# Patient Record
Sex: FEMALE | URBAN - METROPOLITAN AREA
[De-identification: names, ages, dates, MRNs, and addresses within clinical notes are randomized per-mention and may not be internally consistent; named-entity substitution may affect disease eponyms.]

---

## 2023-01-30 ENCOUNTER — INPATIENT (INPATIENT)
Facility: HOSPITAL | Age: 63
LOS: 21 days | Discharge: HOME | DRG: 207 | End: 2023-02-21
Attending: PSYCHIATRY & NEUROLOGY | Admitting: INTERNAL MEDICINE
Payer: MEDICAID

## 2023-01-30 VITALS
TEMPERATURE: 98 F | HEART RATE: 122 BPM | DIASTOLIC BLOOD PRESSURE: 101 MMHG | WEIGHT: 158.73 LBS | HEIGHT: 62 IN | RESPIRATION RATE: 22 BRPM | OXYGEN SATURATION: 91 % | SYSTOLIC BLOOD PRESSURE: 180 MMHG

## 2023-01-30 DIAGNOSIS — R06.02 SHORTNESS OF BREATH: ICD-10-CM

## 2023-01-30 LAB
ALBUMIN SERPL ELPH-MCNC: 4.6 G/DL — SIGNIFICANT CHANGE UP (ref 3.3–5)
ALP SERPL-CCNC: 108 U/L — SIGNIFICANT CHANGE UP (ref 40–120)
ALT FLD-CCNC: 19 U/L — SIGNIFICANT CHANGE UP (ref 10–45)
ANION GAP SERPL CALC-SCNC: 15 MMOL/L — SIGNIFICANT CHANGE UP (ref 5–17)
AST SERPL-CCNC: 25 U/L — SIGNIFICANT CHANGE UP (ref 10–40)
BASE EXCESS BLDV CALC-SCNC: 6.5 MMOL/L — HIGH (ref -2–3)
BASOPHILS # BLD AUTO: 0.05 K/UL — SIGNIFICANT CHANGE UP (ref 0–0.2)
BASOPHILS NFR BLD AUTO: 0.2 % — SIGNIFICANT CHANGE UP (ref 0–2)
BILIRUB SERPL-MCNC: 0.4 MG/DL — SIGNIFICANT CHANGE UP (ref 0.2–1.2)
BUN SERPL-MCNC: 13 MG/DL — SIGNIFICANT CHANGE UP (ref 7–23)
CA-I SERPL-SCNC: 1.16 MMOL/L — SIGNIFICANT CHANGE UP (ref 1.15–1.33)
CALCIUM SERPL-MCNC: 9.5 MG/DL — SIGNIFICANT CHANGE UP (ref 8.4–10.5)
CHLORIDE BLDV-SCNC: 100 MMOL/L — SIGNIFICANT CHANGE UP (ref 96–108)
CHLORIDE SERPL-SCNC: 97 MMOL/L — SIGNIFICANT CHANGE UP (ref 96–108)
CO2 BLDV-SCNC: 35 MMOL/L — HIGH (ref 22–26)
CO2 SERPL-SCNC: 27 MMOL/L — SIGNIFICANT CHANGE UP (ref 22–31)
CREAT SERPL-MCNC: 0.51 MG/DL — SIGNIFICANT CHANGE UP (ref 0.5–1.3)
EGFR: 105 ML/MIN/1.73M2 — SIGNIFICANT CHANGE UP
EOSINOPHIL # BLD AUTO: 0.05 K/UL — SIGNIFICANT CHANGE UP (ref 0–0.5)
EOSINOPHIL NFR BLD AUTO: 0.2 % — SIGNIFICANT CHANGE UP (ref 0–6)
FLUAV AG NPH QL: SIGNIFICANT CHANGE UP
FLUBV AG NPH QL: SIGNIFICANT CHANGE UP
GAS PNL BLDV: 135 MMOL/L — LOW (ref 136–145)
GAS PNL BLDV: SIGNIFICANT CHANGE UP
GLUCOSE BLDV-MCNC: 186 MG/DL — HIGH (ref 70–99)
GLUCOSE SERPL-MCNC: 162 MG/DL — HIGH (ref 70–99)
HCO3 BLDV-SCNC: 33 MMOL/L — HIGH (ref 22–29)
HCT VFR BLD CALC: 45.8 % — HIGH (ref 34.5–45)
HCT VFR BLDA CALC: 45 % — SIGNIFICANT CHANGE UP (ref 34.5–46.5)
HGB BLD CALC-MCNC: 15.1 G/DL — SIGNIFICANT CHANGE UP (ref 11.7–16.1)
HGB BLD-MCNC: 15.2 G/DL — SIGNIFICANT CHANGE UP (ref 11.5–15.5)
IMM GRANULOCYTES NFR BLD AUTO: 0.5 % — SIGNIFICANT CHANGE UP (ref 0–0.9)
LACTATE BLDV-MCNC: 1.3 MMOL/L — SIGNIFICANT CHANGE UP (ref 0.5–2)
LYMPHOCYTES # BLD AUTO: 0.8 K/UL — LOW (ref 1–3.3)
LYMPHOCYTES # BLD AUTO: 3.7 % — LOW (ref 13–44)
MCHC RBC-ENTMCNC: 29.6 PG — SIGNIFICANT CHANGE UP (ref 27–34)
MCHC RBC-ENTMCNC: 33.2 GM/DL — SIGNIFICANT CHANGE UP (ref 32–36)
MCV RBC AUTO: 89.1 FL — SIGNIFICANT CHANGE UP (ref 80–100)
MONOCYTES # BLD AUTO: 0.95 K/UL — HIGH (ref 0–0.9)
MONOCYTES NFR BLD AUTO: 4.4 % — SIGNIFICANT CHANGE UP (ref 2–14)
NEUTROPHILS # BLD AUTO: 19.8 K/UL — HIGH (ref 1.8–7.4)
NEUTROPHILS NFR BLD AUTO: 91 % — HIGH (ref 43–77)
NRBC # BLD: 0 /100 WBCS — SIGNIFICANT CHANGE UP (ref 0–0)
PCO2 BLDV: 55 MMHG — HIGH (ref 39–42)
PH BLDV: 7.39 — SIGNIFICANT CHANGE UP (ref 7.32–7.43)
PLATELET # BLD AUTO: 331 K/UL — SIGNIFICANT CHANGE UP (ref 150–400)
PO2 BLDV: 64 MMHG — HIGH (ref 25–45)
POTASSIUM BLDV-SCNC: 4.2 MMOL/L — SIGNIFICANT CHANGE UP (ref 3.5–5.1)
POTASSIUM SERPL-MCNC: 4.1 MMOL/L — SIGNIFICANT CHANGE UP (ref 3.5–5.3)
POTASSIUM SERPL-SCNC: 4.1 MMOL/L — SIGNIFICANT CHANGE UP (ref 3.5–5.3)
PROT SERPL-MCNC: 8.2 G/DL — SIGNIFICANT CHANGE UP (ref 6–8.3)
RBC # BLD: 5.14 M/UL — SIGNIFICANT CHANGE UP (ref 3.8–5.2)
RBC # FLD: 12.6 % — SIGNIFICANT CHANGE UP (ref 10.3–14.5)
RSV RNA NPH QL NAA+NON-PROBE: SIGNIFICANT CHANGE UP
SAO2 % BLDV: 93.2 % — HIGH (ref 67–88)
SARS-COV-2 RNA SPEC QL NAA+PROBE: DETECTED
SODIUM SERPL-SCNC: 139 MMOL/L — SIGNIFICANT CHANGE UP (ref 135–145)
WBC # BLD: 21.75 K/UL — HIGH (ref 3.8–10.5)
WBC # FLD AUTO: 21.75 K/UL — HIGH (ref 3.8–10.5)

## 2023-01-30 PROCEDURE — 99291 CRITICAL CARE FIRST HOUR: CPT

## 2023-01-30 PROCEDURE — 71045 X-RAY EXAM CHEST 1 VIEW: CPT | Mod: 26

## 2023-01-30 RX ORDER — PYRIDOSTIGMINE BROMIDE 60 MG/5ML
120 SOLUTION ORAL ONCE
Refills: 0 | Status: DISCONTINUED | OUTPATIENT
Start: 2023-01-30 | End: 2023-01-30

## 2023-01-30 RX ORDER — SODIUM CHLORIDE 9 MG/ML
500 INJECTION INTRAMUSCULAR; INTRAVENOUS; SUBCUTANEOUS ONCE
Refills: 0 | Status: COMPLETED | OUTPATIENT
Start: 2023-01-30 | End: 2023-01-30

## 2023-01-30 RX ADMIN — SODIUM CHLORIDE 500 MILLILITER(S): 9 INJECTION INTRAMUSCULAR; INTRAVENOUS; SUBCUTANEOUS at 19:07

## 2023-01-30 NOTE — ED PROVIDER NOTE - PHYSICAL EXAMINATION
GENERAL: no acute distress, non-toxic appearing  HEAD: normocephalic, atraumatic  HEENT: PERRLA, EOMI, normal conjunctiva  CARDIAC: regular rate and rhythm, no appreciable murmurs  PULM: diminished breath sounds right side, no crackles, no wheezing, no acute respiratory distress  GI: abdomen nondistended, soft, nontender  NEURO: alert and oriented x 3, normal speech, no gross neurologic deficit  MSK: no visible deformities, no peripheral edema, calf tenderness/redness/swelling  SKIN: no visible rashes, dry, well-perfused  PSYCH: appropriate mood and affect

## 2023-01-30 NOTE — CONSULT NOTE ADULT - ASSESSMENT
62y (1960) Malayalam-speaking woman with a PMHx significant for myasthenia gravis who presents to the ED with SOB for 3 days after testing positive for COVID-19 at home. She is currently on prednisone 10mg PRN, which she has taken for the past 4 days, and pyridostigmine 2 tablets of 60mg TID. She has had prior hospitalizations for myasthenia exacerbations every 1-2 years, with last hospitalization 1.5 years ago. Each time she had an exacerbation she was treated with IVIG. She reportedly discussed PLEX as an option but preferred IVIG and states she typically starts to improve after 3-4 days. In the ED, NIF/VC were -20/.68L. Physical exam remarkable for ophthalmoplegia, R ptosis>L, nasal speech, proximal>distal weakness, and SOB requiring 2L O2 via NC.    Impression: Worsening SOB and weakness due to myasthenia gravis exacerbation in the setting of COVID-19.    Recommendations:  [] MICU consult as patient would benefit from close observation of respiratory status in the ICU  [] Would recommend Shiley placement for at least 5 sessions of PLEX. Discussed at length the risk and benefits of PLEX vs IVIG given patient's current presentation, including risk for intubation and tracheostomy if no significant improvement with IVIG, however patient verbalized understanding and would prefer to proceed with IVIG. If patient insists on doing IVIG would plan for at least 5 sessions daily.  [] Hold prednisone and pyridostigmine during acute exacerbation  [] Avoid medications that may worsen the current exacerbation including macrolides, fluoroquinolones, tetracyclines, aminoglycoside, beta-blockers, magnesium, and anti-arrhythmics (procainamide, quinidine, and lidocaine)  [] NIF/VC q4h  [] Neuro checks and vital signs per unit protocol  [] Speech and swallow evaluation  [] PT/OT when able  [] Fall precautions    Case seen and discussed with neurology attending Dr. Whyte.     62y (1960) Malayalam-speaking woman with a PMHx significant for myasthenia gravis who presents to the ED with SOB for 3 days after testing positive for COVID-19 at home. She is currently on prednisone 10mg PRN, which she has taken for the past 4 days, and pyridostigmine 2 tablets of 60mg TID. She has had prior hospitalizations for myasthenia exacerbations every 1-2 years, with last hospitalization 1.5 years ago. Each time she had an exacerbation she was treated with IVIG. She reportedly discussed PLEX as an option but preferred IVIG and states she typically starts to improve after 3-4 days. In the ED, NIF/VC were -20/.68L. Physical exam remarkable for ophthalmoplegia, R ptosis>L, nasal speech, proximal>distal weakness, and SOB requiring 2L O2 via NC.    Impression: Worsening SOB and weakness due to myasthenia gravis exacerbation in the setting of COVID-19.    Recommendations:  [] MICU consult as patient would benefit from close observation of respiratory status in the ICU  [] Would recommend Shiley placement for at least 5 sessions of PLEX. Discussed at length the risk and benefits of PLEX vs IVIG given patient's current presentation, including risk for intubation and tracheostomy if no significant improvement with IVIG, however patient verbalized understanding and would prefer to proceed with IVIG. If patient insists on doing IVIG would plan for at least 5 sessions daily for a total of 2g/kg.  [] Hold prednisone and pyridostigmine during acute exacerbation  [] Avoid medications that may worsen the current exacerbation including macrolides, fluoroquinolones, tetracyclines, aminoglycoside, beta-blockers, magnesium, and anti-arrhythmics (procainamide, quinidine, and lidocaine)  [] NIF/VC q4h  [] Neuro checks and vital signs per unit protocol  [] Speech and swallow evaluation  [] PT/OT when able  [] Fall precautions    Case seen and discussed with neurology attending Dr. Whyte.     62y (1960) Malayalam-speaking woman with a PMHx significant for myasthenia gravis who presents to the ED with SOB for 3 days after testing positive for COVID-19 at home. She is currently on prednisone 10mg PRN, which she has taken for the past 4 days, and pyridostigmine 2 tablets of 60mg TID. She has had prior hospitalizations for myasthenia exacerbations every 1-2 years, with last hospitalization 1.5 years ago. Each time she had an exacerbation she was treated with IVIG. She reportedly discussed PLEX as an option but preferred IVIG and states she typically starts to improve after 3-4 days. In the ED, NIF/VC were -20/.68L. Physical exam remarkable for ophthalmoplegia, R ptosis>L, nasal speech, proximal>distal weakness, and SOB requiring 2L O2 via NC.    Impression: Worsening SOB and weakness due to myasthenia gravis exacerbation in the setting of COVID-19.    Recommendations:  [] MICU consult as patient would benefit from close observation of respiratory status in the ICU  [] Would recommend Shiley placement for at least 5 sessions of PLEX. Discussed at length the risk and benefits of PLEX vs IVIG given patient's current presentation, including risk for intubation and tracheostomy if no significant improvement with IVIG, however patient verbalized understanding and would prefer to proceed with IVIG. If patient insists on doing IVIG would plan for 5 sessions daily for a total of 2g/kg.  [] Hold prednisone and pyridostigmine during acute exacerbation  [] Avoid medications that may worsen the current exacerbation including macrolides, fluoroquinolones, tetracyclines, aminoglycoside, beta-blockers, magnesium, and anti-arrhythmics (procainamide, quinidine, and lidocaine)  [] NIF/VC q4h  [] Neuro checks and vital signs per unit protocol  [] Speech and swallow evaluation  [] PT/OT when able  [] Fall precautions    Case seen and discussed with neurology attending Dr. Whyte.

## 2023-01-30 NOTE — PROGRESS NOTE ADULT - ASSESSMENT
ASSESSMENT/PLAN:    NEURO:  Activity: [] mobilize as tolerated [] Bedrest [] PT [] OT [] PMNR    PULM:    CV:  SBP goal    RENAL:  Fluids:    GI:  Diet:  GI prophylaxis [] not indicated [] PPI [] other:  Bowel regimen [] colace [] senna [] other:    ENDO:   Goal euglycemia (-180)    HEME/ONC:  VTE prophylaxis: [] SCDs [] chemoprophylaxis [] hold chemoprophylaxis due to: [] high risk of DVT/PE on admission due to:    ID:    MISC:    SOCIAL/FAMILY:  [] awaiting [] updated at bedside [] family meeting    CODE STATUS:  [] Full Code [] DNR [] DNI [] Palliative/Comfort Care    DISPOSITION:  [] ICU [] Stroke Unit [] Floor [] EMU [] RCU [] PCU    [] Patient is at high risk of neurologic deterioration/death due to:     Time seen:  Time spent: ___ [] critical care minutes    Contact: [phone] ASSESSMENT/PLAN:    NEURO:  Activity: [] mobilize as tolerated [] Bedrest [] PT [] OT [] PMNR    PULM:    CV:  SBP goal    RENAL:  Fluids:    GI:  Diet:  GI prophylaxis [] not indicated [] PPI [] other:  Bowel regimen [] colace [] senna [] other:    ENDO:   Goal euglycemia (-180)    HEME/ONC:  VTE prophylaxis: [] SCDs [] chemoprophylaxis [] hold chemoprophylaxis due to: [] high risk of DVT/PE on admission due to:    ID:    MISC:    SOCIAL/FAMILY:  [] awaiting [] updated at bedside [] family meeting    CODE STATUS:  [] Full Code [] DNR [] DNI [] Palliative/Comfort Care    DISPOSITION:  [] ICU [] Stroke Unit [] Floor [] EMU [] RCU [] PCU    [] Patient is at high risk of neurologic deterioration/death due to:     Time seen:  Time spent: ___ [] critical care minutes    Contact: [phone] ASSESSMENT/PLAN:    NEURO:  Activity: [] mobilize as tolerated [] Bedrest [] PT [] OT [] PMNR    PULM:    CV:  SBP goal    RENAL:  Fluids:    GI:  Diet:  GI prophylaxis [] not indicated [] PPI [] other:  Bowel regimen [] colace [] senna [] other:    ENDO:   Goal euglycemia (-180)    HEME/ONC:  VTE prophylaxis: [] SCDs [] chemoprophylaxis [] hold chemoprophylaxis due to: [] high risk of DVT/PE on admission due to:    ID:    MISC:    SOCIAL/FAMILY:  [] awaiting [] updated at bedside [] family meeting    CODE STATUS:  [] Full Code [] DNR [] DNI [] Palliative/Comfort Care    DISPOSITION:  [] ICU [] Stroke Unit [] Floor [] EMU [] RCU [] PCU    [] Patient is at high risk of neurologic deterioration/death due to:     Time seen:  Time spent: ___ [] critical care minutes    Contact: [phone] ASSESSMENT/PLAN: MG crisis, covid +    NEURO:   neurology consulted, reportedly had discussion with Patient regarding PLEX, patient refused  start IVIG not   Hold prednisone and pyridostigmine during acute exacerbation  Avoid medications that may worsen the current exacerbation including macrolides, fluoroquinolones, tetracyclines, aminoglycoside, beta-blockers, magnesium, and anti-arrhythmics (procainamide, quinidine, and lidocaine)  NIF/VC q4h--not able as on BiPAP   neurochecks Q2 hrs   Activity: [] mobilize as tolerated [x] Bedrest [] PT [] OT [] PMNR    PULM:  severe respiratory acidosis improved with BiPAP  if persistent/no further improvement, intubate     CV:  SBP goal 100-160  nicardipine gtt prn     RENAL:  Fluids: IVF    GI:  Diet: NPO   GI prophylaxis [x] not indicated [] PPI [] other:  Bowel regimen [] colace [x] senna [x] other: miralax     ENDO:   Goal euglycemia (-180)  RISS for now      HEME/ONC:  VTE prophylaxis: [x] SCDs [x] chemoprophylaxis  lovenox [] hold chemoprophylaxis due to: [] high risk of DVT/PE on admission due to:    ID: covid + hold off decadron given MG exac, supportive care for now  monitor for fevers     MISC:    SOCIAL/FAMILY:  [] awaiting [x] updated at bedside [] family meeting    CODE STATUS:  [x] Full Code [] DNR [] DNI [] Palliative/Comfort Care    DISPOSITION:  [x] ICU [] Stroke Unit [] Floor [] EMU [] RCU [] PCU    [x] Patient is at high risk of neurologic deterioration/death due to: MG crisis, hypercapnic respiratory failure       Contact: [phone] ASSESSMENT/PLAN: MG crisis, covid +    NEURO:   neurology consulted, reportedly had discussion with Patient regarding PLEX, patient refused  start IVIG not   Hold prednisone and pyridostigmine during acute exacerbation  Avoid medications that may worsen the current exacerbation including macrolides, fluoroquinolones, tetracyclines, aminoglycoside, beta-blockers, magnesium, and anti-arrhythmics (procainamide, quinidine, and lidocaine)  NIF/VC q4h--not able as on BiPAP   neurochecks Q2 hrs   Activity: [] mobilize as tolerated [x] Bedrest [] PT [] OT [] PMNR    PULM:  severe respiratory acidosis improved with BiPAP  if persistent/no further improvement, intubate     CV:  SBP goal 100-160  nicardipine gtt prn     RENAL:  Fluids: IVF    GI:  Diet: NPO   GI prophylaxis [x] not indicated [] PPI [] other:  Bowel regimen [] colace [x] senna [x] other: miralax     ENDO:   Goal euglycemia (-180)  RISS for now      HEME/ONC:  VTE prophylaxis: [x] SCDs [x] chemoprophylaxis  lovenox [] hold chemoprophylaxis due to: [] high risk of DVT/PE on admission due to:    ID: covid + hold off decadron given MG exac, supportive care for now  monitor for fevers     MISC:    SOCIAL/FAMILY:  [] awaiting [x] updated at bedside [] family meeting    CODE STATUS:  [x] Full Code [] DNR [] DNI [] Palliative/Comfort Care    DISPOSITION:  [x] ICU [] Stroke Unit [] Floor [] EMU [] RCU [] PCU    [x] Patient is at high risk of neurologic deterioration/death due to: MG crisis, hypercapnic respiratory failure       Contact: [phone] ASSESSMENT/PLAN: MG crisis, covid +    NEURO:   neurology consulted, reportedly had discussion with Patient regarding PLEX, patient refused  start IVIG not   Hold prednisone and pyridostigmine during acute exacerbation  Avoid medications that may worsen the current exacerbation including macrolides, fluoroquinolones, tetracyclines, aminoglycoside, beta-blockers, magnesium, and anti-arrhythmics (procainamide, quinidine, and lidocaine)  NIF/VC q4h--not able as on BiPAP   neurochecks Q2 hrs   Activity: [] mobilize as tolerated [x] Bedrest [] PT [] OT [] PMNR    PULM:  severe respiratory acidosis improved with BiPAP  if persistent/no further improvement, intubate     CV:  SBP goal 100-160  nicardipine gtt prn     RENAL:  Fluids: IVF    GI:  Diet: NPO   GI prophylaxis [x] not indicated [] PPI [] other:  Bowel regimen [] colace [x] senna [x] other: miralax     ENDO:   Goal euglycemia (-180)  RISS for now      HEME/ONC:  VTE prophylaxis: [x] SCDs [x] chemoprophylaxis  lovenox [] hold chemoprophylaxis due to: [] high risk of DVT/PE on admission due to:    ID: covid + hold off decadron given MG exac, supportive care for now  monitor for fevers     MISC:    SOCIAL/FAMILY:  [] awaiting [x] updated at bedside [] family meeting    CODE STATUS:  [x] Full Code [] DNR [] DNI [] Palliative/Comfort Care    DISPOSITION:  [x] ICU [] Stroke Unit [] Floor [] EMU [] RCU [] PCU    [x] Patient is at high risk of neurologic deterioration/death due to: MG crisis, hypercapnic respiratory failure       Contact: [phone]

## 2023-01-30 NOTE — CONSULT NOTE ADULT - ATTENDING COMMENTS
Patient seen and examined.  Agree with resident note as above.  Patient with hx as noted including DM2 on metformin and myasthenia gravis maintained on pyridostigmine who presented with worsening dyspnea and difficulty swallowing in the setting of covid intection (tested + at home and  is covid +).      On exam by attending, patient complained of worsening dyspnea and difficulty swallowing over the course of today.  Attempting swallow frequently, spitting in order to tolerate secretions.  Reports orthopnea.  Other MICU team members note worsening subjective dyspnea and lid lag since initial exam. Vitals stable on 2L via NC but NIF and VC are very low.  VBG shows chronic hypercarbia (no evidence of acute hypercarbia at the time of collection).    Given chronic hypercarbia, low respiratory parameters, worsening sx during the day, difficulty managing secretions, I assessed that patient would be best served with critical care monitoring and management.  Discussed with NSCU and patient will be triaged to neurocritical care service.  Will defer all decisions to NSCU.

## 2023-01-30 NOTE — ED PROVIDER NOTE - ATTENDING CONTRIBUTION TO CARE
I, Franklin Weber, performed a history and physical exam of the patient and discussed their management with the resident and/or advanced care provider. I reviewed the resident and/or advanced care provider's note and agree with the documented findings and plan of care. I was present and available for all procedures.    62y F PMHx DM, MG (on pyridostigmine, hospital admission every 1-2 years for IVIG), p/w SOB, orthopnea, Covid+ test at home This morning. Reports orthopnea last night at home.  Worsening shortness of breath this morning so came to the ER for evaluation. On prednisone 10 mg daily. Denies recent trauma, chills, headache, dizziness, nausea, vomiting, dysuria, freq, hematuria, diarrhea, constipation, shortness of breath, cough.    Well appearing and in NAD, head normal appearing atraumatic, trachea midline, tachypnea mild respiratory distress, lungs poor lung volumes but cta bilaterally, rrr no murmurs, soft NT ND abdomen, no visible extremity deformities, Alert and oriented, non focal neuro exam, skin warm and dry, normal affect and mood    Concerning for acute myasthenic crisis will obtain negative inspiratory force as well as forced vital capacity to prognosticate otherwise obtain screening labs close monitoring for respiratory collapse discussions with ICU and neurology for further disposition discussed with patient agreeable for plan likely related to COVID related illness complicated by myasthenia.  Unlikely ACS PE pneumothorax dissection AAA pneumonia anticipate admission possible to ICU

## 2023-01-30 NOTE — PROGRESS NOTE ADULT - SUBJECTIVE AND OBJECTIVE BOX
SUMMARY:    OVERNIGHT EVENTS:     ADMISSION SCORES:   GCS: HH: MF: NIHSS: ICH Score:    SEDATION SCORES:  RASS: CAM-ICU:     REVIEW OF SYSTEMS:    VITALS: [] Reviewed    IMAGING/DATA: [] Reviewed    IVF FLUIDS/MEDICATIONS: [] Reviewed    ALLERGIES: Allergies    No Known Allergies    Intolerances        DEVICES:   [] Restraints [] PIVs [] ET tube [] central line [] PICC [] arterial line [] morales [] NGT/OGT [] EVD [] LD [] MYAH/HMV [] LiCOX [] ICP monitor [] Trach [] PEG [] Chest Tube [] other:    EXAMINATION:  General: No acute distress  HEENT: Anicteric sclerae  Cardiac: [id number]  Lungs: Clear  Abdomen: Soft, non-tender, +BS  Extremities: No c/c/e  Skin/Incision Site: Clean, dry and intact  Neurologic: Awake, alert, fully oriented, follows commands, PERRL, VFFtc, EOMI, face symmetric, tongue midline, no drift, full strength SUMMARY:    OVERNIGHT EVENTS:     ADMISSION SCORES:   GCS: HH: MF: NIHSS: ICH Score:    SEDATION SCORES:  RASS: CAM-ICU:     REVIEW OF SYSTEMS:    VITALS: [] Reviewed    IMAGING/DATA: [] Reviewed    IVF FLUIDS/MEDICATIONS: [] Reviewed    ALLERGIES: Allergies    No Known Allergies    Intolerances        DEVICES:   [] Restraints [] PIVs [] ET tube [] central line [] PICC [] arterial line [] morales [] NGT/OGT [] EVD [] LD [] MAYH/HMV [] LiCOX [] ICP monitor [] Trach [] PEG [] Chest Tube [] other:    EXAMINATION:  General: No acute distress  HEENT: Anicteric sclerae  Cardiac: [id number]  Lungs: Clear  Abdomen: Soft, non-tender, +BS  Extremities: No c/c/e  Skin/Incision Site: Clean, dry and intact  Neurologic: Awake, alert, fully oriented, follows commands, PERRL, VFFtc, EOMI, face symmetric, tongue midline, no drift, full strength SUMMARY:    OVERNIGHT EVENTS:     ADMISSION SCORES:   GCS: HH: MF: NIHSS: ICH Score:    SEDATION SCORES:  RASS: CAM-ICU:     REVIEW OF SYSTEMS:    VITALS: [] Reviewed    IMAGING/DATA: [] Reviewed    IVF FLUIDS/MEDICATIONS: [] Reviewed    ALLERGIES: Allergies    No Known Allergies    Intolerances        DEVICES:   [] Restraints [] PIVs [] ET tube [] central line [] PICC [] arterial line [] morales [] NGT/OGT [] EVD [] LD [] MYAH/HMV [] LiCOX [] ICP monitor [] Trach [] PEG [] Chest Tube [] other:    EXAMINATION:  General: No acute distress  HEENT: Anicteric sclerae  Cardiac: [id number]  Lungs: Clear  Abdomen: Soft, non-tender, +BS  Extremities: No c/c/e  Skin/Incision Site: Clean, dry and intact  Neurologic: Awake, alert, fully oriented, follows commands, PERRL, VFFtc, EOMI, face symmetric, tongue midline, no drift, full strength SUMMARY: 61yo Malayalam-speaking woman w/ Myasthenia Gravis (on pyridostigmine, hospitalization every 1-2 years requiring IVIG) (dx 2015), s/p thymectomy (2010), T2DM who presents with SOB, cough, difficulty spitting up sputum x 4 days.  was sick with COVID, so son performed home COVID test for patient which was positive. Patient's primary complaint is SOB when lying down. She has been sleeping upright. She had difficulty coughing up and spitting out sputum but today these symptoms are slightly improved. Also reports difficulty swallowing - but was able to tolerate taking oral pills. Admits to difficulty looking up, but eye drooping is overall improved from past couple of months. No blurry vision. Patient takes pyridostigmine 120mg TID regularly. Patient also has prednisone PRN for sick days-- she has been taking 20mg prednisone x past 4 days as per instruction of her neurologist.     Of note, last hospitalization was 2 years ago at [facility] during which patient was intubated -- son notes that this was likely 2/2 patient being forced to lie down flat in hospital bed, leading to aspiration. She was extubated after ~3 days. Received IVIG during that hospitalization    OVERNIGHT EVENTS: Adm NSCU, upon arrival to the unit, appeared in acute respiratory distress tachypneic, hypertensive, tachycardic, hypoxic, ABG showing resp acidosis, immediately put on BiPAP, started on IVIG with some improvement     ADMISSION SCORES:   GCS: 14 HH: MF: NIHSS: ICH Score:    REVIEW OF SYSTEMS: [] Unable to Assess due to neurologic exam   [ x] All ROS addressed below are non-contributory, except:  Neuro: [ ] Headache [ ] Back pain [ ] Numbness [ ] Weakness [ ] Ataxia [ ] Dizziness [ ] Aphasia [ ] Dysarthria [ ] Visual disturbance  Resp: [ x] Shortness of breath/dyspnea [ ] Orthopnea [ ] Cough  CV: [ ] Chest pain [ ] Palpitation [ ] Lightheadedness [ ] Syncope  Renal: [ ] Thirst [ ] Edema  GI: [ ] Nausea [ ] Emesis [ ] Abdominal pain [ ] Constipation [ ] Diarrhea  Hem: [ ] Hematemesis [ ] bBright red blood per rectum  ID: [ ] Fever [ ] Chills [ ] Dysuria  ENT: [ ] Rhinorrhea    VITALS: [x] Reviewed    IMAGING/DATA: [x] Reviewed    IVF FLUIDS/MEDICATIONS: [x] Reviewed    ALLERGIES: Allergies    No Known Allergies    Intolerances      DEVICES:   [] Restraints [x] PIVs [] ET tube [] central line [] PICC [] arterial line [] morales [] NGT/OGT [] EVD [] LD [] MYAH/HMV [] LiCOX [] ICP monitor [] Trach [] PEG [] Chest Tube [] other:    EXAMINATION:  General: distress  HEENT: Anicteric sclerae  Cardiac: [id number] tachycardic  Lungs: diminished   Abdomen: Soft, non-tender, +BS  Neurologic: Awake, alert, oriented x3 with , follows commands, PERRL, EOMI, face symmetric, TANNER to command SUMMARY: 61yo Malayalam-speaking woman w/ Myasthenia Gravis (on pyridostigmine, hospitalization every 1-2 years requiring IVIG) (dx 2015), s/p thymectomy (2010), T2DM who presents with SOB, cough, difficulty spitting up sputum x 4 days.  was sick with COVID, so son performed home COVID test for patient which was positive. Patient's primary complaint is SOB when lying down. She has been sleeping upright. She had difficulty coughing up and spitting out sputum but today these symptoms are slightly improved. Also reports difficulty swallowing - but was able to tolerate taking oral pills. Admits to difficulty looking up, but eye drooping is overall improved from past couple of months. No blurry vision. Patient takes pyridostigmine 120mg TID regularly. Patient also has prednisone PRN for sick days-- she has been taking 20mg prednisone x past 4 days as per instruction of her neurologist.     Of note, last hospitalization was 2 years ago at [facility] during which patient was intubated -- son notes that this was likely 2/2 patient being forced to lie down flat in hospital bed, leading to aspiration. She was extubated after ~3 days. Received IVIG during that hospitalization    OVERNIGHT EVENTS: Adm NSCU, upon arrival to the unit, appeared in acute respiratory distress tachypneic, hypertensive, tachycardic, hypoxic, ABG showing resp acidosis, immediately put on BiPAP, started on IVIG with some improvement     ADMISSION SCORES:   GCS: 14 HH: MF: NIHSS: ICH Score:    REVIEW OF SYSTEMS: [] Unable to Assess due to neurologic exam   [ x] All ROS addressed below are non-contributory, except:  Neuro: [ ] Headache [ ] Back pain [ ] Numbness [ ] Weakness [ ] Ataxia [ ] Dizziness [ ] Aphasia [ ] Dysarthria [ ] Visual disturbance  Resp: [ x] Shortness of breath/dyspnea [ ] Orthopnea [ ] Cough  CV: [ ] Chest pain [ ] Palpitation [ ] Lightheadedness [ ] Syncope  Renal: [ ] Thirst [ ] Edema  GI: [ ] Nausea [ ] Emesis [ ] Abdominal pain [ ] Constipation [ ] Diarrhea  Hem: [ ] Hematemesis [ ] bBright red blood per rectum  ID: [ ] Fever [ ] Chills [ ] Dysuria  ENT: [ ] Rhinorrhea    VITALS: [x] Reviewed    IMAGING/DATA: [x] Reviewed    IVF FLUIDS/MEDICATIONS: [x] Reviewed    ALLERGIES: Allergies    No Known Allergies    Intolerances      DEVICES:   [] Restraints [x] PIVs [] ET tube [] central line [] PICC [] arterial line [] morales [] NGT/OGT [] EVD [] LD [] MYAH/HMV [] LiCOX [] ICP monitor [] Trach [] PEG [] Chest Tube [] other:    EXAMINATION:  General: distress  HEENT: Anicteric sclerae  Cardiac: [id number] tachycardic  Lungs: diminished   Abdomen: Soft, non-tender, +BS  Neurologic: Awake, alert, oriented x3 with , follows commands, PERRL, EOMI, face symmetric, TANNER to command SUMMARY: 63yo Malayalam-speaking woman w/ Myasthenia Gravis (on pyridostigmine, hospitalization every 1-2 years requiring IVIG) (dx 2015), s/p thymectomy (2010), T2DM who presents with SOB, cough, difficulty spitting up sputum x 4 days.  was sick with COVID, so son performed home COVID test for patient which was positive. Patient's primary complaint is SOB when lying down. She has been sleeping upright. She had difficulty coughing up and spitting out sputum but today these symptoms are slightly improved. Also reports difficulty swallowing - but was able to tolerate taking oral pills. Admits to difficulty looking up, but eye drooping is overall improved from past couple of months. No blurry vision. Patient takes pyridostigmine 120mg TID regularly. Patient also has prednisone PRN for sick days-- she has been taking 20mg prednisone x past 4 days as per instruction of her neurologist.     Of note, last hospitalization was 2 years ago at [facility] during which patient was intubated -- son notes that this was likely 2/2 patient being forced to lie down flat in hospital bed, leading to aspiration. She was extubated after ~3 days. Received IVIG during that hospitalization    OVERNIGHT EVENTS: Adm NSCU, upon arrival to the unit, appeared in acute respiratory distress tachypneic, hypertensive, tachycardic, hypoxic, ABG showing resp acidosis, immediately put on BiPAP, started on IVIG with some improvement     ADMISSION SCORES:   GCS: 14 HH: MF: NIHSS: ICH Score:    REVIEW OF SYSTEMS: [] Unable to Assess due to neurologic exam   [ x] All ROS addressed below are non-contributory, except:  Neuro: [ ] Headache [ ] Back pain [ ] Numbness [ ] Weakness [ ] Ataxia [ ] Dizziness [ ] Aphasia [ ] Dysarthria [ ] Visual disturbance  Resp: [ x] Shortness of breath/dyspnea [ ] Orthopnea [ ] Cough  CV: [ ] Chest pain [ ] Palpitation [ ] Lightheadedness [ ] Syncope  Renal: [ ] Thirst [ ] Edema  GI: [ ] Nausea [ ] Emesis [ ] Abdominal pain [ ] Constipation [ ] Diarrhea  Hem: [ ] Hematemesis [ ] bBright red blood per rectum  ID: [ ] Fever [ ] Chills [ ] Dysuria  ENT: [ ] Rhinorrhea    VITALS: [x] Reviewed    IMAGING/DATA: [x] Reviewed    IVF FLUIDS/MEDICATIONS: [x] Reviewed    ALLERGIES: Allergies    No Known Allergies    Intolerances      DEVICES:   [] Restraints [x] PIVs [] ET tube [] central line [] PICC [] arterial line [] morales [] NGT/OGT [] EVD [] LD [] MYAH/HMV [] LiCOX [] ICP monitor [] Trach [] PEG [] Chest Tube [] other:    EXAMINATION:  General: distress  HEENT: Anicteric sclerae  Cardiac: [id number] tachycardic  Lungs: diminished   Abdomen: Soft, non-tender, +BS  Neurologic: Awake, alert, oriented x3 with , follows commands, PERRL, EOMI, face symmetric, TANNER to command

## 2023-01-30 NOTE — ED ADULT NURSE NOTE - OBJECTIVE STATEMENT
Pt presented to the ED in NAD, A&O x 4, breathing with ease on room air, c/o SOB, difficulty swallowing and generalized weakness. Pt tested Covid+ at home.

## 2023-01-30 NOTE — CONSULT NOTE ADULT - ASSESSMENT
#Myasthenia gravis exacerbation likely d/t COVID infection  - Recommend repeat NIF/Vital Capacity  - Steroids:  -  #Myasthenia gravis exacerbation likely d/t COVID infection  - NIF/Vital Capacity q4h  - f/u Neuro recs - hold pyridostigmine and steroid iso acute exacerbation  - Patient declines PLEX  - Should start IVIG  - Should be monitored in ICU setting - neuro crit care unit vs. MICU, TBD    Discussed with Dr. Bauman

## 2023-01-30 NOTE — CONSULT NOTE ADULT - CRITICAL CARE ATTENDING COMMENT
HPI as per resident note, personally verified by me. Patient with worsening of respiration, speech, and weakness for the past 3-4 days since being diagnosed with COVID. She discussed with her outside neurologist and had increased her outpatient prednisone from 10mg daily to 20mg daily but symptoms still worsened. Also reported some dysphagia but can still swallow pills. She has had her prior work-up and treatment at [facility] and her last exacerbation was ~1.5 years ago. She has had PNA and had to be briefly intubated in the past but was able to be extubated without tracheostomy. She is very reticent against PLEX as she does not want Shiley placed but has had experience with IVIG and is willing to try that. VC .68 L and NIF -20 cm H20.    Neurologic exam as per resident note with additions as below:  AAO x3, speech very nasal and moderately hypophonic but fluent  CN's II-XII intact except for moderate to severe R > L ptosis, dysconjugate with poor upgaze at baseline, NF 3/5, NE 3+/5  Strength BUE's deltoid 3/5 -> disal 4+/5, BLE's with IP 4/5 and R DF/PF 4/5, L DF/PF 4+/5  Sens intact all  Coordination grossly appropriate for level of strength  Neutral b/l plantar response    A/P:  Myasthenia gravis (MG) exacerbation with weakness, ptosis, and acute respiratory failure  COVID-19 infection    - Likely MG exacerbation/crisis triggered by current COVID-19 infection. Her VC and NIF are low and she has increased risk of intubation and will require ICU level of monitoring, at least for now  - Had extensive talk with patient and son regarding treatment and our recommendation for PLEX given significant respiratory involvement and wanted to avoid intubation. However, they were insistent upon IVIG. Would give IVIG 0.4g/kg/day x5 days and premedicate with Tylenol, Benadryl, and IV fluids  - VC and NIF [id number]  - Hold home Mestinon and steroids for now  - PT/OT/ST evaluation  - Continue to address above medical issues, as you are doing  - Will continue to follow patient with you HPI as per resident note, personally verified by me. Patient with worsening of respiration, speech, and weakness for the past 3-4 days since being diagnosed with COVID. She discussed with her outside neurologist and had increased her outpatient prednisone from 10mg daily to 20mg daily but symptoms still worsened. Also reported some dysphagia but can still swallow pills. She has had her prior work-up and treatment at [facility] and her last exacerbation was ~1.5 years ago. She has had PNA and had to be briefly intubated in the past but was able to be extubated without tracheostomy. She is very reticent against PLEX as she does not want Shiley placed but has had experience with IVIG and is willing to try that. VC .68 L and NIF -20 cm H20.    Neurologic exam as per resident note with additions as below:  AAO x3, speech very nasal and moderately hypophonic but fluent  CN's II-XII intact except for moderate to severe R > L ptosis, dysconjugate with poor upgaze at baseline, NF 3/5, NE 3+/5  Strength BUE's deltoid 3/5 -> disal 4+/5, BLE's with IP 4/5 and R DF/PF 4/5, L DF/PF 4+/5  Sens intact all  Coordination grossly appropriate for level of strength  Neutral b/l plantar response    A/P:  Myasthenia gravis (MG) exacerbation with weakness, ptosis, and acute respiratory failure  COVID-19 infection    - Likely MG exacerbation/crisis triggered by current COVID-19 infection. Her VC and NIF are low and she has increased risk of intubation and will require ICU level of monitoring, at least for now  - Had extensive talk with patient and son regarding treatment and our recommendation for PLEX given significant respiratory involvement and wanted to avoid intubation. However, they were insistent upon IVIG. Would give IVIG 0.4g/kg/day x5 days and premedicate with Tylenol, Benadryl, and IV fluids  - VC and NIF [id number]  - Hold home Mestinon and steroids for now  - PT/OT/ST evaluation  - Continue to address above medical issues, as you are doing  - Will continue to follow patient with you HPI as per resident note, personally verified by me. Patient with worsening of respiration, speech, and weakness for the past 3-4 days since being diagnosed with COVID. She discussed with her outside neurologist and had increased her outpatient prednisone from 10mg daily to 20mg daily but symptoms still worsened. Also reported some dysphagia but can still swallow pills. She has had her prior work-up and treatment at [facility] and her last exacerbation was ~1.5 years ago. She has had PNA and had to be briefly intubated in the past but was able to be extubated without tracheostomy. She is very reticent against PLEX as she does not want Shiley placed but has had experience with IVIG and is willing to try that. VC .68 L and NIF -20 cm H20.    Neurologic exam as per resident note with additions as below:  AAO x3, speech very nasal and moderately hypophonic but fluent  CN's II-XII intact except for moderate to severe R > L ptosis, dysconjugate with poor upgaze at baseline, NF 3/5, NE 3+/5  Strength BUE's deltoid 3/5 -> disal 4+/5, BLE's with IP 4/5 and R DF/PF 4/5, L DF/PF 4+/5  Sens intact all  Coordination grossly appropriate for level of strength  Neutral b/l plantar response    A/P:  Myasthenia gravis (MG) exacerbation with weakness, ptosis, and acute respiratory failure  COVID-19 infection    - Likely MG exacerbation/crisis triggered by current COVID-19 infection. Her VC and NIF are low and she has increased risk of intubation and will require ICU level of monitoring, at least for now  - Had extensive talk with patient and son regarding treatment and our recommendation for PLEX given significant respiratory involvement and wanted to avoid intubation. However, they were insistent upon IVIG. Would give IVIG 0.4g/kg/day x5 days and premedicate with Tylenol, Benadryl, and IV fluids  - VC and NIF [id number]  - Hold home Mestinon and steroids for now  - PT/OT/ST evaluation  - Continue to address above medical issues, as you are doing  - Will continue to follow patient with you

## 2023-01-30 NOTE — ED PROVIDER NOTE - CHILD ABUSE FACILITY
[facility]

## 2023-01-30 NOTE — ED ADULT NURSE NOTE - NSIMPLEMENTINTERV_GEN_ALL_ED
Implemented All Universal Safety Interventions:  [place] to call system. Call bell, personal items and telephone within reach. Instruct patient to call for assistance. Room bathroom lighting operational. Non-slip footwear when patient is off stretcher. Physically safe environment: no spills, clutter or unnecessary equipment. Stretcher in lowest position, wheels locked, appropriate side rails in place. Implemented All Universal Safety Interventions:  [place] to call system. Call bell, personal items and telephone within reach. Instruct patient to call for assistance. Room bathroom lighting operational. Non-slip footwear when patient is off stretcher. Physically safe environment: no spills, clutter or unnecessary equipment. Stretcher in lowest position, wheels locked, appropriate side rails in place. Implemented All Universal Safety Interventions:  [place] to call system. Call bell, personal items and telephone within reach. Instruct patient to call for assistance. Room bathroom lighting operational. Non-slip footwear when patient is off stretcher. Physically safe environment: no spills, clutter or unnecessary equipment. Stretcher in lowest position, wheels locked, appropriate side rails in place.

## 2023-01-30 NOTE — ED ADULT NURSE REASSESSMENT NOTE - NS ED NURSE REASSESS COMMENT FT1
Report received from RADHA Slaughter. Pt is A&OX4 and is neurologically intact. Pt able to follow commands.  is at bedside. Pt is on the monitor and is ST. Pt is on O2 2l nasal canula. Pt note not filled out from previous shift. Pt pending dispo.

## 2023-01-30 NOTE — CONSULT NOTE ADULT - SUBJECTIVE AND OBJECTIVE BOX
62F Malayam-speaking w/ Myasthenia Gravis, DM  CHIEF COMPLAINT: SOB    HPI:  Interpretation provided by son at bedside.    62F Malayalam-speaking w/ Myasthenia Gravis (on pyridostigmine, hospitalization every 1-2 years requiring IVIG) (dx 2015), s/p thymectomy (2010), T2DM who presents with SOB, cough, difficulty spitting up sputum x 4 days.  was sick with COVID, so son performed home COVID test for patient which was positive. Patient's primary complaint is SOB when lying down. She has been sleeping upright. She had difficulty coughing up and spitting out sputum but today these symptoms are slightly improved. Also reports difficulty swallowing - but was able to tolerate taking oral pills. Admits to difficulty looking up, but this is overall improved than the past couple of months. Patient takes pyridostigmine 120mg TID regularly. Patient also has prednisone PRN for sick days-- she has been taking 20mg prednisone x past 4 days as per instruction of her neurologist.     Of note, last hospitalization was 2 years ago at [facility] during which patient was intubated -- son notes that this was likely 2/2 patient being forced to lie down flat in hospital bed, leading to aspiration. She was extubated after ~3 days. Received IVIG during that hospitalization.  PAST MEDICAL & SURGICAL HISTORY:  Myasthenia gravis      Diabetes mellitus          FAMILY HISTORY:      SOCIAL HISTORY:  Smoking: none  EtOH Use: none  Marital Status:   Occupation:  Recent Travel:  Country of Birth:  Advance Directives:    Allergies    No Known Allergies    Intolerances        HOME MEDICATIONS:    REVIEW OF SYSTEMS:  Constitutional:   Eyes:  ENT:  CV:  Resp:  GI:  :  MSK:  Integumentary:  Neurological:  Psychiatric:  Endocrine:  Hematologic/Lymphatic:  Allergic/Immunologic:  [ ] All other systems negative  [ ] Unable to assess ROS because ________    OBJECTIVE:  ICU Vital Signs Last 24 Hrs  T(C): 36.8 (30 Jan 2023 11:21), Max: 36.8 (30 Jan 2023 11:21)  T(F): 98.2 (30 Jan 2023 11:21), Max: 98.2 (30 Jan 2023 11:21)  HR: 120 (30 Jan 2023 14:14) (120 - 122)  BP: 180/101 (30 Jan 2023 11:21) (180/101 - 180/101)  BP(mean): --  ABP: --  ABP(mean): --  RR: 20 (30 Jan 2023 14:14) (20 - 22)  SpO2: 98% (30 Jan 2023 14:14) (91% - 98%)    O2 Parameters below as of 30 Jan 2023 14:14  Patient On (Oxygen Delivery Method): nasal cannula  O2 Flow (L/min): 2            CAPILLARY BLOOD GLUCOSE          PHYSICAL EXAM:  General:   HEENT:   Lymph Nodes:  Neck:   Respiratory:   Cardiovascular:   Abdomen:   Extremities:   Skin:   Neurological:  Psychiatry:    HOSPITAL MEDICATIONS:  MEDICATIONS  (STANDING):    MEDICATIONS  (PRN):      LABS:                        15.2   21.75 )-----------( 331      ( 30 Jan 2023 13:37 )             45.8     01-30    139  |  97  |  13  ----------------------------<  162<H>  4.1   |  27  |  0.51    Ca    9.5      30 Jan 2023 13:37    TPro  8.2  /  Alb  4.6  /  TBili  0.4  /  DBili  x   /  AST  25  /  ALT  19  /  AlkPhos  108  01-30          Venous Blood Gas:  01-30 @ 14:00  7.39/55/64/33/93.2  VBG Lactate: 1.3      MICROBIOLOGY:     RADIOLOGY:  [ ] Reviewed and interpreted by me    EKG: CHIEF COMPLAINT: SOB    HPI:  Interpretation provided by son at bedside.    62F Malayalam-speaking w/ Myasthenia Gravis (on pyridostigmine, hospitalization every 1-2 years requiring IVIG) (dx 2015), s/p thymectomy (2010), T2DM who presents with SOB, cough, difficulty spitting up sputum x 4 days.  was sick with COVID, so son performed home COVID test for patient which was positive. Patient's primary complaint is SOB when lying down. She has been sleeping upright. She had difficulty coughing up and spitting out sputum but today these symptoms are slightly improved. Also reports difficulty swallowing - but was able to tolerate taking oral pills. Admits to difficulty looking up, but this is overall improved than the past couple of months. Patient takes pyridostigmine 120mg TID regularly. Patient also has prednisone PRN for sick days-- she has been taking 20mg prednisone x past 4 days as per instruction of her neurologist.     Of note, last hospitalization was 2 years ago at [facility] during which patient was intubated -- son notes that this was likely 2/2 patient being forced to lie down flat in hospital bed, leading to aspiration. She was extubated after ~3 days. Received IVIG during that hospitalization.  PAST MEDICAL & SURGICAL HISTORY:  Myasthenia gravis      Diabetes mellitus          FAMILY HISTORY:      SOCIAL HISTORY:  Smoking: none  EtOH Use: none  Marital Status:   Occupation:  Recent Travel:  Country of Birth:  Advance Directives:    Allergies    No Known Allergies    Intolerances        HOME MEDICATIONS:    REVIEW OF SYSTEMS:  Constitutional:   Eyes:  ENT:  CV:  Resp:  GI:  :  MSK:  Integumentary:  Neurological:  Psychiatric:  Endocrine:  Hematologic/Lymphatic:  Allergic/Immunologic:  [ ] All other systems negative  [ ] Unable to assess ROS because ________    OBJECTIVE:  ICU Vital Signs Last 24 Hrs  T(C): 36.8 (30 Jan 2023 11:21), Max: 36.8 (30 Jan 2023 11:21)  T(F): 98.2 (30 Jan 2023 11:21), Max: 98.2 (30 Jan 2023 11:21)  HR: 120 (30 Jan 2023 14:14) (120 - 122)  BP: 180/101 (30 Jan 2023 11:21) (180/101 - 180/101)  BP(mean): --  ABP: --  ABP(mean): --  RR: 20 (30 Jan 2023 14:14) (20 - 22)  SpO2: 98% (30 Jan 2023 14:14) (91% - 98%)    O2 Parameters below as of 30 Jan 2023 14:14  Patient On (Oxygen Delivery Method): nasal cannula  O2 Flow (L/min): 2            CAPILLARY BLOOD GLUCOSE          PHYSICAL EXAM:  General:   HEENT:   Lymph Nodes:  Neck:   Respiratory:   Cardiovascular:   Abdomen:   Extremities:   Skin:   Neurological:  Psychiatry:    HOSPITAL MEDICATIONS:  MEDICATIONS  (STANDING):    MEDICATIONS  (PRN):      LABS:                        15.2   21.75 )-----------( 331      ( 30 Jan 2023 13:37 )             45.8     01-30    139  |  97  |  13  ----------------------------<  162<H>  4.1   |  27  |  0.51    Ca    9.5      30 Jan 2023 13:37    TPro  8.2  /  Alb  4.6  /  TBili  0.4  /  DBili  x   /  AST  25  /  ALT  19  /  AlkPhos  108  01-30          Venous Blood Gas:  01-30 @ 14:00  7.39/55/64/33/93.2  VBG Lactate: 1.3      MICROBIOLOGY:     RADIOLOGY:  [ ] Reviewed and interpreted by me    EKG: CHIEF COMPLAINT: SOB    HPI:  Interpretation provided by son at bedside.    62F Malayalam-speaking w/ Myasthenia Gravis (on pyridostigmine, hospitalization every 1-2 years requiring IVIG) (dx 2015), s/p thymectomy (2010), T2DM who presents with SOB, cough, difficulty spitting up sputum x 4 days.  was sick with COVID, so son performed home COVID test for patient which was positive. Patient's primary complaint is SOB when lying down. She has been sleeping upright. She had difficulty coughing up and spitting out sputum but today these symptoms are slightly improved. Also reports difficulty swallowing - but was able to tolerate taking oral pills. Admits to difficulty looking up, but this is overall improved than the past couple of months. Patient takes pyridostigmine 120mg TID regularly. Patient also has prednisone PRN for sick days-- she has been taking 20mg prednisone x past 4 days as per instruction of her neurologist.     Of note, last hospitalization was 2 years ago at [facility] during which patient was intubated -- son notes that this was likely 2/2 patient being forced to lie down flat in hospital bed, leading to aspiration. She was extubated after ~3 days. Received IVIG during that hospitalization.  PAST MEDICAL & SURGICAL HISTORY:  Myasthenia gravis      Diabetes mellitus          FAMILY HISTORY:      SOCIAL HISTORY:  Smoking: none  EtOH Use: none  Marital Status:   Occupation:  Recent Travel:  Country of Birth:  Advance Directives:    Allergies    No Known Allergies    Intolerances        HOME MEDICATIONS:    REVIEW OF SYSTEMS:  Constitutional:   Eyes:  ENT:  CV:  Resp:  GI:  :  MSK:  Integumentary:  Neurological:  Psychiatric:  Endocrine:  Hematologic/Lymphatic:  Allergic/Immunologic:  [ ] All other systems negative  [ ] Unable to assess ROS because ________    OBJECTIVE:  ICU Vital Signs Last 24 Hrs  T(C): 36.8 (30 Jan 2023 11:21), Max: 36.8 (30 Jan 2023 11:21)  T(F): 98.2 (30 Jan 2023 11:21), Max: 98.2 (30 Jan 2023 11:21)  HR: 120 (30 Jan 2023 14:14) (120 - 122)  BP: 180/101 (30 Jan 2023 11:21) (180/101 - 180/101)  BP(mean): --  ABP: --  ABP(mean): --  RR: 20 (30 Jan 2023 14:14) (20 - 22)  SpO2: 98% (30 Jan 2023 14:14) (91% - 98%)    O2 Parameters below as of 30 Jan 2023 14:14  Patient On (Oxygen Delivery Method): nasal cannula  O2 Flow (L/min): 2            CAPILLARY BLOOD GLUCOSE          PHYSICAL EXAM:  General:   HEENT:   Lymph Nodes:  Neck:   Respiratory:   Cardiovascular:   Abdomen:   Extremities:   Skin:   Neurological:  Psychiatry:    HOSPITAL MEDICATIONS:  MEDICATIONS  (STANDING):    MEDICATIONS  (PRN):      LABS:                        15.2   21.75 )-----------( 331      ( 30 Jan 2023 13:37 )             45.8     01-30    139  |  97  |  13  ----------------------------<  162<H>  4.1   |  27  |  0.51    Ca    9.5      30 Jan 2023 13:37    TPro  8.2  /  Alb  4.6  /  TBili  0.4  /  DBili  x   /  AST  25  /  ALT  19  /  AlkPhos  108  01-30          Venous Blood Gas:  01-30 @ 14:00  7.39/55/64/33/93.2  VBG Lactate: 1.3      MICROBIOLOGY:     RADIOLOGY:  [ ] Reviewed and interpreted by me    EKG: CHIEF COMPLAINT: SOB    HPI:  Interpretation provided by son at bedside.    62F Malayalam-speaking w/ Myasthenia Gravis (on pyridostigmine, hospitalization every 1-2 years requiring IVIG) (dx 2015), s/p thymectomy (2010), T2DM who presents with SOB, cough, difficulty spitting up sputum x 4 days.  was sick with COVID, so son performed home COVID test for patient which was positive. Patient's primary complaint is SOB when lying down. She has been sleeping upright. She had difficulty coughing up and spitting out sputum but today these symptoms are slightly improved. Also reports difficulty swallowing - but was able to tolerate taking oral pills. Admits to difficulty looking up, but eye drooping is overall improved from past couple of months. No blurry vision. Patient takes pyridostigmine 120mg TID regularly. Patient also has prednisone PRN for sick days-- she has been taking 20mg prednisone x past 4 days as per instruction of her neurologist.     Of note, last hospitalization was 2 years ago at [facility] during which patient was intubated -- son notes that this was likely 2/2 patient being forced to lie down flat in hospital bed, leading to aspiration. She was extubated after ~3 days. Received IVIG during that hospitalization.    PAST MEDICAL & SURGICAL HISTORY:  Myasthenia gravis  Diabetes mellitus      FAMILY HISTORY:    SOCIAL HISTORY:  Smoking: none  EtOH Use: none  Marital Status:     Allergies  No Known Allergies    Intolerances        HOME MEDICATIONS:  Pyridostigmine 120mg TID  PRN prednisone 10mg  Metformin 500mg BID      REVIEW OF SYSTEMS:  General: No fevers, chills  HEENT: No headaches, acute visual changes, rhinorrhea. +difficulty swallowing  CVS: No chest pain, palpitations  Resp: + cough, SOB, orthopnea.  GI: No abdominal pain, nausea, vomiting, constipation, diarrhea, hematochezia, melena  : No dysuria, frequency, hematuria, or discharge  MSK: No joint pain or swelling  Ext: No edema, no claudication  Skin: No rashes or itching  Heme: No easy bruising or petechiae  Neuro: No confusion, numbness, focal weakness, or loss of consciousness  Endocrine: No excessive heat or cold symptoms, polyuria, polydipsia    OBJECTIVE:  ICU Vital Signs Last 24 Hrs  T(C): 36.8 (30 Jan 2023 11:21), Max: 36.8 (30 Jan 2023 11:21)  T(F): 98.2 (30 Jan 2023 11:21), Max: 98.2 (30 Jan 2023 11:21)  HR: 120 (30 Jan 2023 14:14) (120 - 122)  BP: 180/101 (30 Jan 2023 11:21) (180/101 - 180/101)  BP(mean): --  ABP: --  ABP(mean): --  RR: 20 (30 Jan 2023 14:14) (20 - 22)  SpO2: 98% (30 Jan 2023 14:14) (91% - 98%)    O2 Parameters below as of 30 Jan 2023 14:14  Patient On (Oxygen Delivery Method): nasal cannula  O2 Flow (L/min): 2            CAPILLARY BLOOD GLUCOSE          PHYSICAL EXAM:  General: NAD, intermittently coughing up sputum. Well developed.  HEENT: NCAT, EOMI but with limited movement upwards, clear conjunctiva, no scleral icterus, mmm  Neck: Supple, no LAD  CV: RRR, S1S2, no m/r/g  Resp: CTAB, normal respiratory effort  Abd: Soft, NT, ND, normal bowel sounds  Ext: no edema, no clubbing or cyanosis  Neuro: AOx3, CN2-12 grossly intact, TANNER  Skin: warm, perfused    HOSPITAL MEDICATIONS:  MEDICATIONS  (STANDING):    MEDICATIONS  (PRN):      LABS:                        15.2   21.75 )-----------( 331      ( 30 Jan 2023 13:37 )             45.8     01-30    139  |  97  |  13  ----------------------------<  162<H>  4.1   |  27  |  0.51    Ca    9.5      30 Jan 2023 13:37    TPro  8.2  /  Alb  4.6  /  TBili  0.4  /  DBili  x   /  AST  25  /  ALT  19  /  AlkPhos  108  01-30          Venous Blood Gas:  01-30 @ 14:00  7.39/55/64/33/93.2  VBG Lactate: 1.3      MICROBIOLOGY:     RADIOLOGY:  [ ] Reviewed and interpreted by me    EKG: CHIEF COMPLAINT: SOB    HPI:  Interpretation provided by son at bedside.    62F Malayalam-speaking w/ Myasthenia Gravis (on pyridostigmine, hospitalization every 1-2 years requiring IVIG) (dx 2015), s/p thymectomy (2010), T2DM who presents with SOB, cough, difficulty spitting up sputum x 4 days.  was sick with COVID, so son performed home COVID test for patient which was positive. Patient's primary complaint is SOB when lying down. She has been sleeping upright. She had difficulty coughing up and spitting out sputum but today these symptoms are slightly improved. Also reports difficulty swallowing - but was able to tolerate taking oral pills. Admits to difficulty looking up, but eye drooping is overall improved from past couple of months. No blurry vision. Patient takes pyridostigmine 120mg TID regularly. Patient also has prednisone PRN for sick days-- she has been taking 20mg prednisone x past 4 days as per instruction of her neurologist.     Of note, last hospitalization was 2 years ago at [facility] during which patient was intubated -- son notes that this was likely 2/2 patient being forced to lie down flat in hospital bed, leading to aspiration. She was extubated after ~3 days. Received IVIG during that hospitalization.    PAST MEDICAL & SURGICAL HISTORY:  Myasthenia gravis  Diabetes mellitus      FAMILY HISTORY:    SOCIAL HISTORY:  Smoking: none  EtOH Use: none  Marital Status:     Allergies  No Known Allergies    Intolerances        HOME MEDICATIONS:  Pyridostigmine 120mg TID  PRN prednisone 10mg  Metformin 500mg BID      REVIEW OF SYSTEMS:  General: No fevers, chills  HEENT: No headaches, acute visual changes, rhinorrhea. +difficulty swallowing  CVS: No chest pain, palpitations  Resp: + cough, SOB, orthopnea.  GI: No abdominal pain, nausea, vomiting, constipation, diarrhea, hematochezia, melena  : No dysuria, frequency, hematuria, or discharge  MSK: No joint pain or swelling  Ext: No edema, no claudication  Skin: No rashes or itching  Heme: No easy bruising or petechiae  Neuro: No confusion, numbness, focal weakness, or loss of consciousness  Endocrine: No excessive heat or cold symptoms, polyuria, polydipsia    OBJECTIVE:  ICU Vital Signs Last 24 Hrs  T(C): 36.8 (30 Jan 2023 11:21), Max: 36.8 (30 Jan 2023 11:21)  T(F): 98.2 (30 Jan 2023 11:21), Max: 98.2 (30 Jan 2023 11:21)  HR: 120 (30 Jan 2023 14:14) (120 - 122)  BP: 180/101 (30 Jan 2023 11:21) (180/101 - 180/101)  BP(mean): --  ABP: --  ABP(mean): --  RR: 20 (30 Jan 2023 14:14) (20 - 22)  SpO2: 98% (30 Jan 2023 14:14) (91% - 98%)    O2 Parameters below as of 30 Jan 2023 14:14  Patient On (Oxygen Delivery Method): nasal cannula  O2 Flow (L/min): 2            CAPILLARY BLOOD GLUCOSE          PHYSICAL EXAM:  General: NAD, intermittently coughing up sputum. Well developed.  HEENT: NCAT, EOMI but with limited movement upwards, clear conjunctiva, no scleral icterus, mmm  Neck: Supple, no LAD  CV: RRR, S1S2, no m/r/g  Resp: CTAB, normal respiratory effort  Abd: Soft, NT, ND, normal bowel sounds  Ext: no edema, no clubbing or cyanosis  Neuro: AOx3, CN2-12 grossly intact, TANNER  Skin: warm, perfused    HOSPITAL MEDICATIONS:  MEDICATIONS  (STANDING):    MEDICATIONS  (PRN):      LABS:                        15.2   21.75 )-----------( 331      ( 30 Jan 2023 13:37 )             45.8     01-30    139  |  97  |  13  ----------------------------<  162<H>  4.1   |  27  |  0.51    Ca    9.5      30 Jan 2023 13:37    TPro  8.2  /  Alb  4.6  /  TBili  0.4  /  DBili  x   /  AST  25  /  ALT  19  /  AlkPhos  108  01-30          Venous Blood Gas:  01-30 @ 14:00  7.39/55/64/33/93.2  VBG Lactate: 1.3      MICROBIOLOGY:     RADIOLOGY:  [ ] Reviewed and interpreted by me    EKG: CHIEF COMPLAINT: SOB    HPI:  Interpretation provided by son at bedside.    62F Malayalam-speaking w/ Myasthenia Gravis (on pyridostigmine, hospitalization every 1-2 years requiring IVIG) (dx 2015), s/p thymectomy (2010), T2DM who presents with SOB, cough, difficulty spitting up sputum x 4 days.  was sick with COVID, so son performed home COVID test for patient which was positive. Patient's primary complaint is SOB when lying down. She has been sleeping upright. She had difficulty coughing up and spitting out sputum but today these symptoms are slightly improved. Also reports difficulty swallowing - but was able to tolerate taking oral pills. Admits to difficulty looking up, but eye drooping is overall improved from past couple of months. No blurry vision. Patient takes pyridostigmine 120mg TID regularly. Patient also has prednisone PRN for sick days-- she has been taking 20mg prednisone x past 4 days as per instruction of her neurologist.     Of note, last hospitalization was 2 years ago at [facility] during which patient was intubated -- son notes that this was likely 2/2 patient being forced to lie down flat in hospital bed, leading to aspiration. She was extubated after ~3 days. Received IVIG during that hospitalization.    PAST MEDICAL & SURGICAL HISTORY:  Myasthenia gravis  Diabetes mellitus      FAMILY HISTORY:    SOCIAL HISTORY:  Smoking: none  EtOH Use: none  Marital Status:     Allergies  No Known Allergies    Intolerances        HOME MEDICATIONS:  Pyridostigmine 120mg TID  PRN prednisone 10mg  Metformin 500mg BID      REVIEW OF SYSTEMS:  General: No fevers, chills  HEENT: No headaches, acute visual changes, rhinorrhea. +difficulty swallowing  CVS: No chest pain, palpitations  Resp: + cough, SOB, orthopnea.  GI: No abdominal pain, nausea, vomiting, constipation, diarrhea, hematochezia, melena  : No dysuria, frequency, hematuria, or discharge  MSK: No joint pain or swelling  Ext: No edema, no claudication  Skin: No rashes or itching  Heme: No easy bruising or petechiae  Neuro: No confusion, numbness, focal weakness, or loss of consciousness  Endocrine: No excessive heat or cold symptoms, polyuria, polydipsia    OBJECTIVE:  ICU Vital Signs Last 24 Hrs  T(C): 36.8 (30 Jan 2023 11:21), Max: 36.8 (30 Jan 2023 11:21)  T(F): 98.2 (30 Jan 2023 11:21), Max: 98.2 (30 Jan 2023 11:21)  HR: 120 (30 Jan 2023 14:14) (120 - 122)  BP: 180/101 (30 Jan 2023 11:21) (180/101 - 180/101)  BP(mean): --  ABP: --  ABP(mean): --  RR: 20 (30 Jan 2023 14:14) (20 - 22)  SpO2: 98% (30 Jan 2023 14:14) (91% - 98%)    O2 Parameters below as of 30 Jan 2023 14:14  Patient On (Oxygen Delivery Method): nasal cannula  O2 Flow (L/min): 2            CAPILLARY BLOOD GLUCOSE          PHYSICAL EXAM:  General: NAD, intermittently coughing up sputum. Well developed.  HEENT: NCAT, EOMI but with limited movement upwards, clear conjunctiva, no scleral icterus, mmm  Neck: Supple, no LAD  CV: RRR, S1S2, no m/r/g  Resp: CTAB, normal respiratory effort  Abd: Soft, NT, ND, normal bowel sounds  Ext: no edema, no clubbing or cyanosis  Neuro: AOx3, CN2-12 grossly intact, TANNER  Skin: warm, perfused    HOSPITAL MEDICATIONS:  MEDICATIONS  (STANDING):    MEDICATIONS  (PRN):      LABS:                        15.2   21.75 )-----------( 331      ( 30 Jan 2023 13:37 )             45.8     01-30    139  |  97  |  13  ----------------------------<  162<H>  4.1   |  27  |  0.51    Ca    9.5      30 Jan 2023 13:37    TPro  8.2  /  Alb  4.6  /  TBili  0.4  /  DBili  x   /  AST  25  /  ALT  19  /  AlkPhos  108  01-30          Venous Blood Gas:  01-30 @ 14:00  7.39/55/64/33/93.2  VBG Lactate: 1.3      MICROBIOLOGY:     RADIOLOGY:  [ ] Reviewed and interpreted by me    EKG: CHIEF COMPLAINT: SOB    HPI:  Interpretation provided by son at bedside.    62F Malayalam-speaking w/ Myasthenia Gravis (on pyridostigmine, hospitalization every 1-2 years requiring IVIG) (dx 2015), s/p thymectomy (2010), T2DM who presents with SOB, cough, difficulty spitting up sputum x 4 days.  was sick with COVID, so son performed home COVID test for patient which was positive. Patient's primary complaint is SOB when lying down. She has been sleeping upright. She had difficulty coughing up and spitting out sputum but today these symptoms are slightly improved. Also reports difficulty swallowing - but was able to tolerate taking oral pills. Admits to difficulty looking up, but eye drooping is overall improved from past couple of months. No blurry vision. Patient takes pyridostigmine 120mg TID regularly. Patient also has prednisone PRN for sick days-- she has been taking 20mg prednisone x past 4 days as per instruction of her neurologist.     Of note, last hospitalization was 2 years ago at [facility] during which patient was intubated -- son notes that this was likely 2/2 patient being forced to lie down flat in hospital bed, leading to aspiration. She was extubated after ~3 days. Received IVIG during that hospitalization.    PAST MEDICAL & SURGICAL HISTORY:  Myasthenia gravis  Diabetes mellitus      FAMILY HISTORY:    SOCIAL HISTORY:  Smoking: none  EtOH Use: none  Marital Status:     Allergies  No Known Allergies    Intolerances        HOME MEDICATIONS:  Pyridostigmine 120mg TID  PRN prednisone 10mg  Metformin 500mg BID      REVIEW OF SYSTEMS:  General: No fevers, chills  HEENT: No headaches, acute visual changes, rhinorrhea. +difficulty swallowing  CVS: No chest pain, palpitations  Resp: + cough, SOB, orthopnea.  GI: No abdominal pain, nausea, vomiting, constipation, diarrhea, hematochezia, melena  : No dysuria, frequency, hematuria, or discharge  MSK: No joint pain or swelling  Ext: No edema, no claudication  Skin: No rashes or itching  Heme: No easy bruising or petechiae  Neuro: No confusion, numbness, focal weakness, or loss of consciousness  Endocrine: No excessive heat or cold symptoms, polyuria, polydipsia    OBJECTIVE:  ICU Vital Signs Last 24 Hrs  T(C): 36.8 (30 Jan 2023 11:21), Max: 36.8 (30 Jan 2023 11:21)  T(F): 98.2 (30 Jan 2023 11:21), Max: 98.2 (30 Jan 2023 11:21)  HR: 120 (30 Jan 2023 14:14) (120 - 122)  BP: 180/101 (30 Jan 2023 11:21) (180/101 - 180/101)  BP(mean): --  ABP: --  ABP(mean): --  RR: 20 (30 Jan 2023 14:14) (20 - 22)  SpO2: 98% (30 Jan 2023 14:14) (91% - 98%)    O2 Parameters below as of 30 Jan 2023 14:14  Patient On (Oxygen Delivery Method): nasal cannula  O2 Flow (L/min): 2            CAPILLARY BLOOD GLUCOSE          PHYSICAL EXAM:  General: NAD, intermittently coughing up sputum. Well developed.  HEENT: NCAT, EOMI but with limited movement upwards, clear conjunctiva, no scleral icterus, mmm, muffled voice  Neck: Supple, no LAD  CV: Tachycardic, regular rhythm, S1S2  Resp: CTAB, normal respiratory effort  Abd: Soft, NT, ND  Ext: no edema, no clubbing or cyanosis  Neuro: AOx3, slight difficulty lifting eyelids and extraocular movement upward. Slight slurring of words and muffled voice.   Skin: warm, perfused    HOSPITAL MEDICATIONS:  MEDICATIONS  (STANDING):    MEDICATIONS  (PRN):      LABS:                        15.2   21.75 )-----------( 331      ( 30 Jan 2023 13:37 )             45.8     01-30    139  |  97  |  13  ----------------------------<  162<H>  4.1   |  27  |  0.51    Ca    9.5      30 Jan 2023 13:37    TPro  8.2  /  Alb  4.6  /  TBili  0.4  /  DBili  x   /  AST  25  /  ALT  19  /  AlkPhos  108  01-30          Venous Blood Gas:  01-30 @ 14:00  7.39/55/64/33/93.2  VBG Lactate: 1.3      MICROBIOLOGY:     RADIOLOGY:  [ ] Reviewed and interpreted by me    EKG: CHIEF COMPLAINT: SOB    HPI:  Interpretation provided by son at bedside.    62F Malayalam-speaking w/ Myasthenia Gravis (on pyridostigmine, hospitalization every 1-2 years requiring IVIG) (dx 2015), s/p thymectomy (2010), T2DM who presents with SOB, cough, difficulty spitting up sputum x 4 days.  was sick with COVID, so son performed home COVID test for patient which was positive. Patient's primary complaint is SOB when lying down. She has been sleeping upright. She had difficulty coughing up and spitting out sputum but today these symptoms are slightly improved. Also reports difficulty swallowing - but was able to tolerate taking oral pills. Admits to difficulty looking up, but eye drooping is overall improved from past couple of months. No blurry vision. Patient takes pyridostigmine 120mg TID regularly. Patient also has prednisone PRN for sick days-- she has been taking 20mg prednisone x past 4 days as per instruction of her neurologist.     Of note, last hospitalization was 2 years ago at [facility] during which patient was intubated -- son notes that this was likely 2/2 patient being forced to lie down flat in hospital bed, leading to aspiration. She was extubated after ~3 days. Received IVIG during that hospitalization.    PAST MEDICAL & SURGICAL HISTORY:  Myasthenia gravis  Diabetes mellitus      FAMILY HISTORY:    SOCIAL HISTORY:  Smoking: none  EtOH Use: none  Marital Status:     Allergies  No Known Allergies    Intolerances        HOME MEDICATIONS:  Pyridostigmine 120mg TID  PRN prednisone 10mg  Metformin 500mg BID      REVIEW OF SYSTEMS:  General: No fevers, chills  HEENT: No headaches, acute visual changes, rhinorrhea. +difficulty swallowing  CVS: No chest pain, palpitations  Resp: + cough, SOB, orthopnea.  GI: No abdominal pain, nausea, vomiting, constipation, diarrhea, hematochezia, melena  : No dysuria, frequency, hematuria, or discharge  MSK: No joint pain or swelling  Ext: No edema, no claudication  Skin: No rashes or itching  Heme: No easy bruising or petechiae  Neuro: No confusion, numbness, focal weakness, or loss of consciousness  Endocrine: No excessive heat or cold symptoms, polyuria, polydipsia    OBJECTIVE:  ICU Vital Signs Last 24 Hrs  T(C): 36.8 (30 Jan 2023 11:21), Max: 36.8 (30 Jan 2023 11:21)  T(F): 98.2 (30 Jan 2023 11:21), Max: 98.2 (30 Jan 2023 11:21)  HR: 120 (30 Jan 2023 14:14) (120 - 122)  BP: 180/101 (30 Jan 2023 11:21) (180/101 - 180/101)  BP(mean): --  ABP: --  ABP(mean): --  RR: 20 (30 Jan 2023 14:14) (20 - 22)  SpO2: 98% (30 Jan 2023 14:14) (91% - 98%)    O2 Parameters below as of 30 Jan 2023 14:14  Patient On (Oxygen Delivery Method): nasal cannula  O2 Flow (L/min): 2            CAPILLARY BLOOD GLUCOSE          PHYSICAL EXAM:  General: NAD, intermittently coughing up sputum. Well developed.  HEENT: NCAT, EOMI but with limited movement upwards, clear conjunctiva, no scleral icterus, mmm, muffled voice  Neck: Supple, no LAD  CV: Tachycardic, regular rhythm, S1S2  Resp: CTAB, normal respiratory effort  Abd: Soft, NT, ND  Ext: no edema, no clubbing or cyanosis  Neuro: AOx3, slight difficulty lifting eyelids and extraocular movement upward. Slight slurring of words and muffled voice.   Skin: warm, perfused    HOSPITAL MEDICATIONS:  MEDICATIONS  (STANDING):    MEDICATIONS  (PRN):      LABS:                        15.2   21.75 )-----------( 331      ( 30 Jan 2023 13:37 )             45.8     01-30    139  |  97  |  13  ----------------------------<  162<H>  4.1   |  27  |  0.51    Ca    9.5      30 Jan 2023 13:37    TPro  8.2  /  Alb  4.6  /  TBili  0.4  /  DBili  x   /  AST  25  /  ALT  19  /  AlkPhos  108  01-30          Venous Blood Gas:  01-30 @ 14:00  7.39/55/64/33/93.2  VBG Lactate: 1.3      MICROBIOLOGY:     RADIOLOGY:  [ ] Reviewed and interpreted by me    EKG: CHIEF COMPLAINT: SOB    HPI:  Interpretation provided by son at bedside.    62F Malayalam-speaking w/ Myasthenia Gravis (on pyridostigmine, hospitalization every 1-2 years requiring IVIG) (dx 2015), s/p thymectomy (2010), T2DM who presents with SOB, cough, difficulty spitting up sputum x 4 days.  was sick with COVID, so son performed home COVID test for patient which was positive. Patient's primary complaint is SOB when lying down. She has been sleeping upright. She had difficulty coughing up and spitting out sputum but today these symptoms are slightly improved. Also reports difficulty swallowing - but was able to tolerate taking oral pills. Admits to difficulty looking up, but eye drooping is overall improved from past couple of months. No blurry vision. Patient takes pyridostigmine 120mg TID regularly. Patient also has prednisone PRN for sick days-- she has been taking 20mg prednisone x past 4 days as per instruction of her neurologist.     Of note, last hospitalization was 2 years ago at [facility] during which patient was intubated -- son notes that this was likely 2/2 patient being forced to lie down flat in hospital bed, leading to aspiration. She was extubated after ~3 days. Received IVIG during that hospitalization.    PAST MEDICAL & SURGICAL HISTORY:  Myasthenia gravis  Diabetes mellitus      FAMILY HISTORY:    SOCIAL HISTORY:  Smoking: none  EtOH Use: none  Marital Status:     Allergies  No Known Allergies    Intolerances        HOME MEDICATIONS:  Pyridostigmine 120mg TID  PRN prednisone 10mg  Metformin 500mg BID      REVIEW OF SYSTEMS:  General: No fevers, chills  HEENT: No headaches, acute visual changes, rhinorrhea. +difficulty swallowing  CVS: No chest pain, palpitations  Resp: + cough, SOB, orthopnea.  GI: No abdominal pain, nausea, vomiting, constipation, diarrhea, hematochezia, melena  : No dysuria, frequency, hematuria, or discharge  MSK: No joint pain or swelling  Ext: No edema, no claudication  Skin: No rashes or itching  Heme: No easy bruising or petechiae  Neuro: No confusion, numbness, focal weakness, or loss of consciousness  Endocrine: No excessive heat or cold symptoms, polyuria, polydipsia    OBJECTIVE:  ICU Vital Signs Last 24 Hrs  T(C): 36.8 (30 Jan 2023 11:21), Max: 36.8 (30 Jan 2023 11:21)  T(F): 98.2 (30 Jan 2023 11:21), Max: 98.2 (30 Jan 2023 11:21)  HR: 120 (30 Jan 2023 14:14) (120 - 122)  BP: 180/101 (30 Jan 2023 11:21) (180/101 - 180/101)  BP(mean): --  ABP: --  ABP(mean): --  RR: 20 (30 Jan 2023 14:14) (20 - 22)  SpO2: 98% (30 Jan 2023 14:14) (91% - 98%)    O2 Parameters below as of 30 Jan 2023 14:14  Patient On (Oxygen Delivery Method): nasal cannula  O2 Flow (L/min): 2            CAPILLARY BLOOD GLUCOSE          PHYSICAL EXAM:  General: NAD, intermittently coughing up sputum. Well developed.  HEENT: NCAT, EOMI but with limited movement upwards, clear conjunctiva, no scleral icterus, mmm, muffled voice  Neck: Supple, no LAD  CV: Tachycardic, regular rhythm, S1S2  Resp: CTAB, normal respiratory effort  Abd: Soft, NT, ND  Ext: no edema, no clubbing or cyanosis  Neuro: AOx3, slight difficulty lifting eyelids and extraocular movement upward. Slight slurring of words and muffled voice.   Skin: warm, perfused    HOSPITAL MEDICATIONS:  MEDICATIONS  (STANDING):    MEDICATIONS  (PRN):      LABS:                        15.2   21.75 )-----------( 331      ( 30 Jan 2023 13:37 )             45.8     01-30    139  |  97  |  13  ----------------------------<  162<H>  4.1   |  27  |  0.51    Ca    9.5      30 Jan 2023 13:37    TPro  8.2  /  Alb  4.6  /  TBili  0.4  /  DBili  x   /  AST  25  /  ALT  19  /  AlkPhos  108  01-30          Venous Blood Gas:  01-30 @ 14:00  7.39/55/64/33/93.2  VBG Lactate: 1.3      MICROBIOLOGY:     RADIOLOGY:  [ ] Reviewed and interpreted by me    EKG: CHIEF COMPLAINT: SOB    HPI:  Interpretation provided by son at bedside.    62F Malayalam-speaking w/ Myasthenia Gravis (on pyridostigmine, hospitalization every 1-2 years requiring IVIG) (dx 2015), s/p thymectomy (2010), T2DM who presents with SOB, cough, difficulty spitting up sputum x 4 days.  was sick with COVID, so son performed home COVID test for patient which was positive. Patient's primary complaint is SOB when lying down. She has been sleeping upright. She had difficulty coughing up and spitting out sputum but today these symptoms are slightly improved. Also reports difficulty swallowing - but was able to tolerate taking oral pills. Admits to difficulty looking up, but eye drooping is overall improved from past couple of months. No blurry vision. Patient takes pyridostigmine 120mg TID regularly. Patient also has prednisone PRN for sick days-- she has been taking 20mg prednisone x past 4 days as per instruction of her neurologist.     Of note, last hospitalization was 2 years ago at [facility] during which patient was intubated -- son notes that this was likely 2/2 patient being forced to lie down flat in hospital bed, leading to aspiration. She was extubated after ~3 days. Received IVIG during that hospitalization.    PAST MEDICAL & SURGICAL HISTORY:  Myasthenia gravis  Diabetes mellitus      SOCIAL HISTORY:  Smoking: none  EtOH Use: none  Marital Status:   lives at home with     Allergies  No Known Allergies      HOME MEDICATIONS:  Pyridostigmine 120mg TID  PRN prednisone 10mg  Metformin 500mg BID      REVIEW OF SYSTEMS:  General: No fevers, chills  HEENT: No headaches, acute visual changes, rhinorrhea. +difficulty swallowing  CVS: No chest pain, palpitations  Resp: + cough, SOB, orthopnea.  GI: No abdominal pain, nausea, vomiting, constipation, diarrhea, hematochezia, melena  : No dysuria, frequency, hematuria, or discharge  MSK: No joint pain or swelling  Ext: No edema, no claudication  Skin: No rashes or itching  Heme: No easy bruising or petechiae  Neuro: No confusion, numbness, focal weakness, or loss of consciousness  Endocrine: No excessive heat or cold symptoms, polyuria, polydipsia    OBJECTIVE:  ICU Vital Signs Last 24 Hrs  T(C): 36.8 (30 Jan 2023 11:21), Max: 36.8 (30 Jan 2023 11:21)  T(F): 98.2 (30 Jan 2023 11:21), Max: 98.2 (30 Jan 2023 11:21)  HR: 120 (30 Jan 2023 14:14) (120 - 122)  BP: 180/101 (30 Jan 2023 11:21) (180/101 - 180/101)  RR: 20 (30 Jan 2023 14:14) (20 - 22)  SpO2: 98% (30 Jan 2023 14:14) (91% - 98%)    O2 Parameters below as of 30 Jan 2023 14:14  Patient On (Oxygen Delivery Method): nasal cannula  O2 Flow (L/min): 2    PHYSICAL EXAM:  General: NAD, intermittently coughing up sputum. Well developed.  HEENT: NCAT, EOMI but with limited movement upwards, clear conjunctiva, no scleral icterus, mmm, muffled voice  Neck: Supple, no LAD  CV: Tachycardic, regular rhythm, S1S2  Resp: CTAB, normal respiratory effort  Abd: Soft, NT, ND  Ext: no edema, no clubbing or cyanosis  Neuro: AOx3, slight difficulty lifting eyelids and extraocular movement upward. Slight slurring of words and muffled voice.   Skin: warm, perfused    HOSPITAL MEDICATIONS:  MEDICATIONS  (STANDING):    MEDICATIONS  (PRN):      LABS:                        15.2   21.75 )-----------( 331      ( 30 Jan 2023 13:37 )             45.8     01-30    139  |  97  |  13  ----------------------------<  162<H>  4.1   |  27  |  0.51    Ca    9.5      30 Jan 2023 13:37    TPro  8.2  /  Alb  4.6  /  TBili  0.4  /  DBili  x   /  AST  25  /  ALT  19  /  AlkPhos  108  01-30    Venous Blood Gas:  01-30 @ 14:00  7.39/55/64/33/93.2  VBG Lactate: 1.3      MICROBIOLOGY: covid+    RADIOLOGY:  [+ ] Reviewed and interpreted by me  < from: Xray Chest 1 View- PORTABLE-Urgent (Xray Chest 1 View- PORTABLE-Urgent .) (01.30.23 @ 14:44) >    IMPRESSION:    Bibasilar linear atelectasis. Otherwise, the lungs are clear.    < end of copied text >   CHIEF COMPLAINT: SOB    HPI:  Interpretation provided by son at bedside.    62F Malayalam-speaking w/ Myasthenia Gravis (on pyridostigmine, hospitalization every 1-2 years requiring IVIG) (dx 2015), s/p thymectomy (2010), T2DM who presents with SOB, cough, difficulty spitting up sputum x 4 days.  was sick with COVID, so son performed home COVID test for patient which was positive. Patient's primary complaint is SOB when lying down. She has been sleeping upright. She had difficulty coughing up and spitting out sputum but today these symptoms are slightly improved. Also reports difficulty swallowing - but was able to tolerate taking oral pills. Admits to difficulty looking up, but eye drooping is overall improved from past couple of months. No blurry vision. Patient takes pyridostigmine 120mg TID regularly. Patient also has prednisone PRN for sick days-- she has been taking 20mg prednisone x past 4 days as per instruction of her neurologist.     Of note, last hospitalization was 2 years ago at [facility] during which patient was intubated -- son notes that this was likely 2/2 patient being forced to lie down flat in hospital bed, leading to aspiration. She was extubated after ~3 days. Received IVIG during that hospitalization.    PAST MEDICAL & SURGICAL HISTORY:  Myasthenia gravis  Diabetes mellitus      SOCIAL HISTORY:  Smoking: none  EtOH Use: none  Marital Status:   lives at home with     Allergies  No Known Allergies      HOME MEDICATIONS:  Pyridostigmine 120mg TID  PRN prednisone 10mg  Metformin 500mg BID      REVIEW OF SYSTEMS:  General: No fevers, chills  HEENT: No headaches, acute visual changes, rhinorrhea. +difficulty swallowing  CVS: No chest pain, palpitations  Resp: + cough, SOB, orthopnea.  GI: No abdominal pain, nausea, vomiting, constipation, diarrhea, hematochezia, melena  : No dysuria, frequency, hematuria, or discharge  MSK: No joint pain or swelling  Ext: No edema, no claudication  Skin: No rashes or itching  Heme: No easy bruising or petechiae  Neuro: No confusion, numbness, focal weakness, or loss of consciousness  Endocrine: No excessive heat or cold symptoms, polyuria, polydipsia    OBJECTIVE:  ICU Vital Signs Last 24 Hrs  T(C): 36.8 (30 Jan 2023 11:21), Max: 36.8 (30 Jan 2023 11:21)  T(F): 98.2 (30 Jan 2023 11:21), Max: 98.2 (30 Jan 2023 11:21)  HR: 120 (30 Jan 2023 14:14) (120 - 122)  BP: 180/101 (30 Jan 2023 11:21) (180/101 - 180/101)  RR: 20 (30 Jan 2023 14:14) (20 - 22)  SpO2: 98% (30 Jan 2023 14:14) (91% - 98%)    O2 Parameters below as of 30 Jan 2023 14:14  Patient On (Oxygen Delivery Method): nasal cannula  O2 Flow (L/min): 2    PHYSICAL EXAM:  General: NAD, intermittently coughing up sputum. Well developed.  HEENT: NCAT, EOMI but with limited movement upwards, clear conjunctiva, no scleral icterus, mmm, muffled voice  Neck: Supple, no LAD  CV: Tachycardic, regular rhythm, S1S2  Resp: CTAB, normal respiratory effort  Abd: Soft, NT, ND  Ext: no edema, no clubbing or cyanosis  Neuro: AOx3, slight difficulty lifting eyelids and extraocular movement upward. Slight slurring of words and muffled voice.   Skin: warm, perfused    HOSPITAL MEDICATIONS:  MEDICATIONS  (STANDING):    MEDICATIONS  (PRN):      LABS:                        15.2   21.75 )-----------( 331      ( 30 Jan 2023 13:37 )             45.8     01-30    139  |  97  |  13  ----------------------------<  162<H>  4.1   |  27  |  0.51    Ca    9.5      30 Jan 2023 13:37    TPro  8.2  /  Alb  4.6  /  TBili  0.4  /  DBili  x   /  AST  25  /  ALT  19  /  AlkPhos  108  01-30    Venous Blood Gas:  01-30 @ 14:00  7.39/55/64/33/93.2  VBG Lactate: 1.3      MICROBIOLOGY: covid+    RADIOLOGY:  [+ ] Reviewed and interpreted by me  < from: Xray Chest 1 View- PORTABLE-Urgent (Xray Chest 1 View- PORTABLE-Urgent .) (01.30.23 @ 14:44) >    IMPRESSION:    Bibasilar linear atelectasis. Otherwise, the lungs are clear.    < end of copied text >   CHIEF COMPLAINT: SOB    HPI:  Interpretation provided by son at bedside.    62F Malayalam-speaking w/ Myasthenia Gravis (on pyridostigmine, hospitalization every 1-2 years requiring IVIG) (dx 2015), s/p thymectomy (2010), T2DM who presents with SOB, cough, difficulty spitting up sputum x 4 days.  was sick with COVID, so son performed home COVID test for patient which was positive. Patient's primary complaint is SOB when lying down. She has been sleeping upright. She had difficulty coughing up and spitting out sputum but today these symptoms are slightly improved. Also reports difficulty swallowing - but was able to tolerate taking oral pills. Admits to difficulty looking up, but eye drooping is overall improved from past couple of months. No blurry vision. Patient takes pyridostigmine 120mg TID regularly. Patient also has prednisone PRN for sick days-- she has been taking 20mg prednisone x past 4 days as per instruction of her neurologist.     Of note, last hospitalization was 2 years ago at [facility] during which patient was intubated -- son notes that this was likely 2/2 patient being forced to lie down flat in hospital bed, leading to aspiration. She was extubated after ~3 days. Received IVIG during that hospitalization.    PAST MEDICAL & SURGICAL HISTORY:  Myasthenia gravis  Diabetes mellitus      SOCIAL HISTORY:  Smoking: none  EtOH Use: none  Marital Status:   lives at home with     Allergies  No Known Allergies      HOME MEDICATIONS:  Pyridostigmine 120mg TID  PRN prednisone 10mg  Metformin 500mg BID      REVIEW OF SYSTEMS:  General: No fevers, chills  HEENT: No headaches, acute visual changes, rhinorrhea. +difficulty swallowing  CVS: No chest pain, palpitations  Resp: + cough, SOB, orthopnea.  GI: No abdominal pain, nausea, vomiting, constipation, diarrhea, hematochezia, melena  : No dysuria, frequency, hematuria, or discharge  MSK: No joint pain or swelling  Ext: No edema, no claudication  Skin: No rashes or itching  Heme: No easy bruising or petechiae  Neuro: No confusion, numbness, focal weakness, or loss of consciousness  Endocrine: No excessive heat or cold symptoms, polyuria, polydipsia    OBJECTIVE:  ICU Vital Signs Last 24 Hrs  T(C): 36.8 (30 Jan 2023 11:21), Max: 36.8 (30 Jan 2023 11:21)  T(F): 98.2 (30 Jan 2023 11:21), Max: 98.2 (30 Jan 2023 11:21)  HR: 120 (30 Jan 2023 14:14) (120 - 122)  BP: 180/101 (30 Jan 2023 11:21) (180/101 - 180/101)  RR: 20 (30 Jan 2023 14:14) (20 - 22)  SpO2: 98% (30 Jan 2023 14:14) (91% - 98%)    O2 Parameters below as of 30 Jan 2023 14:14  Patient On (Oxygen Delivery Method): nasal cannula  O2 Flow (L/min): 2    PHYSICAL EXAM:  General: NAD, intermittently coughing up sputum. Well developed.  HEENT: NCAT, EOMI but with limited movement upwards, clear conjunctiva, no scleral icterus, mmm, muffled voice  Neck: Supple, no LAD  CV: Tachycardic, regular rhythm, S1S2  Resp: CTAB, normal respiratory effort  Abd: Soft, NT, ND  Ext: no edema, no clubbing or cyanosis  Neuro: AOx3, slight difficulty lifting eyelids and extraocular movement upward. Slight slurring of words and muffled voice.   Skin: warm, perfused    HOSPITAL MEDICATIONS:  MEDICATIONS  (STANDING):    MEDICATIONS  (PRN):      LABS:                        15.2   21.75 )-----------( 331      ( 30 Jan 2023 13:37 )             45.8     01-30    139  |  97  |  13  ----------------------------<  162<H>  4.1   |  27  |  0.51    Ca    9.5      30 Jan 2023 13:37    TPro  8.2  /  Alb  4.6  /  TBili  0.4  /  DBili  x   /  AST  25  /  ALT  19  /  AlkPhos  108  01-30    Venous Blood Gas:  01-30 @ 14:00  7.39/55/64/33/93.2  VBG Lactate: 1.3      MICROBIOLOGY: covid+    RADIOLOGY:  [+ ] Reviewed and interpreted by me  < from: Xray Chest 1 View- PORTABLE-Urgent (Xray Chest 1 View- PORTABLE-Urgent .) (01.30.23 @ 14:44) >    IMPRESSION:    Bibasilar linear atelectasis. Otherwise, the lungs are clear.    < end of copied text >

## 2023-01-30 NOTE — ED PROVIDER NOTE - CLINICAL SUMMARY MEDICAL DECISION MAKING FREE TEXT BOX
Patient is a 62y F PMHx DM, MG (on pyridostigmine, hospital admission every 1-2 years for IVIG), p/w SOB, orthopnea, Covid+ test at home. Concern for MG flare, already took prednisone at home, and home pyridostigmine. Will get labs, trop, CXR, NIF/VC. Patient likely TBA, may require IVIG. Will reassess. Currently sats well on 2L NC. Patient is a 62y F PMHx DM, MG (on pyridostigmine, hospital admission every 1-2 years for IVIG), p/w SOB, orthopnea, Covid+ test at home. Concern for MG flare, already took prednisone at home, and home pyridostigmine. Will get labs, trop, CXR, NIF/VC. Patient likely TBA, may require IVIG. Will reassess. Currently sats well on 2L NC.    pettet attending- see attending attestation for my mdm

## 2023-01-30 NOTE — ED PROVIDER NOTE - PROGRESS NOTE DETAILS
ap- pt signed out to me pedning admission to MICU, pt was seen by Dr. Whyte neurology recommending admission to micu, spoke w/ micu fellow who states they will be discussing w/ night MICU attending this case, pt w/ nif of -20, pt doesn't want plex treatment and wants trial of ivig, despite plex w/ improved outcomes spoke w/ son at bedside, nakita pt w/ mild eyelid drooping able to tolerate 60 mg of pyridostigmine, seen by micu they will discuss w/ [facility] regarding admission

## 2023-01-31 LAB
A1C WITH ESTIMATED AVERAGE GLUCOSE RESULT: 6.6 % — HIGH (ref 4–5.6)
ALBUMIN SERPL ELPH-MCNC: 3.5 G/DL — SIGNIFICANT CHANGE UP (ref 3.3–5)
ALBUMIN SERPL ELPH-MCNC: 4.4 G/DL — SIGNIFICANT CHANGE UP (ref 3.3–5)
ALP SERPL-CCNC: 121 U/L — HIGH (ref 40–120)
ALP SERPL-CCNC: 88 U/L — SIGNIFICANT CHANGE UP (ref 40–120)
ALT FLD-CCNC: 21 U/L — SIGNIFICANT CHANGE UP (ref 10–45)
ALT FLD-CCNC: 26 U/L — SIGNIFICANT CHANGE UP (ref 10–45)
ANION GAP SERPL CALC-SCNC: 21 MMOL/L — HIGH (ref 5–17)
ANION GAP SERPL CALC-SCNC: 8 MMOL/L — SIGNIFICANT CHANGE UP (ref 5–17)
AST SERPL-CCNC: 22 U/L — SIGNIFICANT CHANGE UP (ref 10–40)
AST SERPL-CCNC: 24 U/L — SIGNIFICANT CHANGE UP (ref 10–40)
B-OH-BUTYR SERPL-SCNC: 1.2 MMOL/L — HIGH
BILIRUB DIRECT SERPL-MCNC: <0.1 MG/DL — SIGNIFICANT CHANGE UP (ref 0–0.3)
BILIRUB INDIRECT FLD-MCNC: >0.2 MG/DL — SIGNIFICANT CHANGE UP (ref 0.2–1)
BILIRUB SERPL-MCNC: 0.3 MG/DL — SIGNIFICANT CHANGE UP (ref 0.2–1.2)
BILIRUB SERPL-MCNC: 0.4 MG/DL — SIGNIFICANT CHANGE UP (ref 0.2–1.2)
BLD GP AB SCN SERPL QL: NEGATIVE — SIGNIFICANT CHANGE UP
BUN SERPL-MCNC: 18 MG/DL — SIGNIFICANT CHANGE UP (ref 7–23)
BUN SERPL-MCNC: 22 MG/DL — SIGNIFICANT CHANGE UP (ref 7–23)
CALCIUM SERPL-MCNC: 8.3 MG/DL — LOW (ref 8.4–10.5)
CALCIUM SERPL-MCNC: 9.8 MG/DL — SIGNIFICANT CHANGE UP (ref 8.4–10.5)
CHLORIDE SERPL-SCNC: 102 MMOL/L — SIGNIFICANT CHANGE UP (ref 96–108)
CHLORIDE SERPL-SCNC: 99 MMOL/L — SIGNIFICANT CHANGE UP (ref 96–108)
CO2 SERPL-SCNC: 19 MMOL/L — LOW (ref 22–31)
CO2 SERPL-SCNC: 29 MMOL/L — SIGNIFICANT CHANGE UP (ref 22–31)
CREAT SERPL-MCNC: 0.51 MG/DL — SIGNIFICANT CHANGE UP (ref 0.5–1.3)
CREAT SERPL-MCNC: 0.61 MG/DL — SIGNIFICANT CHANGE UP (ref 0.5–1.3)
CREAT SERPL-MCNC: 0.63 MG/DL — SIGNIFICANT CHANGE UP (ref 0.5–1.3)
EGFR: 100 ML/MIN/1.73M2 — SIGNIFICANT CHANGE UP
EGFR: 105 ML/MIN/1.73M2 — SIGNIFICANT CHANGE UP
ESTIMATED AVERAGE GLUCOSE: 143 MG/DL — HIGH (ref 68–114)
GAS PNL BLDA: SIGNIFICANT CHANGE UP
GLUCOSE BLDC GLUCOMTR-MCNC: 118 MG/DL — HIGH (ref 70–99)
GLUCOSE BLDC GLUCOMTR-MCNC: 119 MG/DL — HIGH (ref 70–99)
GLUCOSE BLDC GLUCOMTR-MCNC: 145 MG/DL — HIGH (ref 70–99)
GLUCOSE BLDC GLUCOMTR-MCNC: 160 MG/DL — HIGH (ref 70–99)
GLUCOSE BLDC GLUCOMTR-MCNC: 166 MG/DL — HIGH (ref 70–99)
GLUCOSE BLDC GLUCOMTR-MCNC: 208 MG/DL — HIGH (ref 70–99)
GLUCOSE BLDC GLUCOMTR-MCNC: 241 MG/DL — HIGH (ref 70–99)
GLUCOSE SERPL-MCNC: 119 MG/DL — HIGH (ref 70–99)
GLUCOSE SERPL-MCNC: 248 MG/DL — HIGH (ref 70–99)
HCT VFR BLD CALC: 48.8 % — HIGH (ref 34.5–45)
HGB BLD-MCNC: 15.6 G/DL — HIGH (ref 11.5–15.5)
INR BLD: 1.22 RATIO — HIGH (ref 0.88–1.16)
INR BLD: 1.24 RATIO — HIGH (ref 0.88–1.16)
MAGNESIUM SERPL-MCNC: 1.9 MG/DL — SIGNIFICANT CHANGE UP (ref 1.6–2.6)
MAGNESIUM SERPL-MCNC: 2.4 MG/DL — SIGNIFICANT CHANGE UP (ref 1.6–2.6)
MCHC RBC-ENTMCNC: 29.3 PG — SIGNIFICANT CHANGE UP (ref 27–34)
MCHC RBC-ENTMCNC: 32 GM/DL — SIGNIFICANT CHANGE UP (ref 32–36)
MCV RBC AUTO: 91.7 FL — SIGNIFICANT CHANGE UP (ref 80–100)
NRBC # BLD: 0 /100 WBCS — SIGNIFICANT CHANGE UP (ref 0–0)
PHOSPHATE SERPL-MCNC: 4.5 MG/DL — SIGNIFICANT CHANGE UP (ref 2.5–4.5)
PHOSPHATE SERPL-MCNC: 4.7 MG/DL — HIGH (ref 2.5–4.5)
PLATELET # BLD AUTO: 361 K/UL — SIGNIFICANT CHANGE UP (ref 150–400)
POTASSIUM SERPL-MCNC: 3.7 MMOL/L — SIGNIFICANT CHANGE UP (ref 3.5–5.3)
POTASSIUM SERPL-MCNC: 4 MMOL/L — SIGNIFICANT CHANGE UP (ref 3.5–5.3)
POTASSIUM SERPL-SCNC: 3.7 MMOL/L — SIGNIFICANT CHANGE UP (ref 3.5–5.3)
POTASSIUM SERPL-SCNC: 4 MMOL/L — SIGNIFICANT CHANGE UP (ref 3.5–5.3)
PROT SERPL-MCNC: 7.3 G/DL — SIGNIFICANT CHANGE UP (ref 6–8.3)
PROT SERPL-MCNC: 7.9 G/DL — SIGNIFICANT CHANGE UP (ref 6–8.3)
PROTHROM AB SERPL-ACNC: 14.2 SEC — HIGH (ref 10.5–13.4)
PROTHROM AB SERPL-ACNC: 14.3 SEC — HIGH (ref 10.5–13.4)
RBC # BLD: 5.32 M/UL — HIGH (ref 3.8–5.2)
RBC # FLD: 12.8 % — SIGNIFICANT CHANGE UP (ref 10.3–14.5)
RH IG SCN BLD-IMP: POSITIVE — SIGNIFICANT CHANGE UP
SODIUM SERPL-SCNC: 139 MMOL/L — SIGNIFICANT CHANGE UP (ref 135–145)
WBC # BLD: 28.99 K/UL — HIGH (ref 3.8–10.5)
WBC # FLD AUTO: 28.99 K/UL — HIGH (ref 3.8–10.5)

## 2023-01-31 PROCEDURE — 99291 CRITICAL CARE FIRST HOUR: CPT

## 2023-01-31 PROCEDURE — 71045 X-RAY EXAM CHEST 1 VIEW: CPT | Mod: 26

## 2023-01-31 PROCEDURE — 93970 EXTREMITY STUDY: CPT | Mod: 26

## 2023-01-31 PROCEDURE — 93010 ELECTROCARDIOGRAM REPORT: CPT

## 2023-01-31 RX ORDER — SODIUM CHLORIDE 9 MG/ML
1000 INJECTION, SOLUTION INTRAVENOUS
Refills: 0 | Status: DISCONTINUED | OUTPATIENT
Start: 2023-01-31 | End: 2023-02-02

## 2023-01-31 RX ORDER — INSULIN LISPRO 100/ML
VIAL (ML) SUBCUTANEOUS EVERY 6 HOURS
Refills: 0 | Status: DISCONTINUED | OUTPATIENT
Start: 2023-01-31 | End: 2023-01-31

## 2023-01-31 RX ORDER — ACETAMINOPHEN 500 MG
650 TABLET ORAL ONCE
Refills: 0 | Status: DISCONTINUED | OUTPATIENT
Start: 2023-01-31 | End: 2023-02-02

## 2023-01-31 RX ORDER — CHLORHEXIDINE GLUCONATE 213 G/1000ML
1 SOLUTION TOPICAL
Refills: 0 | Status: DISCONTINUED | OUTPATIENT
Start: 2023-01-31 | End: 2023-02-03

## 2023-01-31 RX ORDER — IMMUNE GLOBULIN (HUMAN) 10 G/100ML
25 INJECTION INTRAVENOUS; SUBCUTANEOUS ONCE
Refills: 0 | Status: COMPLETED | OUTPATIENT
Start: 2023-01-31 | End: 2023-01-31

## 2023-01-31 RX ORDER — DEXTROSE 50 % IN WATER 50 %
50 SYRINGE (ML) INTRAVENOUS
Refills: 0 | Status: DISCONTINUED | OUTPATIENT
Start: 2023-01-31 | End: 2023-01-31

## 2023-01-31 RX ORDER — DIPHENHYDRAMINE HCL 50 MG
25 CAPSULE ORAL ONCE
Refills: 0 | Status: COMPLETED | OUTPATIENT
Start: 2023-01-31 | End: 2023-01-31

## 2023-01-31 RX ORDER — NICARDIPINE HYDROCHLORIDE 30 MG/1
5 CAPSULE, EXTENDED RELEASE ORAL
Qty: 40 | Refills: 0 | Status: DISCONTINUED | OUTPATIENT
Start: 2023-01-31 | End: 2023-01-31

## 2023-01-31 RX ORDER — SENNA PLUS 8.6 MG/1
2 TABLET ORAL AT BEDTIME
Refills: 0 | Status: DISCONTINUED | OUTPATIENT
Start: 2023-01-31 | End: 2023-02-01

## 2023-01-31 RX ORDER — IMMUNE GLOBULIN (HUMAN) 10 G/100ML
25 INJECTION INTRAVENOUS; SUBCUTANEOUS
Refills: 0 | Status: DISCONTINUED | OUTPATIENT
Start: 2023-01-31 | End: 2023-02-02

## 2023-01-31 RX ORDER — INSULIN HUMAN 100 [IU]/ML
3 INJECTION, SOLUTION SUBCUTANEOUS
Qty: 100 | Refills: 0 | Status: DISCONTINUED | OUTPATIENT
Start: 2023-01-31 | End: 2023-01-31

## 2023-01-31 RX ORDER — IMMUNE GLOBULIN (HUMAN) 10 G/100ML
25 INJECTION INTRAVENOUS; SUBCUTANEOUS DAILY
Refills: 0 | Status: DISCONTINUED | OUTPATIENT
Start: 2023-01-31 | End: 2023-01-31

## 2023-01-31 RX ORDER — REMDESIVIR 5 MG/ML
200 INJECTION INTRAVENOUS EVERY 24 HOURS
Refills: 0 | Status: COMPLETED | OUTPATIENT
Start: 2023-01-31 | End: 2023-01-31

## 2023-01-31 RX ORDER — ENOXAPARIN SODIUM 100 MG/ML
40 INJECTION SUBCUTANEOUS
Refills: 0 | Status: DISCONTINUED | OUTPATIENT
Start: 2023-01-31 | End: 2023-02-21

## 2023-01-31 RX ORDER — PYRIDOSTIGMINE BROMIDE 60 MG/5ML
120 SOLUTION ORAL EVERY 8 HOURS
Refills: 0 | Status: DISCONTINUED | OUTPATIENT
Start: 2023-01-31 | End: 2023-01-31

## 2023-01-31 RX ORDER — REMDESIVIR 5 MG/ML
INJECTION INTRAVENOUS
Refills: 0 | Status: COMPLETED | OUTPATIENT
Start: 2023-01-31 | End: 2023-02-04

## 2023-01-31 RX ORDER — REMDESIVIR 5 MG/ML
100 INJECTION INTRAVENOUS EVERY 24 HOURS
Refills: 0 | Status: COMPLETED | OUTPATIENT
Start: 2023-02-01 | End: 2023-02-04

## 2023-01-31 RX ORDER — POLYETHYLENE GLYCOL 3350 17 G/17G
17 POWDER, FOR SOLUTION ORAL EVERY 12 HOURS
Refills: 0 | Status: DISCONTINUED | OUTPATIENT
Start: 2023-01-31 | End: 2023-02-01

## 2023-01-31 RX ORDER — INSULIN LISPRO 100/ML
VIAL (ML) SUBCUTANEOUS EVERY 6 HOURS
Refills: 0 | Status: DISCONTINUED | OUTPATIENT
Start: 2023-01-31 | End: 2023-02-14

## 2023-01-31 RX ORDER — DEXTROSE 50 % IN WATER 50 %
25 SYRINGE (ML) INTRAVENOUS
Refills: 0 | Status: DISCONTINUED | OUTPATIENT
Start: 2023-01-31 | End: 2023-01-31

## 2023-01-31 RX ADMIN — ENOXAPARIN SODIUM 40 MILLIGRAM(S): 100 INJECTION SUBCUTANEOUS at 17:39

## 2023-01-31 RX ADMIN — SODIUM CHLORIDE 50 MILLILITER(S): 9 INJECTION, SOLUTION INTRAVENOUS at 19:05

## 2023-01-31 RX ADMIN — NICARDIPINE HYDROCHLORIDE 25 MG/HR: 30 CAPSULE, EXTENDED RELEASE ORAL at 01:24

## 2023-01-31 RX ADMIN — SODIUM CHLORIDE 50 MILLILITER(S): 9 INJECTION, SOLUTION INTRAVENOUS at 02:34

## 2023-01-31 RX ADMIN — Medication 25 MILLIGRAM(S): at 00:56

## 2023-01-31 RX ADMIN — CHLORHEXIDINE GLUCONATE 1 APPLICATION(S): 213 SOLUTION TOPICAL at 06:39

## 2023-01-31 RX ADMIN — CHLORHEXIDINE GLUCONATE 1 APPLICATION(S): 213 SOLUTION TOPICAL at 17:39

## 2023-01-31 RX ADMIN — IMMUNE GLOBULIN (HUMAN) 83.33 GRAM(S): 10 INJECTION INTRAVENOUS; SUBCUTANEOUS at 02:31

## 2023-01-31 RX ADMIN — Medication 125 MILLIGRAM(S): at 11:30

## 2023-01-31 RX ADMIN — Medication 2: at 18:10

## 2023-01-31 RX ADMIN — REMDESIVIR 200 MILLIGRAM(S): 5 INJECTION INTRAVENOUS at 17:54

## 2023-01-31 RX ADMIN — Medication 2: at 23:14

## 2023-01-31 RX ADMIN — ENOXAPARIN SODIUM 40 MILLIGRAM(S): 100 INJECTION SUBCUTANEOUS at 00:56

## 2023-01-31 RX ADMIN — SODIUM CHLORIDE 50 MILLILITER(S): 9 INJECTION, SOLUTION INTRAVENOUS at 17:39

## 2023-01-31 NOTE — PHARMACOTHERAPY INTERVENTION NOTE - COMMENTS
Recommended ID consult and addition of remdesvir for symptomatic COVID (SOB, low grade fevers, cough) on bipap.    Jessica Ortez, PharmD  PGY2 Critical Care Pharmacy Resident  Available on [employer] Teams

## 2023-01-31 NOTE — PHARMACOTHERAPY INTERVENTION NOTE - COMMENTS
Performed medication reconciliation and home medication list updated in outpatient medication review. Medications verified with patient and [facility] pharmacy. Please refer to specifics in home medication list (outpatient medication review). Also confirmed with patient NKDA. Reviewed medications for appropriate route of administration.     Home Medications:  Albuterol (Eqv-ProAir HFA) 90 mcg/inh inhalation aerosol: 2 puff(s) inhaled every 6 hours, As Needed  amLODIPine 10 mg oral tablet: 1 tab(s) orally once a day  fluticasone 110 mcg/inh inhalation aerosol with adapter: 1 puff(s) inhaled 2 times a day   lovastatin 20 mg oral tablet: 1 tab(s) orally once a day   metFORMIN 500 mg oral tablet: 1 tab(s) orally 3 times a day   predniSONE 5 mg oral tablet: 1 tab(s) orally once a day-last fill 1/20/23   pyridostigmine 60 mg oral tablet: 2 tab(s) orally 3 times a day    Jessica Ortez, PharmD  PGY2 Critical Care Pharmacy Resident  Available on [employer] Teams

## 2023-01-31 NOTE — PATIENT PROFILE ADULT - FUNCTIONAL ASSESSMENT - BASIC MOBILITY 6.
4-calculated by average/Not able to assess (calculate score using [facility] averaging method)  4-calculated by average/Not able to assess (calculate score using [facility] averaging method)  4-calculated by average/Not able to assess (calculate score using [facility] averaging method)

## 2023-01-31 NOTE — PHYSICAL THERAPY INITIAL EVALUATION ADULT - ADDITIONAL COMMENTS
Pt intubated, however answering yes/no questions. Pt lives in a house with , son, and dtr-in-law. Has no stairs to enter and first floor setup. Reports being independent with functional mobility/ADLs without AD.

## 2023-01-31 NOTE — PATIENT PROFILE ADULT - FALL HARM RISK - RISK INTERVENTIONS
Assistance OOB with selected safe patient handling equipment/Assistance with ambulation/Communicate Fall Risk and Risk Factors to all staff, patient, and family/Reinforce activity limits and safety measures with patient and family/Visual Cue: Yellow wristband/Bed in lowest position, wheels locked, appropriate side rails in place/Call bell, personal items and telephone in reach/Instruct patient to call for assistance before getting out of bed or chair/Non-slip footwear when patient is out of bed/[place] to call system/Physically safe environment - no spills, clutter or unnecessary equipment/Purposeful Proactive Rounding/Room/bathroom lighting operational, light cord in reach Assistance OOB with selected safe patient handling equipment/Assistance with ambulation/Communicate Fall Risk and Risk Factors to all staff, patient, and family/Reinforce activity limits and safety measures with patient and family/Visual Cue: Yellow wristband/Bed in lowest position, wheels locked, appropriate side rails in place/Call bell, personal items and telephone in reach/Instruct patient to call for assistance before getting out of bed or chair/Non-slip footwear when patient is out of bed/[place] to call system/Physically safe environment - no spills, clutter or unnecessary equipment/Purposeful Proactive Rounding/Room/bathroom lighting operational, light cord in reach Assistance OOB with selected safe patient handling equipment/Assistance with ambulation/Communicate Fall Risk and Risk Factors to all staff, patient, and family/Reinforce activity limits and safety measures with patient and family/Visual Cue: Yellow wristband/Bed in lowest position, wheels locked, appropriate side rails in place/Call bell, personal items and telephone in reach/Instruct patient to call for assistance before getting out of bed or chair/Non-slip footwear when patient is out of bed/[place] to call system/Physically safe environment - no spills, clutter or unnecessary equipment/Purposeful Proactive Rounding/Room/bathroom lighting operational, light cord in reach

## 2023-01-31 NOTE — PHYSICAL THERAPY INITIAL EVALUATION ADULT - ACTIVE RANGE OF MOTION EXAMINATION, REHAB EVAL
b/l shld AAROM WFL/bilateral upper extremity Active ROM was WFL (within functional limits)/bilateral  lower extremity Active ROM was WFL (within functional limits)

## 2023-01-31 NOTE — PROGRESS NOTE ADULT - SUBJECTIVE AND OBJECTIVE BOX
NEUROLOGY FOLLOW-UP CONSULT NOTE    RFC: Myasthenia gravis (MG) exacerbation    Interval history: No acute neurologic events overnight. Patient placed on BiPAP overnight for respiratory failure. Received first dose of IVIG earlier this morning without incident. Also receiving SoluMedrol 125mg IV and remdesivir for COVID-19 infection. Mildly worse than yesterday.    Meds:  MEDICATIONS  (STANDING):  acetaminophen   IVPB .. 650 milliGRAM(s) IV Intermittent once  chlorhexidine 4% Liquid 1 Application(s) Topical two times a day  enoxaparin Injectable 40 milliGRAM(s) SubCutaneous <User Schedule>  immune   globulin 10% (GAMMAGARD) IVPB 25 Gram(s) IV Intermittent <User Schedule>  insulin lispro (ADMELOG) corrective regimen sliding scale   SubCutaneous every 6 hours  methylPREDNISolone sodium succinate Injectable 125 milliGRAM(s) IV Push daily  multiple electrolytes Injection Type 1 1000 milliLiter(s) (50 mL/Hr) IV Continuous <Continuous>  polyethylene glycol 3350 17 Gram(s) Oral every 12 hours  remdesivir  IVPB   IV Intermittent   remdesivir  IVPB 200 milliGRAM(s) IV Intermittent every 24 hours  senna 2 Tablet(s) Oral at bedtime    MEDICATIONS  (PRN):    GTT:  None    PMHx/PSHx/FHx/SHx:  Shortness of breath    Handoff    MEWS Score    Myasthenia gravis    Diabetes mellitus    Shortness of breath    COVID SOB    SysAdmin_VisitLink        Allergies:  No Known Drug Allergies  peanuts (Unknown)      ROS: All systems negative except as documented in Interval history    O:  T(C): 36.7 (01-31-23 @ 11:00), Max: 37.9 (01-30-23 @ 23:19)  HR: 88 (01-31-23 @ 12:00) (77 - 145)  BP: 130/60 (01-31-23 @ 12:00) (103/54 - 219/129)  RR: 30 (01-31-23 @ 12:00) (18 - 35)  SpO2: 97% (01-31-23 @ 12:00) (90% - 100%)    Focused neurologic exam:  MS - AAO x3, speech difficult to assess due to BiPAP on, follows commands. Due to clinical condition unable to assess (MOLLY) rep/naming, attn/conc/recent and remote memory/fund of knowledge  CN - PERRL, EOM with poor upgaze and dysconjugate gaze, R > L severe ptosis, VFF, face sens/str/hearing WNL b/l, tongue/palate midline, trap 5/5 b/l. NE 3+/5, NF 2+/5  Motor - Normal bulk and mildly dec tone. BUE's D 3/5, RUE distal 4/5, LUE distal 4+/5. BLE's proximal 4/5, RLE distal 4/5, LLE distal 4+/5  Sens - LT/PP intact all  DTR's - 2+ BUE's, 1+ KJ b/l, 0+ AJ b/l, and neutral b/l plantar response  Coord - Grossly appropriate for level of strength  Gait and station - MOLLY    Pertinent labs/studies:  CBC with inc WBC 28.99, H/H inc 16/49, otherwise essentially WNL  PT/INR inc 14.3/1.24  BMP essentially WNL  Mg WNL, Phos WNL  Albumin WNL, LFT's with inc alk phos 121  A1C inc 6.6%, COVID-19 (+)    < from: Xray Chest 1 View- PORTABLE-Urgent (Xray Chest 1 View- PORTABLE-Urgent .) (01.30.23 @ 14:44) >  Bibasilar linear atelectasis. Otherwise, the lungs are clear.    < end of copied text >

## 2023-01-31 NOTE — PHYSICAL THERAPY INITIAL EVALUATION ADULT - NSPTDISCHREC_GEN_A_CORE
at this time, however anticipate possible progression to HomePT pending hospital course./Acute Inpatient Rehab

## 2023-01-31 NOTE — CHART NOTE - NSCHARTNOTEFT_GEN_A_CORE
CAPRINI SCORE [CLOT]    AGE RELATED RISK FACTORS                                                       MOBILITY RELATED FACTORS  [ ] Age 41-60 years                                            (1 Point)                  [ ] Bed rest                                                        (1 Point)  [X ] Age: 61-74 years                                           (2 Points)                 [ ] Plaster cast                                                   (2 Points)  [ ] Age= 75 years                                              (3 Points)                 [ ] Bed bound for more than 72 hours                 (2 Points)    DISEASE RELATED RISK FACTORS                                               GENDER SPECIFIC FACTORS  [ ] Edema in the lower extremities                       (1 Point)                  [ ] Pregnancy                                                     (1 Point)  [ ] Varicose veins                                               (1 Point)                  [ ] Post-partum < 6 weeks                                   (1 Point)             [X] BMI > 25 Kg/m2                                            (1 Point)                  [ ] Hormonal therapy  or oral contraception          (1 Point)                 [ ] Sepsis (in the previous month)                        (1 Point)                  [ ] History of pregnancy complications                 (1 point)  [ ] Pneumonia or serious lung disease                                               [ ] Unexplained or recurrent                     (1 Point)           (in the previous month)                               (1 Point)  [ ] Abnormal pulmonary function test                     (1 Point)                 SURGERY RELATED RISK FACTORS  [ ] Acute myocardial infarction                              (1 Point)                 [ ]  Section                                             (1 Point)  [ ] Congestive heart failure (in the previous month)  (1 Point)               [ ] Minor surgery                                                  (1 Point)   [ ] Inflammatory bowel disease                             (1 Point)                 [ ] Arthroscopic surgery                                        (2 Points)  [ ] Central venous access                                      (2 Points)                [ ] General surgery lasting more than 45 minutes   (2 Points)       [ ] Stroke (in the previous month)                          (5 Points)               [ ] Elective arthroplasty                                         (5 Points)                                                                                                                                               HEMATOLOGY RELATED FACTORS                                                 TRAUMA RELATED RISK FACTORS  [ ] Prior episodes of VTE                                     (3 Points)                [ ] Fracture of the hip, pelvis, or leg                       (5 Points)  [ ] Positive family history for VTE                         (3 Points)                 [ ] Acute spinal cord injury (in the previous month)  (5 Points)  [ ] Prothrombin 15386 A                                     (3 Points)                 [ ] Paralysis  (less than 1 month)                             (5 Points)  [ ] Factor V Leiden                                             (3 Points)                  [ ] Multiple Trauma within 1 month                        (5 Points)  [ ] Lupus anticoagulants                                     (3 Points)                                                           [ ] Anticardiolipin antibodies                               (3 Points)                                                       [ ] High homocysteine in the blood                      (3 Points)                                             [ ] Other congenital or acquired thrombophilia      (3 Points)                                                [ ] Heparin induced thrombocytopenia                  (3 Points)                                          Total Score [     3     ]    Caprini Score 0 - 2:  Low Risk, No VTE Prophylaxis required for most patients, encourage ambulation  Caprini Score 3 - 6:  At Risk, pharmacologic VTE prophylaxis is indicated for most patients (in the absence of a contraindication)  Caprini Score Greater than or = 7:  High Risk, pharmacologic VTE prophylaxis is indicated for most patients (in the absence of a contraindication) CAPRINI SCORE [CLOT]    AGE RELATED RISK FACTORS                                                       MOBILITY RELATED FACTORS  [ ] Age 41-60 years                                            (1 Point)                  [ ] Bed rest                                                        (1 Point)  [X ] Age: 61-74 years                                           (2 Points)                 [ ] Plaster cast                                                   (2 Points)  [ ] Age= 75 years                                              (3 Points)                 [ ] Bed bound for more than 72 hours                 (2 Points)    DISEASE RELATED RISK FACTORS                                               GENDER SPECIFIC FACTORS  [ ] Edema in the lower extremities                       (1 Point)                  [ ] Pregnancy                                                     (1 Point)  [ ] Varicose veins                                               (1 Point)                  [ ] Post-partum < 6 weeks                                   (1 Point)             [X] BMI > 25 Kg/m2                                            (1 Point)                  [ ] Hormonal therapy  or oral contraception          (1 Point)                 [ ] Sepsis (in the previous month)                        (1 Point)                  [ ] History of pregnancy complications                 (1 point)  [ ] Pneumonia or serious lung disease                                               [ ] Unexplained or recurrent                     (1 Point)           (in the previous month)                               (1 Point)  [ ] Abnormal pulmonary function test                     (1 Point)                 SURGERY RELATED RISK FACTORS  [ ] Acute myocardial infarction                              (1 Point)                 [ ]  Section                                             (1 Point)  [ ] Congestive heart failure (in the previous month)  (1 Point)               [ ] Minor surgery                                                  (1 Point)   [ ] Inflammatory bowel disease                             (1 Point)                 [ ] Arthroscopic surgery                                        (2 Points)  [ ] Central venous access                                      (2 Points)                [ ] General surgery lasting more than 45 minutes   (2 Points)       [ ] Stroke (in the previous month)                          (5 Points)               [ ] Elective arthroplasty                                         (5 Points)                                                                                                                                               HEMATOLOGY RELATED FACTORS                                                 TRAUMA RELATED RISK FACTORS  [ ] Prior episodes of VTE                                     (3 Points)                [ ] Fracture of the hip, pelvis, or leg                       (5 Points)  [ ] Positive family history for VTE                         (3 Points)                 [ ] Acute spinal cord injury (in the previous month)  (5 Points)  [ ] Prothrombin 46167 A                                     (3 Points)                 [ ] Paralysis  (less than 1 month)                             (5 Points)  [ ] Factor V Leiden                                             (3 Points)                  [ ] Multiple Trauma within 1 month                        (5 Points)  [ ] Lupus anticoagulants                                     (3 Points)                                                           [ ] Anticardiolipin antibodies                               (3 Points)                                                       [ ] High homocysteine in the blood                      (3 Points)                                             [ ] Other congenital or acquired thrombophilia      (3 Points)                                                [ ] Heparin induced thrombocytopenia                  (3 Points)                                          Total Score [     3     ]    Caprini Score 0 - 2:  Low Risk, No VTE Prophylaxis required for most patients, encourage ambulation  Caprini Score 3 - 6:  At Risk, pharmacologic VTE prophylaxis is indicated for most patients (in the absence of a contraindication)  Caprini Score Greater than or = 7:  High Risk, pharmacologic VTE prophylaxis is indicated for most patients (in the absence of a contraindication) CAPRINI SCORE [CLOT]    AGE RELATED RISK FACTORS                                                       MOBILITY RELATED FACTORS  [ ] Age 41-60 years                                            (1 Point)                  [ ] Bed rest                                                        (1 Point)  [X ] Age: 61-74 years                                           (2 Points)                 [ ] Plaster cast                                                   (2 Points)  [ ] Age= 75 years                                              (3 Points)                 [ ] Bed bound for more than 72 hours                 (2 Points)    DISEASE RELATED RISK FACTORS                                               GENDER SPECIFIC FACTORS  [ ] Edema in the lower extremities                       (1 Point)                  [ ] Pregnancy                                                     (1 Point)  [ ] Varicose veins                                               (1 Point)                  [ ] Post-partum < 6 weeks                                   (1 Point)             [X] BMI > 25 Kg/m2                                            (1 Point)                  [ ] Hormonal therapy  or oral contraception          (1 Point)                 [ ] Sepsis (in the previous month)                        (1 Point)                  [ ] History of pregnancy complications                 (1 point)  [ ] Pneumonia or serious lung disease                                               [ ] Unexplained or recurrent                     (1 Point)           (in the previous month)                               (1 Point)  [ ] Abnormal pulmonary function test                     (1 Point)                 SURGERY RELATED RISK FACTORS  [ ] Acute myocardial infarction                              (1 Point)                 [ ]  Section                                             (1 Point)  [ ] Congestive heart failure (in the previous month)  (1 Point)               [ ] Minor surgery                                                  (1 Point)   [ ] Inflammatory bowel disease                             (1 Point)                 [ ] Arthroscopic surgery                                        (2 Points)  [ ] Central venous access                                      (2 Points)                [ ] General surgery lasting more than 45 minutes   (2 Points)       [ ] Stroke (in the previous month)                          (5 Points)               [ ] Elective arthroplasty                                         (5 Points)                                                                                                                                               HEMATOLOGY RELATED FACTORS                                                 TRAUMA RELATED RISK FACTORS  [ ] Prior episodes of VTE                                     (3 Points)                [ ] Fracture of the hip, pelvis, or leg                       (5 Points)  [ ] Positive family history for VTE                         (3 Points)                 [ ] Acute spinal cord injury (in the previous month)  (5 Points)  [ ] Prothrombin 25295 A                                     (3 Points)                 [ ] Paralysis  (less than 1 month)                             (5 Points)  [ ] Factor V Leiden                                             (3 Points)                  [ ] Multiple Trauma within 1 month                        (5 Points)  [ ] Lupus anticoagulants                                     (3 Points)                                                           [ ] Anticardiolipin antibodies                               (3 Points)                                                       [ ] High homocysteine in the blood                      (3 Points)                                             [ ] Other congenital or acquired thrombophilia      (3 Points)                                                [ ] Heparin induced thrombocytopenia                  (3 Points)                                          Total Score [     3     ]    Caprini Score 0 - 2:  Low Risk, No VTE Prophylaxis required for most patients, encourage ambulation  Caprini Score 3 - 6:  At Risk, pharmacologic VTE prophylaxis is indicated for most patients (in the absence of a contraindication)  Caprini Score Greater than or = 7:  High Risk, pharmacologic VTE prophylaxis is indicated for most patients (in the absence of a contraindication)

## 2023-01-31 NOTE — PROGRESS NOTE ADULT - ASSESSMENT
ASSESSMENT/PLAN: MG crisis, covid +    NEURO:   neurology consulted, reportedly had discussion with Patient regarding PLEX, patient refused  start IVIG not   Hold prednisone and pyridostigmine during acute exacerbation  Avoid medications that may worsen the current exacerbation including macrolides, fluoroquinolones, tetracyclines, aminoglycoside, beta-blockers, magnesium, and anti-arrhythmics (procainamide, quinidine, and lidocaine)  NIF/VC q4h--not able as on BiPAP   neurochecks Q2 hrs   Activity: [] mobilize as tolerated [x] Bedrest [] PT [] OT [] PMNR    PULM:  severe respiratory acidosis improved with BiPAP  if persistent/no further improvement, intubate     CV:  SBP goal 100-160  nicardipine gtt prn     RENAL:  Fluids: IVF    GI:  Diet: NPO   GI prophylaxis [x] not indicated [] PPI [] other:  Bowel regimen [] colace [x] senna [x] other: miralax     ENDO:   Goal euglycemia (-180)  RISS for now      HEME/ONC:  VTE prophylaxis: [x] SCDs [x] chemoprophylaxis  lovenox [] hold chemoprophylaxis due to: [] high risk of DVT/PE on admission due to:    ID: covid + hold off decadron given MG exac, supportive care for now  monitor for fevers     MISC:    SOCIAL/FAMILY:  [] awaiting [x] updated at bedside [] family meeting    CODE STATUS:  [x] Full Code [] DNR [] DNI [] Palliative/Comfort Care    DISPOSITION:  [x] ICU [] Stroke Unit [] Floor [] EMU [] RCU [] PCU    [x] Patient is at high risk of neurologic deterioration/death due to: MG crisis, hypercapnic respiratory failure       Contact: [phone] ASSESSMENT/PLAN: MG crisis, covid +    NEURO:   neurology consulted, reportedly had discussion with Patient regarding PLEX, patient refused  start IVIG not   Hold prednisone and pyridostigmine during acute exacerbation  Avoid medications that may worsen the current exacerbation including macrolides, fluoroquinolones, tetracyclines, aminoglycoside, beta-blockers, magnesium, and anti-arrhythmics (procainamide, quinidine, and lidocaine)  NIF/VC q4h--not able as on BiPAP   neurochecks Q2 hrs   Activity: [] mobilize as tolerated [x] Bedrest [] PT [] OT [] PMNR    PULM:  severe respiratory acidosis improved with BiPAP  if persistent/no further improvement, intubate     CV:  SBP goal 100-160  nicardipine gtt prn     RENAL:  Fluids: IVF    GI:  Diet: NPO   GI prophylaxis [x] not indicated [] PPI [] other:  Bowel regimen [] colace [x] senna [x] other: miralax     ENDO:   Goal euglycemia (-180)  RISS for now      HEME/ONC:  VTE prophylaxis: [x] SCDs [x] chemoprophylaxis  lovenox [] hold chemoprophylaxis due to: [] high risk of DVT/PE on admission due to:    ID: covid + hold off decadron given MG exac, supportive care for now  monitor for fevers     MISC:    SOCIAL/FAMILY:  [] awaiting [x] updated at bedside [] family meeting    CODE STATUS:  [x] Full Code [] DNR [] DNI [] Palliative/Comfort Care    DISPOSITION:  [x] ICU [] Stroke Unit [] Floor [] EMU [] RCU [] PCU    [x] Patient is at high risk of neurologic deterioration/death due to: MG crisis, hypercapnic respiratory failure       Contact: [phone] ASSESSMENT/PLAN: MG crisis, covid +    NEURO:   neurology consulted, reportedly had discussion with Patient regarding PLEX, patient refused  start IVIG not   Hold prednisone and pyridostigmine during acute exacerbation  Avoid medications that may worsen the current exacerbation including macrolides, fluoroquinolones, tetracyclines, aminoglycoside, beta-blockers, magnesium, and anti-arrhythmics (procainamide, quinidine, and lidocaine)  NIF/VC q4h--not able as on BiPAP   neurochecks Q2 hrs   Activity: [] mobilize as tolerated [x] Bedrest [] PT [] OT [] PMNR    PULM:  severe respiratory acidosis improved with BiPAP  if persistent/no further improvement, intubate     CV:  SBP goal 100-160  nicardipine gtt prn     RENAL:  Fluids: IVF    GI:  Diet: NPO   GI prophylaxis [x] not indicated [] PPI [] other:  Bowel regimen [] colace [x] senna [x] other: miralax     ENDO:   Goal euglycemia (-180)  RISS for now      HEME/ONC:  VTE prophylaxis: [x] SCDs [x] chemoprophylaxis  lovenox [] hold chemoprophylaxis due to: [] high risk of DVT/PE on admission due to:    ID: covid + hold off decadron given MG exac, supportive care for now  monitor for fevers     MISC:    SOCIAL/FAMILY:  [] awaiting [x] updated at bedside [] family meeting    CODE STATUS:  [x] Full Code [] DNR [] DNI [] Palliative/Comfort Care    DISPOSITION:  [x] ICU [] Stroke Unit [] Floor [] EMU [] RCU [] PCU    [x] Patient is at high risk of neurologic deterioration/death due to: MG crisis, hypercapnic respiratory failure       Contact: [phone] ASSESSMENT/PLAN: MG crisis, covid +    NEURO:   neurology consulted, reportedly had discussion with Patient regarding PLEX, patient refused  start IVIG  day 2 today   started on solumedrol today   Hold pyridostigmine during acute exacerbation  Avoid medications that may worsen the current exacerbation including macrolides, fluoroquinolones, tetracyclines, aminoglycoside, beta-blockers, magnesium, and anti-arrhythmics (procainamide, quinidine, and lidocaine)  NIF/VC q4h--not able as on noninvasive ventilatory support   neurochecks Q2 hrs   Activity: [] mobilize as tolerated [x] Bedrest [] PT [] OT [] PMNR    PULM:  cont noninvasive ventilatory support  monitor resp status closely  maintain neutral neck position  frequent suctioning     CV:  SBP goal 100-160    RENAL:  Fluids: IVF    GI:  Diet: NPO   GI prophylaxis [x] not indicated [] PPI [] other:  Bowel regimen [] colace [x] senna [x] other: miralax     ENDO:   Goal euglycemia (-180)  RISS for now      HEME/ONC:  VTE prophylaxis: [x] SCDs [x] chemoprophylaxis lovenox [] hold chemoprophylaxis due to: [] high risk of DVT/PE on admission due to:    ID: covid +on steroid and remdesivir now   ID following   monitor for fevers     MISC:    CODE STATUS:  [x] Full Code [] DNR [] DNI [] Palliative/Comfort Care    DISPOSITION:  [x] ICU [] Stroke Unit [] Floor [] EMU [] RCU [] PCU    [x] Patient is at high risk of neurologic deterioration/death due to: MG crisis, hypercapnic/hypoxic respiratory failure    Contact: [phone] ASSESSMENT/PLAN: MG crisis, covid +    NEURO:   neurology consulted, reportedly had discussion with Patient regarding PLEX, patient refused  start IVIG  day 2 today   started on solumedrol today   Hold pyridostigmine during acute exacerbation  Avoid medications that may worsen the current exacerbation including macrolides, fluoroquinolones, tetracyclines, aminoglycoside, beta-blockers, magnesium, and anti-arrhythmics (procainamide, quinidine, and lidocaine)  NIF/VC q4h--not able as on noninvasive ventilatory support   neurochecks Q2 hrs   Activity: [] mobilize as tolerated [x] Bedrest [] PT [] OT [] PMNR    PULM:  cont noninvasive ventilatory support  monitor resp status closely  maintain neutral neck position  frequent suctioning     CV:  SBP goal 100-160    RENAL:  Fluids: IVF    GI:  Diet: NPO   GI prophylaxis [x] not indicated [] PPI [] other:  Bowel regimen [] colace [x] senna [x] other: miralax     ENDO:   Goal euglycemia (-180)  RISS for now      HEME/ONC:  VTE prophylaxis: [x] SCDs [x] chemoprophylaxis lovenox [] hold chemoprophylaxis due to: [] high risk of DVT/PE on admission due to:    ID: covid +on steroid and remdesivir now   ID following   monitor for fevers     MISC:    CODE STATUS:  [x] Full Code [] DNR [] DNI [] Palliative/Comfort Care    DISPOSITION:  [x] ICU [] Stroke Unit [] Floor [] EMU [] RCU [] PCU    [x] Patient is at high risk of neurologic deterioration/death due to: MG crisis, hypercapnic/hypoxic respiratory failure    Contact: [phone] ASSESSMENT/PLAN: MG crisis, covid +    NEURO:   neurology consulted, reportedly had discussion with Patient regarding PLEX, patient refused  start IVIG  day 2 today   started on solumedrol today   Hold pyridostigmine during acute exacerbation  Avoid medications that may worsen the current exacerbation including macrolides, fluoroquinolones, tetracyclines, aminoglycoside, beta-blockers, magnesium, and anti-arrhythmics (procainamide, quinidine, and lidocaine)  NIF/VC q4h--not able as on noninvasive ventilatory support   neurochecks Q2 hrs   Activity: [] mobilize as tolerated [x] Bedrest [] PT [] OT [] PMNR    PULM:  cont noninvasive ventilatory support  monitor resp status closely  maintain neutral neck position  frequent suctioning     CV:  SBP goal 100-160    RENAL:  Fluids: IVF    GI:  Diet: NPO   GI prophylaxis [x] not indicated [] PPI [] other:  Bowel regimen [] colace [x] senna [x] other: miralax     ENDO:   Goal euglycemia (-180)  RISS for now      HEME/ONC:  VTE prophylaxis: [x] SCDs [x] chemoprophylaxis lovenox [] hold chemoprophylaxis due to: [] high risk of DVT/PE on admission due to:    ID: covid +on steroid and remdesivir now   ID following   monitor for fevers     MISC:    CODE STATUS:  [x] Full Code [] DNR [] DNI [] Palliative/Comfort Care    DISPOSITION:  [x] ICU [] Stroke Unit [] Floor [] EMU [] RCU [] PCU    [x] Patient is at high risk of neurologic deterioration/death due to: MG crisis, hypercapnic/hypoxic respiratory failure    Contact: [phone]

## 2023-01-31 NOTE — PHYSICAL THERAPY INITIAL EVALUATION ADULT - PERTINENT HX OF CURRENT PROBLEM, REHAB EVAL
61yo Malayalam-speaking woman w/ Myasthenia Gravis (on pyridostigmine, hospitalization every 1-2 years requiring IVIG) (dx 2015), s/p thymectomy (2010), T2DM who presents with SOB, cough, difficulty spitting up sputum x 4 days.  was sick with COVID, so son performed home COVID test for patient which was positive. Patient's primary complaint is SOB when lying down. She has been sleeping upright. She had difficulty coughing up and spitting out sputum but today these symptoms are slightly improved. Also reports difficulty swallowing - but was able to tolerate taking oral pills. Admits to difficulty looking up, but eye drooping is overall improved from past couple of months. No blurry vision. Patient takes pyridostigmine 120mg TID regularly. Patient also has prednisone PRN for sick days-- she has been taking 20mg prednisone x past 4 days as per instruction of her neurologist.  OVERNIGHT EVENTS: Adm NSCU, upon arrival to the unit, appeared in acute respiratory distress tachypneic, hypertensive, tachycardic, hypoxic, ABG showing resp acidosis, immediately put on BiPAP, started on IVIG with some improvement. 1/30 CXR Bibasilar linear atelectasis. Otherwise, the lungs are clear. 61yo Malayalam-speaking woman w/ Myasthenia Gravis (on pyridostigmine, hospitalization every 1-2 years requiring IVIG) (dx 2015), s/p thymectomy (2010), T2DM who presents with SOB, cough, difficulty spitting up sputum x 4 days.  was sick with COVID, so son performed home COVID test for patient which was positive. Patient's primary complaint is SOB when lying down. She has been sleeping upright. She had difficulty coughing up and spitting out sputum but today these symptoms are slightly improved. Also reports difficulty swallowing - but was able to tolerate taking oral pills. Admits to difficulty looking up, but eye drooping is overall improved from past couple of months. No blurry vision. Patient takes pyridostigmine 120mg TID regularly. Patient also has prednisone PRN for sick days-- she has been taking 20mg prednisone x past 4 days as per instruction of her neurologist.  OVERNIGHT EVENTS: Adm NSCU, upon arrival to the unit, appeared in acute respiratory distress tachypneic, hypertensive, tachycardic, hypoxic, ABG showing resp acidosis, immediately put on BiPAP, started on IVIG with some improvement. 1/30 CXR Bibasilar linear atelectasis. Otherwise, the lungs are clear.    Pt intubated and now s/p 1/5 PLEX sessions and next session on 2/4. 63yo Malayalam-speaking woman w/ Myasthenia Gravis (on pyridostigmine, hospitalization every 1-2 years requiring IVIG) (dx 2015), s/p thymectomy (2010), T2DM who presents with SOB, cough, difficulty spitting up sputum x 4 days.  was sick with COVID, so son performed home COVID test for patient which was positive. Patient's primary complaint is SOB when lying down. She has been sleeping upright. She had difficulty coughing up and spitting out sputum but today these symptoms are slightly improved. Also reports difficulty swallowing - but was able to tolerate taking oral pills. Admits to difficulty looking up, but eye drooping is overall improved from past couple of months. No blurry vision. Patient takes pyridostigmine 120mg TID regularly. Patient also has prednisone PRN for sick days-- she has been taking 20mg prednisone x past 4 days as per instruction of her neurologist.  OVERNIGHT EVENTS: Adm NSCU, upon arrival to the unit, appeared in acute respiratory distress tachypneic, hypertensive, tachycardic, hypoxic, ABG showing resp acidosis, immediately put on BiPAP, started on IVIG with some improvement. 1/30 CXR Bibasilar linear atelectasis. Otherwise, the lungs are clear.    Pt intubated and now s/p 1/5 PLEX sessions and next session on 2/4.

## 2023-01-31 NOTE — PROGRESS NOTE ADULT - SUBJECTIVE AND OBJECTIVE BOX
SUMMARY: 63yo Malayalam-speaking woman w/ Myasthenia Gravis (on pyridostigmine, hospitalization every 1-2 years requiring IVIG) (dx 2015), s/p thymectomy (2010), T2DM who presents with SOB, cough, difficulty spitting up sputum x 4 days.  was sick with COVID, so son performed home COVID test for patient which was positive. Patient's primary complaint is SOB when lying down. She has been sleeping upright. She had difficulty coughing up and spitting out sputum but today these symptoms are slightly improved. Also reports difficulty swallowing - but was able to tolerate taking oral pills. Admits to difficulty looking up, but eye drooping is overall improved from past couple of months. No blurry vision. Patient takes pyridostigmine 120mg TID regularly. Patient also has prednisone PRN for sick days-- she has been taking 20mg prednisone x past 4 days as per instruction of her neurologist.     Of note, last hospitalization was 2 years ago at [facility] during which patient was intubated -- son notes that this was likely 2/2 patient being forced to lie down flat in hospital bed, leading to aspiration. She was extubated after ~3 days. Received IVIG during that hospitalization    OVERNIGHT EVENTS: Adm NSCU, upon arrival to the unit, appeared in acute respiratory distress tachypneic, hypertensive, tachycardic, hypoxic, ABG showing resp acidosis, immediately put on BiPAP, started on IVIG with some improvement     ADMISSION SCORES:   GCS: 14 HH: MF: NIHSS: ICH Score:    REVIEW OF SYSTEMS: [] Unable to Assess due to neurologic exam   [ x] All ROS addressed below are non-contributory, except:  Neuro: [ ] Headache [ ] Back pain [ ] Numbness [ ] Weakness [ ] Ataxia [ ] Dizziness [ ] Aphasia [ ] Dysarthria [ ] Visual disturbance  Resp: [ x] Shortness of breath/dyspnea [ ] Orthopnea [ ] Cough  CV: [ ] Chest pain [ ] Palpitation [ ] Lightheadedness [ ] Syncope  Renal: [ ] Thirst [ ] Edema  GI: [ ] Nausea [ ] Emesis [ ] Abdominal pain [ ] Constipation [ ] Diarrhea  Hem: [ ] Hematemesis [ ] bBright red blood per rectum  ID: [ ] Fever [ ] Chills [ ] Dysuria  ENT: [ ] Rhinorrhea    VITALS: [x] Reviewed    IMAGING/DATA: [x] Reviewed    IVF FLUIDS/MEDICATIONS: [x] Reviewed    ALLERGIES: Allergies    No Known Allergies    Intolerances      DEVICES:   [] Restraints [x] PIVs [] ET tube [] central line [] PICC [] arterial line [] morales [] NGT/OGT [] EVD [] LD [] MYAH/HMV [] LiCOX [] ICP monitor [] Trach [] PEG [] Chest Tube [] other:    EXAMINATION:  General: distress  HEENT: Anicteric sclerae  Cardiac: [id number] tachycardic  Lungs: diminished   Abdomen: Soft, non-tender, +BS  Neurologic: Awake, alert, oriented x3 with , follows commands, PERRL, EOMI, face symmetric, TANNER to command SUMMARY: 61yo Malayalam-speaking woman w/ Myasthenia Gravis (on pyridostigmine, hospitalization every 1-2 years requiring IVIG) (dx 2015), s/p thymectomy (2010), T2DM who presents with SOB, cough, difficulty spitting up sputum x 4 days.  was sick with COVID, so son performed home COVID test for patient which was positive. Patient's primary complaint is SOB when lying down. She has been sleeping upright. She had difficulty coughing up and spitting out sputum but today these symptoms are slightly improved. Also reports difficulty swallowing - but was able to tolerate taking oral pills. Admits to difficulty looking up, but eye drooping is overall improved from past couple of months. No blurry vision. Patient takes pyridostigmine 120mg TID regularly. Patient also has prednisone PRN for sick days-- she has been taking 20mg prednisone x past 4 days as per instruction of her neurologist.     Of note, last hospitalization was 2 years ago at [facility] during which patient was intubated -- son notes that this was likely 2/2 patient being forced to lie down flat in hospital bed, leading to aspiration. She was extubated after ~3 days. Received IVIG during that hospitalization    OVERNIGHT EVENTS: Adm NSCU, upon arrival to the unit, appeared in acute respiratory distress tachypneic, hypertensive, tachycardic, hypoxic, ABG showing resp acidosis, immediately put on BiPAP, started on IVIG with some improvement     ADMISSION SCORES:   GCS: 14 HH: MF: NIHSS: ICH Score:    REVIEW OF SYSTEMS: [] Unable to Assess due to neurologic exam   [ x] All ROS addressed below are non-contributory, except:  Neuro: [ ] Headache [ ] Back pain [ ] Numbness [ ] Weakness [ ] Ataxia [ ] Dizziness [ ] Aphasia [ ] Dysarthria [ ] Visual disturbance  Resp: [ x] Shortness of breath/dyspnea [ ] Orthopnea [ ] Cough  CV: [ ] Chest pain [ ] Palpitation [ ] Lightheadedness [ ] Syncope  Renal: [ ] Thirst [ ] Edema  GI: [ ] Nausea [ ] Emesis [ ] Abdominal pain [ ] Constipation [ ] Diarrhea  Hem: [ ] Hematemesis [ ] bBright red blood per rectum  ID: [ ] Fever [ ] Chills [ ] Dysuria  ENT: [ ] Rhinorrhea    VITALS: [x] Reviewed    IMAGING/DATA: [x] Reviewed    IVF FLUIDS/MEDICATIONS: [x] Reviewed    ALLERGIES: Allergies    No Known Allergies    Intolerances      DEVICES:   [] Restraints [x] PIVs [] ET tube [] central line [] PICC [] arterial line [] morales [] NGT/OGT [] EVD [] LD [] MYAH/HMV [] LiCOX [] ICP monitor [] Trach [] PEG [] Chest Tube [] other:    EXAMINATION:  General: distress  HEENT: Anicteric sclerae  Cardiac: [id number] tachycardic  Lungs: diminished   Abdomen: Soft, non-tender, +BS  Neurologic: Awake, alert, oriented x3 with , follows commands, PERRL, EOMI, face symmetric, TANNER to command SUMMARY: 63yo Malayalam-speaking woman w/ Myasthenia Gravis (on pyridostigmine, hospitalization every 1-2 years requiring IVIG) (dx 2015), s/p thymectomy (2010), T2DM who presents with SOB, cough, difficulty spitting up sputum x 4 days.  was sick with COVID, so son performed home COVID test for patient which was positive. Patient's primary complaint is SOB when lying down. She has been sleeping upright. She had difficulty coughing up and spitting out sputum but today these symptoms are slightly improved. Also reports difficulty swallowing - but was able to tolerate taking oral pills. Admits to difficulty looking up, but eye drooping is overall improved from past couple of months. No blurry vision. Patient takes pyridostigmine 120mg TID regularly. Patient also has prednisone PRN for sick days-- she has been taking 20mg prednisone x past 4 days as per instruction of her neurologist.     Of note, last hospitalization was 2 years ago at [facility] during which patient was intubated -- son notes that this was likely 2/2 patient being forced to lie down flat in hospital bed, leading to aspiration. She was extubated after ~3 days. Received IVIG during that hospitalization    OVERNIGHT EVENTS: Adm NSCU, upon arrival to the unit, appeared in acute respiratory distress tachypneic, hypertensive, tachycardic, hypoxic, ABG showing resp acidosis, immediately put on BiPAP, started on IVIG with some improvement     ADMISSION SCORES:   GCS: 14 HH: MF: NIHSS: ICH Score:    REVIEW OF SYSTEMS: [] Unable to Assess due to neurologic exam   [ x] All ROS addressed below are non-contributory, except:  Neuro: [ ] Headache [ ] Back pain [ ] Numbness [ ] Weakness [ ] Ataxia [ ] Dizziness [ ] Aphasia [ ] Dysarthria [ ] Visual disturbance  Resp: [ x] Shortness of breath/dyspnea [ ] Orthopnea [ ] Cough  CV: [ ] Chest pain [ ] Palpitation [ ] Lightheadedness [ ] Syncope  Renal: [ ] Thirst [ ] Edema  GI: [ ] Nausea [ ] Emesis [ ] Abdominal pain [ ] Constipation [ ] Diarrhea  Hem: [ ] Hematemesis [ ] bBright red blood per rectum  ID: [ ] Fever [ ] Chills [ ] Dysuria  ENT: [ ] Rhinorrhea    VITALS: [x] Reviewed    IMAGING/DATA: [x] Reviewed    IVF FLUIDS/MEDICATIONS: [x] Reviewed    ALLERGIES: Allergies    No Known Allergies    Intolerances      DEVICES:   [] Restraints [x] PIVs [] ET tube [] central line [] PICC [] arterial line [] morales [] NGT/OGT [] EVD [] LD [] MYAH/HMV [] LiCOX [] ICP monitor [] Trach [] PEG [] Chest Tube [] other:    EXAMINATION:  General: distress  HEENT: Anicteric sclerae  Cardiac: [id number] tachycardic  Lungs: diminished   Abdomen: Soft, non-tender, +BS  Neurologic: Awake, alert, oriented x3 with , follows commands, PERRL, EOMI, face symmetric, TANNER to command SUMMARY: 61yo Malayalam-speaking woman w/ Myasthenia Gravis (on pyridostigmine, hospitalization every 1-2 years requiring IVIG) (dx 2015), s/p thymectomy (2010), T2DM who presents with SOB, cough, difficulty spitting up sputum x 4 days.  was sick with COVID, so son performed home COVID test for patient which was positive. Patient's primary complaint is SOB when lying down. She has been sleeping upright. She had difficulty coughing up and spitting out sputum but today these symptoms are slightly improved. Also reports difficulty swallowing - but was able to tolerate taking oral pills. Admits to difficulty looking up, but eye drooping is overall improved from past couple of months. No blurry vision. Patient takes pyridostigmine 120mg TID regularly. Patient also has prednisone PRN for sick days-- she has been taking 20mg prednisone x past 4 days as per instruction of her neurologist.     Of note, last hospitalization was 2 years ago at [facility] during which patient was intubated -- son notes that this was likely 2/2 patient being forced to lie down flat in hospital bed, leading to aspiration. She was extubated after ~3 days. Received IVIG during that hospitalization    OVERNIGHT EVENTS: Adm NSCU, upon arrival to the unit, appeared in acute respiratory distress tachypneic, hypertensive, tachycardic, hypoxic, ABG showing resp acidosis, immediately put on BiPAP, started on IVIG with some improvement     ADMISSION SCORES:   GCS: 14 HH: MF: NIHSS: ICH Score:    REVIEW OF SYSTEMS: [] Unable to Assess due to neurologic exam   [ x] All ROS addressed below are non-contributory, except:  Neuro: [ ] Headache [ ] Back pain [ ] Numbness [ ] Weakness [ ] Ataxia [ ] Dizziness [ ] Aphasia [ ] Dysarthria [ ] Visual disturbance  Resp: [ x] Shortness of breath/dyspnea [ ] Orthopnea [ ] Cough  CV: [ ] Chest pain [ ] Palpitation [ ] Lightheadedness [ ] Syncope  Renal: [ ] Thirst [ ] Edema  GI: [ ] Nausea [ ] Emesis [ ] Abdominal pain [ ] Constipation [ ] Diarrhea  Hem: [ ] Hematemesis [ ] bBright red blood per rectum  ID: [ ] Fever [ ] Chills [ ] Dysuria  ENT: [ ] Rhinorrhea    VITALS: [x] Reviewed    IMAGING/DATA: [x] Reviewed    IVF FLUIDS/MEDICATIONS: [x] Reviewed    ALLERGIES: Allergies    No Known Allergies    Intolerances      DEVICES:   [] Restraints [x] PIVs [] ET tube [] central line [] PICC [] arterial line [] morales [] NGT/OGT [] EVD [] LD [] MYAH/HMV [] LiCOX [] ICP monitor [] Trach [] PEG [] Chest Tube [] other:    EXAMINATION:  General: distress  HEENT: Anicteric sclerae  Cardiac: [id number] tachycardic  Lungs: diminished   Abdomen: Soft, non-tender, +BS  Neurologic: Awake, alert, oriented x3 with , follows commands, PERRL, EOMI, face symmetric, Neck flexion 3, neck extension 3+. R delt 3/5, biceps 4+/5, Triceps 5/5,  5/5. L delt 3/5, biceps 4+/5, triceps 5/5,  5/5. R HF 4/5, DF 4/5, PF 4/5. Left HF 4/5, DF 4+/5, PF 4+/5. Sensations intact.   SUMMARY: 61yo Malayalam-speaking woman w/ Myasthenia Gravis (on pyridostigmine, hospitalization every 1-2 years requiring IVIG) (dx 2015), s/p thymectomy (2010), T2DM who presents with SOB, cough, difficulty spitting up sputum x 4 days.  was sick with COVID, so son performed home COVID test for patient which was positive. Patient's primary complaint is SOB when lying down. She has been sleeping upright. She had difficulty coughing up and spitting out sputum but today these symptoms are slightly improved. Also reports difficulty swallowing - but was able to tolerate taking oral pills. Admits to difficulty looking up, but eye drooping is overall improved from past couple of months. No blurry vision. Patient takes pyridostigmine 120mg TID regularly. Patient also has prednisone PRN for sick days-- she has been taking 20mg prednisone x past 4 days as per instruction of her neurologist.     Of note, last hospitalization was 2 years ago at [facility] during which patient was intubated -- son notes that this was likely 2/2 patient being forced to lie down flat in hospital bed, leading to aspiration. She was extubated after ~3 days. Received IVIG during that hospitalization    OVERNIGHT EVENTS: Adm NSCU, upon arrival to the unit, appeared in acute respiratory distress tachypneic, hypertensive, tachycardic, hypoxic, ABG showing resp acidosis, immediately put on BiPAP, started on IVIG with some improvement     ADMISSION SCORES:   GCS: 14 HH: MF: NIHSS: ICH Score:    REVIEW OF SYSTEMS: [] Unable to Assess due to neurologic exam   [ x] All ROS addressed below are non-contributory, except:  Neuro: [ ] Headache [ ] Back pain [ ] Numbness [ ] Weakness [ ] Ataxia [ ] Dizziness [ ] Aphasia [ ] Dysarthria [ ] Visual disturbance  Resp: [ x] Shortness of breath/dyspnea [ ] Orthopnea [ ] Cough  CV: [ ] Chest pain [ ] Palpitation [ ] Lightheadedness [ ] Syncope  Renal: [ ] Thirst [ ] Edema  GI: [ ] Nausea [ ] Emesis [ ] Abdominal pain [ ] Constipation [ ] Diarrhea  Hem: [ ] Hematemesis [ ] bBright red blood per rectum  ID: [ ] Fever [ ] Chills [ ] Dysuria  ENT: [ ] Rhinorrhea    VITALS: [x] Reviewed    IMAGING/DATA: [x] Reviewed    IVF FLUIDS/MEDICATIONS: [x] Reviewed    ALLERGIES: Allergies    No Known Allergies    Intolerances      DEVICES:   [] Restraints [x] PIVs [] ET tube [] central line [] PICC [] arterial line [] morales [] NGT/OGT [] EVD [] LD [] MYAH/HMV [] LiCOX [] ICP monitor [] Trach [] PEG [] Chest Tube [] other:    EXAMINATION:  General: distress  HEENT: Anicteric sclerae  Cardiac: [id number] tachycardic  Lungs: diminished   Abdomen: Soft, non-tender, +BS  Neurologic: Awake, alert, oriented x3 with , follows commands, PERRL, EOMI, face symmetric, Neck flexion 3, neck extension 3+. R delt 3/5, biceps 4+/5, Triceps 5/5,  5/5. L delt 3/5, biceps 4+/5, triceps 5/5,  5/5. R HF 4/5, DF 4/5, PF 4/5. Left HF 4/5, DF 4+/5, PF 4+/5. Sensations intact.   SUMMARY: 63yo Malayalam-speaking woman w/ Myasthenia Gravis (on pyridostigmine, hospitalization every 1-2 years requiring IVIG) (dx 2015), s/p thymectomy (2010), T2DM who presents with SOB, cough, difficulty spitting up sputum x 4 days.  was sick with COVID, so son performed home COVID test for patient which was positive. Patient's primary complaint is SOB when lying down. She has been sleeping upright. She had difficulty coughing up and spitting out sputum but today these symptoms are slightly improved. Also reports difficulty swallowing - but was able to tolerate taking oral pills. Admits to difficulty looking up, but eye drooping is overall improved from past couple of months. No blurry vision. Patient takes pyridostigmine 120mg TID regularly. Patient also has prednisone PRN for sick days-- she has been taking 20mg prednisone x past 4 days as per instruction of her neurologist.     Of note, last hospitalization was 2 years ago at [facility] during which patient was intubated -- son notes that this was likely 2/2 patient being forced to lie down flat in hospital bed, leading to aspiration. She was extubated after ~3 days. Received IVIG during that hospitalization    OVERNIGHT EVENTS: Adm NSCU, upon arrival to the unit, appeared in acute respiratory distress tachypneic, hypertensive, tachycardic, hypoxic, ABG showing resp acidosis, immediately put on BiPAP, started on IVIG with some improvement     ADMISSION SCORES:   GCS: 14 HH: MF: NIHSS: ICH Score:    REVIEW OF SYSTEMS: [] Unable to Assess due to neurologic exam   [ x] All ROS addressed below are non-contributory, except:  Neuro: [ ] Headache [ ] Back pain [ ] Numbness [ ] Weakness [ ] Ataxia [ ] Dizziness [ ] Aphasia [ ] Dysarthria [ ] Visual disturbance  Resp: [ x] Shortness of breath/dyspnea [ ] Orthopnea [ ] Cough  CV: [ ] Chest pain [ ] Palpitation [ ] Lightheadedness [ ] Syncope  Renal: [ ] Thirst [ ] Edema  GI: [ ] Nausea [ ] Emesis [ ] Abdominal pain [ ] Constipation [ ] Diarrhea  Hem: [ ] Hematemesis [ ] bBright red blood per rectum  ID: [ ] Fever [ ] Chills [ ] Dysuria  ENT: [ ] Rhinorrhea    VITALS: [x] Reviewed    IMAGING/DATA: [x] Reviewed    IVF FLUIDS/MEDICATIONS: [x] Reviewed    ALLERGIES: Allergies    No Known Allergies    Intolerances      DEVICES:   [] Restraints [x] PIVs [] ET tube [] central line [] PICC [] arterial line [] morales [] NGT/OGT [] EVD [] LD [] MYAH/HMV [] LiCOX [] ICP monitor [] Trach [] PEG [] Chest Tube [] other:    EXAMINATION:  General: distress  HEENT: Anicteric sclerae  Cardiac: [id number] tachycardic  Lungs: diminished   Abdomen: Soft, non-tender, +BS  Neurologic: Awake, alert, oriented x3 with , follows commands, PERRL, EOMI, face symmetric, Neck flexion 3, neck extension 3+. R delt 3/5, biceps 4+/5, Triceps 5/5,  5/5. L delt 3/5, biceps 4+/5, triceps 5/5,  5/5. R HF 4/5, DF 4/5, PF 4/5. Left HF 4/5, DF 4+/5, PF 4+/5. Sensations intact.

## 2023-01-31 NOTE — PROGRESS NOTE ADULT - ASSESSMENT
Myasthenia gravis (MG) exacerbation with weakness, ptosis, and acute respiratory failure  COVID-19 infection  DM type 2 (steroid induced?)    - Likely MG exacerbation/crisis triggered by current COVID-19 infection. Her VC and NIF are low and she has increased risk of intubation and will continue to require ICU level of monitoring, at least for now  - Had extensive talk with patient and son on 1/30 regarding treatment and our recommendation for PLEX given significant respiratory involvement and wanted to avoid intubation. However, they were insistent upon IVIG. Would give IVIG 0.4g/kg/day x5 days and premedicate with Tylenol, Benadryl, and IV fluids. Today is dose 1/5  - VC and NIF [id number] (or as able with BiPAP)  - Hold home Mestinon and steroids for now. Defer to ICU as to need for stress dose steroids and remdesivir with current COVID-19 infection  - PT/OT/ST evaluation  - Continue to address above medical issues, as you are doing  - Will continue to follow patient with you Myasthenia gravis (MG) exacerbation with weakness, ptosis, and acute respiratory failure  COVID-19 infection  DM type 2 (steroid induced?)    - Likely MG exacerbation/crisis triggered by current COVID-19 infection. Her VC and NIF are low and she has increased risk of intubation and will continue to require ICU level of monitoring, at least for now  - Had extensive talk with patient and son on 1/30 regarding treatment and our recommendation for PLEX given significant respiratory involvement and wanted to avoid intubation. However, they were insistent upon IVIG. Would give IVIG 0.4g/kg/day x5 days and premedicate with Tylenol, Benadryl, and IV fluids. Today is dose 1/5  - VC and NIF [id number] (or as able with BiPAP)  - Hold home Mestinon and steroids for now. Defer to ICU as to need for stress dose steroids and remdesivir with current COVID-19 infection  - PT/OT/ST evaluation  - Continue to address above medical issues, as you are doing  - Will continue to follow patient with you Myasthenia gravis (MG) exacerbation with weakness, ptosis, and acute respiratory failure  COVID-19 infection  DM type 2 (steroid induced?)    - Likely MG exacerbation/crisis triggered by current COVID-19 infection. Her VC and NIF are low and she has increased risk of intubation and will continue to require ICU level of monitoring, at least for now  - Had extensive talk with patient and son on 1/30 regarding treatment and our recommendation for PLEX given significant respiratory involvement and wanted to avoid intubation. However, they were insistent upon IVIG. Would give IVIG 0.4g/kg/day x5 days and premedicate with Tylenol, Benadryl, and IV fluids. Today is dose 1/5  - VC and NIF [id number] (or as able with BiPAP)  - Hold home Mestinon and steroids for now. Defer to ICU as to need for stress dose steroids and remdesivir with current COVID-19 infection  - PT/OT/ST evaluation  - Continue to address above medical issues, as you are doing  - Will continue to follow patient with you

## 2023-01-31 NOTE — PROGRESS NOTE ADULT - SUBJECTIVE AND OBJECTIVE BOX
SUMMARY: 63yo Malayalam-speaking woman w/ Myasthenia Gravis (on pyridostigmine, hospitalization every 1-2 years requiring IVIG) (dx 2015), s/p thymectomy (2010), T2DM who presents with SOB, cough, difficulty spitting up sputum x 4 days.  was sick with COVID, so son performed home COVID test for patient which was positive. Patient's primary complaint is SOB when lying down. She has been sleeping upright. She had difficulty coughing up and spitting out sputum but today these symptoms are slightly improved. Also reports difficulty swallowing - but was able to tolerate taking oral pills. Admits to difficulty looking up, but eye drooping is overall improved from past couple of months. No blurry vision. Patient takes pyridostigmine 120mg TID regularly. Patient also has prednisone PRN for sick days-- she has been taking 20mg prednisone x past 4 days as per instruction of her neurologist.     Of note, last hospitalization was 2 years ago at [facility] during which patient was intubated -- son notes that this was likely 2/2 patient being forced to lie down flat in hospital bed, leading to aspiration. She was extubated after ~3 days. Received IVIG during that hospitalization    OVERNIGHT EVENTS: Adm NSCU, upon arrival to the unit, appeared in acute respiratory distress tachypneic, hypertensive, tachycardic, hypoxic, ABG showing resp acidosis, immediately put on BiPAP, started on IVIG with some improvement     ADMISSION SCORES:   GCS: 14 HH: MF: NIHSS: ICH Score:    REVIEW OF SYSTEMS: [] Unable to Assess due to neurologic exam   [ x] All ROS addressed below are non-contributory, except:  Neuro: [ ] Headache [ ] Back pain [ ] Numbness [ ] Weakness [ ] Ataxia [ ] Dizziness [ ] Aphasia [ ] Dysarthria [ ] Visual disturbance  Resp: [ x] Shortness of breath/dyspnea [ ] Orthopnea [ ] Cough  CV: [ ] Chest pain [ ] Palpitation [ ] Lightheadedness [ ] Syncope  Renal: [ ] Thirst [ ] Edema  GI: [ ] Nausea [ ] Emesis [ ] Abdominal pain [ ] Constipation [ ] Diarrhea  Hem: [ ] Hematemesis [ ] bBright red blood per rectum  ID: [ ] Fever [ ] Chills [ ] Dysuria  ENT: [ ] Rhinorrhea    VITALS: [x] Reviewed    IMAGING/DATA: [x] Reviewed    IVF FLUIDS/MEDICATIONS: [x] Reviewed    ALLERGIES: Allergies    No Known Allergies    Intolerances      DEVICES:   [] Restraints [x] PIVs [] ET tube [] central line [] PICC [] arterial line [] morales [] NGT/OGT [] EVD [] LD [] MYAH/HMV [] LiCOX [] ICP monitor [] Trach [] PEG [] Chest Tube [] other:    EXAMINATION:  General: distress  HEENT: Anicteric sclerae  Cardiac: [id number] tachycardic  Lungs: diminished   Abdomen: Soft, non-tender, +BS  Neurologic: Awake, alert, oriented x3 with , follows commands, PERRL, EOMI, face symmetric, TANNER to command SUMMARY: 61yo Malayalam-speaking woman w/ Myasthenia Gravis (on pyridostigmine, hospitalization every 1-2 years requiring IVIG) (dx 2015), s/p thymectomy (2010), T2DM who presents with SOB, cough, difficulty spitting up sputum x 4 days.  was sick with COVID, so son performed home COVID test for patient which was positive. Patient's primary complaint is SOB when lying down. She has been sleeping upright. She had difficulty coughing up and spitting out sputum but today these symptoms are slightly improved. Also reports difficulty swallowing - but was able to tolerate taking oral pills. Admits to difficulty looking up, but eye drooping is overall improved from past couple of months. No blurry vision. Patient takes pyridostigmine 120mg TID regularly. Patient also has prednisone PRN for sick days-- she has been taking 20mg prednisone x past 4 days as per instruction of her neurologist.     Of note, last hospitalization was 2 years ago at [facility] during which patient was intubated -- son notes that this was likely 2/2 patient being forced to lie down flat in hospital bed, leading to aspiration. She was extubated after ~3 days. Received IVIG during that hospitalization    OVERNIGHT EVENTS: Adm NSCU, upon arrival to the unit, appeared in acute respiratory distress tachypneic, hypertensive, tachycardic, hypoxic, ABG showing resp acidosis, immediately put on BiPAP, started on IVIG with some improvement     ADMISSION SCORES:   GCS: 14 HH: MF: NIHSS: ICH Score:    REVIEW OF SYSTEMS: [] Unable to Assess due to neurologic exam   [ x] All ROS addressed below are non-contributory, except:  Neuro: [ ] Headache [ ] Back pain [ ] Numbness [ ] Weakness [ ] Ataxia [ ] Dizziness [ ] Aphasia [ ] Dysarthria [ ] Visual disturbance  Resp: [ x] Shortness of breath/dyspnea [ ] Orthopnea [ ] Cough  CV: [ ] Chest pain [ ] Palpitation [ ] Lightheadedness [ ] Syncope  Renal: [ ] Thirst [ ] Edema  GI: [ ] Nausea [ ] Emesis [ ] Abdominal pain [ ] Constipation [ ] Diarrhea  Hem: [ ] Hematemesis [ ] bBright red blood per rectum  ID: [ ] Fever [ ] Chills [ ] Dysuria  ENT: [ ] Rhinorrhea    VITALS: [x] Reviewed    IMAGING/DATA: [x] Reviewed    IVF FLUIDS/MEDICATIONS: [x] Reviewed    ALLERGIES: Allergies    No Known Allergies    Intolerances      DEVICES:   [] Restraints [x] PIVs [] ET tube [] central line [] PICC [] arterial line [] morales [] NGT/OGT [] EVD [] LD [] MYAH/HMV [] LiCOX [] ICP monitor [] Trach [] PEG [] Chest Tube [] other:    EXAMINATION:  General: distress  HEENT: Anicteric sclerae  Cardiac: [id number] tachycardic  Lungs: diminished   Abdomen: Soft, non-tender, +BS  Neurologic: Awake, alert, oriented x3 with , follows commands, PERRL, EOMI, face symmetric, TANNER to command SUMMARY: 61yo Malayalam-speaking woman w/ Myasthenia Gravis (on pyridostigmine, hospitalization every 1-2 years requiring IVIG) (dx 2015), s/p thymectomy (2010), T2DM who presents with SOB, cough, difficulty spitting up sputum x 4 days.  was sick with COVID, so son performed home COVID test for patient which was positive. Patient's primary complaint is SOB when lying down. She has been sleeping upright. She had difficulty coughing up and spitting out sputum but today these symptoms are slightly improved. Also reports difficulty swallowing - but was able to tolerate taking oral pills. Admits to difficulty looking up, but eye drooping is overall improved from past couple of months. No blurry vision. Patient takes pyridostigmine 120mg TID regularly. Patient also has prednisone PRN for sick days-- she has been taking 20mg prednisone x past 4 days as per instruction of her neurologist.     Of note, last hospitalization was 2 years ago at [facility] during which patient was intubated -- son notes that this was likely 2/2 patient being forced to lie down flat in hospital bed, leading to aspiration. She was extubated after ~3 days. Received IVIG during that hospitalization    OVERNIGHT EVENTS: Adm NSCU, upon arrival to the unit, appeared in acute respiratory distress tachypneic, hypertensive, tachycardic, hypoxic, ABG showing resp acidosis, immediately put on BiPAP, started on IVIG with some improvement     ADMISSION SCORES:   GCS: 14 HH: MF: NIHSS: ICH Score:    REVIEW OF SYSTEMS: [] Unable to Assess due to neurologic exam   [ x] All ROS addressed below are non-contributory, except:  Neuro: [ ] Headache [ ] Back pain [ ] Numbness [ ] Weakness [ ] Ataxia [ ] Dizziness [ ] Aphasia [ ] Dysarthria [ ] Visual disturbance  Resp: [ x] Shortness of breath/dyspnea [ ] Orthopnea [ ] Cough  CV: [ ] Chest pain [ ] Palpitation [ ] Lightheadedness [ ] Syncope  Renal: [ ] Thirst [ ] Edema  GI: [ ] Nausea [ ] Emesis [ ] Abdominal pain [ ] Constipation [ ] Diarrhea  Hem: [ ] Hematemesis [ ] bBright red blood per rectum  ID: [ ] Fever [ ] Chills [ ] Dysuria  ENT: [ ] Rhinorrhea    VITALS: [x] Reviewed    IMAGING/DATA: [x] Reviewed    IVF FLUIDS/MEDICATIONS: [x] Reviewed    ALLERGIES: Allergies    No Known Allergies    Intolerances      DEVICES:   [] Restraints [x] PIVs [] ET tube [] central line [] PICC [] arterial line [] morales [] NGT/OGT [] EVD [] LD [] MYAH/HMV [] LiCOX [] ICP monitor [] Trach [] PEG [] Chest Tube [] other:    EXAMINATION:  General: distress  HEENT: Anicteric sclerae  Cardiac: [id number] tachycardic  Lungs: diminished   Abdomen: Soft, non-tender, +BS  Neurologic: Awake, alert, oriented x3 with , follows commands, PERRL, EOMI, face symmetric, TANNER to command SUMMARY: 61yo Malayalam-speaking woman w/ Myasthenia Gravis (on pyridostigmine, hospitalization every 1-2 years requiring IVIG) (dx 2015), s/p thymectomy (2010), T2DM who presents with SOB, cough, difficulty spitting up sputum x 4 days.  was sick with COVID, so son performed home COVID test for patient which was positive. Patient's primary complaint is SOB when lying down. She has been sleeping upright. She had difficulty coughing up and spitting out sputum but today these symptoms are slightly improved. Also reports difficulty swallowing - but was able to tolerate taking oral pills. Admits to difficulty looking up, but eye drooping is overall improved from past couple of months. No blurry vision. Patient takes pyridostigmine 120mg TID regularly. Patient also has prednisone PRN for sick days-- she has been taking 20mg prednisone x past 4 days as per instruction of her neurologist.     Of note, last hospitalization was 2 years ago at [facility] during which patient was intubated -- son notes that this was likely 2/2 patient being forced to lie down flat in hospital bed, leading to aspiration. She was extubated after ~3 days. Received IVIG during that hospitalization  1/30  Adm NSCU, upon arrival to the unit, appeared in acute respiratory distress tachypneic, hypertensive, tachycardic, hypoxic, ABG showing resp acidosis, immediately put on BiPAP, started on IVIG with some improvement     ADMISSION SCORES:   GCS: 14 HH: MF: NIHSS: ICH Score:    REVIEW OF SYSTEMS: [] Unable to Assess due to neurologic exam   [ x] All ROS addressed below are non-contributory, except:  Neuro: [ ] Headache [ ] Back pain [ ] Numbness [ ] Weakness [ ] Ataxia [ ] Dizziness [ ] Aphasia [ ] Dysarthria [ ] Visual disturbance  Resp: [ x] Shortness of breath/dyspnea [ ] Orthopnea [ ] Cough  CV: [ ] Chest pain [ ] Palpitation [ ] Lightheadedness [ ] Syncope  Renal: [ ] Thirst [ ] Edema  GI: [ ] Nausea [ ] Emesis [ ] Abdominal pain [ ] Constipation [ ] Diarrhea  Hem: [ ] Hematemesis [ ] bBright red blood per rectum  ID: [ ] Fever [ ] Chills [ ] Dysuria  ENT: [ ] Rhinorrhea    VITALS: [x] Reviewed    IMAGING/DATA: [x] Reviewed    IVF FLUIDS/MEDICATIONS: [x] Reviewed    ALLERGIES: Allergies    No Known Allergies    Intolerances      DEVICES:   [] Restraints [x] PIVs [] ET tube [] central line [] PICC [] arterial line [] morales [] NGT/OGT [] EVD [] LD [] YMAH/HMV [] LiCOX [] ICP monitor [] Trach [] PEG [] Chest Tube [] other:    EXAMINATION:  General: distress  HEENT: Anicteric sclerae  Cardiac: [id number] tachycardic  Lungs: diminished   Abdomen: Soft, non-tender, +BS  Neurologic: Awake, alert, oriented x3 with , follows commands, PERRL, EOMI, face symmetric, TANNER to command SUMMARY: 63yo Malayalam-speaking woman w/ Myasthenia Gravis (on pyridostigmine, hospitalization every 1-2 years requiring IVIG) (dx 2015), s/p thymectomy (2010), T2DM who presents with SOB, cough, difficulty spitting up sputum x 4 days.  was sick with COVID, so son performed home COVID test for patient which was positive. Patient's primary complaint is SOB when lying down. She has been sleeping upright. She had difficulty coughing up and spitting out sputum but today these symptoms are slightly improved. Also reports difficulty swallowing - but was able to tolerate taking oral pills. Admits to difficulty looking up, but eye drooping is overall improved from past couple of months. No blurry vision. Patient takes pyridostigmine 120mg TID regularly. Patient also has prednisone PRN for sick days-- she has been taking 20mg prednisone x past 4 days as per instruction of her neurologist.     Of note, last hospitalization was 2 years ago at [facility] during which patient was intubated -- son notes that this was likely 2/2 patient being forced to lie down flat in hospital bed, leading to aspiration. She was extubated after ~3 days. Received IVIG during that hospitalization  1/30  Adm NSCU, upon arrival to the unit, appeared in acute respiratory distress tachypneic, hypertensive, tachycardic, hypoxic, ABG showing resp acidosis, immediately put on BiPAP, started on IVIG with some improvement     ADMISSION SCORES:   GCS: 14 HH: MF: NIHSS: ICH Score:    REVIEW OF SYSTEMS: [] Unable to Assess due to neurologic exam   [ x] All ROS addressed below are non-contributory, except:  Neuro: [ ] Headache [ ] Back pain [ ] Numbness [ ] Weakness [ ] Ataxia [ ] Dizziness [ ] Aphasia [ ] Dysarthria [ ] Visual disturbance  Resp: [ x] Shortness of breath/dyspnea [ ] Orthopnea [ ] Cough  CV: [ ] Chest pain [ ] Palpitation [ ] Lightheadedness [ ] Syncope  Renal: [ ] Thirst [ ] Edema  GI: [ ] Nausea [ ] Emesis [ ] Abdominal pain [ ] Constipation [ ] Diarrhea  Hem: [ ] Hematemesis [ ] bBright red blood per rectum  ID: [ ] Fever [ ] Chills [ ] Dysuria  ENT: [ ] Rhinorrhea    VITALS: [x] Reviewed    IMAGING/DATA: [x] Reviewed    IVF FLUIDS/MEDICATIONS: [x] Reviewed    ALLERGIES: Allergies    No Known Allergies    Intolerances      DEVICES:   [] Restraints [x] PIVs [] ET tube [] central line [] PICC [] arterial line [] morales [] NGT/OGT [] EVD [] LD [] MYAH/HMV [] LiCOX [] ICP monitor [] Trach [] PEG [] Chest Tube [] other:    EXAMINATION:  General: distress  HEENT: Anicteric sclerae  Cardiac: [id number] tachycardic  Lungs: diminished   Abdomen: Soft, non-tender, +BS  Neurologic: Awake, alert, oriented x3 with , follows commands, PERRL, EOMI, face symmetric, TANNER to command SUMMARY: 61yo Malayalam-speaking woman w/ Myasthenia Gravis (on pyridostigmine, hospitalization every 1-2 years requiring IVIG) (dx 2015), s/p thymectomy (2010), T2DM who presents with SOB, cough, difficulty spitting up sputum x 4 days.  was sick with COVID, so son performed home COVID test for patient which was positive. Patient's primary complaint is SOB when lying down. She has been sleeping upright. She had difficulty coughing up and spitting out sputum but today these symptoms are slightly improved. Also reports difficulty swallowing - but was able to tolerate taking oral pills. Admits to difficulty looking up, but eye drooping is overall improved from past couple of months. No blurry vision. Patient takes pyridostigmine 120mg TID regularly. Patient also has prednisone PRN for sick days-- she has been taking 20mg prednisone x past 4 days as per instruction of her neurologist.     Of note, last hospitalization was 2 years ago at [facility] during which patient was intubated -- son notes that this was likely 2/2 patient being forced to lie down flat in hospital bed, leading to aspiration. She was extubated after ~3 days. Received IVIG during that hospitalization  1/30  Adm NSCU, upon arrival to the unit, appeared in acute respiratory distress tachypneic, hypertensive, tachycardic, hypoxic, ABG showing resp acidosis, immediately put on BiPAP, started on IVIG with some improvement     ADMISSION SCORES:   GCS: 14 HH: MF: NIHSS: ICH Score:    REVIEW OF SYSTEMS: [] Unable to Assess due to neurologic exam   [ x] All ROS addressed below are non-contributory, except:  Neuro: [ ] Headache [ ] Back pain [ ] Numbness [ ] Weakness [ ] Ataxia [ ] Dizziness [ ] Aphasia [ ] Dysarthria [ ] Visual disturbance  Resp: [ x] Shortness of breath/dyspnea [ ] Orthopnea [ ] Cough  CV: [ ] Chest pain [ ] Palpitation [ ] Lightheadedness [ ] Syncope  Renal: [ ] Thirst [ ] Edema  GI: [ ] Nausea [ ] Emesis [ ] Abdominal pain [ ] Constipation [ ] Diarrhea  Hem: [ ] Hematemesis [ ] bBright red blood per rectum  ID: [ ] Fever [ ] Chills [ ] Dysuria  ENT: [ ] Rhinorrhea    VITALS: [x] Reviewed    IMAGING/DATA: [x] Reviewed    IVF FLUIDS/MEDICATIONS: [x] Reviewed    ALLERGIES: Allergies    No Known Allergies    Intolerances      DEVICES:   [] Restraints [x] PIVs [] ET tube [] central line [] PICC [] arterial line [] morales [] NGT/OGT [] EVD [] LD [] MYAH/HMV [] LiCOX [] ICP monitor [] Trach [] PEG [] Chest Tube [] other:    EXAMINATION:  General: distress  HEENT: Anicteric sclerae  Cardiac: [id number] tachycardic  Lungs: diminished   Abdomen: Soft, non-tender, +BS  Neurologic: Awake, alert, oriented x3 with , follows commands, PERRL, EOMI, face symmetric, TANNER to command

## 2023-01-31 NOTE — PHYSICAL THERAPY INITIAL EVALUATION ADULT - GENERAL OBSERVATIONS, REHAB EVAL
Pt rec'd semisupine in bed, in NAD, +IV, +a-line, +shiley, +intubated, following 100% of commands, +NSCU monitoring. Agreeable to PT. RN and NSCU team cleared pt to be seen. RN, OT, RT present t/o session.

## 2023-01-31 NOTE — CONSULT NOTE ADULT - SUBJECTIVE AND OBJECTIVE BOX
HPI:   Patient is a 62y female with Myasthenia gravis on pyrostigmine, developed cough, sob, tested positive for covid at home. Started 20mg of prednisone but continued to be very sob so came to hospital. She is now needing bipap and desats very low when it is taken off. She has low grade  fever, I cannot get any info from her right now due to respiratory distress on bipap. She tends to have mg flares every couple of years, last one she was at [facility] and required intubation and course was complicated by aspiration pneumonia.     REVIEW OF SYSTEMS:  All other review of systems negative (Comprehensive ROS)    PAST MEDICAL & SURGICAL HISTORY:  Myasthenia gravis      Diabetes mellitus          Allergies    No Known Drug Allergies  peanuts (Unknown)    Intolerances        Antimicrobials Day #  :1/5  remdesivir  IVPB   IV Intermittent     Other Medications:  acetaminophen   IVPB .. 650 milliGRAM(s) IV Intermittent once  chlorhexidine 4% Liquid 1 Application(s) Topical two times a day  enoxaparin Injectable 40 milliGRAM(s) SubCutaneous <User Schedule>  immune   globulin 10% (GAMMAGARD) IVPB 25 Gram(s) IV Intermittent <User Schedule>  insulin lispro (ADMELOG) corrective regimen sliding scale   SubCutaneous every 6 hours  methylPREDNISolone sodium succinate Injectable 125 milliGRAM(s) IV Push daily  multiple electrolytes Injection Type 1 1000 milliLiter(s) IV Continuous <Continuous>  polyethylene glycol 3350 17 Gram(s) Oral every 12 hours  senna 2 Tablet(s) Oral at bedtime      FAMILY HISTORY:      SOCIAL HISTORY:  Smoking: [ ]Yes [x ]No  ETOH: [ ]Yesx [ ]No  Drug Use: [ ]Yes x ]No   [ ] Single[ ]    T(F): 97.4 (01-31-23 @ 07:00), Max: 100.2 (01-30-23 @ 23:19)  HR: 109 (01-31-23 @ 10:00)  BP: 145/68 (01-31-23 @ 10:00)  RR: 31 (01-31-23 @ 10:00)  SpO2: 95% (01-31-23 @ 10:00)  Wt(kg): --    PHYSICAL EXAM:  General: alert, no acute distress  Eyes:  anicteric, no conjunctival injection, no discharge  Oropharynx: no lesions or injection 	  Neck: supple, without adenopathy  Lungs: course bs  to auscultation  Heart: regular rate and rhythm; no murmur, rubs or gallops  Abdomen: soft, nondistended, nontender, without mass or organomegaly  Skin: no lesions  Extremities: no clubbing, cyanosis, or edema  Neurologic: awake, sleepy, weak all over but  moves all extremities    LAB RESULTS:                        15.6   28.99 )-----------( 361      ( 31 Jan 2023 01:13 )             48.8     01-31    139  |  102  |  22  ----------------------------<  119<H>  3.7   |  29  |  0.61    Ca    8.3<L>      31 Jan 2023 06:15  Phos  4.5     01-31  Mg     1.9     01-31    TPro  7.9  /  Alb  4.4  /  TBili  0.4  /  DBili  x   /  AST  24  /  ALT  21  /  AlkPhos  121<H>  01-31    LIVER FUNCTIONS - ( 31 Jan 2023 01:16 )  Alb: 4.4 g/dL / Pro: 7.9 g/dL / ALK PHOS: 121 U/L / ALT: 21 U/L / AST: 24 U/L / GGT: x               MICROBIOLOGY:  RECENT CULTURES:        RADIOLOGY REVIEWED:  < from: Xray Chest 1 View- PORTABLE-Urgent (Xray Chest 1 View- PORTABLE-Urgent .) (01.30.23 @ 14:44) >    ACC: [id number] EXAM:  XR CHEST PORTABLE URGENT 1V   ORDERED BY: MACKENZIE WAGGONER     PROCEDURE DATE:  01/30/2023          INTERPRETATION:  EXAMINATION: XR CHEST URGENT    CLINICAL INDICATION: covid, SOB, orthopnea    TECHNIQUE: Single frontal, portable view of the chest was obtained.    COMPARISON: None available.    FINDINGS:    LINES/TUBES: None present.    LUNGS/PLEURA: Bibasilar linear atelectasis. Otherwise, lungs are clear.   No pleural effusion. No pneumothorax.    HEART AND MEDIASTINUM:The heart size is not accurately measured in this   projection. Median sternotomy.    SKELETON: No acute osseous abnormalities.    IMPRESSION:    Bibasilar linear atelectasis. Otherwise, the lungs are clear.    --- End of Report ---      < end of copied text >          Impression:  Patient is a 62 year old woman with dm, mg on pyrostigmine, developed cough and sputum and sob a few days ago, positive for covid at home. Yesterday very severe sob, came here, hypoxic now on bipap machine, weak all over. She is being treated for mg flare with iv ig but also started on solumedrol. Today will also start remdesivir. It is not known to exacerbate mg. Steroids are needed for covid due to hypoxia.   Recommendations:  remdesivir for 5 days  on steroids for mg which is more than enough of a dose for covid  no ID objection to IV IG  monitor cr, liver enzymes, sats  on lovenox for dvt prophylaxis HPI:   Patient is a 62y female with Myasthenia gravis on pyrostigmine, developed cough, sob, tested positive for covid at home. Started 20mg of prednisone but continued to be very sob so came to hospital. She is now needing bipap and desats very low when it is taken off. She has low grade  fever, I cannot get any info from her right now due to respiratory distress on bipap. She tends to have mg flares every couple of years, last one she was at [facility] and required intubation and course was complicated by aspiration pneumonia.     REVIEW OF SYSTEMS:  All other review of systems negative (Comprehensive ROS)    PAST MEDICAL & SURGICAL HISTORY:  Myasthenia gravis      Diabetes mellitus          Allergies    No Known Drug Allergies  peanuts (Unknown)    Intolerances        Antimicrobials Day #  :1/5  remdesivir  IVPB   IV Intermittent     Other Medications:  acetaminophen   IVPB .. 650 milliGRAM(s) IV Intermittent once  chlorhexidine 4% Liquid 1 Application(s) Topical two times a day  enoxaparin Injectable 40 milliGRAM(s) SubCutaneous <User Schedule>  immune   globulin 10% (GAMMAGARD) IVPB 25 Gram(s) IV Intermittent <User Schedule>  insulin lispro (ADMELOG) corrective regimen sliding scale   SubCutaneous every 6 hours  methylPREDNISolone sodium succinate Injectable 125 milliGRAM(s) IV Push daily  multiple electrolytes Injection Type 1 1000 milliLiter(s) IV Continuous <Continuous>  polyethylene glycol 3350 17 Gram(s) Oral every 12 hours  senna 2 Tablet(s) Oral at bedtime      FAMILY HISTORY:      SOCIAL HISTORY:  Smoking: [ ]Yes [x ]No  ETOH: [ ]Yesx [ ]No  Drug Use: [ ]Yes x ]No   [ ] Single[ ]    T(F): 97.4 (01-31-23 @ 07:00), Max: 100.2 (01-30-23 @ 23:19)  HR: 109 (01-31-23 @ 10:00)  BP: 145/68 (01-31-23 @ 10:00)  RR: 31 (01-31-23 @ 10:00)  SpO2: 95% (01-31-23 @ 10:00)  Wt(kg): --    PHYSICAL EXAM:  General: alert, no acute distress  Eyes:  anicteric, no conjunctival injection, no discharge  Oropharynx: no lesions or injection 	  Neck: supple, without adenopathy  Lungs: course bs  to auscultation  Heart: regular rate and rhythm; no murmur, rubs or gallops  Abdomen: soft, nondistended, nontender, without mass or organomegaly  Skin: no lesions  Extremities: no clubbing, cyanosis, or edema  Neurologic: awake, sleepy, weak all over but  moves all extremities    LAB RESULTS:                        15.6   28.99 )-----------( 361      ( 31 Jan 2023 01:13 )             48.8     01-31    139  |  102  |  22  ----------------------------<  119<H>  3.7   |  29  |  0.61    Ca    8.3<L>      31 Jan 2023 06:15  Phos  4.5     01-31  Mg     1.9     01-31    TPro  7.9  /  Alb  4.4  /  TBili  0.4  /  DBili  x   /  AST  24  /  ALT  21  /  AlkPhos  121<H>  01-31    LIVER FUNCTIONS - ( 31 Jan 2023 01:16 )  Alb: 4.4 g/dL / Pro: 7.9 g/dL / ALK PHOS: 121 U/L / ALT: 21 U/L / AST: 24 U/L / GGT: x               MICROBIOLOGY:  RECENT CULTURES:        RADIOLOGY REVIEWED:  < from: Xray Chest 1 View- PORTABLE-Urgent (Xray Chest 1 View- PORTABLE-Urgent .) (01.30.23 @ 14:44) >    ACC: [id number] EXAM:  XR CHEST PORTABLE URGENT 1V   ORDERED BY: MACKENZIE WAGGONER     PROCEDURE DATE:  01/30/2023          INTERPRETATION:  EXAMINATION: XR CHEST URGENT    CLINICAL INDICATION: covid, SOB, orthopnea    TECHNIQUE: Single frontal, portable view of the chest was obtained.    COMPARISON: None available.    FINDINGS:    LINES/TUBES: None present.    LUNGS/PLEURA: Bibasilar linear atelectasis. Otherwise, lungs are clear.   No pleural effusion. No pneumothorax.    HEART AND MEDIASTINUM:The heart size is not accurately measured in this   projection. Median sternotomy.    SKELETON: No acute osseous abnormalities.    IMPRESSION:    Bibasilar linear atelectasis. Otherwise, the lungs are clear.    --- End of Report ---      < end of copied text >          Impression:  Patient is a 62 year old woman with dm, mg on pyrostigmine, developed cough and sputum and sob a few days ago, positive for covid at home. Yesterday very severe sob, came here, hypoxic now on bipap machine, weak all over. She is being treated for mg flare with iv ig but also started on solumedrol. Today will also start remdesivir. It is not known to exacerbate mg. Steroids are needed for covid due to hypoxia.   Recommendations:  remdesivir for 5 days  on steroids for mg which is more than enough of a dose for covid  no ID objection to IV IG  monitor cr, liver enzymes, sats  on lovenox for dvt prophylaxis HPI:   Patient is a 62y female with Myasthenia gravis on pyrostigmine, developed cough, sob, tested positive for covid at home. Started 20mg of prednisone but continued to be very sob so came to hospital. She is now needing bipap and desats very low when it is taken off. She has low grade  fever, I cannot get any info from her right now due to respiratory distress on bipap. She tends to have mg flares every couple of years, last one she was at [facility] and required intubation and course was complicated by aspiration pneumonia.     REVIEW OF SYSTEMS:  All other review of systems negative (Comprehensive ROS)    PAST MEDICAL & SURGICAL HISTORY:  Myasthenia gravis      Diabetes mellitus          Allergies    No Known Drug Allergies  peanuts (Unknown)    Intolerances        Antimicrobials Day #  :1/5  remdesivir  IVPB   IV Intermittent     Other Medications:  acetaminophen   IVPB .. 650 milliGRAM(s) IV Intermittent once  chlorhexidine 4% Liquid 1 Application(s) Topical two times a day  enoxaparin Injectable 40 milliGRAM(s) SubCutaneous <User Schedule>  immune   globulin 10% (GAMMAGARD) IVPB 25 Gram(s) IV Intermittent <User Schedule>  insulin lispro (ADMELOG) corrective regimen sliding scale   SubCutaneous every 6 hours  methylPREDNISolone sodium succinate Injectable 125 milliGRAM(s) IV Push daily  multiple electrolytes Injection Type 1 1000 milliLiter(s) IV Continuous <Continuous>  polyethylene glycol 3350 17 Gram(s) Oral every 12 hours  senna 2 Tablet(s) Oral at bedtime      FAMILY HISTORY:      SOCIAL HISTORY:  Smoking: [ ]Yes [x ]No  ETOH: [ ]Yesx [ ]No  Drug Use: [ ]Yes x ]No   [ ] Single[ ]    T(F): 97.4 (01-31-23 @ 07:00), Max: 100.2 (01-30-23 @ 23:19)  HR: 109 (01-31-23 @ 10:00)  BP: 145/68 (01-31-23 @ 10:00)  RR: 31 (01-31-23 @ 10:00)  SpO2: 95% (01-31-23 @ 10:00)  Wt(kg): --    PHYSICAL EXAM:  General: alert, no acute distress  Eyes:  anicteric, no conjunctival injection, no discharge  Oropharynx: no lesions or injection 	  Neck: supple, without adenopathy  Lungs: course bs  to auscultation  Heart: regular rate and rhythm; no murmur, rubs or gallops  Abdomen: soft, nondistended, nontender, without mass or organomegaly  Skin: no lesions  Extremities: no clubbing, cyanosis, or edema  Neurologic: awake, sleepy, weak all over but  moves all extremities    LAB RESULTS:                        15.6   28.99 )-----------( 361      ( 31 Jan 2023 01:13 )             48.8     01-31    139  |  102  |  22  ----------------------------<  119<H>  3.7   |  29  |  0.61    Ca    8.3<L>      31 Jan 2023 06:15  Phos  4.5     01-31  Mg     1.9     01-31    TPro  7.9  /  Alb  4.4  /  TBili  0.4  /  DBili  x   /  AST  24  /  ALT  21  /  AlkPhos  121<H>  01-31    LIVER FUNCTIONS - ( 31 Jan 2023 01:16 )  Alb: 4.4 g/dL / Pro: 7.9 g/dL / ALK PHOS: 121 U/L / ALT: 21 U/L / AST: 24 U/L / GGT: x               MICROBIOLOGY:  RECENT CULTURES:        RADIOLOGY REVIEWED:  < from: Xray Chest 1 View- PORTABLE-Urgent (Xray Chest 1 View- PORTABLE-Urgent .) (01.30.23 @ 14:44) >    ACC: [id number] EXAM:  XR CHEST PORTABLE URGENT 1V   ORDERED BY: MACKENZIE WAGGONER     PROCEDURE DATE:  01/30/2023          INTERPRETATION:  EXAMINATION: XR CHEST URGENT    CLINICAL INDICATION: covid, SOB, orthopnea    TECHNIQUE: Single frontal, portable view of the chest was obtained.    COMPARISON: None available.    FINDINGS:    LINES/TUBES: None present.    LUNGS/PLEURA: Bibasilar linear atelectasis. Otherwise, lungs are clear.   No pleural effusion. No pneumothorax.    HEART AND MEDIASTINUM:The heart size is not accurately measured in this   projection. Median sternotomy.    SKELETON: No acute osseous abnormalities.    IMPRESSION:    Bibasilar linear atelectasis. Otherwise, the lungs are clear.    --- End of Report ---      < end of copied text >          Impression:  Patient is a 62 year old woman with dm, mg on pyrostigmine, developed cough and sputum and sob a few days ago, positive for covid at home. Yesterday very severe sob, came here, hypoxic now on bipap machine, weak all over. She is being treated for mg flare with iv ig but also started on solumedrol. Today will also start remdesivir. It is not known to exacerbate mg. Steroids are needed for covid due to hypoxia.   Recommendations:  remdesivir for 5 days  on steroids for mg which is more than enough of a dose for covid  no ID objection to IV IG  monitor cr, liver enzymes, sats  on lovenox for dvt prophylaxis

## 2023-01-31 NOTE — PHYSICAL THERAPY INITIAL EVALUATION ADULT - GAIT DEVIATIONS NOTED, PT EVAL
decreased elo/increased time in double stance/decreased step length/decreased stride length/decreased weight-shifting ability

## 2023-01-31 NOTE — PROGRESS NOTE ADULT - ASSESSMENT
ASSESSMENT/PLAN: MG crisis, covid +    NEURO:   neurology consulted, reportedly had discussion with Patient regarding PLEX, patient refused  start IVIG  Hold prednisone and pyridostigmine during acute exacerbation  Avoid medications that may worsen the current exacerbation including macrolides, fluoroquinolones, tetracyclines, aminoglycoside, beta-blockers, magnesium, and anti-arrhythmics (procainamide, quinidine, and lidocaine)  NIF/VC q4h--not able as on BiPAP   neurochecks Q2 hrs   Activity: [] mobilize as tolerated [x] Bedrest [] PT [] OT [] PMNR    PULM:  severe respiratory acidosis improved with BiPAP  if persistent/no further improvement, intubate     CV:  SBP goal 100-160  nicardipine gtt prn     RENAL:  Fluids: IVF    GI:  Diet: NPO   GI prophylaxis [x] not indicated [] PPI [] other:  Bowel regimen [] colace [x] senna [x] other: miralax     ENDO:   Goal euglycemia (-180)  RISS for now      HEME/ONC:  VTE prophylaxis: [x] SCDs [x] chemoprophylaxis  lovenox [] hold chemoprophylaxis due to: [] high risk of DVT/PE on admission due to:    ID: covid + hold off decadron given MG exac, supportive care for now  monitor for fevers     MISC:    SOCIAL/FAMILY:  [] awaiting [x] updated at bedside [] family meeting    CODE STATUS:  [x] Full Code [] DNR [] DNI [] Palliative/Comfort Care    DISPOSITION:  [x] ICU [] Stroke Unit [] Floor [] EMU [] RCU [] PCU    [x] Patient is at high risk of neurologic deterioration/death due to: MG crisis, hypercapnic respiratory failure       Contact: [phone] ASSESSMENT/PLAN: MG crisis, covid +    NEURO:   neurology consulted, reportedly had discussion with Patient regarding PLEX, patient refused  start IVIG  Hold prednisone and pyridostigmine during acute exacerbation  Avoid medications that may worsen the current exacerbation including macrolides, fluoroquinolones, tetracyclines, aminoglycoside, beta-blockers, magnesium, and anti-arrhythmics (procainamide, quinidine, and lidocaine)  NIF/VC q4h--not able as on BiPAP   neurochecks Q2 hrs   Activity: [] mobilize as tolerated [x] Bedrest [] PT [] OT [] PMNR    PULM:  severe respiratory acidosis improved with BiPAP  if persistent/no further improvement, intubate     CV:  SBP goal 100-160  nicardipine gtt prn     RENAL:  Fluids: IVF    GI:  Diet: NPO   GI prophylaxis [x] not indicated [] PPI [] other:  Bowel regimen [] colace [x] senna [x] other: miralax     ENDO:   Goal euglycemia (-180)  RISS for now      HEME/ONC:  VTE prophylaxis: [x] SCDs [x] chemoprophylaxis  lovenox [] hold chemoprophylaxis due to: [] high risk of DVT/PE on admission due to:    ID: covid + hold off decadron given MG exac, supportive care for now  monitor for fevers     MISC:    SOCIAL/FAMILY:  [] awaiting [x] updated at bedside [] family meeting    CODE STATUS:  [x] Full Code [] DNR [] DNI [] Palliative/Comfort Care    DISPOSITION:  [x] ICU [] Stroke Unit [] Floor [] EMU [] RCU [] PCU    [x] Patient is at high risk of neurologic deterioration/death due to: MG crisis, hypercapnic respiratory failure       Contact: [phone] ASSESSMENT/PLAN: MG crisis, covid +    NEURO:   neurology consulted, reportedly had discussion with Patient regarding PLEX, patient refused  start IVIG  Hold prednisone and pyridostigmine during acute exacerbation  Avoid medications that may worsen the current exacerbation including macrolides, fluoroquinolones, tetracyclines, aminoglycoside, beta-blockers, magnesium, and anti-arrhythmics (procainamide, quinidine, and lidocaine)  NIF/VC q4h--not able as on BiPAP   neurochecks Q2 hrs   Activity: [] mobilize as tolerated [x] Bedrest [] PT [] OT [] PMNR    PULM:  severe respiratory acidosis improved with BiPAP  if persistent/no further improvement, intubate     CV:  SBP goal 100-160  nicardipine gtt prn     RENAL:  Fluids: IVF    GI:  Diet: NPO   GI prophylaxis [x] not indicated [] PPI [] other:  Bowel regimen [] colace [x] senna [x] other: miralax     ENDO:   Goal euglycemia (-180)  RISS for now      HEME/ONC:  VTE prophylaxis: [x] SCDs [x] chemoprophylaxis  lovenox [] hold chemoprophylaxis due to: [] high risk of DVT/PE on admission due to:    ID: covid + hold off decadron given MG exac, supportive care for now  monitor for fevers     MISC:    SOCIAL/FAMILY:  [] awaiting [x] updated at bedside [] family meeting    CODE STATUS:  [x] Full Code [] DNR [] DNI [] Palliative/Comfort Care    DISPOSITION:  [x] ICU [] Stroke Unit [] Floor [] EMU [] RCU [] PCU    [x] Patient is at high risk of neurologic deterioration/death due to: MG crisis, hypercapnic respiratory failure       Contact: [phone] ASSESSMENT/PLAN: MG crisis, covid +    NEURO:   neurology consulted, Patient refusing PLEX  c/w IVIG  Will likely start steroids in setting of acute exacerbation  Avoid medications that may worsen the current exacerbation including macrolides, fluoroquinolones, tetracyclines, aminoglycoside, beta-blockers, magnesium, and anti-arrhythmics (procainamide, quinidine, and lidocaine)  NIF/VC q4h--not able as on BiPAP   neurochecks Q2 hrs   Activity: [] mobilize as tolerated [x] Bedrest [] PT [] OT [] PMNR    PULM:  severe respiratory acidosis improved with BiPAP  if persistent/no further improvement, intubate   Last ABG 7.32/61/168/31, saturating 99%    CV:  SBP goal 100-160  nicardipine gtt prn     RENAL:  Fluids: IVF    GI:  Diet: NPO   GI prophylaxis [x] not indicated [] PPI [] other:  Bowel regimen [] colace [x] senna [x] other: miralax     ENDO:   Goal euglycemia (-180)  RISS for now      HEME/ONC:  VTE prophylaxis: [x] SCDs [x] chemoprophylaxis  lovenox [] hold chemoprophylaxis due to: [] high risk of DVT/PE on admission due to:    ID: covid + hold off decadron given MG exac, supportive care for now  Elevated leukocytosis, likely in setting of MG exacerbation and COVID-19 infection  monitor for fevers     MISC:    SOCIAL/FAMILY:  [] awaiting [x] updated at bedside [] family meeting    CODE STATUS:  [x] Full Code [] DNR [] DNI [] Palliative/Comfort Care    DISPOSITION:  [x] ICU [] Stroke Unit [] Floor [] EMU [] RCU [] PCU    [x] Patient is at high risk of neurologic deterioration/death due to: MG crisis, hypercapnic respiratory failure       Contact: [phone] ASSESSMENT/PLAN: MG crisis, covid +    NEURO:   neurology consulted, Patient refusing PLEX  c/w IVIG  Will likely start steroids in setting of acute exacerbation  Avoid medications that may worsen the current exacerbation including macrolides, fluoroquinolones, tetracyclines, aminoglycoside, beta-blockers, magnesium, and anti-arrhythmics (procainamide, quinidine, and lidocaine)  NIF/VC q4h--not able as on BiPAP   neurochecks Q2 hrs   Activity: [] mobilize as tolerated [x] Bedrest [] PT [] OT [] PMNR    PULM:  severe respiratory acidosis improved with BiPAP  if persistent/no further improvement, intubate   Last ABG 7.32/61/168/31, saturating 99%    CV:  SBP goal 100-160  nicardipine gtt prn     RENAL:  Fluids: IVF    GI:  Diet: NPO   GI prophylaxis [x] not indicated [] PPI [] other:  Bowel regimen [] colace [x] senna [x] other: miralax     ENDO:   Goal euglycemia (-180)  RISS for now      HEME/ONC:  VTE prophylaxis: [x] SCDs [x] chemoprophylaxis  lovenox [] hold chemoprophylaxis due to: [] high risk of DVT/PE on admission due to:    ID: covid + hold off decadron given MG exac, supportive care for now  Elevated leukocytosis, likely in setting of MG exacerbation and COVID-19 infection  monitor for fevers     MISC:    SOCIAL/FAMILY:  [] awaiting [x] updated at bedside [] family meeting    CODE STATUS:  [x] Full Code [] DNR [] DNI [] Palliative/Comfort Care    DISPOSITION:  [x] ICU [] Stroke Unit [] Floor [] EMU [] RCU [] PCU    [x] Patient is at high risk of neurologic deterioration/death due to: MG crisis, hypercapnic respiratory failure       Contact: [phone] ASSESSMENT/PLAN: MG crisis, covid +    NEURO:   neurology consulted, Patient refusing PLEX  c/w IVIG  Will likely start steroids in setting of acute exacerbation  Avoid medications that may worsen the current exacerbation including macrolides, fluoroquinolones, tetracyclines, aminoglycoside, beta-blockers, magnesium, and anti-arrhythmics (procainamide, quinidine, and lidocaine)  NIF/VC q4h--not able as on BiPAP   neurochecks Q2 hrs   Activity: [] mobilize as tolerated [x] Bedrest [] PT [] OT [] PMNR    PULM:  severe respiratory acidosis improved with BiPAP  if persistent/no further improvement, intubate   Last ABG 7.32/61/168/31, saturating 99%    CV:  SBP goal 100-160  nicardipine gtt prn     RENAL:  Fluids: IVF    GI:  Diet: NPO   GI prophylaxis [x] not indicated [] PPI [] other:  Bowel regimen [] colace [x] senna [x] other: miralax     ENDO:   Goal euglycemia (-180)  RISS for now      HEME/ONC:  VTE prophylaxis: [x] SCDs [x] chemoprophylaxis  lovenox [] hold chemoprophylaxis due to: [] high risk of DVT/PE on admission due to:    ID: covid + hold off decadron given MG exac, supportive care for now  Elevated leukocytosis, likely in setting of MG exacerbation and COVID-19 infection  monitor for fevers     MISC:    SOCIAL/FAMILY:  [] awaiting [x] updated at bedside [] family meeting    CODE STATUS:  [x] Full Code [] DNR [] DNI [] Palliative/Comfort Care    DISPOSITION:  [x] ICU [] Stroke Unit [] Floor [] EMU [] RCU [] PCU    [x] Patient is at high risk of neurologic deterioration/death due to: MG crisis, hypercapnic respiratory failure       Contact: [phone] ASSESSMENT/PLAN: MG crisis, covid +    NEURO:   checks Q1  neurology consulted, Patient refusing PLEX  c/w IVIG 25g (1/31--  Will likely start steroids in setting of acute exacerbation  Avoid medications that may worsen the current exacerbation including macrolides, fluoroquinolones, tetracyclines, aminoglycoside, beta-blockers, magnesium, and anti-arrhythmics (procainamide, quinidine, and lidocaine)  NIF/VC q4h--not able as on BiPAP   Activity: [] mobilize as tolerated [x] Bedrest [] PT [] OT [] PMNR    PULM:  severe respiratory acidosis improved with BiPAP  if persistent/no further improvement, intubate   Last ABG 7.32/61/168/31, saturating 99%  On bipap setting 14/8, will obtain ABG now and adjust settings    CV:  SBP goal 100-160  Not on cardene anymore    RENAL:  Fluids: IVF    GI:  Diet: NPO   GI prophylaxis [x] not indicated [] PPI [] other:  Bowel regimen [] colace [x] senna [x] other: miralax     ENDO:   Goal euglycemia (-180)  RISS for now      HEME/ONC:  VTE prophylaxis: [x] SCDs [x] chemoprophylaxis  lovenox [] hold chemoprophylaxis due to: [] high risk of DVT/PE on admission due to:    ID: covid + hold off decadron given MG exac, supportive care for now  Elevated leukocytosis, likely in setting of MG exacerbation and COVID-19 infection  monitor for fevers   Will consult ID for COVID- 19 treatment    MISC:    SOCIAL/FAMILY:  [] awaiting [x] updated at bedside [] family meeting    CODE STATUS:  [x] Full Code [] DNR [] DNI [] Palliative/Comfort Care    DISPOSITION:  [x] ICU [] Stroke Unit [] Floor [] EMU [] RCU [] PCU    [x] Patient is at high risk of neurologic deterioration/death due to: MG crisis, hypercapnic respiratory failure       Contact: [phone] ASSESSMENT/PLAN: MG crisis, covid +    NEURO:   checks Q1  neurology consulted, Patient refusing PLEX  c/w IVIG 25g (1/31--  Will likely start steroids in setting of acute exacerbation  Avoid medications that may worsen the current exacerbation including macrolides, fluoroquinolones, tetracyclines, aminoglycoside, beta-blockers, magnesium, and anti-arrhythmics (procainamide, quinidine, and lidocaine)  NIF/VC q4h--not able as on BiPAP   Activity: [] mobilize as tolerated [x] Bedrest [] PT [] OT [] PMNR    PULM:  severe respiratory acidosis improved with BiPAP  if persistent/no further improvement, intubate   Last ABG 7.32/61/168/31, saturating 99%  On bipap setting 14/8, will obtain ABG now and adjust settings    CV:  SBP goal 100-160  Not on cardene anymore    RENAL:  Fluids: IVF    GI:  Diet: NPO   GI prophylaxis [x] not indicated [] PPI [] other:  Bowel regimen [] colace [x] senna [x] other: miralax     ENDO:   Goal euglycemia (-180)  RISS for now      HEME/ONC:  VTE prophylaxis: [x] SCDs [x] chemoprophylaxis  lovenox [] hold chemoprophylaxis due to: [] high risk of DVT/PE on admission due to:    ID: covid + hold off decadron given MG exac, supportive care for now  Elevated leukocytosis, likely in setting of MG exacerbation and COVID-19 infection  monitor for fevers   Will consult ID for COVID- 19 treatment    MISC:    SOCIAL/FAMILY:  [] awaiting [x] updated at bedside [] family meeting    CODE STATUS:  [x] Full Code [] DNR [] DNI [] Palliative/Comfort Care    DISPOSITION:  [x] ICU [] Stroke Unit [] Floor [] EMU [] RCU [] PCU    [x] Patient is at high risk of neurologic deterioration/death due to: MG crisis, hypercapnic respiratory failure       Contact: [phone] ASSESSMENT/PLAN: MG crisis, covid +    NEURO:   checks Q1  neurology consulted, Patient refusing PLEX  c/w IVIG 25g (1/31--  Will likely start steroids in setting of acute exacerbation  Avoid medications that may worsen the current exacerbation including macrolides, fluoroquinolones, tetracyclines, aminoglycoside, beta-blockers, magnesium, and anti-arrhythmics (procainamide, quinidine, and lidocaine)  NIF/VC q4h--not able as on BiPAP   Activity: [] mobilize as tolerated [x] Bedrest [] PT [] OT [] PMNR    PULM:  severe respiratory acidosis improved with BiPAP  if persistent/no further improvement, intubate   Last ABG 7.32/61/168/31, saturating 99%  On bipap setting 14/8, will obtain ABG now and adjust settings    CV:  SBP goal 100-160  Not on cardene anymore    RENAL:  Fluids: IVF    GI:  Diet: NPO   GI prophylaxis [x] not indicated [] PPI [] other:  Bowel regimen [] colace [x] senna [x] other: miralax     ENDO:   Goal euglycemia (-180)  RISS for now      HEME/ONC:  VTE prophylaxis: [x] SCDs [x] chemoprophylaxis  lovenox [] hold chemoprophylaxis due to: [] high risk of DVT/PE on admission due to:    ID: covid + hold off decadron given MG exac, supportive care for now  Elevated leukocytosis, likely in setting of MG exacerbation and COVID-19 infection  monitor for fevers   Will consult ID for COVID- 19 treatment    MISC:    SOCIAL/FAMILY:  [] awaiting [x] updated at bedside [] family meeting    CODE STATUS:  [x] Full Code [] DNR [] DNI [] Palliative/Comfort Care    DISPOSITION:  [x] ICU [] Stroke Unit [] Floor [] EMU [] RCU [] PCU    [x] Patient is at high risk of neurologic deterioration/death due to: MG crisis, hypercapnic respiratory failure       Contact: [phone]

## 2023-02-01 LAB
ANION GAP SERPL CALC-SCNC: 13 MMOL/L — SIGNIFICANT CHANGE UP (ref 5–17)
BUN SERPL-MCNC: 32 MG/DL — HIGH (ref 7–23)
CALCIUM SERPL-MCNC: 8.6 MG/DL — SIGNIFICANT CHANGE UP (ref 8.4–10.5)
CHLORIDE SERPL-SCNC: 103 MMOL/L — SIGNIFICANT CHANGE UP (ref 96–108)
CO2 SERPL-SCNC: 24 MMOL/L — SIGNIFICANT CHANGE UP (ref 22–31)
CREAT SERPL-MCNC: 0.58 MG/DL — SIGNIFICANT CHANGE UP (ref 0.5–1.3)
EGFR: 102 ML/MIN/1.73M2 — SIGNIFICANT CHANGE UP
GAS PNL BLDA: SIGNIFICANT CHANGE UP
GLUCOSE BLDC GLUCOMTR-MCNC: 112 MG/DL — HIGH (ref 70–99)
GLUCOSE BLDC GLUCOMTR-MCNC: 165 MG/DL — HIGH (ref 70–99)
GLUCOSE BLDC GLUCOMTR-MCNC: 180 MG/DL — HIGH (ref 70–99)
GLUCOSE SERPL-MCNC: 120 MG/DL — HIGH (ref 70–99)
HCT VFR BLD CALC: 33.6 % — LOW (ref 34.5–45)
HCT VFR BLD CALC: 37.5 % — SIGNIFICANT CHANGE UP (ref 34.5–45)
HGB BLD-MCNC: 11 G/DL — LOW (ref 11.5–15.5)
HGB BLD-MCNC: 12 G/DL — SIGNIFICANT CHANGE UP (ref 11.5–15.5)
MAGNESIUM SERPL-MCNC: 2.1 MG/DL — SIGNIFICANT CHANGE UP (ref 1.6–2.6)
MCHC RBC-ENTMCNC: 28.6 PG — SIGNIFICANT CHANGE UP (ref 27–34)
MCHC RBC-ENTMCNC: 28.8 PG — SIGNIFICANT CHANGE UP (ref 27–34)
MCHC RBC-ENTMCNC: 32 GM/DL — SIGNIFICANT CHANGE UP (ref 32–36)
MCHC RBC-ENTMCNC: 32.7 GM/DL — SIGNIFICANT CHANGE UP (ref 32–36)
MCV RBC AUTO: 87.5 FL — SIGNIFICANT CHANGE UP (ref 80–100)
MCV RBC AUTO: 90.1 FL — SIGNIFICANT CHANGE UP (ref 80–100)
NRBC # BLD: 0 /100 WBCS — SIGNIFICANT CHANGE UP (ref 0–0)
PHOSPHATE SERPL-MCNC: 2.7 MG/DL — SIGNIFICANT CHANGE UP (ref 2.5–4.5)
PLATELET # BLD AUTO: 264 K/UL — SIGNIFICANT CHANGE UP (ref 150–400)
PLATELET # BLD AUTO: 294 K/UL — SIGNIFICANT CHANGE UP (ref 150–400)
POTASSIUM SERPL-MCNC: 3.8 MMOL/L — SIGNIFICANT CHANGE UP (ref 3.5–5.3)
POTASSIUM SERPL-SCNC: 3.8 MMOL/L — SIGNIFICANT CHANGE UP (ref 3.5–5.3)
RBC # BLD: 3.84 M/UL — SIGNIFICANT CHANGE UP (ref 3.8–5.2)
RBC # BLD: 4.16 M/UL — SIGNIFICANT CHANGE UP (ref 3.8–5.2)
RBC # FLD: 12.8 % — SIGNIFICANT CHANGE UP (ref 10.3–14.5)
RBC # FLD: 13.2 % — SIGNIFICANT CHANGE UP (ref 10.3–14.5)
SODIUM SERPL-SCNC: 140 MMOL/L — SIGNIFICANT CHANGE UP (ref 135–145)
WBC # BLD: 13.93 K/UL — HIGH (ref 3.8–10.5)
WBC # BLD: 18.35 K/UL — HIGH (ref 3.8–10.5)
WBC # FLD AUTO: 13.93 K/UL — HIGH (ref 3.8–10.5)
WBC # FLD AUTO: 18.35 K/UL — HIGH (ref 3.8–10.5)

## 2023-02-01 PROCEDURE — 71045 X-RAY EXAM CHEST 1 VIEW: CPT | Mod: 26

## 2023-02-01 PROCEDURE — 99291 CRITICAL CARE FIRST HOUR: CPT

## 2023-02-01 RX ORDER — CHLORHEXIDINE GLUCONATE 213 G/1000ML
15 SOLUTION TOPICAL EVERY 12 HOURS
Refills: 0 | Status: DISCONTINUED | OUTPATIENT
Start: 2023-02-01 | End: 2023-02-11

## 2023-02-01 RX ORDER — POLYETHYLENE GLYCOL 3350 17 G/17G
17 POWDER, FOR SOLUTION ORAL EVERY 12 HOURS
Refills: 0 | Status: DISCONTINUED | OUTPATIENT
Start: 2023-02-01 | End: 2023-02-03

## 2023-02-01 RX ORDER — POTASSIUM CHLORIDE 20 MEQ
10 PACKET (EA) ORAL
Refills: 0 | Status: COMPLETED | OUTPATIENT
Start: 2023-02-01 | End: 2023-02-01

## 2023-02-01 RX ORDER — PROPOFOL 10 MG/ML
20 INJECTION, EMULSION INTRAVENOUS
Qty: 500 | Refills: 0 | Status: DISCONTINUED | OUTPATIENT
Start: 2023-02-01 | End: 2023-02-01

## 2023-02-01 RX ORDER — FAMOTIDINE 10 MG/ML
20 INJECTION INTRAVENOUS
Refills: 0 | Status: DISCONTINUED | OUTPATIENT
Start: 2023-02-01 | End: 2023-02-12

## 2023-02-01 RX ORDER — PHENYLEPHRINE HYDROCHLORIDE 10 MG/ML
0.2 INJECTION INTRAVENOUS
Qty: 40 | Refills: 0 | Status: DISCONTINUED | OUTPATIENT
Start: 2023-02-01 | End: 2023-02-02

## 2023-02-01 RX ORDER — SENNA PLUS 8.6 MG/1
2 TABLET ORAL AT BEDTIME
Refills: 0 | Status: DISCONTINUED | OUTPATIENT
Start: 2023-02-01 | End: 2023-02-03

## 2023-02-01 RX ORDER — PROPOFOL 10 MG/ML
20 INJECTION, EMULSION INTRAVENOUS
Qty: 500 | Refills: 0 | Status: DISCONTINUED | OUTPATIENT
Start: 2023-02-01 | End: 2023-02-02

## 2023-02-01 RX ADMIN — ENOXAPARIN SODIUM 40 MILLIGRAM(S): 100 INJECTION SUBCUTANEOUS at 17:38

## 2023-02-01 RX ADMIN — FAMOTIDINE 20 MILLIGRAM(S): 10 INJECTION INTRAVENOUS at 17:38

## 2023-02-01 RX ADMIN — CHLORHEXIDINE GLUCONATE 15 MILLILITER(S): 213 SOLUTION TOPICAL at 05:10

## 2023-02-01 RX ADMIN — PROPOFOL 8.64 MICROGRAM(S)/KG/MIN: 10 INJECTION, EMULSION INTRAVENOUS at 18:22

## 2023-02-01 RX ADMIN — PHENYLEPHRINE HYDROCHLORIDE 5.4 MICROGRAM(S)/KG/MIN: 10 INJECTION INTRAVENOUS at 07:25

## 2023-02-01 RX ADMIN — PROPOFOL 8.64 MICROGRAM(S)/KG/MIN: 10 INJECTION, EMULSION INTRAVENOUS at 07:25

## 2023-02-01 RX ADMIN — REMDESIVIR 200 MILLIGRAM(S): 5 INJECTION INTRAVENOUS at 17:38

## 2023-02-01 RX ADMIN — CHLORHEXIDINE GLUCONATE 15 MILLILITER(S): 213 SOLUTION TOPICAL at 17:39

## 2023-02-01 RX ADMIN — SODIUM CHLORIDE 75 MILLILITER(S): 9 INJECTION, SOLUTION INTRAVENOUS at 19:00

## 2023-02-01 RX ADMIN — Medication 100 MILLIEQUIVALENT(S): at 08:42

## 2023-02-01 RX ADMIN — PHENYLEPHRINE HYDROCHLORIDE 5.4 MICROGRAM(S)/KG/MIN: 10 INJECTION INTRAVENOUS at 19:00

## 2023-02-01 RX ADMIN — Medication 100 MILLIEQUIVALENT(S): at 07:00

## 2023-02-01 RX ADMIN — Medication 125 MILLIGRAM(S): at 05:10

## 2023-02-01 RX ADMIN — CHLORHEXIDINE GLUCONATE 1 APPLICATION(S): 213 SOLUTION TOPICAL at 05:09

## 2023-02-01 RX ADMIN — PHENYLEPHRINE HYDROCHLORIDE 5.4 MICROGRAM(S)/KG/MIN: 10 INJECTION INTRAVENOUS at 00:37

## 2023-02-01 RX ADMIN — PROPOFOL 8.64 MICROGRAM(S)/KG/MIN: 10 INJECTION, EMULSION INTRAVENOUS at 19:00

## 2023-02-01 RX ADMIN — Medication 2: at 12:05

## 2023-02-01 RX ADMIN — SODIUM CHLORIDE 75 MILLILITER(S): 9 INJECTION, SOLUTION INTRAVENOUS at 17:00

## 2023-02-01 RX ADMIN — SODIUM CHLORIDE 50 MILLILITER(S): 9 INJECTION, SOLUTION INTRAVENOUS at 07:25

## 2023-02-01 RX ADMIN — POLYETHYLENE GLYCOL 3350 17 GRAM(S): 17 POWDER, FOR SOLUTION ORAL at 17:38

## 2023-02-01 RX ADMIN — Medication 2: at 17:57

## 2023-02-01 RX ADMIN — IMMUNE GLOBULIN (HUMAN) 83.33 GRAM(S): 10 INJECTION INTRAVENOUS; SUBCUTANEOUS at 00:35

## 2023-02-01 RX ADMIN — SENNA PLUS 2 TABLET(S): 8.6 TABLET ORAL at 21:47

## 2023-02-01 RX ADMIN — PROPOFOL 8.64 MICROGRAM(S)/KG/MIN: 10 INJECTION, EMULSION INTRAVENOUS at 00:51

## 2023-02-01 RX ADMIN — CHLORHEXIDINE GLUCONATE 1 APPLICATION(S): 213 SOLUTION TOPICAL at 17:39

## 2023-02-01 NOTE — DIETITIAN INITIAL EVALUATION ADULT - CALCULATED FROM (G/KG)
Denies known Latex allergy or symptoms of Latex sensitivity.  Verified medications with patient, no refills needed.  Luis Barr is a 61 year old male presenting for RANDAL.     86.28 59.76

## 2023-02-01 NOTE — DIETITIAN INITIAL EVALUATION ADULT - ORAL INTAKE PTA/DIET HISTORY
Name band; Nutrition assessment completed at bedside. Unable to obtain subjective information, pt intubated and sedated at time of interview. Will follow up and obtain subjective information at a later date when medically feasible.

## 2023-02-01 NOTE — DIETITIAN INITIAL EVALUATION ADULT - OTHER CALCULATIONS
Estimated nutrient needs based on dosing weight of 158.7 lbs (01-30) (per chart).   [place] State Equation Estimated Needs: 1,508 kcals  Defer fluid calculations to the medical team.

## 2023-02-01 NOTE — DIETITIAN INITIAL EVALUATION ADULT - NAME AND PHONE
Laura Killian  Dietetic Intern  Pager # [phone] or TEAMS  Laura Killian  Dietetic Intern  Pager # [phone] or TEAMS  Laura Killian  Dietetic Intern  Pager # [phone] or TEAMS

## 2023-02-01 NOTE — DIETITIAN INITIAL EVALUATION ADULT - OTHER INFO
Weights:  - Current Weight: 158.7 lbs (02-01) (per chart)  - Dosing Weight: 158.7 lbs (01-30) (per chart)  RD to continue to monitor weights and trends as able.     Pt is s/p tracheal intubation on 02-01 (per chart).   - Pt is currently on a propofol infusion (per orders).     Pt w/ history of T2DM.  - Currently on insulin lispro sliding scale in-house and methylPREDNISolone sodium succinate injectable (per orders).  - Per chart, "patient also has prednisone PRN for sick days-- she has been taking 20mg prednisone x past 4 days as per instruction of her neurologist."  - POCT Blood Glucose this AM: 112 mg/dL (02-01). Elevated yesterday (01-31) (per chart).  - A1C 6.6% (01-31) (per chart).  Weights:  - Current Weight: 158.7 lbs (02-01) (per chart)  - Dosing Weight: 158.7 lbs (01-30) (per chart)  RD to continue to monitor weights and trends as able.     Pt is s/p tracheal intubation on 02-01 (per chart).   - Pt is currently on a propofol infusion of 8.64 mL/hr (per orders).     Pt w/ history of T2DM.  - Currently on insulin lispro sliding scale in-house and methylPREDNISolone sodium succinate injectable (per orders).  - Per chart, "patient also has prednisone PRN for sick days-- she has been taking 20mg prednisone x past 4 days as per instruction of her neurologist."  - POCT Blood Glucose this AM: 112 mg/dL (02-01). Elevated yesterday (01-31) (per chart).  - A1C 6.6% (01-31) (per chart).

## 2023-02-01 NOTE — DIETITIAN INITIAL EVALUATION ADULT - PERTINENT MEDS FT
MEDICATIONS  (STANDING):  IV immune globulin 10% (GAMMAGARD)   insulin lispro corrective regimen sliding scale  methylPREDNISolone sodium succinate injectable  IV multiple electrolytes injection type 1   polyethylene glycol  IV propofol infusion  IV remdesivir  senna MEDICATIONS  (STANDING):  IV immune globulin 10% (GAMMAGARD)   insulin lispro corrective regimen sliding scale  methylPREDNISolone sodium succinate injectable  IV multiple electrolytes injection type 1   polyethylene glycol  phenylephrine 13.5 mL/hr  IV propofol infusion  IV remdesivir  senna

## 2023-02-01 NOTE — PROGRESS NOTE ADULT - SUBJECTIVE AND OBJECTIVE BOX
CC: f/u for  covid  Patient reports  nothing  REVIEW OF SYSTEMS:  All other review of systems negative (Comprehensive ROS)cannot get    Antimicrobials Day #  :2/5  remdesivir  IVPB   IV Intermittent   remdesivir  IVPB 100 milliGRAM(s) IV Intermittent every 24 hours    Other Medications Reviewed    T(F): 99.3 (02-01-23 @ 15:00), Max: 99.9 (02-01-23 @ 07:00)  HR: 60 (02-01-23 @ 18:45)  BP: 116/63 (02-01-23 @ 03:15)  RR: 18 (02-01-23 @ 18:45)  SpO2: 98% (02-01-23 @ 18:45)  Wt(kg): --    PHYSICAL EXAM:  General: sedated on vent, no acute distress  Eyes:  anicteric, no conjunctival injection, no discharge  Oropharynx: no lesions or injection 	  Neck: supple, without adenopathy  Lungs: vent sounds  to auscultation  Heart: regular rate and rhythm; no murmur, rubs or gallops  Abdomen: soft, nondistended, nontender, without mass or organomegaly  Skin: no lesions  Extremities: no clubbing, cyanosis, or edema  Neurologic: sedated    LAB RESULTS:                        12.0   18.35 )-----------( 294      ( 01 Feb 2023 03:48 )             37.5     02-01    140  |  103  |  32<H>  ----------------------------<  120<H>  3.8   |  24  |  0.58    Ca    8.6      01 Feb 2023 03:48  Phos  2.7     02-01  Mg     2.1     02-01    TPro  7.3  /  Alb  3.5  /  TBili  0.3  /  DBili  <0.1  /  AST  22  /  ALT  26  /  AlkPhos  88  01-31    LIVER FUNCTIONS - ( 31 Jan 2023 15:00 )  Alb: 3.5 g/dL / Pro: 7.3 g/dL / ALK PHOS: 88 U/L / ALT: 26 U/L / AST: 22 U/L / GGT: x             MICROBIOLOGY:  RECENT CULTURES:      RADIOLOGY REVIEWED:  < from: Xray Chest 1 View- PORTABLE-Urgent (Xray Chest 1 View- PORTABLE-Urgent .) (02.01.23 @ 09:46) >    ACC: [id number] EXAM:  XR CHEST PORTABLE URGENT 1V   ORDERED BY:  SANTOS TREVINO     ACC: [id number] EXAM:  XR CHEST PORTABLE URGENT 1V   ORDERED BY: HEATHER LOCK     ACC: [id number] EXAM:  XR CHEST PORTABLE URGENT 1V   ORDERED BY: KEELEY MERCEDES     PROCEDURE DATE:  02/01/2023          INTERPRETATION:  EXAMINATION: XR CHEST URGENT, XR CHEST URGENT, XR CHEST   URGENT    CLINICAL INDICATION: intubated    TECHNIQUE: Single frontal, portable view of the chest was obtained.    COMPARISON: Chest x-ray 1/31/2023    FINDINGS:    2/1/2023 12:45 AM  LINES/TUBES: Endotracheal tube is within the distal trachea..    LUNGS/PLEURA: Bibasilar atelectasis. Small left pleural effusion. No   pneumothorax.    HEART AND MEDIASTINUM: The heart size is not accurately measured in this   projection. Median sternotomy.    SKELETON: No acute osseous abnormalities.    2/1/2023 4:12 AM  Interval development of hazy right lower lung opacification.    2/1/2023 9:04 AM  Enteric tube, coursing below the diaphragm, with tip in the stomach.    IMPRESSION:    Interval development of hazy right lower lung patchy opacification on   chest x-ray 2/1/2023 4:12 AM, possibly related to atelectasis or   pneumonia.  Retrocardiac atelectasis/effusion.      < end of copied text >              Assessment:  patient with MG, has flares every couple of years, she now returns with covid, sob, respiratory failure now intubated .  Plan:  4 more days of remdesivir  monitor cr, liver enzymes  steroids for hypoxia  iv ig but now planned for plex  supportive cre  will monitor for aspiration pneumonia  dvt prophylaxis CC: f/u for  covid  Patient reports  nothing  REVIEW OF SYSTEMS:  All other review of systems negative (Comprehensive ROS)cannot get    Antimicrobials Day #  :2/5  remdesivir  IVPB   IV Intermittent   remdesivir  IVPB 100 milliGRAM(s) IV Intermittent every 24 hours    Other Medications Reviewed    T(F): 99.3 (02-01-23 @ 15:00), Max: 99.9 (02-01-23 @ 07:00)  HR: 60 (02-01-23 @ 18:45)  BP: 116/63 (02-01-23 @ 03:15)  RR: 18 (02-01-23 @ 18:45)  SpO2: 98% (02-01-23 @ 18:45)  Wt(kg): --    PHYSICAL EXAM:  General: sedated on vent, no acute distress  Eyes:  anicteric, no conjunctival injection, no discharge  Oropharynx: no lesions or injection 	  Neck: supple, without adenopathy  Lungs: vent sounds  to auscultation  Heart: regular rate and rhythm; no murmur, rubs or gallops  Abdomen: soft, nondistended, nontender, without mass or organomegaly  Skin: no lesions  Extremities: no clubbing, cyanosis, or edema  Neurologic: sedated    LAB RESULTS:                        12.0   18.35 )-----------( 294      ( 01 Feb 2023 03:48 )             37.5     02-01    140  |  103  |  32<H>  ----------------------------<  120<H>  3.8   |  24  |  0.58    Ca    8.6      01 Feb 2023 03:48  Phos  2.7     02-01  Mg     2.1     02-01    TPro  7.3  /  Alb  3.5  /  TBili  0.3  /  DBili  <0.1  /  AST  22  /  ALT  26  /  AlkPhos  88  01-31    LIVER FUNCTIONS - ( 31 Jan 2023 15:00 )  Alb: 3.5 g/dL / Pro: 7.3 g/dL / ALK PHOS: 88 U/L / ALT: 26 U/L / AST: 22 U/L / GGT: x             MICROBIOLOGY:  RECENT CULTURES:      RADIOLOGY REVIEWED:  < from: Xray Chest 1 View- PORTABLE-Urgent (Xray Chest 1 View- PORTABLE-Urgent .) (02.01.23 @ 09:46) >    ACC: [id number] EXAM:  XR CHEST PORTABLE URGENT 1V   ORDERED BY:  SANTOS TREVINO     ACC: [id number] EXAM:  XR CHEST PORTABLE URGENT 1V   ORDERED BY: HEATHER LOCK     ACC: [id number] EXAM:  XR CHEST PORTABLE URGENT 1V   ORDERED BY: KEELEY MERCEDES     PROCEDURE DATE:  02/01/2023          INTERPRETATION:  EXAMINATION: XR CHEST URGENT, XR CHEST URGENT, XR CHEST   URGENT    CLINICAL INDICATION: intubated    TECHNIQUE: Single frontal, portable view of the chest was obtained.    COMPARISON: Chest x-ray 1/31/2023    FINDINGS:    2/1/2023 12:45 AM  LINES/TUBES: Endotracheal tube is within the distal trachea..    LUNGS/PLEURA: Bibasilar atelectasis. Small left pleural effusion. No   pneumothorax.    HEART AND MEDIASTINUM: The heart size is not accurately measured in this   projection. Median sternotomy.    SKELETON: No acute osseous abnormalities.    2/1/2023 4:12 AM  Interval development of hazy right lower lung opacification.    2/1/2023 9:04 AM  Enteric tube, coursing below the diaphragm, with tip in the stomach.    IMPRESSION:    Interval development of hazy right lower lung patchy opacification on   chest x-ray 2/1/2023 4:12 AM, possibly related to atelectasis or   pneumonia.  Retrocardiac atelectasis/effusion.      < end of copied text >              Assessment:  patient with MG, has flares every couple of years, she now returns with covid, sob, respiratory failure now intubated .  Plan:  4 more days of remdesivir  monitor cr, liver enzymes  steroids for hypoxia  iv ig but now planned for plex  supportive cre  will monitor for aspiration pneumonia  dvt prophylaxis CC: f/u for  covid  Patient reports  nothing  REVIEW OF SYSTEMS:  All other review of systems negative (Comprehensive ROS)cannot get    Antimicrobials Day #  :2/5  remdesivir  IVPB   IV Intermittent   remdesivir  IVPB 100 milliGRAM(s) IV Intermittent every 24 hours    Other Medications Reviewed    T(F): 99.3 (02-01-23 @ 15:00), Max: 99.9 (02-01-23 @ 07:00)  HR: 60 (02-01-23 @ 18:45)  BP: 116/63 (02-01-23 @ 03:15)  RR: 18 (02-01-23 @ 18:45)  SpO2: 98% (02-01-23 @ 18:45)  Wt(kg): --    PHYSICAL EXAM:  General: sedated on vent, no acute distress  Eyes:  anicteric, no conjunctival injection, no discharge  Oropharynx: no lesions or injection 	  Neck: supple, without adenopathy  Lungs: vent sounds  to auscultation  Heart: regular rate and rhythm; no murmur, rubs or gallops  Abdomen: soft, nondistended, nontender, without mass or organomegaly  Skin: no lesions  Extremities: no clubbing, cyanosis, or edema  Neurologic: sedated    LAB RESULTS:                        12.0   18.35 )-----------( 294      ( 01 Feb 2023 03:48 )             37.5     02-01    140  |  103  |  32<H>  ----------------------------<  120<H>  3.8   |  24  |  0.58    Ca    8.6      01 Feb 2023 03:48  Phos  2.7     02-01  Mg     2.1     02-01    TPro  7.3  /  Alb  3.5  /  TBili  0.3  /  DBili  <0.1  /  AST  22  /  ALT  26  /  AlkPhos  88  01-31    LIVER FUNCTIONS - ( 31 Jan 2023 15:00 )  Alb: 3.5 g/dL / Pro: 7.3 g/dL / ALK PHOS: 88 U/L / ALT: 26 U/L / AST: 22 U/L / GGT: x             MICROBIOLOGY:  RECENT CULTURES:      RADIOLOGY REVIEWED:  < from: Xray Chest 1 View- PORTABLE-Urgent (Xray Chest 1 View- PORTABLE-Urgent .) (02.01.23 @ 09:46) >    ACC: [id number] EXAM:  XR CHEST PORTABLE URGENT 1V   ORDERED BY:  SANTOS TREVINO     ACC: [id number] EXAM:  XR CHEST PORTABLE URGENT 1V   ORDERED BY: HEATHER LOCK     ACC: [id number] EXAM:  XR CHEST PORTABLE URGENT 1V   ORDERED BY: KEELEY MERCEDES     PROCEDURE DATE:  02/01/2023          INTERPRETATION:  EXAMINATION: XR CHEST URGENT, XR CHEST URGENT, XR CHEST   URGENT    CLINICAL INDICATION: intubated    TECHNIQUE: Single frontal, portable view of the chest was obtained.    COMPARISON: Chest x-ray 1/31/2023    FINDINGS:    2/1/2023 12:45 AM  LINES/TUBES: Endotracheal tube is within the distal trachea..    LUNGS/PLEURA: Bibasilar atelectasis. Small left pleural effusion. No   pneumothorax.    HEART AND MEDIASTINUM: The heart size is not accurately measured in this   projection. Median sternotomy.    SKELETON: No acute osseous abnormalities.    2/1/2023 4:12 AM  Interval development of hazy right lower lung opacification.    2/1/2023 9:04 AM  Enteric tube, coursing below the diaphragm, with tip in the stomach.    IMPRESSION:    Interval development of hazy right lower lung patchy opacification on   chest x-ray 2/1/2023 4:12 AM, possibly related to atelectasis or   pneumonia.  Retrocardiac atelectasis/effusion.      < end of copied text >              Assessment:  patient with MG, has flares every couple of years, she now returns with covid, sob, respiratory failure now intubated .  Plan:  4 more days of remdesivir  monitor cr, liver enzymes  steroids for hypoxia  iv ig but now planned for plex  supportive cre  will monitor for aspiration pneumonia  dvt prophylaxis

## 2023-02-01 NOTE — DIETITIAN INITIAL EVALUATION ADULT - NUTRITIONGOAL OUTCOME2
Pt will be able to meet >80% of estimated nutrient needs.  Pt will be able to meet >80% of estimated nutrient needs via tolerated route.

## 2023-02-01 NOTE — DIETITIAN INITIAL EVALUATION ADULT - PERTINENT LABORATORY DATA
02-01    140  |  103  |  32<H>  ----------------------------<  120<H>  3.8   |  24  |  0.58    Ca    8.6      01 Feb 2023 03:48  Phos  2.7     02-01  Mg     2.1     02-01    TPro  7.3  /  Alb  3.5  /  TBili  0.3  /  DBili  <0.1  /  AST  22  /  ALT  26  /  AlkPhos  88  01-31  POCT Blood Glucose.: 112 mg/dL (02-01-23 @ 05:42)  A1C with Estimated Average Glucose Result: 6.6 % (01-31-23 @ 01:13)    Nutrition Related Labs: 02-01 Na140 mmol/L Glu 120 mg/dL<H> K+ 3.8 mmol/L Cr  0.58 mg/dL BUN 32 mg/dL<H> Phos 2.7 mg/dL

## 2023-02-01 NOTE — PROGRESS NOTE ADULT - SUBJECTIVE AND OBJECTIVE BOX
SUMMARY: 63yo Malayalam-speaking woman w/ Myasthenia Gravis (on pyridostigmine, hospitalization every 1-2 years requiring IVIG) (dx 2015), s/p thymectomy (2010), T2DM who presents with SOB, cough, difficulty spitting up sputum x 4 days.  was sick with COVID, so son performed home COVID test for patient which was positive. Patient's primary complaint is SOB when lying down. She has been sleeping upright. She had difficulty coughing up and spitting out sputum but today these symptoms are slightly improved. Also reports difficulty swallowing - but was able to tolerate taking oral pills. Admits to difficulty looking up, but eye drooping is overall improved from past couple of months. No blurry vision. Patient takes pyridostigmine 120mg TID regularly. Patient also has prednisone PRN for sick days-- she has been taking 20mg prednisone x past 4 days as per instruction of her neurologist.     Of note, last hospitalization was 2 years ago at [facility] during which patient was intubated -- son notes that this was likely 2/2 patient being forced to lie down flat in hospital bed, leading to aspiration. She was extubated after ~3 days. Received IVIG during that hospitalization    OVERNIGHT EVENTS: Adm NSCU, upon arrival to the unit, appeared in acute respiratory distress tachypneic, hypertensive, tachycardic, hypoxic, ABG showing resp acidosis, immediately put on BiPAP, started on IVIG with some improvement     ADMISSION SCORES:   GCS: 14 HH: MF: NIHSS: ICH Score:    REVIEW OF SYSTEMS: [] Unable to Assess due to neurologic exam   [ x] All ROS addressed below are non-contributory, except:  Neuro: [ ] Headache [ ] Back pain [ ] Numbness [ ] Weakness [ ] Ataxia [ ] Dizziness [ ] Aphasia [ ] Dysarthria [ ] Visual disturbance  Resp: [ x] Shortness of breath/dyspnea [ ] Orthopnea [ ] Cough  CV: [ ] Chest pain [ ] Palpitation [ ] Lightheadedness [ ] Syncope  Renal: [ ] Thirst [ ] Edema  GI: [ ] Nausea [ ] Emesis [ ] Abdominal pain [ ] Constipation [ ] Diarrhea  Hem: [ ] Hematemesis [ ] bBright red blood per rectum  ID: [ ] Fever [ ] Chills [ ] Dysuria  ENT: [ ] Rhinorrhea    VITALS: [x] Reviewed    IMAGING/DATA: [x] Reviewed    IVF FLUIDS/MEDICATIONS: [x] Reviewed    ALLERGIES: Allergies    No Known Allergies    Intolerances      DEVICES:   [] Restraints [x] PIVs [] ET tube [] central line [] PICC [] arterial line [] morales [] NGT/OGT [] EVD [] LD [] MYAH/HMV [] LiCOX [] ICP monitor [] Trach [] PEG [] Chest Tube [] other:    EXAMINATION:  General: distress  HEENT: Anicteric sclerae  Cardiac: [id number] tachycardic  Lungs: diminished   Abdomen: Soft, non-tender, +BS  Neurologic: Awake, alert, oriented x3 with , follows commands, PERRL, EOMI, face symmetric, Neck flexion 3, neck extension 3+. R delt 3/5, biceps 4+/5, Triceps 5/5,  5/5. L delt 3/5, biceps 4+/5, triceps 5/5,  5/5. R HF 4/5, DF 4/5, PF 4/5. Left HF 4/5, DF 4+/5, PF 4+/5. Sensations intact.   SUMMARY: 63yo Malayalam-speaking woman w/ Myasthenia Gravis (on pyridostigmine, hospitalization every 1-2 years requiring IVIG) (dx 2015), s/p thymectomy (2010), T2DM who presents with SOB, cough, difficulty spitting up sputum x 4 days.  was sick with COVID, so son performed home COVID test for patient which was positive. Patient's primary complaint is SOB when lying down. She has been sleeping upright. She had difficulty coughing up and spitting out sputum but today these symptoms are slightly improved. Also reports difficulty swallowing - but was able to tolerate taking oral pills. Admits to difficulty looking up, but eye drooping is overall improved from past couple of months. No blurry vision. Patient takes pyridostigmine 120mg TID regularly. Patient also has prednisone PRN for sick days-- she has been taking 20mg prednisone x past 4 days as per instruction of her neurologist.     Of note, last hospitalization was 2 years ago at [facility] during which patient was intubated -- son notes that this was likely 2/2 patient being forced to lie down flat in hospital bed, leading to aspiration. She was extubated after ~3 days. Received IVIG during that hospitalization    OVERNIGHT EVENTS: Adm NSCU, upon arrival to the unit, appeared in acute respiratory distress tachypneic, hypertensive, tachycardic, hypoxic, ABG showing resp acidosis, immediately put on BiPAP, started on IVIG with some improvement     ADMISSION SCORES:   GCS: 14 HH: MF: NIHSS: ICH Score:    REVIEW OF SYSTEMS: [] Unable to Assess due to neurologic exam   [ x] All ROS addressed below are non-contributory, except:  Neuro: [ ] Headache [ ] Back pain [ ] Numbness [ ] Weakness [ ] Ataxia [ ] Dizziness [ ] Aphasia [ ] Dysarthria [ ] Visual disturbance  Resp: [ x] Shortness of breath/dyspnea [ ] Orthopnea [ ] Cough  CV: [ ] Chest pain [ ] Palpitation [ ] Lightheadedness [ ] Syncope  Renal: [ ] Thirst [ ] Edema  GI: [ ] Nausea [ ] Emesis [ ] Abdominal pain [ ] Constipation [ ] Diarrhea  Hem: [ ] Hematemesis [ ] bBright red blood per rectum  ID: [ ] Fever [ ] Chills [ ] Dysuria  ENT: [ ] Rhinorrhea    VITALS: [x] Reviewed    IMAGING/DATA: [x] Reviewed    IVF FLUIDS/MEDICATIONS: [x] Reviewed    ALLERGIES: Allergies    No Known Allergies    Intolerances      DEVICES:   [] Restraints [x] PIVs [] ET tube [] central line [] PICC [] arterial line [] morales [] NGT/OGT [] EVD [] LD [] MYAH/HMV [] LiCOX [] ICP monitor [] Trach [] PEG [] Chest Tube [] other:    EXAMINATION:  General: distress  HEENT: Anicteric sclerae  Cardiac: [id number] tachycardic  Lungs: diminished   Abdomen: Soft, non-tender, +BS  Neurologic: Awake, alert, oriented x3 with , follows commands, PERRL, EOMI, face symmetric, Neck flexion 3, neck extension 3+. R delt 3/5, biceps 4+/5, Triceps 5/5,  5/5. L delt 3/5, biceps 4+/5, triceps 5/5,  5/5. R HF 4/5, DF 4/5, PF 4/5. Left HF 4/5, DF 4+/5, PF 4+/5. Sensations intact.   SUMMARY: 63yo Malayalam-speaking woman w/ Myasthenia Gravis (on pyridostigmine, hospitalization every 1-2 years requiring IVIG) (dx 2015), s/p thymectomy (2010), T2DM who presents with SOB, cough, difficulty spitting up sputum x 4 days.  was sick with COVID, so son performed home COVID test for patient which was positive. Patient's primary complaint is SOB when lying down. She has been sleeping upright. She had difficulty coughing up and spitting out sputum but today these symptoms are slightly improved. Also reports difficulty swallowing - but was able to tolerate taking oral pills. Admits to difficulty looking up, but eye drooping is overall improved from past couple of months. No blurry vision. Patient takes pyridostigmine 120mg TID regularly. Patient also has prednisone PRN for sick days-- she has been taking 20mg prednisone x past 4 days as per instruction of her neurologist.     Of note, last hospitalization was 2 years ago at [facility] during which patient was intubated -- son notes that this was likely 2/2 patient being forced to lie down flat in hospital bed, leading to aspiration. She was extubated after ~3 days. Received IVIG during that hospitalization    OVERNIGHT EVENTS: Adm NSCU, upon arrival to the unit, appeared in acute respiratory distress tachypneic, hypertensive, tachycardic, hypoxic, ABG showing resp acidosis, immediately put on BiPAP, started on IVIG with some improvement     ADMISSION SCORES:   GCS: 14 HH: MF: NIHSS: ICH Score:    REVIEW OF SYSTEMS: [] Unable to Assess due to neurologic exam   [ x] All ROS addressed below are non-contributory, except:  Neuro: [ ] Headache [ ] Back pain [ ] Numbness [ ] Weakness [ ] Ataxia [ ] Dizziness [ ] Aphasia [ ] Dysarthria [ ] Visual disturbance  Resp: [ x] Shortness of breath/dyspnea [ ] Orthopnea [ ] Cough  CV: [ ] Chest pain [ ] Palpitation [ ] Lightheadedness [ ] Syncope  Renal: [ ] Thirst [ ] Edema  GI: [ ] Nausea [ ] Emesis [ ] Abdominal pain [ ] Constipation [ ] Diarrhea  Hem: [ ] Hematemesis [ ] bBright red blood per rectum  ID: [ ] Fever [ ] Chills [ ] Dysuria  ENT: [ ] Rhinorrhea    VITALS: [x] Reviewed    IMAGING/DATA: [x] Reviewed    IVF FLUIDS/MEDICATIONS: [x] Reviewed    ALLERGIES: Allergies    No Known Allergies    Intolerances      DEVICES:   [] Restraints [x] PIVs [] ET tube [] central line [] PICC [] arterial line [] morales [] NGT/OGT [] EVD [] LD [] MYAH/HMV [] LiCOX [] ICP monitor [] Trach [] PEG [] Chest Tube [] other:    EXAMINATION:  General: distress  HEENT: Anicteric sclerae  Cardiac: [id number] tachycardic  Lungs: diminished   Abdomen: Soft, non-tender, +BS  Neurologic: Awake, alert, oriented x3 with , follows commands, PERRL, EOMI, face symmetric, Neck flexion 3, neck extension 3+. R delt 3/5, biceps 4+/5, Triceps 5/5,  5/5. L delt 3/5, biceps 4+/5, triceps 5/5,  5/5. R HF 4/5, DF 4/5, PF 4/5. Left HF 4/5, DF 4+/5, PF 4+/5. Sensations intact.   SUMMARY: 61yo Malayalam-speaking woman w/ Myasthenia Gravis (on pyridostigmine, hospitalization every 1-2 years requiring IVIG) (dx 2015), s/p thymectomy (2010), T2DM who presents with SOB, cough, difficulty spitting up sputum x 4 days.  was sick with COVID, so son performed home COVID test for patient which was positive. Patient's primary complaint is SOB when lying down. She has been sleeping upright. She had difficulty coughing up and spitting out sputum but today these symptoms are slightly improved. Also reports difficulty swallowing - but was able to tolerate taking oral pills. Admits to difficulty looking up, but eye drooping is overall improved from past couple of months. No blurry vision. Patient takes pyridostigmine 120mg TID regularly. Patient also has prednisone PRN for sick days-- she has been taking 20mg prednisone x past 4 days as per instruction of her neurologist.     Of note, last hospitalization was 2 years ago at [facility] during which patient was intubated -- son notes that this was likely 2/2 patient being forced to lie down flat in hospital bed, leading to aspiration. She was extubated after ~3 days. Received IVIG during that hospitalization    OVERNIGHT EVENTS: Adm NSCU, upon arrival to the unit, appeared in acute respiratory distress tachypneic, hypertensive, tachycardic, hypoxic, ABG showing resp acidosis, immediately put on BiPAP, started on IVIG with some improvement     ADMISSION SCORES:   GCS: 14 HH: MF: NIHSS: ICH Score:    REVIEW OF SYSTEMS: [] Unable to Assess due to neurologic exam   [ x] All ROS addressed below are non-contributory, except:  Neuro: [ ] Headache [ ] Back pain [ ] Numbness [ ] Weakness [ ] Ataxia [ ] Dizziness [ ] Aphasia [ ] Dysarthria [ ] Visual disturbance  Resp: [ x] Shortness of breath/dyspnea [ ] Orthopnea [ ] Cough  CV: [ ] Chest pain [ ] Palpitation [ ] Lightheadedness [ ] Syncope  Renal: [ ] Thirst [ ] Edema  GI: [ ] Nausea [ ] Emesis [ ] Abdominal pain [ ] Constipation [ ] Diarrhea  Hem: [ ] Hematemesis [ ] bBright red blood per rectum  ID: [ ] Fever [ ] Chills [ ] Dysuria  ENT: [ ] Rhinorrhea    VITALS: [x] Reviewed    IMAGING/DATA: [x] Reviewed    IVF FLUIDS/MEDICATIONS: [x] Reviewed    ALLERGIES: Allergies    No Known Allergies    Intolerances      DEVICES:   [] Restraints [x] PIVs [] ET tube [] central line [] PICC [] arterial line [] morales [] NGT/OGT [] EVD [] LD [] MYAH/HMV [] LiCOX [] ICP monitor [] Trach [] PEG [] Chest Tube [] other:    EXAMINATION:  General: distress  HEENT: Anicteric sclerae  Cardiac: [id number] tachycardic  Lungs: diminished   Abdomen: Soft, non-tender, +BS  Neurologic: Awake, alert, oriented x3 with choices, follows commands, PERRL, EOMI, face symmetric, Neck flexion 2, neck extension 4+. R delt 3/5, biceps 4+/5, Triceps 5/5,  5/5. L delt 3/5, biceps 4/5, triceps 5/5,  5/5. BILATERAL LE 5/5. Sensations intact.   SUMMARY: 61yo Malayalam-speaking woman w/ Myasthenia Gravis (on pyridostigmine, hospitalization every 1-2 years requiring IVIG) (dx 2015), s/p thymectomy (2010), T2DM who presents with SOB, cough, difficulty spitting up sputum x 4 days.  was sick with COVID, so son performed home COVID test for patient which was positive. Patient's primary complaint is SOB when lying down. She has been sleeping upright. She had difficulty coughing up and spitting out sputum but today these symptoms are slightly improved. Also reports difficulty swallowing - but was able to tolerate taking oral pills. Admits to difficulty looking up, but eye drooping is overall improved from past couple of months. No blurry vision. Patient takes pyridostigmine 120mg TID regularly. Patient also has prednisone PRN for sick days-- she has been taking 20mg prednisone x past 4 days as per instruction of her neurologist.     Of note, last hospitalization was 2 years ago at [facility] during which patient was intubated -- son notes that this was likely 2/2 patient being forced to lie down flat in hospital bed, leading to aspiration. She was extubated after ~3 days. Received IVIG during that hospitalization    OVERNIGHT EVENTS: Adm NSCU, upon arrival to the unit, appeared in acute respiratory distress tachypneic, hypertensive, tachycardic, hypoxic, ABG showing resp acidosis, immediately put on BiPAP, started on IVIG with some improvement     ADMISSION SCORES:   GCS: 14 HH: MF: NIHSS: ICH Score:    REVIEW OF SYSTEMS: [] Unable to Assess due to neurologic exam   [ x] All ROS addressed below are non-contributory, except:  Neuro: [ ] Headache [ ] Back pain [ ] Numbness [ ] Weakness [ ] Ataxia [ ] Dizziness [ ] Aphasia [ ] Dysarthria [ ] Visual disturbance  Resp: [ x] Shortness of breath/dyspnea [ ] Orthopnea [ ] Cough  CV: [ ] Chest pain [ ] Palpitation [ ] Lightheadedness [ ] Syncope  Renal: [ ] Thirst [ ] Edema  GI: [ ] Nausea [ ] Emesis [ ] Abdominal pain [ ] Constipation [ ] Diarrhea  Hem: [ ] Hematemesis [ ] bBright red blood per rectum  ID: [ ] Fever [ ] Chills [ ] Dysuria  ENT: [ ] Rhinorrhea    VITALS: [x] Reviewed    IMAGING/DATA: [x] Reviewed    IVF FLUIDS/MEDICATIONS: [x] Reviewed    ALLERGIES: Allergies    No Known Allergies    Intolerances      DEVICES:   [] Restraints [x] PIVs [] ET tube [] central line [] PICC [] arterial line [] morales [] NGT/OGT [] EVD [] LD [] MYAH/HMV [] LiCOX [] ICP monitor [] Trach [] PEG [] Chest Tube [] other:    EXAMINATION:  General: distress  HEENT: Anicteric sclerae  Cardiac: [id number] tachycardic  Lungs: diminished   Abdomen: Soft, non-tender, +BS  Neurologic: Awake, alert, oriented x3 with choices, follows commands, PERRL, EOMI, face symmetric, Neck flexion 2, neck extension 4+. R delt 3/5, biceps 4+/5, Triceps 5/5,  5/5. L delt 3/5, biceps 4/5, triceps 5/5,  5/5. BILATERAL LE 5/5. Sensations intact.   SUMMARY: 61yo Malayalam-speaking woman w/ Myasthenia Gravis (on pyridostigmine, hospitalization every 1-2 years requiring IVIG) (dx 2015), s/p thymectomy (2010), T2DM who presents with SOB, cough, difficulty spitting up sputum x 4 days.  was sick with COVID, so son performed home COVID test for patient which was positive. Patient's primary complaint is SOB when lying down. She has been sleeping upright. She had difficulty coughing up and spitting out sputum but today these symptoms are slightly improved. Also reports difficulty swallowing - but was able to tolerate taking oral pills. Admits to difficulty looking up, but eye drooping is overall improved from past couple of months. No blurry vision. Patient takes pyridostigmine 120mg TID regularly. Patient also has prednisone PRN for sick days-- she has been taking 20mg prednisone x past 4 days as per instruction of her neurologist.     Of note, last hospitalization was 2 years ago at [facility] during which patient was intubated -- son notes that this was likely 2/2 patient being forced to lie down flat in hospital bed, leading to aspiration. She was extubated after ~3 days. Received IVIG during that hospitalization    OVERNIGHT EVENTS: Adm NSCU, upon arrival to the unit, appeared in acute respiratory distress tachypneic, hypertensive, tachycardic, hypoxic, ABG showing resp acidosis, immediately put on BiPAP, started on IVIG with some improvement     ADMISSION SCORES:   GCS: 14 HH: MF: NIHSS: ICH Score:    REVIEW OF SYSTEMS: [] Unable to Assess due to neurologic exam   [ x] All ROS addressed below are non-contributory, except:  Neuro: [ ] Headache [ ] Back pain [ ] Numbness [ ] Weakness [ ] Ataxia [ ] Dizziness [ ] Aphasia [ ] Dysarthria [ ] Visual disturbance  Resp: [ x] Shortness of breath/dyspnea [ ] Orthopnea [ ] Cough  CV: [ ] Chest pain [ ] Palpitation [ ] Lightheadedness [ ] Syncope  Renal: [ ] Thirst [ ] Edema  GI: [ ] Nausea [ ] Emesis [ ] Abdominal pain [ ] Constipation [ ] Diarrhea  Hem: [ ] Hematemesis [ ] bBright red blood per rectum  ID: [ ] Fever [ ] Chills [ ] Dysuria  ENT: [ ] Rhinorrhea    VITALS: [x] Reviewed    IMAGING/DATA: [x] Reviewed    IVF FLUIDS/MEDICATIONS: [x] Reviewed    ALLERGIES: Allergies    No Known Allergies    Intolerances      DEVICES:   [] Restraints [x] PIVs [] ET tube [] central line [] PICC [] arterial line [] morales [] NGT/OGT [] EVD [] LD [] MYAH/HMV [] LiCOX [] ICP monitor [] Trach [] PEG [] Chest Tube [] other:    EXAMINATION:  General: distress  HEENT: Anicteric sclerae  Cardiac: [id number] tachycardic  Lungs: diminished   Abdomen: Soft, non-tender, +BS  Neurologic: Awake, alert, oriented x3 with choices, follows commands, PERRL, EOMI, face symmetric, Neck flexion 2, neck extension 4+. R delt 3/5, biceps 4+/5, Triceps 5/5,  5/5. L delt 3/5, biceps 4/5, triceps 5/5,  5/5. BILATERAL LE 5/5. Sensations intact.

## 2023-02-01 NOTE — PROGRESS NOTE ADULT - ASSESSMENT
ASSESSMENT/PLAN: MG crisis, covid +    NEURO:   neurology consulted, reportedly had discussion with Patient regarding PLEX, patient refused  start IVIG  day 2 today   started on solumedrol today   Hold pyridostigmine during acute exacerbation  Avoid medications that may worsen the current exacerbation including macrolides, fluoroquinolones, tetracyclines, aminoglycoside, beta-blockers, magnesium, and anti-arrhythmics (procainamide, quinidine, and lidocaine)  NIF/VC q4h--not able as on noninvasive ventilatory support   neurochecks Q2 hrs   Activity: [] mobilize as tolerated [x] Bedrest [] PT [] OT [] PMNR    PULM:  cont noninvasive ventilatory support  monitor resp status closely  maintain neutral neck position  frequent suctioning     CV:  SBP goal 100-160    RENAL:  Fluids: IVF    GI:  Diet: NPO   GI prophylaxis [x] not indicated [] PPI [] other:  Bowel regimen [] colace [x] senna [x] other: miralax     ENDO:   Goal euglycemia (-180)  RISS for now      HEME/ONC:  VTE prophylaxis: [x] SCDs [x] chemoprophylaxis lovenox [] hold chemoprophylaxis due to: [] high risk of DVT/PE on admission due to:    ID: covid +on steroid and remdesivir now   ID following   monitor for fevers     MISC:    CODE STATUS:  [x] Full Code [] DNR [] DNI [] Palliative/Comfort Care    DISPOSITION:  [x] ICU [] Stroke Unit [] Floor [] EMU [] RCU [] PCU    [x] Patient is at high risk of neurologic deterioration/death due to: MG crisis, hypercapnic/hypoxic respiratory failure    Contact: [phone] ASSESSMENT/PLAN: MG crisis, covid +    NEURO:   neurology consulted, reportedly had discussion with Patient regarding PLEX, patient refused  start IVIG  day 2 today   started on solumedrol today   Hold pyridostigmine during acute exacerbation  Avoid medications that may worsen the current exacerbation including macrolides, fluoroquinolones, tetracyclines, aminoglycoside, beta-blockers, magnesium, and anti-arrhythmics (procainamide, quinidine, and lidocaine)  NIF/VC q4h--not able as on noninvasive ventilatory support   neurochecks Q2 hrs   Activity: [] mobilize as tolerated [x] Bedrest [] PT [] OT [] PMNR    PULM:  cont noninvasive ventilatory support  monitor resp status closely  maintain neutral neck position  frequent suctioning     CV:  SBP goal 100-160    RENAL:  Fluids: IVF    GI:  Diet: NPO   GI prophylaxis [x] not indicated [] PPI [] other:  Bowel regimen [] colace [x] senna [x] other: miralax     ENDO:   Goal euglycemia (-180)  RISS for now      HEME/ONC:  VTE prophylaxis: [x] SCDs [x] chemoprophylaxis lovenox [] hold chemoprophylaxis due to: [] high risk of DVT/PE on admission due to:    ID: covid +on steroid and remdesivir now   ID following   monitor for fevers     MISC:    CODE STATUS:  [x] Full Code [] DNR [] DNI [] Palliative/Comfort Care    DISPOSITION:  [x] ICU [] Stroke Unit [] Floor [] EMU [] RCU [] PCU    [x] Patient is at high risk of neurologic deterioration/death due to: MG crisis, hypercapnic/hypoxic respiratory failure    Contact: [phone] ASSESSMENT/PLAN: MG crisis, covid +    NEURO:   neurology consulted, reportedly had discussion with Patient regarding PLEX, patient refused  start IVIG  day 2 today   started on solumedrol today   Hold pyridostigmine during acute exacerbation  Avoid medications that may worsen the current exacerbation including macrolides, fluoroquinolones, tetracyclines, aminoglycoside, beta-blockers, magnesium, and anti-arrhythmics (procainamide, quinidine, and lidocaine)  NIF/VC q4h--not able as on noninvasive ventilatory support   neurochecks Q2 hrs   Activity: [] mobilize as tolerated [x] Bedrest [] PT [] OT [] PMNR    PULM:  cont noninvasive ventilatory support  monitor resp status closely  maintain neutral neck position  frequent suctioning     CV:  SBP goal 100-160    RENAL:  Fluids: IVF    GI:  Diet: NPO   GI prophylaxis [x] not indicated [] PPI [] other:  Bowel regimen [] colace [x] senna [x] other: miralax     ENDO:   Goal euglycemia (-180)  RISS for now      HEME/ONC:  VTE prophylaxis: [x] SCDs [x] chemoprophylaxis lovenox [] hold chemoprophylaxis due to: [] high risk of DVT/PE on admission due to:    ID: covid +on steroid and remdesivir now   ID following   monitor for fevers     MISC:    CODE STATUS:  [x] Full Code [] DNR [] DNI [] Palliative/Comfort Care    DISPOSITION:  [x] ICU [] Stroke Unit [] Floor [] EMU [] RCU [] PCU    [x] Patient is at high risk of neurologic deterioration/death due to: MG crisis, hypercapnic/hypoxic respiratory failure    Contact: [phone] ASSESSMENT/PLAN: MG crisis, covid +    NEURO:   now family/patient agreeable to PLEX, edith consented will need to arrange for PLEX tomorrow   start IVIG 3rd dose now   started on solumedrol   Hold pyridostigmine during acute exacerbation  Avoid medications that may worsen the current exacerbation including macrolides, fluoroquinolones, tetracyclines, aminoglycoside, beta-blockers, magnesium, and anti-arrhythmics (procainamide, quinidine, and lidocaine)  neurochecks Q2 hrs   lose dose propofol for vent synchrony   Activity: [] mobilize as tolerated [x] Bedrest [] PT [] OT [] PMNR    PULM:  intubated  18/400/7/40%  ABG check and adjust vent setting as appropriate     CV:  SBP goal 100-160  MAP>65    RENAL:  Fluids: IVL    GI:  Diet: tube feeding   GI prophylaxis [x] not indicated [] PPI [] other:  Bowel regimen [] colace [x] senna [x] other: miralax   2/1 BM     ENDO:   Goal euglycemia (-180)  RISS for now      HEME/ONC:  VTE prophylaxis: [x] SCDs [x] chemoprophylaxis lovenox [] hold chemoprophylaxis due to: [] high risk of DVT/PE on admission due to:    ID: covid +on steroid and remdesivir now   ID following   monitor for fevers     MISC:  son updated at bedside and discussed plan     CODE STATUS:  [x] Full Code [] DNR [] DNI [] Palliative/Comfort Care    DISPOSITION:  [x] ICU [] Stroke Unit [] Floor [] EMU [] RCU [] PCU    [x] Patient is at high risk of neurologic deterioration/death due to: MG crisis, hypercapnic/hypoxic respiratory failure    Contact: [phone] ASSESSMENT/PLAN: MG crisis, covid +    NEURO:   now family/patient agreeable to PLEX, edith consented will need to arrange for PLEX tomorrow   start IVIG 3rd dose now   started on solumedrol   Hold pyridostigmine during acute exacerbation  Avoid medications that may worsen the current exacerbation including macrolides, fluoroquinolones, tetracyclines, aminoglycoside, beta-blockers, magnesium, and anti-arrhythmics (procainamide, quinidine, and lidocaine)  neurochecks Q2 hrs   lose dose propofol for vent synchrony   Activity: [] mobilize as tolerated [x] Bedrest [] PT [] OT [] PMNR    PULM:  intubated  18/400/7/40%  ABG check and adjust vent setting as appropriate     CV:  SBP goal 100-160  MAP>65    RENAL:  Fluids: IVL    GI:  Diet: tube feeding   GI prophylaxis [x] not indicated [] PPI [] other:  Bowel regimen [] colace [x] senna [x] other: miralax   2/1 BM     ENDO:   Goal euglycemia (-180)  RISS for now      HEME/ONC:  VTE prophylaxis: [x] SCDs [x] chemoprophylaxis lovenox [] hold chemoprophylaxis due to: [] high risk of DVT/PE on admission due to:    ID: covid +on steroid and remdesivir now   ID following   monitor for fevers     MISC:  son updated at bedside and discussed plan     CODE STATUS:  [x] Full Code [] DNR [] DNI [] Palliative/Comfort Care    DISPOSITION:  [x] ICU [] Stroke Unit [] Floor [] EMU [] RCU [] PCU    [x] Patient is at high risk of neurologic deterioration/death due to: MG crisis, hypercapnic/hypoxic respiratory failure    Contact: [phone] ASSESSMENT/PLAN: MG crisis, covid +    NEURO:   now family/patient agreeable to PLEX, edith consented will need to arrange for PLEX tomorrow   start IVIG 3rd dose now   started on solumedrol   Hold pyridostigmine during acute exacerbation  Avoid medications that may worsen the current exacerbation including macrolides, fluoroquinolones, tetracyclines, aminoglycoside, beta-blockers, magnesium, and anti-arrhythmics (procainamide, quinidine, and lidocaine)  neurochecks Q2 hrs   lose dose propofol for vent synchrony   Activity: [] mobilize as tolerated [x] Bedrest [] PT [] OT [] PMNR    PULM:  intubated  18/400/7/40%  ABG check and adjust vent setting as appropriate     CV:  SBP goal 100-160  MAP>65    RENAL:  Fluids: IVL    GI:  Diet: tube feeding   GI prophylaxis [x] not indicated [] PPI [] other:  Bowel regimen [] colace [x] senna [x] other: miralax   2/1 BM     ENDO:   Goal euglycemia (-180)  RISS for now      HEME/ONC:  VTE prophylaxis: [x] SCDs [x] chemoprophylaxis lovenox [] hold chemoprophylaxis due to: [] high risk of DVT/PE on admission due to:    ID: covid +on steroid and remdesivir now   ID following   monitor for fevers     MISC:  son updated at bedside and discussed plan     CODE STATUS:  [x] Full Code [] DNR [] DNI [] Palliative/Comfort Care    DISPOSITION:  [x] ICU [] Stroke Unit [] Floor [] EMU [] RCU [] PCU    [x] Patient is at high risk of neurologic deterioration/death due to: MG crisis, hypercapnic/hypoxic respiratory failure    Contact: [phone]

## 2023-02-01 NOTE — PROGRESS NOTE ADULT - SUBJECTIVE AND OBJECTIVE BOX
SUMMARY: 61yo Malayalam-speaking woman w/ Myasthenia Gravis (on pyridostigmine, hospitalization every 1-2 years requiring IVIG) (dx 2015), s/p thymectomy (2010), T2DM who presents with SOB, cough, difficulty spitting up sputum x 4 days.  was sick with COVID, so son performed home COVID test for patient which was positive. Patient's primary complaint is SOB when lying down. She has been sleeping upright. She had difficulty coughing up and spitting out sputum but today these symptoms are slightly improved. Also reports difficulty swallowing - but was able to tolerate taking oral pills. Admits to difficulty looking up, but eye drooping is overall improved from past couple of months. No blurry vision. Patient takes pyridostigmine 120mg TID regularly. Patient also has prednisone PRN for sick days-- she has been taking 20mg prednisone x past 4 days as per instruction of her neurologist.     Of note, last hospitalization was 2 years ago at [facility] during which patient was intubated -- son notes that this was likely 2/2 patient being forced to lie down flat in hospital bed, leading to aspiration. She was extubated after ~3 days. Received IVIG during that hospitalization  1/30  Adm NSCU, upon arrival to the unit, appeared in acute respiratory distress tachypneic, hypertensive, tachycardic, hypoxic, ABG showing resp acidosis, immediately put on BiPAP, started on IVIG with some improvement     ADMISSION SCORES:   GCS: 14 HH: MF: NIHSS: ICH Score:    REVIEW OF SYSTEMS: [] Unable to Assess due to neurologic exam   [ x] All ROS addressed below are non-contributory, except:  Neuro: [ ] Headache [ ] Back pain [ ] Numbness [ ] Weakness [ ] Ataxia [ ] Dizziness [ ] Aphasia [ ] Dysarthria [ ] Visual disturbance  Resp: [ x] Shortness of breath/dyspnea [ ] Orthopnea [ ] Cough  CV: [ ] Chest pain [ ] Palpitation [ ] Lightheadedness [ ] Syncope  Renal: [ ] Thirst [ ] Edema  GI: [ ] Nausea [ ] Emesis [ ] Abdominal pain [ ] Constipation [ ] Diarrhea  Hem: [ ] Hematemesis [ ] bBright red blood per rectum  ID: [ ] Fever [ ] Chills [ ] Dysuria  ENT: [ ] Rhinorrhea    VITALS: [x] Reviewed    IMAGING/DATA: [x] Reviewed    IVF FLUIDS/MEDICATIONS: [x] Reviewed    ALLERGIES: Allergies    No Known Allergies    Intolerances      DEVICES:   [] Restraints [x] PIVs [] ET tube [] central line [] PICC [] arterial line [] morales [] NGT/OGT [] EVD [] LD [] MYAH/HMV [] LiCOX [] ICP monitor [] Trach [] PEG [] Chest Tube [] other:    EXAMINATION:  General: distress  HEENT: Anicteric sclerae  Cardiac: [id number] tachycardic  Lungs: diminished   Abdomen: Soft, non-tender, +BS  Neurologic: Awake, alert, oriented x3 with , follows commands, PERRL, EOMI, face symmetric, TANNER to command SUMMARY: 61yo Malayalam-speaking woman w/ Myasthenia Gravis (on pyridostigmine, hospitalization every 1-2 years requiring IVIG) (dx 2015), s/p thymectomy (2010), T2DM who presents with SOB, cough, difficulty spitting up sputum x 4 days.  was sick with COVID, so son performed home COVID test for patient which was positive. Patient's primary complaint is SOB when lying down. She has been sleeping upright. She had difficulty coughing up and spitting out sputum but today these symptoms are slightly improved. Also reports difficulty swallowing - but was able to tolerate taking oral pills. Admits to difficulty looking up, but eye drooping is overall improved from past couple of months. No blurry vision. Patient takes pyridostigmine 120mg TID regularly. Patient also has prednisone PRN for sick days-- she has been taking 20mg prednisone x past 4 days as per instruction of her neurologist.     Of note, last hospitalization was 2 years ago at [facility] during which patient was intubated -- son notes that this was likely 2/2 patient being forced to lie down flat in hospital bed, leading to aspiration. She was extubated after ~3 days. Received IVIG during that hospitalization  1/30  Adm NSCU, upon arrival to the unit, appeared in acute respiratory distress tachypneic, hypertensive, tachycardic, hypoxic, ABG showing resp acidosis, immediately put on BiPAP, started on IVIG with some improvement     ADMISSION SCORES:   GCS: 14 HH: MF: NIHSS: ICH Score:    REVIEW OF SYSTEMS: [] Unable to Assess due to neurologic exam   [ x] All ROS addressed below are non-contributory, except:  Neuro: [ ] Headache [ ] Back pain [ ] Numbness [ ] Weakness [ ] Ataxia [ ] Dizziness [ ] Aphasia [ ] Dysarthria [ ] Visual disturbance  Resp: [ x] Shortness of breath/dyspnea [ ] Orthopnea [ ] Cough  CV: [ ] Chest pain [ ] Palpitation [ ] Lightheadedness [ ] Syncope  Renal: [ ] Thirst [ ] Edema  GI: [ ] Nausea [ ] Emesis [ ] Abdominal pain [ ] Constipation [ ] Diarrhea  Hem: [ ] Hematemesis [ ] bBright red blood per rectum  ID: [ ] Fever [ ] Chills [ ] Dysuria  ENT: [ ] Rhinorrhea    VITALS: [x] Reviewed    IMAGING/DATA: [x] Reviewed    IVF FLUIDS/MEDICATIONS: [x] Reviewed    ALLERGIES: Allergies    No Known Allergies    Intolerances      DEVICES:   [] Restraints [x] PIVs [] ET tube [] central line [] PICC [] arterial line [] morales [] NGT/OGT [] EVD [] LD [] MYAH/HMV [] LiCOX [] ICP monitor [] Trach [] PEG [] Chest Tube [] other:    EXAMINATION:  General: distress  HEENT: Anicteric sclerae  Cardiac: [id number] tachycardic  Lungs: diminished   Abdomen: Soft, non-tender, +BS  Neurologic: Awake, alert, oriented x3 with , follows commands, PERRL, EOMI, face symmetric, TANNER to command SUMMARY: 63yo Malayalam-speaking woman w/ Myasthenia Gravis (on pyridostigmine, hospitalization every 1-2 years requiring IVIG) (dx 2015), s/p thymectomy (2010), T2DM who presents with SOB, cough, difficulty spitting up sputum x 4 days.  was sick with COVID, so son performed home COVID test for patient which was positive. Patient's primary complaint is SOB when lying down. She has been sleeping upright. She had difficulty coughing up and spitting out sputum but today these symptoms are slightly improved. Also reports difficulty swallowing - but was able to tolerate taking oral pills. Admits to difficulty looking up, but eye drooping is overall improved from past couple of months. No blurry vision. Patient takes pyridostigmine 120mg TID regularly. Patient also has prednisone PRN for sick days-- she has been taking 20mg prednisone x past 4 days as per instruction of her neurologist.     Of note, last hospitalization was 2 years ago at [facility] during which patient was intubated -- son notes that this was likely 2/2 patient being forced to lie down flat in hospital bed, leading to aspiration. She was extubated after ~3 days. Received IVIG during that hospitalization  1/30  Adm NSCU, upon arrival to the unit, appeared in acute respiratory distress tachypneic, hypertensive, tachycardic, hypoxic, ABG showing resp acidosis, immediately put on BiPAP, started on IVIG with some improvement     ADMISSION SCORES:   GCS: 14 HH: MF: NIHSS: ICH Score:    REVIEW OF SYSTEMS: [] Unable to Assess due to neurologic exam   [ x] All ROS addressed below are non-contributory, except:  Neuro: [ ] Headache [ ] Back pain [ ] Numbness [ ] Weakness [ ] Ataxia [ ] Dizziness [ ] Aphasia [ ] Dysarthria [ ] Visual disturbance  Resp: [ x] Shortness of breath/dyspnea [ ] Orthopnea [ ] Cough  CV: [ ] Chest pain [ ] Palpitation [ ] Lightheadedness [ ] Syncope  Renal: [ ] Thirst [ ] Edema  GI: [ ] Nausea [ ] Emesis [ ] Abdominal pain [ ] Constipation [ ] Diarrhea  Hem: [ ] Hematemesis [ ] bBright red blood per rectum  ID: [ ] Fever [ ] Chills [ ] Dysuria  ENT: [ ] Rhinorrhea    VITALS: [x] Reviewed    IMAGING/DATA: [x] Reviewed    IVF FLUIDS/MEDICATIONS: [x] Reviewed    ALLERGIES: Allergies    No Known Allergies    Intolerances      DEVICES:   [] Restraints [x] PIVs [] ET tube [] central line [] PICC [] arterial line [] morales [] NGT/OGT [] EVD [] LD [] MYAH/HMV [] LiCOX [] ICP monitor [] Trach [] PEG [] Chest Tube [] other:    EXAMINATION:  General: distress  HEENT: Anicteric sclerae  Cardiac: [id number] tachycardic  Lungs: diminished   Abdomen: Soft, non-tender, +BS  Neurologic: Awake, alert, oriented x3 with , follows commands, PERRL, EOMI, face symmetric, TANNER to command SUMMARY: 61yo Malayalam-speaking woman w/ Myasthenia Gravis (on pyridostigmine, hospitalization every 1-2 years requiring IVIG) (dx 2015), s/p thymectomy (2010), T2DM who presents with SOB, cough, difficulty spitting up sputum x 4 days.  was sick with COVID, so son performed home COVID test for patient which was positive. Patient's primary complaint is SOB when lying down. She has been sleeping upright. She had difficulty coughing up and spitting out sputum but today these symptoms are slightly improved. Also reports difficulty swallowing - but was able to tolerate taking oral pills. Admits to difficulty looking up, but eye drooping is overall improved from past couple of months. No blurry vision. Patient takes pyridostigmine 120mg TID regularly. Patient also has prednisone PRN for sick days-- she has been taking 20mg prednisone x past 4 days as per instruction of her neurologist.     Of note, last hospitalization was 2 years ago at [facility] during which patient was intubated -- son notes that this was likely 2/2 patient being forced to lie down flat in hospital bed, leading to aspiration. She was extubated after ~3 days. Received IVIG during that hospitalization  1/30  Adm NSCU, upon arrival to the unit, appeared in acute respiratory distress tachypneic, hypertensive, tachycardic, hypoxic, ABG showing resp acidosis, immediately put on BiPAP, started on IVIG with some improvement     1/31 intubated overnight for hypercapnic/hypoxic respiratory failure     currently on phenylephrine, propofol, IVF     ADMISSION SCORES:   GCS: 14 HH: MF: NIHSS: ICH Score:    REVIEW OF SYSTEMS: [x] Unable to Assess due to neurologic exam intubated now but denies pain   [ ] All ROS addressed below are non-contributory, except:  Neuro: [ ] Headache [ ] Back pain [ ] Numbness [ ] Weakness [ ] Ataxia [ ] Dizziness [ ] Aphasia [ ] Dysarthria [ ] Visual disturbance  Resp: [ x] Shortness of breath/dyspnea [ ] Orthopnea [ ] Cough  CV: [ ] Chest pain [ ] Palpitation [ ] Lightheadedness [ ] Syncope  Renal: [ ] Thirst [ ] Edema  GI: [ ] Nausea [ ] Emesis [ ] Abdominal pain [ ] Constipation [ ] Diarrhea  Hem: [ ] Hematemesis [ ] bBright red blood per rectum  ID: [ ] Fever [ ] Chills [ ] Dysuria  ENT: [ ] Rhinorrhea    VITALS: [x] Reviewed    IMAGING/DATA: [x] Reviewed    IVF FLUIDS/MEDICATIONS: [x] Reviewed    ALLERGIES: Allergies    No Known Allergies    Intolerances      DEVICES:   [] Restraints [x] PIVs [] ET tube [] central line [] PICC [x] arterial line [] morales [] NGT/OGT [] EVD [] LD [] MYAH/HMV [] LiCOX [] ICP monitor [] Trach [] PEG [] Chest Tube [] other:    EXAMINATION:  General: NAD  HEENT: Anicteric sclerae  Cardiac: [id number] tachycardic  Lungs: diminished   Abdomen: Soft, non-tender, +BS  Neurologic: intubated but able to nod appropriately, oriented x3 with choices, son at bedside, follows commands, PERRL, EOMI, face symmetric, TANNER to command SUMMARY: 63yo Malayalam-speaking woman w/ Myasthenia Gravis (on pyridostigmine, hospitalization every 1-2 years requiring IVIG) (dx 2015), s/p thymectomy (2010), T2DM who presents with SOB, cough, difficulty spitting up sputum x 4 days.  was sick with COVID, so son performed home COVID test for patient which was positive. Patient's primary complaint is SOB when lying down. She has been sleeping upright. She had difficulty coughing up and spitting out sputum but today these symptoms are slightly improved. Also reports difficulty swallowing - but was able to tolerate taking oral pills. Admits to difficulty looking up, but eye drooping is overall improved from past couple of months. No blurry vision. Patient takes pyridostigmine 120mg TID regularly. Patient also has prednisone PRN for sick days-- she has been taking 20mg prednisone x past 4 days as per instruction of her neurologist.     Of note, last hospitalization was 2 years ago at [facility] during which patient was intubated -- son notes that this was likely 2/2 patient being forced to lie down flat in hospital bed, leading to aspiration. She was extubated after ~3 days. Received IVIG during that hospitalization  1/30  Adm NSCU, upon arrival to the unit, appeared in acute respiratory distress tachypneic, hypertensive, tachycardic, hypoxic, ABG showing resp acidosis, immediately put on BiPAP, started on IVIG with some improvement     1/31 intubated overnight for hypercapnic/hypoxic respiratory failure     currently on phenylephrine, propofol, IVF     ADMISSION SCORES:   GCS: 14 HH: MF: NIHSS: ICH Score:    REVIEW OF SYSTEMS: [x] Unable to Assess due to neurologic exam intubated now but denies pain   [ ] All ROS addressed below are non-contributory, except:  Neuro: [ ] Headache [ ] Back pain [ ] Numbness [ ] Weakness [ ] Ataxia [ ] Dizziness [ ] Aphasia [ ] Dysarthria [ ] Visual disturbance  Resp: [ x] Shortness of breath/dyspnea [ ] Orthopnea [ ] Cough  CV: [ ] Chest pain [ ] Palpitation [ ] Lightheadedness [ ] Syncope  Renal: [ ] Thirst [ ] Edema  GI: [ ] Nausea [ ] Emesis [ ] Abdominal pain [ ] Constipation [ ] Diarrhea  Hem: [ ] Hematemesis [ ] bBright red blood per rectum  ID: [ ] Fever [ ] Chills [ ] Dysuria  ENT: [ ] Rhinorrhea    VITALS: [x] Reviewed    IMAGING/DATA: [x] Reviewed    IVF FLUIDS/MEDICATIONS: [x] Reviewed    ALLERGIES: Allergies    No Known Allergies    Intolerances      DEVICES:   [] Restraints [x] PIVs [] ET tube [] central line [] PICC [x] arterial line [] morales [] NGT/OGT [] EVD [] LD [] MYAH/HMV [] LiCOX [] ICP monitor [] Trach [] PEG [] Chest Tube [] other:    EXAMINATION:  General: NAD  HEENT: Anicteric sclerae  Cardiac: [id number] tachycardic  Lungs: diminished   Abdomen: Soft, non-tender, +BS  Neurologic: intubated but able to nod appropriately, oriented x3 with choices, son at bedside, follows commands, PERRL, EOMI, face symmetric, TANNER to command SUMMARY: 63yo Malayalam-speaking woman w/ Myasthenia Gravis (on pyridostigmine, hospitalization every 1-2 years requiring IVIG) (dx 2015), s/p thymectomy (2010), T2DM who presents with SOB, cough, difficulty spitting up sputum x 4 days.  was sick with COVID, so son performed home COVID test for patient which was positive. Patient's primary complaint is SOB when lying down. She has been sleeping upright. She had difficulty coughing up and spitting out sputum but today these symptoms are slightly improved. Also reports difficulty swallowing - but was able to tolerate taking oral pills. Admits to difficulty looking up, but eye drooping is overall improved from past couple of months. No blurry vision. Patient takes pyridostigmine 120mg TID regularly. Patient also has prednisone PRN for sick days-- she has been taking 20mg prednisone x past 4 days as per instruction of her neurologist.     Of note, last hospitalization was 2 years ago at [facility] during which patient was intubated -- son notes that this was likely 2/2 patient being forced to lie down flat in hospital bed, leading to aspiration. She was extubated after ~3 days. Received IVIG during that hospitalization  1/30  Adm NSCU, upon arrival to the unit, appeared in acute respiratory distress tachypneic, hypertensive, tachycardic, hypoxic, ABG showing resp acidosis, immediately put on BiPAP, started on IVIG with some improvement     1/31 intubated overnight for hypercapnic/hypoxic respiratory failure     currently on phenylephrine, propofol, IVF     ADMISSION SCORES:   GCS: 14 HH: MF: NIHSS: ICH Score:    REVIEW OF SYSTEMS: [x] Unable to Assess due to neurologic exam intubated now but denies pain   [ ] All ROS addressed below are non-contributory, except:  Neuro: [ ] Headache [ ] Back pain [ ] Numbness [ ] Weakness [ ] Ataxia [ ] Dizziness [ ] Aphasia [ ] Dysarthria [ ] Visual disturbance  Resp: [ x] Shortness of breath/dyspnea [ ] Orthopnea [ ] Cough  CV: [ ] Chest pain [ ] Palpitation [ ] Lightheadedness [ ] Syncope  Renal: [ ] Thirst [ ] Edema  GI: [ ] Nausea [ ] Emesis [ ] Abdominal pain [ ] Constipation [ ] Diarrhea  Hem: [ ] Hematemesis [ ] bBright red blood per rectum  ID: [ ] Fever [ ] Chills [ ] Dysuria  ENT: [ ] Rhinorrhea    VITALS: [x] Reviewed    IMAGING/DATA: [x] Reviewed    IVF FLUIDS/MEDICATIONS: [x] Reviewed    ALLERGIES: Allergies    No Known Allergies    Intolerances      DEVICES:   [] Restraints [x] PIVs [] ET tube [] central line [] PICC [x] arterial line [] morales [] NGT/OGT [] EVD [] LD [] MYAH/HMV [] LiCOX [] ICP monitor [] Trach [] PEG [] Chest Tube [] other:    EXAMINATION:  General: NAD  HEENT: Anicteric sclerae  Cardiac: [id number] tachycardic  Lungs: diminished   Abdomen: Soft, non-tender, +BS  Neurologic: intubated but able to nod appropriately, oriented x3 with choices, son at bedside, follows commands, PERRL, EOMI, face symmetric, TANNER to command

## 2023-02-01 NOTE — DIETITIAN INITIAL EVALUATION ADULT - [id number]_GEN_A_CORE
Last documented bowel movement: 02-01 (per flow sheets).   Bowel Regimen: Miralax and Senna (per orders).

## 2023-02-01 NOTE — DIETITIAN INITIAL EVALUATION ADULT - ADD RECOMMEND
1) If enteral nutrition warranted, recommend Glucerna 1.5 @ 45 mL/hr for 24 hrs as tolerated to provide 1,080 mL formula, 1,620 kcals, 89.1 g protein, and 820 mL free water. Propofol infusing at 8.64 mL/hr x 24 hrs provides an additional 228 kcals/day (1,848 total calories daily with propofol and tube feeding regimen). Total tube feeding regimen with propofol infusion provides 26 kcals/kg and 1.2 g/kg protein daily based on dosing weight of 72 kg (01-30). Defer free water flushes to medical team.   2) If pt receives enteral nutrition, monitor GI tolerance to EN. RD remains available to adjust formula/rate PRN.   3) If pt is advanced to PO diet, recommend Consistent Carbohydrate diet in setting of T2DM. Defer diet texture/consistency to SLP.   4) Monitor nutritional intake, tolerance to diet prescription, weights, labs, glucose fingersticks and skin integrity.  1) If enteral nutrition warranted, as phenylephrine deescalates recommend Vital 1.5 starting at 10 mL/hr advancing by 10 mL/hr every 4 hrs until goal @ 45 mL/hr for 24 hrs as tolerated to provide 1,080 mL formula, 1,620 kcals, 72 g protein, and 825 mL free water. Propofol infusing at 8.64 mL/hr x 24 hrs provides an additional 228 kcals/day (1,848 total calories daily with propofol and tube feeding regimen). Total tube feeding regimen with propofol infusion provides 26 kcals/kg and 1.4 g/kg protein daily based on IBW weight of 50 kg. Defer free water flushes to medical team.   2) If pt receives enteral nutrition, monitor GI tolerance to EN. RD remains available to adjust formula/rate PRN.   3) If pt is advanced to PO diet, recommend Consistent Carbohydrate diet in setting of T2DM. Defer diet texture/consistency to SLP.   4) Monitor nutritional intake, tolerance to diet prescription, weights, labs, glucose fingersticks, triglycerides and skin integrity.   5) Monitor for refeeding risk. Monitor and replete electrolytes as needed.

## 2023-02-01 NOTE — PROGRESS NOTE ADULT - ATTENDING COMMENTS
63 yo woman with DM and Myasthenia gravis s/p thymectomy 2010, with prior exacerbations requiring intubation (last 2021), now admitted 1/30 with SOB, weak cough, dysphagia and double vision, found to be COVID positive, concerning for MG exacerbation. Started on IVIG due to refusal of PLEX. Initially on Bipap but intubated 2/1 for worsening respiratory failure.      On ACVC, , PEEP 7, 60% FiO2.     On exam, intubated, alert, communicates by nodding, oriented x 3 with choices, neck flexion 2/5, neck extension 4+/5, deltoids 3/5, R stronger than L, LEs 5/5    discussing PLEX with family, awaiting decision   plan for IVIG 3/5 dose tonight  c/w solumedrol 125mg daily   low dose propofol for sedation, currently off precedex for bradycardia   on rich at 0.5, MAP goal >65  wean vent setting as tolerated   for MG, avoid Mg, beta blockers, NMB, microlides and fluroquinolones   start TF, add famotidine   senna and miralax, LBM prior to admission   ISS (HgA1C 6.6)  remdesevir   Lov ppx, LE US neg 1/31    Patient seen and examined by attending on 2/1/2023.    Patient is critically ill due to myasthenia gravis exacerbation and at high risk for neurological deterioration or death due to: hypoxic and hypercarbic respiratory failure requiring mechanical ventilation. 61 yo woman with DM and Myasthenia gravis s/p thymectomy 2010, with prior exacerbations requiring intubation (last 2021), now admitted 1/30 with SOB, weak cough, dysphagia and double vision, found to be COVID positive, concerning for MG exacerbation. Started on IVIG due to refusal of PLEX. Initially on Bipap but intubated 2/1 for worsening respiratory failure.      On ACVC, , PEEP 7, 60% FiO2.     On exam, intubated, alert, communicates by nodding, oriented x 3 with choices, neck flexion 2/5, neck extension 4+/5, deltoids 3/5, R stronger than L, LEs 5/5    discussing PLEX with family, awaiting decision   plan for IVIG 3/5 dose tonight  c/w solumedrol 125mg daily   low dose propofol for sedation, currently off precedex for bradycardia   on rich at 0.5, MAP goal >65  wean vent setting as tolerated   for MG, avoid Mg, beta blockers, NMB, microlides and fluroquinolones   start TF, add famotidine   senna and miralax, LBM prior to admission   ISS (HgA1C 6.6)  remdesevir   Lov ppx, LE US neg 1/31    Patient seen and examined by attending on 2/1/2023.    Patient is critically ill due to myasthenia gravis exacerbation and at high risk for neurological deterioration or death due to: hypoxic and hypercarbic respiratory failure requiring mechanical ventilation.

## 2023-02-01 NOTE — DIETITIAN INITIAL EVALUATION ADULT - NUTRITIONGOAL OUTCOME1
Pt will be able to meet >80% of estimated energy needs.  Pt will be able to meet >80% of estimated energy needs via tolerated route.

## 2023-02-01 NOTE — DIETITIAN INITIAL EVALUATION ADULT - SIGNS/SYMPTOMS
as evidenced by MG exacerbation, COVID + and recent intubation status.  as evidenced by NPO status x 2 days.  as evidenced by MG exacerbation, and COVID +.

## 2023-02-01 NOTE — PROGRESS NOTE ADULT - ASSESSMENT
ASSESSMENT/PLAN: MG crisis, covid +    NEURO:   checks Q1  neurology consulted, Patient refusing PLEX  c/w IVIG 25g (1/31--  Will likely start steroids in setting of acute exacerbation  Avoid medications that may worsen the current exacerbation including macrolides, fluoroquinolones, tetracyclines, aminoglycoside, beta-blockers, magnesium, and anti-arrhythmics (procainamide, quinidine, and lidocaine)  NIF/VC q4h--not able as on BiPAP   Activity: [] mobilize as tolerated [x] Bedrest [] PT [] OT [] PMNR    PULM:  severe respiratory acidosis improved with BiPAP  if persistent/no further improvement, intubate   Last ABG 7.32/61/168/31, saturating 99%  On bipap setting 14/8, will obtain ABG now and adjust settings    CV:  SBP goal 100-160  Not on cardene anymore    RENAL:  Fluids: IVF    GI:  Diet: NPO   GI prophylaxis [x] not indicated [] PPI [] other:  Bowel regimen [] colace [x] senna [x] other: miralax     ENDO:   Goal euglycemia (-180)  RISS for now      HEME/ONC:  VTE prophylaxis: [x] SCDs [x] chemoprophylaxis  lovenox [] hold chemoprophylaxis due to: [] high risk of DVT/PE on admission due to:    ID: covid + hold off decadron given MG exac, supportive care for now  Elevated leukocytosis, likely in setting of MG exacerbation and COVID-19 infection  monitor for fevers   Will consult ID for COVID- 19 treatment    MISC:    SOCIAL/FAMILY:  [] awaiting [x] updated at bedside [] family meeting    CODE STATUS:  [x] Full Code [] DNR [] DNI [] Palliative/Comfort Care    DISPOSITION:  [x] ICU [] Stroke Unit [] Floor [] EMU [] RCU [] PCU    [x] Patient is at high risk of neurologic deterioration/death due to: MG crisis, hypercapnic respiratory failure       Contact: [phone] ASSESSMENT/PLAN: MG crisis, covid +    NEURO:   checks Q1  neurology consulted, Patient refusing PLEX  c/w IVIG 25g (1/31--  Will likely start steroids in setting of acute exacerbation  Avoid medications that may worsen the current exacerbation including macrolides, fluoroquinolones, tetracyclines, aminoglycoside, beta-blockers, magnesium, and anti-arrhythmics (procainamide, quinidine, and lidocaine)  NIF/VC q4h--not able as on BiPAP   Activity: [] mobilize as tolerated [x] Bedrest [] PT [] OT [] PMNR    PULM:  severe respiratory acidosis improved with BiPAP  if persistent/no further improvement, intubate   Last ABG 7.32/61/168/31, saturating 99%  On bipap setting 14/8, will obtain ABG now and adjust settings    CV:  SBP goal 100-160  Not on cardene anymore    RENAL:  Fluids: IVF    GI:  Diet: NPO   GI prophylaxis [x] not indicated [] PPI [] other:  Bowel regimen [] colace [x] senna [x] other: miralax     ENDO:   Goal euglycemia (-180)  RISS for now      HEME/ONC:  VTE prophylaxis: [x] SCDs [x] chemoprophylaxis  lovenox [] hold chemoprophylaxis due to: [] high risk of DVT/PE on admission due to:    ID: covid + hold off decadron given MG exac, supportive care for now  Elevated leukocytosis, likely in setting of MG exacerbation and COVID-19 infection  monitor for fevers   Will consult ID for COVID- 19 treatment    MISC:    SOCIAL/FAMILY:  [] awaiting [x] updated at bedside [] family meeting    CODE STATUS:  [x] Full Code [] DNR [] DNI [] Palliative/Comfort Care    DISPOSITION:  [x] ICU [] Stroke Unit [] Floor [] EMU [] RCU [] PCU    [x] Patient is at high risk of neurologic deterioration/death due to: MG crisis, hypercapnic respiratory failure       Contact: [phone] ASSESSMENT/PLAN: MG crisis, covid +    NEURO:   checks Q1  neurology consulted, Patient refusing PLEX  c/w IVIG 25g (1/31--  Will likely start steroids in setting of acute exacerbation  Avoid medications that may worsen the current exacerbation including macrolides, fluoroquinolones, tetracyclines, aminoglycoside, beta-blockers, magnesium, and anti-arrhythmics (procainamide, quinidine, and lidocaine)  NIF/VC q4h--not able as on BiPAP   Activity: [] mobilize as tolerated [x] Bedrest [] PT [] OT [] PMNR    PULM:  severe respiratory acidosis improved with BiPAP  if persistent/no further improvement, intubate   Last ABG 7.32/61/168/31, saturating 99%  On bipap setting 14/8, will obtain ABG now and adjust settings    CV:  SBP goal 100-160  Not on cardene anymore    RENAL:  Fluids: IVF    GI:  Diet: NPO   GI prophylaxis [x] not indicated [] PPI [] other:  Bowel regimen [] colace [x] senna [x] other: miralax     ENDO:   Goal euglycemia (-180)  RISS for now      HEME/ONC:  VTE prophylaxis: [x] SCDs [x] chemoprophylaxis  lovenox [] hold chemoprophylaxis due to: [] high risk of DVT/PE on admission due to:    ID: covid + hold off decadron given MG exac, supportive care for now  Elevated leukocytosis, likely in setting of MG exacerbation and COVID-19 infection  monitor for fevers   Will consult ID for COVID- 19 treatment    MISC:    SOCIAL/FAMILY:  [] awaiting [x] updated at bedside [] family meeting    CODE STATUS:  [x] Full Code [] DNR [] DNI [] Palliative/Comfort Care    DISPOSITION:  [x] ICU [] Stroke Unit [] Floor [] EMU [] RCU [] PCU    [x] Patient is at high risk of neurologic deterioration/death due to: MG crisis, hypercapnic respiratory failure       Contact: [phone] ASSESSMENT/PLAN: MG crisis, covid +    NEURO:   checks Q1  neurology consulted, will confirm about PLEX, patient agreed to neurology team  c/w IVIG 25g (1/31--  Started on solumedrol 125 mg daily  Avoid medications that may worsen the current exacerbation including macrolides, fluoroquinolones, tetracyclines, aminoglycoside, beta-blockers, magnesium, and anti-arrhythmics (procainamide, quinidine, and lidocaine)  Pain management  Activity: [] mobilize as tolerated [x] Bedrest [] PT [] OT [] PMNR    PULM:  Intubated for airway protection  Adjusted the vent settings based on ABG  Daily CXR and ABG while intubated    CV:  MAP >65  On rich, try to wean    RENAL:  Fluids: IVF with plasmalytes at 75 cc/hour    GI:  Diet: TF   GI prophylaxis [] not indicated [x] famotidine [] other:  Bowel regimen [] colace [x] senna [x] other: miralax     ENDO:   A1c 6.6  Goal euglycemia (-180)  RISS for now      HEME/ONC:  VTE prophylaxis: [x] SCDs [x] chemoprophylaxis  lovenox [] hold chemoprophylaxis due to: [] high risk of DVT/PE on admission due to:    ID: covid + supportive care and solumedrol + remdesivir   Elevated leukocytosis, likely in setting of MG exacerbation and COVID-19 infection  monitor for fevers   ID on board    MISC:    SOCIAL/FAMILY:  [] awaiting [x] updated at bedside [] family meeting    CODE STATUS:  [x] Full Code [] DNR [] DNI [] Palliative/Comfort Care    DISPOSITION:  [x] ICU [] Stroke Unit [] Floor [] EMU [] RCU [] PCU    [x] Patient is at high risk of neurologic deterioration/death due to: MG crisis, hypercapnic respiratory failure       Contact: [phone] ASSESSMENT/PLAN: MG crisis, covid +    NEURO:   checks Q1  neurology consulted, will confirm about PLEX, patient agreed to neurology team  c/w IVIG 25g (1/31--  Started on solumedrol 125 mg daily  Avoid medications that may worsen the current exacerbation including macrolides, fluoroquinolones, tetracyclines, aminoglycoside, beta-blockers, magnesium, and anti-arrhythmics (procainamide, quinidine, and lidocaine)  Pain management  Activity: [] mobilize as tolerated [x] Bedrest [] PT [] OT [] PMNR    PULM:  Intubated for airway protection  Adjusted the vent settings based on ABG  Daily CXR and ABG while intubated    CV:  MAP >65  On rich, try to wean    RENAL:  Fluids: IVF with plasmalytes at 75 cc/hour    GI:  Diet: TF   GI prophylaxis [] not indicated [x] famotidine [] other:  Bowel regimen [] colace [x] senna [x] other: miralax     ENDO:   A1c 6.6  Goal euglycemia (-180)  RISS for now      HEME/ONC:  VTE prophylaxis: [x] SCDs [x] chemoprophylaxis  lovenox [] hold chemoprophylaxis due to: [] high risk of DVT/PE on admission due to:    ID: covid + supportive care and solumedrol + remdesivir   Elevated leukocytosis, likely in setting of MG exacerbation and COVID-19 infection  monitor for fevers   ID on board    MISC:    SOCIAL/FAMILY:  [] awaiting [x] updated at bedside [] family meeting    CODE STATUS:  [x] Full Code [] DNR [] DNI [] Palliative/Comfort Care    DISPOSITION:  [x] ICU [] Stroke Unit [] Floor [] EMU [] RCU [] PCU    [x] Patient is at high risk of neurologic deterioration/death due to: MG crisis, hypercapnic respiratory failure       Contact: [phone] ASSESSMENT/PLAN: MG crisis, covid +    NEURO:   checks Q1  neurology consulted, will confirm about PLEX, patient agreed to neurology team  c/w IVIG 25g (1/31--  Started on solumedrol 125 mg daily  Avoid medications that may worsen the current exacerbation including macrolides, fluoroquinolones, tetracyclines, aminoglycoside, beta-blockers, magnesium, and anti-arrhythmics (procainamide, quinidine, and lidocaine)  Pain management  Activity: [] mobilize as tolerated [x] Bedrest [] PT [] OT [] PMNR    PULM:  Intubated for airway protection  Adjusted the vent settings based on ABG  Daily CXR and ABG while intubated    CV:  MAP >65  On rich, try to wean    RENAL:  Fluids: IVF with plasmalytes at 75 cc/hour    GI:  Diet: TF   GI prophylaxis [] not indicated [x] famotidine [] other:  Bowel regimen [] colace [x] senna [x] other: miralax     ENDO:   A1c 6.6  Goal euglycemia (-180)  RISS for now      HEME/ONC:  VTE prophylaxis: [x] SCDs [x] chemoprophylaxis  lovenox [] hold chemoprophylaxis due to: [] high risk of DVT/PE on admission due to:    ID: covid + supportive care and solumedrol + remdesivir   Elevated leukocytosis, likely in setting of MG exacerbation and COVID-19 infection  monitor for fevers   ID on board    MISC:    SOCIAL/FAMILY:  [] awaiting [x] updated at bedside [] family meeting    CODE STATUS:  [x] Full Code [] DNR [] DNI [] Palliative/Comfort Care    DISPOSITION:  [x] ICU [] Stroke Unit [] Floor [] EMU [] RCU [] PCU    [x] Patient is at high risk of neurologic deterioration/death due to: MG crisis, hypercapnic respiratory failure       Contact: [phone]

## 2023-02-01 NOTE — DIETITIAN INITIAL EVALUATION ADULT - ETIOLOGY
related to increased physiological demand for nutrients  related to inability to meet sufficient protein-energy needs

## 2023-02-01 NOTE — DIETITIAN INITIAL EVALUATION ADULT - REASON INDICATOR FOR ASSESSMENT
RD assessment warranted for length of stay.   Source: Electronic Medical Record  Chart reviewed, events noted.

## 2023-02-01 NOTE — DIETITIAN INITIAL EVALUATION ADULT - REASON
Nutrition focused physical exam not appropriate at this time. Pt is intubated and sedated, no family at bedside to provide consent.

## 2023-02-01 NOTE — DIETITIAN INITIAL EVALUATION ADULT - NSFNSNUTRCHEWSWALLOWFT_GEN_A_CORE
Per NSICU note on 01-31, pt "reports difficulty swallowing - but was able to tolerate taking oral pills."   If pt is advanced to a PO diet, defer diet texture/consistency to SLP.

## 2023-02-01 NOTE — PROGRESS NOTE ADULT - SUBJECTIVE AND OBJECTIVE BOX
Patient on AVAPS from the day, but was c/o inability to control saliva earlier. Now tachypneic to high 30's and tachycardic, hypoxic. Decision made to intubate. Son called and discussed at [phone]. Hypercapnic/hypoxic respiratory failure.  Patient on AVAPS from the day, but was c/o inability to control saliva earlier. Now tachypneic to high 30's and tachycardic, hypoxic. Decision made to intubate. Son called and discussed at [phone]. Hypercapnic/hypoxic respiratory failure.  Patient on AVAPS from the day, but was c/o inability to control saliva earlier. Now tachypneic to high 30's and tachycardic, hypoxic. Decision made to intubate. Son called and discussed at [phone]. Hypercapnic/hypoxic respiratory failure.

## 2023-02-01 NOTE — STUDENT SIGN OFF DOCUMENT - DOCUMENTS STUDENTS ARE SIGNED OFF ON
Assessment and Intervention Nadia Blackmon, MS, RD, CDN #[id number]/Dietitian Initial Evaluation Nadia Blackmon, MS, RD, CDN #[id number]/Dietitian Initial Evaluation Nadia Blackmon, MS, RD, CDN #[phone]/Dietitian Initial Evaluation

## 2023-02-01 NOTE — DIETITIAN INITIAL EVALUATION ADULT - ENERGY INTAKE
Currently, pt has been NPO x 2 days.  Pt has been NPO x 2 days./Number of days NPO (enter number of days in comment field)

## 2023-02-01 NOTE — PROGRESS NOTE ADULT - ASSESSMENT
Myasthenia gravis (MG) exacerbation with weakness, ptosis, and acute hypoxic and hypercarbic respiratory failure  COVID-19 infection  DM type 2 (steroid induced?)    - Likely MG exacerbation/crisis triggered by current COVID-19 infection. Her VC and NIF were low and she has since been intubated. She seems more agreeable to going with PLEX at this time  - Had extensive talk with patient and son on 1/30 regarding treatment and our recommendation for PLEX given significant respiratory involvement and wanted to avoid intubation. However, they were insistent upon IVIG. Would give IVIG 0.4g/kg/day x5 days and premedicate with Tylenol, Benadryl, and IV fluids. Today is dose 2/5  - Following talk with patient she now seems more amenable to undergoing PLEX. If this continues to be the case would place [facility] urgently and begin PLEX with one session every other day for total of 5 sessions. If PLEX started please stop any further doses of IVIG  - OK to continue current steroid dose of SoluMedrol 125mg IV daily for MG exacerbation  - Hold home Mestinon and steroids for now. Defer to ICU as to need for stress dose remdesivir with current COVID-19 infection  - PT/OT/ST evaluation  - Continue to address above medical issues, as you are doing  - Will continue to follow patient with you

## 2023-02-01 NOTE — PHARMACOTHERAPY INTERVENTION NOTE - COMMENTS
Reviewed medications for appropriate route of administration     MEDICATIONS  (STANDING):  acetaminophen   IVPB .. 650 milliGRAM(s) IV Intermittent once  chlorhexidine 0.12% Liquid 15 milliLiter(s) Oral Mucosa every 12 hours  chlorhexidine 4% Liquid 1 Application(s) Topical two times a day  enoxaparin Injectable 40 milliGRAM(s) SubCutaneous <User Schedule>  immune   globulin 10% (GAMMAGARD) IVPB 25 Gram(s) IV Intermittent <User Schedule>  insulin lispro (ADMELOG) corrective regimen sliding scale   SubCutaneous every 6 hours  methylPREDNISolone sodium succinate Injectable 125 milliGRAM(s) IV Push daily  multiple electrolytes Injection Type 1 1000 milliLiter(s) (50 mL/Hr) IV Continuous   phenylephrine    Infusion 0.2 MICROgram(s)/kG/Min (5.4 mL/Hr) IV Continuous   polyethylene glycol 3350 17 Gram(s) Oral every 12 hours  propofol Infusion 20 MICROgram(s)/kG/Min (8.64 mL/Hr) IV Continuous   remdesivir  IVPB 100 milliGRAM(s) IV Intermittent every 24 hours  senna 2 Tablet(s) Oral at bedtime    Jessica Ortez, PharmD  PGY2 Critical Care Pharmacy Resident  Available on [employer] Teams

## 2023-02-01 NOTE — PROGRESS NOTE ADULT - SUBJECTIVE AND OBJECTIVE BOX
NEUROLOGY FOLLOW-UP CONSULT NOTE    RFC: Myasthenia gravis (MG) exacerbation    Interval history: Patient with worsening respiratory failure overnight that required AVAPS and then intubation. No appreciable change in strength. Patient appears to indicate that she is OK with PLEX at this time. No additional acute neurologic events overnight. Received second dose of IVIG earlier this morning without incident. Also receiving SoluMedrol 125mg IV and remdesivir for COVID-19 infection.    Meds:  MEDICATIONS  (STANDING):  acetaminophen   IVPB .. 650 milliGRAM(s) IV Intermittent once  chlorhexidine 0.12% Liquid 15 milliLiter(s) Oral Mucosa every 12 hours  chlorhexidine 4% Liquid 1 Application(s) Topical two times a day  enoxaparin Injectable 40 milliGRAM(s) SubCutaneous <User Schedule>  immune   globulin 10% (GAMMAGARD) IVPB 25 Gram(s) IV Intermittent <User Schedule>  insulin lispro (ADMELOG) corrective regimen sliding scale   SubCutaneous every 6 hours  methylPREDNISolone sodium succinate Injectable 125 milliGRAM(s) IV Push daily  multiple electrolytes Injection Type 1 1000 milliLiter(s) (50 mL/Hr) IV Continuous <Continuous>  phenylephrine    Infusion 0.2 MICROgram(s)/kG/Min (5.4 mL/Hr) IV Continuous <Continuous>  polyethylene glycol 3350 17 Gram(s) Oral every 12 hours  propofol Infusion 20 MICROgram(s)/kG/Min (8.64 mL/Hr) IV Continuous <Continuous>  remdesivir  IVPB   IV Intermittent   remdesivir  IVPB 100 milliGRAM(s) IV Intermittent every 24 hours  senna 2 Tablet(s) Oral at bedtime    MEDICATIONS  (PRN):    GTT:  Phenylephrine 0.5 mcg/kg/min  Propofol 15 mcg/kg/min    PMHx/PSHx/FHx/SHx:  Shortness of breath    Handoff    MEWS Score    Myasthenia gravis    Diabetes mellitus    Shortness of breath    COVID SOB    [email or web address]        Allergies:  No Known Drug Allergies  peanuts (Unknown)      ROS: All systems negative except as documented in Interval history    O:  T(C): 37.1 (02-01-23 @ 11:00), Max: 37.7 (02-01-23 @ 07:00)  HR: 61 (02-01-23 @ 12:45) (59 - 153)  BP: 116/63 (02-01-23 @ 03:15) (99/51 - 158/82)  RR: 18 (02-01-23 @ 12:45) (15 - 34)  SpO2: 100% (02-01-23 @ 12:45) (93% - 100%)    Focused neurologic exam:  MS - Intubated, sedated, lethargic, attends to all stimuli b/l, can follow commands and respond to questions by shaking head "yes"/"no" appropriately but no true speech output. Due to clinical condition unable to assess (MOLLY) orientation rep/naming, attn/conc/recent and remote memory/fund of knowledge  CN - PERRL, EOM with poor upgaze and dysconjugate gaze, R > L severe ptosis, VFF, face sens/str/hearing WNL b/l, tongue/palate midline, trap 5/5 b/l. NE 3+/5, NF 2+/5  Motor - Normal bulk and mildly dec tone. BUE's D 3/5, RUE distal 4/5, LUE distal 4+/5. BLE's proximal 4/5, RLE distal 4/5, LLE distal 4+/5  Sens - LT/PP intact all  DTR's - 2+ BUE's, 1+ KJ b/l, 0+ AJ b/l, and neutral b/l plantar response  Coord - Grossly appropriate for level of strength  Gait and station - MOLLY    Pertinent labs/studies:  CBC with inc WBC 18.35, otherwise essentially WNL  PT/INR inc 14.3/1.24  BMP essentially WNL  Mg WNL, Phos WNL  Albumin 3.5 WNL, LFT's WNL  A1C inc 6.6%, COVID-19 (+)    < from: Xray Chest 1 View- PORTABLE-Urgent (Xray Chest 1 View- PORTABLE-Urgent .) (02.01.23 @ 09:46) >  Interval development of hazy right lower lung patchy opacification on   chest x-ray 2/1/2023 4:12 AM, possibly related to atelectasis or   pneumonia.  Retrocardiac atelectasis/effusion.    < end of copied text >      < from: Xray Chest 1 View- PORTABLE-Urgent (Xray Chest 1 View- PORTABLE-Urgent .) (01.30.23 @ 14:44) >  Bibasilar linear atelectasis. Otherwise, the lungs are clear.    < end of copied text > NEUROLOGY FOLLOW-UP CONSULT NOTE    RFC: Myasthenia gravis (MG) exacerbation    Interval history: Patient with worsening respiratory failure overnight that required AVAPS and then intubation. No appreciable change in strength. Patient appears to indicate that she is OK with PLEX at this time. No additional acute neurologic events overnight. Received second dose of IVIG earlier this morning without incident. Also receiving SoluMedrol 125mg IV and remdesivir for COVID-19 infection.    Meds:  MEDICATIONS  (STANDING):  acetaminophen   IVPB .. 650 milliGRAM(s) IV Intermittent once  chlorhexidine 0.12% Liquid 15 milliLiter(s) Oral Mucosa every 12 hours  chlorhexidine 4% Liquid 1 Application(s) Topical two times a day  enoxaparin Injectable 40 milliGRAM(s) SubCutaneous <User Schedule>  immune   globulin 10% (GAMMAGARD) IVPB 25 Gram(s) IV Intermittent <User Schedule>  insulin lispro (ADMELOG) corrective regimen sliding scale   SubCutaneous every 6 hours  methylPREDNISolone sodium succinate Injectable 125 milliGRAM(s) IV Push daily  multiple electrolytes Injection Type 1 1000 milliLiter(s) (50 mL/Hr) IV Continuous <Continuous>  phenylephrine    Infusion 0.2 MICROgram(s)/kG/Min (5.4 mL/Hr) IV Continuous <Continuous>  polyethylene glycol 3350 17 Gram(s) Oral every 12 hours  propofol Infusion 20 MICROgram(s)/kG/Min (8.64 mL/Hr) IV Continuous <Continuous>  remdesivir  IVPB   IV Intermittent   remdesivir  IVPB 100 milliGRAM(s) IV Intermittent every 24 hours  senna 2 Tablet(s) Oral at bedtime    MEDICATIONS  (PRN):    GTT:  Phenylephrine 0.5 mcg/kg/min  Propofol 15 mcg/kg/min    PMHx/PSHx/FHx/SHx:  Shortness of breath    Handoff    MEWS Score    Myasthenia gravis    Diabetes mellitus    Shortness of breath    COVID SOB    [email or web address]        Allergies:  No Known Drug Allergies  peanuts (Unknown)      ROS: All systems negative except as documented in Interval history    O:  T(C): 37.1 (02-01-23 @ 11:00), Max: 37.7 (02-01-23 @ 07:00)  HR: 61 (02-01-23 @ 12:45) (59 - 153)  BP: 116/63 (02-01-23 @ 03:15) (99/51 - 158/82)  RR: 18 (02-01-23 @ 12:45) (15 - 34)  SpO2: 100% (02-01-23 @ 12:45) (93% - 100%)    Focused neurologic exam:  MS - Intubated, sedated, lethargic, attends to all stimuli b/l, can follow commands and respond to questions by shaking head "yes"/"no" appropriately but no true speech output. Due to clinical condition unable to assess (MOLLY) orientation rep/naming, attn/conc/recent and remote memory/fund of knowledge  CN - PERRL, EOM with poor upgaze and dysconjugate gaze, R > L severe ptosis, VFF, face sens/str/hearing WNL b/l, tongue/palate midline, trap 5/5 b/l. NE 3+/5, NF 2+/5  Motor - Normal bulk and mildly dec tone. BUE's D 3/5, RUE distal 4/5, LUE distal 4+/5. BLE's proximal 4/5, RLE distal 4/5, LLE distal 4+/5  Sens - LT/PP intact all  DTR's - 2+ BUE's, 1+ KJ b/l, 0+ AJ b/l, and neutral b/l plantar response  Coord - Grossly appropriate for level of strength  Gait and station - MOLYL    Pertinent labs/studies:  CBC with inc WBC 18.35, otherwise essentially WNL  PT/INR inc 14.3/1.24  BMP essentially WNL  Mg WNL, Phos WNL  Albumin 3.5 WNL, LFT's WNL  A1C inc 6.6%, COVID-19 (+)    < from: Xray Chest 1 View- PORTABLE-Urgent (Xray Chest 1 View- PORTABLE-Urgent .) (02.01.23 @ 09:46) >  Interval development of hazy right lower lung patchy opacification on   chest x-ray 2/1/2023 4:12 AM, possibly related to atelectasis or   pneumonia.  Retrocardiac atelectasis/effusion.    < end of copied text >      < from: Xray Chest 1 View- PORTABLE-Urgent (Xray Chest 1 View- PORTABLE-Urgent .) (01.30.23 @ 14:44) >  Bibasilar linear atelectasis. Otherwise, the lungs are clear.    < end of copied text >

## 2023-02-01 NOTE — CHART NOTE - NSCHARTNOTEFT_GEN_A_CORE
IR consulted for PLEX catheter placement. Recommend bedside placement by ICU team give pt's clinical status.

## 2023-02-02 LAB
ALBUMIN SERPL ELPH-MCNC: 3 G/DL — LOW (ref 3.3–5)
ALP SERPL-CCNC: 80 U/L — SIGNIFICANT CHANGE UP (ref 40–120)
ALT FLD-CCNC: 9 U/L — LOW (ref 10–45)
ANION GAP SERPL CALC-SCNC: 11 MMOL/L — SIGNIFICANT CHANGE UP (ref 5–17)
APTT BLD: 27.2 SEC — LOW (ref 27.5–35.5)
AST SERPL-CCNC: 14 U/L — SIGNIFICANT CHANGE UP (ref 10–40)
BILIRUB SERPL-MCNC: 0.3 MG/DL — SIGNIFICANT CHANGE UP (ref 0.2–1.2)
BUN SERPL-MCNC: 50 MG/DL — HIGH (ref 7–23)
CA-I BLD-SCNC: 1.12 MMOL/L — LOW (ref 1.15–1.33)
CALCIUM SERPL-MCNC: 8.7 MG/DL — SIGNIFICANT CHANGE UP (ref 8.4–10.5)
CHLORIDE SERPL-SCNC: 104 MMOL/L — SIGNIFICANT CHANGE UP (ref 96–108)
CO2 SERPL-SCNC: 28 MMOL/L — SIGNIFICANT CHANGE UP (ref 22–31)
CREAT SERPL-MCNC: 0.6 MG/DL — SIGNIFICANT CHANGE UP (ref 0.5–1.3)
EGFR: 101 ML/MIN/1.73M2 — SIGNIFICANT CHANGE UP
FIBRINOGEN PPP-MCNC: 474 MG/DL — HIGH (ref 200–445)
GLUCOSE BLDC GLUCOMTR-MCNC: 180 MG/DL — HIGH (ref 70–99)
GLUCOSE BLDC GLUCOMTR-MCNC: 186 MG/DL — HIGH (ref 70–99)
GLUCOSE BLDC GLUCOMTR-MCNC: 222 MG/DL — HIGH (ref 70–99)
GLUCOSE BLDC GLUCOMTR-MCNC: 234 MG/DL — HIGH (ref 70–99)
GLUCOSE BLDC GLUCOMTR-MCNC: 280 MG/DL — HIGH (ref 70–99)
GLUCOSE SERPL-MCNC: 198 MG/DL — HIGH (ref 70–99)
INR BLD: 1.35 RATIO — HIGH (ref 0.88–1.16)
MAGNESIUM SERPL-MCNC: 2.7 MG/DL — HIGH (ref 1.6–2.6)
PHOSPHATE SERPL-MCNC: 2.9 MG/DL — SIGNIFICANT CHANGE UP (ref 2.5–4.5)
POTASSIUM SERPL-MCNC: 3.5 MMOL/L — SIGNIFICANT CHANGE UP (ref 3.5–5.3)
POTASSIUM SERPL-SCNC: 3.5 MMOL/L — SIGNIFICANT CHANGE UP (ref 3.5–5.3)
PROT SERPL-MCNC: 6.8 G/DL — SIGNIFICANT CHANGE UP (ref 6–8.3)
PROTHROM AB SERPL-ACNC: 15.6 SEC — HIGH (ref 10.5–13.4)
SODIUM SERPL-SCNC: 143 MMOL/L — SIGNIFICANT CHANGE UP (ref 135–145)

## 2023-02-02 PROCEDURE — 99291 CRITICAL CARE FIRST HOUR: CPT

## 2023-02-02 PROCEDURE — 36556 INSERT NON-TUNNEL CV CATH: CPT

## 2023-02-02 PROCEDURE — 99232 SBSQ HOSP IP/OBS MODERATE 35: CPT

## 2023-02-02 PROCEDURE — 36514 APHERESIS PLASMA: CPT

## 2023-02-02 PROCEDURE — 71045 X-RAY EXAM CHEST 1 VIEW: CPT | Mod: 26

## 2023-02-02 PROCEDURE — 71045 X-RAY EXAM CHEST 1 VIEW: CPT | Mod: 26,77

## 2023-02-02 RX ORDER — POTASSIUM CHLORIDE 20 MEQ
40 PACKET (EA) ORAL ONCE
Refills: 0 | Status: COMPLETED | OUTPATIENT
Start: 2023-02-02 | End: 2023-02-02

## 2023-02-02 RX ORDER — FENTANYL CITRATE 50 UG/ML
25 INJECTION INTRAVENOUS
Refills: 0 | Status: DISCONTINUED | OUTPATIENT
Start: 2023-02-02 | End: 2023-02-03

## 2023-02-02 RX ORDER — FENTANYL CITRATE 50 UG/ML
50 INJECTION INTRAVENOUS ONCE
Refills: 0 | Status: DISCONTINUED | OUTPATIENT
Start: 2023-02-02 | End: 2023-02-02

## 2023-02-02 RX ORDER — HUMAN INSULIN 100 [IU]/ML
4 INJECTION, SUSPENSION SUBCUTANEOUS EVERY 6 HOURS
Refills: 0 | Status: DISCONTINUED | OUTPATIENT
Start: 2023-02-02 | End: 2023-02-03

## 2023-02-02 RX ORDER — SODIUM CHLORIDE 9 MG/ML
1000 INJECTION INTRAMUSCULAR; INTRAVENOUS; SUBCUTANEOUS ONCE
Refills: 0 | Status: COMPLETED | OUTPATIENT
Start: 2023-02-02 | End: 2023-02-02

## 2023-02-02 RX ORDER — SODIUM CHLORIDE 9 MG/ML
1000 INJECTION, SOLUTION INTRAVENOUS
Refills: 0 | Status: DISCONTINUED | OUTPATIENT
Start: 2023-02-02 | End: 2023-02-04

## 2023-02-02 RX ORDER — SODIUM CHLORIDE 9 MG/ML
10 INJECTION INTRAMUSCULAR; INTRAVENOUS; SUBCUTANEOUS
Refills: 0 | Status: DISCONTINUED | OUTPATIENT
Start: 2023-02-02 | End: 2023-02-05

## 2023-02-02 RX ORDER — CHLORHEXIDINE GLUCONATE 213 G/1000ML
1 SOLUTION TOPICAL
Refills: 0 | Status: DISCONTINUED | OUTPATIENT
Start: 2023-02-02 | End: 2023-02-21

## 2023-02-02 RX ORDER — HYDRALAZINE HCL 50 MG
10 TABLET ORAL ONCE
Refills: 0 | Status: DISCONTINUED | OUTPATIENT
Start: 2023-02-02 | End: 2023-02-02

## 2023-02-02 RX ORDER — CALCIUM GLUCONATE 100 MG/ML
1 VIAL (ML) INTRAVENOUS ONCE
Refills: 0 | Status: COMPLETED | OUTPATIENT
Start: 2023-02-02 | End: 2023-02-02

## 2023-02-02 RX ADMIN — Medication 200 GRAM(S): at 09:52

## 2023-02-02 RX ADMIN — HUMAN INSULIN 4 UNIT(S): 100 INJECTION, SUSPENSION SUBCUTANEOUS at 17:54

## 2023-02-02 RX ADMIN — Medication 125 MILLIGRAM(S): at 05:28

## 2023-02-02 RX ADMIN — CHLORHEXIDINE GLUCONATE 1 APPLICATION(S): 213 SOLUTION TOPICAL at 05:28

## 2023-02-02 RX ADMIN — SODIUM CHLORIDE 75 MILLILITER(S): 9 INJECTION, SOLUTION INTRAVENOUS at 09:51

## 2023-02-02 RX ADMIN — FENTANYL CITRATE 50 MICROGRAM(S): 50 INJECTION INTRAVENOUS at 14:15

## 2023-02-02 RX ADMIN — CHLORHEXIDINE GLUCONATE 15 MILLILITER(S): 213 SOLUTION TOPICAL at 05:27

## 2023-02-02 RX ADMIN — CHLORHEXIDINE GLUCONATE 15 MILLILITER(S): 213 SOLUTION TOPICAL at 17:27

## 2023-02-02 RX ADMIN — FAMOTIDINE 20 MILLIGRAM(S): 10 INJECTION INTRAVENOUS at 17:27

## 2023-02-02 RX ADMIN — FENTANYL CITRATE 25 MICROGRAM(S): 50 INJECTION INTRAVENOUS at 14:05

## 2023-02-02 RX ADMIN — FAMOTIDINE 20 MILLIGRAM(S): 10 INJECTION INTRAVENOUS at 05:28

## 2023-02-02 RX ADMIN — CHLORHEXIDINE GLUCONATE 1 APPLICATION(S): 213 SOLUTION TOPICAL at 17:28

## 2023-02-02 RX ADMIN — SENNA PLUS 2 TABLET(S): 8.6 TABLET ORAL at 21:34

## 2023-02-02 RX ADMIN — REMDESIVIR 200 MILLIGRAM(S): 5 INJECTION INTRAVENOUS at 17:27

## 2023-02-02 RX ADMIN — IMMUNE GLOBULIN (HUMAN) 83.33 GRAM(S): 10 INJECTION INTRAVENOUS; SUBCUTANEOUS at 00:50

## 2023-02-02 RX ADMIN — FENTANYL CITRATE 50 MICROGRAM(S): 50 INJECTION INTRAVENOUS at 14:30

## 2023-02-02 RX ADMIN — Medication 2: at 01:05

## 2023-02-02 RX ADMIN — SODIUM CHLORIDE 75 MILLILITER(S): 9 INJECTION, SOLUTION INTRAVENOUS at 19:00

## 2023-02-02 RX ADMIN — CHLORHEXIDINE GLUCONATE 1 APPLICATION(S): 213 SOLUTION TOPICAL at 18:14

## 2023-02-02 RX ADMIN — Medication 6: at 11:58

## 2023-02-02 RX ADMIN — Medication 2: at 05:40

## 2023-02-02 RX ADMIN — HUMAN INSULIN 4 UNIT(S): 100 INJECTION, SUSPENSION SUBCUTANEOUS at 23:49

## 2023-02-02 RX ADMIN — Medication 40 MILLIEQUIVALENT(S): at 05:28

## 2023-02-02 RX ADMIN — Medication 4: at 23:50

## 2023-02-02 RX ADMIN — FENTANYL CITRATE 25 MICROGRAM(S): 50 INJECTION INTRAVENOUS at 13:55

## 2023-02-02 RX ADMIN — Medication 4: at 17:55

## 2023-02-02 RX ADMIN — ENOXAPARIN SODIUM 40 MILLIGRAM(S): 100 INJECTION SUBCUTANEOUS at 17:27

## 2023-02-02 RX ADMIN — SODIUM CHLORIDE 2000 MILLILITER(S): 9 INJECTION INTRAMUSCULAR; INTRAVENOUS; SUBCUTANEOUS at 09:51

## 2023-02-02 RX ADMIN — POLYETHYLENE GLYCOL 3350 17 GRAM(S): 17 POWDER, FOR SOLUTION ORAL at 05:27

## 2023-02-02 NOTE — CONSULT NOTE ADULT - NSCONSULTADDITIONALINFOA_GEN_ALL_CORE
Informed consent for therapeutic plasma exchange (PLEX) procedure was obtained over the telephone from patient's son and listed emergency contact (Justin Acosta). The details of the procedure were discussed (including replacement with 5% albumin and/or plasma and anticoagulation), benefits, risks and complications of the procedure (electrolyte imbalance, fluid imbalance, transfusion transmitted infections and transfusion reactions which may be life threatening) and alternative options explained in detail to the patient's son who verbalized understanding and consented to the procedure after all questions answered.

## 2023-02-02 NOTE — PROGRESS NOTE ADULT - ASSESSMENT
ASSESSMENT/PLAN: MG crisis, covid +    NEURO:   checks Q1  neurology consulted, will confirm about PLEX, patient agreed to neurology team  c/w IVIG 25g (1/31--  Started on solumedrol 125 mg daily  Avoid medications that may worsen the current exacerbation including macrolides, fluoroquinolones, tetracyclines, aminoglycoside, beta-blockers, magnesium, and anti-arrhythmics (procainamide, quinidine, and lidocaine)  Pain management  Activity: [] mobilize as tolerated [x] Bedrest [] PT [] OT [] PMNR    PULM:  Intubated for airway protection  Adjusted the vent settings based on ABG  Daily CXR and ABG while intubated    CV:  MAP >65  On rich, try to wean    RENAL:  Fluids: IVF with plasmalytes at 75 cc/hour    GI:  Diet: TF   GI prophylaxis [] not indicated [x] famotidine [] other:  Bowel regimen [] colace [x] senna [x] other: miralax     ENDO:   A1c 6.6  Goal euglycemia (-180)  RISS for now      HEME/ONC:  VTE prophylaxis: [x] SCDs [x] chemoprophylaxis  lovenox [] hold chemoprophylaxis due to: [] high risk of DVT/PE on admission due to:    ID: covid + supportive care and solumedrol + remdesivir   Elevated leukocytosis, likely in setting of MG exacerbation and COVID-19 infection  monitor for fevers   ID on board    MISC:    SOCIAL/FAMILY:  [] awaiting [x] updated at bedside [] family meeting    CODE STATUS:  [x] Full Code [] DNR [] DNI [] Palliative/Comfort Care    DISPOSITION:  [x] ICU [] Stroke Unit [] Floor [] EMU [] RCU [] PCU    [x] Patient is at high risk of neurologic deterioration/death due to: MG crisis, hypercapnic respiratory failure       Contact: [phone] ASSESSMENT/PLAN: MG crisis, covid +    NEURO:   checks Q1  neurology consulted, will confirm about PLEX, patient agreed to neurology team  c/w IVIG 25g (1/31--  Started on solumedrol 125 mg daily  Avoid medications that may worsen the current exacerbation including macrolides, fluoroquinolones, tetracyclines, aminoglycoside, beta-blockers, magnesium, and anti-arrhythmics (procainamide, quinidine, and lidocaine)  Pain management  Activity: [] mobilize as tolerated [x] Bedrest [] PT [] OT [] PMNR    PULM:  Intubated for airway protection  Adjusted the vent settings based on ABG  Daily CXR and ABG while intubated    CV:  MAP >65  On rich, try to wean    RENAL:  Fluids: IVF with plasmalytes at 75 cc/hour    GI:  Diet: TF   GI prophylaxis [] not indicated [x] famotidine [] other:  Bowel regimen [] colace [x] senna [x] other: miralax     ENDO:   A1c 6.6  Goal euglycemia (-180)  RISS for now      HEME/ONC:  VTE prophylaxis: [x] SCDs [x] chemoprophylaxis  lovenox [] hold chemoprophylaxis due to: [] high risk of DVT/PE on admission due to:    ID: covid + supportive care and solumedrol + remdesivir   Elevated leukocytosis, likely in setting of MG exacerbation and COVID-19 infection  monitor for fevers   ID on board    MISC:    SOCIAL/FAMILY:  [] awaiting [x] updated at bedside [] family meeting    CODE STATUS:  [x] Full Code [] DNR [] DNI [] Palliative/Comfort Care    DISPOSITION:  [x] ICU [] Stroke Unit [] Floor [] EMU [] RCU [] PCU    [x] Patient is at high risk of neurologic deterioration/death due to: MG crisis, hypercapnic respiratory failure       Contact: [phone] ASSESSMENT/PLAN: MG crisis, covid +    NEURO:   checks Q1  neurology consulted, will confirm about PLEX, patient agreed to neurology team  c/w IVIG 25g (1/31--  Started on solumedrol 125 mg daily  Avoid medications that may worsen the current exacerbation including macrolides, fluoroquinolones, tetracyclines, aminoglycoside, beta-blockers, magnesium, and anti-arrhythmics (procainamide, quinidine, and lidocaine)  Pain management  Activity: [] mobilize as tolerated [x] Bedrest [] PT [] OT [] PMNR    PULM:  Intubated for airway protection  Adjusted the vent settings based on ABG  Daily CXR and ABG while intubated    CV:  MAP >65  On rich, try to wean    RENAL:  Fluids: IVF with plasmalytes at 75 cc/hour    GI:  Diet: TF   GI prophylaxis [] not indicated [x] famotidine [] other:  Bowel regimen [] colace [x] senna [x] other: miralax     ENDO:   A1c 6.6  Goal euglycemia (-180)  RISS for now      HEME/ONC:  VTE prophylaxis: [x] SCDs [x] chemoprophylaxis  lovenox [] hold chemoprophylaxis due to: [] high risk of DVT/PE on admission due to:    ID: covid + supportive care and solumedrol + remdesivir   Elevated leukocytosis, likely in setting of MG exacerbation and COVID-19 infection  monitor for fevers   ID on board    MISC:    SOCIAL/FAMILY:  [] awaiting [x] updated at bedside [] family meeting    CODE STATUS:  [x] Full Code [] DNR [] DNI [] Palliative/Comfort Care    DISPOSITION:  [x] ICU [] Stroke Unit [] Floor [] EMU [] RCU [] PCU    [x] Patient is at high risk of neurologic deterioration/death due to: MG crisis, hypercapnic respiratory failure       Contact: [phone] ASSESSMENT/PLAN: MG crisis, covid +    NEURO:   checks Q1  neurology consulted, will start her on PLEX today  c/w IVIG 25g (1/31--  Started on solumedrol 125 mg daily  Avoid medications that may worsen the current exacerbation including macrolides, fluoroquinolones, tetracyclines, aminoglycoside, beta-blockers, magnesium, and anti-arrhythmics (procainamide, quinidine, and lidocaine)  Pain management with fentanyl pushes  Activity: [] mobilize as tolerated [x] Bedrest [] PT [] OT [] PMNR    PULM:  Intubated for airway protection, will CPAP trial  Adjusted the vent settings based on ABG  ABG 7.5/39/124/30  Daily CXR and ABG while intubated    CV:  MAP >65  Will order TTE    RENAL:  Fluids: IVF with plasmalytes at 75 cc/hour  Will give her a bolus     GI:  Diet: TF at goal  GI prophylaxis [] not indicated [x] famotidine [] other:  Bowel regimen [] colace [x] senna [x] other: miralax   No BM yet    ENDO:   A1c 6.6  Goal euglycemia (-180)  RISS for now      HEME/ONC:  H/H stable   VTE prophylaxis: [x] SCDs [x] chemoprophylaxis  lovenox [] hold chemoprophylaxis due to: [] high risk of DVT/PE on admission due to:    ID: covid + supportive care and solumedrol + remdesivir   Elevated leukocytosis trending down, likely in setting of MG exacerbation and COVID-19 infection  monitor for fevers   ID on board    MISC:    SOCIAL/FAMILY:  [] awaiting [x] updated at bedside [] family meeting    CODE STATUS:  [x] Full Code [] DNR [] DNI [] Palliative/Comfort Care    DISPOSITION:  [x] ICU [] Stroke Unit [] Floor [] EMU [] RCU [] PCU    [x] Patient is at high risk of neurologic deterioration/death due to: MG crisis, hypercapnic respiratory failure       Contact: [phone] ASSESSMENT/PLAN: MG crisis, covid +    NEURO:   checks Q1  neurology consulted, will start her on PLEX today  c/w IVIG 25g (1/31--  Started on solumedrol 125 mg daily  Avoid medications that may worsen the current exacerbation including macrolides, fluoroquinolones, tetracyclines, aminoglycoside, beta-blockers, magnesium, and anti-arrhythmics (procainamide, quinidine, and lidocaine)  Pain management with fentanyl pushes  Activity: [] mobilize as tolerated [x] Bedrest [] PT [] OT [] PMNR    PULM:  Intubated for airway protection, will CPAP trial  Adjusted the vent settings based on ABG  ABG 7.5/39/124/30  Daily CXR and ABG while intubated    CV:  MAP >65  Will order TTE    RENAL:  Fluids: IVF with plasmalytes at 75 cc/hour  Will give her a bolus     GI:  Diet: TF at goal  GI prophylaxis [] not indicated [x] famotidine [] other:  Bowel regimen [] colace [x] senna [x] other: miralax   No BM yet    ENDO:   A1c 6.6  Goal euglycemia (-180)  RISS for now      HEME/ONC:  H/H stable   VTE prophylaxis: [x] SCDs [x] chemoprophylaxis  lovenox [] hold chemoprophylaxis due to: [] high risk of DVT/PE on admission due to:    ID: covid + supportive care and solumedrol + remdesivir   Elevated leukocytosis trending down, likely in setting of MG exacerbation and COVID-19 infection  monitor for fevers   ID on board    MISC:    SOCIAL/FAMILY:  [] awaiting [x] updated at bedside [] family meeting    CODE STATUS:  [x] Full Code [] DNR [] DNI [] Palliative/Comfort Care    DISPOSITION:  [x] ICU [] Stroke Unit [] Floor [] EMU [] RCU [] PCU    [x] Patient is at high risk of neurologic deterioration/death due to: MG crisis, hypercapnic respiratory failure       Contact: [phone] ASSESSMENT/PLAN: MG crisis, covid +    NEURO:   checks Q1  neurology consulted, will start her on PLEX today  c/w IVIG 25g (1/31--  Started on solumedrol 125 mg daily  Avoid medications that may worsen the current exacerbation including macrolides, fluoroquinolones, tetracyclines, aminoglycoside, beta-blockers, magnesium, and anti-arrhythmics (procainamide, quinidine, and lidocaine)  Pain management with fentanyl pushes  Activity: [] mobilize as tolerated [x] Bedrest [] PT [] OT [] PMNR    PULM:  Intubated for airway protection, will CPAP trial  Adjusted the vent settings based on ABG  ABG 7.5/39/124/30  Daily CXR and ABG while intubated    CV:  MAP >65  Will order TTE    RENAL:  Fluids: IVF with plasmalytes at 75 cc/hour  Will give her a bolus     GI:  Diet: TF at goal  GI prophylaxis [] not indicated [x] famotidine [] other:  Bowel regimen [] colace [x] senna [x] other: miralax   No BM yet    ENDO:   A1c 6.6  Goal euglycemia (-180)  RISS for now      HEME/ONC:  H/H stable   VTE prophylaxis: [x] SCDs [x] chemoprophylaxis  lovenox [] hold chemoprophylaxis due to: [] high risk of DVT/PE on admission due to:    ID: covid + supportive care and solumedrol + remdesivir   Elevated leukocytosis trending down, likely in setting of MG exacerbation and COVID-19 infection  monitor for fevers   ID on board    MISC:    SOCIAL/FAMILY:  [] awaiting [x] updated at bedside [] family meeting    CODE STATUS:  [x] Full Code [] DNR [] DNI [] Palliative/Comfort Care    DISPOSITION:  [x] ICU [] Stroke Unit [] Floor [] EMU [] RCU [] PCU    [x] Patient is at high risk of neurologic deterioration/death due to: MG crisis, hypercapnic respiratory failure       Contact: [phone]

## 2023-02-02 NOTE — PHARMACOTHERAPY INTERVENTION NOTE - COMMENTS
Reviewed medications for appropriate route of administration     MEDICATIONS  (STANDING):  chlorhexidine 0.12% Liquid 15 milliLiter(s) Oral Mucosa every 12 hours  chlorhexidine 4% Liquid 1 Application(s) Topical two times a day  enoxaparin Injectable 40 milliGRAM(s) SubCutaneous <User Schedule>  famotidine    Tablet 20 milliGRAM(s) Oral two times a day  immune   globulin 10% (GAMMAGARD) IVPB 25 Gram(s) IV Intermittent <User Schedule>  insulin lispro (ADMELOG) corrective regimen sliding scale   SubCutaneous every 6 hours  methylPREDNISolone sodium succinate Injectable 125 milliGRAM(s) IV Push daily  multiple electrolytes Injection Type 1 1000 milliLiter(s) (75 mL/Hr) IV Continuous   polyethylene glycol 3350 17 Gram(s) Oral every 12 hours  remdesivir  IVPB 100 milliGRAM(s) IV Intermittent every 24 hours  remdesivir  IVPB   IV Intermittent   senna 2 Tablet(s) Oral at bedtime    MEDICATIONS  (PRN):  fentaNYL    Injectable 25 MICROGram(s) IV Push every 2 hours PRN pain / vent synchrony    Jessica Ortez, PharmD  PGY2 Critical Care Pharmacy Resident  Available on [employer] Teams

## 2023-02-02 NOTE — PROGRESS NOTE ADULT - ASSESSMENT
ASSESSMENT:    PLAN:  SBP  Feeding: [] diet [] tube feeds  Analgesia/Sedation:   Seizure prophylaxis: [] not indicated  Thromboprophylaxis: [] SCDs [] chemoprophylaxis [] hold chemoprophylaxis due to: [] high risk of DVT/PE on admission due to:  Head of Bed/Activity: [] 30 degrees [] mobilize as tolerated [] PT [] OT [] PMNR  Ulcer prophylaxis: [] not indicated [] PPI [] other:  Glucose Control: Goal -180 [] RISS [] other:    [] Patient critically ill due to:    Contact: [phone] ASSESSMENT:    PLAN:  SBP  Feeding: [] diet [] tube feeds  Analgesia/Sedation:   Seizure prophylaxis: [] not indicated  Thromboprophylaxis: [] SCDs [] chemoprophylaxis [] hold chemoprophylaxis due to: [] high risk of DVT/PE on admission due to:  Head of Bed/Activity: [] 30 degrees [] mobilize as tolerated [] PT [] OT [] PMNR  Ulcer prophylaxis: [] not indicated [] PPI [] other:  Glucose Control: Goal -180 [] RISS [] other:    [] Patient critically ill due to:    Contact: [phone] ASSESSMENT:    PLAN:  SBP  Feeding: [] diet [] tube feeds  Analgesia/Sedation:   Seizure prophylaxis: [] not indicated  Thromboprophylaxis: [] SCDs [] chemoprophylaxis [] hold chemoprophylaxis due to: [] high risk of DVT/PE on admission due to:  Head of Bed/Activity: [] 30 degrees [] mobilize as tolerated [] PT [] OT [] PMNR  Ulcer prophylaxis: [] not indicated [] PPI [] other:  Glucose Control: Goal -180 [] RISS [] other:    [] Patient critically ill due to:    Contact: [phone] ASSESSMENT: Myasthenic crisis    PLAN:  PLEX  Steroids  Vent support, wean as tolerated  -160mmHg  Feeding: [] diet [x] tube feeds  Thromboprophylaxis: [x] SCDs [x] chemoprophylaxis [] hold chemoprophylaxis due to: [] high risk of DVT/PE on admission due to:  Head of Bed/Activity: [x] 30 degrees [] mobilize as tolerated [] PT [] OT [] PMNR  Ulcer prophylaxis  Glucose Control: Goal -180 [] RISS [] other:  COVID - remdesivir    [x] Patient critically ill due to: acute resp failure requiring intubation    Contact: [phone] ASSESSMENT: Myasthenic crisis    PLAN:  PLEX  Steroids  Vent support, wean as tolerated  -160mmHg  Feeding: [] diet [x] tube feeds  Thromboprophylaxis: [x] SCDs [x] chemoprophylaxis [] hold chemoprophylaxis due to: [] high risk of DVT/PE on admission due to:  Head of Bed/Activity: [x] 30 degrees [] mobilize as tolerated [] PT [] OT [] PMNR  Ulcer prophylaxis  Glucose Control: Goal -180 [] RISS [] other:  COVID - remdesivir    [x] Patient critically ill due to: acute resp failure requiring intubation    Contact: [phone] ASSESSMENT: Myasthenic crisis    PLAN:  PLEX  Steroids  Vent support, wean as tolerated  -160mmHg  Feeding: [] diet [x] tube feeds  Thromboprophylaxis: [x] SCDs [x] chemoprophylaxis [] hold chemoprophylaxis due to: [] high risk of DVT/PE on admission due to:  Head of Bed/Activity: [x] 30 degrees [] mobilize as tolerated [] PT [] OT [] PMNR  Ulcer prophylaxis  Glucose Control: Goal -180 [] RISS [] other:  COVID - remdesivir    [x] Patient critically ill due to: acute resp failure requiring intubation    Contact: [phone]

## 2023-02-02 NOTE — PROGRESS NOTE ADULT - SUBJECTIVE AND OBJECTIVE BOX
SUMMARY: 63yo Malayalam-speaking woman w/ Myasthenia Gravis (on pyridostigmine, hospitalization every 1-2 years requiring IVIG) (dx 2015), s/p thymectomy (2010), T2DM who presents with SOB, cough, difficulty spitting up sputum x 4 days.  was sick with COVID, so son performed home COVID test for patient which was positive. Patient's primary complaint is SOB when lying down. She has been sleeping upright. She had difficulty coughing up and spitting out sputum but today these symptoms are slightly improved. Also reports difficulty swallowing - but was able to tolerate taking oral pills. Admits to difficulty looking up, but eye drooping is overall improved from past couple of months. No blurry vision. Patient takes pyridostigmine 120mg TID regularly. Patient also has prednisone PRN for sick days-- she has been taking 20mg prednisone x past 4 days as per instruction of her neurologist.     Of note, last hospitalization was 2 years ago at [facility] during which patient was intubated -- son notes that this was likely 2/2 patient being forced to lie down flat in hospital bed, leading to aspiration. She was extubated after ~3 days. Received IVIG during that hospitalization    OVERNIGHT EVENTS: Adm NSCU, upon arrival to the unit, appeared in acute respiratory distress tachypneic, hypertensive, tachycardic, hypoxic, ABG showing resp acidosis, immediately put on BiPAP, started on IVIG with some improvement     2/2- Still on IVIG, PLEX consented. No issues overnight.    ADMISSION SCORES:   GCS: 14 HH: MF: NIHSS: ICH Score:    REVIEW OF SYSTEMS: [] Unable to Assess due to neurologic exam   [ x] All ROS addressed below are non-contributory, except:  Neuro: [ ] Headache [ ] Back pain [ ] Numbness [ ] Weakness [ ] Ataxia [ ] Dizziness [ ] Aphasia [ ] Dysarthria [ ] Visual disturbance  Resp: [ x] Shortness of breath/dyspnea [ ] Orthopnea [ ] Cough  CV: [ ] Chest pain [ ] Palpitation [ ] Lightheadedness [ ] Syncope  Renal: [ ] Thirst [ ] Edema  GI: [ ] Nausea [ ] Emesis [ ] Abdominal pain [ ] Constipation [ ] Diarrhea  Hem: [ ] Hematemesis [ ] bBright red blood per rectum  ID: [ ] Fever [ ] Chills [ ] Dysuria  ENT: [ ] Rhinorrhea    VITALS: [x] Reviewed    IMAGING/DATA: [x] Reviewed    IVF FLUIDS/MEDICATIONS: [x] Reviewed    ALLERGIES: Allergies    No Known Allergies    Intolerances      DEVICES:   [] Restraints [x] PIVs [] ET tube [] central line [] PICC [] arterial line [] morales [] NGT/OGT [] EVD [] LD [] MYAH/HMV [] LiCOX [] ICP monitor [] Trach [] PEG [] Chest Tube [] other:    EXAMINATION:  General: distress  HEENT: Anicteric sclerae  Cardiac: [id number] tachycardic  Lungs: diminished   Abdomen: Soft, non-tender, +BS  Neurologic: Awake, alert, oriented x3 with choices, follows commands, PERRL, EOMI, face symmetric, Neck flexion 2, neck extension 4+. R delt 3/5, biceps 4+/5, Triceps 5/5,  5/5. L delt 3/5, biceps 4/5, triceps 5/5,  5/5. BILATERAL LE 5/5. Sensations intact.   SUMMARY: 63yo Malayalam-speaking woman w/ Myasthenia Gravis (on pyridostigmine, hospitalization every 1-2 years requiring IVIG) (dx 2015), s/p thymectomy (2010), T2DM who presents with SOB, cough, difficulty spitting up sputum x 4 days.  was sick with COVID, so son performed home COVID test for patient which was positive. Patient's primary complaint is SOB when lying down. She has been sleeping upright. She had difficulty coughing up and spitting out sputum but today these symptoms are slightly improved. Also reports difficulty swallowing - but was able to tolerate taking oral pills. Admits to difficulty looking up, but eye drooping is overall improved from past couple of months. No blurry vision. Patient takes pyridostigmine 120mg TID regularly. Patient also has prednisone PRN for sick days-- she has been taking 20mg prednisone x past 4 days as per instruction of her neurologist.     Of note, last hospitalization was 2 years ago at [facility] during which patient was intubated -- son notes that this was likely 2/2 patient being forced to lie down flat in hospital bed, leading to aspiration. She was extubated after ~3 days. Received IVIG during that hospitalization    OVERNIGHT EVENTS: Adm NSCU, upon arrival to the unit, appeared in acute respiratory distress tachypneic, hypertensive, tachycardic, hypoxic, ABG showing resp acidosis, immediately put on BiPAP, started on IVIG with some improvement     2/2- Still on IVIG, PLEX consented. No issues overnight.    ADMISSION SCORES:   GCS: 14 HH: MF: NIHSS: ICH Score:    REVIEW OF SYSTEMS: [] Unable to Assess due to neurologic exam   [ x] All ROS addressed below are non-contributory, except:  Neuro: [ ] Headache [ ] Back pain [ ] Numbness [ ] Weakness [ ] Ataxia [ ] Dizziness [ ] Aphasia [ ] Dysarthria [ ] Visual disturbance  Resp: [ x] Shortness of breath/dyspnea [ ] Orthopnea [ ] Cough  CV: [ ] Chest pain [ ] Palpitation [ ] Lightheadedness [ ] Syncope  Renal: [ ] Thirst [ ] Edema  GI: [ ] Nausea [ ] Emesis [ ] Abdominal pain [ ] Constipation [ ] Diarrhea  Hem: [ ] Hematemesis [ ] bBright red blood per rectum  ID: [ ] Fever [ ] Chills [ ] Dysuria  ENT: [ ] Rhinorrhea    VITALS: [x] Reviewed    IMAGING/DATA: [x] Reviewed    IVF FLUIDS/MEDICATIONS: [x] Reviewed    ALLERGIES: Allergies    No Known Allergies    Intolerances      DEVICES:   [] Restraints [x] PIVs [] ET tube [] central line [] PICC [] arterial line [] morales [] NGT/OGT [] EVD [] LD [] MYAH/HMV [] LiCOX [] ICP monitor [] Trach [] PEG [] Chest Tube [] other:    EXAMINATION:  General: distress  HEENT: Anicteric sclerae  Cardiac: [id number] tachycardic  Lungs: diminished   Abdomen: Soft, non-tender, +BS  Neurologic: Awake, alert, oriented x3 with choices, follows commands, PERRL, EOMI, face symmetric, Neck flexion 2, neck extension 4+. R delt 3/5, biceps 4+/5, Triceps 5/5,  5/5. L delt 3/5, biceps 4/5, triceps 5/5,  5/5. BILATERAL LE 5/5. Sensations intact.   SUMMARY: 61yo Malayalam-speaking woman w/ Myasthenia Gravis (on pyridostigmine, hospitalization every 1-2 years requiring IVIG) (dx 2015), s/p thymectomy (2010), T2DM who presents with SOB, cough, difficulty spitting up sputum x 4 days.  was sick with COVID, so son performed home COVID test for patient which was positive. Patient's primary complaint is SOB when lying down. She has been sleeping upright. She had difficulty coughing up and spitting out sputum but today these symptoms are slightly improved. Also reports difficulty swallowing - but was able to tolerate taking oral pills. Admits to difficulty looking up, but eye drooping is overall improved from past couple of months. No blurry vision. Patient takes pyridostigmine 120mg TID regularly. Patient also has prednisone PRN for sick days-- she has been taking 20mg prednisone x past 4 days as per instruction of her neurologist.     Of note, last hospitalization was 2 years ago at [facility] during which patient was intubated -- son notes that this was likely 2/2 patient being forced to lie down flat in hospital bed, leading to aspiration. She was extubated after ~3 days. Received IVIG during that hospitalization    OVERNIGHT EVENTS: Adm NSCU, upon arrival to the unit, appeared in acute respiratory distress tachypneic, hypertensive, tachycardic, hypoxic, ABG showing resp acidosis, immediately put on BiPAP, started on IVIG with some improvement     2/2- Still on IVIG, PLEX consented. No issues overnight.    ADMISSION SCORES:   GCS: 14 HH: MF: NIHSS: ICH Score:    REVIEW OF SYSTEMS: [] Unable to Assess due to neurologic exam   [ x] All ROS addressed below are non-contributory, except:  Neuro: [ ] Headache [ ] Back pain [ ] Numbness [ ] Weakness [ ] Ataxia [ ] Dizziness [ ] Aphasia [ ] Dysarthria [ ] Visual disturbance  Resp: [ x] Shortness of breath/dyspnea [ ] Orthopnea [ ] Cough  CV: [ ] Chest pain [ ] Palpitation [ ] Lightheadedness [ ] Syncope  Renal: [ ] Thirst [ ] Edema  GI: [ ] Nausea [ ] Emesis [ ] Abdominal pain [ ] Constipation [ ] Diarrhea  Hem: [ ] Hematemesis [ ] bBright red blood per rectum  ID: [ ] Fever [ ] Chills [ ] Dysuria  ENT: [ ] Rhinorrhea    VITALS: [x] Reviewed    IMAGING/DATA: [x] Reviewed    IVF FLUIDS/MEDICATIONS: [x] Reviewed    ALLERGIES: Allergies    No Known Allergies    Intolerances      DEVICES:   [] Restraints [x] PIVs [] ET tube [] central line [] PICC [] arterial line [] morales [] NGT/OGT [] EVD [] LD [] MYAH/HMV [] LiCOX [] ICP monitor [] Trach [] PEG [] Chest Tube [] other:    EXAMINATION:  General: distress  HEENT: Anicteric sclerae  Cardiac: [id number] tachycardic  Lungs: diminished   Abdomen: Soft, non-tender, +BS  Neurologic: Awake, alert, oriented x3 with choices, follows commands, PERRL, EOMI, face symmetric, Neck flexion 2, neck extension 4+. R delt 3/5, biceps 4+/5, Triceps 5/5,  5/5. L delt 3/5, biceps 4/5, triceps 5/5,  5/5. BILATERAL LE 5/5. Sensations intact.   SUMMARY: 63yo Malayalam-speaking woman w/ Myasthenia Gravis (on pyridostigmine, hospitalization every 1-2 years requiring IVIG) (dx 2015), s/p thymectomy (2010), T2DM who presents with SOB, cough, difficulty spitting up sputum x 4 days.  was sick with COVID, so son performed home COVID test for patient which was positive. Patient's primary complaint is SOB when lying down. She has been sleeping upright. She had difficulty coughing up and spitting out sputum but today these symptoms are slightly improved. Also reports difficulty swallowing - but was able to tolerate taking oral pills. Admits to difficulty looking up, but eye drooping is overall improved from past couple of months. No blurry vision. Patient takes pyridostigmine 120mg TID regularly. Patient also has prednisone PRN for sick days-- she has been taking 20mg prednisone x past 4 days as per instruction of her neurologist.     Of note, last hospitalization was 2 years ago at [facility] during which patient was intubated -- son notes that this was likely 2/2 patient being forced to lie down flat in hospital bed, leading to aspiration. She was extubated after ~3 days. Received IVIG during that hospitalization    OVERNIGHT EVENTS: Adm NSCU, upon arrival to the unit, appeared in acute respiratory distress tachypneic, hypertensive, tachycardic, hypoxic, ABG showing resp acidosis, immediately put on BiPAP, started on IVIG with some improvement     2/2- Still on IVIG, PLEX consented. No issues overnight.    ADMISSION SCORES:   GCS: 14 HH: MF: NIHSS: ICH Score:    REVIEW OF SYSTEMS: [] Unable to Assess due to neurologic exam   [ x] All ROS addressed below are non-contributory, except:  Neuro: [ ] Headache [ ] Back pain [ ] Numbness [ ] Weakness [ ] Ataxia [ ] Dizziness [ ] Aphasia [ ] Dysarthria [ ] Visual disturbance  Resp: [ x] Shortness of breath/dyspnea [ ] Orthopnea [ ] Cough  CV: [ ] Chest pain [ ] Palpitation [ ] Lightheadedness [ ] Syncope  Renal: [ ] Thirst [ ] Edema  GI: [ ] Nausea [ ] Emesis [ ] Abdominal pain [ ] Constipation [ ] Diarrhea  Hem: [ ] Hematemesis [ ] bBright red blood per rectum  ID: [ ] Fever [ ] Chills [ ] Dysuria  ENT: [ ] Rhinorrhea    VITALS: [x] Reviewed    IMAGING/DATA: [x] Reviewed    IVF FLUIDS/MEDICATIONS: [x] Reviewed    ALLERGIES: Allergies    No Known Allergies    Intolerances      DEVICES:   [] Restraints [x] PIVs [] ET tube [] central line [] PICC [] arterial line [] morales [] NGT/OGT [] EVD [] LD [] MYAH/HMV [] LiCOX [] ICP monitor [] Trach [] PEG [] Chest Tube [] other:    EXAMINATION:  General: distress  HEENT: Anicteric sclerae  Cardiac: [id number] tachycardic  Lungs: diminished   Abdomen: Soft, non-tender, +BS  Neurologic: Awake, alert, oriented x3 with choices, follows commands, PERRL, EOMI, face symmetric, bilateral ptosis improved since yesterday Neck flexion 2, neck extension 4+. R delt 3/5, biceps 4+/5, Triceps 5/5,  5/5. L delt 3/5, biceps 4/5, triceps 5/5,  5/5. BILATERAL LE 5/5. Sensations intact.   SUMMARY: 63yo Malayalam-speaking woman w/ Myasthenia Gravis (on pyridostigmine, hospitalization every 1-2 years requiring IVIG) (dx 2015), s/p thymectomy (2010), T2DM who presents with SOB, cough, difficulty spitting up sputum x 4 days.  was sick with COVID, so son performed home COVID test for patient which was positive. Patient's primary complaint is SOB when lying down. She has been sleeping upright. She had difficulty coughing up and spitting out sputum but today these symptoms are slightly improved. Also reports difficulty swallowing - but was able to tolerate taking oral pills. Admits to difficulty looking up, but eye drooping is overall improved from past couple of months. No blurry vision. Patient takes pyridostigmine 120mg TID regularly. Patient also has prednisone PRN for sick days-- she has been taking 20mg prednisone x past 4 days as per instruction of her neurologist.     Of note, last hospitalization was 2 years ago at [facility] during which patient was intubated -- son notes that this was likely 2/2 patient being forced to lie down flat in hospital bed, leading to aspiration. She was extubated after ~3 days. Received IVIG during that hospitalization    OVERNIGHT EVENTS: Adm NSCU, upon arrival to the unit, appeared in acute respiratory distress tachypneic, hypertensive, tachycardic, hypoxic, ABG showing resp acidosis, immediately put on BiPAP, started on IVIG with some improvement     2/2- Still on IVIG, PLEX consented. No issues overnight.    ADMISSION SCORES:   GCS: 14 HH: MF: NIHSS: ICH Score:    REVIEW OF SYSTEMS: [] Unable to Assess due to neurologic exam   [ x] All ROS addressed below are non-contributory, except:  Neuro: [ ] Headache [ ] Back pain [ ] Numbness [ ] Weakness [ ] Ataxia [ ] Dizziness [ ] Aphasia [ ] Dysarthria [ ] Visual disturbance  Resp: [ x] Shortness of breath/dyspnea [ ] Orthopnea [ ] Cough  CV: [ ] Chest pain [ ] Palpitation [ ] Lightheadedness [ ] Syncope  Renal: [ ] Thirst [ ] Edema  GI: [ ] Nausea [ ] Emesis [ ] Abdominal pain [ ] Constipation [ ] Diarrhea  Hem: [ ] Hematemesis [ ] bBright red blood per rectum  ID: [ ] Fever [ ] Chills [ ] Dysuria  ENT: [ ] Rhinorrhea    VITALS: [x] Reviewed    IMAGING/DATA: [x] Reviewed    IVF FLUIDS/MEDICATIONS: [x] Reviewed    ALLERGIES: Allergies    No Known Allergies    Intolerances      DEVICES:   [] Restraints [x] PIVs [] ET tube [] central line [] PICC [] arterial line [] morales [] NGT/OGT [] EVD [] LD [] MYAH/HMV [] LiCOX [] ICP monitor [] Trach [] PEG [] Chest Tube [] other:    EXAMINATION:  General: distress  HEENT: Anicteric sclerae  Cardiac: [id number] tachycardic  Lungs: diminished   Abdomen: Soft, non-tender, +BS  Neurologic: Awake, alert, oriented x3 with choices, follows commands, PERRL, EOMI, face symmetric, bilateral ptosis improved since yesterday Neck flexion 2, neck extension 4+. R delt 3/5, biceps 4+/5, Triceps 5/5,  5/5. L delt 3/5, biceps 4/5, triceps 5/5,  5/5. BILATERAL LE 5/5. Sensations intact.   SUMMARY: 63yo Malayalam-speaking woman w/ Myasthenia Gravis (on pyridostigmine, hospitalization every 1-2 years requiring IVIG) (dx 2015), s/p thymectomy (2010), T2DM who presents with SOB, cough, difficulty spitting up sputum x 4 days.  was sick with COVID, so son performed home COVID test for patient which was positive. Patient's primary complaint is SOB when lying down. She has been sleeping upright. She had difficulty coughing up and spitting out sputum but today these symptoms are slightly improved. Also reports difficulty swallowing - but was able to tolerate taking oral pills. Admits to difficulty looking up, but eye drooping is overall improved from past couple of months. No blurry vision. Patient takes pyridostigmine 120mg TID regularly. Patient also has prednisone PRN for sick days-- she has been taking 20mg prednisone x past 4 days as per instruction of her neurologist.     Of note, last hospitalization was 2 years ago at [facility] during which patient was intubated -- son notes that this was likely 2/2 patient being forced to lie down flat in hospital bed, leading to aspiration. She was extubated after ~3 days. Received IVIG during that hospitalization    OVERNIGHT EVENTS: Adm NSCU, upon arrival to the unit, appeared in acute respiratory distress tachypneic, hypertensive, tachycardic, hypoxic, ABG showing resp acidosis, immediately put on BiPAP, started on IVIG with some improvement     2/2- Still on IVIG, PLEX consented. No issues overnight.    ADMISSION SCORES:   GCS: 14 HH: MF: NIHSS: ICH Score:    REVIEW OF SYSTEMS: [] Unable to Assess due to neurologic exam   [ x] All ROS addressed below are non-contributory, except:  Neuro: [ ] Headache [ ] Back pain [ ] Numbness [ ] Weakness [ ] Ataxia [ ] Dizziness [ ] Aphasia [ ] Dysarthria [ ] Visual disturbance  Resp: [ x] Shortness of breath/dyspnea [ ] Orthopnea [ ] Cough  CV: [ ] Chest pain [ ] Palpitation [ ] Lightheadedness [ ] Syncope  Renal: [ ] Thirst [ ] Edema  GI: [ ] Nausea [ ] Emesis [ ] Abdominal pain [ ] Constipation [ ] Diarrhea  Hem: [ ] Hematemesis [ ] bBright red blood per rectum  ID: [ ] Fever [ ] Chills [ ] Dysuria  ENT: [ ] Rhinorrhea    VITALS: [x] Reviewed    IMAGING/DATA: [x] Reviewed    IVF FLUIDS/MEDICATIONS: [x] Reviewed    ALLERGIES: Allergies    No Known Allergies    Intolerances      DEVICES:   [] Restraints [x] PIVs [] ET tube [] central line [] PICC [] arterial line [] morales [] NGT/OGT [] EVD [] LD [] MYAH/HMV [] LiCOX [] ICP monitor [] Trach [] PEG [] Chest Tube [] other:    EXAMINATION:  General: distress  HEENT: Anicteric sclerae  Cardiac: [id number] tachycardic  Lungs: diminished   Abdomen: Soft, non-tender, +BS  Neurologic: Awake, alert, oriented x3 with choices, follows commands, PERRL, EOMI, face symmetric, bilateral ptosis improved since yesterday Neck flexion 2, neck extension 4+. R delt 3/5, biceps 4+/5, Triceps 5/5,  5/5. L delt 3/5, biceps 4/5, triceps 5/5,  5/5. BILATERAL LE 5/5. Sensations intact.

## 2023-02-02 NOTE — CONSULT NOTE ADULT - ASSESSMENT
63yo woman w/ Myasthenia Gravis (on pyridostigmine, hospitalization every 1-2 years requiring IVIG) (dx 2015), s/p thymectomy (2010), T2DM who presents on 01/30/2023 with SOB, cough, difficulty spitting up sputum x 4 days.  was sick with COVID, so son performed home COVID test for patient which was positive. Patient's primary complaint is SOB when lying down. She has been sleeping upright. She had difficulty coughing up and spitting out sputum but today these symptoms are slightly improved. Also reports difficulty swallowing - but was able to tolerate taking oral pills. Admits to difficulty looking up, but eye drooping is overall improved from past couple of months. No blurry vision. Patient takes pyridostigmine 120mg TID regularly. Patient also has prednisone PRN for sick days-- she has been taking 20mg prednisone x past 4 days as per instruction of her neurologist.     Of note, last hospitalization was 2 years ago at [facility] during which patient was intubated -- son notes that this was likely 2/2 patient being forced to lie down flat in hospital bed, leading to aspiration. She was extubated after ~3 days. Received IVIG during that hospitalization. Patient was admitted to [facility], upon arrival to the unit, appeared in acute respiratory distress tachypneic, hypertensive, tachycardic, hypoxic, ABG showing respiratory acidosis, immediately put on BiPAP, started on IVIG, SoluMedrol 125mg IV and remdesivir for COVID-19 infection.    Patient has experienced some improvements in ptosis and extremity strength but not neck flexion or respiratory status. Today we were consulted by neurology service for initiation of therapeutic plasma exchange. A right IJ Shiley catheter has been inserted and confirmed. We concur with initiation of therapeutic plasma exchange (PLEX) today. Plan is for 5 PLEX procedures, every other day, one plasma volume using 5% albumin for replacement fluid (pending coagulation labs).    Next scheduled PLEX procedure is on Saturday, 02/04/23. Please order CBC, fibrinogen and ionized calcium the morning of PLEX. 61yo woman w/ Myasthenia Gravis (on pyridostigmine, hospitalization every 1-2 years requiring IVIG) (dx 2015), s/p thymectomy (2010), T2DM who presents on 01/30/2023 with SOB, cough, difficulty spitting up sputum x 4 days.  was sick with COVID, so son performed home COVID test for patient which was positive. Patient's primary complaint is SOB when lying down. She has been sleeping upright. She had difficulty coughing up and spitting out sputum but today these symptoms are slightly improved. Also reports difficulty swallowing - but was able to tolerate taking oral pills. Admits to difficulty looking up, but eye drooping is overall improved from past couple of months. No blurry vision. Patient takes pyridostigmine 120mg TID regularly. Patient also has prednisone PRN for sick days-- she has been taking 20mg prednisone x past 4 days as per instruction of her neurologist.     Of note, last hospitalization was 2 years ago at [facility] during which patient was intubated -- son notes that this was likely 2/2 patient being forced to lie down flat in hospital bed, leading to aspiration. She was extubated after ~3 days. Received IVIG during that hospitalization. Patient was admitted to NSCU, upon arrival to the unit, appeared in acute respiratory distress tachypneic, hypertensive, tachycardic, hypoxic, ABG showing respiratory acidosis, immediately put on BiPAP, started on IVIG, SoluMedrol 125mg IV and remdesivir for COVID-19 infection.    Patient has experienced some improvements in ptosis and extremity strength but not neck flexion or respiratory status. Today we were consulted by neurology service for initiation of therapeutic plasma exchange. A right IJ Shiley catheter has been inserted and confirmed. We concur with initiation of therapeutic plasma exchange (PLEX) today. Plan is for 5 PLEX procedures, every other day, one plasma volume using 5% albumin for replacement fluid (pending coagulation labs).    Next scheduled PLEX procedure is on Saturday, 02/04/23. Please order CBC, fibrinogen and ionized calcium the morning of PLEX. 63yo woman w/ Myasthenia Gravis (on pyridostigmine, hospitalization every 1-2 years requiring IVIG) (dx 2015), s/p thymectomy (2010), T2DM who presents on 01/30/2023 with SOB, cough, difficulty spitting up sputum x 4 days.  was sick with COVID, so son performed home COVID test for patient which was positive. Patient's primary complaint is SOB when lying down. She has been sleeping upright. She had difficulty coughing up and spitting out sputum but today these symptoms are slightly improved. Also reports difficulty swallowing - but was able to tolerate taking oral pills. Admits to difficulty looking up, but eye drooping is overall improved from past couple of months. No blurry vision. Patient takes pyridostigmine 120mg TID regularly. Patient also has prednisone PRN for sick days-- she has been taking 20mg prednisone x past 4 days as per instruction of her neurologist.     Of note, last hospitalization was 2 years ago at [facility] during which patient was intubated -- son notes that this was likely 2/2 patient being forced to lie down flat in hospital bed, leading to aspiration. She was extubated after ~3 days. Received IVIG during that hospitalization. Patient was admitted to NSCU, upon arrival to the unit, appeared in acute respiratory distress tachypneic, hypertensive, tachycardic, hypoxic, ABG showing respiratory acidosis, immediately put on BiPAP, started on IVIG, SoluMedrol 125mg IV and remdesivir for COVID-19 infection.    Patient has experienced some improvements in ptosis and extremity strength but not neck flexion or respiratory status. Today we were consulted by neurology service for initiation of therapeutic plasma exchange. A right IJ Shiley catheter has been inserted and confirmed. We concur with initiation of therapeutic plasma exchange (PLEX) today. Plan is for 5 PLEX procedures, every other day, one plasma volume using 5% albumin for replacement fluid (pending coagulation labs).    Next scheduled PLEX procedure is on Saturday, 02/04/23. Please order CBC, fibrinogen and ionized calcium the morning of PLEX.

## 2023-02-02 NOTE — PROGRESS NOTE ADULT - SUBJECTIVE AND OBJECTIVE BOX
SUMMARY:     24 HOUR EVENTS:    VITALS/DATA/ORDERS: [x] Reviewed    EXAMINATION:  SUMMARY:   62-year-old woman with DMII, myasthenia gravis status post thymectomy and prior crises status post IVIG developed MG crisis in setting of COVID-19 infection requiring intubation status post IVIG x 1 and PLEX.     24 HOUR EVENTS:  Tolerating PLEX.     VITALS/DATA/ORDERS: [x] Reviewed    EXAMINATION:   Intubated, nods appropr SUMMARY:   62-year-old woman with DMII, myasthenia gravis status post thymectomy and prior crises status post IVIG developed MG crisis in setting of COVID-19 infection requiring intubation status post IVIG x 1 and PLEX.     24 HOUR EVENTS:  Tolerating PLEX.     VITALS/DATA/ORDERS: [x] Reviewed    EXAMINATION:   Intubated, nods appropriately, +cough/gag, no ptosis, deltoids 3/5, neck flexion/extension 4/5, otherwise full strength

## 2023-02-02 NOTE — PROGRESS NOTE ADULT - SUBJECTIVE AND OBJECTIVE BOX
CC: f/u for  covid  Patient reports she is ok    REVIEW OF SYSTEMS:  All other review of systems negative (Comprehensive ROS)    Antimicrobials Day #  :3/5  remdesivir  IVPB   IV Intermittent   remdesivir  IVPB 100 milliGRAM(s) IV Intermittent every 24 hours    Other Medications Reviewed    T(F): 99.3 (02-02-23 @ 15:00), Max: 99.4 (02-01-23 @ 23:00)  HR: 57 (02-02-23 @ 19:00)  BP: --  RR: 18 (02-02-23 @ 19:00)  SpO2: 100% (02-02-23 @ 19:00)  Wt(kg): --    PHYSICAL EXAM:  General: alert, no acute distress, on vent, follows  Eyes:  anicteric, no conjunctival injection, no discharge  Oropharynx: no lesions or injection 	  Neck: supple, without adenopathy  Lungs: vent sounds  to auscultation  Heart: regular rate and rhythm; no murmur, rubs or gallops  Abdomen: soft, nondistended, nontender, without mass or organomegaly  Skin: no lesions  Extremities: no clubbing, cyanosis, or edema  Neurologic: alert,, moves all extremities    LAB RESULTS:                        11.0   13.93 )-----------( 264      ( 01 Feb 2023 23:40 )             33.6     02-01    143  |  104  |  50<H>  ----------------------------<  198<H>  3.5   |  28  |  0.60    Ca    8.7      01 Feb 2023 23:40  Phos  2.9     02-01  Mg     2.7     02-01    TPro  6.8  /  Alb  3.0<L>  /  TBili  0.3  /  DBili  x   /  AST  14  /  ALT  9<L>  /  AlkPhos  80  02-01    LIVER FUNCTIONS - ( 01 Feb 2023 23:40 )  Alb: 3.0 g/dL / Pro: 6.8 g/dL / ALK PHOS: 80 U/L / ALT: 9 U/L / AST: 14 U/L / GGT: x             MICROBIOLOGY:  RECENT CULTURES:      RADIOLOGY REVIEWED:  < from: Xray Chest 1 View AP/PA (02.02.23 @ 15:36) >    ACC: [id number] EXAM:  XR CHEST AP OR PA 1V   ORDERED BY: SHELTON MCCORMICK     PROCEDURE DATE:  02/02/2023          INTERPRETATION:  AP chest on February 2, 2023 at 3:12 PM. Patient had   exacerbation of myasthenia gravis.    Heart magnified by technique. Sternotomy, endotracheal tube, and   nasogastric tube remain.    There is a persistent left base pleural pulmonary process and a small   infiltrate off the right lower hilum.    Above findings are similar to earlier in the day.    Present film shows a large right jugular line inserted in good position.    IMPRESSION: Right jugular line inserted. Stable findings otherwise.    --- End of Report ---            < from: Xray Chest 1 View- PORTABLE-Routine (Xray Chest 1 View- PORTABLE-Routine in AM.) (02.02.23 @ 04:07) >    ACC: [id number] EXAM:  XR CHEST PORTABLE ROUTINE 1V   ORDERED BY: HEATHER LOCK     PROCEDURE DATE:  02/02/2023          INTERPRETATION:  EXAMINATION: XR CHEST    CLINICAL INDICATION: intubated    TECHNIQUE: Single frontal, portable view of the chest was obtained.    COMPARISON: Chest x-ray 2/1/2023    FINDINGS:    LINES/TUBES: Endotracheal tube is within the mid trachea. Enteric tube,   coursing below the diaphragm, with its tip non-visualized below the level   of the film.    LUNGS/PLEURA: The lungs are clear. No pleural effusion. No pneumothorax.    HEART AND MEDIASTINUM: The heart size is normal. Median sternotomy.    SKELETON: No acute osseous abnormalities.    IMPRESSION:    Clear lungs.    --- End of Report ---        < end of copied text >    < end of copied text >              Assessment:  patient with myasthenia on just pyrostigmine admitted a few days ago with respiratory failure from covid and MG flare , given iv IG, steroids and now for plex, required intubation  Plan:  2 more days of rdv  steroid taper per neuro  monitor cr, liver enzymes , sats but suspect mostly respiratory failure on neuromuscular basis  dvt prophylaxis CC: f/u for  covid  Patient reports she is ok    REVIEW OF SYSTEMS:  All other review of systems negative (Comprehensive ROS)    Antimicrobials Day #  :3/5  remdesivir  IVPB   IV Intermittent   remdesivir  IVPB 100 milliGRAM(s) IV Intermittent every 24 hours    Other Medications Reviewed    T(F): 99.3 (02-02-23 @ 15:00), Max: 99.4 (02-01-23 @ 23:00)  HR: 57 (02-02-23 @ 19:00)  BP: --  RR: 18 (02-02-23 @ 19:00)  SpO2: 100% (02-02-23 @ 19:00)  Wt(kg): --    PHYSICAL EXAM:  General: alert, no acute distress, on vent, follows  Eyes:  anicteric, no conjunctival injection, no discharge  Oropharynx: no lesions or injection 	  Neck: supple, without adenopathy  Lungs: vent sounds  to auscultation  Heart: regular rate and rhythm; no murmur, rubs or gallops  Abdomen: soft, nondistended, nontender, without mass or organomegaly  Skin: no lesions  Extremities: no clubbing, cyanosis, or edema  Neurologic: alert,, moves all extremities    LAB RESULTS:                        11.0   13.93 )-----------( 264      ( 01 Feb 2023 23:40 )             33.6     02-01    143  |  104  |  50<H>  ----------------------------<  198<H>  3.5   |  28  |  0.60    Ca    8.7      01 Feb 2023 23:40  Phos  2.9     02-01  Mg     2.7     02-01    TPro  6.8  /  Alb  3.0<L>  /  TBili  0.3  /  DBili  x   /  AST  14  /  ALT  9<L>  /  AlkPhos  80  02-01    LIVER FUNCTIONS - ( 01 Feb 2023 23:40 )  Alb: 3.0 g/dL / Pro: 6.8 g/dL / ALK PHOS: 80 U/L / ALT: 9 U/L / AST: 14 U/L / GGT: x             MICROBIOLOGY:  RECENT CULTURES:      RADIOLOGY REVIEWED:  < from: Xray Chest 1 View AP/PA (02.02.23 @ 15:36) >    ACC: [id number] EXAM:  XR CHEST AP OR PA 1V   ORDERED BY: SHELTON MCCORMICK     PROCEDURE DATE:  02/02/2023          INTERPRETATION:  AP chest on February 2, 2023 at 3:12 PM. Patient had   exacerbation of myasthenia gravis.    Heart magnified by technique. Sternotomy, endotracheal tube, and   nasogastric tube remain.    There is a persistent left base pleural pulmonary process and a small   infiltrate off the right lower hilum.    Above findings are similar to earlier in the day.    Present film shows a large right jugular line inserted in good position.    IMPRESSION: Right jugular line inserted. Stable findings otherwise.    --- End of Report ---            < from: Xray Chest 1 View- PORTABLE-Routine (Xray Chest 1 View- PORTABLE-Routine in AM.) (02.02.23 @ 04:07) >    ACC: [id number] EXAM:  XR CHEST PORTABLE ROUTINE 1V   ORDERED BY: HEATHER LOCK     PROCEDURE DATE:  02/02/2023          INTERPRETATION:  EXAMINATION: XR CHEST    CLINICAL INDICATION: intubated    TECHNIQUE: Single frontal, portable view of the chest was obtained.    COMPARISON: Chest x-ray 2/1/2023    FINDINGS:    LINES/TUBES: Endotracheal tube is within the mid trachea. Enteric tube,   coursing below the diaphragm, with its tip non-visualized below the level   of the film.    LUNGS/PLEURA: The lungs are clear. No pleural effusion. No pneumothorax.    HEART AND MEDIASTINUM: The heart size is normal. Median sternotomy.    SKELETON: No acute osseous abnormalities.    IMPRESSION:    Clear lungs.    --- End of Report ---        < end of copied text >    < end of copied text >              Assessment:  patient with myasthenia on just pyrostigmine admitted a few days ago with respiratory failure from covid and MG flare , given iv IG, steroids and now for plex, required intubation  Plan:  2 more days of rdv  steroid taper per neuro  monitor cr, liver enzymes , sats but suspect mostly respiratory failure on neuromuscular basis  dvt prophylaxis CC: f/u for  covid  Patient reports she is ok    REVIEW OF SYSTEMS:  All other review of systems negative (Comprehensive ROS)    Antimicrobials Day #  :3/5  remdesivir  IVPB   IV Intermittent   remdesivir  IVPB 100 milliGRAM(s) IV Intermittent every 24 hours    Other Medications Reviewed    T(F): 99.3 (02-02-23 @ 15:00), Max: 99.4 (02-01-23 @ 23:00)  HR: 57 (02-02-23 @ 19:00)  BP: --  RR: 18 (02-02-23 @ 19:00)  SpO2: 100% (02-02-23 @ 19:00)  Wt(kg): --    PHYSICAL EXAM:  General: alert, no acute distress, on vent, follows  Eyes:  anicteric, no conjunctival injection, no discharge  Oropharynx: no lesions or injection 	  Neck: supple, without adenopathy  Lungs: vent sounds  to auscultation  Heart: regular rate and rhythm; no murmur, rubs or gallops  Abdomen: soft, nondistended, nontender, without mass or organomegaly  Skin: no lesions  Extremities: no clubbing, cyanosis, or edema  Neurologic: alert,, moves all extremities    LAB RESULTS:                        11.0   13.93 )-----------( 264      ( 01 Feb 2023 23:40 )             33.6     02-01    143  |  104  |  50<H>  ----------------------------<  198<H>  3.5   |  28  |  0.60    Ca    8.7      01 Feb 2023 23:40  Phos  2.9     02-01  Mg     2.7     02-01    TPro  6.8  /  Alb  3.0<L>  /  TBili  0.3  /  DBili  x   /  AST  14  /  ALT  9<L>  /  AlkPhos  80  02-01    LIVER FUNCTIONS - ( 01 Feb 2023 23:40 )  Alb: 3.0 g/dL / Pro: 6.8 g/dL / ALK PHOS: 80 U/L / ALT: 9 U/L / AST: 14 U/L / GGT: x             MICROBIOLOGY:  RECENT CULTURES:      RADIOLOGY REVIEWED:  < from: Xray Chest 1 View AP/PA (02.02.23 @ 15:36) >    ACC: [id number] EXAM:  XR CHEST AP OR PA 1V   ORDERED BY: SHELTON MCCORMICK     PROCEDURE DATE:  02/02/2023          INTERPRETATION:  AP chest on February 2, 2023 at 3:12 PM. Patient had   exacerbation of myasthenia gravis.    Heart magnified by technique. Sternotomy, endotracheal tube, and   nasogastric tube remain.    There is a persistent left base pleural pulmonary process and a small   infiltrate off the right lower hilum.    Above findings are similar to earlier in the day.    Present film shows a large right jugular line inserted in good position.    IMPRESSION: Right jugular line inserted. Stable findings otherwise.    --- End of Report ---            < from: Xray Chest 1 View- PORTABLE-Routine (Xray Chest 1 View- PORTABLE-Routine in AM.) (02.02.23 @ 04:07) >    ACC: [id number] EXAM:  XR CHEST PORTABLE ROUTINE 1V   ORDERED BY: HEATHER LOCK     PROCEDURE DATE:  02/02/2023          INTERPRETATION:  EXAMINATION: XR CHEST    CLINICAL INDICATION: intubated    TECHNIQUE: Single frontal, portable view of the chest was obtained.    COMPARISON: Chest x-ray 2/1/2023    FINDINGS:    LINES/TUBES: Endotracheal tube is within the mid trachea. Enteric tube,   coursing below the diaphragm, with its tip non-visualized below the level   of the film.    LUNGS/PLEURA: The lungs are clear. No pleural effusion. No pneumothorax.    HEART AND MEDIASTINUM: The heart size is normal. Median sternotomy.    SKELETON: No acute osseous abnormalities.    IMPRESSION:    Clear lungs.    --- End of Report ---        < end of copied text >    < end of copied text >              Assessment:  patient with myasthenia on just pyrostigmine admitted a few days ago with respiratory failure from covid and MG flare , given iv IG, steroids and now for plex, required intubation  Plan:  2 more days of rdv  steroid taper per neuro  monitor cr, liver enzymes , sats but suspect mostly respiratory failure on neuromuscular basis  dvt prophylaxis CC: f/u for  covid  Patient reports she is ok    REVIEW OF SYSTEMS:  All other review of systems negative (Comprehensive ROS)    Antimicrobials Day #  :3/5  remdesivir  IVPB   IV Intermittent   remdesivir  IVPB 100 milliGRAM(s) IV Intermittent every 24 hours    Other Medications Reviewed    T(F): 99.3 (02-02-23 @ 15:00), Max: 99.4 (02-01-23 @ 23:00)  HR: 57 (02-02-23 @ 19:00)  BP: --  RR: 18 (02-02-23 @ 19:00)  SpO2: 100% (02-02-23 @ 19:00)  Wt(kg): --    PHYSICAL EXAM:  General: alert, no acute distress, on vent, follows  Eyes:  anicteric, no conjunctival injection, no discharge  Oropharynx: no lesions or injection 	  Neck: supple, without adenopathy  Lungs: vent sounds  to auscultation  Heart: regular rate and rhythm; no murmur, rubs or gallops  Abdomen: soft, nondistended, nontender, without mass or organomegaly  Skin: no lesions  Extremities: no clubbing, cyanosis, or edema  Neurologic: alert,, moves all extremities    LAB RESULTS:                        11.0   13.93 )-----------( 264      ( 01 Feb 2023 23:40 )             33.6     02-01    143  |  104  |  50<H>  ----------------------------<  198<H>  3.5   |  28  |  0.60    Ca    8.7      01 Feb 2023 23:40  Phos  2.9     02-01  Mg     2.7     02-01    TPro  6.8  /  Alb  3.0<L>  /  TBili  0.3  /  DBili  x   /  AST  14  /  ALT  9<L>  /  AlkPhos  80  02-01    LIVER FUNCTIONS - ( 01 Feb 2023 23:40 )  Alb: 3.0 g/dL / Pro: 6.8 g/dL / ALK PHOS: 80 U/L / ALT: 9 U/L / AST: 14 U/L / GGT: x             MICROBIOLOGY:  RECENT CULTURES:      RADIOLOGY REVIEWED:  < from: Xray Chest 1 View AP/PA (02.02.23 @ 15:36) >    ACC: [id number] EXAM:  XR CHEST AP OR PA 1V   ORDERED BY: SHELTON MCCORMICK     PROCEDURE DATE:  02/02/2023          INTERPRETATION:  AP chest on February 2, 2023 at 3:12 PM. Patient had   exacerbation of myasthenia gravis.    Heart magnified by technique. Sternotomy, endotracheal tube, and   nasogastric tube remain.    There is a persistent left base pleural pulmonary process and a small   infiltrate off the right lower hilum.    Above findings are similar to earlier in the day.    Present film shows a large right jugular line inserted in good position.    IMPRESSION: Right jugular line inserted. Stable findings otherwise.    --- End of Report ---            < from: Xray Chest 1 View- PORTABLE-Routine (Xray Chest 1 View- PORTABLE-Routine in AM.) (02.02.23 @ 04:07) >    ACC: [id number] EXAM:  XR CHEST PORTABLE ROUTINE 1V   ORDERED BY: HEATHER LOCK     PROCEDURE DATE:  02/02/2023          INTERPRETATION:  EXAMINATION: XR CHEST    CLINICAL INDICATION: intubated    TECHNIQUE: Single frontal, portable view of the chest was obtained.    COMPARISON: Chest x-ray 2/1/2023    FINDINGS:    LINES/TUBES: Endotracheal tube is within the mid trachea. Enteric tube,   coursing below the diaphragm, with its tip non-visualized below the level   of the film.    LUNGS/PLEURA: The lungs are clear. No pleural effusion. No pneumothorax.    HEART AND MEDIASTINUM: The heart size is normal. Median sternotomy.    SKELETON: No acute osseous abnormalities.    IMPRESSION:    Clear lungs.    --- End of Report ---        < end of copied text >    < end of copied text >              Assessment:  patient with myasthenia on just pyridostigmine admitted a few days ago with respiratory failure from covid and MG flare , took some prednisone just pta,  given iv IG, steroids and now for plex, required intubation  Plan:  2 more days of rdv  steroid taper per neuro  monitor cr, liver enzymes , sats but suspect mostly respiratory failure on neuromuscular basis  dvt prophylaxis CC: f/u for  covid  Patient reports she is ok    REVIEW OF SYSTEMS:  All other review of systems negative (Comprehensive ROS)    Antimicrobials Day #  :3/5  remdesivir  IVPB   IV Intermittent   remdesivir  IVPB 100 milliGRAM(s) IV Intermittent every 24 hours    Other Medications Reviewed    T(F): 99.3 (02-02-23 @ 15:00), Max: 99.4 (02-01-23 @ 23:00)  HR: 57 (02-02-23 @ 19:00)  BP: --  RR: 18 (02-02-23 @ 19:00)  SpO2: 100% (02-02-23 @ 19:00)  Wt(kg): --    PHYSICAL EXAM:  General: alert, no acute distress, on vent, follows  Eyes:  anicteric, no conjunctival injection, no discharge  Oropharynx: no lesions or injection 	  Neck: supple, without adenopathy  Lungs: vent sounds  to auscultation  Heart: regular rate and rhythm; no murmur, rubs or gallops  Abdomen: soft, nondistended, nontender, without mass or organomegaly  Skin: no lesions  Extremities: no clubbing, cyanosis, or edema  Neurologic: alert,, moves all extremities    LAB RESULTS:                        11.0   13.93 )-----------( 264      ( 01 Feb 2023 23:40 )             33.6     02-01    143  |  104  |  50<H>  ----------------------------<  198<H>  3.5   |  28  |  0.60    Ca    8.7      01 Feb 2023 23:40  Phos  2.9     02-01  Mg     2.7     02-01    TPro  6.8  /  Alb  3.0<L>  /  TBili  0.3  /  DBili  x   /  AST  14  /  ALT  9<L>  /  AlkPhos  80  02-01    LIVER FUNCTIONS - ( 01 Feb 2023 23:40 )  Alb: 3.0 g/dL / Pro: 6.8 g/dL / ALK PHOS: 80 U/L / ALT: 9 U/L / AST: 14 U/L / GGT: x             MICROBIOLOGY:  RECENT CULTURES:      RADIOLOGY REVIEWED:  < from: Xray Chest 1 View AP/PA (02.02.23 @ 15:36) >    ACC: [id number] EXAM:  XR CHEST AP OR PA 1V   ORDERED BY: SHELTON MCCORMICK     PROCEDURE DATE:  02/02/2023          INTERPRETATION:  AP chest on February 2, 2023 at 3:12 PM. Patient had   exacerbation of myasthenia gravis.    Heart magnified by technique. Sternotomy, endotracheal tube, and   nasogastric tube remain.    There is a persistent left base pleural pulmonary process and a small   infiltrate off the right lower hilum.    Above findings are similar to earlier in the day.    Present film shows a large right jugular line inserted in good position.    IMPRESSION: Right jugular line inserted. Stable findings otherwise.    --- End of Report ---            < from: Xray Chest 1 View- PORTABLE-Routine (Xray Chest 1 View- PORTABLE-Routine in AM.) (02.02.23 @ 04:07) >    ACC: [id number] EXAM:  XR CHEST PORTABLE ROUTINE 1V   ORDERED BY: HEATHER LOCK     PROCEDURE DATE:  02/02/2023          INTERPRETATION:  EXAMINATION: XR CHEST    CLINICAL INDICATION: intubated    TECHNIQUE: Single frontal, portable view of the chest was obtained.    COMPARISON: Chest x-ray 2/1/2023    FINDINGS:    LINES/TUBES: Endotracheal tube is within the mid trachea. Enteric tube,   coursing below the diaphragm, with its tip non-visualized below the level   of the film.    LUNGS/PLEURA: The lungs are clear. No pleural effusion. No pneumothorax.    HEART AND MEDIASTINUM: The heart size is normal. Median sternotomy.    SKELETON: No acute osseous abnormalities.    IMPRESSION:    Clear lungs.    --- End of Report ---        < end of copied text >    < end of copied text >              Assessment:  patient with myasthenia on just pyridostigmine admitted a few days ago with respiratory failure from covid and MG flare , took some prednisone just pta,  given iv IG, steroids and now for plex, required intubation  Plan:  2 more days of rdv  steroid taper per neuro  monitor cr, liver enzymes , sats but suspect mostly respiratory failure on neuromuscular basis  dvt prophylaxis CC: f/u for  covid  Patient reports she is ok    REVIEW OF SYSTEMS:  All other review of systems negative (Comprehensive ROS)    Antimicrobials Day #  :3/5  remdesivir  IVPB   IV Intermittent   remdesivir  IVPB 100 milliGRAM(s) IV Intermittent every 24 hours    Other Medications Reviewed    T(F): 99.3 (02-02-23 @ 15:00), Max: 99.4 (02-01-23 @ 23:00)  HR: 57 (02-02-23 @ 19:00)  BP: --  RR: 18 (02-02-23 @ 19:00)  SpO2: 100% (02-02-23 @ 19:00)  Wt(kg): --    PHYSICAL EXAM:  General: alert, no acute distress, on vent, follows  Eyes:  anicteric, no conjunctival injection, no discharge  Oropharynx: no lesions or injection 	  Neck: supple, without adenopathy  Lungs: vent sounds  to auscultation  Heart: regular rate and rhythm; no murmur, rubs or gallops  Abdomen: soft, nondistended, nontender, without mass or organomegaly  Skin: no lesions  Extremities: no clubbing, cyanosis, or edema  Neurologic: alert,, moves all extremities    LAB RESULTS:                        11.0   13.93 )-----------( 264      ( 01 Feb 2023 23:40 )             33.6     02-01    143  |  104  |  50<H>  ----------------------------<  198<H>  3.5   |  28  |  0.60    Ca    8.7      01 Feb 2023 23:40  Phos  2.9     02-01  Mg     2.7     02-01    TPro  6.8  /  Alb  3.0<L>  /  TBili  0.3  /  DBili  x   /  AST  14  /  ALT  9<L>  /  AlkPhos  80  02-01    LIVER FUNCTIONS - ( 01 Feb 2023 23:40 )  Alb: 3.0 g/dL / Pro: 6.8 g/dL / ALK PHOS: 80 U/L / ALT: 9 U/L / AST: 14 U/L / GGT: x             MICROBIOLOGY:  RECENT CULTURES:      RADIOLOGY REVIEWED:  < from: Xray Chest 1 View AP/PA (02.02.23 @ 15:36) >    ACC: [id number] EXAM:  XR CHEST AP OR PA 1V   ORDERED BY: SHELTON MCCORMICK     PROCEDURE DATE:  02/02/2023          INTERPRETATION:  AP chest on February 2, 2023 at 3:12 PM. Patient had   exacerbation of myasthenia gravis.    Heart magnified by technique. Sternotomy, endotracheal tube, and   nasogastric tube remain.    There is a persistent left base pleural pulmonary process and a small   infiltrate off the right lower hilum.    Above findings are similar to earlier in the day.    Present film shows a large right jugular line inserted in good position.    IMPRESSION: Right jugular line inserted. Stable findings otherwise.    --- End of Report ---            < from: Xray Chest 1 View- PORTABLE-Routine (Xray Chest 1 View- PORTABLE-Routine in AM.) (02.02.23 @ 04:07) >    ACC: [id number] EXAM:  XR CHEST PORTABLE ROUTINE 1V   ORDERED BY: HEATHER LOCK     PROCEDURE DATE:  02/02/2023          INTERPRETATION:  EXAMINATION: XR CHEST    CLINICAL INDICATION: intubated    TECHNIQUE: Single frontal, portable view of the chest was obtained.    COMPARISON: Chest x-ray 2/1/2023    FINDINGS:    LINES/TUBES: Endotracheal tube is within the mid trachea. Enteric tube,   coursing below the diaphragm, with its tip non-visualized below the level   of the film.    LUNGS/PLEURA: The lungs are clear. No pleural effusion. No pneumothorax.    HEART AND MEDIASTINUM: The heart size is normal. Median sternotomy.    SKELETON: No acute osseous abnormalities.    IMPRESSION:    Clear lungs.    --- End of Report ---        < end of copied text >    < end of copied text >              Assessment:  patient with myasthenia on just pyridostigmine admitted a few days ago with respiratory failure from covid and MG flare , took some prednisone just pta,  given iv IG, steroids and now for plex, required intubation  Plan:  2 more days of rdv  steroid taper per neuro  monitor cr, liver enzymes , sats but suspect mostly respiratory failure on neuromuscular basis  dvt prophylaxis

## 2023-02-02 NOTE — PROGRESS NOTE ADULT - ATTENDING COMMENTS
63 yo woman with DM and Myasthenia gravis s/p thymectomy 2010, with prior exacerbations requiring intubation (last 2021), now admitted 1/30 with SOB, weak cough, dysphagia and double vision, found to be COVID positive, concerning for MG exacerbation. Started on IVIG due to refusal of PLEX. Initially on Bipap but intubated 2/1 for worsening respiratory failure.      On ACVC, , PEEP 7, 40% FiO2.   Afebrile, leukocytosis improving.   Trialed on CPAP 12/5, with very small TV.     On exam, intubated, alert, communicates by nodding, oriented x 3 with choices, with ptosis, neck flexion 2/5, neck extension 4+/5, deltoids 3/5, R stronger than L, LEs 5/5    start PLEX today   c/w solumedrol 125mg daily   currently off sedation (with bradycardia), fentanyl PRN  MAP goal >65, off rich   CPAP trials   for MG, avoid Mg, beta blockers, NMB, microlides and fluroquinolones   on TF  c/w famotidine   senna and miralax, LBM prior to admission   1 L bolus and IVF for intravascular depletion, keep hydrated to avoid clots   ISS (HgA1C 6.6)  remdesevir   Lov ppx, LE US neg 1/31    Patient seen and examined by attending on 2/2/2023.    Patient is critically ill due to myasthenia gravis exacerbation and at high risk for neurological deterioration or death due to: hypoxic and hypercarbic respiratory failure requiring mechanical ventilation. 61 yo woman with DM and Myasthenia gravis s/p thymectomy 2010, with prior exacerbations requiring intubation (last 2021), now admitted 1/30 with SOB, weak cough, dysphagia and double vision, found to be COVID positive, concerning for MG exacerbation. Started on IVIG due to refusal of PLEX. Initially on Bipap but intubated 2/1 for worsening respiratory failure.      On ACVC, , PEEP 7, 40% FiO2.   Afebrile, leukocytosis improving.   Trialed on CPAP 12/5, with very small TV.     On exam, intubated, alert, communicates by nodding, oriented x 3 with choices, with ptosis, neck flexion 2/5, neck extension 4+/5, deltoids 3/5, R stronger than L, LEs 5/5    start PLEX today   c/w solumedrol 125mg daily   currently off sedation (with bradycardia), fentanyl PRN  MAP goal >65, off rich   CPAP trials   for MG, avoid Mg, beta blockers, NMB, microlides and fluroquinolones   on TF  c/w famotidine   senna and miralax, LBM prior to admission   1 L bolus and IVF for intravascular depletion, keep hydrated to avoid clots   ISS (HgA1C 6.6)  remdesevir   Lov ppx, LE US neg 1/31    Patient seen and examined by attending on 2/2/2023.    Patient is critically ill due to myasthenia gravis exacerbation and at high risk for neurological deterioration or death due to: hypoxic and hypercarbic respiratory failure requiring mechanical ventilation.

## 2023-02-02 NOTE — CONSULT NOTE ADULT - SUBJECTIVE AND OBJECTIVE BOX
Patient is a 62y old  Female who presents on 01/30/23 with a chief complaint of shortness of breath.    HPI:  61yo woman w/ Myasthenia Gravis (on pyridostigmine, hospitalization every 1-2 years requiring IVIG) (dx 2015), s/p thymectomy (2010), T2DM who presents on 01/30/2023 with SOB, cough, difficulty spitting up sputum x 4 days.  was sick with COVID, so son performed home COVID test for patient which was positive. Patient's primary complaint is SOB when lying down. She has been sleeping upright. She had difficulty coughing up and spitting out sputum but today these symptoms are slightly improved. Also reports difficulty swallowing - but was able to tolerate taking oral pills. Admits to difficulty looking up, but eye drooping is overall improved from past couple of months. No blurry vision. Patient takes pyridostigmine 120mg TID regularly. Patient also has prednisone PRN for sick days-- she has been taking 20mg prednisone x past 4 days as per instruction of her neurologist.     Of note, last hospitalization was 2 years ago at [facility] during which patient was intubated -- son notes that this was likely 2/2 patient being forced to lie down flat in hospital bed, leading to aspiration. She was extubated after ~3 days. Received IVIG during that hospitalization. Patient was admitted to NSCU, upon arrival to the unit, appeared in acute respiratory distress tachypneic, hypertensive, tachycardic, hypoxic, ABG showing respiratory acidosis, immediately put on BiPAP, started on IVIG, SoluMedrol 125mg IV and remdesivir for COVID-19 infection.    Patient has experienced some improvement with IVIG, with improvement in ptosis and extremity strength but not neck flexion or respiratory status. Today we were consulted by neurology service for initiation of therapeutic plasma exchange. A right IJ Shiley catheter has been inserted and confirmed. We concur with initiation of therapeutic plasma exchange (PLEX) today.    PAST MEDICAL & SURGICAL HISTORY:  Myasthenia gravis  Diabetes mellitus    MEDICATIONS  (STANDING):  chlorhexidine 0.12% Liquid 15 milliLiter(s) Oral Mucosa every 12 hours  chlorhexidine 4% Liquid 1 Application(s) Topical two times a day  chlorhexidine 4% Liquid 1 Application(s) Topical <User Schedule>  enoxaparin Injectable 40 milliGRAM(s) SubCutaneous <User Schedule>  famotidine    Tablet 20 milliGRAM(s) Oral two times a day  immune   globulin 10% (GAMMAGARD) IVPB 25 Gram(s) IV Intermittent <User Schedule>  insulin lispro (ADMELOG) corrective regimen sliding scale   SubCutaneous every 6 hours  methylPREDNISolone sodium succinate Injectable 125 milliGRAM(s) IV Push daily  multiple electrolytes Injection Type 1 1000 milliLiter(s) (75 mL/Hr) IV Continuous <Continuous>  polyethylene glycol 3350 17 Gram(s) Oral every 12 hours  remdesivir  IVPB 100 milliGRAM(s) IV Intermittent every 24 hours  remdesivir  IVPB   IV Intermittent   senna 2 Tablet(s) Oral at bedtime    MEDICATIONS  (PRN):  fentaNYL    Injectable 25 MICROGram(s) IV Push every 2 hours PRN pain / vent synchrony  sodium chloride 0.9% lock flush 10 milliLiter(s) IV Push every 1 hour PRN Pre/post blood products, medications, blood draw, and to maintain line patency    FAMILY HISTORY:  Non-contributory.    Allergies    No Known Drug Allergies  peanuts (Unknown)    REVIEW OF SYSTEMS:  CONSTITUTIONAL: No weakness, fevers or chills.  EYES/ENT: No visual changes;  No vertigo or throat pain   RESPIRATORY: Cough ans shortness of breath. No wheezing, hemoptysis.  CARDIOVASCULAR: No chest pain or palpitations  GASTROINTESTINAL: No abdominal or epigastric pain. No nausea, vomiting, or hematemesis; No diarrhea or constipation. No melena or hematochezia.  MUSCULOSKELETAL: Full range of motion  NEUROLOGICAL: No numbness or weakness  HEMATOLOGY: No anemia, no bleeding  SKIN: No itching, burning, rashes, petechia, or lesions  ENDOCRINE: No diabetes, no thyroid disease    Vital Signs Last 24 Hrs  T(C): 37.4 (02 Feb 2023 15:00), Max: 37.4 (01 Feb 2023 23:00)  T(F): 99.3 (02 Feb 2023 15:00), Max: 99.4 (01 Feb 2023 23:00)  HR: 69 (02 Feb 2023 15:28) (53 - 119)  RR: 20 (02 Feb 2023 15:00) (18 - 29)  SpO2: 97% (02 Feb 2023 15:28) (95% - 100%)    Parameters below as of 02 Feb 2023 09:25  Patient On (Oxygen Delivery Method): ventilator    PHYSICAL EXAM:  General: WN/WD respiratory distress  Eyes: No jaundice  Respiratory: diminished breath sounds, bilaterally.  CV: RRR, no murmurs. Tachycardia.  Abdominal: Soft, NT, ND +BS  Musculoskeletal/Extremities: full range of motion X 4, no edema  Neurology: Awake, alert, oriented x3 with choices, follows commands, PERRL, EOMI, face symmetric, bilateral ptosis improved since yesterday Neck flexion 2, neck extension 4+. R delt 3/5, biceps 4+/5, Triceps 5/5,  5/5. L delt 3/5, biceps 4/5, triceps 5/5,  5/5. BILATERAL LE 5/5. Sensations intact.  Skin: No rash, no petechia  Catheters/Tubes/Wires: Right IJ Shiley catheter.                          11.0   13.93 )-----------( 264      ( 01 Feb 2023 23:40 )             33.6       Hematocrit: 33.6 % (02-01 @ 23:40)  Hematocrit: 37.5 % (02-01 @ 03:48)  Hematocrit: 48.8 % (01-31 @ 01:13)    02-01    143  |  104  |  50<H>  ----------------------------<  198<H>  3.5   |  28  |  0.60    Ca    8.7      01 Feb 2023 23:40  Phos  2.9     02-01  Mg     2.7     02-01    TPro  6.8  /  Alb  3.0<L>  /  TBili  0.3  /  DBili  x   /  AST  14  /  ALT  9<L>  /  AlkPhos  80  02-01    Bilirubin Direct, Serum: <0.1 mg/dL (01-31 @ 15:00)                                       Patient is a 62y old  Female who presents on 01/30/23 with a chief complaint of shortness of breath.    HPI:  63yo woman w/ Myasthenia Gravis (on pyridostigmine, hospitalization every 1-2 years requiring IVIG) (dx 2015), s/p thymectomy (2010), T2DM who presents on 01/30/2023 with SOB, cough, difficulty spitting up sputum x 4 days.  was sick with COVID, so son performed home COVID test for patient which was positive. Patient's primary complaint is SOB when lying down. She has been sleeping upright. She had difficulty coughing up and spitting out sputum but today these symptoms are slightly improved. Also reports difficulty swallowing - but was able to tolerate taking oral pills. Admits to difficulty looking up, but eye drooping is overall improved from past couple of months. No blurry vision. Patient takes pyridostigmine 120mg TID regularly. Patient also has prednisone PRN for sick days-- she has been taking 20mg prednisone x past 4 days as per instruction of her neurologist.     Of note, last hospitalization was 2 years ago at [facility] during which patient was intubated -- son notes that this was likely 2/2 patient being forced to lie down flat in hospital bed, leading to aspiration. She was extubated after ~3 days. Received IVIG during that hospitalization. Patient was admitted to NSCU, upon arrival to the unit, appeared in acute respiratory distress tachypneic, hypertensive, tachycardic, hypoxic, ABG showing respiratory acidosis, immediately put on BiPAP, started on IVIG, SoluMedrol 125mg IV and remdesivir for COVID-19 infection.    Patient has experienced some improvement with IVIG, with improvement in ptosis and extremity strength but not neck flexion or respiratory status. Today we were consulted by neurology service for initiation of therapeutic plasma exchange. A right IJ Shiley catheter has been inserted and confirmed. We concur with initiation of therapeutic plasma exchange (PLEX) today.    PAST MEDICAL & SURGICAL HISTORY:  Myasthenia gravis  Diabetes mellitus    MEDICATIONS  (STANDING):  chlorhexidine 0.12% Liquid 15 milliLiter(s) Oral Mucosa every 12 hours  chlorhexidine 4% Liquid 1 Application(s) Topical two times a day  chlorhexidine 4% Liquid 1 Application(s) Topical <User Schedule>  enoxaparin Injectable 40 milliGRAM(s) SubCutaneous <User Schedule>  famotidine    Tablet 20 milliGRAM(s) Oral two times a day  immune   globulin 10% (GAMMAGARD) IVPB 25 Gram(s) IV Intermittent <User Schedule>  insulin lispro (ADMELOG) corrective regimen sliding scale   SubCutaneous every 6 hours  methylPREDNISolone sodium succinate Injectable 125 milliGRAM(s) IV Push daily  multiple electrolytes Injection Type 1 1000 milliLiter(s) (75 mL/Hr) IV Continuous <Continuous>  polyethylene glycol 3350 17 Gram(s) Oral every 12 hours  remdesivir  IVPB 100 milliGRAM(s) IV Intermittent every 24 hours  remdesivir  IVPB   IV Intermittent   senna 2 Tablet(s) Oral at bedtime    MEDICATIONS  (PRN):  fentaNYL    Injectable 25 MICROGram(s) IV Push every 2 hours PRN pain / vent synchrony  sodium chloride 0.9% lock flush 10 milliLiter(s) IV Push every 1 hour PRN Pre/post blood products, medications, blood draw, and to maintain line patency    FAMILY HISTORY:  Non-contributory.    Allergies    No Known Drug Allergies  peanuts (Unknown)    REVIEW OF SYSTEMS:  CONSTITUTIONAL: No weakness, fevers or chills.  EYES/ENT: No visual changes;  No vertigo or throat pain   RESPIRATORY: Cough ans shortness of breath. No wheezing, hemoptysis.  CARDIOVASCULAR: No chest pain or palpitations  GASTROINTESTINAL: No abdominal or epigastric pain. No nausea, vomiting, or hematemesis; No diarrhea or constipation. No melena or hematochezia.  MUSCULOSKELETAL: Full range of motion  NEUROLOGICAL: No numbness or weakness  HEMATOLOGY: No anemia, no bleeding  SKIN: No itching, burning, rashes, petechia, or lesions  ENDOCRINE: No diabetes, no thyroid disease    Vital Signs Last 24 Hrs  T(C): 37.4 (02 Feb 2023 15:00), Max: 37.4 (01 Feb 2023 23:00)  T(F): 99.3 (02 Feb 2023 15:00), Max: 99.4 (01 Feb 2023 23:00)  HR: 69 (02 Feb 2023 15:28) (53 - 119)  RR: 20 (02 Feb 2023 15:00) (18 - 29)  SpO2: 97% (02 Feb 2023 15:28) (95% - 100%)    Parameters below as of 02 Feb 2023 09:25  Patient On (Oxygen Delivery Method): ventilator    PHYSICAL EXAM:  General: WN/WD respiratory distress  Eyes: No jaundice  Respiratory: diminished breath sounds, bilaterally.  CV: RRR, no murmurs. Tachycardia.  Abdominal: Soft, NT, ND +BS  Musculoskeletal/Extremities: full range of motion X 4, no edema  Neurology: Awake, alert, oriented x3 with choices, follows commands, PERRL, EOMI, face symmetric, bilateral ptosis improved since yesterday Neck flexion 2, neck extension 4+. R delt 3/5, biceps 4+/5, Triceps 5/5,  5/5. L delt 3/5, biceps 4/5, triceps 5/5,  5/5. BILATERAL LE 5/5. Sensations intact.  Skin: No rash, no petechia  Catheters/Tubes/Wires: Right IJ Shiley catheter.                          11.0   13.93 )-----------( 264      ( 01 Feb 2023 23:40 )             33.6       Hematocrit: 33.6 % (02-01 @ 23:40)  Hematocrit: 37.5 % (02-01 @ 03:48)  Hematocrit: 48.8 % (01-31 @ 01:13)    02-01    143  |  104  |  50<H>  ----------------------------<  198<H>  3.5   |  28  |  0.60    Ca    8.7      01 Feb 2023 23:40  Phos  2.9     02-01  Mg     2.7     02-01    TPro  6.8  /  Alb  3.0<L>  /  TBili  0.3  /  DBili  x   /  AST  14  /  ALT  9<L>  /  AlkPhos  80  02-01    Bilirubin Direct, Serum: <0.1 mg/dL (01-31 @ 15:00)                                       Patient is a 62y old  Female who presents on 01/30/23 with a chief complaint of shortness of breath.    HPI:  61yo woman w/ Myasthenia Gravis (on pyridostigmine, hospitalization every 1-2 years requiring IVIG) (dx 2015), s/p thymectomy (2010), T2DM who presents on 01/30/2023 with SOB, cough, difficulty spitting up sputum x 4 days.  was sick with COVID, so son performed home COVID test for patient which was positive. Patient's primary complaint is SOB when lying down. She has been sleeping upright. She had difficulty coughing up and spitting out sputum but today these symptoms are slightly improved. Also reports difficulty swallowing - but was able to tolerate taking oral pills. Admits to difficulty looking up, but eye drooping is overall improved from past couple of months. No blurry vision. Patient takes pyridostigmine 120mg TID regularly. Patient also has prednisone PRN for sick days-- she has been taking 20mg prednisone x past 4 days as per instruction of her neurologist.     Of note, last hospitalization was 2 years ago at [facility] during which patient was intubated -- son notes that this was likely 2/2 patient being forced to lie down flat in hospital bed, leading to aspiration. She was extubated after ~3 days. Received IVIG during that hospitalization. Patient was admitted to NSCU, upon arrival to the unit, appeared in acute respiratory distress tachypneic, hypertensive, tachycardic, hypoxic, ABG showing respiratory acidosis, immediately put on BiPAP, started on IVIG, SoluMedrol 125mg IV and remdesivir for COVID-19 infection.    Patient has experienced some improvement with IVIG, with improvement in ptosis and extremity strength but not neck flexion or respiratory status. Today we were consulted by neurology service for initiation of therapeutic plasma exchange. A right IJ Shiley catheter has been inserted and confirmed. We concur with initiation of therapeutic plasma exchange (PLEX) today.    PAST MEDICAL & SURGICAL HISTORY:  Myasthenia gravis  Diabetes mellitus    MEDICATIONS  (STANDING):  chlorhexidine 0.12% Liquid 15 milliLiter(s) Oral Mucosa every 12 hours  chlorhexidine 4% Liquid 1 Application(s) Topical two times a day  chlorhexidine 4% Liquid 1 Application(s) Topical <User Schedule>  enoxaparin Injectable 40 milliGRAM(s) SubCutaneous <User Schedule>  famotidine    Tablet 20 milliGRAM(s) Oral two times a day  immune   globulin 10% (GAMMAGARD) IVPB 25 Gram(s) IV Intermittent <User Schedule>  insulin lispro (ADMELOG) corrective regimen sliding scale   SubCutaneous every 6 hours  methylPREDNISolone sodium succinate Injectable 125 milliGRAM(s) IV Push daily  multiple electrolytes Injection Type 1 1000 milliLiter(s) (75 mL/Hr) IV Continuous <Continuous>  polyethylene glycol 3350 17 Gram(s) Oral every 12 hours  remdesivir  IVPB 100 milliGRAM(s) IV Intermittent every 24 hours  remdesivir  IVPB   IV Intermittent   senna 2 Tablet(s) Oral at bedtime    MEDICATIONS  (PRN):  fentaNYL    Injectable 25 MICROGram(s) IV Push every 2 hours PRN pain / vent synchrony  sodium chloride 0.9% lock flush 10 milliLiter(s) IV Push every 1 hour PRN Pre/post blood products, medications, blood draw, and to maintain line patency    FAMILY HISTORY:  Non-contributory.    Allergies    No Known Drug Allergies  peanuts (Unknown)    REVIEW OF SYSTEMS:  CONSTITUTIONAL: No weakness, fevers or chills.  EYES/ENT: No visual changes;  No vertigo or throat pain   RESPIRATORY: Cough ans shortness of breath. No wheezing, hemoptysis.  CARDIOVASCULAR: No chest pain or palpitations  GASTROINTESTINAL: No abdominal or epigastric pain. No nausea, vomiting, or hematemesis; No diarrhea or constipation. No melena or hematochezia.  MUSCULOSKELETAL: Full range of motion  NEUROLOGICAL: No numbness or weakness  HEMATOLOGY: No anemia, no bleeding  SKIN: No itching, burning, rashes, petechia, or lesions  ENDOCRINE: No diabetes, no thyroid disease    Vital Signs Last 24 Hrs  T(C): 37.4 (02 Feb 2023 15:00), Max: 37.4 (01 Feb 2023 23:00)  T(F): 99.3 (02 Feb 2023 15:00), Max: 99.4 (01 Feb 2023 23:00)  HR: 69 (02 Feb 2023 15:28) (53 - 119)  RR: 20 (02 Feb 2023 15:00) (18 - 29)  SpO2: 97% (02 Feb 2023 15:28) (95% - 100%)    Parameters below as of 02 Feb 2023 09:25  Patient On (Oxygen Delivery Method): ventilator    PHYSICAL EXAM:  General: WN/WD respiratory distress  Eyes: No jaundice  Respiratory: diminished breath sounds, bilaterally.  CV: RRR, no murmurs. Tachycardia.  Abdominal: Soft, NT, ND +BS  Musculoskeletal/Extremities: full range of motion X 4, no edema  Neurology: Awake, alert, oriented x3 with choices, follows commands, PERRL, EOMI, face symmetric, bilateral ptosis improved since yesterday Neck flexion 2, neck extension 4+. R delt 3/5, biceps 4+/5, Triceps 5/5,  5/5. L delt 3/5, biceps 4/5, triceps 5/5,  5/5. BILATERAL LE 5/5. Sensations intact.  Skin: No rash, no petechia  Catheters/Tubes/Wires: Right IJ Shiley catheter.                          11.0   13.93 )-----------( 264      ( 01 Feb 2023 23:40 )             33.6       Hematocrit: 33.6 % (02-01 @ 23:40)  Hematocrit: 37.5 % (02-01 @ 03:48)  Hematocrit: 48.8 % (01-31 @ 01:13)    02-01    143  |  104  |  50<H>  ----------------------------<  198<H>  3.5   |  28  |  0.60    Ca    8.7      01 Feb 2023 23:40  Phos  2.9     02-01  Mg     2.7     02-01    TPro  6.8  /  Alb  3.0<L>  /  TBili  0.3  /  DBili  x   /  AST  14  /  ALT  9<L>  /  AlkPhos  80  02-01    Bilirubin Direct, Serum: <0.1 mg/dL (01-31 @ 15:00)

## 2023-02-02 NOTE — PROGRESS NOTE ADULT - SUBJECTIVE AND OBJECTIVE BOX
NEUROLOGY FOLLOW-UP CONSULT NOTE    RFC: Myasthenia gravis (MG) exacerbation    Interval history: Patient continues with intubation and has improvement in ptosis and extremity strength but not neck flexion or respiratory status. She has agreed to PLEX and will place Shiley and start today. Also receiving SoluMedrol 125mg IV and remdesivir for COVID-19 infection.    Meds:  MEDICATIONS  (STANDING):  chlorhexidine 0.12% Liquid 15 milliLiter(s) Oral Mucosa every 12 hours  chlorhexidine 4% Liquid 1 Application(s) Topical two times a day  enoxaparin Injectable 40 milliGRAM(s) SubCutaneous <User Schedule>  famotidine    Tablet 20 milliGRAM(s) Oral two times a day  immune   globulin 10% (GAMMAGARD) IVPB 25 Gram(s) IV Intermittent <User Schedule>  insulin lispro (ADMELOG) corrective regimen sliding scale   SubCutaneous every 6 hours  methylPREDNISolone sodium succinate Injectable 125 milliGRAM(s) IV Push daily  multiple electrolytes Injection Type 1 1000 milliLiter(s) (75 mL/Hr) IV Continuous <Continuous>  polyethylene glycol 3350 17 Gram(s) Oral every 12 hours  remdesivir  IVPB 100 milliGRAM(s) IV Intermittent every 24 hours  remdesivir  IVPB   IV Intermittent   senna 2 Tablet(s) Oral at bedtime    MEDICATIONS  (PRN):  fentaNYL    Injectable 25 MICROGram(s) IV Push every 2 hours PRN pain / vent synchrony    GTT:  None    PMHx/PSHx/FHx/SHx:  Shortness of breath    Handoff    MEWS Score    Myasthenia gravis    Diabetes mellitus    Shortness of breath    COVID SOB    [email or web address]        Allergies:  No Known Drug Allergies  peanuts (Unknown)      ROS: All systems negative except as documented in Interval history    O:  T(C): 37.2 (02-02-23 @ 11:00), Max: 37.4 (02-01-23 @ 15:00)  HR: 59 (02-02-23 @ 12:00) (53 - 119)  BP: --  RR: 18 (02-02-23 @ 12:00) (18 - 29)  SpO2: 99% (02-02-23 @ 12:00) (97% - 100%)    Focused neurologic exam:  MS - Intubated, not sedated, awake, attends to all stimuli b/l, can follow commands and respond to questions by shaking head "yes"/"no" appropriately but no true speech output. Due to clinical condition unable to assess (MOLLY) orientation rep/naming, attn/conc/recent and remote memory/fund of knowledge  CN - PERRL, EOM with poor upgaze and dysconjugate gaze, L > R moderate ptosis, VFF, face sens/str/hearing WNL b/l, tongue/palate midline, trap 5/5 b/l. NE 3+/5, NF 1+/5. (+) spontaneous respirations  Motor - Normal bulk and mildly dec tone. BUE's D 3+/5, RUE distal 4+/5, LUE distal 4++/5. BLE's proximal 4+/5, RLE distal 4+/5, LLE distal 4++/5  Sens - LT/PP intact all  DTR's - 2+ BUE's, 1+ KJ b/l, 0+ AJ b/l, and neutral b/l plantar response  Coord - Grossly appropriate for level of strength  Gait and station - MOLLY    Pertinent labs/studies:  CBC with inc WBC 13.93, low H/H 11/34 and MCV WNL, otherwise essentially WNL  PT/INR inc 14.3/1.24  BMP essentially WNL  Mg inc 2.7, Phos WNL  Albumin 3.0 WNL, LFT's WNL  A1C inc 6.6%, COVID-19 (+)    < from: Xray Chest 1 View- PORTABLE-Urgent (Xray Chest 1 View- PORTABLE-Urgent .) (02.01.23 @ 09:46) >  Interval development of hazy right lower lung patchy opacification on   chest x-ray 2/1/2023 4:12 AM, possibly related to atelectasis or   pneumonia.  Retrocardiac atelectasis/effusion.    < end of copied text >      < from: Xray Chest 1 View- PORTABLE-Urgent (Xray Chest 1 View- PORTABLE-Urgent .) (01.30.23 @ 14:44) >  Bibasilar linear atelectasis. Otherwise, the lungs are clear.    < end of copied text >

## 2023-02-03 LAB
ANION GAP SERPL CALC-SCNC: 10 MMOL/L — SIGNIFICANT CHANGE UP (ref 5–17)
ANION GAP SERPL CALC-SCNC: 8 MMOL/L — SIGNIFICANT CHANGE UP (ref 5–17)
BUN SERPL-MCNC: 30 MG/DL — HIGH (ref 7–23)
BUN SERPL-MCNC: 41 MG/DL — HIGH (ref 7–23)
CALCIUM SERPL-MCNC: 8.1 MG/DL — LOW (ref 8.4–10.5)
CALCIUM SERPL-MCNC: 8.2 MG/DL — LOW (ref 8.4–10.5)
CHLORIDE SERPL-SCNC: 110 MMOL/L — HIGH (ref 96–108)
CHLORIDE SERPL-SCNC: 112 MMOL/L — HIGH (ref 96–108)
CO2 SERPL-SCNC: 23 MMOL/L — SIGNIFICANT CHANGE UP (ref 22–31)
CO2 SERPL-SCNC: 28 MMOL/L — SIGNIFICANT CHANGE UP (ref 22–31)
CREAT SERPL-MCNC: 0.38 MG/DL — LOW (ref 0.5–1.3)
CREAT SERPL-MCNC: 0.49 MG/DL — LOW (ref 0.5–1.3)
EGFR: 106 ML/MIN/1.73M2 — SIGNIFICANT CHANGE UP
EGFR: 113 ML/MIN/1.73M2 — SIGNIFICANT CHANGE UP
GAS PNL BLDA: SIGNIFICANT CHANGE UP
GLUCOSE BLDC GLUCOMTR-MCNC: 191 MG/DL — HIGH (ref 70–99)
GLUCOSE BLDC GLUCOMTR-MCNC: 210 MG/DL — HIGH (ref 70–99)
GLUCOSE BLDC GLUCOMTR-MCNC: 254 MG/DL — HIGH (ref 70–99)
GLUCOSE SERPL-MCNC: 163 MG/DL — HIGH (ref 70–99)
GLUCOSE SERPL-MCNC: 222 MG/DL — HIGH (ref 70–99)
HCT VFR BLD CALC: 31.6 % — LOW (ref 34.5–45)
HGB BLD-MCNC: 10.2 G/DL — LOW (ref 11.5–15.5)
MAGNESIUM SERPL-MCNC: 2.4 MG/DL — SIGNIFICANT CHANGE UP (ref 1.6–2.6)
MCHC RBC-ENTMCNC: 28.5 PG — SIGNIFICANT CHANGE UP (ref 27–34)
MCHC RBC-ENTMCNC: 32.3 GM/DL — SIGNIFICANT CHANGE UP (ref 32–36)
MCV RBC AUTO: 88.3 FL — SIGNIFICANT CHANGE UP (ref 80–100)
NRBC # BLD: 0 /100 WBCS — SIGNIFICANT CHANGE UP (ref 0–0)
PHOSPHATE SERPL-MCNC: 3.1 MG/DL — SIGNIFICANT CHANGE UP (ref 2.5–4.5)
PLATELET # BLD AUTO: 218 K/UL — SIGNIFICANT CHANGE UP (ref 150–400)
POTASSIUM SERPL-MCNC: 3.9 MMOL/L — SIGNIFICANT CHANGE UP (ref 3.5–5.3)
POTASSIUM SERPL-MCNC: 4.4 MMOL/L — SIGNIFICANT CHANGE UP (ref 3.5–5.3)
POTASSIUM SERPL-SCNC: 3.9 MMOL/L — SIGNIFICANT CHANGE UP (ref 3.5–5.3)
POTASSIUM SERPL-SCNC: 4.4 MMOL/L — SIGNIFICANT CHANGE UP (ref 3.5–5.3)
RBC # BLD: 3.58 M/UL — LOW (ref 3.8–5.2)
RBC # FLD: 13.2 % — SIGNIFICANT CHANGE UP (ref 10.3–14.5)
SODIUM SERPL-SCNC: 145 MMOL/L — SIGNIFICANT CHANGE UP (ref 135–145)
SODIUM SERPL-SCNC: 146 MMOL/L — HIGH (ref 135–145)
WBC # BLD: 7.28 K/UL — SIGNIFICANT CHANGE UP (ref 3.8–10.5)
WBC # FLD AUTO: 7.28 K/UL — SIGNIFICANT CHANGE UP (ref 3.8–10.5)

## 2023-02-03 PROCEDURE — 99291 CRITICAL CARE FIRST HOUR: CPT

## 2023-02-03 PROCEDURE — 71045 X-RAY EXAM CHEST 1 VIEW: CPT | Mod: 26

## 2023-02-03 PROCEDURE — 93306 TTE W/DOPPLER COMPLETE: CPT | Mod: 26

## 2023-02-03 RX ORDER — POTASSIUM CHLORIDE 20 MEQ
20 PACKET (EA) ORAL ONCE
Refills: 0 | Status: COMPLETED | OUTPATIENT
Start: 2023-02-03 | End: 2023-02-04

## 2023-02-03 RX ORDER — FENTANYL CITRATE 50 UG/ML
50 INJECTION INTRAVENOUS
Refills: 0 | Status: DISCONTINUED | OUTPATIENT
Start: 2023-02-03 | End: 2023-02-09

## 2023-02-03 RX ORDER — HYDRALAZINE HCL 50 MG
10 TABLET ORAL ONCE
Refills: 0 | Status: COMPLETED | OUTPATIENT
Start: 2023-02-03 | End: 2023-02-03

## 2023-02-03 RX ORDER — HUMAN INSULIN 100 [IU]/ML
6 INJECTION, SUSPENSION SUBCUTANEOUS EVERY 6 HOURS
Refills: 0 | Status: DISCONTINUED | OUTPATIENT
Start: 2023-02-03 | End: 2023-02-03

## 2023-02-03 RX ORDER — PSYLLIUM SEED (WITH DEXTROSE)
1 POWDER (GRAM) ORAL THREE TIMES A DAY
Refills: 0 | Status: DISCONTINUED | OUTPATIENT
Start: 2023-02-03 | End: 2023-02-06

## 2023-02-03 RX ORDER — SODIUM CHLORIDE 9 MG/ML
1000 INJECTION, SOLUTION INTRAVENOUS ONCE
Refills: 0 | Status: COMPLETED | OUTPATIENT
Start: 2023-02-03 | End: 2023-02-03

## 2023-02-03 RX ORDER — FENTANYL CITRATE 50 UG/ML
25 INJECTION INTRAVENOUS ONCE
Refills: 0 | Status: DISCONTINUED | OUTPATIENT
Start: 2023-02-03 | End: 2023-02-03

## 2023-02-03 RX ORDER — HUMAN INSULIN 100 [IU]/ML
10 INJECTION, SUSPENSION SUBCUTANEOUS EVERY 6 HOURS
Refills: 0 | Status: DISCONTINUED | OUTPATIENT
Start: 2023-02-03 | End: 2023-02-06

## 2023-02-03 RX ADMIN — Medication 2: at 05:41

## 2023-02-03 RX ADMIN — Medication 4: at 17:37

## 2023-02-03 RX ADMIN — ENOXAPARIN SODIUM 40 MILLIGRAM(S): 100 INJECTION SUBCUTANEOUS at 17:25

## 2023-02-03 RX ADMIN — FENTANYL CITRATE 25 MICROGRAM(S): 50 INJECTION INTRAVENOUS at 11:20

## 2023-02-03 RX ADMIN — FENTANYL CITRATE 25 MICROGRAM(S): 50 INJECTION INTRAVENOUS at 16:30

## 2023-02-03 RX ADMIN — FENTANYL CITRATE 25 MICROGRAM(S): 50 INJECTION INTRAVENOUS at 11:05

## 2023-02-03 RX ADMIN — HUMAN INSULIN 6 UNIT(S): 100 INJECTION, SUSPENSION SUBCUTANEOUS at 17:36

## 2023-02-03 RX ADMIN — FENTANYL CITRATE 25 MICROGRAM(S): 50 INJECTION INTRAVENOUS at 16:15

## 2023-02-03 RX ADMIN — CHLORHEXIDINE GLUCONATE 15 MILLILITER(S): 213 SOLUTION TOPICAL at 17:25

## 2023-02-03 RX ADMIN — FENTANYL CITRATE 25 MICROGRAM(S): 50 INJECTION INTRAVENOUS at 14:56

## 2023-02-03 RX ADMIN — Medication 1 PACKET(S): at 14:27

## 2023-02-03 RX ADMIN — Medication 6: at 13:00

## 2023-02-03 RX ADMIN — CHLORHEXIDINE GLUCONATE 1 APPLICATION(S): 213 SOLUTION TOPICAL at 05:34

## 2023-02-03 RX ADMIN — FAMOTIDINE 20 MILLIGRAM(S): 10 INJECTION INTRAVENOUS at 17:25

## 2023-02-03 RX ADMIN — CHLORHEXIDINE GLUCONATE 15 MILLILITER(S): 213 SOLUTION TOPICAL at 05:33

## 2023-02-03 RX ADMIN — HUMAN INSULIN 4 UNIT(S): 100 INJECTION, SUSPENSION SUBCUTANEOUS at 05:41

## 2023-02-03 RX ADMIN — SODIUM CHLORIDE 75 MILLILITER(S): 9 INJECTION, SOLUTION INTRAVENOUS at 17:42

## 2023-02-03 RX ADMIN — FAMOTIDINE 20 MILLIGRAM(S): 10 INJECTION INTRAVENOUS at 05:33

## 2023-02-03 RX ADMIN — Medication 125 MILLIGRAM(S): at 05:33

## 2023-02-03 RX ADMIN — SODIUM CHLORIDE 1000 MILLILITER(S): 9 INJECTION, SOLUTION INTRAVENOUS at 18:51

## 2023-02-03 RX ADMIN — FENTANYL CITRATE 50 MICROGRAM(S): 50 INJECTION INTRAVENOUS at 22:07

## 2023-02-03 RX ADMIN — Medication 10 MILLIGRAM(S): at 17:21

## 2023-02-03 RX ADMIN — REMDESIVIR 200 MILLIGRAM(S): 5 INJECTION INTRAVENOUS at 17:25

## 2023-02-03 RX ADMIN — FENTANYL CITRATE 50 MICROGRAM(S): 50 INJECTION INTRAVENOUS at 21:00

## 2023-02-03 RX ADMIN — HUMAN INSULIN 6 UNIT(S): 100 INJECTION, SUSPENSION SUBCUTANEOUS at 13:00

## 2023-02-03 NOTE — PROGRESS NOTE ADULT - SUBJECTIVE AND OBJECTIVE BOX
SUMMARY:   62-year-old woman with DMII, myasthenia gravis status post thymectomy and prior crises status post IVIG developed MG crisis in setting of COVID-19 infection requiring intubation status post IVIG x 1 and PLEX.     24 HOUR EVENTS:  Insulin dose adjusted for hyperglycemia.     VITALS/DATA/ORDERS: [x] Reviewed    EXAMINATION:   Intubated, nods appropriately, +cough/gag, no ptosis, deltoids 3/5, neck flexion/extension 4/5, otherwise full strength

## 2023-02-03 NOTE — OCCUPATIONAL THERAPY INITIAL EVALUATION ADULT - NSOTDISCHREC_GEN_A_CORE
anticipate pt able to progress to home pending further hospital course however at this time recommending acute rehab/Acute Inpatient Rehab

## 2023-02-03 NOTE — PROGRESS NOTE ADULT - SUBJECTIVE AND OBJECTIVE BOX
NEUROLOGY FOLLOW-UP CONSULT NOTE    RFC: Myasthenia gravis (MG) exacerbation    Interval history: Patient continues with intubation and has improvement in ptosis, extremity strength, and now neck flexion but unclear respiratory status. Had Krystal placed and first session of PLEX on 2/2, tolerated without incident. Continues to receive SoluMedrol 125mg IV and remdesivir for COVID-19 infection.    Meds:  MEDICATIONS  (STANDING):  chlorhexidine 0.12% Liquid 15 milliLiter(s) Oral Mucosa every 12 hours  chlorhexidine 4% Liquid 1 Application(s) Topical <User Schedule>  enoxaparin Injectable 40 milliGRAM(s) SubCutaneous <User Schedule>  famotidine    Tablet 20 milliGRAM(s) Oral two times a day  insulin lispro (ADMELOG) corrective regimen sliding scale   SubCutaneous every 6 hours  insulin NPH human recombinant 6 Unit(s) SubCutaneous every 6 hours  methylPREDNISolone sodium succinate Injectable 125 milliGRAM(s) IV Push daily  multiple electrolytes Injection Type 1 1000 milliLiter(s) (75 mL/Hr) IV Continuous <Continuous>  psyllium Powder 1 Packet(s) Oral three times a day  remdesivir  IVPB   IV Intermittent   remdesivir  IVPB 100 milliGRAM(s) IV Intermittent every 24 hours    MEDICATIONS  (PRN):  fentaNYL    Injectable 25 MICROGram(s) IV Push every 2 hours PRN pain / vent synchrony  sodium chloride 0.9% lock flush 10 milliLiter(s) IV Push every 1 hour PRN Pre/post blood products, medications, blood draw, and to maintain line patency    GTT:  None    PMHx/PSHx/FHx/SHx:  Shortness of breath    Handoff    MEWS Score    Myasthenia gravis    Diabetes mellitus    Shortness of breath    COVID SOB    [email or web address]        Allergies:  No Known Drug Allergies  peanuts (Unknown)      ROS: All systems negative except as documented in Interval history    O:  T(C): 37.1 (02-03-23 @ 11:00), Max: 37.4 (02-02-23 @ 15:00)  HR: 70 (02-03-23 @ 11:15) (55 - 105)  BP: --  RR: 17 (02-03-23 @ 11:15) (17 - 22)  SpO2: 97% (02-03-23 @ 11:15) (95% - 100%)    Focused neurologic exam:  MS - Intubated, not sedated, awake, attends to all stimuli b/l, can follow commands and respond to questions by shaking head "yes"/"no" appropriately but no true speech output. Due to clinical condition unable to assess (MOLLY) orientation rep/naming, attn/conc/recent and remote memory/fund of knowledge  CN - PERRL, EOM with dec upgaze and dysconjugate gaze (improved from 2/2), L > R moderate ptosis, VFF, face sens/str/hearing WNL b/l, tongue/palate midline, trap 5/5 b/l. NE 4/5, NF 2/5. (-) spontaneous respirations (vent rate 18 bpm), (-) cough  Motor - Normal bulk and mildly dec tone. BUE's D 4/5, RUE distal 4++/5, LUE distal 5/5. BLE's proximal 4++/5, RLE distal 4++/5, LLE distal 5/5  Sens - LT/PP intact all  DTR's - 2+ BUE's, 1+ KJ b/l, 0+ AJ b/l, and neutral b/l plantar response  Coord - Grossly appropriate for level of strength  Gait and station - MOLLY    Pertinent labs/studies:  CBC with low H/H 10/32 and MCV WNL, otherwise essentially WNL  PT/INR inc 15.6/1.35, PTT dec 27.2  BMP essentially WNL  Mg WNL, Phos WNL  Albumin 3.0 WNL, LFT's WNL  A1C inc 6.6%, COVID-19 (+)    < from: Xray Chest 1 View- PORTABLE-Urgent (Xray Chest 1 View- PORTABLE-Urgent .) (02.01.23 @ 09:46) >  Interval development of hazy right lower lung patchy opacification on   chest x-ray 2/1/2023 4:12 AM, possibly related to atelectasis or   pneumonia.  Retrocardiac atelectasis/effusion.    < end of copied text >      < from: Xray Chest 1 View- PORTABLE-Urgent (Xray Chest 1 View- PORTABLE-Urgent .) (01.30.23 @ 14:44) >  Bibasilar linear atelectasis. Otherwise, the lungs are clear.    < end of copied text >    < from: Transthoracic Echocardiogram (02.03.23 @ 09:14) >  Technically difficult, focused study to evaluate LV and RV  systolic function.  1. Tethered mitral valve leaflets with normal opening.  Mild-moderate mitral regurgitation.  2. Normal trileaflet aortic valve. Minimal aortic  regurgitation.  3. Left ventricle suboptimally visualized; the apex is  foreshortened on apical images. Grossly, moderate global  left ventricular systolic dysfunction.  4. The right ventricle is not well visualized; grossly  normal right ventricular size and systolic function.    < end of copied text > NEUROLOGY FOLLOW-UP CONSULT NOTE    RFC: Myasthenia gravis (MG) exacerbation    Interval history: Patient continues with intubation and has improvement in ptosis, extremity strength, and now neck flexion but unclear respiratory status. Had Krysatl placed and first session of PLEX on 2/2, tolerated without incident. Continues to receive SoluMedrol 125mg IV and remdesivir for COVID-19 infection.    Meds:  MEDICATIONS  (STANDING):  chlorhexidine 0.12% Liquid 15 milliLiter(s) Oral Mucosa every 12 hours  chlorhexidine 4% Liquid 1 Application(s) Topical <User Schedule>  enoxaparin Injectable 40 milliGRAM(s) SubCutaneous <User Schedule>  famotidine    Tablet 20 milliGRAM(s) Oral two times a day  insulin lispro (ADMELOG) corrective regimen sliding scale   SubCutaneous every 6 hours  insulin NPH human recombinant 6 Unit(s) SubCutaneous every 6 hours  methylPREDNISolone sodium succinate Injectable 125 milliGRAM(s) IV Push daily  multiple electrolytes Injection Type 1 1000 milliLiter(s) (75 mL/Hr) IV Continuous <Continuous>  psyllium Powder 1 Packet(s) Oral three times a day  remdesivir  IVPB   IV Intermittent   remdesivir  IVPB 100 milliGRAM(s) IV Intermittent every 24 hours    MEDICATIONS  (PRN):  fentaNYL    Injectable 25 MICROGram(s) IV Push every 2 hours PRN pain / vent synchrony  sodium chloride 0.9% lock flush 10 milliLiter(s) IV Push every 1 hour PRN Pre/post blood products, medications, blood draw, and to maintain line patency    GTT:  None    PMHx/PSHx/FHx/SHx:  Shortness of breath    Handoff    MEWS Score    Myasthenia gravis    Diabetes mellitus    Shortness of breath    COVID SOB    [email or web address]        Allergies:  No Known Drug Allergies  peanuts (Unknown)      ROS: All systems negative except as documented in Interval history    O:  T(C): 37.1 (02-03-23 @ 11:00), Max: 37.4 (02-02-23 @ 15:00)  HR: 70 (02-03-23 @ 11:15) (55 - 105)  BP: --  RR: 17 (02-03-23 @ 11:15) (17 - 22)  SpO2: 97% (02-03-23 @ 11:15) (95% - 100%)    Focused neurologic exam:  MS - Intubated, not sedated, awake, attends to all stimuli b/l, can follow commands and respond to questions by shaking head "yes"/"no" appropriately but no true speech output. Due to clinical condition unable to assess (MOLLY) orientation rep/naming, attn/conc/recent and remote memory/fund of knowledge  CN - PERRL, EOM with dec upgaze and dysconjugate gaze (improved from 2/2), L > R moderate ptosis, VFF, face sens/str/hearing WNL b/l, tongue/palate midline, trap 5/5 b/l. NE 4/5, NF 2/5. (-) spontaneous respirations (vent rate 18 bpm), (-) cough  Motor - Normal bulk and mildly dec tone. BUE's D 4/5, RUE distal 4++/5, LUE distal 5/5. BLE's proximal 4++/5, RLE distal 4++/5, LLE distal 5/5  Sens - LT/PP intact all  DTR's - 2+ BUE's, 1+ KJ b/l, 0+ AJ b/l, and neutral b/l plantar response  Coord - Grossly appropriate for level of strength  Gait and station - MOLLY    Pertinent labs/studies:  CBC with low H/H 10/32 and MCV WNL, otherwise essentially WNL  PT/INR inc 15.6/1.35, PTT dec 27.2  BMP essentially WNL  Mg WNL, Phos WNL  Albumin 3.0 WNL, LFT's WNL  A1C inc 6.6%, COVID-19 (+)    < from: Xray Chest 1 View- PORTABLE-Urgent (Xray Chest 1 View- PORTABLE-Urgent .) (02.01.23 @ 09:46) >  Interval development of hazy right lower lung patchy opacification on   chest x-ray 2/1/2023 4:12 AM, possibly related to atelectasis or   pneumonia.  Retrocardiac atelectasis/effusion.    < end of copied text >      < from: Xray Chest 1 View- PORTABLE-Urgent (Xray Chest 1 View- PORTABLE-Urgent .) (01.30.23 @ 14:44) >  Bibasilar linear atelectasis. Otherwise, the lungs are clear.    < end of copied text >    < from: Transthoracic Echocardiogram (02.03.23 @ 09:14) >  Technically difficult, focused study to evaluate LV and RV  systolic function.  1. Tethered mitral valve leaflets with normal opening.  Mild-moderate mitral regurgitation.  2. Normal trileaflet aortic valve. Minimal aortic  regurgitation.  3. Left ventricle suboptimally visualized; the apex is  foreshortened on apical images. Grossly, moderate global  left ventricular systolic dysfunction.  4. The right ventricle is not well visualized; grossly  normal right ventricular size and systolic function.    < end of copied text >

## 2023-02-03 NOTE — OCCUPATIONAL THERAPY INITIAL EVALUATION ADULT - PERTINENT HX OF CURRENT PROBLEM, REHAB EVAL
63 yo woman with DM and Myasthenia gravis s/p thymectomy 2010, with prior exacerbations requiring intubation (last 2021), now admitted 1/30 with SOB, weak cough, dysphagia and double vision, found to be COVID positive, concerning for MG exacerbation. Started on IVIG, s/p 3 doses. Initially on Bipap but intubated 2/1 for worsening respiratory failure.  S/p PLEX 1/5 sessions. 61 yo woman with DM and Myasthenia gravis s/p thymectomy 2010, with prior exacerbations requiring intubation (last 2021), now admitted 1/30 with SOB, weak cough, dysphagia and double vision, found to be COVID positive, concerning for MG exacerbation. Started on IVIG, s/p 3 doses. Initially on Bipap but intubated 2/1 for worsening respiratory failure.  S/p PLEX 1/5 sessions.

## 2023-02-03 NOTE — PROGRESS NOTE ADULT - ATTENDING COMMENTS
61 yo woman with DM and Myasthenia gravis s/p thymectomy 2010, with prior exacerbations requiring intubation (last 2021), now admitted 1/30 with SOB, weak cough, dysphagia and double vision, found to be COVID positive, concerning for MG exacerbation. Started on IVIG due to refusal of PLEX, s/p 3 doses. Initially on Bipap but intubated 2/1 for worsening respiratory failure.    TTE with EF 35-40%, mild-moderate MR.     On ACVC.  Afebrile, leukocytosis improving.     On exam, intubated, alert, communicates by nodding, oriented x 3 with choices, with ptosis, neck flexion 2/5, neck extension 4+/5, R arm stronger than L, LEs 5/5 (please see fellow documentation for detailed motor exam)     s/p PLEX 1/5 sessions   c/w solumedrol 125mg daily (10 days total)  currently off sedation (with bradycardia), fentanyl PRN  MAP goal >65  CPAP trials   for MG, avoid Mg, beta blockers, NMB, microlides and fluroquinolones   on TF  c/w famotidine   senna and miralax, LBM 2/3  IVF  NPH 4U q6h, ISS (HgA1C 6.6)  remdesevir   Lov ppx, LE US neg 1/31    Patient seen and examined by attending on 2/3/2023.    Patient is critically ill due to myasthenia gravis exacerbation and at high risk for neurological deterioration or death due to: hypoxic and hypercarbic respiratory failure requiring mechanical ventilation. 63 yo woman with DM and Myasthenia gravis s/p thymectomy 2010, with prior exacerbations requiring intubation (last 2021), now admitted 1/30 with SOB, weak cough, dysphagia and double vision, found to be COVID positive, concerning for MG exacerbation. Started on IVIG due to refusal of PLEX, s/p 3 doses. Initially on Bipap but intubated 2/1 for worsening respiratory failure.    TTE with EF 35-40%, mild-moderate MR.     On ACVC.  Afebrile, leukocytosis improving.     On exam, intubated, alert, communicates by nodding, oriented x 3 with choices, with ptosis, neck flexion 2/5, neck extension 4+/5, R arm stronger than L, LEs 5/5 (please see fellow documentation for detailed motor exam)     s/p PLEX 1/5 sessions   c/w solumedrol 125mg daily (10 days total)  currently off sedation (with bradycardia), fentanyl PRN  MAP goal >65  CPAP trials   for MG, avoid Mg, beta blockers, NMB, microlides and fluroquinolones   on TF  c/w famotidine   senna and miralax, LBM 2/3  IVF  NPH 4U q6h, ISS (HgA1C 6.6)  remdesevir   Lov ppx, LE US neg 1/31    Patient seen and examined by attending on 2/3/2023.    Patient is critically ill due to myasthenia gravis exacerbation and at high risk for neurological deterioration or death due to: hypoxic and hypercarbic respiratory failure requiring mechanical ventilation.

## 2023-02-03 NOTE — PROGRESS NOTE ADULT - ASSESSMENT
Myasthenia gravis (MG) exacerbation with weakness, ptosis, and acute hypoxic and hypercarbic respiratory failure  COVID-19 infection  DM type 2 (steroid induced?)    - Likely MG exacerbation/crisis triggered by current COVID-19 infection. Her VC and NIF were low and she has since been intubated. She seems more agreeable to going with PLEX at this time  - Had extensive talk with patient and son on 1/30 regarding treatment and our recommendation for PLEX given significant respiratory involvement and wanted to avoid intubation. However, they were insistent upon IVIG. Gave IVIG 0.4g/kg/day 3/5 doses and then switched to PLEX due to worsening respiratory status  - Receiving PLEX and tolerating with some improvement already. She is s/p 1/5 sessions and next session on 2/4  - OK to continue current steroid dose of SoluMedrol 125mg IV daily x 10 days for MG exacerbation  - Hold home Mestinon and steroids for now. Defer to ICU as to need for stress dose remdesivir with current COVID-19 infection  - PT/OT/ST evaluation  - Continue to address above medical issues, as you are doing  - Will continue to follow patient with you but on Monday (2/6). Please call [phone]) with additional questions or concerns in the interim Myasthenia gravis (MG) exacerbation with weakness, ptosis, and acute hypoxic and hypercarbic respiratory failure  COVID-19 infection  DM type 2 (steroid induced?)    - Likely MG exacerbation/crisis triggered by current COVID-19 infection. Her VC and NIF were low and she has since been intubated. She seems more agreeable to going with PLEX at this time  - Had extensive talk with patient and son on 1/30 regarding treatment and our recommendation for PLEX given significant respiratory involvement and wanted to avoid intubation. However, they were insistent upon IVIG. Gave IVIG 0.4g/kg/day 3/5 doses and then switched to PLEX due to worsening respiratory status  - Receiving PLEX and tolerating with some improvement already. She is s/p 1/5 sessions and next session on 2/4  - OK to continue current steroid dose of SoluMedrol 125mg IV daily x 10 days for MG exacerbation  - Hold home Mestinon and steroids for now. Defer to ICU as to need for stress dose remdesivir with current COVID-19 infection  - PT/OT/ST evaluation  - Continue to address above medical issues, as you are doing  - Will continue to follow patient with you but on Monday (2/6). Please call [phone]) with additional questions or concerns in the interim Myasthenia gravis (MG) exacerbation with weakness, ptosis, and acute hypoxic and hypercarbic respiratory failure  COVID-19 infection  DM type 2 (steroid induced?)    - Likely MG exacerbation/crisis triggered by current COVID-19 infection. Her VC and NIF were low and she has since been intubated. She seems more agreeable to going with PLEX at this time  - Had extensive talk with patient and son on 1/30 regarding treatment and our recommendation for PLEX given significant respiratory involvement and wanted to avoid intubation. However, they were insistent upon IVIG. Gave IVIG 0.4g/kg/day 3/5 doses and then switched to PLEX due to worsening respiratory status  - Receiving PLEX and tolerating with some improvement already. She is s/p 1/5 sessions and next session on 2/4  - OK to continue current steroid dose of SoluMedrol 125mg IV daily x 10 days for MG exacerbation  - Hold home Mestinon and steroids for now. Defer to ICU as to need for stress dose remdesivir with current COVID-19 infection  - PT/OT/ST evaluation  - Continue to address above medical issues, as you are doing  - Will continue to follow patient with you but on Monday (2/6). Please call [phone]) with additional questions or concerns in the interim

## 2023-02-03 NOTE — OCCUPATIONAL THERAPY INITIAL EVALUATION ADULT - ADL RETRAINING, OT EVAL
GOAL: Patient will be independent with UB dressing within 4 weeks GOAL: Pt will perform LB dressing independently in 4 weeks

## 2023-02-03 NOTE — PROGRESS NOTE ADULT - ASSESSMENT
ASSESSMENT: Myasthenic crisis    PLAN:  PLEX  Steroids  Vent support, wean as tolerated  -160mmHg  Feeding: [] diet [x] tube feeds  Thromboprophylaxis: [x] SCDs [x] chemoprophylaxis [] hold chemoprophylaxis due to: [] high risk of DVT/PE on admission due to:  Head of Bed/Activity: [x] 30 degrees [] mobilize as tolerated [] PT [] OT [] PMNR  Ulcer prophylaxis  Glucose Control: Goal -180 [] RISS [] other: Adjust NPH as needed for glycemic cntrol  COVID - remdesivir    [x] Patient critically ill due to: acute resp failure requiring intubation    Contact: [phone] ASSESSMENT: Myasthenic crisis    PLAN:  PLEX  Steroids  Vent support, wean as tolerated  -160mmHg  Feeding: [] diet [x] tube feeds  Thromboprophylaxis: [x] SCDs [x] chemoprophylaxis [] hold chemoprophylaxis due to: [] high risk of DVT/PE on admission due to:  Head of Bed/Activity: [x] 30 degrees [] mobilize as tolerated [] PT [] OT [] PMNR  Ulcer prophylaxis  Glucose Control: Goal -180 [] RISS [] other: Adjust NPH as needed for glycemic cntrol  COVID - remdesivir    [x] Patient critically ill due to: acute resp failure requiring intubation    Contact: [phone] ASSESSMENT: Myasthenic crisis    PLAN:  PLEX  Steroids  Vent support, wean as tolerated  -160mmHg  Feeding: [] diet [x] tube feeds  Thromboprophylaxis: [x] SCDs [x] chemoprophylaxis [] hold chemoprophylaxis due to: [] high risk of DVT/PE on admission due to:  Head of Bed/Activity: [x] 30 degrees [] mobilize as tolerated [] PT [] OT [] PMNR  Ulcer prophylaxis  Glucose Control: Goal -180 [] RISS [] other: Adjust NPH as needed for glycemic cntrol  COVID - remdesivir    [x] Patient critically ill due to: acute resp failure requiring intubation    Contact: [phone]

## 2023-02-03 NOTE — OCCUPATIONAL THERAPY INITIAL EVALUATION ADULT - ORIENTATION, REHAB EVAL
when provided yes/no choices due to inability to verbalize while orally intubated/oriented to person, place, time and situation

## 2023-02-03 NOTE — PROGRESS NOTE ADULT - ASSESSMENT
65 yo female with a long history of Myasthenia Gravis , T2DM,,prior thymectomy who was admitted with respiratory difficulty and weakness.  She is maintained on pyridostigmine and PRN prednisone, this had been added recently for MG flare.  History of prior need for ventilator.  Home exposure to Covid, she tested positive on admission.  Suspect respiratory failure on MG basis.  Suggest:  1.Limit RDV to 5 days  2. Steroid per MG flare  3 S/P IVIG as well as PLEX  4.Vent per neurology  5.Monitor for evidence of secondary infection. 63 yo female with a long history of Myasthenia Gravis , T2DM,,prior thymectomy who was admitted with respiratory difficulty and weakness.  She is maintained on pyridostigmine and PRN prednisone, this had been added recently for MG flare.  History of prior need for ventilator.  Home exposure to Covid, she tested positive on admission.  Suspect respiratory failure on MG basis.  Suggest:  1.Limit RDV to 5 days  2. Steroid per MG flare  3 S/P IVIG as well as PLEX  4.Vent per neurology  5.Monitor for evidence of secondary infection.

## 2023-02-03 NOTE — PROGRESS NOTE ADULT - ASSESSMENT
ASSESSMENT/PLAN: MG crisis, covid +    NEURO:   checks Q1  neurology consulted, will start her on PLEX today  c/w IVIG 25g (1/31--  Started on solumedrol 125 mg daily  Avoid medications that may worsen the current exacerbation including macrolides, fluoroquinolones, tetracyclines, aminoglycoside, beta-blockers, magnesium, and anti-arrhythmics (procainamide, quinidine, and lidocaine)  Pain management with fentanyl pushes  Activity: [] mobilize as tolerated [x] Bedrest [] PT [] OT [] PMNR    PULM:  Intubated for airway protection, will CPAP trial  Adjusted the vent settings based on ABG  ABG 7.5/39/124/30  Daily CXR and ABG while intubated    CV:  MAP >65  Will order TTE    RENAL:  Fluids: IVF with plasmalytes at 75 cc/hour  Will give her a bolus     GI:  Diet: TF at goal  GI prophylaxis [] not indicated [x] famotidine [] other:  Bowel regimen [] colace [x] senna [x] other: miralax   No BM yet    ENDO:   A1c 6.6  Goal euglycemia (-180)  RISS for now      HEME/ONC:  H/H stable   VTE prophylaxis: [x] SCDs [x] chemoprophylaxis  lovenox [] hold chemoprophylaxis due to: [] high risk of DVT/PE on admission due to:    ID: covid + supportive care and solumedrol + remdesivir   Elevated leukocytosis trending down, likely in setting of MG exacerbation and COVID-19 infection  monitor for fevers   ID on board    MISC:    SOCIAL/FAMILY:  [] awaiting [x] updated at bedside [] family meeting    CODE STATUS:  [x] Full Code [] DNR [] DNI [] Palliative/Comfort Care    DISPOSITION:  [x] ICU [] Stroke Unit [] Floor [] EMU [] RCU [] PCU    [x] Patient is at high risk of neurologic deterioration/death due to: MG crisis, hypercapnic respiratory failure       Contact: [phone] ASSESSMENT/PLAN: MG crisis, covid +    NEURO:   checks Q1  neurology consulted, will start her on PLEX today  c/w IVIG 25g (1/31--  Started on solumedrol 125 mg daily  Avoid medications that may worsen the current exacerbation including macrolides, fluoroquinolones, tetracyclines, aminoglycoside, beta-blockers, magnesium, and anti-arrhythmics (procainamide, quinidine, and lidocaine)  Pain management with fentanyl pushes  Activity: [] mobilize as tolerated [x] Bedrest [] PT [] OT [] PMNR    PULM:  Intubated for airway protection, will CPAP trial  Adjusted the vent settings based on ABG  ABG 7.5/39/124/30  Daily CXR and ABG while intubated    CV:  MAP >65  Will order TTE    RENAL:  Fluids: IVF with plasmalytes at 75 cc/hour  Will give her a bolus     GI:  Diet: TF at goal  GI prophylaxis [] not indicated [x] famotidine [] other:  Bowel regimen [] colace [x] senna [x] other: miralax   No BM yet    ENDO:   A1c 6.6  Goal euglycemia (-180)  RISS for now      HEME/ONC:  H/H stable   VTE prophylaxis: [x] SCDs [x] chemoprophylaxis  lovenox [] hold chemoprophylaxis due to: [] high risk of DVT/PE on admission due to:    ID: covid + supportive care and solumedrol + remdesivir   Elevated leukocytosis trending down, likely in setting of MG exacerbation and COVID-19 infection  monitor for fevers   ID on board    MISC:    SOCIAL/FAMILY:  [] awaiting [x] updated at bedside [] family meeting    CODE STATUS:  [x] Full Code [] DNR [] DNI [] Palliative/Comfort Care    DISPOSITION:  [x] ICU [] Stroke Unit [] Floor [] EMU [] RCU [] PCU    [x] Patient is at high risk of neurologic deterioration/death due to: MG crisis, hypercapnic respiratory failure       Contact: [phone] ASSESSMENT/PLAN: MG crisis, covid +    NEURO:   checks Q1  neurology consulted, will start her on PLEX today  c/w IVIG 25g (1/31--  Started on solumedrol 125 mg daily  Avoid medications that may worsen the current exacerbation including macrolides, fluoroquinolones, tetracyclines, aminoglycoside, beta-blockers, magnesium, and anti-arrhythmics (procainamide, quinidine, and lidocaine)  Pain management with fentanyl pushes  Activity: [] mobilize as tolerated [x] Bedrest [] PT [] OT [] PMNR    PULM:  Intubated for airway protection, will CPAP trial  Adjusted the vent settings based on ABG  ABG 7.5/39/124/30  Daily CXR and ABG while intubated    CV:  MAP >65  Will order TTE    RENAL:  Fluids: IVF with plasmalytes at 75 cc/hour  Will give her a bolus     GI:  Diet: TF at goal  GI prophylaxis [] not indicated [x] famotidine [] other:  Bowel regimen [] colace [x] senna [x] other: miralax   No BM yet    ENDO:   A1c 6.6  Goal euglycemia (-180)  RISS for now      HEME/ONC:  H/H stable   VTE prophylaxis: [x] SCDs [x] chemoprophylaxis  lovenox [] hold chemoprophylaxis due to: [] high risk of DVT/PE on admission due to:    ID: covid + supportive care and solumedrol + remdesivir   Elevated leukocytosis trending down, likely in setting of MG exacerbation and COVID-19 infection  monitor for fevers   ID on board    MISC:    SOCIAL/FAMILY:  [] awaiting [x] updated at bedside [] family meeting    CODE STATUS:  [x] Full Code [] DNR [] DNI [] Palliative/Comfort Care    DISPOSITION:  [x] ICU [] Stroke Unit [] Floor [] EMU [] RCU [] PCU    [x] Patient is at high risk of neurologic deterioration/death due to: MG crisis, hypercapnic respiratory failure       Contact: [phone] ASSESSMENT/PLAN: MG exacerbation, covid +    NEURO:   checks Q4  neurology consulted  IVIG 25g (1/31--2/2), PLEX every other day 2/2--  Started on solumedrol 125 mg daily (1/31--)  Avoid medications that may worsen the current exacerbation including macrolides, fluoroquinolones, tetracyclines, aminoglycoside, beta-blockers, magnesium, and anti-arrhythmics (procainamide, quinidine, and lidocaine)  Pain management with fentanyl pushes  Activity: [] mobilize as tolerated [x] Bedrest [] PT [] OT [] PMNR    PULM:  Intubated for airway protection, will CPAP trial  Adjust the vent settings based on ABG  Increased TV to 400 and RR to 14- Repeat ABG  Daily CXR and ABG while intubated    CV:  MAP >65  TTE- p    RENAL:  Fluids: IVF with plasmalytes at 75 cc/hour    GI:  Diet: TF at goal  GI prophylaxis [] not indicated [x] famotidine [] other:  Bowel regimen [] colace [x] senna [x] other: miralax - hold since the patient had diarrhea, will add psyllium   LBM 2/3    ENDO:   A1c 6.6  Goal euglycemia (-180)  RISS for now      HEME/ONC:  H/H stable   VTE prophylaxis: [x] SCDs [x] chemoprophylaxis lovenox [] hold chemoprophylaxis due to: [] high risk of DVT/PE on admission due to:    ID: covid + supportive care and solumedrol + remdesivir   No leukocytosis, afebrile  monitor for fevers   ID on board    MISC:    SOCIAL/FAMILY:  [] awaiting [x] updated at bedside [] family meeting    CODE STATUS:  [x] Full Code [] DNR [] DNI [] Palliative/Comfort Care    DISPOSITION:  [x] ICU [] Stroke Unit [] Floor [] EMU [] RCU [] PCU    [x] Patient is at high risk of neurologic deterioration/death due to: MG crisis, hypercapnic respiratory failure       Contact: [phone] ASSESSMENT/PLAN: MG exacerbation, covid +    NEURO:   checks Q4  neurology consulted  IVIG 25g (1/31--2/2), PLEX every other day 2/2--  Started on solumedrol 125 mg daily (1/31--)  Avoid medications that may worsen the current exacerbation including macrolides, fluoroquinolones, tetracyclines, aminoglycoside, beta-blockers, magnesium, and anti-arrhythmics (procainamide, quinidine, and lidocaine)  Pain management with fentanyl pushes  Activity: [] mobilize as tolerated [x] Bedrest [] PT [] OT [] PMNR    PULM:  Intubated for airway protection, will CPAP trial  Adjust the vent settings based on ABG  Increased TV to 400 and RR to 14- Repeat ABG  Daily CXR and ABG while intubated    CV:  MAP >65  TTE- p    RENAL:  Fluids: IVF with plasmalytes at 75 cc/hour    GI:  Diet: TF at goal  GI prophylaxis [] not indicated [x] famotidine [] other:  Bowel regimen [] colace [x] senna [x] other: miralax - hold since the patient had diarrhea, will add psyllium   LBM 2/3    ENDO:   A1c 6.6  Goal euglycemia (-180)  RISS for now      HEME/ONC:  H/H stable   VTE prophylaxis: [x] SCDs [x] chemoprophylaxis lovenox [] hold chemoprophylaxis due to: [] high risk of DVT/PE on admission due to:    ID: covid + supportive care and solumedrol + remdesivir   No leukocytosis, afebrile  monitor for fevers   ID on board    MISC:    SOCIAL/FAMILY:  [] awaiting [x] updated at bedside [] family meeting    CODE STATUS:  [x] Full Code [] DNR [] DNI [] Palliative/Comfort Care    DISPOSITION:  [x] ICU [] Stroke Unit [] Floor [] EMU [] RCU [] PCU    [x] Patient is at high risk of neurologic deterioration/death due to: MG crisis, hypercapnic respiratory failure       Contact: [phone] ASSESSMENT/PLAN: MG exacerbation, covid +    NEURO:   checks Q4  neurology consulted  IVIG 25g (1/31--2/2), PLEX every other day 2/2--  Started on solumedrol 125 mg daily (1/31--)  Avoid medications that may worsen the current exacerbation including macrolides, fluoroquinolones, tetracyclines, aminoglycoside, beta-blockers, magnesium, and anti-arrhythmics (procainamide, quinidine, and lidocaine)  Pain management with fentanyl pushes  Activity: [] mobilize as tolerated [x] Bedrest [] PT [] OT [] PMNR    PULM:  Intubated for airway protection, will CPAP trial  Adjust the vent settings based on ABG  Increased TV to 400 and RR to 14- Repeat ABG  Daily CXR and ABG while intubated    CV:  MAP >65  TTE- p    RENAL:  Fluids: IVF with plasmalytes at 75 cc/hour    GI:  Diet: TF at goal  GI prophylaxis [] not indicated [x] famotidine [] other:  Bowel regimen [] colace [x] senna [x] other: miralax - hold since the patient had diarrhea, will add psyllium   LBM 2/3    ENDO:   A1c 6.6  Goal euglycemia (-180)  RISS for now      HEME/ONC:  H/H stable   VTE prophylaxis: [x] SCDs [x] chemoprophylaxis lovenox [] hold chemoprophylaxis due to: [] high risk of DVT/PE on admission due to:    ID: covid + supportive care and solumedrol + remdesivir   No leukocytosis, afebrile  monitor for fevers   ID on board    MISC:    SOCIAL/FAMILY:  [] awaiting [x] updated at bedside [] family meeting    CODE STATUS:  [x] Full Code [] DNR [] DNI [] Palliative/Comfort Care    DISPOSITION:  [x] ICU [] Stroke Unit [] Floor [] EMU [] RCU [] PCU    [x] Patient is at high risk of neurologic deterioration/death due to: MG crisis, hypercapnic respiratory failure       Contact: [phone]

## 2023-02-03 NOTE — PROGRESS NOTE ADULT - SUBJECTIVE AND OBJECTIVE BOX
SUMMARY: 63yo Malayalam-speaking woman w/ Myasthenia Gravis (on pyridostigmine, hospitalization every 1-2 years requiring IVIG) (dx 2015), s/p thymectomy (2010), T2DM who presents with SOB, cough, difficulty spitting up sputum x 4 days.  was sick with COVID, so son performed home COVID test for patient which was positive. Patient's primary complaint is SOB when lying down. She has been sleeping upright. She had difficulty coughing up and spitting out sputum but today these symptoms are slightly improved. Also reports difficulty swallowing - but was able to tolerate taking oral pills. Admits to difficulty looking up, but eye drooping is overall improved from past couple of months. No blurry vision. Patient takes pyridostigmine 120mg TID regularly. Patient also has prednisone PRN for sick days-- she has been taking 20mg prednisone x past 4 days as per instruction of her neurologist.     Of note, last hospitalization was 2 years ago at [facility] during which patient was intubated -- son notes that this was likely 2/2 patient being forced to lie down flat in hospital bed, leading to aspiration. She was extubated after ~3 days. Received IVIG during that hospitalization    OVERNIGHT EVENTS: Adm NSCU, upon arrival to the unit, appeared in acute respiratory distress tachypneic, hypertensive, tachycardic, hypoxic, ABG showing resp acidosis, immediately put on BiPAP, started on IVIG with some improvement     2/2- Still on IVIG, PLEX consented. No issues overnight.  2/3- Shiley was placed and was started on PLEX (2/2). No other events.    ADMISSION SCORES:   GCS: 14 HH: MF: NIHSS: ICH Score:    REVIEW OF SYSTEMS: [] Unable to Assess due to neurologic exam   [ x] All ROS addressed below are non-contributory, except:  Neuro: [ ] Headache [ ] Back pain [ ] Numbness [ ] Weakness [ ] Ataxia [ ] Dizziness [ ] Aphasia [ ] Dysarthria [ ] Visual disturbance  Resp: [ x] Shortness of breath/dyspnea [ ] Orthopnea [ ] Cough  CV: [ ] Chest pain [ ] Palpitation [ ] Lightheadedness [ ] Syncope  Renal: [ ] Thirst [ ] Edema  GI: [ ] Nausea [ ] Emesis [ ] Abdominal pain [ ] Constipation [ ] Diarrhea  Hem: [ ] Hematemesis [ ] bBright red blood per rectum  ID: [ ] Fever [ ] Chills [ ] Dysuria  ENT: [ ] Rhinorrhea    VITALS: [x] Reviewed    IMAGING/DATA: [x] Reviewed    IVF FLUIDS/MEDICATIONS: [x] Reviewed    ALLERGIES: Allergies    No Known Allergies    Intolerances      DEVICES:   [] Restraints [x] PIVs [] ET tube [] central line [] PICC [] arterial line [] morales [] NGT/OGT [] EVD [] LD [] MYAH/HMV [] LiCOX [] ICP monitor [] Trach [] PEG [] Chest Tube [] other:    EXAMINATION:  General: distress  HEENT: Anicteric sclerae  Cardiac: [id number] tachycardic  Lungs: diminished   Abdomen: Soft, non-tender, +BS  Neurologic: Awake, alert, oriented x3 with choices, follows commands, PERRL, EOMI, face symmetric, bilateral ptosis improved since yesterday Neck flexion 2, neck extension 4+. R delt 3/5, biceps 4+/5, Triceps 5/5,  5/5. L delt 3/5, biceps 4/5, triceps 5/5,  5/5. BILATERAL LE 5/5. Sensations intact.   SUMMARY: 63yo Malayalam-speaking woman w/ Myasthenia Gravis (on pyridostigmine, hospitalization every 1-2 years requiring IVIG) (dx 2015), s/p thymectomy (2010), T2DM who presents with SOB, cough, difficulty spitting up sputum x 4 days.  was sick with COVID, so son performed home COVID test for patient which was positive. Patient's primary complaint is SOB when lying down. She has been sleeping upright. She had difficulty coughing up and spitting out sputum but today these symptoms are slightly improved. Also reports difficulty swallowing - but was able to tolerate taking oral pills. Admits to difficulty looking up, but eye drooping is overall improved from past couple of months. No blurry vision. Patient takes pyridostigmine 120mg TID regularly. Patient also has prednisone PRN for sick days-- she has been taking 20mg prednisone x past 4 days as per instruction of her neurologist.     Of note, last hospitalization was 2 years ago at [facility] during which patient was intubated -- son notes that this was likely 2/2 patient being forced to lie down flat in hospital bed, leading to aspiration. She was extubated after ~3 days. Received IVIG during that hospitalization    OVERNIGHT EVENTS: Adm NSCU, upon arrival to the unit, appeared in acute respiratory distress tachypneic, hypertensive, tachycardic, hypoxic, ABG showing resp acidosis, immediately put on BiPAP, started on IVIG with some improvement     2/2- Still on IVIG, PLEX consented. No issues overnight.  2/3- Shiley was placed and was started on PLEX (2/2). No other events.    ADMISSION SCORES:   GCS: 14 HH: MF: NIHSS: ICH Score:    REVIEW OF SYSTEMS: [] Unable to Assess due to neurologic exam   [ x] All ROS addressed below are non-contributory, except:  Neuro: [ ] Headache [ ] Back pain [ ] Numbness [ ] Weakness [ ] Ataxia [ ] Dizziness [ ] Aphasia [ ] Dysarthria [ ] Visual disturbance  Resp: [ x] Shortness of breath/dyspnea [ ] Orthopnea [ ] Cough  CV: [ ] Chest pain [ ] Palpitation [ ] Lightheadedness [ ] Syncope  Renal: [ ] Thirst [ ] Edema  GI: [ ] Nausea [ ] Emesis [ ] Abdominal pain [ ] Constipation [ ] Diarrhea  Hem: [ ] Hematemesis [ ] bBright red blood per rectum  ID: [ ] Fever [ ] Chills [ ] Dysuria  ENT: [ ] Rhinorrhea    VITALS: [x] Reviewed    IMAGING/DATA: [x] Reviewed    IVF FLUIDS/MEDICATIONS: [x] Reviewed    ALLERGIES: Allergies    No Known Allergies    Intolerances      DEVICES:   [] Restraints [x] PIVs [] ET tube [] central line [] PICC [] arterial line [] morales [] NGT/OGT [] EVD [] LD [] MYAH/HMV [] LiCOX [] ICP monitor [] Trach [] PEG [] Chest Tube [] other:    EXAMINATION:  General: distress  HEENT: Anicteric sclerae  Cardiac: [id number] tachycardic  Lungs: diminished   Abdomen: Soft, non-tender, +BS  Neurologic: Awake, alert, oriented x3 with choices, follows commands, PERRL, EOMI, face symmetric, bilateral ptosis improved since yesterday Neck flexion 2, neck extension 4+. R delt 3/5, biceps 4+/5, Triceps 5/5,  5/5. L delt 3/5, biceps 4/5, triceps 5/5,  5/5. BILATERAL LE 5/5. Sensations intact.   SUMMARY: 61yo Malayalam-speaking woman w/ Myasthenia Gravis (on pyridostigmine, hospitalization every 1-2 years requiring IVIG) (dx 2015), s/p thymectomy (2010), T2DM who presents with SOB, cough, difficulty spitting up sputum x 4 days.  was sick with COVID, so son performed home COVID test for patient which was positive. Patient's primary complaint is SOB when lying down. She has been sleeping upright. She had difficulty coughing up and spitting out sputum but today these symptoms are slightly improved. Also reports difficulty swallowing - but was able to tolerate taking oral pills. Admits to difficulty looking up, but eye drooping is overall improved from past couple of months. No blurry vision. Patient takes pyridostigmine 120mg TID regularly. Patient also has prednisone PRN for sick days-- she has been taking 20mg prednisone x past 4 days as per instruction of her neurologist.     Of note, last hospitalization was 2 years ago at [facility] during which patient was intubated -- son notes that this was likely 2/2 patient being forced to lie down flat in hospital bed, leading to aspiration. She was extubated after ~3 days. Received IVIG during that hospitalization    OVERNIGHT EVENTS: Adm NSCU, upon arrival to the unit, appeared in acute respiratory distress tachypneic, hypertensive, tachycardic, hypoxic, ABG showing resp acidosis, immediately put on BiPAP, started on IVIG with some improvement     2/2- Still on IVIG, PLEX consented. No issues overnight.  2/3- Shiley was placed and was started on PLEX (2/2). No other events.    ADMISSION SCORES:   GCS: 14 HH: MF: NIHSS: ICH Score:    REVIEW OF SYSTEMS: [] Unable to Assess due to neurologic exam   [ x] All ROS addressed below are non-contributory, except:  Neuro: [ ] Headache [ ] Back pain [ ] Numbness [ ] Weakness [ ] Ataxia [ ] Dizziness [ ] Aphasia [ ] Dysarthria [ ] Visual disturbance  Resp: [ x] Shortness of breath/dyspnea [ ] Orthopnea [ ] Cough  CV: [ ] Chest pain [ ] Palpitation [ ] Lightheadedness [ ] Syncope  Renal: [ ] Thirst [ ] Edema  GI: [ ] Nausea [ ] Emesis [ ] Abdominal pain [ ] Constipation [ ] Diarrhea  Hem: [ ] Hematemesis [ ] bBright red blood per rectum  ID: [ ] Fever [ ] Chills [ ] Dysuria  ENT: [ ] Rhinorrhea    VITALS: [x] Reviewed    IMAGING/DATA: [x] Reviewed    IVF FLUIDS/MEDICATIONS: [x] Reviewed    ALLERGIES: Allergies    No Known Allergies    Intolerances      DEVICES:   [] Restraints [x] PIVs [] ET tube [] central line [] PICC [] arterial line [] morales [] NGT/OGT [] EVD [] LD [] MYAH/HMV [] LiCOX [] ICP monitor [] Trach [] PEG [] Chest Tube [] other:    EXAMINATION:  General: distress  HEENT: Anicteric sclerae  Cardiac: [id number] tachycardic  Lungs: diminished   Abdomen: Soft, non-tender, +BS  Neurologic: Awake, alert, oriented x3 with choices, follows commands, PERRL, EOMI, face symmetric, bilateral ptosis improved since yesterday Neck flexion 2, neck extension 4+. R delt 3/5, biceps 4+/5, Triceps 5/5,  5/5. L delt 3/5, biceps 4/5, triceps 5/5,  5/5. BILATERAL LE 5/5. Sensations intact.   SUMMARY: 61yo Malayalam-speaking woman w/ Myasthenia Gravis (on pyridostigmine, hospitalization every 1-2 years requiring IVIG) (dx 2015), s/p thymectomy (2010), T2DM who presents with SOB, cough, difficulty spitting up sputum x 4 days.  was sick with COVID, so son performed home COVID test for patient which was positive. Patient's primary complaint is SOB when lying down. She has been sleeping upright. She had difficulty coughing up and spitting out sputum but today these symptoms are slightly improved. Also reports difficulty swallowing - but was able to tolerate taking oral pills. Admits to difficulty looking up, but eye drooping is overall improved from past couple of months. No blurry vision. Patient takes pyridostigmine 120mg TID regularly. Patient also has prednisone PRN for sick days-- she has been taking 20mg prednisone x past 4 days as per instruction of her neurologist.     Of note, last hospitalization was 2 years ago at [facility] during which patient was intubated -- son notes that this was likely 2/2 patient being forced to lie down flat in hospital bed, leading to aspiration. She was extubated after ~3 days. Received IVIG during that hospitalization    OVERNIGHT EVENTS: Adm NSCU, upon arrival to the unit, appeared in acute respiratory distress tachypneic, hypertensive, tachycardic, hypoxic, ABG showing resp acidosis, immediately put on BiPAP, started on IVIG with some improvement     2/2- Still on IVIG, PLEX consented. No issues overnight.  2/3- Shiley was placed and was started on PLEX (2/2). No other events.    ADMISSION SCORES:   GCS: 14 HH: MF: NIHSS: ICH Score:    REVIEW OF SYSTEMS: [] Unable to Assess due to neurologic exam   [ x] All ROS addressed below are non-contributory, except:  Neuro: [ ] Headache [ ] Back pain [ ] Numbness [ ] Weakness [ ] Ataxia [ ] Dizziness [ ] Aphasia [ ] Dysarthria [ ] Visual disturbance  Resp: [ x] Shortness of breath/dyspnea [ ] Orthopnea [ ] Cough  CV: [ ] Chest pain [ ] Palpitation [ ] Lightheadedness [ ] Syncope  Renal: [ ] Thirst [ ] Edema  GI: [ ] Nausea [ ] Emesis [ ] Abdominal pain [ ] Constipation [ ] Diarrhea  Hem: [ ] Hematemesis [ ] bBright red blood per rectum  ID: [ ] Fever [ ] Chills [ ] Dysuria  ENT: [ ] Rhinorrhea    VITALS: [x] Reviewed    IMAGING/DATA: [x] Reviewed    IVF FLUIDS/MEDICATIONS: [x] Reviewed    ALLERGIES: Allergies    No Known Allergies    Intolerances      DEVICES:   [] Restraints [x] PIVs [] ET tube [] central line [] PICC [] arterial line [] morales [] NGT/OGT [] EVD [] LD [] MYAH/HMV [] LiCOX [] ICP monitor [] Trach [] PEG [] Chest Tube [] other:    EXAMINATION:  General: distress  HEENT: Anicteric sclerae  Cardiac: [id number] tachycardic  Lungs: diminished   Abdomen: Soft, non-tender, +BS  Neurologic: Awake, alert, oriented x3 with choices, follows commands, PERRL, EOMI, face symmetric, bilateral ptosis improving, Neck flexion 2, neck extension 4+. R delt 3/5, biceps 4+/5, Triceps 5/5,  5/5. L delt 3/5, biceps 4/5, triceps 5/5,  5/5. BILATERAL LE 5/5. Sensations intact.   SUMMARY: 61yo Malayalam-speaking woman w/ Myasthenia Gravis (on pyridostigmine, hospitalization every 1-2 years requiring IVIG) (dx 2015), s/p thymectomy (2010), T2DM who presents with SOB, cough, difficulty spitting up sputum x 4 days.  was sick with COVID, so son performed home COVID test for patient which was positive. Patient's primary complaint is SOB when lying down. She has been sleeping upright. She had difficulty coughing up and spitting out sputum but today these symptoms are slightly improved. Also reports difficulty swallowing - but was able to tolerate taking oral pills. Admits to difficulty looking up, but eye drooping is overall improved from past couple of months. No blurry vision. Patient takes pyridostigmine 120mg TID regularly. Patient also has prednisone PRN for sick days-- she has been taking 20mg prednisone x past 4 days as per instruction of her neurologist.     Of note, last hospitalization was 2 years ago at [facility] during which patient was intubated -- son notes that this was likely 2/2 patient being forced to lie down flat in hospital bed, leading to aspiration. She was extubated after ~3 days. Received IVIG during that hospitalization    OVERNIGHT EVENTS: Adm NSCU, upon arrival to the unit, appeared in acute respiratory distress tachypneic, hypertensive, tachycardic, hypoxic, ABG showing resp acidosis, immediately put on BiPAP, started on IVIG with some improvement     2/2- Still on IVIG, PLEX consented. No issues overnight.  2/3- Shiley was placed and was started on PLEX (2/2). No other events.    ADMISSION SCORES:   GCS: 14 HH: MF: NIHSS: ICH Score:    REVIEW OF SYSTEMS: [] Unable to Assess due to neurologic exam   [ x] All ROS addressed below are non-contributory, except:  Neuro: [ ] Headache [ ] Back pain [ ] Numbness [ ] Weakness [ ] Ataxia [ ] Dizziness [ ] Aphasia [ ] Dysarthria [ ] Visual disturbance  Resp: [ x] Shortness of breath/dyspnea [ ] Orthopnea [ ] Cough  CV: [ ] Chest pain [ ] Palpitation [ ] Lightheadedness [ ] Syncope  Renal: [ ] Thirst [ ] Edema  GI: [ ] Nausea [ ] Emesis [ ] Abdominal pain [ ] Constipation [ ] Diarrhea  Hem: [ ] Hematemesis [ ] bBright red blood per rectum  ID: [ ] Fever [ ] Chills [ ] Dysuria  ENT: [ ] Rhinorrhea    VITALS: [x] Reviewed    IMAGING/DATA: [x] Reviewed    IVF FLUIDS/MEDICATIONS: [x] Reviewed    ALLERGIES: Allergies    No Known Allergies    Intolerances      DEVICES:   [] Restraints [x] PIVs [] ET tube [] central line [] PICC [] arterial line [] morales [] NGT/OGT [] EVD [] LD [] MYAH/HMV [] LiCOX [] ICP monitor [] Trach [] PEG [] Chest Tube [] other:    EXAMINATION:  General: distress  HEENT: Anicteric sclerae  Cardiac: [id number] tachycardic  Lungs: diminished   Abdomen: Soft, non-tender, +BS  Neurologic: Awake, alert, oriented x3 with choices, follows commands, PERRL, EOMI, face symmetric, bilateral ptosis improving, Neck flexion 2, neck extension 4+. R delt 3/5, biceps 4+/5, Triceps 5/5,  5/5. L delt 3/5, biceps 4/5, triceps 5/5,  5/5. BILATERAL LE 5/5. Sensations intact.   SUMMARY: 61yo Malayalam-speaking woman w/ Myasthenia Gravis (on pyridostigmine, hospitalization every 1-2 years requiring IVIG) (dx 2015), s/p thymectomy (2010), T2DM who presents with SOB, cough, difficulty spitting up sputum x 4 days.  was sick with COVID, so son performed home COVID test for patient which was positive. Patient's primary complaint is SOB when lying down. She has been sleeping upright. She had difficulty coughing up and spitting out sputum but today these symptoms are slightly improved. Also reports difficulty swallowing - but was able to tolerate taking oral pills. Admits to difficulty looking up, but eye drooping is overall improved from past couple of months. No blurry vision. Patient takes pyridostigmine 120mg TID regularly. Patient also has prednisone PRN for sick days-- she has been taking 20mg prednisone x past 4 days as per instruction of her neurologist.     Of note, last hospitalization was 2 years ago at [facility] during which patient was intubated -- son notes that this was likely 2/2 patient being forced to lie down flat in hospital bed, leading to aspiration. She was extubated after ~3 days. Received IVIG during that hospitalization    OVERNIGHT EVENTS: Adm NSCU, upon arrival to the unit, appeared in acute respiratory distress tachypneic, hypertensive, tachycardic, hypoxic, ABG showing resp acidosis, immediately put on BiPAP, started on IVIG with some improvement     2/2- Still on IVIG, PLEX consented. No issues overnight.  2/3- Shiley was placed and was started on PLEX (2/2). No other events.    ADMISSION SCORES:   GCS: 14 HH: MF: NIHSS: ICH Score:    REVIEW OF SYSTEMS: [] Unable to Assess due to neurologic exam   [ x] All ROS addressed below are non-contributory, except:  Neuro: [ ] Headache [ ] Back pain [ ] Numbness [ ] Weakness [ ] Ataxia [ ] Dizziness [ ] Aphasia [ ] Dysarthria [ ] Visual disturbance  Resp: [ x] Shortness of breath/dyspnea [ ] Orthopnea [ ] Cough  CV: [ ] Chest pain [ ] Palpitation [ ] Lightheadedness [ ] Syncope  Renal: [ ] Thirst [ ] Edema  GI: [ ] Nausea [ ] Emesis [ ] Abdominal pain [ ] Constipation [ ] Diarrhea  Hem: [ ] Hematemesis [ ] bBright red blood per rectum  ID: [ ] Fever [ ] Chills [ ] Dysuria  ENT: [ ] Rhinorrhea    VITALS: [x] Reviewed    IMAGING/DATA: [x] Reviewed    IVF FLUIDS/MEDICATIONS: [x] Reviewed    ALLERGIES: Allergies    No Known Allergies    Intolerances      DEVICES:   [] Restraints [x] PIVs [] ET tube [] central line [] PICC [] arterial line [] morales [] NGT/OGT [] EVD [] LD [] MYAH/HMV [] LiCOX [] ICP monitor [] Trach [] PEG [] Chest Tube [] other:    EXAMINATION:  General: distress  HEENT: Anicteric sclerae  Cardiac: [id number] tachycardic  Lungs: diminished   Abdomen: Soft, non-tender, +BS  Neurologic: Awake, alert, oriented x3 with choices, follows commands, PERRL, EOMI, face symmetric, bilateral ptosis improving, Neck flexion 2, neck extension 4+. R delt 3/5, biceps 4+/5, Triceps 5/5,  5/5. L delt 3/5, biceps 4/5, triceps 5/5,  5/5. BILATERAL LE 5/5. Sensations intact.

## 2023-02-03 NOTE — PROGRESS NOTE ADULT - SUBJECTIVE AND OBJECTIVE BOX
CC: f/u for Covid    Patient reports: she is alert, following commands, on vent    REVIEW OF SYSTEMS:  All other review of systems negative (Comprehensive ROS): limited, tolerating enteral feeds, minimal secretions    Antimicrobials Day #  :day 4/5  remdesivir  IVPB   IV Intermittent   remdesivir  IVPB 100 milliGRAM(s) IV Intermittent every 24 hours    Other Medications Reviewed  MEDICATIONS  (STANDING):  chlorhexidine 0.12% Liquid 15 milliLiter(s) Oral Mucosa every 12 hours  chlorhexidine 4% Liquid 1 Application(s) Topical <User Schedule>  enoxaparin Injectable 40 milliGRAM(s) SubCutaneous <User Schedule>  famotidine    Tablet 20 milliGRAM(s) Oral two times a day  insulin lispro (ADMELOG) corrective regimen sliding scale   SubCutaneous every 6 hours  insulin NPH human recombinant 4 Unit(s) SubCutaneous every 6 hours  methylPREDNISolone sodium succinate Injectable 125 milliGRAM(s) IV Push daily  multiple electrolytes Injection Type 1 1000 milliLiter(s) (75 mL/Hr) IV Continuous <Continuous>  remdesivir  IVPB   IV Intermittent   remdesivir  IVPB 100 milliGRAM(s) IV Intermittent every 24 hours    T(F): 98.3 (02-03-23 @ 07:00), Max: 99.3 (02-02-23 @ 15:00)  HR: 74 (02-03-23 @ 10:00)  BP: --  RR: 20 (02-03-23 @ 10:00)  SpO2: 98% (02-03-23 @ 10:00)  Wt(kg): --    PHYSICAL EXAM:  General: alert, no acute distress  Eyes:  anicteric, no conjunctival injection, no discharge  Oropharynx: no lesions or injection 	  Neck: ETT, OGT  Lungs: clear to auscultation  Heart: regular rate and rhythm; no murmur, rubs or gallops  Abdomen: soft, nondistended, nontender, without mass or organomegaly  Skin: no lesions  Extremities: no clubbing, cyanosis, or edema  Neurologic: alert, oriented, moves all extremities    LAB RESULTS:                        10.2   7.28  )-----------( 218      ( 03 Feb 2023 00:25 )             31.6     02-03    145  |  112<H>  |  41<H>  ----------------------------<  222<H>  4.4   |  23  |  0.49<L>    Ca    8.2<L>      03 Feb 2023 00:25  Phos  3.1     02-03  Mg     2.4     02-03    TPro  6.8  /  Alb  3.0<L>  /  TBili  0.3  /  DBili  x   /  AST  14  /  ALT  9<L>  /  AlkPhos  80  02-01    LIVER FUNCTIONS - ( 01 Feb 2023 23:40 )  Alb: 3.0 g/dL / Pro: 6.8 g/dL / ALK PHOS: 80 U/L / ALT: 9 U/L / AST: 14 U/L / GGT: x             MICROBIOLOGY:  RECENT CULTURES:      RADIOLOGY REVIEWED:  < from: Xray Chest 1 View AP/PA (02.02.23 @ 15:36) >    INTERPRETATION:  AP chest on February 2, 2023 at 3:12 PM. Patient had   exacerbation of myasthenia gravis.    Heart magnified by technique. Sternotomy, endotracheal tube, and   nasogastric tube remain.    There is a persistent left base pleural pulmonary process and a small   infiltrate off the right lower hilum.    Above findings are similar to earlier in the day.    Present film shows a large right jugular line inserted in good position.    IMPRESSION: Right jugular line inserted. Stable findings otherwise.    --- End of Report ---    < end of copied text >

## 2023-02-04 LAB
ANION GAP SERPL CALC-SCNC: 8 MMOL/L — SIGNIFICANT CHANGE UP (ref 5–17)
BUN SERPL-MCNC: 31 MG/DL — HIGH (ref 7–23)
CA-I BLD-SCNC: 1.13 MMOL/L — LOW (ref 1.15–1.33)
CALCIUM SERPL-MCNC: 8.2 MG/DL — LOW (ref 8.4–10.5)
CHLORIDE SERPL-SCNC: 109 MMOL/L — HIGH (ref 96–108)
CO2 SERPL-SCNC: 28 MMOL/L — SIGNIFICANT CHANGE UP (ref 22–31)
CREAT SERPL-MCNC: 0.43 MG/DL — LOW (ref 0.5–1.3)
EGFR: 109 ML/MIN/1.73M2 — SIGNIFICANT CHANGE UP
FIBRINOGEN PPP-MCNC: 169 MG/DL — LOW (ref 200–445)
GLUCOSE BLDC GLUCOMTR-MCNC: 132 MG/DL — HIGH (ref 70–99)
GLUCOSE BLDC GLUCOMTR-MCNC: 143 MG/DL — HIGH (ref 70–99)
GLUCOSE BLDC GLUCOMTR-MCNC: 167 MG/DL — HIGH (ref 70–99)
GLUCOSE BLDC GLUCOMTR-MCNC: 220 MG/DL — HIGH (ref 70–99)
GLUCOSE BLDC GLUCOMTR-MCNC: 223 MG/DL — HIGH (ref 70–99)
GLUCOSE SERPL-MCNC: 163 MG/DL — HIGH (ref 70–99)
HCT VFR BLD CALC: 32.3 % — LOW (ref 34.5–45)
HGB BLD-MCNC: 10.5 G/DL — LOW (ref 11.5–15.5)
MAGNESIUM SERPL-MCNC: 2.6 MG/DL — SIGNIFICANT CHANGE UP (ref 1.6–2.6)
MCHC RBC-ENTMCNC: 28.9 PG — SIGNIFICANT CHANGE UP (ref 27–34)
MCHC RBC-ENTMCNC: 32.5 GM/DL — SIGNIFICANT CHANGE UP (ref 32–36)
MCV RBC AUTO: 89 FL — SIGNIFICANT CHANGE UP (ref 80–100)
NRBC # BLD: 0 /100 WBCS — SIGNIFICANT CHANGE UP (ref 0–0)
PHOSPHATE SERPL-MCNC: 3.8 MG/DL — SIGNIFICANT CHANGE UP (ref 2.5–4.5)
PLATELET # BLD AUTO: 206 K/UL — SIGNIFICANT CHANGE UP (ref 150–400)
POTASSIUM SERPL-MCNC: 4 MMOL/L — SIGNIFICANT CHANGE UP (ref 3.5–5.3)
POTASSIUM SERPL-SCNC: 4 MMOL/L — SIGNIFICANT CHANGE UP (ref 3.5–5.3)
RBC # BLD: 3.63 M/UL — LOW (ref 3.8–5.2)
RBC # FLD: 13.2 % — SIGNIFICANT CHANGE UP (ref 10.3–14.5)
SODIUM SERPL-SCNC: 145 MMOL/L — SIGNIFICANT CHANGE UP (ref 135–145)
WBC # BLD: 10.31 K/UL — SIGNIFICANT CHANGE UP (ref 3.8–10.5)
WBC # FLD AUTO: 10.31 K/UL — SIGNIFICANT CHANGE UP (ref 3.8–10.5)

## 2023-02-04 PROCEDURE — 71045 X-RAY EXAM CHEST 1 VIEW: CPT | Mod: 26

## 2023-02-04 PROCEDURE — 36514 APHERESIS PLASMA: CPT

## 2023-02-04 PROCEDURE — 99291 CRITICAL CARE FIRST HOUR: CPT

## 2023-02-04 RX ADMIN — HUMAN INSULIN 10 UNIT(S): 100 INJECTION, SUSPENSION SUBCUTANEOUS at 17:46

## 2023-02-04 RX ADMIN — Medication 4: at 17:46

## 2023-02-04 RX ADMIN — Medication 4: at 12:45

## 2023-02-04 RX ADMIN — FENTANYL CITRATE 50 MICROGRAM(S): 50 INJECTION INTRAVENOUS at 21:30

## 2023-02-04 RX ADMIN — REMDESIVIR 200 MILLIGRAM(S): 5 INJECTION INTRAVENOUS at 17:45

## 2023-02-04 RX ADMIN — ENOXAPARIN SODIUM 40 MILLIGRAM(S): 100 INJECTION SUBCUTANEOUS at 17:46

## 2023-02-04 RX ADMIN — FAMOTIDINE 20 MILLIGRAM(S): 10 INJECTION INTRAVENOUS at 17:46

## 2023-02-04 RX ADMIN — FENTANYL CITRATE 50 MICROGRAM(S): 50 INJECTION INTRAVENOUS at 12:52

## 2023-02-04 RX ADMIN — Medication 1 PACKET(S): at 05:24

## 2023-02-04 RX ADMIN — Medication 1 PACKET(S): at 13:54

## 2023-02-04 RX ADMIN — HUMAN INSULIN 10 UNIT(S): 100 INJECTION, SUSPENSION SUBCUTANEOUS at 00:52

## 2023-02-04 RX ADMIN — HUMAN INSULIN 10 UNIT(S): 100 INJECTION, SUSPENSION SUBCUTANEOUS at 12:44

## 2023-02-04 RX ADMIN — CHLORHEXIDINE GLUCONATE 15 MILLILITER(S): 213 SOLUTION TOPICAL at 05:22

## 2023-02-04 RX ADMIN — FENTANYL CITRATE 50 MICROGRAM(S): 50 INJECTION INTRAVENOUS at 10:45

## 2023-02-04 RX ADMIN — HUMAN INSULIN 10 UNIT(S): 100 INJECTION, SUSPENSION SUBCUTANEOUS at 06:33

## 2023-02-04 RX ADMIN — CHLORHEXIDINE GLUCONATE 15 MILLILITER(S): 213 SOLUTION TOPICAL at 17:46

## 2023-02-04 RX ADMIN — Medication 2: at 00:52

## 2023-02-04 RX ADMIN — SODIUM CHLORIDE 75 MILLILITER(S): 9 INJECTION, SOLUTION INTRAVENOUS at 07:23

## 2023-02-04 RX ADMIN — CHLORHEXIDINE GLUCONATE 1 APPLICATION(S): 213 SOLUTION TOPICAL at 06:25

## 2023-02-04 RX ADMIN — Medication 1 PACKET(S): at 21:17

## 2023-02-04 RX ADMIN — FAMOTIDINE 20 MILLIGRAM(S): 10 INJECTION INTRAVENOUS at 05:23

## 2023-02-04 RX ADMIN — Medication 20 MILLIEQUIVALENT(S): at 00:13

## 2023-02-04 RX ADMIN — FENTANYL CITRATE 50 MICROGRAM(S): 50 INJECTION INTRAVENOUS at 10:30

## 2023-02-04 RX ADMIN — FENTANYL CITRATE 50 MICROGRAM(S): 50 INJECTION INTRAVENOUS at 21:15

## 2023-02-04 RX ADMIN — Medication 125 MILLIGRAM(S): at 05:23

## 2023-02-04 NOTE — PROGRESS NOTE ADULT - SUBJECTIVE AND OBJECTIVE BOX
CC: f/u for  covid  Patient reports she is ok    REVIEW OF SYSTEMS:  All other review of systems negative (Comprehensive ROS)    Antimicrobials Day #  :    Other Medications Reviewed    T(F): 99 (02-04-23 @ 19:00), Max: 99.3 (02-04-23 @ 11:00)  HR: 91 (02-04-23 @ 19:00)  BP: --  RR: 20 (02-04-23 @ 19:00)  SpO2: 100% (02-04-23 @ 19:00)  Wt(kg): --    PHYSICAL EXAM:  General: alert, no acute distress  Eyes:  anicteric, no conjunctival injection, no discharge  Oropharynx: no lesions or injection 	  Neck: supple, without adenopathy  Lungs: clear to auscultation  Heart: regular rate and rhythm; no murmur, rubs or gallops  Abdomen: soft, nondistended, nontender, without mass or organomegaly  Skin: no lesions  Extremities: no clubbing, cyanosis, or edema  Neurologic: alert, oriented, moves all extremities    LAB RESULTS:                        10.5   10.31 )-----------( 206      ( 04 Feb 2023 00:56 )             32.3     02-04    145  |  109<H>  |  31<H>  ----------------------------<  163<H>  4.0   |  28  |  0.43<L>    Ca    8.2<L>      04 Feb 2023 00:56  Phos  3.8     02-04  Mg     2.6     02-04          MICROBIOLOGY:  RECENT CULTURES:      RADIOLOGY REVIEWED:  < from: Xray Chest 1 View- PORTABLE-Routine (Xray Chest 1 View- PORTABLE-Routine in AM.) (02.04.23 @ 04:58) >    ACC: [id number] EXAM:  XR CHEST PORTABLE ROUTINE 1V   ORDERED BY: SHELTON MCCORMICK     PROCEDURE DATE:  02/04/2023          INTERPRETATION:  EXAMINATION: XR CHEST    CLINICAL INDICATION: intubated    TECHNIQUE: Single frontal, portable view of the chest was obtained.    COMPARISON: Chest x-ray 2/3/2023.    FINDINGS:  Endotracheal tube tip is above the level of the sherri.  There is an enteric tube, coursing below the diaphragm, with its tip out   of the field of view.  Right IJ approach central venous catheter with its tip in the SVC.  Median sternotomy wires are visualized.  The heart size cannot be accurately assessed in this projection.  Low lung volumes. No focal consolidation.  There is no pneumothorax or pleural effusion.  No acute bonyabnormality.    IMPRESSION:  Lines and tubes, as above.  No focal consolidation.    --- End of Report ---    < end of copied text >              Assessment:  Patient with MG admitted with flare triggered by covid, finished rdv, on steroids and plex, s/p IV ig for mg flare  Plan:  steroids, plex  per neuro  dvt prophylaxis  monitor off further antimicrobics   CC: f/u for  covid  Patient reports she is ok    REVIEW OF SYSTEMS:  All other review of systems negative (Comprehensive ROS)    Antimicrobials Day #  :    Other Medications Reviewed    T(F): 99 (02-04-23 @ 19:00), Max: 99.3 (02-04-23 @ 11:00)  HR: 91 (02-04-23 @ 19:00)  BP: --  RR: 20 (02-04-23 @ 19:00)  SpO2: 100% (02-04-23 @ 19:00)  Wt(kg): --    PHYSICAL EXAM:  General: alert, no acute distress  Eyes:  anicteric, no conjunctival injection, no discharge  Oropharynx: no lesions or injection 	  Neck: supple, without adenopathy  Lungs: clear to auscultation  Heart: regular rate and rhythm; no murmur, rubs or gallops  Abdomen: soft, nondistended, nontender, without mass or organomegaly  Skin: no lesions  Extremities: no clubbing, cyanosis, or edema  Neurologic: alert, oriented, moves all extremities    LAB RESULTS:                        10.5   10.31 )-----------( 206      ( 04 Feb 2023 00:56 )             32.3     02-04    145  |  109<H>  |  31<H>  ----------------------------<  163<H>  4.0   |  28  |  0.43<L>    Ca    8.2<L>      04 Feb 2023 00:56  Phos  3.8     02-04  Mg     2.6     02-04          MICROBIOLOGY:  RECENT CULTURES:      RADIOLOGY REVIEWED:  < from: Xray Chest 1 View- PORTABLE-Routine (Xray Chest 1 View- PORTABLE-Routine in AM.) (02.04.23 @ 04:58) >    ACC: [id number] EXAM:  XR CHEST PORTABLE ROUTINE 1V   ORDERED BY: SHELTON MCCORMICK     PROCEDURE DATE:  02/04/2023          INTERPRETATION:  EXAMINATION: XR CHEST    CLINICAL INDICATION: intubated    TECHNIQUE: Single frontal, portable view of the chest was obtained.    COMPARISON: Chest x-ray 2/3/2023.    FINDINGS:  Endotracheal tube tip is above the level of the sherri.  There is an enteric tube, coursing below the diaphragm, with its tip out   of the field of view.  Right IJ approach central venous catheter with its tip in the SVC.  Median sternotomy wires are visualized.  The heart size cannot be accurately assessed in this projection.  Low lung volumes. No focal consolidation.  There is no pneumothorax or pleural effusion.  No acute bonyabnormality.    IMPRESSION:  Lines and tubes, as above.  No focal consolidation.    --- End of Report ---    < end of copied text >              Assessment:  Patient with MG admitted with flare triggered by covid, finished rdv, on steroids and plex, s/p IV ig for mg flare  Plan:  steroids, plex  per neuro  dvt prophylaxis  monitor off further antimicrobics   CC: f/u for  covid  Patient reports she is ok    REVIEW OF SYSTEMS:  All other review of systems negative (Comprehensive ROS)    Antimicrobials Day #  :    Other Medications Reviewed    T(F): 99 (02-04-23 @ 19:00), Max: 99.3 (02-04-23 @ 11:00)  HR: 91 (02-04-23 @ 19:00)  BP: --  RR: 20 (02-04-23 @ 19:00)  SpO2: 100% (02-04-23 @ 19:00)  Wt(kg): --    PHYSICAL EXAM:  General: alert, no acute distress  Eyes:  anicteric, no conjunctival injection, no discharge  Oropharynx: no lesions or injection 	  Neck: supple, without adenopathy  Lungs: clear to auscultation  Heart: regular rate and rhythm; no murmur, rubs or gallops  Abdomen: soft, nondistended, nontender, without mass or organomegaly  Skin: no lesions  Extremities: no clubbing, cyanosis, or edema  Neurologic: alert, oriented, moves all extremities    LAB RESULTS:                        10.5   10.31 )-----------( 206      ( 04 Feb 2023 00:56 )             32.3     02-04    145  |  109<H>  |  31<H>  ----------------------------<  163<H>  4.0   |  28  |  0.43<L>    Ca    8.2<L>      04 Feb 2023 00:56  Phos  3.8     02-04  Mg     2.6     02-04          MICROBIOLOGY:  RECENT CULTURES:      RADIOLOGY REVIEWED:  < from: Xray Chest 1 View- PORTABLE-Routine (Xray Chest 1 View- PORTABLE-Routine in AM.) (02.04.23 @ 04:58) >    ACC: [id number] EXAM:  XR CHEST PORTABLE ROUTINE 1V   ORDERED BY: SHELTON MCCORMICK     PROCEDURE DATE:  02/04/2023          INTERPRETATION:  EXAMINATION: XR CHEST    CLINICAL INDICATION: intubated    TECHNIQUE: Single frontal, portable view of the chest was obtained.    COMPARISON: Chest x-ray 2/3/2023.    FINDINGS:  Endotracheal tube tip is above the level of the sherri.  There is an enteric tube, coursing below the diaphragm, with its tip out   of the field of view.  Right IJ approach central venous catheter with its tip in the SVC.  Median sternotomy wires are visualized.  The heart size cannot be accurately assessed in this projection.  Low lung volumes. No focal consolidation.  There is no pneumothorax or pleural effusion.  No acute bonyabnormality.    IMPRESSION:  Lines and tubes, as above.  No focal consolidation.    --- End of Report ---    < end of copied text >              Assessment:  Patient with MG admitted with flare triggered by covid, finished rdv, on steroids and plex, s/p IV ig for mg flare  Plan:  steroids, plex  per neuro  dvt prophylaxis  monitor off further antimicrobics

## 2023-02-04 NOTE — CHART NOTE - NSCHARTNOTEFT_GEN_A_CORE
61yo woman w/ Myasthenia Gravis (on pyridostigmine, hospitalization every 1-2 years requiring IVIG) (dx 2015), s/p thymectomy (2010), T2DM who presents on 01/30/2023 with SOB, cough, difficulty spitting up sputum x 4 days.  was sick with COVID, so son performed home COVID test for patient which was positive. Patient's primary complaint is SOB when lying down. She has been sleeping upright. She had difficulty coughing up and spitting out sputum but today these symptoms are slightly improved. Also reports difficulty swallowing - but was able to tolerate taking oral pills. Admits to difficulty looking up, but eye drooping is overall improved from past couple of months. No blurry vision. Patient takes pyridostigmine 120mg TID regularly. Patient also has prednisone PRN for sick days-- she has been taking 20mg prednisone x past 4 days as per instruction of her neurologist.     Of note, last hospitalization was 2 years ago at [facility] during which patient was intubated -- son notes that this was likely 2/2 patient being forced to lie down flat in hospital bed, leading to aspiration. She was extubated after ~3 days. Received IVIG during that hospitalization. Patient was admitted to [facility], upon arrival to the unit, appeared in acute respiratory distress, tachypneic, hypertensive, tachycardic, hypoxic, ABG showing respiratory acidosis, immediately put on BiPAP, started on IVIG. Patient later intubated on 2/1 for worsening respiratory failure. He received IVIG x 3 (1/31-2/2). Therapeutic Apheresis team consulted for initiation of PLEX on 2/2. A course of 5 PLEX over 10 days planned.    Patient is s/p PLEX#1 on 2/2/23, procedure tolerated well.    Clinical notes, labs, meds reviewed. Some improvement in ptosis, extremity strength, and neck flexion. Remains intubated,    PLEX#2 today, 1 plasma volume with 5% albumin as replacement fluid.     PLEX#3 currently scheduled on 2/6/23. Please draw a CBC, fibrinogen, and ionized calcium in the morning of each procedure. 61yo woman w/ Myasthenia Gravis (on pyridostigmine, hospitalization every 1-2 years requiring IVIG) (dx 2015), s/p thymectomy (2010), T2DM who presents on 01/30/2023 with SOB, cough, difficulty spitting up sputum x 4 days.  was sick with COVID, so son performed home COVID test for patient which was positive. Patient's primary complaint is SOB when lying down. She has been sleeping upright. She had difficulty coughing up and spitting out sputum but today these symptoms are slightly improved. Also reports difficulty swallowing - but was able to tolerate taking oral pills. Admits to difficulty looking up, but eye drooping is overall improved from past couple of months. No blurry vision. Patient takes pyridostigmine 120mg TID regularly. Patient also has prednisone PRN for sick days-- she has been taking 20mg prednisone x past 4 days as per instruction of her neurologist.     Of note, last hospitalization was 2 years ago at [facility] during which patient was intubated -- son notes that this was likely 2/2 patient being forced to lie down flat in hospital bed, leading to aspiration. She was extubated after ~3 days. Received IVIG during that hospitalization. Patient was admitted to NSCU, upon arrival to the unit, appeared in acute respiratory distress, tachypneic, hypertensive, tachycardic, hypoxic, ABG showing respiratory acidosis, immediately put on BiPAP, started on IVIG. Patient later intubated on 2/1 for worsening respiratory failure. He received IVIG x 3 (1/31-2/2). Therapeutic Apheresis team consulted for initiation of PLEX on 2/2. A course of 5 PLEX over 10 days planned.    Patient is s/p PLEX#1 on 2/2/23, procedure tolerated well.    Clinical notes, labs, meds reviewed. Some improvement in ptosis, extremity strength, and neck flexion. Remains intubated,    PLEX#2 today, 1 plasma volume with 5% albumin as replacement fluid.     PLEX#3 currently scheduled on 2/6/23. Please draw a CBC, fibrinogen, and ionized calcium in the morning of each procedure. 63yo woman w/ Myasthenia Gravis (on pyridostigmine, hospitalization every 1-2 years requiring IVIG) (dx 2015), s/p thymectomy (2010), T2DM who presents on 01/30/2023 with SOB, cough, difficulty spitting up sputum x 4 days.  was sick with COVID, so son performed home COVID test for patient which was positive. Patient's primary complaint is SOB when lying down. She has been sleeping upright. She had difficulty coughing up and spitting out sputum but today these symptoms are slightly improved. Also reports difficulty swallowing - but was able to tolerate taking oral pills. Admits to difficulty looking up, but eye drooping is overall improved from past couple of months. No blurry vision. Patient takes pyridostigmine 120mg TID regularly. Patient also has prednisone PRN for sick days-- she has been taking 20mg prednisone x past 4 days as per instruction of her neurologist.     Of note, last hospitalization was 2 years ago at [facility] during which patient was intubated -- son notes that this was likely 2/2 patient being forced to lie down flat in hospital bed, leading to aspiration. She was extubated after ~3 days. Received IVIG during that hospitalization. Patient was admitted to NSCU, upon arrival to the unit, appeared in acute respiratory distress, tachypneic, hypertensive, tachycardic, hypoxic, ABG showing respiratory acidosis, immediately put on BiPAP, started on IVIG. Patient later intubated on 2/1 for worsening respiratory failure. He received IVIG x 3 (1/31-2/2). Therapeutic Apheresis team consulted for initiation of PLEX on 2/2. A course of 5 PLEX over 10 days planned.    Patient is s/p PLEX#1 on 2/2/23, procedure tolerated well.    Clinical notes, labs, meds reviewed. Some improvement in ptosis, extremity strength, and neck flexion. Remains intubated,    PLEX#2 today, 1 plasma volume with 5% albumin as replacement fluid.     PLEX#3 currently scheduled on 2/6/23. Please draw a CBC, fibrinogen, and ionized calcium in the morning of each procedure.

## 2023-02-04 NOTE — PROGRESS NOTE ADULT - CRITICAL CARE ATTENDING COMMENT
Pt currently receiving Plasma exchnage. will cont for anticpated 5 treatments. Pt failed CPAP today. CXR shows significant b/l atelectasis. d/w RT-will perform Q6 IPPV with duonebs. Will raise PEEP to10 from 7. Check ABG in 2hrs

## 2023-02-04 NOTE — PROGRESS NOTE ADULT - ATTENDING COMMENTS
61 yo woman with DM and Myasthenia gravis s/p thymectomy 2010, with prior exacerbations requiring intubation (last 2021), now admitted 1/30 with SOB, weak cough, dysphagia and double vision, found to be COVID positive, concerning for MG exacerbation. Started on IVIG due to refusal of PLEX, s/p 3 doses. Initially on Bipap but intubated 2/1 for worsening respiratory failure.  Now receiving PLEX.   TTE with EF 35-40%, mild-moderate MR.     On ACVC.  Afebrile, no leukocytosis.  Is and Os positive 1.5L.     On exam, intubated, alert, communicates by nodding, oriented x 3 with choices, with ptosis, neck flexion 2/5, neck extension 5/5, R delt 3/5, biceps 5/5, Triceps 4+/5,  5/5. L delt 3/5, biceps 4+/5, triceps 4+/5,  5/5. B/l LE 5/5.    Exam improving.     s/p PLEX 1/5 sessions, next session today   c/w solumedrol 125mg daily (10 days total)  currently off sedation (with bradycardia), fentanyl PRN  MAP goal >65  CPAP trials   for MG, avoid Mg, beta blockers, NMB, microlides and fluroquinolones   on TF  c/w famotidine   senna and miralax, LBM 2/3  d/c plasmalyte   NPH 10U q6h, ISS (HgA1C 6.6)  remdesevir, last day today   Lov ppx, LE US neg 1/31    Patient seen and examined by attending on 2/4/2023.    Patient is critically ill due to myasthenia gravis exacerbation and at high risk for neurological deterioration or death due to: hypoxic and hypercarbic respiratory failure requiring mechanical ventilation. 63 yo woman with DM and Myasthenia gravis s/p thymectomy 2010, with prior exacerbations requiring intubation (last 2021), now admitted 1/30 with SOB, weak cough, dysphagia and double vision, found to be COVID positive, concerning for MG exacerbation. Started on IVIG due to refusal of PLEX, s/p 3 doses. Initially on Bipap but intubated 2/1 for worsening respiratory failure.  Now receiving PLEX.   TTE with EF 35-40%, mild-moderate MR.     On ACVC.  Afebrile, no leukocytosis.  Is and Os positive 1.5L.     On exam, intubated, alert, communicates by nodding, oriented x 3 with choices, with ptosis, neck flexion 2/5, neck extension 5/5, R delt 3/5, biceps 5/5, Triceps 4+/5,  5/5. L delt 3/5, biceps 4+/5, triceps 4+/5,  5/5. B/l LE 5/5.    Exam improving.     s/p PLEX 1/5 sessions, next session today   c/w solumedrol 125mg daily (10 days total)  currently off sedation (with bradycardia), fentanyl PRN  MAP goal >65  CPAP trials   for MG, avoid Mg, beta blockers, NMB, microlides and fluroquinolones   on TF  c/w famotidine   senna and miralax, LBM 2/3  d/c plasmalyte   NPH 10U q6h, ISS (HgA1C 6.6)  remdesevir, last day today   Lov ppx, LE US neg 1/31    Patient seen and examined by attending on 2/4/2023.    Patient is critically ill due to myasthenia gravis exacerbation and at high risk for neurological deterioration or death due to: hypoxic and hypercarbic respiratory failure requiring mechanical ventilation.

## 2023-02-04 NOTE — PROGRESS NOTE ADULT - ASSESSMENT
ASSESSMENT/PLAN: MG exacerbation, covid +    NEURO:   checks Q4  neurology consulted  IVIG 25g (1/31--2/2), PLEX every other day 2/2--  Started on solumedrol 125 mg daily (1/31--)  Avoid medications that may worsen the current exacerbation including macrolides, fluoroquinolones, tetracyclines, aminoglycoside, beta-blockers, magnesium, and anti-arrhythmics (procainamide, quinidine, and lidocaine)  Pain management with fentanyl pushes  Activity: [] mobilize as tolerated [x] Bedrest [] PT [] OT [] PMNR    PULM:  Intubated for airway protection, will CPAP trial  Adjust the vent settings based on ABG  Increased TV to 400 and RR to 14- Repeat ABG  Daily CXR and ABG while intubated    CV:  MAP >65  TTE- p    RENAL:  Fluids: IVF with plasmalytes at 75 cc/hour    GI:  Diet: TF at goal  GI prophylaxis [] not indicated [x] famotidine [] other:  Bowel regimen [] colace [x] senna [x] other: miralax - hold since the patient had diarrhea, will add psyllium   LBM 2/3    ENDO:   A1c 6.6  Goal euglycemia (-180)  RISS for now      HEME/ONC:  H/H stable   VTE prophylaxis: [x] SCDs [x] chemoprophylaxis lovenox [] hold chemoprophylaxis due to: [] high risk of DVT/PE on admission due to:    ID: covid + supportive care and solumedrol + remdesivir   No leukocytosis, afebrile  monitor for fevers   ID on board    MISC:    SOCIAL/FAMILY:  [] awaiting [x] updated at bedside [] family meeting    CODE STATUS:  [x] Full Code [] DNR [] DNI [] Palliative/Comfort Care    DISPOSITION:  [x] ICU [] Stroke Unit [] Floor [] EMU [] RCU [] PCU    [x] Patient is at high risk of neurologic deterioration/death due to: MG crisis, hypercapnic respiratory failure       Contact: [phone] ASSESSMENT/PLAN: MG exacerbation, covid +    NEURO:   checks Q4  neurology consulted  IVIG 25g (1/31--2/2), PLEX every other day 2/2--  Started on solumedrol 125 mg daily (1/31--)  Avoid medications that may worsen the current exacerbation including macrolides, fluoroquinolones, tetracyclines, aminoglycoside, beta-blockers, magnesium, and anti-arrhythmics (procainamide, quinidine, and lidocaine)  Pain management with fentanyl pushes  Activity: [] mobilize as tolerated [x] Bedrest [] PT [] OT [] PMNR    PULM:  Intubated for airway protection, will CPAP trial  Adjust the vent settings based on ABG  Increased TV to 400 and RR to 14- Repeat ABG  Daily CXR and ABG while intubated    CV:  MAP >65  TTE- p    RENAL:  Fluids: IVF with plasmalytes at 75 cc/hour    GI:  Diet: TF at goal  GI prophylaxis [] not indicated [x] famotidine [] other:  Bowel regimen [] colace [x] senna [x] other: miralax - hold since the patient had diarrhea, will add psyllium   LBM 2/3    ENDO:   A1c 6.6  Goal euglycemia (-180)  RISS for now      HEME/ONC:  H/H stable   VTE prophylaxis: [x] SCDs [x] chemoprophylaxis lovenox [] hold chemoprophylaxis due to: [] high risk of DVT/PE on admission due to:    ID: covid + supportive care and solumedrol + remdesivir   No leukocytosis, afebrile  monitor for fevers   ID on board    MISC:    SOCIAL/FAMILY:  [] awaiting [x] updated at bedside [] family meeting    CODE STATUS:  [x] Full Code [] DNR [] DNI [] Palliative/Comfort Care    DISPOSITION:  [x] ICU [] Stroke Unit [] Floor [] EMU [] RCU [] PCU    [x] Patient is at high risk of neurologic deterioration/death due to: MG crisis, hypercapnic respiratory failure       Contact: [phone] ASSESSMENT/PLAN: MG exacerbation, covid +    NEURO:   checks Q4  neurology consulted  IVIG 25g (1/31--2/2), PLEX every other day 2/2--  Started on solumedrol 125 mg daily (1/31--)  Avoid medications that may worsen the current exacerbation including macrolides, fluoroquinolones, tetracyclines, aminoglycoside, beta-blockers, magnesium, and anti-arrhythmics (procainamide, quinidine, and lidocaine)  Pain management with fentanyl pushes  Activity: [] mobilize as tolerated [x] Bedrest [] PT [] OT [] PMNR    PULM:  Intubated for airway protection, will CPAP trial  Adjust the vent settings based on ABG  Increased TV to 400 and RR to 14- Repeat ABG  Daily CXR and ABG while intubated    CV:  MAP >65  TTE- p    RENAL:  Fluids: IVF with plasmalytes at 75 cc/hour    GI:  Diet: TF at goal  GI prophylaxis [] not indicated [x] famotidine [] other:  Bowel regimen [] colace [x] senna [x] other: miralax - hold since the patient had diarrhea, will add psyllium   LBM 2/3    ENDO:   A1c 6.6  Goal euglycemia (-180)  RISS for now      HEME/ONC:  H/H stable   VTE prophylaxis: [x] SCDs [x] chemoprophylaxis lovenox [] hold chemoprophylaxis due to: [] high risk of DVT/PE on admission due to:    ID: covid + supportive care and solumedrol + remdesivir   No leukocytosis, afebrile  monitor for fevers   ID on board    MISC:    SOCIAL/FAMILY:  [] awaiting [x] updated at bedside [] family meeting    CODE STATUS:  [x] Full Code [] DNR [] DNI [] Palliative/Comfort Care    DISPOSITION:  [x] ICU [] Stroke Unit [] Floor [] EMU [] RCU [] PCU    [x] Patient is at high risk of neurologic deterioration/death due to: MG crisis, hypercapnic respiratory failure       Contact: [phone] ASSESSMENT/PLAN: MG exacerbation, covid +    NEURO:   checks Q4  neurology consulted  IVIG 25g (1/31--2/2), PLEX every other day 2/2--  Started on solumedrol 125 mg daily (1/31--)  Avoid medications that may worsen the current exacerbation including macrolides, fluoroquinolones, tetracyclines, aminoglycoside, beta-blockers, magnesium, and anti-arrhythmics (procainamide, quinidine, and lidocaine)  Pain management with fentanyl pushes  Activity: [] mobilize as tolerated [x] Bedrest [] PT [] OT [] PMNR    PULM:  14/400/7/40%  2/3: ABG- 7.41/42/114/27  Intubated for airway protection, will CPAP trial  Adjust the vent settings based on ABG  Daily CXR and ABG while intubated    CV:  MAP >65  TTE- 35-40%    RENAL:  Fluids: IVL in setting of poor EF  IO: replete as needed    GI:  Diet: TF at goal  GI prophylaxis [] not indicated [x] famotidine [] other:  Bowel regimen [] colace [x] senna [x] other: miralax - hold since the patient had diarrhea, will add psyllium   LBM 2/3    ENDO:   A1c 6.6  Goal euglycemia (-180)  RISS for now and NPH     HEME/ONC:  H/H stable   VTE prophylaxis: [x] SCDs [x] chemoprophylaxis lovenox [] hold chemoprophylaxis due to: [] high risk of DVT/PE on admission due to:    ID: covid + supportive care and solumedrol x 10 days total (1/31-- + remdesivir   No leukocytosis, afebrile  monitor for fevers   ID on board    MISC:    SOCIAL/FAMILY:  [] awaiting [x] updated at bedside [] family meeting    CODE STATUS:  [x] Full Code [] DNR [] DNI [] Palliative/Comfort Care    DISPOSITION:  [x] ICU [] Stroke Unit [] Floor [] EMU [] RCU [] PCU    [x] Patient is at high risk of neurologic deterioration/death due to: MG crisis, hypercapnic respiratory failure       Contact: [phone] ASSESSMENT/PLAN: MG exacerbation, covid +    NEURO:   checks Q4  neurology consulted  IVIG 25g (1/31--2/2), PLEX every other day 2/2--  Started on solumedrol 125 mg daily (1/31--)  Avoid medications that may worsen the current exacerbation including macrolides, fluoroquinolones, tetracyclines, aminoglycoside, beta-blockers, magnesium, and anti-arrhythmics (procainamide, quinidine, and lidocaine)  Pain management with fentanyl pushes  Activity: [] mobilize as tolerated [x] Bedrest [] PT [] OT [] PMNR    PULM:  14/400/7/40%  2/3: ABG- 7.41/42/114/27  Intubated for airway protection, will CPAP trial  Adjust the vent settings based on ABG  Daily CXR and ABG while intubated    CV:  MAP >65  TTE- 35-40%    RENAL:  Fluids: IVL in setting of poor EF  IO: replete as needed    GI:  Diet: TF at goal  GI prophylaxis [] not indicated [x] famotidine [] other:  Bowel regimen [] colace [x] senna [x] other: miralax - hold since the patient had diarrhea, will add psyllium   LBM 2/3    ENDO:   A1c 6.6  Goal euglycemia (-180)  RISS for now and NPH     HEME/ONC:  H/H stable   VTE prophylaxis: [x] SCDs [x] chemoprophylaxis lovenox [] hold chemoprophylaxis due to: [] high risk of DVT/PE on admission due to:    ID: covid + supportive care and solumedrol x 10 days total (1/31-- + remdesivir   No leukocytosis, afebrile  monitor for fevers   ID on board    MISC:    SOCIAL/FAMILY:  [] awaiting [x] updated at bedside [] family meeting    CODE STATUS:  [x] Full Code [] DNR [] DNI [] Palliative/Comfort Care    DISPOSITION:  [x] ICU [] Stroke Unit [] Floor [] EMU [] RCU [] PCU    [x] Patient is at high risk of neurologic deterioration/death due to: MG crisis, hypercapnic respiratory failure       Contact: [phone] ASSESSMENT/PLAN: MG exacerbation, covid +    NEURO:   checks Q4  neurology consulted  IVIG 25g (1/31--2/2), PLEX every other day 2/2--  Started on solumedrol 125 mg daily (1/31--)  Avoid medications that may worsen the current exacerbation including macrolides, fluoroquinolones, tetracyclines, aminoglycoside, beta-blockers, magnesium, and anti-arrhythmics (procainamide, quinidine, and lidocaine)  Pain management with fentanyl pushes  Activity: [] mobilize as tolerated [x] Bedrest [] PT [] OT [] PMNR    PULM:  14/400/7/40%  2/3: ABG- 7.41/42/114/27  Intubated for airway protection, will CPAP trial  Adjust the vent settings based on ABG  Daily CXR and ABG while intubated    CV:  MAP >65  TTE- 35-40%    RENAL:  Fluids: IVL in setting of poor EF  IO: replete as needed    GI:  Diet: TF at goal  GI prophylaxis [] not indicated [x] famotidine [] other:  Bowel regimen [] colace [x] senna [x] other: miralax - hold since the patient had diarrhea, will add psyllium   LBM 2/3    ENDO:   A1c 6.6  Goal euglycemia (-180)  RISS for now and NPH     HEME/ONC:  H/H stable   VTE prophylaxis: [x] SCDs [x] chemoprophylaxis lovenox [] hold chemoprophylaxis due to: [] high risk of DVT/PE on admission due to:    ID: covid + supportive care and solumedrol x 10 days total (1/31-- + remdesivir   No leukocytosis, afebrile  monitor for fevers   ID on board    MISC:    SOCIAL/FAMILY:  [] awaiting [x] updated at bedside [] family meeting    CODE STATUS:  [x] Full Code [] DNR [] DNI [] Palliative/Comfort Care    DISPOSITION:  [x] ICU [] Stroke Unit [] Floor [] EMU [] RCU [] PCU    [x] Patient is at high risk of neurologic deterioration/death due to: MG crisis, hypercapnic respiratory failure       Contact: [phone] ASSESSMENT/PLAN: MG exacerbation, covid +    NEURO:   checks Q4  neurology consulted  IVIG 25g (1/31--2/2), PLEX every other day 2/2--  Started on solumedrol 125 mg daily (1/31--)  Avoid medications that may worsen the current exacerbation including macrolides, fluoroquinolones, tetracyclines, aminoglycoside, beta-blockers, magnesium, and anti-arrhythmics (procainamide, quinidine, and lidocaine)  Pain management with fentanyl pushes  Activity: [] mobilize as tolerated [x] Bedrest [] PT [] OT [] PMNR    PULM:  14/400/7/40%  2/3: ABG- 7.41/42/114/27  Intubated for airway protection, will CPAP trial  Adjust the vent settings based on ABG  Daily CXR and ABG while intubated    CV:  MAP >65  TTE- 35-40%    RENAL:  Fluids: IVL in setting of poor EF  IO: replete as needed    GI:  Diet: TF at goal  GI prophylaxis [] not indicated [x] famotidine [] other:  Bowel regimen [] colace [x] senna [x] other: miralax - hold since the patient had diarrhea, will add psyllium   LBM 2/3    ENDO:   A1c 6.6  Goal euglycemia (-180)  RISS for now and NPH     HEME/ONC:  H/H stable   VTE prophylaxis: [x] SCDs [x] chemoprophylaxis lovenox [] hold chemoprophylaxis due to: [] high risk of DVT/PE on admission due to:    ID: covid + supportive care and solumedrol x 10 days total (1/31-- + remdesivir   No leukocytosis, afebrile  monitor for fevers   ID on board    MISC:    SOCIAL/FAMILY:  [] awaiting [x] updated at bedside [] family meeting    CODE STATUS:  [x] Full Code [] DNR [] DNI [] Palliative/Comfort Care    DISPOSITION:  [x] ICU [] Stroke Unit [] Floor [] EMU [] RCU [] PCU      Contact: [phone] ASSESSMENT/PLAN: MG exacerbation, covid +    NEURO:   checks Q4  neurology consulted  IVIG 25g (1/31--2/2), PLEX every other day 2/2--  Started on solumedrol 125 mg daily (1/31--)  Avoid medications that may worsen the current exacerbation including macrolides, fluoroquinolones, tetracyclines, aminoglycoside, beta-blockers, magnesium, and anti-arrhythmics (procainamide, quinidine, and lidocaine)  Pain management with fentanyl pushes  Activity: [] mobilize as tolerated [x] Bedrest [] PT [] OT [] PMNR    PULM:  14/400/7/40%  2/3: ABG- 7.41/42/114/27  Intubated for airway protection, will CPAP trial  Adjust the vent settings based on ABG  Daily CXR and ABG while intubated    CV:  MAP >65  TTE- 35-40%    RENAL:  Fluids: IVL in setting of poor EF  IO: replete as needed    GI:  Diet: TF at goal  GI prophylaxis [] not indicated [x] famotidine [] other:  Bowel regimen [] colace [x] senna [x] other: miralax - hold since the patient had diarrhea, will add psyllium   LBM 2/3    ENDO:   A1c 6.6  Goal euglycemia (-180)  RISS for now and NPH     HEME/ONC:  H/H stable   VTE prophylaxis: [x] SCDs [x] chemoprophylaxis lovenox [] hold chemoprophylaxis due to: [] high risk of DVT/PE on admission due to:    ID: covid + supportive care and solumedrol x 10 days total (1/31-- + remdesivir   No leukocytosis, afebrile  monitor for fevers   ID on board    MISC:    SOCIAL/FAMILY:  [] awaiting [x] updated at bedside [] family meeting    CODE STATUS:  [x] Full Code [] DNR [] DNI [] Palliative/Comfort Care    DISPOSITION:  [x] ICU [] Stroke Unit [] Floor [] EMU [] RCU [] PCU      Contact: [phone] ASSESSMENT/PLAN: MG exacerbation, covid +    NEURO:   checks Q4  neurology consulted  IVIG 25g (1/31--2/2), PLEX every other day 2/2--  Started on solumedrol 125 mg daily (1/31--)  Avoid medications that may worsen the current exacerbation including macrolides, fluoroquinolones, tetracyclines, aminoglycoside, beta-blockers, magnesium, and anti-arrhythmics (procainamide, quinidine, and lidocaine)  Pain management with fentanyl pushes  Activity: [] mobilize as tolerated [x] Bedrest [] PT [] OT [] PMNR    PULM:  14/400/7/40%  2/3: ABG- 7.41/42/114/27  Intubated for airway protection, will CPAP trial  Adjust the vent settings based on ABG  Daily CXR and ABG while intubated    CV:  MAP >65  TTE- 35-40%    RENAL:  Fluids: IVL in setting of poor EF  IO: replete as needed    GI:  Diet: TF at goal  GI prophylaxis [] not indicated [x] famotidine [] other:  Bowel regimen [] colace [x] senna [x] other: miralax - hold since the patient had diarrhea, will add psyllium   LBM 2/3    ENDO:   A1c 6.6  Goal euglycemia (-180)  RISS for now and NPH     HEME/ONC:  H/H stable   VTE prophylaxis: [x] SCDs [x] chemoprophylaxis lovenox [] hold chemoprophylaxis due to: [] high risk of DVT/PE on admission due to:    ID: covid + supportive care and solumedrol x 10 days total (1/31-- + remdesivir   No leukocytosis, afebrile  monitor for fevers   ID on board    MISC:    SOCIAL/FAMILY:  [] awaiting [x] updated at bedside [] family meeting    CODE STATUS:  [x] Full Code [] DNR [] DNI [] Palliative/Comfort Care    DISPOSITION:  [x] ICU [] Stroke Unit [] Floor [] EMU [] RCU [] PCU      Contact: [phone]

## 2023-02-05 LAB
ANION GAP SERPL CALC-SCNC: 6 MMOL/L — SIGNIFICANT CHANGE UP (ref 5–17)
ANION GAP SERPL CALC-SCNC: 8 MMOL/L — SIGNIFICANT CHANGE UP (ref 5–17)
BUN SERPL-MCNC: 35 MG/DL — HIGH (ref 7–23)
BUN SERPL-MCNC: 36 MG/DL — HIGH (ref 7–23)
CALCIUM SERPL-MCNC: 8.1 MG/DL — LOW (ref 8.4–10.5)
CALCIUM SERPL-MCNC: 8.3 MG/DL — LOW (ref 8.4–10.5)
CHLORIDE SERPL-SCNC: 110 MMOL/L — HIGH (ref 96–108)
CO2 SERPL-SCNC: 27 MMOL/L — SIGNIFICANT CHANGE UP (ref 22–31)
CO2 SERPL-SCNC: 28 MMOL/L — SIGNIFICANT CHANGE UP (ref 22–31)
CREAT SERPL-MCNC: 0.42 MG/DL — LOW (ref 0.5–1.3)
CREAT SERPL-MCNC: 0.47 MG/DL — LOW (ref 0.5–1.3)
EGFR: 107 ML/MIN/1.73M2 — SIGNIFICANT CHANGE UP
EGFR: 110 ML/MIN/1.73M2 — SIGNIFICANT CHANGE UP
GAS PNL BLDA: SIGNIFICANT CHANGE UP
GLUCOSE BLDC GLUCOMTR-MCNC: 128 MG/DL — HIGH (ref 70–99)
GLUCOSE BLDC GLUCOMTR-MCNC: 164 MG/DL — HIGH (ref 70–99)
GLUCOSE BLDC GLUCOMTR-MCNC: 187 MG/DL — HIGH (ref 70–99)
GLUCOSE BLDC GLUCOMTR-MCNC: 239 MG/DL — HIGH (ref 70–99)
GLUCOSE SERPL-MCNC: 138 MG/DL — HIGH (ref 70–99)
GLUCOSE SERPL-MCNC: 158 MG/DL — HIGH (ref 70–99)
HCT VFR BLD CALC: 31.9 % — LOW (ref 34.5–45)
HCT VFR BLD CALC: 32.6 % — LOW (ref 34.5–45)
HGB BLD-MCNC: 10.3 G/DL — LOW (ref 11.5–15.5)
HGB BLD-MCNC: 10.6 G/DL — LOW (ref 11.5–15.5)
MAGNESIUM SERPL-MCNC: 2.4 MG/DL — SIGNIFICANT CHANGE UP (ref 1.6–2.6)
MAGNESIUM SERPL-MCNC: 2.5 MG/DL — SIGNIFICANT CHANGE UP (ref 1.6–2.6)
MCHC RBC-ENTMCNC: 28.9 PG — SIGNIFICANT CHANGE UP (ref 27–34)
MCHC RBC-ENTMCNC: 29 PG — SIGNIFICANT CHANGE UP (ref 27–34)
MCHC RBC-ENTMCNC: 32.3 GM/DL — SIGNIFICANT CHANGE UP (ref 32–36)
MCHC RBC-ENTMCNC: 32.5 GM/DL — SIGNIFICANT CHANGE UP (ref 32–36)
MCV RBC AUTO: 89.3 FL — SIGNIFICANT CHANGE UP (ref 80–100)
MCV RBC AUTO: 89.4 FL — SIGNIFICANT CHANGE UP (ref 80–100)
NRBC # BLD: 0 /100 WBCS — SIGNIFICANT CHANGE UP (ref 0–0)
PHOSPHATE SERPL-MCNC: 4.1 MG/DL — SIGNIFICANT CHANGE UP (ref 2.5–4.5)
PHOSPHATE SERPL-MCNC: 4.9 MG/DL — HIGH (ref 2.5–4.5)
PLATELET # BLD AUTO: 198 K/UL — SIGNIFICANT CHANGE UP (ref 150–400)
PLATELET # BLD AUTO: 229 K/UL — SIGNIFICANT CHANGE UP (ref 150–400)
POTASSIUM SERPL-MCNC: 3.9 MMOL/L — SIGNIFICANT CHANGE UP (ref 3.5–5.3)
POTASSIUM SERPL-MCNC: 4.2 MMOL/L — SIGNIFICANT CHANGE UP (ref 3.5–5.3)
POTASSIUM SERPL-SCNC: 3.9 MMOL/L — SIGNIFICANT CHANGE UP (ref 3.5–5.3)
POTASSIUM SERPL-SCNC: 4.2 MMOL/L — SIGNIFICANT CHANGE UP (ref 3.5–5.3)
RBC # BLD: 3.57 M/UL — LOW (ref 3.8–5.2)
RBC # BLD: 3.65 M/UL — LOW (ref 3.8–5.2)
RBC # FLD: 13.1 % — SIGNIFICANT CHANGE UP (ref 10.3–14.5)
RBC # FLD: 13.2 % — SIGNIFICANT CHANGE UP (ref 10.3–14.5)
SODIUM SERPL-SCNC: 144 MMOL/L — SIGNIFICANT CHANGE UP (ref 135–145)
SODIUM SERPL-SCNC: 145 MMOL/L — SIGNIFICANT CHANGE UP (ref 135–145)
WBC # BLD: 13.64 K/UL — HIGH (ref 3.8–10.5)
WBC # BLD: 9.28 K/UL — SIGNIFICANT CHANGE UP (ref 3.8–10.5)
WBC # FLD AUTO: 13.64 K/UL — HIGH (ref 3.8–10.5)
WBC # FLD AUTO: 9.28 K/UL — SIGNIFICANT CHANGE UP (ref 3.8–10.5)

## 2023-02-05 PROCEDURE — 71045 X-RAY EXAM CHEST 1 VIEW: CPT | Mod: 26

## 2023-02-05 PROCEDURE — 99232 SBSQ HOSP IP/OBS MODERATE 35: CPT

## 2023-02-05 PROCEDURE — 99291 CRITICAL CARE FIRST HOUR: CPT

## 2023-02-05 RX ORDER — POTASSIUM CHLORIDE 20 MEQ
20 PACKET (EA) ORAL ONCE
Refills: 0 | Status: COMPLETED | OUTPATIENT
Start: 2023-02-05 | End: 2023-02-05

## 2023-02-05 RX ORDER — FENTANYL CITRATE 50 UG/ML
50 INJECTION INTRAVENOUS ONCE
Refills: 0 | Status: DISCONTINUED | OUTPATIENT
Start: 2023-02-05 | End: 2023-02-05

## 2023-02-05 RX ORDER — IPRATROPIUM/ALBUTEROL SULFATE 18-103MCG
3 AEROSOL WITH ADAPTER (GRAM) INHALATION EVERY 6 HOURS
Refills: 0 | Status: DISCONTINUED | OUTPATIENT
Start: 2023-02-05 | End: 2023-02-07

## 2023-02-05 RX ORDER — SODIUM CHLORIDE 9 MG/ML
4 INJECTION INTRAMUSCULAR; INTRAVENOUS; SUBCUTANEOUS EVERY 6 HOURS
Refills: 0 | Status: DISCONTINUED | OUTPATIENT
Start: 2023-02-05 | End: 2023-02-07

## 2023-02-05 RX ADMIN — Medication 4: at 12:41

## 2023-02-05 RX ADMIN — Medication 3 MILLILITER(S): at 12:17

## 2023-02-05 RX ADMIN — FENTANYL CITRATE 50 MICROGRAM(S): 50 INJECTION INTRAVENOUS at 10:35

## 2023-02-05 RX ADMIN — SODIUM CHLORIDE 4 MILLILITER(S): 9 INJECTION INTRAMUSCULAR; INTRAVENOUS; SUBCUTANEOUS at 14:36

## 2023-02-05 RX ADMIN — HUMAN INSULIN 10 UNIT(S): 100 INJECTION, SUSPENSION SUBCUTANEOUS at 17:21

## 2023-02-05 RX ADMIN — FENTANYL CITRATE 50 MICROGRAM(S): 50 INJECTION INTRAVENOUS at 12:50

## 2023-02-05 RX ADMIN — Medication 125 MILLIGRAM(S): at 05:41

## 2023-02-05 RX ADMIN — Medication 1 PACKET(S): at 23:00

## 2023-02-05 RX ADMIN — ENOXAPARIN SODIUM 40 MILLIGRAM(S): 100 INJECTION SUBCUTANEOUS at 17:01

## 2023-02-05 RX ADMIN — HUMAN INSULIN 10 UNIT(S): 100 INJECTION, SUSPENSION SUBCUTANEOUS at 12:40

## 2023-02-05 RX ADMIN — Medication 2: at 23:24

## 2023-02-05 RX ADMIN — CHLORHEXIDINE GLUCONATE 1 APPLICATION(S): 213 SOLUTION TOPICAL at 05:41

## 2023-02-05 RX ADMIN — Medication 2: at 17:20

## 2023-02-05 RX ADMIN — Medication 1 PACKET(S): at 05:40

## 2023-02-05 RX ADMIN — FENTANYL CITRATE 50 MICROGRAM(S): 50 INJECTION INTRAVENOUS at 10:20

## 2023-02-05 RX ADMIN — SODIUM CHLORIDE 4 MILLILITER(S): 9 INJECTION INTRAMUSCULAR; INTRAVENOUS; SUBCUTANEOUS at 17:28

## 2023-02-05 RX ADMIN — HUMAN INSULIN 10 UNIT(S): 100 INJECTION, SUSPENSION SUBCUTANEOUS at 23:23

## 2023-02-05 RX ADMIN — FENTANYL CITRATE 50 MICROGRAM(S): 50 INJECTION INTRAVENOUS at 19:55

## 2023-02-05 RX ADMIN — Medication 1 PACKET(S): at 13:56

## 2023-02-05 RX ADMIN — HUMAN INSULIN 10 UNIT(S): 100 INJECTION, SUSPENSION SUBCUTANEOUS at 05:42

## 2023-02-05 RX ADMIN — FENTANYL CITRATE 50 MICROGRAM(S): 50 INJECTION INTRAVENOUS at 01:00

## 2023-02-05 RX ADMIN — CHLORHEXIDINE GLUCONATE 15 MILLILITER(S): 213 SOLUTION TOPICAL at 05:40

## 2023-02-05 RX ADMIN — FENTANYL CITRATE 50 MICROGRAM(S): 50 INJECTION INTRAVENOUS at 19:40

## 2023-02-05 RX ADMIN — FAMOTIDINE 20 MILLIGRAM(S): 10 INJECTION INTRAVENOUS at 17:01

## 2023-02-05 RX ADMIN — FENTANYL CITRATE 50 MICROGRAM(S): 50 INJECTION INTRAVENOUS at 13:05

## 2023-02-05 RX ADMIN — CHLORHEXIDINE GLUCONATE 15 MILLILITER(S): 213 SOLUTION TOPICAL at 17:01

## 2023-02-05 RX ADMIN — HUMAN INSULIN 10 UNIT(S): 100 INJECTION, SUSPENSION SUBCUTANEOUS at 00:21

## 2023-02-05 RX ADMIN — FENTANYL CITRATE 50 MICROGRAM(S): 50 INJECTION INTRAVENOUS at 12:55

## 2023-02-05 RX ADMIN — Medication 20 MILLIEQUIVALENT(S): at 05:40

## 2023-02-05 RX ADMIN — FENTANYL CITRATE 50 MICROGRAM(S): 50 INJECTION INTRAVENOUS at 17:20

## 2023-02-05 RX ADMIN — Medication 3 MILLILITER(S): at 17:28

## 2023-02-05 RX ADMIN — FENTANYL CITRATE 50 MICROGRAM(S): 50 INJECTION INTRAVENOUS at 12:40

## 2023-02-05 RX ADMIN — Medication 3 MILLILITER(S): at 23:17

## 2023-02-05 RX ADMIN — FAMOTIDINE 20 MILLIGRAM(S): 10 INJECTION INTRAVENOUS at 05:41

## 2023-02-05 RX ADMIN — FENTANYL CITRATE 50 MICROGRAM(S): 50 INJECTION INTRAVENOUS at 01:31

## 2023-02-05 RX ADMIN — SODIUM CHLORIDE 4 MILLILITER(S): 9 INJECTION INTRAMUSCULAR; INTRAVENOUS; SUBCUTANEOUS at 23:17

## 2023-02-05 RX ADMIN — FENTANYL CITRATE 50 MICROGRAM(S): 50 INJECTION INTRAVENOUS at 05:45

## 2023-02-05 RX ADMIN — FENTANYL CITRATE 50 MICROGRAM(S): 50 INJECTION INTRAVENOUS at 17:35

## 2023-02-05 NOTE — PROGRESS NOTE ADULT - ASSESSMENT
Myasthenia gravis (MG) exacerbation in the setting of + COVID infection    Currently intubated and stable.     Started PLEX, second session completed 2/4, will plan for a total of 5 treatment sessions (depending on clinical improvement)

## 2023-02-05 NOTE — PROGRESS NOTE ADULT - SUBJECTIVE AND OBJECTIVE BOX
Pt seen at bedside. Remains intubated, not on sedation. Alert and able to follow all commands. Received second session of PLEX on 2/4. She denies any pain or concern.      B/l Ptosis L>R  Limited upward gaze  Neck extension 5/5, neck flexion 4/5  Shoulder shrug 5/5  Proximal UE and LE strength 4+/5, distally 5/5

## 2023-02-05 NOTE — PROGRESS NOTE ADULT - SUBJECTIVE AND OBJECTIVE BOX
SUMMARY:   62-year-old woman with DMII, myasthenia gravis status post thymectomy and prior crises status post IVIG developed MG crisis in setting of COVID-19 infection requiring intubation status post IVIG x 1 and PLEX.     24 HOUR EVENTS:  Tolerating feeds.     VITALS/DATA/ORDERS: [x] Reviewed    EXAMINATION:   Intubated, nods appropriately, +cough/gag, no ptosis, deltoids 3/5, neck flexion/extension 4+/5, otherwise full strength   Decreased BS at bases

## 2023-02-05 NOTE — PROGRESS NOTE ADULT - ASSESSMENT
ASSESSMENT/PLAN: MG exacerbation, covid +    NEURO:   IVIG 25g (1/31--2/2), PLEX every other day 2/2--  solumedrol 125 mg daily (1/31--)  Avoid medications that may worsen the current exacerbation including macrolides, fluoroquinolones, tetracyclines, aminoglycoside, beta-blockers, magnesium, and anti-arrhythmics (procainamide, quinidine, and lidocaine)  Pain management with fentanyl pushes    PULM:  Intubated for airway protection, will wean as tolerated    CV:  MAP >65  TTE- 35-40%    RENAL:  Fluids: IVL in setting of poor EF  IO: replete as needed    GI:  Diet: TF at goal  GI prophylaxis [] not indicated [x] famotidine [] other:    ENDO:   A1c 6.6  Goal euglycemia (-180)  RISS for now and NPH, adjust as needed    HEME/ONC:  H/H stable   VTE prophylaxis: [x] SCDs [x] chemoprophylaxis lovenox [] hold chemoprophylaxis due to: [] high risk of DVT/PE on admission due to:    ID: covid + status post remdesivir  No leukocytosis, afebrile  monitor for fevers   ID on board    CODE STATUS:  [x] Full Code [] DNR [] DNI [] Palliative/Comfort Care    DISPOSITION:  [x] ICU [] Stroke Unit [] Floor [] EMU [] RCU [] PCU    At risk of death/neuro decline due to: acute resp failure requiring intubation    Contact: [phone] ASSESSMENT/PLAN: MG exacerbation, covid +    NEURO:   IVIG 25g (1/31--2/2), PLEX every other day 2/2--  solumedrol 125 mg daily (1/31--)  Avoid medications that may worsen the current exacerbation including macrolides, fluoroquinolones, tetracyclines, aminoglycoside, beta-blockers, magnesium, and anti-arrhythmics (procainamide, quinidine, and lidocaine)  Pain management with fentanyl pushes    PULM:  Intubated for airway protection, will wean as tolerated    CV:  MAP >65  TTE- 35-40%    RENAL:  Fluids: IVL in setting of poor EF  IO: replete as needed    GI:  Diet: TF at goal  GI prophylaxis [] not indicated [x] famotidine [] other:    ENDO:   A1c 6.6  Goal euglycemia (-180)  RISS for now and NPH, adjust as needed    HEME/ONC:  H/H stable   VTE prophylaxis: [x] SCDs [x] chemoprophylaxis lovenox [] hold chemoprophylaxis due to: [] high risk of DVT/PE on admission due to:    ID: covid + status post remdesivir  No leukocytosis, afebrile  monitor for fevers   ID on board    CODE STATUS:  [x] Full Code [] DNR [] DNI [] Palliative/Comfort Care    DISPOSITION:  [x] ICU [] Stroke Unit [] Floor [] EMU [] RCU [] PCU    At risk of death/neuro decline due to: acute resp failure requiring intubation    Contact: [phone] ASSESSMENT/PLAN: MG exacerbation, covid +    NEURO:   IVIG 25g (1/31--2/2), PLEX every other day 2/2--  solumedrol 125 mg daily (1/31--)  Avoid medications that may worsen the current exacerbation including macrolides, fluoroquinolones, tetracyclines, aminoglycoside, beta-blockers, magnesium, and anti-arrhythmics (procainamide, quinidine, and lidocaine)  Pain management with fentanyl pushes    PULM:  Intubated for airway protection, will wean as tolerated    CV:  MAP >65  TTE- 35-40%    RENAL:  Fluids: IVL in setting of poor EF  IO: replete as needed    GI:  Diet: TF at goal  GI prophylaxis [] not indicated [x] famotidine [] other:    ENDO:   A1c 6.6  Goal euglycemia (-180)  RISS for now and NPH, adjust as needed    HEME/ONC:  H/H stable   VTE prophylaxis: [x] SCDs [x] chemoprophylaxis lovenox [] hold chemoprophylaxis due to: [] high risk of DVT/PE on admission due to:    ID: covid + status post remdesivir  No leukocytosis, afebrile  monitor for fevers   ID on board    CODE STATUS:  [x] Full Code [] DNR [] DNI [] Palliative/Comfort Care    DISPOSITION:  [x] ICU [] Stroke Unit [] Floor [] EMU [] RCU [] PCU    At risk of death/neuro decline due to: acute resp failure requiring intubation    Contact: [phone]

## 2023-02-06 LAB
CA-I BLD-SCNC: 1.1 MMOL/L — LOW (ref 1.15–1.33)
FIBRINOGEN PPP-MCNC: 246 MG/DL — SIGNIFICANT CHANGE UP (ref 200–445)
GAS PNL BLDA: SIGNIFICANT CHANGE UP
GLUCOSE BLDC GLUCOMTR-MCNC: 141 MG/DL — HIGH (ref 70–99)
GLUCOSE BLDC GLUCOMTR-MCNC: 153 MG/DL — HIGH (ref 70–99)
GLUCOSE BLDC GLUCOMTR-MCNC: 158 MG/DL — HIGH (ref 70–99)
GLUCOSE BLDC GLUCOMTR-MCNC: 227 MG/DL — HIGH (ref 70–99)
GLUCOSE BLDC GLUCOMTR-MCNC: 84 MG/DL — SIGNIFICANT CHANGE UP (ref 70–99)
GLUCOSE BLDC GLUCOMTR-MCNC: 87 MG/DL — SIGNIFICANT CHANGE UP (ref 70–99)
HCT VFR BLD CALC: 32.8 % — LOW (ref 34.5–45)
HCT VFR BLD CALC: 33.8 % — LOW (ref 34.5–45)
HGB BLD-MCNC: 10.7 G/DL — LOW (ref 11.5–15.5)
HGB BLD-MCNC: 11 G/DL — LOW (ref 11.5–15.5)
MCHC RBC-ENTMCNC: 28.9 PG — SIGNIFICANT CHANGE UP (ref 27–34)
MCHC RBC-ENTMCNC: 29.3 PG — SIGNIFICANT CHANGE UP (ref 27–34)
MCHC RBC-ENTMCNC: 32.5 GM/DL — SIGNIFICANT CHANGE UP (ref 32–36)
MCHC RBC-ENTMCNC: 32.6 GM/DL — SIGNIFICANT CHANGE UP (ref 32–36)
MCV RBC AUTO: 88.7 FL — SIGNIFICANT CHANGE UP (ref 80–100)
MCV RBC AUTO: 89.9 FL — SIGNIFICANT CHANGE UP (ref 80–100)
NRBC # BLD: 0 /100 WBCS — SIGNIFICANT CHANGE UP (ref 0–0)
PLATELET # BLD AUTO: 226 K/UL — SIGNIFICANT CHANGE UP (ref 150–400)
PLATELET # BLD AUTO: 263 K/UL — SIGNIFICANT CHANGE UP (ref 150–400)
RBC # BLD: 3.65 M/UL — LOW (ref 3.8–5.2)
RBC # BLD: 3.81 M/UL — SIGNIFICANT CHANGE UP (ref 3.8–5.2)
RBC # FLD: 13.2 % — SIGNIFICANT CHANGE UP (ref 10.3–14.5)
RBC # FLD: 13.4 % — SIGNIFICANT CHANGE UP (ref 10.3–14.5)
WBC # BLD: 15.33 K/UL — HIGH (ref 3.8–10.5)
WBC # BLD: 20.14 K/UL — HIGH (ref 3.8–10.5)
WBC # FLD AUTO: 15.33 K/UL — HIGH (ref 3.8–10.5)
WBC # FLD AUTO: 20.14 K/UL — HIGH (ref 3.8–10.5)

## 2023-02-06 PROCEDURE — 99291 CRITICAL CARE FIRST HOUR: CPT

## 2023-02-06 PROCEDURE — 36514 APHERESIS PLASMA: CPT

## 2023-02-06 PROCEDURE — 71045 X-RAY EXAM CHEST 1 VIEW: CPT | Mod: 26,76

## 2023-02-06 PROCEDURE — 99233 SBSQ HOSP IP/OBS HIGH 50: CPT

## 2023-02-06 RX ORDER — ACETAMINOPHEN 500 MG
650 TABLET ORAL EVERY 6 HOURS
Refills: 0 | Status: DISCONTINUED | OUTPATIENT
Start: 2023-02-06 | End: 2023-02-07

## 2023-02-06 RX ORDER — DEXTROSE 50 % IN WATER 50 %
25 SYRINGE (ML) INTRAVENOUS ONCE
Refills: 0 | Status: COMPLETED | OUTPATIENT
Start: 2023-02-06 | End: 2023-02-06

## 2023-02-06 RX ORDER — ACETAMINOPHEN 500 MG
1000 TABLET ORAL ONCE
Refills: 0 | Status: COMPLETED | OUTPATIENT
Start: 2023-02-06 | End: 2023-02-06

## 2023-02-06 RX ORDER — HUMAN INSULIN 100 [IU]/ML
8 INJECTION, SUSPENSION SUBCUTANEOUS EVERY 6 HOURS
Refills: 0 | Status: DISCONTINUED | OUTPATIENT
Start: 2023-02-06 | End: 2023-02-12

## 2023-02-06 RX ORDER — FENTANYL CITRATE 50 UG/ML
12.5 INJECTION INTRAVENOUS ONCE
Refills: 0 | Status: DISCONTINUED | OUTPATIENT
Start: 2023-02-06 | End: 2023-02-06

## 2023-02-06 RX ORDER — HUMAN INSULIN 100 [IU]/ML
4 INJECTION, SUSPENSION SUBCUTANEOUS ONCE
Refills: 0 | Status: COMPLETED | OUTPATIENT
Start: 2023-02-06 | End: 2023-02-06

## 2023-02-06 RX ORDER — OXYCODONE HYDROCHLORIDE 5 MG/1
5 TABLET ORAL EVERY 6 HOURS
Refills: 0 | Status: DISCONTINUED | OUTPATIENT
Start: 2023-02-06 | End: 2023-02-09

## 2023-02-06 RX ADMIN — Medication 3 MILLILITER(S): at 05:27

## 2023-02-06 RX ADMIN — Medication 2: at 12:02

## 2023-02-06 RX ADMIN — Medication 3 MILLILITER(S): at 23:45

## 2023-02-06 RX ADMIN — Medication 25 MILLILITER(S): at 05:15

## 2023-02-06 RX ADMIN — Medication 1000 MILLIGRAM(S): at 12:30

## 2023-02-06 RX ADMIN — FENTANYL CITRATE 50 MICROGRAM(S): 50 INJECTION INTRAVENOUS at 10:20

## 2023-02-06 RX ADMIN — Medication 125 MILLIGRAM(S): at 05:10

## 2023-02-06 RX ADMIN — SODIUM CHLORIDE 4 MILLILITER(S): 9 INJECTION INTRAMUSCULAR; INTRAVENOUS; SUBCUTANEOUS at 12:02

## 2023-02-06 RX ADMIN — Medication 3 MILLILITER(S): at 18:00

## 2023-02-06 RX ADMIN — FENTANYL CITRATE 50 MICROGRAM(S): 50 INJECTION INTRAVENOUS at 03:00

## 2023-02-06 RX ADMIN — FENTANYL CITRATE 50 MICROGRAM(S): 50 INJECTION INTRAVENOUS at 17:00

## 2023-02-06 RX ADMIN — ENOXAPARIN SODIUM 40 MILLIGRAM(S): 100 INJECTION SUBCUTANEOUS at 17:11

## 2023-02-06 RX ADMIN — Medication 3 MILLILITER(S): at 12:02

## 2023-02-06 RX ADMIN — Medication 650 MILLIGRAM(S): at 19:33

## 2023-02-06 RX ADMIN — SODIUM CHLORIDE 4 MILLILITER(S): 9 INJECTION INTRAMUSCULAR; INTRAVENOUS; SUBCUTANEOUS at 05:28

## 2023-02-06 RX ADMIN — FENTANYL CITRATE 50 MICROGRAM(S): 50 INJECTION INTRAVENOUS at 10:35

## 2023-02-06 RX ADMIN — SODIUM CHLORIDE 4 MILLILITER(S): 9 INJECTION INTRAMUSCULAR; INTRAVENOUS; SUBCUTANEOUS at 23:45

## 2023-02-06 RX ADMIN — FENTANYL CITRATE 12.5 MICROGRAM(S): 50 INJECTION INTRAVENOUS at 00:36

## 2023-02-06 RX ADMIN — FENTANYL CITRATE 50 MICROGRAM(S): 50 INJECTION INTRAVENOUS at 00:17

## 2023-02-06 RX ADMIN — HUMAN INSULIN 4 UNIT(S): 100 INJECTION, SUSPENSION SUBCUTANEOUS at 12:37

## 2023-02-06 RX ADMIN — FAMOTIDINE 20 MILLIGRAM(S): 10 INJECTION INTRAVENOUS at 17:11

## 2023-02-06 RX ADMIN — CHLORHEXIDINE GLUCONATE 15 MILLILITER(S): 213 SOLUTION TOPICAL at 05:09

## 2023-02-06 RX ADMIN — CHLORHEXIDINE GLUCONATE 1 APPLICATION(S): 213 SOLUTION TOPICAL at 05:09

## 2023-02-06 RX ADMIN — FENTANYL CITRATE 50 MICROGRAM(S): 50 INJECTION INTRAVENOUS at 03:15

## 2023-02-06 RX ADMIN — FENTANYL CITRATE 50 MICROGRAM(S): 50 INJECTION INTRAVENOUS at 17:15

## 2023-02-06 RX ADMIN — Medication 400 MILLIGRAM(S): at 12:00

## 2023-02-06 RX ADMIN — HUMAN INSULIN 8 UNIT(S): 100 INJECTION, SUSPENSION SUBCUTANEOUS at 17:09

## 2023-02-06 RX ADMIN — Medication 650 MILLIGRAM(S): at 20:03

## 2023-02-06 RX ADMIN — FENTANYL CITRATE 50 MICROGRAM(S): 50 INJECTION INTRAVENOUS at 23:50

## 2023-02-06 RX ADMIN — SODIUM CHLORIDE 4 MILLILITER(S): 9 INJECTION INTRAMUSCULAR; INTRAVENOUS; SUBCUTANEOUS at 17:59

## 2023-02-06 RX ADMIN — CHLORHEXIDINE GLUCONATE 15 MILLILITER(S): 213 SOLUTION TOPICAL at 17:11

## 2023-02-06 RX ADMIN — FENTANYL CITRATE 12.5 MICROGRAM(S): 50 INJECTION INTRAVENOUS at 00:21

## 2023-02-06 RX ADMIN — FENTANYL CITRATE 50 MICROGRAM(S): 50 INJECTION INTRAVENOUS at 00:02

## 2023-02-06 RX ADMIN — Medication 4: at 17:10

## 2023-02-06 NOTE — PROGRESS NOTE ADULT - ASSESSMENT
ASSESSMENT/PLAN: MG exacerbation, covid +    NEURO:   IVIG 25g (1/31--2/2), PLEX every other day 2/2--  solumedrol 125 mg daily (1/31--)  Avoid medications that may worsen the current exacerbation including macrolides, fluoroquinolones, tetracyclines, aminoglycoside, beta-blockers, magnesium, and anti-arrhythmics (procainamide, quinidine, and lidocaine)  Pain management with fentanyl pushes    PULM:  Intubated for airway protection, will wean as tolerated    CV:  MAP >65  TTE- 35-40%    RENAL:  Fluids: IVL in setting of poor EF  IO: replete as needed    GI:  Diet: TF at goal  GI prophylaxis [] not indicated [x] famotidine [] other:    ENDO:   A1c 6.6  Goal euglycemia (-180)  RISS for now and NPH, adjust as needed    HEME/ONC:  H/H stable   VTE prophylaxis: [x] SCDs [x] chemoprophylaxis lovenox [] hold chemoprophylaxis due to: [] high risk of DVT/PE on admission due to:    ID: covid + status post remdesivir  No leukocytosis, afebrile  monitor for fevers   ID on board    CODE STATUS:  [x] Full Code [] DNR [] DNI [] Palliative/Comfort Care    DISPOSITION:  [x] ICU [] Stroke Unit [] Floor [] EMU [] RCU [] PCU    At risk of death/neuro decline due to: acute resp failure requiring intubation    Contact: [phone] ASSESSMENT/PLAN: MG exacerbation, covid +    NEURO:   IVIG 25g (1/31--2/2), PLEX every other day 2/2--  solumedrol 125 mg daily (1/31--)  Avoid medications that may worsen the current exacerbation including macrolides, fluoroquinolones, tetracyclines, aminoglycoside, beta-blockers, magnesium, and anti-arrhythmics (procainamide, quinidine, and lidocaine)  Pain management with fentanyl pushes    PULM:  Intubated for airway protection, will wean as tolerated    CV:  MAP >65  TTE- 35-40%    RENAL:  Fluids: IVL in setting of poor EF  IO: replete as needed    GI:  Diet: TF at goal  GI prophylaxis [] not indicated [x] famotidine [] other:    ENDO:   A1c 6.6  Goal euglycemia (-180)  RISS for now and NPH, adjust as needed    HEME/ONC:  H/H stable   VTE prophylaxis: [x] SCDs [x] chemoprophylaxis lovenox [] hold chemoprophylaxis due to: [] high risk of DVT/PE on admission due to:    ID: covid + status post remdesivir  No leukocytosis, afebrile  monitor for fevers   ID on board    CODE STATUS:  [x] Full Code [] DNR [] DNI [] Palliative/Comfort Care    DISPOSITION:  [x] ICU [] Stroke Unit [] Floor [] EMU [] RCU [] PCU    At risk of death/neuro decline due to: acute resp failure requiring intubation    Contact: [phone] ASSESSMENT/PLAN: MG exacerbation, covid +    NEURO:   IVIG 25g (1/31--2/2), PLEX every other day 2/2--  solumedrol 125 mg daily (1/31--)  Avoid medications that may worsen the current exacerbation including macrolides, fluoroquinolones, tetracyclines, aminoglycoside, beta-blockers, magnesium, and anti-arrhythmics (procainamide, quinidine, and lidocaine)  Pain management with fentanyl pushes    PULM:  Intubated for airway protection, will wean as tolerated    CV:  MAP >65  TTE- 35-40%    RENAL:  Fluids: IVL in setting of poor EF  IO: replete as needed    GI:  Diet: TF at goal  GI prophylaxis [] not indicated [x] famotidine [] other:    ENDO:   A1c 6.6  Goal euglycemia (-180)  RISS for now and NPH, adjust as needed    HEME/ONC:  H/H stable   VTE prophylaxis: [x] SCDs [x] chemoprophylaxis lovenox [] hold chemoprophylaxis due to: [] high risk of DVT/PE on admission due to:    ID: covid + status post remdesivir  No leukocytosis, afebrile  monitor for fevers   ID on board    CODE STATUS:  [x] Full Code [] DNR [] DNI [] Palliative/Comfort Care    DISPOSITION:  [x] ICU [] Stroke Unit [] Floor [] EMU [] RCU [] PCU    At risk of death/neuro decline due to: acute resp failure requiring intubation    Contact: [phone]

## 2023-02-06 NOTE — PHARMACOTHERAPY INTERVENTION NOTE - COMMENTS
Reviewed medications for appropriate route of administration     MEDICATIONS  (STANDING):  albuterol/ipratropium for Nebulization 3 milliLiter(s) Nebulizer every 6 hours  chlorhexidine 0.12% Liquid 15 milliLiter(s) Oral Mucosa every 12 hours  chlorhexidine 4% Liquid 1 Application(s) Topical <User Schedule>  enoxaparin Injectable 40 milliGRAM(s) SubCutaneous <User Schedule>  famotidine    Tablet 20 milliGRAM(s) Oral two times a day  insulin lispro (ADMELOG) corrective regimen sliding scale   SubCutaneous every 6 hours  insulin NPH human recombinant 8 Unit(s) SubCutaneous every 6 hours  methylPREDNISolone sodium succinate Injectable 125 milliGRAM(s) IV Push daily  sodium chloride 3%  Inhalation 4 milliLiter(s) Inhalation every 6 hours    MEDICATIONS  (PRN):  fentaNYL    Injectable 50 MICROGram(s) IV Push every 2 hours PRN vent synchrony    Jessica Ortez, PharmD  PGY2 Critical Care Pharmacy Resident  Available on [employer] Teams

## 2023-02-06 NOTE — PROGRESS NOTE ADULT - ASSESSMENT
ASSESSMENT/PLAN: MG exacerbation, covid +    NEURO:   IVIG 25g (1/31--2/2), PLEX every other day 2/2--  solumedrol 125 mg daily (1/31--)  Avoid medications that may worsen the current exacerbation including macrolides, fluoroquinolones, tetracyclines, aminoglycoside, beta-blockers, magnesium, and anti-arrhythmics (procainamide, quinidine, and lidocaine)  Pain management with fentanyl pushes    PULM:  Intubated for airway protection, will wean as tolerated    CV:  MAP >65  TTE- 35-40%    RENAL:  Fluids: IVL in setting of poor EF  IO: replete as needed    GI:  Diet: TF at goal  GI prophylaxis [] not indicated [x] famotidine [] other:    ENDO:   A1c 6.6  Goal euglycemia (-180)  RISS for now and NPH, adjust as needed    HEME/ONC:  H/H stable   VTE prophylaxis: [x] SCDs [x] chemoprophylaxis lovenox [] hold chemoprophylaxis due to: [] high risk of DVT/PE on admission due to:    ID: covid + status post remdesivir  No leukocytosis, afebrile  monitor for fevers   ID on board    CODE STATUS:  [x] Full Code [] DNR [] DNI [] Palliative/Comfort Care    DISPOSITION:  [x] ICU [] Stroke Unit [] Floor [] EMU [] RCU [] PCU    At risk of death/neuro decline due to: acute resp failure requiring intubation    Contact: [phone] ASSESSMENT/PLAN: MG exacerbation, covid +    NEURO:   IVIG 25g (1/31--2/2), PLEX every other day 2/2--  solumedrol 125 mg daily (1/31--)  Avoid medications that may worsen the current exacerbation including macrolides, fluoroquinolones, tetracyclines, aminoglycoside, beta-blockers, magnesium, and anti-arrhythmics (procainamide, quinidine, and lidocaine)  Pain management with fentanyl pushes    PULM:  Intubated for airway protection, will wean as tolerated    CV:  MAP >65  TTE- 35-40%    RENAL:  Fluids: IVL in setting of poor EF  IO: replete as needed    GI:  Diet: TF at goal  GI prophylaxis [] not indicated [x] famotidine [] other:    ENDO:   A1c 6.6  Goal euglycemia (-180)  RISS for now and NPH, adjust as needed    HEME/ONC:  H/H stable   VTE prophylaxis: [x] SCDs [x] chemoprophylaxis lovenox [] hold chemoprophylaxis due to: [] high risk of DVT/PE on admission due to:    ID: covid + status post remdesivir  No leukocytosis, afebrile  monitor for fevers   ID on board    CODE STATUS:  [x] Full Code [] DNR [] DNI [] Palliative/Comfort Care    DISPOSITION:  [x] ICU [] Stroke Unit [] Floor [] EMU [] RCU [] PCU    At risk of death/neuro decline due to: acute resp failure requiring intubation    Contact: [phone] ASSESSMENT/PLAN: MG exacerbation, covid +    NEURO:   IVIG 25g (1/31--2/2), PLEX every other day 2/2--  solumedrol 125 mg daily (1/31--)  Avoid medications that may worsen the current exacerbation including macrolides, fluoroquinolones, tetracyclines, aminoglycoside, beta-blockers, magnesium, and anti-arrhythmics (procainamide, quinidine, and lidocaine)  Pain management with fentanyl pushes    PULM:  Intubated for airway protection, will wean as tolerated    CV:  MAP >65  TTE- 35-40%    RENAL:  Fluids: IVL in setting of poor EF  IO: replete as needed    GI:  Diet: TF at goal  GI prophylaxis [] not indicated [x] famotidine [] other:    ENDO:   A1c 6.6  Goal euglycemia (-180)  RISS for now and NPH, adjust as needed    HEME/ONC:  H/H stable   VTE prophylaxis: [x] SCDs [x] chemoprophylaxis lovenox [] hold chemoprophylaxis due to: [] high risk of DVT/PE on admission due to:    ID: covid + status post remdesivir  No leukocytosis, afebrile  monitor for fevers   ID on board    CODE STATUS:  [x] Full Code [] DNR [] DNI [] Palliative/Comfort Care    DISPOSITION:  [x] ICU [] Stroke Unit [] Floor [] EMU [] RCU [] PCU    At risk of death/neuro decline due to: acute resp failure requiring intubation    Contact: [phone] ASSESSMENT/PLAN: MG exacerbation, covid +    NEURO:   IVIG 25g (1/31--2/2), PLEX every other day 2/2--  solumedrol 125 mg daily (1/31--)  Avoid medications that may worsen the current exacerbation including macrolides, fluoroquinolones, tetracyclines, aminoglycoside, beta-blockers, magnesium, and anti-arrhythmics (procainamide, quinidine, and lidocaine)  Pain management with fentanyl pushes    PULM:  Intubated for airway protection, will wean as tolerated    CV:  MAP >65  TTE- 35-40%    RENAL:  Fluids: IVL in setting of poor EF  IO: replete as needed    GI:  Diet: TF at goal  GI prophylaxis [] not indicated [x] famotidine [] other:    ENDO:   A1c 6.6  Goal euglycemia (-180)  RISS for now and NPH, adjust as needed    HEME/ONC:  H/H stable   VTE prophylaxis: [x] SCDs [x] chemoprophylaxis lovenox [] hold chemoprophylaxis due to: [] high risk of DVT/PE on admission due to:    ID: covid + status post remdesivir  ELEVATED leukocytes likely in setting of steroids, afebrile  monitor for fevers   ID on board    CODE STATUS:  [x] Full Code [] DNR [] DNI [] Palliative/Comfort Care    DISPOSITION:  [x] ICU [] Stroke Unit [] Floor [] EMU [] RCU [] PCU    At risk of death/neuro decline due to: acute resp failure requiring intubation    Contact: [phone] ASSESSMENT/PLAN: MG exacerbation, covid +    NEURO:   IVIG 25g (1/31--2/2), PLEX every other day 2/2--  solumedrol 125 mg daily (1/31--)  Avoid medications that may worsen the current exacerbation including macrolides, fluoroquinolones, tetracyclines, aminoglycoside, beta-blockers, magnesium, and anti-arrhythmics (procainamide, quinidine, and lidocaine)  Pain management with fentanyl pushes    PULM:  Intubated for airway protection, will wean as tolerated    CV:  MAP >65  TTE- 35-40%    RENAL:  Fluids: IVL in setting of poor EF  IO: replete as needed    GI:  Diet: TF at goal  GI prophylaxis [] not indicated [x] famotidine [] other:    ENDO:   A1c 6.6  Goal euglycemia (-180)  RISS for now and NPH, adjust as needed    HEME/ONC:  H/H stable   VTE prophylaxis: [x] SCDs [x] chemoprophylaxis lovenox [] hold chemoprophylaxis due to: [] high risk of DVT/PE on admission due to:    ID: covid + status post remdesivir  ELEVATED leukocytes likely in setting of steroids, afebrile  monitor for fevers   ID on board    CODE STATUS:  [x] Full Code [] DNR [] DNI [] Palliative/Comfort Care    DISPOSITION:  [x] ICU [] Stroke Unit [] Floor [] EMU [] RCU [] PCU    At risk of death/neuro decline due to: acute resp failure requiring intubation    Contact: [phone] ASSESSMENT/PLAN: MG exacerbation, covid +    NEURO:   IVIG 25g (1/31--2/2), PLEX every other day 2/2--  solumedrol 125 mg daily (1/31--)  Avoid medications that may worsen the current exacerbation including macrolides, fluoroquinolones, tetracyclines, aminoglycoside, beta-blockers, magnesium, and anti-arrhythmics (procainamide, quinidine, and lidocaine)  Pain management with fentanyl pushes    PULM:  Intubated for airway protection, will wean as tolerated    CV:  MAP >65  TTE- 35-40%    RENAL:  Fluids: IVL in setting of poor EF  IO: replete as needed    GI:  Diet: TF at goal  GI prophylaxis [] not indicated [x] famotidine [] other:    ENDO:   A1c 6.6  Goal euglycemia (-180)  RISS for now and NPH, adjust as needed    HEME/ONC:  H/H stable   VTE prophylaxis: [x] SCDs [x] chemoprophylaxis lovenox [] hold chemoprophylaxis due to: [] high risk of DVT/PE on admission due to:    ID: covid + status post remdesivir  ELEVATED leukocytes likely in setting of steroids, afebrile  monitor for fevers   ID on board    CODE STATUS:  [x] Full Code [] DNR [] DNI [] Palliative/Comfort Care    DISPOSITION:  [x] ICU [] Stroke Unit [] Floor [] EMU [] RCU [] PCU    At risk of death/neuro decline due to: acute resp failure requiring intubation    Contact: [phone] ASSESSMENT/PLAN: MG exacerbation, covid +    NEURO:   IVIG 25g (1/31--2/2), PLEX every other day 2/2--  solumedrol 125 mg daily (1/31--)  Avoid medications that may worsen the current exacerbation including macrolides, fluoroquinolones, tetracyclines, aminoglycoside, beta-blockers, magnesium, and anti-arrhythmics (procainamide, quinidine, and lidocaine)  Pain management with fentanyl pushes    PULM:  IPP as she tolerates   PEEP 10/FiO2 40, Will obtain repeat ABG  Intubated for airway protection, will wean as tolerated    CV:  MAP >65  TTE- 35-40%    RENAL:  Fluids: IVL in setting of poor EF  IO: replete as needed    GI:  Diet: TF at goal  GI prophylaxis [] not indicated [x] famotidine [] other:  LBM- 2/3    ENDO:   A1c 6.6  Goal euglycemia (-180)  RISS for now and NPH, adjust as needed    HEME/ONC:  H/H stable   VTE prophylaxis: [x] SCDs [x] chemoprophylaxis lovenox [] hold chemoprophylaxis due to: [] high risk of DVT/PE on admission due to:    ID: covid + status post remdesivir  ELEVATED leukocytes likely in setting of steroids, afebrile  monitor for fevers   ID on board    CODE STATUS:  [x] Full Code [] DNR [] DNI [] Palliative/Comfort Care    DISPOSITION:  [x] ICU [] Stroke Unit [] Floor [] EMU [] RCU [] PCU    At risk of death/neuro decline due to: acute resp failure requiring intubation    Contact: [phone] ASSESSMENT/PLAN: MG exacerbation, covid +    NEURO:   IVIG 25g (1/31--2/2), PLEX every other day 2/2--  solumedrol 125 mg daily (1/31--)  Avoid medications that may worsen the current exacerbation including macrolides, fluoroquinolones, tetracyclines, aminoglycoside, beta-blockers, magnesium, and anti-arrhythmics (procainamide, quinidine, and lidocaine)  Pain management with fentanyl pushes    PULM:  IPP as she tolerates   PEEP 10/FiO2 40, Will obtain repeat ABG  Intubated for airway protection, will wean as tolerated    CV:  MAP >65  TTE- 35-40%    RENAL:  Fluids: IVL in setting of poor EF  IO: replete as needed    GI:  Diet: TF at goal  GI prophylaxis [] not indicated [x] famotidine [] other:  LBM- 2/3    ENDO:   A1c 6.6  Goal euglycemia (-180)  RISS for now and NPH, adjust as needed    HEME/ONC:  H/H stable   VTE prophylaxis: [x] SCDs [x] chemoprophylaxis lovenox [] hold chemoprophylaxis due to: [] high risk of DVT/PE on admission due to:    ID: covid + status post remdesivir  ELEVATED leukocytes likely in setting of steroids, afebrile  monitor for fevers   ID on board    CODE STATUS:  [x] Full Code [] DNR [] DNI [] Palliative/Comfort Care    DISPOSITION:  [x] ICU [] Stroke Unit [] Floor [] EMU [] RCU [] PCU    At risk of death/neuro decline due to: acute resp failure requiring intubation    Contact: [phone] ASSESSMENT/PLAN: MG exacerbation, covid +    NEURO:   IVIG 25g (1/31--2/2), PLEX every other day 2/2--  solumedrol 125 mg daily (1/31--)  Avoid medications that may worsen the current exacerbation including macrolides, fluoroquinolones, tetracyclines, aminoglycoside, beta-blockers, magnesium, and anti-arrhythmics (procainamide, quinidine, and lidocaine)  Pain management with fentanyl pushes    PULM:  IPP as she tolerates   PEEP 10/FiO2 40, Will obtain repeat ABG  Intubated for airway protection, will wean as tolerated    CV:  MAP >65  TTE- 35-40%    RENAL:  Fluids: IVL in setting of poor EF  IO: replete as needed    GI:  Diet: TF at goal  GI prophylaxis [] not indicated [x] famotidine [] other:  LBM- 2/3    ENDO:   A1c 6.6  Goal euglycemia (-180)  RISS for now and NPH, adjust as needed    HEME/ONC:  H/H stable   VTE prophylaxis: [x] SCDs [x] chemoprophylaxis lovenox [] hold chemoprophylaxis due to: [] high risk of DVT/PE on admission due to:    ID: covid + status post remdesivir  ELEVATED leukocytes likely in setting of steroids, afebrile  monitor for fevers   ID on board    CODE STATUS:  [x] Full Code [] DNR [] DNI [] Palliative/Comfort Care    DISPOSITION:  [x] ICU [] Stroke Unit [] Floor [] EMU [] RCU [] PCU    At risk of death/neuro decline due to: acute resp failure requiring intubation    Contact: [phone]

## 2023-02-06 NOTE — PROGRESS NOTE ADULT - SUBJECTIVE AND OBJECTIVE BOX
CC: f/u for Covid, MG flare    Patient reports: she is alert, on vet, on FiO2 of 30%    REVIEW OF SYSTEMS:  All other review of systems negative (Comprehensive ROS)    Antimicrobials Day #  :off    Other Medications Reviewed  MEDICATIONS  (STANDING):  albuterol/ipratropium for Nebulization 3 milliLiter(s) Nebulizer every 6 hours  chlorhexidine 0.12% Liquid 15 milliLiter(s) Oral Mucosa every 12 hours  chlorhexidine 4% Liquid 1 Application(s) Topical <User Schedule>  enoxaparin Injectable 40 milliGRAM(s) SubCutaneous <User Schedule>  famotidine    Tablet 20 milliGRAM(s) Oral two times a day  insulin lispro (ADMELOG) corrective regimen sliding scale   SubCutaneous every 6 hours  insulin NPH human recombinant 10 Unit(s) SubCutaneous every 6 hours  methylPREDNISolone sodium succinate Injectable 125 milliGRAM(s) IV Push daily  psyllium Powder 1 Packet(s) Oral three times a day  sodium chloride 3%  Inhalation 4 milliLiter(s) Inhalation every 6 hours    T(F): 99.9 (02-06-23 @ 03:00), Max: 100 (02-05-23 @ 19:00)  HR: 55 (02-06-23 @ 06:00)  BP: --  RR: 16 (02-06-23 @ 06:00)  SpO2: 100% (02-06-23 @ 06:00)  Wt(kg): --    PHYSICAL EXAM:  General: alert, no acute distress  Eyes:  anicteric, no conjunctival injection, no discharge  Oropharynx: ETT, OGT	  Neck: supple, without adenopathy  Lungs: clear to auscultation  Heart: regular rate and rhythm; no murmur, rubs or gallops  Abdomen: soft, nondistended, nontender, without mass or organomegaly  Skin: no lesions  Extremities: no clubbing, cyanosis, or edema  Neurologic: alert, oriented, moves all extremities    LAB RESULTS:                        10.7   15.33 )-----------( 226      ( 06 Feb 2023 06:02 )             32.8     02-05    144  |  110<H>  |  36<H>  ----------------------------<  158<H>  4.2   |  28  |  0.42<L>    Ca    8.3<L>      05 Feb 2023 21:46  Phos  4.9     02-05  Mg     2.5     02-05          MICROBIOLOGY:  RECENT CULTURES:      RADIOLOGY REVIEWED:    < from: Xray Chest 1 View- PORTABLE-Routine (Xray Chest 1 View- PORTABLE-Routine in AM.) (02.05.23 @ 04:02) >  INTERPRETATION:  EXAMINATION: XR CHEST    CLINICAL INDICATION: intubated    TECHNIQUE: Single frontal, portable view of the chest was obtained.    COMPARISON: Chest X-Ray 2/4/2023 at 4:08 AM.    FINDINGS:    Endotracheal tube tip above the level the sherri. Median sternotomy   wires. Enteric tube courses below the diaphragm but tip is not   visualized. Right IJ approach catheter with tip terminating in the region   overlying the SVC.  Median sternotomy wires.  The heart is not accurately assessed in this AP projection.  Low lung volumes. No focal consolidation.  There is no pleural effusion.  There is no pneumothorax.  No acute bony abnormality.    IMPRESSION:  No significant interval change.    < end of copied text >

## 2023-02-06 NOTE — PROGRESS NOTE ADULT - ASSESSMENT
63 yo female with a long history of Myasthenia Gravis , T2DM,,prior thymectomy who was admitted with respiratory difficulty and weakness.  She is maintained on pyridostigmine and PRN prednisone, this had been added recently for MG flare.  History of prior need for ventilator.  Home exposure to Covid, she tested positive on admission.  Suspect respiratory failure on MG basis.  She is s/p 5 days of RDV-completed 2/4  High dose steroids for MG  Vent management per NSICU,  Leukocytosis likely steroid mediated  Suggest:  1.Monitor of RDV  2. Steroids  per MG flare per neuroogy  3 S/P IVIG as well as PLEX per neurology  4.Vent per NSICU  5.Monitor for evidence of secondary infection., low threshold for surveillance cultures if she spikes a fever. 65 yo female with a long history of Myasthenia Gravis , T2DM,,prior thymectomy who was admitted with respiratory difficulty and weakness.  She is maintained on pyridostigmine and PRN prednisone, this had been added recently for MG flare.  History of prior need for ventilator.  Home exposure to Covid, she tested positive on admission.  Suspect respiratory failure on MG basis.  She is s/p 5 days of RDV-completed 2/4  High dose steroids for MG  Vent management per NSICU,  Leukocytosis likely steroid mediated  Suggest:  1.Monitor of RDV  2. Steroids  per MG flare per neuroogy  3 S/P IVIG as well as PLEX per neurology  4.Vent per NSICU  5.Monitor for evidence of secondary infection., low threshold for surveillance cultures if she spikes a fever.

## 2023-02-06 NOTE — CHART NOTE - NSCHARTNOTEFT_GEN_A_CORE
61yo woman w/ Myasthenia Gravis (on pyridostigmine, hospitalization every 1-2 years requiring IVIG) (dx 2015), s/p thymectomy (2010), T2DM who presents on 01/30/2023 with SOB, cough, difficulty spitting up sputum x 4 days.  was sick with COVID, so son performed home COVID test for patient which was positive. Patient's primary complaint is SOB when lying down. She has been sleeping upright. She had difficulty coughing up and spitting out sputum but today these symptoms are slightly improved. Also reports difficulty swallowing - but was able to tolerate taking oral pills. Admits to difficulty looking up, but eye drooping is overall improved from past couple of months. No blurry vision. Patient takes pyridostigmine 120mg TID regularly. Patient also has prednisone PRN for sick days-- she has been taking 20mg prednisone x past 4 days as per instruction of her neurologist.  Of note, last hospitalization was 2 years ago at [facility] during which patient was intubated -- son notes that this was likely 2/2 patient being forced to lie down flat in hospital bed, leading to aspiration. She was extubated after ~3 days. Received IVIG during that hospitalization. Patient was admitted to NSCU, upon arrival to the unit, appeared in acute respiratory distress, tachypneic, hypertensive, tachycardic, hypoxic, ABG showing respiratory acidosis, immediately put on BiPAP, started on IVIG. Patient later intubated on 2/1 for worsening respiratory failure. He received IVIG x 3 (1/31-2/2). Therapeutic Apheresis team consulted for initiation of PLEX on 2/2. A course of 5 PLEX over 10 days planned.    s/p PLEX on 2/2 and 2/4/23, tolerated well. Remains intubated.    PLEX#3 performed today, 1 plasma volume with 5% albumin as replacement fluid. Procedure tolerated well.    PLEX#4 currently scheduled on 2/8/23. Please draw a CBC, fibrinogen, and ionized calcium in the morning of each procedure. 61yo woman w/ Myasthenia Gravis (on pyridostigmine, hospitalization every 1-2 years requiring IVIG) (dx 2015), s/p thymectomy (2010), T2DM who presents on 01/30/2023 with SOB, cough, difficulty spitting up sputum x 4 days.  was sick with COVID, so son performed home COVID test for patient which was positive. Patient's primary complaint is SOB when lying down. She has been sleeping upright. She had difficulty coughing up and spitting out sputum but today these symptoms are slightly improved. Also reports difficulty swallowing - but was able to tolerate taking oral pills. Admits to difficulty looking up, but eye drooping is overall improved from past couple of months. No blurry vision. Patient takes pyridostigmine 120mg TID regularly. Patient also has prednisone PRN for sick days-- she has been taking 20mg prednisone x past 4 days as per instruction of her neurologist.  Of note, last hospitalization was 2 years ago at [facility] during which patient was intubated -- son notes that this was likely 2/2 patient being forced to lie down flat in hospital bed, leading to aspiration. She was extubated after ~3 days. Received IVIG during that hospitalization. Patient was admitted to NSCU, upon arrival to the unit, appeared in acute respiratory distress, tachypneic, hypertensive, tachycardic, hypoxic, ABG showing respiratory acidosis, immediately put on BiPAP, started on IVIG. Patient later intubated on 2/1 for worsening respiratory failure. He received IVIG x 3 (1/31-2/2). Therapeutic Apheresis team consulted for initiation of PLEX on 2/2. A course of 5 PLEX over 10 days planned.    s/p PLEX on 2/2 and 2/4/23, tolerated well. Remains intubated.    PLEX#3 performed today, 1 plasma volume with 5% albumin as replacement fluid. Procedure tolerated well.    PLEX#4 currently scheduled on 2/8/23. Please draw a CBC, fibrinogen, and ionized calcium in the morning of each procedure. 63yo woman w/ Myasthenia Gravis (on pyridostigmine, hospitalization every 1-2 years requiring IVIG) (dx 2015), s/p thymectomy (2010), T2DM who presents on 01/30/2023 with SOB, cough, difficulty spitting up sputum x 4 days.  was sick with COVID, so son performed home COVID test for patient which was positive. Patient's primary complaint is SOB when lying down. She has been sleeping upright. She had difficulty coughing up and spitting out sputum but today these symptoms are slightly improved. Also reports difficulty swallowing - but was able to tolerate taking oral pills. Admits to difficulty looking up, but eye drooping is overall improved from past couple of months. No blurry vision. Patient takes pyridostigmine 120mg TID regularly. Patient also has prednisone PRN for sick days-- she has been taking 20mg prednisone x past 4 days as per instruction of her neurologist.  Of note, last hospitalization was 2 years ago at [facility] during which patient was intubated -- son notes that this was likely 2/2 patient being forced to lie down flat in hospital bed, leading to aspiration. She was extubated after ~3 days. Received IVIG during that hospitalization. Patient was admitted to NSCU, upon arrival to the unit, appeared in acute respiratory distress, tachypneic, hypertensive, tachycardic, hypoxic, ABG showing respiratory acidosis, immediately put on BiPAP, started on IVIG. Patient later intubated on 2/1 for worsening respiratory failure. He received IVIG x 3 (1/31-2/2). Therapeutic Apheresis team consulted for initiation of PLEX on 2/2. A course of 5 PLEX over 10 days planned.    s/p PLEX on 2/2 and 2/4/23, tolerated well. Remains intubated.    PLEX#3 performed today, 1 plasma volume with 5% albumin as replacement fluid. Procedure tolerated well.    PLEX#4 currently scheduled on 2/8/23. Please draw a CBC, fibrinogen, and ionized calcium in the morning of each procedure.

## 2023-02-06 NOTE — PROGRESS NOTE ADULT - SUBJECTIVE AND OBJECTIVE BOX
SUMMARY:   62-year-old woman with DMII, myasthenia gravis status post thymectomy and prior crises status post IVIG developed MG crisis in setting of COVID-19 infection requiring intubation status post IVIG x 1 and PLEX.     24 HOUR EVENTS:  IPV, nebs q6hrs, PEEP increased to 10    2/6- No overnight events.     VITALS/DATA/ORDERS: [x] Reviewed    EXAMINATION:   Intubated, nods appropriately, +cough/gag, no ptosis, deltoids 3/5, neck flexion/extension 4+/5, otherwise full strength   Pulmonary: Decreased breath sounds at bases  Cardiac: Normal S1/S2  Abd: Soft, non tender, + BS SUMMARY:   62-year-old woman with DMII, myasthenia gravis status post thymectomy and prior crises status post IVIG developed MG crisis in setting of COVID-19 infection requiring intubation status post IVIG x 1 and PLEX.     24 HOUR EVENTS:  IPV, nebs q6hrs, PEEP increased to 10    2/6- No overnight events.     VITALS/DATA/ORDERS: [x] Reviewed    EXAMINATION:   Intubated, nods appropriately, +cough/gag, no ptosis, deltoids 4-/5, neck flexion/extension 4+/5, otherwise full strength   Pulmonary: Decreased breath sounds at bases  Cardiac: Normal S1/S2  Abd: Soft, non tender, + BS

## 2023-02-06 NOTE — PROGRESS NOTE ADULT - SUBJECTIVE AND OBJECTIVE BOX
SUMMARY:   62-year-old woman with DMII, myasthenia gravis status post thymectomy and prior crises status post IVIG developed MG crisis in setting of COVID-19 infection requiring intubation status post IVIG x 1 and PLEX.     24 HOUR EVENTS:  IPV, nebs q6hrs, PEEP increased to 10    VITALS/DATA/ORDERS: [x] Reviewed    EXAMINATION:   Intubated, nods appropriately, +cough/gag, no ptosis, deltoids 3/5, neck flexion/extension 4+/5, otherwise full strength   Decreased BS at bases

## 2023-02-06 NOTE — PROGRESS NOTE ADULT - ASSESSMENT
ASSESSMENT/PLAN: MG crisis, covid +    NEURO:   now family/patient agreeable to PLEX, edith consented will need to arrange for PLEX tomorrow   start IVIG 3rd dose now   started on solumedrol   Hold pyridostigmine during acute exacerbation  Avoid medications that may worsen the current exacerbation including macrolides, fluoroquinolones, tetracyclines, aminoglycoside, beta-blockers, magnesium, and anti-arrhythmics (procainamide, quinidine, and lidocaine)  neurochecks Q2 hrs   lose dose propofol for vent synchrony   Activity: [] mobilize as tolerated [x] Bedrest [] PT [] OT [] PMNR    PULM:  intubated  18/400/7/40%  ABG check and adjust vent setting as appropriate     CV:  SBP goal 100-160  MAP>65    RENAL:  Fluids: IVL    GI:  Diet: tube feeding   GI prophylaxis [x] not indicated [] PPI [] other:  Bowel regimen [] colace [x] senna [x] other: miralax   2/1 BM     ENDO:   Goal euglycemia (-180)  RISS for now      HEME/ONC:  VTE prophylaxis: [x] SCDs [x] chemoprophylaxis lovenox [] hold chemoprophylaxis due to: [] high risk of DVT/PE on admission due to:    ID: covid +on steroid and remdesivir now   ID following   monitor for fevers     MISC:  son updated at bedside and discussed plan     CODE STATUS:  [x] Full Code [] DNR [] DNI [] Palliative/Comfort Care    DISPOSITION:  [x] ICU [] Stroke Unit [] Floor [] EMU [] RCU [] PCU    [x] Patient is at high risk of neurologic deterioration/death due to: MG crisis, hypercapnic/hypoxic respiratory failure    Contact: [phone] ASSESSMENT/PLAN: MG crisis, covid +    NEURO:   now family/patient agreeable to PLEX, edith consented will need to arrange for PLEX tomorrow   start IVIG 3rd dose now   started on solumedrol   Hold pyridostigmine during acute exacerbation  Avoid medications that may worsen the current exacerbation including macrolides, fluoroquinolones, tetracyclines, aminoglycoside, beta-blockers, magnesium, and anti-arrhythmics (procainamide, quinidine, and lidocaine)  neurochecks Q2 hrs   lose dose propofol for vent synchrony   Activity: [] mobilize as tolerated [x] Bedrest [] PT [] OT [] PMNR    PULM:  intubated  18/400/7/40%  ABG check and adjust vent setting as appropriate     CV:  SBP goal 100-160  MAP>65    RENAL:  Fluids: IVL    GI:  Diet: tube feeding   GI prophylaxis [x] not indicated [] PPI [] other:  Bowel regimen [] colace [x] senna [x] other: miralax   2/1 BM     ENDO:   Goal euglycemia (-180)  RISS for now      HEME/ONC:  VTE prophylaxis: [x] SCDs [x] chemoprophylaxis lovenox [] hold chemoprophylaxis due to: [] high risk of DVT/PE on admission due to:    ID: covid +on steroid and remdesivir now   ID following   monitor for fevers     MISC:  son updated at bedside and discussed plan     CODE STATUS:  [x] Full Code [] DNR [] DNI [] Palliative/Comfort Care    DISPOSITION:  [x] ICU [] Stroke Unit [] Floor [] EMU [] RCU [] PCU    [x] Patient is at high risk of neurologic deterioration/death due to: MG crisis, hypercapnic/hypoxic respiratory failure    Contact: [phone] ASSESSMENT/PLAN: MG crisis, covid +    NEURO:   now family/patient agreeable to PLEX, edith consented will need to arrange for PLEX tomorrow   start IVIG 3rd dose now   started on solumedrol   Hold pyridostigmine during acute exacerbation  Avoid medications that may worsen the current exacerbation including macrolides, fluoroquinolones, tetracyclines, aminoglycoside, beta-blockers, magnesium, and anti-arrhythmics (procainamide, quinidine, and lidocaine)  neurochecks Q2 hrs   lose dose propofol for vent synchrony   Activity: [] mobilize as tolerated [x] Bedrest [] PT [] OT [] PMNR    PULM:  intubated  18/400/7/40%  ABG check and adjust vent setting as appropriate     CV:  SBP goal 100-160  MAP>65    RENAL:  Fluids: IVL    GI:  Diet: tube feeding   GI prophylaxis [x] not indicated [] PPI [] other:  Bowel regimen [] colace [x] senna [x] other: miralax   2/1 BM     ENDO:   Goal euglycemia (-180)  RISS for now      HEME/ONC:  VTE prophylaxis: [x] SCDs [x] chemoprophylaxis lovenox [] hold chemoprophylaxis due to: [] high risk of DVT/PE on admission due to:    ID: covid +on steroid and remdesivir now   ID following   monitor for fevers     MISC:  son updated at bedside and discussed plan     CODE STATUS:  [x] Full Code [] DNR [] DNI [] Palliative/Comfort Care    DISPOSITION:  [x] ICU [] Stroke Unit [] Floor [] EMU [] RCU [] PCU    [x] Patient is at high risk of neurologic deterioration/death due to: MG crisis, hypercapnic/hypoxic respiratory failure    Contact: [phone] ASSESSMENT/PLAN: MG crisis, covid +    NEURO:   PLEX#3 today   s/p IVIG 3rd dose now   started on solumedrol   Hold pyridostigmine during acute exacerbation  Avoid medications that may worsen the current exacerbation including macrolides, fluoroquinolones, tetracyclines, aminoglycoside, beta-blockers, magnesium, and anti-arrhythmics (procainamide, quinidine, and lidocaine)  neurochecks Q4 hrs   Activity: [] mobilize as tolerated [x] Bedrest [] PT [] OT [] PMNR    PULM:  intubated  16/300/10/40/% PEEP to 5 now, CPAP as tolerated  check ABG tonight after 10/5 CPAP  NPO, CPAP at 4am for possible extubation   IPV Q6 hrs       CV:  SBP goal 100-160  MAP>65    RENAL:  Fluids: IVL    GI:  Diet: tube feeding, NPO at 4am  GI prophylaxis [x] not indicated [] PPI [] other:  Bowel regimen [] colace [x] senna [x] other: miralax   2/4 BM     ENDO:   Goal euglycemia (-180)  RISS for now      HEME/ONC:  VTE prophylaxis: [x] SCDs [x] chemoprophylaxis lovenox [] hold chemoprophylaxis due to: [] high risk of DVT/PE on admission due to:    ID: covid +on steroid and remdesivir now   ID following   monitor for fevers     MISC:  son updated at bedside and discussed plan     CODE STATUS:  [x] Full Code [] DNR [] DNI [] Palliative/Comfort Care    DISPOSITION:  [x] ICU [] Stroke Unit [] Floor [] EMU [] RCU [] PCU    [x] Patient is at high risk of neurologic deterioration/death due to: MG crisis, hypercapnic/hypoxic respiratory failure    Contact: [phone] ASSESSMENT/PLAN: MG crisis, covid +    NEURO:   PLEX#3 today   s/p IVIG 3rd dose now   started on solumedrol   Hold pyridostigmine during acute exacerbation  Avoid medications that may worsen the current exacerbation including macrolides, fluoroquinolones, tetracyclines, aminoglycoside, beta-blockers, magnesium, and anti-arrhythmics (procainamide, quinidine, and lidocaine)  neurochecks Q4 hrs   Activity: [] mobilize as tolerated [x] Bedrest [] PT [] OT [] PMNR    PULM:  intubated  16/300/10/40/% PEEP to 5 now, CPAP as tolerated  check ABG tonight after 10/5 CPAP  NPO, CPAP at 4am for possible extubation   IPV Q6 hrs       CV:  SBP goal 100-160  MAP>65    RENAL:  Fluids: IVL    GI:  Diet: tube feeding, NPO at 4am  GI prophylaxis [x] not indicated [] PPI [] other:  Bowel regimen [] colace [x] senna [x] other: miralax   2/4 BM     ENDO:   Goal euglycemia (-180)  RISS for now      HEME/ONC:  VTE prophylaxis: [x] SCDs [x] chemoprophylaxis lovenox [] hold chemoprophylaxis due to: [] high risk of DVT/PE on admission due to:    ID: covid +on steroid and remdesivir now   ID following   monitor for fevers     MISC:  son updated at bedside and discussed plan     CODE STATUS:  [x] Full Code [] DNR [] DNI [] Palliative/Comfort Care    DISPOSITION:  [x] ICU [] Stroke Unit [] Floor [] EMU [] RCU [] PCU    [x] Patient is at high risk of neurologic deterioration/death due to: MG crisis, hypercapnic/hypoxic respiratory failure    Contact: [phone] ASSESSMENT/PLAN: MG crisis, covid +    NEURO:   PLEX#3 today   s/p IVIG 3rd dose now   started on solumedrol   Hold pyridostigmine during acute exacerbation  Avoid medications that may worsen the current exacerbation including macrolides, fluoroquinolones, tetracyclines, aminoglycoside, beta-blockers, magnesium, and anti-arrhythmics (procainamide, quinidine, and lidocaine)  neurochecks Q4 hrs   Activity: [] mobilize as tolerated [x] Bedrest [] PT [] OT [] PMNR    PULM:  intubated  16/300/10/40/% PEEP to 5 now, CPAP as tolerated  check ABG tonight after 10/5 CPAP  NPO, CPAP at 4am for possible extubation   IPV Q6 hrs       CV:  SBP goal 100-160  MAP>65    RENAL:  Fluids: IVL    GI:  Diet: tube feeding, NPO at 4am  GI prophylaxis [x] not indicated [] PPI [] other:  Bowel regimen [] colace [x] senna [x] other: miralax   2/4 BM     ENDO:   Goal euglycemia (-180)  RISS for now      HEME/ONC:  VTE prophylaxis: [x] SCDs [x] chemoprophylaxis lovenox [] hold chemoprophylaxis due to: [] high risk of DVT/PE on admission due to:    ID: covid +on steroid and remdesivir now   ID following   monitor for fevers     MISC:  son updated at bedside and discussed plan     CODE STATUS:  [x] Full Code [] DNR [] DNI [] Palliative/Comfort Care    DISPOSITION:  [x] ICU [] Stroke Unit [] Floor [] EMU [] RCU [] PCU    [x] Patient is at high risk of neurologic deterioration/death due to: MG crisis, hypercapnic/hypoxic respiratory failure    Contact: [phone]

## 2023-02-06 NOTE — PHARMACOTHERAPY INTERVENTION NOTE - COMMENTS
Reviewed medications for appropriate route of administration     MEDICATIONS  (STANDING):  albuterol/ipratropium for Nebulization 3 milliLiter(s) Nebulizer every 6 hours  chlorhexidine 0.12% Liquid 15 milliLiter(s) Oral Mucosa every 12 hours  chlorhexidine 4% Liquid 1 Application(s) Topical daily  enoxaparin Injectable 40 milliGRAM(s) SubCutaneous daily  famotidine    Tablet 20 milliGRAM(s) Oral two times a day  insulin lispro (ADMELOG) corrective regimen sliding scale   SubCutaneous every 6 hours  insulin NPH human recombinant 10 Unit(s) SubCutaneous every 6 hours  methylPREDNISolone sodium succinate Injectable 125 milliGRAM(s) IV Push daily  sodium chloride 3%  Inhalation 4 milliLiter(s) Inhalation every 6 hours    MEDICATIONS  (PRN):  fentaNYL    Injectable 50 MICROGram(s) IV Push every 2 hours PRN vent synchrony    Jessica Ortez, PharmD  PGY2 Critical Care Pharmacy Resident  Available on [employer] Teams

## 2023-02-06 NOTE — PROGRESS NOTE ADULT - SUBJECTIVE AND OBJECTIVE BOX
SUMMARY: 61yo Malayalam-speaking woman w/ Myasthenia Gravis (on pyridostigmine, hospitalization every 1-2 years requiring IVIG) (dx 2015), s/p thymectomy (2010), T2DM who presents with SOB, cough, difficulty spitting up sputum x 4 days.  was sick with COVID, so son performed home COVID test for patient which was positive. Patient's primary complaint is SOB when lying down. She has been sleeping upright. She had difficulty coughing up and spitting out sputum but today these symptoms are slightly improved. Also reports difficulty swallowing - but was able to tolerate taking oral pills. Admits to difficulty looking up, but eye drooping is overall improved from past couple of months. No blurry vision. Patient takes pyridostigmine 120mg TID regularly. Patient also has prednisone PRN for sick days-- she has been taking 20mg prednisone x past 4 days as per instruction of her neurologist.     Of note, last hospitalization was 2 years ago at [facility] during which patient was intubated -- son notes that this was likely 2/2 patient being forced to lie down flat in hospital bed, leading to aspiration. She was extubated after ~3 days. Received IVIG during that hospitalization  1/30  Adm NSCU, upon arrival to the unit, appeared in acute respiratory distress tachypneic, hypertensive, tachycardic, hypoxic, ABG showing resp acidosis, immediately put on BiPAP, started on IVIG with some improvement     1/31 intubated overnight for hypercapnic/hypoxic respiratory failure     currently on phenylephrine, propofol, IVF     ADMISSION SCORES:   GCS: 14 HH: MF: NIHSS: ICH Score:    REVIEW OF SYSTEMS: [x] Unable to Assess due to neurologic exam intubated now but denies pain   [ ] All ROS addressed below are non-contributory, except:  Neuro: [ ] Headache [ ] Back pain [ ] Numbness [ ] Weakness [ ] Ataxia [ ] Dizziness [ ] Aphasia [ ] Dysarthria [ ] Visual disturbance  Resp: [ x] Shortness of breath/dyspnea [ ] Orthopnea [ ] Cough  CV: [ ] Chest pain [ ] Palpitation [ ] Lightheadedness [ ] Syncope  Renal: [ ] Thirst [ ] Edema  GI: [ ] Nausea [ ] Emesis [ ] Abdominal pain [ ] Constipation [ ] Diarrhea  Hem: [ ] Hematemesis [ ] bBright red blood per rectum  ID: [ ] Fever [ ] Chills [ ] Dysuria  ENT: [ ] Rhinorrhea    VITALS: [x] Reviewed    IMAGING/DATA: [x] Reviewed    IVF FLUIDS/MEDICATIONS: [x] Reviewed    ALLERGIES: Allergies    No Known Allergies    Intolerances      DEVICES:   [] Restraints [x] PIVs [] ET tube [] central line [] PICC [x] arterial line [] morales [] NGT/OGT [] EVD [] LD [] MYAH/HMV [] LiCOX [] ICP monitor [] Trach [] PEG [] Chest Tube [] other:    EXAMINATION:  General: NAD  HEENT: Anicteric sclerae  Cardiac: [id number] tachycardic  Lungs: diminished   Abdomen: Soft, non-tender, +BS  Neurologic: intubated but able to nod appropriately, oriented x3 with choices, son at bedside, follows commands, PERRL, EOMI, face symmetric, TANNER to command SUMMARY: 61yo Malayalam-speaking woman w/ Myasthenia Gravis (on pyridostigmine, hospitalization every 1-2 years requiring IVIG) (dx 2015), s/p thymectomy (2010), T2DM who presents with SOB, cough, difficulty spitting up sputum x 4 days.  was sick with COVID, so son performed home COVID test for patient which was positive. Patient's primary complaint is SOB when lying down. She has been sleeping upright. She had difficulty coughing up and spitting out sputum but today these symptoms are slightly improved. Also reports difficulty swallowing - but was able to tolerate taking oral pills. Admits to difficulty looking up, but eye drooping is overall improved from past couple of months. No blurry vision. Patient takes pyridostigmine 120mg TID regularly. Patient also has prednisone PRN for sick days-- she has been taking 20mg prednisone x past 4 days as per instruction of her neurologist.     Of note, last hospitalization was 2 years ago at [facility] during which patient was intubated -- son notes that this was likely 2/2 patient being forced to lie down flat in hospital bed, leading to aspiration. She was extubated after ~3 days. Received IVIG during that hospitalization  1/30  Adm NSCU, upon arrival to the unit, appeared in acute respiratory distress tachypneic, hypertensive, tachycardic, hypoxic, ABG showing resp acidosis, immediately put on BiPAP, started on IVIG with some improvement     1/31 intubated overnight for hypercapnic/hypoxic respiratory failure     currently on phenylephrine, propofol, IVF     ADMISSION SCORES:   GCS: 14 HH: MF: NIHSS: ICH Score:    REVIEW OF SYSTEMS: [x] Unable to Assess due to neurologic exam intubated now but denies pain   [ ] All ROS addressed below are non-contributory, except:  Neuro: [ ] Headache [ ] Back pain [ ] Numbness [ ] Weakness [ ] Ataxia [ ] Dizziness [ ] Aphasia [ ] Dysarthria [ ] Visual disturbance  Resp: [ x] Shortness of breath/dyspnea [ ] Orthopnea [ ] Cough  CV: [ ] Chest pain [ ] Palpitation [ ] Lightheadedness [ ] Syncope  Renal: [ ] Thirst [ ] Edema  GI: [ ] Nausea [ ] Emesis [ ] Abdominal pain [ ] Constipation [ ] Diarrhea  Hem: [ ] Hematemesis [ ] bBright red blood per rectum  ID: [ ] Fever [ ] Chills [ ] Dysuria  ENT: [ ] Rhinorrhea    VITALS: [x] Reviewed    IMAGING/DATA: [x] Reviewed    IVF FLUIDS/MEDICATIONS: [x] Reviewed    ALLERGIES: Allergies    No Known Allergies    Intolerances      DEVICES:   [] Restraints [x] PIVs [] ET tube [] central line [] PICC [x] arterial line [] morales [] NGT/OGT [] EVD [] LD [] MYAH/HMV [] LiCOX [] ICP monitor [] Trach [] PEG [] Chest Tube [] other:    EXAMINATION:  General: NAD  HEENT: Anicteric sclerae  Cardiac: [id number] tachycardic  Lungs: diminished   Abdomen: Soft, non-tender, +BS  Neurologic: intubated but able to nod appropriately, oriented x3 with choices, son at bedside, follows commands, PERRL, EOMI, face symmetric, TANNER to command SUMMARY: 61yo Malayalam-speaking woman w/ Myasthenia Gravis (on pyridostigmine, hospitalization every 1-2 years requiring IVIG) (dx 2015), s/p thymectomy (2010), T2DM who presents with SOB, cough, difficulty spitting up sputum x 4 days.  was sick with COVID, so son performed home COVID test for patient which was positive. Patient's primary complaint is SOB when lying down. She has been sleeping upright. She had difficulty coughing up and spitting out sputum but today these symptoms are slightly improved. Also reports difficulty swallowing - but was able to tolerate taking oral pills. Admits to difficulty looking up, but eye drooping is overall improved from past couple of months. No blurry vision. Patient takes pyridostigmine 120mg TID regularly. Patient also has prednisone PRN for sick days-- she has been taking 20mg prednisone x past 4 days as per instruction of her neurologist.     Of note, last hospitalization was 2 years ago at [facility] during which patient was intubated -- son notes that this was likely 2/2 patient being forced to lie down flat in hospital bed, leading to aspiration. She was extubated after ~3 days. Received IVIG during that hospitalization  1/30  Adm NSCU, upon arrival to the unit, appeared in acute respiratory distress tachypneic, hypertensive, tachycardic, hypoxic, ABG showing resp acidosis, immediately put on BiPAP, started on IVIG with some improvement     1/31 intubated overnight for hypercapnic/hypoxic respiratory failure     currently on phenylephrine, propofol, IVF     ADMISSION SCORES:   GCS: 14 HH: MF: NIHSS: ICH Score:    REVIEW OF SYSTEMS: [x] Unable to Assess due to neurologic exam intubated now but denies pain   [ ] All ROS addressed below are non-contributory, except:  Neuro: [ ] Headache [ ] Back pain [ ] Numbness [ ] Weakness [ ] Ataxia [ ] Dizziness [ ] Aphasia [ ] Dysarthria [ ] Visual disturbance  Resp: [ x] Shortness of breath/dyspnea [ ] Orthopnea [ ] Cough  CV: [ ] Chest pain [ ] Palpitation [ ] Lightheadedness [ ] Syncope  Renal: [ ] Thirst [ ] Edema  GI: [ ] Nausea [ ] Emesis [ ] Abdominal pain [ ] Constipation [ ] Diarrhea  Hem: [ ] Hematemesis [ ] bBright red blood per rectum  ID: [ ] Fever [ ] Chills [ ] Dysuria  ENT: [ ] Rhinorrhea    VITALS: [x] Reviewed    IMAGING/DATA: [x] Reviewed    IVF FLUIDS/MEDICATIONS: [x] Reviewed    ALLERGIES: Allergies    No Known Allergies    Intolerances      DEVICES:   [] Restraints [x] PIVs [] ET tube [] central line [] PICC [x] arterial line [] morales [] NGT/OGT [] EVD [] LD [] MYAH/HMV [] LiCOX [] ICP monitor [] Trach [] PEG [] Chest Tube [] other:    EXAMINATION:  General: NAD  HEENT: Anicteric sclerae  Cardiac: [id number] tachycardic  Lungs: diminished   Abdomen: Soft, non-tender, +BS  Neurologic: intubated but able to nod appropriately, oriented x3 with choices, son at bedside, follows commands, PERRL, EOMI, face symmetric, TANNER to command SUMMARY: 63yo Malayalam-speaking woman w/ Myasthenia Gravis (on pyridostigmine, hospitalization every 1-2 years requiring IVIG) (dx 2015), s/p thymectomy (2010), T2DM who presents with SOB, cough, difficulty spitting up sputum x 4 days.  was sick with COVID, so son performed home COVID test for patient which was positive. Patient's primary complaint is SOB when lying down. She has been sleeping upright. She had difficulty coughing up and spitting out sputum but today these symptoms are slightly improved. Also reports difficulty swallowing - but was able to tolerate taking oral pills. Admits to difficulty looking up, but eye drooping is overall improved from past couple of months. No blurry vision. Patient takes pyridostigmine 120mg TID regularly. Patient also has prednisone PRN for sick days-- she has been taking 20mg prednisone x past 4 days as per instruction of her neurologist.     Of note, last hospitalization was 2 years ago at [facility] during which patient was intubated -- son notes that this was likely 2/2 patient being forced to lie down flat in hospital bed, leading to aspiration. She was extubated after ~3 days. Received IVIG during that hospitalization  1/30  Adm NSCU, upon arrival to the unit, appeared in acute respiratory distress tachypneic, hypertensive, tachycardic, hypoxic, ABG showing resp acidosis, immediately put on BiPAP, started on IVIG with some improvement     1/31 intubated overnight for hypercapnic/hypoxic respiratory failure     2/2 started PLEX       REVIEW OF SYSTEMS: [x] Unable to Assess due to neurologic exam intubated now but able to nod to y/n questions, minimal pain c/o   [ ] All ROS addressed below are non-contributory, except:  Neuro: [ ] Headache [ ] Back pain [ ] Numbness [ ] Weakness [ ] Ataxia [ ] Dizziness [ ] Aphasia [ ] Dysarthria [ ] Visual disturbance  Resp: [ x] Shortness of breath/dyspnea [ ] Orthopnea [ ] Cough  CV: [ ] Chest pain [ ] Palpitation [ ] Lightheadedness [ ] Syncope  Renal: [ ] Thirst [ ] Edema  GI: [ ] Nausea [ ] Emesis [ ] Abdominal pain [ ] Constipation [ ] Diarrhea  Hem: [ ] Hematemesis [ ] bBright red blood per rectum  ID: [ ] Fever [ ] Chills [ ] Dysuria  ENT: [ ] Rhinorrhea    VITALS: [x] Reviewed    IMAGING/DATA: [x] Reviewed    IVF FLUIDS/MEDICATIONS: [x] Reviewed    ALLERGIES: Allergies    No Known Allergies    Intolerances      DEVICES:   [] Restraints [x] PIVs [x] ET tube [x] central line R IJ Shiley [] PICC [x] arterial line [] morales [] NGT/OGT [] EVD [] LD [] MYAH/HMV [] LiCOX [] ICP monitor [] Trach [] PEG [] Chest Tube [] other:    EXAMINATION:  General: NAD  HEENT: Anicteric sclerae  Cardiac: [id number] tachycardic  Lungs: diminished   Abdomen: Soft, non-tender, +BS  Neurologic: intubated but able to nod appropriately, oriented x3 with choices, son at bedside, follows commands, PERRL, EOMI, cough strongly to command, able to take deep breaths to commands, BUE no drift 5/5 pain limited (shoulder pain at baseline), BLE 5/5  SUMMARY: 63yo Malayalam-speaking woman w/ Myasthenia Gravis (on pyridostigmine, hospitalization every 1-2 years requiring IVIG) (dx 2015), s/p thymectomy (2010), T2DM who presents with SOB, cough, difficulty spitting up sputum x 4 days.  was sick with COVID, so son performed home COVID test for patient which was positive. Patient's primary complaint is SOB when lying down. She has been sleeping upright. She had difficulty coughing up and spitting out sputum but today these symptoms are slightly improved. Also reports difficulty swallowing - but was able to tolerate taking oral pills. Admits to difficulty looking up, but eye drooping is overall improved from past couple of months. No blurry vision. Patient takes pyridostigmine 120mg TID regularly. Patient also has prednisone PRN for sick days-- she has been taking 20mg prednisone x past 4 days as per instruction of her neurologist.     Of note, last hospitalization was 2 years ago at [facility] during which patient was intubated -- son notes that this was likely 2/2 patient being forced to lie down flat in hospital bed, leading to aspiration. She was extubated after ~3 days. Received IVIG during that hospitalization  1/30  Adm NSCU, upon arrival to the unit, appeared in acute respiratory distress tachypneic, hypertensive, tachycardic, hypoxic, ABG showing resp acidosis, immediately put on BiPAP, started on IVIG with some improvement     1/31 intubated overnight for hypercapnic/hypoxic respiratory failure     2/2 started PLEX       REVIEW OF SYSTEMS: [x] Unable to Assess due to neurologic exam intubated now but able to nod to y/n questions, minimal pain c/o   [ ] All ROS addressed below are non-contributory, except:  Neuro: [ ] Headache [ ] Back pain [ ] Numbness [ ] Weakness [ ] Ataxia [ ] Dizziness [ ] Aphasia [ ] Dysarthria [ ] Visual disturbance  Resp: [ x] Shortness of breath/dyspnea [ ] Orthopnea [ ] Cough  CV: [ ] Chest pain [ ] Palpitation [ ] Lightheadedness [ ] Syncope  Renal: [ ] Thirst [ ] Edema  GI: [ ] Nausea [ ] Emesis [ ] Abdominal pain [ ] Constipation [ ] Diarrhea  Hem: [ ] Hematemesis [ ] bBright red blood per rectum  ID: [ ] Fever [ ] Chills [ ] Dysuria  ENT: [ ] Rhinorrhea    VITALS: [x] Reviewed    IMAGING/DATA: [x] Reviewed    IVF FLUIDS/MEDICATIONS: [x] Reviewed    ALLERGIES: Allergies    No Known Allergies    Intolerances      DEVICES:   [] Restraints [x] PIVs [x] ET tube [x] central line R IJ Shiley [] PICC [x] arterial line [] morales [] NGT/OGT [] EVD [] LD [] MYAH/HMV [] LiCOX [] ICP monitor [] Trach [] PEG [] Chest Tube [] other:    EXAMINATION:  General: NAD  HEENT: Anicteric sclerae  Cardiac: [id number] tachycardic  Lungs: diminished   Abdomen: Soft, non-tender, +BS  Neurologic: intubated but able to nod appropriately, oriented x3 with choices, son at bedside, follows commands, PERRL, EOMI, cough strongly to command, able to take deep breaths to commands, BUE no drift 5/5 pain limited (shoulder pain at baseline), BLE 5/5  SUMMARY: 61yo Malayalam-speaking woman w/ Myasthenia Gravis (on pyridostigmine, hospitalization every 1-2 years requiring IVIG) (dx 2015), s/p thymectomy (2010), T2DM who presents with SOB, cough, difficulty spitting up sputum x 4 days.  was sick with COVID, so son performed home COVID test for patient which was positive. Patient's primary complaint is SOB when lying down. She has been sleeping upright. She had difficulty coughing up and spitting out sputum but today these symptoms are slightly improved. Also reports difficulty swallowing - but was able to tolerate taking oral pills. Admits to difficulty looking up, but eye drooping is overall improved from past couple of months. No blurry vision. Patient takes pyridostigmine 120mg TID regularly. Patient also has prednisone PRN for sick days-- she has been taking 20mg prednisone x past 4 days as per instruction of her neurologist.     Of note, last hospitalization was 2 years ago at [facility] during which patient was intubated -- son notes that this was likely 2/2 patient being forced to lie down flat in hospital bed, leading to aspiration. She was extubated after ~3 days. Received IVIG during that hospitalization  1/30  Adm NSCU, upon arrival to the unit, appeared in acute respiratory distress tachypneic, hypertensive, tachycardic, hypoxic, ABG showing resp acidosis, immediately put on BiPAP, started on IVIG with some improvement     1/31 intubated overnight for hypercapnic/hypoxic respiratory failure     2/2 started PLEX       REVIEW OF SYSTEMS: [x] Unable to Assess due to neurologic exam intubated now but able to nod to y/n questions, minimal pain c/o   [ ] All ROS addressed below are non-contributory, except:  Neuro: [ ] Headache [ ] Back pain [ ] Numbness [ ] Weakness [ ] Ataxia [ ] Dizziness [ ] Aphasia [ ] Dysarthria [ ] Visual disturbance  Resp: [ x] Shortness of breath/dyspnea [ ] Orthopnea [ ] Cough  CV: [ ] Chest pain [ ] Palpitation [ ] Lightheadedness [ ] Syncope  Renal: [ ] Thirst [ ] Edema  GI: [ ] Nausea [ ] Emesis [ ] Abdominal pain [ ] Constipation [ ] Diarrhea  Hem: [ ] Hematemesis [ ] bBright red blood per rectum  ID: [ ] Fever [ ] Chills [ ] Dysuria  ENT: [ ] Rhinorrhea    VITALS: [x] Reviewed    IMAGING/DATA: [x] Reviewed    IVF FLUIDS/MEDICATIONS: [x] Reviewed    ALLERGIES: Allergies    No Known Allergies    Intolerances      DEVICES:   [] Restraints [x] PIVs [x] ET tube [x] central line R IJ Shiley [] PICC [x] arterial line [] morales [] NGT/OGT [] EVD [] LD [] MYAH/HMV [] LiCOX [] ICP monitor [] Trach [] PEG [] Chest Tube [] other:    EXAMINATION:  General: NAD  HEENT: Anicteric sclerae  Cardiac: [id number] tachycardic  Lungs: diminished   Abdomen: Soft, non-tender, +BS  Neurologic: intubated but able to nod appropriately, oriented x3 with choices, son at bedside, follows commands, PERRL, EOMI, cough strongly to command, able to take deep breaths to commands, BUE no drift 5/5 pain limited (shoulder pain at baseline), BLE 5/5

## 2023-02-07 LAB
ANION GAP SERPL CALC-SCNC: 12 MMOL/L — SIGNIFICANT CHANGE UP (ref 5–17)
ANION GAP SERPL CALC-SCNC: 7 MMOL/L — SIGNIFICANT CHANGE UP (ref 5–17)
APPEARANCE UR: ABNORMAL
BACTERIA # UR AUTO: ABNORMAL
BILIRUB UR-MCNC: NEGATIVE — SIGNIFICANT CHANGE UP
BUN SERPL-MCNC: 38 MG/DL — HIGH (ref 7–23)
BUN SERPL-MCNC: 41 MG/DL — HIGH (ref 7–23)
CALCIUM SERPL-MCNC: 8.2 MG/DL — LOW (ref 8.4–10.5)
CALCIUM SERPL-MCNC: 8.8 MG/DL — SIGNIFICANT CHANGE UP (ref 8.4–10.5)
CHLORIDE SERPL-SCNC: 108 MMOL/L — SIGNIFICANT CHANGE UP (ref 96–108)
CO2 SERPL-SCNC: 26 MMOL/L — SIGNIFICANT CHANGE UP (ref 22–31)
CO2 SERPL-SCNC: 28 MMOL/L — SIGNIFICANT CHANGE UP (ref 22–31)
COLOR SPEC: YELLOW — SIGNIFICANT CHANGE UP
CREAT SERPL-MCNC: 0.42 MG/DL — LOW (ref 0.5–1.3)
CREAT SERPL-MCNC: 0.49 MG/DL — LOW (ref 0.5–1.3)
DIFF PNL FLD: ABNORMAL
EGFR: 106 ML/MIN/1.73M2 — SIGNIFICANT CHANGE UP
EGFR: 110 ML/MIN/1.73M2 — SIGNIFICANT CHANGE UP
EPI CELLS # UR: 0 /HPF — SIGNIFICANT CHANGE UP
GAS PNL BLDA: SIGNIFICANT CHANGE UP
GLUCOSE BLDC GLUCOMTR-MCNC: 116 MG/DL — HIGH (ref 70–99)
GLUCOSE BLDC GLUCOMTR-MCNC: 161 MG/DL — HIGH (ref 70–99)
GLUCOSE BLDC GLUCOMTR-MCNC: 165 MG/DL — HIGH (ref 70–99)
GLUCOSE BLDC GLUCOMTR-MCNC: 196 MG/DL — HIGH (ref 70–99)
GLUCOSE SERPL-MCNC: 161 MG/DL — HIGH (ref 70–99)
GLUCOSE SERPL-MCNC: 176 MG/DL — HIGH (ref 70–99)
GLUCOSE UR QL: NEGATIVE — SIGNIFICANT CHANGE UP
HCT VFR BLD CALC: 29.9 % — LOW (ref 34.5–45)
HGB BLD-MCNC: 9.7 G/DL — LOW (ref 11.5–15.5)
HYALINE CASTS # UR AUTO: 6 /LPF — HIGH (ref 0–2)
KETONES UR-MCNC: NEGATIVE — SIGNIFICANT CHANGE UP
LEUKOCYTE ESTERASE UR-ACNC: ABNORMAL
MAGNESIUM SERPL-MCNC: 2.5 MG/DL — SIGNIFICANT CHANGE UP (ref 1.6–2.6)
MAGNESIUM SERPL-MCNC: 2.6 MG/DL — SIGNIFICANT CHANGE UP (ref 1.6–2.6)
MCHC RBC-ENTMCNC: 28.9 PG — SIGNIFICANT CHANGE UP (ref 27–34)
MCHC RBC-ENTMCNC: 32.4 GM/DL — SIGNIFICANT CHANGE UP (ref 32–36)
MCV RBC AUTO: 89 FL — SIGNIFICANT CHANGE UP (ref 80–100)
NITRITE UR-MCNC: POSITIVE
NRBC # BLD: 0 /100 WBCS — SIGNIFICANT CHANGE UP (ref 0–0)
PH UR: 7 — SIGNIFICANT CHANGE UP (ref 5–8)
PHOSPHATE SERPL-MCNC: 3.5 MG/DL — SIGNIFICANT CHANGE UP (ref 2.5–4.5)
PHOSPHATE SERPL-MCNC: 5.1 MG/DL — HIGH (ref 2.5–4.5)
PLATELET # BLD AUTO: 231 K/UL — SIGNIFICANT CHANGE UP (ref 150–400)
POTASSIUM SERPL-MCNC: 4.2 MMOL/L — SIGNIFICANT CHANGE UP (ref 3.5–5.3)
POTASSIUM SERPL-MCNC: 4.3 MMOL/L — SIGNIFICANT CHANGE UP (ref 3.5–5.3)
POTASSIUM SERPL-SCNC: 4.2 MMOL/L — SIGNIFICANT CHANGE UP (ref 3.5–5.3)
POTASSIUM SERPL-SCNC: 4.3 MMOL/L — SIGNIFICANT CHANGE UP (ref 3.5–5.3)
PROT UR-MCNC: ABNORMAL
RBC # BLD: 3.36 M/UL — LOW (ref 3.8–5.2)
RBC # FLD: 13.5 % — SIGNIFICANT CHANGE UP (ref 10.3–14.5)
RBC CASTS # UR COMP ASSIST: 20 /HPF — HIGH (ref 0–4)
SODIUM SERPL-SCNC: 143 MMOL/L — SIGNIFICANT CHANGE UP (ref 135–145)
SODIUM SERPL-SCNC: 146 MMOL/L — HIGH (ref 135–145)
SP GR SPEC: 1.03 — HIGH (ref 1.01–1.02)
UROBILINOGEN FLD QL: NEGATIVE — SIGNIFICANT CHANGE UP
WBC # BLD: 13.71 K/UL — HIGH (ref 3.8–10.5)
WBC # FLD AUTO: 13.71 K/UL — HIGH (ref 3.8–10.5)
WBC UR QL: 129 /HPF — HIGH (ref 0–5)

## 2023-02-07 PROCEDURE — 99232 SBSQ HOSP IP/OBS MODERATE 35: CPT

## 2023-02-07 PROCEDURE — 99291 CRITICAL CARE FIRST HOUR: CPT

## 2023-02-07 PROCEDURE — 71045 X-RAY EXAM CHEST 1 VIEW: CPT | Mod: 26

## 2023-02-07 RX ORDER — IPRATROPIUM/ALBUTEROL SULFATE 18-103MCG
3 AEROSOL WITH ADAPTER (GRAM) INHALATION EVERY 6 HOURS
Refills: 0 | Status: DISCONTINUED | OUTPATIENT
Start: 2023-02-07 | End: 2023-02-21

## 2023-02-07 RX ORDER — SENNA PLUS 8.6 MG/1
2 TABLET ORAL AT BEDTIME
Refills: 0 | Status: DISCONTINUED | OUTPATIENT
Start: 2023-02-07 | End: 2023-02-14

## 2023-02-07 RX ORDER — POLYETHYLENE GLYCOL 3350 17 G/17G
17 POWDER, FOR SOLUTION ORAL DAILY
Refills: 0 | Status: DISCONTINUED | OUTPATIENT
Start: 2023-02-07 | End: 2023-02-14

## 2023-02-07 RX ORDER — SENNA PLUS 8.6 MG/1
2 TABLET ORAL AT BEDTIME
Refills: 0 | Status: DISCONTINUED | OUTPATIENT
Start: 2023-02-07 | End: 2023-02-07

## 2023-02-07 RX ORDER — SODIUM CHLORIDE 9 MG/ML
4 INJECTION INTRAMUSCULAR; INTRAVENOUS; SUBCUTANEOUS EVERY 6 HOURS
Refills: 0 | Status: DISCONTINUED | OUTPATIENT
Start: 2023-02-07 | End: 2023-02-13

## 2023-02-07 RX ORDER — ACETAMINOPHEN 500 MG
650 TABLET ORAL EVERY 6 HOURS
Refills: 0 | Status: COMPLETED | OUTPATIENT
Start: 2023-02-07 | End: 2023-02-08

## 2023-02-07 RX ADMIN — Medication 2: at 23:57

## 2023-02-07 RX ADMIN — Medication 3 MILLILITER(S): at 05:40

## 2023-02-07 RX ADMIN — FAMOTIDINE 20 MILLIGRAM(S): 10 INJECTION INTRAVENOUS at 18:22

## 2023-02-07 RX ADMIN — FENTANYL CITRATE 50 MICROGRAM(S): 50 INJECTION INTRAVENOUS at 22:32

## 2023-02-07 RX ADMIN — CHLORHEXIDINE GLUCONATE 15 MILLILITER(S): 213 SOLUTION TOPICAL at 18:22

## 2023-02-07 RX ADMIN — Medication 2: at 12:22

## 2023-02-07 RX ADMIN — FENTANYL CITRATE 50 MICROGRAM(S): 50 INJECTION INTRAVENOUS at 05:25

## 2023-02-07 RX ADMIN — HUMAN INSULIN 8 UNIT(S): 100 INJECTION, SUSPENSION SUBCUTANEOUS at 12:21

## 2023-02-07 RX ADMIN — SODIUM CHLORIDE 4 MILLILITER(S): 9 INJECTION INTRAMUSCULAR; INTRAVENOUS; SUBCUTANEOUS at 05:41

## 2023-02-07 RX ADMIN — HUMAN INSULIN 8 UNIT(S): 100 INJECTION, SUSPENSION SUBCUTANEOUS at 23:58

## 2023-02-07 RX ADMIN — OXYCODONE HYDROCHLORIDE 5 MILLIGRAM(S): 5 TABLET ORAL at 12:21

## 2023-02-07 RX ADMIN — FENTANYL CITRATE 50 MICROGRAM(S): 50 INJECTION INTRAVENOUS at 05:40

## 2023-02-07 RX ADMIN — FENTANYL CITRATE 50 MICROGRAM(S): 50 INJECTION INTRAVENOUS at 00:05

## 2023-02-07 RX ADMIN — OXYCODONE HYDROCHLORIDE 5 MILLIGRAM(S): 5 TABLET ORAL at 06:10

## 2023-02-07 RX ADMIN — Medication 125 MILLIGRAM(S): at 05:40

## 2023-02-07 RX ADMIN — FENTANYL CITRATE 50 MICROGRAM(S): 50 INJECTION INTRAVENOUS at 18:36

## 2023-02-07 RX ADMIN — Medication 3 MILLILITER(S): at 17:17

## 2023-02-07 RX ADMIN — ENOXAPARIN SODIUM 40 MILLIGRAM(S): 100 INJECTION SUBCUTANEOUS at 23:58

## 2023-02-07 RX ADMIN — SODIUM CHLORIDE 4 MILLILITER(S): 9 INJECTION INTRAMUSCULAR; INTRAVENOUS; SUBCUTANEOUS at 17:31

## 2023-02-07 RX ADMIN — HUMAN INSULIN 8 UNIT(S): 100 INJECTION, SUSPENSION SUBCUTANEOUS at 18:22

## 2023-02-07 RX ADMIN — HUMAN INSULIN 8 UNIT(S): 100 INJECTION, SUSPENSION SUBCUTANEOUS at 05:44

## 2023-02-07 RX ADMIN — FAMOTIDINE 20 MILLIGRAM(S): 10 INJECTION INTRAVENOUS at 05:40

## 2023-02-07 RX ADMIN — FENTANYL CITRATE 50 MICROGRAM(S): 50 INJECTION INTRAVENOUS at 18:22

## 2023-02-07 RX ADMIN — OXYCODONE HYDROCHLORIDE 5 MILLIGRAM(S): 5 TABLET ORAL at 13:00

## 2023-02-07 RX ADMIN — Medication 2: at 18:23

## 2023-02-07 RX ADMIN — CHLORHEXIDINE GLUCONATE 15 MILLILITER(S): 213 SOLUTION TOPICAL at 05:41

## 2023-02-07 RX ADMIN — HUMAN INSULIN 8 UNIT(S): 100 INJECTION, SUSPENSION SUBCUTANEOUS at 00:43

## 2023-02-07 RX ADMIN — FENTANYL CITRATE 50 MICROGRAM(S): 50 INJECTION INTRAVENOUS at 22:47

## 2023-02-07 RX ADMIN — OXYCODONE HYDROCHLORIDE 5 MILLIGRAM(S): 5 TABLET ORAL at 05:40

## 2023-02-07 RX ADMIN — CHLORHEXIDINE GLUCONATE 1 APPLICATION(S): 213 SOLUTION TOPICAL at 05:53

## 2023-02-07 RX ADMIN — Medication 2: at 00:42

## 2023-02-07 NOTE — PROGRESS NOTE ADULT - ASSESSMENT
ASSESSMENT/PLAN: MG exacerbation, covid +    NEURO:   IVIG 25g (1/31--2/2), PLEX every other day 2/2--  solumedrol 125 mg daily (1/31--)  Avoid medications that may worsen the current exacerbation including macrolides, fluoroquinolones, tetracyclines, aminoglycoside, beta-blockers, magnesium, and anti-arrhythmics (procainamide, quinidine, and lidocaine)  Pain management with fentanyl pushes    PULM:  IPP as she tolerates   PEEP 10/FiO2 40, Will obtain repeat ABG  Intubated for airway protection, will wean as tolerated    CV:  MAP >65  TTE- 35-40%    RENAL:  Fluids: IVL in setting of poor EF  IO: replete as needed    GI:  Diet: TF at goal  GI prophylaxis [] not indicated [x] famotidine [] other:  LBM- 2/3    ENDO:   A1c 6.6  Goal euglycemia (-180)  RISS for now and NPH, adjust as needed    HEME/ONC:  H/H stable   VTE prophylaxis: [x] SCDs [x] chemoprophylaxis lovenox [] hold chemoprophylaxis due to: [] high risk of DVT/PE on admission due to:    ID: covid + status post remdesivir  ELEVATED leukocytes likely in setting of steroids, afebrile  monitor for fevers   ID on board    CODE STATUS:  [x] Full Code [] DNR [] DNI [] Palliative/Comfort Care    DISPOSITION:  [x] ICU [] Stroke Unit [] Floor [] EMU [] RCU [] PCU    At risk of death/neuro decline due to: acute resp failure requiring intubation    Contact: [phone] ASSESSMENT/PLAN: MG exacerbation, covid +    NEURO:   IVIG 25g (1/31--2/2), PLEX every other day 2/2--  solumedrol 125 mg daily (1/31--)  Avoid medications that may worsen the current exacerbation including macrolides, fluoroquinolones, tetracyclines, aminoglycoside, beta-blockers, magnesium, and anti-arrhythmics (procainamide, quinidine, and lidocaine)  Pain management with fentanyl pushes    PULM:  IPP as she tolerates   PEEP 10/FiO2 40, Will obtain repeat ABG  Intubated for airway protection, will wean as tolerated    CV:  MAP >65  TTE- 35-40%    RENAL:  Fluids: IVL in setting of poor EF  IO: replete as needed    GI:  Diet: TF at goal  GI prophylaxis [] not indicated [x] famotidine [] other:  LBM- 2/3    ENDO:   A1c 6.6  Goal euglycemia (-180)  RISS for now and NPH, adjust as needed    HEME/ONC:  H/H stable   VTE prophylaxis: [x] SCDs [x] chemoprophylaxis lovenox [] hold chemoprophylaxis due to: [] high risk of DVT/PE on admission due to:    ID: covid + status post remdesivir  ELEVATED leukocytes likely in setting of steroids, afebrile  monitor for fevers   ID on board    CODE STATUS:  [x] Full Code [] DNR [] DNI [] Palliative/Comfort Care    DISPOSITION:  [x] ICU [] Stroke Unit [] Floor [] EMU [] RCU [] PCU    At risk of death/neuro decline due to: acute resp failure requiring intubation    Contact: [phone] ASSESSMENT/PLAN: MG exacerbation, covid +    NEURO:   IVIG 25g (1/31--2/2), PLEX every other day 2/2--  solumedrol 125 mg daily (1/31--)  Avoid medications that may worsen the current exacerbation including macrolides, fluoroquinolones, tetracyclines, aminoglycoside, beta-blockers, magnesium, and anti-arrhythmics (procainamide, quinidine, and lidocaine)  Pain management with fentanyl pushes    PULM:  IPP as she tolerates   PEEP 10/FiO2 40, Will obtain repeat ABG  Intubated for airway protection, will wean as tolerated    CV:  MAP >65  TTE- 35-40%    RENAL:  Fluids: IVL in setting of poor EF  IO: replete as needed    GI:  Diet: TF at goal  GI prophylaxis [] not indicated [x] famotidine [] other:  LBM- 2/3    ENDO:   A1c 6.6  Goal euglycemia (-180)  RISS for now and NPH, adjust as needed    HEME/ONC:  H/H stable   VTE prophylaxis: [x] SCDs [x] chemoprophylaxis lovenox [] hold chemoprophylaxis due to: [] high risk of DVT/PE on admission due to:    ID: covid + status post remdesivir  ELEVATED leukocytes likely in setting of steroids, afebrile  monitor for fevers   ID on board    CODE STATUS:  [x] Full Code [] DNR [] DNI [] Palliative/Comfort Care    DISPOSITION:  [x] ICU [] Stroke Unit [] Floor [] EMU [] RCU [] PCU    At risk of death/neuro decline due to: acute resp failure requiring intubation    Contact: [phone] ASSESSMENT/PLAN: MG exacerbation, covid +    NEURO:   IVIG 25g (1/31--2/2), PLEX every other day 2/2--  solumedrol 125 mg daily (1/31--2/10)  Avoid medications that may worsen the current exacerbation including macrolides, fluoroquinolones, tetracyclines, aminoglycoside, beta-blockers, magnesium, and anti-arrhythmics (procainamide, quinidine, and lidocaine)  Pain management with fentanyl pushes    PULM:  IPPV as she tolerates   Will obtain repeat ABG  Intubated for airway protection, will wean as tolerated    CV:  MAP >65  TTE- 35-40%    RENAL:  Fluids: IVL in setting of poor EF  IO: replete as needed    GI:  Diet: TF at goal  GI prophylaxis [] not indicated [x] famotidine [] other:   LBM- 2/4    ENDO:   A1c 6.6  Goal euglycemia (-180)  RISS for now and NPH, adjust as needed    HEME/ONC:  H/H stable   VTE prophylaxis: [x] SCDs [x] chemoprophylaxis lovenox [] hold chemoprophylaxis due to: [] high risk of DVT/PE on admission due to:    ID: covid + status post remdesivir  ELEVATED leukocytes likely in setting of steroids, afebrile  monitor for fevers   ID on board, low threshold to start antibiotics if spikes fever    CODE STATUS:  [x] Full Code [] DNR [] DNI [] Palliative/Comfort Care    DISPOSITION:  [x] ICU [] Stroke Unit [] Floor [] EMU [] RCU [] PCU    At risk of death/neuro decline due to: acute resp failure requiring intubation    Contact: [phone] ASSESSMENT/PLAN: MG exacerbation, covid +    NEURO:   IVIG 25g (1/31--2/2), PLEX every other day 2/2--  solumedrol 125 mg daily (1/31--2/10)  Avoid medications that may worsen the current exacerbation including macrolides, fluoroquinolones, tetracyclines, aminoglycoside, beta-blockers, magnesium, and anti-arrhythmics (procainamide, quinidine, and lidocaine)  Pain management with fentanyl pushes    PULM:  IPPV as she tolerates   Will obtain repeat ABG  Intubated for airway protection, will wean as tolerated    CV:  MAP >65  TTE- 35-40%    RENAL:  Fluids: IVL in setting of poor EF  IO: replete as needed    GI:  Diet: TF at goal  GI prophylaxis [] not indicated [x] famotidine [] other:   LBM- 2/4    ENDO:   A1c 6.6  Goal euglycemia (-180)  RISS for now and NPH, adjust as needed    HEME/ONC:  H/H stable   VTE prophylaxis: [x] SCDs [x] chemoprophylaxis lovenox [] hold chemoprophylaxis due to: [] high risk of DVT/PE on admission due to:    ID: covid + status post remdesivir  ELEVATED leukocytes likely in setting of steroids, afebrile  monitor for fevers   ID on board, low threshold to start antibiotics if spikes fever    CODE STATUS:  [x] Full Code [] DNR [] DNI [] Palliative/Comfort Care    DISPOSITION:  [x] ICU [] Stroke Unit [] Floor [] EMU [] RCU [] PCU    At risk of death/neuro decline due to: acute resp failure requiring intubation    Contact: [phone] ASSESSMENT/PLAN: MG exacerbation, covid +    NEURO:   IVIG 25g (1/31--2/2), PLEX every other day 2/2--  solumedrol 125 mg daily (1/31--2/10)  Avoid medications that may worsen the current exacerbation including macrolides, fluoroquinolones, tetracyclines, aminoglycoside, beta-blockers, magnesium, and anti-arrhythmics (procainamide, quinidine, and lidocaine)  Pain management with fentanyl pushes    PULM:  IPPV as she tolerates   Will obtain repeat ABG  Intubated for airway protection, will wean as tolerated    CV:  MAP >65  TTE- 35-40%    RENAL:  Fluids: IVL in setting of poor EF  IO: replete as needed    GI:  Diet: TF at goal  GI prophylaxis [] not indicated [x] famotidine [] other:   LBM- 2/4    ENDO:   A1c 6.6  Goal euglycemia (-180)  RISS for now and NPH, adjust as needed    HEME/ONC:  H/H stable   VTE prophylaxis: [x] SCDs [x] chemoprophylaxis lovenox [] hold chemoprophylaxis due to: [] high risk of DVT/PE on admission due to:    ID: covid + status post remdesivir  ELEVATED leukocytes likely in setting of steroids, afebrile  monitor for fevers   ID on board, low threshold to start antibiotics if spikes fever    CODE STATUS:  [x] Full Code [] DNR [] DNI [] Palliative/Comfort Care    DISPOSITION:  [x] ICU [] Stroke Unit [] Floor [] EMU [] RCU [] PCU    At risk of death/neuro decline due to: acute resp failure requiring intubation    Contact: [phone]

## 2023-02-07 NOTE — PHARMACOTHERAPY INTERVENTION NOTE - COMMENTS
Reviewed medications for appropriate route of administration     MEDICATIONS  (STANDING):  albuterol/ipratropium for Nebulization 3 milliLiter(s) Nebulizer every 6 hours  chlorhexidine 0.12% Liquid 15 milliLiter(s) Oral Mucosa every 12 hours  chlorhexidine 4% Liquid 1 Application(s) Topical <User Schedule>  enoxaparin Injectable 40 milliGRAM(s) SubCutaneous <User Schedule>  famotidine    Tablet 20 milliGRAM(s) Oral two times a day  insulin lispro (ADMELOG) corrective regimen sliding scale   SubCutaneous every 6 hours  insulin NPH human recombinant 8 Unit(s) SubCutaneous every 6 hours  methylPREDNISolone sodium succinate Injectable 125 milliGRAM(s) IV Push daily  polyethylene glycol 3350 17 Gram(s) Oral daily  senna 2 Tablet(s) Oral at bedtime  sodium chloride 3%  Inhalation 4 milliLiter(s) Inhalation every 6 hours    MEDICATIONS  (PRN):  acetaminophen    Suspension .. 650 milliGRAM(s) Oral every 6 hours PRN Mild Pain (1 - 3)  fentaNYL    Injectable 50 MICROGram(s) IV Push every 2 hours PRN vent synchrony  oxyCODONE    IR 5 milliGRAM(s) Oral every 6 hours PRN Moderate Pain (4 - 6)    Jessica Ortez, PharmD  PGY2 Critical Care Pharmacy Resident  Available on [employer] Teams

## 2023-02-07 NOTE — CHART NOTE - NSCHARTNOTEFT_GEN_A_CORE
Nutrition Follow Up Note  Patient seen for: follow up     Chart reviewed, events noted. Per chart: '62-year-old woman with DMII, myasthenia gravis status post thymectomy and prior crises status post IVIG developed MG crisis in setting of COVID-19 infection requiring intubation status post IVIG x 1 and PLEX.."    Source: [] Patient       [x] EMR        [x] Nursing        [] Family at bedside       [] Other:    -If unable to interview patient: [x] Trach/Vent/BiPAP  [] Disoriented/confused/inappropriate to interview    Nutrition-Related Events:   - Pressors:  [] Yes    [x] No - phenylephrine stopped 2/.   - Propofol:  [] Yes    [x] No - stopped 2/.          - Rate: __mL/hr. If maintained x 24 hours, propofol will provide:     Diet Order:   Diet, NPO:   NPO for Procedure/Test     NPO Start Date: 2023,   NPO Start Time: 04:00  Except Medications (23)  Diet, NPO with Tube Feed:   Tube Feeding Modality: Nasogastric  Glucerna 1.2 Taye (GLUCERNARTH)  Total Volume for 24 Hours (mL): 1440  Continuous  Starting Tube Feed Rate {mL per Hour}: 20  Increase Tube Feed Rate by (mL): 10     Every 4 hours  Until Goal Tube Feed Rate (mL per Hour): 60  Tube Feed Duration (in Hours): 24  Tube Feed Start Time: 17:00 (23)    Being held for weaning trials.   EN Order Provides:    - Total volume: 1440ml    - Kcal:    1728   ( ___kcal/kg based on __)    - Gm Protein 86g ( __ g/kg based on __)    - mL free water: 1159ml  Current Pump Rate: held  EN provision: 85% EN volume goal provided in past 5 days per RN documentation     Is current diet order appropriate/adequate? see below for recommendations    Nutrition-related concerns:  -on insulin regimen for glycemic control  -hyperphosphatemia noted yesterday    GI:  Last BM ___.   Bowel Regimen? [] Yes   [] No  NGT Output:  IV Fluids:  Ostomy Output:  Fistula Output:     Weights:   Daily Weight in k ()    MEDICATIONS  (STANDING):  famotidine    Tablet  insulin lispro (ADMELOG) corrective regimen sliding scale  insulin NPH human recombinant  methylPREDNISolone sodium succinate Injectable    Pertinent Labs:  @ 23:32: Na 146<H>, BUN 41<H>, Cr 0.49<L>, <H>, K+ 4.3, Phos 5.1<H>, Mg 2.6, Alk Phos --, ALT/SGPT --, AST/SGOT --, HbA1c --    A1C with Estimated Average Glucose Result: 6.6 % (23 @ 01:13)    Finger Sticks:  POCT Blood Glucose.: 116 mg/dL ( @ 05:43)  POCT Blood Glucose.: 153 mg/dL ( @ 23:53)  POCT Blood Glucose.: 227 mg/dL ( @ 16:47)        Skin per nursing documentation:   Edema:     Estimated Energy Needs:  Estimated Protein Needs:  Estimated Fluid Needs:   [place] Equation:    Previous Nutrition Diagnosis:   Nutrition Diagnosis is: [] ongoing  [] resolved [] not applicable     New Nutrition Diagnosis: [] Not applicable    Nutrition Care Plan:  [] In Progress  [] Achieved  [] Not applicable    Nutrition Interventions:     Education Provided:       [] Yes:  [] No:        Recommendations:         [] Continue current diet order            [] Add oral nutrition supplement:     [] Discontinue current diet order. Recommend:      [] Add micronutrient supplementation:      [] Continue current micronutrient supplementation:      [] Other:     Monitoring and Evaluation:   Continue to monitor nutritional intake, tolerance to diet prescription, weights, labs, skin integrity      RD remains available upon request and will follow up per protocol Nutrition Follow Up Note  Patient seen for: follow up     Chart reviewed, events noted. Per chart: '62-year-old woman with DMII, myasthenia gravis status post thymectomy and prior crises status post IVIG developed MG crisis in setting of COVID-19 infection requiring intubation status post IVIG x 1 and PLEX."    Source: [] Patient       [x] EMR        [x] Nursing        [] Family at bedside       [] Other:    -If unable to interview patient: [x] Trach/Vent/BiPAP  [] Disoriented/confused/inappropriate to interview    Nutrition-Related Events:   - Pressors:  [] Yes    [x] No - phenylephrine stopped 2/2.   - Propofol:  [] Yes    [x] No - stopped 2/.          - Rate: __mL/hr. If maintained x 24 hours, propofol will provide:     Diet Order:   Diet, NPO:   NPO for Procedure/Test     NPO Start Date: 2023,   NPO Start Time: 04:00  Except Medications (23)  Diet, NPO with Tube Feed:   Tube Feeding Modality: Nasogastric  Glucerna 1.2 Taye (GLUCERNARTH)  Total Volume for 24 Hours (mL): 1440  Continuous  Starting Tube Feed Rate {mL per Hour}: 20  Increase Tube Feed Rate by (mL): 10     Every 4 hours  Until Goal Tube Feed Rate (mL per Hour): 60  Tube Feed Duration (in Hours): 24  Tube Feed Start Time: 17:00 (23)    Being held for weaning trials.   EN Order Provides:    - Total volume: 1440ml    - Kcal:    1728   ( 31kcal/kg based on IBW + 10% 55kg)    - Gm Protein 86g ( ~1.5 g/kg based on IBW + 10% 55kg)    - mL free water: 1159ml  Current Pump Rate: held  EN provision: 85% EN volume goal provided in past 5 days per RN documentation     Is current diet order appropriate/adequate? see below for recommendations    Nutrition-related concerns:  -on insulin regimen for glycemic control. Hgb A1c 6.6% ().   -hyperphosphatemia noted yesterday    GI: some constipation per nursing. Last BM 2/.   Bowel Regimen? [] Yes   [x] No    Weights:   Dosing weight 72kg  Daily Weight in k ()  no further weights noted. RD will continue to monitor.     MEDICATIONS  (STANDING):  famotidine    Tablet  insulin lispro (ADMELOG) corrective regimen sliding scale  insulin NPH human recombinant  methylPREDNISolone sodium succinate Injectable    Pertinent Labs:  @ 23:32: Na 146<H>, BUN 41<H>, Cr 0.49<L>, <H>, K+ 4.3, Phos 5.1<H>, Mg 2.6, Alk Phos --, ALT/SGPT --, AST/SGOT --, HbA1c --    A1C with Estimated Average Glucose Result: 6.6 % (23 @ 01:13)    Finger Sticks:  POCT Blood Glucose.: 116 mg/dL ( @ 05:43)  POCT Blood Glucose.: 153 mg/dL ( @ 23:53)  POCT Blood Glucose.: 227 mg/dL ( @ 16:47)    Skin per nursing documentation: no pressure injuries noted  Edema: 1+ generalized per flow sheets    Estimated Needs using IBW + 10% 55kg  30-35kcal/kg 1650-1925kcal/day  1.2-1.5g/kg 66-83g protein/day  defer fluid needs to team    Previous Nutrition Diagnosis: Increased nutrient needs,  Inadequate Protein Energy Intake (currently NPO)  Nutrition Diagnosis is: [x] ongoing  [] resolved [] not applicable     New Nutrition Diagnosis: [x] Not applicable    Nutrition Care Plan:  [x] In Progress  [] Achieved  [] Not applicable    Nutrition Interventions:     Education Provided:       [] Yes:  [x] No:     Recommendations:      -As able, continue Glucerna 1.2 60ml/hr x 24 hours. Pt with a history of DM2 and Glucerna is designed to minimize blood glucose spikes. This is providing pt with 1440ml total volume, 1728kcal ( 31kcal/kg based on IBW + 10% 55kg) Protein 86g ( ~1.5 g/kg based on IBW + 10% 55kg) and 1159ml free water if 100% provision  -defer free water to team  -if pt remains constipated, consider bowel regimen     Monitoring and Evaluation:   Continue to monitor EN provision, tolerance to diet prescription, weights, labs, skin integrity    RD remains available upon request and will follow up per protocol  Nadia Blackmon, MS, RD, CDN #[phone] Nutrition Follow Up Note  Patient seen for: follow up     Chart reviewed, events noted. Per chart: '62-year-old woman with DMII, myasthenia gravis status post thymectomy and prior crises status post IVIG developed MG crisis in setting of COVID-19 infection requiring intubation status post IVIG x 1 and PLEX."    Source: [] Patient       [x] EMR        [x] Nursing        [] Family at bedside       [] Other:    -If unable to interview patient: [x] Trach/Vent/BiPAP  [] Disoriented/confused/inappropriate to interview    Nutrition-Related Events:   - Pressors:  [] Yes    [x] No - phenylephrine stopped 2/2.   - Propofol:  [] Yes    [x] No - stopped 2/.          - Rate: __mL/hr. If maintained x 24 hours, propofol will provide:     Diet Order:   Diet, NPO:   NPO for Procedure/Test     NPO Start Date: 2023,   NPO Start Time: 04:00  Except Medications (23)  Diet, NPO with Tube Feed:   Tube Feeding Modality: Nasogastric  Glucerna 1.2 Taye (GLUCERNARTH)  Total Volume for 24 Hours (mL): 1440  Continuous  Starting Tube Feed Rate {mL per Hour}: 20  Increase Tube Feed Rate by (mL): 10     Every 4 hours  Until Goal Tube Feed Rate (mL per Hour): 60  Tube Feed Duration (in Hours): 24  Tube Feed Start Time: 17:00 (23)    Being held for weaning trials.   EN Order Provides:    - Total volume: 1440ml    - Kcal:    1728   ( 31kcal/kg based on IBW + 10% 55kg)    - Gm Protein 86g ( ~1.5 g/kg based on IBW + 10% 55kg)    - mL free water: 1159ml  Current Pump Rate: held  EN provision: 85% EN volume goal provided in past 5 days per RN documentation     Is current diet order appropriate/adequate? see below for recommendations    Nutrition-related concerns:  -on insulin regimen for glycemic control. Hgb A1c 6.6% ().   -hyperphosphatemia noted yesterday    GI: some constipation per nursing. Last BM 2/.   Bowel Regimen? [] Yes   [x] No    Weights:   Dosing weight 72kg  Daily Weight in k ()  no further weights noted. RD will continue to monitor.     MEDICATIONS  (STANDING):  famotidine    Tablet  insulin lispro (ADMELOG) corrective regimen sliding scale  insulin NPH human recombinant  methylPREDNISolone sodium succinate Injectable    Pertinent Labs:  @ 23:32: Na 146<H>, BUN 41<H>, Cr 0.49<L>, <H>, K+ 4.3, Phos 5.1<H>, Mg 2.6, Alk Phos --, ALT/SGPT --, AST/SGOT --, HbA1c --    A1C with Estimated Average Glucose Result: 6.6 % (23 @ 01:13)    Finger Sticks:  POCT Blood Glucose.: 116 mg/dL ( @ 05:43)  POCT Blood Glucose.: 153 mg/dL ( @ 23:53)  POCT Blood Glucose.: 227 mg/dL ( @ 16:47)    Skin per nursing documentation: no pressure injuries noted  Edema: 1+ generalized per flow sheets    Estimated Needs using IBW + 10% 55kg  30-35kcal/kg 1650-1925kcal/day  1.2-1.5g/kg 66-83g protein/day  defer fluid needs to team    Previous Nutrition Diagnosis: Increased nutrient needs,  Inadequate Protein Energy Intake (currently NPO)  Nutrition Diagnosis is: [x] ongoing  [] resolved [] not applicable     New Nutrition Diagnosis: [x] Not applicable    Nutrition Care Plan:  [x] In Progress  [] Achieved  [] Not applicable    Nutrition Interventions:     Education Provided:       [] Yes:  [x] No:     Recommendations:      -As able, continue Glucerna 1.2 60ml/hr x 24 hours. Pt with a history of DM2 and Glucerna is designed to minimize blood glucose spikes. This is providing pt with 1440ml total volume, 1728kcal ( 31kcal/kg based on IBW + 10% 55kg) Protein 86g ( ~1.5 g/kg based on IBW + 10% 55kg) and 1159ml free water if 100% provision  -defer free water to team  -if pt remains constipated, consider bowel regimen     Monitoring and Evaluation:   Continue to monitor EN provision, tolerance to diet prescription, weights, labs, skin integrity    RD remains available upon request and will follow up per protocol  Nadia Blackmon, MS, RD, CDN #[phone] Nutrition Follow Up Note  Patient seen for: follow up     Chart reviewed, events noted. Per chart: '62-year-old woman with DMII, myasthenia gravis status post thymectomy and prior crises status post IVIG developed MG crisis in setting of COVID-19 infection requiring intubation status post IVIG x 1 and PLEX."    Source: [] Patient       [x] EMR        [x] Nursing        [] Family at bedside       [] Other:    -If unable to interview patient: [x] Trach/Vent/BiPAP  [] Disoriented/confused/inappropriate to interview    Nutrition-Related Events:   - Pressors:  [] Yes    [x] No - phenylephrine stopped 2/2.   - Propofol:  [] Yes    [x] No - stopped 2/.          - Rate: __mL/hr. If maintained x 24 hours, propofol will provide:     Diet Order:   Diet, NPO:   NPO for Procedure/Test     NPO Start Date: 2023,   NPO Start Time: 04:00  Except Medications (23)  Diet, NPO with Tube Feed:   Tube Feeding Modality: Nasogastric  Glucerna 1.2 Taye (GLUCERNARTH)  Total Volume for 24 Hours (mL): 1440  Continuous  Starting Tube Feed Rate {mL per Hour}: 20  Increase Tube Feed Rate by (mL): 10     Every 4 hours  Until Goal Tube Feed Rate (mL per Hour): 60  Tube Feed Duration (in Hours): 24  Tube Feed Start Time: 17:00 (23)    Being held for weaning trials.   EN Order Provides:    - Total volume: 1440ml    - Kcal:    1728   ( 31kcal/kg based on IBW + 10% 55kg)    - Gm Protein 86g ( ~1.5 g/kg based on IBW + 10% 55kg)    - mL free water: 1159ml  Current Pump Rate: held  EN provision: 85% EN volume goal provided in past 5 days per RN documentation     Is current diet order appropriate/adequate? see below for recommendations    Nutrition-related concerns:  -on insulin regimen for glycemic control. Hgb A1c 6.6% ().   -hyperphosphatemia noted yesterday    GI: some constipation per nursing. Last BM 2/.   Bowel Regimen? [] Yes   [x] No    Weights:   Dosing weight 72kg  Daily Weight in k ()  no further weights noted. RD will continue to monitor.     MEDICATIONS  (STANDING):  famotidine    Tablet  insulin lispro (ADMELOG) corrective regimen sliding scale  insulin NPH human recombinant  methylPREDNISolone sodium succinate Injectable    Pertinent Labs:  @ 23:32: Na 146<H>, BUN 41<H>, Cr 0.49<L>, <H>, K+ 4.3, Phos 5.1<H>, Mg 2.6, Alk Phos --, ALT/SGPT --, AST/SGOT --, HbA1c --    A1C with Estimated Average Glucose Result: 6.6 % (23 @ 01:13)    Finger Sticks:  POCT Blood Glucose.: 116 mg/dL ( @ 05:43)  POCT Blood Glucose.: 153 mg/dL ( @ 23:53)  POCT Blood Glucose.: 227 mg/dL ( @ 16:47)    Skin per nursing documentation: no pressure injuries noted  Edema: 1+ generalized per flow sheets    Estimated Needs using IBW + 10% 55kg  30-35kcal/kg 1650-1925kcal/day  1.2-1.5g/kg 66-83g protein/day  defer fluid needs to team    Previous Nutrition Diagnosis: Increased nutrient needs,  Inadequate Protein Energy Intake (currently NPO)  Nutrition Diagnosis is: [x] ongoing  [] resolved [] not applicable     New Nutrition Diagnosis: [x] Not applicable    Nutrition Care Plan:  [x] In Progress  [] Achieved  [] Not applicable    Nutrition Interventions:     Education Provided:       [] Yes:  [x] No:     Recommendations:      -As able, continue Glucerna 1.2 60ml/hr x 24 hours. Pt with a history of DM2 and Glucerna is designed to minimize blood glucose spikes. This is providing pt with 1440ml total volume, 1728kcal ( 31kcal/kg based on IBW + 10% 55kg) Protein 86g ( ~1.5 g/kg based on IBW + 10% 55kg) and 1159ml free water if 100% provision  -defer free water to team  -if pt remains constipated, consider bowel regimen     Monitoring and Evaluation:   Continue to monitor EN provision, tolerance to diet prescription, weights, labs, skin integrity    RD remains available upon request and will follow up per protocol  Nadia Blackmon, MS, RD, CDN #[phone]

## 2023-02-07 NOTE — PROGRESS NOTE ADULT - SUBJECTIVE AND OBJECTIVE BOX
CC: f/u for covid    Patient reports she is ok    REVIEW OF SYSTEMS:  All other review of systems negative (Comprehensive ROS)    Antimicrobials Day #  :    Other Medications Reviewed    T(F): 98.6 (02-07-23 @ 11:00), Max: 99.3 (02-06-23 @ 19:00)  HR: 57 (02-07-23 @ 16:00)  BP: --  RR: 16 (02-07-23 @ 16:00)  SpO2: 95% (02-07-23 @ 16:00)  Wt(kg): --    PHYSICAL EXAM:  General: alert, no acute distress  Eyes:  anicteric, no conjunctival injection, no discharge  Oropharynx: no lesions or injection 	  Neck: supple, without adenopathy  Lungs: clear to auscultation  Heart: regular rate and rhythm; no murmur, rubs or gallops  Abdomen: soft, nondistended, nontender, without mass or organomegaly  Skin: no lesions  Extremities: no clubbing, cyanosis, or edema  Neurologic: alert,  moves all extremities    LAB RESULTS:                        11.0   20.14 )-----------( 263      ( 06 Feb 2023 23:32 )             33.8     02-06    146<H>  |  108  |  41<H>  ----------------------------<  161<H>  4.3   |  26  |  0.49<L>    Ca    8.8      06 Feb 2023 23:32  Phos  5.1     02-06  Mg     2.6     02-06          MICROBIOLOGY:  RECENT CULTURES:      RADIOLOGY REVIEWED:    < from: Xray Chest 1 View- PORTABLE-Urgent (Xray Chest 1 View- PORTABLE-Urgent .) (02.06.23 @ 12:20) >  [facility]     PROCEDURE DATE:  02/06/2023          INTERPRETATION:  EXAMINATION: XR CHEST URGENT    CLINICAL INDICATION: NGT placement    TECHNIQUE: Single frontal, portable view of the chest was obtained.    COMPARISON: Chest x-ray 2/6/2023 at 2:52 AM.    FINDINGS:  Support devices: Enteric tube projecting over the stomach. Right internal   jugular dialysis catheter with tip at superior cavoatrial junction.   Endotracheal tube with tip above the sherri. Median sternotomy.  The cardiomediastinal silhouette is  not accurately assessed in this AP   projection.  Low lung volumes. Small bilateral pleural effusions and bibasilar   atelectasis..  There is no pneumothorax.  No acute bony abnormality.    IMPRESSION:  Interval placement of enteric tube with tip in stomach.  Small bilateral pleural effusions and bibasilar atelectasis.    --- End of Report ---          < end of copied text >            Assessment:  Patient with MG, developed covid, developed mg flare , finished course of rdv, on solumedrol and is undergoing plex. She is doing some cpap and her mental status is great the mg is doing a lot better. She haS rising wbc but looks good and has no infiltrate on cxr so for now will monitor off antibiotics.   Plan:  for now monitor off abx  would do blood cultures and sputum cultures since wbc is up and may have temps masked by steroids

## 2023-02-07 NOTE — PROGRESS NOTE ADULT - SUBJECTIVE AND OBJECTIVE BOX
Ms. Acosta is a 61 yo woman with PMHx of myxasthenia gravis who presented with SOB associated with COVID 19 testing.  P is able to follow through imitation although she is Malayalam speaking.  She is on maintenance 10mg prednisone and pyridostigmine 120mg tid at home and has had multiple myasthenic exacerbation every 1-2 years requiring hospitalization with intubation- last 1.5 years ago.  She had been receiving IVIG previously and opted to IVIG during this hospitalization  However, she continued to worsen with worsening NIF/ VC and was intubated for airway protection.  Since intubation she has been receiving PLEX tx and has noted increasing strength.  On exam today Pt is eyes open and following simple imitative commands.  Mild ophthalmoparesis with difficulty mildly on upward gaze.  Mild bilateral ptosis.  Able to lift arms for sustained period bilaterally.  Sustained upward gaze.  Last PLEX on 2/6    A/P:  61 yo woman with myaesthenia Gravis exacerbation liekly associated with concurrent COVID infection with worsening SOB.  Continue with PLEX for 2 additional sessions.  Continue Solumedrol 125 mcg.  will continue to follow. Ms. Acosta is a 63 yo woman with PMHx of myxasthenia gravis who presented with SOB associated with COVID 19 testing.  P is able to follow through imitation although she is Malayalam speaking.  She is on maintenance 10mg prednisone and pyridostigmine 120mg tid at home and has had multiple myasthenic exacerbation every 1-2 years requiring hospitalization with intubation- last 1.5 years ago.  She had been receiving IVIG previously and opted to IVIG during this hospitalization  However, she continued to worsen with worsening NIF/ VC and was intubated for airway protection.  Since intubation she has been receiving PLEX tx and has noted increasing strength.  On exam today Pt is eyes open and following simple imitative commands.  Mild ophthalmoparesis with difficulty mildly on upward gaze.  Mild bilateral ptosis.  Able to lift arms for sustained period bilaterally.  Sustained upward gaze.  Last PLEX on 2/6    A/P:  63 yo woman with myaesthenia Gravis exacerbation liekly associated with concurrent COVID infection with worsening SOB.  Continue with PLEX for 2 additional sessions.  Continue Solumedrol 125 mcg.  will continue to follow.

## 2023-02-07 NOTE — PROGRESS NOTE ADULT - ASSESSMENT
ASSESSMENT/PLAN: MG crisis, covid +    NEURO:   PLEX#4 tomorrow    s/p IVIG 3rd dose now   started on solumedrol   Hold pyridostigmine during acute exacerbation  Avoid medications that may worsen the current exacerbation including macrolides, fluoroquinolones, tetracyclines, aminoglycoside, beta-blockers, magnesium, and anti-arrhythmics (procainamide, quinidine, and lidocaine)  neurochecks Q4 hrs   Activity: [] mobilize as tolerated [x] Bedrest [] PT [] OT [] PMNR    PULM:  intubated  16/300/7/40/% PRVC CPAP 12/7, tolerated, but got tired with 10/5  NPO, CPAP at 4am for possible extubation   IPV Q6 hrs     CV:  SBP goal 100-160  MAP>65    RENAL:  Fluids: IVL    GI:  Diet: tube feeding, NPO at 4am  GI prophylaxis [x] not indicated [] PPI [] other:  Bowel regimen [] colace [x] senna [x] other: miralax   2/4 BM     ENDO:   Goal euglycemia (-180)  RISS for now      HEME/ONC:  VTE prophylaxis: [x] SCDs [x] chemoprophylaxis lovenox [] hold chemoprophylaxis due to: [] high risk of DVT/PE on admission due to:    ID: covid +on steroid and remdesivir now   ID following   monitor for fevers     MISC:  son updated at bedside and discussed plan     CODE STATUS:  [x] Full Code [] DNR [] DNI [] Palliative/Comfort Care    DISPOSITION:  [x] ICU [] Stroke Unit [] Floor [] EMU [] RCU [] PCU    [x] Patient is at high risk of neurologic deterioration/death due to: MG crisis, hypercapnic/hypoxic respiratory failure    Contact: [phone] ASSESSMENT/PLAN: MG crisis, covid +    NEURO:   PLEX#4 tomorrow    s/p IVIG 3rd dose now   started on solumedrol   Hold pyridostigmine during acute exacerbation  Avoid medications that may worsen the current exacerbation including macrolides, fluoroquinolones, tetracyclines, aminoglycoside, beta-blockers, magnesium, and anti-arrhythmics (procainamide, quinidine, and lidocaine)  neurochecks Q4 hrs   Activity: [] mobilize as tolerated [x] Bedrest [] PT [] OT [] PMNR    PULM:  intubated  16/300/7/40/% PRVC CPAP 12/7, tolerated, but got tired with 10/5  NPO, CPAP at 4am for possible extubation   IPV Q6 hrs     CV:  SBP goal 100-160  MAP>65    RENAL:  Fluids: IVL    GI:  Diet: tube feeding, NPO at 4am  GI prophylaxis [x] not indicated [] PPI [] other:  Bowel regimen [] colace [x] senna [x] other: miralax   2/4 BM     ENDO:   Goal euglycemia (-180)  RISS for now      HEME/ONC:  VTE prophylaxis: [x] SCDs [x] chemoprophylaxis lovenox [] hold chemoprophylaxis due to: [] high risk of DVT/PE on admission due to:    ID: covid +on steroid and remdesivir now   ID following   monitor for fevers     MISC:  son updated at bedside and discussed plan     CODE STATUS:  [x] Full Code [] DNR [] DNI [] Palliative/Comfort Care    DISPOSITION:  [x] ICU [] Stroke Unit [] Floor [] EMU [] RCU [] PCU    [x] Patient is at high risk of neurologic deterioration/death due to: MG crisis, hypercapnic/hypoxic respiratory failure    Contact: [phone] ASSESSMENT/PLAN: MG crisis, covid +    NEURO:   PLEX#4 tomorrow    s/p IVIG 3rd dose now   started on solumedrol   Hold pyridostigmine during acute exacerbation  Avoid medications that may worsen the current exacerbation including macrolides, fluoroquinolones, tetracyclines, aminoglycoside, beta-blockers, magnesium, and anti-arrhythmics (procainamide, quinidine, and lidocaine)  neurochecks Q4 hrs   Activity: [] mobilize as tolerated [x] Bedrest [] PT [] OT [] PMNR    PULM:  intubated  16/300/7/40/% PRVC CPAP 12/7, tolerated, but got tired with 10/5  NPO, CPAP at 4am for possible extubation   IPV Q6 hrs     CV:  SBP goal 100-160  MAP>65    RENAL:  Fluids: IVL    GI:  Diet: tube feeding, NPO at 4am  GI prophylaxis [x] not indicated [] PPI [] other:  Bowel regimen [] colace [x] senna [x] other: miralax   2/4 BM     ENDO:   Goal euglycemia (-180)  RISS for now      HEME/ONC:  VTE prophylaxis: [x] SCDs [x] chemoprophylaxis lovenox [] hold chemoprophylaxis due to: [] high risk of DVT/PE on admission due to:    ID: covid +on steroid and remdesivir now   ID following   monitor for fevers     MISC:  son updated at bedside and discussed plan     CODE STATUS:  [x] Full Code [] DNR [] DNI [] Palliative/Comfort Care    DISPOSITION:  [x] ICU [] Stroke Unit [] Floor [] EMU [] RCU [] PCU    [x] Patient is at high risk of neurologic deterioration/death due to: MG crisis, hypercapnic/hypoxic respiratory failure    Contact: [phone] ASSESSMENT/PLAN: MG crisis, covid +    NEURO:   PLEX#4 tomorrow    s/p IVIG 3rd dose now   cont solumedrol   Hold pyridostigmine during acute exacerbation  Avoid medications that may worsen the current exacerbation including macrolides, fluoroquinolones, tetracyclines, aminoglycoside, beta-blockers, magnesium, and anti-arrhythmics (procainamide, quinidine, and lidocaine)  neurochecks Q4 hrs   Activity: [] mobilize as tolerated [x] Bedrest [] PT [] OT [] PMNR    PULM:  intubated  16/300/5/30/% PRVC  CPAP 10/5 at 4am   NPO, CPAP at 4am for possible extubation   IPV Q6 hrs standing     CV:  SBP goal   MAP>65    RENAL:  Fluids: IVL    GI:  Diet: tube feeding, NPO at 4am  GI prophylaxis [x] not indicated [] PPI [] other:  Bowel regimen [] colace [x] senna [x] other: miralax   2/7 BM     ENDO:   Goal euglycemia (-180)  RISS and NPH 8Q6      HEME/ONC:  VTE prophylaxis: [x] SCDs [x] chemoprophylaxis lovenox [] hold chemoprophylaxis due to: [] high risk of DVT/PE on admission due to:    ID: covid +on steroid and remdesivir now   ID following   monitor for fevers     CODE STATUS:  [x] Full Code [] DNR [] DNI [] Palliative/Comfort Care    DISPOSITION:  [x] ICU [] Stroke Unit [] Floor [] EMU [] RCU [] PCU    [x] Patient is at high risk of neurologic deterioration/death due to: MG crisis, hypercapnic/hypoxic respiratory failure    Contact: [phone] ASSESSMENT/PLAN: MG crisis, covid +    NEURO:   PLEX#4 tomorrow    s/p IVIG 3rd dose now   cont solumedrol   Hold pyridostigmine during acute exacerbation  Avoid medications that may worsen the current exacerbation including macrolides, fluoroquinolones, tetracyclines, aminoglycoside, beta-blockers, magnesium, and anti-arrhythmics (procainamide, quinidine, and lidocaine)  neurochecks Q4 hrs   Activity: [] mobilize as tolerated [x] Bedrest [] PT [] OT [] PMNR    PULM:  intubated  16/300/5/30/% PRVC  CPAP 10/5 at 4am   NPO, CPAP at 4am for possible extubation   IPV Q6 hrs standing     CV:  SBP goal   MAP>65    RENAL:  Fluids: IVL    GI:  Diet: tube feeding, NPO at 4am  GI prophylaxis [x] not indicated [] PPI [] other:  Bowel regimen [] colace [x] senna [x] other: miralax   2/7 BM     ENDO:   Goal euglycemia (-180)  RISS and NPH 8Q6      HEME/ONC:  VTE prophylaxis: [x] SCDs [x] chemoprophylaxis lovenox [] hold chemoprophylaxis due to: [] high risk of DVT/PE on admission due to:    ID: covid +on steroid and remdesivir now   ID following   monitor for fevers     CODE STATUS:  [x] Full Code [] DNR [] DNI [] Palliative/Comfort Care    DISPOSITION:  [x] ICU [] Stroke Unit [] Floor [] EMU [] RCU [] PCU    [x] Patient is at high risk of neurologic deterioration/death due to: MG crisis, hypercapnic/hypoxic respiratory failure    Contact: [phone] ASSESSMENT/PLAN: MG crisis, covid +    NEURO:   PLEX#4 tomorrow    s/p IVIG 3rd dose now   cont solumedrol   Hold pyridostigmine during acute exacerbation  Avoid medications that may worsen the current exacerbation including macrolides, fluoroquinolones, tetracyclines, aminoglycoside, beta-blockers, magnesium, and anti-arrhythmics (procainamide, quinidine, and lidocaine)  neurochecks Q4 hrs   Activity: [] mobilize as tolerated [x] Bedrest [] PT [] OT [] PMNR    PULM:  intubated  16/300/5/30/% PRVC  CPAP 10/5 at 4am   NPO, CPAP at 4am for possible extubation   IPV Q6 hrs standing     CV:  SBP goal   MAP>65    RENAL:  Fluids: IVL    GI:  Diet: tube feeding, NPO at 4am  GI prophylaxis [x] not indicated [] PPI [] other:  Bowel regimen [] colace [x] senna [x] other: miralax   2/7 BM     ENDO:   Goal euglycemia (-180)  RISS and NPH 8Q6      HEME/ONC:  VTE prophylaxis: [x] SCDs [x] chemoprophylaxis lovenox [] hold chemoprophylaxis due to: [] high risk of DVT/PE on admission due to:    ID: covid +on steroid and remdesivir now   ID following   monitor for fevers     CODE STATUS:  [x] Full Code [] DNR [] DNI [] Palliative/Comfort Care    DISPOSITION:  [x] ICU [] Stroke Unit [] Floor [] EMU [] RCU [] PCU    [x] Patient is at high risk of neurologic deterioration/death due to: MG crisis, hypercapnic/hypoxic respiratory failure    Contact: [phone]

## 2023-02-07 NOTE — PROGRESS NOTE ADULT - SUBJECTIVE AND OBJECTIVE BOX
SUMMARY: 61yo Malayalam-speaking woman w/ Myasthenia Gravis (on pyridostigmine, hospitalization every 1-2 years requiring IVIG) (dx 2015), s/p thymectomy (2010), T2DM who presents with SOB, cough, difficulty spitting up sputum x 4 days.  was sick with COVID, so son performed home COVID test for patient which was positive. Patient's primary complaint is SOB when lying down. She has been sleeping upright. She had difficulty coughing up and spitting out sputum but today these symptoms are slightly improved. Also reports difficulty swallowing - but was able to tolerate taking oral pills. Admits to difficulty looking up, but eye drooping is overall improved from past couple of months. No blurry vision. Patient takes pyridostigmine 120mg TID regularly. Patient also has prednisone PRN for sick days-- she has been taking 20mg prednisone x past 4 days as per instruction of her neurologist.     Of note, last hospitalization was 2 years ago at [facility] during which patient was intubated -- son notes that this was likely 2/2 patient being forced to lie down flat in hospital bed, leading to aspiration. She was extubated after ~3 days. Received IVIG during that hospitalization  1/30  Adm NSCU, upon arrival to the unit, appeared in acute respiratory distress tachypneic, hypertensive, tachycardic, hypoxic, ABG showing resp acidosis, immediately put on BiPAP, started on IVIG with some improvement     1/31 intubated overnight for hypercapnic/hypoxic respiratory failure     2/2 started PLEX     REVIEW OF SYSTEMS: [x] Unable to Assess due to neurologic exam intubated now but able to nod to y/n questions, minimal pain c/o   [ ] All ROS addressed below are non-contributory, except:  Neuro: [ ] Headache [ ] Back pain [ ] Numbness [ ] Weakness [ ] Ataxia [ ] Dizziness [ ] Aphasia [ ] Dysarthria [ ] Visual disturbance  Resp: [ x] Shortness of breath/dyspnea [ ] Orthopnea [ ] Cough  CV: [ ] Chest pain [ ] Palpitation [ ] Lightheadedness [ ] Syncope  Renal: [ ] Thirst [ ] Edema  GI: [ ] Nausea [ ] Emesis [ ] Abdominal pain [ ] Constipation [ ] Diarrhea  Hem: [ ] Hematemesis [ ] bBright red blood per rectum  ID: [ ] Fever [ ] Chills [ ] Dysuria  ENT: [ ] Rhinorrhea    VITALS: [x] Reviewed    IMAGING/DATA: [x] Reviewed    IVF FLUIDS/MEDICATIONS: [x] Reviewed    ALLERGIES: Allergies    No Known Allergies    Intolerances      DEVICES:   [] Restraints [x] PIVs [x] ET tube [x] central line R IJ Shiley [] PICC [x] arterial line [] morales [] NGT/OGT [] EVD [] LD [] MYAH/HMV [] LiCOX [] ICP monitor [] Trach [] PEG [] Chest Tube [] other:    EXAMINATION:  General: NAD  HEENT: Anicteric sclerae  Cardiac: [id number] tachycardic  Lungs: diminished   Abdomen: Soft, non-tender, +BS  Neurologic: intubated but able to nod appropriately, oriented x3 with choices, son at bedside, follows commands, PERRL, EOMI, cough strongly to command, able to take deep breaths to commands, BUE no drift 5/5 pain limited (shoulder pain at baseline), BLE 5/5  SUMMARY: 61yo Malayalam-speaking woman w/ Myasthenia Gravis (on pyridostigmine, hospitalization every 1-2 years requiring IVIG) (dx 2015), s/p thymectomy (2010), T2DM who presents with SOB, cough, difficulty spitting up sputum x 4 days.  was sick with COVID, so son performed home COVID test for patient which was positive. Patient's primary complaint is SOB when lying down. She has been sleeping upright. She had difficulty coughing up and spitting out sputum but today these symptoms are slightly improved. Also reports difficulty swallowing - but was able to tolerate taking oral pills. Admits to difficulty looking up, but eye drooping is overall improved from past couple of months. No blurry vision. Patient takes pyridostigmine 120mg TID regularly. Patient also has prednisone PRN for sick days-- she has been taking 20mg prednisone x past 4 days as per instruction of her neurologist.     Of note, last hospitalization was 2 years ago at [facility] during which patient was intubated -- son notes that this was likely 2/2 patient being forced to lie down flat in hospital bed, leading to aspiration. She was extubated after ~3 days. Received IVIG during that hospitalization  1/30  Adm NSCU, upon arrival to the unit, appeared in acute respiratory distress tachypneic, hypertensive, tachycardic, hypoxic, ABG showing resp acidosis, immediately put on BiPAP, started on IVIG with some improvement     1/31 intubated overnight for hypercapnic/hypoxic respiratory failure     2/2 started PLEX     REVIEW OF SYSTEMS: [x] Unable to Assess due to neurologic exam intubated now but able to nod to y/n questions, minimal pain c/o   [ ] All ROS addressed below are non-contributory, except:  Neuro: [ ] Headache [ ] Back pain [ ] Numbness [ ] Weakness [ ] Ataxia [ ] Dizziness [ ] Aphasia [ ] Dysarthria [ ] Visual disturbance  Resp: [ x] Shortness of breath/dyspnea [ ] Orthopnea [ ] Cough  CV: [ ] Chest pain [ ] Palpitation [ ] Lightheadedness [ ] Syncope  Renal: [ ] Thirst [ ] Edema  GI: [ ] Nausea [ ] Emesis [ ] Abdominal pain [ ] Constipation [ ] Diarrhea  Hem: [ ] Hematemesis [ ] bBright red blood per rectum  ID: [ ] Fever [ ] Chills [ ] Dysuria  ENT: [ ] Rhinorrhea    VITALS: [x] Reviewed    IMAGING/DATA: [x] Reviewed    IVF FLUIDS/MEDICATIONS: [x] Reviewed    ALLERGIES: Allergies    No Known Allergies    Intolerances      DEVICES:   [] Restraints [x] PIVs [x] ET tube [x] central line R IJ Shiley [] PICC [x] arterial line [] morales [] NGT/OGT [] EVD [] LD [] MYAH/HMV [] LiCOX [] ICP monitor [] Trach [] PEG [] Chest Tube [] other:    EXAMINATION:  General: NAD  HEENT: Anicteric sclerae  Cardiac: [id number] tachycardic  Lungs: diminished   Abdomen: Soft, non-tender, +BS  Neurologic: intubated but able to nod appropriately, oriented x3 with choices, son at bedside, follows commands, PERRL, EOMI, cough strongly to command, able to take deep breaths to commands, BUE no drift 5/5 pain limited (shoulder pain at baseline), BLE 5/5  SUMMARY: 61yo Malayalam-speaking woman w/ Myasthenia Gravis (on pyridostigmine, hospitalization every 1-2 years requiring IVIG) (dx 2015), s/p thymectomy (2010), T2DM who presents with SOB, cough, difficulty spitting up sputum x 4 days.  was sick with COVID, so son performed home COVID test for patient which was positive. Patient's primary complaint is SOB when lying down. She has been sleeping upright. She had difficulty coughing up and spitting out sputum but today these symptoms are slightly improved. Also reports difficulty swallowing - but was able to tolerate taking oral pills. Admits to difficulty looking up, but eye drooping is overall improved from past couple of months. No blurry vision. Patient takes pyridostigmine 120mg TID regularly. Patient also has prednisone PRN for sick days-- she has been taking 20mg prednisone x past 4 days as per instruction of her neurologist.     Of note, last hospitalization was 2 years ago at [facility] during which patient was intubated -- son notes that this was likely 2/2 patient being forced to lie down flat in hospital bed, leading to aspiration. She was extubated after ~3 days. Received IVIG during that hospitalization  1/30  Adm NSCU, upon arrival to the unit, appeared in acute respiratory distress tachypneic, hypertensive, tachycardic, hypoxic, ABG showing resp acidosis, immediately put on BiPAP, started on IVIG with some improvement     1/31 intubated overnight for hypercapnic/hypoxic respiratory failure     2/2 started PLEX     REVIEW OF SYSTEMS: [x] Unable to Assess due to neurologic exam intubated now but able to nod to y/n questions, minimal pain c/o   [ ] All ROS addressed below are non-contributory, except:  Neuro: [ ] Headache [ ] Back pain [ ] Numbness [ ] Weakness [ ] Ataxia [ ] Dizziness [ ] Aphasia [ ] Dysarthria [ ] Visual disturbance  Resp: [ x] Shortness of breath/dyspnea [ ] Orthopnea [ ] Cough  CV: [ ] Chest pain [ ] Palpitation [ ] Lightheadedness [ ] Syncope  Renal: [ ] Thirst [ ] Edema  GI: [ ] Nausea [ ] Emesis [ ] Abdominal pain [ ] Constipation [ ] Diarrhea  Hem: [ ] Hematemesis [ ] bBright red blood per rectum  ID: [ ] Fever [ ] Chills [ ] Dysuria  ENT: [ ] Rhinorrhea    VITALS: [x] Reviewed    IMAGING/DATA: [x] Reviewed    IVF FLUIDS/MEDICATIONS: [x] Reviewed    ALLERGIES: Allergies    No Known Allergies    Intolerances      DEVICES:   [] Restraints [x] PIVs [x] ET tube [x] central line R IJ Shiley [] PICC [x] arterial line [] morales [] NGT/OGT [] EVD [] LD [] MYAH/HMV [] LiCOX [] ICP monitor [] Trach [] PEG [] Chest Tube [] other:    EXAMINATION:  General: NAD  HEENT: Anicteric sclerae  Cardiac: [id number] tachycardic  Lungs: diminished   Abdomen: Soft, non-tender, +BS  Neurologic: intubated but able to nod appropriately, oriented x3 with choices, son at bedside, follows commands, PERRL, EOMI, cough strongly to command, able to take deep breaths to commands, BUE no drift 5/5 pain limited (shoulder pain at baseline), BLE 5/5  SUMMARY: 61yo Malayalam-speaking woman w/ Myasthenia Gravis (on pyridostigmine, hospitalization every 1-2 years requiring IVIG) (dx 2015), s/p thymectomy (2010), T2DM who presents with SOB, cough, difficulty spitting up sputum x 4 days.  was sick with COVID, so son performed home COVID test for patient which was positive. Patient's primary complaint is SOB when lying down. She has been sleeping upright. She had difficulty coughing up and spitting out sputum but today these symptoms are slightly improved. Also reports difficulty swallowing - but was able to tolerate taking oral pills. Admits to difficulty looking up, but eye drooping is overall improved from past couple of months. No blurry vision. Patient takes pyridostigmine 120mg TID regularly. Patient also has prednisone PRN for sick days-- she has been taking 20mg prednisone x past 4 days as per instruction of her neurologist.     Of note, last hospitalization was 2 years ago at [facility] during which patient was intubated -- son notes that this was likely 2/2 patient being forced to lie down flat in hospital bed, leading to aspiration. She was extubated after ~3 days. Received IVIG during that hospitalization  1/30  Adm NSCU, upon arrival to the unit, appeared in acute respiratory distress tachypneic, hypertensive, tachycardic, hypoxic, ABG showing resp acidosis, immediately put on BiPAP, started on IVIG with some improvement     1/31 intubated overnight for hypercapnic/hypoxic respiratory failure     2/2 started PLEX     DAYTIME EVENTS: remains intubated, thick secretion     REVIEW OF SYSTEMS: [x] Unable to Assess due to neurologic exam intubated now but able to nod to y/n questions, minimal pain c/o   [ ] All ROS addressed below are non-contributory, except:  Neuro: [ ] Headache [ ] Back pain [ ] Numbness [ ] Weakness [ ] Ataxia [ ] Dizziness [ ] Aphasia [ ] Dysarthria [ ] Visual disturbance  Resp: [ x] Shortness of breath/dyspnea [ ] Orthopnea [ ] Cough  CV: [ ] Chest pain [ ] Palpitation [ ] Lightheadedness [ ] Syncope  Renal: [ ] Thirst [ ] Edema  GI: [ ] Nausea [ ] Emesis [ ] Abdominal pain [ ] Constipation [ ] Diarrhea  Hem: [ ] Hematemesis [ ] bBright red blood per rectum  ID: [ ] Fever [ ] Chills [ ] Dysuria  ENT: [ ] Rhinorrhea    VITALS: [x] Reviewed    IMAGING/DATA: [x] Reviewed    IVF FLUIDS/MEDICATIONS: [x] Reviewed    ALLERGIES: Allergies    No Known Allergies    Intolerances      DEVICES:   [] Restraints [x] PIVs [x] ET tube [x] central line R IJ Shiley [] PICC [x] arterial line [] morales [] NGT/OGT [] EVD [] LD [] MYAH/HMV [] LiCOX [] ICP monitor [] Trach [] PEG [] Chest Tube [] other:    EXAMINATION:  General: NAD  HEENT: Anicteric sclerae  Cardiac: [id number] tachycardic  Lungs: diminished   Abdomen: Soft, non-tender, +BS  Neurologic: intubated but able to nod appropriately, oriented x3 with choices, follows commands, PERRL, EOMI, cough strongly to command, able to take deep breaths to commands, BUE no drift 5/5 pain limited (shoulder pain at baseline), BLE 5/5  SUMMARY: 61yo Malayalam-speaking woman w/ Myasthenia Gravis (on pyridostigmine, hospitalization every 1-2 years requiring IVIG) (dx 2015), s/p thymectomy (2010), T2DM who presents with SOB, cough, difficulty spitting up sputum x 4 days.  was sick with COVID, so son performed home COVID test for patient which was positive. Patient's primary complaint is SOB when lying down. She has been sleeping upright. She had difficulty coughing up and spitting out sputum but today these symptoms are slightly improved. Also reports difficulty swallowing - but was able to tolerate taking oral pills. Admits to difficulty looking up, but eye drooping is overall improved from past couple of months. No blurry vision. Patient takes pyridostigmine 120mg TID regularly. Patient also has prednisone PRN for sick days-- she has been taking 20mg prednisone x past 4 days as per instruction of her neurologist.     Of note, last hospitalization was 2 years ago at [facility] during which patient was intubated -- son notes that this was likely 2/2 patient being forced to lie down flat in hospital bed, leading to aspiration. She was extubated after ~3 days. Received IVIG during that hospitalization  1/30  Adm NSCU, upon arrival to the unit, appeared in acute respiratory distress tachypneic, hypertensive, tachycardic, hypoxic, ABG showing resp acidosis, immediately put on BiPAP, started on IVIG with some improvement     1/31 intubated overnight for hypercapnic/hypoxic respiratory failure     2/2 started PLEX     DAYTIME EVENTS: remains intubated, thick secretion     REVIEW OF SYSTEMS: [x] Unable to Assess due to neurologic exam intubated now but able to nod to y/n questions, minimal pain c/o   [ ] All ROS addressed below are non-contributory, except:  Neuro: [ ] Headache [ ] Back pain [ ] Numbness [ ] Weakness [ ] Ataxia [ ] Dizziness [ ] Aphasia [ ] Dysarthria [ ] Visual disturbance  Resp: [ x] Shortness of breath/dyspnea [ ] Orthopnea [ ] Cough  CV: [ ] Chest pain [ ] Palpitation [ ] Lightheadedness [ ] Syncope  Renal: [ ] Thirst [ ] Edema  GI: [ ] Nausea [ ] Emesis [ ] Abdominal pain [ ] Constipation [ ] Diarrhea  Hem: [ ] Hematemesis [ ] bBright red blood per rectum  ID: [ ] Fever [ ] Chills [ ] Dysuria  ENT: [ ] Rhinorrhea    VITALS: [x] Reviewed    IMAGING/DATA: [x] Reviewed    IVF FLUIDS/MEDICATIONS: [x] Reviewed    ALLERGIES: Allergies    No Known Allergies    Intolerances      DEVICES:   [] Restraints [x] PIVs [x] ET tube [x] central line R IJ Shiley [] PICC [x] arterial line [] morales [] NGT/OGT [] EVD [] LD [] MYAH/HMV [] LiCOX [] ICP monitor [] Trach [] PEG [] Chest Tube [] other:    EXAMINATION:  General: NAD  HEENT: Anicteric sclerae  Cardiac: [id number] tachycardic  Lungs: diminished   Abdomen: Soft, non-tender, +BS  Neurologic: intubated but able to nod appropriately, oriented x3 with choices, follows commands, PERRL, EOMI, cough strongly to command, able to take deep breaths to commands, BUE no drift 5/5 pain limited (shoulder pain at baseline), BLE 5/5  SUMMARY: 61yo Malayalam-speaking woman w/ Myasthenia Gravis (on pyridostigmine, hospitalization every 1-2 years requiring IVIG) (dx 2015), s/p thymectomy (2010), T2DM who presents with SOB, cough, difficulty spitting up sputum x 4 days.  was sick with COVID, so son performed home COVID test for patient which was positive. Patient's primary complaint is SOB when lying down. She has been sleeping upright. She had difficulty coughing up and spitting out sputum but today these symptoms are slightly improved. Also reports difficulty swallowing - but was able to tolerate taking oral pills. Admits to difficulty looking up, but eye drooping is overall improved from past couple of months. No blurry vision. Patient takes pyridostigmine 120mg TID regularly. Patient also has prednisone PRN for sick days-- she has been taking 20mg prednisone x past 4 days as per instruction of her neurologist.     Of note, last hospitalization was 2 years ago at [facility] during which patient was intubated -- son notes that this was likely 2/2 patient being forced to lie down flat in hospital bed, leading to aspiration. She was extubated after ~3 days. Received IVIG during that hospitalization  1/30  Adm NSCU, upon arrival to the unit, appeared in acute respiratory distress tachypneic, hypertensive, tachycardic, hypoxic, ABG showing resp acidosis, immediately put on BiPAP, started on IVIG with some improvement     1/31 intubated overnight for hypercapnic/hypoxic respiratory failure     2/2 started PLEX     DAYTIME EVENTS: remains intubated, thick secretion     REVIEW OF SYSTEMS: [x] Unable to Assess due to neurologic exam intubated now but able to nod to y/n questions, minimal pain c/o   [ ] All ROS addressed below are non-contributory, except:  Neuro: [ ] Headache [ ] Back pain [ ] Numbness [ ] Weakness [ ] Ataxia [ ] Dizziness [ ] Aphasia [ ] Dysarthria [ ] Visual disturbance  Resp: [ x] Shortness of breath/dyspnea [ ] Orthopnea [ ] Cough  CV: [ ] Chest pain [ ] Palpitation [ ] Lightheadedness [ ] Syncope  Renal: [ ] Thirst [ ] Edema  GI: [ ] Nausea [ ] Emesis [ ] Abdominal pain [ ] Constipation [ ] Diarrhea  Hem: [ ] Hematemesis [ ] bBright red blood per rectum  ID: [ ] Fever [ ] Chills [ ] Dysuria  ENT: [ ] Rhinorrhea    VITALS: [x] Reviewed    IMAGING/DATA: [x] Reviewed    IVF FLUIDS/MEDICATIONS: [x] Reviewed    ALLERGIES: Allergies    No Known Allergies    Intolerances      DEVICES:   [] Restraints [x] PIVs [x] ET tube [x] central line R IJ Shiley [] PICC [x] arterial line [] morales [] NGT/OGT [] EVD [] LD [] MYAH/HMV [] LiCOX [] ICP monitor [] Trach [] PEG [] Chest Tube [] other:    EXAMINATION:  General: NAD  HEENT: Anicteric sclerae  Cardiac: [id number] tachycardic  Lungs: diminished   Abdomen: Soft, non-tender, +BS  Neurologic: intubated but able to nod appropriately, oriented x3 with choices, follows commands, PERRL, EOMI, cough strongly to command, able to take deep breaths to commands, BUE no drift 5/5 pain limited (shoulder pain at baseline), BLE 5/5

## 2023-02-07 NOTE — PROGRESS NOTE ADULT - SUBJECTIVE AND OBJECTIVE BOX
SUMMARY:   62-year-old woman with DMII, myasthenia gravis status post thymectomy and prior crises status post IVIG developed MG crisis in setting of COVID-19 infection requiring intubation status post IVIG x 1 and PLEX.     24 HOUR EVENTS:  IPV, nebs q6hrs, PEEP increased to 10    2/6- No overnight events.     2/7- Patient difficult to wean off the ventilator, requiring quite a lot of PEEP, currently on PEEP of 7. Otherwise exam improving. S/p 3 doses of PLEX.    VITALS/DATA/ORDERS: [x] Reviewed    EXAMINATION:   Intubated, nods appropriately, +cough/gag, no ptosis, deltoids 4-/5, neck flexion/extension 4+/5, otherwise full strength   Pulmonary: Decreased breath sounds at bases  Cardiac: Normal S1/S2  Abd: Soft, non tender, + BS SUMMARY:   62-year-old woman with DMII, myasthenia gravis status post thymectomy and prior crises status post IVIG developed MG crisis in setting of COVID-19 infection requiring intubation status post IVIG x 1 and PLEX.     24 HOUR EVENTS:  IPV, nebs q6hrs, PEEP increased to 10    2/6- No overnight events.     2/7- Patient difficult to wean off the ventilator, requiring quite a lot of PEEP, currently on PEEP of 7. Otherwise exam improving. S/p 3 doses of PLEX.    VITALS/DATA/ORDERS: [x] Reviewed    EXAMINATION:   Intubated, nods appropriately, +cough/gag, no ptosis, deltoids 4-/5, neck flexion 4+/5 extension 5/5, otherwise full strength   Pulmonary: Decreased breath sounds at bases  Cardiac: Normal S1/S2  Abd: Soft, non tender, + BS SUMMARY:   62-year-old woman with DMII, myasthenia gravis status post thymectomy and prior crises status post IVIG developed MG crisis in setting of COVID-19 infection requiring intubation status post IVIG x 1 and PLEX.     24 HOUR EVENTS:  IPV, nebs q6hrs, PEEP increased to 10    2/6- No overnight events.     2/7- Patient difficult to wean off the ventilator, requiring quite a lot of PEEP, currently on PEEP of 7. Otherwise exam improving. S/p 3 doses of PLEX.    VITALS/DATA/ORDERS: [x] Reviewed    EXAMINATION:   Intubated, nods appropriately, +cough/gag, no ptosis, deltoids 4-/5, neck flexion 4+/5 extension 5/5, otherwise full strength   Pulmonary: Decreased breath sounds at bases  Cardiac: Normal S1/S2  Abd: Soft, non tender, + BS  Extremities: pulses strong, no edema

## 2023-02-08 LAB
ANION GAP SERPL CALC-SCNC: 8 MMOL/L — SIGNIFICANT CHANGE UP (ref 5–17)
BUN SERPL-MCNC: 34 MG/DL — HIGH (ref 7–23)
CALCIUM SERPL-MCNC: 8 MG/DL — LOW (ref 8.4–10.5)
CHLORIDE SERPL-SCNC: 105 MMOL/L — SIGNIFICANT CHANGE UP (ref 96–108)
CO2 SERPL-SCNC: 30 MMOL/L — SIGNIFICANT CHANGE UP (ref 22–31)
CREAT SERPL-MCNC: 0.47 MG/DL — LOW (ref 0.5–1.3)
EGFR: 107 ML/MIN/1.73M2 — SIGNIFICANT CHANGE UP
FIBRINOGEN PPP-MCNC: 349 MG/DL — SIGNIFICANT CHANGE UP (ref 200–445)
GAS PNL BLDA: SIGNIFICANT CHANGE UP
GLUCOSE BLDC GLUCOMTR-MCNC: 101 MG/DL — HIGH (ref 70–99)
GLUCOSE BLDC GLUCOMTR-MCNC: 162 MG/DL — HIGH (ref 70–99)
GLUCOSE BLDC GLUCOMTR-MCNC: 170 MG/DL — HIGH (ref 70–99)
GLUCOSE BLDC GLUCOMTR-MCNC: 98 MG/DL — SIGNIFICANT CHANGE UP (ref 70–99)
GLUCOSE SERPL-MCNC: 170 MG/DL — HIGH (ref 70–99)
GRAM STN FLD: SIGNIFICANT CHANGE UP
HCT VFR BLD CALC: 30 % — LOW (ref 34.5–45)
HGB BLD-MCNC: 10 G/DL — LOW (ref 11.5–15.5)
MAGNESIUM SERPL-MCNC: 2.3 MG/DL — SIGNIFICANT CHANGE UP (ref 1.6–2.6)
MCHC RBC-ENTMCNC: 29.2 PG — SIGNIFICANT CHANGE UP (ref 27–34)
MCHC RBC-ENTMCNC: 33.3 GM/DL — SIGNIFICANT CHANGE UP (ref 32–36)
MCV RBC AUTO: 87.5 FL — SIGNIFICANT CHANGE UP (ref 80–100)
NRBC # BLD: 0 /100 WBCS — SIGNIFICANT CHANGE UP (ref 0–0)
PHOSPHATE SERPL-MCNC: 3.7 MG/DL — SIGNIFICANT CHANGE UP (ref 2.5–4.5)
PLATELET # BLD AUTO: 229 K/UL — SIGNIFICANT CHANGE UP (ref 150–400)
POTASSIUM SERPL-MCNC: 4 MMOL/L — SIGNIFICANT CHANGE UP (ref 3.5–5.3)
POTASSIUM SERPL-SCNC: 4 MMOL/L — SIGNIFICANT CHANGE UP (ref 3.5–5.3)
RBC # BLD: 3.43 M/UL — LOW (ref 3.8–5.2)
RBC # FLD: 13.7 % — SIGNIFICANT CHANGE UP (ref 10.3–14.5)
SODIUM SERPL-SCNC: 143 MMOL/L — SIGNIFICANT CHANGE UP (ref 135–145)
SPECIMEN SOURCE: SIGNIFICANT CHANGE UP
WBC # BLD: 12.86 K/UL — HIGH (ref 3.8–10.5)
WBC # FLD AUTO: 12.86 K/UL — HIGH (ref 3.8–10.5)

## 2023-02-08 PROCEDURE — 36514 APHERESIS PLASMA: CPT

## 2023-02-08 PROCEDURE — 99291 CRITICAL CARE FIRST HOUR: CPT

## 2023-02-08 PROCEDURE — 71045 X-RAY EXAM CHEST 1 VIEW: CPT | Mod: 26

## 2023-02-08 RX ADMIN — OXYCODONE HYDROCHLORIDE 5 MILLIGRAM(S): 5 TABLET ORAL at 21:40

## 2023-02-08 RX ADMIN — Medication 3 MILLILITER(S): at 17:24

## 2023-02-08 RX ADMIN — Medication 2: at 17:47

## 2023-02-08 RX ADMIN — FENTANYL CITRATE 50 MICROGRAM(S): 50 INJECTION INTRAVENOUS at 17:11

## 2023-02-08 RX ADMIN — HUMAN INSULIN 8 UNIT(S): 100 INJECTION, SUSPENSION SUBCUTANEOUS at 17:48

## 2023-02-08 RX ADMIN — FAMOTIDINE 20 MILLIGRAM(S): 10 INJECTION INTRAVENOUS at 17:47

## 2023-02-08 RX ADMIN — SODIUM CHLORIDE 4 MILLILITER(S): 9 INJECTION INTRAMUSCULAR; INTRAVENOUS; SUBCUTANEOUS at 11:58

## 2023-02-08 RX ADMIN — SODIUM CHLORIDE 4 MILLILITER(S): 9 INJECTION INTRAMUSCULAR; INTRAVENOUS; SUBCUTANEOUS at 17:23

## 2023-02-08 RX ADMIN — OXYCODONE HYDROCHLORIDE 5 MILLIGRAM(S): 5 TABLET ORAL at 17:57

## 2023-02-08 RX ADMIN — ENOXAPARIN SODIUM 40 MILLIGRAM(S): 100 INJECTION SUBCUTANEOUS at 17:47

## 2023-02-08 RX ADMIN — Medication 3 MILLILITER(S): at 06:17

## 2023-02-08 RX ADMIN — CHLORHEXIDINE GLUCONATE 15 MILLILITER(S): 213 SOLUTION TOPICAL at 06:23

## 2023-02-08 RX ADMIN — FAMOTIDINE 20 MILLIGRAM(S): 10 INJECTION INTRAVENOUS at 06:23

## 2023-02-08 RX ADMIN — FENTANYL CITRATE 50 MICROGRAM(S): 50 INJECTION INTRAVENOUS at 17:57

## 2023-02-08 RX ADMIN — OXYCODONE HYDROCHLORIDE 5 MILLIGRAM(S): 5 TABLET ORAL at 06:24

## 2023-02-08 RX ADMIN — SODIUM CHLORIDE 4 MILLILITER(S): 9 INJECTION INTRAMUSCULAR; INTRAVENOUS; SUBCUTANEOUS at 00:23

## 2023-02-08 RX ADMIN — FENTANYL CITRATE 50 MICROGRAM(S): 50 INJECTION INTRAVENOUS at 01:15

## 2023-02-08 RX ADMIN — Medication 125 MILLIGRAM(S): at 06:23

## 2023-02-08 RX ADMIN — SODIUM CHLORIDE 4 MILLILITER(S): 9 INJECTION INTRAMUSCULAR; INTRAVENOUS; SUBCUTANEOUS at 06:17

## 2023-02-08 RX ADMIN — FENTANYL CITRATE 50 MICROGRAM(S): 50 INJECTION INTRAVENOUS at 01:00

## 2023-02-08 RX ADMIN — Medication 3 MILLILITER(S): at 11:57

## 2023-02-08 RX ADMIN — Medication 3 MILLILITER(S): at 00:22

## 2023-02-08 RX ADMIN — OXYCODONE HYDROCHLORIDE 5 MILLIGRAM(S): 5 TABLET ORAL at 14:40

## 2023-02-08 RX ADMIN — POLYETHYLENE GLYCOL 3350 17 GRAM(S): 17 POWDER, FOR SOLUTION ORAL at 11:38

## 2023-02-08 RX ADMIN — OXYCODONE HYDROCHLORIDE 5 MILLIGRAM(S): 5 TABLET ORAL at 21:07

## 2023-02-08 RX ADMIN — CHLORHEXIDINE GLUCONATE 15 MILLILITER(S): 213 SOLUTION TOPICAL at 17:47

## 2023-02-08 RX ADMIN — OXYCODONE HYDROCHLORIDE 5 MILLIGRAM(S): 5 TABLET ORAL at 06:54

## 2023-02-08 RX ADMIN — CHLORHEXIDINE GLUCONATE 1 APPLICATION(S): 213 SOLUTION TOPICAL at 06:24

## 2023-02-08 RX ADMIN — SENNA PLUS 2 TABLET(S): 8.6 TABLET ORAL at 21:07

## 2023-02-08 NOTE — PROGRESS NOTE ADULT - ASSESSMENT
ASSESSMENT/PLAN: MG exacerbation, covid +    NEURO:   IVIG 25g (1/31--2/2), PLEX every other day 2/2--  solumedrol 125 mg daily (1/31--2/10)  Avoid medications that may worsen the current exacerbation including macrolides, fluoroquinolones, tetracyclines, aminoglycoside, beta-blockers, magnesium, and anti-arrhythmics (procainamide, quinidine, and lidocaine)  Pain management with fentanyl pushes    PULM:  IPPV as she tolerates   Will obtain repeat ABG  Intubated for airway protection, will wean as tolerated    CV:  MAP >65  TTE- 35-40%    RENAL:  Fluids: IVL in setting of poor EF  IO: replete as needed    GI:  Diet: TF at goal  GI prophylaxis [] not indicated [x] famotidine [] other:   LBM- 2/4    ENDO:   A1c 6.6  Goal euglycemia (-180)  RISS for now and NPH, adjust as needed    HEME/ONC:  H/H stable   VTE prophylaxis: [x] SCDs [x] chemoprophylaxis lovenox [] hold chemoprophylaxis due to: [] high risk of DVT/PE on admission due to:    ID: covid + status post remdesivir  ELEVATED leukocytes likely in setting of steroids, afebrile  monitor for fevers   ID on board, low threshold to start antibiotics if spikes fever    CODE STATUS:  [x] Full Code [] DNR [] DNI [] Palliative/Comfort Care    DISPOSITION:  [x] ICU [] Stroke Unit [] Floor [] EMU [] RCU [] PCU    At risk of death/neuro decline due to: acute resp failure requiring intubation    Contact: [phone] ASSESSMENT/PLAN: MG exacerbation, covid +    NEURO:   IVIG 25g (1/31--2/2), PLEX every other day 2/2--  solumedrol 125 mg daily (1/31--2/10)  Avoid medications that may worsen the current exacerbation including macrolides, fluoroquinolones, tetracyclines, aminoglycoside, beta-blockers, magnesium, and anti-arrhythmics (procainamide, quinidine, and lidocaine)  Pain management with fentanyl pushes    PULM:  IPPV as she tolerates   Will obtain repeat ABG  Intubated for airway protection, will wean as tolerated    CV:  MAP >65  TTE- 35-40%    RENAL:  Fluids: IVL in setting of poor EF  IO: replete as needed    GI:  Diet: TF at goal  GI prophylaxis [] not indicated [x] famotidine [] other:   LBM- 2/4    ENDO:   A1c 6.6  Goal euglycemia (-180)  RISS for now and NPH, adjust as needed    HEME/ONC:  H/H stable   VTE prophylaxis: [x] SCDs [x] chemoprophylaxis lovenox [] hold chemoprophylaxis due to: [] high risk of DVT/PE on admission due to:    ID: covid + status post remdesivir  ELEVATED leukocytes likely in setting of steroids, afebrile  monitor for fevers   ID on board, low threshold to start antibiotics if spikes fever    CODE STATUS:  [x] Full Code [] DNR [] DNI [] Palliative/Comfort Care    DISPOSITION:  [x] ICU [] Stroke Unit [] Floor [] EMU [] RCU [] PCU    At risk of death/neuro decline due to: acute resp failure requiring intubation    Contact: [phone] ASSESSMENT/PLAN: MG exacerbation, covid +    NEURO:   IVIG 25g (1/31--2/2), PLEX every other day 2/2--  solumedrol 125 mg daily (1/31--2/10)  Avoid medications that may worsen the current exacerbation including macrolides, fluoroquinolones, tetracyclines, aminoglycoside, beta-blockers, magnesium, and anti-arrhythmics (procainamide, quinidine, and lidocaine)  Pain management with fentanyl pushes    PULM:  IPPV as she tolerates   Will obtain repeat ABG  Intubated for airway protection, will wean as tolerated    CV:  MAP >65  TTE- 35-40%    RENAL:  Fluids: IVL in setting of poor EF  IO: replete as needed    GI:  Diet: TF at goal  GI prophylaxis [] not indicated [x] famotidine [] other:   LBM- 2/4    ENDO:   A1c 6.6  Goal euglycemia (-180)  RISS for now and NPH, adjust as needed    HEME/ONC:  H/H stable   VTE prophylaxis: [x] SCDs [x] chemoprophylaxis lovenox [] hold chemoprophylaxis due to: [] high risk of DVT/PE on admission due to:    ID: covid + status post remdesivir  ELEVATED leukocytes likely in setting of steroids, afebrile  monitor for fevers   ID on board, low threshold to start antibiotics if spikes fever    CODE STATUS:  [x] Full Code [] DNR [] DNI [] Palliative/Comfort Care    DISPOSITION:  [x] ICU [] Stroke Unit [] Floor [] EMU [] RCU [] PCU    At risk of death/neuro decline due to: acute resp failure requiring intubation    Contact: [phone] ASSESSMENT/PLAN: MG exacerbation, covid +    NEURO:   IVIG 25g (1/31--2/2), PLEX every other day 2/2--  solumedrol 125 mg daily (1/31--2/10)  Avoid medications that may worsen the current exacerbation including macrolides, fluoroquinolones, tetracyclines, aminoglycoside, beta-blockers, magnesium, and anti-arrhythmics (procainamide, quinidine, and lidocaine)  Pain management with fentanyl pushes    PULM:  CPAP  NIF/VC/RSBI - will calculate and attempt to extubate to BIPAP.  Will obtain repeat ABG  Intubated for airway protection, will wean as tolerated    CV:  MAP >65  TTE- 35-40%    RENAL:  Fluids: IVL in setting of poor EF  IO: replete as needed    GI:  Diet: NPO for ext  GI prophylaxis [] not indicated [x] famotidine [] other:   LBM- 2/8    ENDO:   A1c 6.6  Goal euglycemia (-180)  RISS for now and NPH, adjust as needed    HEME/ONC:  H/H stable   VTE prophylaxis: [x] SCDs [x] chemoprophylaxis lovenox [] hold chemoprophylaxis due to: [] high risk of DVT/PE on admission due to:    ID: covid + status post remdesivir  leukocytes trending down, UA positive, obtain UC  Will start ceftriaxone 2/8--    CODE STATUS:  [x] Full Code [] DNR [] DNI [] Palliative/Comfort Care    DISPOSITION:  [x] ICU [] Stroke Unit [] Floor [] EMU [] RCU [] PCU    At risk of death/neuro decline due to: acute resp failure requiring intubation    Contact: [phone] ASSESSMENT/PLAN: MG exacerbation, covid +    NEURO:   IVIG 25g (1/31--2/2), PLEX every other day 2/2--  solumedrol 125 mg daily (1/31--2/10)  Avoid medications that may worsen the current exacerbation including macrolides, fluoroquinolones, tetracyclines, aminoglycoside, beta-blockers, magnesium, and anti-arrhythmics (procainamide, quinidine, and lidocaine)  Pain management with fentanyl pushes    PULM:  CPAP  NIF/VC/RSBI - will calculate and attempt to extubate to BIPAP.  Will obtain repeat ABG  Intubated for airway protection, will wean as tolerated    CV:  MAP >65  TTE- 35-40%    RENAL:  Fluids: IVL in setting of poor EF  IO: replete as needed    GI:  Diet: NPO for ext  GI prophylaxis [] not indicated [x] famotidine [] other:   LBM- 2/8    ENDO:   A1c 6.6  Goal euglycemia (-180)  RISS for now and NPH, adjust as needed    HEME/ONC:  H/H stable   VTE prophylaxis: [x] SCDs [x] chemoprophylaxis lovenox [] hold chemoprophylaxis due to: [] high risk of DVT/PE on admission due to:    ID: covid + status post remdesivir  leukocytes trending down, UA positive, obtain UC  Will start ceftriaxone 2/8--    CODE STATUS:  [x] Full Code [] DNR [] DNI [] Palliative/Comfort Care    DISPOSITION:  [x] ICU [] Stroke Unit [] Floor [] EMU [] RCU [] PCU    At risk of death/neuro decline due to: acute resp failure requiring intubation    Contact: [phone] ASSESSMENT/PLAN: MG exacerbation, covid +    NEURO:   IVIG 25g (1/31--2/2), PLEX every other day 2/2--  solumedrol 125 mg daily (1/31--2/10)  Avoid medications that may worsen the current exacerbation including macrolides, fluoroquinolones, tetracyclines, aminoglycoside, beta-blockers, magnesium, and anti-arrhythmics (procainamide, quinidine, and lidocaine)  Pain management with fentanyl pushes    PULM:  CPAP  NIF/VC/RSBI - will calculate and attempt to extubate to BIPAP.  Will obtain repeat ABG  Intubated for airway protection, will wean as tolerated    CV:  MAP >65  TTE- 35-40%    RENAL:  Fluids: IVL in setting of poor EF  IO: replete as needed    GI:  Diet: NPO for ext  GI prophylaxis [] not indicated [x] famotidine [] other:   LBM- 2/8    ENDO:   A1c 6.6  Goal euglycemia (-180)  RISS for now and NPH, adjust as needed    HEME/ONC:  H/H stable   VTE prophylaxis: [x] SCDs [x] chemoprophylaxis lovenox [] hold chemoprophylaxis due to: [] high risk of DVT/PE on admission due to:    ID: covid + status post remdesivir  leukocytes trending down, UA positive, obtain UC  Will start ceftriaxone 2/8--    CODE STATUS:  [x] Full Code [] DNR [] DNI [] Palliative/Comfort Care    DISPOSITION:  [x] ICU [] Stroke Unit [] Floor [] EMU [] RCU [] PCU    At risk of death/neuro decline due to: acute resp failure requiring intubation    Contact: [phone]

## 2023-02-08 NOTE — PROGRESS NOTE ADULT - SUBJECTIVE AND OBJECTIVE BOX
Interval events:    VITALS:  T(C): , Max: 37.1 (02-08-23 @ 11:00)  HR:  (53 - 113)  BP: --  ABP:  (102/42 - 168/80)  RR:  (11 - 25)  SpO2:  (91% - 100%)  Wt(kg): --  Device: Avea, Mode: AC/ CMV (Assist Control/ Continuous Mandatory Ventilation), RR (machine): 16, TV (machine): 300, FiO2: 30, PEEP: 5, ITime: 1, MAP: 11, PIP: 20    02-07-23 @ 07:01  -  02-08-23 @ 07:00  --------------------------------------------------------  IN: 540 mL / OUT: 450 mL / NET: 90 mL    02-08-23 @ 07:01  -  02-08-23 @ 19:04  --------------------------------------------------------  IN: 180 mL / OUT: 650 mL / NET: -470 mL      LABS:  Na: 143 (02-07 @ 22:06), 146 (02-06 @ 23:32), 144 (02-05 @ 21:46)  K: 4.2 (02-07 @ 22:06), 4.3 (02-06 @ 23:32), 4.2 (02-05 @ 21:46)  Cl: 108 (02-07 @ 22:06), 108 (02-06 @ 23:32), 110 (02-05 @ 21:46)  CO2: 28 (02-07 @ 22:06), 26 (02-06 @ 23:32), 28 (02-05 @ 21:46)  BUN: 38 (02-07 @ 22:06), 41 (02-06 @ 23:32), 36 (02-05 @ 21:46)  Cr: 0.42 (02-07 @ 22:06), 0.49 (02-06 @ 23:32), 0.42 (02-05 @ 21:46)  Glu: 176(02-07 @ 22:06), 161(02-06 @ 23:32), 158(02-05 @ 21:46)    Hgb: 9.7 (02-07 @ 22:06), 11.0 (02-06 @ 23:32), 10.7 (02-06 @ 06:02), 10.6 (02-05 @ 21:47)  Hct: 29.9 (02-07 @ 22:06), 33.8 (02-06 @ 23:32), 32.8 (02-06 @ 06:02), 32.6 (02-05 @ 21:47)  WBC: 13.71 (02-07 @ 22:06), 20.14 (02-06 @ 23:32), 15.33 (02-06 @ 06:02), 13.64 (02-05 @ 21:47)  Plt: 231 (02-07 @ 22:06), 263 (02-06 @ 23:32), 226 (02-06 @ 06:02), 229 (02-05 @ 21:47)    MEDICATIONS:  acetaminophen    Suspension .. 650 milliGRAM(s) Oral every 6 hours PRN  albuterol/ipratropium for Nebulization 3 milliLiter(s) Nebulizer every 6 hours  chlorhexidine 0.12% Liquid 15 milliLiter(s) Oral Mucosa every 12 hours  chlorhexidine 4% Liquid 1 Application(s) Topical <User Schedule>  enoxaparin Injectable 40 milliGRAM(s) SubCutaneous <User Schedule>  famotidine    Tablet 20 milliGRAM(s) Oral two times a day  fentaNYL    Injectable 50 MICROGram(s) IV Push every 2 hours PRN  insulin lispro (ADMELOG) corrective regimen sliding scale   SubCutaneous every 6 hours  insulin NPH human recombinant 8 Unit(s) SubCutaneous every 6 hours  methylPREDNISolone sodium succinate Injectable 125 milliGRAM(s) IV Push daily  oxyCODONE    IR 5 milliGRAM(s) Oral every 6 hours PRN  polyethylene glycol 3350 17 Gram(s) Oral daily  senna 2 Tablet(s) Oral at bedtime  sodium chloride 3%  Inhalation 4 milliLiter(s) Inhalation every 6 hours    EXAMINATION:  General:  in NAD  HEENT:  MMM  Neuro:  awake, alert, oriented x 3, follows commands, EOMI, face symmetric, no PD, DF 5/5   Cards:  RRR  Respiratory:  no respiratory distress  Abdomen:  soft  Extremities:  no LE edema    Assessment/Plan:   61 yo woman with DM and Myasthenia gravis s/p thymectomy 2010, with prior exacerbations requiring intubation (last 2021), now admitted 1/30 with SOB, weak cough, dysphagia and double vision, found to be COVID positive, concerning for MG exacerbation. Started on IVIG due to refusal of PLEX, s/p 3 doses. Initially on Bipap but intubated 2/1 for worsening respiratory failure.  Now receiving PLEX s/p 4/5 sessions.   TTE with EF 35-40%, mild-moderate MR.     No change to plan from daytime.  senna and miralax, LBM 2/3  d/c plasmalyte   NPH 10U q6h, ISS (HgA1C 6.6)  remdesevir, last day today   Lov ppx, LE US neg 1/31    Марина Doss  Neurocritical Care Attending     Interval events:    VITALS:  T(C): , Max: 37.1 (02-08-23 @ 11:00)  HR:  (53 - 113)  BP: --  ABP:  (102/42 - 168/80)  RR:  (11 - 25)  SpO2:  (91% - 100%)  Wt(kg): --  Device: Avea, Mode: AC/ CMV (Assist Control/ Continuous Mandatory Ventilation), RR (machine): 16, TV (machine): 300, FiO2: 30, PEEP: 5, ITime: 1, MAP: 11, PIP: 20    02-07-23 @ 07:01  -  02-08-23 @ 07:00  --------------------------------------------------------  IN: 540 mL / OUT: 450 mL / NET: 90 mL    02-08-23 @ 07:01  -  02-08-23 @ 19:04  --------------------------------------------------------  IN: 180 mL / OUT: 650 mL / NET: -470 mL      LABS:  Na: 143 (02-07 @ 22:06), 146 (02-06 @ 23:32), 144 (02-05 @ 21:46)  K: 4.2 (02-07 @ 22:06), 4.3 (02-06 @ 23:32), 4.2 (02-05 @ 21:46)  Cl: 108 (02-07 @ 22:06), 108 (02-06 @ 23:32), 110 (02-05 @ 21:46)  CO2: 28 (02-07 @ 22:06), 26 (02-06 @ 23:32), 28 (02-05 @ 21:46)  BUN: 38 (02-07 @ 22:06), 41 (02-06 @ 23:32), 36 (02-05 @ 21:46)  Cr: 0.42 (02-07 @ 22:06), 0.49 (02-06 @ 23:32), 0.42 (02-05 @ 21:46)  Glu: 176(02-07 @ 22:06), 161(02-06 @ 23:32), 158(02-05 @ 21:46)    Hgb: 9.7 (02-07 @ 22:06), 11.0 (02-06 @ 23:32), 10.7 (02-06 @ 06:02), 10.6 (02-05 @ 21:47)  Hct: 29.9 (02-07 @ 22:06), 33.8 (02-06 @ 23:32), 32.8 (02-06 @ 06:02), 32.6 (02-05 @ 21:47)  WBC: 13.71 (02-07 @ 22:06), 20.14 (02-06 @ 23:32), 15.33 (02-06 @ 06:02), 13.64 (02-05 @ 21:47)  Plt: 231 (02-07 @ 22:06), 263 (02-06 @ 23:32), 226 (02-06 @ 06:02), 229 (02-05 @ 21:47)    MEDICATIONS:  acetaminophen    Suspension .. 650 milliGRAM(s) Oral every 6 hours PRN  albuterol/ipratropium for Nebulization 3 milliLiter(s) Nebulizer every 6 hours  chlorhexidine 0.12% Liquid 15 milliLiter(s) Oral Mucosa every 12 hours  chlorhexidine 4% Liquid 1 Application(s) Topical <User Schedule>  enoxaparin Injectable 40 milliGRAM(s) SubCutaneous <User Schedule>  famotidine    Tablet 20 milliGRAM(s) Oral two times a day  fentaNYL    Injectable 50 MICROGram(s) IV Push every 2 hours PRN  insulin lispro (ADMELOG) corrective regimen sliding scale   SubCutaneous every 6 hours  insulin NPH human recombinant 8 Unit(s) SubCutaneous every 6 hours  methylPREDNISolone sodium succinate Injectable 125 milliGRAM(s) IV Push daily  oxyCODONE    IR 5 milliGRAM(s) Oral every 6 hours PRN  polyethylene glycol 3350 17 Gram(s) Oral daily  senna 2 Tablet(s) Oral at bedtime  sodium chloride 3%  Inhalation 4 milliLiter(s) Inhalation every 6 hours    EXAMINATION:  General:  in NAD  HEENT:  MMM  Neuro:  awake, alert, oriented x 3, follows commands, EOMI, face symmetric, no PD, DF 5/5   Cards:  RRR  Respiratory:  no respiratory distress  Abdomen:  soft  Extremities:  no LE edema    Assessment/Plan:   63 yo woman with DM and Myasthenia gravis s/p thymectomy 2010, with prior exacerbations requiring intubation (last 2021), now admitted 1/30 with SOB, weak cough, dysphagia and double vision, found to be COVID positive, concerning for MG exacerbation. Started on IVIG due to refusal of PLEX, s/p 3 doses. Initially on Bipap but intubated 2/1 for worsening respiratory failure.  Now receiving PLEX s/p 4/5 sessions.   TTE with EF 35-40%, mild-moderate MR.     No change to plan from daytime.  senna and miralax, LBM 2/3  d/c plasmalyte   NPH 10U q6h, ISS (HgA1C 6.6)  remdesevir, last day today   Lov ppx, LE US neg 1/31    Марина Doss  Neurocritical Care Attending     Interval events:  S/p PLEX session 4 today.   No acute events on evening rounds.   On CPAP 10/5 throughout the day.     VITALS:  T(C): , Max: 37.1 (02-08-23 @ 11:00)  HR:  (53 - 113)  BP: --  ABP:  (102/42 - 168/80)  RR:  (11 - 25)  SpO2:  (91% - 100%)  Wt(kg): --  Device: Avea, Mode: AC/ CMV (Assist Control/ Continuous Mandatory Ventilation), RR (machine): 16, TV (machine): 300, FiO2: 30, PEEP: 5, ITime: 1, MAP: 11, PIP: 20    02-07-23 @ 07:01  -  02-08-23 @ 07:00  --------------------------------------------------------  IN: 540 mL / OUT: 450 mL / NET: 90 mL    02-08-23 @ 07:01  -  02-08-23 @ 19:04  --------------------------------------------------------  IN: 180 mL / OUT: 650 mL / NET: -470 mL      LABS:  Na: 143 (02-07 @ 22:06), 146 (02-06 @ 23:32), 144 (02-05 @ 21:46)  K: 4.2 (02-07 @ 22:06), 4.3 (02-06 @ 23:32), 4.2 (02-05 @ 21:46)  Cl: 108 (02-07 @ 22:06), 108 (02-06 @ 23:32), 110 (02-05 @ 21:46)  CO2: 28 (02-07 @ 22:06), 26 (02-06 @ 23:32), 28 (02-05 @ 21:46)  BUN: 38 (02-07 @ 22:06), 41 (02-06 @ 23:32), 36 (02-05 @ 21:46)  Cr: 0.42 (02-07 @ 22:06), 0.49 (02-06 @ 23:32), 0.42 (02-05 @ 21:46)  Glu: 176(02-07 @ 22:06), 161(02-06 @ 23:32), 158(02-05 @ 21:46)    Hgb: 9.7 (02-07 @ 22:06), 11.0 (02-06 @ 23:32), 10.7 (02-06 @ 06:02), 10.6 (02-05 @ 21:47)  Hct: 29.9 (02-07 @ 22:06), 33.8 (02-06 @ 23:32), 32.8 (02-06 @ 06:02), 32.6 (02-05 @ 21:47)  WBC: 13.71 (02-07 @ 22:06), 20.14 (02-06 @ 23:32), 15.33 (02-06 @ 06:02), 13.64 (02-05 @ 21:47)  Plt: 231 (02-07 @ 22:06), 263 (02-06 @ 23:32), 226 (02-06 @ 06:02), 229 (02-05 @ 21:47)    MEDICATIONS:  acetaminophen    Suspension .. 650 milliGRAM(s) Oral every 6 hours PRN  albuterol/ipratropium for Nebulization 3 milliLiter(s) Nebulizer every 6 hours  chlorhexidine 0.12% Liquid 15 milliLiter(s) Oral Mucosa every 12 hours  chlorhexidine 4% Liquid 1 Application(s) Topical <User Schedule>  enoxaparin Injectable 40 milliGRAM(s) SubCutaneous <User Schedule>  famotidine    Tablet 20 milliGRAM(s) Oral two times a day  fentaNYL    Injectable 50 MICROGram(s) IV Push every 2 hours PRN  insulin lispro (ADMELOG) corrective regimen sliding scale   SubCutaneous every 6 hours  insulin NPH human recombinant 8 Unit(s) SubCutaneous every 6 hours  methylPREDNISolone sodium succinate Injectable 125 milliGRAM(s) IV Push daily  oxyCODONE    IR 5 milliGRAM(s) Oral every 6 hours PRN  polyethylene glycol 3350 17 Gram(s) Oral daily  senna 2 Tablet(s) Oral at bedtime  sodium chloride 3%  Inhalation 4 milliLiter(s) Inhalation every 6 hours    EXAMINATION:  Please see exam from daytime.     Assessment/Plan:   61 yo woman with DM and Myasthenia gravis s/p thymectomy 2010, with prior exacerbations requiring intubation (last 2021), now admitted 1/30 with SOB, weak cough, dysphagia and double vision, found to be COVID positive, concerning for MG exacerbation. Started on IVIG due to refusal of PLEX, s/p 3 doses. Initially on Bipap but intubated 2/1 for worsening respiratory failure.  Now receiving PLEX s/p 4/5 sessions.   TTE with EF 35-40%, mild-moderate MR.     No change to plan from daytime.  senna and miralax, LBM 2/7  TF at goal   NPH 8U q6h, ISS (HgA1C 6.6)  Lov ppx, LE US neg 1/31    Марина Doss  Neurocritical Care Attending

## 2023-02-08 NOTE — CHART NOTE - NSCHARTNOTEFT_GEN_A_CORE
The patient is a 62-year-old woman with DMII, myasthenia gravis status post thymectomy and prior crises status post IVIG who developed MG crisis in setting of COVID-19 infection requiring intubation. The patient received IVIG 25g (1/31--2/2) and  solumedrol 125 mg daily (1/31--2/10). PLEX was started on 2/2 with plan to perform 5 procedures over 10 days. The patient is a 62-year-old woman with DMII, myasthenia gravis status post thymectomy and prior crises status post IVIG who developed MG crisis in setting of COVID-19 infection requiring intubation. The patient received IVIG 25g (1/31--2/2) and  solumedrol 125 mg daily (1/31--2/10). PLEX was started on 2/2 with plan to perform 5 procedures over 10 days.    PLEX procedures on 2/2, 2/4 and 2/6 tolerated well.     PLEX#4 performed today 2/8/23. 1 PV with 5% albumin as replacement fluid. Procedure tolerated well. Patient remains intubated with NGT in place. Family members at bedside.     PLEX#5 scheduled for 2/10/23. Please monitor CBC, fibrinogen and ionized calcium the morning of each procedure.

## 2023-02-08 NOTE — PROGRESS NOTE ADULT - ASSESSMENT
65 yo female with a long history of Myasthenia Gravis , T2DM,,prior thymectomy who was admitted with respiratory difficulty and weakness.  She is maintained on pyridostigmine and PRN prednisone, this had been added recently for MG flare.  History of prior need for ventilator.  Home exposure to Covid, she tested positive on admission.  Suspect respiratory failure on MG basis.  She is s/p 5 days of RDV-completed 2/4  High dose steroids for MG  S/P IVIG and multiple PLEX sessions  Vent management per NSICU,  Leukocytosis likely steroid mediated, it is moderating  Surveillance blood cultures and sputum cultures sent 2/7  pyuria noted, nonspecific  Suggest:  1.Monitor off antibiotics  2.Secretions may pose threat to extubation.Await sputum culture, directed antibiotics may be indicated  3.Appears hemodynamically stable  4.Need for any respnse to pyuria from ID viewpoint is not obvious.  5.Supportive care per NSICU.VINAYAK AKINS management per neurology. 63 yo female with a long history of Myasthenia Gravis , T2DM,,prior thymectomy who was admitted with respiratory difficulty and weakness.  She is maintained on pyridostigmine and PRN prednisone, this had been added recently for MG flare.  History of prior need for ventilator.  Home exposure to Covid, she tested positive on admission.  Suspect respiratory failure on MG basis.  She is s/p 5 days of RDV-completed 2/4  High dose steroids for MG  S/P IVIG and multiple PLEX sessions  Vent management per NSICU,  Leukocytosis likely steroid mediated, it is moderating  Surveillance blood cultures and sputum cultures sent 2/7  pyuria noted, nonspecific  Suggest:  1.Monitor off antibiotics  2.Secretions may pose threat to extubation.Await sputum culture, directed antibiotics may be indicated  3.Appears hemodynamically stable  4.Need for any respnse to pyuria from ID viewpoint is not obvious.  5.Supportive care per NSICU.VINAYAK AKINS management per neurology.

## 2023-02-08 NOTE — PROGRESS NOTE ADULT - SUBJECTIVE AND OBJECTIVE BOX
SUMMARY:   62-year-old woman with DMII, myasthenia gravis status post thymectomy and prior crises status post IVIG developed MG crisis in setting of COVID-19 infection requiring intubation status post IVIG x 1 and PLEX.     24 HOUR EVENTS:  IPV, nebs q6hrs, PEEP increased to 10    2/6- No overnight events.     2/7- Patient difficult to wean off the ventilator, requiring quite a lot of PEEP, currently on PEEP of 7. Otherwise exam improving. S/p 3 doses of PLEX.    2/7- Remains intubated, CPAP this AM and tolerated it. Today is her 5th dose of PLEX. No changes in exam.    VITALS/DATA/ORDERS: [x] Reviewed    EXAMINATION:   Intubated, nods appropriately, +cough/gag, no ptosis, deltoids 4-/5, neck flexion 4+/5 extension 5/5, otherwise full strength   Pulmonary: Decreased breath sounds at bases  Cardiac: Normal S1/S2  Abd: Soft, non tender, + BS  Extremities: pulses strong, no edema SUMMARY:   62-year-old woman with DMII, myasthenia gravis status post thymectomy and prior crises status post IVIG developed MG crisis in setting of COVID-19 infection requiring intubation status post IVIG x 1 and PLEX.     24 HOUR EVENTS:  IPV, nebs q6hrs, PEEP increased to 10    2/6- No overnight events.     2/7- Patient difficult to wean off the ventilator, requiring quite a lot of PEEP, currently on PEEP of 7. Otherwise exam improving. S/p 3 doses of PLEX.    2/7- Remains intubated, CPAP this AM and tolerated it. Today is her 5th dose of PLEX. No changes in exam.    VITALS/DATA/ORDERS: [x] Reviewed    EXAMINATION:   Intubated, nods appropriately, +cough/gag, no ptosis, deltoids 4-/5, neck flexion 5/5 extension 5/5, otherwise full strength   Pulmonary: Decreased breath sounds at bases  Cardiac: Normal S1/S2  Abd: Soft, non tender, + BS  Extremities: pulses strong, no edema SUMMARY:   62-year-old woman with DMII, myasthenia gravis status post thymectomy and prior crises status post IVIG developed MG crisis in setting of COVID-19 infection requiring intubation status post IVIG x 1 and PLEX.     24 HOUR EVENTS:  IPV, nebs q6hrs, PEEP increased to 10    2/6- No overnight events.     2/7- Patient difficult to wean off the ventilator, requiring quite a lot of PEEP, currently on PEEP of 7. Otherwise exam improving. S/p 3 doses of PLEX.    2/7- Remains intubated, CPAP this AM and tolerated it. Today is her 5th dose of PLEX. No changes in exam.    VITALS/DATA/ORDERS: [x] Reviewed    EXAMINATION:   Intubated, nods appropriately aaoX3, +cough/gag, no ptosis, deltoids 4-/5, neck flexion 5/5 extension 5/5, otherwise full strength   Pulmonary: Decreased breath sounds at bases  Cardiac: Normal S1/S2  Abd: Soft, non tender, + BS  Extremities: pulses strong, no edema

## 2023-02-08 NOTE — PROGRESS NOTE ADULT - SUBJECTIVE AND OBJECTIVE BOX
CC: f/u for leukocytosis    Patient reports: she remains alert on ventilator    REVIEW OF SYSTEMS:  All other review of systems negative (Comprehensive ROS): limited, moderate ET secretions    Antimicrobials Day #  :off    Other Medications Reviewed  MEDICATIONS  (STANDING):  albuterol/ipratropium for Nebulization 3 milliLiter(s) Nebulizer every 6 hours  chlorhexidine 0.12% Liquid 15 milliLiter(s) Oral Mucosa every 12 hours  chlorhexidine 4% Liquid 1 Application(s) Topical <User Schedule>  enoxaparin Injectable 40 milliGRAM(s) SubCutaneous <User Schedule>  famotidine    Tablet 20 milliGRAM(s) Oral two times a day  insulin lispro (ADMELOG) corrective regimen sliding scale   SubCutaneous every 6 hours  insulin NPH human recombinant 8 Unit(s) SubCutaneous every 6 hours  methylPREDNISolone sodium succinate Injectable 125 milliGRAM(s) IV Push daily  polyethylene glycol 3350 17 Gram(s) Oral daily  senna 2 Tablet(s) Oral at bedtime  sodium chloride 3%  Inhalation 4 milliLiter(s) Inhalation every 6 hours    T(F): 98.1 (23 @ 03:00), Max: 98.8 (23 @ 19:00)  HR: 68 (23 @ 06:55)  BP: --  RR: 20 (23 @ 06:00)  SpO2: 95% (23 @ 06:55)  Wt(kg): --    PHYSICAL EXAM:  General: alert, no acute distress  Eyes:  anicteric, no conjunctival injection, no discharge  Oropharynx: ETT	  Neck: supple, without adenopathy  Lungs: clear to auscultation  Heart: regular rate and rhythm; no murmur, rubs or gallops  Abdomen: soft, nondistended, nontender, without mass or organomegaly  Skin: no lesions  Extremities: no clubbing, cyanosis, or edema  Neurologic: alert, oriented, moves all extremities    LAB RESULTS:                        9.7    13.71 )-----------( 231      ( 2023 22:06 )             29.9         143  |  108  |  38<H>  ----------------------------<  176<H>  4.2   |  28  |  0.42<L>    Ca    8.2<L>      2023 22:06  Phos  3.5     02-  Mg     2.5     02-07        Urinalysis Basic - ( 2023 22:06 )    Color: Yellow / Appearance: Slightly Turbid / S.032 / pH: x  Gluc: x / Ketone: Negative  / Bili: Negative / Urobili: Negative   Blood: x / Protein: 30 mg/dL / Nitrite: Positive   Leuk Esterase: Large / RBC: 20 /hpf /  /HPF   Sq Epi: x / Non Sq Epi: 0 /hpf / Bacteria: Many      MICROBIOLOGY:  RECENT CULTURES:    pending  RADIOLOGY REVIEWED:    < from: Xray Chest 1 View- PORTABLE-Routine (Xray Chest 1 View- PORTABLE-Routine in AM.) (23 @ 04:37) >  IMPRESSION:  Endotracheal tube tip above the sherri.  Similar small bilateral pleural effusions and bibasilar atelectasis.    --- End of Report -    < end of copied text >

## 2023-02-09 LAB
ANION GAP SERPL CALC-SCNC: 8 MMOL/L — SIGNIFICANT CHANGE UP (ref 5–17)
BUN SERPL-MCNC: 36 MG/DL — HIGH (ref 7–23)
CALCIUM SERPL-MCNC: 8.4 MG/DL — SIGNIFICANT CHANGE UP (ref 8.4–10.5)
CHLORIDE SERPL-SCNC: 107 MMOL/L — SIGNIFICANT CHANGE UP (ref 96–108)
CO2 SERPL-SCNC: 29 MMOL/L — SIGNIFICANT CHANGE UP (ref 22–31)
CREAT SERPL-MCNC: 0.46 MG/DL — LOW (ref 0.5–1.3)
EGFR: 107 ML/MIN/1.73M2 — SIGNIFICANT CHANGE UP
GAS PNL BLDA: SIGNIFICANT CHANGE UP
GLUCOSE BLDC GLUCOMTR-MCNC: 142 MG/DL — HIGH (ref 70–99)
GLUCOSE BLDC GLUCOMTR-MCNC: 157 MG/DL — HIGH (ref 70–99)
GLUCOSE BLDC GLUCOMTR-MCNC: 166 MG/DL — HIGH (ref 70–99)
GLUCOSE BLDC GLUCOMTR-MCNC: 202 MG/DL — HIGH (ref 70–99)
GLUCOSE BLDC GLUCOMTR-MCNC: 211 MG/DL — HIGH (ref 70–99)
GLUCOSE SERPL-MCNC: 151 MG/DL — HIGH (ref 70–99)
HCT VFR BLD CALC: 28 % — LOW (ref 34.5–45)
HGB BLD-MCNC: 9.3 G/DL — LOW (ref 11.5–15.5)
MAGNESIUM SERPL-MCNC: 2.5 MG/DL — SIGNIFICANT CHANGE UP (ref 1.6–2.6)
MCHC RBC-ENTMCNC: 29.2 PG — SIGNIFICANT CHANGE UP (ref 27–34)
MCHC RBC-ENTMCNC: 33.2 GM/DL — SIGNIFICANT CHANGE UP (ref 32–36)
MCV RBC AUTO: 88.1 FL — SIGNIFICANT CHANGE UP (ref 80–100)
NRBC # BLD: 0 /100 WBCS — SIGNIFICANT CHANGE UP (ref 0–0)
PHOSPHATE SERPL-MCNC: 3.3 MG/DL — SIGNIFICANT CHANGE UP (ref 2.5–4.5)
PLATELET # BLD AUTO: 226 K/UL — SIGNIFICANT CHANGE UP (ref 150–400)
POTASSIUM SERPL-MCNC: 3.9 MMOL/L — SIGNIFICANT CHANGE UP (ref 3.5–5.3)
POTASSIUM SERPL-SCNC: 3.9 MMOL/L — SIGNIFICANT CHANGE UP (ref 3.5–5.3)
RBC # BLD: 3.18 M/UL — LOW (ref 3.8–5.2)
RBC # FLD: 14 % — SIGNIFICANT CHANGE UP (ref 10.3–14.5)
SODIUM SERPL-SCNC: 144 MMOL/L — SIGNIFICANT CHANGE UP (ref 135–145)
WBC # BLD: 15.45 K/UL — HIGH (ref 3.8–10.5)
WBC # FLD AUTO: 15.45 K/UL — HIGH (ref 3.8–10.5)

## 2023-02-09 PROCEDURE — 99291 CRITICAL CARE FIRST HOUR: CPT

## 2023-02-09 PROCEDURE — 71045 X-RAY EXAM CHEST 1 VIEW: CPT | Mod: 26

## 2023-02-09 PROCEDURE — 99232 SBSQ HOSP IP/OBS MODERATE 35: CPT

## 2023-02-09 RX ORDER — OXYCODONE HYDROCHLORIDE 5 MG/1
5 TABLET ORAL EVERY 6 HOURS
Refills: 0 | Status: DISCONTINUED | OUTPATIENT
Start: 2023-02-09 | End: 2023-02-14

## 2023-02-09 RX ORDER — CALCIUM GLUCONATE 100 MG/ML
1 VIAL (ML) INTRAVENOUS ONCE
Refills: 0 | Status: COMPLETED | OUTPATIENT
Start: 2023-02-09 | End: 2023-02-09

## 2023-02-09 RX ORDER — FENTANYL CITRATE 50 UG/ML
50 INJECTION INTRAVENOUS ONCE
Refills: 0 | Status: DISCONTINUED | OUTPATIENT
Start: 2023-02-09 | End: 2023-02-09

## 2023-02-09 RX ORDER — OXYCODONE HYDROCHLORIDE 5 MG/1
10 TABLET ORAL EVERY 4 HOURS
Refills: 0 | Status: DISCONTINUED | OUTPATIENT
Start: 2023-02-09 | End: 2023-02-14

## 2023-02-09 RX ADMIN — FENTANYL CITRATE 50 MICROGRAM(S): 50 INJECTION INTRAVENOUS at 01:00

## 2023-02-09 RX ADMIN — OXYCODONE HYDROCHLORIDE 5 MILLIGRAM(S): 5 TABLET ORAL at 04:00

## 2023-02-09 RX ADMIN — POLYETHYLENE GLYCOL 3350 17 GRAM(S): 17 POWDER, FOR SOLUTION ORAL at 10:59

## 2023-02-09 RX ADMIN — ENOXAPARIN SODIUM 40 MILLIGRAM(S): 100 INJECTION SUBCUTANEOUS at 17:24

## 2023-02-09 RX ADMIN — Medication 100 GRAM(S): at 05:20

## 2023-02-09 RX ADMIN — SODIUM CHLORIDE 4 MILLILITER(S): 9 INJECTION INTRAMUSCULAR; INTRAVENOUS; SUBCUTANEOUS at 17:59

## 2023-02-09 RX ADMIN — Medication 4: at 10:58

## 2023-02-09 RX ADMIN — Medication 2: at 05:24

## 2023-02-09 RX ADMIN — Medication 3 MILLILITER(S): at 05:30

## 2023-02-09 RX ADMIN — Medication 4: at 17:23

## 2023-02-09 RX ADMIN — FENTANYL CITRATE 50 MICROGRAM(S): 50 INJECTION INTRAVENOUS at 07:35

## 2023-02-09 RX ADMIN — FENTANYL CITRATE 50 MICROGRAM(S): 50 INJECTION INTRAVENOUS at 19:05

## 2023-02-09 RX ADMIN — SENNA PLUS 2 TABLET(S): 8.6 TABLET ORAL at 21:21

## 2023-02-09 RX ADMIN — Medication 2: at 00:52

## 2023-02-09 RX ADMIN — SODIUM CHLORIDE 4 MILLILITER(S): 9 INJECTION INTRAMUSCULAR; INTRAVENOUS; SUBCUTANEOUS at 12:02

## 2023-02-09 RX ADMIN — Medication 3 MILLILITER(S): at 17:58

## 2023-02-09 RX ADMIN — FENTANYL CITRATE 50 MICROGRAM(S): 50 INJECTION INTRAVENOUS at 15:00

## 2023-02-09 RX ADMIN — FENTANYL CITRATE 50 MICROGRAM(S): 50 INJECTION INTRAVENOUS at 14:30

## 2023-02-09 RX ADMIN — HUMAN INSULIN 8 UNIT(S): 100 INJECTION, SUSPENSION SUBCUTANEOUS at 17:24

## 2023-02-09 RX ADMIN — FAMOTIDINE 20 MILLIGRAM(S): 10 INJECTION INTRAVENOUS at 05:20

## 2023-02-09 RX ADMIN — Medication 3 MILLILITER(S): at 00:14

## 2023-02-09 RX ADMIN — CHLORHEXIDINE GLUCONATE 15 MILLILITER(S): 213 SOLUTION TOPICAL at 05:20

## 2023-02-09 RX ADMIN — FENTANYL CITRATE 50 MICROGRAM(S): 50 INJECTION INTRAVENOUS at 18:35

## 2023-02-09 RX ADMIN — HUMAN INSULIN 8 UNIT(S): 100 INJECTION, SUSPENSION SUBCUTANEOUS at 10:58

## 2023-02-09 RX ADMIN — FENTANYL CITRATE 50 MICROGRAM(S): 50 INJECTION INTRAVENOUS at 00:56

## 2023-02-09 RX ADMIN — CHLORHEXIDINE GLUCONATE 1 APPLICATION(S): 213 SOLUTION TOPICAL at 05:21

## 2023-02-09 RX ADMIN — FENTANYL CITRATE 50 MICROGRAM(S): 50 INJECTION INTRAVENOUS at 08:05

## 2023-02-09 RX ADMIN — HUMAN INSULIN 8 UNIT(S): 100 INJECTION, SUSPENSION SUBCUTANEOUS at 00:52

## 2023-02-09 RX ADMIN — CHLORHEXIDINE GLUCONATE 15 MILLILITER(S): 213 SOLUTION TOPICAL at 17:25

## 2023-02-09 RX ADMIN — FENTANYL CITRATE 50 MICROGRAM(S): 50 INJECTION INTRAVENOUS at 06:10

## 2023-02-09 RX ADMIN — OXYCODONE HYDROCHLORIDE 5 MILLIGRAM(S): 5 TABLET ORAL at 03:17

## 2023-02-09 RX ADMIN — Medication 3 MILLILITER(S): at 12:01

## 2023-02-09 RX ADMIN — FENTANYL CITRATE 50 MICROGRAM(S): 50 INJECTION INTRAVENOUS at 05:50

## 2023-02-09 RX ADMIN — SODIUM CHLORIDE 4 MILLILITER(S): 9 INJECTION INTRAMUSCULAR; INTRAVENOUS; SUBCUTANEOUS at 00:19

## 2023-02-09 RX ADMIN — OXYCODONE HYDROCHLORIDE 5 MILLIGRAM(S): 5 TABLET ORAL at 11:30

## 2023-02-09 RX ADMIN — FAMOTIDINE 20 MILLIGRAM(S): 10 INJECTION INTRAVENOUS at 17:25

## 2023-02-09 RX ADMIN — HUMAN INSULIN 8 UNIT(S): 100 INJECTION, SUSPENSION SUBCUTANEOUS at 23:57

## 2023-02-09 RX ADMIN — HUMAN INSULIN 8 UNIT(S): 100 INJECTION, SUSPENSION SUBCUTANEOUS at 05:25

## 2023-02-09 RX ADMIN — SODIUM CHLORIDE 4 MILLILITER(S): 9 INJECTION INTRAMUSCULAR; INTRAVENOUS; SUBCUTANEOUS at 05:39

## 2023-02-09 RX ADMIN — Medication 125 MILLIGRAM(S): at 05:16

## 2023-02-09 RX ADMIN — OXYCODONE HYDROCHLORIDE 5 MILLIGRAM(S): 5 TABLET ORAL at 11:00

## 2023-02-09 NOTE — PROGRESS NOTE ADULT - SUBJECTIVE AND OBJECTIVE BOX
CC: f/u for  leukocytosis  Patient reports  feels ok  REVIEW OF SYSTEMS:  All other review of systems negative (Comprehensive ROS)    Antimicrobials Day #  :    Other Medications Reviewed    T(F): 99.2 (23 @ 19:00), Max: 99.6 (23 @ 03:00)  HR: 55 (23 @ 21:00)  BP: --  RR: 16 (23 @ 21:00)  SpO2: 100% (23 @ 21:00)  Wt(kg): --    PHYSICAL EXAM:  General: alert, no acute distress  Eyes:  anicteric, no conjunctival injection, no discharge  Oropharynx: no lesions or injection 	  Neck: supple, without adenopathy  Lungs: clear to auscultation  Heart: regular rate and rhythm; no murmur, rubs or gallops  Abdomen: soft, nondistended, nontender, without mass or organomegaly  Skin: no lesions  Extremities: no clubbing, cyanosis, or edema  Neurologic: alert, oriented, moves all extremities    LAB RESULTS:                        9.3    15.45 )-----------( 226      ( 2023 21:24 )             28.0     02-08    143  |  105  |  34<H>  ----------------------------<  170<H>  4.0   |  30  |  0.47<L>    Ca    8.0<L>      2023 21:28  Phos  3.7     02-  Mg     2.3     -        Urinalysis Basic - ( 2023 22:06 )    Color: Yellow / Appearance: Slightly Turbid / S.032 / pH: x  Gluc: x / Ketone: Negative  / Bili: Negative / Urobili: Negative   Blood: x / Protein: 30 mg/dL / Nitrite: Positive   Leuk Esterase: Large / RBC: 20 /hpf /  /HPF   Sq Epi: x / Non Sq Epi: 0 /hpf / Bacteria: Many      MICROBIOLOGY:  RECENT CULTURES:   @ 01:09 .Sputum Sputum     Normal Respiratory Renetta present    Rare polymorphonuclear leukocytes per low power field  No Squamous epithelial cells per low power field  Rare Gram Positive Rods seen per oil power field  Rare Gram positive cocci in pairs seen per oil power field     @ 18:41 .Blood Blood-Venous     No growth to date.       @ 18:40 .Blood Blood-Peripheral     No growth to date.          RADIOLOGY REVIEWED:      < from: Xray Chest 1 View- PORTABLE-Urgent (Xray Chest 1 View- PORTABLE-Urgent .) (23 @ 04:05) >    ACC: [id number] EXAM:  XR CHEST PORTABLE URGENT 1V   ORDERED BY: LUBNA AGUDELO     PROCEDURE DATE:  2023          INTERPRETATION:  EXAMINATION: XR CHEST URGENT    CLINICAL INDICATION: CXR    TECHNIQUE: Single frontal, portable view of the chest was obtained.    COMPARISON: Chest x-ray 2023.    FINDINGS:  Support devices: Endotracheal tube tip above the sherri. Enteric tube tip   projecting over the stomach. Right internal jugular nontunneled dialysis   catheter with tip in SVC. Median sternotomy.  The cardiomediastinal silhouette is  not accurately assessed in this AP   projection.  Decreased small right pleural effusion.  There is no pneumothorax.  No acute bony abnormality.    IMPRESSION:  Decreased small right pleural effusion.  Lines and tubes as above.    --- End of Report ---          < end of copied text >          Assessment:  patient with mg on chronic po prednisone admitted last week with covid and mg flare, Has some leukocytosis but suspect just from steroid  Plan:  monitor off antimicrobics  supportive care CC: f/u for  leukocytosis  Patient reports  feels ok  REVIEW OF SYSTEMS:  All other review of systems negative (Comprehensive ROS)    Antimicrobials Day #  :    Other Medications Reviewed    T(F): 99.2 (23 @ 19:00), Max: 99.6 (23 @ 03:00)  HR: 55 (23 @ 21:00)  BP: --  RR: 16 (23 @ 21:00)  SpO2: 100% (23 @ 21:00)  Wt(kg): --    PHYSICAL EXAM:  General: alert, no acute distress  Eyes:  anicteric, no conjunctival injection, no discharge  Oropharynx: no lesions or injection 	  Neck: supple, without adenopathy  Lungs: clear to auscultation  Heart: regular rate and rhythm; no murmur, rubs or gallops  Abdomen: soft, nondistended, nontender, without mass or organomegaly  Skin: no lesions  Extremities: no clubbing, cyanosis, or edema  Neurologic: alert, oriented, moves all extremities    LAB RESULTS:                        9.3    15.45 )-----------( 226      ( 2023 21:24 )             28.0     02-08    143  |  105  |  34<H>  ----------------------------<  170<H>  4.0   |  30  |  0.47<L>    Ca    8.0<L>      2023 21:28  Phos  3.7     02-  Mg     2.3     -        Urinalysis Basic - ( 2023 22:06 )    Color: Yellow / Appearance: Slightly Turbid / S.032 / pH: x  Gluc: x / Ketone: Negative  / Bili: Negative / Urobili: Negative   Blood: x / Protein: 30 mg/dL / Nitrite: Positive   Leuk Esterase: Large / RBC: 20 /hpf /  /HPF   Sq Epi: x / Non Sq Epi: 0 /hpf / Bacteria: Many      MICROBIOLOGY:  RECENT CULTURES:   @ 01:09 .Sputum Sputum     Normal Respiratory Renetta present    Rare polymorphonuclear leukocytes per low power field  No Squamous epithelial cells per low power field  Rare Gram Positive Rods seen per oil power field  Rare Gram positive cocci in pairs seen per oil power field     @ 18:41 .Blood Blood-Venous     No growth to date.       @ 18:40 .Blood Blood-Peripheral     No growth to date.          RADIOLOGY REVIEWED:      < from: Xray Chest 1 View- PORTABLE-Urgent (Xray Chest 1 View- PORTABLE-Urgent .) (23 @ 04:05) >    ACC: [id number] EXAM:  XR CHEST PORTABLE URGENT 1V   ORDERED BY: LUBNA AGUDELO     PROCEDURE DATE:  2023          INTERPRETATION:  EXAMINATION: XR CHEST URGENT    CLINICAL INDICATION: CXR    TECHNIQUE: Single frontal, portable view of the chest was obtained.    COMPARISON: Chest x-ray 2023.    FINDINGS:  Support devices: Endotracheal tube tip above the sherri. Enteric tube tip   projecting over the stomach. Right internal jugular nontunneled dialysis   catheter with tip in SVC. Median sternotomy.  The cardiomediastinal silhouette is  not accurately assessed in this AP   projection.  Decreased small right pleural effusion.  There is no pneumothorax.  No acute bony abnormality.    IMPRESSION:  Decreased small right pleural effusion.  Lines and tubes as above.    --- End of Report ---          < end of copied text >          Assessment:  patient with mg on chronic po prednisone admitted last week with covid and mg flare, Has some leukocytosis but suspect just from steroid  Plan:  monitor off antimicrobics  supportive care CC: f/u for  leukocytosis  Patient reports  feels ok  REVIEW OF SYSTEMS:  All other review of systems negative (Comprehensive ROS)    Antimicrobials Day #  :    Other Medications Reviewed    T(F): 99.2 (23 @ 19:00), Max: 99.6 (23 @ 03:00)  HR: 55 (23 @ 21:00)  BP: --  RR: 16 (23 @ 21:00)  SpO2: 100% (23 @ 21:00)  Wt(kg): --    PHYSICAL EXAM:  General: alert, no acute distress  Eyes:  anicteric, no conjunctival injection, no discharge  Oropharynx: no lesions or injection 	  Neck: supple, without adenopathy  Lungs: clear to auscultation  Heart: regular rate and rhythm; no murmur, rubs or gallops  Abdomen: soft, nondistended, nontender, without mass or organomegaly  Skin: no lesions  Extremities: no clubbing, cyanosis, or edema  Neurologic: alert, oriented, moves all extremities    LAB RESULTS:                        9.3    15.45 )-----------( 226      ( 2023 21:24 )             28.0     02-08    143  |  105  |  34<H>  ----------------------------<  170<H>  4.0   |  30  |  0.47<L>    Ca    8.0<L>      2023 21:28  Phos  3.7     02-  Mg     2.3     -        Urinalysis Basic - ( 2023 22:06 )    Color: Yellow / Appearance: Slightly Turbid / S.032 / pH: x  Gluc: x / Ketone: Negative  / Bili: Negative / Urobili: Negative   Blood: x / Protein: 30 mg/dL / Nitrite: Positive   Leuk Esterase: Large / RBC: 20 /hpf /  /HPF   Sq Epi: x / Non Sq Epi: 0 /hpf / Bacteria: Many      MICROBIOLOGY:  RECENT CULTURES:   @ 01:09 .Sputum Sputum     Normal Respiratory Renetta present    Rare polymorphonuclear leukocytes per low power field  No Squamous epithelial cells per low power field  Rare Gram Positive Rods seen per oil power field  Rare Gram positive cocci in pairs seen per oil power field     @ 18:41 .Blood Blood-Venous     No growth to date.       @ 18:40 .Blood Blood-Peripheral     No growth to date.          RADIOLOGY REVIEWED:      < from: Xray Chest 1 View- PORTABLE-Urgent (Xray Chest 1 View- PORTABLE-Urgent .) (23 @ 04:05) >    ACC: [id number] EXAM:  XR CHEST PORTABLE URGENT 1V   ORDERED BY: LUBNA AGUDELO     PROCEDURE DATE:  2023          INTERPRETATION:  EXAMINATION: XR CHEST URGENT    CLINICAL INDICATION: CXR    TECHNIQUE: Single frontal, portable view of the chest was obtained.    COMPARISON: Chest x-ray 2023.    FINDINGS:  Support devices: Endotracheal tube tip above the sherri. Enteric tube tip   projecting over the stomach. Right internal jugular nontunneled dialysis   catheter with tip in SVC. Median sternotomy.  The cardiomediastinal silhouette is  not accurately assessed in this AP   projection.  Decreased small right pleural effusion.  There is no pneumothorax.  No acute bony abnormality.    IMPRESSION:  Decreased small right pleural effusion.  Lines and tubes as above.    --- End of Report ---          < end of copied text >          Assessment:  patient with mg on chronic po prednisone admitted last week with covid and mg flare, Has some leukocytosis but suspect just from steroid  Plan:  monitor off antimicrobics  supportive care

## 2023-02-09 NOTE — PROGRESS NOTE ADULT - SUBJECTIVE AND OBJECTIVE BOX
SUBJECTIVE/INTERVAL HISTORY:  - Desaturated with low tidal volume, adjusted PEEP to 10 and improved oxygenation  - Remains intubated    PAST MEDICAL & SURGICAL HISTORY:  Myasthenia gravis      Diabetes mellitus        MEDICATIONS (HOME):  Home Medications:  Albuterol (Eqv-ProAir HFA) 90 mcg/inh inhalation aerosol: 2 puff(s) inhaled every 6 hours, As Needed (2023 09:16)  amLODIPine 10 mg oral tablet: 1 tab(s) orally once a day (2023 09:16)  fluticasone 110 mcg/inh inhalation aerosol with adapter: 1 puff(s) inhaled 2 times a day (2023 09:16)  lovastatin 20 mg oral tablet: 1 tab(s) orally once a day (2023 09:16)  metFORMIN 500 mg oral tablet: 1 tab(s) orally 3 times a day (2023 09:16)  predniSONE 5 mg oral tablet: 1 tab(s) orally once a day  last fill 23 (2023 09:16)  pyridostigmine 60 mg oral tablet: 2 tab(s) orally 3 times a day (2023 09:16)    MEDICATIONS  (STANDING):  albuterol/ipratropium for Nebulization 3 milliLiter(s) Nebulizer every 6 hours  chlorhexidine 0.12% Liquid 15 milliLiter(s) Oral Mucosa every 12 hours  chlorhexidine 4% Liquid 1 Application(s) Topical <User Schedule>  enoxaparin Injectable 40 milliGRAM(s) SubCutaneous <User Schedule>  famotidine    Tablet 20 milliGRAM(s) Oral two times a day  insulin lispro (ADMELOG) corrective regimen sliding scale   SubCutaneous every 6 hours  insulin NPH human recombinant 8 Unit(s) SubCutaneous every 6 hours  methylPREDNISolone sodium succinate Injectable 125 milliGRAM(s) IV Push daily  polyethylene glycol 3350 17 Gram(s) Oral daily  senna 2 Tablet(s) Oral at bedtime  sodium chloride 3%  Inhalation 4 milliLiter(s) Inhalation every 6 hours    MEDICATIONS  (PRN):  acetaminophen    Suspension .. 650 milliGRAM(s) Oral every 6 hours PRN Mild Pain (1 - 3)  fentaNYL    Injectable 50 MICROGram(s) IV Push every 2 hours PRN vent synchrony  oxyCODONE    IR 5 milliGRAM(s) Oral every 6 hours PRN Moderate Pain (4 - 6)    ALLERGIES/INTOLERANCES:  Allergies  No Known Drug Allergies  peanuts (Unknown)    Intolerances    VITALS & EXAMINATION:  Vital Signs Last 24 Hrs  T(C): 37.5 (2023 07:00), Max: 37.6 (2023 03:00)  T(F): 99.5 (2023 07:00), Max: 99.6 (2023 03:00)  HR: 92 (2023 09:26) (49 - 120)  BP: --  BP(mean): --  RR: 16 (2023 08:00) (15 - 24)  SpO2: 97% (2023 09:26) (92% - 100%)    Parameters below as of 2023 05:45  Patient On (Oxygen Delivery Method): ventilator        General:  Constitutional: Obese Female, appears stated age, in no apparent distress including pain  Head: Normocephalic & atraumatic.  Respiratory: Intubated    Neurological (>12):  MS: Eyes open. Responds to verbal stimuli. Awake, alert, unable to answer orientation questions due to intubated state. Normal affect. Follows all commands.    Language: Mute due to intubation    CNs: VFF. EOMI. Mild b/l eyelid droop, difficulty with upward gaze.     Motor: Normal muscle bulk & tone. No noticeable tremor. B/l UE at least 3/5 strength. Wrist extension 5/5 and finger flexion 5/5 b/l    Sensation: Intact to LT b/l throughout.     Gait: Deferred    LABORATORY:  CBC                       10.0   12.86 )-----------( 229      ( 2023 21:28 )             30.0     Chem 02-08    143  |  105  |  34<H>  ----------------------------<  170<H>  4.0   |  30  |  0.47<L>    Ca    8.0<L>      2023 21:28  Phos  3.7     02-08  Mg     2.3     02-08      LFTs   Coagulopathy   Lipid Panel   A1c   Cardiac enzymes     U/A Urinalysis Basic - ( 2023 22:06 )    Color: Yellow / Appearance: Slightly Turbid / S.032 / pH: x  Gluc: x / Ketone: Negative  / Bili: Negative / Urobili: Negative   Blood: x / Protein: 30 mg/dL / Nitrite: Positive   Leuk Esterase: Large / RBC: 20 /hpf /  /HPF   Sq Epi: x / Non Sq Epi: 0 /hpf / Bacteria: Many      CSF  Immunological  Other    STUDIES & IMAGING:  Studies (EKG, EEG, EMG, etc):     Radiology (XR, CT, MR, U/S, TTE/AUGUSTINA):

## 2023-02-09 NOTE — PROVIDER CONTACT NOTE (CHANGE IN STATUS NOTIFICATION) - ACTION/TREATMENT ORDERED:
Providers Garcia Rios, and MD Doss assessed at bedside. ABG ordered and sent. Chest radiographs ordered and sent. Ventilator settings adjusted from  to 400, Peep from 5 to 10, and FiO2 from 30% to 80%. Fentanyl 50mg given for pain.

## 2023-02-09 NOTE — PROVIDER CONTACT NOTE (CHANGE IN STATUS NOTIFICATION) - SITUATION
Patient had acute change in mental status for approximately 1 minute immediately following initiation of IPV ventilation.

## 2023-02-09 NOTE — PROGRESS NOTE ADULT - SUBJECTIVE AND OBJECTIVE BOX
SUMMARY:   62-year-old woman with DMII, myasthenia gravis status post thymectomy and prior crises status post IVIG developed MG crisis in setting of COVID-19 infection requiring intubation status post IVIG x 1 and PLEX.     24 HOUR EVENTS:  IPV, nebs q6hrs, PEEP increased to 10    2/6- No overnight events.     2/7- Patient difficult to wean off the ventilator, requiring quite a lot of PEEP, currently on PEEP of 7. Otherwise exam improving. S/p 3 doses of PLEX.    2/8- Remains intubated, CPAP this AM and tolerated it. Today is her 5th dose of PLEX. No changes in exam.    2/9- Patient desaturated overnight, TV was low which was adjusted and PEEP was increased to 10. Improved after.     VITALS/DATA/ORDERS: [x] Reviewed    EXAMINATION:   Intubated, nods appropriately aaoX3, +cough/gag, no ptosis, deltoids 4-/5, neck flexion 5/5 extension 5/5, otherwise full strength   Pulmonary: Decreased breath sounds at bases  Cardiac: Normal S1/S2  Abd: Soft, non tender, + BS  Extremities: pulses strong, no edema

## 2023-02-09 NOTE — PROGRESS NOTE ADULT - SUBJECTIVE AND OBJECTIVE BOX
Interval events:    VITALS:  T(C): , Max: 37.6 (02-09-23 @ 03:00)  HR:  (49 - 120)  BP: --  ABP:  (95/38 - 162/70)  RR:  (15 - 24)  SpO2:  (92% - 100%)  Wt(kg): --  Device: Avea, Mode: AC/ CMV (Assist Control/ Continuous Mandatory Ventilation), RR (machine): 16, TV (machine): 400, FiO2: 60, PEEP: 10, ITime: 1, MAP: 14, PIP: 22    02-08-23 @ 07:01  -  02-09-23 @ 07:00  --------------------------------------------------------  IN: 1080 mL / OUT: 850 mL / NET: 230 mL    02-09-23 @ 07:01  -  02-09-23 @ 19:21  --------------------------------------------------------  IN: 800 mL / OUT: 350 mL / NET: 450 mL      LABS:  Na: 143 (02-08 @ 21:28), 143 (02-07 @ 22:06), 146 (02-06 @ 23:32)  K: 4.0 (02-08 @ 21:28), 4.2 (02-07 @ 22:06), 4.3 (02-06 @ 23:32)  Cl: 105 (02-08 @ 21:28), 108 (02-07 @ 22:06), 108 (02-06 @ 23:32)  CO2: 30 (02-08 @ 21:28), 28 (02-07 @ 22:06), 26 (02-06 @ 23:32)  BUN: 34 (02-08 @ 21:28), 38 (02-07 @ 22:06), 41 (02-06 @ 23:32)  Cr: 0.47 (02-08 @ 21:28), 0.42 (02-07 @ 22:06), 0.49 (02-06 @ 23:32)  Glu: 170(02-08 @ 21:28), 176(02-07 @ 22:06), 161(02-06 @ 23:32)    Hgb: 10.0 (02-08 @ 21:28), 9.7 (02-07 @ 22:06), 11.0 (02-06 @ 23:32)  Hct: 30.0 (02-08 @ 21:28), 29.9 (02-07 @ 22:06), 33.8 (02-06 @ 23:32)  WBC: 12.86 (02-08 @ 21:28), 13.71 (02-07 @ 22:06), 20.14 (02-06 @ 23:32)  Plt: 229 (02-08 @ 21:28), 231 (02-07 @ 22:06), 263 (02-06 @ 23:32)    MEDICATIONS:  acetaminophen    Suspension .. 650 milliGRAM(s) Oral every 6 hours PRN  albuterol/ipratropium for Nebulization 3 milliLiter(s) Nebulizer every 6 hours  chlorhexidine 0.12% Liquid 15 milliLiter(s) Oral Mucosa every 12 hours  chlorhexidine 4% Liquid 1 Application(s) Topical <User Schedule>  enoxaparin Injectable 40 milliGRAM(s) SubCutaneous <User Schedule>  famotidine    Tablet 20 milliGRAM(s) Oral two times a day  fentaNYL    Injectable 50 MICROGram(s) IV Push every 2 hours PRN  insulin lispro (ADMELOG) corrective regimen sliding scale   SubCutaneous every 6 hours  insulin NPH human recombinant 8 Unit(s) SubCutaneous every 6 hours  methylPREDNISolone sodium succinate Injectable 125 milliGRAM(s) IV Push daily  oxyCODONE    IR 5 milliGRAM(s) Oral every 6 hours PRN  polyethylene glycol 3350 17 Gram(s) Oral daily  senna 2 Tablet(s) Oral at bedtime  sodium chloride 3%  Inhalation 4 milliLiter(s) Inhalation every 6 hours    EXAMINATION:  Please see exam from daytime.     Assessment/Plan:   61 yo woman with DM and Myasthenia gravis s/p thymectomy 2010, with prior exacerbations requiring intubation (last 2021), now admitted 1/30 with SOB, weak cough, dysphagia and double vision, found to be COVID positive, concerning for MG exacerbation. Started on IVIG due to refusal of PLEX, s/p 3 doses. Initially on Bipap but intubated 2/1 for worsening respiratory failure.  Now receiving PLEX s/p 4/5 sessions.   TTE with EF 35-40%, mild-moderate MR.     No change to plan from daytime.  senna and miralax, LBM 2/7  TF at goal   NPH 8U q6h, ISS (HgA1C 6.6)  Lov ppx, LE US neg 1/31    Марина Doss  Neurocritical Care Attending Interval events:  Last night with an episode of desaturation, requiring increase in TV from 300 to 400 and PEEP from 5 to 10, with increase in FiO2. CXR with a small R pleural effusion and b/l atelectasis.     VITALS:  T(C): , Max: 37.6 (02-09-23 @ 03:00)  HR:  (49 - 120)  BP: --  ABP:  (95/38 - 162/70)  RR:  (15 - 24)  SpO2:  (92% - 100%)  Wt(kg): --  Device: Avea, Mode: AC/ CMV (Assist Control/ Continuous Mandatory Ventilation), RR (machine): 16, TV (machine): 400, FiO2: 60, PEEP: 10, ITime: 1, MAP: 14, PIP: 22    02-08-23 @ 07:01  -  02-09-23 @ 07:00  --------------------------------------------------------  IN: 1080 mL / OUT: 850 mL / NET: 230 mL    02-09-23 @ 07:01  -  02-09-23 @ 19:21  --------------------------------------------------------  IN: 800 mL / OUT: 350 mL / NET: 450 mL      LABS:  Na: 143 (02-08 @ 21:28), 143 (02-07 @ 22:06), 146 (02-06 @ 23:32)  K: 4.0 (02-08 @ 21:28), 4.2 (02-07 @ 22:06), 4.3 (02-06 @ 23:32)  Cl: 105 (02-08 @ 21:28), 108 (02-07 @ 22:06), 108 (02-06 @ 23:32)  CO2: 30 (02-08 @ 21:28), 28 (02-07 @ 22:06), 26 (02-06 @ 23:32)  BUN: 34 (02-08 @ 21:28), 38 (02-07 @ 22:06), 41 (02-06 @ 23:32)  Cr: 0.47 (02-08 @ 21:28), 0.42 (02-07 @ 22:06), 0.49 (02-06 @ 23:32)  Glu: 170(02-08 @ 21:28), 176(02-07 @ 22:06), 161(02-06 @ 23:32)    Hgb: 10.0 (02-08 @ 21:28), 9.7 (02-07 @ 22:06), 11.0 (02-06 @ 23:32)  Hct: 30.0 (02-08 @ 21:28), 29.9 (02-07 @ 22:06), 33.8 (02-06 @ 23:32)  WBC: 12.86 (02-08 @ 21:28), 13.71 (02-07 @ 22:06), 20.14 (02-06 @ 23:32)  Plt: 229 (02-08 @ 21:28), 231 (02-07 @ 22:06), 263 (02-06 @ 23:32)    MEDICATIONS:  acetaminophen    Suspension .. 650 milliGRAM(s) Oral every 6 hours PRN  albuterol/ipratropium for Nebulization 3 milliLiter(s) Nebulizer every 6 hours  chlorhexidine 0.12% Liquid 15 milliLiter(s) Oral Mucosa every 12 hours  chlorhexidine 4% Liquid 1 Application(s) Topical <User Schedule>  enoxaparin Injectable 40 milliGRAM(s) SubCutaneous <User Schedule>  famotidine    Tablet 20 milliGRAM(s) Oral two times a day  fentaNYL    Injectable 50 MICROGram(s) IV Push every 2 hours PRN  insulin lispro (ADMELOG) corrective regimen sliding scale   SubCutaneous every 6 hours  insulin NPH human recombinant 8 Unit(s) SubCutaneous every 6 hours  methylPREDNISolone sodium succinate Injectable 125 milliGRAM(s) IV Push daily  oxyCODONE    IR 5 milliGRAM(s) Oral every 6 hours PRN  polyethylene glycol 3350 17 Gram(s) Oral daily  senna 2 Tablet(s) Oral at bedtime  sodium chloride 3%  Inhalation 4 milliLiter(s) Inhalation every 6 hours    EXAMINATION:  Please see exam from daytime.     Assessment/Plan:   63 yo woman with DM and Myasthenia gravis s/p thymectomy 2010, with prior exacerbations requiring intubation (last 2021), now admitted 1/30 with SOB, weak cough, dysphagia and double vision, found to be COVID positive, concerning for MG exacerbation. Started on IVIG due to refusal of PLEX, s/p 3 doses. Initially on Bipap but intubated 2/1 for worsening respiratory failure.  Now receiving PLEX s/p 4/5 sessions.   TTE with EF 35-40%, mild-moderate MR.     No change to plan from daytime except ABG to continue to wean vent settings   add oxy for pain control   senna and miralax, LBM 2/9  TF at goal   NPH 8U q6h, ISS (HgA1C 6.6)  Lov ppx, LE US neg 1/31    Марина Doss  Neurocritical Care Attending

## 2023-02-09 NOTE — PROGRESS NOTE ADULT - ASSESSMENT
62 y.o. F with PMH of myasthenia gravis, presented with myasthenic crisis s/p IVIG x1 (1/31-2/2) and PLEX (2/2 - ), next PLEX 2/10/23. Neuro exam in the interval showing improvement in ophthalmoparesis and b/l UE strength.    Recommendations:  [] C/w with PLEX for 1 more sessions, next planned 2/10/23  [] C/w Solumedrol 125 mcg  [] Outpatient fu with Dr. Ro and discuss further immunotherapy for further management  [] Neurology will continue to follow    Case seen and discussed with general neurology Dr. Reeder

## 2023-02-09 NOTE — PROVIDER CONTACT NOTE (CHANGE IN STATUS NOTIFICATION) - ASSESSMENT
Approximately 15 seconds following the initiation on IPV ventilation by RT Bang Godoy, pts eyes rolled back and she became unresponsive. VS remained stable. Providers Ali and Zelaya came to beside immediately and pt regained consciousness after ~1 minute and was able to follow commands. Patient then complained of pain d/t ET tube.

## 2023-02-09 NOTE — PROGRESS NOTE ADULT - ASSESSMENT
ASSESSMENT/PLAN: MG exacerbation, covid +    NEURO:   IVIG 25g (1/31--2/2), PLEX every other day 2/2--  solumedrol 125 mg daily (1/31--2/10)  Avoid medications that may worsen the current exacerbation including macrolides, fluoroquinolones, tetracyclines, aminoglycoside, beta-blockers, magnesium, and anti-arrhythmics (procainamide, quinidine, and lidocaine)  Pain management with fentanyl pushes    PULM:  CPAP  NIF/VC/RSBI - will calculate and attempt to extubate to BIPAP.  Will obtain repeat ABG  Intubated for airway protection, will wean as tolerated    CV:  MAP >65  TTE- 35-40%    RENAL:  Fluids: IVL in setting of poor EF  IO: replete as needed    GI:  Diet: NPO for ext  GI prophylaxis [] not indicated [x] famotidine [] other:   LBM- 2/8    ENDO:   A1c 6.6  Goal euglycemia (-180)  RISS for now and NPH, adjust as needed    HEME/ONC:  H/H stable   VTE prophylaxis: [x] SCDs [x] chemoprophylaxis lovenox [] hold chemoprophylaxis due to: [] high risk of DVT/PE on admission due to:    ID: covid + status post remdesivir  leukocytes trending down, UA positive, obtain UC  Will start ceftriaxone 2/8--    CODE STATUS:  [x] Full Code [] DNR [] DNI [] Palliative/Comfort Care    DISPOSITION:  [x] ICU [] Stroke Unit [] Floor [] EMU [] RCU [] PCU    At risk of death/neuro decline due to: acute resp failure requiring intubation    Contact: [phone] ASSESSMENT/PLAN: MG exacerbation, covid +    NEURO:   IVIG 25g (1/31--2/2), PLEX every other day 2/2--  solumedrol 125 mg daily (1/31--2/10)  Avoid medications that may worsen the current exacerbation including macrolides, fluoroquinolones, tetracyclines, aminoglycoside, beta-blockers, magnesium, and anti-arrhythmics (procainamide, quinidine, and lidocaine)  Pain management with fentanyl pushes    PULM:  CPAP  NIF/VC/RSBI - will calculate and attempt to extubate to BIPAP.  Will obtain repeat ABG  Intubated for airway protection, will wean as tolerated    CV:  MAP >65  TTE- 35-40%    RENAL:  Fluids: IVL in setting of poor EF  IO: replete as needed    GI:  Diet: NPO for ext  GI prophylaxis [] not indicated [x] famotidine [] other:   LBM- 2/8    ENDO:   A1c 6.6  Goal euglycemia (-180)  RISS for now and NPH, adjust as needed    HEME/ONC:  H/H stable   VTE prophylaxis: [x] SCDs [x] chemoprophylaxis lovenox [] hold chemoprophylaxis due to: [] high risk of DVT/PE on admission due to:    ID: covid + status post remdesivir  leukocytes trending down, UA positive, obtain UC  Will start ceftriaxone 2/8--    CODE STATUS:  [x] Full Code [] DNR [] DNI [] Palliative/Comfort Care    DISPOSITION:  [x] ICU [] Stroke Unit [] Floor [] EMU [] RCU [] PCU    At risk of death/neuro decline due to: acute resp failure requiring intubation    Contact: [phone] ASSESSMENT/PLAN: MG exacerbation, covid +    NEURO:   IVIG 25g (1/31--2/2), PLEX every other day 2/2--  solumedrol 125 mg daily (1/31--2/10)  Avoid medications that may worsen the current exacerbation including macrolides, fluoroquinolones, tetracyclines, aminoglycoside, beta-blockers, magnesium, and anti-arrhythmics (procainamide, quinidine, and lidocaine)  Pain management with fentanyl pushes    PULM:  CPAP  NIF/VC/RSBI - will calculate and attempt to extubate to BIPAP.  Will obtain repeat ABG  Intubated for airway protection, will wean as tolerated    CV:  MAP >65  TTE- 35-40%    RENAL:  Fluids: IVL in setting of poor EF  IO: replete as needed    GI:  Diet: NPO for ext  GI prophylaxis [] not indicated [x] famotidine [] other:   LBM- 2/8    ENDO:   A1c 6.6  Goal euglycemia (-180)  RISS for now and NPH, adjust as needed    HEME/ONC:  H/H stable   VTE prophylaxis: [x] SCDs [x] chemoprophylaxis lovenox [] hold chemoprophylaxis due to: [] high risk of DVT/PE on admission due to:    ID: covid + status post remdesivir  leukocytes trending down, UA positive, obtain UC  Will start ceftriaxone 2/8--    CODE STATUS:  [x] Full Code [] DNR [] DNI [] Palliative/Comfort Care    DISPOSITION:  [x] ICU [] Stroke Unit [] Floor [] EMU [] RCU [] PCU    At risk of death/neuro decline due to: acute resp failure requiring intubation    Contact: [phone] ASSESSMENT/PLAN: MG exacerbation, covid +    NEURO:   Neurochecks q4  IVIG 25g (1/31--2/2), PLEX every other day 2/2--  solumedrol 125 mg daily (1/31--2/10)  Avoid medications that may worsen the current exacerbation including macrolides, fluoroquinolones, tetracyclines, aminoglycoside, beta-blockers, magnesium, and anti-arrhythmics (procainamide, quinidine, and lidocaine)  Pain management with fentanyl pushes    PULM:  Full support   NIF/VC/RSBI - will calculate and attempt to extubate to BIPAP.  Will obtain repeat ABG  Intubated for airway protection, will wean as tolerated    CV:  MAP >65  TTE- 35-40%    RENAL:  Fluids: IVL in setting of poor EF  IO: replete as needed    GI:  Diet: TF at goal  GI prophylaxis [] not indicated [x] famotidine [] other:   LBM- 2/8    ENDO:   A1c 6.6  Goal euglycemia (-180)  RISS for now and NPH, adjust as needed    HEME/ONC:  H/H stable   VTE prophylaxis: [x] SCDs [x] chemoprophylaxis lovenox [] hold chemoprophylaxis due to: [] high risk of DVT/PE on admission due to:    ID: covid + status post remdesivir  leukocytes trending down, UA positive  Will follow up with urine culture and start antibiotics if positive    CODE STATUS:  [x] Full Code [] DNR [] DNI [] Palliative/Comfort Care    DISPOSITION:  [x] ICU [] Stroke Unit [] Floor [] EMU [] RCU [] PCU    At risk of death/neuro decline due to: acute resp failure requiring intubation    Contact: [phone] ASSESSMENT/PLAN: MG exacerbation, covid +    NEURO:   Neurochecks q4  IVIG 25g (1/31--2/2), PLEX every other day 2/2--  solumedrol 125 mg daily (1/31--2/10)  Avoid medications that may worsen the current exacerbation including macrolides, fluoroquinolones, tetracyclines, aminoglycoside, beta-blockers, magnesium, and anti-arrhythmics (procainamide, quinidine, and lidocaine)  Pain management with fentanyl pushes    PULM:  Full support   NIF/VC/RSBI - will calculate and attempt to extubate to BIPAP.  Will obtain repeat ABG  Intubated for airway protection, will wean as tolerated    CV:  MAP >65  TTE- 35-40%    RENAL:  Fluids: IVL in setting of poor EF  IO: replete as needed    GI:  Diet: TF at goal  GI prophylaxis [] not indicated [x] famotidine [] other:   LBM- 2/8    ENDO:   A1c 6.6  Goal euglycemia (-180)  RISS for now and NPH, adjust as needed    HEME/ONC:  H/H stable   VTE prophylaxis: [x] SCDs [x] chemoprophylaxis lovenox [] hold chemoprophylaxis due to: [] high risk of DVT/PE on admission due to:    ID: covid + status post remdesivir  leukocytes trending down, UA positive  Will follow up with urine culture and start antibiotics if positive    CODE STATUS:  [x] Full Code [] DNR [] DNI [] Palliative/Comfort Care    DISPOSITION:  [x] ICU [] Stroke Unit [] Floor [] EMU [] RCU [] PCU    At risk of death/neuro decline due to: acute resp failure requiring intubation    Contact: [phone] ASSESSMENT/PLAN: MG exacerbation, covid +    NEURO:   Neurochecks q4  IVIG 25g (1/31--2/2), PLEX every other day 2/2--  solumedrol 125 mg daily (1/31--2/10)  Avoid medications that may worsen the current exacerbation including macrolides, fluoroquinolones, tetracyclines, aminoglycoside, beta-blockers, magnesium, and anti-arrhythmics (procainamide, quinidine, and lidocaine)  Pain management with fentanyl pushes    PULM:  Full support   NIF/VC/RSBI - will calculate and attempt to extubate to BIPAP.  Will obtain repeat ABG  Intubated for airway protection, will wean as tolerated    CV:  MAP >65  TTE- 35-40%    RENAL:  Fluids: IVL in setting of poor EF  IO: replete as needed    GI:  Diet: TF at goal  GI prophylaxis [] not indicated [x] famotidine [] other:   LBM- 2/8    ENDO:   A1c 6.6  Goal euglycemia (-180)  RISS for now and NPH, adjust as needed    HEME/ONC:  H/H stable   VTE prophylaxis: [x] SCDs [x] chemoprophylaxis lovenox [] hold chemoprophylaxis due to: [] high risk of DVT/PE on admission due to:    ID: covid + status post remdesivir  leukocytes trending down, UA positive  Will follow up with urine culture and start antibiotics if positive    CODE STATUS:  [x] Full Code [] DNR [] DNI [] Palliative/Comfort Care    DISPOSITION:  [x] ICU [] Stroke Unit [] Floor [] EMU [] RCU [] PCU    At risk of death/neuro decline due to: acute resp failure requiring intubation    Contact: [phone]

## 2023-02-09 NOTE — PROGRESS NOTE ADULT - ATTENDING COMMENTS
Pt is 63 yo woman with Myaesthenia and COVID.  Now s/p 4th PLEX and continues to improve.  However, attempt at extubating was not effective yesterday and are awaiting 5th PLEX.  No other new changes.  on exam pt is increasingly strong in bilateral upper and lower extremities.  Has increased period of upward gaze.  No other noted new deficits.  A/P:  Myaesthenia Graivis, exacerbation likely related to concurrent COVID infection.  Plan for 5th PLEX session and reassess.  To consider extubation as per NSCU.

## 2023-02-10 PROBLEM — Z00.00 ENCOUNTER FOR PREVENTIVE HEALTH EXAMINATION: Status: ACTIVE | Noted: 2023-02-10

## 2023-02-10 LAB
APTT BLD: 27.3 SEC — LOW (ref 27.5–35.5)
BLD GP AB SCN SERPL QL: NEGATIVE — SIGNIFICANT CHANGE UP
D DIMER BLD IA.RAPID-MCNC: <150 NG/ML DDU — SIGNIFICANT CHANGE UP
FIBRINOGEN PPP-MCNC: 187 MG/DL — LOW (ref 200–445)
FIBRINOGEN PPP-MCNC: 197 MG/DL — LOW (ref 200–445)
GAS PNL BLDA: SIGNIFICANT CHANGE UP
GLUCOSE BLDC GLUCOMTR-MCNC: 112 MG/DL — HIGH (ref 70–99)
GLUCOSE BLDC GLUCOMTR-MCNC: 125 MG/DL — HIGH (ref 70–99)
GLUCOSE BLDC GLUCOMTR-MCNC: 135 MG/DL — HIGH (ref 70–99)
GLUCOSE BLDC GLUCOMTR-MCNC: 141 MG/DL — HIGH (ref 70–99)
HCT VFR BLD CALC: 25.6 % — LOW (ref 34.5–45)
HCT VFR BLD CALC: 25.9 % — LOW (ref 34.5–45)
HGB BLD-MCNC: 8.3 G/DL — LOW (ref 11.5–15.5)
HGB BLD-MCNC: 8.4 G/DL — LOW (ref 11.5–15.5)
INR BLD: 1.13 RATIO — SIGNIFICANT CHANGE UP (ref 0.88–1.16)
MCHC RBC-ENTMCNC: 28.8 PG — SIGNIFICANT CHANGE UP (ref 27–34)
MCHC RBC-ENTMCNC: 29 PG — SIGNIFICANT CHANGE UP (ref 27–34)
MCHC RBC-ENTMCNC: 32.4 GM/DL — SIGNIFICANT CHANGE UP (ref 32–36)
MCV RBC AUTO: 88.9 FL — SIGNIFICANT CHANGE UP (ref 80–100)
MCV RBC AUTO: 89.3 FL — SIGNIFICANT CHANGE UP (ref 80–100)
NRBC # BLD: 0 /100 WBCS — SIGNIFICANT CHANGE UP (ref 0–0)
PLATELET # BLD AUTO: 203 K/UL — SIGNIFICANT CHANGE UP (ref 150–400)
PLATELET # BLD AUTO: 210 K/UL — SIGNIFICANT CHANGE UP (ref 150–400)
PROTHROM AB SERPL-ACNC: 13 SEC — SIGNIFICANT CHANGE UP (ref 10.5–13.4)
RBC # BLD: 2.88 M/UL — LOW (ref 3.8–5.2)
RBC # BLD: 2.9 M/UL — LOW (ref 3.8–5.2)
RBC # FLD: 14.3 % — SIGNIFICANT CHANGE UP (ref 10.3–14.5)
RBC # FLD: 14.4 % — SIGNIFICANT CHANGE UP (ref 10.3–14.5)
RH IG SCN BLD-IMP: POSITIVE — SIGNIFICANT CHANGE UP
TROPONIN T, HIGH SENSITIVITY RESULT: 15 NG/L — SIGNIFICANT CHANGE UP (ref 0–51)
WBC # BLD: 13.77 K/UL — HIGH (ref 3.8–10.5)
WBC # BLD: 15.4 K/UL — HIGH (ref 3.8–10.5)
WBC # FLD AUTO: 13.77 K/UL — HIGH (ref 3.8–10.5)
WBC # FLD AUTO: 15.4 K/UL — HIGH (ref 3.8–10.5)

## 2023-02-10 PROCEDURE — 36514 APHERESIS PLASMA: CPT

## 2023-02-10 PROCEDURE — 71045 X-RAY EXAM CHEST 1 VIEW: CPT | Mod: 26

## 2023-02-10 PROCEDURE — 99291 CRITICAL CARE FIRST HOUR: CPT

## 2023-02-10 RX ORDER — PIPERACILLIN AND TAZOBACTAM 4; .5 G/20ML; G/20ML
3.38 INJECTION, POWDER, LYOPHILIZED, FOR SOLUTION INTRAVENOUS EVERY 8 HOURS
Refills: 0 | Status: DISCONTINUED | OUTPATIENT
Start: 2023-02-10 | End: 2023-02-11

## 2023-02-10 RX ORDER — NOREPINEPHRINE BITARTRATE/D5W 8 MG/250ML
0.05 PLASTIC BAG, INJECTION (ML) INTRAVENOUS
Qty: 8 | Refills: 0 | Status: DISCONTINUED | OUTPATIENT
Start: 2023-02-10 | End: 2023-02-10

## 2023-02-10 RX ORDER — PIPERACILLIN AND TAZOBACTAM 4; .5 G/20ML; G/20ML
3.38 INJECTION, POWDER, LYOPHILIZED, FOR SOLUTION INTRAVENOUS ONCE
Refills: 0 | Status: COMPLETED | OUTPATIENT
Start: 2023-02-10 | End: 2023-02-10

## 2023-02-10 RX ORDER — SODIUM CHLORIDE 9 MG/ML
500 INJECTION INTRAMUSCULAR; INTRAVENOUS; SUBCUTANEOUS ONCE
Refills: 0 | Status: COMPLETED | OUTPATIENT
Start: 2023-02-10 | End: 2023-02-10

## 2023-02-10 RX ORDER — NOREPINEPHRINE BITARTRATE/D5W 8 MG/250ML
0.05 PLASTIC BAG, INJECTION (ML) INTRAVENOUS
Qty: 8 | Refills: 0 | Status: DISCONTINUED | OUTPATIENT
Start: 2023-02-10 | End: 2023-02-11

## 2023-02-10 RX ADMIN — FAMOTIDINE 20 MILLIGRAM(S): 10 INJECTION INTRAVENOUS at 05:15

## 2023-02-10 RX ADMIN — HUMAN INSULIN 8 UNIT(S): 100 INJECTION, SUSPENSION SUBCUTANEOUS at 23:08

## 2023-02-10 RX ADMIN — SENNA PLUS 2 TABLET(S): 8.6 TABLET ORAL at 22:59

## 2023-02-10 RX ADMIN — Medication 3 MILLILITER(S): at 05:22

## 2023-02-10 RX ADMIN — HUMAN INSULIN 8 UNIT(S): 100 INJECTION, SUSPENSION SUBCUTANEOUS at 05:20

## 2023-02-10 RX ADMIN — OXYCODONE HYDROCHLORIDE 10 MILLIGRAM(S): 5 TABLET ORAL at 03:07

## 2023-02-10 RX ADMIN — SODIUM CHLORIDE 500 MILLILITER(S): 9 INJECTION INTRAMUSCULAR; INTRAVENOUS; SUBCUTANEOUS at 10:11

## 2023-02-10 RX ADMIN — Medication 3 MILLILITER(S): at 13:01

## 2023-02-10 RX ADMIN — ENOXAPARIN SODIUM 40 MILLIGRAM(S): 100 INJECTION SUBCUTANEOUS at 18:17

## 2023-02-10 RX ADMIN — SODIUM CHLORIDE 4 MILLILITER(S): 9 INJECTION INTRAMUSCULAR; INTRAVENOUS; SUBCUTANEOUS at 00:36

## 2023-02-10 RX ADMIN — PIPERACILLIN AND TAZOBACTAM 25 GRAM(S): 4; .5 INJECTION, POWDER, LYOPHILIZED, FOR SOLUTION INTRAVENOUS at 22:59

## 2023-02-10 RX ADMIN — CHLORHEXIDINE GLUCONATE 15 MILLILITER(S): 213 SOLUTION TOPICAL at 15:06

## 2023-02-10 RX ADMIN — Medication 3 MILLILITER(S): at 18:29

## 2023-02-10 RX ADMIN — Medication 6.75 MICROGRAM(S)/KG/MIN: at 12:18

## 2023-02-10 RX ADMIN — HUMAN INSULIN 8 UNIT(S): 100 INJECTION, SUSPENSION SUBCUTANEOUS at 12:18

## 2023-02-10 RX ADMIN — OXYCODONE HYDROCHLORIDE 5 MILLIGRAM(S): 5 TABLET ORAL at 12:50

## 2023-02-10 RX ADMIN — HUMAN INSULIN 8 UNIT(S): 100 INJECTION, SUSPENSION SUBCUTANEOUS at 18:17

## 2023-02-10 RX ADMIN — CHLORHEXIDINE GLUCONATE 1 APPLICATION(S): 213 SOLUTION TOPICAL at 05:15

## 2023-02-10 RX ADMIN — FAMOTIDINE 20 MILLIGRAM(S): 10 INJECTION INTRAVENOUS at 18:17

## 2023-02-10 RX ADMIN — CHLORHEXIDINE GLUCONATE 15 MILLILITER(S): 213 SOLUTION TOPICAL at 05:15

## 2023-02-10 RX ADMIN — SODIUM CHLORIDE 4 MILLILITER(S): 9 INJECTION INTRAMUSCULAR; INTRAVENOUS; SUBCUTANEOUS at 13:02

## 2023-02-10 RX ADMIN — PIPERACILLIN AND TAZOBACTAM 25 GRAM(S): 4; .5 INJECTION, POWDER, LYOPHILIZED, FOR SOLUTION INTRAVENOUS at 16:00

## 2023-02-10 RX ADMIN — OXYCODONE HYDROCHLORIDE 10 MILLIGRAM(S): 5 TABLET ORAL at 03:30

## 2023-02-10 RX ADMIN — Medication 3 MILLILITER(S): at 00:26

## 2023-02-10 RX ADMIN — PIPERACILLIN AND TAZOBACTAM 200 GRAM(S): 4; .5 INJECTION, POWDER, LYOPHILIZED, FOR SOLUTION INTRAVENOUS at 12:25

## 2023-02-10 RX ADMIN — SODIUM CHLORIDE 4 MILLILITER(S): 9 INJECTION INTRAMUSCULAR; INTRAVENOUS; SUBCUTANEOUS at 18:29

## 2023-02-10 RX ADMIN — OXYCODONE HYDROCHLORIDE 10 MILLIGRAM(S): 5 TABLET ORAL at 21:40

## 2023-02-10 RX ADMIN — OXYCODONE HYDROCHLORIDE 10 MILLIGRAM(S): 5 TABLET ORAL at 20:34

## 2023-02-10 RX ADMIN — SODIUM CHLORIDE 4 MILLILITER(S): 9 INJECTION INTRAMUSCULAR; INTRAVENOUS; SUBCUTANEOUS at 05:42

## 2023-02-10 NOTE — PROGRESS NOTE ADULT - ASSESSMENT
ASSESSMENT/PLAN: MG exacerbation, covid +    NEURO:   Neurochecks q4  IVIG 25g (1/31--2/2), PLEX every other day 2/2--  solumedrol 125 mg daily (1/31--2/10)  Avoid medications that may worsen the current exacerbation including macrolides, fluoroquinolones, tetracyclines, aminoglycoside, beta-blockers, magnesium, and anti-arrhythmics (procainamide, quinidine, and lidocaine)  Pain management with fentanyl pushes    PULM:  Full support   NIF/VC/RSBI - will calculate and attempt to extubate to BIPAP.  Will obtain repeat ABG  Intubated for airway protection, will wean as tolerated    CV:  MAP >65  TTE- 35-40%    RENAL:  Fluids: IVL in setting of poor EF  IO: replete as needed    GI:  Diet: TF at goal  GI prophylaxis [] not indicated [x] famotidine [] other:   LBM- 2/8    ENDO:   A1c 6.6  Goal euglycemia (-180)  RISS for now and NPH, adjust as needed    HEME/ONC:  H/H stable   VTE prophylaxis: [x] SCDs [x] chemoprophylaxis lovenox [] hold chemoprophylaxis due to: [] high risk of DVT/PE on admission due to:    ID: covid + status post remdesivir  leukocytes trending down, UA positive  Will follow up with urine culture and start antibiotics if positive    CODE STATUS:  [x] Full Code [] DNR [] DNI [] Palliative/Comfort Care    DISPOSITION:  [x] ICU [] Stroke Unit [] Floor [] EMU [] RCU [] PCU    At risk of death/neuro decline due to: acute resp failure requiring intubation    Contact: [phone] ASSESSMENT/PLAN: MG exacerbation, covid +    NEURO:   Neurochecks q4  IVIG 25g (1/31--2/2), PLEX every other day 2/2--  solumedrol 125 mg daily (1/31--2/10)  Avoid medications that may worsen the current exacerbation including macrolides, fluoroquinolones, tetracyclines, aminoglycoside, beta-blockers, magnesium, and anti-arrhythmics (procainamide, quinidine, and lidocaine)  Pain management with fentanyl pushes    PULM:  Full support   NIF/VC/RSBI - will calculate and attempt to extubate to BIPAP.  Will obtain repeat ABG  Intubated for airway protection, will wean as tolerated    CV:  MAP >65  TTE- 35-40%    RENAL:  Fluids: IVL in setting of poor EF  IO: replete as needed    GI:  Diet: TF at goal  GI prophylaxis [] not indicated [x] famotidine [] other:   LBM- 2/8    ENDO:   A1c 6.6  Goal euglycemia (-180)  RISS for now and NPH, adjust as needed    HEME/ONC:  H/H stable   VTE prophylaxis: [x] SCDs [x] chemoprophylaxis lovenox [] hold chemoprophylaxis due to: [] high risk of DVT/PE on admission due to:    ID: covid + status post remdesivir  leukocytes trending down, UA positive  Will follow up with urine culture and start antibiotics if positive    CODE STATUS:  [x] Full Code [] DNR [] DNI [] Palliative/Comfort Care    DISPOSITION:  [x] ICU [] Stroke Unit [] Floor [] EMU [] RCU [] PCU    At risk of death/neuro decline due to: acute resp failure requiring intubation    Contact: [phone] ASSESSMENT/PLAN: MG exacerbation, covid +    NEURO:   Neurochecks q4  IVIG 25g (1/31--2/2), PLEX every other day 2/2--  solumedrol 125 mg daily (1/31--2/10)  Avoid medications that may worsen the current exacerbation including macrolides, fluoroquinolones, tetracyclines, aminoglycoside, beta-blockers, magnesium, and anti-arrhythmics (procainamide, quinidine, and lidocaine)  Pain management with fentanyl pushes    PULM:  Full support   NIF/VC/RSBI - will calculate and attempt to extubate to BIPAP.  Will obtain repeat ABG  Intubated for airway protection, will wean as tolerated    CV:  MAP >65  TTE- 35-40%    RENAL:  Fluids: IVL in setting of poor EF  IO: replete as needed    GI:  Diet: TF at goal  GI prophylaxis [] not indicated [x] famotidine [] other:   LBM- 2/8    ENDO:   A1c 6.6  Goal euglycemia (-180)  RISS for now and NPH, adjust as needed    HEME/ONC:  H/H stable   VTE prophylaxis: [x] SCDs [x] chemoprophylaxis lovenox [] hold chemoprophylaxis due to: [] high risk of DVT/PE on admission due to:    ID: covid + status post remdesivir  leukocytes trending down, UA positive  Will follow up with urine culture and start antibiotics if positive    CODE STATUS:  [x] Full Code [] DNR [] DNI [] Palliative/Comfort Care    DISPOSITION:  [x] ICU [] Stroke Unit [] Floor [] EMU [] RCU [] PCU    At risk of death/neuro decline due to: acute resp failure requiring intubation    Contact: [phone] ASSESSMENT/PLAN: MG exacerbation, covid +     NEURO:   Neurochecks q4  IVIG 25g (1/31--2/2), PLEX every other day 2/2--  solumedrol 125 mg daily (1/31--2/10)  Avoid medications that may worsen the current exacerbation including macrolides, fluoroquinolones, tetracyclines, aminoglycoside, beta-blockers, magnesium, and anti-arrhythmics (procainamide, quinidine, and lidocaine)  Pain management with tylenol fentanyl pushes    PULM:  Full support currently difficult ventilatory management due to poor   Will decrease the PEEP to 8 now, will repeat ABG at 4  Will obtain repeat ABG  Intubated for airway protection, will wean as tolerated    CV:  MAP >65  TTE- 35-40%  On Levo  FU trops    RENAL:  Fluids: IVL in setting of poor EF  IO: replete as needed    GI:  Diet: TF at goal  GI prophylaxis [] not indicated [x] famotidine [] other:   LBM- 2/8    ENDO:   A1c 6.6  Goal euglycemia (-180)  RISS for now and NPH, adjust as needed    HEME/ONC:  H/H trending down  VTE prophylaxis: [x] SCDs [x] chemoprophylaxis lovenox [] hold chemoprophylaxis due to: [] high risk of DVT/PE on admission due to:  DIC labs for downtrending H/H  Will obtain CT CAP if DIC labs are negative  Will obtain type and screen and CBC at 4 PM    ID: covid + status post remdesivir  leukocytes trending down, UA positive  UC positive for gram negative rods  Will start patient on Zosyn  Will obtain LED in bilateral LE    CODE STATUS:  [x] Full Code [] DNR [] DNI [] Palliative/Comfort Care    DISPOSITION:  [x] ICU [] Stroke Unit [] Floor [] EMU [] RCU [] PCU    At risk of death/neuro decline due to: acute resp failure requiring intubation    Contact: [phone] ASSESSMENT/PLAN: MG exacerbation, covid +     NEURO:   Neurochecks q4  IVIG 25g (1/31--2/2), PLEX every other day 2/2--  solumedrol 125 mg daily (1/31--2/10)  Avoid medications that may worsen the current exacerbation including macrolides, fluoroquinolones, tetracyclines, aminoglycoside, beta-blockers, magnesium, and anti-arrhythmics (procainamide, quinidine, and lidocaine)  Pain management with tylenol fentanyl pushes    PULM:  Full support currently difficult ventilatory management due to poor   Will decrease the PEEP to 8 now, will repeat ABG at 4  Will obtain repeat ABG  Intubated for airway protection, will wean as tolerated    CV:  MAP >65  TTE- 35-40%  On Levo  FU trops    RENAL:  Fluids: IVL in setting of poor EF  IO: replete as needed    GI:  Diet: TF at goal  GI prophylaxis [] not indicated [x] famotidine [] other:   LBM- 2/8    ENDO:   A1c 6.6  Goal euglycemia (-180)  RISS for now and NPH, adjust as needed    HEME/ONC:  H/H trending down  VTE prophylaxis: [x] SCDs [x] chemoprophylaxis lovenox [] hold chemoprophylaxis due to: [] high risk of DVT/PE on admission due to:  DIC labs for downtrending H/H  Will obtain CT CAP if DIC labs are negative  Will obtain type and screen and CBC at 4 PM    ID: covid + status post remdesivir  leukocytes trending down, UA positive  UC positive for gram negative rods  Will start patient on Zosyn  Will obtain LED in bilateral LE    CODE STATUS:  [x] Full Code [] DNR [] DNI [] Palliative/Comfort Care    DISPOSITION:  [x] ICU [] Stroke Unit [] Floor [] EMU [] RCU [] PCU    At risk of death/neuro decline due to: acute resp failure requiring intubation    Contact: [phone] ASSESSMENT/PLAN: MG exacerbation, covid +     NEURO:   Neurochecks q4  IVIG 25g (1/31--2/2), PLEX every other day 2/2--  solumedrol 125 mg daily (1/31--2/10)  Avoid medications that may worsen the current exacerbation including macrolides, fluoroquinolones, tetracyclines, aminoglycoside, beta-blockers, magnesium, and anti-arrhythmics (procainamide, quinidine, and lidocaine)  Pain management with tylenol fentanyl pushes    PULM:  Full support currently difficult ventilatory management due to poor   Will decrease the PEEP to 8 now, will repeat ABG at 4  Will obtain repeat ABG  Intubated for airway protection, will wean as tolerated    CV:  MAP >65  TTE- 35-40%  On Levo  FU trops    RENAL:  Fluids: IVL in setting of poor EF  IO: replete as needed    GI:  Diet: TF at goal  GI prophylaxis [] not indicated [x] famotidine [] other:   LBM- 2/8    ENDO:   A1c 6.6  Goal euglycemia (-180)  RISS for now and NPH, adjust as needed    HEME/ONC:  H/H trending down  VTE prophylaxis: [x] SCDs [x] chemoprophylaxis lovenox [] hold chemoprophylaxis due to: [] high risk of DVT/PE on admission due to:  DIC labs for downtrending H/H  Will obtain CT CAP if DIC labs are negative  Will obtain type and screen and CBC at 4 PM    ID: covid + status post remdesivir  leukocytes trending down, UA positive  UC positive for gram negative rods  Will start patient on Zosyn  Will obtain LED in bilateral LE    CODE STATUS:  [x] Full Code [] DNR [] DNI [] Palliative/Comfort Care    DISPOSITION:  [x] ICU [] Stroke Unit [] Floor [] EMU [] RCU [] PCU    At risk of death/neuro decline due to: acute resp failure requiring intubation    Contact: [phone] ASSESSMENT/PLAN: MG exacerbation, covid +     NEURO:   Neurochecks q4  IVIG 25g (1/31--2/2), PLEX every other day 2/2--  solumedrol 125 mg daily (1/31--2/10)  Avoid medications that may worsen the current exacerbation including macrolides, fluoroquinolones, tetracyclines, aminoglycoside, beta-blockers, magnesium, and anti-arrhythmics (procainamide, quinidine, and lidocaine)  Pain management with tylenol fentanyl pushes    PULM:  Full support currently difficult ventilatory management due to poor   Will decrease the PEEP to 8 now, will repeat ABG at 4  Will obtain repeat ABG  Intubated for airway protection, will wean as tolerated    CV:  MAP >65  TTE- 35-40%  On Levo  FU trops    RENAL:  Fluids: IVL in setting of poor EF  IO: replete as needed    GI:  Diet: TF at goal  GI prophylaxis [] not indicated [x] famotidine [] other:   LBM- 2/8    ENDO:   A1c 6.6  Goal euglycemia (-180)  RISS for now and NPH, adjust as needed    HEME/ONC:  H/H trending down  VTE prophylaxis: [x] SCDs [x] chemoprophylaxis lovenox [] hold chemoprophylaxis due to: [] high risk of DVT/PE on admission due to:  DIC labs for downtrending H/H  Will obtain CT CAP if DIC labs are negative  Will obtain type and screen and CBC at 4 PM    ID: covid + status post remdesivir  R/O sepsis  leukocytes trending down, UA positive  UC positive for gram negative rods  Will start patient on Zosyn  Will obtain LED in bilateral LE    CODE STATUS:  [x] Full Code [] DNR [] DNI [] Palliative/Comfort Care    DISPOSITION:  [x] ICU [] Stroke Unit [] Floor [] EMU [] RCU [] PCU    At risk of death/neuro decline due to: acute resp failure requiring intubation    Contact: [phone] ASSESSMENT/PLAN: MG exacerbation, covid +     NEURO:   Neurochecks q4  IVIG 25g (1/31--2/2), PLEX every other day 2/2--  solumedrol 125 mg daily (1/31--2/10)  Avoid medications that may worsen the current exacerbation including macrolides, fluoroquinolones, tetracyclines, aminoglycoside, beta-blockers, magnesium, and anti-arrhythmics (procainamide, quinidine, and lidocaine)  Pain management with tylenol fentanyl pushes    PULM:  Full support currently difficult ventilatory management due to poor   Will decrease the PEEP to 8 now, will repeat ABG at 4  Will obtain repeat ABG  Intubated for airway protection, will wean as tolerated    CV:  MAP >65  TTE- 35-40%  On Levo  FU trops    RENAL:  Fluids: IVL in setting of poor EF  IO: replete as needed    GI:  Diet: TF at goal  GI prophylaxis [] not indicated [x] famotidine [] other:   LBM- 2/8    ENDO:   A1c 6.6  Goal euglycemia (-180)  RISS for now and NPH, adjust as needed    HEME/ONC:  H/H trending down  VTE prophylaxis: [x] SCDs [x] chemoprophylaxis lovenox [] hold chemoprophylaxis due to: [] high risk of DVT/PE on admission due to:  DIC labs for downtrending H/H  Will obtain CT CAP if DIC labs are negative  Will obtain type and screen and CBC at 4 PM    ID: covid + status post remdesivir  R/O sepsis  leukocytes trending down, UA positive  UC positive for gram negative rods  Will start patient on Zosyn  Will obtain LED in bilateral LE    CODE STATUS:  [x] Full Code [] DNR [] DNI [] Palliative/Comfort Care    DISPOSITION:  [x] ICU [] Stroke Unit [] Floor [] EMU [] RCU [] PCU    At risk of death/neuro decline due to: acute resp failure requiring intubation    Contact: [phone] ASSESSMENT/PLAN: MG exacerbation, covid +     NEURO:   Neurochecks q4  IVIG 25g (1/31--2/2), PLEX every other day 2/2--  solumedrol 125 mg daily (1/31--2/10)  Avoid medications that may worsen the current exacerbation including macrolides, fluoroquinolones, tetracyclines, aminoglycoside, beta-blockers, magnesium, and anti-arrhythmics (procainamide, quinidine, and lidocaine)  Pain management with tylenol fentanyl pushes    PULM:  Full support currently difficult ventilatory management due to poor   Will decrease the PEEP to 8 now, will repeat ABG at 4  Will obtain repeat ABG  Intubated for airway protection, will wean as tolerated    CV:  MAP >65  TTE- 35-40%  On Levo  FU trops    RENAL:  Fluids: IVL in setting of poor EF  IO: replete as needed    GI:  Diet: TF at goal  GI prophylaxis [] not indicated [x] famotidine [] other:   LBM- 2/8    ENDO:   A1c 6.6  Goal euglycemia (-180)  RISS for now and NPH, adjust as needed    HEME/ONC:  H/H trending down  VTE prophylaxis: [x] SCDs [x] chemoprophylaxis lovenox [] hold chemoprophylaxis due to: [] high risk of DVT/PE on admission due to:  DIC labs for downtrending H/H  Will obtain CT CAP if DIC labs are negative  Will obtain type and screen and CBC at 4 PM    ID: covid + status post remdesivir  R/O sepsis  leukocytes trending down, UA positive  UC positive for gram negative rods  Will start patient on Zosyn  Will obtain LED in bilateral LE    CODE STATUS:  [x] Full Code [] DNR [] DNI [] Palliative/Comfort Care    DISPOSITION:  [x] ICU [] Stroke Unit [] Floor [] EMU [] RCU [] PCU    At risk of death/neuro decline due to: acute resp failure requiring intubation    Contact: [phone] ASSESSMENT/PLAN: MG exacerbation, covid +     NEURO:   Neurochecks q4  IVIG 25g (1/31--2/2), PLEX every other day 2/2--  solumedrol 125 mg daily (1/31--2/10)  Avoid medications that may worsen the current exacerbation including macrolides, fluoroquinolones, tetracyclines, aminoglycoside, beta-blockers, magnesium, and anti-arrhythmics (procainamide, quinidine, and lidocaine)  Pain management with tylenol fentanyl pushes    PULM:  Full support currently difficult ventilatory management due to poor   Will decrease the PEEP to 8 now, will repeat ABG at 4  Will obtain repeat ABG  Intubated for airway protection, will wean as tolerated    CV:  MAP >65  TTE- 35-40%  On Levo  FU trops    RENAL:  Fluids: IVL in setting of poor EF  IO: replete as needed    GI:  Diet: TF at goal  GI prophylaxis [] not indicated [x] famotidine [] other:   LBM- 2/8    ENDO:   A1c 6.6  Goal euglycemia (-180)  RISS for now and NPH, adjust as needed    HEME/ONC:  H/H trending down  VTE prophylaxis: [x] SCDs [x] chemoprophylaxis lovenox [] hold chemoprophylaxis due to: [] high risk of DVT/PE on admission due to:  DIC labs for downtrending H/H  Will obtain CT CAP if DIC labs are negative  Will obtain type and screen and CBC at 4 PM    ID: covid + status post remdesivir  R/O sepsis  leukocytes trending down, UA positive  UC positive for gram negative rods  Will start patient on Zosyn  Will obtain LED in bilateral LE    CODE STATUS:  [x] Full Code [] DNR [] DNI [] Palliative/Comfort Care    DISPOSITION:  [x] ICU [] Stroke Unit [] Floor [] EMU [] RCU [] PCU    At risk of death/neuro decline due to: acute resp failure requiring intubation     Contact: [phone] ASSESSMENT/PLAN: MG exacerbation, covid +     NEURO:   Neurochecks q4  IVIG 25g (1/31--2/2), PLEX every other day 2/2--  solumedrol 125 mg daily (1/31--2/10)  Avoid medications that may worsen the current exacerbation including macrolides, fluoroquinolones, tetracyclines, aminoglycoside, beta-blockers, magnesium, and anti-arrhythmics (procainamide, quinidine, and lidocaine)  Pain management with tylenol fentanyl pushes    PULM:  Full support currently difficult ventilatory management due to poor   Will decrease the PEEP to 8 now, will repeat ABG at 4  Will obtain repeat ABG  Intubated for airway protection, will wean as tolerated    CV:  MAP >65  TTE- 35-40%  On Levo  FU trops    RENAL:  Fluids: IVL in setting of poor EF  IO: replete as needed    GI:  Diet: TF at goal  GI prophylaxis [] not indicated [x] famotidine [] other:   LBM- 2/8    ENDO:   A1c 6.6  Goal euglycemia (-180)  RISS for now and NPH, adjust as needed    HEME/ONC:  H/H trending down  VTE prophylaxis: [x] SCDs [x] chemoprophylaxis lovenox [] hold chemoprophylaxis due to: [] high risk of DVT/PE on admission due to:  DIC labs for downtrending H/H  Will obtain CT CAP if DIC labs are negative  Will obtain type and screen and CBC at 4 PM    ID: covid + status post remdesivir  R/O sepsis  leukocytes trending down, UA positive  UC positive for gram negative rods  Will start patient on Zosyn  Will obtain LED in bilateral LE    CODE STATUS:  [x] Full Code [] DNR [] DNI [] Palliative/Comfort Care    DISPOSITION:  [x] ICU [] Stroke Unit [] Floor [] EMU [] RCU [] PCU    At risk of death/neuro decline due to: acute resp failure requiring intubation     Contact: [phone] ASSESSMENT/PLAN: MG exacerbation, covid +     NEURO:   Neurochecks q4  IVIG 25g (1/31--2/2), PLEX every other day 2/2--  solumedrol 125 mg daily (1/31--2/10)  Avoid medications that may worsen the current exacerbation including macrolides, fluoroquinolones, tetracyclines, aminoglycoside, beta-blockers, magnesium, and anti-arrhythmics (procainamide, quinidine, and lidocaine)  Pain management with tylenol fentanyl pushes    PULM:  Full support currently difficult ventilatory management due to poor   Will decrease the PEEP to 8 now, will repeat ABG at 4  Will obtain repeat ABG  Intubated for airway protection, will wean as tolerated    CV:  MAP >65  TTE- 35-40%  On Levo  FU trops    RENAL:  Fluids: IVL in setting of poor EF  IO: replete as needed    GI:  Diet: TF at goal  GI prophylaxis [] not indicated [x] famotidine [] other:   LBM- 2/8    ENDO:   A1c 6.6  Goal euglycemia (-180)  RISS for now and NPH, adjust as needed    HEME/ONC:  H/H trending down  VTE prophylaxis: [x] SCDs [x] chemoprophylaxis lovenox [] hold chemoprophylaxis due to: [] high risk of DVT/PE on admission due to:  DIC labs for downtrending H/H  Will obtain CT CAP if DIC labs are negative  Will obtain type and screen and CBC at 4 PM    ID: covid + status post remdesivir  R/O sepsis  leukocytes trending down, UA positive  UC positive for gram negative rods  Will start patient on Zosyn  Will obtain LED in bilateral LE    CODE STATUS:  [x] Full Code [] DNR [] DNI [] Palliative/Comfort Care    DISPOSITION:  [x] ICU [] Stroke Unit [] Floor [] EMU [] RCU [] PCU    At risk of death/neuro decline due to: acute resp failure requiring intubation     Contact: [phone]

## 2023-02-10 NOTE — PROGRESS NOTE ADULT - ATTENDING COMMENTS
Agree with/edited as appropriate above  remains intubated, PEEP to 8 and cont wean vent  ABG  PLEX#5 today   afebrile, but ucx +proteus, start zosyn f/u sensitivity

## 2023-02-10 NOTE — PROGRESS NOTE ADULT - SUBJECTIVE AND OBJECTIVE BOX
SUMMARY:   62-year-old woman with DMII, myasthenia gravis status post thymectomy and prior crises status post IVIG developed MG crisis in setting of COVID-19 infection requiring intubation status post IVIG x 1 and PLEX.     24 HOUR EVENTS:  IPV, nebs q6hrs, PEEP increased to 10    2/6- No overnight events.     2/7- Patient difficult to wean off the ventilator, requiring quite a lot of PEEP, currently on PEEP of 7. Otherwise exam improving. S/p 3 doses of PLEX.    2/8- Remains intubated, CPAP this AM and tolerated it. Today is her 5th dose of PLEX. No changes in exam.    2/9- Patient desaturated overnight, TV was low which was adjusted and PEEP was increased to 10. Improved after.     2/10- Day 5 of PLEX today. No changes in examination. Decreased PEEP requirements overnight.    VITALS/DATA/ORDERS: [x] Reviewed    EXAMINATION:   Intubated, nods appropriately aaoX3, +cough/gag, no ptosis, deltoids 4-/5, neck flexion 5/5 extension 5/5, otherwise full strength   Pulmonary: Decreased breath sounds at bases  Cardiac: Normal S1/S2  Abd: Soft, non tender, + BS  Extremities: pulses strong, no edema SUMMARY:   62-year-old woman with DMII, myasthenia gravis status post thymectomy and prior crises status post IVIG developed MG crisis in setting of COVID-19 infection requiring intubation status post IVIG x 1 and PLEX.     24 HOUR EVENTS:  IPV, nebs q6hrs, PEEP increased to 10    2/6- No overnight events.     2/7- Patient difficult to wean off the ventilator, requiring quite a lot of PEEP, currently on PEEP of 7. Otherwise exam improving. S/p 3 doses of PLEX.    2/8- Remains intubated, CPAP this AM and tolerated it. Today is her 5th dose of PLEX. No changes in exam.    2/9- Patient desaturated overnight, TV was low which was adjusted and PEEP was increased to 10. Improved after.     2/10- Day 5 of PLEX today. No changes in examination.     VITALS/DATA/ORDERS: [x] Reviewed    EXAMINATION:   Intubated, nods appropriately aaoX3, +cough/gag, no ptosis, deltoids 4-/5, neck flexion 5/5 extension 5/5, otherwise full strength   Pulmonary: Decreased breath sounds at bases  Cardiac: Normal S1/S2  Abd: Soft, non tender, + BS  Extremities: pulses strong, no edema SUMMARY:   62-year-old woman with DMII, myasthenia gravis status post thymectomy and prior crises status post IVIG developed MG crisis in setting of COVID-19 infection requiring intubation status post IVIG x 1 and PLEX.     24 HOUR EVENTS:  IPV, nebs q6hrs, PEEP increased to 10    2/6- No overnight events.     2/7- Patient difficult to wean off the ventilator, requiring quite a lot of PEEP, currently on PEEP of 7. Otherwise exam improving. S/p 3 doses of PLEX.    2/8- Remains intubated, CPAP this AM and tolerated it. Today is her 5th dose of PLEX. No changes in exam.    2/9- Patient desaturated overnight, TV was low which was adjusted and PEEP was increased to 10. Improved after.     2/10- Day 5 of PLEX today.     REVIEW OF SYSTEMS: [x] Unable to Assess due to neurologic exam--intubated, but able to nod, c/o pain   [ ] All ROS addressed below are non-contributory, except:  Neuro: [ ] Headache [ ] Back pain [ ] Numbness [ ] Weakness [ ] Ataxia [ ] Dizziness [ ] Aphasia [ ] Dysarthria [ ] Visual disturbance  Resp: [ ] Shortness of breath/dyspnea [ ] Orthopnea [ ] Cough  CV: [ ] Chest pain [ ] Palpitation [ ] Lightheadedness [ ] Syncope  Renal: [ ] Thirst [ ] Edema  GI: [ ] Nausea [ ] Emesis [ ] Abdominal pain [ ] Constipation [ ] Diarrhea  Hem: [ ] Hematemesis [ ] bBright red blood per rectum  ID: [ ] Fever [ ] Chills [ ] Dysuria  ENT: [ ] Rhinorrhea    VITALS/DATA/ORDERS: [x] Reviewed    EXAMINATION:   Intubated, nods appropriately aaoX3, +cough/gag, no ptosis, deltoids 4-/5, neck flexion 5/5 extension 5/5, otherwise full strength   Pulmonary: Decreased breath sounds at bases  Cardiac: Normal S1/S2  Abd: Soft, non tender, + BS  Extremities: pulses strong, no edema

## 2023-02-10 NOTE — PROGRESS NOTE ADULT - SUBJECTIVE AND OBJECTIVE BOX
CC: f/u for respiratory failure    Patient reports: she is alert on vent, not waening    REVIEW OF SYSTEMS:  All other review of systems negative (Comprehensive ROS)    Antimicrobials Day #  :day 1  piperacillin/tazobactam IVPB.- 3.375 Gram(s) IV Intermittent once  piperacillin/tazobactam IVPB.. 3.375 Gram(s) IV Intermittent every 8 hours    Other Medications Reviewed  MEDICATIONS  (STANDING):  albuterol/ipratropium for Nebulization 3 milliLiter(s) Nebulizer every 6 hours  chlorhexidine 0.12% Liquid 15 milliLiter(s) Oral Mucosa every 12 hours  chlorhexidine 4% Liquid 1 Application(s) Topical <User Schedule>  enoxaparin Injectable 40 milliGRAM(s) SubCutaneous <User Schedule>  famotidine    Tablet 20 milliGRAM(s) Oral two times a day  insulin lispro (ADMELOG) corrective regimen sliding scale   SubCutaneous every 6 hours  insulin NPH human recombinant 8 Unit(s) SubCutaneous every 6 hours  norepinephrine Infusion 0.05 MICROgram(s)/kG/Min (6.75 mL/Hr) IV Continuous <Continuous>  piperacillin/tazobactam IVPB.- 3.375 Gram(s) IV Intermittent once  piperacillin/tazobactam IVPB.. 3.375 Gram(s) IV Intermittent every 8 hours  polyethylene glycol 3350 17 Gram(s) Oral daily  senna 2 Tablet(s) Oral at bedtime  sodium chloride 3%  Inhalation 4 milliLiter(s) Inhalation every 6 hours    T(F): 98 (02-10-23 @ 12:00), Max: 99.2 (02-09-23 @ 19:00)  HR: 75 (02-10-23 @ 12:00)  BP: --  RR: 13 (02-10-23 @ 12:00)  SpO2: 99% (02-10-23 @ 12:00)  Wt(kg): --    PHYSICAL EXAM:  General: alert, no acute distress  Eyes:  anicteric, no conjunctival injection, no discharge  Oropharynx: ETT	  Neck: supple, without adenopathy  Lungs: clear to auscultation  Heart: regular rate and rhythm; no murmur, rubs or gallops  Abdomen: soft, nondistended, nontender, without mass or organomegaly  Skin: no lesions  Extremities: no clubbing, cyanosis, or edema  Neurologic: alert, oriented, moves all extremities    LAB RESULTS:                        8.4    15.40 )-----------( 210      ( 10 Feb 2023 10:21 )             25.9     02-09    144  |  107  |  36<H>  ----------------------------<  151<H>  3.9   |  29  |  0.46<L>    Ca    8.4      09 Feb 2023 21:24  Phos  3.3     02-09  Mg     2.5     02-09          MICROBIOLOGY:  RECENT CULTURES:  02-08 @ 12:55 Clean Catch Clean Catch (Midstream)     >100,000 CFU/ml Proteus mirabilis      02-08 @ 01:09 .Sputum Sputum     Moderate Staphylococcus aureus Susceptibility to follow.  Normal Respiratory Renetta present    Rare polymorphonuclear leukocytes per low power field  No Squamous epithelial cells per low power field  Rare Gram Positive Rods seen per oil power field  Rare Gram positive cocci in pairs seen per oil power field    02-07 @ 18:41 .Blood Blood-Venous     No growth to date.      02-07 @ 18:40 .Blood Blood-Peripheral     No growth to date.          RADIOLOGY REVIEWED:  < from: Xray Chest 1 View- PORTABLE-Routine (Xray Chest 1 View- PORTABLE-Routine in AM.) (02.10.23 @ 04:28) >  INTERPRETATION:  Chest one view    HISTORY: Shortness of breath    COMPARISON STUDY: 2/9/2023    Frontal expiratory view of the chest shows the heart to be similar in   size. Endotracheal tube, right jugular dialysis catheter and feeding tube   are present.    The lungs show partial clearing of the lung bases and there is no   evidence of pneumothorax nor definite pleural effusion.    IMPRESSION:  Partial basilar clearing.    < end of copied text >

## 2023-02-10 NOTE — PROGRESS NOTE ADULT - ASSESSMENT
65 yo female with a long history of Myasthenia Gravis , T2DM,,prior thymectomy who was admitted with respiratory difficulty and weakness.  She is maintained on pyridostigmine and PRN prednisone, this had been added recently for MG flare.  History of prior need for ventilator.  Home exposure to Covid, she tested positive on admission.  Suspect respiratory failure on MG basis.  She is s/p 5 days of RDV-completed 2/4  High dose steroids for MG  S/P IVIG and multiple PLEX sessions  Vent management per NSICU,  Leukocytosis likely steroid mediated, it is moderating  Surveillance blood cultures and sputum cultures sent 2/7  Blood cultures negative, sputum with staph aureus, and urine with proteus.  pyuria noted, nonspecific  Zosyn started by NSICU to cover proteus. I am not sure proteus/staph aureus are more than colonizers  PLEX # 5 started  Suggest:  1. Zosyn per NSICU  2.Supportive care  3.PLEX, steroids, and IVIG as per NSICU  4.Vent per NSICU  5.Reculture any fever spikes, await sensitivities. 63 yo female with a long history of Myasthenia Gravis , T2DM,,prior thymectomy who was admitted with respiratory difficulty and weakness.  She is maintained on pyridostigmine and PRN prednisone, this had been added recently for MG flare.  History of prior need for ventilator.  Home exposure to Covid, she tested positive on admission.  Suspect respiratory failure on MG basis.  She is s/p 5 days of RDV-completed 2/4  High dose steroids for MG  S/P IVIG and multiple PLEX sessions  Vent management per NSICU,  Leukocytosis likely steroid mediated, it is moderating  Surveillance blood cultures and sputum cultures sent 2/7  Blood cultures negative, sputum with staph aureus, and urine with proteus.  pyuria noted, nonspecific  Zosyn started by NSICU to cover proteus. I am not sure proteus/staph aureus are more than colonizers  PLEX # 5 started  Suggest:  1. Zosyn per NSICU  2.Supportive care  3.PLEX, steroids, and IVIG as per NSICU  4.Vent per NSICU  5.Reculture any fever spikes, await sensitivities.

## 2023-02-10 NOTE — CHART NOTE - NSCHARTNOTEFT_GEN_A_CORE
The patient is a 62-year-old woman with DMII, myasthenia gravis status post thymectomy and prior crises status post IVIG who developed MG crisis in setting of COVID-19 infection requiring intubation. The patient received IVIG 25g (1/31--2/2) and solumedrol 125 mg daily (1/31--2/10). PLEX was started on 2/2 with plan to perform 5 procedures over 10 days.    PLEX procedures on 2/2, 2/4 and 2/6 tolerated well.     PLEX#4 performed today 2/8/23. 1 PV with 5% albumin as replacement fluid. Procedure tolerated well. Patient remains intubated with NGT in place. Family members at bedside.     PLEX#5 scheduled for 2/10/23. Plan for 1 PV with 5% albumin as replacement fluid. The patient is a 62-year-old woman with DMII, myasthenia gravis status post thymectomy and prior crises status post IVIG who developed MG crisis in setting of COVID-19 infection requiring intubation. The patient received IVIG 25g (1/31--2/2) and solumedrol 125 mg daily (1/31--2/10). PLEX was started on 2/2 with plan to perform 5 procedures over 10 days.    PLEX procedures on 2/2, 2/4 and 2/6 tolerated well.     PLEX#4 performed today 2/8/23. 1 PV with 5% albumin as replacement fluid. Procedure tolerated well. Patient remains intubated with NGT in place. Family members at bedside.     PLEX#5 scheduled for 2/10/23. Plan for 1 PV with 5% albumin as replacement fluid. Patient shows improvement on neurology exam, but remains intubated. The patient is a 62-year-old woman with DMII, myasthenia gravis status post thymectomy and prior crises status post IVIG who developed MG crisis in setting of COVID-19 infection requiring intubation. The patient received IVIG 25g (1/31--2/2) and solumedrol 125 mg daily (1/31--2/10). PLEX was started on 2/2 with plan to perform 5 procedures over 10 days.    PLEX procedures on 2/2, 2/4 and 2/6 tolerated well.     PLEX#4 performed today 2/8/23. 1 PV with 5% albumin as replacement fluid. Procedure tolerated well. Patient remains intubated with NGT in place. Family members at bedside.     PLEX#5 scheduled for 2/10/23. Plan for 1 PV with 5% albumin as replacement fluid. Patient shows improvement on neurology exam, but remains intubated. Discussed with ICU provider at bedside for close monitoring of BP and possible adjustment of levo during procedure. The patient is a 62-year-old woman with DMII, myasthenia gravis status post thymectomy and prior crises status post IVIG who developed MG crisis in setting of COVID-19 infection requiring intubation. The patient received IVIG 25g (1/31--2/2) and solumedrol 125 mg daily (1/31--2/10). PLEX was started on 2/2 with plan to perform 5 procedures over 10 days.    PLEX procedures on 2/2, 2/4 and 2/6 tolerated well.     PLEX#4 performed 2/8/23. 1 PV with 5% albumin as replacement fluid. Procedure tolerated well. Patient remains intubated with NGT in place. Family members at bedside.     PLEX#5 performed 2/10/23. Plan for 1 PV with 5% albumin as replacement fluid. Patient shows improvement on neurology exam, but remains intubated. Discussed with ICU provider at bedside need for close monitoring of BP and possible adjustment of levo during procedure. Blood pressure stable throughout procedure and procedure tolerated well.     Please reach out to Apheresis Service ([phone]) if further apheresis procedures needed. The patient is a 62-year-old woman with DMII, myasthenia gravis status post thymectomy and prior crises status post IVIG who developed MG crisis in setting of COVID-19 infection requiring intubation. The patient received IVIG 25g (1/31--2/2) and solumedrol 125 mg daily (1/31--2/10). PLEX was started on 2/2 with plan to perform 5 procedures over 10 days.    PLEX procedures on 2/2, 2/4 and 2/6 tolerated well.     PLEX#4 performed 2/8/23. 1 PV with 5% albumin as replacement fluid. Procedure tolerated well. Patient remains intubated with NGT in place. Family members at bedside.     PLEX#5 performed 2/10/23. Plan for 1 PV with 5% albumin as replacement fluid. Patient shows improvement on neurology exam, but remains intubated. Discussed with ICU provider at bedside need for close monitoring of BP and possible adjustment of levo during procedure. Blood pressure stable throughout procedure and procedure tolerated well.     Please reach out to Apheresis Service ([phone]) if further apheresis procedures needed. The patient is a 62-year-old woman with DMII, myasthenia gravis status post thymectomy and prior crises status post IVIG who developed MG crisis in setting of COVID-19 infection requiring intubation. The patient received IVIG 25g (1/31--2/2) and solumedrol 125 mg daily (1/31--2/10). PLEX was started on 2/2 with plan to perform 5 procedures over 10 days.    PLEX procedures on 2/2, 2/4 and 2/6 tolerated well.     PLEX#4 performed 2/8/23. 1 PV with 5% albumin as replacement fluid. Procedure tolerated well. Patient remains intubated with NGT in place. Family members at bedside.     PLEX#5 performed 2/10/23. Plan for 1 PV with 5% albumin as replacement fluid. Patient shows improvement on neurology exam, but remains intubated. Discussed with ICU provider at bedside need for close monitoring of BP and possible adjustment of levo during procedure. Blood pressure stable throughout procedure and procedure tolerated well.     Please reach out to Apheresis Service ([phone]) if further apheresis procedures needed.

## 2023-02-10 NOTE — PHARMACOTHERAPY INTERVENTION NOTE - COMMENTS
Reviewed medications for appropriate route of administration     MEDICATIONS  (STANDING):  albuterol/ipratropium for Nebulization 3 milliLiter(s) Nebulizer every 6 hours  chlorhexidine 0.12% Liquid 15 milliLiter(s) Oral Mucosa every 12 hours  chlorhexidine 4% Liquid 1 Application(s) Topical <User Schedule>  enoxaparin Injectable 40 milliGRAM(s) SubCutaneous <User Schedule>  famotidine    Tablet 20 milliGRAM(s) Oral two times a day  insulin lispro (ADMELOG) corrective regimen sliding scale   SubCutaneous every 6 hours  insulin NPH human recombinant 8 Unit(s) SubCutaneous every 6 hours  norepinephrine Infusion 0.05 MICROgram(s)/kG/Min (6.75 mL/Hr) IV Continuous <Continuous>  piperacillin/tazobactam IVPB.- 3.375 Gram(s) IV Intermittent once  piperacillin/tazobactam IVPB.. 3.375 Gram(s) IV Intermittent every 8 hours  polyethylene glycol 3350 17 Gram(s) Oral daily  senna 2 Tablet(s) Oral at bedtime  sodium chloride 3%  Inhalation 4 milliLiter(s) Inhalation every 6 hours    MEDICATIONS  (PRN):  acetaminophen    Suspension .. 650 milliGRAM(s) Oral every 6 hours PRN Mild Pain (1 - 3)  oxyCODONE    IR 5 milliGRAM(s) Oral every 6 hours PRN Moderate Pain (4 - 6)  oxyCODONE    IR 10 milliGRAM(s) Oral every 4 hours PRN Severe Pain (7 - 10)    Jessica Ortez, PharmD  PGY2 Critical Care Pharmacy Resident  Available on [employer] Teams

## 2023-02-10 NOTE — PROGRESS NOTE ADULT - SUBJECTIVE AND OBJECTIVE BOX
Interval events:    VITALS:  T(C): , Max: 37.6 (02-10-23 @ 18:00)  HR:  (54 - 101)  BP: --  ABP:  (86/31 - 157/60)  RR:  (11 - 19)  SpO2:  (96% - 100%)  Wt(kg): --  Device: Avea, Mode: AC/ CMV (Assist Control/ Continuous Mandatory Ventilation), RR (machine): 16, TV (machine): 400, FiO2: 40, PEEP: 8, ITime: 1, MAP: 12, PIP: 24    02-09-23 @ 07:01  -  02-10-23 @ 07:00  --------------------------------------------------------  IN: 1850 mL / OUT: 750 mL / NET: 1100 mL    02-10-23 @ 07:01  -  02-10-23 @ 19:10  --------------------------------------------------------  IN: 874.3 mL / OUT: 650 mL / NET: 224.3 mL      LABS:  Na: 144 (02-09 @ 21:24), 143 (02-08 @ 21:28), 143 (02-07 @ 22:06)  K: 3.9 (02-09 @ 21:24), 4.0 (02-08 @ 21:28), 4.2 (02-07 @ 22:06)  Cl: 107 (02-09 @ 21:24), 105 (02-08 @ 21:28), 108 (02-07 @ 22:06)  CO2: 29 (02-09 @ 21:24), 30 (02-08 @ 21:28), 28 (02-07 @ 22:06)  BUN: 36 (02-09 @ 21:24), 34 (02-08 @ 21:28), 38 (02-07 @ 22:06)  Cr: 0.46 (02-09 @ 21:24), 0.47 (02-08 @ 21:28), 0.42 (02-07 @ 22:06)  Glu: 151(02-09 @ 21:24), 170(02-08 @ 21:28), 176(02-07 @ 22:06)    Hgb: 8.4 (02-10 @ 10:21), 9.3 (02-09 @ 21:24), 10.0 (02-08 @ 21:28), 9.7 (02-07 @ 22:06)  Hct: 25.9 (02-10 @ 10:21), 28.0 (02-09 @ 21:24), 30.0 (02-08 @ 21:28), 29.9 (02-07 @ 22:06)  WBC: 15.40 (02-10 @ 10:21), 15.45 (02-09 @ 21:24), 12.86 (02-08 @ 21:28), 13.71 (02-07 @ 22:06)  Plt: 210 (02-10 @ 10:21), 226 (02-09 @ 21:24), 229 (02-08 @ 21:28), 231 (02-07 @ 22:06)    INR: 1.13 02-10-23 @ 12:34  PTT: 27.3 02-10-23 @ 12:34    MEDICATIONS:  acetaminophen    Suspension .. 650 milliGRAM(s) Oral every 6 hours PRN  albuterol/ipratropium for Nebulization 3 milliLiter(s) Nebulizer every 6 hours  chlorhexidine 0.12% Liquid 15 milliLiter(s) Oral Mucosa every 12 hours  chlorhexidine 4% Liquid 1 Application(s) Topical <User Schedule>  enoxaparin Injectable 40 milliGRAM(s) SubCutaneous <User Schedule>  famotidine    Tablet 20 milliGRAM(s) Oral two times a day  insulin lispro (ADMELOG) corrective regimen sliding scale   SubCutaneous every 6 hours  insulin NPH human recombinant 8 Unit(s) SubCutaneous every 6 hours  norepinephrine Infusion 0.05 MICROgram(s)/kG/Min IV Continuous <Continuous>  oxyCODONE    IR 5 milliGRAM(s) Oral every 6 hours PRN  oxyCODONE    IR 10 milliGRAM(s) Oral every 4 hours PRN  piperacillin/tazobactam IVPB.. 3.375 Gram(s) IV Intermittent every 8 hours  polyethylene glycol 3350 17 Gram(s) Oral daily  senna 2 Tablet(s) Oral at bedtime  sodium chloride 3%  Inhalation 4 milliLiter(s) Inhalation every 6 hours    EXAMINATION:  Please see exam from daytime.     Assessment/Plan:   63 yo woman with DM and Myasthenia gravis s/p thymectomy 2010, with prior exacerbations requiring intubation (last 2021), now admitted 1/30 with SOB, weak cough, dysphagia and double vision, found to be COVID positive, concerning for MG exacerbation. Started on IVIG due to refusal of PLEX, s/p 3 doses. Initially on Bipap but intubated 2/1 for worsening respiratory failure.  Now receiving PLEX s/p 4/5 sessions.   TTE with EF 35-40%, mild-moderate MR.     No change to plan from daytime except ABG to continue to wean vent settings   add oxy for pain control   senna and miralax, LBM 2/9  TF at goal   NPH 8U q6h, ISS (HgA1C 6.6)  Lov ppx, LE US neg 1/31    Марина Doss  Neurocritical Care Attending   Interval events:    VITALS:  T(C): , Max: 37.6 (02-10-23 @ 18:00)  HR:  (54 - 101)  BP: --  ABP:  (86/31 - 157/60)  RR:  (11 - 19)  SpO2:  (96% - 100%)  Wt(kg): --  Device: Avea, Mode: AC/ CMV (Assist Control/ Continuous Mandatory Ventilation), RR (machine): 16, TV (machine): 400, FiO2: 40, PEEP: 8, ITime: 1, MAP: 12, PIP: 24    02-09-23 @ 07:01  -  02-10-23 @ 07:00  --------------------------------------------------------  IN: 1850 mL / OUT: 750 mL / NET: 1100 mL    02-10-23 @ 07:01  -  02-10-23 @ 19:10  --------------------------------------------------------  IN: 874.3 mL / OUT: 650 mL / NET: 224.3 mL      LABS:  Na: 144 (02-09 @ 21:24), 143 (02-08 @ 21:28), 143 (02-07 @ 22:06)  K: 3.9 (02-09 @ 21:24), 4.0 (02-08 @ 21:28), 4.2 (02-07 @ 22:06)  Cl: 107 (02-09 @ 21:24), 105 (02-08 @ 21:28), 108 (02-07 @ 22:06)  CO2: 29 (02-09 @ 21:24), 30 (02-08 @ 21:28), 28 (02-07 @ 22:06)  BUN: 36 (02-09 @ 21:24), 34 (02-08 @ 21:28), 38 (02-07 @ 22:06)  Cr: 0.46 (02-09 @ 21:24), 0.47 (02-08 @ 21:28), 0.42 (02-07 @ 22:06)  Glu: 151(02-09 @ 21:24), 170(02-08 @ 21:28), 176(02-07 @ 22:06)    Hgb: 8.4 (02-10 @ 10:21), 9.3 (02-09 @ 21:24), 10.0 (02-08 @ 21:28), 9.7 (02-07 @ 22:06)  Hct: 25.9 (02-10 @ 10:21), 28.0 (02-09 @ 21:24), 30.0 (02-08 @ 21:28), 29.9 (02-07 @ 22:06)  WBC: 15.40 (02-10 @ 10:21), 15.45 (02-09 @ 21:24), 12.86 (02-08 @ 21:28), 13.71 (02-07 @ 22:06)  Plt: 210 (02-10 @ 10:21), 226 (02-09 @ 21:24), 229 (02-08 @ 21:28), 231 (02-07 @ 22:06)    INR: 1.13 02-10-23 @ 12:34  PTT: 27.3 02-10-23 @ 12:34    MEDICATIONS:  acetaminophen    Suspension .. 650 milliGRAM(s) Oral every 6 hours PRN  albuterol/ipratropium for Nebulization 3 milliLiter(s) Nebulizer every 6 hours  chlorhexidine 0.12% Liquid 15 milliLiter(s) Oral Mucosa every 12 hours  chlorhexidine 4% Liquid 1 Application(s) Topical <User Schedule>  enoxaparin Injectable 40 milliGRAM(s) SubCutaneous <User Schedule>  famotidine    Tablet 20 milliGRAM(s) Oral two times a day  insulin lispro (ADMELOG) corrective regimen sliding scale   SubCutaneous every 6 hours  insulin NPH human recombinant 8 Unit(s) SubCutaneous every 6 hours  norepinephrine Infusion 0.05 MICROgram(s)/kG/Min IV Continuous <Continuous>  oxyCODONE    IR 5 milliGRAM(s) Oral every 6 hours PRN  oxyCODONE    IR 10 milliGRAM(s) Oral every 4 hours PRN  piperacillin/tazobactam IVPB.. 3.375 Gram(s) IV Intermittent every 8 hours  polyethylene glycol 3350 17 Gram(s) Oral daily  senna 2 Tablet(s) Oral at bedtime  sodium chloride 3%  Inhalation 4 milliLiter(s) Inhalation every 6 hours    EXAMINATION:  Please see exam from daytime.     Assessment/Plan:   61 yo woman with DM and Myasthenia gravis s/p thymectomy 2010, with prior exacerbations requiring intubation (last 2021), now admitted 1/30 with SOB, weak cough, dysphagia and double vision, found to be COVID positive, concerning for MG exacerbation. Started on IVIG due to refusal of PLEX, s/p 3 doses. Initially on Bipap but intubated 2/1 for worsening respiratory failure.  Now receiving PLEX s/p 4/5 sessions.   TTE with EF 35-40%, mild-moderate MR.     No change to plan from daytime except ABG to continue to wean vent settings   add oxy for pain control   senna and miralax, LBM 2/9  TF at goal   NPH 8U q6h, ISS (HgA1C 6.6)  Lov ppx, LE US neg 1/31    Марина Doss  Neurocritical Care Attending   Interval events:  S/p PLEX 5/5 today.  Started on Zosyn for UTI.     VITALS:  T(C): , Max: 37.6 (02-10-23 @ 18:00)  HR:  (54 - 101)  BP: --  ABP:  (86/31 - 157/60)  RR:  (11 - 19)  SpO2:  (96% - 100%)  Wt(kg): --  Device: Avea, Mode: AC/ CMV (Assist Control/ Continuous Mandatory Ventilation), RR (machine): 16, TV (machine): 400, FiO2: 40, PEEP: 8, ITime: 1, MAP: 12, PIP: 24    02-09-23 @ 07:01  -  02-10-23 @ 07:00  --------------------------------------------------------  IN: 1850 mL / OUT: 750 mL / NET: 1100 mL    02-10-23 @ 07:01  -  02-10-23 @ 19:10  --------------------------------------------------------  IN: 874.3 mL / OUT: 650 mL / NET: 224.3 mL      LABS:  Na: 144 (02-09 @ 21:24), 143 (02-08 @ 21:28), 143 (02-07 @ 22:06)  K: 3.9 (02-09 @ 21:24), 4.0 (02-08 @ 21:28), 4.2 (02-07 @ 22:06)  Cl: 107 (02-09 @ 21:24), 105 (02-08 @ 21:28), 108 (02-07 @ 22:06)  CO2: 29 (02-09 @ 21:24), 30 (02-08 @ 21:28), 28 (02-07 @ 22:06)  BUN: 36 (02-09 @ 21:24), 34 (02-08 @ 21:28), 38 (02-07 @ 22:06)  Cr: 0.46 (02-09 @ 21:24), 0.47 (02-08 @ 21:28), 0.42 (02-07 @ 22:06)  Glu: 151(02-09 @ 21:24), 170(02-08 @ 21:28), 176(02-07 @ 22:06)    Hgb: 8.4 (02-10 @ 10:21), 9.3 (02-09 @ 21:24), 10.0 (02-08 @ 21:28), 9.7 (02-07 @ 22:06)  Hct: 25.9 (02-10 @ 10:21), 28.0 (02-09 @ 21:24), 30.0 (02-08 @ 21:28), 29.9 (02-07 @ 22:06)  WBC: 15.40 (02-10 @ 10:21), 15.45 (02-09 @ 21:24), 12.86 (02-08 @ 21:28), 13.71 (02-07 @ 22:06)  Plt: 210 (02-10 @ 10:21), 226 (02-09 @ 21:24), 229 (02-08 @ 21:28), 231 (02-07 @ 22:06)    INR: 1.13 02-10-23 @ 12:34  PTT: 27.3 02-10-23 @ 12:34    MEDICATIONS:  acetaminophen    Suspension .. 650 milliGRAM(s) Oral every 6 hours PRN  albuterol/ipratropium for Nebulization 3 milliLiter(s) Nebulizer every 6 hours  chlorhexidine 0.12% Liquid 15 milliLiter(s) Oral Mucosa every 12 hours  chlorhexidine 4% Liquid 1 Application(s) Topical <User Schedule>  enoxaparin Injectable 40 milliGRAM(s) SubCutaneous <User Schedule>  famotidine    Tablet 20 milliGRAM(s) Oral two times a day  insulin lispro (ADMELOG) corrective regimen sliding scale   SubCutaneous every 6 hours  insulin NPH human recombinant 8 Unit(s) SubCutaneous every 6 hours  norepinephrine Infusion 0.05 MICROgram(s)/kG/Min IV Continuous <Continuous>  oxyCODONE    IR 5 milliGRAM(s) Oral every 6 hours PRN  oxyCODONE    IR 10 milliGRAM(s) Oral every 4 hours PRN  piperacillin/tazobactam IVPB.. 3.375 Gram(s) IV Intermittent every 8 hours  polyethylene glycol 3350 17 Gram(s) Oral daily  senna 2 Tablet(s) Oral at bedtime  sodium chloride 3%  Inhalation 4 milliLiter(s) Inhalation every 6 hours    EXAMINATION:  Please see exam from daytime.     Assessment/Plan:   63 yo woman with DM and Myasthenia gravis s/p thymectomy 2010, with prior exacerbations requiring intubation (last 2021), now admitted 1/30 with SOB, weak cough, dysphagia and double vision, found to be COVID positive, concerning for MG exacerbation. Started on IVIG due to refusal of PLEX, s/p 3 doses. Initially on Bipap but intubated 2/1 for worsening respiratory failure.  Now receiving PLEX s/p 5/5 sessions.   TTE with EF 35-40%, mild-moderate MR.     No change to plan from daytime  oxy for pain control   senna and miralax, LBM 2/9  TF at goal   NPH 8U q6h, ISS (HgA1C 6.6)  Lov ppx, LE US neg 1/31  On Zosyn for proteus UTI     Марина Doss  Neurocritical Care Attending   Interval events:  S/p PLEX 5/5 today.  Started on Zosyn for UTI.     VITALS:  T(C): , Max: 37.6 (02-10-23 @ 18:00)  HR:  (54 - 101)  BP: --  ABP:  (86/31 - 157/60)  RR:  (11 - 19)  SpO2:  (96% - 100%)  Wt(kg): --  Device: Avea, Mode: AC/ CMV (Assist Control/ Continuous Mandatory Ventilation), RR (machine): 16, TV (machine): 400, FiO2: 40, PEEP: 8, ITime: 1, MAP: 12, PIP: 24    02-09-23 @ 07:01  -  02-10-23 @ 07:00  --------------------------------------------------------  IN: 1850 mL / OUT: 750 mL / NET: 1100 mL    02-10-23 @ 07:01  -  02-10-23 @ 19:10  --------------------------------------------------------  IN: 874.3 mL / OUT: 650 mL / NET: 224.3 mL      LABS:  Na: 144 (02-09 @ 21:24), 143 (02-08 @ 21:28), 143 (02-07 @ 22:06)  K: 3.9 (02-09 @ 21:24), 4.0 (02-08 @ 21:28), 4.2 (02-07 @ 22:06)  Cl: 107 (02-09 @ 21:24), 105 (02-08 @ 21:28), 108 (02-07 @ 22:06)  CO2: 29 (02-09 @ 21:24), 30 (02-08 @ 21:28), 28 (02-07 @ 22:06)  BUN: 36 (02-09 @ 21:24), 34 (02-08 @ 21:28), 38 (02-07 @ 22:06)  Cr: 0.46 (02-09 @ 21:24), 0.47 (02-08 @ 21:28), 0.42 (02-07 @ 22:06)  Glu: 151(02-09 @ 21:24), 170(02-08 @ 21:28), 176(02-07 @ 22:06)    Hgb: 8.4 (02-10 @ 10:21), 9.3 (02-09 @ 21:24), 10.0 (02-08 @ 21:28), 9.7 (02-07 @ 22:06)  Hct: 25.9 (02-10 @ 10:21), 28.0 (02-09 @ 21:24), 30.0 (02-08 @ 21:28), 29.9 (02-07 @ 22:06)  WBC: 15.40 (02-10 @ 10:21), 15.45 (02-09 @ 21:24), 12.86 (02-08 @ 21:28), 13.71 (02-07 @ 22:06)  Plt: 210 (02-10 @ 10:21), 226 (02-09 @ 21:24), 229 (02-08 @ 21:28), 231 (02-07 @ 22:06)    INR: 1.13 02-10-23 @ 12:34  PTT: 27.3 02-10-23 @ 12:34    MEDICATIONS:  acetaminophen    Suspension .. 650 milliGRAM(s) Oral every 6 hours PRN  albuterol/ipratropium for Nebulization 3 milliLiter(s) Nebulizer every 6 hours  chlorhexidine 0.12% Liquid 15 milliLiter(s) Oral Mucosa every 12 hours  chlorhexidine 4% Liquid 1 Application(s) Topical <User Schedule>  enoxaparin Injectable 40 milliGRAM(s) SubCutaneous <User Schedule>  famotidine    Tablet 20 milliGRAM(s) Oral two times a day  insulin lispro (ADMELOG) corrective regimen sliding scale   SubCutaneous every 6 hours  insulin NPH human recombinant 8 Unit(s) SubCutaneous every 6 hours  norepinephrine Infusion 0.05 MICROgram(s)/kG/Min IV Continuous <Continuous>  oxyCODONE    IR 5 milliGRAM(s) Oral every 6 hours PRN  oxyCODONE    IR 10 milliGRAM(s) Oral every 4 hours PRN  piperacillin/tazobactam IVPB.. 3.375 Gram(s) IV Intermittent every 8 hours  polyethylene glycol 3350 17 Gram(s) Oral daily  senna 2 Tablet(s) Oral at bedtime  sodium chloride 3%  Inhalation 4 milliLiter(s) Inhalation every 6 hours    EXAMINATION:  Please see exam from daytime.     Assessment/Plan:   61 yo woman with DM and Myasthenia gravis s/p thymectomy 2010, with prior exacerbations requiring intubation (last 2021), now admitted 1/30 with SOB, weak cough, dysphagia and double vision, found to be COVID positive, concerning for MG exacerbation. Started on IVIG due to refusal of PLEX, s/p 3 doses. Initially on Bipap but intubated 2/1 for worsening respiratory failure.  Now receiving PLEX s/p 5/5 sessions.   TTE with EF 35-40%, mild-moderate MR.     No change to plan from daytime  oxy for pain control   senna and miralax, LBM 2/9  TF at goal   NPH 8U q6h, ISS (HgA1C 6.6)  Lov ppx, LE US neg 1/31  On Zosyn for proteus UTI     Марина Doss  Neurocritical Care Attending

## 2023-02-11 LAB
-  AMIKACIN: SIGNIFICANT CHANGE UP
-  AMOXICILLIN/CLAVULANIC ACID: SIGNIFICANT CHANGE UP
-  AMPICILLIN/SULBACTAM: SIGNIFICANT CHANGE UP
-  AMPICILLIN: SIGNIFICANT CHANGE UP
-  AZTREONAM: SIGNIFICANT CHANGE UP
-  CEFAZOLIN: SIGNIFICANT CHANGE UP
-  CEFEPIME: SIGNIFICANT CHANGE UP
-  CEFOXITIN: SIGNIFICANT CHANGE UP
-  CEFTRIAXONE: SIGNIFICANT CHANGE UP
-  CEFUROXIME: SIGNIFICANT CHANGE UP
-  CIPROFLOXACIN: SIGNIFICANT CHANGE UP
-  CLINDAMYCIN: SIGNIFICANT CHANGE UP
-  ERTAPENEM: SIGNIFICANT CHANGE UP
-  ERYTHROMYCIN: SIGNIFICANT CHANGE UP
-  GENTAMICIN: SIGNIFICANT CHANGE UP
-  LEVOFLOXACIN: SIGNIFICANT CHANGE UP
-  MEROPENEM: SIGNIFICANT CHANGE UP
-  NITROFURANTOIN: SIGNIFICANT CHANGE UP
-  OXACILLIN: SIGNIFICANT CHANGE UP
-  PENICILLIN: SIGNIFICANT CHANGE UP
-  PIPERACILLIN/TAZOBACTAM: SIGNIFICANT CHANGE UP
-  RIFAMPIN: SIGNIFICANT CHANGE UP
-  TETRACYCLINE: SIGNIFICANT CHANGE UP
-  TOBRAMYCIN: SIGNIFICANT CHANGE UP
-  TRIMETHOPRIM/SULFAMETHOXAZOLE: SIGNIFICANT CHANGE UP
-  VANCOMYCIN: SIGNIFICANT CHANGE UP
ANION GAP SERPL CALC-SCNC: 13 MMOL/L — SIGNIFICANT CHANGE UP (ref 5–17)
ANION GAP SERPL CALC-SCNC: 9 MMOL/L — SIGNIFICANT CHANGE UP (ref 5–17)
BUN SERPL-MCNC: 16 MG/DL — SIGNIFICANT CHANGE UP (ref 7–23)
BUN SERPL-MCNC: 28 MG/DL — HIGH (ref 7–23)
CALCIUM SERPL-MCNC: 8.1 MG/DL — LOW (ref 8.4–10.5)
CHLORIDE SERPL-SCNC: 106 MMOL/L — SIGNIFICANT CHANGE UP (ref 96–108)
CHLORIDE SERPL-SCNC: 112 MMOL/L — HIGH (ref 96–108)
CO2 SERPL-SCNC: 24 MMOL/L — SIGNIFICANT CHANGE UP (ref 22–31)
CO2 SERPL-SCNC: 26 MMOL/L — SIGNIFICANT CHANGE UP (ref 22–31)
CREAT SERPL-MCNC: 0.45 MG/DL — LOW (ref 0.5–1.3)
CREAT SERPL-MCNC: 0.51 MG/DL — SIGNIFICANT CHANGE UP (ref 0.5–1.3)
CULTURE RESULTS: SIGNIFICANT CHANGE UP
EGFR: 105 ML/MIN/1.73M2 — SIGNIFICANT CHANGE UP
EGFR: 108 ML/MIN/1.73M2 — SIGNIFICANT CHANGE UP
GAS PNL BLDA: SIGNIFICANT CHANGE UP
GLUCOSE BLDC GLUCOMTR-MCNC: 106 MG/DL — HIGH (ref 70–99)
GLUCOSE BLDC GLUCOMTR-MCNC: 117 MG/DL — HIGH (ref 70–99)
GLUCOSE BLDC GLUCOMTR-MCNC: 137 MG/DL — HIGH (ref 70–99)
GLUCOSE BLDC GLUCOMTR-MCNC: 85 MG/DL — SIGNIFICANT CHANGE UP (ref 70–99)
GLUCOSE SERPL-MCNC: 106 MG/DL — HIGH (ref 70–99)
GLUCOSE SERPL-MCNC: 92 MG/DL — SIGNIFICANT CHANGE UP (ref 70–99)
HCT VFR BLD CALC: 26.4 % — LOW (ref 34.5–45)
HGB BLD-MCNC: 8.6 G/DL — LOW (ref 11.5–15.5)
MAGNESIUM SERPL-MCNC: 2.3 MG/DL — SIGNIFICANT CHANGE UP (ref 1.6–2.6)
MAGNESIUM SERPL-MCNC: 2.4 MG/DL — SIGNIFICANT CHANGE UP (ref 1.6–2.6)
MCHC RBC-ENTMCNC: 29.8 PG — SIGNIFICANT CHANGE UP (ref 27–34)
MCHC RBC-ENTMCNC: 32.6 GM/DL — SIGNIFICANT CHANGE UP (ref 32–36)
MCV RBC AUTO: 91.3 FL — SIGNIFICANT CHANGE UP (ref 80–100)
METHOD TYPE: SIGNIFICANT CHANGE UP
NRBC # BLD: 0 /100 WBCS — SIGNIFICANT CHANGE UP (ref 0–0)
ORGANISM # SPEC MICROSCOPIC CNT: SIGNIFICANT CHANGE UP
PHOSPHATE SERPL-MCNC: 3.6 MG/DL — SIGNIFICANT CHANGE UP (ref 2.5–4.5)
PHOSPHATE SERPL-MCNC: 4.3 MG/DL — SIGNIFICANT CHANGE UP (ref 2.5–4.5)
PLATELET # BLD AUTO: 202 K/UL — SIGNIFICANT CHANGE UP (ref 150–400)
POTASSIUM SERPL-MCNC: 3.8 MMOL/L — SIGNIFICANT CHANGE UP (ref 3.5–5.3)
POTASSIUM SERPL-MCNC: 4 MMOL/L — SIGNIFICANT CHANGE UP (ref 3.5–5.3)
POTASSIUM SERPL-SCNC: 3.8 MMOL/L — SIGNIFICANT CHANGE UP (ref 3.5–5.3)
POTASSIUM SERPL-SCNC: 4 MMOL/L — SIGNIFICANT CHANGE UP (ref 3.5–5.3)
RBC # BLD: 2.89 M/UL — LOW (ref 3.8–5.2)
RBC # FLD: 14.7 % — HIGH (ref 10.3–14.5)
SODIUM SERPL-SCNC: 141 MMOL/L — SIGNIFICANT CHANGE UP (ref 135–145)
SODIUM SERPL-SCNC: 149 MMOL/L — HIGH (ref 135–145)
SPECIMEN SOURCE: SIGNIFICANT CHANGE UP
WBC # BLD: 12.29 K/UL — HIGH (ref 3.8–10.5)
WBC # FLD AUTO: 12.29 K/UL — HIGH (ref 3.8–10.5)

## 2023-02-11 PROCEDURE — 71045 X-RAY EXAM CHEST 1 VIEW: CPT | Mod: 26

## 2023-02-11 PROCEDURE — 99291 CRITICAL CARE FIRST HOUR: CPT

## 2023-02-11 PROCEDURE — 93970 EXTREMITY STUDY: CPT | Mod: 26

## 2023-02-11 RX ORDER — ACETAMINOPHEN 500 MG
1000 TABLET ORAL ONCE
Refills: 0 | Status: COMPLETED | OUTPATIENT
Start: 2023-02-11 | End: 2023-02-11

## 2023-02-11 RX ORDER — SODIUM CHLORIDE 9 MG/ML
1000 INJECTION INTRAMUSCULAR; INTRAVENOUS; SUBCUTANEOUS
Refills: 0 | Status: DISCONTINUED | OUTPATIENT
Start: 2023-02-11 | End: 2023-02-13

## 2023-02-11 RX ORDER — CEFTRIAXONE 500 MG/1
1000 INJECTION, POWDER, FOR SOLUTION INTRAMUSCULAR; INTRAVENOUS EVERY 24 HOURS
Refills: 0 | Status: COMPLETED | OUTPATIENT
Start: 2023-02-11 | End: 2023-02-15

## 2023-02-11 RX ORDER — ROBINUL 0.2 MG/ML
0.2 INJECTION INTRAMUSCULAR; INTRAVENOUS ONCE
Refills: 0 | Status: COMPLETED | OUTPATIENT
Start: 2023-02-11 | End: 2023-02-11

## 2023-02-11 RX ORDER — POTASSIUM CHLORIDE 20 MEQ
40 PACKET (EA) ORAL ONCE
Refills: 0 | Status: COMPLETED | OUTPATIENT
Start: 2023-02-11 | End: 2023-02-11

## 2023-02-11 RX ADMIN — SODIUM CHLORIDE 4 MILLILITER(S): 9 INJECTION INTRAMUSCULAR; INTRAVENOUS; SUBCUTANEOUS at 11:59

## 2023-02-11 RX ADMIN — SODIUM CHLORIDE 4 MILLILITER(S): 9 INJECTION INTRAMUSCULAR; INTRAVENOUS; SUBCUTANEOUS at 23:53

## 2023-02-11 RX ADMIN — CHLORHEXIDINE GLUCONATE 15 MILLILITER(S): 213 SOLUTION TOPICAL at 05:32

## 2023-02-11 RX ADMIN — Medication 400 MILLIGRAM(S): at 11:58

## 2023-02-11 RX ADMIN — Medication 1000 MILLIGRAM(S): at 12:15

## 2023-02-11 RX ADMIN — Medication 1000 MILLIGRAM(S): at 21:53

## 2023-02-11 RX ADMIN — Medication 3 MILLILITER(S): at 11:58

## 2023-02-11 RX ADMIN — Medication 3 MILLILITER(S): at 05:36

## 2023-02-11 RX ADMIN — FAMOTIDINE 20 MILLIGRAM(S): 10 INJECTION INTRAVENOUS at 05:32

## 2023-02-11 RX ADMIN — Medication 650 MILLIGRAM(S): at 05:59

## 2023-02-11 RX ADMIN — SODIUM CHLORIDE 4 MILLILITER(S): 9 INJECTION INTRAMUSCULAR; INTRAVENOUS; SUBCUTANEOUS at 17:25

## 2023-02-11 RX ADMIN — SODIUM CHLORIDE 4 MILLILITER(S): 9 INJECTION INTRAMUSCULAR; INTRAVENOUS; SUBCUTANEOUS at 00:35

## 2023-02-11 RX ADMIN — CEFTRIAXONE 100 MILLIGRAM(S): 500 INJECTION, POWDER, FOR SOLUTION INTRAMUSCULAR; INTRAVENOUS at 20:54

## 2023-02-11 RX ADMIN — FAMOTIDINE 20 MILLIGRAM(S): 10 INJECTION INTRAVENOUS at 17:12

## 2023-02-11 RX ADMIN — PIPERACILLIN AND TAZOBACTAM 25 GRAM(S): 4; .5 INJECTION, POWDER, LYOPHILIZED, FOR SOLUTION INTRAVENOUS at 15:28

## 2023-02-11 RX ADMIN — Medication 40 MILLIEQUIVALENT(S): at 05:32

## 2023-02-11 RX ADMIN — ENOXAPARIN SODIUM 40 MILLIGRAM(S): 100 INJECTION SUBCUTANEOUS at 17:12

## 2023-02-11 RX ADMIN — HUMAN INSULIN 8 UNIT(S): 100 INJECTION, SUSPENSION SUBCUTANEOUS at 05:41

## 2023-02-11 RX ADMIN — Medication 3 MILLILITER(S): at 23:53

## 2023-02-11 RX ADMIN — Medication 3 MILLILITER(S): at 00:35

## 2023-02-11 RX ADMIN — Medication 400 MILLIGRAM(S): at 21:38

## 2023-02-11 RX ADMIN — Medication 3 MILLILITER(S): at 17:25

## 2023-02-11 RX ADMIN — ROBINUL 0.2 MILLIGRAM(S): 0.2 INJECTION INTRAMUSCULAR; INTRAVENOUS at 12:26

## 2023-02-11 RX ADMIN — CHLORHEXIDINE GLUCONATE 1 APPLICATION(S): 213 SOLUTION TOPICAL at 06:06

## 2023-02-11 RX ADMIN — PIPERACILLIN AND TAZOBACTAM 25 GRAM(S): 4; .5 INJECTION, POWDER, LYOPHILIZED, FOR SOLUTION INTRAVENOUS at 05:32

## 2023-02-11 RX ADMIN — SODIUM CHLORIDE 4 MILLILITER(S): 9 INJECTION INTRAMUSCULAR; INTRAVENOUS; SUBCUTANEOUS at 05:52

## 2023-02-11 RX ADMIN — Medication 6.75 MICROGRAM(S)/KG/MIN: at 01:03

## 2023-02-11 NOTE — PROGRESS NOTE ADULT - ASSESSMENT
ASSESSMENT/PLAN: MG exacerbation, covid +     NEURO:   Neurochecks q4  IVIG 25g (1/31--2/2), PLEX every other day 2/2--  solumedrol 125 mg daily (1/31--2/10)  Avoid medications that may worsen the current exacerbation including macrolides, fluoroquinolones, tetracyclines, aminoglycoside, beta-blockers, magnesium, and anti-arrhythmics (procainamide, quinidine, and lidocaine)  Pain management with tylenol fentanyl pushes    PULM:  Full support currently difficult ventilatory management due to poor   Will decrease the PEEP to 8 now, will repeat ABG at 4  Will obtain repeat ABG  Intubated for airway protection, will wean as tolerated    CV:  MAP >65  TTE- 35-40%  On Levo  FU trops    RENAL:  Fluids: IVL in setting of poor EF  IO: replete as needed    GI:  Diet: TF at goal  GI prophylaxis [] not indicated [x] famotidine [] other:   LBM- 2/8    ENDO:   A1c 6.6  Goal euglycemia (-180)  RISS for now and NPH, adjust as needed    HEME/ONC:  H/H trending down  VTE prophylaxis: [x] SCDs [x] chemoprophylaxis lovenox [] hold chemoprophylaxis due to: [] high risk of DVT/PE on admission due to:  DIC labs for downtrending H/H  Will obtain CT CAP if DIC labs are negative  Will obtain type and screen and CBC at 4 PM    ID: covid + status post remdesivir  R/O sepsis  leukocytes trending down, UA positive  UC positive for gram negative rods  Will start patient on Zosyn  Will obtain LED in bilateral LE    CODE STATUS:  [x] Full Code [] DNR [] DNI [] Palliative/Comfort Care    DISPOSITION:  [x] ICU [] Stroke Unit [] Floor [] EMU [] RCU [] PCU    At risk of death/neuro decline due to: acute resp failure requiring intubation     Contact: [phone] ASSESSMENT/PLAN: MG exacerbation, covid +     NEURO:   Neurochecks q4  IVIG 25g (1/31--2/2), PLEX every other day 2/2--  solumedrol 125 mg daily (1/31--2/10)  Avoid medications that may worsen the current exacerbation including macrolides, fluoroquinolones, tetracyclines, aminoglycoside, beta-blockers, magnesium, and anti-arrhythmics (procainamide, quinidine, and lidocaine)  Pain management with tylenol fentanyl pushes    PULM:  Full support currently difficult ventilatory management due to poor   Will decrease the PEEP to 8 now, will repeat ABG at 4  Will obtain repeat ABG  Intubated for airway protection, will wean as tolerated    CV:  MAP >65  TTE- 35-40%  On Levo  FU trops    RENAL:  Fluids: IVL in setting of poor EF  IO: replete as needed    GI:  Diet: TF at goal  GI prophylaxis [] not indicated [x] famotidine [] other:   LBM- 2/8    ENDO:   A1c 6.6  Goal euglycemia (-180)  RISS for now and NPH, adjust as needed    HEME/ONC:  H/H trending down  VTE prophylaxis: [x] SCDs [x] chemoprophylaxis lovenox [] hold chemoprophylaxis due to: [] high risk of DVT/PE on admission due to:  DIC labs for downtrending H/H  Will obtain CT CAP if DIC labs are negative  Will obtain type and screen and CBC at 4 PM    ID: covid + status post remdesivir  R/O sepsis  leukocytes trending down, UA positive  UC positive for gram negative rods  Will start patient on Zosyn  Will obtain LED in bilateral LE    CODE STATUS:  [x] Full Code [] DNR [] DNI [] Palliative/Comfort Care    DISPOSITION:  [x] ICU [] Stroke Unit [] Floor [] EMU [] RCU [] PCU    At risk of death/neuro decline due to: acute resp failure requiring intubation     Contact: [phone] ASSESSMENT/PLAN: MG exacerbation, covid +     NEURO:   Neurochecks q4  IVIG 25g (1/31--2/2), PLEX every other day 2/2--  solumedrol 125 mg daily (1/31--2/10)  Avoid medications that may worsen the current exacerbation including macrolides, fluoroquinolones, tetracyclines, aminoglycoside, beta-blockers, magnesium, and anti-arrhythmics (procainamide, quinidine, and lidocaine)  Pain management with tylenol fentanyl pushes    PULM:  Full support currently difficult ventilatory management due to poor   Will decrease the PEEP to 8 now, will repeat ABG at 4  Will obtain repeat ABG  Intubated for airway protection, will wean as tolerated    CV:  MAP >65  TTE- 35-40%  On Levo  FU trops    RENAL:  Fluids: IVL in setting of poor EF  IO: replete as needed    GI:  Diet: TF at goal  GI prophylaxis [] not indicated [x] famotidine [] other:   LBM- 2/8    ENDO:   A1c 6.6  Goal euglycemia (-180)  RISS for now and NPH, adjust as needed    HEME/ONC:  H/H trending down  VTE prophylaxis: [x] SCDs [x] chemoprophylaxis lovenox [] hold chemoprophylaxis due to: [] high risk of DVT/PE on admission due to:  DIC labs for downtrending H/H  Will obtain CT CAP if DIC labs are negative  Will obtain type and screen and CBC at 4 PM    ID: covid + status post remdesivir  R/O sepsis  leukocytes trending down, UA positive  UC positive for gram negative rods  Will start patient on Zosyn  Will obtain LED in bilateral LE    CODE STATUS:  [x] Full Code [] DNR [] DNI [] Palliative/Comfort Care    DISPOSITION:  [x] ICU [] Stroke Unit [] Floor [] EMU [] RCU [] PCU    At risk of death/neuro decline due to: acute resp failure requiring intubation     Contact: [phone] ASSESSMENT/PLAN: MG exacerbation, covid +     NEURO:   Neurochecks q4  IVIG 25g (1/31--2/2), PLEX every other day 2/2-- 2/10 x 5 doses  solumedrol 125 mg daily (1/31--2/10)  Avoid medications that may worsen the current exacerbation including macrolides, fluoroquinolones, tetracyclines, aminoglycoside, beta-blockers, magnesium, and anti-arrhythmics (procainamide, quinidine, and lidocaine)  Pain management with tylenol fentanyl pushes    PULM:  Full support currently difficult ventilatory management due to poor   Will decrease the PEEP to 8 now,  Will obtain repeat ABG  Intubated for airway protection, will wean as tolerated    CV:  MAP >65  TTE- 35-40%    RENAL:  Fluids: IVL in setting of poor EF  IO: replete as needed    GI:  Diet: TF at goal  GI prophylaxis [] not indicated [x] famotidine [] other:   LBM- 2/11    ENDO:   A1c 6.6  Goal euglycemia (-180)  RISS for now and NPH, adjust as needed    HEME/ONC:  H/H trending down but stable now  VTE prophylaxis: [x] SCDs [x] chemoprophylaxis lovenox [] hold chemoprophylaxis due to: [] high risk of DVT/PE on admission due to:  DIC labs- f/u   No evidence of overt bleeding  Will obtain CT CAP if DIC labs are negative    ID: covid + status post remdesivir  Afebrile  R/O sepsis  leukocytes trending down, UA positive  UC positive for gram negative rods- protease  Continue with Zosyn 2/10- (8 days course)  LED in bilateral LE- p     CODE STATUS:  [x] Full Code [] DNR [] DNI [] Palliative/Comfort Care    DISPOSITION:  [x] ICU [] Stroke Unit [] Floor [] EMU [] RCU [] PCU    At risk of death/neuro decline due to: acute resp failure requiring intubation     Contact: [phone] ASSESSMENT/PLAN: MG exacerbation, covid +     NEURO:   Neurochecks q4  IVIG 25g (1/31--2/2), PLEX every other day 2/2-- 2/10 x 5 doses  solumedrol 125 mg daily (1/31--2/10)  Avoid medications that may worsen the current exacerbation including macrolides, fluoroquinolones, tetracyclines, aminoglycoside, beta-blockers, magnesium, and anti-arrhythmics (procainamide, quinidine, and lidocaine)  Pain management with tylenol fentanyl pushes    PULM:  Full support currently difficult ventilatory management due to poor   Will decrease the PEEP to 8 now,  Will obtain repeat ABG  Intubated for airway protection, will wean as tolerated    CV:  MAP >65  TTE- 35-40%    RENAL:  Fluids: IVL in setting of poor EF  IO: replete as needed    GI:  Diet: TF at goal  GI prophylaxis [] not indicated [x] famotidine [] other:   LBM- 2/11    ENDO:   A1c 6.6  Goal euglycemia (-180)  RISS for now and NPH, adjust as needed    HEME/ONC:  H/H trending down but stable now  VTE prophylaxis: [x] SCDs [x] chemoprophylaxis lovenox [] hold chemoprophylaxis due to: [] high risk of DVT/PE on admission due to:  DIC labs- f/u   No evidence of overt bleeding  Will obtain CT CAP if DIC labs are negative    ID: covid + status post remdesivir  Afebrile  R/O sepsis  leukocytes trending down, UA positive  UC positive for gram negative rods- protease  Continue with Zosyn 2/10- (8 days course)  LED in bilateral LE- p     CODE STATUS:  [x] Full Code [] DNR [] DNI [] Palliative/Comfort Care    DISPOSITION:  [x] ICU [] Stroke Unit [] Floor [] EMU [] RCU [] PCU    At risk of death/neuro decline due to: acute resp failure requiring intubation     Contact: [phone] ASSESSMENT/PLAN: MG exacerbation, covid +     NEURO:   Neurochecks q4  IVIG 25g (1/31--2/2), PLEX every other day 2/2-- 2/10 x 5 doses  solumedrol 125 mg daily (1/31--2/10)  Avoid medications that may worsen the current exacerbation including macrolides, fluoroquinolones, tetracyclines, aminoglycoside, beta-blockers, magnesium, and anti-arrhythmics (procainamide, quinidine, and lidocaine)  Pain management with tylenol fentanyl pushes    PULM:  Full support currently difficult ventilatory management due to poor   Will decrease the PEEP to 8 now,  Will obtain repeat ABG  Intubated for airway protection, will wean as tolerated    CV:  MAP >65  TTE- 35-40%    RENAL:  Fluids: IVL in setting of poor EF  IO: replete as needed    GI:  Diet: TF at goal  GI prophylaxis [] not indicated [x] famotidine [] other:   LBM- 2/11    ENDO:   A1c 6.6  Goal euglycemia (-180)  RISS for now and NPH, adjust as needed    HEME/ONC:  H/H trending down but stable now  VTE prophylaxis: [x] SCDs [x] chemoprophylaxis lovenox [] hold chemoprophylaxis due to: [] high risk of DVT/PE on admission due to:  DIC labs- f/u   No evidence of overt bleeding  Will obtain CT CAP if DIC labs are negative    ID: covid + status post remdesivir  Afebrile  R/O sepsis  leukocytes trending down, UA positive  UC positive for gram negative rods- protease  Continue with Zosyn 2/10- (8 days course)  LED in bilateral LE- p     CODE STATUS:  [x] Full Code [] DNR [] DNI [] Palliative/Comfort Care    DISPOSITION:  [x] ICU [] Stroke Unit [] Floor [] EMU [] RCU [] PCU    At risk of death/neuro decline due to: acute resp failure requiring intubation     Contact: [phone]

## 2023-02-11 NOTE — PROGRESS NOTE ADULT - ATTENDING COMMENTS
Agree with/edited as appropriate above  remains intubated, PEEP to 8 and cont wean vent-->extubate to BiPAP   s/p PLEX#5    afebrile, but ucx +proteus amd MSSA in bronch cx--> tailer abx to ceftriaxone

## 2023-02-11 NOTE — PROGRESS NOTE ADULT - SUBJECTIVE AND OBJECTIVE BOX
SUMMARY:   62-year-old woman with DMII, myasthenia gravis status post thymectomy and prior crises status post IVIG developed MG crisis in setting of COVID-19 infection requiring intubation status post IVIG x 1 and PLEX.     24 HOUR EVENTS:  IPV, nebs q6hrs, PEEP increased to 10    2/6- No overnight events.     2/7- Patient difficult to wean off the ventilator, requiring quite a lot of PEEP, currently on PEEP of 7. Otherwise exam improving. S/p 3 doses of PLEX.    2/8- Remains intubated, CPAP this AM and tolerated it. Today is her 5th dose of PLEX. No changes in exam.    2/9- Patient desaturated overnight, TV was low which was adjusted and PEEP was increased to 10. Improved after.     2/10- Day 5 of PLEX today.     2/11- Will remove the shiley from the right IJ. Overnight no events. Still on PEEP of 8.     REVIEW OF SYSTEMS: [x] Unable to Assess due to neurologic exam--intubated, but able to nod, c/o pain   [ ] All ROS addressed below are non-contributory, except:  Neuro: [ ] Headache [ ] Back pain [ ] Numbness [ ] Weakness [ ] Ataxia [ ] Dizziness [ ] Aphasia [ ] Dysarthria [ ] Visual disturbance  Resp: [ ] Shortness of breath/dyspnea [ ] Orthopnea [ ] Cough  CV: [ ] Chest pain [ ] Palpitation [ ] Lightheadedness [ ] Syncope  Renal: [ ] Thirst [ ] Edema  GI: [ ] Nausea [ ] Emesis [ ] Abdominal pain [ ] Constipation [ ] Diarrhea  Hem: [ ] Hematemesis [ ] bBright red blood per rectum  ID: [ ] Fever [ ] Chills [ ] Dysuria  ENT: [ ] Rhinorrhea    VITALS/DATA/ORDERS: [x] Reviewed    EXAMINATION:   Intubated, nods appropriately aaoX3, +cough/gag, no ptosis, deltoids 4-/5, neck flexion 5/5 extension 5/5, otherwise full strength   Pulmonary: Decreased breath sounds at bases  Cardiac: Normal S1/S2  Abd: Soft, non tender, + BS  Extremities: pulses strong, no edema SUMMARY:   62-year-old woman with DMII, myasthenia gravis status post thymectomy and prior crises status post IVIG developed MG crisis in setting of COVID-19 infection requiring intubation status post IVIG x 1 and PLEX.     24 HOUR EVENTS:  IPV, nebs q6hrs, PEEP increased to 10    2/6- No overnight events.     2/7- Patient difficult to wean off the ventilator, requiring quite a lot of PEEP, currently on PEEP of 7. Otherwise exam improving. S/p 3 doses of PLEX.    2/8- Remains intubated, CPAP this AM and tolerated it. Today is her 5th dose of PLEX. No changes in exam.    2/9- Patient desaturated overnight, TV was low which was adjusted and PEEP was increased to 10. Improved after.     2/10- Day 5 of PLEX today.     2/11- Will remove the shiley from the right IJ. Overnight no events. Still on PEEP of 8. Not on rich any longer.    REVIEW OF SYSTEMS: [x] Unable to Assess due to neurologic exam--intubated, but able to nod, c/o pain   [ ] All ROS addressed below are non-contributory, except:  Neuro: [ ] Headache [ ] Back pain [ ] Numbness [ ] Weakness [ ] Ataxia [ ] Dizziness [ ] Aphasia [ ] Dysarthria [ ] Visual disturbance  Resp: [ ] Shortness of breath/dyspnea [ ] Orthopnea [ ] Cough  CV: [ ] Chest pain [ ] Palpitation [ ] Lightheadedness [ ] Syncope  Renal: [ ] Thirst [ ] Edema  GI: [ ] Nausea [ ] Emesis [ ] Abdominal pain [ ] Constipation [ ] Diarrhea  Hem: [ ] Hematemesis [ ] bBright red blood per rectum  ID: [ ] Fever [ ] Chills [ ] Dysuria  ENT: [ ] Rhinorrhea    VITALS/DATA/ORDERS: [x] Reviewed    EXAMINATION:   Intubated, nods appropriately aaoX3, +cough/gag, no ptosis, deltoids 4-/5, neck flexion 5/5 extension 5/5, otherwise full strength   Pulmonary: Decreased breath sounds at bases  Cardiac: Normal S1/S2  Abd: Soft, non tender, + BS  Extremities: pulses strong, no edema

## 2023-02-11 NOTE — PROGRESS NOTE ADULT - SUBJECTIVE AND OBJECTIVE BOX
Interval events:    VITALS:  T(C): , Max: 38.1 (02-11-23 @ 06:00)  HR:  (60 - 116)  BP: --  ABP:  (88/39 - 145/53)  RR:  (14 - 27)  SpO2:  (95% - 100%)  Wt(kg): --  Device: Avea, Mode: CPAP with PS, FiO2: 40, PEEP: 5, PS: 10, MAP: 9, PIP: 16    02-10-23 @ 07:01  -  02-11-23 @ 07:00  --------------------------------------------------------  IN: 1812 mL / OUT: 1250 mL / NET: 562 mL    02-11-23 @ 07:01  -  02-11-23 @ 19:20  --------------------------------------------------------  IN: 320 mL / OUT: 1500 mL / NET: -1180 mL      LABS:  Na: 149 (02-10 @ 23:33), 144 (02-09 @ 21:24), 143 (02-08 @ 21:28)  K: 3.8 (02-10 @ 23:33), 3.9 (02-09 @ 21:24), 4.0 (02-08 @ 21:28)  Cl: 112 (02-10 @ 23:33), 107 (02-09 @ 21:24), 105 (02-08 @ 21:28)  CO2: 24 (02-10 @ 23:33), 29 (02-09 @ 21:24), 30 (02-08 @ 21:28)  BUN: 28 (02-10 @ 23:33), 36 (02-09 @ 21:24), 34 (02-08 @ 21:28)  Cr: 0.51 (02-10 @ 23:33), 0.46 (02-09 @ 21:24), 0.47 (02-08 @ 21:28)  Glu: 106(02-10 @ 23:33), 151(02-09 @ 21:24), 170(02-08 @ 21:28)    Hgb: 8.3 (02-10 @ 23:33), 8.4 (02-10 @ 10:21), 9.3 (02-09 @ 21:24), 10.0 (02-08 @ 21:28)  Hct: 25.6 (02-10 @ 23:33), 25.9 (02-10 @ 10:21), 28.0 (02-09 @ 21:24), 30.0 (02-08 @ 21:28)  WBC: 13.77 (02-10 @ 23:33), 15.40 (02-10 @ 10:21), 15.45 (02-09 @ 21:24), 12.86 (02-08 @ 21:28)  Plt: 203 (02-10 @ 23:33), 210 (02-10 @ 10:21), 226 (02-09 @ 21:24), 229 (02-08 @ 21:28)    INR: 1.13 02-10-23 @ 12:34  PTT: 27.3 02-10-23 @ 12:34    MEDICATIONS:  albuterol/ipratropium for Nebulization 3 milliLiter(s) Nebulizer every 6 hours  cefTRIAXone   IVPB 1000 milliGRAM(s) IV Intermittent every 24 hours  chlorhexidine 4% Liquid 1 Application(s) Topical <User Schedule>  enoxaparin Injectable 40 milliGRAM(s) SubCutaneous <User Schedule>  famotidine    Tablet 20 milliGRAM(s) Oral two times a day  insulin lispro (ADMELOG) corrective regimen sliding scale   SubCutaneous every 6 hours  insulin NPH human recombinant 8 Unit(s) SubCutaneous every 6 hours  norepinephrine Infusion 0.05 MICROgram(s)/kG/Min IV Continuous <Continuous>  oxyCODONE    IR 5 milliGRAM(s) Oral every 6 hours PRN  oxyCODONE    IR 10 milliGRAM(s) Oral every 4 hours PRN  polyethylene glycol 3350 17 Gram(s) Oral daily  senna 2 Tablet(s) Oral at bedtime  sodium chloride 3%  Inhalation 4 milliLiter(s) Inhalation every 6 hours    EXAMINATION:  General:  in NAD  HEENT:  MMM  Neuro:  awake, alert, oriented x 3, follows commands, EOMI, face symmetric, no PD, DF 5/5   Cards:  RRR  Respiratory:  no respiratory distress  Abdomen:  soft  Extremities:  no LE edema    Assessment/Plan:   63 yo woman with DM and Myasthenia gravis s/p thymectomy 2010, with prior exacerbations requiring intubation (last 2021), now admitted 1/30 with SOB, weak cough, dysphagia and double vision, found to be COVID positive, concerning for MG exacerbation. Started on IVIG due to refusal of PLEX, s/p 3 doses. Initially on Bipap but intubated 2/1 for worsening respiratory failure.  Now receiving PLEX s/p 5/5 sessions.   TTE with EF 35-40%, mild-moderate MR.     No change to plan from daytime  oxy for pain control   senna and miralax, LBM 2/9  TF at goal   NPH 8U q6h, ISS (HgA1C 6.6)  Lov ppx, LE US neg 1/31  On Zosyn for proteus UTI     Марина Doss  Neurocritical Care Attending Interval events:    VITALS:  T(C): , Max: 38.1 (02-11-23 @ 06:00)  HR:  (60 - 116)  BP: --  ABP:  (88/39 - 145/53)  RR:  (14 - 27)  SpO2:  (95% - 100%)  Wt(kg): --  Device: Avea, Mode: CPAP with PS, FiO2: 40, PEEP: 5, PS: 10, MAP: 9, PIP: 16    02-10-23 @ 07:01  -  02-11-23 @ 07:00  --------------------------------------------------------  IN: 1812 mL / OUT: 1250 mL / NET: 562 mL    02-11-23 @ 07:01  -  02-11-23 @ 19:20  --------------------------------------------------------  IN: 320 mL / OUT: 1500 mL / NET: -1180 mL      LABS:  Na: 149 (02-10 @ 23:33), 144 (02-09 @ 21:24), 143 (02-08 @ 21:28)  K: 3.8 (02-10 @ 23:33), 3.9 (02-09 @ 21:24), 4.0 (02-08 @ 21:28)  Cl: 112 (02-10 @ 23:33), 107 (02-09 @ 21:24), 105 (02-08 @ 21:28)  CO2: 24 (02-10 @ 23:33), 29 (02-09 @ 21:24), 30 (02-08 @ 21:28)  BUN: 28 (02-10 @ 23:33), 36 (02-09 @ 21:24), 34 (02-08 @ 21:28)  Cr: 0.51 (02-10 @ 23:33), 0.46 (02-09 @ 21:24), 0.47 (02-08 @ 21:28)  Glu: 106(02-10 @ 23:33), 151(02-09 @ 21:24), 170(02-08 @ 21:28)    Hgb: 8.3 (02-10 @ 23:33), 8.4 (02-10 @ 10:21), 9.3 (02-09 @ 21:24), 10.0 (02-08 @ 21:28)  Hct: 25.6 (02-10 @ 23:33), 25.9 (02-10 @ 10:21), 28.0 (02-09 @ 21:24), 30.0 (02-08 @ 21:28)  WBC: 13.77 (02-10 @ 23:33), 15.40 (02-10 @ 10:21), 15.45 (02-09 @ 21:24), 12.86 (02-08 @ 21:28)  Plt: 203 (02-10 @ 23:33), 210 (02-10 @ 10:21), 226 (02-09 @ 21:24), 229 (02-08 @ 21:28)    INR: 1.13 02-10-23 @ 12:34  PTT: 27.3 02-10-23 @ 12:34    MEDICATIONS:  albuterol/ipratropium for Nebulization 3 milliLiter(s) Nebulizer every 6 hours  cefTRIAXone   IVPB 1000 milliGRAM(s) IV Intermittent every 24 hours  chlorhexidine 4% Liquid 1 Application(s) Topical <User Schedule>  enoxaparin Injectable 40 milliGRAM(s) SubCutaneous <User Schedule>  famotidine    Tablet 20 milliGRAM(s) Oral two times a day  insulin lispro (ADMELOG) corrective regimen sliding scale   SubCutaneous every 6 hours  insulin NPH human recombinant 8 Unit(s) SubCutaneous every 6 hours  norepinephrine Infusion 0.05 MICROgram(s)/kG/Min IV Continuous <Continuous>  oxyCODONE    IR 5 milliGRAM(s) Oral every 6 hours PRN  oxyCODONE    IR 10 milliGRAM(s) Oral every 4 hours PRN  polyethylene glycol 3350 17 Gram(s) Oral daily  senna 2 Tablet(s) Oral at bedtime  sodium chloride 3%  Inhalation 4 milliLiter(s) Inhalation every 6 hours    EXAMINATION:  General:  in NAD  HEENT:  MMM  Neuro:  awake, alert, oriented x 3, follows commands, EOMI, face symmetric, no PD, DF 5/5   Cards:  RRR  Respiratory:  no respiratory distress  Abdomen:  soft  Extremities:  no LE edema    Assessment/Plan:   61 yo woman with DM and Myasthenia gravis s/p thymectomy 2010, with prior exacerbations requiring intubation (last 2021), now admitted 1/30 with SOB, weak cough, dysphagia and double vision, found to be COVID positive, concerning for MG exacerbation. Started on IVIG due to refusal of PLEX, s/p 3 doses. Initially on Bipap but intubated 2/1 for worsening respiratory failure.  Now receiving PLEX s/p 5/5 sessions.   TTE with EF 35-40%, mild-moderate MR.     No change to plan from daytime  oxy for pain control   senna and miralax, LBM 2/9  TF at goal   NPH 8U q6h, ISS (HgA1C 6.6)  Lov ppx, LE US neg 1/31  On Zosyn for proteus UTI     Марина Doss  Neurocritical Care Attending Interval events:  Extubated today.   Patient seen on evening rounds, no acute events.   On BIPAP IPAP 10, EPAP 5, taking 300-400cc TV at bedside.   Patient reports mild SOB.     VITALS:  T(C): , Max: 38.1 (02-11-23 @ 06:00)  HR:  (60 - 116)  BP: --  ABP:  (88/39 - 145/53)  RR:  (14 - 27)  SpO2:  (95% - 100%)  Wt(kg): --  Device: Avea, Mode: CPAP with PS, FiO2: 40, PEEP: 5, PS: 10, MAP: 9, PIP: 16    02-10-23 @ 07:01  -  02-11-23 @ 07:00  --------------------------------------------------------  IN: 1812 mL / OUT: 1250 mL / NET: 562 mL    02-11-23 @ 07:01  -  02-11-23 @ 19:20  --------------------------------------------------------  IN: 320 mL / OUT: 1500 mL / NET: -1180 mL      LABS:  Na: 149 (02-10 @ 23:33), 144 (02-09 @ 21:24), 143 (02-08 @ 21:28)  K: 3.8 (02-10 @ 23:33), 3.9 (02-09 @ 21:24), 4.0 (02-08 @ 21:28)  Cl: 112 (02-10 @ 23:33), 107 (02-09 @ 21:24), 105 (02-08 @ 21:28)  CO2: 24 (02-10 @ 23:33), 29 (02-09 @ 21:24), 30 (02-08 @ 21:28)  BUN: 28 (02-10 @ 23:33), 36 (02-09 @ 21:24), 34 (02-08 @ 21:28)  Cr: 0.51 (02-10 @ 23:33), 0.46 (02-09 @ 21:24), 0.47 (02-08 @ 21:28)  Glu: 106(02-10 @ 23:33), 151(02-09 @ 21:24), 170(02-08 @ 21:28)    Hgb: 8.3 (02-10 @ 23:33), 8.4 (02-10 @ 10:21), 9.3 (02-09 @ 21:24), 10.0 (02-08 @ 21:28)  Hct: 25.6 (02-10 @ 23:33), 25.9 (02-10 @ 10:21), 28.0 (02-09 @ 21:24), 30.0 (02-08 @ 21:28)  WBC: 13.77 (02-10 @ 23:33), 15.40 (02-10 @ 10:21), 15.45 (02-09 @ 21:24), 12.86 (02-08 @ 21:28)  Plt: 203 (02-10 @ 23:33), 210 (02-10 @ 10:21), 226 (02-09 @ 21:24), 229 (02-08 @ 21:28)    INR: 1.13 02-10-23 @ 12:34  PTT: 27.3 02-10-23 @ 12:34    MEDICATIONS:  albuterol/ipratropium for Nebulization 3 milliLiter(s) Nebulizer every 6 hours  cefTRIAXone   IVPB 1000 milliGRAM(s) IV Intermittent every 24 hours  chlorhexidine 4% Liquid 1 Application(s) Topical <User Schedule>  enoxaparin Injectable 40 milliGRAM(s) SubCutaneous <User Schedule>  famotidine    Tablet 20 milliGRAM(s) Oral two times a day  insulin lispro (ADMELOG) corrective regimen sliding scale   SubCutaneous every 6 hours  insulin NPH human recombinant 8 Unit(s) SubCutaneous every 6 hours  norepinephrine Infusion 0.05 MICROgram(s)/kG/Min IV Continuous <Continuous>  oxyCODONE    IR 5 milliGRAM(s) Oral every 6 hours PRN  oxyCODONE    IR 10 milliGRAM(s) Oral every 4 hours PRN  polyethylene glycol 3350 17 Gram(s) Oral daily  senna 2 Tablet(s) Oral at bedtime  sodium chloride 3%  Inhalation 4 milliLiter(s) Inhalation every 6 hours    EXAMINATION:  General:  in NAD  HEENT:  on BiPAP  Neuro:  awake, alert, oriented x 3, follows commands, EOMI, face symmetric, no PD, 5/5 in b/l biceps and triceps, DF 5/5   Cards:  RRR  Respiratory:  no respiratory distress  Abdomen:  soft  Extremities:  no LE edema    Assessment/Plan:   61 yo woman with DM and Myasthenia gravis s/p thymectomy 2010, with prior exacerbations requiring intubation (last 2021), admitted 1/30 with SOB, weak cough, dysphagia and double vision, found to be COVID positive, concerning for MG exacerbation. Started on IVIG due to refusal of PLEX, s/p 3 doses. Initially on Bipap but intubated 2/1 for worsening respiratory failure.  Now receiving PLEX s/p 5/5 sessions. Extubated 2/11.   TTE with EF 35-40%, mild-moderate MR.     No change to plan from daytime  c/w BIPAP, send ABG q6h   oxy for pain control   senna and miralax, LBM 2/11  hold tube feeds for BIPAP   hold NPH 8U q6h while NPO, ISS (HgA1C 6.6)  Lov ppx  On Ceftriaxone for proteus UTI     Марина Doss  Neurocritical Care Attending Interval events:  Extubated today.   Patient seen on evening rounds, no acute events.   On BIPAP IPAP 10, EPAP 5, taking 300-400cc TV at bedside.   Patient reports mild SOB.     VITALS:  T(C): , Max: 38.1 (02-11-23 @ 06:00)  HR:  (60 - 116)  BP: --  ABP:  (88/39 - 145/53)  RR:  (14 - 27)  SpO2:  (95% - 100%)  Wt(kg): --  Device: Avea, Mode: CPAP with PS, FiO2: 40, PEEP: 5, PS: 10, MAP: 9, PIP: 16    02-10-23 @ 07:01  -  02-11-23 @ 07:00  --------------------------------------------------------  IN: 1812 mL / OUT: 1250 mL / NET: 562 mL    02-11-23 @ 07:01  -  02-11-23 @ 19:20  --------------------------------------------------------  IN: 320 mL / OUT: 1500 mL / NET: -1180 mL      LABS:  Na: 149 (02-10 @ 23:33), 144 (02-09 @ 21:24), 143 (02-08 @ 21:28)  K: 3.8 (02-10 @ 23:33), 3.9 (02-09 @ 21:24), 4.0 (02-08 @ 21:28)  Cl: 112 (02-10 @ 23:33), 107 (02-09 @ 21:24), 105 (02-08 @ 21:28)  CO2: 24 (02-10 @ 23:33), 29 (02-09 @ 21:24), 30 (02-08 @ 21:28)  BUN: 28 (02-10 @ 23:33), 36 (02-09 @ 21:24), 34 (02-08 @ 21:28)  Cr: 0.51 (02-10 @ 23:33), 0.46 (02-09 @ 21:24), 0.47 (02-08 @ 21:28)  Glu: 106(02-10 @ 23:33), 151(02-09 @ 21:24), 170(02-08 @ 21:28)    Hgb: 8.3 (02-10 @ 23:33), 8.4 (02-10 @ 10:21), 9.3 (02-09 @ 21:24), 10.0 (02-08 @ 21:28)  Hct: 25.6 (02-10 @ 23:33), 25.9 (02-10 @ 10:21), 28.0 (02-09 @ 21:24), 30.0 (02-08 @ 21:28)  WBC: 13.77 (02-10 @ 23:33), 15.40 (02-10 @ 10:21), 15.45 (02-09 @ 21:24), 12.86 (02-08 @ 21:28)  Plt: 203 (02-10 @ 23:33), 210 (02-10 @ 10:21), 226 (02-09 @ 21:24), 229 (02-08 @ 21:28)    INR: 1.13 02-10-23 @ 12:34  PTT: 27.3 02-10-23 @ 12:34    MEDICATIONS:  albuterol/ipratropium for Nebulization 3 milliLiter(s) Nebulizer every 6 hours  cefTRIAXone   IVPB 1000 milliGRAM(s) IV Intermittent every 24 hours  chlorhexidine 4% Liquid 1 Application(s) Topical <User Schedule>  enoxaparin Injectable 40 milliGRAM(s) SubCutaneous <User Schedule>  famotidine    Tablet 20 milliGRAM(s) Oral two times a day  insulin lispro (ADMELOG) corrective regimen sliding scale   SubCutaneous every 6 hours  insulin NPH human recombinant 8 Unit(s) SubCutaneous every 6 hours  norepinephrine Infusion 0.05 MICROgram(s)/kG/Min IV Continuous <Continuous>  oxyCODONE    IR 5 milliGRAM(s) Oral every 6 hours PRN  oxyCODONE    IR 10 milliGRAM(s) Oral every 4 hours PRN  polyethylene glycol 3350 17 Gram(s) Oral daily  senna 2 Tablet(s) Oral at bedtime  sodium chloride 3%  Inhalation 4 milliLiter(s) Inhalation every 6 hours    EXAMINATION:  General:  in NAD  HEENT:  on BiPAP  Neuro:  awake, alert, oriented x 3, follows commands, EOMI, face symmetric, no PD, 5/5 in b/l biceps and triceps, DF 5/5   Cards:  RRR  Respiratory:  no respiratory distress  Abdomen:  soft  Extremities:  no LE edema    Assessment/Plan:   63 yo woman with DM and Myasthenia gravis s/p thymectomy 2010, with prior exacerbations requiring intubation (last 2021), admitted 1/30 with SOB, weak cough, dysphagia and double vision, found to be COVID positive, concerning for MG exacerbation. Started on IVIG due to refusal of PLEX, s/p 3 doses. Initially on Bipap but intubated 2/1 for worsening respiratory failure.  Now receiving PLEX s/p 5/5 sessions. Extubated 2/11.   TTE with EF 35-40%, mild-moderate MR.     No change to plan from daytime  c/w BIPAP, send ABG q6h   oxy for pain control   senna and miralax, LBM 2/11  hold tube feeds for BIPAP   hold NPH 8U q6h while NPO, ISS (HgA1C 6.6)  Lov ppx  On Ceftriaxone for proteus UTI     Марина Doss  Neurocritical Care Attending Interval events:  Extubated today.   Patient seen on evening rounds, no acute events.   On BIPAP IPAP 10, EPAP 5, taking 300-400cc TV at bedside.   Patient reports mild SOB.     VITALS:  T(C): , Max: 38.1 (02-11-23 @ 06:00)  HR:  (60 - 116)  BP: --  ABP:  (88/39 - 145/53)  RR:  (14 - 27)  SpO2:  (95% - 100%)  Wt(kg): --  Device: Avea, Mode: CPAP with PS, FiO2: 40, PEEP: 5, PS: 10, MAP: 9, PIP: 16    02-10-23 @ 07:01  -  02-11-23 @ 07:00  --------------------------------------------------------  IN: 1812 mL / OUT: 1250 mL / NET: 562 mL    02-11-23 @ 07:01  -  02-11-23 @ 19:20  --------------------------------------------------------  IN: 320 mL / OUT: 1500 mL / NET: -1180 mL      LABS:  Na: 149 (02-10 @ 23:33), 144 (02-09 @ 21:24), 143 (02-08 @ 21:28)  K: 3.8 (02-10 @ 23:33), 3.9 (02-09 @ 21:24), 4.0 (02-08 @ 21:28)  Cl: 112 (02-10 @ 23:33), 107 (02-09 @ 21:24), 105 (02-08 @ 21:28)  CO2: 24 (02-10 @ 23:33), 29 (02-09 @ 21:24), 30 (02-08 @ 21:28)  BUN: 28 (02-10 @ 23:33), 36 (02-09 @ 21:24), 34 (02-08 @ 21:28)  Cr: 0.51 (02-10 @ 23:33), 0.46 (02-09 @ 21:24), 0.47 (02-08 @ 21:28)  Glu: 106(02-10 @ 23:33), 151(02-09 @ 21:24), 170(02-08 @ 21:28)    Hgb: 8.3 (02-10 @ 23:33), 8.4 (02-10 @ 10:21), 9.3 (02-09 @ 21:24), 10.0 (02-08 @ 21:28)  Hct: 25.6 (02-10 @ 23:33), 25.9 (02-10 @ 10:21), 28.0 (02-09 @ 21:24), 30.0 (02-08 @ 21:28)  WBC: 13.77 (02-10 @ 23:33), 15.40 (02-10 @ 10:21), 15.45 (02-09 @ 21:24), 12.86 (02-08 @ 21:28)  Plt: 203 (02-10 @ 23:33), 210 (02-10 @ 10:21), 226 (02-09 @ 21:24), 229 (02-08 @ 21:28)    INR: 1.13 02-10-23 @ 12:34  PTT: 27.3 02-10-23 @ 12:34    MEDICATIONS:  albuterol/ipratropium for Nebulization 3 milliLiter(s) Nebulizer every 6 hours  cefTRIAXone   IVPB 1000 milliGRAM(s) IV Intermittent every 24 hours  chlorhexidine 4% Liquid 1 Application(s) Topical <User Schedule>  enoxaparin Injectable 40 milliGRAM(s) SubCutaneous <User Schedule>  famotidine    Tablet 20 milliGRAM(s) Oral two times a day  insulin lispro (ADMELOG) corrective regimen sliding scale   SubCutaneous every 6 hours  insulin NPH human recombinant 8 Unit(s) SubCutaneous every 6 hours  norepinephrine Infusion 0.05 MICROgram(s)/kG/Min IV Continuous <Continuous>  oxyCODONE    IR 5 milliGRAM(s) Oral every 6 hours PRN  oxyCODONE    IR 10 milliGRAM(s) Oral every 4 hours PRN  polyethylene glycol 3350 17 Gram(s) Oral daily  senna 2 Tablet(s) Oral at bedtime  sodium chloride 3%  Inhalation 4 milliLiter(s) Inhalation every 6 hours    EXAMINATION:  General:  in NAD  HEENT:  on BiPAP  Neuro:  awake, alert, oriented x 3, follows commands, EOMI, face symmetric, no PD, 5/5 in b/l biceps and triceps, DF 5/5   Cards:  RRR  Respiratory:  no respiratory distress  Abdomen:  soft  Extremities:  no LE edema    Assessment/Plan:   63 yo woman with DM and Myasthenia gravis s/p thymectomy 2010, with prior exacerbations requiring intubation (last 2021), admitted 1/30 with SOB, weak cough, dysphagia and double vision, found to be COVID positive, concerning for MG exacerbation. Started on IVIG due to refusal of PLEX, s/p 3 doses. Initially on Bipap but intubated 2/1 for worsening respiratory failure.  Now receiving PLEX s/p 5/5 sessions. Extubated 2/11.   TTE with EF 35-40%, mild-moderate MR.     No change to plan from daytime  c/w BIPAP, send ABG q6h   oxy for pain control   senna and miralax, LBM 2/11  hold tube feeds for BIPAP   hold NPH 8U q6h while NPO, ISS (HgA1C 6.6)  Lov ppx  On Ceftriaxone for proteus UTI     Patient seen and examined by attending on 2/11/2023.    Patient is critically ill due to myasthenia gravis exacerbation and at high risk for neurological deterioration or death due to: respiratory failure, extubated today, requiring BiPAP and close respiratory monitoring.

## 2023-02-11 NOTE — PROGRESS NOTE ADULT - SUBJECTIVE AND OBJECTIVE BOX
CC: f/u for respiratory failure    Patient reports: she is alert on vent    REVIEW OF SYSTEMS:  All other review of systems negative (Comprehensive ROS): limited, moderate ET secretions, RN concerned about aspiration of tube feeds    Antimicrobials Day #  :day 2  piperacillin/tazobactam IVPB.. 3.375 Gram(s) IV Intermittent every 8 hours    Other Medications Reviewed  MEDICATIONS  (STANDING):  albuterol/ipratropium for Nebulization 3 milliLiter(s) Nebulizer every 6 hours  chlorhexidine 0.12% Liquid 15 milliLiter(s) Oral Mucosa every 12 hours  chlorhexidine 4% Liquid 1 Application(s) Topical <User Schedule>  enoxaparin Injectable 40 milliGRAM(s) SubCutaneous <User Schedule>  famotidine    Tablet 20 milliGRAM(s) Oral two times a day  insulin lispro (ADMELOG) corrective regimen sliding scale   SubCutaneous every 6 hours  insulin NPH human recombinant 8 Unit(s) SubCutaneous every 6 hours  norepinephrine Infusion 0.05 MICROgram(s)/kG/Min (6.75 mL/Hr) IV Continuous <Continuous>  piperacillin/tazobactam IVPB.. 3.375 Gram(s) IV Intermittent every 8 hours  polyethylene glycol 3350 17 Gram(s) Oral daily  senna 2 Tablet(s) Oral at bedtime  sodium chloride 3%  Inhalation 4 milliLiter(s) Inhalation every 6 hours      T(F): 99.9 (02-11-23 @ 07:00), Max: 100.5 (02-11-23 @ 06:00)  HR: 83 (02-11-23 @ 08:00)  BP: --  RR: 15 (02-11-23 @ 08:00)  SpO2: 99% (02-11-23 @ 08:00)  Wt(kg): --    PHYSICAL EXAM:  General: alert, on vent  Eyes:  anicteric, no conjunctival injection, no discharge  Oropharynx: ETT 	  Neck: supple, without adenopathy  Lungs: clear to auscultation anteriorly, decreased at bases  Heart: regular rate and rhythm; no murmur, rubs or gallops  Abdomen: soft, nondistended, nontender, without mass or organomegaly  Skin: no lesions  Extremities: no clubbing, cyanosis, or edema  Neurologic: alert, oriented, moves all extremities    LAB RESULTS:                        8.3    13.77 )-----------( 203      ( 10 Feb 2023 23:33 )             25.6     02-10    149<H>  |  112<H>  |  28<H>  ----------------------------<  106<H>  3.8   |  24  |  0.51    Ca    8.1<L>      10 Feb 2023 23:33  Phos  3.6     02-10  Mg     2.3     02-10          MICROBIOLOGY:  RECENT CULTURES:  02-08 @ 12:55 Clean Catch Clean Catch (Midstream) Proteus mirabilis    >100,000 CFU/ml Proteus mirabilis      02-08 @ 01:09 .Sputum Sputum     Moderate Staphylococcus aureus Susceptibility to follow.  Normal Respiratory Renetta present    Rare polymorphonuclear leukocytes per low power field  No Squamous epithelial cells per low power field  Rare Gram Positive Rods seen per oil power field  Rare Gram positive cocci in pairs seen per oil power field    02-07 @ 18:41 .Blood Blood-Venous     No growth to date.      02-07 @ 18:40 .Blood Blood-Peripheral     No growth to date.          RADIOLOGY REVIEWED:    < from: Xray Chest 1 View- PORTABLE-Routine (Xray Chest 1 View- PORTABLE-Routine in AM.) (02.11.23 @ 07:43) >  IMPRESSION:  Low lung volumes. Otherwise, the lungs are clear.    --- End of Report -    < end of copied text >

## 2023-02-11 NOTE — PROGRESS NOTE ADULT - ASSESSMENT
63 yo female with a long history of Myasthenia Gravis , T2DM,,prior thymectomy who was admitted with respiratory difficulty and weakness.  She is maintained on pyridostigmine and PRN prednisone, this had been added recently for MG flare.  History of prior need for ventilator.  Home exposure to Covid, she tested positive on admission.  Suspect respiratory failure on MG basis.  She is s/p 5 days of RDV-completed 2/4  High dose steroids for MG  S/P IVIG and multiple PLEX sessions  Vent management per NSICU,  Leukocytosis likely steroid mediated, it is moderating  Surveillance blood cultures and sputum cultures sent 2/7  Blood cultures negative, sputum with staph aureus, and urine with proteus.  pyuria noted, nonspecific  Zosyn started by NSICU to cover proteus. I am not sure proteus/staph aureus are more than colonizers  PLEX # 5 on 2/10  She now has a low grade temp, ? respiratory  Suggest:  1. Await sensitivity of staph aureus in sputum , if this is MSSA we can narrow to cefazolin 2 gr q8 to cover both sputum and urine.  2.Supportive care  3.PLEX, steroids, and IVIG as per NSICU  4.Vent per NSICU  5.If fevers persist consider repeat blood cultures 65 yo female with a long history of Myasthenia Gravis , T2DM,,prior thymectomy who was admitted with respiratory difficulty and weakness.  She is maintained on pyridostigmine and PRN prednisone, this had been added recently for MG flare.  History of prior need for ventilator.  Home exposure to Covid, she tested positive on admission.  Suspect respiratory failure on MG basis.  She is s/p 5 days of RDV-completed 2/4  High dose steroids for MG  S/P IVIG and multiple PLEX sessions  Vent management per NSICU,  Leukocytosis likely steroid mediated, it is moderating  Surveillance blood cultures and sputum cultures sent 2/7  Blood cultures negative, sputum with staph aureus, and urine with proteus.  pyuria noted, nonspecific  Zosyn started by NSICU to cover proteus. I am not sure proteus/staph aureus are more than colonizers  PLEX # 5 on 2/10  She now has a low grade temp, ? respiratory  Suggest:  1. Await sensitivity of staph aureus in sputum , if this is MSSA we can narrow to cefazolin 2 gr q8 to cover both sputum and urine.  2.Supportive care  3.PLEX, steroids, and IVIG as per NSICU  4.Vent per NSICU  5.If fevers persist consider repeat blood cultures

## 2023-02-11 NOTE — PROGRESS NOTE ADULT - CRITICAL CARE SERVICES PROVIDED
Patient is critically ill, requiring critical care services.

## 2023-02-11 NOTE — AIRWAY REMOVAL NOTE  ADULT & PEDS - ARTIFICAL AIRWAY REMOVAL COMMENTS
Written order for extubation is verified. Pt was identified by full name birthday compared to the identifcation band. Present during the procedure was Rupali Cordon)

## 2023-02-12 LAB
ANION GAP SERPL CALC-SCNC: 11 MMOL/L — SIGNIFICANT CHANGE UP (ref 5–17)
BUN SERPL-MCNC: 9 MG/DL — SIGNIFICANT CHANGE UP (ref 7–23)
CALCIUM SERPL-MCNC: 8.3 MG/DL — LOW (ref 8.4–10.5)
CHLORIDE SERPL-SCNC: 105 MMOL/L — SIGNIFICANT CHANGE UP (ref 96–108)
CO2 SERPL-SCNC: 26 MMOL/L — SIGNIFICANT CHANGE UP (ref 22–31)
CREAT SERPL-MCNC: 0.32 MG/DL — LOW (ref 0.5–1.3)
EGFR: 117 ML/MIN/1.73M2 — SIGNIFICANT CHANGE UP
GAS PNL BLDA: SIGNIFICANT CHANGE UP
GLUCOSE BLDC GLUCOMTR-MCNC: 150 MG/DL — HIGH (ref 70–99)
GLUCOSE BLDC GLUCOMTR-MCNC: 84 MG/DL — SIGNIFICANT CHANGE UP (ref 70–99)
GLUCOSE BLDC GLUCOMTR-MCNC: 88 MG/DL — SIGNIFICANT CHANGE UP (ref 70–99)
GLUCOSE BLDC GLUCOMTR-MCNC: 89 MG/DL — SIGNIFICANT CHANGE UP (ref 70–99)
GLUCOSE BLDC GLUCOMTR-MCNC: 94 MG/DL — SIGNIFICANT CHANGE UP (ref 70–99)
GLUCOSE SERPL-MCNC: 85 MG/DL — SIGNIFICANT CHANGE UP (ref 70–99)
OSMOLALITY SERPL: 288 MOSMOL/KG — SIGNIFICANT CHANGE UP (ref 280–301)
OSMOLALITY UR: 379 MOS/KG — SIGNIFICANT CHANGE UP (ref 300–900)
POTASSIUM SERPL-MCNC: 3.8 MMOL/L — SIGNIFICANT CHANGE UP (ref 3.5–5.3)
POTASSIUM SERPL-SCNC: 3.8 MMOL/L — SIGNIFICANT CHANGE UP (ref 3.5–5.3)
SODIUM SERPL-SCNC: 142 MMOL/L — SIGNIFICANT CHANGE UP (ref 135–145)
SP GR UR STRIP: 1.01 — LOW (ref 1.01–1.02)

## 2023-02-12 PROCEDURE — 99233 SBSQ HOSP IP/OBS HIGH 50: CPT

## 2023-02-12 PROCEDURE — 71045 X-RAY EXAM CHEST 1 VIEW: CPT | Mod: 26

## 2023-02-12 PROCEDURE — 99232 SBSQ HOSP IP/OBS MODERATE 35: CPT

## 2023-02-12 RX ORDER — BUDESONIDE, MICRONIZED 100 %
0.25 POWDER (GRAM) MISCELLANEOUS EVERY 12 HOURS
Refills: 0 | Status: DISCONTINUED | OUTPATIENT
Start: 2023-02-12 | End: 2023-02-21

## 2023-02-12 RX ORDER — HUMAN INSULIN 100 [IU]/ML
4 INJECTION, SUSPENSION SUBCUTANEOUS EVERY 6 HOURS
Refills: 0 | Status: DISCONTINUED | OUTPATIENT
Start: 2023-02-12 | End: 2023-02-14

## 2023-02-12 RX ORDER — PYRIDOSTIGMINE BROMIDE 60 MG/5ML
120 SOLUTION ORAL EVERY 8 HOURS
Refills: 0 | Status: DISCONTINUED | OUTPATIENT
Start: 2023-02-12 | End: 2023-02-14

## 2023-02-12 RX ORDER — POTASSIUM CHLORIDE 20 MEQ
20 PACKET (EA) ORAL ONCE
Refills: 0 | Status: DISCONTINUED | OUTPATIENT
Start: 2023-02-12 | End: 2023-02-12

## 2023-02-12 RX ORDER — SODIUM CHLORIDE 9 MG/ML
500 INJECTION INTRAMUSCULAR; INTRAVENOUS; SUBCUTANEOUS ONCE
Refills: 0 | Status: COMPLETED | OUTPATIENT
Start: 2023-02-12 | End: 2023-02-12

## 2023-02-12 RX ORDER — POTASSIUM CHLORIDE 20 MEQ
20 PACKET (EA) ORAL ONCE
Refills: 0 | Status: COMPLETED | OUTPATIENT
Start: 2023-02-12 | End: 2023-02-12

## 2023-02-12 RX ORDER — NYSTATIN CREAM 100000 [USP'U]/G
1 CREAM TOPICAL
Refills: 0 | Status: DISCONTINUED | OUTPATIENT
Start: 2023-02-12 | End: 2023-02-21

## 2023-02-12 RX ADMIN — PYRIDOSTIGMINE BROMIDE 120 MILLIGRAM(S): 60 SOLUTION ORAL at 14:25

## 2023-02-12 RX ADMIN — SODIUM CHLORIDE 4 MILLILITER(S): 9 INJECTION INTRAMUSCULAR; INTRAVENOUS; SUBCUTANEOUS at 11:45

## 2023-02-12 RX ADMIN — Medication 3 MILLILITER(S): at 17:32

## 2023-02-12 RX ADMIN — SODIUM CHLORIDE 70 MILLILITER(S): 9 INJECTION INTRAMUSCULAR; INTRAVENOUS; SUBCUTANEOUS at 20:45

## 2023-02-12 RX ADMIN — Medication 0.25 MILLIGRAM(S): at 17:33

## 2023-02-12 RX ADMIN — NYSTATIN CREAM 1 APPLICATION(S): 100000 CREAM TOPICAL at 17:32

## 2023-02-12 RX ADMIN — Medication 3 MILLILITER(S): at 05:55

## 2023-02-12 RX ADMIN — Medication 20 MILLIEQUIVALENT(S): at 18:34

## 2023-02-12 RX ADMIN — POLYETHYLENE GLYCOL 3350 17 GRAM(S): 17 POWDER, FOR SOLUTION ORAL at 12:17

## 2023-02-12 RX ADMIN — CEFTRIAXONE 100 MILLIGRAM(S): 500 INJECTION, POWDER, FOR SOLUTION INTRAMUSCULAR; INTRAVENOUS at 20:45

## 2023-02-12 RX ADMIN — SODIUM CHLORIDE 4 MILLILITER(S): 9 INJECTION INTRAMUSCULAR; INTRAVENOUS; SUBCUTANEOUS at 05:54

## 2023-02-12 RX ADMIN — PYRIDOSTIGMINE BROMIDE 120 MILLIGRAM(S): 60 SOLUTION ORAL at 21:38

## 2023-02-12 RX ADMIN — HUMAN INSULIN 8 UNIT(S): 100 INJECTION, SUSPENSION SUBCUTANEOUS at 12:18

## 2023-02-12 RX ADMIN — SODIUM CHLORIDE 4 MILLILITER(S): 9 INJECTION INTRAMUSCULAR; INTRAVENOUS; SUBCUTANEOUS at 17:33

## 2023-02-12 RX ADMIN — Medication 3 MILLILITER(S): at 11:38

## 2023-02-12 RX ADMIN — SODIUM CHLORIDE 70 MILLILITER(S): 9 INJECTION INTRAMUSCULAR; INTRAVENOUS; SUBCUTANEOUS at 16:30

## 2023-02-12 RX ADMIN — SODIUM CHLORIDE 1000 MILLILITER(S): 9 INJECTION INTRAMUSCULAR; INTRAVENOUS; SUBCUTANEOUS at 16:35

## 2023-02-12 RX ADMIN — ENOXAPARIN SODIUM 40 MILLIGRAM(S): 100 INJECTION SUBCUTANEOUS at 17:29

## 2023-02-12 NOTE — PROGRESS NOTE ADULT - SUBJECTIVE AND OBJECTIVE BOX
Patient seen and examined during night rounds     T(C): 36.9 (02-12-23 @ 16:00), Max: 36.9 (02-12-23 @ 16:00)  HR: 88 (02-12-23 @ 18:24) (57 - 122)  BP: --  RR: 21 (02-12-23 @ 18:00) (16 - 33)  SpO2: 99% (02-12-23 @ 18:24) (95% - 100%)  02-11-23 @ 07:01  -  02-12-23 @ 07:00  --------------------------------------------------------  IN: 390 mL / OUT: 1700 mL / NET: -1310 mL    02-12-23 @ 07:01  -  02-12-23 @ 18:50  --------------------------------------------------------  IN: 2135 mL / OUT: 2675 mL / NET: -540 mL    albuterol/ipratropium for Nebulization 3 milliLiter(s) Nebulizer every 6 hours  buDESOnide    Inhalation Suspension 0.25 milliGRAM(s) Inhalation every 12 hours  cefTRIAXone   IVPB 1000 milliGRAM(s) IV Intermittent every 24 hours  chlorhexidine 4% Liquid 1 Application(s) Topical <User Schedule>  enoxaparin Injectable 40 milliGRAM(s) SubCutaneous <User Schedule>  insulin lispro (ADMELOG) corrective regimen sliding scale   SubCutaneous every 6 hours  insulin NPH human recombinant 8 Unit(s) SubCutaneous every 6 hours  nystatin Powder 1 Application(s) Topical two times a day  oxyCODONE    IR 5 milliGRAM(s) Oral every 6 hours PRN  oxyCODONE    IR 10 milliGRAM(s) Oral every 4 hours PRN  polyethylene glycol 3350 17 Gram(s) Oral daily  pyridostigmine 120 milliGRAM(s) Oral every 8 hours  senna 2 Tablet(s) Oral at bedtime  sodium chloride 0.9%. 1000 milliLiter(s) IV Continuous <Continuous>  sodium chloride 3%  Inhalation 4 milliLiter(s) Inhalation every 6 hours        Exam stable    labs and imaging reviewed       LABS:  Na: 142 (02-12 @ 17:13), 141 (02-11 @ 21:43), 149 (02-10 @ 23:33), 144 (02-09 @ 21:24)  K: 3.8 (02-12 @ 17:13), 4.0 (02-11 @ 21:43), 3.8 (02-10 @ 23:33), 3.9 (02-09 @ 21:24)  Cl: 105 (02-12 @ 17:13), 106 (02-11 @ 21:43), 112 (02-10 @ 23:33), 107 (02-09 @ 21:24)  CO2: 26 (02-12 @ 17:13), 26 (02-11 @ 21:43), 24 (02-10 @ 23:33), 29 (02-09 @ 21:24)  BUN: 9 (02-12 @ 17:13), 16 (02-11 @ 21:43), 28 (02-10 @ 23:33), 36 (02-09 @ 21:24)  Cr: 0.32 (02-12 @ 17:13), 0.45 (02-11 @ 21:43), 0.51 (02-10 @ 23:33), 0.46 (02-09 @ 21:24)  Glu: 85(02-12 @ 17:13), 92(02-11 @ 21:43), 106(02-10 @ 23:33), 151(02-09 @ 21:24)    Hgb: 8.6 (02-11 @ 21:43), 8.3 (02-10 @ 23:33), 8.4 (02-10 @ 10:21), 9.3 (02-09 @ 21:24)  Hct: 26.4 (02-11 @ 21:43), 25.6 (02-10 @ 23:33), 25.9 (02-10 @ 10:21), 28.0 (02-09 @ 21:24)  WBC: 12.29 (02-11 @ 21:43), 13.77 (02-10 @ 23:33), 15.40 (02-10 @ 10:21), 15.45 (02-09 @ 21:24)  Plt: 202 (02-11 @ 21:43), 203 (02-10 @ 23:33), 210 (02-10 @ 10:21), 226 (02-09 @ 21:24)    INR: 1.13 02-10-23 @ 12:34  PTT: 27.3 02-10-23 @ 12:34          decrease NPH to 6 units q 6 hr otherwise   Continue same management       Caitlyn Calderon [facility] attending  Patient seen and examined during night rounds     T(C): 36.9 (02-12-23 @ 16:00), Max: 36.9 (02-12-23 @ 16:00)  HR: 88 (02-12-23 @ 18:24) (57 - 122)  BP: --  RR: 21 (02-12-23 @ 18:00) (16 - 33)  SpO2: 99% (02-12-23 @ 18:24) (95% - 100%)  02-11-23 @ 07:01  -  02-12-23 @ 07:00  --------------------------------------------------------  IN: 390 mL / OUT: 1700 mL / NET: -1310 mL    02-12-23 @ 07:01  -  02-12-23 @ 18:50  --------------------------------------------------------  IN: 2135 mL / OUT: 2675 mL / NET: -540 mL    albuterol/ipratropium for Nebulization 3 milliLiter(s) Nebulizer every 6 hours  buDESOnide    Inhalation Suspension 0.25 milliGRAM(s) Inhalation every 12 hours  cefTRIAXone   IVPB 1000 milliGRAM(s) IV Intermittent every 24 hours  chlorhexidine 4% Liquid 1 Application(s) Topical <User Schedule>  enoxaparin Injectable 40 milliGRAM(s) SubCutaneous <User Schedule>  insulin lispro (ADMELOG) corrective regimen sliding scale   SubCutaneous every 6 hours  insulin NPH human recombinant 8 Unit(s) SubCutaneous every 6 hours  nystatin Powder 1 Application(s) Topical two times a day  oxyCODONE    IR 5 milliGRAM(s) Oral every 6 hours PRN  oxyCODONE    IR 10 milliGRAM(s) Oral every 4 hours PRN  polyethylene glycol 3350 17 Gram(s) Oral daily  pyridostigmine 120 milliGRAM(s) Oral every 8 hours  senna 2 Tablet(s) Oral at bedtime  sodium chloride 0.9%. 1000 milliLiter(s) IV Continuous <Continuous>  sodium chloride 3%  Inhalation 4 milliLiter(s) Inhalation every 6 hours        Exam stable    labs and imaging reviewed       LABS:  Na: 142 (02-12 @ 17:13), 141 (02-11 @ 21:43), 149 (02-10 @ 23:33), 144 (02-09 @ 21:24)  K: 3.8 (02-12 @ 17:13), 4.0 (02-11 @ 21:43), 3.8 (02-10 @ 23:33), 3.9 (02-09 @ 21:24)  Cl: 105 (02-12 @ 17:13), 106 (02-11 @ 21:43), 112 (02-10 @ 23:33), 107 (02-09 @ 21:24)  CO2: 26 (02-12 @ 17:13), 26 (02-11 @ 21:43), 24 (02-10 @ 23:33), 29 (02-09 @ 21:24)  BUN: 9 (02-12 @ 17:13), 16 (02-11 @ 21:43), 28 (02-10 @ 23:33), 36 (02-09 @ 21:24)  Cr: 0.32 (02-12 @ 17:13), 0.45 (02-11 @ 21:43), 0.51 (02-10 @ 23:33), 0.46 (02-09 @ 21:24)  Glu: 85(02-12 @ 17:13), 92(02-11 @ 21:43), 106(02-10 @ 23:33), 151(02-09 @ 21:24)    Hgb: 8.6 (02-11 @ 21:43), 8.3 (02-10 @ 23:33), 8.4 (02-10 @ 10:21), 9.3 (02-09 @ 21:24)  Hct: 26.4 (02-11 @ 21:43), 25.6 (02-10 @ 23:33), 25.9 (02-10 @ 10:21), 28.0 (02-09 @ 21:24)  WBC: 12.29 (02-11 @ 21:43), 13.77 (02-10 @ 23:33), 15.40 (02-10 @ 10:21), 15.45 (02-09 @ 21:24)  Plt: 202 (02-11 @ 21:43), 203 (02-10 @ 23:33), 210 (02-10 @ 10:21), 226 (02-09 @ 21:24)    INR: 1.13 02-10-23 @ 12:34  PTT: 27.3 02-10-23 @ 12:34          decrease NPH to 6 units q 6 hr otherwise   Continue same management       Caitlyn Calderon [facility] attending  Patient seen and examined during night rounds     T(C): 36.9 (02-12-23 @ 16:00), Max: 36.9 (02-12-23 @ 16:00)  HR: 88 (02-12-23 @ 18:24) (57 - 122)  BP: --  RR: 21 (02-12-23 @ 18:00) (16 - 33)  SpO2: 99% (02-12-23 @ 18:24) (95% - 100%)  02-11-23 @ 07:01  -  02-12-23 @ 07:00  --------------------------------------------------------  IN: 390 mL / OUT: 1700 mL / NET: -1310 mL    02-12-23 @ 07:01  -  02-12-23 @ 18:50  --------------------------------------------------------  IN: 2135 mL / OUT: 2675 mL / NET: -540 mL    albuterol/ipratropium for Nebulization 3 milliLiter(s) Nebulizer every 6 hours  buDESOnide    Inhalation Suspension 0.25 milliGRAM(s) Inhalation every 12 hours  cefTRIAXone   IVPB 1000 milliGRAM(s) IV Intermittent every 24 hours  chlorhexidine 4% Liquid 1 Application(s) Topical <User Schedule>  enoxaparin Injectable 40 milliGRAM(s) SubCutaneous <User Schedule>  insulin lispro (ADMELOG) corrective regimen sliding scale   SubCutaneous every 6 hours  insulin NPH human recombinant 8 Unit(s) SubCutaneous every 6 hours  nystatin Powder 1 Application(s) Topical two times a day  oxyCODONE    IR 5 milliGRAM(s) Oral every 6 hours PRN  oxyCODONE    IR 10 milliGRAM(s) Oral every 4 hours PRN  polyethylene glycol 3350 17 Gram(s) Oral daily  pyridostigmine 120 milliGRAM(s) Oral every 8 hours  senna 2 Tablet(s) Oral at bedtime  sodium chloride 0.9%. 1000 milliLiter(s) IV Continuous <Continuous>  sodium chloride 3%  Inhalation 4 milliLiter(s) Inhalation every 6 hours        Exam stable    labs and imaging reviewed       LABS:  Na: 142 (02-12 @ 17:13), 141 (02-11 @ 21:43), 149 (02-10 @ 23:33), 144 (02-09 @ 21:24)  K: 3.8 (02-12 @ 17:13), 4.0 (02-11 @ 21:43), 3.8 (02-10 @ 23:33), 3.9 (02-09 @ 21:24)  Cl: 105 (02-12 @ 17:13), 106 (02-11 @ 21:43), 112 (02-10 @ 23:33), 107 (02-09 @ 21:24)  CO2: 26 (02-12 @ 17:13), 26 (02-11 @ 21:43), 24 (02-10 @ 23:33), 29 (02-09 @ 21:24)  BUN: 9 (02-12 @ 17:13), 16 (02-11 @ 21:43), 28 (02-10 @ 23:33), 36 (02-09 @ 21:24)  Cr: 0.32 (02-12 @ 17:13), 0.45 (02-11 @ 21:43), 0.51 (02-10 @ 23:33), 0.46 (02-09 @ 21:24)  Glu: 85(02-12 @ 17:13), 92(02-11 @ 21:43), 106(02-10 @ 23:33), 151(02-09 @ 21:24)    Hgb: 8.6 (02-11 @ 21:43), 8.3 (02-10 @ 23:33), 8.4 (02-10 @ 10:21), 9.3 (02-09 @ 21:24)  Hct: 26.4 (02-11 @ 21:43), 25.6 (02-10 @ 23:33), 25.9 (02-10 @ 10:21), 28.0 (02-09 @ 21:24)  WBC: 12.29 (02-11 @ 21:43), 13.77 (02-10 @ 23:33), 15.40 (02-10 @ 10:21), 15.45 (02-09 @ 21:24)  Plt: 202 (02-11 @ 21:43), 203 (02-10 @ 23:33), 210 (02-10 @ 10:21), 226 (02-09 @ 21:24)    INR: 1.13 02-10-23 @ 12:34  PTT: 27.3 02-10-23 @ 12:34    neuro exam stable   NS 75 ml/hr while having diarrhea   TF change to continuous from bolus due to diarrhea , multiple BM   decrease NPH to 4 units q 6 hr otherwise   Continue same management       [facility] Calder[facility]  Patient seen and examined during night rounds     T(C): 36.9 (02-12-23 @ 16:00), Max: 36.9 (02-12-23 @ 16:00)  HR: 88 (02-12-23 @ 18:24) (57 - 122)  BP: --  RR: 21 (02-12-23 @ 18:00) (16 - 33)  SpO2: 99% (02-12-23 @ 18:24) (95% - 100%)  02-11-23 @ 07:01  -  02-12-23 @ 07:00  --------------------------------------------------------  IN: 390 mL / OUT: 1700 mL / NET: -1310 mL    02-12-23 @ 07:01  -  02-12-23 @ 18:50  --------------------------------------------------------  IN: 2135 mL / OUT: 2675 mL / NET: -540 mL    albuterol/ipratropium for Nebulization 3 milliLiter(s) Nebulizer every 6 hours  buDESOnide    Inhalation Suspension 0.25 milliGRAM(s) Inhalation every 12 hours  cefTRIAXone   IVPB 1000 milliGRAM(s) IV Intermittent every 24 hours  chlorhexidine 4% Liquid 1 Application(s) Topical <User Schedule>  enoxaparin Injectable 40 milliGRAM(s) SubCutaneous <User Schedule>  insulin lispro (ADMELOG) corrective regimen sliding scale   SubCutaneous every 6 hours  insulin NPH human recombinant 8 Unit(s) SubCutaneous every 6 hours  nystatin Powder 1 Application(s) Topical two times a day  oxyCODONE    IR 5 milliGRAM(s) Oral every 6 hours PRN  oxyCODONE    IR 10 milliGRAM(s) Oral every 4 hours PRN  polyethylene glycol 3350 17 Gram(s) Oral daily  pyridostigmine 120 milliGRAM(s) Oral every 8 hours  senna 2 Tablet(s) Oral at bedtime  sodium chloride 0.9%. 1000 milliLiter(s) IV Continuous <Continuous>  sodium chloride 3%  Inhalation 4 milliLiter(s) Inhalation every 6 hours        Exam stable    labs and imaging reviewed       LABS:  Na: 142 (02-12 @ 17:13), 141 (02-11 @ 21:43), 149 (02-10 @ 23:33), 144 (02-09 @ 21:24)  K: 3.8 (02-12 @ 17:13), 4.0 (02-11 @ 21:43), 3.8 (02-10 @ 23:33), 3.9 (02-09 @ 21:24)  Cl: 105 (02-12 @ 17:13), 106 (02-11 @ 21:43), 112 (02-10 @ 23:33), 107 (02-09 @ 21:24)  CO2: 26 (02-12 @ 17:13), 26 (02-11 @ 21:43), 24 (02-10 @ 23:33), 29 (02-09 @ 21:24)  BUN: 9 (02-12 @ 17:13), 16 (02-11 @ 21:43), 28 (02-10 @ 23:33), 36 (02-09 @ 21:24)  Cr: 0.32 (02-12 @ 17:13), 0.45 (02-11 @ 21:43), 0.51 (02-10 @ 23:33), 0.46 (02-09 @ 21:24)  Glu: 85(02-12 @ 17:13), 92(02-11 @ 21:43), 106(02-10 @ 23:33), 151(02-09 @ 21:24)    Hgb: 8.6 (02-11 @ 21:43), 8.3 (02-10 @ 23:33), 8.4 (02-10 @ 10:21), 9.3 (02-09 @ 21:24)  Hct: 26.4 (02-11 @ 21:43), 25.6 (02-10 @ 23:33), 25.9 (02-10 @ 10:21), 28.0 (02-09 @ 21:24)  WBC: 12.29 (02-11 @ 21:43), 13.77 (02-10 @ 23:33), 15.40 (02-10 @ 10:21), 15.45 (02-09 @ 21:24)  Plt: 202 (02-11 @ 21:43), 203 (02-10 @ 23:33), 210 (02-10 @ 10:21), 226 (02-09 @ 21:24)    INR: 1.13 02-10-23 @ 12:34  PTT: 27.3 02-10-23 @ 12:34    neuro exam stable   NS 75 ml/hr while having diarrhea   TF change to continuous from bolus due to diarrhea , multiple BM   decrease NPH to 4 units q 6 hr otherwise   Continue same management       [facility] attending  Patient seen and examined during night rounds     T(C): 36.9 (02-12-23 @ 16:00), Max: 36.9 (02-12-23 @ 16:00)  HR: 88 (02-12-23 @ 18:24) (57 - 122)  BP: --  RR: 21 (02-12-23 @ 18:00) (16 - 33)  SpO2: 99% (02-12-23 @ 18:24) (95% - 100%)  02-11-23 @ 07:01  -  02-12-23 @ 07:00  --------------------------------------------------------  IN: 390 mL / OUT: 1700 mL / NET: -1310 mL    02-12-23 @ 07:01  -  02-12-23 @ 18:50  --------------------------------------------------------  IN: 2135 mL / OUT: 2675 mL / NET: -540 mL    albuterol/ipratropium for Nebulization 3 milliLiter(s) Nebulizer every 6 hours  buDESOnide    Inhalation Suspension 0.25 milliGRAM(s) Inhalation every 12 hours  cefTRIAXone   IVPB 1000 milliGRAM(s) IV Intermittent every 24 hours  chlorhexidine 4% Liquid 1 Application(s) Topical <User Schedule>  enoxaparin Injectable 40 milliGRAM(s) SubCutaneous <User Schedule>  insulin lispro (ADMELOG) corrective regimen sliding scale   SubCutaneous every 6 hours  insulin NPH human recombinant 8 Unit(s) SubCutaneous every 6 hours  nystatin Powder 1 Application(s) Topical two times a day  oxyCODONE    IR 5 milliGRAM(s) Oral every 6 hours PRN  oxyCODONE    IR 10 milliGRAM(s) Oral every 4 hours PRN  polyethylene glycol 3350 17 Gram(s) Oral daily  pyridostigmine 120 milliGRAM(s) Oral every 8 hours  senna 2 Tablet(s) Oral at bedtime  sodium chloride 0.9%. 1000 milliLiter(s) IV Continuous <Continuous>  sodium chloride 3%  Inhalation 4 milliLiter(s) Inhalation every 6 hours        Exam stable    labs and imaging reviewed       LABS:  Na: 142 (02-12 @ 17:13), 141 (02-11 @ 21:43), 149 (02-10 @ 23:33), 144 (02-09 @ 21:24)  K: 3.8 (02-12 @ 17:13), 4.0 (02-11 @ 21:43), 3.8 (02-10 @ 23:33), 3.9 (02-09 @ 21:24)  Cl: 105 (02-12 @ 17:13), 106 (02-11 @ 21:43), 112 (02-10 @ 23:33), 107 (02-09 @ 21:24)  CO2: 26 (02-12 @ 17:13), 26 (02-11 @ 21:43), 24 (02-10 @ 23:33), 29 (02-09 @ 21:24)  BUN: 9 (02-12 @ 17:13), 16 (02-11 @ 21:43), 28 (02-10 @ 23:33), 36 (02-09 @ 21:24)  Cr: 0.32 (02-12 @ 17:13), 0.45 (02-11 @ 21:43), 0.51 (02-10 @ 23:33), 0.46 (02-09 @ 21:24)  Glu: 85(02-12 @ 17:13), 92(02-11 @ 21:43), 106(02-10 @ 23:33), 151(02-09 @ 21:24)    Hgb: 8.6 (02-11 @ 21:43), 8.3 (02-10 @ 23:33), 8.4 (02-10 @ 10:21), 9.3 (02-09 @ 21:24)  Hct: 26.4 (02-11 @ 21:43), 25.6 (02-10 @ 23:33), 25.9 (02-10 @ 10:21), 28.0 (02-09 @ 21:24)  WBC: 12.29 (02-11 @ 21:43), 13.77 (02-10 @ 23:33), 15.40 (02-10 @ 10:21), 15.45 (02-09 @ 21:24)  Plt: 202 (02-11 @ 21:43), 203 (02-10 @ 23:33), 210 (02-10 @ 10:21), 226 (02-09 @ 21:24)    INR: 1.13 02-10-23 @ 12:34  PTT: 27.3 02-10-23 @ 12:34    neuro exam stable   NS 75 ml/hr while having diarrhea   TF change to continuous from bolus due to diarrhea , multiple BM   decrease NPH to 4 units q 6 hr otherwise   Continue same management       [facility] Calderon [facility] attending

## 2023-02-12 NOTE — SWALLOW BEDSIDE ASSESSMENT ADULT - H & P REVIEW
KAIDEN HATCH is a 63yo Malayalam-speaking woman w/ Myasthenia Gravis (on pyridostigmine, hospitalization every 1-2 years requiring IVIG) (dx 2015), DMII, s/p thymectomy (2010), T2DM who presents with SOB, cough, difficulty spitting up sputum x 4 days. Also reports difficulty swallowing - but was able to tolerate taking oral pills. Admits to difficulty looking up, but eye drooping is overall improved from past couple of months. Of note, last hospitalization was 2 years ago at [facility] during which patient was intubated -- son notes that this was likely 2/2 patient being forced to lie down flat in hospital bed, leading to aspiration. She was extubated after ~3 days. Received IVIG during that hospitalization. She reportedly discussed PLEX as an option but preferred IVIG and states she typically starts to improve after 3-4 days./yes KAIDEN HATCH is a 61yo Malayalam-speaking woman w/ Myasthenia Gravis (on pyridostigmine, hospitalization every 1-2 years requiring IVIG) (dx 2015), DMII, s/p thymectomy (2010), T2DM who presents with SOB, cough, difficulty spitting up sputum x 4 days. Also reports difficulty swallowing - but was able to tolerate taking oral pills. Admits to difficulty looking up, but eye drooping is overall improved from past couple of months. Of note, last hospitalization was 2 years ago at [facility] during which patient was intubated -- son notes that this was likely 2/2 patient being forced to lie down flat in hospital bed, leading to aspiration. She was extubated after ~3 days. Received IVIG during that hospitalization. She reportedly discussed PLEX as an option but preferred IVIG and states she typically starts to improve after 3-4 days./yes KAIDEN HATCH is a 63yo Malayalam-speaking woman w/ Myasthenia Gravis (on pyridostigmine, hospitalization every 1-2 years requiring IVIG) (dx 2015), DMII, s/p thymectomy (2010), T2DM who presents with SOB, cough, difficulty spitting up sputum x 4 days. Also reports difficulty swallowing - but was able to tolerate taking oral pills. Admits to difficulty looking up, but eye drooping is overall improved from past couple of months. Of note, last hospitalization was 2 years ago at [facility] during which patient was intubated -- son notes that this was likely 2/2 patient being forced to lie down flat in hospital bed, leading to aspiration. She was extubated after ~3 days. Received IVIG during that hospitalization. She reportedly discussed PLEX as an option but preferred IVIG and states she typically starts to improve after 3-4 days./yes

## 2023-02-12 NOTE — PROGRESS NOTE ADULT - ASSESSMENT
Myaesthenia Gravis exacerbation  likely secondary to COVID infection.  To Completed 5 rounds of plex and to observe.  Would plan to restart pyridostigmine 120mg tid.  Remain off of steroid as pt has completed full cycle of treatment.

## 2023-02-12 NOTE — SWALLOW BEDSIDE ASSESSMENT ADULT - SWALLOW EVAL: DIAGNOSIS
This is a 62-year-old woman with DMII, myasthenia gravis status post thymectomy and prior crises status post IVIG developed MG crisis in setting of COVID-19 infection requiring intubation status post IVIG x 1 and PLEX. Oropharyngeal swallow profile p/w hypophonia and baseline coughing on secretions/wet cough intermittent with 02 dips and increase in RR with conservative trials of po offered this date. Given dysphagia risk factors, as stated above, including recent extubation on 2/11 pt is not deemed a candidate for po intake at this time.

## 2023-02-12 NOTE — PROGRESS NOTE ADULT - SUBJECTIVE AND OBJECTIVE BOX
CC: f/u for proteus UTI and MSSA in sputum    Patient reports: she was extubated 2/11, appears comfortable    REVIEW OF SYSTEMS:  All other review of systems negative (Comprehensive ROS)    Antimicrobials Day #  :day 3  cefTRIAXone   IVPB 1000 milliGRAM(s) IV Intermittent every 24 hours    Other Medications Reviewed  MEDICATIONS  (STANDING):  albuterol/ipratropium for Nebulization 3 milliLiter(s) Nebulizer every 6 hours  buDESOnide    Inhalation Suspension 0.25 milliGRAM(s) Inhalation every 12 hours  cefTRIAXone   IVPB 1000 milliGRAM(s) IV Intermittent every 24 hours  chlorhexidine 4% Liquid 1 Application(s) Topical <User Schedule>  enoxaparin Injectable 40 milliGRAM(s) SubCutaneous <User Schedule>  insulin lispro (ADMELOG) corrective regimen sliding scale   SubCutaneous every 6 hours  insulin NPH human recombinant 8 Unit(s) SubCutaneous every 6 hours  polyethylene glycol 3350 17 Gram(s) Oral daily  pyridostigmine 120 milliGRAM(s) Oral every 8 hours  senna 2 Tablet(s) Oral at bedtime  sodium chloride 0.9%. 1000 milliLiter(s) (70 mL/Hr) IV Continuous <Continuous>  sodium chloride 3%  Inhalation 4 milliLiter(s) Inhalation every 6 hours    T(F): 97.9 (02-12-23 @ 08:00), Max: 98.8 (02-11-23 @ 11:00)  HR: 79 (02-12-23 @ 09:52)  BP: --  RR: 16 (02-12-23 @ 09:52)  SpO2: 100% (02-12-23 @ 09:52)  Wt(kg): --    PHYSICAL EXAM:  General: alert, no acute distress  Eyes:  anicteric, no conjunctival injection, no discharge  Oropharynx: no lesions or injection 	  Neck: supple, without adenopathy  Lungs: coarse BS  Heart: regular rate and rhythm; no murmur, rubs or gallops  Abdomen: soft, nondistended, nontender, without mass or organomegaly  Skin: no lesions  Extremities: no clubbing, cyanosis, or edema  Neurologic: alert, oriented, moves all extremities    LAB RESULTS:                        8.6    12.29 )-----------( 202      ( 11 Feb 2023 21:43 )             26.4     02-11    141  |  106  |  16  ----------------------------<  92  4.0   |  26  |  0.45<L>    Ca    8.1<L>      11 Feb 2023 21:43  Phos  4.3     02-11  Mg     2.4     02-11          MICROBIOLOGY:  RECENT CULTURES:  02-08 @ 12:55 Clean Catch Clean Catch (Midstream) Proteus mirabilis    >100,000 CFU/ml Proteus mirabilis      02-08 @ 01:09 .Sputum Sputum Staphylococcus aureus    Moderate Staphylococcus aureus  Normal Respiratory Renetta present    Rare polymorphonuclear leukocytes per low power field  No Squamous epithelial cells per low power field  Rare Gram Positive Rods seen per oil power field  Rare Gram positive cocci in pairs seen per oil power field    02-07 @ 18:41 .Blood Blood-Venous     No growth to date.      02-07 @ 18:40 .Blood Blood-Peripheral     No growth to date.          RADIOLOGY REVIEWED:    < from: [facility] Duplex Lower Ext Vein Scan, Bilat (02.11.23 @ 11:14) >  IMPRESSION:  No evidence of deep venous thrombosis in either lower extremity.    < end of copied text >  < from: Xray Chest 1 View- PORTABLE-Routine (Xray Chest 1 View- PORTABLE-Routine in AM.) (02.11.23 @ 07:43) >    IMPRESSION:  Low lung volumes. Otherwise, the lungs are clear.    --- End of Report ---    < end of copied text >

## 2023-02-12 NOTE — PROGRESS NOTE ADULT - SUBJECTIVE AND OBJECTIVE BOX
Pt was extubated yesterday and doing well with use of face mask.  Breathing comfortable  Non verbal but expresses that she has felt increasingly strong since initiation of PLEX.    Notes no other concerns.   Has tolerated PLEX well and no clear ill se's.    On exam:  Pt wearing rebreather facemask but imitating movements well.  No facial droop.  Able to keep prolonged upgaze.  Hands have increased to bilateral 4-/5 strength.  Legs also bilateral 4+/5 strength to confrontation  No tremor and mild dysmetria

## 2023-02-12 NOTE — SWALLOW BEDSIDE ASSESSMENT ADULT - NS SPL SWALLOW CLINIC TRIAL FT
Given current swallow profile as described above, pt is not a candidate for initiation of po at this time. Plan to re-assess in 1-2 days to assess for bedside advancement vs need for objective testing.

## 2023-02-12 NOTE — SWALLOW BEDSIDE ASSESSMENT ADULT - PHARYNGEAL PHASE
suspected latency in the swallow trigger, multiple swallows consisted with possible pharyngeal residue, latent coughing response uncertain if 2/2 baseline cough vs 2/2 po - noted slight 02 dips from 97-94% and RR from 12-14 to 19-20 following trials

## 2023-02-12 NOTE — SWALLOW BEDSIDE ASSESSMENT ADULT - COMMENTS
Continued history:   Patient was admitted to [facility], upon arrival to the unit, appeared in acute respiratory distress tachypneic, hypertensive, tachycardic, hypoxic, ABG showing resp acidosis, immediately put on BiPAP, started on IVIG with some improvement   -1/30- MD had extensive talk with patient and son on 1/30 regarding treatment and recommendation for PLEX given significant respiratory involvement and wanted to avoid intubation. However, they were insistent upon IVIG. Gave IVIG 0.4g/kg/day 3/5 doses and then switched to PLEX due to worsening respiratory status.  -1/31 intubated overnight for hypercapnic/hypoxic respiratory failure   -2/2- Still on IVIG, PLEX consented. No issues overnight.  - She is s/p 5 days of RDV-completed 2/4  -2/8- Remains intubated, CPAP this AM and tolerated it. Today is her 5th dose of PLEX.   -2/10- Day 5 of PLEX today.  S/p PLEX 5/5.  -2/11- Krystal d/floyd'ed, extubated    Swallow history: No reports in SCM. Pt denied swallow deficits PTA.

## 2023-02-12 NOTE — PROGRESS NOTE ADULT - ASSESSMENT
ASSESSMENT/PLAN: MG exacerbation, covid +     NEURO:   Neurochecks q4  s/p 5x PLEX, 3x IVIG  s/p solumedrol 125 mg daily (1/31--2/10)  Avoid medications that may worsen the current exacerbation including macrolides, fluoroquinolones, tetracyclines, aminoglycoside, beta-blockers, magnesium, and anti-arrhythmics (procainamide, quinidine, and lidocaine)  Pain management   no sedation   neurology following, restart pyridostigmine     PULM:  extubated to BiPAP yesterday  duonebs and pulmicort nebs for now (on steroid inhaler at home)  wean NC this morning, monitor O2 sat, Q4 VC and NIF check for today     CV:  MAP >65  TTE- 35-40%    RENAL:  Fluids: IVL in setting of poor EF  IO: replete as needed    GI:  Diet: intermittent BiPAP requirement, try bolus feed while off BiPAP   GI prophylaxis [] not indicated [x] famotidine [] other:   LBM- 2/11    ENDO:   A1c 6.6  Goal euglycemia (-180)  RISS for now and NPH, adjust as needed    HEME/ONC:  H/H trending down but stable now  VTE prophylaxis: [x] SCDs [x] chemoprophylaxis lovenox [] hold chemoprophylaxis due to: [] high risk of DVT/PE on admission due to:  DIC labs- f/u   No evidence of overt bleeding  Will obtain CT CAP if DIC labs are negative    ID: covid + status post remdesivir  Afebrile  R/O sepsis  leukocytes trending down, UA positive  UC positive for gram negative rods- protease  Continue with Zosyn 2/10- (8 days course)  LED in bilateral LE- p     CODE STATUS:  [x] Full Code [] DNR [] DNI [] Palliative/Comfort Care    DISPOSITION:  [x] ICU [] Stroke Unit [] Floor [] EMU [] RCU [] PCU    At risk of death/neuro decline due to: acute resp failure requiring intubation     Contact: [phone] ASSESSMENT/PLAN: MG exacerbation, covid +     NEURO:   Neurochecks q4  s/p 5x PLEX, 3x IVIG  s/p solumedrol 125 mg daily (1/31--2/10)  Avoid medications that may worsen the current exacerbation including macrolides, fluoroquinolones, tetracyclines, aminoglycoside, beta-blockers, magnesium, and anti-arrhythmics (procainamide, quinidine, and lidocaine)  Pain management   no sedation   neurology following, restart pyridostigmine     PULM:  extubated to BiPAP yesterday  duonebs and pulmicort nebs for now (on steroid inhaler at home)  wean NC this morning, monitor O2 sat, Q4 VC and NIF check for today     CV:  MAP >65  TTE- 35-40%    RENAL:  Fluids: IVL in setting of poor EF  IO: replete as needed    GI:  Diet: intermittent BiPAP requirement, try bolus feed while off BiPAP   GI prophylaxis [] not indicated [x] famotidine [] other:   LBM- 2/11    ENDO:   A1c 6.6  Goal euglycemia (-180)  RISS for now and NPH, adjust as needed    HEME/ONC:  H/H trending down but stable now  VTE prophylaxis: [x] SCDs [x] chemoprophylaxis lovenox [] hold chemoprophylaxis due to: [] high risk of DVT/PE on admission due to:  DIC labs- f/u   No evidence of overt bleeding  Will obtain CT CAP if DIC labs are negative    ID: covid + status post remdesivir  Afebrile  R/O sepsis  leukocytes trending down, UA positive  UC positive for gram negative rods- protease  Continue with Zosyn 2/10- (8 days course)  LED in bilateral LE- p     CODE STATUS:  [x] Full Code [] DNR [] DNI [] Palliative/Comfort Care    DISPOSITION:  [x] ICU [] Stroke Unit [] Floor [] EMU [] RCU [] PCU    At risk of death/neuro decline due to: acute resp failure requiring intubation     Contact: [phone] ASSESSMENT/PLAN: MG exacerbation, covid +     NEURO:   Neurochecks q4  s/p 5x PLEX, 3x IVIG  s/p solumedrol 125 mg daily (1/31--2/10)  Avoid medications that may worsen the current exacerbation including macrolides, fluoroquinolones, tetracyclines, aminoglycoside, beta-blockers, magnesium, and anti-arrhythmics (procainamide, quinidine, and lidocaine)  Pain management   no sedation   neurology following, restart pyridostigmine     PULM:  extubated to BiPAP yesterday  duonebs and pulmicort nebs for now (on steroid inhaler at home)  wean NC this morning, monitor O2 sat, Q4 VC and NIF check for today     CV:  MAP >65  TTE- 35-40%    RENAL:  Fluids: IVL in setting of poor EF  IO: replete as needed    GI:  Diet: intermittent BiPAP requirement, try bolus feed while off BiPAP   GI prophylaxis [] not indicated [x] famotidine [] other:   LBM- 2/11    ENDO:   A1c 6.6  Goal euglycemia (-180)  RISS for now and NPH, adjust as needed    HEME/ONC:  H/H trending down but stable now  VTE prophylaxis: [x] SCDs [x] chemoprophylaxis lovenox [] hold chemoprophylaxis due to: [] high risk of DVT/PE on admission due to:  DIC labs- f/u   No evidence of overt bleeding  Will obtain CT CAP if DIC labs are negative    ID: covid + status post remdesivir  Afebrile  R/O sepsis  leukocytes trending down, UA positive  UC positive for gram negative rods- protease  Continue with Zosyn 2/10- (8 days course)  LED in bilateral LE- p     CODE STATUS:  [x] Full Code [] DNR [] DNI [] Palliative/Comfort Care    DISPOSITION:  [x] ICU [] Stroke Unit [] Floor [] EMU [] RCU [] PCU    At risk of death/neuro decline due to: acute resp failure requiring intubation     Contact: [phone]

## 2023-02-12 NOTE — PROGRESS NOTE ADULT - ASSESSMENT
65 yo female with a long history of Myasthenia Gravis , T2DM,,prior thymectomy who was admitted with respiratory difficulty and weakness.  She is maintained on pyridostigmine and PRN prednisone, this had been added recently for MG flare.  History of prior need for ventilator.  Home exposure to Covid, she tested positive on admission.  Suspect respiratory failure on MG basis.  She is s/p 5 days of RDV-completed 2/4  High dose steroids for MG  S/P IVIG and multiple PLEX sessions  Vent management per NSICU,  Leukocytosis likely steroid mediated, it is moderating  Surveillance blood cultures and sputum cultures sent 2/7  Blood cultures negative, sputum with staph aureus, and urine with proteus.  pyuria noted, nonspecific  Zosyn started by NSICU to cover proteus. I am not sure proteus/staph aureus are more than colonizers  She was converted to CTX 2/11  PLEX # 5 on 2/10  Extubated 2/11, required BIPAP overnight  Suggest:  1. CTX covers both urine and sputum isolates, can also consider cefazolin or even oral cephalexin  2.Supportive care  3.PLEX, steroids, and IVIG as per NSICU  4.Supplemental O2 per ICU  5.Diet per ICU 63 yo female with a long history of Myasthenia Gravis , T2DM,,prior thymectomy who was admitted with respiratory difficulty and weakness.  She is maintained on pyridostigmine and PRN prednisone, this had been added recently for MG flare.  History of prior need for ventilator.  Home exposure to Covid, she tested positive on admission.  Suspect respiratory failure on MG basis.  She is s/p 5 days of RDV-completed 2/4  High dose steroids for MG  S/P IVIG and multiple PLEX sessions  Vent management per NSICU,  Leukocytosis likely steroid mediated, it is moderating  Surveillance blood cultures and sputum cultures sent 2/7  Blood cultures negative, sputum with staph aureus, and urine with proteus.  pyuria noted, nonspecific  Zosyn started by NSICU to cover proteus. I am not sure proteus/staph aureus are more than colonizers  She was converted to CTX 2/11  PLEX # 5 on 2/10  Extubated 2/11, required BIPAP overnight  Suggest:  1. CTX covers both urine and sputum isolates, can also consider cefazolin or even oral cephalexin  2.Supportive care  3.PLEX, steroids, and IVIG as per NSICU  4.Supplemental O2 per ICU  5.Diet per ICU

## 2023-02-12 NOTE — SWALLOW BEDSIDE ASSESSMENT ADULT - NS ASR SWALLOW FINDINGS DISCUS
[place] [facility] [place], [place]/Physician/Nursing/Patient RN Al[place], [facility] [place], [place]/Physician/Nursing/[place] [facility] [place], [place]/Physician/Nursing/Patient

## 2023-02-12 NOTE — SWALLOW BEDSIDE ASSESSMENT ADULT - SLP GENERAL OBSERVATIONS
Encountered at bedside, awake/alert ox3, NC 2L, NGT in situ L nares. Hypophonic voice, which improved mildly as evaluation progressed. NAD. Communicating effectively

## 2023-02-12 NOTE — PROGRESS NOTE ADULT - SUBJECTIVE AND OBJECTIVE BOX
SUMMARY:   62-year-old woman with DMII, myasthenia gravis status post thymectomy and prior crises status post IVIG developed MG crisis in setting of COVID-19 infection requiring intubation status post IVIG x 1 and PLEX.     24 HOUR EVENTS:  IPV, nebs q6hrs, PEEP increased to 10    2/6- No overnight events.   2/7- Patient difficult to wean off the ventilator, requiring quite a lot of PEEP, currently on PEEP of 7. Otherwise exam improving. S/p 3 doses of PLEX.  2/8- Remains intubated, CPAP this AM and tolerated it. Today is her 5th dose of PLEX. No changes in exam.  2/9- Patient desaturated overnight, TV was low which was adjusted and PEEP was increased to 10. Improved after.   2/10- Day 5 of PLEX today.   2/11- Krystal d/c'ed, extubated    stable on BiPAP overnight     REVIEW OF SYSTEMS: [x] occational HA, but shows thumbs up when asked how she's doing    [ ] All ROS addressed below are non-contributory, except:  Neuro: [ ] Headache [ ] Back pain [ ] Numbness [ ] Weakness [ ] Ataxia [ ] Dizziness [ ] Aphasia [ ] Dysarthria [ ] Visual disturbance  Resp: [ ] Shortness of breath/dyspnea [ ] Orthopnea [ ] Cough  CV: [ ] Chest pain [ ] Palpitation [ ] Lightheadedness [ ] Syncope  Renal: [ ] Thirst [ ] Edema  GI: [ ] Nausea [ ] Emesis [ ] Abdominal pain [ ] Constipation [ ] Diarrhea  Hem: [ ] Hematemesis [ ] bBright red blood per rectum  ID: [ ] Fever [ ] Chills [ ] Dysuria  ENT: [ ] Rhinorrhea    VITALS/DATA/ORDERS: [x] Reviewed    EXAMINATION:   nods appropriately aaoX3, +cough/gag, no ptosis, deltoids 4-/5, neck flexion 5/5 extension 5/5, otherwise full strength   Pulmonary: Decreased breath sounds at bases  Cardiac: Normal S1/S2  Abd: Soft, non tender, + BS  Extremities: pulses strong, no edema

## 2023-02-13 LAB
CULTURE RESULTS: SIGNIFICANT CHANGE UP
GLUCOSE BLDC GLUCOMTR-MCNC: 109 MG/DL — HIGH (ref 70–99)
GLUCOSE BLDC GLUCOMTR-MCNC: 123 MG/DL — HIGH (ref 70–99)
GLUCOSE BLDC GLUCOMTR-MCNC: 94 MG/DL — SIGNIFICANT CHANGE UP (ref 70–99)
SPECIMEN SOURCE: SIGNIFICANT CHANGE UP

## 2023-02-13 PROCEDURE — 99232 SBSQ HOSP IP/OBS MODERATE 35: CPT | Mod: GC

## 2023-02-13 PROCEDURE — 99232 SBSQ HOSP IP/OBS MODERATE 35: CPT

## 2023-02-13 RX ORDER — PSYLLIUM SEED (WITH DEXTROSE)
1 POWDER (GRAM) ORAL EVERY 8 HOURS
Refills: 0 | Status: DISCONTINUED | OUTPATIENT
Start: 2023-02-13 | End: 2023-02-21

## 2023-02-13 RX ADMIN — CEFTRIAXONE 100 MILLIGRAM(S): 500 INJECTION, POWDER, FOR SOLUTION INTRAMUSCULAR; INTRAVENOUS at 21:49

## 2023-02-13 RX ADMIN — HUMAN INSULIN 4 UNIT(S): 100 INJECTION, SUSPENSION SUBCUTANEOUS at 12:14

## 2023-02-13 RX ADMIN — NYSTATIN CREAM 1 APPLICATION(S): 100000 CREAM TOPICAL at 17:11

## 2023-02-13 RX ADMIN — Medication 1 PACKET(S): at 21:50

## 2023-02-13 RX ADMIN — Medication 3 MILLILITER(S): at 05:48

## 2023-02-13 RX ADMIN — ENOXAPARIN SODIUM 40 MILLIGRAM(S): 100 INJECTION SUBCUTANEOUS at 17:11

## 2023-02-13 RX ADMIN — SODIUM CHLORIDE 4 MILLILITER(S): 9 INJECTION INTRAMUSCULAR; INTRAVENOUS; SUBCUTANEOUS at 05:47

## 2023-02-13 RX ADMIN — PYRIDOSTIGMINE BROMIDE 120 MILLIGRAM(S): 60 SOLUTION ORAL at 13:20

## 2023-02-13 RX ADMIN — SODIUM CHLORIDE 4 MILLILITER(S): 9 INJECTION INTRAMUSCULAR; INTRAVENOUS; SUBCUTANEOUS at 00:28

## 2023-02-13 RX ADMIN — Medication 1 PACKET(S): at 13:21

## 2023-02-13 RX ADMIN — HUMAN INSULIN 4 UNIT(S): 100 INJECTION, SUSPENSION SUBCUTANEOUS at 17:12

## 2023-02-13 RX ADMIN — CHLORHEXIDINE GLUCONATE 1 APPLICATION(S): 213 SOLUTION TOPICAL at 05:12

## 2023-02-13 RX ADMIN — PYRIDOSTIGMINE BROMIDE 120 MILLIGRAM(S): 60 SOLUTION ORAL at 05:13

## 2023-02-13 RX ADMIN — NYSTATIN CREAM 1 APPLICATION(S): 100000 CREAM TOPICAL at 05:14

## 2023-02-13 RX ADMIN — Medication 3 MILLILITER(S): at 00:28

## 2023-02-13 RX ADMIN — PYRIDOSTIGMINE BROMIDE 120 MILLIGRAM(S): 60 SOLUTION ORAL at 21:50

## 2023-02-13 NOTE — PHARMACOTHERAPY INTERVENTION NOTE - OUTCOME
accepted

## 2023-02-13 NOTE — PROGRESS NOTE ADULT - ASSESSMENT
ASSESSMENT/PLAN: MG exacerbation, covid +     NEURO:   Neurochecks q4  s/p 5x PLEX, 3x IVIG  s/p solumedrol 125 mg daily (1/31--2/10)  Avoid medications that may worsen the current exacerbation including macrolides, fluoroquinolones, tetracyclines, aminoglycoside, beta-blockers, magnesium, and anti-arrhythmics (procainamide, quinidine, and lidocaine)  Pain management   no sedation   neurology following, restart pyridostigmine     PULM:  extubated to BiPAP yesterday  duonebs and pulmicort nebs for now (on steroid inhaler at home)  wean NC this morning, monitor O2 sat, Q4 VC and NIF check for today     CV:  MAP >65  TTE- 35-40%    RENAL:  Fluids: IVL in setting of poor EF  IO: replete as needed    GI:  Diet: intermittent BiPAP requirement, try bolus feed while off BiPAP   GI prophylaxis [] not indicated [x] famotidine [] other:   LBM- 2/11    ENDO:   A1c 6.6  Goal euglycemia (-180)  RISS for now and NPH, adjust as needed    HEME/ONC:  H/H trending down but stable now  VTE prophylaxis: [x] SCDs [x] chemoprophylaxis lovenox [] hold chemoprophylaxis due to: [] high risk of DVT/PE on admission due to:  DIC labs- f/u   No evidence of overt bleeding  Will obtain CT CAP if DIC labs are negative    ID: covid + status post remdesivir  Afebrile  R/O sepsis  leukocytes trending down, UA positive  UC positive for gram negative rods- protease  Continue with Zosyn 2/10- (8 days course)  LED in bilateral LE- p     CODE STATUS:  [x] Full Code [] DNR [] DNI [] Palliative/Comfort Care    DISPOSITION:  [x] ICU [] Stroke Unit [] Floor [] EMU [] RCU [] PCU    At risk of death/neuro decline due to: acute resp failure requiring intubation     Contact: [phone] ASSESSMENT/PLAN: MG exacerbation, covid +     NEURO:   Neurochecks q4  s/p 5x PLEX, 3x IVIG  s/p solumedrol 125 mg daily (1/31--2/10)  Avoid medications that may worsen the current exacerbation including macrolides, fluoroquinolones, tetracyclines, aminoglycoside, beta-blockers, magnesium, and anti-arrhythmics (procainamide, quinidine, and lidocaine)  Pain management   no sedation   neurology following, restart pyridostigmine     PULM:  extubated to BiPAP yesterday  duonebs and pulmicort nebs for now (on steroid inhaler at home)  wean NC this morning, monitor O2 sat, Q4 VC and NIF check for today     CV:  MAP >65  TTE- 35-40%    RENAL:  Fluids: IVL in setting of poor EF  IO: replete as needed    GI:  Diet: intermittent BiPAP requirement, try bolus feed while off BiPAP   GI prophylaxis [] not indicated [x] famotidine [] other:   LBM- 2/11    ENDO:   A1c 6.6  Goal euglycemia (-180)  RISS for now and NPH, adjust as needed    HEME/ONC:  H/H trending down but stable now  VTE prophylaxis: [x] SCDs [x] chemoprophylaxis lovenox [] hold chemoprophylaxis due to: [] high risk of DVT/PE on admission due to:  DIC labs- f/u   No evidence of overt bleeding  Will obtain CT CAP if DIC labs are negative    ID: covid + status post remdesivir  Afebrile  R/O sepsis  leukocytes trending down, UA positive  UC positive for gram negative rods- protease  Continue with Zosyn 2/10- (8 days course)  LED in bilateral LE- p     CODE STATUS:  [x] Full Code [] DNR [] DNI [] Palliative/Comfort Care    DISPOSITION:  [x] ICU [] Stroke Unit [] Floor [] EMU [] RCU [] PCU    At risk of death/neuro decline due to: acute resp failure requiring intubation     Contact: [phone] ASSESSMENT/PLAN: MG exacerbation, covid +     NEURO:   Neurochecks q4  s/p 5x PLEX, 3x IVIG  s/p solumedrol 125 mg daily (1/31--2/10)  Avoid medications that may worsen the current exacerbation including macrolides, fluoroquinolones, tetracyclines, aminoglycoside, beta-blockers, magnesium, and anti-arrhythmics (procainamide, quinidine, and lidocaine)  Pain management   no sedation   neurology following, restart pyridostigmine     PULM:  extubated to BiPAP yesterday  duonebs and pulmicort nebs for now (on steroid inhaler at home)  wean NC this morning, monitor O2 sat, Q4 VC and NIF check for today     CV:  MAP >65  TTE- 35-40%    RENAL:  Fluids: IVL in setting of poor EF  IO: replete as needed    GI:  Diet: intermittent BiPAP requirement, try bolus feed while off BiPAP   GI prophylaxis [] not indicated [x] famotidine [] other:   LBM- 2/11    ENDO:   A1c 6.6  Goal euglycemia (-180)  RISS for now and NPH, adjust as needed    HEME/ONC:  H/H trending down but stable now  VTE prophylaxis: [x] SCDs [x] chemoprophylaxis lovenox [] hold chemoprophylaxis due to: [] high risk of DVT/PE on admission due to:  DIC labs- f/u   No evidence of overt bleeding  Will obtain CT CAP if DIC labs are negative    ID: covid + status post remdesivir  Afebrile  R/O sepsis  leukocytes trending down, UA positive  UC positive for gram negative rods- protease  Continue with Zosyn 2/10- (8 days course)  LED in bilateral LE- p     CODE STATUS:  [x] Full Code [] DNR [] DNI [] Palliative/Comfort Care    DISPOSITION:  [x] ICU [] Stroke Unit [] Floor [] EMU [] RCU [] PCU    At risk of death/neuro decline due to: acute resp failure requiring intubation     Contact: [phone] ASSESSMENT/PLAN: MG exacerbation, covid +     NEURO:   Neurochecks q4h  s/p 5x PLEX, 3x IVIG  s/p solumedrol 125 mg daily (1/31--2/10)  Avoid medications that may worsen the current exacerbation including macrolides, fluoroquinolones, tetracyclines, aminoglycoside, beta-blockers, magnesium, and anti-arrhythmics (procainamide, quinidine, and lidocaine)  Pain management   no sedation   neurology following, restarted pyridostigmine     PULM:  extubated   cont suction  duonebs will cont and pulmicort nebs, HTS nebs DC      CV:  MAP >65  TTE- 35-40%    RENAL:  Fluids: IVL in setting of poor EF  IO: replete as needed    GI:  Diet: TF at goal but low goal., consult nutrition  GI prophylaxis [] not indicated [x] famotidine [] other:   LBM- 2/13    ENDO:   A1c 6.6  Goal euglycemia (-180)  RISS for now and NPH, adjust as needed    HEME/ONC:  H/H trending down but stable now  VTE prophylaxis: [x] SCDs [x] chemoprophylaxis lovenox [] hold chemoprophylaxis due to: [] high risk of DVT/PE on admission due to:  DIC labs- f/u   No evidence of overt bleeding  Will obtain CT CAP if DIC labs are negative    ID: covid + status post remdesivir  CTX will continue 5D for proteus and MSSA in sputum  Afebrile  R/O sepsis  leukocytes trending down, UA positive  Continue with Zosyn 2/10- (8 days course)  LED in bilateral LE-     CODE STATUS:  [x] Full Code [] DNR [] DNI [] Palliative/Comfort Care    DISPOSITION:  [x] ICU [] Stroke Unit [] Floor [] EMU [] RCU [] PCU    At risk of death/neuro decline due to: acute resp failure requiring intubation     Contact: [phone] ASSESSMENT/PLAN: MG exacerbation, covid +     NEURO:   Neurochecks q4h  s/p 5x PLEX, 3x IVIG  s/p solumedrol 125 mg daily (1/31--2/10)  Avoid medications that may worsen the current exacerbation including macrolides, fluoroquinolones, tetracyclines, aminoglycoside, beta-blockers, magnesium, and anti-arrhythmics (procainamide, quinidine, and lidocaine)  Pain management   no sedation   neurology following, restarted pyridostigmine     PULM:  extubated   cont suction  duonebs will cont and pulmicort nebs, HTS nebs DC      CV:  MAP >65  TTE- 35-40%    RENAL:  Fluids: IVL in setting of poor EF  IO: replete as needed    GI:  Diet: TF at goal but low goal., consult nutrition  GI prophylaxis [] not indicated [x] famotidine [] other:   LBM- 2/13    ENDO:   A1c 6.6  Goal euglycemia (-180)  RISS for now and NPH, adjust as needed    HEME/ONC:  H/H trending down but stable now  VTE prophylaxis: [x] SCDs [x] chemoprophylaxis lovenox [] hold chemoprophylaxis due to: [] high risk of DVT/PE on admission due to:  DIC labs- f/u   No evidence of overt bleeding  Will obtain CT CAP if DIC labs are negative    ID: covid + status post remdesivir  CTX will continue 5D for proteus and MSSA in sputum  Afebrile  R/O sepsis  leukocytes trending down, UA positive  Continue with Zosyn 2/10- (8 days course)  LED in bilateral LE-     CODE STATUS:  [x] Full Code [] DNR [] DNI [] Palliative/Comfort Care    DISPOSITION:  [x] ICU [] Stroke Unit [] Floor [] EMU [] RCU [] PCU    At risk of death/neuro decline due to: acute resp failure requiring intubation     Contact: [phone] ASSESSMENT/PLAN: MG exacerbation, covid +     NEURO:   Neurochecks q4h  s/p 5x PLEX, 3x IVIG  s/p solumedrol 125 mg daily (1/31--2/10)  Avoid medications that may worsen the current exacerbation including macrolides, fluoroquinolones, tetracyclines, aminoglycoside, beta-blockers, magnesium, and anti-arrhythmics (procainamide, quinidine, and lidocaine)  Pain management   no sedation   neurology following, restarted pyridostigmine     PULM:  extubated   cont suction  duonebs will cont and pulmicort nebs, HTS nebs DC      CV:  MAP >65  TTE- 35-40%    RENAL:  Fluids: IVL in setting of poor EF  IO: replete as needed    GI:  Diet: TF at goal but low goal., consult nutrition  GI prophylaxis [] not indicated [x] famotidine [] other:   LBM- 2/13    ENDO:   A1c 6.6  Goal euglycemia (-180)  RISS for now and NPH, adjust as needed    HEME/ONC:  H/H trending down but stable now  VTE prophylaxis: [x] SCDs [x] chemoprophylaxis lovenox [] hold chemoprophylaxis due to: [] high risk of DVT/PE on admission due to:  DIC labs- f/u   No evidence of overt bleeding  Will obtain CT CAP if DIC labs are negative    ID: covid + status post remdesivir  CTX will continue 5D for proteus and MSSA in sputum  Afebrile  R/O sepsis  leukocytes trending down, UA positive  Continue with Zosyn 2/10- (8 days course)  LED in bilateral LE-     CODE STATUS:  [x] Full Code [] DNR [] DNI [] Palliative/Comfort Care    DISPOSITION:  [x] ICU [] Stroke Unit [] Floor [] EMU [] RCU [] PCU    At risk of death/neuro decline due to: acute resp failure requiring intubation     Contact: [phone] ASSESSMENT/PLAN: MG exacerbation, covid +     NEURO:   Neurochecks q4h  s/p 5x PLEX, 3x IVIG  s/p solumedrol 125 mg daily (1/31--2/10)  Avoid medications that may worsen the current exacerbation including macrolides, fluoroquinolones, tetracyclines, aminoglycoside, beta-blockers, magnesium, and anti-arrhythmics (procainamide, quinidine, and lidocaine)  Pain management   no sedation   neurology following, restarted pyridostigmine     PULM:  extubated   cont suction  duonebs will cont and pulmicort nebs, HTS nebs DC      CV:  MAP >65  TTE- 35-40%  DC axillary a line    RENAL:  Fluids: IVL in setting of poor EF  IO: replete as needed    GI:  Diet: TF at goal but low goal., consult nutrition  GI prophylaxis [] not indicated [x] famotidine [] other:   LBM- 2/13    ENDO:   A1c 6.6  Goal euglycemia (-180)  RISS for now and NPH, adjust as needed    HEME/ONC:  H/H trending down but stable now  VTE prophylaxis: [x] SCDs [x] chemoprophylaxis lovenox [] hold chemoprophylaxis due to: [] high risk of DVT/PE on admission due to:  DIC labs- f/u   No evidence of overt bleeding  Will obtain CT CAP if DIC labs are negative    ID: covid + status post remdesivir  CTX will continue 5D for proteus and MSSA in sputum  Afebrile  R/O sepsis  leukocytes trending down, UA positive  Continue with Zosyn 2/10- (8 days course)  LED in bilateral LE-     CODE STATUS:  [x] Full Code [] DNR [] DNI [] Palliative/Comfort Care    DISPOSITION:  [x] ICU [] Stroke Unit [] Floor [] EMU [] RCU [] PCU    At risk of death/neuro decline due to: acute resp failure requiring intubation     Contact: [phone] ASSESSMENT/PLAN: MG exacerbation, covid +     NEURO:   Neurochecks q4h  s/p 5x PLEX, 3x IVIG  s/p solumedrol 125 mg daily (1/31--2/10)  Avoid medications that may worsen the current exacerbation including macrolides, fluoroquinolones, tetracyclines, aminoglycoside, beta-blockers, magnesium, and anti-arrhythmics (procainamide, quinidine, and lidocaine)  Pain management   no sedation   neurology following, restarted pyridostigmine     PULM:  extubated   cont suction  duonebs will cont and pulmicort nebs, HTS nebs DC      CV:  MAP >65  TTE- 35-40%  DC axillary a line    RENAL:  Fluids: IVL in setting of poor EF  IO: replete as needed    GI:  Diet: TF at goal but low goal., consult nutrition  GI prophylaxis [] not indicated [x] famotidine [] other:   LBM- 2/13    ENDO:   A1c 6.6  Goal euglycemia (-180)  RISS for now and NPH, adjust as needed    HEME/ONC:  H/H trending down but stable now  VTE prophylaxis: [x] SCDs [x] chemoprophylaxis lovenox [] hold chemoprophylaxis due to: [] high risk of DVT/PE on admission due to:  DIC labs- f/u   No evidence of overt bleeding  Will obtain CT CAP if DIC labs are negative    ID: covid + status post remdesivir  CTX will continue 5D for proteus and MSSA in sputum  Afebrile  R/O sepsis  leukocytes trending down, UA positive  Continue with Zosyn 2/10- (8 days course)  LED in bilateral LE-     CODE STATUS:  [x] Full Code [] DNR [] DNI [] Palliative/Comfort Care    DISPOSITION:  [x] ICU [] Stroke Unit [] Floor [] EMU [] RCU [] PCU    At risk of death/neuro decline due to: acute resp failure requiring intubation     Contact: [phone] ASSESSMENT/PLAN: MG exacerbation, covid +     NEURO:   Neurochecks q4h  s/p 5x PLEX, 3x IVIG  s/p solumedrol 125 mg daily (1/31--2/10)  Avoid medications that may worsen the current exacerbation including macrolides, fluoroquinolones, tetracyclines, aminoglycoside, beta-blockers, magnesium, and anti-arrhythmics (procainamide, quinidine, and lidocaine)  Pain management   no sedation   neurology following, restarted pyridostigmine     PULM:  extubated   cont suction  duonebs will cont and pulmicort nebs, HTS nebs DC      CV:  MAP >65  TTE- 35-40%  DC axillary a line    RENAL:  Fluids: IVL in setting of poor EF  IO: replete as needed    GI:  Diet: TF at goal but low goal., consult nutrition  GI prophylaxis [] not indicated [x] famotidine [] other:   LBM- 2/13    ENDO:   A1c 6.6  Goal euglycemia (-180)  RISS for now and NPH, adjust as needed    HEME/ONC:  H/H trending down but stable now  VTE prophylaxis: [x] SCDs [x] chemoprophylaxis lovenox [] hold chemoprophylaxis due to: [] high risk of DVT/PE on admission due to:  DIC labs- f/u   No evidence of overt bleeding  Will obtain CT CAP if DIC labs are negative    ID: covid + status post remdesivir  CTX will continue 5D for proteus and MSSA in sputum  Afebrile  R/O sepsis  leukocytes trending down, UA positive  Continue with Zosyn 2/10- (8 days course)  LED in bilateral LE-     CODE STATUS:  [x] Full Code [] DNR [] DNI [] Palliative/Comfort Care    DISPOSITION:  [x] ICU [] Stroke Unit [] Floor [] EMU [] RCU [] PCU    At risk of death/neuro decline due to: acute resp failure requiring intubation     Contact: [phone] ASSESSMENT/PLAN: MG exacerbation, covid +     NEURO:   Neurochecks q4h  s/p 5x PLEX, 3x IVIG  s/p solumedrol 125 mg daily (1/31--2/10)  Avoid medications that may worsen the current exacerbation including macrolides, fluoroquinolones, tetracyclines, aminoglycoside, beta-blockers, magnesium, and anti-arrhythmics (procainamide, quinidine, and lidocaine)  Pain management   no sedation   neurology following, restarted pyridostigmine     PULM:  extubated   cont suction  duonebs will cont and pulmicort nebs, HTS nebs DC      CV:  MAP >65  TTE- 35-40%  DC axillary a line    RENAL:  Fluids: IVL in setting of poor EF  IO: replete as needed    GI:  Diet: TF at goal but low goal., consult nutrition  GI prophylaxis [] not indicated [x] famotidine [] other:   LBM- 2/13    ENDO:   A1c 6.6  Goal euglycemia (-180)  RISS for now and NPH, adjust as needed    HEME/ONC:  H/H trending down but stable now  VTE prophylaxis: [x] SCDs [x] chemoprophylaxis lovenox [] hold chemoprophylaxis due to: [] high risk of DVT/PE on admission due to:  DIC labs- f/u   No evidence of overt bleeding  Will obtain CT CAP if DIC labs are negative    ID: covid + status post remdesivir  CTX will continue 5D for proteus and MSSA in sputum  Afebrile  R/O sepsis  leukocytes trending down, UA positive  Continue with Zosyn 2/10- (8 days course)  LED in bilateral LE-     CODE STATUS:  [x] Full Code [] DNR [] DNI [] Palliative/Comfort Care    DISPOSITION:  stable for transfer to floor, need neurology service      Contact: [phone] ASSESSMENT/PLAN: MG exacerbation, covid +     NEURO:   Neurochecks q4h  s/p 5x PLEX, 3x IVIG  s/p solumedrol 125 mg daily (1/31--2/10)  Avoid medications that may worsen the current exacerbation including macrolides, fluoroquinolones, tetracyclines, aminoglycoside, beta-blockers, magnesium, and anti-arrhythmics (procainamide, quinidine, and lidocaine)  Pain management   no sedation   neurology following, restarted pyridostigmine     PULM:  extubated   cont suction  duonebs will cont and pulmicort nebs, HTS nebs DC      CV:  MAP >65  TTE- 35-40%  DC axillary a line    RENAL:  Fluids: IVL in setting of poor EF  IO: replete as needed    GI:  Diet: TF at goal but low goal., consult nutrition  GI prophylaxis [] not indicated [x] famotidine [] other:   LBM- 2/13    ENDO:   A1c 6.6  Goal euglycemia (-180)  RISS for now and NPH, adjust as needed    HEME/ONC:  H/H trending down but stable now  VTE prophylaxis: [x] SCDs [x] chemoprophylaxis lovenox [] hold chemoprophylaxis due to: [] high risk of DVT/PE on admission due to:  DIC labs- f/u   No evidence of overt bleeding  Will obtain CT CAP if DIC labs are negative    ID: covid + status post remdesivir  CTX will continue 5D for proteus and MSSA in sputum  Afebrile  R/O sepsis  leukocytes trending down, UA positive  Continue with Zosyn 2/10- (8 days course)  LED in bilateral LE-     CODE STATUS:  [x] Full Code [] DNR [] DNI [] Palliative/Comfort Care    DISPOSITION:  stable for transfer to floor, need neurology service      Contact: [phone] ASSESSMENT/PLAN: MG exacerbation, covid +     NEURO:   Neurochecks q4h  s/p 5x PLEX, 3x IVIG  s/p solumedrol 125 mg daily (1/31--2/10)  Avoid medications that may worsen the current exacerbation including macrolides, fluoroquinolones, tetracyclines, aminoglycoside, beta-blockers, magnesium, and anti-arrhythmics (procainamide, quinidine, and lidocaine)  Pain management   no sedation   neurology following, restarted pyridostigmine     PULM:  extubated   cont suction  duonebs will cont and pulmicort nebs, HTS nebs DC      CV:  MAP >65  TTE- 35-40%  DC axillary a line    RENAL:  Fluids: IVL in setting of poor EF  IO: replete as needed    GI:  Diet: TF at goal but low goal., consult nutrition  GI prophylaxis [] not indicated [x] famotidine [] other:   LBM- 2/13    ENDO:   A1c 6.6  Goal euglycemia (-180)  RISS for now and NPH, adjust as needed    HEME/ONC:  H/H trending down but stable now  VTE prophylaxis: [x] SCDs [x] chemoprophylaxis lovenox [] hold chemoprophylaxis due to: [] high risk of DVT/PE on admission due to:  DIC labs- f/u   No evidence of overt bleeding  Will obtain CT CAP if DIC labs are negative    ID: covid + status post remdesivir  CTX will continue 5D for proteus and MSSA in sputum  Afebrile  R/O sepsis  leukocytes trending down, UA positive  Continue with Zosyn 2/10- (8 days course)  LED in bilateral LE-     CODE STATUS:  [x] Full Code [] DNR [] DNI [] Palliative/Comfort Care    DISPOSITION:  stable for transfer to floor, need neurology service      Contact: [phone]

## 2023-02-13 NOTE — PROGRESS NOTE ADULT - ASSESSMENT
63 yo female with a long history of Myasthenia Gravis , T2DM,,prior thymectomy who was admitted with respiratory difficulty and weakness.  She is maintained on pyridostigmine and PRN prednisone, this had been added recently for MG flare.  History of prior need for ventilator.  Home exposure to Covid, she tested positive on admission.  Suspect respiratory failure on MG basis.  She is s/p 5 days of RDV-completed 2/4  High dose steroids for MG  S/P IVIG and multiple PLEX sessions  Vent management per NSICU,  Leukocytosis likely steroid mediated, it is moderating  Surveillance blood cultures and sputum cultures sent 2/7  Blood cultures negative, sputum with staph aureus, and urine with proteus.  pyuria noted, nonspecific  Zosyn started by NSICU to cover proteus. I am not sure proteus/staph aureus are more than colonizers  She was converted to CTX 2/11  PLEX # 5 on 2/10  Extubated 2/11, comfortable on nasal oxygen  Suggest:  1. CTX covers both urine and sputum isolates, can also consider cefazolin or even oral cephalexin, day 4, favor limiting to 5-7 days  2.Supportive care  3.PLEX, steroids, and IVIG as per NSICU  4.Supplemental O2 per ICU  5.Diet per ICU

## 2023-02-13 NOTE — CHART NOTE - NSCHARTNOTEFT_GEN_A_CORE
Nutrition Follow Up Note  Patient seen for: follow up and consult for tube feeding    Chart reviewed, events noted. Per chart: "62-year-old woman with DMII, myasthenia gravis status post thymectomy and prior crises status post IVIG developed MG crisis in setting of COVID-19 infection requiring intubation status post IVIG x 1 and PLEX."  - [facility] d/c'ed, extubated    Source: [] Patient       [x] EMR        [x] Nursing        [] Family at bedside       [] Other:    -If unable to interview patient: [] Trach/Vent/BiPAP  [x] Disoriented/confused/inappropriate to interview    Nutrition-Related Events:   - Pressors:  [] Yes    [x] No   - Propofol:  [] Yes    [x] No         - Rate: __mL/hr. If maintained x 24 hours, propofol will provide:     Diet Order:   Diet, NPO with Tube Feed:   Tube Feeding Modality: Nasogastric  Glucerna 1.2 Taye (GLUCERNARTH)  Total Volume for 24 Hours (mL): 720  Continuous  Starting Tube Feed Rate {mL per Hour}: 20  Increase Tube Feed Rate by (mL): 10     Every 4 hours  Until Goal Tube Feed Rate (mL per Hour): 30  Tube Feed Duration (in Hours): 24  Tube Feed Start Time: 23:00 (23)    EN Order Provides:    - Total volume: 720ml    - Kcal:   864    ( 15.7kcal/kg based on 55kg)    - Gm Protein 43g  ( 0.8 g/kg based on 55kg)    - mL free water: 580ml  Current Pump Rate: 30ml  EN provision: 60% EN volume goal provided in past 7 days per nursing documentation on flow sheets. was intermittently held for weaning trials.     Is current diet order appropriate/adequate? [] Yes  [x]  No: see below for recommendations    Nutrition-related concerns:  -on insulin regimen for glycemic control. Hgb A1c 6.6% ()    GI:  Last BM , lose per flow sheets.  Bowel Regimen? [x] Yes senna miralax metamucil  [] No    Weights:   Dosing weight 72kg  Daily Weight in k ()  no further weights noted. RD will continue to monitor.   Nutrition focused physical exam: no overt signs of muscle wasting or fat loss noted.     MEDICATIONS  (STANDING):  cefTRIAXone   IVPB  insulin lispro (ADMELOG) corrective regimen sliding scale  insulin NPH human recombinant  polyethylene glycol 3350  psyllium Powder  senna    Pertinent Labs:  @ 17:13: Na 142, BUN 9, Cr 0.32<L>, BG 85, K+ 3.8, Phos --, Mg --, Alk Phos --, ALT/SGPT --, AST/SGOT --, HbA1c --    A1C with Estimated Average Glucose Result: 6.6 % (23 @ 01:13)    Finger Sticks:  POCT Blood Glucose.: 109 mg/dL ( @ 11:54)  POCT Blood Glucose.: 94 mg/dL ( @ 05:08)  POCT Blood Glucose.: 94 mg/dL ( @ 23:30)  POCT Blood Glucose.: 84 mg/dL ( @ 17:40)    Skin per nursing documentation: no pressure injuries noted  Edema: none noted per flow sheets     Estimated Needs using IBW + 10% 55kg  30-35kcal/kg 1650-1925kcal/day  1.2-1.5g/kg 66-83g protein/day  defer fluid needs to team    Previous Nutrition Diagnosis: Increased nutrient needs, Inadequate Protein Energy Intake  Nutrition Diagnosis is: [x] ongoing  [] resolved [] not applicable     New Nutrition Diagnosis: [x] Not applicable    Nutrition Care Plan:  [x] In Progress  [] Achieved  [] Not applicable    Nutrition Interventions:     Education Provided:       [] Yes:  [x] No: not warranted at this time    Recommendations:      -Lose stool noted. Consider changing EN formula to Vital 1.5, which is designed to promote GI tolerance. Consider 50ml/hr x 24 hours to provide a total volume of 1200ml, 1800kcal (32.7kcal/kg), 81g protein (1.47g/kg) and 917ml free water   -Defer free water flush to team  -Consider discontinuing senna and or Miralax if lose stool persists  -Consider addition of Multivitamin if no contraindications to aid in preventing micronutrient deficiencies.     Monitoring and Evaluation:   Continue to monitor EN provision, tolerance to diet prescription, weights, labs, skin integrity    RD remains available upon request and will follow up per protocol  Nadia Blackmon, MS, RD, CDN #[phone] Nutrition Follow Up Note  Patient seen for: follow up and consult for tube feeding    Chart reviewed, events noted. Per chart: "62-year-old woman with DMII, myasthenia gravis status post thymectomy and prior crises status post IVIG developed MG crisis in setting of COVID-19 infection requiring intubation status post IVIG x 1 and PLEX."  - [facility] d/c'ed, extubated    Source: [] Patient       [x] EMR        [x] Nursing        [] Family at bedside       [] Other:    -If unable to interview patient: [] Trach/Vent/BiPAP  [x] Disoriented/confused/inappropriate to interview    Nutrition-Related Events:   - Pressors:  [] Yes    [x] No   - Propofol:  [] Yes    [x] No         - Rate: __mL/hr. If maintained x 24 hours, propofol will provide:     Diet Order:   Diet, NPO with Tube Feed:   Tube Feeding Modality: Nasogastric  Glucerna 1.2 Taye (GLUCERNARTH)  Total Volume for 24 Hours (mL): 720  Continuous  Starting Tube Feed Rate {mL per Hour}: 20  Increase Tube Feed Rate by (mL): 10     Every 4 hours  Until Goal Tube Feed Rate (mL per Hour): 30  Tube Feed Duration (in Hours): 24  Tube Feed Start Time: 23:00 (23)    EN Order Provides:    - Total volume: 720ml    - Kcal:   864    ( 15.7kcal/kg based on 55kg)    - Gm Protein 43g  ( 0.8 g/kg based on 55kg)    - mL free water: 580ml  Current Pump Rate: 30ml  EN provision: 60% EN volume goal provided in past 7 days per nursing documentation on flow sheets. was intermittently held for weaning trials.     Is current diet order appropriate/adequate? [] Yes  [x]  No: see below for recommendations    Nutrition-related concerns:  -on insulin regimen for glycemic control. Hgb A1c 6.6% ()    GI:  Last BM , lose per flow sheets.  Bowel Regimen? [x] Yes senna miralax metamucil  [] No    Weights:   Dosing weight 72kg  Daily Weight in k ()  no further weights noted. RD will continue to monitor.   Nutrition focused physical exam: no overt signs of muscle wasting or fat loss noted.     MEDICATIONS  (STANDING):  cefTRIAXone   IVPB  insulin lispro (ADMELOG) corrective regimen sliding scale  insulin NPH human recombinant  polyethylene glycol 3350  psyllium Powder  senna    Pertinent Labs:  @ 17:13: Na 142, BUN 9, Cr 0.32<L>, BG 85, K+ 3.8, Phos --, Mg --, Alk Phos --, ALT/SGPT --, AST/SGOT --, HbA1c --    A1C with Estimated Average Glucose Result: 6.6 % (23 @ 01:13)    Finger Sticks:  POCT Blood Glucose.: 109 mg/dL ( @ 11:54)  POCT Blood Glucose.: 94 mg/dL ( @ 05:08)  POCT Blood Glucose.: 94 mg/dL ( @ 23:30)  POCT Blood Glucose.: 84 mg/dL ( @ 17:40)    Skin per nursing documentation: no pressure injuries noted  Edema: none noted per flow sheets     Estimated Needs using IBW + 10% 55kg  30-35kcal/kg 1650-1925kcal/day  1.2-1.5g/kg 66-83g protein/day  defer fluid needs to team    Previous Nutrition Diagnosis: Increased nutrient needs, Inadequate Protein Energy Intake  Nutrition Diagnosis is: [x] ongoing  [] resolved [] not applicable     New Nutrition Diagnosis: [x] Not applicable    Nutrition Care Plan:  [x] In Progress  [] Achieved  [] Not applicable    Nutrition Interventions:     Education Provided:       [] Yes:  [x] No: not warranted at this time    Recommendations:      -Lose stool noted. Consider changing EN formula to Vital 1.5, which is designed to promote GI tolerance. Consider 50ml/hr x 24 hours to provide a total volume of 1200ml, 1800kcal (32.7kcal/kg), 81g protein (1.47g/kg) and 917ml free water   -Defer free water flush to team  -Consider discontinuing senna and or Miralax if lose stool persists  -Consider addition of Multivitamin if no contraindications to aid in preventing micronutrient deficiencies.     Monitoring and Evaluation:   Continue to monitor EN provision, tolerance to diet prescription, weights, labs, skin integrity    RD remains available upon request and will follow up per protocol  Nadia Blackmon, MS, RD, CDN #[phone] Nutrition Follow Up Note  Patient seen for: follow up and consult for tube feeding    Chart reviewed, events noted. Per chart: "62-year-old woman with DMII, myasthenia gravis status post thymectomy and prior crises status post IVIG developed MG crisis in setting of COVID-19 infection requiring intubation status post IVIG x 1 and PLEX."  - [facility] d/c'ed, extubated    Source: [] Patient       [x] EMR        [x] Nursing        [] Family at bedside       [] Other:    -If unable to interview patient: [] Trach/Vent/BiPAP  [x] Disoriented/confused/inappropriate to interview    Nutrition-Related Events:   - Pressors:  [] Yes    [x] No   - Propofol:  [] Yes    [x] No         - Rate: __mL/hr. If maintained x 24 hours, propofol will provide:     Diet Order:   Diet, NPO with Tube Feed:   Tube Feeding Modality: Nasogastric  Glucerna 1.2 Taye (GLUCERNARTH)  Total Volume for 24 Hours (mL): 720  Continuous  Starting Tube Feed Rate {mL per Hour}: 20  Increase Tube Feed Rate by (mL): 10     Every 4 hours  Until Goal Tube Feed Rate (mL per Hour): 30  Tube Feed Duration (in Hours): 24  Tube Feed Start Time: 23:00 (23)    EN Order Provides:    - Total volume: 720ml    - Kcal:   864    ( 15.7kcal/kg based on 55kg)    - Gm Protein 43g  ( 0.8 g/kg based on 55kg)    - mL free water: 580ml  Current Pump Rate: 30ml  EN provision: 60% EN volume goal provided in past 7 days per nursing documentation on flow sheets. was intermittently held for weaning trials.     Is current diet order appropriate/adequate? [] Yes  [x]  No: see below for recommendations    Nutrition-related concerns:  -on insulin regimen for glycemic control. Hgb A1c 6.6% ()    GI:  Last BM , lose per flow sheets.  Bowel Regimen? [x] Yes senna miralax metamucil  [] No    Weights:   Dosing weight 72kg  Daily Weight in k ()  no further weights noted. RD will continue to monitor.   Nutrition focused physical exam: no overt signs of muscle wasting or fat loss noted.     MEDICATIONS  (STANDING):  cefTRIAXone   IVPB  insulin lispro (ADMELOG) corrective regimen sliding scale  insulin NPH human recombinant  polyethylene glycol 3350  psyllium Powder  senna    Pertinent Labs:  @ 17:13: Na 142, BUN 9, Cr 0.32<L>, BG 85, K+ 3.8, Phos --, Mg --, Alk Phos --, ALT/SGPT --, AST/SGOT --, HbA1c --    A1C with Estimated Average Glucose Result: 6.6 % (23 @ 01:13)    Finger Sticks:  POCT Blood Glucose.: 109 mg/dL ( @ 11:54)  POCT Blood Glucose.: 94 mg/dL ( @ 05:08)  POCT Blood Glucose.: 94 mg/dL ( @ 23:30)  POCT Blood Glucose.: 84 mg/dL ( @ 17:40)    Skin per nursing documentation: no pressure injuries noted  Edema: none noted per flow sheets     Estimated Needs using IBW + 10% 55kg  30-35kcal/kg 1650-1925kcal/day  1.2-1.5g/kg 66-83g protein/day  defer fluid needs to team    Previous Nutrition Diagnosis: Increased nutrient needs, Inadequate Protein Energy Intake  Nutrition Diagnosis is: [x] ongoing  [] resolved [] not applicable     New Nutrition Diagnosis: [x] Not applicable    Nutrition Care Plan:  [x] In Progress  [] Achieved  [] Not applicable    Nutrition Interventions:     Education Provided:       [] Yes:  [x] No: not warranted at this time    Recommendations:      -Lose stool noted. Consider changing EN formula to Vital 1.5, which is designed to promote GI tolerance. Consider 50ml/hr x 24 hours to provide a total volume of 1200ml, 1800kcal (32.7kcal/kg), 81g protein (1.47g/kg) and 917ml free water   -Defer free water flush to team  -Consider discontinuing senna and or Miralax if lose stool persists  -Consider addition of Multivitamin if no contraindications to aid in preventing micronutrient deficiencies.     Monitoring and Evaluation:   Continue to monitor EN provision, tolerance to diet prescription, weights, labs, skin integrity    RD remains available upon request and will follow up per protocol  Nadia Blackmon, MS, RD, CDN #[phone] Nutrition Follow Up Note  Patient seen for: follow up and consult for tube feeding    Chart reviewed, events noted. Per chart: "62-year-old woman with DMII, myasthenia gravis status post thymectomy and prior crises status post IVIG developed MG crisis in setting of COVID-19 infection requiring intubation status post IVIG x 1 and PLEX."  - [facility] d/c'ed, extubated   Swallow Bedside Assessment: "Given current swallow profile as described above, pt is not a candidate for initiation of po at this time. Plan to re-assess in 1-2 days to assess for bedside advancement vs need for objective testing."    Source: [] Patient       [x] EMR        [x] Nursing        [] Family at bedside       [] Other:    -If unable to interview patient: [] Trach/Vent/BiPAP  [x] Disoriented/confused/inappropriate to interview    Nutrition-Related Events:   - Pressors:  [] Yes    [x] No   - Propofol:  [] Yes    [x] No         - Rate: __mL/hr. If maintained x 24 hours, propofol will provide:     Diet Order:   Diet, NPO with Tube Feed:   Tube Feeding Modality: Nasogastric  Glucerna 1.2 Taye (GLUCERNARTH)  Total Volume for 24 Hours (mL): 720  Continuous  Starting Tube Feed Rate {mL per Hour}: 20  Increase Tube Feed Rate by (mL): 10     Every 4 hours  Until Goal Tube Feed Rate (mL per Hour): 30  Tube Feed Duration (in Hours): 24  Tube Feed Start Time: 23:00 (23)    EN Order Provides:    - Total volume: 720ml    - Kcal:   864    ( 15.7kcal/kg based on 55kg)    - Gm Protein 43g  ( 0.8 g/kg based on 55kg)    - mL free water: 580ml  Current Pump Rate: 30ml  EN provision: 60% EN volume goal provided in past 7 days per nursing documentation on flow sheets. was intermittently held for weaning trials.     Is current diet order appropriate/adequate? [] Yes  [x]  No: see below for recommendations    Nutrition-related concerns:  -on insulin regimen for glycemic control. Hgb A1c 6.6% ()    GI:  Last BM , lose per flow sheets.  Bowel Regimen? [x] Yes senna miralax metamucil  [] No    Weights:   Dosing weight 72kg  Daily Weight in k ()  no further weights noted. RD will continue to monitor.   Nutrition focused physical exam: no overt signs of muscle wasting or fat loss noted.     MEDICATIONS  (STANDING):  cefTRIAXone   IVPB  insulin lispro (ADMELOG) corrective regimen sliding scale  insulin NPH human recombinant  polyethylene glycol 3350  psyllium Powder  senna    Pertinent Labs:  @ 17:13: Na 142, BUN 9, Cr 0.32<L>, BG 85, K+ 3.8, Phos --, Mg --, Alk Phos --, ALT/SGPT --, AST/SGOT --, HbA1c --    A1C with Estimated Average Glucose Result: 6.6 % (23 @ 01:13)    Finger Sticks:  POCT Blood Glucose.: 109 mg/dL ( @ 11:54)  POCT Blood Glucose.: 94 mg/dL ( @ 05:08)  POCT Blood Glucose.: 94 mg/dL ( @ 23:30)  POCT Blood Glucose.: 84 mg/dL ( @ 17:40)    Skin per nursing documentation: no pressure injuries noted  Edema: none noted per flow sheets     Estimated Needs using IBW + 10% 55kg  30-35kcal/kg 1650-1925kcal/day  1.2-1.5g/kg 66-83g protein/day  defer fluid needs to team    Previous Nutrition Diagnosis: Increased nutrient needs, Inadequate Protein Energy Intake  Nutrition Diagnosis is: [x] ongoing  [] resolved [] not applicable     New Nutrition Diagnosis: [x] Not applicable    Nutrition Care Plan:  [x] In Progress  [] Achieved  [] Not applicable    Nutrition Interventions:     Education Provided:       [] Yes:  [x] No: not warranted at this time    Recommendations:      -Lose stool noted. Consider changing EN formula to Vital 1.5, which is designed to promote GI tolerance. Consider 50ml/hr x 24 hours to provide a total volume of 1200ml, 1800kcal (32.7kcal/kg), 81g protein (1.47g/kg) and 917ml free water   -Defer free water flush to team  -Consider discontinuing senna and or Miralax if lose stool persists  -Consider addition of Multivitamin if no contraindications to aid in preventing micronutrient deficiencies.     Monitoring and Evaluation:   Continue to monitor EN provision, tolerance to diet prescription, weights, labs, skin integrity    RD remains available upon request and will follow up per protocol  Nadia Blackmon MS, RD, CDN #[phone] Nutrition Follow Up Note  Patient seen for: follow up and consult for tube feeding    Chart reviewed, events noted. Per chart: "62-year-old woman with DMII, myasthenia gravis status post thymectomy and prior crises status post IVIG developed MG crisis in setting of COVID-19 infection requiring intubation status post IVIG x 1 and PLEX."  - [facility] d/c'ed, extubated   Swallow Bedside Assessment: "Given current swallow profile as described above, pt is not a candidate for initiation of po at this time. Plan to re-assess in 1-2 days to assess for bedside advancement vs need for objective testing."    Source: [] Patient       [x] EMR        [x] Nursing        [] Family at bedside       [] Other:    -If unable to interview patient: [] Trach/Vent/BiPAP  [x] Disoriented/confused/inappropriate to interview    Nutrition-Related Events:   - Pressors:  [] Yes    [x] No   - Propofol:  [] Yes    [x] No         - Rate: __mL/hr. If maintained x 24 hours, propofol will provide:     Diet Order:   Diet, NPO with Tube Feed:   Tube Feeding Modality: Nasogastric  Glucerna 1.2 Taye (GLUCERNARTH)  Total Volume for 24 Hours (mL): 720  Continuous  Starting Tube Feed Rate {mL per Hour}: 20  Increase Tube Feed Rate by (mL): 10     Every 4 hours  Until Goal Tube Feed Rate (mL per Hour): 30  Tube Feed Duration (in Hours): 24  Tube Feed Start Time: 23:00 (23)    EN Order Provides:    - Total volume: 720ml    - Kcal:   864    ( 15.7kcal/kg based on 55kg)    - Gm Protein 43g  ( 0.8 g/kg based on 55kg)    - mL free water: 580ml  Current Pump Rate: 30ml  EN provision: 60% EN volume goal provided in past 7 days per nursing documentation on flow sheets. was intermittently held for weaning trials.     Is current diet order appropriate/adequate? [] Yes  [x]  No: see below for recommendations    Nutrition-related concerns:  -on insulin regimen for glycemic control. Hgb A1c 6.6% ()    GI:  Last BM , lose per flow sheets.  Bowel Regimen? [x] Yes senna miralax metamucil  [] No    Weights:   Dosing weight 72kg  Daily Weight in k ()  no further weights noted. RD will continue to monitor.   Nutrition focused physical exam: no overt signs of muscle wasting or fat loss noted.     MEDICATIONS  (STANDING):  cefTRIAXone   IVPB  insulin lispro (ADMELOG) corrective regimen sliding scale  insulin NPH human recombinant  polyethylene glycol 3350  psyllium Powder  senna    Pertinent Labs:  @ 17:13: Na 142, BUN 9, Cr 0.32<L>, BG 85, K+ 3.8, Phos --, Mg --, Alk Phos --, ALT/SGPT --, AST/SGOT --, HbA1c --    A1C with Estimated Average Glucose Result: 6.6 % (23 @ 01:13)    Finger Sticks:  POCT Blood Glucose.: 109 mg/dL ( @ 11:54)  POCT Blood Glucose.: 94 mg/dL ( @ 05:08)  POCT Blood Glucose.: 94 mg/dL ( @ 23:30)  POCT Blood Glucose.: 84 mg/dL ( @ 17:40)    Skin per nursing documentation: no pressure injuries noted  Edema: none noted per flow sheets     Estimated Needs using IBW + 10% 55kg  30-35kcal/kg 1650-1925kcal/day  1.2-1.5g/kg 66-83g protein/day  defer fluid needs to team    Previous Nutrition Diagnosis: Increased nutrient needs, Inadequate Protein Energy Intake  Nutrition Diagnosis is: [x] ongoing  [] resolved [] not applicable     New Nutrition Diagnosis: [x] Not applicable    Nutrition Care Plan:  [x] In Progress  [] Achieved  [] Not applicable    Nutrition Interventions:     Education Provided:       [] Yes:  [x] No: not warranted at this time    Recommendations:      -Lose stool noted. Consider changing EN formula to Vital 1.5, which is designed to promote GI tolerance. Consider 50ml/hr x 24 hours to provide a total volume of 1200ml, 1800kcal (32.7kcal/kg), 81g protein (1.47g/kg) and 917ml free water   -Defer free water flush to team  -Consider discontinuing senna and or Miralax if lose stool persists  -Consider addition of Multivitamin if no contraindications to aid in preventing micronutrient deficiencies.     Monitoring and Evaluation:   Continue to monitor EN provision, tolerance to diet prescription, weights, labs, skin integrity    RD remains available upon request and will follow up per protocol  Nadia Blackmon MS, RD, CDN #[phone] Nutrition Follow Up Note  Patient seen for: follow up and consult for tube feeding    Chart reviewed, events noted. Per chart: "62-year-old woman with DMII, myasthenia gravis status post thymectomy and prior crises status post IVIG developed MG crisis in setting of COVID-19 infection requiring intubation status post IVIG x 1 and PLEX."  - [facility] d/c'ed, extubated   Swallow Bedside Assessment: "Given current swallow profile as described above, pt is not a candidate for initiation of po at this time. Plan to re-assess in 1-2 days to assess for bedside advancement vs need for objective testing."    Source: [] Patient       [x] EMR        [x] Nursing        [] Family at bedside       [] Other:    -If unable to interview patient: [] Trach/Vent/BiPAP  [x] Disoriented/confused/inappropriate to interview    Nutrition-Related Events:   - Pressors:  [] Yes    [x] No   - Propofol:  [] Yes    [x] No         - Rate: __mL/hr. If maintained x 24 hours, propofol will provide:     Diet Order:   Diet, NPO with Tube Feed:   Tube Feeding Modality: Nasogastric  Glucerna 1.2 Taye (GLUCERNARTH)  Total Volume for 24 Hours (mL): 720  Continuous  Starting Tube Feed Rate {mL per Hour}: 20  Increase Tube Feed Rate by (mL): 10     Every 4 hours  Until Goal Tube Feed Rate (mL per Hour): 30  Tube Feed Duration (in Hours): 24  Tube Feed Start Time: 23:00 (23)    EN Order Provides:    - Total volume: 720ml    - Kcal:   864    ( 15.7kcal/kg based on 55kg)    - Gm Protein 43g  ( 0.8 g/kg based on 55kg)    - mL free water: 580ml  Current Pump Rate: 30ml  EN provision: 60% EN volume goal provided in past 7 days per nursing documentation on flow sheets. was intermittently held for weaning trials.     Is current diet order appropriate/adequate? [] Yes  [x]  No: see below for recommendations    Nutrition-related concerns:  -on insulin regimen for glycemic control. Hgb A1c 6.6% ()    GI:  Last BM , lose per flow sheets.  Bowel Regimen? [x] Yes senna miralax metamucil  [] No    Weights:   Dosing weight 72kg  Daily Weight in k ()  no further weights noted. RD will continue to monitor.   Nutrition focused physical exam: no overt signs of muscle wasting or fat loss noted.     MEDICATIONS  (STANDING):  cefTRIAXone   IVPB  insulin lispro (ADMELOG) corrective regimen sliding scale  insulin NPH human recombinant  polyethylene glycol 3350  psyllium Powder  senna    Pertinent Labs:  @ 17:13: Na 142, BUN 9, Cr 0.32<L>, BG 85, K+ 3.8, Phos --, Mg --, Alk Phos --, ALT/SGPT --, AST/SGOT --, HbA1c --    A1C with Estimated Average Glucose Result: 6.6 % (23 @ 01:13)    Finger Sticks:  POCT Blood Glucose.: 109 mg/dL ( @ 11:54)  POCT Blood Glucose.: 94 mg/dL ( @ 05:08)  POCT Blood Glucose.: 94 mg/dL ( @ 23:30)  POCT Blood Glucose.: 84 mg/dL ( @ 17:40)    Skin per nursing documentation: no pressure injuries noted  Edema: none noted per flow sheets     Estimated Needs using IBW + 10% 55kg  30-35kcal/kg 1650-1925kcal/day  1.2-1.5g/kg 66-83g protein/day  defer fluid needs to team    Previous Nutrition Diagnosis: Increased nutrient needs, Inadequate Protein Energy Intake  Nutrition Diagnosis is: [x] ongoing  [] resolved [] not applicable     New Nutrition Diagnosis: [x] Not applicable    Nutrition Care Plan:  [x] In Progress  [] Achieved  [] Not applicable    Nutrition Interventions:     Education Provided:       [] Yes:  [x] No: not warranted at this time    Recommendations:      -Lose stool noted. Consider changing EN formula to Vital 1.5, which is designed to promote GI tolerance. Consider 50ml/hr x 24 hours to provide a total volume of 1200ml, 1800kcal (32.7kcal/kg), 81g protein (1.47g/kg) and 917ml free water   -Defer free water flush to team  -Consider discontinuing senna and or Miralax if lose stool persists  -Consider addition of Multivitamin if no contraindications to aid in preventing micronutrient deficiencies.     Monitoring and Evaluation:   Continue to monitor EN provision, tolerance to diet prescription, weights, labs, skin integrity    RD remains available upon request and will follow up per protocol  Nadia Blackmon MS, RD, CDN #[phone]

## 2023-02-13 NOTE — PROGRESS NOTE ADULT - REASON FOR ADMISSION
Covid and MG flare
MG flare and Covid
covid
Covid and MG flare
Covid, MG flare
Myaesthenia worsening following infection
covid
respiratory failure
respiratory failure, Covid, MG
respiratory failure, MG and covid
Covid and MG flare
MG flare and Covid
Myaesthenic Exacerbation
SOB

## 2023-02-13 NOTE — PROGRESS NOTE ADULT - SUBJECTIVE AND OBJECTIVE BOX
SUMMARY:   62-year-old woman with DMII, myasthenia gravis status post thymectomy and prior crises status post IVIG developed MG crisis in setting of COVID-19 infection requiring intubation status post IVIG x 1 and PLEX.     24 HOUR EVENTS:  IPV, nebs q6hrs, PEEP increased to 10    2/6- No overnight events.   2/7- Patient difficult to wean off the ventilator, requiring quite a lot of PEEP, currently on PEEP of 7. Otherwise exam improving. S/p 3 doses of PLEX.  2/8- Remains intubated, CPAP this AM and tolerated it. Today is her 5th dose of PLEX. No changes in exam.  2/9- Patient desaturated overnight, TV was low which was adjusted and PEEP was increased to 10. Improved after.   2/10- Day 5 of PLEX today.   2/11- Krystal d/c'ed, extubated    stable on BiPAP overnight     REVIEW OF SYSTEMS: [x] occational HA, but shows thumbs up when asked how she's doing    [ ] All ROS addressed below are non-contributory, except:  Neuro: [ ] Headache [ ] Back pain [ ] Numbness [ ] Weakness [ ] Ataxia [ ] Dizziness [ ] Aphasia [ ] Dysarthria [ ] Visual disturbance  Resp: [ ] Shortness of breath/dyspnea [ ] Orthopnea [ ] Cough  CV: [ ] Chest pain [ ] Palpitation [ ] Lightheadedness [ ] Syncope  Renal: [ ] Thirst [ ] Edema  GI: [ ] Nausea [ ] Emesis [ ] Abdominal pain [ ] Constipation [ ] Diarrhea  Hem: [ ] Hematemesis [ ] bBright red blood per rectum  ID: [ ] Fever [ ] Chills [ ] Dysuria  ENT: [ ] Rhinorrhea    VITALS/DATA/ORDERS: [x] Reviewed    EXAMINATION:   nods appropriately aaoX3, +cough/gag, no ptosis, deltoids 4-/5, neck flexion 5/5 extension 5/5, otherwise full strength   Pulmonary: Decreased breath sounds at bases  Cardiac: Normal S1/S2  Abd: Soft, non tender, + BS  Extremities: pulses strong, no edema SUMMARY:   62-year-old woman with DMII, myasthenia gravis status post thymectomy and prior crises status post IVIG developed MG crisis in setting of COVID-19 infection requiring intubation status post IVIG x 1 and PLEX.     24 HOUR EVENTS:  IPV, nebs q6hrs, PEEP increased to 10, bipap dc    2/6- No overnight events.   2/7- Patient difficult to wean off the ventilator, requiring quite a lot of PEEP, currently on PEEP of 7. Otherwise exam improving. S/p 3 doses of PLEX.  2/8- Remains intubated, CPAP this AM and tolerated it. Today is her 5th dose of PLEX. No changes in exam.  2/9- Patient desaturated overnight, TV was low which was adjusted and PEEP was increased to 10. Improved after.   2/10- Day 5 of PLEX today.   2/11- Krystal d/c'ed, extubated      REVIEW OF SYSTEMS: [x] occational HA, but shows thumbs up when asked how she's doing    [ ] All ROS addressed below are non-contributory, except:  Neuro: [ ] Headache [ ] Back pain [ ] Numbness [ ] Weakness [ ] Ataxia [ ] Dizziness [ ] Aphasia [ ] Dysarthria [ ] Visual disturbance  Resp: [ ] Shortness of breath/dyspnea [ ] Orthopnea [ ] Cough  CV: [ ] Chest pain [ ] Palpitation [ ] Lightheadedness [ ] Syncope  Renal: [ ] Thirst [ ] Edema  GI: [ ] Nausea [ ] Emesis [ ] Abdominal pain [ ] Constipation [ ] Diarrhea  Hem: [ ] Hematemesis [ ] bBright red blood per rectum  ID: [ ] Fever [ ] Chills [ ] Dysuria  ENT: [ ] Rhinorrhea    VITALS/DATA/ORDERS: [x] Reviewed    EXAMINATION:   nods appropriately aaoX3, +cough/gag, no ptosis, deltoids 4-/5, neck flexion 5/5 extension 5/5, otherwise full strength   Pulmonary: Decreased breath sounds at bases  Cardiac: Normal S1/S2  Abd: Soft, non tender, + BS  Extremities: pulses strong, no edema, slight colder on L side SUMMARY:   62-year-old woman with DMII, myasthenia gravis status post thymectomy and prior crises status post IVIG developed MG crisis in setting of COVID-19 infection requiring intubation status post IVIG x 1 and PLEX.     2/6- No overnight events.   2/7- Patient difficult to wean off the ventilator, requiring quite a lot of PEEP, currently on PEEP of 7. Otherwise exam improving. S/p 3 doses of PLEX.  2/8- Remains intubated, CPAP this AM and tolerated it. Today is her 5th dose of PLEX. No changes in exam.  2/9- Patient desaturated overnight, TV was low which was adjusted and PEEP was increased to 10. Improved after.   2/10- Day 5 of PLEX today.   2/11- Krystal d/c'ed, extubated    off BiPAP, resp status stable     REVIEW OF SYSTEMS: [x] occational HA, but shows thumbs up when asked how she's doing    [ ] All ROS addressed below are non-contributory, except:  Neuro: [x ] Headache [ ] Back pain [ ] Numbness [ ] Weakness [ ] Ataxia [ ] Dizziness [ ] Aphasia [ ] Dysarthria [ ] Visual disturbance  Resp: [ ] Shortness of breath/dyspnea [ ] Orthopnea [ ] Cough  CV: [ ] Chest pain [ ] Palpitation [ ] Lightheadedness [ ] Syncope  Renal: [ ] Thirst [ ] Edema  GI: [ ] Nausea [ ] Emesis [ ] Abdominal pain [ ] Constipation [ ] Diarrhea  Hem: [ ] Hematemesis [ ] bBright red blood per rectum  ID: [ ] Fever [ ] Chills [ ] Dysuria  ENT: [ ] Rhinorrhea    VITALS/DATA/ORDERS: [x] Reviewed    EXAMINATION:   nods appropriately aaoX3, +cough/gag, no ptosis, deltoids 4-/5, neck flexion 5/5 extension 5/5, otherwise full strength   Pulmonary: Decreased breath sounds at bases  Cardiac: Normal S1/S2  Abd: Soft, non tender, + BS  Extremities: pulses strong, no edema, slight colder on L side

## 2023-02-13 NOTE — PROGRESS NOTE ADULT - SUBJECTIVE AND OBJECTIVE BOX
Neurology Progress Note    SUBJECTIVE/OBJECTIVE/INTERVAL EVENTS: Patient seen and examined at bedside w/ neuro attending and team. Patient with MG, states respiratory state, swallowing, motor strength have improved since PLEX and admission. No worsening visual symptoms including diplopia. Otherwise states no new/worsening complaints at time of evaluation. NIF -50, .     Used language line : Antonio  [id number]    MEDICATIONS  (STANDING):  albuterol/ipratropium for Nebulization 3 milliLiter(s) Nebulizer every 6 hours  buDESOnide    Inhalation Suspension 0.25 milliGRAM(s) Inhalation every 12 hours  cefTRIAXone   IVPB 1000 milliGRAM(s) IV Intermittent every 24 hours  chlorhexidine 4% Liquid 1 Application(s) Topical <User Schedule>  enoxaparin Injectable 40 milliGRAM(s) SubCutaneous <User Schedule>  insulin lispro (ADMELOG) corrective regimen sliding scale   SubCutaneous every 6 hours  insulin NPH human recombinant 4 Unit(s) SubCutaneous every 6 hours  nystatin Powder 1 Application(s) Topical two times a day  polyethylene glycol 3350 17 Gram(s) Oral daily  psyllium Powder 1 Packet(s) Oral every 8 hours  pyridostigmine 120 milliGRAM(s) Oral every 8 hours  senna 2 Tablet(s) Oral at bedtime    MEDICATIONS  (PRN):  oxyCODONE    IR 5 milliGRAM(s) Oral every 6 hours PRN Moderate Pain (4 - 6)  oxyCODONE    IR 10 milliGRAM(s) Oral every 4 hours PRN Severe Pain (7 - 10)    VITALS & EXAMINATION:  Vital Signs Last 24 Hrs  T(C): 37.1 (13 Feb 2023 15:00), Max: 37.1 (12 Feb 2023 19:00)  T(F): 98.8 (13 Feb 2023 15:00), Max: 98.8 (13 Feb 2023 11:00)  HR: 98 (13 Feb 2023 15:00) (66 - 118)  BP: 151/61 (13 Feb 2023 15:00) (145/72 - 151/61)  BP(mean): 85 (13 Feb 2023 15:00) (85 - 89)  RR: 18 (13 Feb 2023 15:00) (18 - 45)  SpO2: 95% (13 Feb 2023 15:00) (94% - 100%)    Parameters below as of 13 Feb 2023 14:17  Patient On (Oxygen Delivery Method): nasal cannula  O2 Flow (L/min): 2     Gen: NAD  Head: normocephalic  Eyes: clear sclera  Resp: breathing regularly with periods of secretions that need to be cleared.   One count to 19  Head/neck flex 5/5  Able to keep prolonged upgaze    MS: awake alert oriented person place situation year month. No aphasia  CN: PERRL, EOMI, VFF, CNV intact, no nani facial asymmetry.   Extremities proximally 4+/5 distally 4+/5,  Legs also bilateral 4+/5 strength to confrontation  No tremor and mild dysmetria  Coordination: intact FNF  gait MOLLY     LABORATORY:  CBC                       8.6    12.29 )-----------( 202      ( 11 Feb 2023 21:43 )             26.4     Chem 02-12    142  |  105  |  9   ----------------------------<  85  3.8   |  26  |  0.32<L>    Ca    8.3<L>      12 Feb 2023 17:13  Phos  4.3     02-11  Mg     2.4     02-11      LFTs   Coagulopathy   Lipid Panel   A1c   Cardiac enzymes     U/A   CSF  Immunological  Other    STUDIES & IMAGING: (EEG, CT, MR, U/S, TTE/AUGUSTINA):     Neurology Progress Note    SUBJECTIVE/OBJECTIVE/INTERVAL EVENTS: Patient seen and examined at bedside w/ neuro attending and team. Patient with MG, states respiratory state, swallowing, motor strength have improved since PLEX and admission. No worsening visual symptoms including diplopia. Otherwise states no new/worsening complaints at time of evaluation. NIF -50, .     Used language line : Antonio  [phone]    MEDICATIONS  (STANDING):  albuterol/ipratropium for Nebulization 3 milliLiter(s) Nebulizer every 6 hours  buDESOnide    Inhalation Suspension 0.25 milliGRAM(s) Inhalation every 12 hours  cefTRIAXone   IVPB 1000 milliGRAM(s) IV Intermittent every 24 hours  chlorhexidine 4% Liquid 1 Application(s) Topical <User Schedule>  enoxaparin Injectable 40 milliGRAM(s) SubCutaneous <User Schedule>  insulin lispro (ADMELOG) corrective regimen sliding scale   SubCutaneous every 6 hours  insulin NPH human recombinant 4 Unit(s) SubCutaneous every 6 hours  nystatin Powder 1 Application(s) Topical two times a day  polyethylene glycol 3350 17 Gram(s) Oral daily  psyllium Powder 1 Packet(s) Oral every 8 hours  pyridostigmine 120 milliGRAM(s) Oral every 8 hours  senna 2 Tablet(s) Oral at bedtime    MEDICATIONS  (PRN):  oxyCODONE    IR 5 milliGRAM(s) Oral every 6 hours PRN Moderate Pain (4 - 6)  oxyCODONE    IR 10 milliGRAM(s) Oral every 4 hours PRN Severe Pain (7 - 10)    VITALS & EXAMINATION:  Vital Signs Last 24 Hrs  T(C): 37.1 (13 Feb 2023 15:00), Max: 37.1 (12 Feb 2023 19:00)  T(F): 98.8 (13 Feb 2023 15:00), Max: 98.8 (13 Feb 2023 11:00)  HR: 98 (13 Feb 2023 15:00) (66 - 118)  BP: 151/61 (13 Feb 2023 15:00) (145/72 - 151/61)  BP(mean): 85 (13 Feb 2023 15:00) (85 - 89)  RR: 18 (13 Feb 2023 15:00) (18 - 45)  SpO2: 95% (13 Feb 2023 15:00) (94% - 100%)    Parameters below as of 13 Feb 2023 14:17  Patient On (Oxygen Delivery Method): nasal cannula  O2 Flow (L/min): 2     Gen: NAD  Head: normocephalic  Eyes: clear sclera  Resp: breathing regularly with periods of secretions that need to be cleared.   One count to 19  Head/neck flex 5/5  Able to keep prolonged upgaze    MS: awake alert oriented person place situation year month. No aphasia  CN: PERRL, EOMI, VFF, CNV intact, no nani facial asymmetry.   Extremities proximally 4+/5 distally 4+/5,  Legs also bilateral 4+/5 strength to confrontation  No tremor and mild dysmetria  Coordination: intact FNF  gait MOLLY     LABORATORY:  CBC                       8.6    12.29 )-----------( 202      ( 11 Feb 2023 21:43 )             26.4     Chem 02-12    142  |  105  |  9   ----------------------------<  85  3.8   |  26  |  0.32<L>    Ca    8.3<L>      12 Feb 2023 17:13  Phos  4.3     02-11  Mg     2.4     02-11      LFTs   Coagulopathy   Lipid Panel   A1c   Cardiac enzymes     U/A   CSF  Immunological  Other    STUDIES & IMAGING: (EEG, CT, MR, U/S, TTE/AUGUSTINA):     Neurology Progress Note    SUBJECTIVE/OBJECTIVE/INTERVAL EVENTS: Patient seen and examined at bedside w/ neuro attending and team. Patient with MG, states respiratory state, swallowing, motor strength have improved since PLEX and admission. No worsening visual symptoms including diplopia. Otherwise states no new/worsening complaints at time of evaluation. NIF -50, .     Used language line : Antonio  [phone]    MEDICATIONS  (STANDING):  albuterol/ipratropium for Nebulization 3 milliLiter(s) Nebulizer every 6 hours  buDESOnide    Inhalation Suspension 0.25 milliGRAM(s) Inhalation every 12 hours  cefTRIAXone   IVPB 1000 milliGRAM(s) IV Intermittent every 24 hours  chlorhexidine 4% Liquid 1 Application(s) Topical <User Schedule>  enoxaparin Injectable 40 milliGRAM(s) SubCutaneous <User Schedule>  insulin lispro (ADMELOG) corrective regimen sliding scale   SubCutaneous every 6 hours  insulin NPH human recombinant 4 Unit(s) SubCutaneous every 6 hours  nystatin Powder 1 Application(s) Topical two times a day  polyethylene glycol 3350 17 Gram(s) Oral daily  psyllium Powder 1 Packet(s) Oral every 8 hours  pyridostigmine 120 milliGRAM(s) Oral every 8 hours  senna 2 Tablet(s) Oral at bedtime    MEDICATIONS  (PRN):  oxyCODONE    IR 5 milliGRAM(s) Oral every 6 hours PRN Moderate Pain (4 - 6)  oxyCODONE    IR 10 milliGRAM(s) Oral every 4 hours PRN Severe Pain (7 - 10)    VITALS & EXAMINATION:  Vital Signs Last 24 Hrs  T(C): 37.1 (13 Feb 2023 15:00), Max: 37.1 (12 Feb 2023 19:00)  T(F): 98.8 (13 Feb 2023 15:00), Max: 98.8 (13 Feb 2023 11:00)  HR: 98 (13 Feb 2023 15:00) (66 - 118)  BP: 151/61 (13 Feb 2023 15:00) (145/72 - 151/61)  BP(mean): 85 (13 Feb 2023 15:00) (85 - 89)  RR: 18 (13 Feb 2023 15:00) (18 - 45)  SpO2: 95% (13 Feb 2023 15:00) (94% - 100%)    Parameters below as of 13 Feb 2023 14:17  Patient On (Oxygen Delivery Method): nasal cannula  O2 Flow (L/min): 2     Gen: NAD  Head: normocephalic  Eyes: clear sclera  Resp: breathing regularly with periods of secretions that need to be cleared.   One count to 19  Head/neck flex 5/5  Able to keep prolonged upgaze    MS: awake alert oriented person place situation year month. No aphasia  CN: PERRL, EOMI, VFF, CNV intact, no nani facial asymmetry.   Extremities proximally 4+/5 distally 4+/5,  Legs also bilateral 4+/5 strength to confrontation  No tremor and mild dysmetria  Coordination: intact FNF  gait MOLLY     LABORATORY:  CBC                       8.6    12.29 )-----------( 202      ( 11 Feb 2023 21:43 )             26.4     Chem 02-12    142  |  105  |  9   ----------------------------<  85  3.8   |  26  |  0.32<L>    Ca    8.3<L>      12 Feb 2023 17:13  Phos  4.3     02-11  Mg     2.4     02-11      LFTs   Coagulopathy   Lipid Panel   A1c   Cardiac enzymes     U/A   CSF  Immunological  Other    STUDIES & IMAGING: (EEG, CT, MR, U/S, TTE/AUGUSTINA):

## 2023-02-13 NOTE — CHART NOTE - NSCHARTNOTEFT_GEN_A_CORE
Patient accepted to neuro service under Dr. Chung.  Report given to EKTA Mora, patient transferred at this time.

## 2023-02-13 NOTE — PROGRESS NOTE ADULT - ASSESSMENT
Impression: Myaesthenia Gravis exacerbation  likely secondary to COVID infection. S/p PLEX x5 sessions. Improving symptoms ophthalmoparesis, b/l UE strength, dysarthria,     Recommendations:  [x] s/p PLEX 5 sessions (2/2, 2/4, 2/6, 2/8, 2/10)  [x] c/w pyridostigmine 120mg tid PO  [ ] Remain off of steroid as pt has completed full cycle of treatment.  [ ] ICU recs appreciated, also agreeable that patient stable for downgrade to neuro general floor if respiratory status maintains/ improves  [ ] appreciate ID recs, infectious w/u       [ ] f/u NIF and VC Q4h (will later decide to space out if stable)   [ ] Cardiac monitoring with tele  [ ] S/S for diet recs, currently TF  [ ] Avoid medications like fluoroquinonles, macrolides, amnioglycosides, chloroquines. Consider avoiding anti hypertensives such as beta blockers, CCB   [ ] DVT ppx   [ ] neuro checks with vitals         Evaluation and impression including differential diagnosis is not limited to that listed above and is based on evaluation at particular timepoint. Formally staffed by attending during rounds.  Recommendations will be finalized once signed by attending. Impression: Myaesthenia Gravis exacerbation  likely secondary to COVID infection. S/p PLEX x5 sessions. Improving symptoms ophthalmoparesis, b/l UE strength, dysarthria,     Recommendations:  [x] s/p PLEX 5 sessions (2/2, 2/4, 2/6, 2/8, 2/10)  [x] c/w pyridostigmine 120mg tid PO  [ ] Remain off of steroid as pt has completed full cycle of treatment.  [ ] ICU recs appreciated, ICU and neuro attending agreeable at time of evaluation that patient stable for downgrade to neuro general floor if respiratory status maintains/ improves until time of transfer.  [ ] appreciate ID recs, infectious w/u       [ ] f/u NIF and VC Q4h (will later decide to space out if stable)   [ ] Cardiac monitoring with tele  [ ] S/S for diet recs, currently TF  [ ] Avoid medications like fluoroquinonles, macrolides, amnioglycosides, chloroquines. Consider avoiding anti hypertensives such as beta blockers, CCB   [ ] DVT ppx   [ ] neuro checks with vitals         Evaluation and impression including differential diagnosis is not limited to that listed above and is based on evaluation at particular timepoint. Formally staffed by attending during rounds.  Recommendations will be finalized once signed by attending.

## 2023-02-13 NOTE — PROGRESS NOTE ADULT - SUBJECTIVE AND OBJECTIVE BOX
CC: f/u for positive cultures    Patient reports: she is comfortable off vent, fed via NG tube    REVIEW OF SYSTEMS:  All other review of systems negative (Comprehensive ROS)    Antimicrobials Day #  : day 4  cefTRIAXone   IVPB 1000 milliGRAM(s) IV Intermittent every 24 hours    Other Medications Reviewed  MEDICATIONS  (STANDING):  albuterol/ipratropium for Nebulization 3 milliLiter(s) Nebulizer every 6 hours  buDESOnide    Inhalation Suspension 0.25 milliGRAM(s) Inhalation every 12 hours  cefTRIAXone   IVPB 1000 milliGRAM(s) IV Intermittent every 24 hours  chlorhexidine 4% Liquid 1 Application(s) Topical <User Schedule>  enoxaparin Injectable 40 milliGRAM(s) SubCutaneous <User Schedule>  insulin lispro (ADMELOG) corrective regimen sliding scale   SubCutaneous every 6 hours  insulin NPH human recombinant 4 Unit(s) SubCutaneous every 6 hours  nystatin Powder 1 Application(s) Topical two times a day  polyethylene glycol 3350 17 Gram(s) Oral daily  psyllium Powder 1 Packet(s) Oral every 8 hours  pyridostigmine 120 milliGRAM(s) Oral every 8 hours  senna 2 Tablet(s) Oral at bedtime    T(F): 98.8 (02-13-23 @ 11:00), Max: 98.8 (02-13-23 @ 11:00)  HR: 83 (02-13-23 @ 11:00)  BP: 145/72 (02-13-23 @ 11:00)  RR: 39 (02-13-23 @ 11:00)  SpO2: 100% (02-13-23 @ 11:00)  Wt(kg): --    PHYSICAL EXAM:  General: alert, no acute distress, NGT  Eyes:  anicteric, no conjunctival injection, no discharge  Oropharynx: no lesions or injection 	  Neck: supple, without adenopathy  Lungs: clear to auscultation  Heart: regular rate and rhythm; no murmur, rubs or gallops  Abdomen: soft, nondistended, nontender, without mass or organomegaly  Skin: no lesions  Extremities: no clubbing, cyanosis, or edema  Neurologic: alert, oriented, moves all extremities    LAB RESULTS:                        8.6    12.29 )-----------( 202      ( 11 Feb 2023 21:43 )             26.4     02-12    142  |  105  |  9   ----------------------------<  85  3.8   |  26  |  0.32<L>    Ca    8.3<L>      12 Feb 2023 17:13  Phos  4.3     02-11  Mg     2.4     02-11          MICROBIOLOGY:  RECENT CULTURES:      RADIOLOGY REVIEWED:    < from: Xray Chest 1 View- PORTABLE-Urgent (Xray Chest 1 View- PORTABLE-Urgent .) (02.12.23 @ 11:08) >  IMPRESSION:  Enteric tube with tip in the stomach.  Left retrocardiac opacity may represent atelectasis versus effusion.    --- End of Report ---    < end of copied text >

## 2023-02-13 NOTE — PHARMACOTHERAPY INTERVENTION NOTE - COMMENTS
Reviewed medications for appropriate route of administration     MEDICATIONS  (STANDING):  albuterol/ipratropium for Nebulization 3 milliLiter(s) Nebulizer every 6 hours  buDESOnide    Inhalation Suspension 0.25 milliGRAM(s) Inhalation every 12 hours  cefTRIAXone   IVPB 1000 milliGRAM(s) IV Intermittent every 24 hours  chlorhexidine 4% Liquid 1 Application(s) Topical <User Schedule>  enoxaparin Injectable 40 milliGRAM(s) SubCutaneous <User Schedule>  insulin lispro (ADMELOG) corrective regimen sliding scale   SubCutaneous every 6 hours  insulin NPH human recombinant 4 Unit(s) SubCutaneous every 6 hours  nystatin Powder 1 Application(s) Topical two times a day  polyethylene glycol 3350 17 Gram(s) Oral daily  pyridostigmine 120 milliGRAM(s) Oral every 8 hours  senna 2 Tablet(s) Oral at bedtime    MEDICATIONS  (PRN):  oxyCODONE    IR 5 milliGRAM(s) Oral every 6 hours PRN Moderate Pain (4 - 6)  oxyCODONE    IR 10 milliGRAM(s) Oral every 4 hours PRN Severe Pain (7 - 10)    Jessica Ortez, PharmD  PGY2 Critical Care Pharmacy Resident  Available on [employer] Teams  Graft Type: Full Thickness Skin Graft

## 2023-02-13 NOTE — PROGRESS NOTE ADULT - SUBJECTIVE AND OBJECTIVE BOX
Patient seen and examined, night rounds     T(C): 37.1 (02-13-23 @ 19:00), Max: 37.1 (02-13-23 @ 11:00)  HR: 113 (02-13-23 @ 19:00) (66 - 113)  BP: 163/66 (02-13-23 @ 19:00) (145/72 - 163/66)  RR: 30 (02-13-23 @ 19:00) (18 - 45)  SpO2: 99% (02-13-23 @ 19:00) (95% - 100%)  02-12-23 @ 07:01  -  02-13-23 @ 07:00  --------------------------------------------------------  IN: 3015 mL / OUT: 3675 mL / NET: -660 mL    02-13-23 @ 07:01  -  02-13-23 @ 22:00  --------------------------------------------------------  IN: 440 mL / OUT: 1625 mL / NET: -1185 mL    albuterol/ipratropium for Nebulization 3 milliLiter(s) Nebulizer every 6 hours  buDESOnide    Inhalation Suspension 0.25 milliGRAM(s) Inhalation every 12 hours  cefTRIAXone   IVPB 1000 milliGRAM(s) IV Intermittent every 24 hours  chlorhexidine 4% Liquid 1 Application(s) Topical <User Schedule>  enoxaparin Injectable 40 milliGRAM(s) SubCutaneous <User Schedule>  insulin lispro (ADMELOG) corrective regimen sliding scale   SubCutaneous every 6 hours  insulin NPH human recombinant 4 Unit(s) SubCutaneous every 6 hours  nystatin Powder 1 Application(s) Topical two times a day  oxyCODONE    IR 10 milliGRAM(s) Oral every 4 hours PRN  oxyCODONE    IR 5 milliGRAM(s) Oral every 6 hours PRN  polyethylene glycol 3350 17 Gram(s) Oral daily  psyllium Powder 1 Packet(s) Oral every 8 hours  pyridostigmine 120 milliGRAM(s) Oral every 8 hours  senna 2 Tablet(s) Oral at bedtime        Exam stable    labs and imaging reviewed     Continue same management     [facility]  Patient seen and examined, night rounds     T(C): 37.1 (02-13-23 @ 19:00), Max: 37.1 (02-13-23 @ 11:00)  HR: 113 (02-13-23 @ 19:00) (66 - 113)  BP: 163/66 (02-13-23 @ 19:00) (145/72 - 163/66)  RR: 30 (02-13-23 @ 19:00) (18 - 45)  SpO2: 99% (02-13-23 @ 19:00) (95% - 100%)  02-12-23 @ 07:01  -  02-13-23 @ 07:00  --------------------------------------------------------  IN: 3015 mL / OUT: 3675 mL / NET: -660 mL    02-13-23 @ 07:01  -  02-13-23 @ 22:00  --------------------------------------------------------  IN: 440 mL / OUT: 1625 mL / NET: -1185 mL    albuterol/ipratropium for Nebulization 3 milliLiter(s) Nebulizer every 6 hours  buDESOnide    Inhalation Suspension 0.25 milliGRAM(s) Inhalation every 12 hours  cefTRIAXone   IVPB 1000 milliGRAM(s) IV Intermittent every 24 hours  chlorhexidine 4% Liquid 1 Application(s) Topical <User Schedule>  enoxaparin Injectable 40 milliGRAM(s) SubCutaneous <User Schedule>  insulin lispro (ADMELOG) corrective regimen sliding scale   SubCutaneous every 6 hours  insulin NPH human recombinant 4 Unit(s) SubCutaneous every 6 hours  nystatin Powder 1 Application(s) Topical two times a day  oxyCODONE    IR 10 milliGRAM(s) Oral every 4 hours PRN  oxyCODONE    IR 5 milliGRAM(s) Oral every 6 hours PRN  polyethylene glycol 3350 17 Gram(s) Oral daily  psyllium Powder 1 Packet(s) Oral every 8 hours  pyridostigmine 120 milliGRAM(s) Oral every 8 hours  senna 2 Tablet(s) Oral at bedtime        Exam stable    labs and imaging reviewed     Continue same management     [facility]aitlyn Calderon [facility] [place] [place] [facility]  Patient seen and examined, night rounds     T(C): 37.1 (02-13-23 @ 19:00), Max: 37.1 (02-13-23 @ 11:00)  HR: 113 (02-13-23 @ 19:00) (66 - 113)  BP: 163/66 (02-13-23 @ 19:00) (145/72 - 163/66)  RR: 30 (02-13-23 @ 19:00) (18 - 45)  SpO2: 99% (02-13-23 @ 19:00) (95% - 100%)  02-12-23 @ 07:01  -  02-13-23 @ 07:00  --------------------------------------------------------  IN: 3015 mL / OUT: 3675 mL / NET: -660 mL    02-13-23 @ 07:01  -  02-13-23 @ 22:00  --------------------------------------------------------  IN: 440 mL / OUT: 1625 mL / NET: -1185 mL    albuterol/ipratropium for Nebulization 3 milliLiter(s) Nebulizer every 6 hours  buDESOnide    Inhalation Suspension 0.25 milliGRAM(s) Inhalation every 12 hours  cefTRIAXone   IVPB 1000 milliGRAM(s) IV Intermittent every 24 hours  chlorhexidine 4% Liquid 1 Application(s) Topical <User Schedule>  enoxaparin Injectable 40 milliGRAM(s) SubCutaneous <User Schedule>  insulin lispro (ADMELOG) corrective regimen sliding scale   SubCutaneous every 6 hours  insulin NPH human recombinant 4 Unit(s) SubCutaneous every 6 hours  nystatin Powder 1 Application(s) Topical two times a day  oxyCODONE    IR 10 milliGRAM(s) Oral every 4 hours PRN  oxyCODONE    IR 5 milliGRAM(s) Oral every 6 hours PRN  polyethylene glycol 3350 17 Gram(s) Oral daily  psyllium Powder 1 Packet(s) Oral every 8 hours  pyridostigmine 120 milliGRAM(s) Oral every 8 hours  senna 2 Tablet(s) Oral at bedtime        Exam stable    labs and imaging reviewed     Continue same management     [facility]

## 2023-02-13 NOTE — PHARMACOTHERAPY INTERVENTION NOTE - REASON FOR NOTE
Reviewed medications for appropriate route of administration

## 2023-02-13 NOTE — PROGRESS NOTE ADULT - THIS PATIENT HAS THE FOLLOWING CONDITION(S)/DIAGNOSES ON THIS ADMISSION:
MG exacerbation
myasthenia gravis exacerbation
myasthenia gravis exacerbation
Acute Respiratory Failure
None
Acute Respiratory Failure
MG exacerbation
None
Acute Respiratory Failure
Acute Respiratory Failure
MG exacerbation
myasthenia gravis
Acute Respiratory Failure
MG exacerbation
MG exacerbation
myasthenia gravis
MG exacerbation
Acute Respiratory Failure
MG exacerbation

## 2023-02-14 LAB
ANION GAP SERPL CALC-SCNC: 7 MMOL/L — SIGNIFICANT CHANGE UP (ref 5–17)
BASOPHILS # BLD AUTO: 0.01 K/UL — SIGNIFICANT CHANGE UP (ref 0–0.2)
BASOPHILS NFR BLD AUTO: 0.1 % — SIGNIFICANT CHANGE UP (ref 0–2)
BUN SERPL-MCNC: 8 MG/DL — SIGNIFICANT CHANGE UP (ref 7–23)
CALCIUM SERPL-MCNC: 9.2 MG/DL — SIGNIFICANT CHANGE UP (ref 8.4–10.5)
CHLORIDE SERPL-SCNC: 103 MMOL/L — SIGNIFICANT CHANGE UP (ref 96–108)
CO2 SERPL-SCNC: 31 MMOL/L — SIGNIFICANT CHANGE UP (ref 22–31)
CREAT SERPL-MCNC: 0.35 MG/DL — LOW (ref 0.5–1.3)
EGFR: 115 ML/MIN/1.73M2 — SIGNIFICANT CHANGE UP
EOSINOPHIL # BLD AUTO: 0.78 K/UL — HIGH (ref 0–0.5)
EOSINOPHIL NFR BLD AUTO: 6.8 % — HIGH (ref 0–6)
GLUCOSE BLDC GLUCOMTR-MCNC: 116 MG/DL — HIGH (ref 70–99)
GLUCOSE BLDC GLUCOMTR-MCNC: 131 MG/DL — HIGH (ref 70–99)
GLUCOSE BLDC GLUCOMTR-MCNC: 157 MG/DL — HIGH (ref 70–99)
GLUCOSE BLDC GLUCOMTR-MCNC: 189 MG/DL — HIGH (ref 70–99)
GLUCOSE BLDC GLUCOMTR-MCNC: 99 MG/DL — SIGNIFICANT CHANGE UP (ref 70–99)
GLUCOSE SERPL-MCNC: 113 MG/DL — HIGH (ref 70–99)
HCT VFR BLD CALC: 31 % — LOW (ref 34.5–45)
HGB BLD-MCNC: 9.5 G/DL — LOW (ref 11.5–15.5)
IMM GRANULOCYTES NFR BLD AUTO: 0.6 % — SIGNIFICANT CHANGE UP (ref 0–0.9)
LYMPHOCYTES # BLD AUTO: 1.64 K/UL — SIGNIFICANT CHANGE UP (ref 1–3.3)
LYMPHOCYTES # BLD AUTO: 14.2 % — SIGNIFICANT CHANGE UP (ref 13–44)
MAGNESIUM SERPL-MCNC: 2.4 MG/DL — SIGNIFICANT CHANGE UP (ref 1.6–2.6)
MCHC RBC-ENTMCNC: 28.5 PG — SIGNIFICANT CHANGE UP (ref 27–34)
MCHC RBC-ENTMCNC: 30.6 GM/DL — LOW (ref 32–36)
MCV RBC AUTO: 93.1 FL — SIGNIFICANT CHANGE UP (ref 80–100)
MONOCYTES # BLD AUTO: 0.86 K/UL — SIGNIFICANT CHANGE UP (ref 0–0.9)
MONOCYTES NFR BLD AUTO: 7.5 % — SIGNIFICANT CHANGE UP (ref 2–14)
NEUTROPHILS # BLD AUTO: 8.16 K/UL — HIGH (ref 1.8–7.4)
NEUTROPHILS NFR BLD AUTO: 70.8 % — SIGNIFICANT CHANGE UP (ref 43–77)
NRBC # BLD: 0 /100 WBCS — SIGNIFICANT CHANGE UP (ref 0–0)
PHOSPHATE SERPL-MCNC: 3.8 MG/DL — SIGNIFICANT CHANGE UP (ref 2.5–4.5)
PLATELET # BLD AUTO: 297 K/UL — SIGNIFICANT CHANGE UP (ref 150–400)
POTASSIUM SERPL-MCNC: 3.7 MMOL/L — SIGNIFICANT CHANGE UP (ref 3.5–5.3)
POTASSIUM SERPL-SCNC: 3.7 MMOL/L — SIGNIFICANT CHANGE UP (ref 3.5–5.3)
RBC # BLD: 3.33 M/UL — LOW (ref 3.8–5.2)
RBC # FLD: 14.5 % — SIGNIFICANT CHANGE UP (ref 10.3–14.5)
SODIUM SERPL-SCNC: 141 MMOL/L — SIGNIFICANT CHANGE UP (ref 135–145)
WBC # BLD: 11.52 K/UL — HIGH (ref 3.8–10.5)
WBC # FLD AUTO: 11.52 K/UL — HIGH (ref 3.8–10.5)

## 2023-02-14 PROCEDURE — 71045 X-RAY EXAM CHEST 1 VIEW: CPT | Mod: 26

## 2023-02-14 PROCEDURE — 99222 1ST HOSP IP/OBS MODERATE 55: CPT

## 2023-02-14 PROCEDURE — 99232 SBSQ HOSP IP/OBS MODERATE 35: CPT

## 2023-02-14 RX ORDER — OXYCODONE HYDROCHLORIDE 5 MG/1
5 TABLET ORAL EVERY 6 HOURS
Refills: 0 | Status: DISCONTINUED | OUTPATIENT
Start: 2023-02-14 | End: 2023-02-20

## 2023-02-14 RX ORDER — SENNA PLUS 8.6 MG/1
2 TABLET ORAL AT BEDTIME
Refills: 0 | Status: DISCONTINUED | OUTPATIENT
Start: 2023-02-14 | End: 2023-02-21

## 2023-02-14 RX ORDER — OXYCODONE HYDROCHLORIDE 5 MG/1
10 TABLET ORAL EVERY 4 HOURS
Refills: 0 | Status: DISCONTINUED | OUTPATIENT
Start: 2023-02-14 | End: 2023-02-20

## 2023-02-14 RX ORDER — PYRIDOSTIGMINE BROMIDE 60 MG/5ML
120 SOLUTION ORAL EVERY 8 HOURS
Refills: 0 | Status: DISCONTINUED | OUTPATIENT
Start: 2023-02-14 | End: 2023-02-20

## 2023-02-14 RX ORDER — INSULIN LISPRO 100/ML
VIAL (ML) SUBCUTANEOUS
Refills: 0 | Status: DISCONTINUED | OUTPATIENT
Start: 2023-02-14 | End: 2023-02-20

## 2023-02-14 RX ORDER — POLYETHYLENE GLYCOL 3350 17 G/17G
17 POWDER, FOR SOLUTION ORAL DAILY
Refills: 0 | Status: DISCONTINUED | OUTPATIENT
Start: 2023-02-14 | End: 2023-02-21

## 2023-02-14 RX ADMIN — Medication 3 MILLILITER(S): at 23:35

## 2023-02-14 RX ADMIN — Medication 3 MILLILITER(S): at 11:43

## 2023-02-14 RX ADMIN — HUMAN INSULIN 4 UNIT(S): 100 INJECTION, SUSPENSION SUBCUTANEOUS at 05:39

## 2023-02-14 RX ADMIN — Medication 3 MILLILITER(S): at 05:37

## 2023-02-14 RX ADMIN — HUMAN INSULIN 4 UNIT(S): 100 INJECTION, SUSPENSION SUBCUTANEOUS at 11:42

## 2023-02-14 RX ADMIN — PYRIDOSTIGMINE BROMIDE 120 MILLIGRAM(S): 60 SOLUTION ORAL at 22:25

## 2023-02-14 RX ADMIN — Medication 0.25 MILLIGRAM(S): at 05:38

## 2023-02-14 RX ADMIN — NYSTATIN CREAM 1 APPLICATION(S): 100000 CREAM TOPICAL at 05:38

## 2023-02-14 RX ADMIN — Medication 1 PACKET(S): at 22:24

## 2023-02-14 RX ADMIN — ENOXAPARIN SODIUM 40 MILLIGRAM(S): 100 INJECTION SUBCUTANEOUS at 17:49

## 2023-02-14 RX ADMIN — Medication 2: at 22:25

## 2023-02-14 RX ADMIN — CEFTRIAXONE 100 MILLIGRAM(S): 500 INJECTION, POWDER, FOR SOLUTION INTRAMUSCULAR; INTRAVENOUS at 20:38

## 2023-02-14 RX ADMIN — Medication 3 MILLILITER(S): at 00:37

## 2023-02-14 RX ADMIN — Medication 0.25 MILLIGRAM(S): at 17:49

## 2023-02-14 RX ADMIN — NYSTATIN CREAM 1 APPLICATION(S): 100000 CREAM TOPICAL at 17:49

## 2023-02-14 RX ADMIN — Medication 1 PACKET(S): at 06:25

## 2023-02-14 RX ADMIN — Medication 2: at 11:41

## 2023-02-14 RX ADMIN — CHLORHEXIDINE GLUCONATE 1 APPLICATION(S): 213 SOLUTION TOPICAL at 05:39

## 2023-02-14 RX ADMIN — Medication 3 MILLILITER(S): at 17:49

## 2023-02-14 RX ADMIN — HUMAN INSULIN 4 UNIT(S): 100 INJECTION, SUSPENSION SUBCUTANEOUS at 00:32

## 2023-02-14 RX ADMIN — PYRIDOSTIGMINE BROMIDE 120 MILLIGRAM(S): 60 SOLUTION ORAL at 14:32

## 2023-02-14 RX ADMIN — PYRIDOSTIGMINE BROMIDE 120 MILLIGRAM(S): 60 SOLUTION ORAL at 05:38

## 2023-02-14 NOTE — PROGRESS NOTE ADULT - SUBJECTIVE AND OBJECTIVE BOX
Neurology - Consult Note    -  Spectra: [phone] ([facility]), [phone] ([facility])  -    Interval hx:   Patient with MG, states respiratory state, swallowing, motor strength have improved since PLEX and admission.   pt transferred from ICU to neurology  floor today. Received 3 sessions of IVIG and PLEX (2/2, 2/4, 2/6, 2/8, 2/10). s/p extubation 2/11. Now on Mestinon 120 mg TID.   Pt states she has some cough and sputum prior. Not comfortable with NG tube. Seen with S + S who placed her on regular diet.       Review of Systems:   CONSTITUTIONAL: No fevers or chills  NECK: No pain or stiffness  RESPIRATORY: No hemoptysis or shortness of breath  NEUROLOGICAL: +As stated in HPI above  SKIN: No itching, burning, rashes, or lesions   All other review of systems is negative unless indicated above.    Allergies:  peanuts (Unknown)      PMHx/PSHx/Family Hx: As above, otherwise see below   Myasthenia gravis    Diabetes mellitus        Social Hx:  No current use of tobacco, alcohol, or illicit drugs    Medications:  MEDICATIONS  (STANDING):  albuterol/ipratropium for Nebulization 3 milliLiter(s) Nebulizer every 6 hours  buDESOnide    Inhalation Suspension 0.25 milliGRAM(s) Inhalation every 12 hours  cefTRIAXone   IVPB 1000 milliGRAM(s) IV Intermittent every 24 hours  chlorhexidine 4% Liquid 1 Application(s) Topical <User Schedule>  enoxaparin Injectable 40 milliGRAM(s) SubCutaneous <User Schedule>  insulin lispro (ADMELOG) corrective regimen sliding scale   SubCutaneous every 6 hours  insulin NPH human recombinant 4 Unit(s) SubCutaneous every 6 hours  nystatin Powder 1 Application(s) Topical two times a day  polyethylene glycol 3350 17 Gram(s) Oral daily  psyllium Powder 1 Packet(s) Oral every 8 hours  pyridostigmine 120 milliGRAM(s) Oral every 8 hours  senna 2 Tablet(s) Oral at bedtime    MEDICATIONS  (PRN):  oxyCODONE    IR 5 milliGRAM(s) Oral every 6 hours PRN Moderate Pain (4 - 6)  oxyCODONE    IR 10 milliGRAM(s) Oral every 4 hours PRN Severe Pain (7 - 10)      Vitals:  T(C): 36.8 (02-14-23 @ 09:34), Max: 37.2 (02-13-23 @ 23:14)  HR: 86 (02-14-23 @ 09:34) (82 - 113)  BP: 150/75 (02-14-23 @ 09:34) (137/84 - 163/66)  RR: 20 (02-14-23 @ 09:34) (18 - 45)  SpO2: 98% (02-14-23 @ 09:34) (95% - 100%)    Physical Examination:  General - NAD  Cardiovascular - Peripheral pulses palpable, no edema  Eyes - Fundoscopy with flat, sharp optic discs and no hemorrhage or exudates; Fundoscopy not well visualized; Fundoscopy not performed due to safety precautions in the setting of the COVID-19 pandemic    Neurologic Exam:  Mental status - Awake, Alert, Oriented to person, place, and time. Speech fluent, repetition and naming intact. Follows simple commands. Attention/concentration, recent and remote memory (including registration and recall), and fund of knowledge intact    Cranial nerves - PERRLA, VFF, EOMI, face sensation (V1-V3) intact b/l, facial strength intact without asymmetry b/l, hearing intact b/l, palate with symmetric elevation,   L eye ptosis.  Diplopia on upward gaze.   Can count to 23.   Neck flexion 4-    Motor - Normal bulk and tone throughout. No pronator drift.  Strength testing            Deltoid      Biceps      Triceps     Wrist Extension    Wrist Flexion     Interossei         R            5                 5               5                     5                              5                        5                 5  L             5                 5               5                     5                              5                        5                 5              Hip Flexion    Hip Extension    Knee Flexion    Knee Extension    Dorsiflexion    Plantar Flexion  R              5                           5                       5                           5                            5                          5  L              5                           5                        5                           5                            5                          5    Sensation - Light touch/temperature intact throughout    DTR's -             Biceps      Triceps     Brachioradialis      Patellar    Ankle    Toes/plantar response  R             2+             2+                  2+                       2+            2+                 Down  L              2+             2+                 2+                        2+           2+                 Down    Coordination - Finger to Nose intact b/l. No tremors appreciated    Gait and station - unable to perform due to fall risk    Labs:                        9.5    11.52 )-----------( 297      ( 14 Feb 2023 06:50 )             31.0     02-14    141  |  103  |  8   ----------------------------<  113<H>  3.7   |  31  |  0.35<L>    Ca    9.2      14 Feb 2023 06:51  Phos  3.8     02-14  Mg     2.4     02-14      CAPILLARY BLOOD GLUCOSE      POCT Blood Glucose.: 157 mg/dL (14 Feb 2023 11:23)          CSF:                  Radiology:     Neurology - Consult Note    -  Spectra: [phone] ([facility]), [phone] ([facility])  -    Interval hx:   Patient with MG, states respiratory state, swallowing, motor strength have improved since PLEX and admission.   pt transferred from ICU to neurology  floor today. Received 3 sessions of IVIG and PLEX (2/2, 2/4, 2/6, 2/8, 2/10). s/p extubation 2/11. Now on Mestinon 120 mg TID.   Pt states she has some cough and sputum prior. Not comfortable with NG tube. Seen with S + S who placed her on regular diet.       Review of Systems:   CONSTITUTIONAL: No fevers or chills  NECK: No pain or stiffness  RESPIRATORY: No hemoptysis or shortness of breath  NEUROLOGICAL: +As stated in HPI above  SKIN: No itching, burning, rashes, or lesions   All other review of systems is negative unless indicated above.    Allergies:  peanuts (Unknown)      PMHx/PSHx/Family Hx: As above, otherwise see below   Myasthenia gravis    Diabetes mellitus        Social Hx:  No current use of tobacco, alcohol, or illicit drugs    Medications:  MEDICATIONS  (STANDING):  albuterol/ipratropium for Nebulization 3 milliLiter(s) Nebulizer every 6 hours  buDESOnide    Inhalation Suspension 0.25 milliGRAM(s) Inhalation every 12 hours  cefTRIAXone   IVPB 1000 milliGRAM(s) IV Intermittent every 24 hours  chlorhexidine 4% Liquid 1 Application(s) Topical <User Schedule>  enoxaparin Injectable 40 milliGRAM(s) SubCutaneous <User Schedule>  insulin lispro (ADMELOG) corrective regimen sliding scale   SubCutaneous every 6 hours  insulin NPH human recombinant 4 Unit(s) SubCutaneous every 6 hours  nystatin Powder 1 Application(s) Topical two times a day  polyethylene glycol 3350 17 Gram(s) Oral daily  psyllium Powder 1 Packet(s) Oral every 8 hours  pyridostigmine 120 milliGRAM(s) Oral every 8 hours  senna 2 Tablet(s) Oral at bedtime    MEDICATIONS  (PRN):  oxyCODONE    IR 5 milliGRAM(s) Oral every 6 hours PRN Moderate Pain (4 - 6)  oxyCODONE    IR 10 milliGRAM(s) Oral every 4 hours PRN Severe Pain (7 - 10)      Vitals:  T(C): 36.8 (02-14-23 @ 09:34), Max: 37.2 (02-13-23 @ 23:14)  HR: 86 (02-14-23 @ 09:34) (82 - 113)  BP: 150/75 (02-14-23 @ 09:34) (137/84 - 163/66)  RR: 20 (02-14-23 @ 09:34) (18 - 45)  SpO2: 98% (02-14-23 @ 09:34) (95% - 100%)    Physical Examination:  General - NAD  Cardiovascular - Peripheral pulses palpable, no edema  Eyes - Fundoscopy with flat, sharp optic discs and no hemorrhage or exudates; Fundoscopy not well visualized; Fundoscopy not performed due to safety precautions in the setting of the COVID-19 pandemic    Neurologic Exam:  Mental status - Awake, Alert, Oriented to person, place, and time. Speech fluent, repetition and naming intact. Follows simple commands. Attention/concentration, recent and remote memory (including registration and recall), and fund of knowledge intact    Cranial nerves - PERRLA, VFF, EOMI, face sensation (V1-V3) intact b/l, facial strength intact without asymmetry b/l, hearing intact b/l, palate with symmetric elevation,   L eye ptosis.  Diplopia on upward gaze.   Can count to 23.   Neck flexion 4-    Motor - Normal bulk and tone throughout. No pronator drift.  Strength testing            Deltoid      Biceps      Triceps     Wrist Extension    Wrist Flexion     Interossei         R            5                 5               5                     5                              5                        5                 5  L             5                 5               5                     5                              5                        5                 5              Hip Flexion    Hip Extension    Knee Flexion    Knee Extension    Dorsiflexion    Plantar Flexion  R              5                           5                       5                           5                            5                          5  L              5                           5                        5                           5                            5                          5    Sensation - Light touch/temperature intact throughout    DTR's -             Biceps      Triceps     Brachioradialis      Patellar    Ankle    Toes/plantar response  R             2+             2+                  2+                       2+            2+                 Down  L              2+             2+                 2+                        2+           2+                 Down    Coordination - Finger to Nose intact b/l. No tremors appreciated    Gait and station - unable to perform due to fall risk    Labs:                        9.5    11.52 )-----------( 297      ( 14 Feb 2023 06:50 )             31.0     02-14    141  |  103  |  8   ----------------------------<  113<H>  3.7   |  31  |  0.35<L>    Ca    9.2      14 Feb 2023 06:51  Phos  3.8     02-14  Mg     2.4     02-14      CAPILLARY BLOOD GLUCOSE      POCT Blood Glucose.: 157 mg/dL (14 Feb 2023 11:23)          CSF:                  Radiology:     Neurology - Consult Note    -  Spectra: [phone] ([facility]), [phone] ([facility])  -    Interval hx:   Patient with MG, states respiratory state, swallowing, motor strength have improved since PLEX and admission.   pt transferred from ICU to neurology  floor today. Received 3 sessions of IVIG and PLEX (2/2, 2/4, 2/6, 2/8, 2/10). s/p extubation 2/11. Now on Mestinon 120 mg TID.   Pt states she has some cough and sputum prior. Not comfortable with NG tube. Seen with S + S who placed her on regular diet.       Review of Systems:   CONSTITUTIONAL: No fevers or chills  NECK: No pain or stiffness  RESPIRATORY: No hemoptysis or shortness of breath  NEUROLOGICAL: +As stated in HPI above  SKIN: No itching, burning, rashes, or lesions   All other review of systems is negative unless indicated above.    Allergies:  peanuts (Unknown)      PMHx/PSHx/Family Hx: As above, otherwise see below   Myasthenia gravis    Diabetes mellitus        Social Hx:  No current use of tobacco, alcohol, or illicit drugs    Medications:  MEDICATIONS  (STANDING):  albuterol/ipratropium for Nebulization 3 milliLiter(s) Nebulizer every 6 hours  buDESOnide    Inhalation Suspension 0.25 milliGRAM(s) Inhalation every 12 hours  cefTRIAXone   IVPB 1000 milliGRAM(s) IV Intermittent every 24 hours  chlorhexidine 4% Liquid 1 Application(s) Topical <User Schedule>  enoxaparin Injectable 40 milliGRAM(s) SubCutaneous <User Schedule>  insulin lispro (ADMELOG) corrective regimen sliding scale   SubCutaneous every 6 hours  insulin NPH human recombinant 4 Unit(s) SubCutaneous every 6 hours  nystatin Powder 1 Application(s) Topical two times a day  polyethylene glycol 3350 17 Gram(s) Oral daily  psyllium Powder 1 Packet(s) Oral every 8 hours  pyridostigmine 120 milliGRAM(s) Oral every 8 hours  senna 2 Tablet(s) Oral at bedtime    MEDICATIONS  (PRN):  oxyCODONE    IR 5 milliGRAM(s) Oral every 6 hours PRN Moderate Pain (4 - 6)  oxyCODONE    IR 10 milliGRAM(s) Oral every 4 hours PRN Severe Pain (7 - 10)      Vitals:  T(C): 36.8 (02-14-23 @ 09:34), Max: 37.2 (02-13-23 @ 23:14)  HR: 86 (02-14-23 @ 09:34) (82 - 113)  BP: 150/75 (02-14-23 @ 09:34) (137/84 - 163/66)  RR: 20 (02-14-23 @ 09:34) (18 - 45)  SpO2: 98% (02-14-23 @ 09:34) (95% - 100%)    Physical Examination:  General - NAD  Cardiovascular - Peripheral pulses palpable, no edema  Eyes - Fundoscopy with flat, sharp optic discs and no hemorrhage or exudates; Fundoscopy not well visualized; Fundoscopy not performed due to safety precautions in the setting of the COVID-19 pandemic    Neurologic Exam:  Mental status - Awake, Alert, Oriented to person, place, and time. Speech fluent, repetition and naming intact. Follows simple commands. Attention/concentration, recent and remote memory (including registration and recall), and fund of knowledge intact    Cranial nerves - PERRLA, VFF, EOMI, face sensation (V1-V3) intact b/l, facial strength intact without asymmetry b/l, hearing intact b/l, palate with symmetric elevation,   L eye ptosis.  Diplopia on upward gaze.   Can count to 23.   Neck flexion 4-    Motor - Normal bulk and tone throughout. No pronator drift.  Strength testing            Deltoid      Biceps      Triceps     Wrist Extension    Wrist Flexion     Interossei         R            5                 5               5                     5                              5                        5                 5  L             5                 5               5                     5                              5                        5                 5              Hip Flexion    Hip Extension    Knee Flexion    Knee Extension    Dorsiflexion    Plantar Flexion  R              5                           5                       5                           5                            5                          5  L              5                           5                        5                           5                            5                          5    Sensation - Light touch/temperature intact throughout    DTR's -             Biceps      Triceps     Brachioradialis      Patellar    Ankle    Toes/plantar response  R             2+             2+                  2+                       2+            2+                 Down  L              2+             2+                 2+                        2+           2+                 Down    Coordination - Finger to Nose intact b/l. No tremors appreciated    Gait and station - unable to perform due to fall risk    Labs:                        9.5    11.52 )-----------( 297      ( 14 Feb 2023 06:50 )             31.0     02-14    141  |  103  |  8   ----------------------------<  113<H>  3.7   |  31  |  0.35<L>    Ca    9.2      14 Feb 2023 06:51  Phos  3.8     02-14  Mg     2.4     02-14      CAPILLARY BLOOD GLUCOSE      POCT Blood Glucose.: 157 mg/dL (14 Feb 2023 11:23)          CSF:                  Radiology:

## 2023-02-14 NOTE — PROGRESS NOTE ADULT - SUBJECTIVE AND OBJECTIVE BOX
CC: f/u for positive cultures    Patient reports no new c/o, afebrile, on NC    REVIEW OF SYSTEMS:  All other review of systems negative (Comprehensive ROS)    Antimicrobials Day #  : day 5  cefTRIAXone   IVPB 1000 milliGRAM(s) IV Intermittent every 24 hours    Other Medications Reviewed  MEDICATIONS  (STANDING):  albuterol/ipratropium for Nebulization 3 milliLiter(s) Nebulizer every 6 hours  buDESOnide    Inhalation Suspension 0.25 milliGRAM(s) Inhalation every 12 hours  cefTRIAXone   IVPB 1000 milliGRAM(s) IV Intermittent every 24 hours  chlorhexidine 4% Liquid 1 Application(s) Topical <User Schedule>  enoxaparin Injectable 40 milliGRAM(s) SubCutaneous <User Schedule>  insulin lispro (ADMELOG) corrective regimen sliding scale   SubCutaneous every 6 hours  insulin NPH human recombinant 4 Unit(s) SubCutaneous every 6 hours  nystatin Powder 1 Application(s) Topical two times a day  polyethylene glycol 3350 17 Gram(s) Oral daily  psyllium Powder 1 Packet(s) Oral every 8 hours  pyridostigmine 120 milliGRAM(s) Oral every 8 hours  senna 2 Tablet(s) Oral at bedtime    Vital Signs Last 24 Hrs  T(C): 36.7 (14 Feb 2023 13:00), Max: 37.2 (13 Feb 2023 23:14)  T(F): 98 (14 Feb 2023 13:00), Max: 98.9 (13 Feb 2023 23:14)  HR: 100 (14 Feb 2023 13:00) (82 - 113)  BP: 139/80 (14 Feb 2023 13:00) (137/84 - 163/66)  BP(mean): 108 (13 Feb 2023 23:14) (96 - 108)  RR: 19 (14 Feb 2023 13:00) (19 - 30)  SpO2: 98% (14 Feb 2023 13:00) (98% - 100%)    Parameters below as of 14 Feb 2023 13:00  Patient On (Oxygen Delivery Method): nasal cannula        PHYSICAL EXAM:  General: alert, no acute distress, NGT  Eyes:  anicteric, no conjunctival injection, no discharge  Oropharynx: no lesions or injection 	  Neck: supple, without adenopathy  Lungs: clear to auscultation  Heart: regular rate and rhythm; no murmur, rubs or gallops  Abdomen: soft, nondistended, nontender, without mass or organomegaly  Skin: no lesions  Extremities: no clubbing, cyanosis, or edema  Neurologic: alert, oriented, moves all extremities    LAB RESULTS:                                   9.5    11.52 )-----------( 297      ( 14 Feb 2023 06:50 )             31.0   02-14    141  |  103  |  8   ----------------------------<  113<H>  3.7   |  31  |  0.35<L>    Ca    9.2      14 Feb 2023 06:51  Phos  3.8     02-14  Mg     2.4     02-14              MICROBIOLOGY:  RECENT CULTURES:      RADIOLOGY REVIEWED:  < from: Xray Chest 1 View- PORTABLE-Urgent (Xray Chest 1 View- PORTABLE-Urgent .) (02.14.23 @ 07:31) >  IMPRESSION:  Feeding tube to stomach.        Thank you for the courtesy of this referral.    --- End of Report ---    < end of copied text >    < from: Xray Chest 1 View- PORTABLE-Urgent (Xray Chest 1 View- PORTABLE-Urgent .) (02.12.23 @ 11:08) >  IMPRESSION:  Enteric tube with tip in the stomach.  Left retrocardiac opacity may represent atelectasis versus effusion.    --- End of Report ---    < end of copied text >

## 2023-02-14 NOTE — PROGRESS NOTE ADULT - ASSESSMENT
65 yo female with a long history of Myasthenia Gravis , T2DM,,prior thymectomy who was admitted with respiratory difficulty and weakness.  She is maintained on pyridostigmine and PRN prednisone, this had been added recently for MG flare.  History of prior need for ventilator.  Home exposure to Covid, she tested positive on admission.  Suspect respiratory failure on MG basis.  She is s/p 5 days of RDV-completed 2/4  High dose steroids for MG  S/P IVIG and multiple PLEX sessions  Vent management per NSICU,  Leukocytosis likely steroid mediated, it is moderating  Surveillance blood cultures and sputum cultures sent 2/7  Blood cultures negative, sputum with staph aureus, and urine with proteus.  pyuria noted, nonspecific  Zosyn started by NSICU to cover proteus. I am not sure proteus/staph aureus are more than colonizers  She was converted to CTX 2/11  PLEX # 5 on 2/10  Extubated 2/11, comfortable on nasal oxygen  Suggest:  1. CTX covers both urine and sputum isolates, can also consider cefazolin or even oral cephalexin, day 5/7 of abx  2. PLEX, steroids, and IVIG as per NSICU  3 Monitor WBC and fevers trend 63 yo female with a long history of Myasthenia Gravis , T2DM,,prior thymectomy who was admitted with respiratory difficulty and weakness.  She is maintained on pyridostigmine and PRN prednisone, this had been added recently for MG flare.  History of prior need for ventilator.  Home exposure to Covid, she tested positive on admission.  Suspect respiratory failure on MG basis.  She is s/p 5 days of RDV-completed 2/4  High dose steroids for MG  S/P IVIG and multiple PLEX sessions  Vent management per NSICU,  Leukocytosis likely steroid mediated, it is moderating  Surveillance blood cultures and sputum cultures sent 2/7  Blood cultures negative, sputum with staph aureus, and urine with proteus.  pyuria noted, nonspecific  Zosyn started by NSICU to cover proteus. I am not sure proteus/staph aureus are more than colonizers  She was converted to CTX 2/11  PLEX # 5 on 2/10  Extubated 2/11, comfortable on nasal oxygen  Suggest:  1. CTX covers both urine and sputum isolates, can also consider cefazolin or even oral cephalexin, day 5/7 of abx  2. PLEX, steroids, and IVIG as per NSICU  3 Monitor WBC and fevers trend

## 2023-02-14 NOTE — CONSULT NOTE ADULT - SUBJECTIVE AND OBJECTIVE BOX
Patient is a 62y old  Female who presents with a chief complaint of Covid and MG flare (13 Feb 2023 14:34)    Patient was admitted on 1/30, to NSCU, intubated 2/1, treated with IVIG, PLEX, solumedrol, extubated 2/11, seen today on 4 Marcano, denies pain, weakness.     REVIEW OF SYSTEMS  Constitutional - No fever, No weight loss, No fatigue  HEENT - No eye pain, No visual disturbances, No difficulty hearing, No tinnitus, No vertigo, No neck pain  Respiratory -+ cough, +O2 by NC   Cardiovascular - No chest pain, No palpitations  Gastrointestinal - No abdominal pain, No nausea, No vomiting, No diarrhea, No constipation  Genitourinary - No dysuria, No frequency, No hematuria, No incontinence  Psychiatric - No depression, No anxiety    VITALS  T(C): 36.8 (02-14-23 @ 09:34), Max: 37.2 (02-13-23 @ 23:14)  HR: 86 (02-14-23 @ 09:34) (82 - 113)  BP: 150/75 (02-14-23 @ 09:34) (137/84 - 163/66)  RR: 20 (02-14-23 @ 09:34) (18 - 45)  SpO2: 98% (02-14-23 @ 09:34) (95% - 100%)  Wt(kg): --    PAST MEDICAL & SURGICAL HISTORY  Myasthenia gravis    Diabetes mellitus        SOCIAL HISTORY  Smoking - Denied  EtOH - Denied   Drugs - Denied    FUNCTIONAL HISTORY  Lives with family   Independent AMB and ADLs PTA     CURRENT FUNCTIONAL STATUS  2/14 SLP  mild hypophonia  improvement in swallow function  regular/thin liquids    2/13 PT  bed mobility CG/min assist  transfers min assist  gait min assist with HHA, 5 feet      2/13 OT  bed mobility CG  transfers min assist        FAMILY HISTORY   no pertinent history in first degree relatives     RECENT LABS/IMAGING  CBC Full  -  ( 14 Feb 2023 06:50 )  WBC Count : 11.52 K/uL  RBC Count : 3.33 M/uL  Hemoglobin : 9.5 g/dL  Hematocrit : 31.0 %  Platelet Count - Automated : 297 K/uL  Mean Cell Volume : 93.1 fl  Mean Cell Hemoglobin : 28.5 pg  Mean Cell Hemoglobin Concentration : 30.6 gm/dL  Auto Neutrophil # : 8.16 K/uL  Auto Lymphocyte # : 1.64 K/uL  Auto Monocyte # : 0.86 K/uL  Auto Eosinophil # : 0.78 K/uL  Auto Basophil # : 0.01 K/uL  Auto Neutrophil % : 70.8 %  Auto Lymphocyte % : 14.2 %  Auto Monocyte % : 7.5 %  Auto Eosinophil % : 6.8 %  Auto Basophil % : 0.1 %    02-14    141  |  103  |  8   ----------------------------<  113<H>  3.7   |  31  |  0.35<L>    Ca    9.2      14 Feb 2023 06:51  Phos  3.8     02-14  Mg     2.4     02-14          ALLERGIES  No Known Drug Allergies  peanuts (Unknown)      MEDICATIONS   albuterol/ipratropium for Nebulization 3 milliLiter(s) Nebulizer every 6 hours  buDESOnide    Inhalation Suspension 0.25 milliGRAM(s) Inhalation every 12 hours  cefTRIAXone   IVPB 1000 milliGRAM(s) IV Intermittent every 24 hours  chlorhexidine 4% Liquid 1 Application(s) Topical <User Schedule>  enoxaparin Injectable 40 milliGRAM(s) SubCutaneous <User Schedule>  insulin lispro (ADMELOG) corrective regimen sliding scale   SubCutaneous every 6 hours  insulin NPH human recombinant 4 Unit(s) SubCutaneous every 6 hours  nystatin Powder 1 Application(s) Topical two times a day  oxyCODONE    IR 5 milliGRAM(s) Oral every 6 hours PRN  oxyCODONE    IR 10 milliGRAM(s) Oral every 4 hours PRN  polyethylene glycol 3350 17 Gram(s) Oral daily  psyllium Powder 1 Packet(s) Oral every 8 hours  pyridostigmine 120 milliGRAM(s) Oral every 8 hours  senna 2 Tablet(s) Oral at bedtime      ----------------------------------------------------------------------------------------  PHYSICAL EXAM  Constitutional - NAD, Comfortable, sitting at side of bed   Chest - Breathing comfortably, +O2  Cardiovascular - S1S2   Abdomen - Soft   Extremities - No C/C/E, No calf tenderness   Neurologic Exam -                    Cognitive - Awake, Alert, AAO to self, place, date, year, situation     Communication - Fluent, No dysarthria        Motor - 4/5     Sensory - Intact to LT     Psychiatric - Mood stable, Affect WNL  ----------------------------------------------------------------------------------------  ASSESSMENT/PLAN  62yFemale h/o DM, MG on pyridostigmine and prednisone with functional deficits after Myasthenia Gravis Exacerbation  COVID + on admission, s/p remdesivir, off isolation   s/p PLEX x 5, diet advanced   +sputum/urine cultures, on CTX, plan for 5-7 days, ID following   Pain - Tylenol, oxycodone  DVT PPX - SCDs, lovenox   Rehab - Will continue to follow for ongoing rehab needs and recommendations.    Recommend ACUTE inpatient rehabilitation for the functional deficits consisting of 3 hours of therapy/day & 24 hour RN/daily PMR physician for comorbid medical management. Patient will be able to tolerate 3 hours a day.

## 2023-02-14 NOTE — CHART NOTE - NSCHARTNOTEFT_GEN_A_CORE
KAIDEN HATCH is a 61yo Malayalam-speaking woman w/ Myasthenia Gravis (on pyridostigmine, hospitalization every 1-2 years requiring IVIG) (dx 2015), DMII, s/p thymectomy (2010), T2DM who presents with SOB, cough, difficulty spitting up sputum x 4 days. Also reports difficulty swallowing - but was able to tolerate taking oral pills. Of note, last hospitalization was 2 years ago at [facility] during which patient was intubated -- son notes that this was likely 2/2 patient being forced to lie down flat in hospital bed, leading to aspiration. She was extubated after ~3 days. Received IVIG during that hospitalization. She reportedly discussed PLEX as an option but preferred IVIG and states she typically starts to improve after 3-4 days.Patient was admitted to NSCU, upon arrival to the unit, appeared in acute respiratory distress tachypneic, hypertensive, tachycardic, hypoxic, ABG showing resp acidosis, immediately put on BiPAP, started on IVIG with some improvement.  -1/31 intubated overnight for hypercapnic/hypoxic respiratory failure   -2/2- Still on IVIG, PLEX consented. No issues overnight.  -2/10- Day 5 of PLEX today.  S/p PLEX 5/5.  -2/11- Krystal d/c'ed, extubated    Swallow history: No reports in SCM. Pt denied swallow deficits PTA.    TODAY, patient seen for swallow re-evaluation. She is now on 4Cohen from NSCU. Improvement in vocal quality; mild hypophonia which improved w/ progressive of this intervention. NAD. NC. Communicating well in English and with  services. Of note appreciated NGT coiled in posterior oral cavity w/ pt complaint of "feeling something back there". RN and team are aware. SLP requested to remove NGT given pt continued complaint and negative interaction with PO trials- team in agreement. SLP removed without incident. Offered pt trials of moderate and mildly thick liquids via tsp and straw, thin liquids via cup and straw, puree and regular solids; exhaustive trials provided to ensure tolerance. Pt self administered all trials. Oral phase unremarkable. Pharyngeal phase appearing with timely onset of swallow trigger, no overt s/sx of aspiration throughout entirety of re-evaluation. Purposeful proactive rounding reinforced and 5 Ps addressed. Pt left in no distress.     IMPRESSION: Patient p/w improvement in swallow function and vocal quality as compared to initial evaluation; low threshold for silent aspiration. Pt appropriate for diet advancement this date, pending team agreement.     Recommendations   1. Regular solids / thin liquids   2. Aspiration precautions   3. Stringent oral care to reduce oral pathogens   4. Tray prep as warranted   5. SLP to remain on consult to ensure tolerance, do not anticipate needs at next level of care pending course     Sruthi Cuba MS CCC-SLP Prefer teams   extension [phone]#     D/w pt, RN and Neuro team     GOAL- Pt to tolerate recommended textures during course w/ no s/sx of aspiration KAIDEN HATCH is a 63yo Malayalam-speaking woman w/ Myasthenia Gravis (on pyridostigmine, hospitalization every 1-2 years requiring IVIG) (dx 2015), DMII, s/p thymectomy (2010), T2DM who presents with SOB, cough, difficulty spitting up sputum x 4 days. Also reports difficulty swallowing - but was able to tolerate taking oral pills. Of note, last hospitalization was 2 years ago at [facility] during which patient was intubated -- son notes that this was likely 2/2 patient being forced to lie down flat in hospital bed, leading to aspiration. She was extubated after ~3 days. Received IVIG during that hospitalization. She reportedly discussed PLEX as an option but preferred IVIG and states she typically starts to improve after 3-4 days.Patient was admitted to NSCU, upon arrival to the unit, appeared in acute respiratory distress tachypneic, hypertensive, tachycardic, hypoxic, ABG showing resp acidosis, immediately put on BiPAP, started on IVIG with some improvement.  -1/31 intubated overnight for hypercapnic/hypoxic respiratory failure   -2/2- Still on IVIG, PLEX consented. No issues overnight.  -2/10- Day 5 of PLEX today.  S/p PLEX 5/5.  -2/11- Krystal d/c'ed, extubated    Swallow history: No reports in SCM. Pt denied swallow deficits PTA.    TODAY, patient seen for swallow re-evaluation. She is now on 4Cohen from NSCU. Improvement in vocal quality; mild hypophonia which improved w/ progressive of this intervention. NAD. NC. Communicating well in English and with  services. Of note appreciated NGT coiled in posterior oral cavity w/ pt complaint of "feeling something back there". RN and team are aware. SLP requested to remove NGT given pt continued complaint and negative interaction with PO trials- team in agreement. SLP removed without incident. Offered pt trials of moderate and mildly thick liquids via tsp and straw, thin liquids via cup and straw, puree and regular solids; exhaustive trials provided to ensure tolerance. Pt self administered all trials. Oral phase unremarkable. Pharyngeal phase appearing with timely onset of swallow trigger, no overt s/sx of aspiration throughout entirety of re-evaluation. Purposeful proactive rounding reinforced and 5 Ps addressed. Pt left in no distress.     IMPRESSION: Patient p/w improvement in swallow function and vocal quality as compared to initial evaluation; low threshold for silent aspiration. Pt appropriate for diet advancement this date, pending team agreement.     Recommendations   1. Regular solids / thin liquids   2. Aspiration precautions   3. Stringent oral care to reduce oral pathogens   4. Tray prep as warranted   5. SLP to remain on consult to ensure tolerance, do not anticipate needs at next level of care pending course     Sruthi Cuba MS CCC-SLP Prefer teams   extension [phone]#     D/w pt, RN and Neuro team     GOAL- Pt to tolerate recommended textures during course w/ no s/sx of aspiration KAIDEN HATCH is a 61yo Malayalam-speaking woman w/ Myasthenia Gravis (on pyridostigmine, hospitalization every 1-2 years requiring IVIG) (dx 2015), DMII, s/p thymectomy (2010), T2DM who presents with SOB, cough, difficulty spitting up sputum x 4 days. Also reports difficulty swallowing - but was able to tolerate taking oral pills. Of note, last hospitalization was 2 years ago at [facility] during which patient was intubated -- son notes that this was likely 2/2 patient being forced to lie down flat in hospital bed, leading to aspiration. She was extubated after ~3 days. Received IVIG during that hospitalization. She reportedly discussed PLEX as an option but preferred IVIG and states she typically starts to improve after 3-4 days.Patient was admitted to NSCU, upon arrival to the unit, appeared in acute respiratory distress tachypneic, hypertensive, tachycardic, hypoxic, ABG showing resp acidosis, immediately put on BiPAP, started on IVIG with some improvement.  -1/31 intubated overnight for hypercapnic/hypoxic respiratory failure   -2/2- Still on IVIG, PLEX consented. No issues overnight.  -2/10- Day 5 of PLEX today.  S/p PLEX 5/5.  -2/11- Krystal d/c'ed, extubated    Swallow history: No reports in SCM. Pt denied swallow deficits PTA.    TODAY, patient seen for swallow re-evaluation. She is now on 4Cohen from NSCU. Improvement in vocal quality; mild hypophonia which improved w/ progressive of this intervention. NAD. NC. Communicating well in English and with  services. Of note appreciated NGT coiled in posterior oral cavity w/ pt complaint of "feeling something back there". RN and team are aware. SLP requested to remove NGT given pt continued complaint and negative interaction with PO trials- team in agreement. SLP removed without incident. Offered pt trials of moderate and mildly thick liquids via tsp and straw, thin liquids via cup and straw, puree and regular solids; exhaustive trials provided to ensure tolerance. Pt self administered all trials. Oral phase unremarkable. Pharyngeal phase appearing with timely onset of swallow trigger, no overt s/sx of aspiration throughout entirety of re-evaluation. Purposeful proactive rounding reinforced and 5 Ps addressed. Pt left in no distress.     IMPRESSION: Patient p/w improvement in swallow function and vocal quality as compared to initial evaluation; low threshold for silent aspiration. Pt appropriate for diet advancement this date, pending team agreement.     Recommendations   1. Regular solids / thin liquids   2. Aspiration precautions   3. Stringent oral care to reduce oral pathogens   4. Tray prep as warranted   5. SLP to remain on consult to ensure tolerance, do not anticipate needs at next level of care pending course     Sruthi Cuba MS CCC-SLP Prefer teams   extension [phone]#     D/w pt, RN and Neuro team     GOAL- Pt to tolerate recommended textures during course w/ no s/sx of aspiration

## 2023-02-14 NOTE — PROGRESS NOTE ADULT - ATTENDING COMMENTS
Patient with MG a/w with SOB in setting of Covid infxn.Was intubated and received pulse IVMP, IVG and 5 courses of PLEX. She is now extubated and transferred from NSICU. She is back on mestinon 120mg TID    On exam counts to 23 on 1 breath. Neck Flex 4+  CNs intact  Strength in UE and LE 5/5  No dysmetria  DTRS 2+    Plan  s/s eval today  monitor PFTs  PT  cont mestinon 120mg TID Patient with MG a/w with SOB in setting of Covid infxn. Was intubated and received pulse IVMP, IVG and 5 courses of PLEX Last 2/10, extubated 2/11. She is now extubated and transferred from NSICU. She is back on mestinon 120mg TID    On exam counts to 23 on 1 breath. Neck Flex 4+  CNs intact  Strength in UE and LE 5/5  No dysmetria  DTRS 2+    Plan  s/s eval today  monitor PFTs  PT  cont mestinon 120mg TID  f/u ID rec for ABx d 5/7

## 2023-02-14 NOTE — PROGRESS NOTE ADULT - ASSESSMENT
Impression: Myaesthenia Gravis exacerbation  likely secondary to COVID infection. S/p PLEX x5 sessions. Improving symptoms ophthalmoparesis, b/l UE strength, dysarthria,     Recommendations:  [x] s/p PLEX 5 sessions (2/2, 2/4, 2/6, 2/8, 2/10)  [x] c/w pyridostigmine 120mg tid PO  [ ] Remain off of steroid as pt has completed full cycle of treatment.      [ ] f/u NIF and VC Q4h- 2/14 - (-40/420)  [ ] appreciate ID recs, infectious w/u- on Cefriaxone   [] CXR  [ ] Cardiac monitoring with tele  [ ] S/S for diet recs, currently TF  [ ] Avoid medications like fluoroquinonles, macrolides, amnioglycosides, chloroquines. Consider avoiding anti hypertensives such as beta blockers, CCB   [ ] DVT ppx   []Diet - regular diet  [ ] neuro checks with vitals         Case seen and discussed with neurologist, Dr. Chung.      Impression: Myaesthenia Gravis exacerbation  likely secondary to COVID infection. S/p PLEX x5 sessions. Improving symptoms ophthalmoparesis, b/l UE strength, dysarthria,     Recommendations:  [x] s/p PLEX 5 sessions (2/2, 2/4, 2/6, 2/8, 2/10)  [x] c/w pyridostigmine 120mg tid PO  [ ] Remain off of steroid as pt has completed full cycle of treatment.      [ ] f/u NIF and VC Q4h- 2/14 - (-40/420)  [ ] appreciate ID recs, infectious w/u- on Cefriaxone   [] CXR  [ ] Cardiac monitoring with tele  [ ] S/S for diet recs, currently TF  [ ] Avoid medications like fluoroquinonles, macrolides, amnioglycosides, chloroquines. Consider avoiding anti hypertensives such as beta blockers, CCB   [ ] DVT ppx   []Diet - regular diet  [ ] neuro checks with vitals     []Rehab - Will continue to follow for ongoing rehab needs and recommendations. Recommend ACUTE inpatient rehabilitation for the functional deficits consisting of 3 hours of therapy/day & 24 hour RN/daily PMR physician for comorbid medical management. Patient will be able to tolerate 3 hours a day.      Case seen and discussed with neurologist, Dr. Chung.

## 2023-02-15 ENCOUNTER — TRANSCRIPTION ENCOUNTER (OUTPATIENT)
Age: 63
End: 2023-02-15

## 2023-02-15 LAB
ANION GAP SERPL CALC-SCNC: 9 MMOL/L — SIGNIFICANT CHANGE UP (ref 5–17)
BUN SERPL-MCNC: 16 MG/DL — SIGNIFICANT CHANGE UP (ref 7–23)
CALCIUM SERPL-MCNC: 8.8 MG/DL — SIGNIFICANT CHANGE UP (ref 8.4–10.5)
CHLORIDE SERPL-SCNC: 103 MMOL/L — SIGNIFICANT CHANGE UP (ref 96–108)
CO2 SERPL-SCNC: 27 MMOL/L — SIGNIFICANT CHANGE UP (ref 22–31)
CREAT SERPL-MCNC: 0.46 MG/DL — LOW (ref 0.5–1.3)
EGFR: 107 ML/MIN/1.73M2 — SIGNIFICANT CHANGE UP
GLUCOSE BLDC GLUCOMTR-MCNC: 164 MG/DL — HIGH (ref 70–99)
GLUCOSE BLDC GLUCOMTR-MCNC: 225 MG/DL — HIGH (ref 70–99)
GLUCOSE BLDC GLUCOMTR-MCNC: 227 MG/DL — HIGH (ref 70–99)
GLUCOSE BLDC GLUCOMTR-MCNC: 89 MG/DL — SIGNIFICANT CHANGE UP (ref 70–99)
GLUCOSE SERPL-MCNC: 107 MG/DL — HIGH (ref 70–99)
HCT VFR BLD CALC: 30.8 % — LOW (ref 34.5–45)
HGB BLD-MCNC: 9.6 G/DL — LOW (ref 11.5–15.5)
MCHC RBC-ENTMCNC: 29.2 PG — SIGNIFICANT CHANGE UP (ref 27–34)
MCHC RBC-ENTMCNC: 31.2 GM/DL — LOW (ref 32–36)
MCV RBC AUTO: 93.6 FL — SIGNIFICANT CHANGE UP (ref 80–100)
NRBC # BLD: 0 /100 WBCS — SIGNIFICANT CHANGE UP (ref 0–0)
PLATELET # BLD AUTO: 325 K/UL — SIGNIFICANT CHANGE UP (ref 150–400)
POTASSIUM SERPL-MCNC: 3.7 MMOL/L — SIGNIFICANT CHANGE UP (ref 3.5–5.3)
POTASSIUM SERPL-SCNC: 3.7 MMOL/L — SIGNIFICANT CHANGE UP (ref 3.5–5.3)
RBC # BLD: 3.29 M/UL — LOW (ref 3.8–5.2)
RBC # FLD: 14.5 % — SIGNIFICANT CHANGE UP (ref 10.3–14.5)
SARS-COV-2 RNA SPEC QL NAA+PROBE: DETECTED
SODIUM SERPL-SCNC: 139 MMOL/L — SIGNIFICANT CHANGE UP (ref 135–145)
WBC # BLD: 11.71 K/UL — HIGH (ref 3.8–10.5)
WBC # FLD AUTO: 11.71 K/UL — HIGH (ref 3.8–10.5)

## 2023-02-15 PROCEDURE — 99232 SBSQ HOSP IP/OBS MODERATE 35: CPT

## 2023-02-15 RX ADMIN — Medication 0.25 MILLIGRAM(S): at 05:21

## 2023-02-15 RX ADMIN — Medication 0.25 MILLIGRAM(S): at 17:19

## 2023-02-15 RX ADMIN — Medication 2: at 07:58

## 2023-02-15 RX ADMIN — NYSTATIN CREAM 1 APPLICATION(S): 100000 CREAM TOPICAL at 18:22

## 2023-02-15 RX ADMIN — CEFTRIAXONE 100 MILLIGRAM(S): 500 INJECTION, POWDER, FOR SOLUTION INTRAMUSCULAR; INTRAVENOUS at 23:04

## 2023-02-15 RX ADMIN — Medication 3 MILLILITER(S): at 23:05

## 2023-02-15 RX ADMIN — PYRIDOSTIGMINE BROMIDE 120 MILLIGRAM(S): 60 SOLUTION ORAL at 17:06

## 2023-02-15 RX ADMIN — PYRIDOSTIGMINE BROMIDE 120 MILLIGRAM(S): 60 SOLUTION ORAL at 23:05

## 2023-02-15 RX ADMIN — Medication 3 MILLILITER(S): at 11:52

## 2023-02-15 RX ADMIN — ENOXAPARIN SODIUM 40 MILLIGRAM(S): 100 INJECTION SUBCUTANEOUS at 17:17

## 2023-02-15 RX ADMIN — Medication 1 PACKET(S): at 17:06

## 2023-02-15 RX ADMIN — Medication 3 MILLILITER(S): at 18:23

## 2023-02-15 RX ADMIN — PYRIDOSTIGMINE BROMIDE 120 MILLIGRAM(S): 60 SOLUTION ORAL at 07:58

## 2023-02-15 RX ADMIN — CHLORHEXIDINE GLUCONATE 1 APPLICATION(S): 213 SOLUTION TOPICAL at 05:21

## 2023-02-15 RX ADMIN — NYSTATIN CREAM 1 APPLICATION(S): 100000 CREAM TOPICAL at 05:20

## 2023-02-15 RX ADMIN — Medication 1 PACKET(S): at 05:21

## 2023-02-15 RX ADMIN — POLYETHYLENE GLYCOL 3350 17 GRAM(S): 17 POWDER, FOR SOLUTION ORAL at 17:19

## 2023-02-15 RX ADMIN — Medication 4: at 17:14

## 2023-02-15 RX ADMIN — Medication 3 MILLILITER(S): at 05:20

## 2023-02-15 NOTE — DISCHARGE NOTE PROVIDER - CARE PROVIDER_API CALL
Kee Escudero)  Electrodiagnostic Medicine; Internal Medicine; Neurology  [address] [facility] [address]  [place], [place] [id number]  Phone: [phone]  Fax: [phone]  Follow Up Time: 2 weeks   Kee Escudero)  Electrodiagnostic Medicine; Internal Medicine; Neurology  [address] [facility] [address]  [place], [place] [id number]  Phone: [phone]  Fax: [phone]  Follow Up Time: 2 weeks   Kee Escudero)  Electrodiagnostic Medicine; Internal Medicine; Neurology  [address] [facility] [address]  [place], [place] [id number]  Phone: [phone]  Fax: [phone]  Follow Up Time: 2 weeks   Kiet Sky (DO)  Cardiology; Internal Medicine  [address] [place] [address], [address]  [place] [address]  Phone: [phone]  Fax: [phone]  Follow Up Time: 2 weeks   Kiet Sky (DO)  Cardiology; Internal Medicine  [address] [place] [address], [address]  [place] [address]  Phone: [phone]  Fax: [phone]  Follow Up Time: 2 weeks   Kiet Sky (DO)  Cardiology; Internal Medicine  [address] [place] [address], [address]  [place] [address]  Phone: [phone]  Fax: [phone]  Follow Up Time: 2 weeks

## 2023-02-15 NOTE — DISCHARGE NOTE PROVIDER - HOSPITAL COURSE
Patient KAIDEN HATCH is a 62y (1960) woman with a PMHx significant for myasthenia gravis who presents to the ED with SOB for 3 days after testing positive for COVID-19 at home. Patient speaks Malayalam and translation was provided by family member at bedside at patient's request. Patient reportedly follows with a neurologist at [facility]. She is currently on prednisone 10mg PRN and pyridostigmine 120mg TID. She has had prior hospitalizations for myasthenia exacerbations every 1-2 years, including intubation, with last hospitalization 1.5 years ago. Each time she had an exacerbation she was treated with IVIG. She reportedly discussed PLEX as an option but preferred IVIG and states she typically starts to improve after 3-4 days. Currently patient reports to have weakness and worsening of SOB when laying flat. Additionally family reports ptosis can vary in laterality.    HOSPITAL COURSE:   Patient KAIDEN HATCH is a 62y (1960) woman with a PMHx significant for myasthenia gravis who presents to the ED with SOB for 3 days after testing positive for COVID-19 at home. Patient speaks Malayalam and translation was provided by family member at bedside at patient's request. Patient reportedly follows with a neurologist at [facility]. She is currently on prednisone 10mg PRN and pyridostigmine 120mg TID. She has had prior hospitalizations for myasthenia exacerbations every 1-2 years, including intubation, with last hospitalization 1.5 years ago. Each time she had an exacerbation she was treated with IVIG. She reportedly discussed PLEX as an option but preferred IVIG and states she typically starts to improve after 3-4 days. Currently patient reports to have weakness and worsening of SOB when laying flat. Additionally family reports ptosis can vary in laterality.    HOSPITAL COURSE:   Patient KAIDEN HATCH is a 62y (1960) woman with a PMHx significant for myasthenia gravis who presents to the ED with SOB for 3 days after testing positive for COVID-19 at home. Patient speaks Malayalam and translation was provided by family member at bedside at patient's request. Patient reportedly follows with a neurologist at [facility]. She is currently on prednisone 10mg PRN and pyridostigmine 120mg TID. She has had prior hospitalizations for myasthenia exacerbations every 1-2 years, including intubation, with last hospitalization 1.5 years ago. Each time she had an exacerbation she was treated with IVIG. She reportedly discussed PLEX as an option but preferred IVIG and states she typically starts to improve after 3-4 days. Currently patient reports to have weakness and worsening of SOB when laying flat. Additionally family reports ptosis can vary in laterality.    HOSPITAL COURSE:   Patient KAIDEN HATCH is a 62y (1960) woman with a PMHx significant for myasthenia gravis who presents to the ED with SOB for 3 days after testing positive for COVID-19 at home. Patient speaks Malayalam and translation was provided by family member at bedside at patient's request. Patient reportedly follows with a neurologist at [facility]. She is currently on prednisone 10mg PRN and pyridostigmine 120mg TID. She has had prior hospitalizations for myasthenia exacerbations every 1-2 years, including intubation, with last hospitalization 1.5 years ago. Each time she had an exacerbation she was treated with IVIG. She reportedly discussed PLEX as an option but preferred IVIG and states she typically starts to improve after 3-4 days. Currently patient reports to have weakness and worsening of SOB when laying flat. Additionally family reports ptosis can vary in laterality.    HOSPITAL COURSE:  Pt was admitted to the neurology service due to worsening of her myasthenia gravis due to COVID infection. NIF and VC was monitored daily and pt remained stable for the first few days of hospitalization requiring minimum oxygen for support. However, the NIF and VC started to worsen and pt was started on prednisone 40mg QD and IVIG x5 days on 2/16/23. Pt continued to improve on this regimen. PT evaluated the pt and recommended acute rehab. Pt stable for discharge. Patient KAIDEN HATCH is a 62y (1960) woman with a PMHx significant for myasthenia gravis who presents to the ED with SOB for 3 days after testing positive for COVID-19 at home. Patient speaks Malayalam and translation was provided by family member at bedside at patient's request. Patient reportedly follows with a neurologist at [facility]. She is currently on prednisone 10mg PRN and pyridostigmine 120mg TID. She has had prior hospitalizations for myasthenia exacerbations every 1-2 years, including intubation, with last hospitalization 1.5 years ago. Each time she had an exacerbation she was treated with IVIG. She reportedly discussed PLEX as an option but preferred IVIG and states she typically starts to improve after 3-4 days. Currently patient reports to have weakness and worsening of SOB when laying flat. Additionally family reports ptosis can vary in laterality.    HOSPITAL COURSE:  Pt was admitted to the neurology service due to worsening of her myasthenia gravis due to COVID infection. NIF and VC was monitored daily and pt remained stable for the first few days of hospitalization requiring minimum oxygen for support. However, the NIF and VC started to worsen and pt was started on prednisone 40mg QD and IVIG x5 days on 2/16/23. Pt continued to improve on this regimen. PT evaluated the pt and recommended acute rehab. Pt stable for discharge. Patient KAIDEN HATCH is a 62y (1960) woman with a PMHx significant for myasthenia gravis who presents to the ED with SOB for 3 days after testing positive for COVID-19 at home. Patient speaks Malayalam and translation was provided by family member at bedside at patient's request. Patient reportedly follows with a neurologist at [facility]. She is currently on prednisone 10mg PRN and pyridostigmine 120mg TID. She has had prior hospitalizations for myasthenia exacerbations every 1-2 years, including intubation, with last hospitalization 1.5 years ago. Each time she had an exacerbation she was treated with IVIG. She reportedly discussed PLEX as an option but preferred IVIG and states she typically starts to improve after 3-4 days. Currently patient reports to have weakness and worsening of SOB when laying flat. Additionally family reports ptosis can vary in laterality.    HOSPITAL COURSE:  Pt was admitted to the neurology service due to worsening of her myasthenia gravis due to COVID infection. NIF and VC was monitored daily and pt remained stable for the first few days of hospitalization requiring minimum oxygen for support. However, the NIF and VC started to worsen and pt was started on prednisone 40mg QD and IVIG x5 days on 2/16/23. Pt continued to improve on this regimen. PT evaluated the pt and recommended acute rehab. Pt stable for discharge. Patient KAIDEN HATCH is a 62y (1960) woman with a PMHx significant for myasthenia gravis who presents to the ED with SOB for 3 days after testing positive for COVID-19 at home. Patient speaks Malayalam and translation was provided by family member at bedside at patient's request. Patient reportedly follows with a neurologist at [facility]. She is currently on prednisone 10mg PRN and pyridostigmine 120mg TID. She has had prior hospitalizations for myasthenia exacerbations every 1-2 years, including intubation, with last hospitalization 1.5 years ago. Each time she had an exacerbation she was treated with IVIG. She reportedly discussed PLEX as an option but preferred IVIG and states she typically starts to improve after 3-4 days. Currently patient reports to have weakness and worsening of SOB when laying flat. Additionally family reports ptosis can vary in laterality.    HOSPITAL COURSE:  Pt was admitted to the neurology service due to worsening of her myasthenia gravis due to COVID infection. NIF and VC was monitored daily and pt remained stable for the first few days of hospitalization requiring minimum oxygen for support. However, the NIF and VC started to worsen and pt was started on prednisone 40mg QD and IVIG x5 days on 2/16/23. Pt continued to improve on this regimen which was completed on 2/20/23. During the hospitalization, pt was found to have low potassium which was repleted and remained stable. PT evaluated the pt and recommended acute rehab, however, pt declined and decided to go home. Extensive discussion was held regarding risks and benefits of declining PT. Family has full understanding. Pt stable for discharge. Patient KAIDEN HATCH is a 62y (1960) woman with a PMHx significant for myasthenia gravis who presents to the ED with SOB for 3 days after testing positive for COVID-19 at home. Patient speaks Malayalam and translation was provided by family member at bedside at patient's request. Patient reportedly follows with a neurologist at [facility]. She is currently on prednisone 10mg PRN and pyridostigmine 120mg TID. She has had prior hospitalizations for myasthenia exacerbations every 1-2 years, including intubation, with last hospitalization 1.5 years ago. Each time she had an exacerbation she was treated with IVIG. She reportedly discussed PLEX as an option but preferred IVIG and states she typically starts to improve after 3-4 days. Currently patient reports to have weakness and worsening of SOB when laying flat. Additionally family reports ptosis can vary in laterality.    HOSPITAL COURSE:  Pt was admitted to the neurology service due to worsening of her myasthenia gravis due to COVID infection. NIF and VC was monitored daily and pt remained stable for the first few days of hospitalization requiring minimum oxygen for support. However, the NIF and VC started to worsen and pt was started on prednisone 40mg QD and IVIG x5 days on 2/16/23. Pt continued to improve on this regimen which was completed on 2/20/23. During the hospitalization, pt was found to have low potassium which was repleted and remained stable. PT evaluated the pt and recommended acute rehab, however, pt declined and decided to go home. Extensive discussion was held regarding risks and benefits of declining PT. Family has full understanding. Pt stable for discharge. Patient KAIDEN HATCH is a 62y (1960) woman with a PMHx significant for myasthenia gravis who presents to the ED with SOB for 3 days after testing positive for COVID-19 at home. Patient speaks Malayalam and translation was provided by family member at bedside at patient's request. Patient reportedly follows with a neurologist at [facility]. She is currently on prednisone 10mg PRN and pyridostigmine 120mg TID. She has had prior hospitalizations for myasthenia exacerbations every 1-2 years, including intubation, with last hospitalization 1.5 years ago. Each time she had an exacerbation she was treated with IVIG. She reportedly discussed PLEX as an option but preferred IVIG and states she typically starts to improve after 3-4 days. Currently patient reports to have weakness and worsening of SOB when laying flat. Additionally family reports ptosis can vary in laterality.    HOSPITAL COURSE:  Pt was admitted to the neurology service due to worsening of her myasthenia gravis due to COVID infection. NIF and VC was monitored daily and pt remained stable for the first few days of hospitalization requiring minimum oxygen for support. However, the NIF and VC started to worsen and pt was started on prednisone 40mg QD and IVIG x5 days on 2/16/23. Pt continued to improve on this regimen which was completed on 2/20/23. During the hospitalization, pt was found to have low potassium which was repleted and remained stable. PT evaluated the pt and recommended acute rehab, however, pt declined and decided to go home. Extensive discussion was held regarding risks and benefits of declining PT. Family has full understanding. Pt stable for discharge.

## 2023-02-15 NOTE — PROGRESS NOTE ADULT - ATTENDING COMMENTS
Patient is tolerating regular diet. Respiratory status is stable. Has mild residual left ptosis with diplopia on far left gaze.  Last day of ABx is tomorrow.   cont mestinon regimen  Acute rehab pending

## 2023-02-15 NOTE — DISCHARGE NOTE NURSING/CASE MANAGEMENT/SOCIAL WORK - CASE MANAGER'S NAME
RICHIE CASH RN [facility]  [phone] RICHIE CASH RN [facility]  [phone] RICHIE CASH RN [facility]  [phone]

## 2023-02-15 NOTE — PROGRESS NOTE ADULT - ASSESSMENT
Impression: Myaesthenia Gravis exacerbation  likely secondary to COVID infection. S/p PLEX x5 sessions. Improving symptoms ophthalmoparesis, b/l UE strength, dysarthria,     Recommendations:  [x] s/p PLEX 5 sessions (2/2, 2/4, 2/6, 2/8, 2/10)  [x] c/w pyridostigmine 120mg tid PO  [ ] Remain off of steroid as pt has completed full cycle of treatment.      [ ] f/u NIF and VC Q4h- 2/14 - (-40/420)  [ ] appreciate ID recs, infectious w/u- on Cefriaxone   [] CXR  [ ] Cardiac monitoring with tele  [ ] S/S for diet recs, currently TF  [ ] Avoid medications like fluoroquinonles, macrolides, amnioglycosides, chloroquines. Consider avoiding anti hypertensives such as beta blockers, CCB   [ ] DVT ppx   []Diet - regular diet  [ ] neuro checks with vitals     []Rehab - Will continue to follow for ongoing rehab needs and recommendations. Recommend ACUTE inpatient rehabilitation for the functional deficits consisting of 3 hours of therapy/day & 24 hour RN/daily PMR physician for comorbid medical management. Patient will be able to tolerate 3 hours a day.      Case seen and discussed with neurologist, Dr. Chung.      Impression: Myaesthenia Gravis exacerbation  likely secondary to COVID infection. S/p PLEX x5 sessions. Improving symptoms ophthalmoparesis, b/l UE strength, dysarthria,     Recommendations:  [x] s/p PLEX 5 sessions (2/2, 2/4, 2/6, 2/8, 2/10)  [x] c/w pyridostigmine 120mg tid PO  [x] Remain off of steroid as pt has completed full cycle of treatment.      [ ] f/u NIF and VC Q4h- 2/14 - (-40/650; 2/15)  [ ] appreciate ID recs, infectious w/u- on Ceftriaxone (ends on 2/16)  [ ] CXR  [ ] Cardiac monitoring with tele  [ ] S/S for diet recs, currently TF  [ ] Avoid medications like fluoroquinonles, macrolides, amnioglycosides, chloroquines. Consider avoiding anti hypertensives such as beta blockers, CCB   [ ] DVT ppx   []Diet - regular diet  [ ] neuro checks with vitals     []Rehab - Will continue to follow for ongoing rehab needs and recommendations. Recommend ACUTE inpatient rehabilitation for the functional deficits consisting of 3 hours of therapy/day & 24 hour RN/daily PMR physician for comorbid medical management. Patient will be able to tolerate 3 hours a day.      Case seen and discussed with neurologist, Dr. Chung.      62F PMHx significant for myasthenia gravis who presents to the ED with SOB for 3 days after testing positive for COVID at home. NIF/VC -20/0.68L in ED. Pt placed on oxygen 2L NC. Pt now s/p PLEX x5 sessions and currently on pyridostigmine. Pt continues to improve clinically. CBC showing WBC 11.71, Hgb 9.5. CMP WNL.     IMPRESSION: Myasthenia gravis exacerbation likely secondary to COVID infection.    PLAN:  #Myasthenia Gravis exacerbation i/s/o COVID-19  -f/u NIF and VC Q6h => 2/14 (-40/650; 2/15)  -s/p PLEX 5 sessions (2/2, 2/4, 2/6, 2/8, 2/10)  -c/w pyridostigmine 120mg tid PO  -No indication for steroid as pt has completed full cycle of treatment.        #Urine/Sputum cx positive  -appreciate ID recs     =c/w Ceftriaxone x7days (ends on 2/16)    #Preventive measures  -Cardiac monitoring with tele  -DVT ppx: Lovenox 40mg QD  -Diet: Regular  -Dispo: Acute rehab  -Avoid medications like fluoroquinolones macrolides, aminoglycosides chloroquine.   -Consider avoiding anti hypertensives such as beta blockers, CCB.

## 2023-02-15 NOTE — PROGRESS NOTE ADULT - SUBJECTIVE AND OBJECTIVE BOX
Neurology Progress Note    SUBJECTIVE/OBJECTIVE/INTERVAL EVENTS: Patient seen and examined at bedside w/ neuro attending and team.     INTERVAL HISTORY:    REVIEW OF SYSTEMS: Otherwise denies fever, chills, headaches, vision changes, blurry vision, double vision, nausea, vomiting, hearing change, focal weakness, focal numbness, parasthesias, bowel/ bladder incontinence.  Few questions of a 10-system ROS was performed and is negative except for those items noted above and/or in the HPI.    VITALS & EXAMINATION:  Vital Signs Last 24 Hrs  T(C): 36.6 (15 Feb 2023 04:56), Max: 36.9 (14 Feb 2023 21:33)  T(F): 97.9 (15 Feb 2023 04:56), Max: 98.4 (14 Feb 2023 21:33)  HR: 87 (15 Feb 2023 04:56) (86 - 121)  BP: 132/75 (15 Feb 2023 04:56) (110/70 - 150/75)  BP(mean): --  RR: 18 (15 Feb 2023 04:56) (18 - 20)  SpO2: 98% (15 Feb 2023 04:56) (95% - 99%)    Parameters below as of 15 Feb 2023 04:56  Patient On (Oxygen Delivery Method): nasal cannula        General:  Constitutional: Female, appears stated age   Head: Normocephalic; Eyes: clear sclera;   Extremities: No cyanosis; Skin: No nani edema of LE  Resp: breathing comfortably     Neurological (>12):  MS: Awake, alert.  Follows commands. Attends to examiner  Language: Speech is hypophonic, clear, fluent, good repetition,  comprehension, registration of words.  CNs: PERRL (R 3mm, L 3mm). VFF. EOMI. V1-3 intact LT, No facial asymmetry b/l. Hearing grossly normal b/l. Tongue midline.     Motor - Normal bulk and tone throughout. No pronator drift.    L/R         Deltoid  5/5    Biceps   5/5      Triceps  5/5         Wrist Extension 5/5   Wrist Flexion  5/5      5/5   L/R         Hip Flexion  5/5    Hip Extension  5/5  Knee Extension  5/5  Dorsiflexion  5/5      Plantar Flexion 5/5     Sensation: Intact to LT b/l. Cortical: Extinction on DSS (neglect): none  Reflexes L/R:  Biceps(C5) 2/2  BR(C6) 2/2   Triceps(C7)  2/2 Patellar(L4)   2/2   Toes: mute  Coordination: No dysmetria to FTN b/l UE  Gait: Normal Romberg. No postural instability. Normal stance.    LABORATORY:  CBC                       9.6    11.71 )-----------( 325      ( 15 Feb 2023 06:04 )             30.8     Chem 02-15    139  |  103  |  16  ----------------------------<  107<H>  3.7   |  27  |  0.46<L>    Ca    8.8      15 Feb 2023 06:03  Phos  3.8     02-14  Mg     2.4     02-14      LFTs   Coagulopathy   Lipid Panel   A1c   Cardiac enzymes     U/A   CSF  Immunological  Other    STUDIES & IMAGING: (EEG, CT, MR, U/S, TTE/AUGUSTINA): Neurology Progress Note    SUBJECTIVE/OBJECTIVE/INTERVAL EVENTS: Patient seen and examined at bedside w/ neuro attending and team.     INTERVAL HISTORY:    REVIEW OF SYSTEMS: Otherwise denies fever, chills, headaches, vision changes, blurry vision, double vision, nausea, vomiting, hearing change, focal weakness, focal numbness, parasthesias, bowel/ bladder incontinence.  Few questions of a 10-system ROS was performed and is negative except for those items noted above and/or in the HPI.    VITALS & EXAMINATION:  Vital Signs Last 24 Hrs  T(C): 36.6 (15 Feb 2023 04:56), Max: 36.9 (14 Feb 2023 21:33)  T(F): 97.9 (15 Feb 2023 04:56), Max: 98.4 (14 Feb 2023 21:33)  HR: 87 (15 Feb 2023 04:56) (86 - 121)  BP: 132/75 (15 Feb 2023 04:56) (110/70 - 150/75)  BP(mean): --  RR: 18 (15 Feb 2023 04:56) (18 - 20)  SpO2: 98% (15 Feb 2023 04:56) (95% - 99%)    Parameters below as of 15 Feb 2023 04:56  Patient On (Oxygen Delivery Method): nasal cannula    Physical Examination:  General - NAD  Cardiovascular - Peripheral pulses palpable, no edema  Eyes - Fundoscopy with flat, sharp optic discs and no hemorrhage or exudates; Fundoscopy not well visualized; Fundoscopy not performed due to safety precautions in the setting of the COVID-19 pandemic    Neurologic Exam:  Mental status - Awake, Alert, Oriented to person, place, and time. Speech fluent, repetition and naming intact. Follows simple commands. Attention/concentration, recent and remote memory (including registration and recall), and fund of knowledge intact    Cranial nerves - PERRLA, VFF, EOMI, face sensation (V1-V3) intact b/l, facial strength intact without asymmetry b/l, hearing intact b/l, palate with symmetric elevation,   L eye ptosis.  Diplopia on upward gaze.   Can count to 23.   Neck flexion 4-    Motor - Normal bulk and tone throughout. No pronator drift.  Strength testing            Deltoid      Biceps      Triceps     Wrist Extension    Wrist Flexion     Interossei         R            5                 5               5                     5                              5                        5                 5  L             5                 5               5                     5                              5                        5                 5              Hip Flexion    Hip Extension    Knee Flexion    Knee Extension    Dorsiflexion    Plantar Flexion  R              5                           5                       5                           5                            5                          5  L              5                           5                        5                           5                            5                          5    Sensation - Light touch/temperature intact throughout    DTR's -             Biceps      Triceps     Brachioradialis      Patellar    Ankle    Toes/plantar response  R             2+             2+                  2+                       2+            2+                 Down  L              2+             2+                 2+                        2+           2+                 Down    Coordination - Finger to Nose intact b/l. No tremors appreciated    Gait and station - unable to perform due to fall risk      LABORATORY:  CBC                       9.6    11.71 )-----------( 325      ( 15 Feb 2023 06:04 )             30.8     Chem 02-15    139  |  103  |  16  ----------------------------<  107<H>  3.7   |  27  |  0.46<L>    Ca    8.8      15 Feb 2023 06:03  Phos  3.8     02-14  Mg     2.4     02-14      LFTs   Coagulopathy   Lipid Panel   A1c   Cardiac enzymes     U/A   CSF  Immunological  Other    STUDIES & IMAGING: (EEG, CT, MR, U/S, TTE/AUGUSTINA):   Neurology Progress Note    SUBJECTIVE/OBJECTIVE/INTERVAL EVENTS: Patient seen and examined at bedside w/ neuro attending and team.     INTERVAL HISTORY:    REVIEW OF SYSTEMS: Otherwise denies fever, chills, headaches, vision changes, blurry vision, double vision, nausea, vomiting, hearing change, focal weakness, focal numbness, parasthesias, bowel/ bladder incontinence.  Few questions of a 10-system ROS was performed and is negative except for those items noted above and/or in the HPI.    VITALS & EXAMINATION:  Vital Signs Last 24 Hrs  T(C): 36.6 (15 Feb 2023 04:56), Max: 36.9 (14 Feb 2023 21:33)  T(F): 97.9 (15 Feb 2023 04:56), Max: 98.4 (14 Feb 2023 21:33)  HR: 87 (15 Feb 2023 04:56) (86 - 121)  BP: 132/75 (15 Feb 2023 04:56) (110/70 - 150/75)  BP(mean): --  RR: 18 (15 Feb 2023 04:56) (18 - 20)  SpO2: 98% (15 Feb 2023 04:56) (95% - 99%)    Parameters below as of 15 Feb 2023 04:56  Patient On (Oxygen Delivery Method): nasal cannula    Physical Examination:  General - NAD  Cardiovascular - Peripheral pulses palpable, no edema  Eyes - Fundoscopy with flat, sharp optic discs and no hemorrhage or exudates; Fundoscopy not well visualized; Fundoscopy not performed due to safety precautions in the setting of the COVID-19 pandemic    Neurologic Exam:  Mental status - Awake, Alert, Oriented to person, place, and time. Speech fluent, repetition and naming intact. Follows simple commands. Attention/concentration, recent and remote memory (including registration and recall), and fund of knowledge intact    Cranial nerves - PERRLA, VFF, EOMI, face sensation (V1-V3) intact b/l, facial strength intact without asymmetry b/l, hearing intact b/l, palate with symmetric elevation,   L eye ptosis.  Diplopia on upward gaze.   Can count to 23.   Neck flexion 4-    Motor - Normal bulk and tone throughout. No pronator drift.  Strength testing            Deltoid      Biceps      Triceps     Wrist Extension    Wrist Flexion     Interossei         R            5                 5               5                     5                              5                        5                 5  L             5                 5               5                     5                              5                        5                 5              Hip Flexion    Hip Extension    Knee Flexion    Knee Extension    Dorsiflexion    Plantar Flexion  R              5                           5                       5                           5                            5                          5  L              5                           5                        5                           5                            5                          5    Sensation - Light touch/temperature intact throughout    DTR's -             Biceps      Triceps     Brachioradialis      Patellar    Ankle    Toes/plantar response  R             2+             2+                  2+                       2+            2+                 Down  L              2+             2+                 2+                        2+           2+                 Down    Coordination - Finger to Nose intact b/l. No tremors appreciated    Gait and station - unable to perform due to fall risk      LABORATORY:  CBC                       9.6    11.71 )-----------( 325      ( 15 Feb 2023 06:04 )             30.8     Chem 02-15    139  |  103  |  16  ----------------------------<  107<H>  3.7   |  27  |  0.46<L>    Ca    8.8      15 Feb 2023 06:03  Phos  3.8     02-14  Mg     2.4     02-14      LFTs   Coagulopathy   Lipid Panel   A1c   Cardiac enzymes     U/A   CSF  Immunological  Other    STUDIES & IMAGING: (EEG, CT, MR, U/S, TTE/AUGUSITNA):   Neurology Progress Note    SUBJECTIVE/OBJECTIVE/INTERVAL EVENTS: Patient seen and examined at bedside w/ neuro attending and team.     INTERVAL HISTORY:    REVIEW OF SYSTEMS: Otherwise denies fever, chills, headaches, vision changes, blurry vision, double vision, nausea, vomiting, hearing change, focal weakness, focal numbness, parasthesias, bowel/ bladder incontinence.  Few questions of a 10-system ROS was performed and is negative except for those items noted above and/or in the HPI.    VITALS & EXAMINATION:  Vital Signs Last 24 Hrs  T(C): 36.6 (15 Feb 2023 04:56), Max: 36.9 (14 Feb 2023 21:33)  T(F): 97.9 (15 Feb 2023 04:56), Max: 98.4 (14 Feb 2023 21:33)  HR: 87 (15 Feb 2023 04:56) (86 - 121)  BP: 132/75 (15 Feb 2023 04:56) (110/70 - 150/75)  BP(mean): --  RR: 18 (15 Feb 2023 04:56) (18 - 20)  SpO2: 98% (15 Feb 2023 04:56) (95% - 99%)    Parameters below as of 15 Feb 2023 04:56  Patient On (Oxygen Delivery Method): nasal cannula    Physical Examination:  General - NAD  Cardiovascular - Peripheral pulses palpable, no edema  Eyes - Fundoscopy with flat, sharp optic discs and no hemorrhage or exudates; Fundoscopy not well visualized; Fundoscopy not performed due to safety precautions in the setting of the COVID-19 pandemic    Neurologic Exam:  Mental status - Awake, Alert, Oriented to person, place, and time. Speech fluent, repetition and naming intact. Follows simple commands. Attention/concentration, recent and remote memory (including registration and recall), and fund of knowledge intact    Cranial nerves - PERRLA, VFF, EOMI, face sensation (V1-V3) intact b/l, facial strength intact without asymmetry b/l, hearing intact b/l, palate with symmetric elevation,   L eye ptosis.  Diplopia on upward gaze.   Can count to 23.   Neck flexion 5-    Motor - Normal bulk and tone throughout. No pronator drift.  Strength testing            Deltoid      Biceps      Triceps     Wrist Extension    Wrist Flexion     Interossei         R            5                 5               5                     5                              5                        5                 5  L             5                 5               5                     5                              5                        5                 5              Hip Flexion    Hip Extension    Knee Flexion    Knee Extension    Dorsiflexion    Plantar Flexion  R              5                           5                       5                           5                            5                          5  L              5                           5                        5                           5                            5                          5    Sensation - Light touch/temperature intact throughout    DTR's -             Biceps      Triceps     Brachioradialis      Patellar    Ankle    Toes/plantar response  R             2+             2+                  2+                       2+            2+                 Down  L              2+             2+                 2+                        2+           2+                 Down    Coordination - Finger to Nose intact b/l. No tremors appreciated    Gait and station - unable to perform due to fall risk      LABORATORY:  CBC                       9.6    11.71 )-----------( 325      ( 15 Feb 2023 06:04 )             30.8     Chem 02-15    139  |  103  |  16  ----------------------------<  107<H>  3.7   |  27  |  0.46<L>    Ca    8.8      15 Feb 2023 06:03  Phos  3.8     02-14  Mg     2.4     02-14      LFTs   Coagulopathy   Lipid Panel   A1c   Cardiac enzymes     U/A   CSF  Immunological  Other    STUDIES & IMAGING: (EEG, CT, MR, U/S, TTE/AUGUSTINA):   Neurology Progress Note    SUBJECTIVE/OBJECTIVE/INTERVAL EVENTS: Patient seen and examined at bedside w/ neuro attending and team.     INTERVAL HISTORY: NAEO. Pt stable this morning w/o any complaints.     REVIEW OF SYSTEMS: Otherwise denies fever, chills, headaches, vision changes, blurry vision, double vision, nausea, vomiting, hearing change, focal weakness, focal numbness.  Few questions of a 10-system ROS was performed and is negative except for those items noted above and/or in the HPI.    VITALS & EXAMINATION:  Vital Signs Last 24 Hrs  T(C): 36.6 (15 Feb 2023 04:56), Max: 36.9 (14 Feb 2023 21:33)  T(F): 97.9 (15 Feb 2023 04:56), Max: 98.4 (14 Feb 2023 21:33)  HR: 87 (15 Feb 2023 04:56) (86 - 121)  BP: 132/75 (15 Feb 2023 04:56) (110/70 - 150/75)  BP(mean): --  RR: 18 (15 Feb 2023 04:56) (18 - 20)  SpO2: 98% (15 Feb 2023 04:56) (95% - 99%)    Parameters below as of 15 Feb 2023 04:56  Patient On (Oxygen Delivery Method): nasal cannula    Physical Examination:  General - NAD  Cardiovascular - Peripheral pulses palpable, no edema  Eyes - Fundoscopy with flat, sharp optic discs and no hemorrhage or exudates; Fundoscopy not well visualized; Fundoscopy not performed due to safety precautions in the setting of the COVID-19 pandemic    Neurologic Exam:  Mental status - Awake, Alert, Oriented to person, place, and time. Speech fluent, repetition and naming intact. Follows simple commands. Attention/concentration, recent and remote memory (including registration and recall), and fund of knowledge intact    Cranial nerves - PERRLA, VFF, EOMI, face sensation (V1-V3) intact b/l, facial strength intact without asymmetry b/l, hearing intact b/l, palate with symmetric elevation,   L eye ptosis.  Diplopia on upward gaze.   Can count to 24.   Neck flexion 5  Neck extension 5    Motor - Normal bulk and tone throughout. No pronator drift.  Strength testing            Deltoid      Biceps      Triceps          Interossei         R            4+             5              4+                     5            5                   L             4+             5              4+                     5            5                              Hip Flexion    Hip Extension    Knee Flexion    Knee Extension    Dorsiflexion    Plantar Flexion  R              5                   5                     5                     5                            5                          5  L              5                    5                     5                    5                            5                          5    Sensation - Light touch/temperature intact throughout    DTR's -             Biceps      Triceps     Brachioradialis      Patellar    Ankle    Toes/plantar response  R             2+             2+                  2+             2+            2+                 Down  L              2+             2+                 2+              2+           2+                 Down    Coordination - Finger to Nose intact b/l. No tremors appreciated    Gait and station - normal cautious gait      LABORATORY:  CBC                       9.6    11.71 )-----------( 325      ( 15 Feb 2023 06:04 )             30.8     Chem 02-15    139  |  103  |  16  ----------------------------<  107<H>  3.7   |  27  |  0.46<L>    Ca    8.8      15 Feb 2023 06:03  Phos  3.8     02-14  Mg     2.4     02-14      LFTs   Coagulopathy   Lipid Panel   A1c   Cardiac enzymes     U/A   CSF  Immunological  Other    STUDIES & IMAGING: (EEG, CT, MR, U/S, TTE/AUGUSTINA):

## 2023-02-15 NOTE — DISCHARGE NOTE NURSING/CASE MANAGEMENT/SOCIAL WORK - NSDCPEPTCAREGIVEDUMATLIST _GEN_ALL_CORE
Diabetes/Influenza Vaccination/Coronavirus/COVID19 Stroke/Diabetes/Influenza Vaccination/Coronavirus/COVID19

## 2023-02-15 NOTE — DISCHARGE NOTE NURSING/CASE MANAGEMENT/SOCIAL WORK - NSDCDMETYPESERV_GEN_ALL_CORE_FT
ROLLING WALKER TO BE DELIVERED TO BEDSIDE AND SUCTION MACHINE TO BE DELIVERED TO HOME BEFORE DISCHARGE.

## 2023-02-15 NOTE — DISCHARGE NOTE NURSING/CASE MANAGEMENT/SOCIAL WORK - NSSCTYPOFSERV_GEN_ALL_CORE
VN AND PT. VISITING NURSE WILL CALL TO SCHEDULE VISIT DAY AFTER DISCHARGE. ABBIE. VISITING NURSE WILL CALL TO SCHEDULE VISIT DAY AFTER DISCHARGE.

## 2023-02-15 NOTE — DISCHARGE NOTE PROVIDER - PROVIDER TOKENS
PROVIDER:[TOKEN:[[id number]],FOLLOWUP:[2 weeks]] PROVIDER:[TOKEN:[[id number]],FOLLOWUP:[2 weeks]]

## 2023-02-15 NOTE — PROGRESS NOTE ADULT - ASSESSMENT
63 yo female with a long history of Myasthenia Gravis , T2DM,,prior thymectomy who was admitted with respiratory difficulty and weakness.  She is maintained on pyridostigmine and PRN prednisone, this had been added recently for MG flare.  History of prior need for ventilator.  Home exposure to Covid, she tested positive on admission.  Suspect respiratory failure on MG basis.  She is s/p 5 days of RDV-completed 2/4  High dose steroids for MG  S/P IVIG and multiple PLEX sessions  Vent management per NSICU,  Leukocytosis likely steroid mediated, it is moderating  Surveillance blood cultures and sputum cultures sent 2/7  Blood cultures negative, sputum with staph aureus, and urine with proteus.  pyuria noted, nonspecific  Zosyn started by NSICU to cover proteus. I am not sure proteus/staph aureus are more than colonizers  She was converted to CTX 2/11  PLEX # 5 on 2/10  Extubated 2/11, comfortable on nasal oxygen  Suggest:  1. CTX covers both urine and sputum isolates, day 6/7 of abx  2. PLEX, steroids, and IVIG as per NSICU  3 Monitor WBC and fevers trend 65 yo female with a long history of Myasthenia Gravis , T2DM,,prior thymectomy who was admitted with respiratory difficulty and weakness.  She is maintained on pyridostigmine and PRN prednisone, this had been added recently for MG flare.  History of prior need for ventilator.  Home exposure to Covid, she tested positive on admission.  Suspect respiratory failure on MG basis.  She is s/p 5 days of RDV-completed 2/4  High dose steroids for MG  S/P IVIG and multiple PLEX sessions  Vent management per NSICU,  Leukocytosis likely steroid mediated, it is moderating  Surveillance blood cultures and sputum cultures sent 2/7  Blood cultures negative, sputum with staph aureus, and urine with proteus.  pyuria noted, nonspecific  Zosyn started by NSICU to cover proteus. I am not sure proteus/staph aureus are more than colonizers  She was converted to CTX 2/11  PLEX # 5 on 2/10  Extubated 2/11, comfortable on nasal oxygen  Suggest:  1. CTX covers both urine and sputum isolates, day 6/7 of abx  2. PLEX, steroids, and IVIG as per NSICU  3 Monitor WBC and fevers trend

## 2023-02-15 NOTE — DISCHARGE NOTE PROVIDER - NSDCFUADDAPPT_GEN_ALL_CORE_FT
If you would like to follow-up with one of our neurologists, please call the number above for an appointment.  Please follow-up with your neurologist, Dr. Delroy Peralta, in one to two weeks.     Please follow-up with your primary care provider in one to two weeks regarding your hospitalization.  Please follow-up with your neurologist, Dr. Delroy Peralta, in one to two weeks.     Please follow-up with your primary care provider in one to two weeks regarding your hospitalization.     Please follow-up with Dr. Sky, cardiologist, for your fast heart rate. He saw you during your hospitalization and therefore, will also see you outpatient.

## 2023-02-15 NOTE — DISCHARGE NOTE PROVIDER - NSDCMRMEDTOKEN_GEN_ALL_CORE_FT
Albuterol (Eqv-ProAir HFA) 90 mcg/inh inhalation aerosol: 2 puff(s) inhaled every 6 hours, As Needed  amLODIPine 10 mg oral tablet: 1 tab(s) orally once a day  fluticasone 110 mcg/inh inhalation aerosol with adapter: 1 puff(s) inhaled 2 times a day  lovastatin 20 mg oral tablet: 1 tab(s) orally once a day  metFORMIN 500 mg oral tablet: 1 tab(s) orally 3 times a day  predniSONE 5 mg oral tablet: 1 tab(s) orally once a day  last fill 1/20/23  pyridostigmine 60 mg oral tablet: 2 tab(s) orally 3 times a day  Carlos Ghosh: ICD code: R26.2  Rolling Walker x1   Albuterol (Eqv-ProAir HFA) 90 mcg/inh inhalation aerosol: 2 puff(s) inhaled every 6 hours, As Needed  amLODIPine 10 mg oral tablet: 1 tab(s) orally once a day  fluticasone 110 mcg/inh inhalation aerosol with adapter: 1 puff(s) inhaled 2 times a day  lovastatin 20 mg oral tablet: 1 tab(s) orally once a day  metFORMIN 500 mg oral tablet: 1 tab(s) orally 3 times a day  predniSONE 20 mg oral tablet: 2 tab(s) orally once a day MDD:40  pyridostigmine 60 mg oral tablet: 2 tab(s) orally 3 times a day

## 2023-02-15 NOTE — DISCHARGE NOTE NURSING/CASE MANAGEMENT/SOCIAL WORK - PATIENT PORTAL LINK FT
You can access the FollowMyHealth Patient Portal offered by [facility] by registering at the following website: [email or web address]. By joining [facility]’s FollowMyHealth portal, you will also be able to view your health information using other applications (apps) compatible with our system. You can access the FollowMyHealth Patient Portal offered by [facility] by registering at the following website: [email or web address]. By joining [employer]’s FollowMyHealth portal, you will also be able to view your health information using other applications (apps) compatible with our system. You can access the FollowMyHealth Patient Portal offered by [facility] by registering at the following website: [email or web address]. By joining [employer]’s FollowMyHealth portal, you will also be able to view your health information using other applications (apps) compatible with our system.

## 2023-02-15 NOTE — PROGRESS NOTE ADULT - SUBJECTIVE AND OBJECTIVE BOX
CC: f/u for positive cultures    Patient reports no new c/o, afebrile, now on RA, siting in a chair    REVIEW OF SYSTEMS:  All other review of systems negative (Comprehensive ROS)    Antimicrobials Day #  : day 6  cefTRIAXone   IVPB 1000 milliGRAM(s) IV Intermittent every 24 hours    Other Medications Reviewed  MEDICATIONS  (STANDING):  albuterol/ipratropium for Nebulization 3 milliLiter(s) Nebulizer every 6 hours  buDESOnide    Inhalation Suspension 0.25 milliGRAM(s) Inhalation every 12 hours  cefTRIAXone   IVPB 1000 milliGRAM(s) IV Intermittent every 24 hours  chlorhexidine 4% Liquid 1 Application(s) Topical <User Schedule>  enoxaparin Injectable 40 milliGRAM(s) SubCutaneous <User Schedule>  insulin lispro (ADMELOG) corrective regimen sliding scale   SubCutaneous every 6 hours  insulin NPH human recombinant 4 Unit(s) SubCutaneous every 6 hours  nystatin Powder 1 Application(s) Topical two times a day  polyethylene glycol 3350 17 Gram(s) Oral daily  psyllium Powder 1 Packet(s) Oral every 8 hours  pyridostigmine 120 milliGRAM(s) Oral every 8 hours  senna 2 Tablet(s) Oral at bedtime      Vital Signs Last 24 Hrs  T(C): 36.6 (15 Feb 2023 12:48), Max: 36.9 (14 Feb 2023 21:33)  T(F): 97.9 (15 Feb 2023 12:48), Max: 98.4 (14 Feb 2023 21:33)  HR: 113 (15 Feb 2023 12:48) (80 - 121)  BP: 131/77 (15 Feb 2023 12:48) (110/70 - 132/75)  BP(mean): --  RR: 20 (15 Feb 2023 12:48) (18 - 20)  SpO2: 94% (15 Feb 2023 12:48) (94% - 99%)    Parameters below as of 15 Feb 2023 12:48  Patient On (Oxygen Delivery Method): room air            PHYSICAL EXAM:  General: alert, no acute distress, NGT  Eyes:  anicteric, no conjunctival injection, no discharge  Oropharynx: no lesions or injection 	  Neck: supple, without adenopathy  Lungs: clear to auscultation  Heart: regular rate and rhythm; no murmur, rubs or gallops  Abdomen: soft, nondistended, nontender, without mass or organomegaly  Skin: no lesions  Extremities: no clubbing, cyanosis, or edema  Neurologic: alert, oriented, moves all extremities    LAB RESULTS:                                              9.6    11.71 )-----------( 325      ( 15 Feb 2023 06:04 )             30.8   02-15    139  |  103  |  16  ----------------------------<  107<H>  3.7   |  27  |  0.46<L>    Ca    8.8      15 Feb 2023 06:03  Phos  3.8     02-14  Mg     2.4     02-14              MICROBIOLOGY:  RECENT CULTURES:      RADIOLOGY REVIEWED:  < from: Xray Chest 1 View- PORTABLE-Urgent (Xray Chest 1 View- PORTABLE-Urgent .) (02.14.23 @ 07:31) >  IMPRESSION:  Feeding tube to stomach.        Thank you for the courtesy of this referral.    --- End of Report ---    < end of copied text >    < from: Xray Chest 1 View- PORTABLE-Urgent (Xray Chest 1 View- PORTABLE-Urgent .) (02.12.23 @ 11:08) >  IMPRESSION:  Enteric tube with tip in the stomach.  Left retrocardiac opacity may represent atelectasis versus effusion.    --- End of Report ---    < end of copied text >

## 2023-02-15 NOTE — DISCHARGE NOTE PROVIDER - NSDCCPCAREPLAN_GEN_ALL_CORE_FT
PRINCIPAL DISCHARGE DIAGNOSIS  Diagnosis: Myasthenia gravis with exacerbation  Assessment and Plan of Treatment: You were admitted to the [facility] due to exacerbation of your myasthenia gravis condition. You were also noted to have COVID which may have triggered the worsening of your condition. You initially did well the first few days of hospitalization, however, your lung function started to decline therefore, we started you on prednisone 40mg and IVIG which is a treatment for myasthenia gravis exacerbation. You tolerated the infusion well and continued to improve. Please continue to follow-up with your neurologist for close monitoring. Should you notice any chest pain, shortness of breath, please visit the ED as soon as possible.       PRINCIPAL DISCHARGE DIAGNOSIS  Diagnosis: Myasthenia gravis with exacerbation  Assessment and Plan of Treatment: You were admitted to the [facility] due to exacerbation of your myasthenia gravis condition. You were also noted to have COVID which may have triggered the worsening of your condition. You initially did well the first few days of hospitalization, however, your lung function started to decline therefore, we started you on prednisone 40mg and IVIG which is a treatment for myasthenia gravis exacerbation. You tolerated the infusion well and continued to improve. Please continue to follow-up with your neurologist for close monitoring. Should you notice any chest pain, shortness of breath, please visit the ED as soon as possible.       PRINCIPAL DISCHARGE DIAGNOSIS  Diagnosis: Myasthenia gravis with exacerbation  Assessment and Plan of Treatment: You were admitted to the [facility] due to exacerbation of your myasthenia gravis condition. You were also noted to have COVID which may have triggered the worsening of your condition. You initially did well the first few days of hospitalization, however, your lung function started to decline therefore, we started you on prednisone 40mg and IVIG which is a treatment for myasthenia gravis exacerbation. You tolerated the infusion well and continued to improve. Please continue to follow-up with your neurologist for close monitoring. Should you notice any chest pain, shortness of breath, please visit the ED as soon as possible.       PRINCIPAL DISCHARGE DIAGNOSIS  Diagnosis: Myasthenia gravis with exacerbation  Assessment and Plan of Treatment: You were admitted to the [facility] due to exacerbation of your myasthenia gravis condition. You were also noted to have COVID which may have triggered the worsening of your condition. You initially did well the first few days of hospitalization, however, your lung function started to decline therefore, we started you on prednisone 40mg and IVIG which is a treatment for myasthenia gravis exacerbation. You tolerated the infusion well and continued to improve. Please continue to take prednisone 40mg once daily and follow-up with your neurologist, Dr. Delroy Peralta, who will taper down the steroid as needed. Please also follow-up with your PCP for close monitoring. Should you notice any chest pain, shortness of breath, please visit the ED as soon as possible.       PRINCIPAL DISCHARGE DIAGNOSIS  Diagnosis: Myasthenia gravis with exacerbation  Assessment and Plan of Treatment: You were admitted to the [facility] due to exacerbation of your myasthenia gravis condition. You were also noted to have COVID which may have triggered the worsening of your condition. You initially did well the first few days of hospitalization, however, your lung function started to decline therefore, we started you on prednisone 40mg and IVIG which is a treatment for myasthenia gravis exacerbation. You tolerated the infusion well and continued to improve. Please continue to take prednisone 40mg once daily and follow-up with your neurologist, Dr. Delroy Peralta, who will taper down the steroid as needed. Please also follow-up with your PCP for close monitoring. Should you notice any chest pain, shortness of breath, please visit the ED as soon as possible.       PRINCIPAL DISCHARGE DIAGNOSIS  Diagnosis: Myasthenia gravis with exacerbation  Assessment and Plan of Treatment: You were admitted to the [facility] due to exacerbation of your myasthenia gravis condition. You were also noted to have COVID which may have triggered the worsening of your condition. You initially did well the first few days of hospitalization, however, your lung function started to decline therefore, we started you on prednisone 40mg and IVIG which is a treatment for myasthenia gravis exacerbation. You tolerated the infusion well and continued to improve. Please continue to take prednisone 40mg once daily and follow-up with your neurologist, Dr. Delroy Peralta, who will taper down the steroid as needed. Please also follow-up with your PCP for close monitoring. Should you notice any chest pain, shortness of breath, please visit the ED as soon as possible.

## 2023-02-16 LAB
ALBUMIN SERPL ELPH-MCNC: 3.9 G/DL — SIGNIFICANT CHANGE UP (ref 3.3–5)
ALP SERPL-CCNC: 53 U/L — SIGNIFICANT CHANGE UP (ref 40–120)
ALT FLD-CCNC: 17 U/L — SIGNIFICANT CHANGE UP (ref 10–45)
ANION GAP SERPL CALC-SCNC: 12 MMOL/L — SIGNIFICANT CHANGE UP (ref 5–17)
AST SERPL-CCNC: 12 U/L — SIGNIFICANT CHANGE UP (ref 10–40)
BILIRUB SERPL-MCNC: 0.4 MG/DL — SIGNIFICANT CHANGE UP (ref 0.2–1.2)
BUN SERPL-MCNC: 10 MG/DL — SIGNIFICANT CHANGE UP (ref 7–23)
CALCIUM SERPL-MCNC: 8.5 MG/DL — SIGNIFICANT CHANGE UP (ref 8.4–10.5)
CHLORIDE SERPL-SCNC: 104 MMOL/L — SIGNIFICANT CHANGE UP (ref 96–108)
CO2 SERPL-SCNC: 25 MMOL/L — SIGNIFICANT CHANGE UP (ref 22–31)
CREAT SERPL-MCNC: 0.44 MG/DL — LOW (ref 0.5–1.3)
EGFR: 109 ML/MIN/1.73M2 — SIGNIFICANT CHANGE UP
GLUCOSE BLDC GLUCOMTR-MCNC: 128 MG/DL — HIGH (ref 70–99)
GLUCOSE BLDC GLUCOMTR-MCNC: 130 MG/DL — HIGH (ref 70–99)
GLUCOSE BLDC GLUCOMTR-MCNC: 168 MG/DL — HIGH (ref 70–99)
GLUCOSE BLDC GLUCOMTR-MCNC: 263 MG/DL — HIGH (ref 70–99)
GLUCOSE BLDC GLUCOMTR-MCNC: 87 MG/DL — SIGNIFICANT CHANGE UP (ref 70–99)
GLUCOSE SERPL-MCNC: 110 MG/DL — HIGH (ref 70–99)
HCT VFR BLD CALC: 31 % — LOW (ref 34.5–45)
HGB BLD-MCNC: 9.7 G/DL — LOW (ref 11.5–15.5)
MCHC RBC-ENTMCNC: 29.1 PG — SIGNIFICANT CHANGE UP (ref 27–34)
MCHC RBC-ENTMCNC: 31.3 GM/DL — LOW (ref 32–36)
MCV RBC AUTO: 93.1 FL — SIGNIFICANT CHANGE UP (ref 80–100)
NRBC # BLD: 0 /100 WBCS — SIGNIFICANT CHANGE UP (ref 0–0)
PLATELET # BLD AUTO: 343 K/UL — SIGNIFICANT CHANGE UP (ref 150–400)
POTASSIUM SERPL-MCNC: 3.3 MMOL/L — LOW (ref 3.5–5.3)
POTASSIUM SERPL-SCNC: 3.3 MMOL/L — LOW (ref 3.5–5.3)
PROT SERPL-MCNC: 6.6 G/DL — SIGNIFICANT CHANGE UP (ref 6–8.3)
RBC # BLD: 3.33 M/UL — LOW (ref 3.8–5.2)
RBC # FLD: 14.8 % — HIGH (ref 10.3–14.5)
SODIUM SERPL-SCNC: 141 MMOL/L — SIGNIFICANT CHANGE UP (ref 135–145)
WBC # BLD: 10.31 K/UL — SIGNIFICANT CHANGE UP (ref 3.8–10.5)
WBC # FLD AUTO: 10.31 K/UL — SIGNIFICANT CHANGE UP (ref 3.8–10.5)

## 2023-02-16 PROCEDURE — 99233 SBSQ HOSP IP/OBS HIGH 50: CPT

## 2023-02-16 RX ORDER — DIPHENHYDRAMINE HCL 50 MG
50 CAPSULE ORAL EVERY 4 HOURS
Refills: 0 | Status: DISCONTINUED | OUTPATIENT
Start: 2023-02-16 | End: 2023-02-21

## 2023-02-16 RX ORDER — POTASSIUM CHLORIDE 20 MEQ
40 PACKET (EA) ORAL EVERY 4 HOURS
Refills: 0 | Status: COMPLETED | OUTPATIENT
Start: 2023-02-16 | End: 2023-02-16

## 2023-02-16 RX ORDER — ACETAMINOPHEN 500 MG
650 TABLET ORAL EVERY 6 HOURS
Refills: 0 | Status: DISCONTINUED | OUTPATIENT
Start: 2023-02-16 | End: 2023-02-21

## 2023-02-16 RX ORDER — PANTOPRAZOLE SODIUM 20 MG/1
40 TABLET, DELAYED RELEASE ORAL
Refills: 0 | Status: DISCONTINUED | OUTPATIENT
Start: 2023-02-16 | End: 2023-02-21

## 2023-02-16 RX ORDER — IMMUNE GLOBULIN (HUMAN) 10 G/100ML
25 INJECTION INTRAVENOUS; SUBCUTANEOUS DAILY
Refills: 0 | Status: COMPLETED | OUTPATIENT
Start: 2023-02-16 | End: 2023-02-20

## 2023-02-16 RX ADMIN — CHLORHEXIDINE GLUCONATE 1 APPLICATION(S): 213 SOLUTION TOPICAL at 05:25

## 2023-02-16 RX ADMIN — PYRIDOSTIGMINE BROMIDE 120 MILLIGRAM(S): 60 SOLUTION ORAL at 14:04

## 2023-02-16 RX ADMIN — Medication 3 MILLILITER(S): at 13:44

## 2023-02-16 RX ADMIN — PANTOPRAZOLE SODIUM 40 MILLIGRAM(S): 20 TABLET, DELAYED RELEASE ORAL at 14:05

## 2023-02-16 RX ADMIN — ENOXAPARIN SODIUM 40 MILLIGRAM(S): 100 INJECTION SUBCUTANEOUS at 17:28

## 2023-02-16 RX ADMIN — Medication 650 MILLIGRAM(S): at 12:38

## 2023-02-16 RX ADMIN — Medication 6: at 21:21

## 2023-02-16 RX ADMIN — NYSTATIN CREAM 1 APPLICATION(S): 100000 CREAM TOPICAL at 17:28

## 2023-02-16 RX ADMIN — Medication 3 MILLILITER(S): at 17:28

## 2023-02-16 RX ADMIN — Medication 0.25 MILLIGRAM(S): at 12:39

## 2023-02-16 RX ADMIN — PYRIDOSTIGMINE BROMIDE 120 MILLIGRAM(S): 60 SOLUTION ORAL at 05:26

## 2023-02-16 RX ADMIN — PYRIDOSTIGMINE BROMIDE 120 MILLIGRAM(S): 60 SOLUTION ORAL at 21:21

## 2023-02-16 RX ADMIN — Medication 20 MILLIGRAM(S): at 10:11

## 2023-02-16 RX ADMIN — Medication 40 MILLIEQUIVALENT(S): at 14:04

## 2023-02-16 RX ADMIN — IMMUNE GLOBULIN (HUMAN) 41.67 GRAM(S): 10 INJECTION INTRAVENOUS; SUBCUTANEOUS at 13:20

## 2023-02-16 RX ADMIN — Medication 1 PACKET(S): at 14:05

## 2023-02-16 RX ADMIN — Medication 650 MILLIGRAM(S): at 13:53

## 2023-02-16 RX ADMIN — Medication 20 MILLIGRAM(S): at 14:57

## 2023-02-16 RX ADMIN — Medication 2: at 16:21

## 2023-02-16 RX ADMIN — Medication 0.25 MILLIGRAM(S): at 17:29

## 2023-02-16 RX ADMIN — Medication 40 MILLIEQUIVALENT(S): at 10:10

## 2023-02-16 RX ADMIN — Medication 50 MILLIGRAM(S): at 12:53

## 2023-02-16 RX ADMIN — Medication 3 MILLILITER(S): at 23:30

## 2023-02-16 NOTE — PROGRESS NOTE ADULT - ASSESSMENT
62F PMHx significant for myasthenia gravis who presents to the ED with SOB for 3 days after testing positive for COVID at home. NIF/VC -20/0.68L in ED. Pt placed on oxygen 2L NC. Pt now s/p PLEX x5 sessions and currently on pyridostigmine. Pt continues to improve clinically. CBC showing WBC 11.71, Hgb 9.5. CMP WNL.     IMPRESSION: Myasthenia gravis exacerbation likely secondary to COVID infection.    PLAN:  #Myasthenia Gravis exacerbation i/s/o COVID-19  -f/u NIF and VC Q6h -25/440 this AM   -s/p PLEX 5 sessions (2/2, 2/4, 2/6, 2/8, 2/10)  -c/w pyridostigmine 120mg tid PO  -Will restart prednisone 20mg qd       #Dyspnea  -Budesonide and DUOnebs prn    #Urine/Sputum cx positive  -appreciate ID recs     =c/w Ceftriaxone x7days (ends on today)    #HTN  - Amlodipine on hold   - Elevated this AM to  - singular reading, will trend to see if medication management necessary     #Preventive measures  -Cardiac monitoring with tele  -DVT ppx: Lovenox 40mg QD  -Diet: Regular  -Dispo: Acute rehab  -Avoid medications like fluoroquinolones macrolides, aminoglycosides chloroquine.   -Consider avoiding anti hypertensives such as beta blockers, CCB. - AMLODIPINE ON HOLD GIVEN THIS CONSIDERATION    Case discussed with neurology attending Dr. Chung  62F PMHx significant for myasthenia gravis who presents to the ED with SOB for 3 days after testing positive for COVID at home. NIF/VC -20/0.68L in ED. Pt placed on oxygen 2L NC. Pt now s/p PLEX x5 sessions and currently on pyridostigmine. Pt continues to improve clinically. CBC showing WBC 11.71, Hgb 9.5. CMP WNL.     IMPRESSION: Myasthenia gravis exacerbation likely secondary to COVID infection.    PLAN:  #Myasthenia Gravis exacerbation i/s/o COVID-19  -f/u NIF and VC Q6h -25/440 this AM   -s/p PLEX 5 sessions (2/2, 2/4, 2/6, 2/8, 2/10)  -c/w pyridostigmine 120mg tid PO  -Will restart prednisone 40mg qd     -Start IVIG x 5 days - premedicate with tylenol and benadryl    #Dyspnea  -Budesonide and DUOnebs prn    #Urine/Sputum cx positive  -appreciate ID recs     =c/w Ceftriaxone x7days (ends on today)    #HTN  - Amlodipine on hold   - Elevated this AM to  - singular reading, will trend to see if medication management necessary     #Preventive measures  -Cardiac monitoring with tele  -DVT ppx: Lovenox 40mg QD  -GI ppx: Protonix while on steroids   -Diet: Regular  -Dispo: Acute rehab  -Avoid medications like fluoroquinolones macrolides, aminoglycosides chloroquine.   -Consider avoiding anti hypertensives such as beta blockers, CCB. - AMLODIPINE ON HOLD GIVEN THIS CONSIDERATION    Case discussed with neurology attending Dr. Chung

## 2023-02-16 NOTE — PROGRESS NOTE ADULT - SUBJECTIVE AND OBJECTIVE BOX
Interval History:  Patient seen and examined at the bedside. No acute events overnight. Reports feeling stable however respiratory functions tests seem worse    ROS: Pertinent positives in HPI, all other ROS were reviewed and are negative.      MEDICATIONS  (STANDING):  albuterol/ipratropium for Nebulization 3 milliLiter(s) Nebulizer every 6 hours  buDESOnide    Inhalation Suspension 0.25 milliGRAM(s) Inhalation every 12 hours  chlorhexidine 4% Liquid 1 Application(s) Topical <User Schedule>  enoxaparin Injectable 40 milliGRAM(s) SubCutaneous <User Schedule>  insulin lispro (ADMELOG) corrective regimen sliding scale   SubCutaneous Before meals and at bedtime  nystatin Powder 1 Application(s) Topical two times a day  polyethylene glycol 3350 17 Gram(s) Oral daily  potassium chloride    Tablet ER 40 milliEquivalent(s) Oral every 4 hours  predniSONE   Tablet 20 milliGRAM(s) Oral daily  psyllium Powder 1 Packet(s) Oral every 8 hours  pyridostigmine 120 milliGRAM(s) Oral every 8 hours  senna 2 Tablet(s) Oral at bedtime    MEDICATIONS  (PRN):  oxyCODONE    IR 5 milliGRAM(s) Oral every 6 hours PRN Moderate Pain (4 - 6)  oxyCODONE    IR 10 milliGRAM(s) Oral every 4 hours PRN Severe Pain (7 - 10)    Allergies  No Known Drug Allergies  peanuts (Unknown)    Intolerances      Vital Signs Last 24 Hrs  T(C): 36.6 (16 Feb 2023 05:28), Max: 37.3 (15 Feb 2023 16:57)  T(F): 97.9 (16 Feb 2023 05:28), Max: 99.2 (15 Feb 2023 16:57)  HR: 114 (16 Feb 2023 05:28) (98 - 114)  BP: 169/90 (16 Feb 2023 05:28) (131/77 - 169/90)  BP(mean): --  RR: 19 (16 Feb 2023 05:28) (19 - 20)  SpO2: 100% (16 Feb 2023 05:28) (94% - 100%)    Parameters below as of 16 Feb 2023 05:28  Patient On (Oxygen Delivery Method): nasal cannula        NEUROLOGICAL EXAM:  Mental status - Awake, Alert, Oriented to person, place, and time. Speech fluent, repetition and naming intact. Follows simple commands. Attention/concentration, recent and remote memory (including registration and recall), and fund of knowledge intact    Cranial nerves - PERRLA, VFF, EOMI, face sensation (V1-V3) intact b/l, facial strength intact without asymmetry b/l, hearing intact b/l, palate with symmetric elevation,   L eye ptosis.  no Diplopia today on upward gaze.   One breath counting to 19 today.   Neck flexion 4+  Neck extension 4+    Motor - Normal bulk and tone throughout. No pronator drift.  Strength testing            Deltoid      Biceps      Triceps          Interossei         R            pain          4+           4+                     5            5                   L            pain          4+           4+                     5            5                              Hip Flexion    Knee Flexion    Knee Extension    Dorsiflexion    Plantar Flexion  R             4-                4+                     5                          4                          5  L              4-                4+                    5                          4                          5    Sensation - Light touch/temperature intact throughout    DTR's -             Biceps      Triceps     Brachioradialis      Patellar    Ankle    Toes/plantar response  R             2+             2+                  2+             2+            2+                 Down  L              2+             2+                 2+              2+           2+                 Down    Coordination - Finger to Nose intact b/l. No tremors appreciated    Gait and station - deferred       Labs:   cbc                      9.7    10.31 )-----------( 343      ( 16 Feb 2023 06:18 )             31.0     [id number]    141  |  104  |  10  ----------------------------<  110<H>  3.3<L>   |  25  |  0.44<L>    Ca    8.5      16 Feb 2023 06:15    TPro  6.6  /  Alb  3.9  /  TBili  0.4  /  DBili  x   /  AST  12  /  ALT  17  /  AlkPhos  53  02-16    Coags  Lipids  A1C  CardiacMarkers    LFTsLIVER FUNCTIONS - ( 16 Feb 2023 06:15 )  Alb: 3.9 g/dL / Pro: 6.6 g/dL / ALK PHOS: 53 U/L / ALT: 17 U/L / AST: 12 U/L / GGT: x           UA Interval History:  Patient seen and examined at the bedside. No acute events overnight. Reports feeling stable however respiratory functions tests seem worse    ROS: Pertinent positives in HPI, all other ROS were reviewed and are negative.      MEDICATIONS  (STANDING):  albuterol/ipratropium for Nebulization 3 milliLiter(s) Nebulizer every 6 hours  buDESOnide    Inhalation Suspension 0.25 milliGRAM(s) Inhalation every 12 hours  chlorhexidine 4% Liquid 1 Application(s) Topical <User Schedule>  enoxaparin Injectable 40 milliGRAM(s) SubCutaneous <User Schedule>  insulin lispro (ADMELOG) corrective regimen sliding scale   SubCutaneous Before meals and at bedtime  nystatin Powder 1 Application(s) Topical two times a day  polyethylene glycol 3350 17 Gram(s) Oral daily  potassium chloride    Tablet ER 40 milliEquivalent(s) Oral every 4 hours  predniSONE   Tablet 20 milliGRAM(s) Oral daily  psyllium Powder 1 Packet(s) Oral every 8 hours  pyridostigmine 120 milliGRAM(s) Oral every 8 hours  senna 2 Tablet(s) Oral at bedtime    MEDICATIONS  (PRN):  oxyCODONE    IR 5 milliGRAM(s) Oral every 6 hours PRN Moderate Pain (4 - 6)  oxyCODONE    IR 10 milliGRAM(s) Oral every 4 hours PRN Severe Pain (7 - 10)    Allergies  No Known Drug Allergies  peanuts (Unknown)    Intolerances      Vital Signs Last 24 Hrs  T(C): 36.6 (16 Feb 2023 05:28), Max: 37.3 (15 Feb 2023 16:57)  T(F): 97.9 (16 Feb 2023 05:28), Max: 99.2 (15 Feb 2023 16:57)  HR: 114 (16 Feb 2023 05:28) (98 - 114)  BP: 169/90 (16 Feb 2023 05:28) (131/77 - 169/90)  BP(mean): --  RR: 19 (16 Feb 2023 05:28) (19 - 20)  SpO2: 100% (16 Feb 2023 05:28) (94% - 100%)    Parameters below as of 16 Feb 2023 05:28  Patient On (Oxygen Delivery Method): nasal cannula        NEUROLOGICAL EXAM:  Mental status - Awake, Alert, Oriented to person, place, and time. Speech fluent, repetition and naming intact. Follows simple commands. Attention/concentration, recent and remote memory (including registration and recall), and fund of knowledge intact    Cranial nerves - PERRLA, VFF, EOMI, face sensation (V1-V3) intact b/l, facial strength intact without asymmetry b/l, hearing intact b/l, palate with symmetric elevation,   L eye ptosis.  no Diplopia today on upward gaze.   One breath counting to 19 today.   Neck flexion 4+  Neck extension 4+    Motor - Normal bulk and tone throughout. No pronator drift.  Strength testing            Deltoid      Biceps      Triceps          Interossei         R            pain          4+           4+                     5            5                   L            pain          4+           4+                     5            5                              Hip Flexion    Knee Flexion    Knee Extension    Dorsiflexion    Plantar Flexion  R             4-                4+                     5                          4                          5  L              4-                4+                    5                          4                          5    Sensation - Light touch/temperature intact throughout    DTR's -             Biceps      Triceps     Brachioradialis      Patellar    Ankle    Toes/plantar response  R             2+             2+                  2+             2+            2+                 Down  L              2+             2+                 2+              2+           2+                 Down    Coordination - Finger to Nose intact b/l. No tremors appreciated    Gait and station - deferred       Labs:   cbc                      9.7    10.31 )-----------( 343      ( 16 Feb 2023 06:18 )             31.0     [id number]    141  |  104  |  10  ----------------------------<  110<H>  3.3<L>   |  25  |  0.44<L>    Ca    8.5      16 Feb 2023 06:15    TPro  6.6  /  Alb  3.9  /  TBili  0.4  /  DBili  x   /  AST  12  /  ALT  17  /  AlkPhos  53  02-16    Coags  Lipids  A1C  CardiacMarkers    LFTsLIVER FUNCTIONS - ( 16 Feb 2023 06:15 )  Alb: 3.9 g/dL / Pro: 6.6 g/dL / ALK PHOS: 53 U/L / ALT: 17 U/L / AST: 12 U/L / GGT: x           UA Interval History:  Patient seen and examined at the bedside. No acute events overnight. Reports feeling stable however respiratory functions tests seem worse    ROS: Pertinent positives in HPI, all other ROS were reviewed and are negative.      MEDICATIONS  (STANDING):  albuterol/ipratropium for Nebulization 3 milliLiter(s) Nebulizer every 6 hours  buDESOnide    Inhalation Suspension 0.25 milliGRAM(s) Inhalation every 12 hours  chlorhexidine 4% Liquid 1 Application(s) Topical <User Schedule>  enoxaparin Injectable 40 milliGRAM(s) SubCutaneous <User Schedule>  insulin lispro (ADMELOG) corrective regimen sliding scale   SubCutaneous Before meals and at bedtime  nystatin Powder 1 Application(s) Topical two times a day  polyethylene glycol 3350 17 Gram(s) Oral daily  potassium chloride    Tablet ER 40 milliEquivalent(s) Oral every 4 hours  predniSONE   Tablet 20 milliGRAM(s) Oral daily  psyllium Powder 1 Packet(s) Oral every 8 hours  pyridostigmine 120 milliGRAM(s) Oral every 8 hours  senna 2 Tablet(s) Oral at bedtime    MEDICATIONS  (PRN):  oxyCODONE    IR 5 milliGRAM(s) Oral every 6 hours PRN Moderate Pain (4 - 6)  oxyCODONE    IR 10 milliGRAM(s) Oral every 4 hours PRN Severe Pain (7 - 10)    Allergies  No Known Drug Allergies  peanuts (Unknown)    Intolerances      Vital Signs Last 24 Hrs  T(C): 36.6 (16 Feb 2023 05:28), Max: 37.3 (15 Feb 2023 16:57)  T(F): 97.9 (16 Feb 2023 05:28), Max: 99.2 (15 Feb 2023 16:57)  HR: 114 (16 Feb 2023 05:28) (98 - 114)  BP: 169/90 (16 Feb 2023 05:28) (131/77 - 169/90)  BP(mean): --  RR: 19 (16 Feb 2023 05:28) (19 - 20)  SpO2: 100% (16 Feb 2023 05:28) (94% - 100%)    Parameters below as of 16 Feb 2023 05:28  Patient On (Oxygen Delivery Method): nasal cannula        NEUROLOGICAL EXAM:  Mental status - Awake, Alert, Oriented to person, place, and time. Speech fluent, repetition and naming intact. Follows simple commands. Attention/concentration, recent and remote memory (including registration and recall), and fund of knowledge intact    Cranial nerves - PERRLA, VFF, EOMI, face sensation (V1-V3) intact b/l, facial strength intact without asymmetry b/l, hearing intact b/l, palate with symmetric elevation,   L eye ptosis.  no Diplopia today on upward gaze.   One breath counting to 19 today.   Neck flexion 4+  Neck extension 4+    Motor - Normal bulk and tone throughout. No pronator drift.  Strength testing            Deltoid      Biceps      Triceps          Interossei         R            pain          4+           4+                     5            5                   L            pain          4+           4+                     5            5                              Hip Flexion    Knee Flexion    Knee Extension    Dorsiflexion    Plantar Flexion  R             4-                4+                     5                          4                          5  L              4-                4+                    5                          4                          5    Sensation - Light touch/temperature intact throughout    DTR's -             Biceps      Triceps     Brachioradialis      Patellar    Ankle    Toes/plantar response  R             2+             2+                  2+             2+            2+                 Down  L              2+             2+                 2+              2+           2+                 Down    Coordination - Finger to Nose intact b/l. No tremors appreciated    Gait and station - deferred       Labs:   cbc                      9.7    10.31 )-----------( 343      ( 16 Feb 2023 06:18 )             31.0     [id number]    141  |  104  |  10  ----------------------------<  110<H>  3.3<L>   |  25  |  0.44<L>    Ca    8.5      16 Feb 2023 06:15    TPro  6.6  /  Alb  3.9  /  TBili  0.4  /  DBili  x   /  AST  12  /  ALT  17  /  AlkPhos  53  02-16    Coags  Lipids  A1C  CardiacMarkers    LFTsLIVER FUNCTIONS - ( 16 Feb 2023 06:15 )  Alb: 3.9 g/dL / Pro: 6.6 g/dL / ALK PHOS: 53 U/L / ALT: 17 U/L / AST: 12 U/L / GGT: x           UA

## 2023-02-16 NOTE — PROGRESS NOTE ADULT - ATTENDING COMMENTS
Patient's NIF/VC have decreased. She denies any respiratory issues. continues to have intermittent coughing. Does not have diplopia. Left ptosis persists and fatigues. UE strength 4+ today. Counts to 19 on one breath. Will restart prednisone 20mg qd and monitor PFTs closely. Cont mestinon regimen and d/c ABx after today per ID Patient's NIF/VC have decreased. She denies any respiratory issues. continues to have intermittent coughing. Does not have diplopia. Left ptosis persists and fatigues with sustained upgaze. UE strength 4+ today. Counts to 19 on one breath. Will restart prednisone 20mg BID and initiate course of IVIG as well. Monitor PFTs closely. Cont mestinon regimen and d/c ABx after today per ID

## 2023-02-17 LAB
CK SERPL-CCNC: 59 U/L — SIGNIFICANT CHANGE UP (ref 25–170)
GLUCOSE BLDC GLUCOMTR-MCNC: 115 MG/DL — HIGH (ref 70–99)
GLUCOSE BLDC GLUCOMTR-MCNC: 134 MG/DL — HIGH (ref 70–99)
GLUCOSE BLDC GLUCOMTR-MCNC: 149 MG/DL — HIGH (ref 70–99)
GLUCOSE BLDC GLUCOMTR-MCNC: 231 MG/DL — HIGH (ref 70–99)

## 2023-02-17 PROCEDURE — 99233 SBSQ HOSP IP/OBS HIGH 50: CPT

## 2023-02-17 RX ADMIN — Medication 4: at 16:44

## 2023-02-17 RX ADMIN — Medication 50 MILLIGRAM(S): at 12:05

## 2023-02-17 RX ADMIN — Medication 0.25 MILLIGRAM(S): at 17:21

## 2023-02-17 RX ADMIN — PYRIDOSTIGMINE BROMIDE 120 MILLIGRAM(S): 60 SOLUTION ORAL at 21:08

## 2023-02-17 RX ADMIN — Medication 650 MILLIGRAM(S): at 12:36

## 2023-02-17 RX ADMIN — PANTOPRAZOLE SODIUM 40 MILLIGRAM(S): 20 TABLET, DELAYED RELEASE ORAL at 07:50

## 2023-02-17 RX ADMIN — PYRIDOSTIGMINE BROMIDE 120 MILLIGRAM(S): 60 SOLUTION ORAL at 15:03

## 2023-02-17 RX ADMIN — PYRIDOSTIGMINE BROMIDE 120 MILLIGRAM(S): 60 SOLUTION ORAL at 05:29

## 2023-02-17 RX ADMIN — NYSTATIN CREAM 1 APPLICATION(S): 100000 CREAM TOPICAL at 05:29

## 2023-02-17 RX ADMIN — ENOXAPARIN SODIUM 40 MILLIGRAM(S): 100 INJECTION SUBCUTANEOUS at 17:21

## 2023-02-17 RX ADMIN — Medication 40 MILLIGRAM(S): at 05:29

## 2023-02-17 RX ADMIN — Medication 3 MILLILITER(S): at 23:53

## 2023-02-17 RX ADMIN — Medication 3 MILLILITER(S): at 12:05

## 2023-02-17 RX ADMIN — Medication 650 MILLIGRAM(S): at 12:06

## 2023-02-17 RX ADMIN — Medication 3 MILLILITER(S): at 05:28

## 2023-02-17 RX ADMIN — CHLORHEXIDINE GLUCONATE 1 APPLICATION(S): 213 SOLUTION TOPICAL at 05:29

## 2023-02-17 RX ADMIN — Medication 3 MILLILITER(S): at 17:27

## 2023-02-17 RX ADMIN — IMMUNE GLOBULIN (HUMAN) 41.67 GRAM(S): 10 INJECTION INTRAVENOUS; SUBCUTANEOUS at 12:36

## 2023-02-17 RX ADMIN — Medication 1 PACKET(S): at 15:03

## 2023-02-17 NOTE — PROGRESS NOTE ADULT - SUBJECTIVE AND OBJECTIVE BOX
Neurology Progress Note    SUBJECTIVE/OBJECTIVE/INTERVAL EVENTS: Patient seen and examined at bedside w/ neuro attending and team.     INTERVAL HISTORY:     REVIEW OF SYSTEMS: Otherwise denies fever, chills, headaches, vision changes, blurry vision, double vision, nausea, vomiting, hearing change, focal weakness, focal numbness.  Few questions of a 10-system ROS was performed and is negative except for those items noted above and/or in the HPI.    VITALS & EXAMINATION:  Vital Signs Last 24 Hrs  T(C): 36.8 (17 Feb 2023 13:24), Max: 37.1 (17 Feb 2023 01:22)  T(F): 98.2 (17 Feb 2023 13:24), Max: 98.8 (17 Feb 2023 05:19)  HR: 113 (17 Feb 2023 13:24) (83 - 113)  BP: 154/76 (17 Feb 2023 13:24) (114/69 - 154/76)  BP(mean): --  RR: 18 (17 Feb 2023 13:24) (18 - 20)  SpO2: 98% (17 Feb 2023 13:24) (95% - 99%)    Parameters below as of 17 Feb 2023 13:24  Patient On (Oxygen Delivery Method): room air    General:  Constitutional: Female, appears stated age   Head: Normocephalic; Eyes: clear sclera;   Extremities: No cyanosis; Skin: No nani edema of LE  Resp: breathing comfortably on nasal cannula    EUROLOGICAL EXAM:  Mental status - Awake, Alert, Oriented to person, place, and time. Speech fluent, repetition and naming intact. Follows simple commands. Attention/concentration, recent and remote memory (including registration and recall), and fund of knowledge intact    Cranial nerves - PERRLA, VFF, EOMI, face sensation (V1-V3) intact b/l, facial strength intact without asymmetry b/l, hearing intact b/l, palate with symmetric elevation,   L eye ptosis.  no Diplopia today on upward gaze.   One breath counting to 19 today.   Neck flexion 4+  Neck extension 4+    Motor - Normal bulk and tone throughout. No pronator drift.  Strength testing            Deltoid      Biceps      Triceps          Interossei         R            pain          4+           4+                     5            5                   L            pain          4+           4+                     5            5                              Hip Flexion    Knee Flexion    Knee Extension    Dorsiflexion    Plantar Flexion  R             4-                4+                     5                          4                          5  L              4-                4+                    5                          4                          5    Sensation - Light touch/temperature intact throughout    DTR's -             Biceps      Triceps     Brachioradialis      Patellar    Ankle    Toes/plantar response  R             2+             2+                  2+             2+            2+                 Down  L              2+             2+                 2+              2+           2+                 Down    Coordination - Finger to Nose intact b/l. No tremors appreciated    Gait and station - deferred   LABORATORY:  CBC                       9.7    10.31 )-----------( 343      ( 16 Feb 2023 06:18 )             31.0     Chem 02-16    141  |  104  |  10  ----------------------------<  110<H>  3.3<L>   |  25  |  0.44<L>    Ca    8.5      16 Feb 2023 06:15    TPro  6.6  /  Alb  3.9  /  TBili  0.4  /  DBili  x   /  AST  12  /  ALT  17  /  AlkPhos  53  02-16    LFTs LIVER FUNCTIONS - ( 16 Feb 2023 06:15 )  Alb: 3.9 g/dL / Pro: 6.6 g/dL / ALK PHOS: 53 U/L / ALT: 17 U/L / AST: 12 U/L / GGT: x           Coagulopathy   Lipid Panel   A1c   Cardiac enzymes CARDIAC MARKERS ( 17 Feb 2023 09:49 )  x     / x     / 59 U/L / x     / x          U/A   CSF  Immunological  Other    STUDIES & IMAGING: (EEG, CT, MR, U/S, TTE/AUGUSTINA): Neurology Progress Note    SUBJECTIVE/OBJECTIVE/INTERVAL EVENTS: Patient seen and examined at bedside w/ neuro attending and team.     INTERVAL HISTORY:     REVIEW OF SYSTEMS: Otherwise denies fever, chills, headaches, vision changes, blurry vision, double vision, nausea, vomiting, hearing change, focal weakness, focal numbness.  Few questions of a 10-system ROS was performed and is negative except for those items noted above and/or in the HPI.    VITALS & EXAMINATION:  Vital Signs Last 24 Hrs  T(C): 36.8 (17 Feb 2023 13:24), Max: 37.1 (17 Feb 2023 01:22)  T(F): 98.2 (17 Feb 2023 13:24), Max: 98.8 (17 Feb 2023 05:19)  HR: 113 (17 Feb 2023 13:24) (83 - 113)  BP: 154/76 (17 Feb 2023 13:24) (114/69 - 154/76)  BP(mean): --  RR: 18 (17 Feb 2023 13:24) (18 - 20)  SpO2: 98% (17 Feb 2023 13:24) (95% - 99%)    Parameters below as of 17 Feb 2023 13:24  Patient On (Oxygen Delivery Method): room air    General:  Constitutional: Female, appears stated age   Head: Normocephalic; Eyes: clear sclera;   Extremities: No cyanosis; Skin: No nani edema of LE  Resp: breathing comfortably on nasal cannula    EUROLOGICAL EXAM:  Mental status - Awake, Alert, Oriented to person, place, and time. Speech fluent, repetition and naming intact. Follows simple commands. Attention/concentration, recent and remote memory (including registration and recall), and fund of knowledge intact    Cranial nerves - PERRLA, VFF, EOMI, face sensation (V1-V3) intact b/l, facial strength intact without asymmetry b/l, hearing intact b/l, palate with symmetric elevation,   L eye ptosis.  no Diplopia today on upward gaze.   One breath counting to 19 today.   Neck flexion 4+  Neck extension 4+    Motor - Normal bulk and tone throughout. No pronator drift.  Strength testing            Deltoid      Biceps      Triceps          Interossei         R            pain          4+           4+                     5            5                   L            pain          4+           4+                     5            5                              Hip Flexion    Knee Flexion    Knee Extension    Dorsiflexion    Plantar Flexion  R             4-                4+                     5                          4                          5  L              4-                4+                    5                          4                          5    Sensation - Light touch/temperature intact throughout    DTR's -             Biceps      Triceps     Brachioradialis      Patellar    Ankle    Toes/plantar response  R             2+             2+                  2+             2+            2+                 Down  L              2+             2+                 2+              2+           2+                 Down    Coordination - Finger to Nose intact b/l. No tremors appreciated    Gait and station - deferred   LABORATORY:  CBC                       9.7    10.31 )-----------( 343      ( 16 Feb 2023 06:18 )             31.0     Chem 02-16    141  |  104  |  10  ----------------------------<  110<H>  3.3<L>   |  25  |  0.44<L>    Ca    8.5      16 Feb 2023 06:15    TPro  6.6  /  Alb  3.9  /  TBili  0.4  /  DBili  x   /  AST  12  /  ALT  17  /  AlkPhos  53  02-16    LFTs LIVER FUNCTIONS - ( 16 Feb 2023 06:15 )  Alb: 3.9 g/dL / Pro: 6.6 g/dL / ALK PHOS: 53 U/L / ALT: 17 U/L / AST: 12 U/L / GGT: x           Coagulopathy   Lipid Panel   A1c   Cardiac enzymes CARDIAC MARKERS ( 17 Feb 2023 09:49 )  x     / x     / 59 U/L / x     / x          U/A   CSF  Immunological  Other    STUDIES & IMAGING: (EEG, CT, MR, U/S, TTE/AUGUSTNIA): Neurology Progress Note    SUBJECTIVE/OBJECTIVE/INTERVAL EVENTS: Patient seen and examined at bedside w/ neuro attending and team.     INTERVAL HISTORY: No acute events overnights.     REVIEW OF SYSTEMS: Otherwise denies fever, chills, headaches, vision changes, blurry vision, double vision, nausea, vomiting, hearing change, focal weakness, focal numbness.  Few questions of a 10-system ROS was performed and is negative except for those items noted above and/or in the HPI.    VITALS & EXAMINATION:  Vital Signs Last 24 Hrs  T(C): 36.8 (17 Feb 2023 13:24), Max: 37.1 (17 Feb 2023 01:22)  T(F): 98.2 (17 Feb 2023 13:24), Max: 98.8 (17 Feb 2023 05:19)  HR: 113 (17 Feb 2023 13:24) (83 - 113)  BP: 154/76 (17 Feb 2023 13:24) (114/69 - 154/76)  BP(mean): --  RR: 18 (17 Feb 2023 13:24) (18 - 20)  SpO2: 98% (17 Feb 2023 13:24) (95% - 99%)    Parameters below as of 17 Feb 2023 13:24  Patient On (Oxygen Delivery Method): room air    General:  Constitutional: Female, appears stated age   Head: Normocephalic; Eyes: clear sclera;   Extremities: No cyanosis; Skin: No nani edema of LE  Resp: breathing comfortably on nasal cannula    EUROLOGICAL EXAM:  Mental status - Awake, Alert, Oriented to person, place, and time. Speech fluent, repetition and naming intact. Follows simple commands. Attention/concentration, recent and remote memory (including registration and recall), and fund of knowledge intact    Cranial nerves - PERRLA, VFF, EOMI, face sensation (V1-V3) intact b/l, facial strength intact without asymmetry b/l, hearing intact b/l, palate with symmetric elevation,   L eye ptosis.  no Diplopia today on upward gaze.   One breath counting to 26 today.   Neck flexion 4+  Neck extension 4+    Motor - Normal bulk and tone throughout. No pronator drift.  Strength testing            Deltoid      Biceps      Triceps          Interossei         R            pain          4+           5-                     5            5                   L            pain          4+           5-                    5            5                              Hip Flexion    Knee Flexion    Knee Extension    Dorsiflexion    Plantar Flexion  R             4                4+                     5                          4                          5  L              4                4+                    5                          4                          5    Sensation - Light touch/temperature intact throughout    DTR's -             Biceps      Triceps     Brachioradialis      Patellar    Ankle    Toes/plantar response  R             2+             2+                  2+             2+            2+                 Down  L              2+             2+                 2+              2+           2+                 Down    Coordination - Finger to Nose intact b/l. No tremors appreciated    Gait and station - steady gait    LABORATORY:  CBC                       9.7    10.31 )-----------( 343      ( 16 Feb 2023 06:18 )             31.0     Chem 02-16    141  |  104  |  10  ----------------------------<  110<H>  3.3<L>   |  25  |  0.44<L>    Ca    8.5      16 Feb 2023 06:15    TPro  6.6  /  Alb  3.9  /  TBili  0.4  /  DBili  x   /  AST  12  /  ALT  17  /  AlkPhos  53  02-16    LFTs LIVER FUNCTIONS - ( 16 Feb 2023 06:15 )  Alb: 3.9 g/dL / Pro: 6.6 g/dL / ALK PHOS: 53 U/L / ALT: 17 U/L / AST: 12 U/L / GGT: x           Coagulopathy   Lipid Panel   A1c   Cardiac enzymes CARDIAC MARKERS ( 17 Feb 2023 09:49 )  x     / x     / 59 U/L / x     / x          U/A   CSF  Immunological  Other    STUDIES & IMAGING: (EEG, CT, MR, U/S, TTE/AUGUSTINA): Neurology Progress Note    SUBJECTIVE/OBJECTIVE/INTERVAL EVENTS: Patient seen and examined at bedside w/ neuro attending and team.     INTERVAL HISTORY: No acute events overnights.     REVIEW OF SYSTEMS: Otherwise denies fever, chills, headaches, vision changes, blurry vision, double vision, nausea, vomiting, hearing change, focal weakness, focal numbness.  Few questions of a 10-system ROS was performed and is negative except for those items noted above and/or in the HPI.    VITALS & EXAMINATION:  Vital Signs Last 24 Hrs  T(C): 36.8 (17 Feb 2023 13:24), Max: 37.1 (17 Feb 2023 01:22)  T(F): 98.2 (17 Feb 2023 13:24), Max: 98.8 (17 Feb 2023 05:19)  HR: 113 (17 Feb 2023 13:24) (83 - 113)  BP: 154/76 (17 Feb 2023 13:24) (114/69 - 154/76)  BP(mean): --  RR: 18 (17 Feb 2023 13:24) (18 - 20)  SpO2: 98% (17 Feb 2023 13:24) (95% - 99%)    Parameters below as of 17 Feb 2023 13:24  Patient On (Oxygen Delivery Method): room air    General:  Constitutional: Female, appears stated age   Head: Normocephalic; Eyes: clear sclera;   Extremities: No cyanosis; Skin: No nani edema of LE  Resp: breathing comfortably on nasal cannula    EUROLOGICAL EXAM:  Mental status - Awake, Alert, Oriented to person, place, and time. Speech fluent, repetition and naming intact. Follows simple commands. Attention/concentration, recent and remote memory (including registration and recall), and fund of knowledge intact    Cranial nerves - PERRLA, VFF, EOMI, face sensation (V1-V3) intact b/l, facial strength intact without asymmetry b/l, hearing intact b/l, palate with symmetric elevation,   L eye ptosis.  no Diplopia today on upward gaze.   One breath counting to 26 today.   Neck flexion 5+  Neck extension 5+    Motor - Normal bulk and tone throughout. No pronator drift.   Strength testing            Deltoid      Biceps      Triceps          Interossei         R            pain          4+           5                     5            5                   L            pain          4+           5                     5            5                              Hip Flexion    Knee Flexion    Knee Extension    Dorsiflexion    Plantar Flexion  R             4                5                     5                         5                         5  L              4                5                    5                          5                         5    Sensation - Light touch/temperature intact throughout    DTR's -             Biceps      Triceps     Brachioradialis      Patellar    Ankle    Toes/plantar response  R             2+             2+                  2+             2+            2+                 Down  L              2+             2+                 2+              2+           2+                 Down    Coordination - Finger to Nose intact b/l. No tremors appreciated    Gait and station - steady gait    LABORATORY:  CBC                       9.7    10.31 )-----------( 343      ( 16 Feb 2023 06:18 )             31.0     Chem 02-16    141  |  104  |  10  ----------------------------<  110<H>  3.3<L>   |  25  |  0.44<L>    Ca    8.5      16 Feb 2023 06:15    TPro  6.6  /  Alb  3.9  /  TBili  0.4  /  DBili  x   /  AST  12  /  ALT  17  /  AlkPhos  53  02-16    LFTs LIVER FUNCTIONS - ( 16 Feb 2023 06:15 )  Alb: 3.9 g/dL / Pro: 6.6 g/dL / ALK PHOS: 53 U/L / ALT: 17 U/L / AST: 12 U/L / GGT: x           Coagulopathy   Lipid Panel   A1c   Cardiac enzymes CARDIAC MARKERS ( 17 Feb 2023 09:49 )  x     / x     / 59 U/L / x     / x          U/A   CSF  Immunological  Other    STUDIES & IMAGING: (EEG, CT, MR, U/S, TTE/AUGUSTINA):

## 2023-02-17 NOTE — PROGRESS NOTE ADULT - ASSESSMENT
62F PMHx significant for myasthenia gravis who presents to the ED with SOB for 3 days after testing positive for COVID at home. NIF/VC -20/0.68L in ED. Pt placed on oxygen 2L NC. Pt now s/p PLEX x5 sessions and currently on pyridostigmine. Pt continues to improve clinically. CBC showing WBC 11.71, Hgb 9.5. CMP WNL.     IMPRESSION: Myasthenia gravis exacerbation likely secondary to COVID infection.    PLAN:  #Myasthenia Gravis exacerbation i/s/o COVID-19  -Continue with IVIG x 5 days (2/16 - ); premedicate with tylenol and benadryl  -c/w pyridostigmine 120mg tid PO  -c/w prednisone 40mg qd    -f/u NIF and VC Q6h -20/630 this AM   -s/p PLEX 5 sessions (2/2, 2/4, 2/6, 2/8, 2/10)     #Dyspnea  -Budesonide and DUOnebs prn    #Urine/Sputum cx positive  -appreciate ID recs     =s/p Ceftriaxone x5days (2/11 - 2/15)    #HTN  - Amlodipine on hold   - Elevated this AM to  - singular reading, will trend to see if medication management necessary     #Preventive measures  -Cardiac monitoring with tele  -DVT ppx: Lovenox 40mg QD  -GI ppx: Protonix while on steroids   -Diet: Regular  -Dispo: Acute rehab  -Avoid medications like fluoroquinolones macrolides, aminoglycosides chloroquine.   -Consider avoiding anti hypertensives such as beta blockers, CCB. - AMLODIPINE ON HOLD GIVEN THIS CONSIDERATION      Case discussed with neurology attending Dr. Chung  62F PMHx significant for myasthenia gravis who presents to the ED with SOB for 3 days after testing positive for COVID at home. NIF/VC -20/0.68L in ED. Pt placed on oxygen 2L NC. Pt now s/p PLEX x5 sessions and currently on pyridostigmine. Pt continues to improve clinically. CBC showing WBC 11.71, Hgb 9.5. CMP WNL.     IMPRESSION: Myasthenia gravis exacerbation likely secondary to COVID infection.    PLAN:  #Myasthenia Gravis exacerbation i/s/o COVID-19  -Continue with IVIG x 5 days (2/16 - ); premedicate with tylenol and benadryl  -c/w pyridostigmine 120mg tid PO  -c/w prednisone 40mg qd    -f/u NIF and VC Q6h -20/630 this AM   -s/p PLEX 5 sessions (2/2, 2/4, 2/6, 2/8, 2/10)  [] F/U CK and aldolase     #Dyspnea  -Budesonide and DUOnebs prn    #Urine/Sputum cx positive  -appreciate ID recs     =s/p Ceftriaxone x5days (2/11 - 2/15)    #HTN  - Amlodipine on hold   - Elevated this AM to  - singular reading, will trend to see if medication management necessary     #Preventive measures  -Cardiac monitoring with tele  -DVT ppx: Lovenox 40mg QD  -GI ppx: Protonix while on steroids   -Diet: Regular  -Dispo: Acute rehab  -Avoid medications like fluoroquinolones macrolides, aminoglycosides chloroquine.   -Consider avoiding anti hypertensives such as beta blockers, CCB. - AMLODIPINE ON HOLD GIVEN THIS CONSIDERATION      Case discussed with neurology attending Dr. Chung

## 2023-02-17 NOTE — PROGRESS NOTE ADULT - ATTENDING COMMENTS
Started on IVIG and prednisone yesterday. NIF/VC improving and counts to 26 on 1 breath today. UL strength and Neck flexion close to normal. Hip flexors 4+ Gait also improved with use of walker  cont IVIG : d2/5  Prednisone 20mg BID  check ck/aldolase for proximal LE weakness ? steroid myopathy vs. MG

## 2023-02-18 LAB
ALBUMIN SERPL ELPH-MCNC: 4.2 G/DL — SIGNIFICANT CHANGE UP (ref 3.3–5)
ALP SERPL-CCNC: 58 U/L — SIGNIFICANT CHANGE UP (ref 40–120)
ALT FLD-CCNC: 19 U/L — SIGNIFICANT CHANGE UP (ref 10–45)
ANION GAP SERPL CALC-SCNC: 12 MMOL/L — SIGNIFICANT CHANGE UP (ref 5–17)
AST SERPL-CCNC: 12 U/L — SIGNIFICANT CHANGE UP (ref 10–40)
BILIRUB SERPL-MCNC: 0.4 MG/DL — SIGNIFICANT CHANGE UP (ref 0.2–1.2)
BUN SERPL-MCNC: 16 MG/DL — SIGNIFICANT CHANGE UP (ref 7–23)
CALCIUM SERPL-MCNC: 9.1 MG/DL — SIGNIFICANT CHANGE UP (ref 8.4–10.5)
CHLORIDE SERPL-SCNC: 103 MMOL/L — SIGNIFICANT CHANGE UP (ref 96–108)
CO2 SERPL-SCNC: 26 MMOL/L — SIGNIFICANT CHANGE UP (ref 22–31)
CREAT SERPL-MCNC: 0.45 MG/DL — LOW (ref 0.5–1.3)
EGFR: 108 ML/MIN/1.73M2 — SIGNIFICANT CHANGE UP
GLUCOSE BLDC GLUCOMTR-MCNC: 137 MG/DL — HIGH (ref 70–99)
GLUCOSE BLDC GLUCOMTR-MCNC: 169 MG/DL — HIGH (ref 70–99)
GLUCOSE BLDC GLUCOMTR-MCNC: 182 MG/DL — HIGH (ref 70–99)
GLUCOSE BLDC GLUCOMTR-MCNC: 206 MG/DL — HIGH (ref 70–99)
GLUCOSE SERPL-MCNC: 144 MG/DL — HIGH (ref 70–99)
HCT VFR BLD CALC: 30.8 % — LOW (ref 34.5–45)
HGB BLD-MCNC: 9.7 G/DL — LOW (ref 11.5–15.5)
MCHC RBC-ENTMCNC: 29.5 PG — SIGNIFICANT CHANGE UP (ref 27–34)
MCHC RBC-ENTMCNC: 31.5 GM/DL — LOW (ref 32–36)
MCV RBC AUTO: 93.6 FL — SIGNIFICANT CHANGE UP (ref 80–100)
NRBC # BLD: 0 /100 WBCS — SIGNIFICANT CHANGE UP (ref 0–0)
PLATELET # BLD AUTO: 323 K/UL — SIGNIFICANT CHANGE UP (ref 150–400)
POTASSIUM SERPL-MCNC: 3.3 MMOL/L — LOW (ref 3.5–5.3)
POTASSIUM SERPL-SCNC: 3.3 MMOL/L — LOW (ref 3.5–5.3)
PROT SERPL-MCNC: 8 G/DL — SIGNIFICANT CHANGE UP (ref 6–8.3)
RBC # BLD: 3.29 M/UL — LOW (ref 3.8–5.2)
RBC # FLD: 15 % — HIGH (ref 10.3–14.5)
SODIUM SERPL-SCNC: 141 MMOL/L — SIGNIFICANT CHANGE UP (ref 135–145)
WBC # BLD: 7.46 K/UL — SIGNIFICANT CHANGE UP (ref 3.8–10.5)
WBC # FLD AUTO: 7.46 K/UL — SIGNIFICANT CHANGE UP (ref 3.8–10.5)

## 2023-02-18 PROCEDURE — 99232 SBSQ HOSP IP/OBS MODERATE 35: CPT

## 2023-02-18 RX ORDER — AMLODIPINE BESYLATE 2.5 MG/1
10 TABLET ORAL ONCE
Refills: 0 | Status: COMPLETED | OUTPATIENT
Start: 2023-02-18 | End: 2023-02-18

## 2023-02-18 RX ADMIN — Medication 4: at 16:33

## 2023-02-18 RX ADMIN — Medication 40 MILLIGRAM(S): at 07:53

## 2023-02-18 RX ADMIN — NYSTATIN CREAM 1 APPLICATION(S): 100000 CREAM TOPICAL at 06:15

## 2023-02-18 RX ADMIN — PANTOPRAZOLE SODIUM 40 MILLIGRAM(S): 20 TABLET, DELAYED RELEASE ORAL at 05:14

## 2023-02-18 RX ADMIN — Medication 2: at 11:38

## 2023-02-18 RX ADMIN — IMMUNE GLOBULIN (HUMAN) 41.67 GRAM(S): 10 INJECTION INTRAVENOUS; SUBCUTANEOUS at 12:16

## 2023-02-18 RX ADMIN — PYRIDOSTIGMINE BROMIDE 120 MILLIGRAM(S): 60 SOLUTION ORAL at 21:14

## 2023-02-18 RX ADMIN — Medication 3 MILLILITER(S): at 17:38

## 2023-02-18 RX ADMIN — Medication 650 MILLIGRAM(S): at 11:47

## 2023-02-18 RX ADMIN — Medication 0.25 MILLIGRAM(S): at 17:33

## 2023-02-18 RX ADMIN — Medication 2: at 21:41

## 2023-02-18 RX ADMIN — NYSTATIN CREAM 1 APPLICATION(S): 100000 CREAM TOPICAL at 17:16

## 2023-02-18 RX ADMIN — OXYCODONE HYDROCHLORIDE 5 MILLIGRAM(S): 5 TABLET ORAL at 05:14

## 2023-02-18 RX ADMIN — Medication 3 MILLILITER(S): at 11:38

## 2023-02-18 RX ADMIN — OXYCODONE HYDROCHLORIDE 5 MILLIGRAM(S): 5 TABLET ORAL at 07:52

## 2023-02-18 RX ADMIN — PYRIDOSTIGMINE BROMIDE 120 MILLIGRAM(S): 60 SOLUTION ORAL at 07:53

## 2023-02-18 RX ADMIN — Medication 650 MILLIGRAM(S): at 11:38

## 2023-02-18 RX ADMIN — Medication 3 MILLILITER(S): at 05:13

## 2023-02-18 RX ADMIN — Medication 50 MILLIGRAM(S): at 11:39

## 2023-02-18 RX ADMIN — Medication 0.25 MILLIGRAM(S): at 05:13

## 2023-02-18 RX ADMIN — PYRIDOSTIGMINE BROMIDE 120 MILLIGRAM(S): 60 SOLUTION ORAL at 13:29

## 2023-02-18 RX ADMIN — AMLODIPINE BESYLATE 10 MILLIGRAM(S): 2.5 TABLET ORAL at 17:42

## 2023-02-18 RX ADMIN — ENOXAPARIN SODIUM 40 MILLIGRAM(S): 100 INJECTION SUBCUTANEOUS at 17:36

## 2023-02-18 RX ADMIN — CHLORHEXIDINE GLUCONATE 1 APPLICATION(S): 213 SOLUTION TOPICAL at 06:15

## 2023-02-18 NOTE — PROGRESS NOTE ADULT - ASSESSMENT
62F PMHx significant for myasthenia gravis who presents to the ED with SOB for 3 days after testing positive for COVID at home. NIF/VC -20/0.68L in ED. Pt placed on oxygen 2L NC. Pt now s/p PLEX x5 sessions and currently on pyridostigmine. Pt continues to improve clinically. CBC showing WBC 11.71, Hgb 9.5. CMP WNL. CK wnl.     IMPRESSION: Myasthenia gravis exacerbation likely secondary to COVID infection.    PLAN:  #Myasthenia Gravis exacerbation i/s/o COVID-19  -Continue with IVIG x 5 days, day 3/5 (2/16 - ); premedicate with tylenol and benadryl  -c/w pyridostigmine 120mg tid PO  -c/w prednisone 40mg qd    -f/u NIF and VC Q6h -20/630 this AM   -s/p PLEX 5 sessions (2/2, 2/4, 2/6, 2/8, 2/10)  [] F/U aldolase     #Dyspnea  -Budesonide and DUOnebs prn    #Urine/Sputum cx positive  -appreciate ID recs     =s/p Ceftriaxone x5days (2/11 - 2/15)    #HTN  - Amlodipine on hold   - Elevated this AM to  - singular reading, will trend to see if medication management necessary     #Preventive measures  -Cardiac monitoring with tele  -DVT ppx: Lovenox 40mg QD  -GI ppx: Protonix while on steroids   -Diet: Regular  -Dispo: Acute rehab  -Avoid medications like fluoroquinolones macrolides, aminoglycosides chloroquine.   -Consider avoiding anti hypertensives such as beta blockers, CCB. - AMLODIPINE ON HOLD GIVEN THIS CONSIDERATION

## 2023-02-18 NOTE — PROGRESS NOTE ADULT - ATTENDING COMMENTS
Started on IVIG and prednisone yesterday. NIF/VC improving and counts to 26 on 1 breath today. UL strength and Neck flexion close to normal. Hip flexors 4+ Gait also improved with use of walker  Contine IVIG : Today 3/5  Prednisone 20mg BID  check ck/aldolase for proximal LE weakness ? steroid myopathy vs. MG  Her blood pressure has been elevated since amlodipine was held, will restart (as we can monitor her here) and check with cardiology for possible alternative in setting of MG.

## 2023-02-18 NOTE — PROGRESS NOTE ADULT - SUBJECTIVE AND OBJECTIVE BOX
SUBJECTIVE: Patient seen and examined at bedside this AM. No acute events overnight. States she had some difficulty sleeping due to cough but feels better during the daytime.     [facility]  #[id number]    INTERVAL HISTORY: Receiving IVIG, today is day 3.      ROS: As above, otherwise negative.       MEDICATIONS:  MEDICATIONS  (STANDING):  albuterol/ipratropium for Nebulization 3 milliLiter(s) Nebulizer every 6 hours  buDESOnide    Inhalation Suspension 0.25 milliGRAM(s) Inhalation every 12 hours  chlorhexidine 4% Liquid 1 Application(s) Topical <User Schedule>  enoxaparin Injectable 40 milliGRAM(s) SubCutaneous <User Schedule>  immune   globulin 10% (GAMMAGARD) IVPB 25 Gram(s) IV Intermittent daily  insulin lispro (ADMELOG) corrective regimen sliding scale   SubCutaneous Before meals and at bedtime  nystatin Powder 1 Application(s) Topical two times a day  pantoprazole    Tablet 40 milliGRAM(s) Oral before breakfast  polyethylene glycol 3350 17 Gram(s) Oral daily  predniSONE   Tablet 40 milliGRAM(s) Oral daily  psyllium Powder 1 Packet(s) Oral every 8 hours  pyridostigmine 120 milliGRAM(s) Oral every 8 hours  senna 2 Tablet(s) Oral at bedtime    MEDICATIONS  (PRN):  acetaminophen     Tablet .. 650 milliGRAM(s) Oral every 6 hours PRN Temp greater or equal to 38C (100.4F), Mild Pain (1 - 3)  diphenhydrAMINE 50 milliGRAM(s) Oral every 4 hours PRN Rash and/or Itching  oxyCODONE    IR 5 milliGRAM(s) Oral every 6 hours PRN Moderate Pain (4 - 6)  oxyCODONE    IR 10 milliGRAM(s) Oral every 4 hours PRN Severe Pain (7 - 10)      VITALS & EXAMINATION:  Vital Signs Last 24 Hrs  T(C): 36.7 (18 Feb 2023 12:40), Max: 36.8 (18 Feb 2023 00:35)  T(F): 98 (18 Feb 2023 12:40), Max: 98.3 (18 Feb 2023 08:33)  HR: 92 (18 Feb 2023 12:40) (90 - 117)  BP: 161/79 (18 Feb 2023 12:40) (161/79 - 184/82)  RR: 18 (18 Feb 2023 12:40) (18 - 18)  SpO2: 95% (18 Feb 2023 12:40) (95% - 98%)    Parameters below as of 18 Feb 2023 12:40  Patient On (Oxygen Delivery Method): room air      General:  Constitutional: Female, seated at bedside chair, appears stated age, NAD.   Head: Normocephalic; Eyes: clear sclera;     NEUROLOGICAL EXAM:  Mental status - Awake, Alert, Oriented to person, place, and time. Follows all commands.     Speech/Language: Clear, fluent.     Cranial nerves - PERRL, VFF, EOMI. Mild ptosis OS. No diplopia. Facial sensation (V1-V3) intact b/l, facial strength intact without asymmetry b/l, hearing intact to conversation b/l, palate with symmetric elevation.   Neck flexion 5  Neck extension 5    Motor - Normal bulk and tone throughout. No pronator drift.              Deltoid	Biceps	Triceps	   R	4+	4+	4+	5	 	  L	4+	4+	4+	5	    	H-Flex	D-Flex	P-Flex  R	4+	5	5	 	   L	4+	5	5	     DTR's -             Biceps      Triceps     Brachioradialis      Patellar    Ankle    Toes/plantar response  R             2+             2+                  2+             2+            2+                 Down  L              2+             2+                 2+              2+           2+                 Down    Coordination/Gait: Not assessed.     NIF/VC 2/18: -50/710    LABS:  CBC                       9.7    7.46  )-----------( 323      ( 18 Feb 2023 07:21 )             30.8     Chem 02-18    141  |  103  |  16  ----------------------------<  144<H>  3.3<L>   |  26  |  0.45<L>    Ca    9.1      18 Feb 2023 07:19    TPro  8.0  /  Alb  4.2  /  TBili  0.4  /  DBili  x   /  AST  12  /  ALT  19  /  AlkPhos  58  02-18    LFTs LIVER FUNCTIONS - ( 18 Feb 2023 07:19 )  Alb: 4.2 g/dL / Pro: 8.0 g/dL / ALK PHOS: 58 U/L / ALT: 19 U/L / AST: 12 U/L / GGT: x           Coagulopathy   Lipid Panel   A1c   Cardiac enzymes CARDIAC MARKERS ( 17 Feb 2023 09:49 )  x     / x     / 59 U/L / x     / x                STUDIES & IMAGING:    Studies (EKG, EEG, EMG, etc):       Radiology (XR, CT, MR, U/S, TTE/[place]INA):     SUBJECTIVE: Patient seen and examined at bedside this AM. No acute events overnight. States she had some difficulty sleeping due to cough but feels better during the daytime.     [facility]  #[id number]    INTERVAL HISTORY: Receiving IVIG, today is day 3.      ROS: As above, otherwise negative.       MEDICATIONS:  MEDICATIONS  (STANDING):  albuterol/ipratropium for Nebulization 3 milliLiter(s) Nebulizer every 6 hours  buDESOnide    Inhalation Suspension 0.25 milliGRAM(s) Inhalation every 12 hours  chlorhexidine 4% Liquid 1 Application(s) Topical <User Schedule>  enoxaparin Injectable 40 milliGRAM(s) SubCutaneous <User Schedule>  immune   globulin 10% (GAMMAGARD) IVPB 25 Gram(s) IV Intermittent daily  insulin lispro (ADMELOG) corrective regimen sliding scale   SubCutaneous Before meals and at bedtime  nystatin Powder 1 Application(s) Topical two times a day  pantoprazole    Tablet 40 milliGRAM(s) Oral before breakfast  polyethylene glycol 3350 17 Gram(s) Oral daily  predniSONE   Tablet 40 milliGRAM(s) Oral daily  psyllium Powder 1 Packet(s) Oral every 8 hours  pyridostigmine 120 milliGRAM(s) Oral every 8 hours  senna 2 Tablet(s) Oral at bedtime    MEDICATIONS  (PRN):  acetaminophen     Tablet .. 650 milliGRAM(s) Oral every 6 hours PRN Temp greater or equal to 38C (100.4F), Mild Pain (1 - 3)  diphenhydrAMINE 50 milliGRAM(s) Oral every 4 hours PRN Rash and/or Itching  oxyCODONE    IR 5 milliGRAM(s) Oral every 6 hours PRN Moderate Pain (4 - 6)  oxyCODONE    IR 10 milliGRAM(s) Oral every 4 hours PRN Severe Pain (7 - 10)      VITALS & EXAMINATION:  Vital Signs Last 24 Hrs  T(C): 36.7 (18 Feb 2023 12:40), Max: 36.8 (18 Feb 2023 00:35)  T(F): 98 (18 Feb 2023 12:40), Max: 98.3 (18 Feb 2023 08:33)  HR: 92 (18 Feb 2023 12:40) (90 - 117)  BP: 161/79 (18 Feb 2023 12:40) (161/79 - 184/82)  RR: 18 (18 Feb 2023 12:40) (18 - 18)  SpO2: 95% (18 Feb 2023 12:40) (95% - 98%)    Parameters below as of 18 Feb 2023 12:40  Patient On (Oxygen Delivery Method): room air      General:  Constitutional: Female, seated at bedside chair, appears stated age, NAD.   Head: Normocephalic; Eyes: clear sclera;     NEUROLOGICAL EXAM:  Mental status - Awake, Alert, Oriented to person, place, and time. Follows all commands.     Speech/Language: Clear, fluent.     Cranial nerves - PERRL, VFF, EOMI. Mild ptosis OS. No diplopia. Facial sensation (V1-V3) intact b/l, facial strength intact without asymmetry b/l, hearing intact to conversation b/l, palate with symmetric elevation.   Neck flexion 5  Neck extension 5    Motor - Normal bulk and tone throughout. No pronator drift.              Deltoid	Biceps	Triceps	   R	4+	4+	4+	5	 	  L	4+	4+	4+	5	    	H-Flex	D-Flex	P-Flex  R	4+	5	5	 	   L	4+	5	5	     DTR's -             Biceps      Triceps     Brachioradialis      Patellar    Ankle    Toes/plantar response  R             2+             2+                  2+             2+            2+                 Down  L              2+             2+                 2+              2+           2+                 Down    Coordination/Gait: Not assessed.     NIF/VC 2/18: -50/710    LABS:  CBC                       9.7    7.46  )-----------( 323      ( 18 Feb 2023 07:21 )             30.8     Chem 02-18    141  |  103  |  16  ----------------------------<  144<H>  3.3<L>   |  26  |  0.45<L>    Ca    9.1      18 Feb 2023 07:19    TPro  8.0  /  Alb  4.2  /  TBili  0.4  /  DBili  x   /  AST  12  /  ALT  19  /  AlkPhos  58  02-18    LFTs LIVER FUNCTIONS - ( 18 Feb 2023 07:19 )  Alb: 4.2 g/dL / Pro: 8.0 g/dL / ALK PHOS: 58 U/L / ALT: 19 U/L / AST: 12 U/L / GGT: x           Coagulopathy   Lipid Panel   A1c   Cardiac enzymes CARDIAC MARKERS ( 17 Feb 2023 09:49 )  x     / x     / 59 U/L / x     / x                STUDIES & IMAGING:    Studies (EKG, EEG, EMG, etc):       Radiology (XR, CT, MR, U/S, TTE/[place]INA):     SUBJECTIVE: Patient seen and examined at bedside this AM. No acute events overnight. States she had some difficulty sleeping due to cough but feels better during the daytime.     [facility]  #[id number]    INTERVAL HISTORY: Receiving IVIG, today is day 3.      ROS: As above, otherwise negative.       MEDICATIONS:  MEDICATIONS  (STANDING):  albuterol/ipratropium for Nebulization 3 milliLiter(s) Nebulizer every 6 hours  buDESOnide    Inhalation Suspension 0.25 milliGRAM(s) Inhalation every 12 hours  chlorhexidine 4% Liquid 1 Application(s) Topical <User Schedule>  enoxaparin Injectable 40 milliGRAM(s) SubCutaneous <User Schedule>  immune   globulin 10% (GAMMAGARD) IVPB 25 Gram(s) IV Intermittent daily  insulin lispro (ADMELOG) corrective regimen sliding scale   SubCutaneous Before meals and at bedtime  nystatin Powder 1 Application(s) Topical two times a day  pantoprazole    Tablet 40 milliGRAM(s) Oral before breakfast  polyethylene glycol 3350 17 Gram(s) Oral daily  predniSONE   Tablet 40 milliGRAM(s) Oral daily  psyllium Powder 1 Packet(s) Oral every 8 hours  pyridostigmine 120 milliGRAM(s) Oral every 8 hours  senna 2 Tablet(s) Oral at bedtime    MEDICATIONS  (PRN):  acetaminophen     Tablet .. 650 milliGRAM(s) Oral every 6 hours PRN Temp greater or equal to 38C (100.4F), Mild Pain (1 - 3)  diphenhydrAMINE 50 milliGRAM(s) Oral every 4 hours PRN Rash and/or Itching  oxyCODONE    IR 5 milliGRAM(s) Oral every 6 hours PRN Moderate Pain (4 - 6)  oxyCODONE    IR 10 milliGRAM(s) Oral every 4 hours PRN Severe Pain (7 - 10)      VITALS & EXAMINATION:  Vital Signs Last 24 Hrs  T(C): 36.7 (18 Feb 2023 12:40), Max: 36.8 (18 Feb 2023 00:35)  T(F): 98 (18 Feb 2023 12:40), Max: 98.3 (18 Feb 2023 08:33)  HR: 92 (18 Feb 2023 12:40) (90 - 117)  BP: 161/79 (18 Feb 2023 12:40) (161/79 - 184/82)  RR: 18 (18 Feb 2023 12:40) (18 - 18)  SpO2: 95% (18 Feb 2023 12:40) (95% - 98%)    Parameters below as of 18 Feb 2023 12:40  Patient On (Oxygen Delivery Method): room air      General:  Constitutional: Female, seated at bedside chair, appears stated age, NAD.   Head: Normocephalic; Eyes: clear sclera;     NEUROLOGICAL EXAM:  Mental status - Awake, Alert, Oriented to person, place, and time. Follows all commands.     Speech/Language: Clear, fluent.     Cranial nerves - PERRL, VFF, EOMI. Mild ptosis OS. No diplopia. Facial sensation (V1-V3) intact b/l, facial strength intact without asymmetry b/l, hearing intact to conversation b/l, palate with symmetric elevation.   Neck flexion 5  Neck extension 5    Motor - Normal bulk and tone throughout. No pronator drift.              Deltoid	Biceps	Triceps	   R	4+	4+	4+	5	 	  L	4+	4+	4+	5	    	H-Flex	D-Flex	P-Flex  R	4+	5	5	 	   L	4+	5	5	     DTR's -             Biceps      Triceps     Brachioradialis      Patellar    Ankle    Toes/plantar response  R             2+             2+                  2+             2+            2+                 Down  L              2+             2+                 2+              2+           2+                 Down    Coordination/Gait: Not assessed.     NIF/VC 2/18: -50/710    LABS:  CBC                       9.7    7.46  )-----------( 323      ( 18 Feb 2023 07:21 )             30.8     Chem 02-18    141  |  103  |  16  ----------------------------<  144<H>  3.3<L>   |  26  |  0.45<L>    Ca    9.1      18 Feb 2023 07:19    TPro  8.0  /  Alb  4.2  /  TBili  0.4  /  DBili  x   /  AST  12  /  ALT  19  /  AlkPhos  58  02-18    LFTs LIVER FUNCTIONS - ( 18 Feb 2023 07:19 )  Alb: 4.2 g/dL / Pro: 8.0 g/dL / ALK PHOS: 58 U/L / ALT: 19 U/L / AST: 12 U/L / GGT: x           Coagulopathy   Lipid Panel   A1c   Cardiac enzymes CARDIAC MARKERS ( 17 Feb 2023 09:49 )  x     / x     / 59 U/L / x     / x                STUDIES & IMAGING:    Studies (EKG, EEG, EMG, etc):       Radiology (XR, CT, MR, U/S, TTE/[place]):     SUBJECTIVE: Patient seen and examined at bedside this AM. No acute events overnight. States she had some difficulty sleeping due to cough but feels better during the daytime.     Language line solutions - [facility]  ID #[id number]    INTERVAL HISTORY: Receiving IVIG, today is day 3.      ROS: As above, otherwise negative.       MEDICATIONS:  MEDICATIONS  (STANDING):  albuterol/ipratropium for Nebulization 3 milliLiter(s) Nebulizer every 6 hours  buDESOnide    Inhalation Suspension 0.25 milliGRAM(s) Inhalation every 12 hours  chlorhexidine 4% Liquid 1 Application(s) Topical <User Schedule>  enoxaparin Injectable 40 milliGRAM(s) SubCutaneous <User Schedule>  immune   globulin 10% (GAMMAGARD) IVPB 25 Gram(s) IV Intermittent daily  insulin lispro (ADMELOG) corrective regimen sliding scale   SubCutaneous Before meals and at bedtime  nystatin Powder 1 Application(s) Topical two times a day  pantoprazole    Tablet 40 milliGRAM(s) Oral before breakfast  polyethylene glycol 3350 17 Gram(s) Oral daily  predniSONE   Tablet 40 milliGRAM(s) Oral daily  psyllium Powder 1 Packet(s) Oral every 8 hours  pyridostigmine 120 milliGRAM(s) Oral every 8 hours  senna 2 Tablet(s) Oral at bedtime    MEDICATIONS  (PRN):  acetaminophen     Tablet .. 650 milliGRAM(s) Oral every 6 hours PRN Temp greater or equal to 38C (100.4F), Mild Pain (1 - 3)  diphenhydrAMINE 50 milliGRAM(s) Oral every 4 hours PRN Rash and/or Itching  oxyCODONE    IR 5 milliGRAM(s) Oral every 6 hours PRN Moderate Pain (4 - 6)  oxyCODONE    IR 10 milliGRAM(s) Oral every 4 hours PRN Severe Pain (7 - 10)      VITALS & EXAMINATION:  Vital Signs Last 24 Hrs  T(C): 36.7 (18 Feb 2023 12:40), Max: 36.8 (18 Feb 2023 00:35)  T(F): 98 (18 Feb 2023 12:40), Max: 98.3 (18 Feb 2023 08:33)  HR: 92 (18 Feb 2023 12:40) (90 - 117)  BP: 161/79 (18 Feb 2023 12:40) (161/79 - 184/82)  RR: 18 (18 Feb 2023 12:40) (18 - 18)  SpO2: 95% (18 Feb 2023 12:40) (95% - 98%)    Parameters below as of 18 Feb 2023 12:40  Patient On (Oxygen Delivery Method): room air      General:  Constitutional: Female, seated at bedside chair, appears stated age, NAD.   Head: Normocephalic; Eyes: clear sclera;     NEUROLOGICAL EXAM:  Mental status - Awake, Alert, Oriented to person, place, and time. Follows all commands.     Speech/Language: Clear, fluent.     Cranial nerves - PERRL, VFF, EOMI. Mild ptosis OS. No diplopia. Facial sensation (V1-V3) intact b/l, facial strength intact without asymmetry b/l, hearing intact to conversation b/l, palate with symmetric elevation.   Neck flexion 5  Neck extension 5    Motor - Normal bulk and tone throughout. No pronator drift.              Deltoid	Biceps	Triceps	   R	4+	4+	4+	5	 	  L	4+	4+	4+	5	    	H-Flex	D-Flex	P-Flex  R	4+	5	5	 	   L	4+	5	5	     DTR's -             Biceps      Triceps     Brachioradialis      Patellar    Ankle    Toes/plantar response  R             2+             2+                  2+             2+            2+                 Down  L              2+             2+                 2+              2+           2+                 Down    Coordination/Gait: Not assessed.     NIF/VC 2/18: -50/710    LABS:  CBC                       9.7    7.46  )-----------( 323      ( 18 Feb 2023 07:21 )             30.8     Chem 02-18    141  |  103  |  16  ----------------------------<  144<H>  3.3<L>   |  26  |  0.45<L>    Ca    9.1      18 Feb 2023 07:19    TPro  8.0  /  Alb  4.2  /  TBili  0.4  /  DBili  x   /  AST  12  /  ALT  19  /  AlkPhos  58  02-18    LFTs LIVER FUNCTIONS - ( 18 Feb 2023 07:19 )  Alb: 4.2 g/dL / Pro: 8.0 g/dL / ALK PHOS: 58 U/L / ALT: 19 U/L / AST: 12 U/L / GGT: x           Coagulopathy   Lipid Panel   A1c   Cardiac enzymes CARDIAC MARKERS ( 17 Feb 2023 09:49 )  x     / x     / 59 U/L / x     / x                STUDIES & IMAGING:    Studies (EKG, EEG, EMG, etc):       Radiology (XR, CT, MR, U/S, TTE/[place]):     SUBJECTIVE: Patient seen and examined at bedside this AM. No acute events overnight. States she had some difficulty sleeping due to cough but feels better during the daytime.     Language line solutions - [facility]  ID #[id number]    INTERVAL HISTORY: Receiving IVIG, today is day 3.      ROS: As above, otherwise negative.       MEDICATIONS:  MEDICATIONS  (STANDING):  albuterol/ipratropium for Nebulization 3 milliLiter(s) Nebulizer every 6 hours  buDESOnide    Inhalation Suspension 0.25 milliGRAM(s) Inhalation every 12 hours  chlorhexidine 4% Liquid 1 Application(s) Topical <User Schedule>  enoxaparin Injectable 40 milliGRAM(s) SubCutaneous <User Schedule>  immune   globulin 10% (GAMMAGARD) IVPB 25 Gram(s) IV Intermittent daily  insulin lispro (ADMELOG) corrective regimen sliding scale   SubCutaneous Before meals and at bedtime  nystatin Powder 1 Application(s) Topical two times a day  pantoprazole    Tablet 40 milliGRAM(s) Oral before breakfast  polyethylene glycol 3350 17 Gram(s) Oral daily  predniSONE   Tablet 40 milliGRAM(s) Oral daily  psyllium Powder 1 Packet(s) Oral every 8 hours  pyridostigmine 120 milliGRAM(s) Oral every 8 hours  senna 2 Tablet(s) Oral at bedtime    MEDICATIONS  (PRN):  acetaminophen     Tablet .. 650 milliGRAM(s) Oral every 6 hours PRN Temp greater or equal to 38C (100.4F), Mild Pain (1 - 3)  diphenhydrAMINE 50 milliGRAM(s) Oral every 4 hours PRN Rash and/or Itching  oxyCODONE    IR 5 milliGRAM(s) Oral every 6 hours PRN Moderate Pain (4 - 6)  oxyCODONE    IR 10 milliGRAM(s) Oral every 4 hours PRN Severe Pain (7 - 10)      VITALS & EXAMINATION:  Vital Signs Last 24 Hrs  T(C): 36.7 (18 Feb 2023 12:40), Max: 36.8 (18 Feb 2023 00:35)  T(F): 98 (18 Feb 2023 12:40), Max: 98.3 (18 Feb 2023 08:33)  HR: 92 (18 Feb 2023 12:40) (90 - 117)  BP: 161/79 (18 Feb 2023 12:40) (161/79 - 184/82)  RR: 18 (18 Feb 2023 12:40) (18 - 18)  SpO2: 95% (18 Feb 2023 12:40) (95% - 98%)    Parameters below as of 18 Feb 2023 12:40  Patient On (Oxygen Delivery Method): room air      General:  Constitutional: Female, seated at bedside chair, appears stated age, NAD.   Head: Normocephalic; Eyes: clear sclera;     NEUROLOGICAL EXAM:  Mental status - Awake, Alert, Oriented to person, place, and time. Follows all commands.     Speech/Language: Clear, fluent.     Cranial nerves - PERRL, VFF, EOMI. Mild ptosis OS. No diplopia. Facial sensation (V1-V3) intact b/l, facial strength intact without asymmetry b/l, hearing intact to conversation b/l, palate with symmetric elevation.   Neck flexion 5  Neck extension 5    Motor - Normal bulk and tone throughout. No pronator drift.              Deltoid	Biceps	Triceps	   R	4+	4+	4+	5	 	  L	4+	4+	4+	5	    	H-Flex	D-Flex	P-Flex  R	4+	5	5	 	   L	4+	5	5	     DTR's -             Biceps      Triceps     Brachioradialis      Patellar    Ankle    Toes/plantar response  R             2+             2+                  2+             2+            2+                 Down  L              2+             2+                 2+              2+           2+                 Down    Coordination/Gait: Not assessed.     NIF/VC 2/18: -50/710    LABS:  CBC                       9.7    7.46  )-----------( 323      ( 18 Feb 2023 07:21 )             30.8     Chem 02-18    141  |  103  |  16  ----------------------------<  144<H>  3.3<L>   |  26  |  0.45<L>    Ca    9.1      18 Feb 2023 07:19    TPro  8.0  /  Alb  4.2  /  TBili  0.4  /  DBili  x   /  AST  12  /  ALT  19  /  AlkPhos  58  02-18    LFTs LIVER FUNCTIONS - ( 18 Feb 2023 07:19 )  Alb: 4.2 g/dL / Pro: 8.0 g/dL / ALK PHOS: 58 U/L / ALT: 19 U/L / AST: 12 U/L / GGT: x           Coagulopathy   Lipid Panel   A1c   Cardiac enzymes CARDIAC MARKERS ( 17 Feb 2023 09:49 )  x     / x     / 59 U/L / x     / x                STUDIES & IMAGING:    Studies (EKG, EEG, EMG, etc):       Radiology (XR, CT, MR, U/S, TTE/[place]):     SUBJECTIVE: Patient seen and examined at bedside this AM. No acute events overnight. States she had some difficulty sleeping due to cough but feels better during the daytime.     Language line solutions - [facility]  ID #[id number]    INTERVAL HISTORY: Receiving IVIG, today is day 3.      ROS: As above, otherwise negative.       MEDICATIONS:  MEDICATIONS  (STANDING):  albuterol/ipratropium for Nebulization 3 milliLiter(s) Nebulizer every 6 hours  buDESOnide    Inhalation Suspension 0.25 milliGRAM(s) Inhalation every 12 hours  chlorhexidine 4% Liquid 1 Application(s) Topical <User Schedule>  enoxaparin Injectable 40 milliGRAM(s) SubCutaneous <User Schedule>  immune   globulin 10% (GAMMAGARD) IVPB 25 Gram(s) IV Intermittent daily  insulin lispro (ADMELOG) corrective regimen sliding scale   SubCutaneous Before meals and at bedtime  nystatin Powder 1 Application(s) Topical two times a day  pantoprazole    Tablet 40 milliGRAM(s) Oral before breakfast  polyethylene glycol 3350 17 Gram(s) Oral daily  predniSONE   Tablet 40 milliGRAM(s) Oral daily  psyllium Powder 1 Packet(s) Oral every 8 hours  pyridostigmine 120 milliGRAM(s) Oral every 8 hours  senna 2 Tablet(s) Oral at bedtime    MEDICATIONS  (PRN):  acetaminophen     Tablet .. 650 milliGRAM(s) Oral every 6 hours PRN Temp greater or equal to 38C (100.4F), Mild Pain (1 - 3)  diphenhydrAMINE 50 milliGRAM(s) Oral every 4 hours PRN Rash and/or Itching  oxyCODONE    IR 5 milliGRAM(s) Oral every 6 hours PRN Moderate Pain (4 - 6)  oxyCODONE    IR 10 milliGRAM(s) Oral every 4 hours PRN Severe Pain (7 - 10)      VITALS & EXAMINATION:  Vital Signs Last 24 Hrs  T(C): 36.7 (18 Feb 2023 12:40), Max: 36.8 (18 Feb 2023 00:35)  T(F): 98 (18 Feb 2023 12:40), Max: 98.3 (18 Feb 2023 08:33)  HR: 92 (18 Feb 2023 12:40) (90 - 117)  BP: 161/79 (18 Feb 2023 12:40) (161/79 - 184/82)  RR: 18 (18 Feb 2023 12:40) (18 - 18)  SpO2: 95% (18 Feb 2023 12:40) (95% - 98%)    Parameters below as of 18 Feb 2023 12:40  Patient On (Oxygen Delivery Method): room air      General:  Constitutional: Female, seated at bedside chair, appears stated age, NAD.   Head: Normocephalic; Eyes: clear sclera;     NEUROLOGICAL EXAM:  Mental status - Awake, Alert, Oriented to person, place, and time. Follows all commands.     Speech/Language: Clear, fluent.     Cranial nerves - PERRL, VFF, EOMI. Mild ptosis OS. No diplopia. Facial sensation (V1-V3) intact b/l, facial strength intact without asymmetry b/l, hearing intact to conversation b/l, palate with symmetric elevation.   Neck flexion 5  Neck extension 5    Motor - Normal bulk and tone throughout. No pronator drift.              Deltoid	Biceps	Triceps	   R	4+	4+	4+	5	 	  L	4+	4+	4+	5	    	H-Flex	D-Flex	P-Flex  R	4+	5	5	 	   L	4+	5	5	     DTR's -             Biceps      Triceps     Brachioradialis      Patellar    Ankle    Toes/plantar response  R             2+             2+                  2+             2+            2+                 Down  L              2+             2+                 2+              2+           2+                 Down    Coordination/Gait: Not assessed.     NIF/VC 2/18: -50/710    LABS:  CBC                       9.7    7.46  )-----------( 323      ( 18 Feb 2023 07:21 )             30.8     Chem 02-18    141  |  103  |  16  ----------------------------<  144<H>  3.3<L>   |  26  |  0.45<L>    Ca    9.1      18 Feb 2023 07:19    TPro  8.0  /  Alb  4.2  /  TBili  0.4  /  DBili  x   /  AST  12  /  ALT  19  /  AlkPhos  58  02-18    LFTs LIVER FUNCTIONS - ( 18 Feb 2023 07:19 )  Alb: 4.2 g/dL / Pro: 8.0 g/dL / ALK PHOS: 58 U/L / ALT: 19 U/L / AST: 12 U/L / GGT: x           Coagulopathy   Lipid Panel   A1c   Cardiac enzymes CARDIAC MARKERS ( 17 Feb 2023 09:49 )  x     / x     / 59 U/L / x     / x                STUDIES & IMAGING:    Studies (EKG, EEG, EMG, etc):       Radiology (XR, CT, MR, U/S, TTE/[place]):

## 2023-02-19 LAB
ANION GAP SERPL CALC-SCNC: 14 MMOL/L — SIGNIFICANT CHANGE UP (ref 5–17)
BUN SERPL-MCNC: 14 MG/DL — SIGNIFICANT CHANGE UP (ref 7–23)
CALCIUM SERPL-MCNC: 9 MG/DL — SIGNIFICANT CHANGE UP (ref 8.4–10.5)
CHLORIDE SERPL-SCNC: 99 MMOL/L — SIGNIFICANT CHANGE UP (ref 96–108)
CO2 SERPL-SCNC: 27 MMOL/L — SIGNIFICANT CHANGE UP (ref 22–31)
CREAT SERPL-MCNC: 0.34 MG/DL — LOW (ref 0.5–1.3)
EGFR: 116 ML/MIN/1.73M2 — SIGNIFICANT CHANGE UP
GLUCOSE BLDC GLUCOMTR-MCNC: 166 MG/DL — HIGH (ref 70–99)
GLUCOSE BLDC GLUCOMTR-MCNC: 172 MG/DL — HIGH (ref 70–99)
GLUCOSE BLDC GLUCOMTR-MCNC: 176 MG/DL — HIGH (ref 70–99)
GLUCOSE BLDC GLUCOMTR-MCNC: 209 MG/DL — HIGH (ref 70–99)
GLUCOSE SERPL-MCNC: 150 MG/DL — HIGH (ref 70–99)
POTASSIUM SERPL-MCNC: 3.4 MMOL/L — LOW (ref 3.5–5.3)
POTASSIUM SERPL-SCNC: 3.4 MMOL/L — LOW (ref 3.5–5.3)
SODIUM SERPL-SCNC: 140 MMOL/L — SIGNIFICANT CHANGE UP (ref 135–145)

## 2023-02-19 PROCEDURE — 99232 SBSQ HOSP IP/OBS MODERATE 35: CPT

## 2023-02-19 RX ORDER — AMLODIPINE BESYLATE 2.5 MG/1
10 TABLET ORAL DAILY
Refills: 0 | Status: DISCONTINUED | OUTPATIENT
Start: 2023-02-19 | End: 2023-02-21

## 2023-02-19 RX ORDER — AMLODIPINE BESYLATE 2.5 MG/1
10 TABLET ORAL ONCE
Refills: 0 | Status: COMPLETED | OUTPATIENT
Start: 2023-02-19 | End: 2023-02-19

## 2023-02-19 RX ADMIN — Medication 40 MILLIGRAM(S): at 08:03

## 2023-02-19 RX ADMIN — Medication 3 MILLILITER(S): at 00:36

## 2023-02-19 RX ADMIN — Medication 3 MILLILITER(S): at 11:18

## 2023-02-19 RX ADMIN — IMMUNE GLOBULIN (HUMAN) 41.67 GRAM(S): 10 INJECTION INTRAVENOUS; SUBCUTANEOUS at 12:27

## 2023-02-19 RX ADMIN — Medication 2: at 08:00

## 2023-02-19 RX ADMIN — Medication 3 MILLILITER(S): at 05:17

## 2023-02-19 RX ADMIN — Medication 3 MILLILITER(S): at 17:42

## 2023-02-19 RX ADMIN — PYRIDOSTIGMINE BROMIDE 120 MILLIGRAM(S): 60 SOLUTION ORAL at 21:03

## 2023-02-19 RX ADMIN — Medication 50 MILLIGRAM(S): at 11:44

## 2023-02-19 RX ADMIN — NYSTATIN CREAM 1 APPLICATION(S): 100000 CREAM TOPICAL at 17:42

## 2023-02-19 RX ADMIN — Medication 2: at 16:48

## 2023-02-19 RX ADMIN — POLYETHYLENE GLYCOL 3350 17 GRAM(S): 17 POWDER, FOR SOLUTION ORAL at 12:29

## 2023-02-19 RX ADMIN — PYRIDOSTIGMINE BROMIDE 120 MILLIGRAM(S): 60 SOLUTION ORAL at 18:19

## 2023-02-19 RX ADMIN — PYRIDOSTIGMINE BROMIDE 120 MILLIGRAM(S): 60 SOLUTION ORAL at 09:10

## 2023-02-19 RX ADMIN — Medication 3 MILLILITER(S): at 23:23

## 2023-02-19 RX ADMIN — SENNA PLUS 2 TABLET(S): 8.6 TABLET ORAL at 21:03

## 2023-02-19 RX ADMIN — Medication 1 PACKET(S): at 21:03

## 2023-02-19 RX ADMIN — NYSTATIN CREAM 1 APPLICATION(S): 100000 CREAM TOPICAL at 07:15

## 2023-02-19 RX ADMIN — OXYCODONE HYDROCHLORIDE 5 MILLIGRAM(S): 5 TABLET ORAL at 03:33

## 2023-02-19 RX ADMIN — Medication 4: at 21:11

## 2023-02-19 RX ADMIN — CHLORHEXIDINE GLUCONATE 1 APPLICATION(S): 213 SOLUTION TOPICAL at 07:15

## 2023-02-19 RX ADMIN — PANTOPRAZOLE SODIUM 40 MILLIGRAM(S): 20 TABLET, DELAYED RELEASE ORAL at 08:03

## 2023-02-19 RX ADMIN — Medication 0.25 MILLIGRAM(S): at 18:19

## 2023-02-19 RX ADMIN — AMLODIPINE BESYLATE 10 MILLIGRAM(S): 2.5 TABLET ORAL at 03:35

## 2023-02-19 RX ADMIN — Medication 0.25 MILLIGRAM(S): at 05:17

## 2023-02-19 RX ADMIN — ENOXAPARIN SODIUM 40 MILLIGRAM(S): 100 INJECTION SUBCUTANEOUS at 17:42

## 2023-02-19 NOTE — PROGRESS NOTE ADULT - SUBJECTIVE AND OBJECTIVE BOX
Neurology - Consult Note    -  Spectra: [phone] ([facility]), [phone] ([facility])  -    SUBJECTIVE: Patient seen and examined at bedside this AM. Over night: elevated BP- given 1x dose of amlodipine; Holding amlodipine as needed for now.     Language line solutions - Antonio  ID #[id number]    INTERVAL HISTORY: Receiving IVIG, today is day 4.      Review of Systems:    CONSTITUTIONAL: No fevers or chills  EYES AND ENT: No visual changes or no throat pain   NECK: No pain or stiffness  RESPIRATORY: No hemoptysis or shortness of breath  NEUROLOGICAL: +As stated in HPI above  SKIN: No itching, burning, rashes, or lesions   All other review of systems is negative unless indicated above.    Allergies:  peanuts (Unknown)      PMHx/PSHx/Family Hx: As above, otherwise see below   Myasthenia gravis    Diabetes mellitus        Social Hx:  No current use of tobacco, alcohol, or illicit drugs    Medications:  MEDICATIONS  (STANDING):  albuterol/ipratropium for Nebulization 3 milliLiter(s) Nebulizer every 6 hours  buDESOnide    Inhalation Suspension 0.25 milliGRAM(s) Inhalation every 12 hours  chlorhexidine 4% Liquid 1 Application(s) Topical <User Schedule>  enoxaparin Injectable 40 milliGRAM(s) SubCutaneous <User Schedule>  immune   globulin 10% (GAMMAGARD) IVPB 25 Gram(s) IV Intermittent daily  insulin lispro (ADMELOG) corrective regimen sliding scale   SubCutaneous Before meals and at bedtime  nystatin Powder 1 Application(s) Topical two times a day  pantoprazole    Tablet 40 milliGRAM(s) Oral before breakfast  polyethylene glycol 3350 17 Gram(s) Oral daily  predniSONE   Tablet 40 milliGRAM(s) Oral daily  psyllium Powder 1 Packet(s) Oral every 8 hours  pyridostigmine 120 milliGRAM(s) Oral every 8 hours  senna 2 Tablet(s) Oral at bedtime    MEDICATIONS  (PRN):  acetaminophen     Tablet .. 650 milliGRAM(s) Oral every 6 hours PRN Temp greater or equal to 38C (100.4F), Mild Pain (1 - 3)  diphenhydrAMINE 50 milliGRAM(s) Oral every 4 hours PRN Rash and/or Itching  oxyCODONE    IR 5 milliGRAM(s) Oral every 6 hours PRN Moderate Pain (4 - 6)  oxyCODONE    IR 10 milliGRAM(s) Oral every 4 hours PRN Severe Pain (7 - 10)      Vitals:  T(C): 36.1 (02-19-23 @ 04:47), Max: 37.1 (02-18-23 @ 16:19)  HR: 95 (02-19-23 @ 04:47) (91 - 107)  BP: 164/75 (02-19-23 @ 04:47) (161/79 - 187/97)  RR: 18 (02-19-23 @ 04:47) (18 - 18)  SpO2: 96% (02-19-23 @ 04:47) (93% - 97%)    Physical Examination:  Constitutional: Female, seated at bedside chair, appears stated age, NAD.   Head: Normocephalic; Eyes: clear sclera;     NEUROLOGICAL EXAM:  Mental status - Awake, Alert, Oriented to person, place, and time. Follows all commands.     Speech/Language: Clear, fluent.     Cranial nerves - PERRL, VFF, EOMI. Mild ptosis OS. No diplopia. Facial sensation (V1-V3) intact b/l, facial strength intact without asymmetry b/l, hearing intact to conversation b/l, palate with symmetric elevation.   Neck flexion 5  Neck extension 5  counts to 20 in one breath    Motor - Normal bulk and tone throughout. No pronator drift.   Full strength in extremities	     DTR's -             Biceps      Triceps     Brachioradialis      Patellar    Ankle    Toes/plantar response  R             2+             2+                  2+             2+            2+                 Down  L              2+             2+                 2+              2+           2+                 Down    Coordination/Gait: Not assessed.     Labs:                        9.7    7.46  )-----------( 323      ( 18 Feb 2023 07:21 )             30.8     02-19    140  |  99  |  14  ----------------------------<  150<H>  3.4<L>   |  27  |  0.34<L>    Ca    9.0      19 Feb 2023 07:12    TPro  8.0  /  Alb  4.2  /  TBili  0.4  /  DBili  x   /  AST  12  /  ALT  19  /  AlkPhos  58  02-18    CAPILLARY BLOOD GLUCOSE      POCT Blood Glucose.: 166 mg/dL (19 Feb 2023 07:25)    LIVER FUNCTIONS - ( 18 Feb 2023 07:19 )  Alb: 4.2 g/dL / Pro: 8.0 g/dL / ALK PHOS: 58 U/L / ALT: 19 U/L / AST: 12 U/L / GGT: x               CSF:                  Radiology:    CXR 1/30: Bibasilar linear atelectasis. Otherwise, the lungs are clear.   Neurology - Consult Note    -  Spectra: [phone] ([facility]), [phone] ([facility])  -    SUBJECTIVE: Patient seen and examined at bedside this AM. Over night: elevated BP- given 1x dose of amlodipine; Holding amlodipine as needed for now.     Language line solutions - Antonio  ID #[id number]    INTERVAL HISTORY: Receiving IVIG, today is day 4.      Review of Systems:    CONSTITUTIONAL: No fevers or chills  EYES AND ENT: No visual changes or no throat pain   NECK: No pain or stiffness  RESPIRATORY: No hemoptysis or shortness of breath  NEUROLOGICAL: +As stated in HPI above  SKIN: No itching, burning, rashes, or lesions   All other review of systems is negative unless indicated above.    Allergies:  peanuts (Unknown)      PMHx/PSHx/Family Hx: As above, otherwise see below   Myasthenia gravis    Diabetes mellitus        Social Hx:  No current use of tobacco, alcohol, or illicit drugs    Medications:  MEDICATIONS  (STANDING):  albuterol/ipratropium for Nebulization 3 milliLiter(s) Nebulizer every 6 hours  buDESOnide    Inhalation Suspension 0.25 milliGRAM(s) Inhalation every 12 hours  chlorhexidine 4% Liquid 1 Application(s) Topical <User Schedule>  enoxaparin Injectable 40 milliGRAM(s) SubCutaneous <User Schedule>  immune   globulin 10% (GAMMAGARD) IVPB 25 Gram(s) IV Intermittent daily  insulin lispro (ADMELOG) corrective regimen sliding scale   SubCutaneous Before meals and at bedtime  nystatin Powder 1 Application(s) Topical two times a day  pantoprazole    Tablet 40 milliGRAM(s) Oral before breakfast  polyethylene glycol 3350 17 Gram(s) Oral daily  predniSONE   Tablet 40 milliGRAM(s) Oral daily  psyllium Powder 1 Packet(s) Oral every 8 hours  pyridostigmine 120 milliGRAM(s) Oral every 8 hours  senna 2 Tablet(s) Oral at bedtime    MEDICATIONS  (PRN):  acetaminophen     Tablet .. 650 milliGRAM(s) Oral every 6 hours PRN Temp greater or equal to 38C (100.4F), Mild Pain (1 - 3)  diphenhydrAMINE 50 milliGRAM(s) Oral every 4 hours PRN Rash and/or Itching  oxyCODONE    IR 5 milliGRAM(s) Oral every 6 hours PRN Moderate Pain (4 - 6)  oxyCODONE    IR 10 milliGRAM(s) Oral every 4 hours PRN Severe Pain (7 - 10)      Vitals:  T(C): 36.1 (02-19-23 @ 04:47), Max: 37.1 (02-18-23 @ 16:19)  HR: 95 (02-19-23 @ 04:47) (91 - 107)  BP: 164/75 (02-19-23 @ 04:47) (161/79 - 187/97)  RR: 18 (02-19-23 @ 04:47) (18 - 18)  SpO2: 96% (02-19-23 @ 04:47) (93% - 97%)    Physical Examination:  Constitutional: Female, seated at bedside chair, appears stated age, NAD.   Head: Normocephalic; Eyes: clear sclera;     NEUROLOGICAL EXAM:  Mental status - Awake, Alert, Oriented to person, place, and time. Follows all commands.     Speech/Language: Clear, fluent.     Cranial nerves - PERRL, VFF, EOMI. Mild ptosis OS. No diplopia. Facial sensation (V1-V3) intact b/l, facial strength intact without asymmetry b/l, hearing intact to conversation b/l, palate with symmetric elevation.   Neck flexion 5  Neck extension 5  counts to 20 in one breath    Motor - Normal bulk and tone throughout. No pronator drift.   Full strength in extremities	     DTR's -             Biceps      Triceps     Brachioradialis      Patellar    Ankle    Toes/plantar response  R             2+             2+                  2+             2+            2+                 Down  L              2+             2+                 2+              2+           2+                 Down    Coordination/Gait: Not assessed.     Labs:                        9.7    7.46  )-----------( 323      ( 18 Feb 2023 07:21 )             30.8     02-19    140  |  99  |  14  ----------------------------<  150<H>  3.4<L>   |  27  |  0.34<L>    Ca    9.0      19 Feb 2023 07:12    TPro  8.0  /  Alb  4.2  /  TBili  0.4  /  DBili  x   /  AST  12  /  ALT  19  /  AlkPhos  58  02-18    CAPILLARY BLOOD GLUCOSE      POCT Blood Glucose.: 166 mg/dL (19 Feb 2023 07:25)    LIVER FUNCTIONS - ( 18 Feb 2023 07:19 )  Alb: 4.2 g/dL / Pro: 8.0 g/dL / ALK PHOS: 58 U/L / ALT: 19 U/L / AST: 12 U/L / GGT: x               CSF:                  Radiology:    CXR 1/30: Bibasilar linear atelectasis. Otherwise, the lungs are clear.   Neurology - Consult Note    -  Spectra: [phone] ([facility]), [phone] ([facility])  -    SUBJECTIVE: Patient seen and examined at bedside this AM. Over night: elevated BP- given 1x dose of amlodipine; Holding amlodipine as needed for now.     Language line solutions - Antonio  ID #[id number]    INTERVAL HISTORY: Receiving IVIG, today is day 4.      Review of Systems:    CONSTITUTIONAL: No fevers or chills  EYES AND ENT: No visual changes or no throat pain   NECK: No pain or stiffness  RESPIRATORY: No hemoptysis or shortness of breath  NEUROLOGICAL: +As stated in HPI above  SKIN: No itching, burning, rashes, or lesions   All other review of systems is negative unless indicated above.    Allergies:  peanuts (Unknown)      PMHx/PSHx/Family Hx: As above, otherwise see below   Myasthenia gravis    Diabetes mellitus        Social Hx:  No current use of tobacco, alcohol, or illicit drugs    Medications:  MEDICATIONS  (STANDING):  albuterol/ipratropium for Nebulization 3 milliLiter(s) Nebulizer every 6 hours  buDESOnide    Inhalation Suspension 0.25 milliGRAM(s) Inhalation every 12 hours  chlorhexidine 4% Liquid 1 Application(s) Topical <User Schedule>  enoxaparin Injectable 40 milliGRAM(s) SubCutaneous <User Schedule>  immune   globulin 10% (GAMMAGARD) IVPB 25 Gram(s) IV Intermittent daily  insulin lispro (ADMELOG) corrective regimen sliding scale   SubCutaneous Before meals and at bedtime  nystatin Powder 1 Application(s) Topical two times a day  pantoprazole    Tablet 40 milliGRAM(s) Oral before breakfast  polyethylene glycol 3350 17 Gram(s) Oral daily  predniSONE   Tablet 40 milliGRAM(s) Oral daily  psyllium Powder 1 Packet(s) Oral every 8 hours  pyridostigmine 120 milliGRAM(s) Oral every 8 hours  senna 2 Tablet(s) Oral at bedtime    MEDICATIONS  (PRN):  acetaminophen     Tablet .. 650 milliGRAM(s) Oral every 6 hours PRN Temp greater or equal to 38C (100.4F), Mild Pain (1 - 3)  diphenhydrAMINE 50 milliGRAM(s) Oral every 4 hours PRN Rash and/or Itching  oxyCODONE    IR 5 milliGRAM(s) Oral every 6 hours PRN Moderate Pain (4 - 6)  oxyCODONE    IR 10 milliGRAM(s) Oral every 4 hours PRN Severe Pain (7 - 10)      Vitals:  T(C): 36.1 (02-19-23 @ 04:47), Max: 37.1 (02-18-23 @ 16:19)  HR: 95 (02-19-23 @ 04:47) (91 - 107)  BP: 164/75 (02-19-23 @ 04:47) (161/79 - 187/97)  RR: 18 (02-19-23 @ 04:47) (18 - 18)  SpO2: 96% (02-19-23 @ 04:47) (93% - 97%)    Physical Examination:  Constitutional: Female, seated at bedside chair, appears stated age, NAD.   Head: Normocephalic; Eyes: clear sclera;     NEUROLOGICAL EXAM:  Mental status - Awake, Alert, Oriented to person, place, and time. Follows all commands.     Speech/Language: Clear, fluent.     Cranial nerves - PERRL, VFF, EOMI. Mild ptosis OS. No diplopia. Facial sensation (V1-V3) intact b/l, facial strength intact without asymmetry b/l, hearing intact to conversation b/l, palate with symmetric elevation.   Neck flexion 5  Neck extension 5  counts to 20 in one breath    Motor - Normal bulk and tone throughout. No pronator drift.   Full strength in extremities	     DTR's -             Biceps      Triceps     Brachioradialis      Patellar    Ankle    Toes/plantar response  R             2+             2+                  2+             2+            2+                 Down  L              2+             2+                 2+              2+           2+                 Down    Coordination/Gait: Not assessed.     Labs:                        9.7    7.46  )-----------( 323      ( 18 Feb 2023 07:21 )             30.8     02-19    140  |  99  |  14  ----------------------------<  150<H>  3.4<L>   |  27  |  0.34<L>    Ca    9.0      19 Feb 2023 07:12    TPro  8.0  /  Alb  4.2  /  TBili  0.4  /  DBili  x   /  AST  12  /  ALT  19  /  AlkPhos  58  02-18    CAPILLARY BLOOD GLUCOSE      POCT Blood Glucose.: 166 mg/dL (19 Feb 2023 07:25)    LIVER FUNCTIONS - ( 18 Feb 2023 07:19 )  Alb: 4.2 g/dL / Pro: 8.0 g/dL / ALK PHOS: 58 U/L / ALT: 19 U/L / AST: 12 U/L / GGT: x               CSF:                  Radiology:    CXR 1/30: Bibasilar linear atelectasis. Otherwise, the lungs are clear.

## 2023-02-19 NOTE — PROGRESS NOTE ADULT - ASSESSMENT
62F PMHx significant for myasthenia gravis who presents to the ED with SOB for 3 days after testing positive for COVID at home. NIF/VC -20/0.68L in ED. Pt placed on oxygen 2L NC. Pt now s/p PLEX x5 sessions and currently on pyridostigmine. Pt continues to improve clinically. CBC showing WBC 11.71, Hgb 9.5. CMP WNL. CK wnl.     IMPRESSION: Myasthenia gravis exacerbation likely secondary to COVID infection.    PLAN:  #Myasthenia Gravis exacerbation i/s/o COVID-19  -Continue with IVIG x 5 days, day 4/5 (2/16 - ); premedicate with tylenol and benadryl  -c/w pyridostigmine 120mg tid PO  -c/w prednisone 40mg qd    -f/u NIF and VC Q6h -20/630 this AM   -s/p PLEX 5 sessions (2/2, 2/4, 2/6, 2/8, 2/10)  [] F/U aldolase     #Dyspnea  -Budesonide and DUOnebs prn    #Urine/Sputum cx positive  -appreciate ID recs     =s/p Ceftriaxone x5days (2/11 - 2/15)    #HTN  - Amlodipine on hold   - Elevated this AM to  - singular reading, will trend to see if medication management necessary   -F/U cardiology    #Preventive measures  -Cardiac monitoring with tele  -DVT ppx: Lovenox 40mg QD  -GI ppx: Protonix while on steroids   -Diet: Regular  -Dispo: Acute rehab  -Avoid medications like fluoroquinolones macrolides, aminoglycosides chloroquine.   -Consider avoiding anti hypertensives such as beta blockers, CCB. - AMLODIPINE ON HOLD GIVEN THIS CONSIDERATION    Case seen and discussed with neurologist, Dr. Delgadillo. 62F PMHx significant for myasthenia gravis who presents to the ED with SOB for 3 days after testing positive for COVID at home. NIF/VC -20/0.68L in ED. Pt placed on oxygen 2L NC. Pt now s/p PLEX x5 sessions and currently on pyridostigmine. Pt continues to improve clinically. CBC showing WBC 11.71, Hgb 9.5. CMP WNL. CK wnl.     IMPRESSION: Myasthenia gravis exacerbation likely secondary to COVID infection.    PLAN:  #Myasthenia Gravis exacerbation i/s/o COVID-19  -Continue with IVIG x 5 days, day 4/5 (2/16 - ); premedicate with tylenol and benadryl  -c/w pyridostigmine 120mg tid PO  -c/w prednisone 40mg qd    -f/u NIF and VC Q6h -20/630 this AM   -s/p PLEX 5 sessions (2/2, 2/4, 2/6, 2/8, 2/10)  [] F/U aldolase     #Dyspnea  -Budesonide and DUOnebs prn    #Urine/Sputum cx positive  -appreciate ID recs     =s/p Ceftriaxone x5days (2/11 - 2/15)    #HTN  - Amlodipine on hold, but given dose last night   - F/U cardiology to see if another antihypertensive might be appropriate    #Preventive measures  -Cardiac monitoring with tele  -DVT ppx: Lovenox 40mg QD  -GI ppx: Protonix while on steroids   -Diet: Regular  -Dispo: Acute rehab  -Avoid medications like fluoroquinolones macrolides, aminoglycosides chloroquine.   -Consider avoiding anti hypertensives such as beta blockers, CCB. - AMLODIPINE ON HOLD GIVEN THIS CONSIDERATION    Case seen and discussed with neurologist, Dr. Delgadillo. 62F PMHx significant for myasthenia gravis who presents to the ED with SOB for 3 days after testing positive for COVID at home. NIF/VC -20/0.68L in ED. Pt placed on oxygen 2L NC. Pt now s/p PLEX x5 sessions and currently on pyridostigmine. Pt continues to improve clinically. CBC showing WBC 11.71, Hgb 9.5. CMP WNL. CK wnl.     IMPRESSION: Myasthenia gravis exacerbation likely secondary to COVID infection.    PLAN:  #Myasthenia Gravis exacerbation i/s/o COVID-19  -Continue with IVIG x 5 days, day 4/5 (2/16 - ); premedicate with tylenol and benadryl  -c/w pyridostigmine 120mg tid PO  -c/w prednisone 40mg qd    -f/u NIF and VC Q6h -20/630 this AM   -s/p PLEX 5 sessions (2/2, 2/4, 2/6, 2/8, 2/10)  [] F/U aldolase     #Dyspnea  -Budesonide and DUOnebs prn    #Urine/Sputum cx positive  -appreciate ID recs     =s/p Ceftriaxone x5days (2/11 - 2/15)    #HTN  - Amlodipine on hold, but given dose last night   - Cardiology recs appreciated - Continuing home amlodipine as recommended by Dr. Sky    #Preventive measures  -Cardiac monitoring with tele  -DVT ppx: Lovenox 40mg QD  -GI ppx: Protonix while on steroids   -Diet: Regular  -Dispo: Acute rehab  -Avoid medications like fluoroquinolones macrolides, aminoglycosides chloroquine.   -Consider avoiding anti hypertensives such as beta blockers, CCB. - AMLODIPINE ON HOLD GIVEN THIS CONSIDERATION    Case seen and discussed with neurologist, Dr. Delgadillo.

## 2023-02-20 DIAGNOSIS — I10 ESSENTIAL (PRIMARY) HYPERTENSION: ICD-10-CM

## 2023-02-20 DIAGNOSIS — G70.00 MYASTHENIA GRAVIS WITHOUT (ACUTE) EXACERBATION: ICD-10-CM

## 2023-02-20 DIAGNOSIS — E11.9 TYPE 2 DIABETES MELLITUS WITHOUT COMPLICATIONS: ICD-10-CM

## 2023-02-20 LAB
ALDOLASE SERPL-CCNC: 4.5 U/L — SIGNIFICANT CHANGE UP (ref 3.3–10.3)
ANION GAP SERPL CALC-SCNC: 10 MMOL/L — SIGNIFICANT CHANGE UP (ref 5–17)
BUN SERPL-MCNC: 17 MG/DL — SIGNIFICANT CHANGE UP (ref 7–23)
CALCIUM SERPL-MCNC: 8.8 MG/DL — SIGNIFICANT CHANGE UP (ref 8.4–10.5)
CHLORIDE SERPL-SCNC: 100 MMOL/L — SIGNIFICANT CHANGE UP (ref 96–108)
CO2 SERPL-SCNC: 30 MMOL/L — SIGNIFICANT CHANGE UP (ref 22–31)
CREAT SERPL-MCNC: 0.42 MG/DL — LOW (ref 0.5–1.3)
EGFR: 110 ML/MIN/1.73M2 — SIGNIFICANT CHANGE UP
GLUCOSE BLDC GLUCOMTR-MCNC: 145 MG/DL — HIGH (ref 70–99)
GLUCOSE BLDC GLUCOMTR-MCNC: 163 MG/DL — HIGH (ref 70–99)
GLUCOSE BLDC GLUCOMTR-MCNC: 179 MG/DL — HIGH (ref 70–99)
GLUCOSE BLDC GLUCOMTR-MCNC: 212 MG/DL — HIGH (ref 70–99)
GLUCOSE SERPL-MCNC: 129 MG/DL — HIGH (ref 70–99)
HCT VFR BLD CALC: 29.9 % — LOW (ref 34.5–45)
HGB BLD-MCNC: 9.5 G/DL — LOW (ref 11.5–15.5)
MCHC RBC-ENTMCNC: 29.6 PG — SIGNIFICANT CHANGE UP (ref 27–34)
MCHC RBC-ENTMCNC: 31.8 GM/DL — LOW (ref 32–36)
MCV RBC AUTO: 93.1 FL — SIGNIFICANT CHANGE UP (ref 80–100)
NRBC # BLD: 0 /100 WBCS — SIGNIFICANT CHANGE UP (ref 0–0)
PLATELET # BLD AUTO: 304 K/UL — SIGNIFICANT CHANGE UP (ref 150–400)
POTASSIUM SERPL-MCNC: 2.9 MMOL/L — CRITICAL LOW (ref 3.5–5.3)
POTASSIUM SERPL-SCNC: 2.9 MMOL/L — CRITICAL LOW (ref 3.5–5.3)
RBC # BLD: 3.21 M/UL — LOW (ref 3.8–5.2)
RBC # FLD: 15.4 % — HIGH (ref 10.3–14.5)
SODIUM SERPL-SCNC: 140 MMOL/L — SIGNIFICANT CHANGE UP (ref 135–145)
WBC # BLD: 6.26 K/UL — SIGNIFICANT CHANGE UP (ref 3.8–10.5)
WBC # FLD AUTO: 6.26 K/UL — SIGNIFICANT CHANGE UP (ref 3.8–10.5)

## 2023-02-20 PROCEDURE — 99232 SBSQ HOSP IP/OBS MODERATE 35: CPT

## 2023-02-20 RX ORDER — OXYCODONE HYDROCHLORIDE 5 MG/1
5 TABLET ORAL EVERY 6 HOURS
Refills: 0 | Status: DISCONTINUED | OUTPATIENT
Start: 2023-02-20 | End: 2023-02-21

## 2023-02-20 RX ORDER — SODIUM CHLORIDE 9 MG/ML
1000 INJECTION, SOLUTION INTRAVENOUS
Refills: 0 | Status: DISCONTINUED | OUTPATIENT
Start: 2023-02-20 | End: 2023-02-21

## 2023-02-20 RX ORDER — POTASSIUM CHLORIDE 20 MEQ
10 PACKET (EA) ORAL
Refills: 0 | Status: COMPLETED | OUTPATIENT
Start: 2023-02-20 | End: 2023-02-20

## 2023-02-20 RX ORDER — DEXTROSE 50 % IN WATER 50 %
12.5 SYRINGE (ML) INTRAVENOUS ONCE
Refills: 0 | Status: DISCONTINUED | OUTPATIENT
Start: 2023-02-20 | End: 2023-02-21

## 2023-02-20 RX ORDER — INSULIN LISPRO 100/ML
VIAL (ML) SUBCUTANEOUS AT BEDTIME
Refills: 0 | Status: DISCONTINUED | OUTPATIENT
Start: 2023-02-20 | End: 2023-02-21

## 2023-02-20 RX ORDER — DEXTROSE 50 % IN WATER 50 %
15 SYRINGE (ML) INTRAVENOUS ONCE
Refills: 0 | Status: DISCONTINUED | OUTPATIENT
Start: 2023-02-20 | End: 2023-02-21

## 2023-02-20 RX ORDER — DEXTROSE 50 % IN WATER 50 %
25 SYRINGE (ML) INTRAVENOUS ONCE
Refills: 0 | Status: DISCONTINUED | OUTPATIENT
Start: 2023-02-20 | End: 2023-02-21

## 2023-02-20 RX ORDER — PYRIDOSTIGMINE BROMIDE 60 MG/5ML
120 SOLUTION ORAL
Refills: 0 | Status: DISCONTINUED | OUTPATIENT
Start: 2023-02-20 | End: 2023-02-21

## 2023-02-20 RX ORDER — INSULIN LISPRO 100/ML
VIAL (ML) SUBCUTANEOUS
Refills: 0 | Status: DISCONTINUED | OUTPATIENT
Start: 2023-02-20 | End: 2023-02-21

## 2023-02-20 RX ORDER — GLUCAGON INJECTION, SOLUTION 0.5 MG/.1ML
1 INJECTION, SOLUTION SUBCUTANEOUS ONCE
Refills: 0 | Status: DISCONTINUED | OUTPATIENT
Start: 2023-02-20 | End: 2023-02-21

## 2023-02-20 RX ADMIN — Medication 50 MILLIGRAM(S): at 13:08

## 2023-02-20 RX ADMIN — PYRIDOSTIGMINE BROMIDE 120 MILLIGRAM(S): 60 SOLUTION ORAL at 16:31

## 2023-02-20 RX ADMIN — Medication 2: at 16:32

## 2023-02-20 RX ADMIN — Medication 1 PACKET(S): at 05:35

## 2023-02-20 RX ADMIN — CHLORHEXIDINE GLUCONATE 1 APPLICATION(S): 213 SOLUTION TOPICAL at 05:33

## 2023-02-20 RX ADMIN — Medication 100 MILLIEQUIVALENT(S): at 23:29

## 2023-02-20 RX ADMIN — Medication 100 MILLIEQUIVALENT(S): at 08:06

## 2023-02-20 RX ADMIN — Medication 3 MILLILITER(S): at 05:34

## 2023-02-20 RX ADMIN — Medication 40 MILLIGRAM(S): at 08:17

## 2023-02-20 RX ADMIN — Medication 100 MILLIEQUIVALENT(S): at 12:29

## 2023-02-20 RX ADMIN — PYRIDOSTIGMINE BROMIDE 120 MILLIGRAM(S): 60 SOLUTION ORAL at 23:31

## 2023-02-20 RX ADMIN — Medication 100 MILLIEQUIVALENT(S): at 22:04

## 2023-02-20 RX ADMIN — Medication 3 MILLILITER(S): at 23:35

## 2023-02-20 RX ADMIN — PYRIDOSTIGMINE BROMIDE 120 MILLIGRAM(S): 60 SOLUTION ORAL at 08:15

## 2023-02-20 RX ADMIN — AMLODIPINE BESYLATE 10 MILLIGRAM(S): 2.5 TABLET ORAL at 08:14

## 2023-02-20 RX ADMIN — NYSTATIN CREAM 1 APPLICATION(S): 100000 CREAM TOPICAL at 05:35

## 2023-02-20 RX ADMIN — ENOXAPARIN SODIUM 40 MILLIGRAM(S): 100 INJECTION SUBCUTANEOUS at 17:20

## 2023-02-20 RX ADMIN — PANTOPRAZOLE SODIUM 40 MILLIGRAM(S): 20 TABLET, DELAYED RELEASE ORAL at 08:15

## 2023-02-20 RX ADMIN — SENNA PLUS 2 TABLET(S): 8.6 TABLET ORAL at 22:04

## 2023-02-20 RX ADMIN — IMMUNE GLOBULIN (HUMAN) 41.67 GRAM(S): 10 INJECTION INTRAVENOUS; SUBCUTANEOUS at 13:35

## 2023-02-20 RX ADMIN — Medication 0.25 MILLIGRAM(S): at 05:57

## 2023-02-20 RX ADMIN — Medication 0.25 MILLIGRAM(S): at 17:19

## 2023-02-20 RX ADMIN — Medication 100 MILLIEQUIVALENT(S): at 09:46

## 2023-02-20 RX ADMIN — Medication 3 MILLILITER(S): at 12:14

## 2023-02-20 RX ADMIN — Medication 2: at 11:57

## 2023-02-20 RX ADMIN — Medication 3 MILLILITER(S): at 17:21

## 2023-02-20 NOTE — PROGRESS NOTE ADULT - ATTENDING COMMENTS
I have personally seen and examined the patient.  I fully participated in the care of this patient.  I have made amendments to the documentation where necessary, and agree with the history, physical exam, and plan as documented by the Resident.     Last day of IVIG today  Appreciate cardiology consult, will continue amlodipine  Replete potassium

## 2023-02-20 NOTE — PROVIDER CONTACT NOTE (OTHER) - ASSESSMENT
Medication, Pyridostigmine [place] ([place]), was scheduled for every 8 hours. Previous RN administered medication late, oncoming RN was unaware of rescheduled medication. Medication was given at next scheduled time. No neurological changes, no changes in vital signs, pt denies pain and discomfort Medication, Pyridostigmine [place] ([place]), was scheduled for every 8 hours. Previous RN administered medication late, oncoming RN was unaware of rescheduled medication. Medication was given at next scheduled time. No neurological changes, no changes in vital signs, pt denies pain and discomfort Medication, Pyridostigmine [place] ([place]), was scheduled for every 8 hours. Previous RN administered medication late, oncoming RN was unaware of rescheduled medication. Medication was given at next scheduled time. No neurological changes, no changes in vital signs, pt denies pain and discomfort

## 2023-02-20 NOTE — CONSULT NOTE ADULT - CONSULT REQUESTED DATE/TIME
20-Feb-2023 10:06
30-Jan-2023 17:11
31-Jan-2023 11:19
14-Feb-2023 12:11
30-Jan-2023 16:00
02-Feb-2023 16:09

## 2023-02-20 NOTE — CHART NOTE - NSCHARTNOTESELECT_GEN_ALL_CORE
Nutrition Services
Nutrition Services
Speech & Swallow
VTE Risk Assessment/Event Note
Blood Bank
Event Note
IR/Event Note
Therapeutic Apheresis Attending Physician Note/Blood Bank
potassium
Blood Bank
Therapeutic Apheresis Attending Physician Note/Blood Bank

## 2023-02-20 NOTE — PROGRESS NOTE ADULT - SUBJECTIVE AND OBJECTIVE BOX
Neurology Progress Note    Language line  Antonio Harris [id number].    SUBJECTIVE/OBJECTIVE/INTERVAL EVENTS: Patient seen and examined at bedside w/ neuro attending Dr Delgadillo. Patient reports improvement in her symptoms. She was sitting up in her chair, speaking faster than prior and with more energy than prior days. States she feels much better, able to sleep okay at night. Denies difficulty with swallowing, double vision, speech changes, chest pain, shortness of breath, worsening strenght/ sensory changes. When pressed for any new symptoms, offered mild cough but did not bother her. No feeling of tachycardia. Denied fevers. Notified in AM that potassium was low. Will replete IV and check repeat labs in afternoon for correction. NIF -30,  2/20.      MEDICATIONS  (STANDING):  albuterol/ipratropium for Nebulization 3 milliLiter(s) Nebulizer every 6 hours  amLODIPine   Tablet 10 milliGRAM(s) Oral daily  buDESOnide    Inhalation Suspension 0.25 milliGRAM(s) Inhalation every 12 hours  chlorhexidine 4% Liquid 1 Application(s) Topical <User Schedule>  enoxaparin Injectable 40 milliGRAM(s) SubCutaneous <User Schedule>  immune   globulin 10% (GAMMAGARD) IVPB 25 Gram(s) IV Intermittent daily  insulin lispro (ADMELOG) corrective regimen sliding scale   SubCutaneous Before meals and at bedtime  nystatin Powder 1 Application(s) Topical two times a day  pantoprazole    Tablet 40 milliGRAM(s) Oral before breakfast  polyethylene glycol 3350 17 Gram(s) Oral daily  potassium chloride  10 mEq/100 mL IVPB 10 milliEquivalent(s) IV Intermittent every 1 hour  predniSONE   Tablet 40 milliGRAM(s) Oral daily  psyllium Powder 1 Packet(s) Oral every 8 hours  pyridostigmine 120 milliGRAM(s) Oral every 8 hours  senna 2 Tablet(s) Oral at bedtime    MEDICATIONS  (PRN):  acetaminophen     Tablet .. 650 milliGRAM(s) Oral every 6 hours PRN Temp greater or equal to 38C (100.4F), Mild Pain (1 - 3)  diphenhydrAMINE 50 milliGRAM(s) Oral every 4 hours PRN Rash and/or Itching  oxyCODONE    IR 5 milliGRAM(s) Oral every 6 hours PRN Moderate Pain (4 - 6)  oxyCODONE    IR 10 milliGRAM(s) Oral every 4 hours PRN Severe Pain (7 - 10)    VITALS & EXAMINATION:  Vital Signs Last 24 Hrs  T(C): 37.1 (20 Feb 2023 09:28), Max: 37.4 (19 Feb 2023 19:10)  T(F): 98.8 (20 Feb 2023 09:28), Max: 99.3 (19 Feb 2023 19:10)  HR: 114 (20 Feb 2023 09:28) (79 - 114)  BP: 132/76 (20 Feb 2023 09:28) (127/72 - 155/71)  BP(mean): --  RR: 18 (20 Feb 2023 09:28) (18 - 20)  SpO2: 99% (20 Feb 2023 09:28) (95% - 100%)    Parameters below as of 20 Feb 2023 09:28  Patient On (Oxygen Delivery Method): room air    Physical Examination:  Constitutional: Female, seated at bedside chair, appears stated age, not in acute distress.   Head: Normocephalic; Eyes: clear sclera;   NEUROLOGICAL EXAM:  Mental status - Awake, Alert, Oriented to person, place, and time. Follows all commands.  Speech/Language: Clear, fluent, comprehension intact.   Cranial nerves - PERRL, VFF, EOMI no nystagmus. Mild ptosis OS that persisted. No diplopia. Facial sensation (V1-V3) intact b/l, facial strength intact without asymmetry b/l, hearing intact to conversation b/l, palate with symmetric elevation.   Neck flexion 5  Neck extension 5  counts to 22 in one breath  Motor - Normal bulk and tone throughout. No pronator drift.   Full strength in extremities	   DTR's - symmetric and intact b/l  Toes/plantar response downgoing   Coordination/Gait: Not assessed.      LABORATORY:  CBC                       9.5    6.26  )-----------( 304      ( 20 Feb 2023 06:01 )             29.9     Chem 02-20    140  |  100  |  17  ----------------------------<  129<H>  2.9<LL>   |  30  |  0.42<L>    Ca    8.8      20 Feb 2023 06:02    LFTs   Coagulopathy   Lipid Panel   A1c   Cardiac enzymes     U/A   CSF  Immunological  Other    STUDIES & IMAGING: (EEG, CT, MR, U/S, TTE/[place]):    CXR 1/30: Bibasilar linear atelectasis. Otherwise, the lungs are clear.   Neurology Progress Note    Language line  Antonio Harris [id number].    SUBJECTIVE/OBJECTIVE/INTERVAL EVENTS: Patient seen and examined at bedside w/ neuro attending Dr Delgadillo. Patient reports improvement in her symptoms. She was sitting up in her chair, speaking faster than prior and with more energy than prior days. States she feels much better, able to sleep okay at night. Denies difficulty with swallowing, double vision, speech changes, chest pain, shortness of breath, worsening strenght/ sensory changes. When pressed for any new symptoms, offered mild cough but did not bother her. No feeling of tachycardia. Denied fevers. Notified in AM that potassium was low. Will replete IV and check repeat labs in afternoon for correction. NIF -30,  2/20.      MEDICATIONS  (STANDING):  albuterol/ipratropium for Nebulization 3 milliLiter(s) Nebulizer every 6 hours  amLODIPine   Tablet 10 milliGRAM(s) Oral daily  buDESOnide    Inhalation Suspension 0.25 milliGRAM(s) Inhalation every 12 hours  chlorhexidine 4% Liquid 1 Application(s) Topical <User Schedule>  enoxaparin Injectable 40 milliGRAM(s) SubCutaneous <User Schedule>  immune   globulin 10% (GAMMAGARD) IVPB 25 Gram(s) IV Intermittent daily  insulin lispro (ADMELOG) corrective regimen sliding scale   SubCutaneous Before meals and at bedtime  nystatin Powder 1 Application(s) Topical two times a day  pantoprazole    Tablet 40 milliGRAM(s) Oral before breakfast  polyethylene glycol 3350 17 Gram(s) Oral daily  potassium chloride  10 mEq/100 mL IVPB 10 milliEquivalent(s) IV Intermittent every 1 hour  predniSONE   Tablet 40 milliGRAM(s) Oral daily  psyllium Powder 1 Packet(s) Oral every 8 hours  pyridostigmine 120 milliGRAM(s) Oral every 8 hours  senna 2 Tablet(s) Oral at bedtime    MEDICATIONS  (PRN):  acetaminophen     Tablet .. 650 milliGRAM(s) Oral every 6 hours PRN Temp greater or equal to 38C (100.4F), Mild Pain (1 - 3)  diphenhydrAMINE 50 milliGRAM(s) Oral every 4 hours PRN Rash and/or Itching  oxyCODONE    IR 5 milliGRAM(s) Oral every 6 hours PRN Moderate Pain (4 - 6)  oxyCODONE    IR 10 milliGRAM(s) Oral every 4 hours PRN Severe Pain (7 - 10)    VITALS & EXAMINATION:  Vital Signs Last 24 Hrs  T(C): 37.1 (20 Feb 2023 09:28), Max: 37.4 (19 Feb 2023 19:10)  T(F): 98.8 (20 Feb 2023 09:28), Max: 99.3 (19 Feb 2023 19:10)  HR: 114 (20 Feb 2023 09:28) (79 - 114)  BP: 132/76 (20 Feb 2023 09:28) (127/72 - 155/71)  BP(mean): --  RR: 18 (20 Feb 2023 09:28) (18 - 20)  SpO2: 99% (20 Feb 2023 09:28) (95% - 100%)    Parameters below as of 20 Feb 2023 09:28  Patient On (Oxygen Delivery Method): room air    Physical Examination:  Constitutional: Female, seated at bedside chair, appears stated age, not in acute distress.   Head: Normocephalic; Eyes: clear sclera;   NEUROLOGICAL EXAM:  Mental status - Awake, Alert, Oriented to person, place, and time. Follows all commands.  Speech/Language: Clear, fluent, comprehension intact.   Cranial nerves - PERRL, VFF, EOMI no nystagmus. Mild ptosis OS that persisted. No diplopia. Facial sensation (V1-V3) intact b/l, facial strength intact without asymmetry b/l, hearing intact to conversation b/l, palate with symmetric elevation.   Neck flexion 5  Neck extension 5  counts to 22 in one breath  Motor - Normal bulk and tone throughout. No pronator drift.   Full strength in extremities	   DTR's - symmetric and intact b/l  Toes/plantar response downgoing   Coordination/Gait: Not assessed.      LABORATORY:  CBC                       9.5    6.26  )-----------( 304      ( 20 Feb 2023 06:01 )             29.9     Chem 02-20    140  |  100  |  17  ----------------------------<  129<H>  2.9<LL>   |  30  |  0.42<L>    Ca    8.8      20 Feb 2023 06:02    LFTs   Coagulopathy   Lipid Panel   A1c   Cardiac enzymes     U/A   CSF  Immunological  Other    STUDIES & IMAGING: (EEG, CT, MR, U/S, TTE/[place]):    CXR 1/30: Bibasilar linear atelectasis. Otherwise, the lungs are clear.   Neurology Progress Note    Language line  Antonio Harris [id number].    SUBJECTIVE/OBJECTIVE/INTERVAL EVENTS: Patient seen and examined at bedside w/ neuro attending Dr Delgadillo. Patient reports improvement in her symptoms. She was sitting up in her chair, speaking faster than prior and with more energy than prior days. States she feels much better, able to sleep okay at night. Denies difficulty with swallowing, double vision, speech changes, chest pain, shortness of breath, worsening strenght/ sensory changes. When pressed for any new symptoms, offered mild cough but did not bother her. No feeling of tachycardia. Denied fevers. Notified in AM that potassium was low. Will replete IV and check repeat labs in afternoon for correction. NIF -30,  2/20.      MEDICATIONS  (STANDING):  albuterol/ipratropium for Nebulization 3 milliLiter(s) Nebulizer every 6 hours  amLODIPine   Tablet 10 milliGRAM(s) Oral daily  buDESOnide    Inhalation Suspension 0.25 milliGRAM(s) Inhalation every 12 hours  chlorhexidine 4% Liquid 1 Application(s) Topical <User Schedule>  enoxaparin Injectable 40 milliGRAM(s) SubCutaneous <User Schedule>  immune   globulin 10% (GAMMAGARD) IVPB 25 Gram(s) IV Intermittent daily  insulin lispro (ADMELOG) corrective regimen sliding scale   SubCutaneous Before meals and at bedtime  nystatin Powder 1 Application(s) Topical two times a day  pantoprazole    Tablet 40 milliGRAM(s) Oral before breakfast  polyethylene glycol 3350 17 Gram(s) Oral daily  potassium chloride  10 mEq/100 mL IVPB 10 milliEquivalent(s) IV Intermittent every 1 hour  predniSONE   Tablet 40 milliGRAM(s) Oral daily  psyllium Powder 1 Packet(s) Oral every 8 hours  pyridostigmine 120 milliGRAM(s) Oral every 8 hours  senna 2 Tablet(s) Oral at bedtime    MEDICATIONS  (PRN):  acetaminophen     Tablet .. 650 milliGRAM(s) Oral every 6 hours PRN Temp greater or equal to 38C (100.4F), Mild Pain (1 - 3)  diphenhydrAMINE 50 milliGRAM(s) Oral every 4 hours PRN Rash and/or Itching  oxyCODONE    IR 5 milliGRAM(s) Oral every 6 hours PRN Moderate Pain (4 - 6)  oxyCODONE    IR 10 milliGRAM(s) Oral every 4 hours PRN Severe Pain (7 - 10)    VITALS & EXAMINATION:  Vital Signs Last 24 Hrs  T(C): 37.1 (20 Feb 2023 09:28), Max: 37.4 (19 Feb 2023 19:10)  T(F): 98.8 (20 Feb 2023 09:28), Max: 99.3 (19 Feb 2023 19:10)  HR: 114 (20 Feb 2023 09:28) (79 - 114)  BP: 132/76 (20 Feb 2023 09:28) (127/72 - 155/71)  BP(mean): --  RR: 18 (20 Feb 2023 09:28) (18 - 20)  SpO2: 99% (20 Feb 2023 09:28) (95% - 100%)    Parameters below as of 20 Feb 2023 09:28  Patient On (Oxygen Delivery Method): room air    Physical Examination:  Constitutional: Female, seated at bedside chair, appears stated age, not in acute distress.   Head: Normocephalic; Eyes: clear sclera;   NEUROLOGICAL EXAM:  Mental status - Awake, Alert, Oriented to person, place, and time. Follows all commands.  Speech/Language: Clear, fluent, comprehension intact.   Cranial nerves - PERRL, VFF, EOMI no nystagmus. Mild ptosis OS that persisted. No diplopia. Facial sensation (V1-V3) intact b/l, facial strength intact without asymmetry b/l, hearing intact to conversation b/l, palate with symmetric elevation.   Neck flexion 5  Neck extension 5  counts to 22 in one breath  Motor - Normal bulk and tone throughout. No pronator drift.   Full strength in extremities	   DTR's - symmetric and intact b/l  Toes/plantar response downgoing   Coordination/Gait: Not assessed.      LABORATORY:  CBC                       9.5    6.26  )-----------( 304      ( 20 Feb 2023 06:01 )             29.9     Chem 02-20    140  |  100  |  17  ----------------------------<  129<H>  2.9<LL>   |  30  |  0.42<L>    Ca    8.8      20 Feb 2023 06:02    LFTs   Coagulopathy   Lipid Panel   A1c   Cardiac enzymes     U/A   CSF  Immunological  Other    STUDIES & IMAGING: (EEG, CT, MR, U/S, TTE/[place]):    CXR 1/30: Bibasilar linear atelectasis. Otherwise, the lungs are clear.

## 2023-02-20 NOTE — PROGRESS NOTE ADULT - ASSESSMENT
62F PMHx significant for myasthenia gravis who presents to the ED with SOB for 3 days after testing positive for COVID at home. NIF/VC -20/0.68L in ED. Pt placed on oxygen 2L NC. Pt now s/p PLEX x5 sessions and currently on pyridostigmine. Pt continues to improve clinically. CBC showing WBC 11.71, Hgb 9.5. CMP WNL. CK wnl.     IMPRESSION: Myasthenia gravis exacerbation likely secondary to COVID infection. S/p PLEX 5 sessions, currently completing IVIG. Symptomatically improving.     PLAN:  #Myasthenia Gravis exacerbation i/s/o COVID-19  -Continue with IVIG x 5 days, day 4/5 (2/16 - ); premedicate with tylenol and benadryl  -c/w pyridostigmine 120mg tid PO  -c/w prednisone 40mg qd    -f/u NIF and VC Q6h -20/630 this AM   -s/p PLEX 5 sessions (2/2, 2/4, 2/6, 2/8, 2/10)  [] F/U aldolase     #Dyspnea  -Budesonide and DUOnebs prn    #Urine/Sputum cx positive  -appreciate ID recs     =s/p Ceftriaxone x5days (2/11 - 2/15)    #HTN  - Amlodipine on hold, but given dose last night   - Cardiology recs appreciated - Continuing home amlodipine as recommended by Dr. Sky  - Monitoring for any worsening while on amlodipine but on 2/20 appears stable/ tolerating well.     #Preventive measures  -Cardiac monitoring with tele  -DVT ppx: Lovenox 40mg QD  -GI ppx: Protonix while on steroids   -Diet: Regular  -Dispo: Acute rehab  -Avoid medications like fluoroquinolones macrolides, aminoglycosides chloroquine.   -Consider avoiding anti hypertensives such as beta blockers, CCB. - AMLODIPINE was restarted after discussion w/ cards.      Patient was seen on rounds with neuro attending Dr Delgadillo. Recommendations above in agreement with neuro attending at time of note documentation and resident update. Delay in patient note entry due to patient care. Recommendations finalized/addended once signed by attending.  62F PMHx significant for myasthenia gravis who presents to the ED with SOB for 3 days after testing positive for COVID at home. NIF/VC -20/0.68L in ED. Pt placed on oxygen 2L NC. Pt now s/p PLEX x5 sessions and currently on pyridostigmine. Pt continues to improve clinically. CBC showing WBC 11.71, Hgb 9.5. CMP WNL. CK wnl.     IMPRESSION: Myasthenia gravis exacerbation likely secondary to COVID infection. S/p PLEX 5 sessions, currently completing IVIG. Symptomatically improving.     PLAN:  #Myasthenia Gravis exacerbation i/s/o COVID-19  -Continue with IVIG x 5 days, day 4/5 (2/16 - ); premedicate with tylenol and benadryl  -c/w pyridostigmine 120mg tid PO  -c/w prednisone 40mg qd    -f/u NIF and VC Q6h -20/630 this AM   -s/p PLEX 5 sessions (2/2, 2/4, 2/6, 2/8, 2/10)  [] F/U aldolase     #Dyspnea  -Budesonide and DUOnebs prn    #Urine/Sputum cx positive  -appreciate ID recs     =s/p Ceftriaxone x5days (2/11 - 2/15)    #HTN      - Cardiology recs appreciated - Continuing home amlodipine as recommended by Dr. Sky  - Monitoring for any worsening while on amlodipine but on 2/20 appears stable/ tolerating well.     #Preventive measures  -Cardiac monitoring with tele  -DVT ppx: Lovenox 40mg QD  -GI ppx: Protonix while on steroids   -Diet: Regular  -Dispo: Acute rehab  -Avoid medications like fluoroquinolones macrolides, aminoglycosides chloroquine.   -Consider avoiding anti hypertensives such as beta blockers, CCB. - AMLODIPINE was restarted after discussion w/ cards.      Patient was seen on rounds with neuro attending Dr Delgadillo. Recommendations above in agreement with neuro attending at time of note documentation and resident update. Delay in patient note entry due to patient care. Recommendations finalized/addended once signed by attending.

## 2023-02-20 NOTE — CONSULT NOTE ADULT - SUBJECTIVE AND OBJECTIVE BOX
CHIEF COMPLAINT:    HISTORY OF PRESENT ILLNESS:  63yo Malayalam-speaking woman w/ Myasthenia Gravis (on pyridostigmine, hospitalization every 1-2 years requiring IVIG) (dx 2015), s/p thymectomy (2010), T2DM who presents with SOB, cough, difficulty spitting up sputum x 4 days.  was sick with COVID, so son performed home COVID test for patient which was positive. Patient's primary complaint is SOB when lying down. She has been sleeping upright. She had difficulty coughing up and spitting out sputum but today these symptoms are slightly improved. Also reports difficulty swallowing - but was able to tolerate taking oral pills. Admits to difficulty looking up, but eye drooping is overall improved from past couple of months. No blurry vision. Patient takes pyridostigmine 120mg TID regularly. Patient also has prednisone PRN for sick days-- she has been taking 20mg prednisone x past 4 days as per instruction of her neurologist.     Of note, last hospitalization was 2 years ago at [facility] during which patient was intubated -- son notes that this was likely 2/2 patient being forced to lie down flat in hospital bed, leading to aspiration. She was extubated after ~3 days. Received IVIG during that hospitalization  1/30  Adm NSCU, upon arrival to the unit, appeared in acute respiratory distress tachypneic, hypertensive, tachycardic, hypoxic, ABG showing resp acidosis, immediately put on BiPAP, started on IVIG with some improvement   She is now extubated on the general neurology floor   Cardiology consulted to assist with BP medication management.     PAST MEDICAL & SURGICAL HISTORY:  Myasthenia gravis      Diabetes mellitus              MEDICATIONS:  amLODIPine   Tablet 10 milliGRAM(s) Oral daily  enoxaparin Injectable 40 milliGRAM(s) SubCutaneous <User Schedule>      albuterol/ipratropium for Nebulization 3 milliLiter(s) Nebulizer every 6 hours  buDESOnide    Inhalation Suspension 0.25 milliGRAM(s) Inhalation every 12 hours    acetaminophen     Tablet .. 650 milliGRAM(s) Oral every 6 hours PRN  diphenhydrAMINE 50 milliGRAM(s) Oral every 4 hours PRN  oxyCODONE    IR 5 milliGRAM(s) Oral every 6 hours PRN  oxyCODONE    IR 10 milliGRAM(s) Oral every 4 hours PRN    pantoprazole    Tablet 40 milliGRAM(s) Oral before breakfast  polyethylene glycol 3350 17 Gram(s) Oral daily  psyllium Powder 1 Packet(s) Oral every 8 hours  senna 2 Tablet(s) Oral at bedtime    insulin lispro (ADMELOG) corrective regimen sliding scale   SubCutaneous Before meals and at bedtime  predniSONE   Tablet 40 milliGRAM(s) Oral daily    chlorhexidine 4% Liquid 1 Application(s) Topical <User Schedule>  immune   globulin 10% (GAMMAGARD) IVPB 25 Gram(s) IV Intermittent daily  nystatin Powder 1 Application(s) Topical two times a day  potassium chloride  10 mEq/100 mL IVPB 10 milliEquivalent(s) IV Intermittent every 1 hour      FAMILY HISTORY:      SOCIAL HISTORY:    [ ] Non-smoker  [ ] Smoker  [ ] Alcohol    Allergies    No Known Drug Allergies  peanuts (Unknown)    Intolerances    	    REVIEW OF SYSTEMS:  CONSTITUTIONAL: No fever, weight loss, or fatigue  EYES: No eye pain, visual disturbances, or discharge  ENMT:  No difficulty hearing, tinnitus, vertigo; No sinus or throat pain  NECK: No pain or stiffness  RESPIRATORY: No cough, wheezing, chills or hemoptysis; No Shortness of Breath  CARDIOVASCULAR: No chest pain, palpitations, passing out, dizziness, or leg swelling  GASTROINTESTINAL: No abdominal or epigastric pain. No nausea, vomiting, or hematemesis; No diarrhea or constipation. No melena or hematochezia.  GENITOURINARY: No dysuria, frequency, hematuria, or incontinence  NEUROLOGICAL: No headaches, memory loss, loss of strength, numbness, or tremors  SKIN: No itching, burning, rashes, or lesions   LYMPH Nodes: No enlarged glands  ENDOCRINE: No heat or cold intolerance; No hair loss  MUSCULOSKELETAL: No joint pain or swelling; No muscle, back, or extremity pain  PSYCHIATRIC: No depression, anxiety, mood swings, or difficulty sleeping  HEME/LYMPH: No easy bruising, or bleeding gums  ALLERY AND IMMUNOLOGIC: No hives or eczema	    [ ] All others negative	  [ ] Unable to obtain    PHYSICAL EXAM:  T(C): 37.1 (02-20-23 @ 09:28), Max: 37.4 (02-19-23 @ 19:10)  HR: 114 (02-20-23 @ 09:28) (79 - 114)  BP: 132/76 (02-20-23 @ 09:28) (127/72 - 155/71)  RR: 18 (02-20-23 @ 09:28) (18 - 20)  SpO2: 99% (02-20-23 @ 09:28) (95% - 100%)  Wt(kg): --  I&O's Summary    19 Feb 2023 07:01  -  20 Feb 2023 07:00  --------------------------------------------------------  IN: 480 mL / OUT: 0 mL / NET: 480 mL        Appearance: Normal	  HEENT:   Normal oral mucosa, PERRL, EOMI	  Lymphatic: No lymphadenopathy  Cardiovascular: Normal S1 S2, No JVD, No murmurs, No edema  Respiratory: Lungs clear to auscultation	  Psychiatry: A & O x 3, Mood & affect appropriate  Gastrointestinal:  Soft, Non-tender, + BS	  Skin: No rashes, No ecchymoses, No cyanosis	  NEUROLOGICAL EXAM:  Mental status - Awake, Alert, Oriented to person, place, and time. Follows all commands.  Speech/Language: Clear, fluent, comprehension intact.   Cranial nerves - PERRL, VFF, EOMI no nystagmus. Mild ptosis OS that persisted. No diplopia. Facial sensation (V1-V3) intact b/l, facial strength intact without asymmetry b/l, hearing intact to conversation b/l, palate with symmetric elevation.   Neck flexion 5  Neck extension 5  counts to 22 in one breath  Motor - Normal bulk and tone throughout. No pronator drift.   Full strength in extremities	   DTR's - symmetric and intact b/l  Toes/plantar response downgoing   Coordination/Gait: Not assessed.     Extremities: Normal range of motion, No clubbing, cyanosis or edema  Vascular: Peripheral pulses palpable 2+ bilaterally    TELEMETRY: 	    ECG:  NSR ST depression laterally  	  RADIOLOGY:   < from: Xray Chest 1 View- PORTABLE-Urgent (Xray Chest 1 View- PORTABLE-Urgent .) (02.14.23 @ 07:31) >    ACC: [id number] EXAM:  XR CHEST PORTABLE URGENT 1V   ORDERED BY: MAI RAMIREZ     PROCEDURE DATE:  02/14/2023          INTERPRETATION:  Chest one view    HISTORY: Feeding tube placement    COMPARISON STUDY: 2/12/2023    Frontal expiratory viewof the chest shows the heart to be similar in   size. Sternal wires are again noted. Feeding tube reaches the stomach.    The lungs are clear. Small pleural effusions are present and there is no   evidence of pneumothorax.    IMPRESSION:  Feeding tube to stomach.        Thank you for the courtesy of this referral.    --- End of Report ---            COLIN TYLER MD; Attending Interventional Radiologist  This document has been electronically signed. Feb 14 2023  1:37PM    < end of copied text >    OTHER: 	  	  LABS:	 	    CARDIAC MARKERS:                                  9.5    6.26  )-----------( 304      ( 20 Feb 2023 06:01 )             29.9     02-20    140  |  100  |  17  ----------------------------<  129<H>  2.9<LL>   |  30  |  0.42<L>    Ca    8.8      20 Feb 2023 06:02      proBNP:   Lipid Profile:   HgA1c:   TSH:            CHIEF COMPLAINT:    HISTORY OF PRESENT ILLNESS:  61yo Malayalam-speaking woman w/ Myasthenia Gravis (on pyridostigmine, hospitalization every 1-2 years requiring IVIG) (dx 2015), s/p thymectomy (2010), T2DM who presents with SOB, cough, difficulty spitting up sputum x 4 days.  was sick with COVID, so son performed home COVID test for patient which was positive. Patient's primary complaint is SOB when lying down. She has been sleeping upright. She had difficulty coughing up and spitting out sputum but today these symptoms are slightly improved. Also reports difficulty swallowing - but was able to tolerate taking oral pills. Admits to difficulty looking up, but eye drooping is overall improved from past couple of months. No blurry vision. Patient takes pyridostigmine 120mg TID regularly. Patient also has prednisone PRN for sick days-- she has been taking 20mg prednisone x past 4 days as per instruction of her neurologist.     Of note, last hospitalization was 2 years ago at [facility] during which patient was intubated -- son notes that this was likely 2/2 patient being forced to lie down flat in hospital bed, leading to aspiration. She was extubated after ~3 days. Received IVIG during that hospitalization  1/30  Adm NSCU, upon arrival to the unit, appeared in acute respiratory distress tachypneic, hypertensive, tachycardic, hypoxic, ABG showing resp acidosis, immediately put on BiPAP, started on IVIG with some improvement   She is now extubated on the general neurology floor   Cardiology consulted to assist with BP medication management.     PAST MEDICAL & SURGICAL HISTORY:  Myasthenia gravis      Diabetes mellitus              MEDICATIONS:  amLODIPine   Tablet 10 milliGRAM(s) Oral daily  enoxaparin Injectable 40 milliGRAM(s) SubCutaneous <User Schedule>      albuterol/ipratropium for Nebulization 3 milliLiter(s) Nebulizer every 6 hours  buDESOnide    Inhalation Suspension 0.25 milliGRAM(s) Inhalation every 12 hours    acetaminophen     Tablet .. 650 milliGRAM(s) Oral every 6 hours PRN  diphenhydrAMINE 50 milliGRAM(s) Oral every 4 hours PRN  oxyCODONE    IR 5 milliGRAM(s) Oral every 6 hours PRN  oxyCODONE    IR 10 milliGRAM(s) Oral every 4 hours PRN    pantoprazole    Tablet 40 milliGRAM(s) Oral before breakfast  polyethylene glycol 3350 17 Gram(s) Oral daily  psyllium Powder 1 Packet(s) Oral every 8 hours  senna 2 Tablet(s) Oral at bedtime    insulin lispro (ADMELOG) corrective regimen sliding scale   SubCutaneous Before meals and at bedtime  predniSONE   Tablet 40 milliGRAM(s) Oral daily    chlorhexidine 4% Liquid 1 Application(s) Topical <User Schedule>  immune   globulin 10% (GAMMAGARD) IVPB 25 Gram(s) IV Intermittent daily  nystatin Powder 1 Application(s) Topical two times a day  potassium chloride  10 mEq/100 mL IVPB 10 milliEquivalent(s) IV Intermittent every 1 hour      FAMILY HISTORY:      SOCIAL HISTORY:    [ ] Non-smoker  [ ] Smoker  [ ] Alcohol    Allergies    No Known Drug Allergies  peanuts (Unknown)    Intolerances    	    REVIEW OF SYSTEMS:  CONSTITUTIONAL: No fever, weight loss, or fatigue  EYES: No eye pain, visual disturbances, or discharge  ENMT:  No difficulty hearing, tinnitus, vertigo; No sinus or throat pain  NECK: No pain or stiffness  RESPIRATORY: No cough, wheezing, chills or hemoptysis; No Shortness of Breath  CARDIOVASCULAR: No chest pain, palpitations, passing out, dizziness, or leg swelling  GASTROINTESTINAL: No abdominal or epigastric pain. No nausea, vomiting, or hematemesis; No diarrhea or constipation. No melena or hematochezia.  GENITOURINARY: No dysuria, frequency, hematuria, or incontinence  NEUROLOGICAL: No headaches, memory loss, loss of strength, numbness, or tremors  SKIN: No itching, burning, rashes, or lesions   LYMPH Nodes: No enlarged glands  ENDOCRINE: No heat or cold intolerance; No hair loss  MUSCULOSKELETAL: No joint pain or swelling; No muscle, back, or extremity pain  PSYCHIATRIC: No depression, anxiety, mood swings, or difficulty sleeping  HEME/LYMPH: No easy bruising, or bleeding gums  ALLERY AND IMMUNOLOGIC: No hives or eczema	    [ ] All others negative	  [ ] Unable to obtain    PHYSICAL EXAM:  T(C): 37.1 (02-20-23 @ 09:28), Max: 37.4 (02-19-23 @ 19:10)  HR: 114 (02-20-23 @ 09:28) (79 - 114)  BP: 132/76 (02-20-23 @ 09:28) (127/72 - 155/71)  RR: 18 (02-20-23 @ 09:28) (18 - 20)  SpO2: 99% (02-20-23 @ 09:28) (95% - 100%)  Wt(kg): --  I&O's Summary    19 Feb 2023 07:01  -  20 Feb 2023 07:00  --------------------------------------------------------  IN: 480 mL / OUT: 0 mL / NET: 480 mL        Appearance: Normal	  HEENT:   Normal oral mucosa, PERRL, EOMI	  Lymphatic: No lymphadenopathy  Cardiovascular: Normal S1 S2, No JVD, No murmurs, No edema  Respiratory: Lungs clear to auscultation	  Psychiatry: A & O x 3, Mood & affect appropriate  Gastrointestinal:  Soft, Non-tender, + BS	  Skin: No rashes, No ecchymoses, No cyanosis	  NEUROLOGICAL EXAM:  Mental status - Awake, Alert, Oriented to person, place, and time. Follows all commands.  Speech/Language: Clear, fluent, comprehension intact.   Cranial nerves - PERRL, VFF, EOMI no nystagmus. Mild ptosis OS that persisted. No diplopia. Facial sensation (V1-V3) intact b/l, facial strength intact without asymmetry b/l, hearing intact to conversation b/l, palate with symmetric elevation.   Neck flexion 5  Neck extension 5  counts to 22 in one breath  Motor - Normal bulk and tone throughout. No pronator drift.   Full strength in extremities	   DTR's - symmetric and intact b/l  Toes/plantar response downgoing   Coordination/Gait: Not assessed.     Extremities: Normal range of motion, No clubbing, cyanosis or edema  Vascular: Peripheral pulses palpable 2+ bilaterally    TELEMETRY: 	    ECG:  NSR ST depression laterally  	  RADIOLOGY:   < from: Xray Chest 1 View- PORTABLE-Urgent (Xray Chest 1 View- PORTABLE-Urgent .) (02.14.23 @ 07:31) >    ACC: [id number] EXAM:  XR CHEST PORTABLE URGENT 1V   ORDERED BY: MAI RAMIREZ     PROCEDURE DATE:  02/14/2023          INTERPRETATION:  Chest one view    HISTORY: Feeding tube placement    COMPARISON STUDY: 2/12/2023    Frontal expiratory viewof the chest shows the heart to be similar in   size. Sternal wires are again noted. Feeding tube reaches the stomach.    The lungs are clear. Small pleural effusions are present and there is no   evidence of pneumothorax.    IMPRESSION:  Feeding tube to stomach.        Thank you for the courtesy of this referral.    --- End of Report ---            COLIN TYLER MD; Attending Interventional Radiologist  This document has been electronically signed. Feb 14 2023  1:37PM    < end of copied text >    OTHER: 	  	  LABS:	 	    CARDIAC MARKERS:                                  9.5    6.26  )-----------( 304      ( 20 Feb 2023 06:01 )             29.9     02-20    140  |  100  |  17  ----------------------------<  129<H>  2.9<LL>   |  30  |  0.42<L>    Ca    8.8      20 Feb 2023 06:02      proBNP:   Lipid Profile:   HgA1c:   TSH:            CHIEF COMPLAINT:    HISTORY OF PRESENT ILLNESS:  61yo Malayalam-speaking woman w/ Myasthenia Gravis (on pyridostigmine, hospitalization every 1-2 years requiring IVIG) (dx 2015), s/p thymectomy (2010), T2DM who presents with SOB, cough, difficulty spitting up sputum x 4 days.  was sick with COVID, so son performed home COVID test for patient which was positive. Patient's primary complaint is SOB when lying down. She has been sleeping upright. She had difficulty coughing up and spitting out sputum but today these symptoms are slightly improved. Also reports difficulty swallowing - but was able to tolerate taking oral pills. Admits to difficulty looking up, but eye drooping is overall improved from past couple of months. No blurry vision. Patient takes pyridostigmine 120mg TID regularly. Patient also has prednisone PRN for sick days-- she has been taking 20mg prednisone x past 4 days as per instruction of her neurologist.     Of note, last hospitalization was 2 years ago at [facility] during which patient was intubated -- son notes that this was likely 2/2 patient being forced to lie down flat in hospital bed, leading to aspiration. She was extubated after ~3 days. Received IVIG during that hospitalization  1/30  Adm NSCU, upon arrival to the unit, appeared in acute respiratory distress tachypneic, hypertensive, tachycardic, hypoxic, ABG showing resp acidosis, immediately put on BiPAP, started on IVIG with some improvement   She is now extubated on the general neurology floor   Cardiology consulted to assist with BP medication management.     PAST MEDICAL & SURGICAL HISTORY:  Myasthenia gravis      Diabetes mellitus              MEDICATIONS:  amLODIPine   Tablet 10 milliGRAM(s) Oral daily  enoxaparin Injectable 40 milliGRAM(s) SubCutaneous <User Schedule>      albuterol/ipratropium for Nebulization 3 milliLiter(s) Nebulizer every 6 hours  buDESOnide    Inhalation Suspension 0.25 milliGRAM(s) Inhalation every 12 hours    acetaminophen     Tablet .. 650 milliGRAM(s) Oral every 6 hours PRN  diphenhydrAMINE 50 milliGRAM(s) Oral every 4 hours PRN  oxyCODONE    IR 5 milliGRAM(s) Oral every 6 hours PRN  oxyCODONE    IR 10 milliGRAM(s) Oral every 4 hours PRN    pantoprazole    Tablet 40 milliGRAM(s) Oral before breakfast  polyethylene glycol 3350 17 Gram(s) Oral daily  psyllium Powder 1 Packet(s) Oral every 8 hours  senna 2 Tablet(s) Oral at bedtime    insulin lispro (ADMELOG) corrective regimen sliding scale   SubCutaneous Before meals and at bedtime  predniSONE   Tablet 40 milliGRAM(s) Oral daily    chlorhexidine 4% Liquid 1 Application(s) Topical <User Schedule>  immune   globulin 10% (GAMMAGARD) IVPB 25 Gram(s) IV Intermittent daily  nystatin Powder 1 Application(s) Topical two times a day  potassium chloride  10 mEq/100 mL IVPB 10 milliEquivalent(s) IV Intermittent every 1 hour      FAMILY HISTORY:      SOCIAL HISTORY:    [ ] Non-smoker  [ ] Smoker  [ ] Alcohol    Allergies    No Known Drug Allergies  peanuts (Unknown)    Intolerances    	    REVIEW OF SYSTEMS:  CONSTITUTIONAL: No fever, weight loss, or fatigue  EYES: No eye pain, visual disturbances, or discharge  ENMT:  No difficulty hearing, tinnitus, vertigo; No sinus or throat pain  NECK: No pain or stiffness  RESPIRATORY: No cough, wheezing, chills or hemoptysis; No Shortness of Breath  CARDIOVASCULAR: No chest pain, palpitations, passing out, dizziness, or leg swelling  GASTROINTESTINAL: No abdominal or epigastric pain. No nausea, vomiting, or hematemesis; No diarrhea or constipation. No melena or hematochezia.  GENITOURINARY: No dysuria, frequency, hematuria, or incontinence  NEUROLOGICAL: No headaches, memory loss, loss of strength, numbness, or tremors  SKIN: No itching, burning, rashes, or lesions   LYMPH Nodes: No enlarged glands  ENDOCRINE: No heat or cold intolerance; No hair loss  MUSCULOSKELETAL: No joint pain or swelling; No muscle, back, or extremity pain  PSYCHIATRIC: No depression, anxiety, mood swings, or difficulty sleeping  HEME/LYMPH: No easy bruising, or bleeding gums  ALLERY AND IMMUNOLOGIC: No hives or eczema	    [ ] All others negative	  [ ] Unable to obtain    PHYSICAL EXAM:  T(C): 37.1 (02-20-23 @ 09:28), Max: 37.4 (02-19-23 @ 19:10)  HR: 114 (02-20-23 @ 09:28) (79 - 114)  BP: 132/76 (02-20-23 @ 09:28) (127/72 - 155/71)  RR: 18 (02-20-23 @ 09:28) (18 - 20)  SpO2: 99% (02-20-23 @ 09:28) (95% - 100%)  Wt(kg): --  I&O's Summary    19 Feb 2023 07:01  -  20 Feb 2023 07:00  --------------------------------------------------------  IN: 480 mL / OUT: 0 mL / NET: 480 mL        Appearance: Normal	  HEENT:   Normal oral mucosa, PERRL, EOMI	  Lymphatic: No lymphadenopathy  Cardiovascular: Normal S1 S2, No JVD, No murmurs, No edema  Respiratory: Lungs clear to auscultation	  Psychiatry: A & O x 3, Mood & affect appropriate  Gastrointestinal:  Soft, Non-tender, + BS	  Skin: No rashes, No ecchymoses, No cyanosis	  NEUROLOGICAL EXAM:  Mental status - Awake, Alert, Oriented to person, place, and time. Follows all commands.  Speech/Language: Clear, fluent, comprehension intact.   Cranial nerves - PERRL, VFF, EOMI no nystagmus. Mild ptosis OS that persisted. No diplopia. Facial sensation (V1-V3) intact b/l, facial strength intact without asymmetry b/l, hearing intact to conversation b/l, palate with symmetric elevation.   Neck flexion 5  Neck extension 5  counts to 22 in one breath  Motor - Normal bulk and tone throughout. No pronator drift.   Full strength in extremities	   DTR's - symmetric and intact b/l  Toes/plantar response downgoing   Coordination/Gait: Not assessed.     Extremities: Normal range of motion, No clubbing, cyanosis or edema  Vascular: Peripheral pulses palpable 2+ bilaterally    TELEMETRY: 	    ECG:  NSR ST depression laterally  	  RADIOLOGY:   < from: Xray Chest 1 View- PORTABLE-Urgent (Xray Chest 1 View- PORTABLE-Urgent .) (02.14.23 @ 07:31) >    ACC: [id number] EXAM:  XR CHEST PORTABLE URGENT 1V   ORDERED BY: MAI RAMIREZ     PROCEDURE DATE:  02/14/2023          INTERPRETATION:  Chest one view    HISTORY: Feeding tube placement    COMPARISON STUDY: 2/12/2023    Frontal expiratory viewof the chest shows the heart to be similar in   size. Sternal wires are again noted. Feeding tube reaches the stomach.    The lungs are clear. Small pleural effusions are present and there is no   evidence of pneumothorax.    IMPRESSION:  Feeding tube to stomach.        Thank you for the courtesy of this referral.    --- End of Report ---            COLIN TYLER MD; Attending Interventional Radiologist  This document has been electronically signed. Feb 14 2023  1:37PM    < end of copied text >    OTHER: 	  	  LABS:	 	    CARDIAC MARKERS:                                  9.5    6.26  )-----------( 304      ( 20 Feb 2023 06:01 )             29.9     02-20    140  |  100  |  17  ----------------------------<  129<H>  2.9<LL>   |  30  |  0.42<L>    Ca    8.8      20 Feb 2023 06:02      proBNP:   Lipid Profile:   HgA1c:   TSH:

## 2023-02-20 NOTE — CONSULT NOTE ADULT - ASSESSMENT
62F PMHx significant for myasthenia gravis who presents to the ED with SOB for 3 days after testing positive for COVID at home. NIF/VC -20/0.68L in ED. Pt placed on oxygen 2L NC. Pt now s/p PLEX x5 sessions and currently on pyridostigmine. Pt continues to improve clinically. CBC showing WBC 11.71, Hgb 9.5. CMP WNL. CK wnl.     IMPRESSION: Myasthenia gravis exacerbation likely secondary to COVID infection. S/p PLEX 5 sessions, currently completing IVIG. Symptomatically improving.

## 2023-02-20 NOTE — CONSULT NOTE ADULT - PROBLEM SELECTOR RECOMMENDATION 2
S/p PLEX 5 sessions, currently completing IVIG. Symptomatically improving.   aspiration precautions   neurology follow up

## 2023-02-20 NOTE — CHART NOTE - NSCHARTNOTEFT_GEN_A_CORE
Neuro    Notified by nurse ~6PM patient was hard stick for rechecking labs, pending trial from phlebotomy team. Rechecking was delayed initially due to potassium IV being run slow to avoid IV burning.

## 2023-02-20 NOTE — CONSULT NOTE ADULT - CONSULT REASON
covid
Consultation for therapeutic plasma exchange for myasthenia gravis.
Myasthenia Gravis  SOB
SOB
Rehabilitation consult
HTN  management

## 2023-02-20 NOTE — PROVIDER CONTACT NOTE (MEDICATION) - SITUATION
Pt Mestinon schedule originally 6am,2pm and 10pm. Pt has been getting medication at 8am, 4pm and 12am.

## 2023-02-20 NOTE — PROVIDER CONTACT NOTE (OTHER) - ASSESSMENT
Pt is A&Ox3, on ambulation patient heart rate increases to high 130's. Pt is asymptomatic at this time.  Denies chest pain or discomfort. Continuous pulse ox in progress. + tele. SaO2 97% on room air. When sitting in chair heart rate noted to be sustaining  around 113.

## 2023-02-20 NOTE — CONSULT NOTE ADULT - PROBLEM SELECTOR RECOMMENDATION 9
No cardiac objection to continue with Norvasc as ordered  Unlikely to cause or exacerbate MG   Will monitor closely   Low salt diet counseling

## 2023-02-21 VITALS
TEMPERATURE: 99 F | OXYGEN SATURATION: 94 % | DIASTOLIC BLOOD PRESSURE: 68 MMHG | RESPIRATION RATE: 18 BRPM | SYSTOLIC BLOOD PRESSURE: 122 MMHG | HEART RATE: 100 BPM

## 2023-02-21 LAB
ALBUMIN SERPL ELPH-MCNC: 3.3 G/DL — SIGNIFICANT CHANGE UP (ref 3.3–5)
ALP SERPL-CCNC: 58 U/L — SIGNIFICANT CHANGE UP (ref 40–120)
ALT FLD-CCNC: 17 U/L — SIGNIFICANT CHANGE UP (ref 10–45)
ANION GAP SERPL CALC-SCNC: 11 MMOL/L — SIGNIFICANT CHANGE UP (ref 5–17)
AST SERPL-CCNC: 10 U/L — SIGNIFICANT CHANGE UP (ref 10–40)
BILIRUB SERPL-MCNC: 0.4 MG/DL — SIGNIFICANT CHANGE UP (ref 0.2–1.2)
BUN SERPL-MCNC: 20 MG/DL — SIGNIFICANT CHANGE UP (ref 7–23)
CALCIUM SERPL-MCNC: 8.8 MG/DL — SIGNIFICANT CHANGE UP (ref 8.4–10.5)
CHLORIDE SERPL-SCNC: 104 MMOL/L — SIGNIFICANT CHANGE UP (ref 96–108)
CO2 SERPL-SCNC: 25 MMOL/L — SIGNIFICANT CHANGE UP (ref 22–31)
CREAT SERPL-MCNC: 0.49 MG/DL — LOW (ref 0.5–1.3)
EGFR: 106 ML/MIN/1.73M2 — SIGNIFICANT CHANGE UP
GLUCOSE BLDC GLUCOMTR-MCNC: 131 MG/DL — HIGH (ref 70–99)
GLUCOSE BLDC GLUCOMTR-MCNC: 209 MG/DL — HIGH (ref 70–99)
GLUCOSE BLDC GLUCOMTR-MCNC: 234 MG/DL — HIGH (ref 70–99)
GLUCOSE SERPL-MCNC: 131 MG/DL — HIGH (ref 70–99)
HCT VFR BLD CALC: 28.8 % — LOW (ref 34.5–45)
HGB BLD-MCNC: 9 G/DL — LOW (ref 11.5–15.5)
MAGNESIUM SERPL-MCNC: 2.1 MG/DL — SIGNIFICANT CHANGE UP (ref 1.6–2.6)
MCHC RBC-ENTMCNC: 29.1 PG — SIGNIFICANT CHANGE UP (ref 27–34)
MCHC RBC-ENTMCNC: 31.3 GM/DL — LOW (ref 32–36)
MCV RBC AUTO: 93.2 FL — SIGNIFICANT CHANGE UP (ref 80–100)
NRBC # BLD: 0 /100 WBCS — SIGNIFICANT CHANGE UP (ref 0–0)
PHOSPHATE SERPL-MCNC: 3.3 MG/DL — SIGNIFICANT CHANGE UP (ref 2.5–4.5)
PLATELET # BLD AUTO: 290 K/UL — SIGNIFICANT CHANGE UP (ref 150–400)
POTASSIUM SERPL-MCNC: 3.7 MMOL/L — SIGNIFICANT CHANGE UP (ref 3.5–5.3)
POTASSIUM SERPL-SCNC: 3.7 MMOL/L — SIGNIFICANT CHANGE UP (ref 3.5–5.3)
PROT SERPL-MCNC: 7.9 G/DL — SIGNIFICANT CHANGE UP (ref 6–8.3)
RBC # BLD: 3.09 M/UL — LOW (ref 3.8–5.2)
RBC # FLD: 15.9 % — HIGH (ref 10.3–14.5)
SODIUM SERPL-SCNC: 140 MMOL/L — SIGNIFICANT CHANGE UP (ref 135–145)
WBC # BLD: 6.29 K/UL — SIGNIFICANT CHANGE UP (ref 3.8–10.5)
WBC # FLD AUTO: 6.29 K/UL — SIGNIFICANT CHANGE UP (ref 3.8–10.5)

## 2023-02-21 PROCEDURE — 80076 HEPATIC FUNCTION PANEL: CPT

## 2023-02-21 PROCEDURE — 87086 URINE CULTURE/COLONY COUNT: CPT

## 2023-02-21 PROCEDURE — 87070 CULTURE OTHR SPECIMN AEROBIC: CPT

## 2023-02-21 PROCEDURE — 85018 HEMOGLOBIN: CPT

## 2023-02-21 PROCEDURE — 94150 VITAL CAPACITY TEST: CPT

## 2023-02-21 PROCEDURE — 82803 BLOOD GASES ANY COMBINATION: CPT

## 2023-02-21 PROCEDURE — 97535 SELF CARE MNGMENT TRAINING: CPT

## 2023-02-21 PROCEDURE — 80053 COMPREHEN METABOLIC PANEL: CPT

## 2023-02-21 PROCEDURE — 36514 APHERESIS PLASMA: CPT

## 2023-02-21 PROCEDURE — 93306 TTE W/DOPPLER COMPLETE: CPT

## 2023-02-21 PROCEDURE — 94660 CPAP INITIATION&MGMT: CPT

## 2023-02-21 PROCEDURE — 87040 BLOOD CULTURE FOR BACTERIA: CPT

## 2023-02-21 PROCEDURE — 85027 COMPLETE CBC AUTOMATED: CPT

## 2023-02-21 PROCEDURE — 82947 ASSAY GLUCOSE BLOOD QUANT: CPT

## 2023-02-21 PROCEDURE — 81005 URINALYSIS: CPT

## 2023-02-21 PROCEDURE — 97167 OT EVAL HIGH COMPLEX 60 MIN: CPT

## 2023-02-21 PROCEDURE — 83735 ASSAY OF MAGNESIUM: CPT

## 2023-02-21 PROCEDURE — 97116 GAIT TRAINING THERAPY: CPT

## 2023-02-21 PROCEDURE — 97162 PT EVAL MOD COMPLEX 30 MIN: CPT

## 2023-02-21 PROCEDURE — P9045: CPT

## 2023-02-21 PROCEDURE — 86901 BLOOD TYPING SEROLOGIC RH(D): CPT

## 2023-02-21 PROCEDURE — 85379 FIBRIN DEGRADATION QUANT: CPT

## 2023-02-21 PROCEDURE — 86900 BLOOD TYPING SEROLOGIC ABO: CPT

## 2023-02-21 PROCEDURE — 93005 ELECTROCARDIOGRAM TRACING: CPT

## 2023-02-21 PROCEDURE — 99232 SBSQ HOSP IP/OBS MODERATE 35: CPT

## 2023-02-21 PROCEDURE — 97110 THERAPEUTIC EXERCISES: CPT

## 2023-02-21 PROCEDURE — U0003: CPT

## 2023-02-21 PROCEDURE — 71045 X-RAY EXAM CHEST 1 VIEW: CPT

## 2023-02-21 PROCEDURE — 85384 FIBRINOGEN ACTIVITY: CPT

## 2023-02-21 PROCEDURE — 82962 GLUCOSE BLOOD TEST: CPT

## 2023-02-21 PROCEDURE — 94640 AIRWAY INHALATION TREATMENT: CPT

## 2023-02-21 PROCEDURE — 84295 ASSAY OF SERUM SODIUM: CPT

## 2023-02-21 PROCEDURE — 81001 URINALYSIS AUTO W/SCOPE: CPT

## 2023-02-21 PROCEDURE — 85025 COMPLETE CBC W/AUTO DIFF WBC: CPT

## 2023-02-21 PROCEDURE — 85730 THROMBOPLASTIN TIME PARTIAL: CPT

## 2023-02-21 PROCEDURE — 99285 EMERGENCY DEPT VISIT HI MDM: CPT

## 2023-02-21 PROCEDURE — 82010 KETONE BODYS QUAN: CPT

## 2023-02-21 PROCEDURE — 83605 ASSAY OF LACTIC ACID: CPT

## 2023-02-21 PROCEDURE — 85610 PROTHROMBIN TIME: CPT

## 2023-02-21 PROCEDURE — 84100 ASSAY OF PHOSPHORUS: CPT

## 2023-02-21 PROCEDURE — P9041: CPT

## 2023-02-21 PROCEDURE — 87077 CULTURE AEROBIC IDENTIFY: CPT

## 2023-02-21 PROCEDURE — 82330 ASSAY OF CALCIUM: CPT

## 2023-02-21 PROCEDURE — 83930 ASSAY OF BLOOD OSMOLALITY: CPT

## 2023-02-21 PROCEDURE — 82435 ASSAY OF BLOOD CHLORIDE: CPT

## 2023-02-21 PROCEDURE — 82085 ASSAY OF ALDOLASE: CPT

## 2023-02-21 PROCEDURE — 92526 ORAL FUNCTION THERAPY: CPT

## 2023-02-21 PROCEDURE — 92610 EVALUATE SWALLOWING FUNCTION: CPT

## 2023-02-21 PROCEDURE — U0005: CPT

## 2023-02-21 PROCEDURE — 87637 SARSCOV2&INF A&B&RSV AMP PRB: CPT

## 2023-02-21 PROCEDURE — 94799 UNLISTED PULMONARY SVC/PX: CPT

## 2023-02-21 PROCEDURE — 94002 VENT MGMT INPAT INIT DAY: CPT

## 2023-02-21 PROCEDURE — 94003 VENT MGMT INPAT SUBQ DAY: CPT

## 2023-02-21 PROCEDURE — 86850 RBC ANTIBODY SCREEN: CPT

## 2023-02-21 PROCEDURE — 36415 COLL VENOUS BLD VENIPUNCTURE: CPT

## 2023-02-21 PROCEDURE — 87186 SC STD MICRODIL/AGAR DIL: CPT

## 2023-02-21 PROCEDURE — 84132 ASSAY OF SERUM POTASSIUM: CPT

## 2023-02-21 PROCEDURE — 97530 THERAPEUTIC ACTIVITIES: CPT

## 2023-02-21 PROCEDURE — 83036 HEMOGLOBIN GLYCOSYLATED A1C: CPT

## 2023-02-21 PROCEDURE — 82565 ASSAY OF CREATININE: CPT

## 2023-02-21 PROCEDURE — 80048 BASIC METABOLIC PNL TOTAL CA: CPT

## 2023-02-21 PROCEDURE — 82550 ASSAY OF CK (CPK): CPT

## 2023-02-21 PROCEDURE — 85014 HEMATOCRIT: CPT

## 2023-02-21 PROCEDURE — 83935 ASSAY OF URINE OSMOLALITY: CPT

## 2023-02-21 PROCEDURE — 93970 EXTREMITY STUDY: CPT

## 2023-02-21 PROCEDURE — 84484 ASSAY OF TROPONIN QUANT: CPT

## 2023-02-21 RX ADMIN — ENOXAPARIN SODIUM 40 MILLIGRAM(S): 100 INJECTION SUBCUTANEOUS at 18:18

## 2023-02-21 RX ADMIN — Medication 0.25 MILLIGRAM(S): at 18:18

## 2023-02-21 RX ADMIN — Medication 2: at 12:26

## 2023-02-21 RX ADMIN — Medication 3 MILLILITER(S): at 05:08

## 2023-02-21 RX ADMIN — Medication 3 MILLILITER(S): at 12:18

## 2023-02-21 RX ADMIN — Medication 40 MILLIGRAM(S): at 08:31

## 2023-02-21 RX ADMIN — PANTOPRAZOLE SODIUM 40 MILLIGRAM(S): 20 TABLET, DELAYED RELEASE ORAL at 08:32

## 2023-02-21 RX ADMIN — NYSTATIN CREAM 1 APPLICATION(S): 100000 CREAM TOPICAL at 18:19

## 2023-02-21 RX ADMIN — Medication 3 MILLILITER(S): at 18:18

## 2023-02-21 RX ADMIN — Medication 0.25 MILLIGRAM(S): at 05:08

## 2023-02-21 RX ADMIN — Medication 2: at 16:22

## 2023-02-21 RX ADMIN — PYRIDOSTIGMINE BROMIDE 120 MILLIGRAM(S): 60 SOLUTION ORAL at 08:32

## 2023-02-21 RX ADMIN — AMLODIPINE BESYLATE 10 MILLIGRAM(S): 2.5 TABLET ORAL at 08:31

## 2023-02-21 RX ADMIN — PYRIDOSTIGMINE BROMIDE 120 MILLIGRAM(S): 60 SOLUTION ORAL at 15:28

## 2023-02-21 NOTE — PROGRESS NOTE ADULT - ASSESSMENT
62F PMHx significant for myasthenia gravis who presents to the ED with SOB for 3 days after testing positive for COVID at home. NIF/VC -20/0.68L in ED. Pt placed on oxygen 2L NC. Pt now s/p PLEX x5 sessions and currently on pyridostigmine. Pt continues to improve clinically. CBC showing WBC 11.71, Hgb 9.5. CMP WNL. CK wnl.     IMPRESSION: Myasthenia gravis exacerbation likely secondary to COVID infection. S/p PLEX 5 sessions, currently completing IVIG. Symptomatically improving.     PLAN:  #Myasthenia Gravis exacerbation i/s/o COVID-19  -s/p IVIG x 5 days (2/16 - 2/20)  -c/w pyridostigmine 120mg tid PO  -c/w prednisone 40mg qd    -f/u NIF and VC Q6h -20/630 this AM   -s/p PLEX 5 sessions (2/2, 2/4, 2/6, 2/8, 2/10)  -F/U aldolase     #Dyspnea  -Budesonide and DUOnebs prn    #Urine/Sputum cx positive  -appreciate ID recs     =s/p Ceftriaxone x5days (2/11 - 2/15)    #HTN      - Cardiology recs appreciated - Continuing home amlodipine as recommended by Dr. Sky  - Monitoring for any worsening while on amlodipine but on 2/20 appears stable/ tolerating well.     #Preventive measures  -Cardiac monitoring with tele  -DVT ppx: Lovenox 40mg QD  -GI ppx: Protonix while on steroids   -Diet: Regular  -Dispo: Acute rehab  -Avoid medications like fluoroquinolones macrolides, aminoglycosides chloroquine.   -Consider avoiding anti hypertensives such as beta blockers, CCB. - AMLODIPINE was restarted after discussion w/ cards.      Patient was seen on rounds with neuro attending Dr Delgadillo. Recommendations above in agreement with neuro attending at time of note documentation and resident update. Delay in patient note entry due to patient care. Recommendations finalized/addended once signed by attending.  62F PMHx significant for myasthenia gravis who presents to the ED with SOB for 3 days after testing positive for COVID at home. NIF/VC -20/0.68L in ED. Pt placed on oxygen 2L NC. Pt now s/p PLEX x5 sessions and currently on pyridostigmine. Pt continues to improve clinically. CBC showing WBC 11.71, Hgb 9.5. CMP WNL. CK wnl.     IMPRESSION: Myasthenia gravis exacerbation likely secondary to COVID infection. S/p PLEX 5 sessions, completed 5 days of IVIG. Symptomatically improving.     PLAN:  #Myasthenia Gravis exacerbation i/s/o COVID-19  -s/p IVIG x 5 days (2/16 - 2/20)  -c/w pyridostigmine 120mg tid PO  -c/w prednisone 40mg qd    -f/u NIF and VC Q6h -20/630 this AM   -s/p PLEX 5 sessions (2/2, 2/4, 2/6, 2/8, 2/10)  -F/U aldolase     #Dyspnea  -Budesonide and DUOnebs prn    #Urine/Sputum cx positive  -appreciate ID recs     =s/p Ceftriaxone x5days (2/11 - 2/15)    #HTN  - Cardiology recs appreciated - Continuing home amlodipine as recommended by Dr. Sky  - Monitoring for any worsening while on amlodipine but on 2/20 appears stable/ tolerating well.     Hypokalemia  K 2.9 yesterday- repleted with potassium  K 3.7 this AM  F/U with PCP in outpatient setting.     #Preventive measures  -Cardiac monitoring with tele  -DVT ppx: Lovenox 40mg QD  -GI ppx: Protonix while on steroids   -Diet: Regular  -Dispo: Acute rehab  -Avoid medications like fluoroquinolones macrolides, aminoglycosides chloroquine.   -Consider avoiding anti hypertensives such as beta blockers, CCB. - AMLODIPINE was restarted after discussion w/ cards.      Case seen and discussed with neurologist Dr. Marcelino.

## 2023-02-21 NOTE — PROVIDER CONTACT NOTE (OTHER) - BACKGROUND
pt with MG and covid admitted for SOB and difficulty swallowing
pt with MG and covid admitted for SOB and difficulty swallowing
s/p MG exac
MG
p/w respiratory distress followed by MG exacerbation after testing positive for covid upon admission

## 2023-02-21 NOTE — PROGRESS NOTE ADULT - PROVIDER SPECIALTY LIST ADULT
Infectious Disease
NSICU
Neurology
Infectious Disease
Infectious Disease
Neurology
Infectious Disease
NSICU
Neurology
Neurology
Infectious Disease
Infectious Disease
NSICU
Neurology
Neurology
Infectious Disease
NSICU
Neurology
Neurology
Infectious Disease
NSICU
Neurology
Neurology
NSICU
Cardiology

## 2023-02-21 NOTE — PROVIDER CONTACT NOTE (OTHER) - SITUATION
Pt HR up to 160 when walking with PT
pt desat to 88% on BiPAP
pt covid test is positive
recheck of pt bp is 173/91. Pt is sleeping and asymptomatic.
Pt was ambulating ot bathroom when became tachycardic.
pt frequently swallowing & coughing during irrigation of NGT
Clarify medication administration.

## 2023-02-21 NOTE — PROGRESS NOTE ADULT - ATTENDING SUPERVISION STATEMENT
Resident
Fellow
Resident
Fellow

## 2023-02-21 NOTE — PROVIDER CONTACT NOTE (OTHER) - DATE AND TIME:
20-Feb-2023 09:15
15-Feb-2023 22:05
21-Feb-2023 14:00
19-Feb-2023 01:20
20-Feb-2023 02:43
14-Feb-2023 06:05
01-Feb-2023 00:30

## 2023-02-21 NOTE — PROGRESS NOTE ADULT - SUBJECTIVE AND OBJECTIVE BOX
Subjective: Patient seen and examined. No new events except as noted.     REVIEW OF SYSTEMS:    CONSTITUTIONAL+ weakness, fevers or chills  EYES/ENT: No visual changes;  No vertigo or throat pain   NECK: No pain or stiffness  RESPIRATORY: No cough, wheezing, hemoptysis; No shortness of breath  CARDIOVASCULAR: No chest pain or palpitations  GASTROINTESTINAL: No abdominal or epigastric pain. No nausea, vomiting, or hematemesis; No diarrhea or constipation. No melena or hematochezia.  GENITOURINARY: No dysuria, frequency or hematuria  NEUROLOGICAL: No numbness or weakness  SKIN: No itching, burning, rashes, or lesions   All other review of systems is negative unless indicated above.    MEDICATIONS:  MEDICATIONS  (STANDING):  albuterol/ipratropium for Nebulization 3 milliLiter(s) Nebulizer every 6 hours  amLODIPine   Tablet 10 milliGRAM(s) Oral daily  buDESOnide    Inhalation Suspension 0.25 milliGRAM(s) Inhalation every 12 hours  chlorhexidine 4% Liquid 1 Application(s) Topical <User Schedule>  dextrose 5%. 1000 milliLiter(s) (100 mL/Hr) IV Continuous <Continuous>  dextrose 5%. 1000 milliLiter(s) (50 mL/Hr) IV Continuous <Continuous>  dextrose 50% Injectable 25 Gram(s) IV Push once  dextrose 50% Injectable 12.5 Gram(s) IV Push once  dextrose 50% Injectable 25 Gram(s) IV Push once  enoxaparin Injectable 40 milliGRAM(s) SubCutaneous <User Schedule>  glucagon  Injectable 1 milliGRAM(s) IntraMuscular once  insulin lispro (ADMELOG) corrective regimen sliding scale   SubCutaneous three times a day before meals  insulin lispro (ADMELOG) corrective regimen sliding scale   SubCutaneous at bedtime  nystatin Powder 1 Application(s) Topical two times a day  pantoprazole    Tablet 40 milliGRAM(s) Oral before breakfast  polyethylene glycol 3350 17 Gram(s) Oral daily  predniSONE   Tablet 40 milliGRAM(s) Oral daily  psyllium Powder 1 Packet(s) Oral every 8 hours  pyridostigmine 120 milliGRAM(s) Oral <User Schedule>  senna 2 Tablet(s) Oral at bedtime      PHYSICAL EXAM:  T(C): 37.1 (02-21-23 @ 09:00), Max: 37.3 (02-20-23 @ 20:57)  HR: 122 (02-21-23 @ 14:12) (87 - 160)  BP: 146/79 (02-21-23 @ 14:12) (135/74 - 169/84)  RR: 18 (02-21-23 @ 13:50) (18 - 20)  SpO2: 94% (02-21-23 @ 14:12) (94% - 97%)  Wt(kg): --  I&O's Summary          Appearance: Normal	  HEENT:   Normal oral mucosa, PERRL, EOMI	  Lymphatic: No lymphadenopathy  Cardiovascular: Normal S1 S2, No JVD, No murmurs, No edema  Respiratory: Lungs clear to auscultation	  Psychiatry: A & O x 3, Mood & affect appropriate  Gastrointestinal:  Soft, Non-tender, + BS	  Skin: No rashes, No ecchymoses, No cyanosis	  NEUROLOGICAL EXAM:  Mental status - Awake, Alert, Oriented to person, place, and time. Follows all commands.  Speech/Language: Clear, fluent, comprehension intact.   Cranial nerves - PERRL, VFF, EOMI no nystagmus. Mild ptosis OS that persisted. No diplopia. Facial sensation (V1-V3) intact b/l, facial strength intact without asymmetry b/l, hearing intact to conversation b/l, palate with symmetric elevation.   Neck flexion 5  Neck extension 5  counts to 22 in one breath  Motor - Normal bulk and tone throughout. No pronator drift.   Full strength in extremities	   DTR's - symmetric and intact b/l  Toes/plantar response downgoing   Coordination/Gait: Not assessed.     Extremities: Normal range of motion, No clubbing, cyanosis or edema  Vascular: Peripheral pulses palpable 2+ bilaterally      LABS:    CARDIAC MARKERS:                                9.0    6.29  )-----------( 290      ( 21 Feb 2023 05:23 )             28.8     02-21    140  |  104  |  20  ----------------------------<  131<H>  3.7   |  25  |  0.49<L>    Ca    8.8      21 Feb 2023 05:23  Phos  3.3     02-21  Mg     2.1     02-21    TPro  7.9  /  Alb  3.3  /  TBili  0.4  /  DBili  x   /  AST  10  /  ALT  17  /  AlkPhos  58  02-21    proBNP:   Lipid Profile:   HgA1c:   TSH:             TELEMETRY: 	    ECG:  	  RADIOLOGY:   DIAGNOSTIC TESTING:  [ ] Echocardiogram:  [ ]  Catheterization:  [ ] Stress Test:    OTHER:

## 2023-02-21 NOTE — PROGRESS NOTE ADULT - SUBJECTIVE AND OBJECTIVE BOX
Neurology Progress Note    SUBJECTIVE/OBJECTIVE/INTERVAL EVENTS: Patient seen and examined at bedside w/ neuro attending and team.     INTERVAL HISTORY:    REVIEW OF SYSTEMS: Otherwise denies fever, chills, headaches, vision changes, blurry vision, double vision, nausea, vomiting, hearing change, focal weakness, focal numbness, parasthesias, bowel/ bladder incontinence.  Few questions of a 10-system ROS was performed and is negative except for those items noted above and/or in the HPI.    VITALS & EXAMINATION:  Vital Signs Last 24 Hrs  T(C): 36.7 (21 Feb 2023 04:58), Max: 37.3 (20 Feb 2023 20:57)  T(F): 98.1 (21 Feb 2023 04:58), Max: 99.2 (20 Feb 2023 20:57)  HR: 89 (21 Feb 2023 04:58) (88 - 114)  BP: 152/78 (21 Feb 2023 04:58) (120/71 - 169/84)  BP(mean): --  RR: 18 (21 Feb 2023 04:58) (18 - 20)  SpO2: 95% (21 Feb 2023 04:58) (94% - 99%)    Parameters below as of 21 Feb 2023 04:58  Patient On (Oxygen Delivery Method): room air      Physical Examination:  Constitutional: Female, seated at bedside chair, appears stated age, not in acute distress.   Head: Normocephalic; Eyes: clear sclera;   NEUROLOGICAL EXAM:  Mental status - Awake, Alert, Oriented to person, place, and time. Follows all commands.  Speech/Language: Clear, fluent, comprehension intact.   Cranial nerves - PERRL, VFF, EOMI no nystagmus. Mild ptosis OS that persisted. No diplopia. Facial sensation (V1-V3) intact b/l, facial strength intact without asymmetry b/l, hearing intact to conversation b/l, palate with symmetric elevation.   Neck flexion 5  Neck extension 5  counts to 22 in one breath  Motor - Normal bulk and tone throughout. No pronator drift.   Full strength in extremities	   DTR's - symmetric and intact b/l  Toes/plantar response downgoing   Coordination/Gait: Not assessed.       LABORATORY:  CBC                       9.0    6.29  )-----------( 290      ( 21 Feb 2023 05:23 )             28.8     Chem 02-21    140  |  104  |  20  ----------------------------<  131<H>  3.7   |  25  |  0.49<L>    Ca    8.8      21 Feb 2023 05:23  Phos  3.3     02-21  Mg     2.1     02-21    TPro  7.9  /  Alb  3.3  /  TBili  0.4  /  DBili  x   /  AST  10  /  ALT  17  /  AlkPhos  58  02-21    LFTs LIVER FUNCTIONS - ( 21 Feb 2023 05:23 )  Alb: 3.3 g/dL / Pro: 7.9 g/dL / ALK PHOS: 58 U/L / ALT: 17 U/L / AST: 10 U/L / GGT: x           Coagulopathy   Lipid Panel   A1c   Cardiac enzymes     U/A   CSF  Immunological  Other    STUDIES & IMAGING: (EEG, CT, MR, U/S, TTE/AUGUSTINA): Neurology Progress Note    SUBJECTIVE/OBJECTIVE/INTERVAL EVENTS: Patient seen and examined at bedside w/ neuro attending and team.     INTERVAL HISTORY: Pt is doing well. No acute complaints.     Per PT, pt's HR elevated to 160  this AM when pt walked but per cardiology NTD at this time; pt deconditioned.         VITALS & EXAMINATION:  Vital Signs Last 24 Hrs  T(C): 36.7 (21 Feb 2023 04:58), Max: 37.3 (20 Feb 2023 20:57)  T(F): 98.1 (21 Feb 2023 04:58), Max: 99.2 (20 Feb 2023 20:57)  HR: 89 (21 Feb 2023 04:58) (88 - 114)  BP: 152/78 (21 Feb 2023 04:58) (120/71 - 169/84)  BP(mean): --  RR: 18 (21 Feb 2023 04:58) (18 - 20)  SpO2: 95% (21 Feb 2023 04:58) (94% - 99%)    Parameters below as of 21 Feb 2023 04:58  Patient On (Oxygen Delivery Method): room air      Physical Examination:  Constitutional: Female, seated at bedside chair, appears stated age, not in acute distress.   Head: Normocephalic; Eyes: clear sclera;   NEUROLOGICAL EXAM:  Mental status - Awake, Alert, Oriented to person, place, and time. Follows all commands.  Speech/Language: Clear, fluent, comprehension intact.   Cranial nerves - PERRL, VFF, EOMI no nystagmus. Mild ptosis OS that persisted. No diplopia. Facial sensation (V1-V3) intact b/l, facial strength intact without asymmetry b/l, hearing intact to conversation b/l, palate with symmetric elevation.   Neck flexion 5  Neck extension 5  counts to 22 in one breath  Motor - Normal bulk and tone throughout. No pronator drift.   LUE deltoid 4/5, RUE deltoid 4/5.  LLE HF 4/5, DF 5/5, PF 5/5  RLE HF 4/5, DF, 4/5, PF 4/5.      DTR's - symmetric and intact b/l  Toes/plantar response downgoing   Coordination/Gait: Steady but cautious gait.       LABORATORY:  CBC                       9.0    6.29  )-----------( 290      ( 21 Feb 2023 05:23 )             28.8     Chem 02-21    140  |  104  |  20  ----------------------------<  131<H>  3.7   |  25  |  0.49<L>    Ca    8.8      21 Feb 2023 05:23  Phos  3.3     02-21  Mg     2.1     02-21    TPro  7.9  /  Alb  3.3  /  TBili  0.4  /  DBili  x   /  AST  10  /  ALT  17  /  AlkPhos  58  02-21    LFTs LIVER FUNCTIONS - ( 21 Feb 2023 05:23 )  Alb: 3.3 g/dL / Pro: 7.9 g/dL / ALK PHOS: 58 U/L / ALT: 17 U/L / AST: 10 U/L / GGT: x           Coagulopathy   Lipid Panel   A1c   Cardiac enzymes     U/A   CSF  Immunological  Other    STUDIES & IMAGING: (EEG, CT, MR, U/S, TTE/[place]):  CXR 1/30: Bibasilar linear atelectasis. Otherwise, the lungs are clear.

## 2023-02-21 NOTE — PROGRESS NOTE ADULT - TIME BILLING
not critically ill but medically complex inpatient care as above
Chart review, examination and discussion of plan
Chart review, examination and discussion of plan
not critically ill inpatient care as above
Advanced care planning was discussed with patient and family.  Advanced care planning forms were reviewed and discussed as appropriate.  Differential diagnosis and plan of care discussed with patient after the evaluation.   Pain assessed and judicious use of narcotics when appropriate was discussed.  Importance of Fall prevention discussed.  Counseling on Smoking and Alcohol cessation was offered when appropriate.  Counseling on Diet, exercise, and medication compliance was done.

## 2023-02-21 NOTE — PROVIDER CONTACT NOTE (OTHER) - ACTION/TREATMENT ORDERED:
Notified Neyda with Neuro of Pt bp. Will recheck in 2hrs per verbal orders.
pt tested 1/30 for COVID, isolated >10days, no symptoms of COVID.  Isolation discontinued as per guideline.
no new orders at this time.
Provider made aware. ID following and pt treated for covid during admission
IVIG administered as per order; pt intubated with ICU team at bedside.
Provider aware. No interventions at this time, OK to give next scheduled dose.
PA made aware.   RN holding tube feeds.  PA ordered stat Xray for NGT placement
Repeat BP and orthostatic bp done.   neuro resident into see pt.

## 2023-02-21 NOTE — PROVIDER CONTACT NOTE (OTHER) - REASON
Clarify medication administration
Pt Bp is a little elevated
COVID-19 Isolation Discontinuation
pt frequently swallowing & coughing during irrigation of NGT
Pt HR up to 160 when walking with PT
critical value of positive covid test
pt desat to 88% on BiPAP
tachycardic on ambulation

## 2023-02-21 NOTE — PROGRESS NOTE ADULT - PROBLEM SELECTOR PLAN 2
S/p PLEX 5 sessions, completed 5 days of IVIG. Symptomatically improving.   -c/w pyridostigmine 120mg tid PO  -c/w prednisone 40mg qd    -f/u NIF and VC Q6h -20/630 this AM   -s/p PLEX 5 sessions (2/2, 2/4, 2/6, 2/8, 2/10)  -F/U aldolase

## 2023-02-21 NOTE — PROGRESS NOTE ADULT - ATTENDING COMMENTS
MG exacerbation in the setting of COVID-19 infection, s/p plasma exhange x 5 sessions (2/2-2/20) and IVIG 2g/kg over 5 days (last dose 2/20) with clinical improvement.     She was initially intubated and in NSICU, extubated 2/11 and now stable on the floor. She still has mild L eyelid ptosis, but otherwise normal fascial muscle strength, neck flexion 4+/5 and extension 5/5, No ophthalmoplegia. Proximal UE and LE weakness (4/5) b/l- complains of some shoulder soreness, distally 5/5. Able to stand up with support of walker and walk with walker.     She has remained stable on the floor. No respiratory issues. No decline in symptoms. She does not wish to go to rehab but rather would like to go home. Spoke to her son over the phone and he feels comfortable taking her home. She will continue prednisone 40 mg QD at this time and will follow up with her neurologist, Dr. Delroy Ro, in 1-2 weeks and consider tapering down steroids. MG exacerbation in the setting of COVID-19 infection, s/p plasma exhange x 5 sessions (2/2-2/20) and IVIG 2g/kg over 5 days (last dose 2/20) with clinical improvement.     She was initially intubated and in NSICU, extubated 2/11 and now stable on the floor. She still has mild L eyelid ptosis, but otherwise normal fascial muscle strength, neck flexion 4+/5 and extension 5/5, No ophthalmoplegia. Proximal UE and LE weakness (4/5) b/l- complains of some shoulder soreness, distally 5/5. Able to stand up with support of walker and walk with walker.     She has remained stable on the floor. No respiratory issues. No decline in symptoms. She does not wish to go to rehab but rather would like to go home. Spoke to her son over the phone and he feels comfortable taking her home. She will continue prednisone 40 mg QD at this time and will follow up with her neurologist, Dr. Delroy Ro, in 1-2 weeks and consider tapering down steroids. She should also follow up with PCP in 2 weeks for anemia (likely iron deficiency anemia).

## 2023-03-22 NOTE — DISCHARGE NOTE NURSING/CASE MANAGEMENT/SOCIAL WORK - NSDCPEFALRISK_GEN_ALL_CORE
For information on Fall & Injury Prevention, visit: [email or web address]-[email or web address]-mobility OR  [email or web address].[email or web address]/[email or web address]-[email or web address]-tips-[email or web address]-avoid-injury OR  https://www.cdc.gov/steadi/patient.html Attending Attestation (For Attendings USE Only)... For information on Fall & Injury Prevention, visit: [email or web address]://www.[email or web address]/[email or web address]-[email or web address]-health-[email or web address]-mobility OR  [email or web address]://[email or web address]/[email or web address]-prevention-tips-to-avoid-injury OR  https://www.cdc.gov/steadi/patient.html For information on Fall & Injury Prevention, visit: [email or web address]://[email or web address].[email or web address]/[email or web address]-[email or web address]-health-[email or web address]-mobility OR  https://[email or web address].[email or web address]/news/fall-prevention-tips-to-avoid-injury OR  https://www.cdc.gov/steadi/patient.html

## 2023-04-18 ENCOUNTER — EMERGENCY (EMERGENCY)
Facility: HOSPITAL | Age: 63
LOS: 1 days | Discharge: HOME | End: 2023-04-18
Attending: EMERGENCY MEDICINE
Payer: MEDICAID

## 2023-04-18 VITALS
OXYGEN SATURATION: 96 % | SYSTOLIC BLOOD PRESSURE: 165 MMHG | WEIGHT: 151.02 LBS | DIASTOLIC BLOOD PRESSURE: 73 MMHG | RESPIRATION RATE: 19 BRPM | HEIGHT: 65 IN | TEMPERATURE: 98 F | HEART RATE: 93 BPM

## 2023-04-18 VITALS
OXYGEN SATURATION: 95 % | SYSTOLIC BLOOD PRESSURE: 121 MMHG | DIASTOLIC BLOOD PRESSURE: 101 MMHG | HEART RATE: 104 BPM | RESPIRATION RATE: 18 BRPM

## 2023-04-18 LAB
ALBUMIN SERPL ELPH-MCNC: 4.6 G/DL — SIGNIFICANT CHANGE UP (ref 3.3–5)
ALP SERPL-CCNC: 93 U/L — SIGNIFICANT CHANGE UP (ref 40–120)
ALT FLD-CCNC: 19 U/L — SIGNIFICANT CHANGE UP (ref 10–45)
ANION GAP SERPL CALC-SCNC: 15 MMOL/L — SIGNIFICANT CHANGE UP (ref 5–17)
APTT BLD: 33.4 SEC — SIGNIFICANT CHANGE UP (ref 27.5–35.5)
AST SERPL-CCNC: 22 U/L — SIGNIFICANT CHANGE UP (ref 10–40)
BASOPHILS # BLD AUTO: 0.07 K/UL — SIGNIFICANT CHANGE UP (ref 0–0.2)
BASOPHILS NFR BLD AUTO: 0.6 % — SIGNIFICANT CHANGE UP (ref 0–2)
BILIRUB SERPL-MCNC: 0.2 MG/DL — SIGNIFICANT CHANGE UP (ref 0.2–1.2)
BUN SERPL-MCNC: 16 MG/DL — SIGNIFICANT CHANGE UP (ref 7–23)
CALCIUM SERPL-MCNC: 9.7 MG/DL — SIGNIFICANT CHANGE UP (ref 8.4–10.5)
CHLORIDE SERPL-SCNC: 99 MMOL/L — SIGNIFICANT CHANGE UP (ref 96–108)
CO2 SERPL-SCNC: 27 MMOL/L — SIGNIFICANT CHANGE UP (ref 22–31)
CREAT SERPL-MCNC: 0.48 MG/DL — LOW (ref 0.5–1.3)
EGFR: 106 ML/MIN/1.73M2 — SIGNIFICANT CHANGE UP
EOSINOPHIL # BLD AUTO: 0.06 K/UL — SIGNIFICANT CHANGE UP (ref 0–0.5)
EOSINOPHIL NFR BLD AUTO: 0.5 % — SIGNIFICANT CHANGE UP (ref 0–6)
GLUCOSE SERPL-MCNC: 119 MG/DL — HIGH (ref 70–99)
HCT VFR BLD CALC: 45.3 % — HIGH (ref 34.5–45)
HGB BLD-MCNC: 14.7 G/DL — SIGNIFICANT CHANGE UP (ref 11.5–15.5)
IMM GRANULOCYTES NFR BLD AUTO: 0.2 % — SIGNIFICANT CHANGE UP (ref 0–0.9)
INR BLD: 1.01 RATIO — SIGNIFICANT CHANGE UP (ref 0.88–1.16)
LYMPHOCYTES # BLD AUTO: 32.8 % — SIGNIFICANT CHANGE UP (ref 13–44)
LYMPHOCYTES # BLD AUTO: 4.01 K/UL — HIGH (ref 1–3.3)
MCHC RBC-ENTMCNC: 28.8 PG — SIGNIFICANT CHANGE UP (ref 27–34)
MCHC RBC-ENTMCNC: 32.5 GM/DL — SIGNIFICANT CHANGE UP (ref 32–36)
MCV RBC AUTO: 88.8 FL — SIGNIFICANT CHANGE UP (ref 80–100)
MONOCYTES # BLD AUTO: 1.2 K/UL — HIGH (ref 0–0.9)
MONOCYTES NFR BLD AUTO: 9.8 % — SIGNIFICANT CHANGE UP (ref 2–14)
NEUTROPHILS # BLD AUTO: 6.86 K/UL — SIGNIFICANT CHANGE UP (ref 1.8–7.4)
NEUTROPHILS NFR BLD AUTO: 56.1 % — SIGNIFICANT CHANGE UP (ref 43–77)
NRBC # BLD: 0 /100 WBCS — SIGNIFICANT CHANGE UP (ref 0–0)
NT-PROBNP SERPL-SCNC: 269 PG/ML — SIGNIFICANT CHANGE UP (ref 0–300)
PLATELET # BLD AUTO: 358 K/UL — SIGNIFICANT CHANGE UP (ref 150–400)
POTASSIUM SERPL-MCNC: 3.6 MMOL/L — SIGNIFICANT CHANGE UP (ref 3.5–5.3)
POTASSIUM SERPL-SCNC: 3.6 MMOL/L — SIGNIFICANT CHANGE UP (ref 3.5–5.3)
PROT SERPL-MCNC: 7.8 G/DL — SIGNIFICANT CHANGE UP (ref 6–8.3)
PROTHROM AB SERPL-ACNC: 11.6 SEC — SIGNIFICANT CHANGE UP (ref 10.5–13.4)
RBC # BLD: 5.1 M/UL — SIGNIFICANT CHANGE UP (ref 3.8–5.2)
RBC # FLD: 13.7 % — SIGNIFICANT CHANGE UP (ref 10.3–14.5)
SODIUM SERPL-SCNC: 141 MMOL/L — SIGNIFICANT CHANGE UP (ref 135–145)
T3 SERPL-MCNC: 96 NG/DL — SIGNIFICANT CHANGE UP (ref 80–200)
T4 AB SER-ACNC: 9.3 UG/DL — SIGNIFICANT CHANGE UP (ref 4.6–12)
TROPONIN T, HIGH SENSITIVITY RESULT: 18 NG/L — SIGNIFICANT CHANGE UP (ref 0–51)
TROPONIN T, HIGH SENSITIVITY RESULT: 19 NG/L — SIGNIFICANT CHANGE UP (ref 0–51)
TSH SERPL-MCNC: 2.19 UIU/ML — SIGNIFICANT CHANGE UP (ref 0.27–4.2)
WBC # BLD: 12.23 K/UL — HIGH (ref 3.8–10.5)
WBC # FLD AUTO: 12.23 K/UL — HIGH (ref 3.8–10.5)

## 2023-04-18 PROCEDURE — 84480 ASSAY TRIIODOTHYRONINE (T3): CPT

## 2023-04-18 PROCEDURE — 84484 ASSAY OF TROPONIN QUANT: CPT

## 2023-04-18 PROCEDURE — 99285 EMERGENCY DEPT VISIT HI MDM: CPT | Mod: 25

## 2023-04-18 PROCEDURE — 85730 THROMBOPLASTIN TIME PARTIAL: CPT

## 2023-04-18 PROCEDURE — 99285 EMERGENCY DEPT VISIT HI MDM: CPT

## 2023-04-18 PROCEDURE — 83880 ASSAY OF NATRIURETIC PEPTIDE: CPT

## 2023-04-18 PROCEDURE — 85025 COMPLETE CBC W/AUTO DIFF WBC: CPT

## 2023-04-18 PROCEDURE — 36415 COLL VENOUS BLD VENIPUNCTURE: CPT

## 2023-04-18 PROCEDURE — 71045 X-RAY EXAM CHEST 1 VIEW: CPT

## 2023-04-18 PROCEDURE — 84443 ASSAY THYROID STIM HORMONE: CPT

## 2023-04-18 PROCEDURE — 85379 FIBRIN DEGRADATION QUANT: CPT

## 2023-04-18 PROCEDURE — 93005 ELECTROCARDIOGRAM TRACING: CPT

## 2023-04-18 PROCEDURE — 85610 PROTHROMBIN TIME: CPT

## 2023-04-18 PROCEDURE — 80053 COMPREHEN METABOLIC PANEL: CPT

## 2023-04-18 PROCEDURE — 71045 X-RAY EXAM CHEST 1 VIEW: CPT | Mod: 26

## 2023-04-18 PROCEDURE — 84436 ASSAY OF TOTAL THYROXINE: CPT

## 2023-04-18 NOTE — ED PROVIDER NOTE - PATIENT PORTAL LINK FT
You can access the FollowMyHealth Patient Portal offered by [facility] by registering at the following website: [email or web address]. By joining [employer]’s FollowMyHealth portal, you will also be able to view your health information using other applications (apps) compatible with our system. You can access the FollowMyHealth Patient Portal offered by [facility] by registering at the following website: [email or web address]. By joining [employer]’s FollowMyHealth portal, you will also be able to view your health information using other applications (apps) compatible with our system. You can access the FollowMyHealth Patient Portal offered by [facility] by registering at the following website: [email or web address]. By joining [employer]’s FollowMyHealth portal, you will also be able to view your health information using other applications (apps) compatible with our system.

## 2023-04-18 NOTE — ED PROVIDER NOTE - CLINICAL SUMMARY MEDICAL DECISION MAKING FREE TEXT BOX
62-year-old female  with a history of myasthenia gravis, diabetes, hypertension, sent from his PMD office with a new onset of atrial fibrillation-flutter   patient denies any history of heart disease but states that sometimes she gets palpitations    patient looks fine not in acute distress denies any palpitation or chest pain now   EKG shows normal sinus rhythm with occasional VPCs , ECG  rewed   from primary care doctor  revealed sinus tachycardia with occ VPC old AWMI    will obtain blood work troponin, TSH  Ddimers   and reassess ZR

## 2023-04-18 NOTE — ED ADULT NURSE NOTE - OBJECTIVE STATEMENT
62y Female AOx4 with PMH of myasthenia gravis, HTN, DM presents to the ED via walk-in c/o palpitations. Pt endorses palpitations for the past few days, went to PCP who found abnormal EKG and referred pt to ED for further treatment. States this occurred in March, was prescribed 25mg Metoprolol.  Endorses intermittent SOB. Placed on cardiac monitor. Denies N/V, fever/chills, chest pain. Spontaneous/unlabored respirations, speaking in full sentences. Side rails up, bed in lowest position, oriented to call bell, safety maintained.

## 2023-04-18 NOTE — ED ADULT NURSE NOTE - NSFALLRSKOUTCOME_ED_ALL_ED
Morphine PCA stopped at 1130. Patient received 7.6 mg Morphine with 7 attempted doses and 7 given since shift change.    Patient up to shower. New IV placed after per patient request due to sensitivity. Tolerated procedure well with one attempt. Dilaudid PCA started at 1315. Patient's pain level was 7/10 before start. Will continue to monitor the changes that were made.   Universal Safety Interventions

## 2023-04-18 NOTE — ED ADULT NURSE NOTE - NS_SISCREENINGSR_GEN_ALL_ED
Negative Rhofade Pregnancy And Lactation Text: This medication has not been assigned a Pregnancy Risk Category. It is unknown if the medication is excreted in breast milk.

## 2023-04-18 NOTE — ED PROVIDER NOTE - OBJECTIVE STATEMENT
62-year-old female with a history of multiple sclerosis, hyperlipidemia, diabetes sent from her neurology office for new onset of atrial fibrillation and chest pain    patient was complaining of weakness and fatigue and set up an appointment for possible MS exacerbation   started to experience chest pain and EKG in the doctor office revealed new onset of with nonspecific ST changes 62-year-old female with a history of  DM ,Hypertension  myasthenia gravis  on Mestinon and steroids hypertension ,last admission in february for Covid and exacerbation of Myasthenia  ,  sent from her PMD  office for new onset of atrial fibrillation and chest pain    patient was complaining of weakness and fatigue and set up an appointment for possible MS exacerbation ,started to have palpitations and SOB    in the doctor office revealed new onset of with nonspecific ST changes   primary care doctor Jordon Marino

## 2023-04-18 NOTE — ED PROVIDER NOTE - CPE EDP CARDIAC NORM
Already addressed by phone message on 9/22/2021  ( She is advised decrease her methimazole to 5 mg 1 tablet Monday - Friday and no tablets on Saturday and Sunday.)    Thanks  Odin Cardenas MD     normal...

## 2023-04-18 NOTE — ED PROVIDER NOTE - NSICDXPASTMEDICALHX_GEN_ALL_CORE_FT
PAST MEDICAL HISTORY:  Diabetes mellitus     Myasthenia gravis      PAST MEDICAL HISTORY:  Diabetes mellitus     Hypertension     Myasthenia gravis

## 2023-04-18 NOTE — ED ADULT TRIAGE NOTE - NS ED NURSE AMBULANCES
[facility]

## 2023-04-18 NOTE — ED PROVIDER NOTE - PROGRESS NOTE DETAILS
EKTA García: reviewed results with pt. advised cardiology f/u. no evidence of atrial flutter on tele.

## 2023-04-18 NOTE — ED PROVIDER NOTE - NSFOLLOWUPINSTRUCTIONS_ED_ALL_ED_FT
Follow up with your Primary Care Physician within the next 2-3 days  Bring a copy of your test results with you to your appointment  Continue your current medication regimen  Return to the Emergency Room if you experience new or worsening symptoms abdominal pain, nausea, vomiting, fever chills, cough, chest pain, shortness of breath, dizziness, slurred speech, weakness, gait abnormality    Kiet Sky (DO)  Cardiology; Internal Medicine  [address] [facility], [address]  [place] [address]  Phone: [phone] Follow up with your Primary Care Physician within the next 2-3 days  Bring a copy of your test results with you to your appointment  Continue your current medication regimen  Return to the Emergency Room if you experience new or worsening symptoms abdominal pain, nausea, vomiting, fever chills, cough, chest pain, shortness of breath, dizziness, slurred speech, weakness, gait abnormality    Kiet Sky (DO)  Cardiology; Internal Medicine  [address] [facility], [address]  [place] [address]  Phone: [phone] Follow up with your Primary Care Physician within the next 2-3 days  Bring a copy of your test results with you to your appointment  Continue your current medication regimen  Return to the Emergency Room if you experience new or worsening symptoms abdominal pain, nausea, vomiting, fever chills, cough, chest pain, shortness of breath, dizziness, slurred speech, weakness, gait abnormality    Kiet Sky (DO)  Cardiology; Internal Medicine  [address] [facility], [address]  [place] [address]  Phone: [phone]

## 2023-04-18 NOTE — ED ADULT NURSE NOTE - NSIMPLEMENTINTERV_GEN_ALL_ED
Implemented All Universal Safety Interventions:  [place] to call system. Call bell, personal items and telephone within reach. Instruct patient to call for assistance. Room bathroom lighting operational. Non-slip footwear when patient is off stretcher. Physically safe environment: no spills, clutter or unnecessary equipment. Stretcher in lowest position, wheels locked, appropriate side rails in place. Implemented All Universal Safety Interventions:  [place] to call system. Call bell, personal items and telephone within reach. Instruct patient to call for assistance. Room bathroom lighting operational. Non-slip footwear when patient is off stretcher. Physically safe environment: no spills, clutter or unnecessary equipment. Stretcher in lowest position, wheels locked, appropriate side rails in place. Implemented All Universal Safety Interventions:  [place] to call system. Call bell, personal items and telephone within reach. Instruct patient to call for assistance. Room bathroom lighting operational. Non-slip footwear when patient is off stretcher. Physically safe environment: no spills, clutter or unnecessary equipment. Stretcher in lowest position, wheels locked, appropriate side rails in place.

## 2023-04-18 NOTE — ED PROVIDER NOTE - CARE PROVIDER_API CALL
Kiet Sky (DO)  Cardiology; Internal Medicine  [address] [place] [address], [address]  [place] [address]  Phone: [phone]  Fax: [phone]  Follow Up Time:    Kiet Sky (DO)  Cardiology; Internal Medicine  [address] [place] [address], [address]  [place] [address]  Phone: [phone]  Fax: [phone]  Follow Up Time:    Kiet Sky (DO)  Cardiology; Internal Medicine  [address] [place] [address], [address]  [place] [address]  Phone: [phone]  Fax: [phone]  Follow Up Time:

## 2023-04-18 NOTE — ED PROVIDER NOTE - PROVIDER TOKENS
PROVIDER:[TOKEN:[[id number] PROVIDER:[TOKEN:[[id number] PROVIDER:[TOKEN:[[id number]]]

## 2023-04-24 NOTE — ED ADULT NURSE NOTE - NS_SISCREENINGSR_GEN_ALL_ED
Anesthesia Pre Eval Note    Anesthesia ROS/Med Hx        Anesthetic Complication History:  Patient does not have a history of anesthetic complications      Pulmonary Review:    Positive for asthma, well controlled and rare use of rescue inhaler  The patient is a former smoker.     Neuro/Psych Review:  Patient does not have a neuro/psych history       Cardiovascular Review:    Positive for hypertension (Metoprolol, Amlodipine)  Positive for hyperlipidemia    GI/HEPATIC/RENAL Review:    Positive for bowel prep    End/Other Review:    Positive for obesity class I - 30.00 - 34.99  Additional Results:     ALLERGIES:   -- Eggs (Egg) -- NAUSEA    --  Ok to eat farm fresh eggs not store bought   -- Hydrocodone -- DIZZINESS and NAUSEA   -- Lovastatin -- MYALGIA   -- Pravastatin -- Other (See Comments)    --  Confusion and trouble focusing       Last Labs        Component                Value               Date/Time                  WBC                      8.7                 03/20/2023 1649            RBC                      4.10 (L)            03/20/2023 1649            HGB                      12.8 (L)            03/20/2023 1649            HCT                      38.5 (L)            03/20/2023 1649            MCV                      93.9                03/20/2023 1649            MCH                      31.2                03/20/2023 1649            MCHC                     33.2                03/20/2023 1649            RDW-CV                   13.2                03/20/2023 1649            Sodium                   138                 03/20/2023 1649            Potassium                4.4                 03/20/2023 1649            Chloride                 102                 03/20/2023 1649            Carbon Dioxide           30                  03/20/2023 1649            Glucose                  85                  03/20/2023 1649            BUN                      9                   03/20/2023 1649             Creatinine               1.00                03/20/2023 1649            Glomerular Filtrati*     89                  03/20/2023 1649            Calcium                  9.4                 03/20/2023 1649            PLT                      346                 03/20/2023 1649           Relevant Problems   No relevant active problems     Prior to Admission medications    Medication Sig Start Date End Date Taking? Authorizing Provider   metoPROLOL succinate (TOPROL-XL) 50 MG 24 hr tablet Take 1 tablet by mouth nightly. 4/7/23  Yes Ganesh Nicholson MD   albuterol (Ventolin HFA) 108 (90 Base) MCG/ACT inhaler Inhale 2 puffs into the lungs every 4 hours as needed for Shortness of Breath or Wheezing. 4/4/23  Yes Ganesh Nicholson MD   Omega-3 Fatty Acids (FISH OIL OMEGA-3 PO) Take 1 capsule by mouth daily. 2/1/23  Yes Provider, Outside   fluticasone-vilanterol (Breo Ellipta) 200-25 MCG/ACT inhaler Inhale 1 inhalation daily, if this is ineffective may inhale 1 inhalation twice daily, rinse mouth after each use.  Patient taking differently: Inhale 1 inhalation daily as needed 1/5/23  Yes Ganesh Nicholson MD   sildenafil (VIAGRA) 50 MG tablet Take 100 mg 30- 45 minutes before sexual activity. 5/11/22  Yes Ganesh Nicholson MD   EPINEPHrine (EpiPen 2-[place]) 0.3 MG/0.3ML auto-injector Inject 0.3 mLs into the muscle once as needed for Anaphylaxis. 3/31/22  Yes Ganesh Nicholson MD   Coenzyme Q10 (CO Q 10 PO) Take 1 tablet by mouth daily.   Yes Provider, Outside   Red Yeast Rice Extract (RED YEAST RICE PO) Take 1 capsule by mouth daily.   Yes Provider, Outside   amLODIPine (NORVASC) 2.5 MG tablet Take 1 tablet by mouth daily. 3/5/23   Ct Christopher MD     Past Medical History:   Diagnosis Date   • Allergy    • Chest pain     suspect GERD at this point   • Essential (primary) hypertension    • Mild persistent asthma    • Refused influenza vaccine 09/18/2013   • Ruptured appendix        Physical Exam     Airway   Mallampati:  II  TM Distance: >3 FB  Neck ROM: Full  Neck: Able to place in sniff position and Non-tender  TMJ Mobility: Good    Cardiovascular  Cardiovascular exam normal    General Assessment  General Assessment: Alert and oriented and No acute distress    Dental Exam  Dental exam normal    Pulmonary Exam  Pulmonary exam normal      Anesthesia Plan:    ASA Status: 2  Anesthesia Type: MAC    Induction: Intravenous  Preferred Airway Type: Nasal Cannula  Maintenance: TIVA  Premedication: None      Post-op Pain Management: Per Surgeon  Postoperative analgesia plan does NOT include opiods    Checklist  Reviewed: Lab Results, Patient Summary, Allergies, Past Med History, Anesthesia Record, Medications, Beta Blocker Status, NPO Status and Problem list  Consent/Risks Discussed Statement:  The proposed anesthetic plan, including its risks and benefits, have been discussed with the Patient along with the risks and benefits of alternatives. Questions were encouraged and answered and the patient and/or representative understands and agrees to proceed.        I discussed with the patient (and/or patient's legal representative) the risks and benefits of the proposed anesthesia plan, MAC, which may include services performed by other anesthesia providers.    Alternative anesthesia plans, if available, were reviewed with the patient (and/or patient's legal representative). Discussion has been held with the patient (and/or patient's legal representative) regarding risks of anesthesia, which include allergic reaction, conversion to general anesthesia, dental injury, hypotension, intra-operative awareness, nerve injury, oral injury and sore throat and emergent situations that may require change in anesthesia plan.    The patient (and/or patient's legal representative) has indicated understanding, his/her questions have been answered, and he/she wishes to proceed with the planned anesthetic.     Negative

## 2023-05-05 ENCOUNTER — INPATIENT (INPATIENT)
Facility: HOSPITAL | Age: 63
LOS: 17 days | Discharge: HOME HEALTH | DRG: 56 | End: 2023-05-23
Attending: SPECIALIST | Admitting: PSYCHIATRY & NEUROLOGY
Payer: MEDICAID

## 2023-05-05 VITALS
OXYGEN SATURATION: 100 % | SYSTOLIC BLOOD PRESSURE: 144 MMHG | HEIGHT: 65 IN | TEMPERATURE: 99 F | DIASTOLIC BLOOD PRESSURE: 72 MMHG | RESPIRATION RATE: 24 BRPM | HEART RATE: 120 BPM

## 2023-05-05 DIAGNOSIS — G70.01 MYASTHENIA GRAVIS WITH (ACUTE) EXACERBATION: ICD-10-CM

## 2023-05-05 LAB
ALBUMIN SERPL ELPH-MCNC: 4.4 G/DL — SIGNIFICANT CHANGE UP (ref 3.3–5)
ALP SERPL-CCNC: 89 U/L — SIGNIFICANT CHANGE UP (ref 40–120)
ALT FLD-CCNC: 16 U/L — SIGNIFICANT CHANGE UP (ref 10–45)
ANION GAP SERPL CALC-SCNC: 11 MMOL/L — SIGNIFICANT CHANGE UP (ref 5–17)
APTT BLD: 27.6 SEC — SIGNIFICANT CHANGE UP (ref 27.5–35.5)
APTT BLD: 32.4 SEC — SIGNIFICANT CHANGE UP (ref 27.5–35.5)
AST SERPL-CCNC: 16 U/L — SIGNIFICANT CHANGE UP (ref 10–40)
BASE EXCESS BLDV CALC-SCNC: -6.7 MMOL/L — LOW (ref -2–3)
BASE EXCESS BLDV CALC-SCNC: 7.8 MMOL/L — HIGH (ref -2–3)
BASOPHILS # BLD AUTO: 0.02 K/UL — SIGNIFICANT CHANGE UP (ref 0–0.2)
BASOPHILS NFR BLD AUTO: 0.2 % — SIGNIFICANT CHANGE UP (ref 0–2)
BILIRUB SERPL-MCNC: 0.2 MG/DL — SIGNIFICANT CHANGE UP (ref 0.2–1.2)
BLD GP AB SCN SERPL QL: NEGATIVE — SIGNIFICANT CHANGE UP
BUN SERPL-MCNC: 17 MG/DL — SIGNIFICANT CHANGE UP (ref 7–23)
CA-I SERPL-SCNC: 0.82 MMOL/L — LOW (ref 1.15–1.33)
CA-I SERPL-SCNC: 1.22 MMOL/L — SIGNIFICANT CHANGE UP (ref 1.15–1.33)
CALCIUM SERPL-MCNC: 9.7 MG/DL — SIGNIFICANT CHANGE UP (ref 8.4–10.5)
CHLORIDE BLDV-SCNC: 117 MMOL/L — HIGH (ref 96–108)
CHLORIDE BLDV-SCNC: 97 MMOL/L — SIGNIFICANT CHANGE UP (ref 96–108)
CHLORIDE SERPL-SCNC: 95 MMOL/L — LOW (ref 96–108)
CO2 BLDV-SCNC: 19 MMOL/L — LOW (ref 22–26)
CO2 BLDV-SCNC: 40 MMOL/L — HIGH (ref 22–26)
CO2 SERPL-SCNC: 33 MMOL/L — HIGH (ref 22–31)
CREAT SERPL-MCNC: 0.52 MG/DL — SIGNIFICANT CHANGE UP (ref 0.5–1.3)
EGFR: 104 ML/MIN/1.73M2 — SIGNIFICANT CHANGE UP
EOSINOPHIL # BLD AUTO: 0.01 K/UL — SIGNIFICANT CHANGE UP (ref 0–0.5)
EOSINOPHIL NFR BLD AUTO: 0.1 % — SIGNIFICANT CHANGE UP (ref 0–6)
GAS PNL BLDA: SIGNIFICANT CHANGE UP
GAS PNL BLDV: 133 MMOL/L — LOW (ref 136–145)
GAS PNL BLDV: 144 MMOL/L — SIGNIFICANT CHANGE UP (ref 136–145)
GAS PNL BLDV: SIGNIFICANT CHANGE UP
GLUCOSE BLDV-MCNC: 137 MG/DL — HIGH (ref 70–99)
GLUCOSE BLDV-MCNC: 91 MG/DL — SIGNIFICANT CHANGE UP (ref 70–99)
GLUCOSE SERPL-MCNC: 119 MG/DL — HIGH (ref 70–99)
HCO3 BLDV-SCNC: 18 MMOL/L — LOW (ref 22–29)
HCO3 BLDV-SCNC: 37 MMOL/L — HIGH (ref 22–29)
HCT VFR BLD CALC: 39.3 % — SIGNIFICANT CHANGE UP (ref 34.5–45)
HCT VFR BLD CALC: 46.6 % — HIGH (ref 34.5–45)
HCT VFR BLDA CALC: 26 % — LOW (ref 34.5–46.5)
HCT VFR BLDA CALC: 45 % — SIGNIFICANT CHANGE UP (ref 34.5–46.5)
HGB BLD CALC-MCNC: 15 G/DL — SIGNIFICANT CHANGE UP (ref 11.7–16.1)
HGB BLD CALC-MCNC: 8.7 G/DL — LOW (ref 11.7–16.1)
HGB BLD-MCNC: 13 G/DL — SIGNIFICANT CHANGE UP (ref 11.5–15.5)
HGB BLD-MCNC: 14.4 G/DL — SIGNIFICANT CHANGE UP (ref 11.5–15.5)
IMM GRANULOCYTES NFR BLD AUTO: 0.2 % — SIGNIFICANT CHANGE UP (ref 0–0.9)
INR BLD: 1.02 RATIO — SIGNIFICANT CHANGE UP (ref 0.88–1.16)
INR BLD: 1.07 RATIO — SIGNIFICANT CHANGE UP (ref 0.88–1.16)
LACTATE BLDV-MCNC: 0.8 MMOL/L — SIGNIFICANT CHANGE UP (ref 0.5–2)
LACTATE BLDV-MCNC: 1.1 MMOL/L — SIGNIFICANT CHANGE UP (ref 0.5–2)
LYMPHOCYTES # BLD AUTO: 1.5 K/UL — SIGNIFICANT CHANGE UP (ref 1–3.3)
LYMPHOCYTES # BLD AUTO: 12.4 % — LOW (ref 13–44)
MAGNESIUM SERPL-MCNC: 1.8 MG/DL — SIGNIFICANT CHANGE UP (ref 1.6–2.6)
MCHC RBC-ENTMCNC: 28.2 PG — SIGNIFICANT CHANGE UP (ref 27–34)
MCHC RBC-ENTMCNC: 28.8 PG — SIGNIFICANT CHANGE UP (ref 27–34)
MCHC RBC-ENTMCNC: 30.9 GM/DL — LOW (ref 32–36)
MCHC RBC-ENTMCNC: 33.1 GM/DL — SIGNIFICANT CHANGE UP (ref 32–36)
MCV RBC AUTO: 86.9 FL — SIGNIFICANT CHANGE UP (ref 80–100)
MCV RBC AUTO: 91.2 FL — SIGNIFICANT CHANGE UP (ref 80–100)
MONOCYTES # BLD AUTO: 0.37 K/UL — SIGNIFICANT CHANGE UP (ref 0–0.9)
MONOCYTES NFR BLD AUTO: 3.1 % — SIGNIFICANT CHANGE UP (ref 2–14)
NEUTROPHILS # BLD AUTO: 10.2 K/UL — HIGH (ref 1.8–7.4)
NEUTROPHILS NFR BLD AUTO: 84 % — HIGH (ref 43–77)
NRBC # BLD: 0 /100 WBCS — SIGNIFICANT CHANGE UP (ref 0–0)
NT-PROBNP SERPL-SCNC: 147 PG/ML — SIGNIFICANT CHANGE UP (ref 0–300)
OTHER CELLS CSF MANUAL: 13.4 ML/DL — LOW (ref 18–22)
PCO2 BLDV: 33 MMHG — LOW (ref 39–42)
PCO2 BLDV: 74 MMHG — HIGH (ref 39–42)
PH BLDV: 7.31 — LOW (ref 7.32–7.43)
PH BLDV: 7.35 — SIGNIFICANT CHANGE UP (ref 7.32–7.43)
PHOSPHATE SERPL-MCNC: 1.3 MG/DL — LOW (ref 2.5–4.5)
PLATELET # BLD AUTO: 284 K/UL — SIGNIFICANT CHANGE UP (ref 150–400)
PLATELET # BLD AUTO: 292 K/UL — SIGNIFICANT CHANGE UP (ref 150–400)
PO2 BLDV: 36 MMHG — SIGNIFICANT CHANGE UP (ref 25–45)
PO2 BLDV: 41 MMHG — SIGNIFICANT CHANGE UP (ref 25–45)
POTASSIUM BLDV-SCNC: 1.8 MMOL/L — CRITICAL LOW (ref 3.5–5.1)
POTASSIUM BLDV-SCNC: 5.2 MMOL/L — HIGH (ref 3.5–5.1)
POTASSIUM SERPL-MCNC: 4.1 MMOL/L — SIGNIFICANT CHANGE UP (ref 3.5–5.3)
POTASSIUM SERPL-SCNC: 4.1 MMOL/L — SIGNIFICANT CHANGE UP (ref 3.5–5.3)
PROT SERPL-MCNC: 7.5 G/DL — SIGNIFICANT CHANGE UP (ref 6–8.3)
PROTHROM AB SERPL-ACNC: 11.8 SEC — SIGNIFICANT CHANGE UP (ref 10.5–13.4)
PROTHROM AB SERPL-ACNC: 12.4 SEC — SIGNIFICANT CHANGE UP (ref 10.5–13.4)
RAPID RVP RESULT: SIGNIFICANT CHANGE UP
RBC # BLD: 4.52 M/UL — SIGNIFICANT CHANGE UP (ref 3.8–5.2)
RBC # BLD: 5.11 M/UL — SIGNIFICANT CHANGE UP (ref 3.8–5.2)
RBC # FLD: 13.1 % — SIGNIFICANT CHANGE UP (ref 10.3–14.5)
RBC # FLD: 13.2 % — SIGNIFICANT CHANGE UP (ref 10.3–14.5)
RH IG SCN BLD-IMP: POSITIVE — SIGNIFICANT CHANGE UP
SAO2 % BLDV: 64.8 % — LOW (ref 67–88)
SAO2 % BLDV: 65 % — LOW (ref 67–88)
SARS-COV-2 RNA SPEC QL NAA+PROBE: SIGNIFICANT CHANGE UP
SODIUM SERPL-SCNC: 139 MMOL/L — SIGNIFICANT CHANGE UP (ref 135–145)
WBC # BLD: 12.13 K/UL — HIGH (ref 3.8–10.5)
WBC # BLD: 15.88 K/UL — HIGH (ref 3.8–10.5)
WBC # FLD AUTO: 12.13 K/UL — HIGH (ref 3.8–10.5)
WBC # FLD AUTO: 15.88 K/UL — HIGH (ref 3.8–10.5)

## 2023-05-05 PROCEDURE — 71045 X-RAY EXAM CHEST 1 VIEW: CPT | Mod: 26,76

## 2023-05-05 PROCEDURE — 99291 CRITICAL CARE FIRST HOUR: CPT | Mod: 25

## 2023-05-05 PROCEDURE — 36556 INSERT NON-TUNNEL CV CATH: CPT

## 2023-05-05 PROCEDURE — 31500 INSERT EMERGENCY AIRWAY: CPT

## 2023-05-05 PROCEDURE — 99291 CRITICAL CARE FIRST HOUR: CPT

## 2023-05-05 PROCEDURE — 93308 TTE F-UP OR LMTD: CPT | Mod: 26

## 2023-05-05 PROCEDURE — 99233 SBSQ HOSP IP/OBS HIGH 50: CPT

## 2023-05-05 RX ORDER — POTASSIUM PHOSPHATE, MONOBASIC POTASSIUM PHOSPHATE, DIBASIC 236; 224 MG/ML; MG/ML
30 INJECTION, SOLUTION INTRAVENOUS ONCE
Refills: 0 | Status: COMPLETED | OUTPATIENT
Start: 2023-05-05 | End: 2023-05-05

## 2023-05-05 RX ORDER — INSULIN LISPRO 100/ML
VIAL (ML) SUBCUTANEOUS EVERY 6 HOURS
Refills: 0 | Status: DISCONTINUED | OUTPATIENT
Start: 2023-05-05 | End: 2023-05-17

## 2023-05-05 RX ORDER — CHLORHEXIDINE GLUCONATE 213 G/1000ML
1 SOLUTION TOPICAL
Refills: 0 | Status: DISCONTINUED | OUTPATIENT
Start: 2023-05-05 | End: 2023-05-16

## 2023-05-05 RX ORDER — ETOMIDATE 2 MG/ML
20 INJECTION INTRAVENOUS ONCE
Refills: 0 | Status: COMPLETED | OUTPATIENT
Start: 2023-05-05 | End: 2023-05-05

## 2023-05-05 RX ORDER — SODIUM CHLORIDE 9 MG/ML
1000 INJECTION INTRAMUSCULAR; INTRAVENOUS; SUBCUTANEOUS ONCE
Refills: 0 | Status: DISCONTINUED | OUTPATIENT
Start: 2023-05-05 | End: 2023-05-05

## 2023-05-05 RX ORDER — IPRATROPIUM/ALBUTEROL SULFATE 18-103MCG
3 AEROSOL WITH ADAPTER (GRAM) INHALATION EVERY 6 HOURS
Refills: 0 | Status: DISCONTINUED | OUTPATIENT
Start: 2023-05-05 | End: 2023-05-23

## 2023-05-05 RX ORDER — PROPOFOL 10 MG/ML
20 INJECTION, EMULSION INTRAVENOUS
Qty: 1000 | Refills: 0 | Status: DISCONTINUED | OUTPATIENT
Start: 2023-05-05 | End: 2023-05-06

## 2023-05-05 RX ORDER — FENTANYL CITRATE 50 UG/ML
50 INJECTION INTRAVENOUS ONCE
Refills: 0 | Status: DISCONTINUED | OUTPATIENT
Start: 2023-05-05 | End: 2023-05-05

## 2023-05-05 RX ORDER — SODIUM CHLORIDE 9 MG/ML
1000 INJECTION INTRAMUSCULAR; INTRAVENOUS; SUBCUTANEOUS
Refills: 0 | Status: DISCONTINUED | OUTPATIENT
Start: 2023-05-05 | End: 2023-05-06

## 2023-05-05 RX ORDER — NOREPINEPHRINE BITARTRATE/D5W 8 MG/250ML
0.05 PLASTIC BAG, INJECTION (ML) INTRAVENOUS
Qty: 8 | Refills: 0 | Status: DISCONTINUED | OUTPATIENT
Start: 2023-05-05 | End: 2023-05-07

## 2023-05-05 RX ORDER — CHLORHEXIDINE GLUCONATE 213 G/1000ML
15 SOLUTION TOPICAL EVERY 12 HOURS
Refills: 0 | Status: DISCONTINUED | OUTPATIENT
Start: 2023-05-05 | End: 2023-05-16

## 2023-05-05 RX ORDER — NOREPINEPHRINE BITARTRATE/D5W 8 MG/250ML
0.05 PLASTIC BAG, INJECTION (ML) INTRAVENOUS
Qty: 8 | Refills: 0 | Status: DISCONTINUED | OUTPATIENT
Start: 2023-05-05 | End: 2023-05-05

## 2023-05-05 RX ORDER — FENTANYL CITRATE 50 UG/ML
50 INJECTION INTRAVENOUS ONCE
Refills: 0 | Status: DISCONTINUED | OUTPATIENT
Start: 2023-05-05 | End: 2023-05-12

## 2023-05-05 RX ORDER — ATORVASTATIN CALCIUM 80 MG/1
20 TABLET, FILM COATED ORAL AT BEDTIME
Refills: 0 | Status: DISCONTINUED | OUTPATIENT
Start: 2023-05-05 | End: 2023-05-08

## 2023-05-05 RX ORDER — POTASSIUM CHLORIDE 20 MEQ
40 PACKET (EA) ORAL ONCE
Refills: 0 | Status: COMPLETED | OUTPATIENT
Start: 2023-05-05 | End: 2023-05-05

## 2023-05-05 RX ORDER — ROCURONIUM BROMIDE 10 MG/ML
30 VIAL (ML) INTRAVENOUS ONCE
Refills: 0 | Status: COMPLETED | OUTPATIENT
Start: 2023-05-05 | End: 2023-05-05

## 2023-05-05 RX ADMIN — Medication 7.03 MICROGRAM(S)/KG/MIN: at 22:20

## 2023-05-05 RX ADMIN — Medication 30 MILLIGRAM(S): at 15:16

## 2023-05-05 RX ADMIN — FENTANYL CITRATE 50 MICROGRAM(S): 50 INJECTION INTRAVENOUS at 15:21

## 2023-05-05 RX ADMIN — SODIUM CHLORIDE 50 MILLILITER(S): 9 INJECTION INTRAMUSCULAR; INTRAVENOUS; SUBCUTANEOUS at 17:15

## 2023-05-05 RX ADMIN — PROPOFOL 8.88 MICROGRAM(S)/KG/MIN: 10 INJECTION, EMULSION INTRAVENOUS at 16:48

## 2023-05-05 RX ADMIN — PROPOFOL 8.88 MICROGRAM(S)/KG/MIN: 10 INJECTION, EMULSION INTRAVENOUS at 17:15

## 2023-05-05 RX ADMIN — Medication 40 MILLIEQUIVALENT(S): at 22:18

## 2023-05-05 RX ADMIN — CHLORHEXIDINE GLUCONATE 15 MILLILITER(S): 213 SOLUTION TOPICAL at 17:15

## 2023-05-05 RX ADMIN — FENTANYL CITRATE 50 MICROGRAM(S): 50 INJECTION INTRAVENOUS at 16:23

## 2023-05-05 RX ADMIN — Medication 7.03 MICROGRAM(S)/KG/MIN: at 16:48

## 2023-05-05 RX ADMIN — POTASSIUM PHOSPHATE, MONOBASIC POTASSIUM PHOSPHATE, DIBASIC 83.33 MILLIMOLE(S): 236; 224 INJECTION, SOLUTION INTRAVENOUS at 22:19

## 2023-05-05 RX ADMIN — PROPOFOL 8.88 MICROGRAM(S)/KG/MIN: 10 INJECTION, EMULSION INTRAVENOUS at 22:21

## 2023-05-05 RX ADMIN — FENTANYL CITRATE 50 MICROGRAM(S): 50 INJECTION INTRAVENOUS at 16:53

## 2023-05-05 RX ADMIN — Medication 2: at 17:59

## 2023-05-05 RX ADMIN — ETOMIDATE 20 MILLIGRAM(S): 2 INJECTION INTRAVENOUS at 15:16

## 2023-05-05 RX ADMIN — ATORVASTATIN CALCIUM 20 MILLIGRAM(S): 80 TABLET, FILM COATED ORAL at 22:18

## 2023-05-05 RX ADMIN — CHLORHEXIDINE GLUCONATE 1 APPLICATION(S): 213 SOLUTION TOPICAL at 22:26

## 2023-05-05 RX ADMIN — FENTANYL CITRATE 50 MICROGRAM(S): 50 INJECTION INTRAVENOUS at 15:51

## 2023-05-05 RX ADMIN — SODIUM CHLORIDE 50 MILLILITER(S): 9 INJECTION INTRAMUSCULAR; INTRAVENOUS; SUBCUTANEOUS at 22:21

## 2023-05-05 NOTE — H&P ADULT - NSHPPHYSICALEXAM_GEN_ALL_CORE
Physical Examination:   General - in no acute distress  Cardiovascular - hemodynamically stable     Neurologic Exam:  Mental status - Awake, Alert, Oriented x 4.     Cranial nerves - B/L eye ptosis R>L, impaired upgaze, PERRLA,     Motor - Normal bulk and tone throughout. No pronator drift.  Strength testing            Deltoid      Biceps      Triceps     Wrist Extension    Wrist Flexion     Interossei         R            5                 5               5                     5                              5                        5                 5  L             5                 5               5                     5                              5                        5                 5              Hip Flexion    Hip Extension    Knee Flexion    Knee Extension    Dorsiflexion    Plantar Flexion  R              5                           5                       5                           5                            5                          5  L              5                           5                        5                           5                            5                          5

## 2023-05-05 NOTE — PROGRESS NOTE ADULT - SUBJECTIVE AND OBJECTIVE BOX
Interval events:    VITALS:  T(C): , Max: 37.1 (05-05-23 @ 15:04)  HR:  (83 - 135)  BP:  (73/58 - 270/100)  ABP:  (94/48 - 133/63)  RR:  (17 - 37)  SpO2:  (89% - 100%)  Wt(kg): --  Device: Avea, Mode: AC/ CMV (Assist Control/ Continuous Mandatory Ventilation), RR (machine): 18, TV (machine): 450, FiO2: 60, PEEP: 5, ITime: 1, MAP: 13, PIP: 36    05-05-23 @ 07:01  -  05-05-23 @ 18:48  --------------------------------------------------------  IN: 198.3 mL / OUT: 0 mL / NET: 198.3 mL      LABS:  Na: 139 (05-05 @ 13:52)  K: 4.1 (05-05 @ 13:52)  Cl: 95 (05-05 @ 13:52)  CO2: 33 (05-05 @ 13:52)  BUN: 17 (05-05 @ 13:52)  Cr: 0.52 (05-05 @ 13:52)  Glu: 119(05-05 @ 13:52)    Hgb: 14.4 (05-05 @ 13:52)  Hct: 46.6 (05-05 @ 13:52)  WBC: 12.13 (05-05 @ 13:52)  Plt: 292 (05-05 @ 13:52)    INR: 1.02 05-05-23 @ 13:52  PTT: 32.4 05-05-23 @ 13:52    MEDICATIONS:  albuterol/ipratropium for Nebulization 3 milliLiter(s) Nebulizer every 6 hours  atorvastatin 20 milliGRAM(s) Oral at bedtime  chlorhexidine 0.12% Liquid 15 milliLiter(s) Oral Mucosa every 12 hours  chlorhexidine 4% Liquid 1 Application(s) Topical <User Schedule>  fentaNYL    Injectable 50 MICROGram(s) IV Push once  insulin lispro (ADMELOG) corrective regimen sliding scale   SubCutaneous every 6 hours  methylPREDNISolone sodium succinate Injectable 125 milliGRAM(s) IV Push daily  norepinephrine Infusion 0.05 MICROgram(s)/kG/Min IV Continuous <Continuous>  propofol Infusion 20 MICROgram(s)/kG/Min IV Continuous <Continuous>  sodium chloride 0.9%. 1000 milliLiter(s) IV Continuous <Continuous>    EXAMINATION:  General:  in NAD  HEENT:  MMM  Neuro:  awake, alert, oriented x 3, follows commands, EOMI, face symmetric, no PD, DF 5/5   Cards:  RRR  Respiratory:  no respiratory distress  Abdomen:  soft  Extremities:  no LE edema    Assessment/Plan:   61 yo woman with DM and Myasthenia gravis s/p thymectomy 2010, with prior exacerbations requiring intubation (last 2021), with myasthenia gravis exacerbation in late Jan 2023 requiring intubation and PLEX, on 2L O2 at baseline, now readmitted 5.5 with 1 week of diplopia, dysphagia and SOB requiring intubation.      Interval events:    VITALS:  T(C): , Max: 37.1 (05-05-23 @ 15:04)  HR:  (83 - 135)  BP:  (73/58 - 270/100)  ABP:  (94/48 - 133/63)  RR:  (17 - 37)  SpO2:  (89% - 100%)  Wt(kg): --  Device: Avea, Mode: AC/ CMV (Assist Control/ Continuous Mandatory Ventilation), RR (machine): 18, TV (machine): 450, FiO2: 60, PEEP: 5, ITime: 1, MAP: 13, PIP: 36    05-05-23 @ 07:01  -  05-05-23 @ 18:48  --------------------------------------------------------  IN: 198.3 mL / OUT: 0 mL / NET: 198.3 mL      LABS:  Na: 139 (05-05 @ 13:52)  K: 4.1 (05-05 @ 13:52)  Cl: 95 (05-05 @ 13:52)  CO2: 33 (05-05 @ 13:52)  BUN: 17 (05-05 @ 13:52)  Cr: 0.52 (05-05 @ 13:52)  Glu: 119(05-05 @ 13:52)    Hgb: 14.4 (05-05 @ 13:52)  Hct: 46.6 (05-05 @ 13:52)  WBC: 12.13 (05-05 @ 13:52)  Plt: 292 (05-05 @ 13:52)    INR: 1.02 05-05-23 @ 13:52  PTT: 32.4 05-05-23 @ 13:52    MEDICATIONS:  albuterol/ipratropium for Nebulization 3 milliLiter(s) Nebulizer every 6 hours  atorvastatin 20 milliGRAM(s) Oral at bedtime  chlorhexidine 0.12% Liquid 15 milliLiter(s) Oral Mucosa every 12 hours  chlorhexidine 4% Liquid 1 Application(s) Topical <User Schedule>  fentaNYL    Injectable 50 MICROGram(s) IV Push once  insulin lispro (ADMELOG) corrective regimen sliding scale   SubCutaneous every 6 hours  methylPREDNISolone sodium succinate Injectable 125 milliGRAM(s) IV Push daily  norepinephrine Infusion 0.05 MICROgram(s)/kG/Min IV Continuous <Continuous>  propofol Infusion 20 MICROgram(s)/kG/Min IV Continuous <Continuous>  sodium chloride 0.9%. 1000 milliLiter(s) IV Continuous <Continuous>    EXAMINATION:  General:  in NAD  HEENT:  MMM  Neuro:  awake, alert, oriented x 3, follows commands, EOMI, face symmetric, no PD, DF 5/5   Cards:  RRR  Respiratory:  no respiratory distress  Abdomen:  soft  Extremities:  no LE edema    Assessment/Plan:   63 yo woman with DM and Myasthenia gravis s/p thymectomy 2010, with prior exacerbations requiring intubation (last 2021), with myasthenia gravis exacerbation in late Jan 2023 requiring intubation and PLEX, on 2L O2 at baseline, now readmitted 5.5 with 1 week of diplopia, dysphagia and SOB requiring intubation.      Interval events:  Patient seen on evening rounds, no acute events.     VITALS:  T(C): , Max: 37.1 (05-05-23 @ 15:04)  HR:  (83 - 135)  BP:  (73/58 - 270/100)  ABP:  (94/48 - 133/63)  RR:  (17 - 37)  SpO2:  (89% - 100%)  Wt(kg): --  Device: Avea, Mode: AC/ CMV (Assist Control/ Continuous Mandatory Ventilation), RR (machine): 18, TV (machine): 450, FiO2: 60, PEEP: 5, ITime: 1, MAP: 13, PIP: 36    05-05-23 @ 07:01  -  05-05-23 @ 18:48  --------------------------------------------------------  IN: 198.3 mL / OUT: 0 mL / NET: 198.3 mL      LABS:  Na: 139 (05-05 @ 13:52)  K: 4.1 (05-05 @ 13:52)  Cl: 95 (05-05 @ 13:52)  CO2: 33 (05-05 @ 13:52)  BUN: 17 (05-05 @ 13:52)  Cr: 0.52 (05-05 @ 13:52)  Glu: 119(05-05 @ 13:52)    Hgb: 14.4 (05-05 @ 13:52)  Hct: 46.6 (05-05 @ 13:52)  WBC: 12.13 (05-05 @ 13:52)  Plt: 292 (05-05 @ 13:52)    INR: 1.02 05-05-23 @ 13:52  PTT: 32.4 05-05-23 @ 13:52    MEDICATIONS:  albuterol/ipratropium for Nebulization 3 milliLiter(s) Nebulizer every 6 hours  atorvastatin 20 milliGRAM(s) Oral at bedtime  chlorhexidine 0.12% Liquid 15 milliLiter(s) Oral Mucosa every 12 hours  chlorhexidine 4% Liquid 1 Application(s) Topical <User Schedule>  fentaNYL    Injectable 50 MICROGram(s) IV Push once  insulin lispro (ADMELOG) corrective regimen sliding scale   SubCutaneous every 6 hours  methylPREDNISolone sodium succinate Injectable 125 milliGRAM(s) IV Push daily  norepinephrine Infusion 0.05 MICROgram(s)/kG/Min IV Continuous <Continuous>  propofol Infusion 20 MICROgram(s)/kG/Min IV Continuous <Continuous>  sodium chloride 0.9%. 1000 milliLiter(s) IV Continuous <Continuous>    EXAMINATION:  General:  in NAD  HEENT:  intubated   Neuro:  awake, alert, oriented x 3, follows commands, EOMI, face symmetric, able to lift b/l arms AG to the elbow, able to lift b/l LEs off the bed   Cards:  RRR  Respiratory:  no respiratory distress  Abdomen:  soft  Extremities:  no LE edema    Assessment/Plan:   63 yo woman with DM and Myasthenia gravis s/p thymectomy 2010, with prior exacerbations requiring intubation most recently in late Jan 2023 requiring intubation and PLEX, on 2L O2 at baseline, now readmitted 5/5 with 1 week of diplopia, dysphagia and SOB requiring intubation.     c/w Methylpred 125 mg daily, start PLEX   stop propofol, start precedex and fentanyl pushes PRN for pain and sedation   MAP goal >65, Levo PRN  vent settings adjusted, repeat ABG (chronic CO2 retainer)   start Glucerna TF, add PPI  NS at 50cc/hr   start Lov ppx , repeat LE dopplers     A line    Марина Doss  Neurocritical Care Attending

## 2023-05-05 NOTE — ED ADULT NURSE NOTE - HISTORY OF COVID-19 VACCINATION
Discuss with Dr. Amado Rayo about different masks for CPAP machine. Try CPAP machine at night to see if pain is gone. Gastroparesis: Care Instructions  Overview     When you have gastroparesis, your stomach takes a lot longer to empty. This delay can cause belly pain, bloating, and belching. It also can cause hiccups, heartburn, nausea or vomiting. You may not feel like eating. These symptoms may come and go. They most often occur during and after meals. You may feel full after only a few bites of food. This condition occurs when the nerves or muscles to the stomach don't work properly. Diabetes is one of the most common causes of this nerve damage. Gastroparesis can make it harder to control your blood sugar levels. But keeping your blood sugar levels under control may help with your symptoms. Parkinson's disease, stroke, and some medicines can also cause this condition. Home treatment can often help. Follow-up care is a key part of your treatment and safety. Be sure to make and go to all appointments, and call your doctor if you are having problems. It's also a good idea to know your test results and keep a list of the medicines you take. How can you care for yourself at home? · Eat several small meals each day rather than three large meals. · Eat foods that are low in fiber and fat. · If your doctor suggests it, take medicines that help the stomach empty more quickly. These are called motility agents. When should you call for help? Call your doctor now or seek immediate medical care if:    · You are vomiting.     · You have new or worse belly pain.     · You have a fever.     · You cannot pass stools or gas. Watch closely for changes in your health, and be sure to contact your doctor if you have any problems. Where can you learn more? Go to [email or web address] and sign in to your [employer] account.  Enter M106 in the [employer] box to learn more about
Vaccine status unknown

## 2023-05-05 NOTE — PROGRESS NOTE ADULT - ASSESSMENT
ASSESSMENT/PLAN:    MG crisis    NEURO:  - neuro checks q4h  - plex [id number] QODx5d  - wean sedation as tolerated, transition to precedex  - pain control  - steroids  - neurology following      CVS:  - SBP goal ***    PULM:  - ***  - IS As tolerated  - Aspiration precautions    RENAL:  - Fluids: ***  - daily IOs    GI:  - Diet: ***  - GI prophylaxis: ***  - Bowel regimen: miralax, senna    ENDO:   - FS goal 120-180    HEME/ONC:  - SCDs***  - Chemoppx: ***    ID:  - monitor for fevers      Patient is at high risk of neurologic deterioration/death due to:  ***      Time spent: 35 critical care minutes ASSESSMENT/PLAN:    MG crisis    NEURO:  - neuro checks q4h  - plex [id number] QODx5d  - wean sedation as tolerated, transition to precedex  - pain control  - steroids  - neurology following      CVS:  - SBP goal ***    PULM:  - ***  - IS As tolerated  - Aspiration precautions    RENAL:  - Fluids: ***  - daily IOs    GI:  - Diet: ***  - GI prophylaxis: ***  - Bowel regimen: miralax, senna    ENDO:   - FS goal 120-180    HEME/ONC:  - SCDs***  - Chemoppx: ***    ID:  - monitor for fevers      Patient is at high risk of neurologic deterioration/death due to:  ***      Time spent: 35 critical care minutes ASSESSMENT/PLAN:    MG crisis    NEURO:  - neuro checks q4h  - plex [id number] QODx5d  - wean sedation as tolerated, transition to precedex  - pain control  - steroids  - neurology following      CVS:  - SBP goal ***    PULM:  - ***  - IS As tolerated  - Aspiration precautions    RENAL:  - Fluids: ***  - daily IOs    GI:  - Diet: ***  - GI prophylaxis: ***  - Bowel regimen: miralax, senna    ENDO:   - FS goal 120-180    HEME/ONC:  - SCDs***  - Chemoppx: ***    ID:  - monitor for fevers      Patient is at high risk of neurologic deterioration/death due to:  ***      Time spent: 35 critical care minutes ASSESSMENT/PLAN:    MG exacerbation    NEURO:  - neuro checks q4h  - plex [id number] QODx5d  - solumedrol 125 mg daily   - wean sedation as tolerated, fent prn for vent synchrony  - Avoid medications that may worsen the current exacerbation including macrolides, fluoroquinolones, tetracyclines, aminoglycoside, beta-blockers, magnesium, and anti-arrhythmics (procainamide, quinidine, and lidocaine)  - pain control  - steroids  - neurology following      CVS:  - MAP>65  - ekg  - tte    PULM:  Intubated in ED 5/5 for hypecapnic resp failure  - vap bundle  - cxr to confirm ett placement  - abg  - vap bundle      RENAL:  - Fluids: IVF while npo  - daily IOs  - PLex as above  - shiley placement    GI:  - Diet: NGT, trickle feed  - GI prophylaxis: PPi while intubated   - Bowel regimen: miralax, senna    ENDO:   - FS goal 120-180    HEME/ONC:  - SCDs  - Chemoppx: sql    ID:  - monitor for fevers     ASSESSMENT/PLAN:    MG exacerbation    NEURO:  - neuro checks q4h  - plex [id number] QODx5d  - solumedrol 125 mg daily   - wean sedation as tolerated, fent prn for vent synchrony  - Avoid medications that may worsen the current exacerbation including macrolides, fluoroquinolones, tetracyclines, aminoglycoside, beta-blockers, magnesium, and anti-arrhythmics (procainamide, quinidine, and lidocaine)  - pain control  - steroids  - neurology following      CVS:  - MAP>65  - ekg  - tte    PULM:  Intubated in ED 5/5 for hypecapnic resp failure  - vap bundle  - cxr to confirm ett placement  - abg  - vap bundle      RENAL:  - Fluids: IVF while npo  - daily IOs  - PLex as above  - shiley placement    GI:  - Diet: NGT, trickle feed  - GI prophylaxis: PPi while intubated   - Bowel regimen: miralax, senna    ENDO:   - FS goal 120-180    HEME/ONC:  - SCDs  - Chemoppx: sql    ID:  - monitor for fevers     ASSESSMENT/PLAN:    MG exacerbation    NEURO:  - neuro checks q4h  - plex [id number] QODx5d  - solumedrol 125 mg daily   - wean sedation as tolerated, fent prn for vent synchrony  - Avoid medications that may worsen the current exacerbation including macrolides, fluoroquinolones, tetracyclines, aminoglycoside, beta-blockers, magnesium, and anti-arrhythmics (procainamide, quinidine, and lidocaine)  - pain control  - steroids  - neurology following      CVS:  - MAP>65  - ekg  - tte    PULM:  Intubated in ED 5/5 for hypecapnic resp failure  - vap bundle  - cxr to confirm ett placement  - abg  - vap bundle      RENAL:  - Fluids: IVF while npo  - daily IOs  - PLex as above  - shiley placement    GI:  - Diet: NGT, trickle feed  - GI prophylaxis: PPi while intubated   - Bowel regimen: miralax, senna    ENDO:   - FS goal 120-180    HEME/ONC:  - SCDs  - Chemoppx: sql    ID:  - monitor for fevers

## 2023-05-05 NOTE — ED PROVIDER NOTE - CHILD ABUSE FACILITY
[facility]

## 2023-05-05 NOTE — PROCEDURE NOTE - NSINFORMCONSENT_GEN_A_CORE
/Benefits, risks, and possible complications of procedure explained to patient/caregiver who verbalized understanding and gave written consent.

## 2023-05-05 NOTE — ED PROVIDER NOTE - PROGRESS NOTE DETAILS
Patient evaluated by neurology, believe the patient needs an ICU setting, neuro ICU was consulted and will see patient shortly.  Patient currently becoming more tachypneic and shallow breathing, will likely be intubated if any change in mental status oxygenation or worsening in her breathing pattern. Pt intubated without complication, 1 attempt, neuro ICU at bedside. CXR pending for confirmation, will admit shortly

## 2023-05-05 NOTE — ED ADULT TRIAGE NOTE - NS ED NURSE AMBULANCES
[facility]

## 2023-05-05 NOTE — ED PROVIDER NOTE - OBJECTIVE STATEMENT
Attending Brandi Marcano: 62-year-old female history of high blood pressure, myasthenia gravis with prior intubations presenting with worsening weakness as well as shortness of breath and difficulty walking for the last 4 days.  Per family member patient has not been feeling well over the last 4 days and is being complained of worsening weakness and difficulty walking.  Patient also endorses difficulty breathing and feeling short of breath.  No recent coughs, colds or fevers.  Patient did endorse some dysuria.  No sick contacts.  Patient went to see her doctor and was found to be reportedly hypoxic there is sent to the emergency department for further evaluation.

## 2023-05-05 NOTE — H&P ADULT - ASSESSMENT
Assessment:    This is a 62y (1960) Malayam speaking woman with a PMHx significant for myasthenia gravis, HTN ,DM, Afib s/p thymectomy recent hospitalization for covid infection uncomplicated with Mystania gravis crisis  in February, presents presents to the ED for worsening difficulty with double vision, swallowing, talking, and breathing that has progressively gotten worse for the previous one week. Patient is currently on Mestinon 60 mg two tablets TID and Prednisone 10 mg PRN. Has been admitted before in Feb 2023 for MG crisis that required intubation (2/1-2/11/23) iso COVID19. Patient received 5 days of PLEX (2/2-2/10/23) followed by 5 days of IVIG (2/16-2/20/23) with symptomatic improvement. Patient's son says patient has responded best to IVIG and that it stabilizes her MG for about 2 years. At baseline, patient is on 2L NC. In the ED, patient's NIF was -50. However, patient was unable to perform VC. Pending STAT repeat NIF/VC.     Impression: Difficulty swallowing, ptosis, diplopia, respiratory distress likely 2/2 MG crisis. R/o provoking factor vs refractory disease progression    Recommendations:    Neuro: sedation for vent synchrony     CV: normotensive, may continue BP meds, IV fluids, ECHO, trend troponin     Pulm:- s/p mechanical ventilation, repeat CXR for ETT placement, ABG, maintain o2 sat > 95%, f/u NIF and VC Q2h (concern for impending respiratory failure).      GI: keep NPO, Gi PPX    JENNIFER: IV fluids as tolerated, HOLD Hypertensive meds    ENDO: HX DM, FS q 6 hrs, sliding scale     HEM: requires PLEX,  - Avoid medications like fluoroquinonles, macrolides, amnioglycosides, chloroquines. Consider avoiding anti hypertensives such as beta blockers, CCB      Case d/w neurology attending        Assessment:    This is a 62y (1960) Malayam speaking woman with a PMHx significant for myasthenia gravis, HTN ,DM, Afib s/p thymectomy recent hospitalization for covid infection uncomplicated with Mystania gravis crisis  in February, presents presents to the ED for worsening difficulty with double vision, swallowing, talking, and breathing that has progressively gotten worse for the previous one week. Patient is currently on Mestinon 60 mg two tablets TID and Prednisone 10 mg PRN. Has been admitted before in Feb 2023 for MG crisis that required intubation (2/1-2/11/23) iso COVID19. Patient received 5 days of PLEX (2/2-2/10/23) followed by 5 days of IVIG (2/16-2/20/23) with symptomatic improvement. Patient's son says patient has responded best to IVIG and that it stabilizes her MG for about 2 years. At baseline, patient is on 2L NC. In the ED, patient's NIF was -50. However, patient was unable to perform VC. Pending STAT repeat NIF/VC.     Impression: Difficulty swallowing, ptosis, diplopia, respiratory distress likely 2/2 MG crisis. R/o provoking factor vs refractory disease progression    Recommendations:    Neuro: neuro checks q 2hrs, sedation with Propofol  for vent synchrony     CV: normotensive, HOLD BP meds MAP>65, IV fluids, ECHO, trend troponin     Pulm:- s/p mechanical ventilation, repeat CXR for ETT placement, ABG, maintain o2 sat > 95%, f/u NIF and VC Q2h (concern for impending respiratory failure). duo nebs q 6hrs     GI: keep NPO, Gi PPX with PPI    JENNIFER: IV fluids as tolerated, HOLD Hypertensive meds, supplemtnt electrolytes, NO Magnesium     ENDO: HX DM, FS q 6 hrs, sliding scale     HEM: requires PLEX, central line and manuela  - Avoid medications like fluoroquinonles, macrolides, amnioglycosides, chloroquines. Consider avoiding anti hypertensives such as beta blockers, CCB.  Chemo PPX LVX, f/u dupex LE       Case d/w Neuro ICU attending Dr. Milner and fellow Dr. Dunbar

## 2023-05-05 NOTE — ED ADULT NURSE REASSESSMENT NOTE - NS ED NURSE REASSESS COMMENT FT1
Patient sitting in bed. Continuous cardiac monitoring in place. Family is present at bedside. Updated on POC. Respiratory was present at bedside to perform vital capacity, patient unable to complete. MD Marcano is aware, neuro admitting team is aware.
pt resting family at bedside  pending MICU consult
Patient transported to Neuro ICU on propofol and Levophed drip. During transport patient was on continuos cardiac monitoring and ACLS medicine box was present. MD, RN, ED Tech, and Respiratory were all present at bedside for transports. Patient was handed off to Neuro ICU RN.
Patient given 20mg of etomidate, 30mg of rocuronium, and 50mcg of fentayl by MD Marcano. Patient intubated at 1518, color change observed, bilateral breath sounds heard bilaterally. Tube sized used is 7.5 and is sitting at 23 on the lip line. Patient started on propofol drip at 20mcg/kg/min. Patient also started of Levo drip at 0.05mcg/kig/min. Continuous cardiac monitoring in place. Patient is tele-packed and ready for transport. RN is present at bedside.
pt was straight cath with no output  MD Marcano with US states bladder empty

## 2023-05-05 NOTE — CONSULT NOTE ADULT - CRITICAL CARE ATTENDING COMMENT
HPI as per resident note, personally verified by me. Patient well known to me from prior admission in 2/2023-3/2023 for myasthenia gravis (MG) crisis in setting of COVID-19 infection. She had received IVIG but then had to change to PLEX due to respiratory compromise and then received IVIG after. Son, at bedside, reported she had done well until about two weeks after and then began to slowly decline again. For the past week she had developed numerous worsening of her MG symptoms with diplopia, generalized weakness, dysphagia, and respiratory distress (mainly with lying flat). Patient had to emergently intubated and brought to NSICU due to respiratory compromise. She has agreed to PLEX. She is on Mestinon 120mg PO TID and prednisone 10mg PO daily as outpatient and is compliant.    GTT: None    Exam PRIOR to intubation  Gen - Respirations effortful, soft c-collar in place due neck weakness  CV - Peripheral circulation intact, no evidence of edema  Eyes - Fundoscopy not well visualized    Neurologic exam:  MS - AAO x3, speech labored fluent, rep/naming intact, follows commands, attn/conc/recent and remote memory/fund of knowledge WNL  CN - PERRL, EOM with poor upgaze but otherwise intact, VFF, face sens/str/hearing WNL b/l, tongue/palate midline, trap 5/5 b/l. Neck flexion 2+/5, neck extension 4/5. Single breath count 4  Motor - Normal bulk/tone. RUE proximal 2/5 -> distal 4+/5, LUE proximal 3/5 -> distal 5/5, BLE's proximal 4/5 -> distal 5/5  Sens - LT/temp intact all  DTR's - 2+ BUE's, 1+ BLE's, and neutral b/l plantar response  Coord - Grossly appropriate for level of strength  Gait and station - Due to weakness and fall risk could not assess    A/P:  Myasthenia gravis with acute exacerbation  Respiratory failure with hypercapnia  DM type 2  HTN  Leukocytosis (WBC inc 12.13)    - Patient with acute MG exacerbation, could be triggered by progressive disease or other toxic/metabolic insult. No stable focal neurologic deficits to suggest cerebrovascular or other acute intracranial event. She has agreed to therapy  - Urgent Shiley placement for PLEX, 5 sessions every other day  - Hold Mestinon and steroids for now  - May need IVIG following PLEX  - PT/OT as tolerated  - Ultimately patient will need better outpatient therapy to prevent relapses, either IVIG infusions, increased prednisone dose, or Soliris (or combination of some)  - Check UA  - Continue to address above medical problems, as you are doing  - Will continue to follow patient peripherally with you, please call [phone]) with additional questions or concerns HPI as per resident note, personally verified by me. Patient well known to me from prior admission in 2/2023-3/2023 for myasthenia gravis (MG) crisis in setting of COVID-19 infection. She had received IVIG but then had to change to PLEX due to respiratory compromise and then received IVIG after. Son, at bedside, reported she had done well until about two weeks after and then began to slowly decline again. For the past week she had developed numerous worsening of her MG symptoms with diplopia, generalized weakness, dysphagia, and respiratory distress (mainly with lying flat). Patient had to emergently intubated and brought to NSICU due to respiratory compromise. She has agreed to PLEX. She is on Mestinon 120mg PO TID and prednisone 10mg PO daily as outpatient and is compliant.    GTT: None    Exam PRIOR to intubation  Gen - Respirations effortful, soft c-collar in place due neck weakness  CV - Peripheral circulation intact, no evidence of edema  Eyes - Fundoscopy not well visualized    Neurologic exam:  MS - AAO x3, speech labored fluent, rep/naming intact, follows commands, attn/conc/recent and remote memory/fund of knowledge WNL  CN - PERRL, EOM with poor upgaze but otherwise intact, VFF, face sens/str/hearing WNL b/l, tongue/palate midline, trap 5/5 b/l. Neck flexion 2+/5, neck extension 4/5. Single breath count 4  Motor - Normal bulk/tone. RUE proximal 2/5 -> distal 4+/5, LUE proximal 3/5 -> distal 5/5, BLE's proximal 4/5 -> distal 5/5  Sens - LT/temp intact all  DTR's - 2+ BUE's, 1+ BLE's, and neutral b/l plantar response  Coord - Grossly appropriate for level of strength  Gait and station - Due to weakness and fall risk could not assess    A/P:  Myasthenia gravis with acute exacerbation  Respiratory failure with hypercapnia  DM type 2  HTN  Leukocytosis (WBC inc 12.13)    - Patient with acute MG exacerbation, could be triggered by progressive disease or other toxic/metabolic insult. No stable focal neurologic deficits to suggest cerebrovascular or other acute intracranial event. She has agreed to therapy  - Urgent Shiley placement for PLEX, 5 sessions every other day  - Hold Mestinon and steroids for now  - May need IVIG following PLEX  - PT/OT as tolerated  - Ultimately patient will need better outpatient therapy to prevent relapses, either IVIG infusions, increased prednisone dose, or Soliris (or combination of some)  - Check UA  - Continue to address above medical problems, as you are doing  - Will continue to follow patient peripherally with you, please call [phone]) with additional questions or concerns HPI as per resident note, personally verified by me. Patient well known to me from prior admission in 2/2023-3/2023 for myasthenia gravis (MG) crisis in setting of COVID-19 infection. She had received IVIG but then had to change to PLEX due to respiratory compromise and then received IVIG after. Son, at bedside, reported she had done well until about two weeks after and then began to slowly decline again. For the past week she had developed numerous worsening of her MG symptoms with diplopia, generalized weakness, dysphagia, and respiratory distress (mainly with lying flat). Patient had to emergently intubated and brought to NSICU due to respiratory compromise. She has agreed to PLEX. She is on Mestinon 120mg PO TID and prednisone 10mg PO daily as outpatient and is compliant.    GTT: None    Exam PRIOR to intubation  Gen - Respirations effortful, soft c-collar in place due neck weakness  CV - Peripheral circulation intact, no evidence of edema  Eyes - Fundoscopy not well visualized    Neurologic exam:  MS - AAO x3, speech labored fluent, rep/naming intact, follows commands, attn/conc/recent and remote memory/fund of knowledge WNL  CN - PERRL, EOM with poor upgaze but otherwise intact, VFF, face sens/str/hearing WNL b/l, tongue/palate midline, trap 5/5 b/l. Neck flexion 2+/5, neck extension 4/5. Single breath count 4  Motor - Normal bulk/tone. RUE proximal 2/5 -> distal 4+/5, LUE proximal 3/5 -> distal 5/5, BLE's proximal 4/5 -> distal 5/5  Sens - LT/temp intact all  DTR's - 2+ BUE's, 1+ BLE's, and neutral b/l plantar response  Coord - Grossly appropriate for level of strength  Gait and station - Due to weakness and fall risk could not assess    A/P:  Myasthenia gravis with acute exacerbation  Respiratory failure with hypercapnia  DM type 2  HTN  Leukocytosis (WBC inc 12.13)    - Patient with acute MG exacerbation, could be triggered by progressive disease or other toxic/metabolic insult. No stable focal neurologic deficits to suggest cerebrovascular or other acute intracranial event. She has agreed to therapy  - Urgent Shiley placement for PLEX, 5 sessions every other day  - Hold Mestinon and steroids for now  - May need IVIG following PLEX  - PT/OT as tolerated  - Ultimately patient will need better outpatient therapy to prevent relapses, either IVIG infusions, increased prednisone dose, or Soliris (or combination of some)  - Check UA  - Continue to address above medical problems, as you are doing  - Will continue to follow patient peripherally with you, please call [phone]) with additional questions or concerns HPI as per resident note, personally verified by me. Patient well known to me from prior admission in 2/2023-3/2023 for myasthenia gravis (MG) crisis in setting of COVID-19 infection. She had received IVIG but then had to change to PLEX due to respiratory compromise and then received IVIG after. Son, at bedside, reported she had done well until about two weeks after and then began to slowly decline again. For the past week she had developed numerous worsening of her MG symptoms with diplopia, generalized weakness, dysphagia, and respiratory distress (mainly with lying flat). Patient had to emergently intubated and brought to NSICU due to respiratory compromise. She has agreed to PLEX. She is on Mestinon 120mg PO TID and prednisone 10mg PO daily as outpatient and is compliant.    GTT: None    Exam PRIOR to intubation  Gen - Respirations effortful, soft c-collar in place due neck weakness  CV - Peripheral circulation intact, no evidence of edema  Eyes - Fundoscopy not well visualized    Neurologic exam:  MS - AAO x3, speech labored fluent, rep/naming intact, follows commands, attn/conc/recent and remote memory/fund of knowledge WNL  CN - PERRL, EOM with poor upgaze but otherwise intact, L > R ptosis, VFF, face sens/str/hearing WNL b/l, tongue/palate midline, trap 5/5 b/l. Neck flexion 2+/5, neck extension 4/5. Single breath count 4  Motor - Normal bulk/tone. RUE proximal 2/5 -> distal 4+/5, LUE proximal 3/5 -> distal 5/5, BLE's proximal 4/5 -> distal 5/5  Sens - LT/temp intact all  DTR's - 2+ BUE's, 1+ BLE's, and neutral b/l plantar response  Coord - Grossly appropriate for level of strength  Gait and station - Due to weakness and fall risk could not assess    TSH/T3 WNL    A/P:  Myasthenia gravis with acute exacerbation  Respiratory failure with hypercapnia  DM type 2  HTN  Leukocytosis (WBC inc 12.13)    - Patient with acute MG exacerbation, could be triggered by progressive disease or other toxic/metabolic insult. No stable focal neurologic deficits to suggest cerebrovascular or other acute intracranial event. She has agreed to therapy  - Urgent Shiley placement for PLEX, 5 sessions every other day  - Hold Mestinon and steroids for now  - May need IVIG following PLEX  - PT/OT as tolerated  - Ultimately patient will need better outpatient therapy to prevent relapses, either IVIG infusions, increased prednisone dose, or Soliris (or combination of some)  - Check UA  - Continue to address above medical problems, as you are doing  - Will continue to follow patient peripherally with you, please call [phone]) with additional questions or concerns HPI as per resident note, personally verified by me. Patient well known to me from prior admission in 2/2023-3/2023 for myasthenia gravis (MG) crisis in setting of COVID-19 infection. She had received IVIG but then had to change to PLEX due to respiratory compromise and then received IVIG after. Son, at bedside, reported she had done well until about two weeks after and then began to slowly decline again. For the past week she had developed numerous worsening of her MG symptoms with diplopia, generalized weakness, dysphagia, and respiratory distress (mainly with lying flat). Patient had to emergently intubated and brought to NSICU due to respiratory compromise. She has agreed to PLEX. She is on Mestinon 120mg PO TID and prednisone 10mg PO daily as outpatient and is compliant.    GTT: None    Exam PRIOR to intubation  Gen - Respirations effortful, soft c-collar in place due neck weakness  CV - Peripheral circulation intact, no evidence of edema  Eyes - Fundoscopy not well visualized    Neurologic exam:  MS - AAO x3, speech labored fluent, rep/naming intact, follows commands, attn/conc/recent and remote memory/fund of knowledge WNL  CN - PERRL, EOM with poor upgaze but otherwise intact, L > R ptosis, VFF, face sens/str/hearing WNL b/l, tongue/palate midline, trap 5/5 b/l. Neck flexion 2+/5, neck extension 4/5. Single breath count 4  Motor - Normal bulk/tone. RUE proximal 2/5 -> distal 4+/5, LUE proximal 3/5 -> distal 5/5, BLE's proximal 4/5 -> distal 5/5  Sens - LT/temp intact all  DTR's - 2+ BUE's, 1+ BLE's, and neutral b/l plantar response  Coord - Grossly appropriate for level of strength  Gait and station - Due to weakness and fall risk could not assess    TSH/T3 WNL    A/P:  Myasthenia gravis with acute exacerbation  Respiratory failure with hypercapnia  DM type 2  HTN  Leukocytosis (WBC inc 12.13)    - Patient with acute MG exacerbation, could be triggered by progressive disease or other toxic/metabolic insult. No stable focal neurologic deficits to suggest cerebrovascular or other acute intracranial event. She has agreed to therapy  - Urgent Shiley placement for PLEX, 5 sessions every other day  - Hold Mestinon and steroids for now  - May need IVIG following PLEX  - PT/OT as tolerated  - Ultimately patient will need better outpatient therapy to prevent relapses, either IVIG infusions, increased prednisone dose, or Soliris (or combination of some)  - Check UA  - Continue to address above medical problems, as you are doing  - Will continue to follow patient peripherally with you, please call [phone]) with additional questions or concerns HPI as per resident note, personally verified by me. Patient well known to me from prior admission in 2/2023-3/2023 for myasthenia gravis (MG) crisis in setting of COVID-19 infection. She had received IVIG but then had to change to PLEX due to respiratory compromise and then received IVIG after. Son, at bedside, reported she had done well until about two weeks after and then began to slowly decline again. For the past week she had developed numerous worsening of her MG symptoms with diplopia, generalized weakness, dysphagia, and respiratory distress (mainly with lying flat). Patient had to emergently intubated and brought to NSICU due to respiratory compromise. She has agreed to PLEX. She is on Mestinon 120mg PO TID and prednisone 10mg PO daily as outpatient and is compliant.    GTT: None    Exam PRIOR to intubation  Gen - Respirations effortful, soft c-collar in place due neck weakness  CV - Peripheral circulation intact, no evidence of edema  Eyes - Fundoscopy not well visualized    Neurologic exam:  MS - AAO x3, speech labored fluent, rep/naming intact, follows commands, attn/conc/recent and remote memory/fund of knowledge WNL  CN - PERRL, EOM with poor upgaze but otherwise intact, L > R ptosis, VFF, face sens/str/hearing WNL b/l, tongue/palate midline, trap 5/5 b/l. Neck flexion 2+/5, neck extension 4/5. Single breath count 4  Motor - Normal bulk/tone. RUE proximal 2/5 -> distal 4+/5, LUE proximal 3/5 -> distal 5/5, BLE's proximal 4/5 -> distal 5/5  Sens - LT/temp intact all  DTR's - 2+ BUE's, 1+ BLE's, and neutral b/l plantar response  Coord - Grossly appropriate for level of strength  Gait and station - Due to weakness and fall risk could not assess    TSH/T3 WNL    A/P:  Myasthenia gravis with acute exacerbation  Respiratory failure with hypercapnia  DM type 2  HTN  Leukocytosis (WBC inc 12.13)    - Patient with acute MG exacerbation, could be triggered by progressive disease or other toxic/metabolic insult. No stable focal neurologic deficits to suggest cerebrovascular or other acute intracranial event. She has agreed to therapy  - Urgent Shiley placement for PLEX, 5 sessions every other day  - Hold Mestinon and steroids for now  - May need IVIG following PLEX  - PT/OT as tolerated  - Ultimately patient will need better outpatient therapy to prevent relapses, either IVIG infusions, increased prednisone dose, or Soliris (or combination of some)  - Check UA  - Continue to address above medical problems, as you are doing  - Will continue to follow patient peripherally with you, please call [phone]) with additional questions or concerns

## 2023-05-05 NOTE — ED ADULT NURSE NOTE - OBJECTIVE STATEMENT
62 year old female pt presented to the ED from MDs office for exacerbation of myasthenia gravis, as per family pt has been unable to ambulate, swallow and starting having slurred speech, pt states she feels she has secretions and unable to swallow, pt seemingly uncomfortable, pt is A&Ox3, pt also has intermittent double vision

## 2023-05-05 NOTE — CONSULT NOTE ADULT - ASSESSMENT
KAIDEN HATCH is a 62y (1960) Malayam speaking woman with a PMHx significant for myasthenia gravis presenting to the ED for worsening difficulty with double vision, swallowing, talking, and breathing that has progressively gotten worse for the previous one week. Patient is currently on Mestinon 60 mg two tablets TID and Prednisone 10 mg PRN. Has been admitted before in Feb 2023 for MG crisis that required intubation (2/1-2/11/23) iso COVID19. Patient received 5 days of PLEX (2/2-2/10/23) followed by 5 days of IVIG (2/16-2/20/23) with symptomatic improvement. Patient's son says patient has responded best to IVIG and that it stabilizes her MG for about 2 years. At baseline, patient is on 2L NC. In the ED, patient's NIF was -50. However, patient was unable to perform VC. Pending STAT repeat NIF/VC.     Impression: Difficulty swallowing, ptosis, diplopia, respiratory distress likely 2/2 MG crisis. R/o provoking factor vs refractory disease progression    Recommendations:  - CXR, UA to r/o any infectious etiology  - Will discuss IVIG, PLEX candidacy  - f/u NIF and VC Q2h (concern for impending respiratory failure).  - Cardiac monitoring with tele  - Avoid medications like fluoroquinonles, macrolides, amnioglycosides, chloroquines. Consider avoiding anti hypertensives such as beta blockers, CCB  - Elective intubation if respiratory distress or VC below 15-20ml/kg IBW, decline in NIF to -25 to -30 cm h2o KAIDEN HATCH is a 62y (1960) Malayam speaking woman with a PMHx significant for myasthenia gravis presenting to the ED for worsening difficulty with double vision, swallowing, talking, and breathing that has progressively gotten worse for the previous one week. Patient is currently on Mestinon 60 mg two tablets TID and Prednisone 10 mg PRN. Has been admitted before in Feb 2023 for MG crisis that required intubation (2/1-2/11/23) iso COVID19. Patient received 5 days of PLEX (2/2-2/10/23) followed by 5 days of IVIG (2/16-2/20/23) with symptomatic improvement. Patient's son says patient has responded best to IVIG and that it stabilizes her MG for about 2 years. At baseline, patient is on 2L NC. In the ED, patient's NIF was -50. However, patient was unable to perform VC. Pending STAT repeat NIF/VC.     Impression: Difficulty swallowing, ptosis, diplopia, respiratory distress likely 2/2 MG crisis. R/o provoking factor vs refractory disease progression    Recommendations:  -ICU Consult   - CXR, UA to r/o any infectious etiology  - Will discuss IVIG, PLEX candidacy  - f/u NIF and VC Q2h (concern for impending respiratory failure).  - Cardiac monitoring with tele  - Avoid medications like fluoroquinonles, macrolides, amnioglycosides, chloroquines. Consider avoiding anti hypertensives such as beta blockers, CCB  - Elective intubation if respiratory distress or VC below 15-20ml/kg IBW, decline in NIF to -25 to -30 cm h2o KAIDEN HATCH is a 62y (1960) Malayam speaking woman with a PMHx significant for myasthenia gravis presenting to the ED for worsening difficulty with double vision, swallowing, talking, and breathing that has progressively gotten worse for the previous one week. Patient is currently on Mestinon 60 mg two tablets TID and Prednisone 10 mg PRN. Has been admitted before in Feb 2023 for MG crisis that required intubation (2/1-2/11/23) iso COVID19. Patient received 5 days of PLEX (2/2-2/10/23) followed by 5 days of IVIG (2/16-2/20/23) with symptomatic improvement. Patient's son says patient has responded best to IVIG and that it stabilizes her MG for about 2 years. At baseline, patient is on 2L NC. In the ED, patient's NIF was -50. However, patient was unable to perform VC. Pending STAT repeat NIF/VC.     Impression: Difficulty swallowing, ptosis, diplopia, respiratory distress likely 2/2 MG crisis. R/o provoking factor vs refractory disease progression    Recommendations:  -ICU Consult for PLEX  - CXR, UA to r/o any infectious etiology  - f/u NIF and VC Q2h (concern for impending respiratory failure).  - Cardiac monitoring with tele  - Avoid medications like fluoroquinonles, macrolides, amnioglycosides, chloroquines. Consider avoiding anti hypertensives such as beta blockers, CCB  - Elective intubation if respiratory distress or VC below 15-20ml/kg IBW, decline in NIF to -25 to -30 cm h2o    Case d/w neurology attending

## 2023-05-05 NOTE — H&P ADULT - HISTORY OF PRESENT ILLNESS
Patient KAIDEN HATCH is a 62y (1960) Malayam speaking woman with a PMHx significant for myasthenia gravis presenting to the ED for worsening difficulty with double vision, swallowing, talking, and breathing that has progressively gotten worse for the previous one week. Patient is currently on Mestinon 60 mg two tablets TID and Prednisone 10 mg PRN. Has been admitted before in Feb 2023 for MG crisis that required intubation (2/1-2/11/23) iso COVID19. Patient received 5 days of PLEX (2/2-2/10/23) followed by 5 days of IVIG (2/16-2/20/23) with symptomatic improvement. Patient's son says patient has responded best to IVIG and that it stabilizes her MG for about 2 years. At baseline, patient is on 2L NC. In the ED, patient's NIF was -50. However, patient was unable to perform VC. Pending STAT repeat NIF/VC.

## 2023-05-05 NOTE — ED PROVIDER NOTE - ATTENDING CONTRIBUTION TO CARE
Membrane is not visually significant.  Rec observation. Attending MD Brandi Marcano:  I personally have seen and examined this patient.  Resident note reviewed and agree on plan of care and except where noted.  See HPI, PE, and MDM for details.

## 2023-05-05 NOTE — ED ADULT NURSE REASSESSMENT NOTE - [id number]_ALL_ED
05-May-2023 14:06
05-May-2023 14:26
05-May-2023 15:39
05-May-2023 16:49
05-May-2023 14:27
05-May-2023 14:48
05-May-2023 15:05

## 2023-05-05 NOTE — CONSULT NOTE ADULT - SUBJECTIVE AND OBJECTIVE BOX
Neurology - Consult Note    -  Spectra: [phone] ([facility]), [phone] ([facility])  -    HPI: Patient KAIDEN HACTH is a 62y (1960) wo/man with a PMHx significant for ***      Review of Systems:   All other review of systems is negative unless indicated above.    Allergies:  peanuts (Unknown)  No Known Drug Allergies      PMHx/PSHx/Family Hx: As above, otherwise see below   Myasthenia gravis    Diabetes mellitus    Hypertension        Social Hx:  No current use of tobacco, alcohol, or illicit drugs  Lives with ***    Medications:  MEDICATIONS  (STANDING):    MEDICATIONS  (PRN):      Vitals:  T(C): 37 (05-05-23 @ 13:13), Max: 37 (05-05-23 @ 13:13)  HR: 120 (05-05-23 @ 13:13) (120 - 120)  BP: 144/72 (05-05-23 @ 13:13) (144/72 - 144/72)  RR: 24 (05-05-23 @ 13:13) (24 - 24)  SpO2: 100% (05-05-23 @ 13:13) (100% - 100%)    Physical Examination: INCOMPLETE  General - NAD  Cardiovascular - Peripheral pulses palpable, no edema  Eyes - Fundoscopy with flat, sharp optic discs and no hemorrhage or exudates; Fundoscopy not well visualized; Fundoscopy not performed due to safety precautions in the setting of the COVID-19 pandemic    Neurologic Exam:  Mental status - Awake, Alert, Oriented to person, place, and time. Speech fluent, repetition and naming intact. Follows simple and complex commands. Attention/concentration, recent and remote memory (including registration and recall), and fund of knowledge intact    Cranial nerves - PERRLA, VFF, EOMI, face sensation (V1-V3) intact b/l, facial strength intact without asymmetry b/l, hearing intact b/l, palate with symmetric elevation, trapezius OR sternocleidomastiod 5/5 strength b/l, tongue midline on protrusion with full lateral movement    Motor - Normal bulk and tone throughout. No pronator drift.  Strength testing            Deltoid      Biceps      Triceps     Wrist Extension    Wrist Flexion     Interossei         R            5                 5               5                     5                              5                        5                 5  L             5                 5               5                     5                              5                        5                 5              Hip Flexion    Hip Extension    Knee Flexion    Knee Extension    Dorsiflexion    Plantar Flexion  R              5                           5                       5                           5                            5                          5  L              5                           5                        5                           5                            5                          5    Sensation - Light touch/temperature OR pain/vibration intact throughout    DTR's -             Biceps      Triceps     Brachioradialis      Patellar    Ankle    Toes/plantar response  R             2+             2+                  2+                       2+            2+                 Down  L              2+             2+                 2+                        2+           2+                 Down    Coordination - Finger to Nose intact b/l. No tremors appreciated    Gait and station - Normal casual gait. Romberg (-)    Labs:          CAPILLARY BLOOD GLUCOSE              CSF:                  Radiology:     Neurology - Consult Note    -  Spectra: [phone] ([facility]), [phone] ([facility])  -    HPI: Patient KAIDEN HATCH is a 62y (1960) wo/man with a PMHx significant for ***      Review of Systems:   All other review of systems is negative unless indicated above.    Allergies:  peanuts (Unknown)  No Known Drug Allergies      PMHx/PSHx/Family Hx: As above, otherwise see below   Myasthenia gravis    Diabetes mellitus    Hypertension        Social Hx:  No current use of tobacco, alcohol, or illicit drugs  Lives with ***    Medications:  MEDICATIONS  (STANDING):    MEDICATIONS  (PRN):      Vitals:  T(C): 37 (05-05-23 @ 13:13), Max: 37 (05-05-23 @ 13:13)  HR: 120 (05-05-23 @ 13:13) (120 - 120)  BP: 144/72 (05-05-23 @ 13:13) (144/72 - 144/72)  RR: 24 (05-05-23 @ 13:13) (24 - 24)  SpO2: 100% (05-05-23 @ 13:13) (100% - 100%)    Physical Examination: INCOMPLETE  General - NAD  Cardiovascular - Peripheral pulses palpable, no edema  Eyes - Fundoscopy with flat, sharp optic discs and no hemorrhage or exudates; Fundoscopy not well visualized; Fundoscopy not performed due to safety precautions in the setting of the COVID-19 pandemic    Neurologic Exam:  Mental status - Awake, Alert, Oriented to person, place, and time. Speech fluent, repetition and naming intact. Follows simple and complex commands. Attention/concentration, recent and remote memory (including registration and recall), and fund of knowledge intact    Cranial nerves - PERRLA, VFF, EOMI, face sensation (V1-V3) intact b/l, facial strength intact without asymmetry b/l, hearing intact b/l, palate with symmetric elevation, trapezius OR sternocleidomastiod 5/5 strength b/l, tongue midline on protrusion with full lateral movement    Motor - Normal bulk and tone throughout. No pronator drift.  Strength testing            Deltoid      Biceps      Triceps     Wrist Extension    Wrist Flexion     Interossei         R            5                 5               5                     5                              5                        5                 5  L             5                 5               5                     5                              5                        5                 5              Hip Flexion    Hip Extension    Knee Flexion    Knee Extension    Dorsiflexion    Plantar Flexion  R              5                           5                       5                           5                            5                          5  L              5                           5                        5                           5                            5                          5    Sensation - Light touch/temperature OR pain/vibration intact throughout    DTR's -             Biceps      Triceps     Brachioradialis      Patellar    Ankle    Toes/plantar response  R             2+             2+                  2+                       2+            2+                 Down  L              2+             2+                 2+                        2+           2+                 Down    Coordination - Finger to Nose intact b/l. No tremors appreciated    Gait and station - Normal casual gait. Romberg (-)    Labs:          CAPILLARY BLOOD GLUCOSE              CSF:                  Radiology:     Neurology - Consult Note    -  Spectra: [phone] ([facility]), [phone] ([facility])  -    HPI: Patient KAIDEN HATCH is a 62y (1960) wo/man with a PMHx significant for ***      Review of Systems:   All other review of systems is negative unless indicated above.    Allergies:  peanuts (Unknown)  No Known Drug Allergies      PMHx/PSHx/Family Hx: As above, otherwise see below   Myasthenia gravis    Diabetes mellitus    Hypertension        Social Hx:  No current use of tobacco, alcohol, or illicit drugs  Lives with ***    Medications:  MEDICATIONS  (STANDING):    MEDICATIONS  (PRN):      Vitals:  T(C): 37 (05-05-23 @ 13:13), Max: 37 (05-05-23 @ 13:13)  HR: 120 (05-05-23 @ 13:13) (120 - 120)  BP: 144/72 (05-05-23 @ 13:13) (144/72 - 144/72)  RR: 24 (05-05-23 @ 13:13) (24 - 24)  SpO2: 100% (05-05-23 @ 13:13) (100% - 100%)    Physical Examination: INCOMPLETE  General - NAD  Cardiovascular - Peripheral pulses palpable, no edema  Eyes - Fundoscopy with flat, sharp optic discs and no hemorrhage or exudates; Fundoscopy not well visualized; Fundoscopy not performed due to safety precautions in the setting of the COVID-19 pandemic    Neurologic Exam:  Mental status - Awake, Alert, Oriented to person, place, and time. Speech fluent, repetition and naming intact. Follows simple and complex commands. Attention/concentration, recent and remote memory (including registration and recall), and fund of knowledge intact    Cranial nerves - PERRLA, VFF, EOMI, face sensation (V1-V3) intact b/l, facial strength intact without asymmetry b/l, hearing intact b/l, palate with symmetric elevation, trapezius OR sternocleidomastiod 5/5 strength b/l, tongue midline on protrusion with full lateral movement    Motor - Normal bulk and tone throughout. No pronator drift.  Strength testing            Deltoid      Biceps      Triceps     Wrist Extension    Wrist Flexion     Interossei         R            5                 5               5                     5                              5                        5                 5  L             5                 5               5                     5                              5                        5                 5              Hip Flexion    Hip Extension    Knee Flexion    Knee Extension    Dorsiflexion    Plantar Flexion  R              5                           5                       5                           5                            5                          5  L              5                           5                        5                           5                            5                          5    Sensation - Light touch/temperature OR pain/vibration intact throughout    DTR's -             Biceps      Triceps     Brachioradialis      Patellar    Ankle    Toes/plantar response  R             2+             2+                  2+                       2+            2+                 Down  L              2+             2+                 2+                        2+           2+                 Down    Coordination - Finger to Nose intact b/l. No tremors appreciated    Gait and station - Normal casual gait. Romberg (-)    Labs:          CAPILLARY BLOOD GLUCOSE              CSF:                  Radiology:     Neurology - Consult Note    -  Spectra: [phone] ([facility]), [phone] ([facility])  -    HPI: Patient KAIDEN HATCH is a 62y (1960) Malayam speaking woman with a PMHx significant for myasthenia gravis presenting to the ED for worsening difficulty with double vision, swallowing, talking, and breathing that has progressively gotten worse for the previous one week. Patient is currently on Mestinon 60 mg two tablets TID and Prednisone 10 mg PRN. Has been admitted before in Feb 2023 for MG crisis that required intubation iso COVID19. Patient received 5 days of PLEX (2/2-2/20/23)with 5 days of IVIG (2/16-2/20/23) with symptomatic improvement. Patient's son says patient has responded best to IVIG and that it stabilizes her MG for about 2 years. At baseline, patient is on 2L NC. In the ED, patient's NIF was -50. However, patient was unable to perform VC. Pending STAT repeat NIF/VC.     Review of Systems:   All other review of systems is negative unless indicated above.    Allergies:  peanuts (Unknown)  No Known Drug Allergies      PMHx/PSHx/Family Hx: As above, otherwise see below   Myasthenia gravis    Diabetes mellitus    Hypertension        Social Hx:  No current use of tobacco, alcohol, or illicit drugs      Medications:  MEDICATIONS  (STANDING):    MEDICATIONS  (PRN):      Vitals:  T(C): 37 (05-05-23 @ 13:13), Max: 37 (05-05-23 @ 13:13)  HR: 120 (05-05-23 @ 13:13) (120 - 120)  BP: 144/72 (05-05-23 @ 13:13) (144/72 - 144/72)  RR: 24 (05-05-23 @ 13:13) (24 - 24)  SpO2: 100% (05-05-23 @ 13:13) (100% - 100%)    Physical Examination:   General - Appears distressed   Cardiovascular - Peripheral pulses palpable  Eyes - Fundoscopy not performed due to safety precautions in the setting of the COVID-19 pandemic    Neurologic Exam:  Mental status - Awake, Alert, Oriented to person, place, and time. Speech is fluent, but limited given patient is out of breath and can count to 4 seconds in one single breath. Follows simple and complex commands. Follows simple/complex commands.     Cranial nerves - B/L eye ptosis R>L, impaired upgaze, PERRLA, VFF, face sensation (V1-V3) intact b/l, facial strength intact without asymmetry b/l, hearing intact b/l, palate with symmetric elevation, trapezius 5/5 strength b/l, tongue midline on protrusion with full lateral movement    Motor - Normal bulk and tone throughout. No pronator drift.  Strength testing            Deltoid      Biceps      Triceps     Wrist Extension    Wrist Flexion     Interossei         R            5                 5               5                     5                              5                        5                 5  L             5                 5               5                     5                              5                        5                 5              Hip Flexion    Hip Extension    Knee Flexion    Knee Extension    Dorsiflexion    Plantar Flexion  R              5                           5                       5                           5                            5                          5  L              5                           5                        5                           5                            5                          5    Neck extension-4+/5  Neck flexion 4/5    Sensation - Light touch/temperature intact throughout    DTR's -             Biceps      Triceps     Brachioradialis      Patellar    Ankle    Toes/plantar response  R             2+             2+                  2+                 1+         1+                 Down  L              2+             2+                 2+                  1+         1+                 Neutral    Coordination - Finger to Nose intact b/l. No tremors appreciated    Gait and station - Given c/f safety, unable to assess     Labs:          CAPILLARY BLOOD GLUCOSE              CSF:                  Radiology:     Neurology - Consult Note    -  Spectra: [phone] ([facility]), [phone] ([facility])  -    HPI: Patient KAIDEN HATCH is a 62y (1960) Malayam speaking woman with a PMHx significant for myasthenia gravis presenting to the ED for worsening difficulty with double vision, swallowing, talking, and breathing that has progressively gotten worse for the previous one week. Patient is currently on Mestinon 60 mg two tablets TID and Prednisone 10 mg PRN. Has been admitted before in Feb 2023 for MG crisis that required intubation iso COVID19. Patient received 5 days of PLEX (2/2-2/20/23)with 5 days of IVIG (2/16-2/20/23) with symptomatic improvement. Patient's son says patient has responded best to IVIG and that it stabilizes her MG for about 2 years. At baseline, patient is on 2L NC. In the ED, patient's NIF was -50. However, patient was unable to perform VC. Pending STAT repeat NIF/VC.     Review of Systems:   All other review of systems is negative unless indicated above.    Allergies:  peanuts (Unknown)  No Known Drug Allergies      PMHx/PSHx/Family Hx: As above, otherwise see below   Myasthenia gravis    Diabetes mellitus    Hypertension        Social Hx:  No current use of tobacco, alcohol, or illicit drugs      Medications:  MEDICATIONS  (STANDING):    MEDICATIONS  (PRN):      Vitals:  T(C): 37 (05-05-23 @ 13:13), Max: 37 (05-05-23 @ 13:13)  HR: 120 (05-05-23 @ 13:13) (120 - 120)  BP: 144/72 (05-05-23 @ 13:13) (144/72 - 144/72)  RR: 24 (05-05-23 @ 13:13) (24 - 24)  SpO2: 100% (05-05-23 @ 13:13) (100% - 100%)    Physical Examination:   General - Appears distressed   Cardiovascular - Peripheral pulses palpable  Eyes - Fundoscopy not performed due to safety precautions in the setting of the COVID-19 pandemic    Neurologic Exam:  Mental status - Awake, Alert, Oriented to person, place, and time. Speech is fluent, but limited given patient is out of breath and can count to 4 seconds in one single breath. Follows simple and complex commands. Follows simple/complex commands.     Cranial nerves - B/L eye ptosis R>L, impaired upgaze, PERRLA, VFF, face sensation (V1-V3) intact b/l, facial strength intact without asymmetry b/l, hearing intact b/l, palate with symmetric elevation, trapezius 5/5 strength b/l, tongue midline on protrusion with full lateral movement    Motor - Normal bulk and tone throughout. No pronator drift.  Strength testing            Deltoid      Biceps      Triceps     Wrist Extension    Wrist Flexion     Interossei         R            5                 5               5                     5                              5                        5                 5  L             5                 5               5                     5                              5                        5                 5              Hip Flexion    Hip Extension    Knee Flexion    Knee Extension    Dorsiflexion    Plantar Flexion  R              5                           5                       5                           5                            5                          5  L              5                           5                        5                           5                            5                          5    Neck extension-4+/5  Neck flexion 4/5    Sensation - Light touch/temperature intact throughout    DTR's -             Biceps      Triceps     Brachioradialis      Patellar    Ankle    Toes/plantar response  R             2+             2+                  2+                 1+         1+                 Down  L              2+             2+                 2+                  1+         1+                 Neutral    Coordination - Finger to Nose intact b/l. No tremors appreciated    Gait and station - Given c/f safety, unable to assess     Labs:          CAPILLARY BLOOD GLUCOSE              CSF:                  Radiology:     Neurology - Consult Note    -  Spectra: [phone] ([facility]), [phone] ([facility])  -    HPI: Patient KAIDEN HATCH is a 62y (1960) Malayam speaking woman with a PMHx significant for myasthenia gravis presenting to the ED for worsening difficulty with double vision, swallowing, talking, and breathing that has progressively gotten worse for the previous one week. Patient is currently on Mestinon 60 mg two tablets TID and Prednisone 10 mg PRN. Has been admitted before in Feb 2023 for MG crisis that required intubation iso COVID19. Patient received 5 days of PLEX (2/2-2/20/23)with 5 days of IVIG (2/16-2/20/23) with symptomatic improvement. Patient's son says patient has responded best to IVIG and that it stabilizes her MG for about 2 years. At baseline, patient is on 2L NC. In the ED, patient's NIF was -50. However, patient was unable to perform VC. Pending STAT repeat NIF/VC.     Review of Systems:   All other review of systems is negative unless indicated above.    Allergies:  peanuts (Unknown)  No Known Drug Allergies      PMHx/PSHx/Family Hx: As above, otherwise see below   Myasthenia gravis    Diabetes mellitus    Hypertension        Social Hx:  No current use of tobacco, alcohol, or illicit drugs      Medications:  MEDICATIONS  (STANDING):    MEDICATIONS  (PRN):      Vitals:  T(C): 37 (05-05-23 @ 13:13), Max: 37 (05-05-23 @ 13:13)  HR: 120 (05-05-23 @ 13:13) (120 - 120)  BP: 144/72 (05-05-23 @ 13:13) (144/72 - 144/72)  RR: 24 (05-05-23 @ 13:13) (24 - 24)  SpO2: 100% (05-05-23 @ 13:13) (100% - 100%)    Physical Examination:   General - Appears distressed   Cardiovascular - Peripheral pulses palpable  Eyes - Fundoscopy not performed due to safety precautions in the setting of the COVID-19 pandemic    Neurologic Exam:  Mental status - Awake, Alert, Oriented to person, place, and time. Speech is fluent, but limited given patient is out of breath and can count to 4 seconds in one single breath. Follows simple and complex commands. Follows simple/complex commands.     Cranial nerves - B/L eye ptosis R>L, impaired upgaze, PERRLA, VFF, face sensation (V1-V3) intact b/l, facial strength intact without asymmetry b/l, hearing intact b/l, palate with symmetric elevation, trapezius 5/5 strength b/l, tongue midline on protrusion with full lateral movement    Motor - Normal bulk and tone throughout. No pronator drift.  Strength testing            Deltoid      Biceps      Triceps     Wrist Extension    Wrist Flexion     Interossei         R            5                 5               5                     5                              5                        5                 5  L             5                 5               5                     5                              5                        5                 5              Hip Flexion    Hip Extension    Knee Flexion    Knee Extension    Dorsiflexion    Plantar Flexion  R              5                           5                       5                           5                            5                          5  L              5                           5                        5                           5                            5                          5    Neck extension-4+/5  Neck flexion 4/5    Sensation - Light touch/temperature intact throughout    DTR's -             Biceps      Triceps     Brachioradialis      Patellar    Ankle    Toes/plantar response  R             2+             2+                  2+                 1+         1+                 Down  L              2+             2+                 2+                  1+         1+                 Neutral    Coordination - Finger to Nose intact b/l. No tremors appreciated    Gait and station - Given c/f safety, unable to assess     Labs:          CAPILLARY BLOOD GLUCOSE              CSF:                  Radiology:     Neurology - Consult Note    -  Spectra: [phone] ([facility]), [phone] ([facility])  -    HPI: Patient KAIDEN HATCH is a 62y (1960) Malayam speaking woman with a PMHx significant for myasthenia gravis presenting to the ED for worsening difficulty with double vision, swallowing, talking, and breathing that has progressively gotten worse for the previous one week. Patient is currently on Mestinon 60 mg two tablets TID and Prednisone 10 mg PRN. Has been admitted before in Feb 2023 for MG crisis that required intubation (2/1-2/11/23) iso COVID19. Patient received 5 days of PLEX (2/2-2/10/23) followed by 5 days of IVIG (2/16-2/20/23) with symptomatic improvement. Patient's son says patient has responded best to IVIG and that it stabilizes her MG for about 2 years. At baseline, patient is on 2L NC. In the ED, patient's NIF was -50. However, patient was unable to perform VC. Pending STAT repeat NIF/VC.     Review of Systems:   All other review of systems is negative unless indicated above.    Allergies:  peanuts (Unknown)  No Known Drug Allergies      PMHx/PSHx/Family Hx: As above, otherwise see below   Myasthenia gravis    Diabetes mellitus    Hypertension        Social Hx:  No current use of tobacco, alcohol, or illicit drugs      Medications:  MEDICATIONS  (STANDING):    MEDICATIONS  (PRN):      Vitals:  T(C): 37 (05-05-23 @ 13:13), Max: 37 (05-05-23 @ 13:13)  HR: 120 (05-05-23 @ 13:13) (120 - 120)  BP: 144/72 (05-05-23 @ 13:13) (144/72 - 144/72)  RR: 24 (05-05-23 @ 13:13) (24 - 24)  SpO2: 100% (05-05-23 @ 13:13) (100% - 100%)    Physical Examination:   General - Appears distressed   Cardiovascular - Peripheral pulses palpable  Eyes - Fundoscopy not performed due to safety precautions in the setting of the COVID-19 pandemic    Neurologic Exam:  Mental status - Awake, Alert, Oriented to person, place, and time. Speech is fluent, but limited given patient is out of breath and can count to 4 seconds in one single breath. Follows simple and complex commands. Follows simple/complex commands.     Cranial nerves - B/L eye ptosis R>L, impaired upgaze, PERRLA, VFF, face sensation (V1-V3) intact b/l, facial strength intact without asymmetry b/l, hearing intact b/l, palate with symmetric elevation, trapezius 5/5 strength b/l, tongue midline on protrusion with full lateral movement    Motor - Normal bulk and tone throughout. No pronator drift.  Strength testing            Deltoid      Biceps      Triceps     Wrist Extension    Wrist Flexion     Interossei         R            5                 5               5                     5                              5                        5                 5  L             5                 5               5                     5                              5                        5                 5              Hip Flexion    Hip Extension    Knee Flexion    Knee Extension    Dorsiflexion    Plantar Flexion  R              5                           5                       5                           5                            5                          5  L              5                           5                        5                           5                            5                          5    Neck extension-4+/5  Neck flexion 4/5    Sensation - Light touch/temperature intact throughout    DTR's -             Biceps      Triceps     Brachioradialis      Patellar    Ankle    Toes/plantar response  R             2+             2+                  2+                 1+         1+                 Down  L              2+             2+                 2+                  1+         1+                 Neutral    Coordination - Finger to Nose intact b/l. No tremors appreciated    Gait and station - Given c/f safety, unable to assess     Labs:          CAPILLARY BLOOD GLUCOSE              CSF:                  Radiology:     Neurology - Consult Note    -  Spectra: [phone] ([facility]), [phone] ([facility])  -    HPI: Patient KAIDEN HATCH is a 62y (1960) Malayam speaking woman with a PMHx significant for myasthenia gravis presenting to the ED for worsening difficulty with double vision, swallowing, talking, and breathing that has progressively gotten worse for the previous one week. Patient is currently on Mestinon 60 mg two tablets TID and Prednisone 10 mg PRN. Has been admitted before in Feb 2023 for MG crisis that required intubation (2/1-2/11/23) iso COVID19. Patient received 5 days of PLEX (2/2-2/10/23) followed by 5 days of IVIG (2/16-2/20/23) with symptomatic improvement. Patient's son says patient has responded best to IVIG and that it stabilizes her MG for about 2 years. At baseline, patient is on 2L NC. In the ED, patient's NIF was -50. However, patient was unable to perform VC. Pending STAT repeat NIF/VC.     Review of Systems:   All other review of systems is negative unless indicated above.    Allergies:  peanuts (Unknown)  No Known Drug Allergies      PMHx/PSHx/Family Hx: As above, otherwise see below   Myasthenia gravis    Diabetes mellitus    Hypertension        Social Hx:  No current use of tobacco, alcohol, or illicit drugs      Medications:  MEDICATIONS  (STANDING):    MEDICATIONS  (PRN):      Vitals:  T(C): 37 (05-05-23 @ 13:13), Max: 37 (05-05-23 @ 13:13)  HR: 120 (05-05-23 @ 13:13) (120 - 120)  BP: 144/72 (05-05-23 @ 13:13) (144/72 - 144/72)  RR: 24 (05-05-23 @ 13:13) (24 - 24)  SpO2: 100% (05-05-23 @ 13:13) (100% - 100%)    Physical Examination:   General - Appears distressed   Cardiovascular - Peripheral pulses palpable  Eyes - Fundoscopy not performed due to safety precautions in the setting of the COVID-19 pandemic    Neurologic Exam:  Mental status - Awake, Alert, Oriented to person, place, and time. Speech is fluent, but limited given patient is out of breath and can count to 4 seconds in one single breath. Follows simple and complex commands. Follows simple/complex commands.     Cranial nerves - B/L eye ptosis R>L, impaired upgaze, PERRLA, VFF, face sensation (V1-V3) intact b/l, facial strength intact without asymmetry b/l, hearing intact b/l, palate with symmetric elevation, trapezius 5/5 strength b/l, tongue midline on protrusion with full lateral movement    Motor - Normal bulk and tone throughout. No pronator drift.  Strength testing            Deltoid      Biceps      Triceps     Wrist Extension    Wrist Flexion     Interossei         R            5                 5               5                     5                              5                        5                 5  L             5                 5               5                     5                              5                        5                 5              Hip Flexion    Hip Extension    Knee Flexion    Knee Extension    Dorsiflexion    Plantar Flexion  R              5                           5                       5                           5                            5                          5  L              5                           5                        5                           5                            5                          5    Neck extension-4+/5  Neck flexion 4/5    Sensation - Light touch/temperature intact throughout    DTR's -             Biceps      Triceps     Brachioradialis      Patellar    Ankle    Toes/plantar response  R             2+             2+                  2+                 1+         1+                 Down  L              2+             2+                 2+                  1+         1+                 Neutral    Coordination - Finger to Nose intact b/l. No tremors appreciated    Gait and station - Given c/f safety, unable to assess     Labs:          CAPILLARY BLOOD GLUCOSE              CSF:                  Radiology:     Neurology - Consult Note    -  Spectra: [phone] ([facility]), [phone] ([facility])  -    HPI: Patient KAIDEN HATCH is a 62y (1960) Malayam speaking woman with a PMHx significant for myasthenia gravis presenting to the ED for worsening difficulty with double vision, swallowing, talking, and breathing that has progressively gotten worse for the previous one week. Patient is currently on Mestinon 60 mg two tablets TID and Prednisone 10 mg PRN. Has been admitted before in Feb 2023 for MG crisis that required intubation (2/1-2/11/23) iso COVID19. Patient received 5 days of PLEX (2/2-2/10/23) followed by 5 days of IVIG (2/16-2/20/23) with symptomatic improvement. Patient's son says patient has responded best to IVIG and that it stabilizes her MG for about 2 years. At baseline, patient is on 2L NC. In the ED, patient's NIF was -50. However, patient was unable to perform VC. Pending STAT repeat NIF/VC.     Review of Systems:   All other review of systems is negative unless indicated above.    Allergies:  peanuts (Unknown)  No Known Drug Allergies      PMHx/PSHx/Family Hx: As above, otherwise see below   Myasthenia gravis    Diabetes mellitus    Hypertension        Social Hx:  No current use of tobacco, alcohol, or illicit drugs      Medications:  MEDICATIONS  (STANDING):    MEDICATIONS  (PRN):      Vitals:  T(C): 37 (05-05-23 @ 13:13), Max: 37 (05-05-23 @ 13:13)  HR: 120 (05-05-23 @ 13:13) (120 - 120)  BP: 144/72 (05-05-23 @ 13:13) (144/72 - 144/72)  RR: 24 (05-05-23 @ 13:13) (24 - 24)  SpO2: 100% (05-05-23 @ 13:13) (100% - 100%)    Physical Examination:   General - Appears distressed   Cardiovascular - Peripheral pulses palpable  Eyes - Fundoscopy not performed due to safety precautions in the setting of the COVID-19 pandemic    Neurologic Exam:  Mental status - Awake, Alert, Oriented to person, place, and time. Speech is fluent, but limited given patient is out of breath and can count to 4 seconds in one single breath. Follows simple and complex commands. Follows simple/complex commands.     Cranial nerves - B/L eye ptosis R>L, impaired upgaze, PERRLA, VFF, face sensation (V1-V3) intact b/l, facial strength intact without asymmetry b/l, hearing intact b/l, palate with symmetric elevation, trapezius 5/5 strength b/l, tongue midline on protrusion with full lateral movement    Motor - Normal bulk and tone throughout. No pronator drift.  Strength testing            Deltoid      Biceps      Triceps     Wrist Extension    Wrist Flexion     Interossei         R            5                 5               5                     5                              5                        5                 5  L             5                 5               5                     5                              5                        5                 5              Hip Flexion    Hip Extension    Knee Flexion    Knee Extension    Dorsiflexion    Plantar Flexion  R              5                           5                       5                           5                            5                          5  L              5                           5                        5                           5                            5                          5    Neck extension-4+/5  Neck flexion 4/5    Sensation - Light touch/temperature intact throughout    DTR's -             Biceps      Triceps     Brachioradialis      Patellar    Ankle    Toes/plantar response  R             2+             2+                  2+                 1+         1+                 Down  L              2+             2+                 2+                  1+         1+                 Neutral    Coordination - Finger to Nose intact b/l. No tremors appreciated    Gait and station - Given c/f safety, unable to assess     Labs:          CAPILLARY BLOOD GLUCOSE              CSF:                  Radiology:     Neurology - Consult Note    -  Spectra: [phone] ([facility]), [phone] ([facility]  -    HPI: Patient KAIDEN HATCH is a 62y (1960) Malayam speaking woman with a PMHx significant for myasthenia gravis presenting to the ED for worsening difficulty with double vision, swallowing, talking, and breathing that has progressively gotten worse for the previous one week. Patient is currently on Mestinon 60 mg two tablets TID and Prednisone 10 mg PRN. Has been admitted before in Feb 2023 for MG crisis that required intubation (2/1-2/11/23) iso COVID19. Patient received 5 days of PLEX (2/2-2/10/23) followed by 5 days of IVIG (2/16-2/20/23) with symptomatic improvement. Patient's son says patient has responded best to IVIG and that it stabilizes her MG for about 2 years. At baseline, patient is on 2L NC. In the ED, patient's NIF was -50. However, patient was unable to perform VC. Pending STAT repeat NIF/VC.     Review of Systems:   All other review of systems is negative unless indicated above.    Allergies:  peanuts (Unknown)  No Known Drug Allergies      PMHx/PSHx/Family Hx: As above, otherwise see below   Myasthenia gravis    Diabetes mellitus    Hypertension        Social Hx:  No current use of tobacco, alcohol, or illicit drugs      Medications:  MEDICATIONS  (STANDING):    MEDICATIONS  (PRN):      Vitals:  T(C): 37 (05-05-23 @ 13:13), Max: 37 (05-05-23 @ 13:13)  HR: 120 (05-05-23 @ 13:13) (120 - 120)  BP: 144/72 (05-05-23 @ 13:13) (144/72 - 144/72)  RR: 24 (05-05-23 @ 13:13) (24 - 24)  SpO2: 100% (05-05-23 @ 13:13) (100% - 100%)    Physical Examination:   General - Appears distressed   Cardiovascular - Peripheral pulses palpable  Eyes - Fundoscopy not performed due to safety precautions in the setting of the COVID-19 pandemic    Neurologic Exam:  Mental status - Awake, Alert, Oriented to person, place, and time. Speech is fluent, but limited given patient is out of breath and can count to 4 seconds in one single breath.  Follows simple/complex commands.     Cranial nerves - B/L eye ptosis R>L, impaired upgaze, PERRLA, VFF, face sensation (V1-V3) intact b/l, facial strength intact without asymmetry b/l, hearing intact b/l, palate with symmetric elevation, trapezius 5/5 strength b/l, tongue midline on protrusion with full lateral movement    Motor - Normal bulk and tone throughout. No pronator drift.  Strength testing            Deltoid      Biceps      Triceps     Wrist Extension    Wrist Flexion     Interossei         R            5                 5               5                     5                              5                        5                 5  L             5                 5               5                     5                              5                        5                 5              Hip Flexion    Hip Extension    Knee Flexion    Knee Extension    Dorsiflexion    Plantar Flexion  R              5                           5                       5                           5                            5                          5  L              5                           5                        5                           5                            5                          5    Neck extension-4+/5  Neck flexion 4/5    Sensation - Light touch/temperature intact throughout    DTR's -             Biceps      Triceps     Brachioradialis      Patellar    Ankle    Toes/plantar response  R             2+             2+                  2+                 1+         1+                 Down  L              2+             2+                 2+                  1+         1+                 Neutral    Coordination - Finger to Nose intact b/l. No tremors appreciated    Gait and station - Given c/f safety, unable to assess     Labs:          CAPILLARY BLOOD GLUCOSE              CSF:                  Radiology:     Neurology - Consult Note    -  Spectra: [phone] ([facility]), [phone] ([facility])  -    HPI: Patient KAIDEN HATCH is a 62y (1960) Malayam speaking woman with a PMHx significant for myasthenia gravis presenting to the ED for worsening difficulty with double vision, swallowing, talking, and breathing that has progressively gotten worse for the previous one week. Patient is currently on Mestinon 60 mg two tablets TID and Prednisone 10 mg PRN. Has been admitted before in Feb 2023 for MG crisis that required intubation (2/1-2/11/23) iso COVID19. Patient received 5 days of PLEX (2/2-2/10/23) followed by 5 days of IVIG (2/16-2/20/23) with symptomatic improvement. Patient's son says patient has responded best to IVIG and that it stabilizes her MG for about 2 years. At baseline, patient is on 2L NC. In the ED, patient's NIF was -50. However, patient was unable to perform VC. Pending STAT repeat NIF/VC.     Review of Systems:   All other review of systems is negative unless indicated above.    Allergies:  peanuts (Unknown)  No Known Drug Allergies      PMHx/PSHx/Family Hx: As above, otherwise see below   Myasthenia gravis    Diabetes mellitus    Hypertension        Social Hx:  No current use of tobacco, alcohol, or illicit drugs      Medications:  MEDICATIONS  (STANDING):    MEDICATIONS  (PRN):      Vitals:  T(C): 37 (05-05-23 @ 13:13), Max: 37 (05-05-23 @ 13:13)  HR: 120 (05-05-23 @ 13:13) (120 - 120)  BP: 144/72 (05-05-23 @ 13:13) (144/72 - 144/72)  RR: 24 (05-05-23 @ 13:13) (24 - 24)  SpO2: 100% (05-05-23 @ 13:13) (100% - 100%)    Physical Examination:   General - Appears distressed   Cardiovascular - Peripheral pulses palpable  Eyes - Fundoscopy not performed due to safety precautions in the setting of the COVID-19 pandemic    Neurologic Exam:  Mental status - Awake, Alert, Oriented to person, place, and time. Speech is fluent, but limited given patient is out of breath and can count to 4 seconds in one single breath.  Follows simple/complex commands.     Cranial nerves - B/L eye ptosis R>L, impaired upgaze, PERRLA, VFF, face sensation (V1-V3) intact b/l, facial strength intact without asymmetry b/l, hearing intact b/l, palate with symmetric elevation, trapezius 5/5 strength b/l, tongue midline on protrusion with full lateral movement    Motor - Normal bulk and tone throughout. No pronator drift.  Strength testing            Deltoid      Biceps      Triceps     Wrist Extension    Wrist Flexion     Interossei         R            5                 5               5                     5                              5                        5                 5  L             5                 5               5                     5                              5                        5                 5              Hip Flexion    Hip Extension    Knee Flexion    Knee Extension    Dorsiflexion    Plantar Flexion  R              5                           5                       5                           5                            5                          5  L              5                           5                        5                           5                            5                          5    Neck extension-4+/5  Neck flexion 4/5    Sensation - Light touch/temperature intact throughout    DTR's -             Biceps      Triceps     Brachioradialis      Patellar    Ankle    Toes/plantar response  R             2+             2+                  2+                 1+         1+                 Down  L              2+             2+                 2+                  1+         1+                 Neutral    Coordination - Finger to Nose intact b/l. No tremors appreciated    Gait and station - Given c/f safety, unable to assess     Labs:          CAPILLARY BLOOD GLUCOSE              CSF:                  Radiology:     Neurology - Consult Note    -  Spectra: [phone] ([facility]), [phone] ([facility])  -    HPI: Patient KAIDEN HATCH is a 62y (1960) Malayam speaking woman with a PMHx significant for myasthenia gravis presenting to the ED for worsening difficulty with double vision, swallowing, talking, and breathing that has progressively gotten worse for the previous one week. Patient is currently on Mestinon 60 mg two tablets TID and Prednisone 10 mg PRN. Has been admitted before in Feb 2023 for MG crisis that required intubation (2/1-2/11/23) iso COVID19. Patient received 5 days of PLEX (2/2-2/10/23) followed by 5 days of IVIG (2/16-2/20/23) with symptomatic improvement. Patient's son says patient has responded best to IVIG and that it stabilizes her MG for about 2 years. At baseline, patient is on 2L NC. In the ED, patient's NIF was -50. However, patient was unable to perform VC. Pending STAT repeat NIF/VC.     Review of Systems:   All other review of systems is negative unless indicated above.    Allergies:  peanuts (Unknown)  No Known Drug Allergies      PMHx/PSHx/Family Hx: As above, otherwise see below   Myasthenia gravis    Diabetes mellitus    Hypertension        Social Hx:  No current use of tobacco, alcohol, or illicit drugs      Medications:  MEDICATIONS  (STANDING):    MEDICATIONS  (PRN):      Vitals:  T(C): 37 (05-05-23 @ 13:13), Max: 37 (05-05-23 @ 13:13)  HR: 120 (05-05-23 @ 13:13) (120 - 120)  BP: 144/72 (05-05-23 @ 13:13) (144/72 - 144/72)  RR: 24 (05-05-23 @ 13:13) (24 - 24)  SpO2: 100% (05-05-23 @ 13:13) (100% - 100%)    Physical Examination:   General - Appears distressed   Cardiovascular - Peripheral pulses palpable  Eyes - Fundoscopy not performed due to safety precautions in the setting of the COVID-19 pandemic    Neurologic Exam:  Mental status - Awake, Alert, Oriented to person, place, and time. Speech is fluent, but limited given patient is out of breath and can count to 4 seconds in one single breath.  Follows simple/complex commands.     Cranial nerves - B/L eye ptosis R>L, impaired upgaze, PERRLA, VFF, face sensation (V1-V3) intact b/l, facial strength intact without asymmetry b/l, hearing intact b/l, palate with symmetric elevation, trapezius 5/5 strength b/l, tongue midline on protrusion with full lateral movement    Motor - Normal bulk and tone throughout. No pronator drift.  Strength testing            Deltoid      Biceps      Triceps     Wrist Extension    Wrist Flexion     Interossei         R            5                 5               5                     5                              5                        5                 5  L             5                 5               5                     5                              5                        5                 5              Hip Flexion    Hip Extension    Knee Flexion    Knee Extension    Dorsiflexion    Plantar Flexion  R              5                           5                       5                           5                            5                          5  L              5                           5                        5                           5                            5                          5    Neck extension-4+/5  Neck flexion 4/5    Sensation - Light touch/temperature intact throughout    DTR's -             Biceps      Triceps     Brachioradialis      Patellar    Ankle    Toes/plantar response  R             2+             2+                  2+                 1+         1+                 Down  L              2+             2+                 2+                  1+         1+                 Neutral    Coordination - Finger to Nose intact b/l. No tremors appreciated    Gait and station - Given c/f safety, unable to assess     Labs:          CAPILLARY BLOOD GLUCOSE              CSF:                  Radiology:     Neurology - Consult Note    -  Spectra: [phone] ([facility]), [phone] ([facility])  -    HPI: Patient KAIDEN HATCH is a 62y (1960) Malayam speaking woman with a PMHx significant for myasthenia gravis presenting to the ED for worsening difficulty with double vision, swallowing, talking, and breathing that has progressively gotten worse for the previous one week. Patient is currently on Mestinon 60 mg two tablets TID and Prednisone 10 mg PRN. Has been admitted before in Feb 2023 for MG crisis that required intubation (2/1-2/11/23) iso COVID19. Patient received 5 days of PLEX (2/2-2/10/23) followed by 5 days of IVIG (2/16-2/20/23) with symptomatic improvement. Patient's son says patient has responded best to IVIG and that it stabilizes her MG for about 2 years. At baseline, patient is on 2L NC. In the ED, patient's NIF was -50. However, patient was unable to perform VC. Pending STAT repeat NIF/VC.     Review of Systems:   All other review of systems is negative unless indicated above.    Allergies:  peanuts (Unknown)  No Known Drug Allergies      PMHx/PSHx/Family Hx: As above, otherwise see below   Myasthenia gravis    Diabetes mellitus    Hypertension        Social Hx:  No current use of tobacco, alcohol, or illicit drugs      Medications:  MEDICATIONS  (STANDING):    MEDICATIONS  (PRN):      Vitals:  T(C): 37 (05-05-23 @ 13:13), Max: 37 (05-05-23 @ 13:13)  HR: 120 (05-05-23 @ 13:13) (120 - 120)  BP: 144/72 (05-05-23 @ 13:13) (144/72 - 144/72)  RR: 24 (05-05-23 @ 13:13) (24 - 24)  SpO2: 100% (05-05-23 @ 13:13) (100% - 100%)    Physical Examination:   General - Appears distressed   Cardiovascular - Peripheral pulses palpable  Eyes - Fundoscopy not performed due to safety precautions in the setting of the COVID-19 pandemic    Neurologic Exam:  Mental status - Awake, Alert, Oriented to person, place, and time. Speech is fluent, but limited given patient is out of breath and can count to 4 seconds in one single breath.  Follows simple/complex commands. Due to respiratory issues could not assess attention/concentration, recent and remote memory, or fund of knowledge    Cranial nerves - B/L eye ptosis R>L, impaired upgaze, PERRLA, VFF, face sensation (V1-V3) intact b/l, facial strength intact without asymmetry b/l, hearing intact b/l, palate with symmetric elevation, trapezius 5/5 strength b/l, tongue midline on protrusion with full lateral movement    Motor - Normal bulk and tone throughout. No pronator drift.  Strength testing            Deltoid      Biceps      Triceps     Wrist Extension    Wrist Flexion     Interossei         R            5                 5               5                     5                              5                        5                 5  L             5                 5               5                     5                              5                        5                 5              Hip Flexion    Hip Extension    Knee Flexion    Knee Extension    Dorsiflexion    Plantar Flexion  R              5                           5                       5                           5                            5                          5  L              5                           5                        5                           5                            5                          5    Neck extension-4+/5  Neck flexion 4/5    Sensation - Light touch/temperature intact throughout    DTR's -             Biceps      Triceps     Brachioradialis      Patellar    Ankle    Toes/plantar response  R             2+             2+                  2+                 1+         1+                 Down  L              2+             2+                 2+                  1+         1+                 Neutral    Coordination - Finger to Nose intact b/l. No tremors appreciated    Gait and station - Given c/f safety, unable to assess     Labs:          CAPILLARY BLOOD GLUCOSE              CSF:                  Radiology:     Neurology - Consult Note    -  Spectra: [phone] ([facility]), [phone] ([facility])  -    HPI: Patient KAIDEN HATCH is a 62y (1960) Malayam speaking woman with a PMHx significant for myasthenia gravis presenting to the ED for worsening difficulty with double vision, swallowing, talking, and breathing that has progressively gotten worse for the previous one week. Patient is currently on Mestinon 60 mg two tablets TID and Prednisone 10 mg PRN. Has been admitted before in Feb 2023 for MG crisis that required intubation (2/1-2/11/23) iso COVID19. Patient received 5 days of PLEX (2/2-2/10/23) followed by 5 days of IVIG (2/16-2/20/23) with symptomatic improvement. Patient's son says patient has responded best to IVIG and that it stabilizes her MG for about 2 years. At baseline, patient is on 2L NC. In the ED, patient's NIF was -50. However, patient was unable to perform VC. Pending STAT repeat NIF/VC.     Review of Systems:   All other review of systems is negative unless indicated above.    Allergies:  peanuts (Unknown)  No Known Drug Allergies      PMHx/PSHx/Family Hx: As above, otherwise see below   Myasthenia gravis    Diabetes mellitus    Hypertension        Social Hx:  No current use of tobacco, alcohol, or illicit drugs      Medications:  MEDICATIONS  (STANDING):    MEDICATIONS  (PRN):      Vitals:  T(C): 37 (05-05-23 @ 13:13), Max: 37 (05-05-23 @ 13:13)  HR: 120 (05-05-23 @ 13:13) (120 - 120)  BP: 144/72 (05-05-23 @ 13:13) (144/72 - 144/72)  RR: 24 (05-05-23 @ 13:13) (24 - 24)  SpO2: 100% (05-05-23 @ 13:13) (100% - 100%)    Physical Examination:   General - Appears distressed   Cardiovascular - Peripheral pulses palpable  Eyes - Fundoscopy not performed due to safety precautions in the setting of the COVID-19 pandemic    Neurologic Exam:  Mental status - Awake, Alert, Oriented to person, place, and time. Speech is fluent, but limited given patient is out of breath and can count to 4 seconds in one single breath.  Follows simple/complex commands. Due to respiratory issues could not assess attention/concentration, recent and remote memory, or fund of knowledge    Cranial nerves - B/L eye ptosis R>L, impaired upgaze, PERRLA, VFF, face sensation (V1-V3) intact b/l, facial strength intact without asymmetry b/l, hearing intact b/l, palate with symmetric elevation, trapezius 5/5 strength b/l, tongue midline on protrusion with full lateral movement    Motor - Normal bulk and tone throughout. No pronator drift.  Strength testing            Deltoid      Biceps      Triceps     Wrist Extension    Wrist Flexion     Interossei         R            5                 5               5                     5                              5                        5                 5  L             5                 5               5                     5                              5                        5                 5              Hip Flexion    Hip Extension    Knee Flexion    Knee Extension    Dorsiflexion    Plantar Flexion  R              5                           5                       5                           5                            5                          5  L              5                           5                        5                           5                            5                          5    Neck extension-4+/5  Neck flexion 4/5    Sensation - Light touch/temperature intact throughout    DTR's -             Biceps      Triceps     Brachioradialis      Patellar    Ankle    Toes/plantar response  R             2+             2+                  2+                 1+         1+                 Down  L              2+             2+                 2+                  1+         1+                 Neutral    Coordination - Finger to Nose intact b/l. No tremors appreciated    Gait and station - Given c/f safety, unable to assess     Labs:          CAPILLARY BLOOD GLUCOSE              CSF:                  Radiology:     Neurology - Consult Note    -  Spectra: [phone] ([facility]), [phone] ([facility])  -    HPI: Patient KAIDEN HATCH is a 62y (1960) Malayam speaking woman with a PMHx significant for myasthenia gravis presenting to the ED for worsening difficulty with double vision, swallowing, talking, and breathing that has progressively gotten worse for the previous one week. Patient is currently on Mestinon 60 mg two tablets TID and Prednisone 10 mg PRN. Has been admitted before in Feb 2023 for MG crisis that required intubation (2/1-2/11/23) iso COVID19. Patient received 5 days of PLEX (2/2-2/10/23) followed by 5 days of IVIG (2/16-2/20/23) with symptomatic improvement. Patient's son says patient has responded best to IVIG and that it stabilizes her MG for about 2 years. At baseline, patient is on 2L NC. In the ED, patient's NIF was -50. However, patient was unable to perform VC. Pending STAT repeat NIF/VC.     Review of Systems:   All other review of systems is negative unless indicated above.    Allergies:  peanuts (Unknown)  No Known Drug Allergies      PMHx/PSHx/Family Hx: As above, otherwise see below   Myasthenia gravis    Diabetes mellitus    Hypertension        Social Hx:  No current use of tobacco, alcohol, or illicit drugs      Medications:  MEDICATIONS  (STANDING):    MEDICATIONS  (PRN):      Vitals:  T(C): 37 (05-05-23 @ 13:13), Max: 37 (05-05-23 @ 13:13)  HR: 120 (05-05-23 @ 13:13) (120 - 120)  BP: 144/72 (05-05-23 @ 13:13) (144/72 - 144/72)  RR: 24 (05-05-23 @ 13:13) (24 - 24)  SpO2: 100% (05-05-23 @ 13:13) (100% - 100%)    Physical Examination:   General - Appears distressed   Cardiovascular - Peripheral pulses palpable  Eyes - Fundoscopy not performed due to safety precautions in the setting of the COVID-19 pandemic    Neurologic Exam:  Mental status - Awake, Alert, Oriented to person, place, and time. Speech is fluent, but limited given patient is out of breath and can count to 4 seconds in one single breath.  Follows simple/complex commands. Due to respiratory issues could not assess attention/concentration, recent and remote memory, or fund of knowledge    Cranial nerves - B/L eye ptosis R>L, impaired upgaze, PERRLA, VFF, face sensation (V1-V3) intact b/l, facial strength intact without asymmetry b/l, hearing intact b/l, palate with symmetric elevation, trapezius 5/5 strength b/l, tongue midline on protrusion with full lateral movement    Motor - Normal bulk and tone throughout. No pronator drift.  Strength testing            Deltoid      Biceps      Triceps     Wrist Extension    Wrist Flexion     Interossei         R            5                 5               5                     5                              5                        5                 5  L             5                 5               5                     5                              5                        5                 5              Hip Flexion    Hip Extension    Knee Flexion    Knee Extension    Dorsiflexion    Plantar Flexion  R              5                           5                       5                           5                            5                          5  L              5                           5                        5                           5                            5                          5    Neck extension-4+/5  Neck flexion 4/5    Sensation - Light touch/temperature intact throughout    DTR's -             Biceps      Triceps     Brachioradialis      Patellar    Ankle    Toes/plantar response  R             2+             2+                  2+                 1+         1+                 Down  L              2+             2+                 2+                  1+         1+                 Neutral    Coordination - Finger to Nose intact b/l. No tremors appreciated    Gait and station - Given c/f safety, unable to assess     Labs:          CAPILLARY BLOOD GLUCOSE              CSF:                  Radiology:

## 2023-05-05 NOTE — ED ADULT NURSE NOTE - NSIMPLEMENTINTERV_GEN_ALL_ED
Implemented All Universal Safety Interventions:  [place] to call system. Call bell, personal items and telephone within reach. Instruct patient to call for assistance. Room bathroom lighting operational. Non-slip footwear when patient is off stretcher. Physically safe environment: no spills, clutter or unnecessary equipment. Stretcher in lowest position, wheels locked, appropriate side rails in place. Implemented All Universal Safety Interventions:  [place] to call system. Call bell, personal items and telephone within reach. Instruct patient to call for assistance. Room bathroom lighting operational. Non-slip footwear when patient is off stretcher. Physically safe environment: no spills, clutter or unnecessary equipment. Stretcher in lowest position, wheels locked, appropriate side rails in place. Implemented All Universal Safety Interventions:  [place] to call system. Call bell, personal items and telephone within reach. Instruct patient to call for assistance. Room bathroom lighting operational. Non-slip footwear when patient is off stretcher. Physically safe environment: no spills, clutter or unnecessary equipment. Stretcher in lowest position, wheels locked, appropriate side rails in place.

## 2023-05-05 NOTE — ED PROVIDER NOTE - PHYSICAL EXAMINATION
Attending Brandi Marcano: GEn: ill appearing, heent: atraumatic, perrla, difficulty raising eyebrown, unable to look up, mmm, neck: supple, cv: tachcyardic, lungs: bl breath sounds shallow breathing, abd: soft, nontender, nondistended, ext: wwp, neuro: awake and alert following commands, sensation intact, difficulty holding arms and legs up

## 2023-05-05 NOTE — ED CLERICAL - DIVISION
[facility]... [facility]... [facility]...

## 2023-05-05 NOTE — PROGRESS NOTE ADULT - SUBJECTIVE AND OBJECTIVE BOX
NSCU Progress Note    Assessment/Hospital Course:    Patient KAIDEN HATCH is a 62y (1960) Malayam speaking woman with a PMHx significant for myasthenia gravis presenting to the ED for worsening difficulty with double vision, swallowing, talking, and breathing that has progressively gotten worse for the previous one week. Patient is currently on Mestinon 60 mg two tablets TID and Prednisone 10 mg PRN. Has been admitted before in Feb 2023 for MG crisis that required intubation (2/1-2/11/23) iso COVID19. Patient received 5 days of PLEX (2/2-2/10/23) followed by 5 days of IVIG (2/16-2/20/23) with symptomatic improvement. Patient's son says patient has responded best to IVIG and that it stabilizes her MG for about 2 years. At baseline, patient is on 2L NC. In the ED, patient's NIF was -50. However, patient was unable to perform VC. Pending STAT repeat NIF/VC.       24 Hour Events/Subjective:  -       REVIEW OF SYSTEMS:  - negative except as above    VITALS:   - Reviewed      IMAGING/DATA:   - Reviewed          PHYSICAL EXAM:    General:intubated  CVS: RRR  Pulm: CTAB  GI: Soft, NTND  Extremities: No LE Edema  Neuro: intubated/sedated pupils 3mm reactive b/l, TANNER spont/ag   NSCU Progress Note    Assessment/Hospital Course:    Patient KAIDEN HATCH is a 62y (1960) Malayam speaking woman with a PMHx significant for myasthenia gravis presenting to the ED for worsening difficulty with double vision, swallowing, talking, and breathing that has progressively gotten worse for the previous one week. Patient is currently on Mestinon 60 mg two tablets TID and Prednisone 10 mg PRN. Has been admitted before in Feb 2023 for MG crisis that required intubation (2/1-2/11/23) iso COVID19. Patient received 5 days of PLEX (2/2-2/10/23) followed by 5 days of IVIG (2/16-2/20/23) with symptomatic improvement. Patient's son says patient has responded best to IVIG and that it stabilizes her MG for about 2 years. At baseline, patient is on 2L NC. In the ED, patient's NIF was -50. However, patient was unable to perform VC. Pending STAT repeat NIF/VC.       24 Hour Events/Subjective:  - intubated for hypercapnic resp failure, admitted, shiley placed, and plan for plex      REVIEW OF SYSTEMS:  - negative except as above    VITALS:   - Reviewed      IMAGING/DATA:   - Reviewed          PHYSICAL EXAM:    General: intubated  CVS: RRR  Pulm: CTAB  GI: Soft, NTND  Extremities: No LE Edema  Neuro: intubated/sedated pupils 3mm reactive b/l, TANNER spont/ag

## 2023-05-05 NOTE — H&P ADULT - NSHPLABSRESULTS_GEN_ALL_CORE
.  LABS:                         14.4   12.13 )-----------( 292      ( 05 May 2023 13:52 )             46.6     05-05    139  |  95<L>  |  17  ----------------------------<  119<H>  4.1   |  33<H>  |  0.52    Ca    9.7      05 May 2023 13:52    TPro  7.5  /  Alb  4.4  /  TBili  0.2  /  DBili  x   /  AST  16  /  ALT  16  /  AlkPhos  89  05-05    PT/INR - ( 05 May 2023 13:52 )   PT: 11.8 sec;   INR: 1.02 ratio         PTT - ( 05 May 2023 13:52 )  PTT:32.4 sec          RADIOLOGY, EKG & ADDITIONAL TESTS: Reviewed.

## 2023-05-05 NOTE — ED PROVIDER NOTE - CLINICAL SUMMARY MEDICAL DECISION MAKING FREE TEXT BOX
Attending Brandi Marcano: 62-year-old female with history of myasthenia gravis on pyrostigmine. at home presenting with worsening shortness of breath, weakness and difficulty breathing for the last couple of days.  Upon arrival patient ill-appearing tachypneic with increased work of breathing.  Patient was patient able to look up and does endorse some worsening weakness.  Exam findings concerning for myasthenia crisis.  Neurology consulted upon arrival as well as respiratory to retain and nebs as well as vital capacity.  Tried multiple times to obtain a vital capacity and patient unable to which is concerning that vital capacity may be too low to calculate.  Patient with NIF of -50.  Patient afebrile rectally.  Neurology saw patient at the bedside and agreed to concern for significant respiratory decline as well as worsening myasthenic crisis.  Neurosurgical ICU consulted for emergent Plex as well as admission.  With worsening respiratory status and has a evidence of hypercarbia discussed with patient as well as family numbers and neurology need for intubation.  After discussion and agreement was made that patient did require emergent intubation for impending respiratory failure.  Patient will need admission to the neurosurgical ICU for further monitoring.

## 2023-05-06 LAB
A1C WITH ESTIMATED AVERAGE GLUCOSE RESULT: 6.8 % — HIGH (ref 4–5.6)
ANION GAP SERPL CALC-SCNC: 13 MMOL/L — SIGNIFICANT CHANGE UP (ref 5–17)
BUN SERPL-MCNC: 18 MG/DL — SIGNIFICANT CHANGE UP (ref 7–23)
BUN SERPL-MCNC: 20 MG/DL — SIGNIFICANT CHANGE UP (ref 7–23)
CA-I BLD-SCNC: 1.16 MMOL/L — SIGNIFICANT CHANGE UP (ref 1.15–1.33)
CALCIUM SERPL-MCNC: 8.3 MG/DL — LOW (ref 8.4–10.5)
CHLORIDE SERPL-SCNC: 105 MMOL/L — SIGNIFICANT CHANGE UP (ref 96–108)
CHLORIDE SERPL-SCNC: 112 MMOL/L — HIGH (ref 96–108)
CO2 SERPL-SCNC: 15 MMOL/L — LOW (ref 22–31)
CO2 SERPL-SCNC: 23 MMOL/L — SIGNIFICANT CHANGE UP (ref 22–31)
CREAT SERPL-MCNC: 0.46 MG/DL — LOW (ref 0.5–1.3)
CREAT SERPL-MCNC: 0.51 MG/DL — SIGNIFICANT CHANGE UP (ref 0.5–1.3)
EGFR: 105 ML/MIN/1.73M2 — SIGNIFICANT CHANGE UP
EGFR: 107 ML/MIN/1.73M2 — SIGNIFICANT CHANGE UP
ESTIMATED AVERAGE GLUCOSE: 148 MG/DL — HIGH (ref 68–114)
FIBRINOGEN PPP-MCNC: 399 MG/DL — SIGNIFICANT CHANGE UP (ref 200–445)
GAS PNL BLDA: SIGNIFICANT CHANGE UP
GLUCOSE SERPL-MCNC: 165 MG/DL — HIGH (ref 70–99)
GLUCOSE SERPL-MCNC: 177 MG/DL — HIGH (ref 70–99)
HCT VFR BLD CALC: 41.4 % — SIGNIFICANT CHANGE UP (ref 34.5–45)
HCV AB S/CO SERPL IA: 0.08 S/CO — SIGNIFICANT CHANGE UP (ref 0–0.99)
HCV AB SERPL-IMP: SIGNIFICANT CHANGE UP
HGB BLD-MCNC: 12.9 G/DL — SIGNIFICANT CHANGE UP (ref 11.5–15.5)
MAGNESIUM SERPL-MCNC: 2.1 MG/DL — SIGNIFICANT CHANGE UP (ref 1.6–2.6)
MAGNESIUM SERPL-MCNC: 2.8 MG/DL — HIGH (ref 1.6–2.6)
MCHC RBC-ENTMCNC: 28.5 PG — SIGNIFICANT CHANGE UP (ref 27–34)
MCHC RBC-ENTMCNC: 31.2 GM/DL — LOW (ref 32–36)
MCV RBC AUTO: 91.6 FL — SIGNIFICANT CHANGE UP (ref 80–100)
NRBC # BLD: 0 /100 WBCS — SIGNIFICANT CHANGE UP (ref 0–0)
PHOSPHATE SERPL-MCNC: 3.6 MG/DL — SIGNIFICANT CHANGE UP (ref 2.5–4.5)
PHOSPHATE SERPL-MCNC: 4.2 MG/DL — SIGNIFICANT CHANGE UP (ref 2.5–4.5)
PLATELET # BLD AUTO: 259 K/UL — SIGNIFICANT CHANGE UP (ref 150–400)
POTASSIUM SERPL-MCNC: 5 MMOL/L — SIGNIFICANT CHANGE UP (ref 3.5–5.3)
POTASSIUM SERPL-MCNC: 5.1 MMOL/L — SIGNIFICANT CHANGE UP (ref 3.5–5.3)
POTASSIUM SERPL-SCNC: 5 MMOL/L — SIGNIFICANT CHANGE UP (ref 3.5–5.3)
POTASSIUM SERPL-SCNC: 5.1 MMOL/L — SIGNIFICANT CHANGE UP (ref 3.5–5.3)
RBC # BLD: 4.52 M/UL — SIGNIFICANT CHANGE UP (ref 3.8–5.2)
RBC # FLD: 13.8 % — SIGNIFICANT CHANGE UP (ref 10.3–14.5)
SODIUM SERPL-SCNC: 140 MMOL/L — SIGNIFICANT CHANGE UP (ref 135–145)
SODIUM SERPL-SCNC: 141 MMOL/L — SIGNIFICANT CHANGE UP (ref 135–145)
TSH SERPL-MCNC: 0.65 UIU/ML — SIGNIFICANT CHANGE UP (ref 0.27–4.2)
WBC # BLD: 19.08 K/UL — HIGH (ref 3.8–10.5)
WBC # FLD AUTO: 19.08 K/UL — HIGH (ref 3.8–10.5)

## 2023-05-06 PROCEDURE — 99291 CRITICAL CARE FIRST HOUR: CPT

## 2023-05-06 PROCEDURE — 36514 APHERESIS PLASMA: CPT

## 2023-05-06 PROCEDURE — 76376 3D RENDER W/INTRP POSTPROCES: CPT | Mod: 26

## 2023-05-06 PROCEDURE — 71045 X-RAY EXAM CHEST 1 VIEW: CPT | Mod: 26

## 2023-05-06 PROCEDURE — 93970 EXTREMITY STUDY: CPT | Mod: 26

## 2023-05-06 PROCEDURE — 93306 TTE W/DOPPLER COMPLETE: CPT | Mod: 26

## 2023-05-06 RX ORDER — POTASSIUM PHOSPHATE, MONOBASIC POTASSIUM PHOSPHATE, DIBASIC 236; 224 MG/ML; MG/ML
30 INJECTION, SOLUTION INTRAVENOUS ONCE
Refills: 0 | Status: DISCONTINUED | OUTPATIENT
Start: 2023-05-06 | End: 2023-05-06

## 2023-05-06 RX ORDER — SODIUM CHLORIDE 9 MG/ML
1000 INJECTION, SOLUTION INTRAVENOUS
Refills: 0 | Status: DISCONTINUED | OUTPATIENT
Start: 2023-05-06 | End: 2023-05-07

## 2023-05-06 RX ORDER — ACETAMINOPHEN 500 MG
650 TABLET ORAL EVERY 6 HOURS
Refills: 0 | Status: DISCONTINUED | OUTPATIENT
Start: 2023-05-06 | End: 2023-05-16

## 2023-05-06 RX ORDER — PANTOPRAZOLE SODIUM 20 MG/1
40 TABLET, DELAYED RELEASE ORAL DAILY
Refills: 0 | Status: DISCONTINUED | OUTPATIENT
Start: 2023-05-06 | End: 2023-05-16

## 2023-05-06 RX ORDER — FENTANYL CITRATE 50 UG/ML
25 INJECTION INTRAVENOUS
Refills: 0 | Status: DISCONTINUED | OUTPATIENT
Start: 2023-05-06 | End: 2023-05-09

## 2023-05-06 RX ORDER — ENOXAPARIN SODIUM 100 MG/ML
40 INJECTION SUBCUTANEOUS EVERY 24 HOURS
Refills: 0 | Status: DISCONTINUED | OUTPATIENT
Start: 2023-05-06 | End: 2023-05-18

## 2023-05-06 RX ORDER — MAGNESIUM SULFATE 500 MG/ML
2 VIAL (ML) INJECTION ONCE
Refills: 0 | Status: COMPLETED | OUTPATIENT
Start: 2023-05-06 | End: 2023-05-06

## 2023-05-06 RX ORDER — POTASSIUM CHLORIDE 20 MEQ
40 PACKET (EA) ORAL EVERY 4 HOURS
Refills: 0 | Status: COMPLETED | OUTPATIENT
Start: 2023-05-06 | End: 2023-05-06

## 2023-05-06 RX ORDER — OXYCODONE HYDROCHLORIDE 5 MG/1
10 TABLET ORAL EVERY 6 HOURS
Refills: 0 | Status: DISCONTINUED | OUTPATIENT
Start: 2023-05-06 | End: 2023-05-08

## 2023-05-06 RX ORDER — OXYCODONE HYDROCHLORIDE 5 MG/1
5 TABLET ORAL EVERY 6 HOURS
Refills: 0 | Status: DISCONTINUED | OUTPATIENT
Start: 2023-05-06 | End: 2023-05-10

## 2023-05-06 RX ORDER — ACETYLCYSTEINE 200 MG/ML
4 VIAL (ML) MISCELLANEOUS EVERY 6 HOURS
Refills: 0 | Status: DISCONTINUED | OUTPATIENT
Start: 2023-05-06 | End: 2023-05-23

## 2023-05-06 RX ORDER — DEXMEDETOMIDINE HYDROCHLORIDE IN 0.9% SODIUM CHLORIDE 4 UG/ML
0.2 INJECTION INTRAVENOUS
Qty: 200 | Refills: 0 | Status: DISCONTINUED | OUTPATIENT
Start: 2023-05-06 | End: 2023-05-09

## 2023-05-06 RX ADMIN — Medication 2: at 12:09

## 2023-05-06 RX ADMIN — Medication 40 MILLIEQUIVALENT(S): at 01:42

## 2023-05-06 RX ADMIN — Medication 3 MILLILITER(S): at 00:45

## 2023-05-06 RX ADMIN — Medication 125 MILLIGRAM(S): at 05:32

## 2023-05-06 RX ADMIN — Medication 3 MILLILITER(S): at 11:04

## 2023-05-06 RX ADMIN — Medication 3 MILLILITER(S): at 17:05

## 2023-05-06 RX ADMIN — ATORVASTATIN CALCIUM 20 MILLIGRAM(S): 80 TABLET, FILM COATED ORAL at 21:08

## 2023-05-06 RX ADMIN — Medication 4 MILLILITER(S): at 23:05

## 2023-05-06 RX ADMIN — SODIUM CHLORIDE 50 MILLILITER(S): 9 INJECTION INTRAMUSCULAR; INTRAVENOUS; SUBCUTANEOUS at 21:10

## 2023-05-06 RX ADMIN — DEXMEDETOMIDINE HYDROCHLORIDE IN 0.9% SODIUM CHLORIDE 3.75 MICROGRAM(S)/KG/HR: 4 INJECTION INTRAVENOUS at 12:14

## 2023-05-06 RX ADMIN — Medication 7.03 MICROGRAM(S)/KG/MIN: at 12:11

## 2023-05-06 RX ADMIN — Medication 40 MILLIEQUIVALENT(S): at 05:31

## 2023-05-06 RX ADMIN — PANTOPRAZOLE SODIUM 40 MILLIGRAM(S): 20 TABLET, DELAYED RELEASE ORAL at 12:12

## 2023-05-06 RX ADMIN — ENOXAPARIN SODIUM 40 MILLIGRAM(S): 100 INJECTION SUBCUTANEOUS at 17:25

## 2023-05-06 RX ADMIN — CHLORHEXIDINE GLUCONATE 1 APPLICATION(S): 213 SOLUTION TOPICAL at 21:08

## 2023-05-06 RX ADMIN — FENTANYL CITRATE 25 MICROGRAM(S): 50 INJECTION INTRAVENOUS at 15:30

## 2023-05-06 RX ADMIN — DEXMEDETOMIDINE HYDROCHLORIDE IN 0.9% SODIUM CHLORIDE 3.75 MICROGRAM(S)/KG/HR: 4 INJECTION INTRAVENOUS at 01:51

## 2023-05-06 RX ADMIN — Medication 7.03 MICROGRAM(S)/KG/MIN: at 21:10

## 2023-05-06 RX ADMIN — CHLORHEXIDINE GLUCONATE 15 MILLILITER(S): 213 SOLUTION TOPICAL at 05:31

## 2023-05-06 RX ADMIN — FENTANYL CITRATE 25 MICROGRAM(S): 50 INJECTION INTRAVENOUS at 16:00

## 2023-05-06 RX ADMIN — Medication 3 MILLILITER(S): at 23:05

## 2023-05-06 RX ADMIN — Medication 2: at 17:25

## 2023-05-06 RX ADMIN — Medication 25 GRAM(S): at 01:45

## 2023-05-06 RX ADMIN — CHLORHEXIDINE GLUCONATE 15 MILLILITER(S): 213 SOLUTION TOPICAL at 17:24

## 2023-05-06 RX ADMIN — Medication 2: at 05:33

## 2023-05-06 RX ADMIN — Medication 3 MILLILITER(S): at 06:40

## 2023-05-06 RX ADMIN — SODIUM CHLORIDE 50 MILLILITER(S): 9 INJECTION INTRAMUSCULAR; INTRAVENOUS; SUBCUTANEOUS at 12:11

## 2023-05-06 NOTE — PHYSICAL THERAPY INITIAL EVALUATION ADULT - NSPTDISCHREC_GEN_A_CORE
to be determined following completion of functional eval for transfers and ambulation TBD Pending further hospital course

## 2023-05-06 NOTE — OCCUPATIONAL THERAPY INITIAL EVALUATION ADULT - BED MOBILITY TRAINING, PT EVAL
sit<>supine independently within 4 weeks GOAL: Pt will be independent with all bed mobility tasks within 4 weeks to increase ability to engage in functional mobility tasks.

## 2023-05-06 NOTE — OCCUPATIONAL THERAPY INITIAL EVALUATION ADULT - MANUAL MUSCLE TESTING RESULTS, REHAB EVAL
B shoulders at least 2/5, elbows at leat 3+/5, difficulty assessing wrists due to pain due to IVs, LE hip flexion at least 2+/5, knee/ankle at least 3+/5

## 2023-05-06 NOTE — CONSULT NOTE ADULT - SUBJECTIVE AND OBJECTIVE BOX
63 yo woman with DM and Myasthenia gravis s/p thymectomy 2010, with prior exacerbations requiring intubation, most recently in late Jan 2023 requiring intubation and PLEX, on 2L O2 at baseline, now readmitted 5/5 with 1 week of diplopia, dysphagia and SOB requiring intubation.  Neurology requested PLEX, course of 5 procedures, every other day.     61 yo woman with DM and Myasthenia gravis s/p thymectomy 2010, with prior exacerbations requiring intubation, most recently in late Jan 2023 requiring intubation and PLEX, on 2L O2 at baseline, now readmitted 5/5 with 1 week of diplopia, dysphagia and SOB requiring intubation.  Neurology requested PLEX, course of 5 procedures, every other day.

## 2023-05-06 NOTE — PROGRESS NOTE ADULT - SUBJECTIVE AND OBJECTIVE BOX
NSCU Progress Note    Assessment/Hospital Course:    Patient KAIDEN HATCH is a 62y (1960) Malayam speaking woman with a PMHx significant for myasthenia gravis presenting to the ED for worsening difficulty with double vision, swallowing, talking, and breathing that has progressively gotten worse for the previous one week. Patient is currently on Mestinon 60 mg two tablets TID and Prednisone 10 mg PRN. Has been admitted before in Feb 2023 for MG crisis that required intubation (2/1-2/11/23) iso COVID19. Patient received 5 days of PLEX (2/2-2/10/23) followed by 5 days of IVIG (2/16-2/20/23) with symptomatic improvement. Patient's son says patient has responded best to IVIG and that it stabilizes her MG for about 2 years. At baseline, patient is on 2L NC. In the ED, patient's NIF was -50. However, patient was unable to perform VC. Pending STAT repeat NIF/VC.       24 Hour Events/Subjective:  - intubated for hypercapnic resp failure, admitted, shiley placed, and plan for plex      REVIEW OF SYSTEMS:  - negative except as above    VITALS:   - Reviewed      IMAGING/DATA:   - Reviewed          PHYSICAL EXAM:    General: intubated  CVS: RRR  Pulm: CTAB  GI: Soft, NTND  Extremities: No LE Edema  Neuro: intubated/sedated pupils 3mm reactive b/l, TANNER spont/ag   NSCU Progress Note    Assessment/Hospital Course:    Patient KAIDEN HATCH is a 62y (1960) Malayam speaking woman with a PMHx significant for myasthenia gravis presenting to the ED for worsening difficulty with double vision, swallowing, talking, and breathing that has progressively gotten worse for the previous one week. Patient is currently on Mestinon 60 mg two tablets TID and Prednisone 10 mg PRN. Has been admitted before in Feb 2023 for MG crisis that required intubation (2/1-2/11/23) iso COVID19. Patient received 5 days of PLEX (2/2-2/10/23) followed by 5 days of IVIG (2/16-2/20/23) with symptomatic improvement. Patient's son says patient has responded best to IVIG and that it stabilizes her MG for about 2 years. At baseline, patient is on 2L NC. In the ED, patient's NIF was -50. However, patient was unable to perform VC. Pending STAT repeat NIF/VC.       24 Hour Events/Subjective:  - plan for plex today      REVIEW OF SYSTEMS:  - negative except as above    VITALS:   - Reviewed      IMAGING/DATA:   - Reviewed          PHYSICAL EXAM:    General: intubated  CVS: RRR  Pulm: CTAB  GI: Soft, NTND  Extremities: No LE Edema  Neuro: awake, alert, oriented x 3, follows commands, EOMI, face symmetric, able to lift b/l arms AG to the elbow, able to lift b/l LEs off the bed

## 2023-05-06 NOTE — PHYSICAL THERAPY INITIAL EVALUATION ADULT - PERTINENT HX OF CURRENT PROBLEM, REHAB EVAL
62y (1960) Malayam speaking female with PMHx significant for myasthenia gravis presenting to the ED for worsening difficulty with double vision, swallowing, talking, and breathing that has progressively gotten worse x one week. Pt is currently on Mestinon 60 mg two tablets TID and Prednisone 10 mg PRN. Pt recently admitted Feb 2023 for MG crisis that required intubation (2/1-2/11/23) i/s/o COVID19. Pt received 5 days of PLEX (2/2-2/10/23) followed by 5 days of IVIG (2/16-2/20/23) with symptomatic improvement. At baseline, patient is on 2L NC.

## 2023-05-06 NOTE — OCCUPATIONAL THERAPY INITIAL EVALUATION ADULT - RANGE OF MOTION EXAMINATION, LOWER EXTREMITY
bilateral LE Active Assistive ROM was WFL  (within functional limits) bilateral LE Active ROM was WFL  (within functional limits)

## 2023-05-06 NOTE — PROGRESS NOTE ADULT - SUBJECTIVE AND OBJECTIVE BOX
Interval events:    VITALS:  T(C): , Max: 37.3 (05-05-23 @ 19:00)  HR:  (61 - 136)  BP: --  ABP:  (76/62 - 192/94)  RR:  (14 - 33)  SpO2:  (93% - 100%)  Wt(kg): --  Device: Avea, Mode: AC/ CMV (Assist Control/ Continuous Mandatory Ventilation), RR (machine): 14, TV (machine): 400, FiO2: 40, PEEP: 5, ITime: 1, MAP: 10, PIP: 23    05-05-23 @ 07:01  -  05-06-23 @ 07:00  --------------------------------------------------------  IN: 1512.9 mL / OUT: 670 mL / NET: 842.9 mL    05-06-23 @ 07:01  -  05-06-23 @ 18:30  --------------------------------------------------------  IN: 874.8 mL / OUT: 450 mL / NET: 424.8 mL      LABS:  Na: 141 (05-06 @ 05:32), 141 (05-05 @ 21:04), 139 (05-05 @ 13:52)  K: 5.0 (05-06 @ 05:32), 3.3 (05-05 @ 21:04), 4.1 (05-05 @ 13:52)  Cl: 105 (05-06 @ 05:32), 100 (05-05 @ 21:04), 95 (05-05 @ 13:52)  CO2: 23 (05-06 @ 05:32), 26 (05-05 @ 21:04), 33 (05-05 @ 13:52)  BUN: 18 (05-06 @ 05:32), 21 (05-05 @ 21:04), 17 (05-05 @ 13:52)  Cr: 0.46 (05-06 @ 05:32), 0.59 (05-05 @ 21:04), 0.52 (05-05 @ 13:52)  Glu: 165(05-06 @ 05:32), 100(05-05 @ 21:04), 119(05-05 @ 13:52)    Hgb: 13.0 (05-05 @ 21:05), 14.4 (05-05 @ 13:52)  Hct: 39.3 (05-05 @ 21:05), 46.6 (05-05 @ 13:52)  WBC: 15.88 (05-05 @ 21:05), 12.13 (05-05 @ 13:52)  Plt: 284 (05-05 @ 21:05), 292 (05-05 @ 13:52)    INR: 1.07 05-05-23 @ 21:05, 1.02 05-05-23 @ 13:52  PTT: 27.6 05-05-23 @ 21:05, 32.4 05-05-23 @ 13:52    MEDICATIONS:  acetaminophen   Oral Liquid .. 650 milliGRAM(s) Oral every 6 hours PRN  albuterol/ipratropium for Nebulization 3 milliLiter(s) Nebulizer every 6 hours  atorvastatin 20 milliGRAM(s) Oral at bedtime  chlorhexidine 0.12% Liquid 15 milliLiter(s) Oral Mucosa every 12 hours  chlorhexidine 4% Liquid 1 Application(s) Topical <User Schedule>  dexMEDEtomidine Infusion 0.2 MICROgram(s)/kG/Hr IV Continuous <Continuous>  enoxaparin Injectable 40 milliGRAM(s) SubCutaneous every 24 hours  fentaNYL    Injectable 50 MICROGram(s) IV Push once  fentaNYL    Injectable 25 MICROGram(s) IV Push every 2 hours PRN  insulin lispro (ADMELOG) corrective regimen sliding scale   SubCutaneous every 6 hours  methylPREDNISolone sodium succinate Injectable 125 milliGRAM(s) IV Push daily  norepinephrine Infusion 0.05 MICROgram(s)/kG/Min IV Continuous <Continuous>  oxyCODONE    Solution 10 milliGRAM(s) Oral every 6 hours PRN  oxyCODONE    Solution 5 milliGRAM(s) Oral every 6 hours PRN  pantoprazole  Injectable 40 milliGRAM(s) IV Push daily  sodium chloride 0.9%. 1000 milliLiter(s) IV Continuous <Continuous>    EXAMINATION:  Please see exam from daytime.     Assessment/Plan:   61 yo woman with DM and Myasthenia gravis s/p thymectomy 2010, with prior exacerbations requiring intubation most recently in late Jan 2023 requiring intubation and PLEX, on 2L O2 at baseline, now readmitted 5/5 with 1 week of diplopia, dysphagia and SOB requiring intubation.     c/w Methylpred 125 mg daily, start PLEX   stop propofol, start precedex and fentanyl pushes PRN for pain and sedation   MAP goal >65, Levo PRN  vent settings adjusted, repeat ABG (chronic CO2 retainer)   start Glucerna TF, add PPI  NS at 50cc/hr   start Lov ppx , repeat LE dopplers     A line    Марина Doss  Neurocritical Care Attending    Марина Doss  Neurocritical Care Attending     Interval events:  S/p PLEX during the day today.    No acute events on evening rounds. Still on low dose Levo for MAP >65.   With thick inline secretions and intermittent mucus plugging.     VITALS:  T(C): , Max: 37.3 (05-05-23 @ 19:00)  HR:  (61 - 136)  BP: --  ABP:  (76/62 - 192/94)  RR:  (14 - 33)  SpO2:  (93% - 100%)  Wt(kg): --  Device: Avea, Mode: AC/ CMV (Assist Control/ Continuous Mandatory Ventilation), RR (machine): 14, TV (machine): 400, FiO2: 40, PEEP: 5, ITime: 1, MAP: 10, PIP: 23    05-05-23 @ 07:01  -  05-06-23 @ 07:00  --------------------------------------------------------  IN: 1512.9 mL / OUT: 670 mL / NET: 842.9 mL    05-06-23 @ 07:01  -  05-06-23 @ 18:30  --------------------------------------------------------  IN: 874.8 mL / OUT: 450 mL / NET: 424.8 mL      LABS:  Na: 141 (05-06 @ 05:32), 141 (05-05 @ 21:04), 139 (05-05 @ 13:52)  K: 5.0 (05-06 @ 05:32), 3.3 (05-05 @ 21:04), 4.1 (05-05 @ 13:52)  Cl: 105 (05-06 @ 05:32), 100 (05-05 @ 21:04), 95 (05-05 @ 13:52)  CO2: 23 (05-06 @ 05:32), 26 (05-05 @ 21:04), 33 (05-05 @ 13:52)  BUN: 18 (05-06 @ 05:32), 21 (05-05 @ 21:04), 17 (05-05 @ 13:52)  Cr: 0.46 (05-06 @ 05:32), 0.59 (05-05 @ 21:04), 0.52 (05-05 @ 13:52)  Glu: 165(05-06 @ 05:32), 100(05-05 @ 21:04), 119(05-05 @ 13:52)    Hgb: 13.0 (05-05 @ 21:05), 14.4 (05-05 @ 13:52)  Hct: 39.3 (05-05 @ 21:05), 46.6 (05-05 @ 13:52)  WBC: 15.88 (05-05 @ 21:05), 12.13 (05-05 @ 13:52)  Plt: 284 (05-05 @ 21:05), 292 (05-05 @ 13:52)    INR: 1.07 05-05-23 @ 21:05, 1.02 05-05-23 @ 13:52  PTT: 27.6 05-05-23 @ 21:05, 32.4 05-05-23 @ 13:52    MEDICATIONS:  acetaminophen   Oral Liquid .. 650 milliGRAM(s) Oral every 6 hours PRN  albuterol/ipratropium for Nebulization 3 milliLiter(s) Nebulizer every 6 hours  atorvastatin 20 milliGRAM(s) Oral at bedtime  chlorhexidine 0.12% Liquid 15 milliLiter(s) Oral Mucosa every 12 hours  chlorhexidine 4% Liquid 1 Application(s) Topical <User Schedule>  dexMEDEtomidine Infusion 0.2 MICROgram(s)/kG/Hr IV Continuous <Continuous>  enoxaparin Injectable 40 milliGRAM(s) SubCutaneous every 24 hours  fentaNYL    Injectable 50 MICROGram(s) IV Push once  fentaNYL    Injectable 25 MICROGram(s) IV Push every 2 hours PRN  insulin lispro (ADMELOG) corrective regimen sliding scale   SubCutaneous every 6 hours  methylPREDNISolone sodium succinate Injectable 125 milliGRAM(s) IV Push daily  norepinephrine Infusion 0.05 MICROgram(s)/kG/Min IV Continuous <Continuous>  oxyCODONE    Solution 10 milliGRAM(s) Oral every 6 hours PRN  oxyCODONE    Solution 5 milliGRAM(s) Oral every 6 hours PRN  pantoprazole  Injectable 40 milliGRAM(s) IV Push daily  sodium chloride 0.9%. 1000 milliLiter(s) IV Continuous <Continuous>    EXAMINATION:  Please see exam from daytime.     Assessment/Plan:   63 yo woman with DM and Myasthenia gravis s/p thymectomy 2010, with prior exacerbations requiring intubation most recently in late Jan 2023 requiring intubation and PLEX, on 2L O2 at baseline, now readmitted 5/5 with 1 week of diplopia, dysphagia and SOB requiring intubation. Being treated for MG exacerbation.     Precedex for sedation with fentanyl and oxy PRN   c/w Methylpred 125 mg daily, s/p 1 session of PLEX  MAP goal >65, Levo PRN  ACVC, c/w duo nebs, add mucomyst   Glucerna TF, PPI, add senna and miralax, last BM 5/5  NS at 50cc/hr   Lov ppx (LE dopplers 5/6 neg)   send procalc with next labs     A line    Марина Doss  Neurocritical Care Attending    Марина Doss  Neurocritical Care Attending

## 2023-05-06 NOTE — PATIENT PROFILE ADULT - FUNCTIONAL ASSESSMENT - BASIC MOBILITY 6.
4-calculated by average/Not able to assess (calculate score using [facility] averaging method) 4-calculated by average/Not able to assess (calculate score using [facility] averaging method) 4-calculated by average/Not able to assess (calculate score using [facility] averaging method)

## 2023-05-06 NOTE — OCCUPATIONAL THERAPY INITIAL EVALUATION ADULT - PLANNED THERAPY INTERVENTIONS, OT EVAL
ADL retraining/bed mobility training/fine motor coordination training ADL retraining/balance training/bed mobility training/fine motor coordination training/strengthening

## 2023-05-06 NOTE — PATIENT PROFILE ADULT - NSPROPTRIGHTREPNAME_GEN_A__NUR
[FreeTextEntry1] : This is a healthy 8-1/2-year-old boy 4 days status post what seems to be a crush injury to his left foot. He is placed in a short leg cast which would most likely allow him to bear weight more easily than the boot or splint. He is about to start bearing weight as tolerated which he is able to do to a certain extent here in the office. I would like to see him back in one week time for cast removal and repeat clinical exam. Based on his exam he may be recommended to use a cam walker. All of the mother's questions were addressed. She understood and agreed with the plan.The office visit is conducted in [place], the family's native language.\par \par This note was generated using Dragon medical dictation software.  A reasonable effort has been made for proofreading its contents, but typos may still remain.  If there are any questions or points of clarification needed please do not hesitate to contact my office.\par  Dave Louie

## 2023-05-06 NOTE — CONSULT NOTE ADULT - ASSESSMENT
61 yo woman with DM and Myasthenia gravis s/p thymectomy 2010, with prior exacerbations requiring intubation most recently in late Jan 2023 requiring intubation and PLEX, on 2L O2 at baseline, now readmitted 5/5 with 1 week of diplopia, dysphagia and SOB requiring intubation.     Line was placed  Consent obtained from patient's son Justin Acosta  PLEX#1 completed, 1 plasma volume, 5% albumin as replacement fluid (fibrinogen pre-pcd 399)  Patient tolerated well  PLEX#2 scheduled for Monday 5/8/23.  Please monitor CBC, fibrinogen, iCa for morning of pcd 63 yo woman with DM and Myasthenia gravis s/p thymectomy 2010, with prior exacerbations requiring intubation most recently in late Jan 2023 requiring intubation and PLEX, on 2L O2 at baseline, now readmitted 5/5 with 1 week of diplopia, dysphagia and SOB requiring intubation.     Line was placed  Consent obtained from patient's son Justin Acosta  PLEX#1 completed, 1 plasma volume, 5% albumin as replacement fluid (fibrinogen pre-pcd 399)  Patient tolerated well  PLEX#2 scheduled for Monday 5/8/23.  Please monitor CBC, fibrinogen, iCa for morning of pcd

## 2023-05-06 NOTE — PATIENT PROFILE ADULT - DO YOU NEED ADDITIONAL SERVICES TO MANAGE ANY OF THESE MEDICAL CONDITIONS AT HOME?
Medication: Divalproex 250 ER     Date of last refill: 09/30/2019 with 3 addt refills        Last office visit: 9/30/2019  Due back to clinic per last office note:  RTN in 1 year by 09/30/2020  Date next office visit scheduled:  No future appointments.    yes

## 2023-05-06 NOTE — OCCUPATIONAL THERAPY INITIAL EVALUATION ADULT - RANGE OF MOTION EXAMINATION, UPPER EXTREMITY
impaired B shoulder AROM - elbow WFL, wrist impaired due to IV causing pain with movement impaired B shoulder AROM - elbow WFL, wrist impaired due to IV causing pain with movement/bilateral UE Passive ROM was WFL  (within functional limits)

## 2023-05-06 NOTE — PHYSICAL THERAPY INITIAL EVALUATION ADULT - MANUAL MUSCLE TESTING RESULTS, REHAB EVAL
B/l shoulder flex 2/5, b/l elbow flex at leat 3+/5, wrist limited due to pain/lines; b/l hip flexion at least 2+/5, knee flex, ankle DF/PF 3+/5/grossly assessed due to

## 2023-05-06 NOTE — OCCUPATIONAL THERAPY INITIAL EVALUATION ADULT - ADL RETRAINING, OT EVAL
complete grooming independently within 4 weeks GOAL: pt will be independent with all UB/LB dressing tasks within 4 weeks. GOAL: Pt will be independent in all toileting tasks within 4 weeks. GOAL: Pt will be independent with all bathing tasks within 4 weeks.

## 2023-05-06 NOTE — PHYSICAL THERAPY INITIAL EVALUATION ADULT - ACTIVE RANGE OF MOTION EXAMINATION, REHAB EVAL
except b/l shoulder flexion limited; BLE AAROM WFL/bilateral upper extremity Active ROM was WFL (within functional limits)

## 2023-05-06 NOTE — PHYSICAL THERAPY INITIAL EVALUATION ADULT - ADDITIONAL COMMENTS
Pt lives with , son, and dtr-in-law  in a house Has no stairs to enter and first floor setup. Prior to admission pt independent with functional mobility/ADLs

## 2023-05-06 NOTE — OCCUPATIONAL THERAPY INITIAL EVALUATION ADULT - GENERAL OBSERVATIONS, REHAB EVAL
Pt received supine in bed +intubated, +Fer, +tele, +Pulse ox, +IV, +morales, +IJ, +L wrist restraint (R wrist restraint left off as per RADHA Kenny)

## 2023-05-06 NOTE — PATIENT PROFILE ADULT - FALL HARM RISK - RISK INTERVENTIONS
Assistance OOB with selected safe patient handling equipment/Assistance with ambulation/Communicate Fall Risk and Risk Factors to all staff, patient, and family/Reinforce activity limits and safety measures with patient and family/Visual Cue: Yellow wristband/Bed in lowest position, wheels locked, appropriate side rails in place/Call bell, personal items and telephone in reach/Instruct patient to call for assistance before getting out of bed or chair/Non-slip footwear when patient is out of bed/[place] to call system/Physically safe environment - no spills, clutter or unnecessary equipment/Purposeful Proactive Rounding/Room/bathroom lighting operational, light cord in reach Assistance OOB with selected safe patient handling equipment/Assistance with ambulation/Communicate Fall Risk and Risk Factors to all staff, patient, and family/Reinforce activity limits and safety measures with patient and family/Visual Cue: Yellow wristband/Bed in lowest position, wheels locked, appropriate side rails in place/Call bell, personal items and telephone in reach/Instruct patient to call for assistance before getting out of bed or chair/Non-slip footwear when patient is out of bed/[place] to call system/Physically safe environment - no spills, clutter or unnecessary equipment/Purposeful Proactive Rounding/Room/bathroom lighting operational, light cord in reach Assistance OOB with selected safe patient handling equipment/Assistance with ambulation/Communicate Fall Risk and Risk Factors to all staff, patient, and family/Reinforce activity limits and safety measures with patient and family/Visual Cue: Yellow wristband/Bed in lowest position, wheels locked, appropriate side rails in place/Call bell, personal items and telephone in reach/Instruct patient to call for assistance before getting out of bed or chair/Non-slip footwear when patient is out of bed/[place] to call system/Physically safe environment - no spills, clutter or unnecessary equipment/Purposeful Proactive Rounding/Room/bathroom lighting operational, light cord in reach

## 2023-05-06 NOTE — OCCUPATIONAL THERAPY INITIAL EVALUATION ADULT - NS ASR FOLLOW COMMAND OT EVAL
75% of the time/able to follow single-step instructions 100% of the time/able to follow single-step instructions

## 2023-05-06 NOTE — OCCUPATIONAL THERAPY INITIAL EVALUATION ADULT - LIVES WITH, PROFILE
pt unable to clarify at this time pt lives in a private house with  and son. pt has 5 steps to enter and no steps inside -HR. pt owns rolling walker./children

## 2023-05-06 NOTE — PROGRESS NOTE ADULT - ASSESSMENT
ASSESSMENT/PLAN:    MG exacerbation    NEURO:  - neuro checks q4h  - plex [id number] QODx5d  - solumedrol 125 mg daily   - wean sedation as tolerated, fent prn for vent synchrony  - Avoid medications that may worsen the current exacerbation including macrolides, fluoroquinolones, tetracyclines, aminoglycoside, beta-blockers, magnesium, and anti-arrhythmics (procainamide, quinidine, and lidocaine)  - pain control  - steroids  - neurology following      CVS:  - MAP>65  - ekg  - tte    PULM:  Intubated in ED 5/5 for hypecapnic resp failure  - vap bundle  - cxr to confirm ett placement  - abg  - vap bundle      RENAL:  - Fluids: IVF while npo  - daily IOs  - PLex as above  - shiley placement    GI:  - Diet: NGT, trickle feed  - GI prophylaxis: PPi while intubated   - Bowel regimen: miralax, senna    ENDO:   - FS goal 120-180    HEME/ONC:  - SCDs  - Chemoppx: sql    ID:  - monitor for fevers     ASSESSMENT/PLAN:    MG exacerbation    NEURO:  - neuro checks q4h  - plex [id number] QODx5d  - solumedrol 125 mg daily   - wean sedation as tolerated, fent prn for vent synchrony  - Avoid medications that may worsen the current exacerbation including macrolides, fluoroquinolones, tetracyclines, aminoglycoside, beta-blockers, magnesium, and anti-arrhythmics (procainamide, quinidine, and lidocaine)  - pain control  - steroids  - neurology following      CVS:  - MAP>65  - ekg  - tte    PULM:  Intubated in ED 5/5 for hypecapnic resp failure  - vap bundle  - cxr to confirm ett placement  - abg  - vap bundle      RENAL:  - Fluids: IVF while npo  - daily IOs  - PLex as above  - shiley placement    GI:  - Diet: NGT, trickle feed  - GI prophylaxis: PPi while intubated   - Bowel regimen: miralax, senna    ENDO:   - FS goal 120-180    HEME/ONC:  - SCDs  - Chemoppx: sql    ID:  - monitor for fevers     ASSESSMENT/PLAN:    MG exacerbation    NEURO:  - neuro checks q4h  - plex [id number] QODx5d  - solumedrol 125 mg daily   - wean sedation as tolerated, fent prn for vent synchrony  - Avoid medications that may worsen the current exacerbation including macrolides, fluoroquinolones, tetracyclines, aminoglycoside, beta-blockers, magnesium, and anti-arrhythmics (procainamide, quinidine, and lidocaine)  - pain control  - steroids  - neurology following      CVS:  - MAP>65  - ekg  - tte    PULM:  Intubated in ED 5/5 for hypecapnic resp failure  - vap bundle  - cxr to confirm ett placement  - abg  - vap bundle      RENAL:  - Fluids: IVF while npo  - daily IOs  - PLex as above  - shiley placement    GI:  - Diet: NGT, trickle feed  - GI prophylaxis: PPi while intubated   - Bowel regimen: miralax, senna    ENDO:   - FS goal 120-180    HEME/ONC:  - SCDs  - Chemoppx: sql    ID:  - monitor for fevers     ASSESSMENT/PLAN:    MG exacerbation    NEURO:  - neuro checks q4h  - start plexx QODx5d  - solumedrol 125 mg daily   - precedex, fent prn  - Avoid medications that may worsen the current exacerbation including macrolides, fluoroquinolones, tetracyclines, aminoglycoside, beta-blockers, magnesium, and anti-arrhythmics (procainamide, quinidine, and lidocaine)  - pain control  - steroids  - neurology following      CVS:  - MAP>65  - ekg  - tte    PULM:  Intubated in ED 5/5 for hypecapnic resp failure  - vap bundle  - cxr to confirm ett placement  - abg  - vap bundle      RENAL:  RACQUEL sharma placed 5/5  - Fluids: IVF while npo  - daily IOs  - PLex as above    GI:  - Diet: NGT, trickle feed  - GI prophylaxis: PPi while intubated   - Bowel regimen: miralax, senna    ENDO:   - FS goal 120-180    HEME/ONC:  - SCDs  - Chemoppx: sql    ID:  - monitor for fevers     ASSESSMENT/PLAN:    MG exacerbation    NEURO:  - neuro checks q4h  - start plexx QODx5d  - solumedrol 125 mg daily   - precedex, fent prn  - Avoid medications that may worsen the current exacerbation including macrolides, fluoroquinolones, tetracyclines, aminoglycoside, beta-blockers, magnesium, and anti-arrhythmics (procainamide, quinidine, and lidocaine)  - pain control  - neurology following      CVS:  - MAP>65  - tte    PULM:  Intubated in ED 5/5 for hypercapnic resp failure  - vap bundle  - cxr to confirm ett placement  - abg  - vap bundle      RENAL:  RACQUEL sharma placed 5/5  - Fluids: IVF while npo  - daily IOs  - PLex as above    GI:  - Diet: start tf  - GI prophylaxis: PPi while intubated   - Bowel regimen: miralax, senna    ENDO:   - FS goal 120-180    HEME/ONC:  - SCDs  - Chemoppx: sql    ID:  - monitor for fevers

## 2023-05-06 NOTE — OCCUPATIONAL THERAPY INITIAL EVALUATION ADULT - DIAGNOSIS, OT EVAL
Pt p/w impaired strength, +intubated, impaired mobility, impaired R eye ocular motor control (unable to move R eye in an upward direction resulting in diplopia)

## 2023-05-06 NOTE — OCCUPATIONAL THERAPY INITIAL EVALUATION ADULT - PERTINENT HX OF CURRENT PROBLEM, REHAB EVAL
62y Malayam speaking woman with a PMHx significant for myasthenia gravis presenting to the ED for worsening difficulty with double vision, swallowing, talking, and breathing that has progressively gotten worse for the previous one week. Patient is currently on Mestinon 60 mg two tablets TID and Prednisone 10 mg PRN. Has been admitted before in Feb 2023 for MG crisis that required intubation (2/1-2/11/23) iso COVID19. Patient received 5 days of PLEX (2/2-2/10/23) followed by 5 days of IVIG (2/16-2/20/23) with symptomatic improvement. Patient's son says patient has responded best to IVIG and that it stabilizes her MG for about 2 years. At baseline, patient is on 2L NC. +MG exacerbation, now intubated for hypecapnic resp failure but awake and following commands.

## 2023-05-07 PROCEDURE — 93931 UPPER EXTREMITY STUDY: CPT | Mod: 26,RT

## 2023-05-07 PROCEDURE — 71045 X-RAY EXAM CHEST 1 VIEW: CPT | Mod: 26

## 2023-05-07 PROCEDURE — 99291 CRITICAL CARE FIRST HOUR: CPT

## 2023-05-07 PROCEDURE — 93971 EXTREMITY STUDY: CPT | Mod: 26,RT

## 2023-05-07 RX ORDER — GLUCAGON INJECTION, SOLUTION 0.5 MG/.1ML
1 INJECTION, SOLUTION SUBCUTANEOUS ONCE
Refills: 0 | Status: DISCONTINUED | OUTPATIENT
Start: 2023-05-07 | End: 2023-05-23

## 2023-05-07 RX ORDER — SENNA PLUS 8.6 MG/1
2 TABLET ORAL AT BEDTIME
Refills: 0 | Status: DISCONTINUED | OUTPATIENT
Start: 2023-05-07 | End: 2023-05-16

## 2023-05-07 RX ORDER — DEXTROSE 50 % IN WATER 50 %
15 SYRINGE (ML) INTRAVENOUS ONCE
Refills: 0 | Status: DISCONTINUED | OUTPATIENT
Start: 2023-05-07 | End: 2023-05-15

## 2023-05-07 RX ORDER — POLYETHYLENE GLYCOL 3350 17 G/17G
17 POWDER, FOR SOLUTION ORAL EVERY 12 HOURS
Refills: 0 | Status: DISCONTINUED | OUTPATIENT
Start: 2023-05-07 | End: 2023-05-16

## 2023-05-07 RX ORDER — SODIUM CHLORIDE 9 MG/ML
1000 INJECTION, SOLUTION INTRAVENOUS
Refills: 0 | Status: DISCONTINUED | OUTPATIENT
Start: 2023-05-07 | End: 2023-05-23

## 2023-05-07 RX ORDER — HUMAN INSULIN 100 [IU]/ML
4 INJECTION, SUSPENSION SUBCUTANEOUS EVERY 6 HOURS
Refills: 0 | Status: DISCONTINUED | OUTPATIENT
Start: 2023-05-07 | End: 2023-05-09

## 2023-05-07 RX ORDER — DEXTROSE 50 % IN WATER 50 %
25 SYRINGE (ML) INTRAVENOUS ONCE
Refills: 0 | Status: DISCONTINUED | OUTPATIENT
Start: 2023-05-07 | End: 2023-05-23

## 2023-05-07 RX ORDER — DEXTROSE 50 % IN WATER 50 %
25 SYRINGE (ML) INTRAVENOUS ONCE
Refills: 0 | Status: DISCONTINUED | OUTPATIENT
Start: 2023-05-07 | End: 2023-05-15

## 2023-05-07 RX ORDER — DEXTROSE 50 % IN WATER 50 %
12.5 SYRINGE (ML) INTRAVENOUS ONCE
Refills: 0 | Status: DISCONTINUED | OUTPATIENT
Start: 2023-05-07 | End: 2023-05-15

## 2023-05-07 RX ADMIN — ENOXAPARIN SODIUM 40 MILLIGRAM(S): 100 INJECTION SUBCUTANEOUS at 17:22

## 2023-05-07 RX ADMIN — Medication 125 MILLIGRAM(S): at 05:09

## 2023-05-07 RX ADMIN — HUMAN INSULIN 4 UNIT(S): 100 INJECTION, SUSPENSION SUBCUTANEOUS at 17:23

## 2023-05-07 RX ADMIN — CHLORHEXIDINE GLUCONATE 15 MILLILITER(S): 213 SOLUTION TOPICAL at 05:09

## 2023-05-07 RX ADMIN — Medication 3 MILLILITER(S): at 23:21

## 2023-05-07 RX ADMIN — SENNA PLUS 2 TABLET(S): 8.6 TABLET ORAL at 22:00

## 2023-05-07 RX ADMIN — Medication 2: at 17:23

## 2023-05-07 RX ADMIN — Medication 3 MILLILITER(S): at 11:04

## 2023-05-07 RX ADMIN — ATORVASTATIN CALCIUM 20 MILLIGRAM(S): 80 TABLET, FILM COATED ORAL at 22:01

## 2023-05-07 RX ADMIN — Medication 4 MILLILITER(S): at 23:22

## 2023-05-07 RX ADMIN — CHLORHEXIDINE GLUCONATE 15 MILLILITER(S): 213 SOLUTION TOPICAL at 17:22

## 2023-05-07 RX ADMIN — DEXMEDETOMIDINE HYDROCHLORIDE IN 0.9% SODIUM CHLORIDE 3.75 MICROGRAM(S)/KG/HR: 4 INJECTION INTRAVENOUS at 19:14

## 2023-05-07 RX ADMIN — Medication 3 MILLILITER(S): at 05:05

## 2023-05-07 RX ADMIN — PANTOPRAZOLE SODIUM 40 MILLIGRAM(S): 20 TABLET, DELAYED RELEASE ORAL at 11:54

## 2023-05-07 RX ADMIN — Medication 4 MILLILITER(S): at 17:04

## 2023-05-07 RX ADMIN — Medication 3 MILLILITER(S): at 17:03

## 2023-05-07 RX ADMIN — Medication 2: at 00:25

## 2023-05-07 RX ADMIN — Medication 6: at 11:54

## 2023-05-07 RX ADMIN — Medication 4 MILLILITER(S): at 11:04

## 2023-05-07 RX ADMIN — CHLORHEXIDINE GLUCONATE 1 APPLICATION(S): 213 SOLUTION TOPICAL at 22:09

## 2023-05-07 RX ADMIN — Medication 4 MILLILITER(S): at 05:05

## 2023-05-07 RX ADMIN — Medication 2: at 05:09

## 2023-05-07 RX ADMIN — HUMAN INSULIN 4 UNIT(S): 100 INJECTION, SUSPENSION SUBCUTANEOUS at 12:59

## 2023-05-07 NOTE — PROGRESS NOTE ADULT - ASSESSMENT
ASSESSMENT/PLAN:  MG Crisiis requiring intubation. PXC completed yesterday. Plan for total of 5 exchanges qod. pt with UE swelling noted today-will check arterial and venous dopplers. CPAP as tolerared.    [x] Patient is at high risk of neurologic deterioration/death due to:   hypercarbic respiratory failure    Time seen:  Time spent: ___ [] critical care minutes

## 2023-05-07 NOTE — PROGRESS NOTE ADULT - SUBJECTIVE AND OBJECTIVE BOX
Interval events:  No acute events on evening rounds. Still on low dose Levo for MAP >65.     VITALS:  T(C): , Max: 37.3 (05-06-23 @ 19:00)  HR:  (51 - 111)  BP:  (94/64 - 136/64)  ABP:  (65/51 - 145/97)  RR:  (14 - 20)  SpO2:  (98% - 100%)  Wt(kg): --  Device: Avea, Mode: AC/ CMV (Assist Control/ Continuous Mandatory Ventilation), RR (machine): 14, TV (machine): 400, FiO2: 40, PEEP: 5, ITime: 1, MAP: 9, PIP: 21    05-06-23 @ 07:01  -  05-07-23 @ 07:00  --------------------------------------------------------  IN: 2019.8 mL / OUT: 650 mL / NET: 1369.8 mL    05-07-23 @ 07:01  -  05-07-23 @ 18:41  --------------------------------------------------------  IN: 691.8 mL / OUT: 0 mL / NET: 691.8 mL      LABS:  Na: 140 (05-06 @ 21:56), 141 (05-06 @ 05:32), 141 (05-05 @ 21:04), 139 (05-05 @ 13:52)  K: 5.1 (05-06 @ 21:56), 5.0 (05-06 @ 05:32), 3.3 (05-05 @ 21:04), 4.1 (05-05 @ 13:52)  Cl: 112 (05-06 @ 21:56), 105 (05-06 @ 05:32), 100 (05-05 @ 21:04), 95 (05-05 @ 13:52)  CO2: 15 (05-06 @ 21:56), 23 (05-06 @ 05:32), 26 (05-05 @ 21:04), 33 (05-05 @ 13:52)  BUN: 20 (05-06 @ 21:56), 18 (05-06 @ 05:32), 21 (05-05 @ 21:04), 17 (05-05 @ 13:52)  Cr: 0.51 (05-06 @ 21:56), 0.46 (05-06 @ 05:32), 0.59 (05-05 @ 21:04), 0.52 (05-05 @ 13:52)  Glu: 177(05-06 @ 21:56), 165(05-06 @ 05:32), 100(05-05 @ 21:04), 119(05-05 @ 13:52)    Hgb: 12.9 (05-06 @ 21:56), 13.0 (05-05 @ 21:05), 14.4 (05-05 @ 13:52)  Hct: 41.4 (05-06 @ 21:56), 39.3 (05-05 @ 21:05), 46.6 (05-05 @ 13:52)  WBC: 19.08 (05-06 @ 21:56), 15.88 (05-05 @ 21:05), 12.13 (05-05 @ 13:52)  Plt: 259 (05-06 @ 21:56), 284 (05-05 @ 21:05), 292 (05-05 @ 13:52)    INR: 1.07 05-05-23 @ 21:05, 1.02 05-05-23 @ 13:52  PTT: 27.6 05-05-23 @ 21:05, 32.4 05-05-23 @ 13:52    MEDICATIONS:  acetaminophen   Oral Liquid .. 650 milliGRAM(s) Oral every 6 hours PRN  acetylcysteine 20%  Inhalation 4 milliLiter(s) Inhalation every 6 hours  albuterol/ipratropium for Nebulization 3 milliLiter(s) Nebulizer every 6 hours  atorvastatin 20 milliGRAM(s) Oral at bedtime  chlorhexidine 0.12% Liquid 15 milliLiter(s) Oral Mucosa every 12 hours  chlorhexidine 4% Liquid 1 Application(s) Topical <User Schedule>  dexMEDEtomidine Infusion 0.2 MICROgram(s)/kG/Hr IV Continuous <Continuous>  dextrose 5%. 1000 milliLiter(s) IV Continuous <Continuous>  dextrose 5%. 1000 milliLiter(s) IV Continuous <Continuous>  dextrose 50% Injectable 25 Gram(s) IV Push once  dextrose 50% Injectable 25 Gram(s) IV Push once  dextrose 50% Injectable 12.5 Gram(s) IV Push once  dextrose Oral Gel 15 Gram(s) Oral once PRN  enoxaparin Injectable 40 milliGRAM(s) SubCutaneous every 24 hours  fentaNYL    Injectable 25 MICROGram(s) IV Push every 2 hours PRN  fentaNYL    Injectable 50 MICROGram(s) IV Push once  glucagon  Injectable 1 milliGRAM(s) IntraMuscular once  insulin lispro (ADMELOG) corrective regimen sliding scale   SubCutaneous every 6 hours  insulin NPH human recombinant 4 Unit(s) SubCutaneous every 6 hours  methylPREDNISolone sodium succinate Injectable 125 milliGRAM(s) IV Push daily  oxyCODONE    Solution 5 milliGRAM(s) Oral every 6 hours PRN  oxyCODONE    Solution 10 milliGRAM(s) Oral every 6 hours PRN  pantoprazole  Injectable 40 milliGRAM(s) IV Push daily    EXAMINATION:  Please see exam from daytime.     Assessment/Plan:   63 yo woman with DM and Myasthenia gravis s/p thymectomy 2010, with prior exacerbations requiring intubation most recently in late Jan 2023 requiring intubation and PLEX, on 2L O2 at baseline, now readmitted 5/5 with 1 week of diplopia, dysphagia and SOB requiring intubation. Being treated for MG exacerbation.     Precedex for sedation with fentanyl and oxy PRN   c/w Methylpred 125 mg daily, s/p 1 session of PLEX  MAP goal >65, Levo PRN  ACVC, c/w duo nebs, add mucomyst   Glucerna TF, PPI, add senna and miralax, last BM 5/5  NS at 50cc/hr   Lov ppx (LE dopplers 5/6 neg)   send procalc with next labs     A line    Марина Doss  Neurocritical Care Attending   Interval events:  No acute events on evening rounds. Still on low dose Levo for MAP >65.     VITALS:  T(C): , Max: 37.3 (05-06-23 @ 19:00)  HR:  (51 - 111)  BP:  (94/64 - 136/64)  ABP:  (65/51 - 145/97)  RR:  (14 - 20)  SpO2:  (98% - 100%)  Wt(kg): --  Device: Avea, Mode: AC/ CMV (Assist Control/ Continuous Mandatory Ventilation), RR (machine): 14, TV (machine): 400, FiO2: 40, PEEP: 5, ITime: 1, MAP: 9, PIP: 21    05-06-23 @ 07:01  -  05-07-23 @ 07:00  --------------------------------------------------------  IN: 2019.8 mL / OUT: 650 mL / NET: 1369.8 mL    05-07-23 @ 07:01  -  05-07-23 @ 18:41  --------------------------------------------------------  IN: 691.8 mL / OUT: 0 mL / NET: 691.8 mL      LABS:  Na: 140 (05-06 @ 21:56), 141 (05-06 @ 05:32), 141 (05-05 @ 21:04), 139 (05-05 @ 13:52)  K: 5.1 (05-06 @ 21:56), 5.0 (05-06 @ 05:32), 3.3 (05-05 @ 21:04), 4.1 (05-05 @ 13:52)  Cl: 112 (05-06 @ 21:56), 105 (05-06 @ 05:32), 100 (05-05 @ 21:04), 95 (05-05 @ 13:52)  CO2: 15 (05-06 @ 21:56), 23 (05-06 @ 05:32), 26 (05-05 @ 21:04), 33 (05-05 @ 13:52)  BUN: 20 (05-06 @ 21:56), 18 (05-06 @ 05:32), 21 (05-05 @ 21:04), 17 (05-05 @ 13:52)  Cr: 0.51 (05-06 @ 21:56), 0.46 (05-06 @ 05:32), 0.59 (05-05 @ 21:04), 0.52 (05-05 @ 13:52)  Glu: 177(05-06 @ 21:56), 165(05-06 @ 05:32), 100(05-05 @ 21:04), 119(05-05 @ 13:52)    Hgb: 12.9 (05-06 @ 21:56), 13.0 (05-05 @ 21:05), 14.4 (05-05 @ 13:52)  Hct: 41.4 (05-06 @ 21:56), 39.3 (05-05 @ 21:05), 46.6 (05-05 @ 13:52)  WBC: 19.08 (05-06 @ 21:56), 15.88 (05-05 @ 21:05), 12.13 (05-05 @ 13:52)  Plt: 259 (05-06 @ 21:56), 284 (05-05 @ 21:05), 292 (05-05 @ 13:52)    INR: 1.07 05-05-23 @ 21:05, 1.02 05-05-23 @ 13:52  PTT: 27.6 05-05-23 @ 21:05, 32.4 05-05-23 @ 13:52    MEDICATIONS:  acetaminophen   Oral Liquid .. 650 milliGRAM(s) Oral every 6 hours PRN  acetylcysteine 20%  Inhalation 4 milliLiter(s) Inhalation every 6 hours  albuterol/ipratropium for Nebulization 3 milliLiter(s) Nebulizer every 6 hours  atorvastatin 20 milliGRAM(s) Oral at bedtime  chlorhexidine 0.12% Liquid 15 milliLiter(s) Oral Mucosa every 12 hours  chlorhexidine 4% Liquid 1 Application(s) Topical <User Schedule>  dexMEDEtomidine Infusion 0.2 MICROgram(s)/kG/Hr IV Continuous <Continuous>  dextrose 5%. 1000 milliLiter(s) IV Continuous <Continuous>  dextrose 5%. 1000 milliLiter(s) IV Continuous <Continuous>  dextrose 50% Injectable 25 Gram(s) IV Push once  dextrose 50% Injectable 25 Gram(s) IV Push once  dextrose 50% Injectable 12.5 Gram(s) IV Push once  dextrose Oral Gel 15 Gram(s) Oral once PRN  enoxaparin Injectable 40 milliGRAM(s) SubCutaneous every 24 hours  fentaNYL    Injectable 25 MICROGram(s) IV Push every 2 hours PRN  fentaNYL    Injectable 50 MICROGram(s) IV Push once  glucagon  Injectable 1 milliGRAM(s) IntraMuscular once  insulin lispro (ADMELOG) corrective regimen sliding scale   SubCutaneous every 6 hours  insulin NPH human recombinant 4 Unit(s) SubCutaneous every 6 hours  methylPREDNISolone sodium succinate Injectable 125 milliGRAM(s) IV Push daily  oxyCODONE    Solution 5 milliGRAM(s) Oral every 6 hours PRN  oxyCODONE    Solution 10 milliGRAM(s) Oral every 6 hours PRN  pantoprazole  Injectable 40 milliGRAM(s) IV Push daily    EXAMINATION:  Please see exam from daytime.     Assessment/Plan:   61 yo woman with DM and Myasthenia gravis s/p thymectomy 2010, with prior exacerbations requiring intubation most recently in late Jan 2023 requiring intubation and PLEX, on 2L O2 at baseline, now readmitted 5/5 with 1 week of diplopia, dysphagia and SOB requiring intubation. Being treated for MG exacerbation.     Precedex for sedation with fentanyl and oxy PRN   c/w Methylpred 125 mg daily, s/p 1 session of PLEX  MAP goal >65, Levo PRN  ACVC, c/w duo nebs, add mucomyst   Glucerna TF, PPI, add senna and miralax, last BM 5/5  NS at 50cc/hr   Lov ppx (LE dopplers 5/6 neg)   send procalc with next labs     A line    Марина Doss  Neurocritical Care Attending   Interval events:  Did not tolerate CPAP during the day.     No acute events on evening rounds. Off Levo.   Secretions improved.     VITALS:  T(C): , Max: 37.3 (05-06-23 @ 19:00)  HR:  (51 - 111)  BP:  (94/64 - 136/64)  ABP:  (65/51 - 145/97)  RR:  (14 - 20)  SpO2:  (98% - 100%)  Wt(kg): --  Device: Avea, Mode: AC/ CMV (Assist Control/ Continuous Mandatory Ventilation), RR (machine): 14, TV (machine): 400, FiO2: 40, PEEP: 5, ITime: 1, MAP: 9, PIP: 21    05-06-23 @ 07:01  -  05-07-23 @ 07:00  --------------------------------------------------------  IN: 2019.8 mL / OUT: 650 mL / NET: 1369.8 mL    05-07-23 @ 07:01  -  05-07-23 @ 18:41  --------------------------------------------------------  IN: 691.8 mL / OUT: 0 mL / NET: 691.8 mL      LABS:  Na: 140 (05-06 @ 21:56), 141 (05-06 @ 05:32), 141 (05-05 @ 21:04), 139 (05-05 @ 13:52)  K: 5.1 (05-06 @ 21:56), 5.0 (05-06 @ 05:32), 3.3 (05-05 @ 21:04), 4.1 (05-05 @ 13:52)  Cl: 112 (05-06 @ 21:56), 105 (05-06 @ 05:32), 100 (05-05 @ 21:04), 95 (05-05 @ 13:52)  CO2: 15 (05-06 @ 21:56), 23 (05-06 @ 05:32), 26 (05-05 @ 21:04), 33 (05-05 @ 13:52)  BUN: 20 (05-06 @ 21:56), 18 (05-06 @ 05:32), 21 (05-05 @ 21:04), 17 (05-05 @ 13:52)  Cr: 0.51 (05-06 @ 21:56), 0.46 (05-06 @ 05:32), 0.59 (05-05 @ 21:04), 0.52 (05-05 @ 13:52)  Glu: 177(05-06 @ 21:56), 165(05-06 @ 05:32), 100(05-05 @ 21:04), 119(05-05 @ 13:52)    Hgb: 12.9 (05-06 @ 21:56), 13.0 (05-05 @ 21:05), 14.4 (05-05 @ 13:52)  Hct: 41.4 (05-06 @ 21:56), 39.3 (05-05 @ 21:05), 46.6 (05-05 @ 13:52)  WBC: 19.08 (05-06 @ 21:56), 15.88 (05-05 @ 21:05), 12.13 (05-05 @ 13:52)  Plt: 259 (05-06 @ 21:56), 284 (05-05 @ 21:05), 292 (05-05 @ 13:52)    INR: 1.07 05-05-23 @ 21:05, 1.02 05-05-23 @ 13:52  PTT: 27.6 05-05-23 @ 21:05, 32.4 05-05-23 @ 13:52    MEDICATIONS:  acetaminophen   Oral Liquid .. 650 milliGRAM(s) Oral every 6 hours PRN  acetylcysteine 20%  Inhalation 4 milliLiter(s) Inhalation every 6 hours  albuterol/ipratropium for Nebulization 3 milliLiter(s) Nebulizer every 6 hours  atorvastatin 20 milliGRAM(s) Oral at bedtime  chlorhexidine 0.12% Liquid 15 milliLiter(s) Oral Mucosa every 12 hours  chlorhexidine 4% Liquid 1 Application(s) Topical <User Schedule>  dexMEDEtomidine Infusion 0.2 MICROgram(s)/kG/Hr IV Continuous <Continuous>  dextrose 5%. 1000 milliLiter(s) IV Continuous <Continuous>  dextrose 5%. 1000 milliLiter(s) IV Continuous <Continuous>  dextrose 50% Injectable 25 Gram(s) IV Push once  dextrose 50% Injectable 25 Gram(s) IV Push once  dextrose 50% Injectable 12.5 Gram(s) IV Push once  dextrose Oral Gel 15 Gram(s) Oral once PRN  enoxaparin Injectable 40 milliGRAM(s) SubCutaneous every 24 hours  fentaNYL    Injectable 25 MICROGram(s) IV Push every 2 hours PRN  fentaNYL    Injectable 50 MICROGram(s) IV Push once  glucagon  Injectable 1 milliGRAM(s) IntraMuscular once  insulin lispro (ADMELOG) corrective regimen sliding scale   SubCutaneous every 6 hours  insulin NPH human recombinant 4 Unit(s) SubCutaneous every 6 hours  methylPREDNISolone sodium succinate Injectable 125 milliGRAM(s) IV Push daily  oxyCODONE    Solution 5 milliGRAM(s) Oral every 6 hours PRN  oxyCODONE    Solution 10 milliGRAM(s) Oral every 6 hours PRN  pantoprazole  Injectable 40 milliGRAM(s) IV Push daily    EXAMINATION:  Please see exam from daytime.     Assessment/Plan:   63 yo woman with DM and Myasthenia gravis s/p thymectomy 2010, with prior exacerbations requiring intubation most recently in late Jan 2023 requiring intubation and PLEX, on 2L O2 at baseline, now readmitted 5/5 with 1 week of diplopia, dysphagia and SOB requiring intubation. Being treated for MG exacerbation.     on low dose Precedex for sedation with fentanyl and oxy PRN   c/w Methylpred 125 mg daily, s/p 1 session of PLEX  MAP goal >65  ACVC, c/w duo nebs and mucomyst   Glucerna TF, PPI, add senna and miralax, last BM 5/5  Lov ppx (LE dopplers 5/6 neg)   send procalc with next labs   NPH 4 U q6h    A line    Марина Doss  Neurocritical Care Attending   Interval events:  Did not tolerate CPAP during the day.     No acute events on evening rounds. Off Levo.   Secretions improved.     VITALS:  T(C): , Max: 37.3 (05-06-23 @ 19:00)  HR:  (51 - 111)  BP:  (94/64 - 136/64)  ABP:  (65/51 - 145/97)  RR:  (14 - 20)  SpO2:  (98% - 100%)  Wt(kg): --  Device: Avea, Mode: AC/ CMV (Assist Control/ Continuous Mandatory Ventilation), RR (machine): 14, TV (machine): 400, FiO2: 40, PEEP: 5, ITime: 1, MAP: 9, PIP: 21    05-06-23 @ 07:01  -  05-07-23 @ 07:00  --------------------------------------------------------  IN: 2019.8 mL / OUT: 650 mL / NET: 1369.8 mL    05-07-23 @ 07:01  -  05-07-23 @ 18:41  --------------------------------------------------------  IN: 691.8 mL / OUT: 0 mL / NET: 691.8 mL      LABS:  Na: 140 (05-06 @ 21:56), 141 (05-06 @ 05:32), 141 (05-05 @ 21:04), 139 (05-05 @ 13:52)  K: 5.1 (05-06 @ 21:56), 5.0 (05-06 @ 05:32), 3.3 (05-05 @ 21:04), 4.1 (05-05 @ 13:52)  Cl: 112 (05-06 @ 21:56), 105 (05-06 @ 05:32), 100 (05-05 @ 21:04), 95 (05-05 @ 13:52)  CO2: 15 (05-06 @ 21:56), 23 (05-06 @ 05:32), 26 (05-05 @ 21:04), 33 (05-05 @ 13:52)  BUN: 20 (05-06 @ 21:56), 18 (05-06 @ 05:32), 21 (05-05 @ 21:04), 17 (05-05 @ 13:52)  Cr: 0.51 (05-06 @ 21:56), 0.46 (05-06 @ 05:32), 0.59 (05-05 @ 21:04), 0.52 (05-05 @ 13:52)  Glu: 177(05-06 @ 21:56), 165(05-06 @ 05:32), 100(05-05 @ 21:04), 119(05-05 @ 13:52)    Hgb: 12.9 (05-06 @ 21:56), 13.0 (05-05 @ 21:05), 14.4 (05-05 @ 13:52)  Hct: 41.4 (05-06 @ 21:56), 39.3 (05-05 @ 21:05), 46.6 (05-05 @ 13:52)  WBC: 19.08 (05-06 @ 21:56), 15.88 (05-05 @ 21:05), 12.13 (05-05 @ 13:52)  Plt: 259 (05-06 @ 21:56), 284 (05-05 @ 21:05), 292 (05-05 @ 13:52)    INR: 1.07 05-05-23 @ 21:05, 1.02 05-05-23 @ 13:52  PTT: 27.6 05-05-23 @ 21:05, 32.4 05-05-23 @ 13:52    MEDICATIONS:  acetaminophen   Oral Liquid .. 650 milliGRAM(s) Oral every 6 hours PRN  acetylcysteine 20%  Inhalation 4 milliLiter(s) Inhalation every 6 hours  albuterol/ipratropium for Nebulization 3 milliLiter(s) Nebulizer every 6 hours  atorvastatin 20 milliGRAM(s) Oral at bedtime  chlorhexidine 0.12% Liquid 15 milliLiter(s) Oral Mucosa every 12 hours  chlorhexidine 4% Liquid 1 Application(s) Topical <User Schedule>  dexMEDEtomidine Infusion 0.2 MICROgram(s)/kG/Hr IV Continuous <Continuous>  dextrose 5%. 1000 milliLiter(s) IV Continuous <Continuous>  dextrose 5%. 1000 milliLiter(s) IV Continuous <Continuous>  dextrose 50% Injectable 25 Gram(s) IV Push once  dextrose 50% Injectable 25 Gram(s) IV Push once  dextrose 50% Injectable 12.5 Gram(s) IV Push once  dextrose Oral Gel 15 Gram(s) Oral once PRN  enoxaparin Injectable 40 milliGRAM(s) SubCutaneous every 24 hours  fentaNYL    Injectable 25 MICROGram(s) IV Push every 2 hours PRN  fentaNYL    Injectable 50 MICROGram(s) IV Push once  glucagon  Injectable 1 milliGRAM(s) IntraMuscular once  insulin lispro (ADMELOG) corrective regimen sliding scale   SubCutaneous every 6 hours  insulin NPH human recombinant 4 Unit(s) SubCutaneous every 6 hours  methylPREDNISolone sodium succinate Injectable 125 milliGRAM(s) IV Push daily  oxyCODONE    Solution 5 milliGRAM(s) Oral every 6 hours PRN  oxyCODONE    Solution 10 milliGRAM(s) Oral every 6 hours PRN  pantoprazole  Injectable 40 milliGRAM(s) IV Push daily    EXAMINATION:  Please see exam from daytime.     Assessment/Plan:   61 yo woman with DM and Myasthenia gravis s/p thymectomy 2010, with prior exacerbations requiring intubation most recently in late Jan 2023 requiring intubation and PLEX, on 2L O2 at baseline, now readmitted 5/5 with 1 week of diplopia, dysphagia and SOB requiring intubation. Being treated for MG exacerbation.     on low dose Precedex for sedation with fentanyl and oxy PRN   c/w Methylpred 125 mg daily, s/p 1 session of PLEX  MAP goal >65  ACVC, c/w duo nebs and mucomyst   Glucerna TF, PPI, add senna and miralax, last BM 5/5  Lov ppx (LE dopplers 5/6 neg)   send procalc with next labs   NPH 4 U q6h    A line    Марина Doss  Neurocritical Care Attending

## 2023-05-07 NOTE — PROGRESS NOTE ADULT - SUBJECTIVE AND OBJECTIVE BOX
HPI:    Patient KAIDEN HATCH is a 62y (1960) Malayam speaking woman with a PMHx significant for myasthenia gravis presenting to the ED for worsening difficulty with double vision, swallowing, talking, and breathing that has progressively gotten worse for the previous one week. Patient is currently on Mestinon 60 mg two tablets TID and Prednisone 10 mg PRN. Has been admitted before in Feb 2023 for MG crisis that required intubation (2/1-2/11/23) iso COVID19. Patient received 5 days of PLEX (2/2-2/10/23) followed by 5 days of IVIG (2/16-2/20/23) with symptomatic improvement. Patient's son says patient has responded best to IVIG and that it stabilizes her MG for about 2 years. At baseline, patient is on 2L NC. In the ED, patient's NIF was -50. However, patient was unable to perform VC. Pending STAT repeat NIF/VC.      (05 May 2023 15:34)    SURGERY:   PAST MEDICAL HISTORY: Myasthenia gravis    Diabetes mellitus    Hypertension      PAST SURGICAL HISTORY:   FAMILY HISTORY:    ALLERGIES: peanuts (Unknown)  No Known Drug Allergies    **************************************  **************************************    OVERNIGHT EVENTS: [] None  Pt with mucous plug overnight    ROS  Unobtainable due to mental status[] Negative []  Positives:    ADMISSION SCORES: GCS: HH: MF: NIHSS: RASS: CAM-ICU: ICP:    ICU Vital Signs Last 24 Hrs  T(C): 36.9 (07 May 2023 11:00), Max: 37.3 (06 May 2023 19:00)  T(F): 98.4 (07 May 2023 11:00), Max: 99.2 (06 May 2023 19:00)  HR: 77 (07 May 2023 11:06) (51 - 136)  BP: 113/54 (07 May 2023 11:00) (94/64 - 135/65)  BP(mean): 71 (07 May 2023 11:00) (66 - 116)  ABP: 85/64 (07 May 2023 04:30) (65/51 - 186/107)  ABP(mean): 4 (07 May 2023 05:00) (4 - 121)  RR: 14 (07 May 2023 11:00) (14 - 33)  SpO2: 100% (07 May 2023 11:06) (96% - 100%)    O2 Parameters below as of 07 May 2023 11:06  Patient On (Oxygen Delivery Method): ventilator           05-06 @ 07:01  -  05-07 @ 07:00  --------------------------------------------------------  IN: 2019.8 mL / OUT: 650 mL / NET: 1369.8 mL    05-07 @ 07:01  -  05-07 @ 12:21  --------------------------------------------------------  IN: 245.2 mL / OUT: 0 mL / NET: 245.2 mL       Mode: AC/ CMV (Assist Control/ Continuous Mandatory Ventilation)  RR (machine): 14  TV (machine): 400  FiO2: 40  PEEP: 5  ITime: 1  MAP: 10  PIP: 22      DEVICES: [] Restraints [] MYAH/HMV []LD [] ET tube [] Trach [] Chest Tube [] A-line [] Ambriz [] NGT [] Rectal Tube [] EVD [] CVL  [] ICP/LiCOx    NEUROIMAGING:     EEG REPORT:     MEDICATIONS:  acetaminophen   Oral Liquid .. 650 milliGRAM(s) Oral every 6 hours PRN  acetylcysteine 20%  Inhalation 4 milliLiter(s) Inhalation every 6 hours  albuterol/ipratropium for Nebulization 3 milliLiter(s) Nebulizer every 6 hours  atorvastatin 20 milliGRAM(s) Oral at bedtime  chlorhexidine 0.12% Liquid 15 milliLiter(s) Oral Mucosa every 12 hours  chlorhexidine 4% Liquid 1 Application(s) Topical <User Schedule>  dexMEDEtomidine Infusion 0.2 MICROgram(s)/kG/Hr IV Continuous <Continuous>  enoxaparin Injectable 40 milliGRAM(s) SubCutaneous every 24 hours  fentaNYL    Injectable 50 MICROGram(s) IV Push once  fentaNYL    Injectable 25 MICROGram(s) IV Push every 2 hours PRN  insulin lispro (ADMELOG) corrective regimen sliding scale   SubCutaneous every 6 hours  methylPREDNISolone sodium succinate Injectable 125 milliGRAM(s) IV Push daily  oxyCODONE    Solution 10 milliGRAM(s) Oral every 6 hours PRN  oxyCODONE    Solution 5 milliGRAM(s) Oral every 6 hours PRN  pantoprazole  Injectable 40 milliGRAM(s) IV Push daily      PHYSICAL EXAM:  General:  Neurological:   Lungs:  Heart:  Abdomen:  Extremities:   Skin:      LABS:                        12.9   19.08 )-----------( 259      ( 06 May 2023 21:56 )             41.4    05-06    140  |  112<H>  |  20  ----------------------------<  177<H>  5.1   |  15<L>  |  0.51    Ca    8.3<L>      06 May 2023 21:56  Phos  3.6     05-06  Mg     2.1     05-06    TPro  7.5  /  Alb  4.4  /  TBili  0.2  /  DBili  x   /  AST  16  /  ALT  16  /  AlkPhos  89  05-05   ABG - ( 06 May 2023 21:36 )  pH, Arterial: 7.33  pH, Blood: x     /  pCO2: 38    /  pO2: 164   / HCO3: 20    / Base Excess: -5.4  /  SaO2: 99.6                 CARDIAC MARKERS ( 05 May 2023 21:04 )  x     / x     / 41 U/L / x     / x

## 2023-05-08 LAB
ANION GAP SERPL CALC-SCNC: 10 MMOL/L — SIGNIFICANT CHANGE UP (ref 5–17)
ANION GAP SERPL CALC-SCNC: 12 MMOL/L — SIGNIFICANT CHANGE UP (ref 5–17)
BUN SERPL-MCNC: 41 MG/DL — HIGH (ref 7–23)
BUN SERPL-MCNC: 44 MG/DL — HIGH (ref 7–23)
CA-I BLD-SCNC: 1.24 MMOL/L — SIGNIFICANT CHANGE UP (ref 1.15–1.33)
CA-I BLD-SCNC: 1.27 MMOL/L — SIGNIFICANT CHANGE UP (ref 1.15–1.33)
CALCIUM SERPL-MCNC: 9.3 MG/DL — SIGNIFICANT CHANGE UP (ref 8.4–10.5)
CALCIUM SERPL-MCNC: 9.4 MG/DL — SIGNIFICANT CHANGE UP (ref 8.4–10.5)
CHLORIDE SERPL-SCNC: 113 MMOL/L — HIGH (ref 96–108)
CHLORIDE SERPL-SCNC: 115 MMOL/L — HIGH (ref 96–108)
CO2 SERPL-SCNC: 20 MMOL/L — LOW (ref 22–31)
CO2 SERPL-SCNC: 21 MMOL/L — LOW (ref 22–31)
CREAT SERPL-MCNC: 0.53 MG/DL — SIGNIFICANT CHANGE UP (ref 0.5–1.3)
CREAT SERPL-MCNC: 0.6 MG/DL — SIGNIFICANT CHANGE UP (ref 0.5–1.3)
EGFR: 101 ML/MIN/1.73M2 — SIGNIFICANT CHANGE UP
EGFR: 104 ML/MIN/1.73M2 — SIGNIFICANT CHANGE UP
FIBRINOGEN PPP-MCNC: 193 MG/DL — LOW (ref 200–445)
FIBRINOGEN PPP-MCNC: 203 MG/DL — SIGNIFICANT CHANGE UP (ref 200–445)
GLUCOSE SERPL-MCNC: 164 MG/DL — HIGH (ref 70–99)
GLUCOSE SERPL-MCNC: 184 MG/DL — HIGH (ref 70–99)
HCT VFR BLD CALC: 34.9 % — SIGNIFICANT CHANGE UP (ref 34.5–45)
HCT VFR BLD CALC: 36.2 % — SIGNIFICANT CHANGE UP (ref 34.5–45)
HGB BLD-MCNC: 11.2 G/DL — LOW (ref 11.5–15.5)
HGB BLD-MCNC: 11.4 G/DL — LOW (ref 11.5–15.5)
MAGNESIUM SERPL-MCNC: 2.5 MG/DL — SIGNIFICANT CHANGE UP (ref 1.6–2.6)
MAGNESIUM SERPL-MCNC: 2.6 MG/DL — SIGNIFICANT CHANGE UP (ref 1.6–2.6)
MCHC RBC-ENTMCNC: 28.5 PG — SIGNIFICANT CHANGE UP (ref 27–34)
MCHC RBC-ENTMCNC: 28.6 PG — SIGNIFICANT CHANGE UP (ref 27–34)
MCHC RBC-ENTMCNC: 31.5 GM/DL — LOW (ref 32–36)
MCHC RBC-ENTMCNC: 32.1 GM/DL — SIGNIFICANT CHANGE UP (ref 32–36)
MCV RBC AUTO: 89.3 FL — SIGNIFICANT CHANGE UP (ref 80–100)
MCV RBC AUTO: 90.5 FL — SIGNIFICANT CHANGE UP (ref 80–100)
NRBC # BLD: 0 /100 WBCS — SIGNIFICANT CHANGE UP (ref 0–0)
PHOSPHATE SERPL-MCNC: 3.2 MG/DL — SIGNIFICANT CHANGE UP (ref 2.5–4.5)
PHOSPHATE SERPL-MCNC: 4 MG/DL — SIGNIFICANT CHANGE UP (ref 2.5–4.5)
PLATELET # BLD AUTO: 205 K/UL — SIGNIFICANT CHANGE UP (ref 150–400)
PLATELET # BLD AUTO: 208 K/UL — SIGNIFICANT CHANGE UP (ref 150–400)
POTASSIUM SERPL-MCNC: 4.2 MMOL/L — SIGNIFICANT CHANGE UP (ref 3.5–5.3)
POTASSIUM SERPL-MCNC: 4.4 MMOL/L — SIGNIFICANT CHANGE UP (ref 3.5–5.3)
POTASSIUM SERPL-SCNC: 4.2 MMOL/L — SIGNIFICANT CHANGE UP (ref 3.5–5.3)
POTASSIUM SERPL-SCNC: 4.4 MMOL/L — SIGNIFICANT CHANGE UP (ref 3.5–5.3)
RBC # BLD: 3.91 M/UL — SIGNIFICANT CHANGE UP (ref 3.8–5.2)
RBC # BLD: 4 M/UL — SIGNIFICANT CHANGE UP (ref 3.8–5.2)
RBC # FLD: 14.6 % — HIGH (ref 10.3–14.5)
SODIUM SERPL-SCNC: 145 MMOL/L — SIGNIFICANT CHANGE UP (ref 135–145)
SODIUM SERPL-SCNC: 146 MMOL/L — HIGH (ref 135–145)
WBC # BLD: 12.1 K/UL — HIGH (ref 3.8–10.5)
WBC # BLD: 13.08 K/UL — HIGH (ref 3.8–10.5)
WBC # FLD AUTO: 12.1 K/UL — HIGH (ref 3.8–10.5)
WBC # FLD AUTO: 13.08 K/UL — HIGH (ref 3.8–10.5)

## 2023-05-08 PROCEDURE — 99291 CRITICAL CARE FIRST HOUR: CPT

## 2023-05-08 PROCEDURE — 71045 X-RAY EXAM CHEST 1 VIEW: CPT | Mod: 26

## 2023-05-08 PROCEDURE — 36514 APHERESIS PLASMA: CPT

## 2023-05-08 RX ORDER — ATORVASTATIN CALCIUM 80 MG/1
20 TABLET, FILM COATED ORAL AT BEDTIME
Refills: 0 | Status: DISCONTINUED | OUTPATIENT
Start: 2023-05-08 | End: 2023-05-16

## 2023-05-08 RX ORDER — OXYCODONE HYDROCHLORIDE 5 MG/1
10 TABLET ORAL EVERY 6 HOURS
Refills: 0 | Status: DISCONTINUED | OUTPATIENT
Start: 2023-05-08 | End: 2023-05-15

## 2023-05-08 RX ADMIN — HUMAN INSULIN 4 UNIT(S): 100 INJECTION, SUSPENSION SUBCUTANEOUS at 17:23

## 2023-05-08 RX ADMIN — Medication 2: at 05:29

## 2023-05-08 RX ADMIN — Medication 3 MILLILITER(S): at 11:18

## 2023-05-08 RX ADMIN — Medication 4 MILLILITER(S): at 23:57

## 2023-05-08 RX ADMIN — ENOXAPARIN SODIUM 40 MILLIGRAM(S): 100 INJECTION SUBCUTANEOUS at 17:22

## 2023-05-08 RX ADMIN — PANTOPRAZOLE SODIUM 40 MILLIGRAM(S): 20 TABLET, DELAYED RELEASE ORAL at 12:31

## 2023-05-08 RX ADMIN — CHLORHEXIDINE GLUCONATE 15 MILLILITER(S): 213 SOLUTION TOPICAL at 05:11

## 2023-05-08 RX ADMIN — HUMAN INSULIN 4 UNIT(S): 100 INJECTION, SUSPENSION SUBCUTANEOUS at 00:34

## 2023-05-08 RX ADMIN — Medication 2: at 23:17

## 2023-05-08 RX ADMIN — Medication 3 MILLILITER(S): at 05:20

## 2023-05-08 RX ADMIN — CHLORHEXIDINE GLUCONATE 1 APPLICATION(S): 213 SOLUTION TOPICAL at 21:49

## 2023-05-08 RX ADMIN — Medication 4 MILLILITER(S): at 17:16

## 2023-05-08 RX ADMIN — Medication 6: at 12:32

## 2023-05-08 RX ADMIN — CHLORHEXIDINE GLUCONATE 15 MILLILITER(S): 213 SOLUTION TOPICAL at 17:23

## 2023-05-08 RX ADMIN — Medication 4 MILLILITER(S): at 05:20

## 2023-05-08 RX ADMIN — FENTANYL CITRATE 25 MICROGRAM(S): 50 INJECTION INTRAVENOUS at 16:55

## 2023-05-08 RX ADMIN — POLYETHYLENE GLYCOL 3350 17 GRAM(S): 17 POWDER, FOR SOLUTION ORAL at 05:11

## 2023-05-08 RX ADMIN — Medication 4 MILLILITER(S): at 11:18

## 2023-05-08 RX ADMIN — Medication 2: at 00:34

## 2023-05-08 RX ADMIN — Medication 2: at 17:23

## 2023-05-08 RX ADMIN — ATORVASTATIN CALCIUM 20 MILLIGRAM(S): 80 TABLET, FILM COATED ORAL at 21:49

## 2023-05-08 RX ADMIN — HUMAN INSULIN 4 UNIT(S): 100 INJECTION, SUSPENSION SUBCUTANEOUS at 23:16

## 2023-05-08 RX ADMIN — HUMAN INSULIN 4 UNIT(S): 100 INJECTION, SUSPENSION SUBCUTANEOUS at 12:31

## 2023-05-08 RX ADMIN — Medication 3 MILLILITER(S): at 17:16

## 2023-05-08 RX ADMIN — HUMAN INSULIN 4 UNIT(S): 100 INJECTION, SUSPENSION SUBCUTANEOUS at 05:29

## 2023-05-08 RX ADMIN — DEXMEDETOMIDINE HYDROCHLORIDE IN 0.9% SODIUM CHLORIDE 3.75 MICROGRAM(S)/KG/HR: 4 INJECTION INTRAVENOUS at 19:20

## 2023-05-08 RX ADMIN — Medication 125 MILLIGRAM(S): at 05:11

## 2023-05-08 RX ADMIN — Medication 3 MILLILITER(S): at 23:57

## 2023-05-08 RX ADMIN — FENTANYL CITRATE 25 MICROGRAM(S): 50 INJECTION INTRAVENOUS at 17:30

## 2023-05-08 NOTE — DIETITIAN INITIAL EVALUATION ADULT - PERTINENT MEDS FT
MEDICATIONS  (STANDING):  acetylcysteine 20%  Inhalation 4 milliLiter(s) Inhalation every 6 hours  albuterol/ipratropium for Nebulization 3 milliLiter(s) Nebulizer every 6 hours  atorvastatin 20 milliGRAM(s) Oral at bedtime  chlorhexidine 0.12% Liquid 15 milliLiter(s) Oral Mucosa every 12 hours  chlorhexidine 4% Liquid 1 Application(s) Topical <User Schedule>  dexMEDEtomidine Infusion 0.2 MICROgram(s)/kG/Hr (3.75 mL/Hr) IV Continuous <Continuous>  dextrose 5%. 1000 milliLiter(s) (100 mL/Hr) IV Continuous <Continuous>  dextrose 5%. 1000 milliLiter(s) (50 mL/Hr) IV Continuous <Continuous>  dextrose 50% Injectable 25 Gram(s) IV Push once  dextrose 50% Injectable 25 Gram(s) IV Push once  dextrose 50% Injectable 12.5 Gram(s) IV Push once  enoxaparin Injectable 40 milliGRAM(s) SubCutaneous every 24 hours  fentaNYL    Injectable 50 MICROGram(s) IV Push once  glucagon  Injectable 1 milliGRAM(s) IntraMuscular once  insulin lispro (ADMELOG) corrective regimen sliding scale   SubCutaneous every 6 hours  insulin NPH human recombinant 4 Unit(s) SubCutaneous every 6 hours  methylPREDNISolone sodium succinate Injectable 125 milliGRAM(s) IV Push daily  pantoprazole  Injectable 40 milliGRAM(s) IV Push daily  polyethylene glycol 3350 17 Gram(s) Oral every 12 hours  senna 2 Tablet(s) Oral at bedtime    MEDICATIONS  (PRN):  acetaminophen   Oral Liquid .. 650 milliGRAM(s) Oral every 6 hours PRN Mild Pain (1 - 3)  dextrose Oral Gel 15 Gram(s) Oral once PRN Blood Glucose LESS THAN 70 milliGRAM(s)/deciliter  fentaNYL    Injectable 25 MICROGram(s) IV Push every 2 hours PRN Vent synchrony  oxyCODONE    Solution 5 milliGRAM(s) Oral every 6 hours PRN Moderate Pain (4 - 6)  oxyCODONE    Solution 10 milliGRAM(s) Oral every 6 hours PRN Severe Pain (7 - 10)

## 2023-05-08 NOTE — PROGRESS NOTE ADULT - ASSESSMENT
ASSESSMENT/PLAN:    MG exacerbation    NEURO:  - neuro checks q4h  - s/p QODx5d  - solumedrol 125 mg daily   - precedex, fent prn  - Avoid medications that may worsen the current exacerbation including macrolides, fluoroquinolones, tetracyclines, aminoglycoside, beta-blockers, magnesium, and anti-arrhythmics (procainamide, quinidine, and lidocaine)  - pain control  - neurology following      CVS:  - MAP>65  - tte    PULM:  Intubated in ED 5/5 for hypercapnic resp failure  - vap bundle  - cxr to confirm ett placement  - abg  - vap bundle      RENAL:  RACQUEL sharma placed 5/5  - Fluids: IVF while npo  - daily IOs  - PLex as above    GI:  - Diet: start tf  - GI prophylaxis: PPi while intubated   - Bowel regimen: miralax, senna    ENDO:   - FS goal 120-180    HEME/ONC:  - SCDs  - Chemoppx: sql    ID:  - monitor for fevers     ASSESSMENT/PLAN:    MG exacerbation sp 1 x dose of PLEX, second dose to be given today.    NEURO:  - neuro checks q4h  - s/p 1 dose of PLEX, second dose to be given 5/8  - solumedrol 125 mg daily   - precedex, fent prn  - Avoid medications that may worsen the current exacerbation including macrolides, fluoroquinolones, tetracyclines, aminoglycoside, beta-blockers, magnesium, and anti-arrhythmics (procainamide, quinidine, and lidocaine)  - pain control  - neurology following    CVS:  - MAP>65  - tte- EF 54%    PULM:  Intubated in ED 5/5 for hypercapnic resp failure  - vap bundle  - cxr to confirm ett placement    RENAL:  RACQUEL sharma placed 5/5  - Fluids: IVL   - daily IOs  - PLex as above    GI:  - Diet: ON tf  - GI prophylaxis: PPi while intubated   - Bowel regimen: miralax, senna  - LBM 5/6    ENDO:   - FS goal 120-180  - On  ISS and NPH 4mgQ6    HEME/ONC:  - SCDs  - Chemoppx: sql    ID:  - monitor for fevers, slight leukocytosis likely related to steroid

## 2023-05-08 NOTE — CHART NOTE - NSCHARTNOTEFT_GEN_A_CORE
63 yo woman with DM and Myasthenia gravis s/p thymectomy 2010, with prior exacerbations requiring intubation most recently in late Jan 2023 requiring intubation and PLEX, on 2L O2 at baseline, now readmitted 5/5 with 1 week of diplopia, dysphagia and SOB requiring intubation.    PLEX#1 performed on 5/6/23, 1PV with 5% albumin as replacement fluid. Procedure tolerated well.    PLEX#2 today. Labs reviewed. 1PV with 5% albumin as replacement fluid.     PLEX#3 scheduled on 5/10/23. Please monitor pt CBC, Coag, fibrinogen and iCa prior to the procedure. 61 yo woman with DM and Myasthenia gravis s/p thymectomy 2010, with prior exacerbations requiring intubation most recently in late Jan 2023 requiring intubation and PLEX, on 2L O2 at baseline, now readmitted 5/5 with 1 week of diplopia, dysphagia and SOB requiring intubation.    PLEX#1 performed on 5/6/23, 1PV with 5% albumin as replacement fluid. Procedure tolerated well.    PLEX#2 today. Labs reviewed. 1PV with 5% albumin as replacement fluid.     PLEX#3 scheduled on 5/10/23. Please monitor pt CBC, Coag, fibrinogen and iCa prior to the procedure.

## 2023-05-08 NOTE — DIETITIAN INITIAL EVALUATION ADULT - RD TO REMAIN AVAILABLE
Nadia Blackmon, MS, RD, CDN #[id number]/yes Nadia Blackmon, MS, RD, CDN #[id number]/yes Nadia Blackmon, MS, RD, CDN #[id number]/yes

## 2023-05-08 NOTE — DIETITIAN INITIAL EVALUATION ADULT - ADD RECOMMEND
1) Will continue to monitor weight, labs, skin, GI status and diet 2) consider addition of Multivitamin if no contraindications to aid in wound healing

## 2023-05-08 NOTE — PROGRESS NOTE ADULT - SUBJECTIVE AND OBJECTIVE BOX
NSCU Progress Note    Assessment/Hospital Course:    Patient KAIDEN HATCH is a 62y (1960) Malayam speaking woman with a PMHx significant for myasthenia gravis presenting to the ED for worsening difficulty with double vision, swallowing, talking, and breathing that has progressively gotten worse for the previous one week. Patient is currently on Mestinon 60 mg two tablets TID and Prednisone 10 mg PRN. Has been admitted before in Feb 2023 for MG crisis that required intubation (2/1-2/11/23) iso COVID19. Patient received 5 days of PLEX (2/2-2/10/23) followed by 5 days of IVIG (2/16-2/20/23) with symptomatic improvement. Patient's son says patient has responded best to IVIG and that it stabilizes her MG for about 2 years. At baseline, patient is on 2L NC. In the ED, patient's NIF was -50. However, patient was unable to perform VC. Pending STAT repeat NIF/VC.       24 Hour Events/Subjective:  - S/P 1 session of PLEX      REVIEW OF SYSTEMS:  - negative except as above    VITALS:   - Reviewed      IMAGING/DATA:   - Reviewed          PHYSICAL EXAM:    General: intubated  CVS: RRR  Pulm: CTAB  GI: Soft, NTND  Extremities: No LE Edema  Neuro: awake, alert, oriented x 3, follows commands, EOMI, face symmetric, able to lift b/l arms AG to the elbow, able to lift b/l LEs off the bed    NSCU Progress Note    Assessment/Hospital Course:    Patient KAIDEN HATCH is a 62y (1960) Malayam speaking woman with a PMHx significant for myasthenia gravis presenting to the ED for worsening difficulty with double vision, swallowing, talking, and breathing that has progressively gotten worse for the previous one week. Patient is currently on Mestinon 60 mg two tablets TID and Prednisone 10 mg PRN. Has been admitted before in Feb 2023 for MG crisis that required intubation (2/1-2/11/23) iso COVID19. Patient received 5 days of PLEX (2/2-2/10/23) followed by 5 days of IVIG (2/16-2/20/23) with symptomatic improvement. Patient's son says patient has responded best to IVIG and that it stabilizes her MG for about 2 years. At baseline, patient is on 2L NC. In the ED, patient's NIF was -50. However, patient was unable to perform VC. Pending STAT repeat NIF/VC.       24 Hour Events/Subjective:  - S/P 1 session of PLEX      REVIEW OF SYSTEMS:  - negative except as above    VITALS:   - Reviewed      IMAGING/DATA:   - Reviewed          PHYSICAL EXAM:    General: intubated  CVS: RRR  Pulm: CTAB  GI: Soft, NTND  Extremities: No LE Edema  Neuro: awake, alert, oriented x 3, follows commands, EOMI, face symmetric, able to lift b/l arms AG to the elbow, Proximally 3/5, distally 5/5, able to lift b/l LEs off the bed with 5/5 strength. Neck flexion 2/5. dysconjugate upward gaze, right pupil 3 mm and R, Left pupil 4 mm and R.

## 2023-05-08 NOTE — DIETITIAN INITIAL EVALUATION ADULT - ORAL INTAKE PTA/DIET HISTORY
visited pt at bedside this morning, ventilated assisted. peanut allergy per chart. noted history of DM, metformin noted in outpatient medications. current Hgb A1c 6.8%.

## 2023-05-08 NOTE — DIETITIAN INITIAL EVALUATION ADULT - NSFNSGIIOFT_GEN_A_CORE
05-07-23 @ 07:01  -  05-08-23 @ 07:00  --------------------------------------------------------  OUT:  Total OUT: 0 mL    Total NET: 1430 mL  .  no noted nausea/vomiting/constipation/diarrhea on flow sheets. Last bowel movement per flow sheets 5/6.

## 2023-05-08 NOTE — DIETITIAN INITIAL EVALUATION ADULT - ENTERAL
Continue current EN regimen. Current EN regimen meeting ~30kcal/kg and 1.5g/kg protein. defer free water flush to team. continue probiotic.

## 2023-05-08 NOTE — DIETITIAN INITIAL EVALUATION ADULT - NS FNS DIET ORDER
Diet, NPO with Tube Feed:   Tube Feeding Modality: Nasogastric  Glucerna 1.2 Taye (GLUCERNARTH)  Total Volume for 24 Hours (mL): 1440  Continuous  Starting Tube Feed Rate {mL per Hour}: 20  Increase Tube Feed Rate by (mL): 10     Every 6 hours  Until Goal Tube Feed Rate (mL per Hour): 60  Tube Feed Duration (in Hours): 24  Tube Feed Start Time: 10:00  Supplement Feeding Modality:  Nasogastric  Probiotic Yogurt/Smoothie Cans or Servings Per Day:  2       Frequency:  Daily (05-06-23 @ 09:25) [Active]

## 2023-05-08 NOTE — DIETITIAN INITIAL EVALUATION ADULT - NSFNSPHYEXAMSKINFT_GEN_A_CORE
Pressure Injury 1: buttocks, Suspected deep tissue injury  Pressure Injury 2: none, none  Pressure Injury 3: none, none  Pressure Injury 4: none, none  Pressure Injury 5: none, none  Pressure Injury 6: none, none  Pressure Injury 7: none, none  Pressure Injury 8: none, none  Pressure Injury 9: none, none  Pressure Injury 10: none, none  Pressure Injury 11: none, none

## 2023-05-08 NOTE — PROGRESS NOTE ADULT - SUBJECTIVE AND OBJECTIVE BOX
NSCU ATTENDING -- ADDITIONAL PROGRESS NOTE    Nighttime rounds were performed -- please refer to earlier Progress Note for HPI details.    T(C): 37.8 (05-08-23 @ 19:00), Max: 37.8 (05-08-23 @ 19:00)  HR: 54 (05-08-23 @ 21:08) (50 - 107)  BP: 122/62 (05-08-23 @ 21:00) (110/57 - 146/85)  RR: 14 (05-08-23 @ 21:00) (14 - 24)  SpO2: 100% (05-08-23 @ 21:08) (96% - 100%)  Wt(kg): --    Relevant labwork and imaging reviewed.    Patient remains critically ill.    [A/P]    PLEX#2  remains intubated  Q2hr chest PT, pulm toilet      Additional 30 minutes of critical care time.

## 2023-05-08 NOTE — DIETITIAN INITIAL EVALUATION ADULT - PHYSCIAL ASSESSMENT
weight per dietitian initial evaluation 2/1: 72kg  Dosing weight this admission 5/5 75kg  RD will continue to monitor trends.

## 2023-05-08 NOTE — DIETITIAN INITIAL EVALUATION ADULT - PERTINENT LABORATORY DATA
05-08    146<H>  |  113<H>  |  44<H>  ----------------------------<  164<H>  4.2   |  21<L>  |  0.60    Ca    9.4      08 May 2023 06:12  Phos  4.0     05-08  Mg     2.6     05-08    POCT Blood Glucose.: 174 mg/dL (05-08-23 @ 05:26)  A1C with Estimated Average Glucose Result: 6.8 % (05-06-23 @ 00:24)  A1C with Estimated Average Glucose Result: 6.6 % (01-31-23 @ 01:13)

## 2023-05-09 LAB
ANION GAP SERPL CALC-SCNC: 11 MMOL/L — SIGNIFICANT CHANGE UP (ref 5–17)
BUN SERPL-MCNC: 47 MG/DL — HIGH (ref 7–23)
CALCIUM SERPL-MCNC: 8.9 MG/DL — SIGNIFICANT CHANGE UP (ref 8.4–10.5)
CHLORIDE SERPL-SCNC: 115 MMOL/L — HIGH (ref 96–108)
CO2 SERPL-SCNC: 23 MMOL/L — SIGNIFICANT CHANGE UP (ref 22–31)
CREAT SERPL-MCNC: 0.56 MG/DL — SIGNIFICANT CHANGE UP (ref 0.5–1.3)
EGFR: 102 ML/MIN/1.73M2 — SIGNIFICANT CHANGE UP
GAS PNL BLDA: SIGNIFICANT CHANGE UP
GLUCOSE SERPL-MCNC: 173 MG/DL — HIGH (ref 70–99)
HCT VFR BLD CALC: 33.9 % — LOW (ref 34.5–45)
HGB BLD-MCNC: 11.1 G/DL — LOW (ref 11.5–15.5)
MAGNESIUM SERPL-MCNC: 2.4 MG/DL — SIGNIFICANT CHANGE UP (ref 1.6–2.6)
MCHC RBC-ENTMCNC: 29.2 PG — SIGNIFICANT CHANGE UP (ref 27–34)
MCHC RBC-ENTMCNC: 32.7 GM/DL — SIGNIFICANT CHANGE UP (ref 32–36)
MCV RBC AUTO: 89.2 FL — SIGNIFICANT CHANGE UP (ref 80–100)
MRSA PCR RESULT.: SIGNIFICANT CHANGE UP
NRBC # BLD: 0 /100 WBCS — SIGNIFICANT CHANGE UP (ref 0–0)
PHOSPHATE SERPL-MCNC: 4.3 MG/DL — SIGNIFICANT CHANGE UP (ref 2.5–4.5)
PLATELET # BLD AUTO: 193 K/UL — SIGNIFICANT CHANGE UP (ref 150–400)
POTASSIUM SERPL-MCNC: 3.8 MMOL/L — SIGNIFICANT CHANGE UP (ref 3.5–5.3)
POTASSIUM SERPL-SCNC: 3.8 MMOL/L — SIGNIFICANT CHANGE UP (ref 3.5–5.3)
RBC # BLD: 3.8 M/UL — SIGNIFICANT CHANGE UP (ref 3.8–5.2)
RBC # FLD: 14.8 % — HIGH (ref 10.3–14.5)
S AUREUS DNA NOSE QL NAA+PROBE: DETECTED
SODIUM SERPL-SCNC: 149 MMOL/L — HIGH (ref 135–145)
WBC # BLD: 11.52 K/UL — HIGH (ref 3.8–10.5)
WBC # FLD AUTO: 11.52 K/UL — HIGH (ref 3.8–10.5)

## 2023-05-09 PROCEDURE — 99291 CRITICAL CARE FIRST HOUR: CPT

## 2023-05-09 PROCEDURE — 99233 SBSQ HOSP IP/OBS HIGH 50: CPT

## 2023-05-09 PROCEDURE — 71045 X-RAY EXAM CHEST 1 VIEW: CPT | Mod: 26

## 2023-05-09 RX ORDER — POTASSIUM CHLORIDE 20 MEQ
20 PACKET (EA) ORAL ONCE
Refills: 0 | Status: COMPLETED | OUTPATIENT
Start: 2023-05-09 | End: 2023-05-09

## 2023-05-09 RX ORDER — SODIUM CHLORIDE 9 MG/ML
1000 INJECTION, SOLUTION INTRAVENOUS
Refills: 0 | Status: DISCONTINUED | OUTPATIENT
Start: 2023-05-09 | End: 2023-05-10

## 2023-05-09 RX ORDER — HUMAN INSULIN 100 [IU]/ML
6 INJECTION, SUSPENSION SUBCUTANEOUS EVERY 6 HOURS
Refills: 0 | Status: DISCONTINUED | OUTPATIENT
Start: 2023-05-09 | End: 2023-05-10

## 2023-05-09 RX ORDER — MUPIROCIN 20 MG/G
1 OINTMENT TOPICAL EVERY 12 HOURS
Refills: 0 | Status: DISCONTINUED | OUTPATIENT
Start: 2023-05-09 | End: 2023-05-16

## 2023-05-09 RX ADMIN — SODIUM CHLORIDE 50 MILLILITER(S): 9 INJECTION, SOLUTION INTRAVENOUS at 22:05

## 2023-05-09 RX ADMIN — Medication 650 MILLIGRAM(S): at 12:30

## 2023-05-09 RX ADMIN — HUMAN INSULIN 4 UNIT(S): 100 INJECTION, SUSPENSION SUBCUTANEOUS at 11:45

## 2023-05-09 RX ADMIN — Medication 650 MILLIGRAM(S): at 11:38

## 2023-05-09 RX ADMIN — POLYETHYLENE GLYCOL 3350 17 GRAM(S): 17 POWDER, FOR SOLUTION ORAL at 17:07

## 2023-05-09 RX ADMIN — Medication 4 MILLILITER(S): at 05:00

## 2023-05-09 RX ADMIN — SENNA PLUS 2 TABLET(S): 8.6 TABLET ORAL at 22:05

## 2023-05-09 RX ADMIN — CHLORHEXIDINE GLUCONATE 15 MILLILITER(S): 213 SOLUTION TOPICAL at 17:07

## 2023-05-09 RX ADMIN — Medication 3 MILLILITER(S): at 12:10

## 2023-05-09 RX ADMIN — SODIUM CHLORIDE 50 MILLILITER(S): 9 INJECTION, SOLUTION INTRAVENOUS at 09:48

## 2023-05-09 RX ADMIN — CHLORHEXIDINE GLUCONATE 1 APPLICATION(S): 213 SOLUTION TOPICAL at 22:05

## 2023-05-09 RX ADMIN — OXYCODONE HYDROCHLORIDE 5 MILLIGRAM(S): 5 TABLET ORAL at 10:34

## 2023-05-09 RX ADMIN — Medication 125 MILLIGRAM(S): at 05:23

## 2023-05-09 RX ADMIN — HUMAN INSULIN 6 UNIT(S): 100 INJECTION, SUSPENSION SUBCUTANEOUS at 23:10

## 2023-05-09 RX ADMIN — Medication 3 MILLILITER(S): at 18:00

## 2023-05-09 RX ADMIN — CHLORHEXIDINE GLUCONATE 15 MILLILITER(S): 213 SOLUTION TOPICAL at 05:23

## 2023-05-09 RX ADMIN — Medication 20 MILLIEQUIVALENT(S): at 09:48

## 2023-05-09 RX ADMIN — MUPIROCIN 1 APPLICATION(S): 20 OINTMENT TOPICAL at 22:22

## 2023-05-09 RX ADMIN — PANTOPRAZOLE SODIUM 40 MILLIGRAM(S): 20 TABLET, DELAYED RELEASE ORAL at 11:44

## 2023-05-09 RX ADMIN — HUMAN INSULIN 4 UNIT(S): 100 INJECTION, SUSPENSION SUBCUTANEOUS at 17:09

## 2023-05-09 RX ADMIN — Medication 4 MILLILITER(S): at 12:10

## 2023-05-09 RX ADMIN — Medication 4: at 11:45

## 2023-05-09 RX ADMIN — Medication 3 MILLILITER(S): at 05:00

## 2023-05-09 RX ADMIN — Medication 650 MILLIGRAM(S): at 19:11

## 2023-05-09 RX ADMIN — Medication 4 MILLILITER(S): at 18:00

## 2023-05-09 RX ADMIN — Medication 4: at 17:09

## 2023-05-09 RX ADMIN — FENTANYL CITRATE 25 MICROGRAM(S): 50 INJECTION INTRAVENOUS at 05:20

## 2023-05-09 RX ADMIN — Medication 650 MILLIGRAM(S): at 20:11

## 2023-05-09 RX ADMIN — FENTANYL CITRATE 25 MICROGRAM(S): 50 INJECTION INTRAVENOUS at 05:35

## 2023-05-09 RX ADMIN — Medication 650 MILLIGRAM(S): at 04:35

## 2023-05-09 RX ADMIN — HUMAN INSULIN 4 UNIT(S): 100 INJECTION, SUSPENSION SUBCUTANEOUS at 05:24

## 2023-05-09 RX ADMIN — ENOXAPARIN SODIUM 40 MILLIGRAM(S): 100 INJECTION SUBCUTANEOUS at 17:07

## 2023-05-09 RX ADMIN — Medication 2: at 05:24

## 2023-05-09 RX ADMIN — Medication 2: at 23:11

## 2023-05-09 RX ADMIN — ATORVASTATIN CALCIUM 20 MILLIGRAM(S): 80 TABLET, FILM COATED ORAL at 22:05

## 2023-05-09 RX ADMIN — OXYCODONE HYDROCHLORIDE 5 MILLIGRAM(S): 5 TABLET ORAL at 11:30

## 2023-05-09 RX ADMIN — Medication 650 MILLIGRAM(S): at 03:55

## 2023-05-09 NOTE — PROGRESS NOTE ADULT - SUBJECTIVE AND OBJECTIVE BOX
NSCU ATTENDING -- ADDITIONAL PROGRESS NOTE    Nighttime rounds were performed -- please refer to earlier Progress Note for HPI details.    ICU Vital Signs Last 24 Hrs  T(C): 36.8 (09 May 2023 19:00), Max: 37.4 (08 May 2023 23:00)  T(F): 98.3 (09 May 2023 19:00), Max: 99.3 (08 May 2023 23:00)  HR: 68 (09 May 2023 19:00) (50 - 137)  BP: 132/74 (09 May 2023 19:00) (111/53 - 172/82)  BP(mean): 91 (09 May 2023 19:00) (69 - 123)  ABP: --  ABP(mean): --  RR: 14 (09 May 2023 19:00) (12 - 26)  SpO2: 100% (09 May 2023 19:11) (96% - 100%)    O2 Parameters below as of 09 May 2023 19:00  Patient On (Oxygen Delivery Method): ventilator    O2 Concentration (%): 40      Patient remains critically ill.    [A/P]    PLEX#2 5/8  PLEX#3 tomorrow   remains intubated  Q2hr chest PT, pulm toilet  CPAP as tolerated  hypernatremic--start free water 300cc Q6  hyperglycemic, increase NPH to 6UQ6      Additional 35 minutes of critical care time.

## 2023-05-09 NOTE — PROGRESS NOTE ADULT - SUBJECTIVE AND OBJECTIVE BOX
NEUROLOGY FOLLOW-UP CONSULT NOTE    RFC: ***    Interval history: No acute neurologic events overnight.    Meds:  MEDICATIONS  (STANDING):  acetylcysteine 20%  Inhalation 4 milliLiter(s) Inhalation every 6 hours  albuterol/ipratropium for Nebulization 3 milliLiter(s) Nebulizer every 6 hours  atorvastatin 20 milliGRAM(s) Oral at bedtime  chlorhexidine 0.12% Liquid 15 milliLiter(s) Oral Mucosa every 12 hours  chlorhexidine 4% Liquid 1 Application(s) Topical <User Schedule>  dextrose 5%. 1000 milliLiter(s) (100 mL/Hr) IV Continuous <Continuous>  dextrose 5%. 1000 milliLiter(s) (50 mL/Hr) IV Continuous <Continuous>  dextrose 50% Injectable 25 Gram(s) IV Push once  dextrose 50% Injectable 25 Gram(s) IV Push once  dextrose 50% Injectable 12.5 Gram(s) IV Push once  enoxaparin Injectable 40 milliGRAM(s) SubCutaneous every 24 hours  fentaNYL    Injectable 50 MICROGram(s) IV Push once  glucagon  Injectable 1 milliGRAM(s) IntraMuscular once  insulin lispro (ADMELOG) corrective regimen sliding scale   SubCutaneous every 6 hours  insulin NPH human recombinant 4 Unit(s) SubCutaneous every 6 hours  methylPREDNISolone sodium succinate Injectable 125 milliGRAM(s) IV Push daily  multiple electrolytes Injection Type 1 1000 milliLiter(s) (50 mL/Hr) IV Continuous <Continuous>  pantoprazole  Injectable 40 milliGRAM(s) IV Push daily  polyethylene glycol 3350 17 Gram(s) Oral every 12 hours  senna 2 Tablet(s) Oral at bedtime    MEDICATIONS  (PRN):  acetaminophen   Oral Liquid .. 650 milliGRAM(s) Oral every 6 hours PRN Mild Pain (1 - 3)  dextrose Oral Gel 15 Gram(s) Oral once PRN Blood Glucose LESS THAN 70 milliGRAM(s)/deciliter  fentaNYL    Injectable 25 MICROGram(s) IV Push every 2 hours PRN Vent synchrony  oxyCODONE    Solution 10 milliGRAM(s) Oral every 6 hours PRN Severe Pain (7 - 10)  oxyCODONE    Solution 5 milliGRAM(s) Oral every 6 hours PRN Moderate Pain (4 - 6)    GTT:  None    PMHx/PSHx/FHx/SHx:  Myasthenia gravis with exacerbation    Handoff    MEWS Score    Myasthenia gravis    Diabetes mellitus    Hypertension    Myasthenia gravis in crisis    MG    16    [email or web address]        Allergies:  peanuts (Unknown)  No Known Drug Allergies      ROS: All systems negative except as documented in Interval history    O:  T(C): 37.1 (05-09-23 @ 11:00), Max: 37.8 (05-08-23 @ 19:00)  HR: 100 (05-09-23 @ 12:30) (50 - 137)  BP: 127/54 (05-09-23 @ 12:00) (110/57 - 172/82)  RR: 22 (05-09-23 @ 12:00) (12 - 26)  SpO2: 99% (05-09-23 @ 12:30) (96% - 100%)    Focused neurologic exam:  MS - AAO x3, speech fluent, rep/naming intact, follows commands, attn/conc/recent and remote memory/fund of knowledge WNL  CN - PERRLA, EOMI, VFF, face sens/str/hearing WNL b/l, tongue/palate midline, trap 5/5 b/l  Motor - Normal bulk/tone, 5/5 all  Sens - LT/PP intact all  DTR's - 2+ all and downgoing b/l plantar response  Coord - FtN intact b/l  Gait and station - Normal casual gait. Able to heel, toe, tandem. Romberg (-)    Pertinent labs/studies:  *** NEUROLOGY FOLLOW-UP CONSULT NOTE    RFC: Myasthenia gravis exacerbation    Interval history: No acute neurologic events overnight. Patient was able to receive PLEX second session yesterday. She reports mild improvement in symptoms. Remains intubated with episodes of tachycardia.    Meds:  MEDICATIONS  (STANDING):  acetylcysteine 20%  Inhalation 4 milliLiter(s) Inhalation every 6 hours  albuterol/ipratropium for Nebulization 3 milliLiter(s) Nebulizer every 6 hours  atorvastatin 20 milliGRAM(s) Oral at bedtime  chlorhexidine 0.12% Liquid 15 milliLiter(s) Oral Mucosa every 12 hours  chlorhexidine 4% Liquid 1 Application(s) Topical <User Schedule>  dextrose 5%. 1000 milliLiter(s) (100 mL/Hr) IV Continuous <Continuous>  dextrose 5%. 1000 milliLiter(s) (50 mL/Hr) IV Continuous <Continuous>  dextrose 50% Injectable 25 Gram(s) IV Push once  dextrose 50% Injectable 25 Gram(s) IV Push once  dextrose 50% Injectable 12.5 Gram(s) IV Push once  enoxaparin Injectable 40 milliGRAM(s) SubCutaneous every 24 hours  fentaNYL    Injectable 50 MICROGram(s) IV Push once  glucagon  Injectable 1 milliGRAM(s) IntraMuscular once  insulin lispro (ADMELOG) corrective regimen sliding scale   SubCutaneous every 6 hours  insulin NPH human recombinant 4 Unit(s) SubCutaneous every 6 hours  methylPREDNISolone sodium succinate Injectable 125 milliGRAM(s) IV Push daily  multiple electrolytes Injection Type 1 1000 milliLiter(s) (50 mL/Hr) IV Continuous <Continuous>  pantoprazole  Injectable 40 milliGRAM(s) IV Push daily  polyethylene glycol 3350 17 Gram(s) Oral every 12 hours  senna 2 Tablet(s) Oral at bedtime    MEDICATIONS  (PRN):  acetaminophen   Oral Liquid .. 650 milliGRAM(s) Oral every 6 hours PRN Mild Pain (1 - 3)  dextrose Oral Gel 15 Gram(s) Oral once PRN Blood Glucose LESS THAN 70 milliGRAM(s)/deciliter  fentaNYL    Injectable 25 MICROGram(s) IV Push every 2 hours PRN Vent synchrony  oxyCODONE    Solution 10 milliGRAM(s) Oral every 6 hours PRN Severe Pain (7 - 10)  oxyCODONE    Solution 5 milliGRAM(s) Oral every 6 hours PRN Moderate Pain (4 - 6)    GTT:  None    PMHx/PSHx/FHx/SHx:  Myasthenia gravis with exacerbation    Handoff    MEWS Score    Myasthenia gravis    Diabetes mellitus    Hypertension    Myasthenia gravis in crisis    MG    16    [email or web address]        Allergies:  peanuts (Unknown)  No Known Drug Allergies      ROS: Due to clinical condition unable to assess (MOLLY)    O:  T(C): 37.1 (05-09-23 @ 11:00), Max: 37.8 (05-08-23 @ 19:00)  HR: 100 (05-09-23 @ 12:30) (50 - 137)  BP: 127/54 (05-09-23 @ 12:00) (110/57 - 172/82)  RR: 22 (05-09-23 @ 12:00) (12 - 26)  SpO2: 99% (05-09-23 @ 12:30) (96% - 100%)    Focused neurologic exam:  MS - Intubated, not sedated, awake, attends to all stimuli b/l, no speech output but nods head appropriately to "yes"/"no" questions, follows commands. MOLLY orientation, rep/naming, attn/conc/recent and remote memory/fund of knowledge  CN - PERRL, EOM with poor upgaze but otherwise intact, R eye moderate to severe ptosis, L eye miId ptosis, VFF, face sens/str/hearing WNL b/l, tongue midline, trap 4/5 b/l. MOLLY palate. Neck extension 3+/5, neck flexion 2+/5. Spontaneous respirations - 23 on CPAP  Motor - Normal bulk/tone. BUE's proximal 4/5 -> distal 5/5, LLE 5/5, RLE proximal 4+/5 -> distal 5/5  Sens - LT/PP intact all  DTR's - Neutral b/l plantar response  Coord - FtN intact b/l  Gait and station - MOLLY    Pertinent labs/studies:  CBC with inc WBC 11.52, low H/H 11/34 and MCV WNL, otherwise essentially WNL  BMP with inc Na 149, otherwise essentially WNL  Mg WNL, Phos WNL    TSH/T3 WNL    < from: TTE Limited W or WO Ultrasound Enhancing Agent (05.06.23 @ 10:03) >   1. Normal left ventricular cavity size. The left ventricular wall thickness is normal. The left ventricular systolic function is normal. The basal inferior segment is hypokinetic.   2. There is normal left ventricular diastolic function.   3. Normal right ventricular cavity size, normal wall thickness and normal systolic function. The tricuspid annular plane systolic excursion (TAPSE) is 1.8 cm (normal >=1.7 cm).    < end of copied text >

## 2023-05-09 NOTE — PROGRESS NOTE ADULT - SUBJECTIVE AND OBJECTIVE BOX
NSCU Progress Note    Assessment/Hospital Course:    Patient KAIDEN HATCH is a 62y (1960) Malayam speaking woman with a PMHx significant for myasthenia gravis presenting to the ED for worsening difficulty with double vision, swallowing, talking, and breathing that has progressively gotten worse for the previous one week. Patient is currently on Mestinon 60 mg two tablets TID and Prednisone 10 mg PRN. Has been admitted before in Feb 2023 for MG crisis that required intubation (2/1-2/11/23) iso COVID19. Patient received 5 days of PLEX (2/2-2/10/23) followed by 5 days of IVIG (2/16-2/20/23) with symptomatic improvement. Patient's son says patient has responded best to IVIG and that it stabilizes her MG for about 2 years. At baseline, patient is on 2L NC. In the ED, patient's NIF was -50. However, patient was unable to perform VC. Pending STAT repeat NIF/VC.       24 Hour Events/Subjective:  - S/P 2 session of PLEX. No overnight events.      REVIEW OF SYSTEMS:  - negative except as above    VITALS:   - Reviewed      IMAGING/DATA:   - Reviewed          PHYSICAL EXAM:    General: intubated  CVS: RRR  Pulm: CTAB  GI: Soft, NTND  Extremities: No LE Edema  Neuro: awake, alert, oriented x 3, follows commands, EOMI, face symmetric, able to lift b/l arms AG to the elbow, RUE deltoid 3/5, distally 4/5, LUE deltoid 3/5, proximally 5/5, able to lift b/l LEs off the bed with 5/5 strength. Neck flexion 2/5. dysconjugate upward gaze, right pupil 3 mm and R, Left pupil 4 mm and R.   Patient KAIDEN HATCH is a 62y (1960) Malayam speaking woman with a PMHx significant for myasthenia gravis presenting to the ED for worsening difficulty with double vision, swallowing, talking, and breathing that has progressively gotten worse for the previous one week. Patient is currently on Mestinon 60 mg two tablets TID and Prednisone 10 mg PRN. Has been admitted before in Feb 2023 for MG crisis that required intubation (2/1-2/11/23) iso COVID19. Patient received 5 days of PLEX (2/2-2/10/23) followed by 5 days of IVIG (2/16-2/20/23) with symptomatic improvement. Patient's son says patient has responded best to IVIG and that it stabilizes her MG for about 2 years. At baseline, patient is on 2L NC. In the ED, patient's NIF was -50. However, patient was unable to perform VC. Pending STAT repeat NIF/VC.       24 Hour Events/Subjective:  - S/P 2 session of PLEX. No overnight events. Bradycardic on precedex. Tachycardic while put on CPAP. Back on full support.     REVIEW OF SYSTEMS:  - negative except as above    VITALS:   - Reviewed      IMAGING/DATA:   - Reviewed          PHYSICAL EXAM:    General: intubated  CVS: RRR  Pulm: CTAB  GI: Soft, NTND  Extremities: No LE Edema  Neuro: awake, alert, oriented x 3, follows commands, EOMI, face symmetric, able to lift b/l arms AG to the elbow, RUE deltoid 3/5, distally 4/5, LUE deltoid 3/5, proximally 5/5, able to lift b/l LEs off the bed with 5/5 strength. Neck flexion 2/5. dysconjugate upward gaze, right pupil 3 mm and R, Left pupil 4 mm and R.

## 2023-05-09 NOTE — PROGRESS NOTE ADULT - ASSESSMENT
ASSESSMENT/PLAN:    MG exacerbation sp 2 x dose of PLEX    NEURO:  - neuro checks q4h  - s/p 2 dose of PLEX  - solumedrol 125 mg daily   - precedex, fent prn  - Avoid medications that may worsen the current exacerbation including macrolides, fluoroquinolones, tetracyclines, aminoglycoside, beta-blockers, magnesium, and anti-arrhythmics (procainamide, quinidine, and lidocaine)  - pain control  - neurology following    CVS:  - MAP>65  - tte- EF 54%    PULM:  Intubated in ED 5/5 for hypercapnic resp failure  - vap bundle  - CXR- unremarkable  - ABG- unremarkable    RENAL:  RACQUEL sharma placed 5/5  - Fluids: IVL   - daily IOs  - PLex as above    GI:  - Diet: ON tf  - GI prophylaxis: PPi while intubated   - Bowel regimen: miralax, senna  - LBM 5/6    ENDO:   - FS goal 120-180  - On  ISS and NPH 4mgQ6    HEME/ONC:  - SCDs  - Chemoppx: sql    ID:  - monitor for fevers, slight leukocytosis likely related to steroid     ASSESSMENT/PLAN: MG exacerbation sp 2 x dose of PLEX    NEURO:  - neuro checks q4h  - s/p 2 dose of PLEX  - solumedrol 125 mg daily   - Precedex, fent prn  - Avoid medications that may worsen the current exacerbation including macrolides, fluoroquinolones, tetracyclines, aminoglycoside, beta-blockers, magnesium, and anti-arrhythmics (procainamide, quinidine, and lidocaine)  - pain control  - neurology following- will ask neurology input regarding duration of steroids.   - PT/OT     CVS:  - MAP>65  - TTE - EF 54%    PULM:  Intubated in ED 5/5 for hypercapneic resp failure  - Pressure support trials  - NIF/VC every 4 hours  - VAP bundle  - CXR- unremarkable  - ABG- unremarkable    RENAL:  RACQUEL sharma placed 5/5  - Na trending up, likely dehydration as the BUN is also trending up  - Will start patient on  Q6H  - daily IOs    GI:  - Diet: ON tf at GOAL   - FLuids: Plasmalyte at 50 cc/hour  - GI prophylaxis: PPi while intubated   - Bowel regimen: miralax, senna  - LBM 5/8    ENDO:   - FS goal 120-180  - On ISS and NPH 4mgQ6    HEME/ONC:  - SCDs  - Chemoppx: SQL    ID:  - monitor for fevers, slight leukocytosis likely related to steroid

## 2023-05-10 LAB
ANION GAP SERPL CALC-SCNC: 11 MMOL/L — SIGNIFICANT CHANGE UP (ref 5–17)
BASE EXCESS BLDV CALC-SCNC: -0.9 MMOL/L — SIGNIFICANT CHANGE UP (ref -2–3)
BUN SERPL-MCNC: 34 MG/DL — HIGH (ref 7–23)
CA-I BLD-SCNC: 1.14 MMOL/L — LOW (ref 1.15–1.33)
CA-I SERPL-SCNC: 1.18 MMOL/L — SIGNIFICANT CHANGE UP (ref 1.15–1.33)
CALCIUM SERPL-MCNC: 8.8 MG/DL — SIGNIFICANT CHANGE UP (ref 8.4–10.5)
CHLORIDE BLDV-SCNC: 104 MMOL/L — SIGNIFICANT CHANGE UP (ref 96–108)
CHLORIDE SERPL-SCNC: 107 MMOL/L — SIGNIFICANT CHANGE UP (ref 96–108)
CO2 BLDV-SCNC: 28 MMOL/L — HIGH (ref 22–26)
CO2 SERPL-SCNC: 26 MMOL/L — SIGNIFICANT CHANGE UP (ref 22–31)
CREAT SERPL-MCNC: 0.43 MG/DL — LOW (ref 0.5–1.3)
CULTURE RESULTS: SIGNIFICANT CHANGE UP
EGFR: 109 ML/MIN/1.73M2 — SIGNIFICANT CHANGE UP
FIBRINOGEN PPP-MCNC: 151 MG/DL — LOW (ref 200–445)
GAS PNL BLDV: 138 MMOL/L — SIGNIFICANT CHANGE UP (ref 136–145)
GAS PNL BLDV: SIGNIFICANT CHANGE UP
GLUCOSE BLDV-MCNC: 194 MG/DL — HIGH (ref 70–99)
GLUCOSE SERPL-MCNC: 124 MG/DL — HIGH (ref 70–99)
HCO3 BLDV-SCNC: 27 MMOL/L — SIGNIFICANT CHANGE UP (ref 22–29)
HCT VFR BLD CALC: 35.9 % — SIGNIFICANT CHANGE UP (ref 34.5–45)
HCT VFR BLDA CALC: 38 % — SIGNIFICANT CHANGE UP (ref 34.5–46.5)
HGB BLD CALC-MCNC: 12.6 G/DL — SIGNIFICANT CHANGE UP (ref 11.7–16.1)
HGB BLD-MCNC: 11.5 G/DL — SIGNIFICANT CHANGE UP (ref 11.5–15.5)
HOROWITZ INDEX BLDV+IHG-RTO: 40 — SIGNIFICANT CHANGE UP
INR BLD: 1 RATIO — SIGNIFICANT CHANGE UP (ref 0.88–1.16)
LACTATE BLDV-MCNC: 1.7 MMOL/L — SIGNIFICANT CHANGE UP (ref 0.5–2)
MAGNESIUM SERPL-MCNC: 2.5 MG/DL — SIGNIFICANT CHANGE UP (ref 1.6–2.6)
MCHC RBC-ENTMCNC: 28.4 PG — SIGNIFICANT CHANGE UP (ref 27–34)
MCHC RBC-ENTMCNC: 32 GM/DL — SIGNIFICANT CHANGE UP (ref 32–36)
MCV RBC AUTO: 88.6 FL — SIGNIFICANT CHANGE UP (ref 80–100)
NRBC # BLD: 0 /100 WBCS — SIGNIFICANT CHANGE UP (ref 0–0)
PCO2 BLDV: 57 MMHG — HIGH (ref 39–42)
PH BLDV: 7.28 — LOW (ref 7.32–7.43)
PHOSPHATE SERPL-MCNC: 4.4 MG/DL — SIGNIFICANT CHANGE UP (ref 2.5–4.5)
PLATELET # BLD AUTO: 206 K/UL — SIGNIFICANT CHANGE UP (ref 150–400)
PO2 BLDV: 51 MMHG — HIGH (ref 25–45)
POTASSIUM BLDV-SCNC: 4.1 MMOL/L — SIGNIFICANT CHANGE UP (ref 3.5–5.1)
POTASSIUM SERPL-MCNC: 4 MMOL/L — SIGNIFICANT CHANGE UP (ref 3.5–5.3)
POTASSIUM SERPL-SCNC: 4 MMOL/L — SIGNIFICANT CHANGE UP (ref 3.5–5.3)
PROTHROM AB SERPL-ACNC: 11.6 SEC — SIGNIFICANT CHANGE UP (ref 10.5–13.4)
RBC # BLD: 4.05 M/UL — SIGNIFICANT CHANGE UP (ref 3.8–5.2)
RBC # FLD: 14.6 % — HIGH (ref 10.3–14.5)
SAO2 % BLDV: 80.7 % — SIGNIFICANT CHANGE UP (ref 67–88)
SODIUM SERPL-SCNC: 144 MMOL/L — SIGNIFICANT CHANGE UP (ref 135–145)
SPECIMEN SOURCE: SIGNIFICANT CHANGE UP
WBC # BLD: 12.03 K/UL — HIGH (ref 3.8–10.5)
WBC # FLD AUTO: 12.03 K/UL — HIGH (ref 3.8–10.5)

## 2023-05-10 PROCEDURE — 99291 CRITICAL CARE FIRST HOUR: CPT

## 2023-05-10 PROCEDURE — 99233 SBSQ HOSP IP/OBS HIGH 50: CPT

## 2023-05-10 PROCEDURE — 71045 X-RAY EXAM CHEST 1 VIEW: CPT | Mod: 26

## 2023-05-10 PROCEDURE — 36514 APHERESIS PLASMA: CPT

## 2023-05-10 RX ORDER — SODIUM CHLORIDE 0.65 %
1 AEROSOL, SPRAY (ML) NASAL
Refills: 0 | Status: DISCONTINUED | OUTPATIENT
Start: 2023-05-10 | End: 2023-05-23

## 2023-05-10 RX ORDER — CALCIUM GLUCONATE 100 MG/ML
1 VIAL (ML) INTRAVENOUS ONCE
Refills: 0 | Status: COMPLETED | OUTPATIENT
Start: 2023-05-10 | End: 2023-05-10

## 2023-05-10 RX ORDER — INSULIN LISPRO 100/ML
1 VIAL (ML) SUBCUTANEOUS ONCE
Refills: 0 | Status: COMPLETED | OUTPATIENT
Start: 2023-05-10 | End: 2023-05-10

## 2023-05-10 RX ORDER — SODIUM CHLORIDE 9 MG/ML
1000 INJECTION, SOLUTION INTRAVENOUS
Refills: 0 | Status: DISCONTINUED | OUTPATIENT
Start: 2023-05-10 | End: 2023-05-11

## 2023-05-10 RX ORDER — HUMAN INSULIN 100 [IU]/ML
8 INJECTION, SUSPENSION SUBCUTANEOUS EVERY 6 HOURS
Refills: 0 | Status: DISCONTINUED | OUTPATIENT
Start: 2023-05-10 | End: 2023-05-16

## 2023-05-10 RX ADMIN — Medication 1 SPRAY(S): at 23:09

## 2023-05-10 RX ADMIN — HUMAN INSULIN 6 UNIT(S): 100 INJECTION, SUSPENSION SUBCUTANEOUS at 11:26

## 2023-05-10 RX ADMIN — Medication 650 MILLIGRAM(S): at 05:28

## 2023-05-10 RX ADMIN — Medication 125 MILLIGRAM(S): at 05:24

## 2023-05-10 RX ADMIN — Medication 3 MILLILITER(S): at 17:10

## 2023-05-10 RX ADMIN — Medication 650 MILLIGRAM(S): at 18:22

## 2023-05-10 RX ADMIN — Medication 3 MILLILITER(S): at 23:58

## 2023-05-10 RX ADMIN — SODIUM CHLORIDE 30 MILLILITER(S): 9 INJECTION, SOLUTION INTRAVENOUS at 08:43

## 2023-05-10 RX ADMIN — SENNA PLUS 2 TABLET(S): 8.6 TABLET ORAL at 21:18

## 2023-05-10 RX ADMIN — CHLORHEXIDINE GLUCONATE 15 MILLILITER(S): 213 SOLUTION TOPICAL at 17:52

## 2023-05-10 RX ADMIN — CHLORHEXIDINE GLUCONATE 1 APPLICATION(S): 213 SOLUTION TOPICAL at 21:38

## 2023-05-10 RX ADMIN — MUPIROCIN 1 APPLICATION(S): 20 OINTMENT TOPICAL at 17:53

## 2023-05-10 RX ADMIN — Medication 100 GRAM(S): at 08:43

## 2023-05-10 RX ADMIN — HUMAN INSULIN 6 UNIT(S): 100 INJECTION, SUSPENSION SUBCUTANEOUS at 05:23

## 2023-05-10 RX ADMIN — ATORVASTATIN CALCIUM 20 MILLIGRAM(S): 80 TABLET, FILM COATED ORAL at 21:18

## 2023-05-10 RX ADMIN — MUPIROCIN 1 APPLICATION(S): 20 OINTMENT TOPICAL at 05:23

## 2023-05-10 RX ADMIN — POLYETHYLENE GLYCOL 3350 17 GRAM(S): 17 POWDER, FOR SOLUTION ORAL at 17:52

## 2023-05-10 RX ADMIN — HUMAN INSULIN 8 UNIT(S): 100 INJECTION, SUSPENSION SUBCUTANEOUS at 18:04

## 2023-05-10 RX ADMIN — Medication 1 SPRAY(S): at 14:28

## 2023-05-10 RX ADMIN — POLYETHYLENE GLYCOL 3350 17 GRAM(S): 17 POWDER, FOR SOLUTION ORAL at 05:23

## 2023-05-10 RX ADMIN — Medication 3 MILLILITER(S): at 11:07

## 2023-05-10 RX ADMIN — CHLORHEXIDINE GLUCONATE 15 MILLILITER(S): 213 SOLUTION TOPICAL at 05:23

## 2023-05-10 RX ADMIN — SODIUM CHLORIDE 30 MILLILITER(S): 9 INJECTION, SOLUTION INTRAVENOUS at 21:18

## 2023-05-10 RX ADMIN — OXYCODONE HYDROCHLORIDE 5 MILLIGRAM(S): 5 TABLET ORAL at 10:03

## 2023-05-10 RX ADMIN — Medication 4 MILLILITER(S): at 23:58

## 2023-05-10 RX ADMIN — HUMAN INSULIN 8 UNIT(S): 100 INJECTION, SUSPENSION SUBCUTANEOUS at 23:09

## 2023-05-10 RX ADMIN — Medication 650 MILLIGRAM(S): at 06:25

## 2023-05-10 RX ADMIN — Medication 4 MILLILITER(S): at 11:07

## 2023-05-10 RX ADMIN — OXYCODONE HYDROCHLORIDE 5 MILLIGRAM(S): 5 TABLET ORAL at 11:00

## 2023-05-10 RX ADMIN — Medication 650 MILLIGRAM(S): at 19:22

## 2023-05-10 RX ADMIN — ENOXAPARIN SODIUM 40 MILLIGRAM(S): 100 INJECTION SUBCUTANEOUS at 17:52

## 2023-05-10 RX ADMIN — Medication 4 MILLILITER(S): at 17:10

## 2023-05-10 RX ADMIN — Medication 3 MILLILITER(S): at 06:04

## 2023-05-10 RX ADMIN — PANTOPRAZOLE SODIUM 40 MILLIGRAM(S): 20 TABLET, DELAYED RELEASE ORAL at 11:23

## 2023-05-10 RX ADMIN — Medication 4 MILLILITER(S): at 06:04

## 2023-05-10 RX ADMIN — Medication 4: at 11:26

## 2023-05-10 RX ADMIN — Medication 1 UNIT(S): at 18:04

## 2023-05-10 NOTE — PROGRESS NOTE ADULT - ASSESSMENT
ASSESSMENT/PLAN: MG exacerbation sp 2 x dose of PLEX    NEURO:  - neuro checks q4h  - s/p 2 dose of PLEX, today third dose of PLEX  - solumedrol 125 mg daily to be transitioned to prednisone 50 mg QD starting 5/11  - Avoid medications that may worsen the current exacerbation including macrolides, fluoroquinolones, tetracyclines, aminoglycoside, beta-blockers, magnesium, and anti-arrhythmics (procainamide, quinidine, and lidocaine)  - pain control  - neurology following  - PT/OT     CVS:  - MAP>65  - TTE - EF 54%    PULM:  Intubated in ED 5/5 for hypercapneic resp failure  - Pressure support trials  - NIF/VC every 4 hours  - VAP bundle  - CXR- unremarkable  - ABG- unremarkable    RENAL:  RACQUEL sharma placed 5/5  - Na improving  - Will start patient on  Q6H  - daily IOs    GI:  - Diet: ON tf at GOAL   - FLuids: Plasmalyte at 30 cc/hour  - GI prophylaxis: PPi while intubated   - Bowel regimen: miralax, senna  - LBM 5/8    ENDO:   - FS goal 120-180  - On ISS and NPH 4mgQ6    HEME/ONC:  - SCDs  - Chemoppx: SQL    ID:  - monitor for fevers, slight leukocytosis likely related to steroid     ASSESSMENT/PLAN: MG exacerbation sp 3 x dose of PLEX    NEURO:  - neuro checks q4h  - s/p 3 dose of PLEX, next dose 5/12  - solumedrol transitioned to prednisone 50 mg QD starting 5/11  - Avoid medications that may worsen the current exacerbation including macrolides, fluoroquinolones, tetracyclines, aminoglycoside, beta-blockers, magnesium, and anti-arrhythmics (procainamide, quinidine, and lidocaine)  - pain control  - neurology following  - PT/OT     CVS:  - MAP>65  - TTE - EF 54%    PULM:  Intubated in ED 5/5 for hypercapneic resp failure  - Pressure support trials  - NIF/VC every 4 hours  - VAP bundle  - CXR- unremarkable  - ABG- unremarkable    RENAL:  RIJ amorley placed 5/5  - Na improving  - Cont   Q6H  plasmalyte 30cc/hr  - daily IOs    GI:  - Diet: ON tf at GOAL   - FLuids: Plasmalyte at 30 cc/hour  - GI prophylaxis: PPi while intubated   - Bowel regimen: miralax, senna  - LBM 5/8    ENDO:   - FS goal 120-180  - On ISS and NPH 8mgQ6    HEME/ONC:  - SCDs  - Chemoppx: SQL    ID:  - monitor for fevers, slight leukocytosis likely related to steroids

## 2023-05-10 NOTE — PROGRESS NOTE ADULT - ASSESSMENT
Myasthenia gravis with acute exacerbation  Respiratory failure with hypercapnia  DM type 2  HTN  Leukocytosis    - Patient with acute MG exacerbation, could be triggered by progressive disease or other toxic/metabolic insult. No stable focal neurologic deficits to suggest cerebrovascular or other acute intracranial event. She has agreed to therapy. 5/9 - S/p PLEX 2/5 of sessions, showing some mild improvement  - Continue with PLEX for 5 sessions every other day, s/p 2 sessions. Next session is today  - Hold Mestinon for now  - SoluMedrol 125mg IV daily for 5 days; following this would give prednisone 50mg PO daily and likely taper by 5mg every week until 30mg  - May need IVIG following PLEX  - PT/OT as tolerated  - Ultimately patient will need better outpatient therapy to prevent relapses, either IVIG infusions, increased prednisone dose, or Soliris (or combination of some)  - Continue to address above medical problems, as you are doing  - Will continue to follow patient with you

## 2023-05-10 NOTE — PROGRESS NOTE ADULT - SUBJECTIVE AND OBJECTIVE BOX
Patient KAIDEN HATCH is a 62y (1960) Malayam speaking woman with a PMHx significant for myasthenia gravis presenting to the ED for worsening difficulty with double vision, swallowing, talking, and breathing that has progressively gotten worse for the previous one week. Patient is currently on Mestinon 60 mg two tablets TID and Prednisone 10 mg PRN. Has been admitted before in Feb 2023 for MG crisis that required intubation (2/1-2/11/23) iso COVID19. Patient received 5 days of PLEX (2/2-2/10/23) followed by 5 days of IVIG (2/16-2/20/23) with symptomatic improvement. Patient's son says patient has responded best to IVIG and that it stabilizes her MG for about 2 years. At baseline, patient is on 2L NC. In the ED, patient's NIF was -50. However, patient was unable to perform VC. Pending STAT repeat NIF/VC.       24 Hour Events/Subjective:  - S/P 2 session of PLEX. No overnight events. Today third session of PLEX.     REVIEW OF SYSTEMS:  - negative except as above    VITALS:   - Reviewed      IMAGING/DATA:   - Reviewed          PHYSICAL EXAM:    General: intubated  CVS: RRR  Pulm: CTAB  GI: Soft, NTND  Extremities: No LE Edema  Neuro: awake, alert, oriented x 3, follows commands, EOMI, face symmetric, able to lift b/l arms AG to the elbow, RUE deltoid 3/5, distally 4+/5, LUE deltoid 3/5, proximally 5/5, able to lift b/l LEs off the bed with 5/5 strength. Neck flexion 2/5. dysconjugate upward gaze, right pupil 3 mm and R, Left pupil 4 mm and R.   Patient KAIDEN HATCH is a 62y (1960) Malayam speaking woman with a PMHx significant for myasthenia gravis presenting to the ED for worsening difficulty with double vision, swallowing, talking, and breathing that has progressively gotten worse for the previous one week. Patient is currently on Mestinon 60 mg two tablets TID and Prednisone 10 mg PRN. Has been admitted before in Feb 2023 for MG crisis that required intubation (2/1-2/11/23) iso COVID19. Patient received 5 days of PLEX (2/2-2/10/23) followed by 5 days of IVIG (2/16-2/20/23) with symptomatic improvement. Patient's son says patient has responded best to IVIG and that it stabilizes her MG for about 2 years. At baseline, patient is on 2L NC. In the ED, patient's NIF was -50. However, patient was unable to perform VC. Pending STAT repeat NIF/VC.       24 Hour Events/Subjective:  - S/P 2 session of PLEX. No overnight events. Today third session of PLEX.     REVIEW OF SYSTEMS:  - negative except as above    VITALS:   - Reviewed      IMAGING/DATA:   - Reviewed    T(C): 36.9 (05-10-23 @ 07:00), Max: 37.1 (05-09-23 @ 11:00)  HR: 141 (05-10-23 @ 07:00) (52 - 141)  BP: 161/77 (05-10-23 @ 07:10) (111/53 - 186/151)  RR: 20 (05-10-23 @ 07:00) (12 - 26)  SpO2: 98% (05-10-23 @ 07:00) (96% - 100%)  05-09-23 @ 07:01  -  05-10-23 @ 07:00  --------------------------------------------------------  IN: 3910 mL / OUT: 1000 mL / NET: 2910 mL    05-10-23 @ 07:01  -  05-10-23 @ 08:21  --------------------------------------------------------  IN: 110 mL / OUT: 0 mL / NET: 110 mL    acetaminophen   Oral Liquid .. 650 milliGRAM(s) Oral every 6 hours PRN  acetylcysteine 20%  Inhalation 4 milliLiter(s) Inhalation every 6 hours  albuterol/ipratropium for Nebulization 3 milliLiter(s) Nebulizer every 6 hours  atorvastatin 20 milliGRAM(s) Oral at bedtime  chlorhexidine 0.12% Liquid 15 milliLiter(s) Oral Mucosa every 12 hours  chlorhexidine 4% Liquid 1 Application(s) Topical <User Schedule>  dextrose 5%. 1000 milliLiter(s) IV Continuous <Continuous>  dextrose 5%. 1000 milliLiter(s) IV Continuous <Continuous>  dextrose 50% Injectable 25 Gram(s) IV Push once  dextrose 50% Injectable 25 Gram(s) IV Push once  dextrose 50% Injectable 12.5 Gram(s) IV Push once  dextrose Oral Gel 15 Gram(s) Oral once PRN  enoxaparin Injectable 40 milliGRAM(s) SubCutaneous every 24 hours  fentaNYL    Injectable 25 MICROGram(s) IV Push every 2 hours PRN  fentaNYL    Injectable 50 MICROGram(s) IV Push once  glucagon  Injectable 1 milliGRAM(s) IntraMuscular once  insulin lispro (ADMELOG) corrective regimen sliding scale   SubCutaneous every 6 hours  insulin NPH human recombinant 6 Unit(s) SubCutaneous every 6 hours  methylPREDNISolone sodium succinate Injectable 125 milliGRAM(s) IV Push daily  multiple electrolytes Injection Type 1 1000 milliLiter(s) IV Continuous <Continuous>  mupirocin 2% Nasal 1 Application(s) Both Nostrils every 12 hours  oxyCODONE    Solution 5 milliGRAM(s) Oral every 6 hours PRN  oxyCODONE    Solution 10 milliGRAM(s) Oral every 6 hours PRN  pantoprazole  Injectable 40 milliGRAM(s) IV Push daily  polyethylene glycol 3350 17 Gram(s) Oral every 12 hours  senna 2 Tablet(s) Oral at bedtime  Mode: CPAP with PS, FiO2: 40, PEEP: 5, PS: 10      PHYSICAL EXAM:    General: intubated  CVS: RRR  Pulm: CTAB  GI: Soft, NTND  Extremities: No LE Edema  Neuro: awake, alert, oriented x 3, follows commands, EOMI, face symmetric, able to lift b/l arms AG to the elbow, RUE deltoid 3/5, distally 4+/5, LUE deltoid 3/5, proximally 5/5, able to lift b/l LEs off the bed with 5/5 strength. Neck flexion 2/5. dysconjugate upward gaze, right pupil 3 mm and R, Left pupil 4 mm and R.

## 2023-05-10 NOTE — CHART NOTE - NSCHARTNOTEFT_GEN_A_CORE
63 yo woman with DM and Myasthenia gravis s/p thymectomy 2010, with prior exacerbations requiring intubation most recently in late Jan 2023 requiring intubation and PLEX, on 2L O2 at baseline, now readmitted 5/5 with 1 week of diplopia, dysphagia and SOB requiring intubation.    PLEX#1 performed on 5/6/23, 1PV with 5% albumin as replacement fluid. Procedure tolerated well.    PLEX#2 performed on 5/8/23, 1PV with 5% albumin as replacement fluid. Procedure tolerated well.    PLEX 3# performed on 5/10/23. Labs reviewed. 1PV with 5% albumin as replacement fluid. Procedure tolerated well.    PLEX#4 scheduled on 5/12/23. Please monitor pt CBC, Coag, fibrinogen and iCa prior to the procedure. 61 yo woman with DM and Myasthenia gravis s/p thymectomy 2010, with prior exacerbations requiring intubation most recently in late Jan 2023 requiring intubation and PLEX, on 2L O2 at baseline, now readmitted 5/5 with 1 week of diplopia, dysphagia and SOB requiring intubation.    PLEX#1 performed on 5/6/23, 1PV with 5% albumin as replacement fluid. Procedure tolerated well.    PLEX#2 performed on 5/8/23, 1PV with 5% albumin as replacement fluid. Procedure tolerated well.    PLEX 3# performed on 5/10/23. Labs reviewed. 1PV with 5% albumin as replacement fluid. Procedure tolerated well.    PLEX#4 scheduled on 5/12/23. Please monitor pt CBC, Coag, fibrinogen and iCa prior to the procedure.

## 2023-05-10 NOTE — PROGRESS NOTE ADULT - SUBJECTIVE AND OBJECTIVE BOX
NEUROLOGY FOLLOW-UP CONSULT NOTE    RFC: Myasthenia gravis exacerbation    Interval history: No acute neurologic events overnight. Patient is pending PLEX session today. She reports continued improvement in symptoms. Remains intubated with episodes of tachycardia.    Meds:  MEDICATIONS  (STANDING):  acetylcysteine 20%  Inhalation 4 milliLiter(s) Inhalation every 6 hours  albuterol/ipratropium for Nebulization 3 milliLiter(s) Nebulizer every 6 hours  atorvastatin 20 milliGRAM(s) Oral at bedtime  chlorhexidine 0.12% Liquid 15 milliLiter(s) Oral Mucosa every 12 hours  chlorhexidine 4% Liquid 1 Application(s) Topical <User Schedule>  dextrose 5%. 1000 milliLiter(s) (100 mL/Hr) IV Continuous <Continuous>  dextrose 5%. 1000 milliLiter(s) (50 mL/Hr) IV Continuous <Continuous>  dextrose 50% Injectable 25 Gram(s) IV Push once  dextrose 50% Injectable 25 Gram(s) IV Push once  dextrose 50% Injectable 12.5 Gram(s) IV Push once  enoxaparin Injectable 40 milliGRAM(s) SubCutaneous every 24 hours  fentaNYL    Injectable 50 MICROGram(s) IV Push once  glucagon  Injectable 1 milliGRAM(s) IntraMuscular once  insulin lispro (ADMELOG) corrective regimen sliding scale   SubCutaneous every 6 hours  insulin NPH human recombinant 6 Unit(s) SubCutaneous every 6 hours  multiple electrolytes Injection Type 1 1000 milliLiter(s) (30 mL/Hr) IV Continuous <Continuous>  mupirocin 2% Nasal 1 Application(s) Both Nostrils every 12 hours  pantoprazole  Injectable 40 milliGRAM(s) IV Push daily  polyethylene glycol 3350 17 Gram(s) Oral every 12 hours  senna 2 Tablet(s) Oral at bedtime    MEDICATIONS  (PRN):  acetaminophen   Oral Liquid .. 650 milliGRAM(s) Oral every 6 hours PRN Mild Pain (1 - 3)  dextrose Oral Gel 15 Gram(s) Oral once PRN Blood Glucose LESS THAN 70 milliGRAM(s)/deciliter  fentaNYL    Injectable 25 MICROGram(s) IV Push every 2 hours PRN Vent synchrony  oxyCODONE    Solution 10 milliGRAM(s) Oral every 6 hours PRN Severe Pain (7 - 10)  oxyCODONE    Solution 5 milliGRAM(s) Oral every 6 hours PRN Moderate Pain (4 - 6)  sodium chloride 0.65% Nasal 1 Spray(s) Both Nostrils four times a day PRN Nasal Congestion    GTT:  None    PMHx/PSHx/FHx/SHx:  Myasthenia gravis with exacerbation    Handoff    MEWS Score    Myasthenia gravis    Diabetes mellitus    Hypertension    Myasthenia gravis in crisis    MG    16    [email or web address]        Allergies:  peanuts (Unknown)  No Known Drug Allergies      ROS: All systems negative except as documented in Interval history    O:  T(C): 37.1 (05-10-23 @ 11:00), Max: 37.1 (05-10-23 @ 03:00)  HR: 84 (05-10-23 @ 16:00) (52 - 141)  BP: 133/71 (05-10-23 @ 16:00) (121/71 - 186/151)  RR: 16 (05-10-23 @ 16:00) (14 - 25)  SpO2: 95% (05-10-23 @ 16:00) (95% - 100%)    Focused neurologic exam:  MS - Intubated, not sedated, awake, attends to all stimuli b/l, no speech output but nods head appropriately to "yes"/"no" questions, follows commands. Due to clinical condition unable to assess (MOLLY) orientation, rep/naming, attn/conc/recent and remote memory/fund of knowledge  CN - PERRL, EOM with poor upgaze but otherwise intact, R eye moderate to severe ptosis, L eye miId ptosis, VFF, face sens/str/hearing WNL b/l, tongue midline, trap 4/5 b/l. MOLLY palate. Neck extension 4+/5, neck flexion 3/5. Spontaneous respirations - 19 on CPAP  Motor - Normal bulk/tone. RUE proximal 4/5 -> distal 5/5, LUE 5/5, LLE 5/5, RLE proximal 4+/5 -> distal 5/5  Sens - LT/PP intact all  DTR's - Neutral b/l plantar response  Coord - FtN intact b/l  Gait and station - MOLLY    Pertinent labs/studies:  CBC with inc WBC 12.03, otherwise essentially WNL  BMP essentially WNL  Mg WNL, Phos WNL    TSH/T3 WNL    < from: TTE Limited W or WO Ultrasound Enhancing Agent (05.06.23 @ 10:03) >   1. Normal left ventricular cavity size. The left ventricular wall thickness is normal. The left ventricular systolic function is normal. The basal inferior segment is hypokinetic.   2. There is normal left ventricular diastolic function.   3. Normal right ventricular cavity size, normal wall thickness and normal systolic function. The tricuspid annular plane systolic excursion (TAPSE) is 1.8 cm (normal >=1.7 cm).    < end of copied text >

## 2023-05-10 NOTE — PROGRESS NOTE ADULT - SUBJECTIVE AND OBJECTIVE BOX
Patient KAIDEN HATCH is a 62y (1960) Malayam speaking woman with a PMHx significant for myasthenia gravis presenting to the ED for worsening difficulty with double vision, swallowing, talking, and breathing that has progressively gotten worse for the previous one week. Patient is currently on Mestinon 60 mg two tablets TID and Prednisone 10 mg PRN. Has been admitted before in Feb 2023 for MG crisis that required intubation (2/1-2/11/23) iso COVID19. Patient received 5 days of PLEX (2/2-2/10/23) followed by 5 days of IVIG (2/16-2/20/23) with symptomatic improvement. Patient's son says patient has responded best to IVIG and that it stabilizes her MG for about 2 years. At baseline, patient is on 2L NC. In the ED, patient's NIF was -50. However, patient was unable to perform VC. Pending STAT repeat NIF/VC.       24 Hour Events/Subjective:  - S/P 2 session of PLEX. No overnight events. Today third session of PLEX.     REVIEW OF SYSTEMS:  - negative except as above    VITALS:   - Reviewed      IMAGING/DATA:   - Reviewed    T(C): 36.9 (05-10-23 @ 07:00), Max: 37.1 (05-09-23 @ 11:00)  HR: 141 (05-10-23 @ 07:00) (52 - 141)  BP: 161/77 (05-10-23 @ 07:10) (111/53 - 186/151)  RR: 20 (05-10-23 @ 07:00) (12 - 26)  SpO2: 98% (05-10-23 @ 07:00) (96% - 100%)  05-09-23 @ 07:01  -  05-10-23 @ 07:00  --------------------------------------------------------  IN: 3910 mL / OUT: 1000 mL / NET: 2910 mL    05-10-23 @ 07:01  -  05-10-23 @ 08:21  --------------------------------------------------------  IN: 110 mL / OUT: 0 mL / NET: 110 mL    acetaminophen   Oral Liquid .. 650 milliGRAM(s) Oral every 6 hours PRN  acetylcysteine 20%  Inhalation 4 milliLiter(s) Inhalation every 6 hours  albuterol/ipratropium for Nebulization 3 milliLiter(s) Nebulizer every 6 hours  atorvastatin 20 milliGRAM(s) Oral at bedtime  chlorhexidine 0.12% Liquid 15 milliLiter(s) Oral Mucosa every 12 hours  chlorhexidine 4% Liquid 1 Application(s) Topical <User Schedule>  dextrose 5%. 1000 milliLiter(s) IV Continuous <Continuous>  dextrose 5%. 1000 milliLiter(s) IV Continuous <Continuous>  dextrose 50% Injectable 25 Gram(s) IV Push once  dextrose 50% Injectable 25 Gram(s) IV Push once  dextrose 50% Injectable 12.5 Gram(s) IV Push once  dextrose Oral Gel 15 Gram(s) Oral once PRN  enoxaparin Injectable 40 milliGRAM(s) SubCutaneous every 24 hours  fentaNYL    Injectable 25 MICROGram(s) IV Push every 2 hours PRN  fentaNYL    Injectable 50 MICROGram(s) IV Push once  glucagon  Injectable 1 milliGRAM(s) IntraMuscular once  insulin lispro (ADMELOG) corrective regimen sliding scale   SubCutaneous every 6 hours  insulin NPH human recombinant 6 Unit(s) SubCutaneous every 6 hours  methylPREDNISolone sodium succinate Injectable 125 milliGRAM(s) IV Push daily  multiple electrolytes Injection Type 1 1000 milliLiter(s) IV Continuous <Continuous>  mupirocin 2% Nasal 1 Application(s) Both Nostrils every 12 hours  oxyCODONE    Solution 5 milliGRAM(s) Oral every 6 hours PRN  oxyCODONE    Solution 10 milliGRAM(s) Oral every 6 hours PRN  pantoprazole  Injectable 40 milliGRAM(s) IV Push daily  polyethylene glycol 3350 17 Gram(s) Oral every 12 hours  senna 2 Tablet(s) Oral at bedtime  Mode: CPAP with PS, FiO2: 40, PEEP: 5, PS: 10      PHYSICAL EXAM:    General: intubated  CVS: RRR  Pulm: CTAB  GI: Soft, NTND  Extremities: No LE Edema  Neuro: awake, alert, oriented x 3, follows commands, EOMI, face symmetric, able to lift b/l arms AG to the elbow, RUE deltoid 3/5, distally 4+/5, LUE deltoid 3/5, proximally 5/5, able to lift b/l LEs off the bed with 5/5 strength. Neck flexion 2/5. dysconjugate upward gaze, right pupil 3 mm and R, Left pupil 4 mm and R.   Patient KAIDEN HATCH is a 62y (1960) Malayam speaking woman with a PMHx significant for myasthenia gravis presenting to the ED for worsening difficulty with double vision, swallowing, talking, and breathing that has progressively gotten worse for the previous one week. Patient is currently on Mestinon 60 mg two tablets TID and Prednisone 10 mg PRN. Has been admitted before in Feb 2023 for MG crisis that required intubation (2/1-2/11/23) iso COVID19. Patient received 5 days of PLEX (2/2-2/10/23) followed by 5 days of IVIG (2/16-2/20/23) with symptomatic improvement. Patient's son says patient has responded best to IVIG and that it stabilizes her MG for about 2 years. At baseline, patient is on 2L NC. In the ED, patient's NIF was -50. However, patient was unable to perform VC. Pending STAT repeat NIF/VC.       24 Hour Events/Subjective:  - S/P 3 sessions of PLEX.   Hyperglycemic    REVIEW OF SYSTEMS:  - negative except as above    VITALS:   - Reviewed      IMAGING/DATA:   - Reviewed      PHYSICAL EXAM:    General: intubated  CVS: RRR  Pulm: CTAB  GI: Soft, NTND  Extremities: No LE Edema  Neuro: awake, alert, oriented x 3, follows commands, EOMI, face symmetric, able to lift b/l arms AG to the elbow, RUE deltoid 3/5, distally 4+/5, LUE deltoid 3/5, proximally 5/5, able to lift b/l LEs off the bed with 5/5 strength. Neck flexion 2/5. dysconjugate upward gaze, right pupil 3 mm and R, Left pupil 4 mm and R.   Patient KAIDEN HATCH is a 62y (1960) Malayam speaking woman with a PMHx significant for myasthenia gravis presenting to the ED for worsening difficulty with double vision, swallowing, talking, and breathing that has progressively gotten worse for the previous one week. Patient is currently on Mestinon 60 mg two tablets TID and Prednisone 10 mg PRN. Has been admitted before in Feb 2023 for MG crisis that required intubation (2/1-2/11/23) iso COVID19. Patient received 5 days of PLEX (2/2-2/10/23) followed by 5 days of IVIG (2/16-2/20/23) with symptomatic improvement. Patient's son says patient has responded best to IVIG and that it stabilizes her MG for about 2 years. At baseline, patient is on 2L NC. In the ED, patient's NIF was -50. However, patient was unable to perform VC. Pending STAT repeat NIF/VC.       24 Hour Events/Subjective:  - S/P 3 sessions of PLEX.   Hyperglycemic    REVIEW OF SYSTEMS:  - negative except as above    VITALS:   - Reviewed      IMAGING/DATA:   - Reviewed      PHYSICAL EXAM:    General: intubated  CVS: RRR  Pulm: CTAB  GI: Soft, NTND  Extremities: No LE Edema  Neuro: awake, alert, oriented x 3 w choices, follows commands, EOMI w poor upgaze, face symmetric, able to lift b/l arms AG to the elbow, RUE deltoid 3/5, distally 4+/5, LUE deltoid 3/5, proximally 5/5, able to lift b/l LEs off the bed with 5/5 strength. Neck flexion 3/5.

## 2023-05-10 NOTE — PROGRESS NOTE ADULT - ASSESSMENT
ASSESSMENT/PLAN: MG exacerbation sp 2 x dose of PLEX    NEURO:  - neuro checks q4h  - s/p 2 dose of PLEX, today third dose of PLEX  - solumedrol 125 mg daily to be transitioned to prednisone 50 mg QD starting 5/11  - Avoid medications that may worsen the current exacerbation including macrolides, fluoroquinolones, tetracyclines, aminoglycoside, beta-blockers, magnesium, and anti-arrhythmics (procainamide, quinidine, and lidocaine)  - pain control  - neurology following  - PT/OT     CVS:  - MAP>65  - TTE - EF 54%    PULM:  Intubated in ED 5/5 for hypercapneic resp failure  - Pressure support trials  - NIF/VC every 4 hours  - VAP bundle  - CXR- unremarkable  - ABG- unremarkable    RENAL:  RACQUEL sharma placed 5/5  - Na improving  - Will start patient on  Q6H  - daily IOs    GI:  - Diet: ON tf at GOAL   - FLuids: Plasmalyte at 30 cc/hour  - GI prophylaxis: PPi while intubated   - Bowel regimen: miralax, senna  - LBM 5/8    ENDO:   - FS goal 120-180  - On ISS and NPH 4mgQ6    HEME/ONC:  - SCDs  - Chemoppx: SQL    ID:  - monitor for fevers, slight leukocytosis likely related to steroid

## 2023-05-11 LAB
ANION GAP SERPL CALC-SCNC: 7 MMOL/L — SIGNIFICANT CHANGE UP (ref 5–17)
BUN SERPL-MCNC: 27 MG/DL — HIGH (ref 7–23)
BUN SERPL-MCNC: 28 MG/DL — HIGH (ref 7–23)
CALCIUM SERPL-MCNC: 8.4 MG/DL — SIGNIFICANT CHANGE UP (ref 8.4–10.5)
CALCIUM SERPL-MCNC: 8.5 MG/DL — SIGNIFICANT CHANGE UP (ref 8.4–10.5)
CHLORIDE SERPL-SCNC: 104 MMOL/L — SIGNIFICANT CHANGE UP (ref 96–108)
CHLORIDE SERPL-SCNC: 107 MMOL/L — SIGNIFICANT CHANGE UP (ref 96–108)
CO2 SERPL-SCNC: 27 MMOL/L — SIGNIFICANT CHANGE UP (ref 22–31)
CO2 SERPL-SCNC: 30 MMOL/L — SIGNIFICANT CHANGE UP (ref 22–31)
CREAT SERPL-MCNC: 0.39 MG/DL — LOW (ref 0.5–1.3)
CREAT SERPL-MCNC: 0.43 MG/DL — LOW (ref 0.5–1.3)
EGFR: 109 ML/MIN/1.73M2 — SIGNIFICANT CHANGE UP
EGFR: 112 ML/MIN/1.73M2 — SIGNIFICANT CHANGE UP
GAS PNL BLDA: SIGNIFICANT CHANGE UP
GLUCOSE SERPL-MCNC: 107 MG/DL — HIGH (ref 70–99)
GLUCOSE SERPL-MCNC: 120 MG/DL — HIGH (ref 70–99)
HCT VFR BLD CALC: 30.4 % — LOW (ref 34.5–45)
HCT VFR BLD CALC: 33.2 % — LOW (ref 34.5–45)
HGB BLD-MCNC: 10.9 G/DL — LOW (ref 11.5–15.5)
HGB BLD-MCNC: 9.7 G/DL — LOW (ref 11.5–15.5)
MAGNESIUM SERPL-MCNC: 2.3 MG/DL — SIGNIFICANT CHANGE UP (ref 1.6–2.6)
MAGNESIUM SERPL-MCNC: 2.5 MG/DL — SIGNIFICANT CHANGE UP (ref 1.6–2.6)
MCHC RBC-ENTMCNC: 28.3 PG — SIGNIFICANT CHANGE UP (ref 27–34)
MCHC RBC-ENTMCNC: 28.8 PG — SIGNIFICANT CHANGE UP (ref 27–34)
MCHC RBC-ENTMCNC: 31.9 GM/DL — LOW (ref 32–36)
MCHC RBC-ENTMCNC: 32.8 GM/DL — SIGNIFICANT CHANGE UP (ref 32–36)
MCV RBC AUTO: 87.8 FL — SIGNIFICANT CHANGE UP (ref 80–100)
MCV RBC AUTO: 88.6 FL — SIGNIFICANT CHANGE UP (ref 80–100)
NRBC # BLD: 0 /100 WBCS — SIGNIFICANT CHANGE UP (ref 0–0)
PHOSPHATE SERPL-MCNC: 3 MG/DL — SIGNIFICANT CHANGE UP (ref 2.5–4.5)
PHOSPHATE SERPL-MCNC: 3.9 MG/DL — SIGNIFICANT CHANGE UP (ref 2.5–4.5)
PLATELET # BLD AUTO: 180 K/UL — SIGNIFICANT CHANGE UP (ref 150–400)
PLATELET # BLD AUTO: 188 K/UL — SIGNIFICANT CHANGE UP (ref 150–400)
POTASSIUM SERPL-MCNC: 3.6 MMOL/L — SIGNIFICANT CHANGE UP (ref 3.5–5.3)
POTASSIUM SERPL-MCNC: 3.8 MMOL/L — SIGNIFICANT CHANGE UP (ref 3.5–5.3)
POTASSIUM SERPL-SCNC: 3.6 MMOL/L — SIGNIFICANT CHANGE UP (ref 3.5–5.3)
POTASSIUM SERPL-SCNC: 3.8 MMOL/L — SIGNIFICANT CHANGE UP (ref 3.5–5.3)
RBC # BLD: 3.43 M/UL — LOW (ref 3.8–5.2)
RBC # BLD: 3.78 M/UL — LOW (ref 3.8–5.2)
RBC # FLD: 14.5 % — SIGNIFICANT CHANGE UP (ref 10.3–14.5)
SODIUM SERPL-SCNC: 141 MMOL/L — SIGNIFICANT CHANGE UP (ref 135–145)
WBC # BLD: 13.92 K/UL — HIGH (ref 3.8–10.5)
WBC # BLD: 16.9 K/UL — HIGH (ref 3.8–10.5)
WBC # FLD AUTO: 13.92 K/UL — HIGH (ref 3.8–10.5)
WBC # FLD AUTO: 16.9 K/UL — HIGH (ref 3.8–10.5)

## 2023-05-11 PROCEDURE — 99233 SBSQ HOSP IP/OBS HIGH 50: CPT

## 2023-05-11 PROCEDURE — 99291 CRITICAL CARE FIRST HOUR: CPT

## 2023-05-11 PROCEDURE — 71045 X-RAY EXAM CHEST 1 VIEW: CPT | Mod: 26

## 2023-05-11 RX ORDER — TETRACAINE/BENZOCAINE/BUTAMBEN 2%-14%-2%
1 OINTMENT (GRAM) TOPICAL ONCE
Refills: 0 | Status: COMPLETED | OUTPATIENT
Start: 2023-05-11 | End: 2023-05-11

## 2023-05-11 RX ORDER — LORATADINE 10 MG/1
10 TABLET ORAL ONCE
Refills: 0 | Status: COMPLETED | OUTPATIENT
Start: 2023-05-11 | End: 2023-05-11

## 2023-05-11 RX ORDER — POTASSIUM CHLORIDE 20 MEQ
40 PACKET (EA) ORAL ONCE
Refills: 0 | Status: COMPLETED | OUTPATIENT
Start: 2023-05-11 | End: 2023-05-11

## 2023-05-11 RX ORDER — DEXMEDETOMIDINE HYDROCHLORIDE IN 0.9% SODIUM CHLORIDE 4 UG/ML
0.2 INJECTION INTRAVENOUS
Qty: 200 | Refills: 0 | Status: DISCONTINUED | OUTPATIENT
Start: 2023-05-11 | End: 2023-05-13

## 2023-05-11 RX ADMIN — Medication 50 MILLIGRAM(S): at 05:20

## 2023-05-11 RX ADMIN — SENNA PLUS 2 TABLET(S): 8.6 TABLET ORAL at 21:21

## 2023-05-11 RX ADMIN — Medication 650 MILLIGRAM(S): at 17:54

## 2023-05-11 RX ADMIN — LORATADINE 10 MILLIGRAM(S): 10 TABLET ORAL at 19:20

## 2023-05-11 RX ADMIN — PANTOPRAZOLE SODIUM 40 MILLIGRAM(S): 20 TABLET, DELAYED RELEASE ORAL at 11:16

## 2023-05-11 RX ADMIN — Medication 3 MILLILITER(S): at 05:45

## 2023-05-11 RX ADMIN — ATORVASTATIN CALCIUM 20 MILLIGRAM(S): 80 TABLET, FILM COATED ORAL at 21:21

## 2023-05-11 RX ADMIN — Medication 2: at 17:05

## 2023-05-11 RX ADMIN — Medication 650 MILLIGRAM(S): at 18:40

## 2023-05-11 RX ADMIN — Medication 3 MILLILITER(S): at 11:54

## 2023-05-11 RX ADMIN — Medication 3 MILLILITER(S): at 17:02

## 2023-05-11 RX ADMIN — CHLORHEXIDINE GLUCONATE 15 MILLILITER(S): 213 SOLUTION TOPICAL at 05:20

## 2023-05-11 RX ADMIN — Medication 1 SPRAY(S): at 19:19

## 2023-05-11 RX ADMIN — HUMAN INSULIN 8 UNIT(S): 100 INJECTION, SUSPENSION SUBCUTANEOUS at 11:17

## 2023-05-11 RX ADMIN — CHLORHEXIDINE GLUCONATE 15 MILLILITER(S): 213 SOLUTION TOPICAL at 17:05

## 2023-05-11 RX ADMIN — ENOXAPARIN SODIUM 40 MILLIGRAM(S): 100 INJECTION SUBCUTANEOUS at 17:04

## 2023-05-11 RX ADMIN — HUMAN INSULIN 8 UNIT(S): 100 INJECTION, SUSPENSION SUBCUTANEOUS at 05:20

## 2023-05-11 RX ADMIN — MUPIROCIN 1 APPLICATION(S): 20 OINTMENT TOPICAL at 17:05

## 2023-05-11 RX ADMIN — Medication 650 MILLIGRAM(S): at 03:58

## 2023-05-11 RX ADMIN — Medication 650 MILLIGRAM(S): at 04:58

## 2023-05-11 RX ADMIN — Medication 4 MILLILITER(S): at 11:55

## 2023-05-11 RX ADMIN — Medication 4 MILLILITER(S): at 05:45

## 2023-05-11 RX ADMIN — Medication 650 MILLIGRAM(S): at 11:05

## 2023-05-11 RX ADMIN — Medication 1 SPRAY(S): at 12:49

## 2023-05-11 RX ADMIN — Medication 4 MILLILITER(S): at 17:03

## 2023-05-11 RX ADMIN — POLYETHYLENE GLYCOL 3350 17 GRAM(S): 17 POWDER, FOR SOLUTION ORAL at 05:20

## 2023-05-11 RX ADMIN — CHLORHEXIDINE GLUCONATE 1 APPLICATION(S): 213 SOLUTION TOPICAL at 21:25

## 2023-05-11 RX ADMIN — Medication 650 MILLIGRAM(S): at 12:00

## 2023-05-11 RX ADMIN — Medication 40 MILLIEQUIVALENT(S): at 23:58

## 2023-05-11 RX ADMIN — HUMAN INSULIN 8 UNIT(S): 100 INJECTION, SUSPENSION SUBCUTANEOUS at 17:05

## 2023-05-11 RX ADMIN — Medication 4: at 11:17

## 2023-05-11 RX ADMIN — MUPIROCIN 1 APPLICATION(S): 20 OINTMENT TOPICAL at 05:20

## 2023-05-11 NOTE — PROGRESS NOTE ADULT - SUBJECTIVE AND OBJECTIVE BOX
Patient KAIDEN HATCH is a 62y (1960) Malayam speaking woman with a PMHx significant for myasthenia gravis presenting to the ED for worsening difficulty with double vision, swallowing, talking, and breathing that has progressively gotten worse for the previous one week. Patient is currently on Mestinon 60 mg two tablets TID and Prednisone 10 mg PRN. Has been admitted before in Feb 2023 for MG crisis that required intubation (2/1-2/11/23) iso COVID19. Patient received 5 days of PLEX (2/2-2/10/23) followed by 5 days of IVIG (2/16-2/20/23) with symptomatic improvement. Patient's son says patient has responded best to IVIG and that it stabilizes her MG for about 2 years. At baseline, patient is on 2L NC. In the ED, patient's NIF was -50. However, patient was unable to perform VC. Pending STAT repeat NIF/VC.       24 Hour Events/Subjective:  - S/P 3 sessions of PLEX.   Hyperglycemic    REVIEW OF SYSTEMS:  - negative except as above    VITALS:   - Reviewed      IMAGING/DATA:   - Reviewed      PHYSICAL EXAM:    General: intubated, increase secretions, looks uncomfortable and with decreased O2 saturation. Put back on pressure support.  CVS: RRR  Pulm: CTAB  GI: Soft, NTND  Extremities: No LE Edema  Neuro: awake, alert, oriented x 3 w choices, follows commands, EOMI w poor upgaze, face symmetric, able to lift b/l arms AG to the elbow, RUE deltoid 4- /5, distally 5/5, LUE deltoid 4-/5, proximally 5/5, able to lift b/l LEs off the bed with 5/5 strength. Neck flexion 4-/5.    Patient KAIDEN HATCH is a 62y WITH History of MG presents with exacerbation.    24 Hour Events/Subjective:  - S/P 3 sessions of PLEX.   - in the AM patient was hypercapnic on CPAP, was placed back on full support, reports fatigue.     ICU Vital Signs Last 24 Hrs  T(C): 37 (11 May 2023 07:00), Max: 37.1 (10 May 2023 11:00)  T(F): 98.6 (11 May 2023 07:00), Max: 98.8 (10 May 2023 11:00)  HR: 115 (11 May 2023 07:30) (55 - 140)  BP: 150/75 (11 May 2023 07:30) (99/53 - 187/73)  BP(mean): 96 (11 May 2023 07:30) (68 - 109)  ABP: --  ABP(mean): --  RR: 20 (11 May 2023 07:30) (14 - 24)  SpO2: 93% (11 May 2023 07:30) (93% - 100%)    O2 Parameters below as of 11 May 2023 05:45  Patient On (Oxygen Delivery Method): ventilator        MEDICATIONS  (STANDING):  acetylcysteine 20%  Inhalation 4 milliLiter(s) Inhalation every 6 hours  albuterol/ipratropium for Nebulization 3 milliLiter(s) Nebulizer every 6 hours  atorvastatin 20 milliGRAM(s) Oral at bedtime  chlorhexidine 0.12% Liquid 15 milliLiter(s) Oral Mucosa every 12 hours  chlorhexidine 4% Liquid 1 Application(s) Topical <User Schedule>  dexMEDEtomidine Infusion 0.2 MICROgram(s)/kG/Hr (3.75 mL/Hr) IV Continuous <Continuous>  dextrose 5%. 1000 milliLiter(s) (100 mL/Hr) IV Continuous <Continuous>  dextrose 5%. 1000 milliLiter(s) (50 mL/Hr) IV Continuous <Continuous>  dextrose 50% Injectable 25 Gram(s) IV Push once  dextrose 50% Injectable 25 Gram(s) IV Push once  dextrose 50% Injectable 12.5 Gram(s) IV Push once  enoxaparin Injectable 40 milliGRAM(s) SubCutaneous every 24 hours  fentaNYL    Injectable 50 MICROGram(s) IV Push once  glucagon  Injectable 1 milliGRAM(s) IntraMuscular once  insulin lispro (ADMELOG) corrective regimen sliding scale   SubCutaneous every 6 hours  insulin NPH human recombinant 8 Unit(s) SubCutaneous every 6 hours  multiple electrolytes Injection Type 1 1000 milliLiter(s) (30 mL/Hr) IV Continuous <Continuous>  mupirocin 2% Nasal 1 Application(s) Both Nostrils every 12 hours  pantoprazole  Injectable 40 milliGRAM(s) IV Push daily  polyethylene glycol 3350 17 Gram(s) Oral every 12 hours  predniSONE   Tablet 50 milliGRAM(s) Oral daily  senna 2 Tablet(s) Oral at bedtime    MEDICATIONS  (PRN):  acetaminophen   Oral Liquid .. 650 milliGRAM(s) Oral every 6 hours PRN Mild Pain (1 - 3)  bisacodyl Suppository 10 milliGRAM(s) Rectal daily PRN Constipation  dextrose Oral Gel 15 Gram(s) Oral once PRN Blood Glucose LESS THAN 70 milliGRAM(s)/deciliter  fentaNYL    Injectable 25 MICROGram(s) IV Push every 2 hours PRN Vent synchrony  oxyCODONE    Solution 10 milliGRAM(s) Oral every 6 hours PRN Severe Pain (7 - 10)  oxyCODONE    Solution 5 milliGRAM(s) Oral every 6 hours PRN Moderate Pain (4 - 6)  sodium chloride 0.65% Nasal 1 Spray(s) Both Nostrils four times a day PRN Nasal Congestion      REVIEW OF SYSTEMS:  - negative except as above    VITALS:   - Reviewed      IMAGING/DATA:   - Reviewed      PHYSICAL EXAM:    General: intubated, increase secretions, looks uncomfortable and with decreased O2 saturation. Put back on pressure support.  CVS: RRR  Pulm: CTAB  GI: Soft, NTND  Extremities: No LE Edema  Neuro: awake, alert, oriented x 3 w choices, follows commands, EOMI w poor upgaze, face symmetric, able to lift b/l arms AG to the elbow, RUE deltoid 4- /5, distally 5/5, LUE deltoid 4-/5, proximally 5/5, able to lift b/l LEs off the bed with 5/5 strength. Neck flexion 4-/5.

## 2023-05-11 NOTE — PROGRESS NOTE ADULT - ASSESSMENT
ASSESSMENT/PLAN: MG exacerbation sp 3 x dose of PLEX    NEURO:  - neuro checks q4h  - s/p 3 dose of PLEX, next dose 5/12  - solumedrol transitioned to prednisone 50 mg QD starting TODAY  - Avoid medications that may worsen the current exacerbation including macrolides, fluoroquinolones, tetracyclines, aminoglycoside, beta-blockers, magnesium, and anti-arrhythmics (procainamide, quinidine, and lidocaine)  - pain control  - neurology following  - PT/OT     CVS:  - MAP>65  - TTE - EF 54%    PULM:  Intubated in ED 5/5 for hypercapneic resp failure  - Pressure support trials  - NIF/VC every 4 hours  - VAP bundle  - CXR- unremarkable  - ABG- unremarkable    RENAL:  RISIERRA sharma placed 5/5  - Na improving  - Cont   Q6H  plasmalyte 30cc/hr  - daily IOs    GI:  - Diet: ON tf at GOAL   - FLuids: Plasmalyte at 30 cc/hour  - GI prophylaxis: PPi while intubated   - Bowel regimen: miralax, senna  - LBM 5/8    ENDO:   - FS goal 120-180  - On ISS and NPH 8mgQ6    HEME/ONC:  - SCDs  - Chemoppx: SQL    ID:  - monitor for fevers, slight leukocytosis likely related to steroids     ASSESSMENT/PLAN: MG exacerbation sp 3 x dose of PLEX    NEURO:  - neuro checks q4h  - s/p 3 dose of PLEX, next dose 5/12  - solumedrol transitioned to prednisone 50 mg QD starting 5/11  - Avoid medications that may worsen the current exacerbation including macrolides, fluoroquinolones, tetracyclines, aminoglycoside, beta-blockers, magnesium, and anti-arrhythmics (procainamide, quinidine, and lidocaine)  - pain control  - neurology following  - PT/OT     CVS:  - MAP>65  - TTE - EF 54%    PULM:  Intubated in ED 5/5 for hypercapnic resp failure  - Pressure support trials  - NIF/VC every 4 hours  - VAP bundle  - CXR- unremarkable  - ABG- reviewed and vent settings changed to FiO2 of 50% from 40%, repeat another ABG in 2 hours.    RENAL:  RACQUEL sharma placed 5/5  - Cont   Q6H  plasmalyte 30cc/hr  - daily IOs    GI:  - Diet: ON tf at GOAL   - FLuids: Plasmalyte at 30 cc/hour  - GI prophylaxis: PPi while intubated   - Bowel regimen: miralax, senna  - LBM 5/11    ENDO:   - FS goal 120-180  - On ISS and NPH 8mgQ6    HEME/ONC:  - SCDs  - Chemoppx: SQL    ID:  - monitor for fevers, leukocytosis likely related to steroids

## 2023-05-11 NOTE — PROGRESS NOTE ADULT - SUBJECTIVE AND OBJECTIVE BOX
NEUROLOGY FOLLOW-UP CONSULT NOTE    RFC: Myasthenia gravis exacerbation    Interval history: No acute neurologic events overnight. Patient had PLEX session yesterday. She reports continued improvement in symptoms. Also with feeling of discomfort on R neck/throat. Remains intubated but with decreased episodes of tachycardia.    Meds:  MEDICATIONS  (STANDING):  acetylcysteine 20%  Inhalation 4 milliLiter(s) Inhalation every 6 hours  albuterol/ipratropium for Nebulization 3 milliLiter(s) Nebulizer every 6 hours  atorvastatin 20 milliGRAM(s) Oral at bedtime  chlorhexidine 0.12% Liquid 15 milliLiter(s) Oral Mucosa every 12 hours  chlorhexidine 4% Liquid 1 Application(s) Topical <User Schedule>  dexMEDEtomidine Infusion 0.2 MICROgram(s)/kG/Hr (3.75 mL/Hr) IV Continuous <Continuous>  dextrose 5%. 1000 milliLiter(s) (50 mL/Hr) IV Continuous <Continuous>  dextrose 5%. 1000 milliLiter(s) (100 mL/Hr) IV Continuous <Continuous>  dextrose 50% Injectable 12.5 Gram(s) IV Push once  dextrose 50% Injectable 25 Gram(s) IV Push once  dextrose 50% Injectable 25 Gram(s) IV Push once  enoxaparin Injectable 40 milliGRAM(s) SubCutaneous every 24 hours  fentaNYL    Injectable 50 MICROGram(s) IV Push once  glucagon  Injectable 1 milliGRAM(s) IntraMuscular once  insulin lispro (ADMELOG) corrective regimen sliding scale   SubCutaneous every 6 hours  insulin NPH human recombinant 8 Unit(s) SubCutaneous every 6 hours  mupirocin 2% Nasal 1 Application(s) Both Nostrils every 12 hours  pantoprazole  Injectable 40 milliGRAM(s) IV Push daily  polyethylene glycol 3350 17 Gram(s) Oral every 12 hours  predniSONE   Tablet 50 milliGRAM(s) Oral daily  senna 2 Tablet(s) Oral at bedtime    MEDICATIONS  (PRN):  acetaminophen   Oral Liquid .. 650 milliGRAM(s) Oral every 6 hours PRN Mild Pain (1 - 3)  bisacodyl Suppository 10 milliGRAM(s) Rectal daily PRN Constipation  dextrose Oral Gel 15 Gram(s) Oral once PRN Blood Glucose LESS THAN 70 milliGRAM(s)/deciliter  fentaNYL    Injectable 25 MICROGram(s) IV Push every 2 hours PRN Vent synchrony  oxyCODONE    Solution 10 milliGRAM(s) Oral every 6 hours PRN Severe Pain (7 - 10)  oxyCODONE    Solution 5 milliGRAM(s) Oral every 6 hours PRN Moderate Pain (4 - 6)  sodium chloride 0.65% Nasal 1 Spray(s) Both Nostrils four times a day PRN Nasal Congestion    GTT:  Precedex 0.2 mcg/kg/hour    PMHx/PSHx/FHx/SHx:  Myasthenia gravis with exacerbation    Handoff    MEWS Score    Myasthenia gravis    Diabetes mellitus    Hypertension    Myasthenia gravis in crisis    MG    16    [email or web address]        Allergies:  peanuts (Unknown)  No Known Drug Allergies      ROS: All systems negative except as documented in Interval history    O:  T(C): 36.9 (05-11-23 @ 11:00), Max: 37.1 (05-10-23 @ 19:00)  HR: 73 (05-11-23 @ 12:27) (55 - 140)  BP: 120/67 (05-11-23 @ 11:00) (99/53 - 187/73)  RR: 13 (05-11-23 @ 11:00) (13 - 24)  SpO2: 98% (05-11-23 @ 12:27) (93% - 100%)    Focused neurologic exam:  MS - Intubated, not sedated, awake, attends to all stimuli b/l, no speech output but nods head appropriately to "yes"/"no" questions, follows commands. Due to clinical condition unable to assess (MOLLY) orientation, rep/naming, attn/conc/recent and remote memory/fund of knowledge  CN - PERRL, EOM with poor upgaze but otherwise intact, R eye moderate to severe ptosis, L eye miId ptosis, VFF, face sens/str/hearing WNL b/l, tongue midline, trap 4/5 b/l. MOLLY palate. Neck extension 4+-5/5, neck flexion 3+-4/5. Spontaneous respirations - patient rate 15 bpm, vent rate 14 bpm  Motor - Normal bulk/tone. RUE proximal 4-4+/5 -> distal 5/5, LUE 5/5, LLE 5/5, RLE proximal 4+/5 -> distal 5/5  Sens - LT/PP intact all  DTR's - Neutral b/l plantar response  Coord - FtN intact b/l  Gait and station - MOLLY    Pertinent labs/studies:  CBC with inc WBC 16.90, low H/H 11/33 and MCV WNL, otherwise essentially WNL  PT/INR WNL, fibrinogen dec 151  BMP essentially WNL  Mg WNL, Phos WNL    TSH/T3 WNL    < from: TTE Limited W or WO Ultrasound Enhancing Agent (05.06.23 @ 10:03) >   1. Normal left ventricular cavity size. The left ventricular wall thickness is normal. The left ventricular systolic function is normal. The basal inferior segment is hypokinetic.   2. There is normal left ventricular diastolic function.   3. Normal right ventricular cavity size, normal wall thickness and normal systolic function. The tricuspid annular plane systolic excursion (TAPSE) is 1.8 cm (normal >=1.7 cm).    < end of copied text >

## 2023-05-11 NOTE — PROGRESS NOTE ADULT - CRITICAL CARE ATTENDING COMMENT
MG crisis intubated, s/p plex 3 sessions   neuro checks q 4 hr ,  prednisone 50 mg  , neurology on  consult , keep mestinon on hold for now   acute respiratory failure intubated, lines in good postiion, NIF -25,  ml , failing PS, becomes tachypneic, desat and tachycardic, will attempt again tomorrow,   NG feeds glucerna at goal, free water 300 q 6 hr	IVL,   hyperglycemia, NPH 8 units q 6 hr , ISS , lovenox 40 mg sc qhs
MG crisis intubated, s/p plex 2 sessions   neuro checks q 4 hr , today will get third session , solumedrol 125 mg day 5/5, change tyo prednisone 50 mg tomorrow    neurology consult   mestinon on hold in the setting of acute exacerbation , will resume tomorrow  acute respiratory failure intubated, lines in good postiion, NIF -22,  ml , attempt PS again today   NG feeds glucerna at goal, free water 300 q 6 hr, decrease plasmalyte to  300 ml/hr   hyperglycemia, NPH 6 units q 6 hr , ISS   lovenox 40 mg sc qhs
MG crisis intubated  neuro checks q 4 hr   solumedrol 125 mg for total of 10 days   s/p PLEX1/5 , today session 2   neurology consult   mestinon on hold in the setting of acute exacerbation   acute respiratory failure intubated, lines in good postiion , failed PS trial  NG feeds at goal   hyperglycemia, NPH 4 units q 6 hr   lovenox 40 mg sc qhs
MG crisis intubated, s/p plex 3 sessions   neuro checks q 4 hr ,  prednisone 50 mg  , neurology on  consult , keep mestinon on hold for now   acute respiratory failure intubated, lines in good postiion, NIF -25,  ml , failing PS, becomes tachypneic, desat and tachycardic, will attempt again tomorrow,   NG feeds glucerna at goal, free water 300 q 6 hr	IVL,   hyperglycemia, NPH 8 units q 6 hr , ISS , lovenox 40 mg sc qhs
MG crisis intubated, s/p PLEX 3/5   neuro checks q 4 hr   solumedrol 125 mg for total of 10 days   NIF -10, VC < 1 L, WILL CONTINUE TO MONITOR   neurology consult   mestinon on hold in the setting of acute exacerbation   acute respiratory failure intubated,ps TRIAL TOLERATING 10/5    NG feeds at goal   hyperglycemia, NPH 4 units q 6 hr   lovenox 40 mg sc qhs

## 2023-05-11 NOTE — PROGRESS NOTE ADULT - ASSESSMENT
Myasthenia gravis with acute exacerbation  Respiratory failure with hypercapnia  DM type 2  HTN  Leukocytosis    - Patient with acute MG exacerbation, could be triggered by progressive disease or other toxic/metabolic insult. No stable focal neurologic deficits to suggest cerebrovascular or other acute intracranial event. She has agreed to therapy. 5/9 - S/p PLEX 2/5 of sessions, showing some mild improvement  - Continue with PLEX for 5 sessions every other day, s/p 3 sessions. Next session is tomorrow (5/12)  - Hold Mestinon for now  - SoluMedrol 125mg IV daily s/p 5 days; now on prednisone 50mg PO daily and with taper by 5mg every week until 30mg  - May need IVIG following PLEX  - PT/OT as tolerated  - Ultimately patient will need better outpatient therapy to prevent relapses, either IVIG infusions, increased prednisone dose, or Soliris (or combination of some)  - Continue to address above medical problems, as you are doing  - Will continue to follow patient with you

## 2023-05-11 NOTE — PROGRESS NOTE ADULT - SUBJECTIVE AND OBJECTIVE BOX
Patient KAIDEN HATCH is a 62y WITH History of MG presents with exacerbation.    24 Hour Events/Subjective:  - S/P 3 sessions of PLEX.   - in the AM patient was hypercapnic on CPAP, was placed back on full support, reports fatigue.  - patient complaining of congestion, and ear fullness given loraditine, RVP-pending     ICU Vital Signs Last 24 Hrs  T(C): 37 (11 May 2023 07:00), Max: 37.1 (10 May 2023 11:00)  T(F): 98.6 (11 May 2023 07:00), Max: 98.8 (10 May 2023 11:00)  HR: 115 (11 May 2023 07:30) (55 - 140)  BP: 150/75 (11 May 2023 07:30) (99/53 - 187/73)  BP(mean): 96 (11 May 2023 07:30) (68 - 109)  ABP: --  ABP(mean): --  RR: 20 (11 May 2023 07:30) (14 - 24)  SpO2: 93% (11 May 2023 07:30) (93% - 100%)    O2 Parameters below as of 11 May 2023 05:45  Patient On (Oxygen Delivery Method): ventilator        MEDICATIONS  (STANDING):  acetylcysteine 20%  Inhalation 4 milliLiter(s) Inhalation every 6 hours  albuterol/ipratropium for Nebulization 3 milliLiter(s) Nebulizer every 6 hours  atorvastatin 20 milliGRAM(s) Oral at bedtime  chlorhexidine 0.12% Liquid 15 milliLiter(s) Oral Mucosa every 12 hours  chlorhexidine 4% Liquid 1 Application(s) Topical <User Schedule>  dexMEDEtomidine Infusion 0.2 MICROgram(s)/kG/Hr (3.75 mL/Hr) IV Continuous <Continuous>  dextrose 5%. 1000 milliLiter(s) (100 mL/Hr) IV Continuous <Continuous>  dextrose 5%. 1000 milliLiter(s) (50 mL/Hr) IV Continuous <Continuous>  dextrose 50% Injectable 25 Gram(s) IV Push once  dextrose 50% Injectable 25 Gram(s) IV Push once  dextrose 50% Injectable 12.5 Gram(s) IV Push once  enoxaparin Injectable 40 milliGRAM(s) SubCutaneous every 24 hours  fentaNYL    Injectable 50 MICROGram(s) IV Push once  glucagon  Injectable 1 milliGRAM(s) IntraMuscular once  insulin lispro (ADMELOG) corrective regimen sliding scale   SubCutaneous every 6 hours  insulin NPH human recombinant 8 Unit(s) SubCutaneous every 6 hours  multiple electrolytes Injection Type 1 1000 milliLiter(s) (30 mL/Hr) IV Continuous <Continuous>  mupirocin 2% Nasal 1 Application(s) Both Nostrils every 12 hours  pantoprazole  Injectable 40 milliGRAM(s) IV Push daily  polyethylene glycol 3350 17 Gram(s) Oral every 12 hours  predniSONE   Tablet 50 milliGRAM(s) Oral daily  senna 2 Tablet(s) Oral at bedtime    MEDICATIONS  (PRN):  acetaminophen   Oral Liquid .. 650 milliGRAM(s) Oral every 6 hours PRN Mild Pain (1 - 3)  bisacodyl Suppository 10 milliGRAM(s) Rectal daily PRN Constipation  dextrose Oral Gel 15 Gram(s) Oral once PRN Blood Glucose LESS THAN 70 milliGRAM(s)/deciliter  fentaNYL    Injectable 25 MICROGram(s) IV Push every 2 hours PRN Vent synchrony  oxyCODONE    Solution 10 milliGRAM(s) Oral every 6 hours PRN Severe Pain (7 - 10)  oxyCODONE    Solution 5 milliGRAM(s) Oral every 6 hours PRN Moderate Pain (4 - 6)  sodium chloride 0.65% Nasal 1 Spray(s) Both Nostrils four times a day PRN Nasal Congestion      REVIEW OF SYSTEMS:  - negative except as above    VITALS:   - Reviewed      IMAGING/DATA:   - Reviewed      PHYSICAL EXAM:    General: intubated, increase secretions, looks uncomfortable and with decreased O2 saturation. Put back on pressure support.  CVS: RRR  Pulm: CTAB  GI: Soft, NTND  Extremities: No LE Edema  Neuro: awake, alert, oriented x 3 w choices, follows commands, EOMI w poor upgaze, face symmetric, able to lift b/l arms AG to the elbow, RUE deltoid 4- /5, distally 5/5, LUE deltoid 4-/5, proximally 5/5, able to lift b/l LEs off the bed with 5/5 strength. Neck flexion 4-/5.    Patient KAIDEN HATCH is a 62y WITH History of MG presents with exacerbation.    24 Hour Events/Subjective:  - S/P 3 sessions of PLEX.   - in the AM patient was hypercapnic on CPAP, was placed back on full support, reports fatigue.  - patient complaining of congestion, and ear fullness given loratadine    ICU Vital Signs Last 24 Hrs  T(C): 37 (11 May 2023 07:00), Max: 37.1 (10 May 2023 11:00)  T(F): 98.6 (11 May 2023 07:00), Max: 98.8 (10 May 2023 11:00)  HR: 115 (11 May 2023 07:30) (55 - 140)  BP: 150/75 (11 May 2023 07:30) (99/53 - 187/73)  BP(mean): 96 (11 May 2023 07:30) (68 - 109)  ABP: --  ABP(mean): --  RR: 20 (11 May 2023 07:30) (14 - 24)  SpO2: 93% (11 May 2023 07:30) (93% - 100%)    O2 Parameters below as of 11 May 2023 05:45  Patient On (Oxygen Delivery Method): ventilator        MEDICATIONS  (STANDING):  acetylcysteine 20%  Inhalation 4 milliLiter(s) Inhalation every 6 hours  albuterol/ipratropium for Nebulization 3 milliLiter(s) Nebulizer every 6 hours  atorvastatin 20 milliGRAM(s) Oral at bedtime  chlorhexidine 0.12% Liquid 15 milliLiter(s) Oral Mucosa every 12 hours  chlorhexidine 4% Liquid 1 Application(s) Topical <User Schedule>  dexMEDEtomidine Infusion 0.2 MICROgram(s)/kG/Hr (3.75 mL/Hr) IV Continuous <Continuous>  dextrose 5%. 1000 milliLiter(s) (100 mL/Hr) IV Continuous <Continuous>  dextrose 5%. 1000 milliLiter(s) (50 mL/Hr) IV Continuous <Continuous>  dextrose 50% Injectable 25 Gram(s) IV Push once  dextrose 50% Injectable 25 Gram(s) IV Push once  dextrose 50% Injectable 12.5 Gram(s) IV Push once  enoxaparin Injectable 40 milliGRAM(s) SubCutaneous every 24 hours  fentaNYL    Injectable 50 MICROGram(s) IV Push once  glucagon  Injectable 1 milliGRAM(s) IntraMuscular once  insulin lispro (ADMELOG) corrective regimen sliding scale   SubCutaneous every 6 hours  insulin NPH human recombinant 8 Unit(s) SubCutaneous every 6 hours  multiple electrolytes Injection Type 1 1000 milliLiter(s) (30 mL/Hr) IV Continuous <Continuous>  mupirocin 2% Nasal 1 Application(s) Both Nostrils every 12 hours  pantoprazole  Injectable 40 milliGRAM(s) IV Push daily  polyethylene glycol 3350 17 Gram(s) Oral every 12 hours  predniSONE   Tablet 50 milliGRAM(s) Oral daily  senna 2 Tablet(s) Oral at bedtime    MEDICATIONS  (PRN):  acetaminophen   Oral Liquid .. 650 milliGRAM(s) Oral every 6 hours PRN Mild Pain (1 - 3)  bisacodyl Suppository 10 milliGRAM(s) Rectal daily PRN Constipation  dextrose Oral Gel 15 Gram(s) Oral once PRN Blood Glucose LESS THAN 70 milliGRAM(s)/deciliter  fentaNYL    Injectable 25 MICROGram(s) IV Push every 2 hours PRN Vent synchrony  oxyCODONE    Solution 10 milliGRAM(s) Oral every 6 hours PRN Severe Pain (7 - 10)  oxyCODONE    Solution 5 milliGRAM(s) Oral every 6 hours PRN Moderate Pain (4 - 6)  sodium chloride 0.65% Nasal 1 Spray(s) Both Nostrils four times a day PRN Nasal Congestion      REVIEW OF SYSTEMS:  - negative except as above    VITALS:   - Reviewed      IMAGING/DATA:   - Reviewed      PHYSICAL EXAM:    General: intubated, increase secretions, looks uncomfortable and with decreased O2 saturation. Put back on pressure support.  CVS: RRR  Pulm: CTAB  GI: Soft, NTND  Extremities: No LE Edema  Neuro: awake, alert, oriented x 3 w choices, follows commands, EOMI w poor upgaze, face symmetric, able to lift b/l arms AG to the elbow, RUE deltoid 4- /5, distally 5/5, LUE deltoid 4-/5, proximally 5/5, able to lift b/l LEs off the bed with 5/5 strength. Neck flexion 4-/5.

## 2023-05-11 NOTE — PROGRESS NOTE ADULT - ASSESSMENT
ASSESSMENT/PLAN: MG exacerbation sp 3 x dose of PLEX    NEURO:  - neuro checks q4h  - s/p 3 dose of PLEX, next dose 5/12  - solumedrol transitioned to prednisone 50 mg QD starting 5/11  - Avoid medications that may worsen the current exacerbation including macrolides, fluoroquinolones, tetracyclines, aminoglycoside, beta-blockers, magnesium, and anti-arrhythmics (procainamide, quinidine, and lidocaine)  - pain control  - neurology following  - PT/OT     CVS:  - MAP>65  - TTE - EF 54%    PULM:  Intubated in ED 5/5 for hypercapnic resp failure  - Pressure support trials  - NIF/VC every 4 hours  - VAP bundle  - CXR- unremarkable  - ABG- reviewed and vent settings changed to FiO2 of 50% from 40%, repeat another ABG in 2 hours.    RENAL:  RACQUEL sharma placed 5/5  - Cont   Q6H  plasmalyte 30cc/hr  - daily IOs    GI:  - Diet: ON tf at GOAL   - FLuids: Plasmalyte at 30 cc/hour  - GI prophylaxis: PPi while intubated   - Bowel regimen: miralax, senna  - LBM 5/11    ENDO:   - FS goal 120-180  - On ISS and NPH 8mgQ6    HEME/ONC:  - SCDs  - Chemoppx: SQL    ID:  - monitor for fevers, leukocytosis likely related to steroids     ASSESSMENT/PLAN: MG exacerbation sp 3 x dose of PLEX    NEURO:  - neuro checks q4h  - s/p 3 dose of PLEX, next dose 5/12  - solumedrol transitioned to prednisone 50 mg QD starting 5/11  - Avoid medications that may worsen the current exacerbation including macrolides, fluoroquinolones, tetracyclines, aminoglycoside, beta-blockers, magnesium, and anti-arrhythmics (procainamide, quinidine, and lidocaine)  - pain control  - neurology following  - PT/OT     CVS:  - MAP>65  - TTE - EF 54%    PULM:  Intubated in ED 5/5 for hypercapnic resp failure  - Pressure support trials  - NIF/VC every 4 hours  - VAP bundle  - CXR- unremarkable  - ABG- reviewed    RENAL:  RACQUEL sharma placed 5/5  - Cont   Q6H  - IVL  - daily IOs    GI:  - Diet: ON tf at GOAL   - Fluids: IVL  - GI prophylaxis: PPi while intubated   - Bowel regimen: miralax, senna  - LBM 5/11    ENDO:   - FS goal 120-180  - On ISS and NPH 8mgQ6    HEME/ONC:  - SCDs  - Chemoppx: SQL    ID:  - monitor for fevers, leukocytosis likely related to steroids

## 2023-05-12 LAB
APTT BLD: 27.9 SEC — SIGNIFICANT CHANGE UP (ref 27.5–35.5)
CA-I BLD-SCNC: 1.14 MMOL/L — LOW (ref 1.15–1.33)
FIBRINOGEN PPP-MCNC: 227 MG/DL — SIGNIFICANT CHANGE UP (ref 200–445)
HCT VFR BLD CALC: 30 % — LOW (ref 34.5–45)
HGB BLD-MCNC: 9.7 G/DL — LOW (ref 11.5–15.5)
MCHC RBC-ENTMCNC: 28.6 PG — SIGNIFICANT CHANGE UP (ref 27–34)
MCHC RBC-ENTMCNC: 32.3 GM/DL — SIGNIFICANT CHANGE UP (ref 32–36)
MCV RBC AUTO: 88.5 FL — SIGNIFICANT CHANGE UP (ref 80–100)
NRBC # BLD: 0 /100 WBCS — SIGNIFICANT CHANGE UP (ref 0–0)
PLATELET # BLD AUTO: 196 K/UL — SIGNIFICANT CHANGE UP (ref 150–400)
RBC # BLD: 3.39 M/UL — LOW (ref 3.8–5.2)
RBC # FLD: 14.6 % — HIGH (ref 10.3–14.5)
WBC # BLD: 11.61 K/UL — HIGH (ref 3.8–10.5)
WBC # FLD AUTO: 11.61 K/UL — HIGH (ref 3.8–10.5)

## 2023-05-12 PROCEDURE — 99233 SBSQ HOSP IP/OBS HIGH 50: CPT

## 2023-05-12 PROCEDURE — 36514 APHERESIS PLASMA: CPT

## 2023-05-12 PROCEDURE — 71045 X-RAY EXAM CHEST 1 VIEW: CPT | Mod: 26

## 2023-05-12 PROCEDURE — 99291 CRITICAL CARE FIRST HOUR: CPT

## 2023-05-12 RX ORDER — CALCIUM GLUCONATE 100 MG/ML
1 VIAL (ML) INTRAVENOUS ONCE
Refills: 0 | Status: COMPLETED | OUTPATIENT
Start: 2023-05-12 | End: 2023-05-12

## 2023-05-12 RX ADMIN — Medication 650 MILLIGRAM(S): at 03:58

## 2023-05-12 RX ADMIN — Medication 650 MILLIGRAM(S): at 23:46

## 2023-05-12 RX ADMIN — Medication 3 MILLILITER(S): at 00:42

## 2023-05-12 RX ADMIN — ATORVASTATIN CALCIUM 20 MILLIGRAM(S): 80 TABLET, FILM COATED ORAL at 23:16

## 2023-05-12 RX ADMIN — Medication 4 MILLILITER(S): at 17:48

## 2023-05-12 RX ADMIN — Medication 3 MILLILITER(S): at 11:26

## 2023-05-12 RX ADMIN — PANTOPRAZOLE SODIUM 40 MILLIGRAM(S): 20 TABLET, DELAYED RELEASE ORAL at 11:30

## 2023-05-12 RX ADMIN — HUMAN INSULIN 8 UNIT(S): 100 INJECTION, SUSPENSION SUBCUTANEOUS at 11:50

## 2023-05-12 RX ADMIN — Medication 4 MILLILITER(S): at 00:41

## 2023-05-12 RX ADMIN — MUPIROCIN 1 APPLICATION(S): 20 OINTMENT TOPICAL at 05:45

## 2023-05-12 RX ADMIN — Medication 3 MILLILITER(S): at 06:08

## 2023-05-12 RX ADMIN — Medication 650 MILLIGRAM(S): at 04:28

## 2023-05-12 RX ADMIN — Medication 4 MILLILITER(S): at 11:27

## 2023-05-12 RX ADMIN — HUMAN INSULIN 8 UNIT(S): 100 INJECTION, SUSPENSION SUBCUTANEOUS at 05:44

## 2023-05-12 RX ADMIN — ENOXAPARIN SODIUM 40 MILLIGRAM(S): 100 INJECTION SUBCUTANEOUS at 17:25

## 2023-05-12 RX ADMIN — CHLORHEXIDINE GLUCONATE 1 APPLICATION(S): 213 SOLUTION TOPICAL at 23:15

## 2023-05-12 RX ADMIN — Medication 100 GRAM(S): at 11:30

## 2023-05-12 RX ADMIN — Medication 3 MILLILITER(S): at 17:48

## 2023-05-12 RX ADMIN — HUMAN INSULIN 8 UNIT(S): 100 INJECTION, SUSPENSION SUBCUTANEOUS at 23:16

## 2023-05-12 RX ADMIN — Medication 4 MILLILITER(S): at 06:08

## 2023-05-12 RX ADMIN — Medication 50 MILLIGRAM(S): at 05:45

## 2023-05-12 RX ADMIN — CHLORHEXIDINE GLUCONATE 15 MILLILITER(S): 213 SOLUTION TOPICAL at 17:25

## 2023-05-12 RX ADMIN — HUMAN INSULIN 8 UNIT(S): 100 INJECTION, SUSPENSION SUBCUTANEOUS at 17:29

## 2023-05-12 RX ADMIN — Medication 2: at 11:50

## 2023-05-12 RX ADMIN — CHLORHEXIDINE GLUCONATE 15 MILLILITER(S): 213 SOLUTION TOPICAL at 05:45

## 2023-05-12 RX ADMIN — Medication 650 MILLIGRAM(S): at 23:16

## 2023-05-12 RX ADMIN — MUPIROCIN 1 APPLICATION(S): 20 OINTMENT TOPICAL at 17:25

## 2023-05-12 NOTE — PROGRESS NOTE ADULT - ASSESSMENT
ASSESSMENT/PLAN: MG exacerbation sp 3 x dose of PLEX    NEURO:  - neuro checks q4h  - s/p 3 dose of PLEX, next dose TODAY 5/12  - solumedrol transitioned to prednisone 50 mg QD starting 5/11  - Avoid medications that may worsen the current exacerbation including macrolides, fluoroquinolones, tetracyclines, aminoglycoside, beta-blockers, magnesium, and anti-arrhythmics (procainamide, quinidine, and lidocaine)  - pain control  - neurology following  - PT/OT     CVS:  - MAP>65  - TTE - EF 54%    PULM:  Intubated in ED 5/5 for hypercapnic resp failure  - Pressure support trials  - NIF/VC every 4 hours  - VAP bundle  - CXR- unremarkable  - ABG- reviewed    RENAL:  RACQUEL sharma placed 5/5  - Will discontinue FWF  - IVL  - daily IOs    GI:  - Diet: ON tf at GOAL   - Fluids: IVL  - GI prophylaxis: PPi while intubated   - Bowel regimen: miralax, senna  - LBM 5/12    ENDO:   - FS goal 120-180  - On ISS and NPH 8mgQ6    HEME/ONC:  - SCDs  - Chemoppx: SQL    ID:  - monitor for fevers, leukocytosis likely related to steroids     ASSESSMENT/PLAN: MG exacerbation sp 3 x dose of PLEX    NEURO:  - neuro checks q4h  - s/p 4 dose of PLEX, next dose TODAY 5/14  - solumedrol transitioned to prednisone 50 mg QD starting 5/11  - Neurology recommends starting IVIG starting 5/14  - Avoid medications that may worsen the current exacerbation including macrolides, fluoroquinolones, tetracyclines, aminoglycoside, beta-blockers, magnesium, and anti-arrhythmics (procainamide, quinidine, and lidocaine)  - pain control  - neurology following  - PT/OT     CVS:  - MAP>65  - TTE - EF 54%    PULM:  Intubated in ED 5/5 for hypercapnic resp failure  - Pressure support trials  - NIF/VC every 4 hours  - VAP bundle  - CXR- unremarkable  - ABG- reviewed    RENAL:  RACQUEL sharma placed 5/5  - Will discontinue FWF  - IVL  - daily IOs    GI:  - Diet: ON tf at GOAL   - Fluids: IVL  - GI prophylaxis: PPi while intubated   - Bowel regimen: miralax, senna  - LBM 5/12    ENDO:   - FS goal 120-180  - On ISS and NPH 8mgQ6    HEME/ONC:  - SCDs  - Chemoppx: SQL    ID:  - monitor for fevers, leukocytosis likely related to steroids  - given loratadine as needed for nasal congestion, if febrile consider RVP       ASSESSMENT/PLAN: MG exacerbation sp 3 x dose of PLEX    NEURO:  - neuro checks q4h  - s/p 4 dose of PLEX, next dose TODAY 5/14  - solumedrol transitioned to prednisone 50 mg QD starting 5/11  - Neurology recommends starting IVIG starting Monday 5/15  - Avoid medications that may worsen the current exacerbation including macrolides, fluoroquinolones, tetracyclines, aminoglycoside, beta-blockers, magnesium, and anti-arrhythmics (procainamide, quinidine, and lidocaine)  - pain control  - neurology following  - PT/OT     CVS:  - MAP>65  - TTE - EF 54%    PULM:  Intubated in ED 5/5 for hypercapnic resp failure  - Pressure support trials  - NIF/VC every 4 hours  - VAP bundle  - CXR- unremarkable  - ABG- reviewed    RENAL:  RACQUEL sharma placed 5/5  - Will discontinue FWF  - IVL  - daily IOs    GI:  - Diet: ON tf at GOAL   - Fluids: IVL  - GI prophylaxis: PPi while intubated   - Bowel regimen: miralax, senna  - LBM 5/12    ENDO:   - FS goal 120-180  - On ISS and NPH 8mgQ6    HEME/ONC:  - SCDs  - Chemoppx: SQL    ID:  - monitor for fevers, leukocytosis likely related to steroids  - given loratadine as needed for nasal congestion, if febrile consider RVP

## 2023-05-12 NOTE — PROGRESS NOTE ADULT - SUBJECTIVE AND OBJECTIVE BOX
Patient KAIDEN HATCH is a 62y WITH History of MG presents with exacerbation.    24 Hour Events/Subjective:  - S/P 3 sessions of PLEX.   - in the AM patient was hypercapnic on CPAP, was placed back on full support, reports fatigue.  - patient complaining of congestion, and ear fullness given loratadine    ICU Vital Signs Last 24 Hrs  T(C): 36.8 (12 May 2023 03:00), Max: 36.9 (11 May 2023 11:00)  T(F): 98.3 (12 May 2023 03:00), Max: 98.5 (11 May 2023 11:00)  HR: 60 (12 May 2023 07:00) (50 - 116)  BP: 107/60 (12 May 2023 07:00) (90/56 - 150/75)  BP(mean): 73 (12 May 2023 07:00) (61 - 96)  ABP: --  ABP(mean): --  RR: 14 (12 May 2023 07:00) (13 - 20)  SpO2: 100% (12 May 2023 07:00) (93% - 100%)    O2 Parameters below as of 12 May 2023 06:08  Patient On (Oxygen Delivery Method): ventilator        MEDICATIONS  (STANDING):  acetylcysteine 20%  Inhalation 4 milliLiter(s) Inhalation every 6 hours  albuterol/ipratropium for Nebulization 3 milliLiter(s) Nebulizer every 6 hours  atorvastatin 20 milliGRAM(s) Oral at bedtime  chlorhexidine 0.12% Liquid 15 milliLiter(s) Oral Mucosa every 12 hours  chlorhexidine 4% Liquid 1 Application(s) Topical <User Schedule>  dexMEDEtomidine Infusion 0.2 MICROgram(s)/kG/Hr (3.75 mL/Hr) IV Continuous <Continuous>  dextrose 5%. 1000 milliLiter(s) (50 mL/Hr) IV Continuous <Continuous>  dextrose 5%. 1000 milliLiter(s) (100 mL/Hr) IV Continuous <Continuous>  dextrose 50% Injectable 12.5 Gram(s) IV Push once  dextrose 50% Injectable 25 Gram(s) IV Push once  dextrose 50% Injectable 25 Gram(s) IV Push once  enoxaparin Injectable 40 milliGRAM(s) SubCutaneous every 24 hours  fentaNYL    Injectable 50 MICROGram(s) IV Push once  glucagon  Injectable 1 milliGRAM(s) IntraMuscular once  insulin lispro (ADMELOG) corrective regimen sliding scale   SubCutaneous every 6 hours  insulin NPH human recombinant 8 Unit(s) SubCutaneous every 6 hours  mupirocin 2% Nasal 1 Application(s) Both Nostrils every 12 hours  pantoprazole  Injectable 40 milliGRAM(s) IV Push daily  polyethylene glycol 3350 17 Gram(s) Oral every 12 hours  predniSONE   Tablet 50 milliGRAM(s) Oral daily  senna 2 Tablet(s) Oral at bedtime    MEDICATIONS  (PRN):  acetaminophen   Oral Liquid .. 650 milliGRAM(s) Oral every 6 hours PRN Mild Pain (1 - 3)  bisacodyl Suppository 10 milliGRAM(s) Rectal daily PRN Constipation  dextrose Oral Gel 15 Gram(s) Oral once PRN Blood Glucose LESS THAN 70 milliGRAM(s)/deciliter  fentaNYL    Injectable 25 MICROGram(s) IV Push every 2 hours PRN Vent synchrony  oxyCODONE    Solution 10 milliGRAM(s) Oral every 6 hours PRN Severe Pain (7 - 10)  oxyCODONE    Solution 5 milliGRAM(s) Oral every 6 hours PRN Moderate Pain (4 - 6)  sodium chloride 0.65% Nasal 1 Spray(s) Both Nostrils four times a day PRN Nasal Congestion            REVIEW OF SYSTEMS:  - negative except as above    VITALS:   - Reviewed      IMAGING/DATA:   - Reviewed      PHYSICAL EXAM:    General: intubated, increase secretions, looks uncomfortable and with decreased O2 saturation. Put back on pressure support.  CVS: RRR  Pulm: CTAB  GI: Soft, NTND  Extremities: No LE Edema  Neuro: awake, alert, oriented x 3 w choices, follows commands, EOMI w poor upgaze, face symmetric, able to lift b/l arms AG to the elbow, RUE deltoid 4- /5, distally 5/5, LUE deltoid 4-/5, proximally 5/5, able to lift b/l LEs off the bed with 5/5 strength. Neck flexion 4-/5.    Patient KAIDEN HATCH is a 62y WITH History of MG presents with exacerbation.    24 Hour Events/Subjective:  - S/P 3 sessions of PLEX.   - in the AM patient was hypercapnic on CPAP, was placed back on full support, reports fatigue.  - patient complaining of congestion, and ear fullness given loratadine    ICU Vital Signs Last 24 Hrs  T(C): 36.8 (12 May 2023 03:00), Max: 36.9 (11 May 2023 11:00)  T(F): 98.3 (12 May 2023 03:00), Max: 98.5 (11 May 2023 11:00)  HR: 60 (12 May 2023 07:00) (50 - 116)  BP: 107/60 (12 May 2023 07:00) (90/56 - 150/75)  BP(mean): 73 (12 May 2023 07:00) (61 - 96)  ABP: --  ABP(mean): --  RR: 14 (12 May 2023 07:00) (13 - 20)  SpO2: 100% (12 May 2023 07:00) (93% - 100%)    O2 Parameters below as of 12 May 2023 06:08  Patient On (Oxygen Delivery Method): ventilator        MEDICATIONS  (STANDING):  acetylcysteine 20%  Inhalation 4 milliLiter(s) Inhalation every 6 hours  albuterol/ipratropium for Nebulization 3 milliLiter(s) Nebulizer every 6 hours  atorvastatin 20 milliGRAM(s) Oral at bedtime  chlorhexidine 0.12% Liquid 15 milliLiter(s) Oral Mucosa every 12 hours  chlorhexidine 4% Liquid 1 Application(s) Topical <User Schedule>  dexMEDEtomidine Infusion 0.2 MICROgram(s)/kG/Hr (3.75 mL/Hr) IV Continuous <Continuous>  dextrose 5%. 1000 milliLiter(s) (50 mL/Hr) IV Continuous <Continuous>  dextrose 5%. 1000 milliLiter(s) (100 mL/Hr) IV Continuous <Continuous>  dextrose 50% Injectable 12.5 Gram(s) IV Push once  dextrose 50% Injectable 25 Gram(s) IV Push once  dextrose 50% Injectable 25 Gram(s) IV Push once  enoxaparin Injectable 40 milliGRAM(s) SubCutaneous every 24 hours  fentaNYL    Injectable 50 MICROGram(s) IV Push once  glucagon  Injectable 1 milliGRAM(s) IntraMuscular once  insulin lispro (ADMELOG) corrective regimen sliding scale   SubCutaneous every 6 hours  insulin NPH human recombinant 8 Unit(s) SubCutaneous every 6 hours  mupirocin 2% Nasal 1 Application(s) Both Nostrils every 12 hours  pantoprazole  Injectable 40 milliGRAM(s) IV Push daily  polyethylene glycol 3350 17 Gram(s) Oral every 12 hours  predniSONE   Tablet 50 milliGRAM(s) Oral daily  senna 2 Tablet(s) Oral at bedtime    MEDICATIONS  (PRN):  acetaminophen   Oral Liquid .. 650 milliGRAM(s) Oral every 6 hours PRN Mild Pain (1 - 3)  bisacodyl Suppository 10 milliGRAM(s) Rectal daily PRN Constipation  dextrose Oral Gel 15 Gram(s) Oral once PRN Blood Glucose LESS THAN 70 milliGRAM(s)/deciliter  fentaNYL    Injectable 25 MICROGram(s) IV Push every 2 hours PRN Vent synchrony  oxyCODONE    Solution 10 milliGRAM(s) Oral every 6 hours PRN Severe Pain (7 - 10)  oxyCODONE    Solution 5 milliGRAM(s) Oral every 6 hours PRN Moderate Pain (4 - 6)  sodium chloride 0.65% Nasal 1 Spray(s) Both Nostrils four times a day PRN Nasal Congestion            REVIEW OF SYSTEMS:  - negative except as above    VITALS:   - Reviewed      IMAGING/DATA:   - Reviewed      PHYSICAL EXAM:    General: intubated, increase secretions, looks uncomfortable and with decreased O2 saturation. Put back on pressure support.  CVS: RRR  Pulm: CTAB  GI: Soft, NTND  Extremities: No LE Edema  Neuro: awake, alert, oriented x 3 w choices, follows commands, EOMI w poor upgaze, face symmetric, able to lift b/l arms AG to the elbow, RUE deltoid 4- /5, distally 5/5, LUE deltoid 4-/5, proximally 5/5, able to lift b/l LEs off the bed with 5/5 strength. Neck flexion 2/5.    Patient KAIDEN HTACH is a 62y WITH History of MG presents with exacerbation.    24 Hour Events/Subjective:  - S/P 3 sessions of PLEX.   - in the AM patient was hypercapnic on CPAP, was placed back on full support, reports fatigue.  - patient complaining of congestion, and ear fullness given loratadine    ICU Vital Signs Last 24 Hrs  T(C): 36.8 (12 May 2023 03:00), Max: 36.9 (11 May 2023 11:00)  T(F): 98.3 (12 May 2023 03:00), Max: 98.5 (11 May 2023 11:00)  HR: 60 (12 May 2023 07:00) (50 - 116)  BP: 107/60 (12 May 2023 07:00) (90/56 - 150/75)  BP(mean): 73 (12 May 2023 07:00) (61 - 96)  ABP: --  ABP(mean): --  RR: 14 (12 May 2023 07:00) (13 - 20)  SpO2: 100% (12 May 2023 07:00) (93% - 100%)    O2 Parameters below as of 12 May 2023 06:08  Patient On (Oxygen Delivery Method): ventilator        MEDICATIONS  (STANDING):  acetylcysteine 20%  Inhalation 4 milliLiter(s) Inhalation every 6 hours  albuterol/ipratropium for Nebulization 3 milliLiter(s) Nebulizer every 6 hours  atorvastatin 20 milliGRAM(s) Oral at bedtime  chlorhexidine 0.12% Liquid 15 milliLiter(s) Oral Mucosa every 12 hours  chlorhexidine 4% Liquid 1 Application(s) Topical <User Schedule>  dexMEDEtomidine Infusion 0.2 MICROgram(s)/kG/Hr (3.75 mL/Hr) IV Continuous <Continuous>  dextrose 5%. 1000 milliLiter(s) (50 mL/Hr) IV Continuous <Continuous>  dextrose 5%. 1000 milliLiter(s) (100 mL/Hr) IV Continuous <Continuous>  dextrose 50% Injectable 12.5 Gram(s) IV Push once  dextrose 50% Injectable 25 Gram(s) IV Push once  dextrose 50% Injectable 25 Gram(s) IV Push once  enoxaparin Injectable 40 milliGRAM(s) SubCutaneous every 24 hours  fentaNYL    Injectable 50 MICROGram(s) IV Push once  glucagon  Injectable 1 milliGRAM(s) IntraMuscular once  insulin lispro (ADMELOG) corrective regimen sliding scale   SubCutaneous every 6 hours  insulin NPH human recombinant 8 Unit(s) SubCutaneous every 6 hours  mupirocin 2% Nasal 1 Application(s) Both Nostrils every 12 hours  pantoprazole  Injectable 40 milliGRAM(s) IV Push daily  polyethylene glycol 3350 17 Gram(s) Oral every 12 hours  predniSONE   Tablet 50 milliGRAM(s) Oral daily  senna 2 Tablet(s) Oral at bedtime    MEDICATIONS  (PRN):  acetaminophen   Oral Liquid .. 650 milliGRAM(s) Oral every 6 hours PRN Mild Pain (1 - 3)  bisacodyl Suppository 10 milliGRAM(s) Rectal daily PRN Constipation  dextrose Oral Gel 15 Gram(s) Oral once PRN Blood Glucose LESS THAN 70 milliGRAM(s)/deciliter  fentaNYL    Injectable 25 MICROGram(s) IV Push every 2 hours PRN Vent synchrony  oxyCODONE    Solution 10 milliGRAM(s) Oral every 6 hours PRN Severe Pain (7 - 10)  oxyCODONE    Solution 5 milliGRAM(s) Oral every 6 hours PRN Moderate Pain (4 - 6)  sodium chloride 0.65% Nasal 1 Spray(s) Both Nostrils four times a day PRN Nasal Congestion            REVIEW OF SYSTEMS:  - negative except as above    VITALS:   - Reviewed      IMAGING/DATA:   - Reviewed      PHYSICAL EXAM:    General: intubated, increase secretions, looks uncomfortable and with decreased O2 saturation. Put back on pressure support.  CVS: RRR  Pulm: CTAB  GI: Soft, NTND  Extremities: No LE Edema  Neuro: awake, alert, oriented x 3 w choices, follows commands, EOMI w poor upgaze, face symmetric, able to lift b/l arms AG to the elbow, RUE deltoid 4- /5, distally 5/5, LUE deltoid 4-/5, proximally 5/5, able to lift b/l LEs off the bed with 5/5 strength. Neck flexion 2/5.

## 2023-05-12 NOTE — PROGRESS NOTE ADULT - SUBJECTIVE AND OBJECTIVE BOX
NEUROLOGY FOLLOW-UP CONSULT NOTE    RFC: Myasthenia gravis exacerbation    Interval history: No acute neurologic events overnight. Patient having PLEX session today. She reports continued improvement in symptoms. Also with feeling of discomfort on R neck/throat but improved from 5/11. Remains intubated.    Meds:  MEDICATIONS  (STANDING):  acetylcysteine 20%  Inhalation 4 milliLiter(s) Inhalation every 6 hours  albuterol/ipratropium for Nebulization 3 milliLiter(s) Nebulizer every 6 hours  atorvastatin 20 milliGRAM(s) Oral at bedtime  chlorhexidine 0.12% Liquid 15 milliLiter(s) Oral Mucosa every 12 hours  chlorhexidine 4% Liquid 1 Application(s) Topical <User Schedule>  dexMEDEtomidine Infusion 0.2 MICROgram(s)/kG/Hr (3.75 mL/Hr) IV Continuous <Continuous>  dextrose 5%. 1000 milliLiter(s) (50 mL/Hr) IV Continuous <Continuous>  dextrose 5%. 1000 milliLiter(s) (100 mL/Hr) IV Continuous <Continuous>  dextrose 50% Injectable 12.5 Gram(s) IV Push once  dextrose 50% Injectable 25 Gram(s) IV Push once  dextrose 50% Injectable 25 Gram(s) IV Push once  enoxaparin Injectable 40 milliGRAM(s) SubCutaneous every 24 hours  glucagon  Injectable 1 milliGRAM(s) IntraMuscular once  insulin lispro (ADMELOG) corrective regimen sliding scale   SubCutaneous every 6 hours  insulin NPH human recombinant 8 Unit(s) SubCutaneous every 6 hours  mupirocin 2% Nasal 1 Application(s) Both Nostrils every 12 hours  pantoprazole  Injectable 40 milliGRAM(s) IV Push daily  polyethylene glycol 3350 17 Gram(s) Oral every 12 hours  predniSONE   Tablet 50 milliGRAM(s) Oral daily  senna 2 Tablet(s) Oral at bedtime    MEDICATIONS  (PRN):  acetaminophen   Oral Liquid .. 650 milliGRAM(s) Oral every 6 hours PRN Mild Pain (1 - 3)  bisacodyl Suppository 10 milliGRAM(s) Rectal daily PRN Constipation  dextrose Oral Gel 15 Gram(s) Oral once PRN Blood Glucose LESS THAN 70 milliGRAM(s)/deciliter  fentaNYL    Injectable 25 MICROGram(s) IV Push every 2 hours PRN Vent synchrony  oxyCODONE    Solution 10 milliGRAM(s) Oral every 6 hours PRN Severe Pain (7 - 10)  oxyCODONE    Solution 5 milliGRAM(s) Oral every 6 hours PRN Moderate Pain (4 - 6)  sodium chloride 0.65% Nasal 1 Spray(s) Both Nostrils four times a day PRN Nasal Congestion    GTT:  None    PMHx/PSHx/FHx/SHx:  Myasthenia gravis with exacerbation    Handoff    MEWS Score    Myasthenia gravis    Diabetes mellitus    Hypertension    Myasthenia gravis in crisis    MG    16    [email or web address]        Allergies:  peanuts (Unknown)  No Known Drug Allergies      ROS: All systems negative except as documented in Interval history    O:  T(C): 36.9 (05-12-23 @ 11:00), Max: 36.9 (05-11-23 @ 15:00)  HR: 97 (05-12-23 @ 13:00) (50 - 98)  BP: 141/54 (05-12-23 @ 13:00) (90/56 - 141/54)  RR: 14 (05-12-23 @ 13:00) (13 - 18)  SpO2: 100% (05-12-23 @ 13:00) (98% - 100%)    Focused neurologic exam:  MS - Intubated, not sedated, awake, attends to all stimuli b/l, no speech output but nods head appropriately to "yes"/"no" questions, follows commands. Due to clinical condition unable to assess (MOLLY) orientation, rep/naming, attn/conc/recent and remote memory/fund of knowledge  CN - PERRL, EOM with poor upgaze but otherwise intact, R eye mild to moderate ptosis, L eye minimal ptosis, VFF, face sens/str/hearing WNL b/l, tongue midline, trap 4/5 b/l. MOLLY palate. Neck extension 4+-5/5, neck flexion 4/5. Spontaneous respirations - patient rate 16 bpm, vent rate 14 bpm  Motor - Normal bulk/tone. RUE proximal 4+/5 -> distal 5/5, LUE 5/5, LLE 5/5, RLE 5/5  Sens - LT/PP intact all  DTR's - Neutral b/l plantar response  Coord - FtN intact b/l  Gait and station - MOLLY    Pertinent labs/studies:  CBC with inc WBC 11.61, low H/H 10/30 and MCV WNL, otherwise essentially WNL  PT/INR WNL, PTT WNL, fibrinogen 227 WNL  BMP essentially WNL  Mg WNL, Phos WNL    TSH/T3 WNL    < from: TTE Limited W or WO Ultrasound Enhancing Agent (05.06.23 @ 10:03) >   1. Normal left ventricular cavity size. The left ventricular wall thickness is normal. The left ventricular systolic function is normal. The basal inferior segment is hypokinetic.   2. There is normal left ventricular diastolic function.   3. Normal right ventricular cavity size, normal wall thickness and normal systolic function. The tricuspid annular plane systolic excursion (TAPSE) is 1.8 cm (normal >=1.7 cm).    < end of copied text >

## 2023-05-12 NOTE — PROGRESS NOTE ADULT - ASSESSMENT
Myasthenia gravis with acute exacerbation  Respiratory failure with hypercapnia  DM type 2  HTN  Leukocytosis    - Patient with acute MG exacerbation, could be triggered by progressive disease or other toxic/metabolic insult. No stable focal neurologic deficits to suggest cerebrovascular or other acute intracranial event. She has agreed to therapy. 5/9 - S/p PLEX 2/5 of sessions, showing some mild improvement. 5/12 - 4/5 of PLEX sessions and continues to show improvement  - Continue with PLEX for 5 sessions every other day, s/p 4 sessions. Next session is tomorrow (5/14)  - Hold Mestinon for now  - SoluMedrol 125mg IV daily s/p 5 days; now on prednisone 50mg PO daily and with taper by 5mg every week until 30mg  - Recommend IVIG 0.5g/kg/day x 4 days following PLEX  - PT/OT as tolerated  - Ultimately patient will need better outpatient therapy to prevent relapses, either IVIG infusions, increased prednisone dose, or Soliris (or combination of some)  - Continue to address above medical problems, as you are doing  - Will continue to follow patient with you but on Monday (5/15). Please call [phone]) with additional questions or concerns in the interim Myasthenia gravis with acute exacerbation  Respiratory failure with hypercapnia  DM type 2  HTN  Leukocytosis    - Patient with acute MG exacerbation, could be triggered by progressive disease or other toxic/metabolic insult. No stable focal neurologic deficits to suggest cerebrovascular or other acute intracranial event. She has agreed to therapy. 5/9 - S/p PLEX 2/5 of sessions, showing some mild improvement. 5/12 - 4/5 of PLEX sessions and continues to show improvement  - Continue with PLEX for 5 sessions every other day, s/p 4 sessions. Next session is tomorrow (5/14)  - Hold Mestinon for now  - SoluMedrol 125mg IV daily s/p 5 days; now on prednisone 50mg PO daily and with taper by 5mg every week until 30mg  - Recommend IVIG 0.5g/kg/day x 4 days following PLEX  - PT/OT as tolerated  - Ultimately patient will need better outpatient therapy to prevent relapses, either IVIG infusions, increased prednisone dose, or Soliris (or combination of some)  - Continue to address above medical problems, as you are doing  - Will continue to follow patient with you but on Monday (5/15). Please call [phone]) with additional questions or concerns in the interim Myasthenia gravis with acute exacerbation  Respiratory failure with hypercapnia  DM type 2  HTN  Leukocytosis    - Patient with acute MG exacerbation, could be triggered by progressive disease or other toxic/metabolic insult. No stable focal neurologic deficits to suggest cerebrovascular or other acute intracranial event. She has agreed to therapy. 5/9 - S/p PLEX 2/5 of sessions, showing some mild improvement. 5/12 - 4/5 of PLEX sessions and continues to show improvement  - Continue with PLEX for 5 sessions every other day, s/p 4 sessions. Next session is tomorrow (5/14)  - Hold Mestinon for now  - SoluMedrol 125mg IV daily s/p 5 days; now on prednisone 50mg PO daily and with taper by 5mg every week until 30mg  - Recommend IVIG 0.5g/kg/day x 4 days following PLEX  - PT/OT as tolerated  - Ultimately patient will need better outpatient therapy to prevent relapses, either IVIG infusions, increased prednisone dose, or Soliris (or combination of some)  - Continue to address above medical problems, as you are doing  - Will continue to follow patient with you but on Monday (5/15). Please call [phone]) with additional questions or concerns in the interim

## 2023-05-12 NOTE — CHART NOTE - NSCHARTNOTEFT_GEN_A_CORE
63 yo woman with DM and Myasthenia gravis s/p thymectomy 2010, with prior exacerbations requiring intubation most recently in late Jan 2023 requiring intubation and PLEX, on 2L O2 at baseline, now readmitted 5/5 with 1 week of diplopia, dysphagia and SOB requiring intubation. BB contacted to initiate PLEX.     PLEX#1 performed on 5/6/23, 1PV with 5% albumin as replacement fluid. Procedure tolerated well.    PLEX#2 performed on 5/8/23, 1PV with 5% albumin as replacement fluid. Procedure tolerated well.    PLEX 3# performed on 5/10/23. 1PV with 5% albumin as replacement fluid. Procedure tolerated well.    PLEX#4 today. Labs and notes reviewed. 1PV with 5% albumin as replacement fluid.    PLEX#5 scheduled on 5/14/23. Please monitor pt CBC, Coag, fibrinogen and iCa prior to the procedure. 61 yo woman with DM and Myasthenia gravis s/p thymectomy 2010, with prior exacerbations requiring intubation most recently in late Jan 2023 requiring intubation and PLEX, on 2L O2 at baseline, now readmitted 5/5 with 1 week of diplopia, dysphagia and SOB requiring intubation. BB contacted to initiate PLEX.     PLEX#1 performed on 5/6/23, 1PV with 5% albumin as replacement fluid. Procedure tolerated well.    PLEX#2 performed on 5/8/23, 1PV with 5% albumin as replacement fluid. Procedure tolerated well.    PLEX 3# performed on 5/10/23. 1PV with 5% albumin as replacement fluid. Procedure tolerated well.    PLEX#4 today. Labs and notes reviewed. 1PV with 5% albumin as replacement fluid.    PLEX#5 scheduled on 5/14/23. Please monitor pt CBC, Coag, fibrinogen and iCa prior to the procedure.

## 2023-05-12 NOTE — CHART NOTE - NSCHARTNOTEFT_GEN_A_CORE
Nutrition Follow Up Note  Patient seen for: Nutrition Follow Up     Chart reviewed, events noted. Pt is a 61 yo F, admitted with MG exacerbation, s/p 3x doses of PLEX. Next planned dose today, . Intubated for hypercapnic respiratory failure.     Source: [] Patient       [x] EMR        [x] RN        [] Family at bedside       [x] Other:    -If unable to interview patient: [x] Trach/Vent/BiPAP  [x] Disoriented/confused/inappropriate to interview    Diet Order:   Diet, NPO with Tube Feed:   Tube Feeding Modality: Nasogastric  Glucerna 1.2 Taye (GLUCERNARTH)  Total Volume for 24 Hours (mL): 1440  Continuous  Starting Tube Feed Rate {mL per Hour}: 20  Increase Tube Feed Rate by (mL): 10     Every 6 hours  Until Goal Tube Feed Rate (mL per Hour): 60  Tube Feed Duration (in Hours): 24  Tube Feed Start Time: 10:00  Supplement Feeding Modality:  Nasogastric  Probiotic Yogurt/Smoothie Cans or Servings Per Day:  2       Frequency:  Daily (23)    EN Order Provides: 1440ml, 1728kcal and 86g protein    EN Provision (per nursing flow sheet):   (): feeds infusing at goal rate, 60ml/hr.   (): 100% of goal  (5/10): 100% of goal  (): 100% of goal  (): 100% of goal    Is current diet order appropriate/adequate? [x] Yes  []  No:     Nutrition-related concerns:  -Last BM (): x 1; (): x 2. On bowel regimen (senna, Miralax and Dulcolax).   -Receiving antihyperglycemic regimen (NPH 8u q 6 hrs, insulin lispro sliding scale). A1c 6.8%.  -->To note, pt on Prednisone (steroid), which may further contribute to elevated FSBG.   -Receiving free water flushes, 200ml q 6 hrs for hydration.     Weights:   Daily Weight in k (05-10)    MEDICATIONS  (STANDING):  atorvastatin  dextrose 5%.  dextrose 5%.  dextrose 50% Injectable  dextrose 50% Injectable  dextrose 50% Injectable  glucagon  Injectable  insulin lispro (ADMELOG) corrective regimen sliding scale  insulin NPH human recombinant  pantoprazole  Injectable  polyethylene glycol 3350  predniSONE   Tablet  senna    Pertinent Labs:  @ 22:58: Na 141, BUN 27<H>, Cr 0.39<L>, <H>, K+ 3.6, Phos 3.9, Mg 2.5, Alk Phos --, ALT/SGPT --, AST/SGOT --, HbA1c --    A1C with Estimated Average Glucose Result: 6.8 % (23 @ 00:24)  A1C with Estimated Average Glucose Result: 6.6 % (23 @ 01:13)    Finger Sticks:  POCT Blood Glucose.: 143 mg/dL ( @ 05:43)  POCT Blood Glucose.: 107 mg/dL ( @ 23:52)  POCT Blood Glucose.: 178 mg/dL ( @ 16:58)  POCT Blood Glucose.: 224 mg/dL ( @ 11:15)    Skin per nursing documentation: buttocks DTI  Edema: 2+ generalized    (based on dosing wt 75kg):   Estimated Energy Needs: (23-27kcal/kg): 1725-2025kcal   Estimated Protein Needs: (1.0-1.2g protein/kg): 75-90g protein   Estimated Fluid Needs: defer to team    Previous Nutrition Diagnosis: increased nutrient needs  Nutrition Diagnosis is: [x] ongoing  [] resolved [] not applicable     New Nutrition Diagnosis: [x] Not applicable    Nutrition Care Plan:  [x] In Progress  [] Achieved  [] Not applicable    Nutrition Recommendations:   1. May continue Glucerna 1.2 at goal rate 60ml/hr x 24 hrs. Provides: 1440ml, 1728kcal and 86g protein. Based on dosing wt 75kg, provides: 23kcal/kg and 1.1g protein/kg. Free water flushes deferred to medical team.   2. Monitor GI tolerance. RD to remain available to adjust EN formulary, volume/rate PRN.   3. May discontinue Laci Active, as pt not prescribed antibiotics at this time.   4. Antihyperglycemic regimen deferred to team.   5. Monitor wt trends/labs/skin integrity/hydration status/bowel regularity.   6. Consider vitamin C if no medication/clinical contraindications to aid in wound healing.     Monitoring and Evaluation:   Continue to monitor nutritional intake, tolerance to diet prescription, weights, labs, skin integrity    RD remains available upon request and will follow up per protocol    Alison Kleiner, RD, [facility] Pager # [phone] Nutrition Follow Up Note  Patient seen for: Nutrition Follow Up     Chart reviewed, events noted. Pt is a 63 yo F, admitted with MG exacerbation, s/p 3x doses of PLEX. Next planned dose today, . Intubated for hypercapnic respiratory failure.     Source: [] Patient       [x] EMR        [x] RN        [] Family at bedside       [x] Other:    -If unable to interview patient: [x] Trach/Vent/BiPAP  [x] Disoriented/confused/inappropriate to interview    Diet Order:   Diet, NPO with Tube Feed:   Tube Feeding Modality: Nasogastric  Glucerna 1.2 Taye (GLUCERNARTH)  Total Volume for 24 Hours (mL): 1440  Continuous  Starting Tube Feed Rate {mL per Hour}: 20  Increase Tube Feed Rate by (mL): 10     Every 6 hours  Until Goal Tube Feed Rate (mL per Hour): 60  Tube Feed Duration (in Hours): 24  Tube Feed Start Time: 10:00  Supplement Feeding Modality:  Nasogastric  Probiotic Yogurt/Smoothie Cans or Servings Per Day:  2       Frequency:  Daily (23)    EN Order Provides: 1440ml, 1728kcal and 86g protein    EN Provision (per nursing flow sheet):   (): feeds infusing at goal rate, 60ml/hr.   (): 100% of goal  (5/10): 100% of goal  (): 100% of goal  (): 100% of goal    Is current diet order appropriate/adequate? [x] Yes  []  No:     Nutrition-related concerns:  -Last BM (): x 1; (): x 2. On bowel regimen (senna, Miralax and Dulcolax).   -Receiving antihyperglycemic regimen (NPH 8u q 6 hrs, insulin lispro sliding scale). A1c 6.8%.  -->To note, pt on Prednisone (steroid), which may further contribute to elevated FSBG.   -Receiving free water flushes, 200ml q 6 hrs for hydration.     Weights:   Daily Weight in k (05-10)    MEDICATIONS  (STANDING):  atorvastatin  dextrose 5%.  dextrose 5%.  dextrose 50% Injectable  dextrose 50% Injectable  dextrose 50% Injectable  glucagon  Injectable  insulin lispro (ADMELOG) corrective regimen sliding scale  insulin NPH human recombinant  pantoprazole  Injectable  polyethylene glycol 3350  predniSONE   Tablet  senna    Pertinent Labs:  @ 22:58: Na 141, BUN 27<H>, Cr 0.39<L>, <H>, K+ 3.6, Phos 3.9, Mg 2.5, Alk Phos --, ALT/SGPT --, AST/SGOT --, HbA1c --    A1C with Estimated Average Glucose Result: 6.8 % (23 @ 00:24)  A1C with Estimated Average Glucose Result: 6.6 % (23 @ 01:13)    Finger Sticks:  POCT Blood Glucose.: 143 mg/dL ( @ 05:43)  POCT Blood Glucose.: 107 mg/dL ( @ 23:52)  POCT Blood Glucose.: 178 mg/dL ( @ 16:58)  POCT Blood Glucose.: 224 mg/dL ( @ 11:15)    Skin per nursing documentation: buttocks DTI  Edema: 2+ generalized    (based on dosing wt 75kg):   Estimated Energy Needs: (23-27kcal/kg): 1725-2025kcal   Estimated Protein Needs: (1.0-1.2g protein/kg): 75-90g protein   Estimated Fluid Needs: defer to team    Previous Nutrition Diagnosis: increased nutrient needs  Nutrition Diagnosis is: [x] ongoing  [] resolved [] not applicable     New Nutrition Diagnosis: [x] Not applicable    Nutrition Care Plan:  [x] In Progress  [] Achieved  [] Not applicable    Nutrition Recommendations:   1. May continue Glucerna 1.2 at goal rate 60ml/hr x 24 hrs. Provides: 1440ml, 1728kcal and 86g protein. Based on dosing wt 75kg, provides: 23kcal/kg and 1.1g protein/kg. Free water flushes deferred to medical team.   2. Monitor GI tolerance. RD to remain available to adjust EN formulary, volume/rate PRN.   3. May discontinue Laci Active, as pt not prescribed antibiotics at this time.   4. Antihyperglycemic regimen deferred to team.   5. Monitor wt trends/labs/skin integrity/hydration status/bowel regularity.   6. Consider vitamin C if no medication/clinical contraindications to aid in wound healing.     Monitoring and Evaluation:   Continue to monitor nutritional intake, tolerance to diet prescription, weights, labs, skin integrity    RD remains available upon request and will follow up per protocol    Alison Kleiner, RD, [facility] Pager # [phone] Nutrition Follow Up Note  Patient seen for: Nutrition Follow Up     Chart reviewed, events noted. Pt is a 63 yo F, admitted with MG exacerbation, s/p 3x doses of PLEX. Next planned dose today, . Intubated for hypercapnic respiratory failure.     Source: [] Patient       [x] EMR        [x] RN        [] Family at bedside       [x] Other:    -If unable to interview patient: [x] Trach/Vent/BiPAP  [x] Disoriented/confused/inappropriate to interview    Diet Order:   Diet, NPO with Tube Feed:   Tube Feeding Modality: Nasogastric  Glucerna 1.2 Taye (GLUCERNARTH)  Total Volume for 24 Hours (mL): 1440  Continuous  Starting Tube Feed Rate {mL per Hour}: 20  Increase Tube Feed Rate by (mL): 10     Every 6 hours  Until Goal Tube Feed Rate (mL per Hour): 60  Tube Feed Duration (in Hours): 24  Tube Feed Start Time: 10:00  Supplement Feeding Modality:  Nasogastric  Probiotic Yogurt/Smoothie Cans or Servings Per Day:  2       Frequency:  Daily (23)    EN Order Provides: 1440ml, 1728kcal and 86g protein    EN Provision (per nursing flow sheet):   (): feeds infusing at goal rate, 60ml/hr.   (): 100% of goal  (5/10): 100% of goal  (): 100% of goal  (): 100% of goal    Is current diet order appropriate/adequate? [x] Yes  []  No:     Nutrition-related concerns:  -Last BM (): x 1; (): x 2. On bowel regimen (senna, Miralax and Dulcolax).   -Receiving antihyperglycemic regimen (NPH 8u q 6 hrs, insulin lispro sliding scale). A1c 6.8%.  -->To note, pt on Prednisone (steroid), which may further contribute to elevated FSBG.   -Receiving free water flushes, 200ml q 6 hrs for hydration.     Weights:   Daily Weight in k (05-10)    MEDICATIONS  (STANDING):  atorvastatin  dextrose 5%.  dextrose 5%.  dextrose 50% Injectable  dextrose 50% Injectable  dextrose 50% Injectable  glucagon  Injectable  insulin lispro (ADMELOG) corrective regimen sliding scale  insulin NPH human recombinant  pantoprazole  Injectable  polyethylene glycol 3350  predniSONE   Tablet  senna    Pertinent Labs:  @ 22:58: Na 141, BUN 27<H>, Cr 0.39<L>, <H>, K+ 3.6, Phos 3.9, Mg 2.5, Alk Phos --, ALT/SGPT --, AST/SGOT --, HbA1c --    A1C with Estimated Average Glucose Result: 6.8 % (23 @ 00:24)  A1C with Estimated Average Glucose Result: 6.6 % (23 @ 01:13)    Finger Sticks:  POCT Blood Glucose.: 143 mg/dL ( @ 05:43)  POCT Blood Glucose.: 107 mg/dL ( @ 23:52)  POCT Blood Glucose.: 178 mg/dL ( @ 16:58)  POCT Blood Glucose.: 224 mg/dL ( @ 11:15)    Skin per nursing documentation: buttocks DTI  Edema: 2+ generalized    (based on dosing wt 75kg):   Estimated Energy Needs: (23-27kcal/kg): 1725-2025kcal   Estimated Protein Needs: (1.0-1.2g protein/kg): 75-90g protein   Estimated Fluid Needs: defer to team    Previous Nutrition Diagnosis: increased nutrient needs  Nutrition Diagnosis is: [x] ongoing  [] resolved [] not applicable     New Nutrition Diagnosis: [x] Not applicable    Nutrition Care Plan:  [x] In Progress  [] Achieved  [] Not applicable    Nutrition Recommendations:   1. May continue Glucerna 1.2 at goal rate 60ml/hr x 24 hrs. Provides: 1440ml, 1728kcal and 86g protein. Based on dosing wt 75kg, provides: 23kcal/kg and 1.1g protein/kg. Free water flushes deferred to medical team.   2. Monitor GI tolerance. RD to remain available to adjust EN formulary, volume/rate PRN.   3. May discontinue Laci Active, as pt not prescribed antibiotics at this time.   4. Antihyperglycemic regimen deferred to team.   5. Monitor wt trends/labs/skin integrity/hydration status/bowel regularity.   6. Consider vitamin C if no medication/clinical contraindications to aid in wound healing.     Monitoring and Evaluation:   Continue to monitor nutritional intake, tolerance to diet prescription, weights, labs, skin integrity    RD remains available upon request and will follow up per protocol    Alison Kleiner, RD, [facility] Pager # [phone]

## 2023-05-12 NOTE — PROGRESS NOTE ADULT - ASSESSMENT
ASSESSMENT/PLAN: MG exacerbation sp 3 x dose of PLEX    NEURO:  - neuro checks q4h  - s/p 3 dose of PLEX, next dose TODAY 5/12  - solumedrol transitioned to prednisone 50 mg QD starting 5/11  - Avoid medications that may worsen the current exacerbation including macrolides, fluoroquinolones, tetracyclines, aminoglycoside, beta-blockers, magnesium, and anti-arrhythmics (procainamide, quinidine, and lidocaine)  - pain control  - neurology following  - PT/OT     CVS:  - MAP>65  - TTE - EF 54%    PULM:  Intubated in ED 5/5 for hypercapnic resp failure  - Pressure support trials  - NIF/VC every 4 hours  - VAP bundle  - CXR- unremarkable  - ABG- reviewed    RENAL:  RACQUEL sharma placed 5/5  - Cont   Q6H  - IVL  - daily IOs    GI:  - Diet: ON tf at GOAL   - Fluids: IVL  - GI prophylaxis: PPi while intubated   - Bowel regimen: miralax, senna  - LBM 5/11    ENDO:   - FS goal 120-180  - On ISS and NPH 8mgQ6    HEME/ONC:  - SCDs  - Chemoppx: SQL    ID:  - monitor for fevers, leukocytosis likely related to steroids     ASSESSMENT/PLAN: MG exacerbation sp 3 x dose of PLEX    NEURO:  - neuro checks q4h  - s/p 3 dose of PLEX, next dose TODAY 5/12  - solumedrol transitioned to prednisone 50 mg QD starting 5/11  - Avoid medications that may worsen the current exacerbation including macrolides, fluoroquinolones, tetracyclines, aminoglycoside, beta-blockers, magnesium, and anti-arrhythmics (procainamide, quinidine, and lidocaine)  - pain control  - neurology following  - PT/OT     CVS:  - MAP>65  - TTE - EF 54%    PULM:  Intubated in ED 5/5 for hypercapnic resp failure  - Pressure support trials  - NIF/VC every 4 hours  - VAP bundle  - CXR- unremarkable  - ABG- reviewed    RENAL:  RACQUEL sharma placed 5/5  - Will discontinue FWF  - IVL  - daily IOs    GI:  - Diet: ON tf at GOAL   - Fluids: IVL  - GI prophylaxis: PPi while intubated   - Bowel regimen: miralax, senna  - LBM 5/12    ENDO:   - FS goal 120-180  - On ISS and NPH 8mgQ6    HEME/ONC:  - SCDs  - Chemoppx: SQL    ID:  - monitor for fevers, leukocytosis likely related to steroids

## 2023-05-12 NOTE — PROGRESS NOTE ADULT - SUBJECTIVE AND OBJECTIVE BOX
Patient KAIDEN HATCH is a 62y WITH History of MG presents with exacerbation.    24 Hour Events/Subjective:  - S/P 3 sessions of PLEX.   - in the AM patient was hypercapnic on CPAP, was placed back on full support, reports fatigue.  - patient complaining of congestion, and ear fullness given loratadine    ICU Vital Signs Last 24 Hrs  T(C): 36.8 (12 May 2023 03:00), Max: 36.9 (11 May 2023 11:00)  T(F): 98.3 (12 May 2023 03:00), Max: 98.5 (11 May 2023 11:00)  HR: 60 (12 May 2023 07:00) (50 - 116)  BP: 107/60 (12 May 2023 07:00) (90/56 - 150/75)  BP(mean): 73 (12 May 2023 07:00) (61 - 96)  ABP: --  ABP(mean): --  RR: 14 (12 May 2023 07:00) (13 - 20)  SpO2: 100% (12 May 2023 07:00) (93% - 100%)    O2 Parameters below as of 12 May 2023 06:08  Patient On (Oxygen Delivery Method): ventilator        MEDICATIONS  (STANDING):  acetylcysteine 20%  Inhalation 4 milliLiter(s) Inhalation every 6 hours  albuterol/ipratropium for Nebulization 3 milliLiter(s) Nebulizer every 6 hours  atorvastatin 20 milliGRAM(s) Oral at bedtime  chlorhexidine 0.12% Liquid 15 milliLiter(s) Oral Mucosa every 12 hours  chlorhexidine 4% Liquid 1 Application(s) Topical <User Schedule>  dexMEDEtomidine Infusion 0.2 MICROgram(s)/kG/Hr (3.75 mL/Hr) IV Continuous <Continuous>  dextrose 5%. 1000 milliLiter(s) (50 mL/Hr) IV Continuous <Continuous>  dextrose 5%. 1000 milliLiter(s) (100 mL/Hr) IV Continuous <Continuous>  dextrose 50% Injectable 12.5 Gram(s) IV Push once  dextrose 50% Injectable 25 Gram(s) IV Push once  dextrose 50% Injectable 25 Gram(s) IV Push once  enoxaparin Injectable 40 milliGRAM(s) SubCutaneous every 24 hours  fentaNYL    Injectable 50 MICROGram(s) IV Push once  glucagon  Injectable 1 milliGRAM(s) IntraMuscular once  insulin lispro (ADMELOG) corrective regimen sliding scale   SubCutaneous every 6 hours  insulin NPH human recombinant 8 Unit(s) SubCutaneous every 6 hours  mupirocin 2% Nasal 1 Application(s) Both Nostrils every 12 hours  pantoprazole  Injectable 40 milliGRAM(s) IV Push daily  polyethylene glycol 3350 17 Gram(s) Oral every 12 hours  predniSONE   Tablet 50 milliGRAM(s) Oral daily  senna 2 Tablet(s) Oral at bedtime    MEDICATIONS  (PRN):  acetaminophen   Oral Liquid .. 650 milliGRAM(s) Oral every 6 hours PRN Mild Pain (1 - 3)  bisacodyl Suppository 10 milliGRAM(s) Rectal daily PRN Constipation  dextrose Oral Gel 15 Gram(s) Oral once PRN Blood Glucose LESS THAN 70 milliGRAM(s)/deciliter  fentaNYL    Injectable 25 MICROGram(s) IV Push every 2 hours PRN Vent synchrony  oxyCODONE    Solution 10 milliGRAM(s) Oral every 6 hours PRN Severe Pain (7 - 10)  oxyCODONE    Solution 5 milliGRAM(s) Oral every 6 hours PRN Moderate Pain (4 - 6)  sodium chloride 0.65% Nasal 1 Spray(s) Both Nostrils four times a day PRN Nasal Congestion            REVIEW OF SYSTEMS:  - negative except as above    VITALS:   - Reviewed      IMAGING/DATA:   - Reviewed      PHYSICAL EXAM:    General: intubated, increase secretions, looks uncomfortable and with decreased O2 saturation. Put back on pressure support.  CVS: RRR  Pulm: CTAB  GI: Soft, NTND  Extremities: No LE Edema  Neuro: awake, alert, oriented x 3 w choices, follows commands, EOMI w poor upgaze, face symmetric, able to lift b/l arms AG to the elbow, RUE deltoid 4- /5, distally 5/5, LUE deltoid 4-/5, proximally 5/5, able to lift b/l LEs off the bed with 5/5 strength. Neck flexion 2/5.    Patient KAIDEN HATCH is a 62y WITH History of MG presents with exacerbation.    24 Hour Events/Subjective:  - S/P 4 sessions of PLEX.   - patient was hypercapnic on CPAP, was placed back on full support, reports fatigue.  - patient complaining of congestion, and ear fullness given loratadine    ICU Vital Signs Last 24 Hrs  T(C): 36.8 (12 May 2023 03:00), Max: 36.9 (11 May 2023 11:00)  T(F): 98.3 (12 May 2023 03:00), Max: 98.5 (11 May 2023 11:00)  HR: 60 (12 May 2023 07:00) (50 - 116)  BP: 107/60 (12 May 2023 07:00) (90/56 - 150/75)  BP(mean): 73 (12 May 2023 07:00) (61 - 96)  ABP: --  ABP(mean): --  RR: 14 (12 May 2023 07:00) (13 - 20)  SpO2: 100% (12 May 2023 07:00) (93% - 100%)    O2 Parameters below as of 12 May 2023 06:08  Patient On (Oxygen Delivery Method): ventilator        MEDICATIONS  (STANDING):  acetylcysteine 20%  Inhalation 4 milliLiter(s) Inhalation every 6 hours  albuterol/ipratropium for Nebulization 3 milliLiter(s) Nebulizer every 6 hours  atorvastatin 20 milliGRAM(s) Oral at bedtime  chlorhexidine 0.12% Liquid 15 milliLiter(s) Oral Mucosa every 12 hours  chlorhexidine 4% Liquid 1 Application(s) Topical <User Schedule>  dexMEDEtomidine Infusion 0.2 MICROgram(s)/kG/Hr (3.75 mL/Hr) IV Continuous <Continuous>  dextrose 5%. 1000 milliLiter(s) (50 mL/Hr) IV Continuous <Continuous>  dextrose 5%. 1000 milliLiter(s) (100 mL/Hr) IV Continuous <Continuous>  dextrose 50% Injectable 12.5 Gram(s) IV Push once  dextrose 50% Injectable 25 Gram(s) IV Push once  dextrose 50% Injectable 25 Gram(s) IV Push once  enoxaparin Injectable 40 milliGRAM(s) SubCutaneous every 24 hours  fentaNYL    Injectable 50 MICROGram(s) IV Push once  glucagon  Injectable 1 milliGRAM(s) IntraMuscular once  insulin lispro (ADMELOG) corrective regimen sliding scale   SubCutaneous every 6 hours  insulin NPH human recombinant 8 Unit(s) SubCutaneous every 6 hours  mupirocin 2% Nasal 1 Application(s) Both Nostrils every 12 hours  pantoprazole  Injectable 40 milliGRAM(s) IV Push daily  polyethylene glycol 3350 17 Gram(s) Oral every 12 hours  predniSONE   Tablet 50 milliGRAM(s) Oral daily  senna 2 Tablet(s) Oral at bedtime    MEDICATIONS  (PRN):  acetaminophen   Oral Liquid .. 650 milliGRAM(s) Oral every 6 hours PRN Mild Pain (1 - 3)  bisacodyl Suppository 10 milliGRAM(s) Rectal daily PRN Constipation  dextrose Oral Gel 15 Gram(s) Oral once PRN Blood Glucose LESS THAN 70 milliGRAM(s)/deciliter  fentaNYL    Injectable 25 MICROGram(s) IV Push every 2 hours PRN Vent synchrony  oxyCODONE    Solution 10 milliGRAM(s) Oral every 6 hours PRN Severe Pain (7 - 10)  oxyCODONE    Solution 5 milliGRAM(s) Oral every 6 hours PRN Moderate Pain (4 - 6)  sodium chloride 0.65% Nasal 1 Spray(s) Both Nostrils four times a day PRN Nasal Congestion            REVIEW OF SYSTEMS:  - negative except as above    VITALS:   - Reviewed      IMAGING/DATA:   - Reviewed      PHYSICAL EXAM:    General: intubated, increase secretions, looks uncomfortable and with decreased O2 saturation. Put back on pressure support.  CVS: RRR  Pulm: CTAB  GI: Soft, NTND  Extremities: No LE Edema  Neuro: awake, alert, oriented x 3 w choices, follows commands, EOMI w poor upgaze, face symmetric, able to lift b/l arms AG to the elbow, RUE deltoid 4- /5, distally 5/5, LUE deltoid 4-/5, proximally 5/5, able to lift b/l LEs off the bed with 5/5 strength. Neck flexion 3/5, neck extension 4/5.

## 2023-05-13 LAB
ANION GAP SERPL CALC-SCNC: 9 MMOL/L — SIGNIFICANT CHANGE UP (ref 5–17)
BUN SERPL-MCNC: 27 MG/DL — HIGH (ref 7–23)
CALCIUM SERPL-MCNC: 8.4 MG/DL — SIGNIFICANT CHANGE UP (ref 8.4–10.5)
CHLORIDE SERPL-SCNC: 111 MMOL/L — HIGH (ref 96–108)
CO2 SERPL-SCNC: 26 MMOL/L — SIGNIFICANT CHANGE UP (ref 22–31)
CREAT SERPL-MCNC: 0.44 MG/DL — LOW (ref 0.5–1.3)
EGFR: 109 ML/MIN/1.73M2 — SIGNIFICANT CHANGE UP
GLUCOSE SERPL-MCNC: 127 MG/DL — HIGH (ref 70–99)
HCT VFR BLD CALC: 29.9 % — LOW (ref 34.5–45)
HGB BLD-MCNC: 9.5 G/DL — LOW (ref 11.5–15.5)
MAGNESIUM SERPL-MCNC: 2.4 MG/DL — SIGNIFICANT CHANGE UP (ref 1.6–2.6)
MCHC RBC-ENTMCNC: 28.4 PG — SIGNIFICANT CHANGE UP (ref 27–34)
MCHC RBC-ENTMCNC: 31.8 GM/DL — LOW (ref 32–36)
MCV RBC AUTO: 89.3 FL — SIGNIFICANT CHANGE UP (ref 80–100)
NRBC # BLD: 0 /100 WBCS — SIGNIFICANT CHANGE UP (ref 0–0)
PHOSPHATE SERPL-MCNC: 3.9 MG/DL — SIGNIFICANT CHANGE UP (ref 2.5–4.5)
PLATELET # BLD AUTO: 200 K/UL — SIGNIFICANT CHANGE UP (ref 150–400)
POTASSIUM SERPL-MCNC: 3.8 MMOL/L — SIGNIFICANT CHANGE UP (ref 3.5–5.3)
POTASSIUM SERPL-SCNC: 3.8 MMOL/L — SIGNIFICANT CHANGE UP (ref 3.5–5.3)
RBC # BLD: 3.35 M/UL — LOW (ref 3.8–5.2)
RBC # FLD: 14.9 % — HIGH (ref 10.3–14.5)
SODIUM SERPL-SCNC: 146 MMOL/L — HIGH (ref 135–145)
WBC # BLD: 12.46 K/UL — HIGH (ref 3.8–10.5)
WBC # FLD AUTO: 12.46 K/UL — HIGH (ref 3.8–10.5)

## 2023-05-13 PROCEDURE — 71045 X-RAY EXAM CHEST 1 VIEW: CPT | Mod: 26

## 2023-05-13 PROCEDURE — 99291 CRITICAL CARE FIRST HOUR: CPT

## 2023-05-13 RX ORDER — ACETAMINOPHEN 500 MG
500 TABLET ORAL ONCE
Refills: 0 | Status: DISCONTINUED | OUTPATIENT
Start: 2023-05-13 | End: 2023-05-14

## 2023-05-13 RX ORDER — POTASSIUM CHLORIDE 20 MEQ
40 PACKET (EA) ORAL ONCE
Refills: 0 | Status: COMPLETED | OUTPATIENT
Start: 2023-05-13 | End: 2023-05-13

## 2023-05-13 RX ADMIN — Medication 4 MILLILITER(S): at 23:30

## 2023-05-13 RX ADMIN — Medication 4 MILLILITER(S): at 00:16

## 2023-05-13 RX ADMIN — Medication 4 MILLILITER(S): at 06:01

## 2023-05-13 RX ADMIN — PANTOPRAZOLE SODIUM 40 MILLIGRAM(S): 20 TABLET, DELAYED RELEASE ORAL at 11:05

## 2023-05-13 RX ADMIN — MUPIROCIN 1 APPLICATION(S): 20 OINTMENT TOPICAL at 05:27

## 2023-05-13 RX ADMIN — Medication 3 MILLILITER(S): at 00:15

## 2023-05-13 RX ADMIN — OXYCODONE HYDROCHLORIDE 10 MILLIGRAM(S): 5 TABLET ORAL at 19:50

## 2023-05-13 RX ADMIN — Medication 4 MILLILITER(S): at 17:31

## 2023-05-13 RX ADMIN — Medication 650 MILLIGRAM(S): at 08:22

## 2023-05-13 RX ADMIN — HUMAN INSULIN 8 UNIT(S): 100 INJECTION, SUSPENSION SUBCUTANEOUS at 11:13

## 2023-05-13 RX ADMIN — HUMAN INSULIN 8 UNIT(S): 100 INJECTION, SUSPENSION SUBCUTANEOUS at 05:39

## 2023-05-13 RX ADMIN — Medication 4: at 11:13

## 2023-05-13 RX ADMIN — CHLORHEXIDINE GLUCONATE 15 MILLILITER(S): 213 SOLUTION TOPICAL at 17:08

## 2023-05-13 RX ADMIN — CHLORHEXIDINE GLUCONATE 15 MILLILITER(S): 213 SOLUTION TOPICAL at 05:27

## 2023-05-13 RX ADMIN — Medication 3 MILLILITER(S): at 23:30

## 2023-05-13 RX ADMIN — Medication 3 MILLILITER(S): at 06:01

## 2023-05-13 RX ADMIN — ENOXAPARIN SODIUM 40 MILLIGRAM(S): 100 INJECTION SUBCUTANEOUS at 17:08

## 2023-05-13 RX ADMIN — Medication 650 MILLIGRAM(S): at 15:42

## 2023-05-13 RX ADMIN — Medication 650 MILLIGRAM(S): at 16:12

## 2023-05-13 RX ADMIN — OXYCODONE HYDROCHLORIDE 10 MILLIGRAM(S): 5 TABLET ORAL at 20:20

## 2023-05-13 RX ADMIN — ATORVASTATIN CALCIUM 20 MILLIGRAM(S): 80 TABLET, FILM COATED ORAL at 21:49

## 2023-05-13 RX ADMIN — Medication 40 MILLIEQUIVALENT(S): at 05:29

## 2023-05-13 RX ADMIN — HUMAN INSULIN 8 UNIT(S): 100 INJECTION, SUSPENSION SUBCUTANEOUS at 17:09

## 2023-05-13 RX ADMIN — Medication 2: at 17:08

## 2023-05-13 RX ADMIN — CHLORHEXIDINE GLUCONATE 1 APPLICATION(S): 213 SOLUTION TOPICAL at 21:49

## 2023-05-13 RX ADMIN — Medication 50 MILLIGRAM(S): at 05:27

## 2023-05-13 RX ADMIN — Medication 4 MILLILITER(S): at 11:44

## 2023-05-13 RX ADMIN — HUMAN INSULIN 8 UNIT(S): 100 INJECTION, SUSPENSION SUBCUTANEOUS at 23:28

## 2023-05-13 RX ADMIN — Medication 650 MILLIGRAM(S): at 07:52

## 2023-05-13 RX ADMIN — Medication 3 MILLILITER(S): at 17:31

## 2023-05-13 RX ADMIN — MUPIROCIN 1 APPLICATION(S): 20 OINTMENT TOPICAL at 17:08

## 2023-05-13 RX ADMIN — Medication 3 MILLILITER(S): at 11:43

## 2023-05-13 NOTE — PROGRESS NOTE ADULT - ASSESSMENT
ASSESSMENT/PLAN: MG exacerbation sp 4 dose of PLEX    NEURO:  - neuro checks q4h  - s/p 5 dose of PLEX  - solumedrol transitioned to prednisone 50 mg QD starting 5/11  - Avoid medications that may worsen the current exacerbation including macrolides, fluoroquinolones, tetracyclines, aminoglycoside, beta-blockers, magnesium, and anti-arrhythmics (procainamide, quinidine, and lidocaine)  - pain control  - neurology following  - PT/OT     CVS:  - MAP>65  - TTE - EF 54%    PULM:  Intubated in ED 5/5 for hypercapnic resp failure  - Pressure support trials  - NIF/VC every 4 hours  - VAP bundle  - CXR- unremarkable  - ABG- reviewed    RENAL:  RACQUEL sharma placed 5/5  - Will discontinue FWF  - IVL  - daily IOs    GI:  - Diet: ON tf at GOAL   - Fluids: IVL  - GI prophylaxis: PPi while intubated   - Bowel regimen: miralax, senna  - LBM 5/12    ENDO:   - FS goal 120-180  - On ISS and NPH 8mgQ6    HEME/ONC:  - SCDs  - Chemoppx: SQL    ID:  - monitor for fevers, leukocytosis likely related to steroids     ASSESSMENT/PLAN: MG exacerbation sp 4 dose of PLEX    NEURO:  - neuro checks q4h  - s/p 4 dose of PLEX  - Continue prednisone 50 mg QD starting 5/11  - Avoid medications that may worsen the current exacerbation including macrolides, fluoroquinolones, tetracyclines, aminoglycoside, beta-blockers, magnesium, and anti-arrhythmics (procainamide, quinidine, and lidocaine)  - neurology following and recommend starting IVIG after PLEX (5/15)  - pain control  - PT/OT     CVS:  - MAP>65  - TTE - EF 54%    PULM:  Intubated in ED 5/5 for hypercapnic resp failure  - Pressure support trials  - NIF/VC every 4 hours  - VAP bundle  - CXR- unremarkable  - ABG- reviewed    RENAL:  RACQUEL sharma placed 5/5  - IVL  - daily IOs    GI:  - Diet: ON tf at GOAL   - Fluids: IVL  - GI prophylaxis: PPi while intubated   - Bowel regimen: miralax, senna  - LBM 5/13    ENDO:   - FS goal 120-180  - On ISS and NPH 8mgQ6    HEME/ONC:  - SCDs  - Chemoppx: SQL    ID:  - monitor for fevers, leukocytosis likely related to steroids  - No signs of infection

## 2023-05-13 NOTE — PROGRESS NOTE ADULT - ASSESSMENT
ASSESSMENT/PLAN: MG exacerbation sp 4 dose of PLEX    NEURO:  - neuro checks q4h  - s/p 4 dose of PLEX  - Continue prednisone 50 mg QD starting 5/11  - Avoid medications that may worsen the current exacerbation including macrolides, fluoroquinolones, tetracyclines, aminoglycoside, beta-blockers, magnesium, and anti-arrhythmics (procainamide, quinidine, and lidocaine)  - neurology following and recommend starting IVIG after PLEX (5/15)  - pain control  - PT/OT     CVS:  - MAP>65  - TTE - EF 54%    PULM:  Intubated in ED 5/5 for hypercapnic resp failure  - Pressure support trials  - NIF/VC every 4 hours  - VAP bundle  - CXR- unremarkable  - ABG- reviewed    RENAL:  RACQUEL sharma placed 5/5  - IVL  - daily IOs    GI:  - Diet: ON tf at GOAL   - Fluids: IVL  - GI prophylaxis: PPi while intubated   - Bowel regimen: miralax, senna  - LBM 5/13    ENDO:   - FS goal 120-180  - On ISS and NPH 8mgQ6    HEME/ONC:  - SCDs  - Chemoppx: SQL    ID:  - monitor for fevers, leukocytosis likely related to steroids  - No signs of infection

## 2023-05-13 NOTE — PROGRESS NOTE ADULT - SUBJECTIVE AND OBJECTIVE BOX
Patient KAIDEN HATCH is a 62y WITH History of MG presents with exacerbation.    24 Hour Events/Subjective:  - S/P 3 sessions of PLEX.   - in the AM patient was hypercapnic on CPAP, was placed back on full support, reports fatigue.       ICU Vital Signs Last 24 Hrs  T(C): 36.8 (13 May 2023 03:00), Max: 37.1 (12 May 2023 23:00)  T(F): 98.3 (13 May 2023 03:00), Max: 98.7 (12 May 2023 23:00)  HR: 123 (13 May 2023 06:10) (56 - 123)  BP: 116/67 (13 May 2023 05:00) (99/57 - 141/54)  BP(mean): 81 (13 May 2023 05:00) (64 - 89)  ABP: --  ABP(mean): --  RR: 14 (13 May 2023 05:00) (14 - 18)  SpO2: 95% (13 May 2023 06:10) (95% - 100%)    O2 Parameters below as of 13 May 2023 06:10  Patient On (Oxygen Delivery Method): ventilator        REVIEW OF SYSTEMS:  - negative except as above    VITALS:   - Reviewed      IMAGING/DATA:   - Reviewed      PHYSICAL EXAM:    General: intubated.  CVS: RRR  Pulm: CTAB  GI: Soft, NTND  Extremities: No LE Edema  Neuro: awake, alert, oriented x 3 w choices, follows commands, EOMI w poor upgaze, face symmetric, able to lift b/l arms AG to the elbow, RUE deltoid 4- /5, distally 5/5, LUE deltoid 4-/5, proximally 5/5, able to lift b/l LEs off the bed with 5/5 strength. Neck flexion 3/5.    Patient KAIDEN HATCH is a 62y WITH History of MG presents with exacerbation.    24 Hour Events/Subjective:  - S/P 4 sessions of PLEX.   - in the AM patient was hypercapnic on CPAP, was placed back on full support, reports fatigue.       ICU Vital Signs Last 24 Hrs  T(C): 36.8 (13 May 2023 03:00), Max: 37.1 (12 May 2023 23:00)  T(F): 98.3 (13 May 2023 03:00), Max: 98.7 (12 May 2023 23:00)  HR: 123 (13 May 2023 06:10) (56 - 123)  BP: 116/67 (13 May 2023 05:00) (99/57 - 141/54)  BP(mean): 81 (13 May 2023 05:00) (64 - 89)  ABP: --  ABP(mean): --  RR: 14 (13 May 2023 05:00) (14 - 18)  SpO2: 95% (13 May 2023 06:10) (95% - 100%)    O2 Parameters below as of 13 May 2023 06:10  Patient On (Oxygen Delivery Method): ventilator    REVIEW OF SYSTEMS:  - negative except as above    VITALS:   - Reviewed    IMAGING/DATA:   - Reviewed      PHYSICAL EXAM:    General: intubated.  CVS: RRR  Pulm: CTAB  GI: Soft, NTND  Extremities: No LE Edema  Neuro: awake, alert, oriented x 3 w choices, follows commands, EOMI w poor upgaze, face symmetric, able to lift b/l arms AG to the elbow, RUE deltoid 4- /5, distally 5/5, LUE deltoid 4-/5, proximally 5/5, able to lift b/l LEs off the bed with 5/5 strength. Neck flexion 3/5.

## 2023-05-13 NOTE — PROGRESS NOTE ADULT - SUBJECTIVE AND OBJECTIVE BOX
Patient KAIDEN HATCH is a 62y WITH History of MG presents with exacerbation.    24 Hour Events/Subjective:  - S/P 3 sessions of PLEX.   - in the AM patient was hypercapnic on CPAP, was placed back on full support, reports fatigue.  - patient complaining of congestion, and ear fullness given Tylenol       ICU Vital Signs Last 24 Hrs  T(C): 36.8 (13 May 2023 03:00), Max: 37.1 (12 May 2023 23:00)  T(F): 98.3 (13 May 2023 03:00), Max: 98.7 (12 May 2023 23:00)  HR: 123 (13 May 2023 06:10) (56 - 123)  BP: 116/67 (13 May 2023 05:00) (99/57 - 141/54)  BP(mean): 81 (13 May 2023 05:00) (64 - 89)  ABP: --  ABP(mean): --  RR: 14 (13 May 2023 05:00) (14 - 18)  SpO2: 95% (13 May 2023 06:10) (95% - 100%)    O2 Parameters below as of 13 May 2023 06:10  Patient On (Oxygen Delivery Method): ventilator        REVIEW OF SYSTEMS:  - negative except as above    VITALS:   - Reviewed      IMAGING/DATA:   - Reviewed      PHYSICAL EXAM:    General: intubated.  CVS: RRR  Pulm: CTAB  GI: Soft, NTND  Extremities: No LE Edema  Neuro: awake, alert, oriented x 3 w choices, follows commands, EOMI w poor upgaze, face symmetric, able to lift b/l arms AG to the elbow, RUE deltoid 4- /5, distally 5/5, LUE deltoid 4-/5, proximally 5/5, able to lift b/l LEs off the bed with 5/5 strength. Neck flexion 3/5.

## 2023-05-14 LAB
ANION GAP SERPL CALC-SCNC: 10 MMOL/L — SIGNIFICANT CHANGE UP (ref 5–17)
ANION GAP SERPL CALC-SCNC: 8 MMOL/L — SIGNIFICANT CHANGE UP (ref 5–17)
APTT BLD: 31.7 SEC — SIGNIFICANT CHANGE UP (ref 27.5–35.5)
BUN SERPL-MCNC: 24 MG/DL — HIGH (ref 7–23)
BUN SERPL-MCNC: 30 MG/DL — HIGH (ref 7–23)
CA-I BLD-SCNC: 1.15 MMOL/L — SIGNIFICANT CHANGE UP (ref 1.15–1.33)
CALCIUM SERPL-MCNC: 8.4 MG/DL — SIGNIFICANT CHANGE UP (ref 8.4–10.5)
CALCIUM SERPL-MCNC: 8.7 MG/DL — SIGNIFICANT CHANGE UP (ref 8.4–10.5)
CHLORIDE SERPL-SCNC: 110 MMOL/L — HIGH (ref 96–108)
CO2 SERPL-SCNC: 26 MMOL/L — SIGNIFICANT CHANGE UP (ref 22–31)
CO2 SERPL-SCNC: 28 MMOL/L — SIGNIFICANT CHANGE UP (ref 22–31)
CREAT SERPL-MCNC: 0.37 MG/DL — LOW (ref 0.5–1.3)
CREAT SERPL-MCNC: 0.41 MG/DL — LOW (ref 0.5–1.3)
EGFR: 110 ML/MIN/1.73M2 — SIGNIFICANT CHANGE UP
EGFR: 113 ML/MIN/1.73M2 — SIGNIFICANT CHANGE UP
FIBRINOGEN PPP-MCNC: 216 MG/DL — SIGNIFICANT CHANGE UP (ref 200–445)
GLUCOSE SERPL-MCNC: 131 MG/DL — HIGH (ref 70–99)
GLUCOSE SERPL-MCNC: 138 MG/DL — HIGH (ref 70–99)
HCT VFR BLD CALC: 30 % — LOW (ref 34.5–45)
HGB BLD-MCNC: 9.4 G/DL — LOW (ref 11.5–15.5)
INR BLD: 1.05 RATIO — SIGNIFICANT CHANGE UP (ref 0.88–1.16)
MAGNESIUM SERPL-MCNC: 2.3 MG/DL — SIGNIFICANT CHANGE UP (ref 1.6–2.6)
MAGNESIUM SERPL-MCNC: 2.6 MG/DL — SIGNIFICANT CHANGE UP (ref 1.6–2.6)
MCHC RBC-ENTMCNC: 28.1 PG — SIGNIFICANT CHANGE UP (ref 27–34)
MCHC RBC-ENTMCNC: 31.3 GM/DL — LOW (ref 32–36)
MCV RBC AUTO: 89.8 FL — SIGNIFICANT CHANGE UP (ref 80–100)
NRBC # BLD: 0 /100 WBCS — SIGNIFICANT CHANGE UP (ref 0–0)
PHOSPHATE SERPL-MCNC: 3.9 MG/DL — SIGNIFICANT CHANGE UP (ref 2.5–4.5)
PHOSPHATE SERPL-MCNC: 4 MG/DL — SIGNIFICANT CHANGE UP (ref 2.5–4.5)
PLATELET # BLD AUTO: 246 K/UL — SIGNIFICANT CHANGE UP (ref 150–400)
POTASSIUM SERPL-MCNC: 4 MMOL/L — SIGNIFICANT CHANGE UP (ref 3.5–5.3)
POTASSIUM SERPL-MCNC: 4.4 MMOL/L — SIGNIFICANT CHANGE UP (ref 3.5–5.3)
POTASSIUM SERPL-SCNC: 4 MMOL/L — SIGNIFICANT CHANGE UP (ref 3.5–5.3)
POTASSIUM SERPL-SCNC: 4.4 MMOL/L — SIGNIFICANT CHANGE UP (ref 3.5–5.3)
PROTHROM AB SERPL-ACNC: 12.2 SEC — SIGNIFICANT CHANGE UP (ref 10.5–13.4)
RBC # BLD: 3.34 M/UL — LOW (ref 3.8–5.2)
RBC # FLD: 15 % — HIGH (ref 10.3–14.5)
SODIUM SERPL-SCNC: 144 MMOL/L — SIGNIFICANT CHANGE UP (ref 135–145)
SODIUM SERPL-SCNC: 148 MMOL/L — HIGH (ref 135–145)
WBC # BLD: 11.97 K/UL — HIGH (ref 3.8–10.5)
WBC # FLD AUTO: 11.97 K/UL — HIGH (ref 3.8–10.5)

## 2023-05-14 PROCEDURE — 71045 X-RAY EXAM CHEST 1 VIEW: CPT | Mod: 26

## 2023-05-14 PROCEDURE — 36514 APHERESIS PLASMA: CPT

## 2023-05-14 PROCEDURE — 99291 CRITICAL CARE FIRST HOUR: CPT | Mod: GC

## 2023-05-14 RX ORDER — PYRIDOSTIGMINE BROMIDE 60 MG/5ML
30 SOLUTION ORAL THREE TIMES A DAY
Refills: 0 | Status: DISCONTINUED | OUTPATIENT
Start: 2023-05-14 | End: 2023-05-15

## 2023-05-14 RX ORDER — PYRIDOSTIGMINE BROMIDE 60 MG/5ML
60 SOLUTION ORAL THREE TIMES A DAY
Refills: 0 | Status: DISCONTINUED | OUTPATIENT
Start: 2023-05-14 | End: 2023-05-14

## 2023-05-14 RX ADMIN — ATORVASTATIN CALCIUM 20 MILLIGRAM(S): 80 TABLET, FILM COATED ORAL at 21:54

## 2023-05-14 RX ADMIN — OXYCODONE HYDROCHLORIDE 10 MILLIGRAM(S): 5 TABLET ORAL at 17:05

## 2023-05-14 RX ADMIN — OXYCODONE HYDROCHLORIDE 10 MILLIGRAM(S): 5 TABLET ORAL at 16:05

## 2023-05-14 RX ADMIN — PANTOPRAZOLE SODIUM 40 MILLIGRAM(S): 20 TABLET, DELAYED RELEASE ORAL at 11:39

## 2023-05-14 RX ADMIN — MUPIROCIN 1 APPLICATION(S): 20 OINTMENT TOPICAL at 06:05

## 2023-05-14 RX ADMIN — PYRIDOSTIGMINE BROMIDE 30 MILLIGRAM(S): 60 SOLUTION ORAL at 21:55

## 2023-05-14 RX ADMIN — Medication 4 MILLILITER(S): at 23:37

## 2023-05-14 RX ADMIN — CHLORHEXIDINE GLUCONATE 15 MILLILITER(S): 213 SOLUTION TOPICAL at 06:05

## 2023-05-14 RX ADMIN — HUMAN INSULIN 8 UNIT(S): 100 INJECTION, SUSPENSION SUBCUTANEOUS at 11:38

## 2023-05-14 RX ADMIN — MUPIROCIN 1 APPLICATION(S): 20 OINTMENT TOPICAL at 17:45

## 2023-05-14 RX ADMIN — ENOXAPARIN SODIUM 40 MILLIGRAM(S): 100 INJECTION SUBCUTANEOUS at 17:48

## 2023-05-14 RX ADMIN — CHLORHEXIDINE GLUCONATE 1 APPLICATION(S): 213 SOLUTION TOPICAL at 21:54

## 2023-05-14 RX ADMIN — Medication 3 MILLILITER(S): at 17:48

## 2023-05-14 RX ADMIN — HUMAN INSULIN 8 UNIT(S): 100 INJECTION, SUSPENSION SUBCUTANEOUS at 17:48

## 2023-05-14 RX ADMIN — Medication 50 MILLIGRAM(S): at 06:05

## 2023-05-14 RX ADMIN — Medication 4 MILLILITER(S): at 17:49

## 2023-05-14 RX ADMIN — Medication 650 MILLIGRAM(S): at 06:35

## 2023-05-14 RX ADMIN — CHLORHEXIDINE GLUCONATE 15 MILLILITER(S): 213 SOLUTION TOPICAL at 17:48

## 2023-05-14 RX ADMIN — OXYCODONE HYDROCHLORIDE 10 MILLIGRAM(S): 5 TABLET ORAL at 23:40

## 2023-05-14 RX ADMIN — Medication 3 MILLILITER(S): at 06:01

## 2023-05-14 RX ADMIN — Medication 2: at 11:39

## 2023-05-14 RX ADMIN — Medication 3 MILLILITER(S): at 11:52

## 2023-05-14 RX ADMIN — HUMAN INSULIN 8 UNIT(S): 100 INJECTION, SUSPENSION SUBCUTANEOUS at 06:06

## 2023-05-14 RX ADMIN — OXYCODONE HYDROCHLORIDE 10 MILLIGRAM(S): 5 TABLET ORAL at 23:10

## 2023-05-14 RX ADMIN — Medication 4 MILLILITER(S): at 11:52

## 2023-05-14 RX ADMIN — Medication 4 MILLILITER(S): at 06:01

## 2023-05-14 RX ADMIN — Medication 650 MILLIGRAM(S): at 06:05

## 2023-05-14 RX ADMIN — Medication 3 MILLILITER(S): at 23:37

## 2023-05-14 NOTE — PROGRESS NOTE ADULT - SUBJECTIVE AND OBJECTIVE BOX
O: remains intubated   T(C): 37 (05-14-23 @ 15:00), Max: 37.2 (05-13-23 @ 19:00)  HR: 81 (05-14-23 @ 18:05) (56 - 105)  BP: 108/56 (05-14-23 @ 17:00) (104/55 - 147/72)  RR: 14 (05-14-23 @ 17:00) (14 - 15)  SpO2: 100% (05-14-23 @ 18:05) (100% - 100%)  05-13-23 @ 07:01  -  05-14-23 @ 07:00  --------------------------------------------------------  IN: 1700 mL / OUT: 850 mL / NET: 850 mL    05-14-23 @ 07:01  -  05-14-23 @ 18:35  --------------------------------------------------------  IN: 1110 mL / OUT: 500 mL / NET: 610 mL    acetaminophen   Oral Liquid .. 650 milliGRAM(s) Oral every 6 hours PRN  acetylcysteine 20%  Inhalation 4 milliLiter(s) Inhalation every 6 hours  albuterol/ipratropium for Nebulization 3 milliLiter(s) Nebulizer every 6 hours  atorvastatin 20 milliGRAM(s) Oral at bedtime  bisacodyl Suppository 10 milliGRAM(s) Rectal daily PRN  chlorhexidine 0.12% Liquid 15 milliLiter(s) Oral Mucosa every 12 hours  chlorhexidine 4% Liquid 1 Application(s) Topical <User Schedule>  dextrose 5%. 1000 milliLiter(s) IV Continuous <Continuous>  dextrose 5%. 1000 milliLiter(s) IV Continuous <Continuous>  dextrose 50% Injectable 25 Gram(s) IV Push once  dextrose 50% Injectable 25 Gram(s) IV Push once  dextrose 50% Injectable 12.5 Gram(s) IV Push once  dextrose Oral Gel 15 Gram(s) Oral once PRN  enoxaparin Injectable 40 milliGRAM(s) SubCutaneous every 24 hours  glucagon  Injectable 1 milliGRAM(s) IntraMuscular once  insulin lispro (ADMELOG) corrective regimen sliding scale   SubCutaneous every 6 hours  insulin NPH human recombinant 8 Unit(s) SubCutaneous every 6 hours  mupirocin 2% Nasal 1 Application(s) Both Nostrils every 12 hours  oxyCODONE    Solution 10 milliGRAM(s) Oral every 6 hours PRN  pantoprazole  Injectable 40 milliGRAM(s) IV Push daily  polyethylene glycol 3350 17 Gram(s) Oral every 12 hours  predniSONE   Tablet 50 milliGRAM(s) Oral daily  senna 2 Tablet(s) Oral at bedtime  sodium chloride 0.65% Nasal 1 Spray(s) Both Nostrils four times a day PRN  Mode: AC/ CMV (Assist Control/ Continuous Mandatory Ventilation), RR (machine): 14, TV (machine): 400, FiO2: 40, PEEP: 5, ITime: 1, MAP: 10, PIP: 26  NEURO EXAM     LABS:  Na: 144 (05-14 @ 17:59), 148 (05-14 @ 04:29), 146 (05-13 @ 01:21), 141 (05-11 @ 22:58)  K: 4.4 (05-14 @ 17:59), 4.0 (05-14 @ 04:29), 3.8 (05-13 @ 01:21), 3.6 (05-11 @ 22:58)  Cl: 110 (05-14 @ 17:59), 110 (05-14 @ 04:29), 111 (05-13 @ 01:21), 104 (05-11 @ 22:58)  CO2: 26 (05-14 @ 17:59), 28 (05-14 @ 04:29), 26 (05-13 @ 01:21), 30 (05-11 @ 22:58)  BUN: 24 (05-14 @ 17:59), 30 (05-14 @ 04:29), 27 (05-13 @ 01:21), 27 (05-11 @ 22:58)  Cr: 0.37 (05-14 @ 17:59), 0.41 (05-14 @ 04:29), 0.44 (05-13 @ 01:21), 0.39 (05-11 @ 22:58)  Glu: 138(05-14 @ 17:59), 131(05-14 @ 04:29), 127(05-13 @ 01:21), 107(05-11 @ 22:58)    Hgb: 9.4 (05-14 @ 04:28), 9.5 (05-13 @ 01:21), 9.7 (05-12 @ 09:58), 9.7 (05-11 @ 22:58)  Hct: 30.0 (05-14 @ 04:28), 29.9 (05-13 @ 01:21), 30.0 (05-12 @ 09:58), 30.4 (05-11 @ 22:58)  WBC: 11.97 (05-14 @ 04:28), 12.46 (05-13 @ 01:21), 11.61 (05-12 @ 09:58), 13.92 (05-11 @ 22:58)  Plt: 246 (05-14 @ 04:28), 200 (05-13 @ 01:21), 196 (05-12 @ 09:58), 188 (05-11 @ 22:58)    INR: 1.05 05-14-23 @ 04:29, 1.06 05-12-23 @ 07:38  PTT: 31.7 05-14-23 @ 04:29, 27.9 05-12-23 @ 07:38              MG crisis intubated, s/p plex 4 sessions   neuro checks q 4 hr , prednisone 50 mg   neurology consult   mestinon on hold in the setting of acute exacerbation   acute respiratory failure intubated, lines in good postiion, NIF -40,  ml ,improving, PS trial tomorrow   NG feeds glucerna at goal, free water 300 q 6 hr, decrease plasmalyte to  300 ml/hr   hyperglycemia, NPH 8 units q 6 hr , ISS   lovenox 40 mg sc qhs. O: remains intubated   T(C): 37 (05-14-23 @ 15:00), Max: 37.2 (05-13-23 @ 19:00)  HR: 81 (05-14-23 @ 18:05) (56 - 105)  BP: 108/56 (05-14-23 @ 17:00) (104/55 - 147/72)  RR: 14 (05-14-23 @ 17:00) (14 - 15)  SpO2: 100% (05-14-23 @ 18:05) (100% - 100%)  05-13-23 @ 07:01  -  05-14-23 @ 07:00  --------------------------------------------------------  IN: 1700 mL / OUT: 850 mL / NET: 850 mL    05-14-23 @ 07:01  -  05-14-23 @ 18:35  --------------------------------------------------------  IN: 1110 mL / OUT: 500 mL / NET: 610 mL    acetaminophen   Oral Liquid .. 650 milliGRAM(s) Oral every 6 hours PRN  acetylcysteine 20%  Inhalation 4 milliLiter(s) Inhalation every 6 hours  albuterol/ipratropium for Nebulization 3 milliLiter(s) Nebulizer every 6 hours  atorvastatin 20 milliGRAM(s) Oral at bedtime  bisacodyl Suppository 10 milliGRAM(s) Rectal daily PRN  chlorhexidine 0.12% Liquid 15 milliLiter(s) Oral Mucosa every 12 hours  chlorhexidine 4% Liquid 1 Application(s) Topical <User Schedule>  dextrose 5%. 1000 milliLiter(s) IV Continuous <Continuous>  dextrose 5%. 1000 milliLiter(s) IV Continuous <Continuous>  dextrose 50% Injectable 25 Gram(s) IV Push once  dextrose 50% Injectable 25 Gram(s) IV Push once  dextrose 50% Injectable 12.5 Gram(s) IV Push once  dextrose Oral Gel 15 Gram(s) Oral once PRN  enoxaparin Injectable 40 milliGRAM(s) SubCutaneous every 24 hours  glucagon  Injectable 1 milliGRAM(s) IntraMuscular once  insulin lispro (ADMELOG) corrective regimen sliding scale   SubCutaneous every 6 hours  insulin NPH human recombinant 8 Unit(s) SubCutaneous every 6 hours  mupirocin 2% Nasal 1 Application(s) Both Nostrils every 12 hours  oxyCODONE    Solution 10 milliGRAM(s) Oral every 6 hours PRN  pantoprazole  Injectable 40 milliGRAM(s) IV Push daily  polyethylene glycol 3350 17 Gram(s) Oral every 12 hours  predniSONE   Tablet 50 milliGRAM(s) Oral daily  senna 2 Tablet(s) Oral at bedtime  sodium chloride 0.65% Nasal 1 Spray(s) Both Nostrils four times a day PRN  Mode: AC/ CMV (Assist Control/ Continuous Mandatory Ventilation), RR (machine): 14, TV (machine): 400, FiO2: 40, PEEP: 5, ITime: 1, MAP: 10, PIP: 26  NEURO EXAM     LABS:  Na: 144 (05-14 @ 17:59), 148 (05-14 @ 04:29), 146 (05-13 @ 01:21), 141 (05-11 @ 22:58)  K: 4.4 (05-14 @ 17:59), 4.0 (05-14 @ 04:29), 3.8 (05-13 @ 01:21), 3.6 (05-11 @ 22:58)  Cl: 110 (05-14 @ 17:59), 110 (05-14 @ 04:29), 111 (05-13 @ 01:21), 104 (05-11 @ 22:58)  CO2: 26 (05-14 @ 17:59), 28 (05-14 @ 04:29), 26 (05-13 @ 01:21), 30 (05-11 @ 22:58)  BUN: 24 (05-14 @ 17:59), 30 (05-14 @ 04:29), 27 (05-13 @ 01:21), 27 (05-11 @ 22:58)  Cr: 0.37 (05-14 @ 17:59), 0.41 (05-14 @ 04:29), 0.44 (05-13 @ 01:21), 0.39 (05-11 @ 22:58)  Glu: 138(05-14 @ 17:59), 131(05-14 @ 04:29), 127(05-13 @ 01:21), 107(05-11 @ 22:58)    Hgb: 9.4 (05-14 @ 04:28), 9.5 (05-13 @ 01:21), 9.7 (05-12 @ 09:58), 9.7 (05-11 @ 22:58)  Hct: 30.0 (05-14 @ 04:28), 29.9 (05-13 @ 01:21), 30.0 (05-12 @ 09:58), 30.4 (05-11 @ 22:58)  WBC: 11.97 (05-14 @ 04:28), 12.46 (05-13 @ 01:21), 11.61 (05-12 @ 09:58), 13.92 (05-11 @ 22:58)  Plt: 246 (05-14 @ 04:28), 200 (05-13 @ 01:21), 196 (05-12 @ 09:58), 188 (05-11 @ 22:58)    INR: 1.05 05-14-23 @ 04:29, 1.06 05-12-23 @ 07:38  PTT: 31.7 05-14-23 @ 04:29, 27.9 05-12-23 @ 07:38              MG crisis intubated, s/p plex 4 sessions   neuro checks q 4 hr , prednisone 50 mg   neurology consult   mestinon on hold in the setting of acute exacerbation   acute respiratory failure intubated, lines in good postiion, NIF -40,  ml ,improving, PS trial tomorrow   NG feeds glucerna at goal, free water 300 q 6 hr, decrease plasmalyte to 300 ml/hr   NPO at midnight , PS at 3 am   hyperglycemia, NPH 8 units q 6 hr , ISS   lovenox 40 mg sc qhs.  O: remains intubated   T(C): 37 (05-14-23 @ 15:00), Max: 37.2 (05-13-23 @ 19:00)  HR: 81 (05-14-23 @ 18:05) (56 - 105)  BP: 108/56 (05-14-23 @ 17:00) (104/55 - 147/72)  RR: 14 (05-14-23 @ 17:00) (14 - 15)  SpO2: 100% (05-14-23 @ 18:05) (100% - 100%)  05-13-23 @ 07:01  -  05-14-23 @ 07:00  --------------------------------------------------------  IN: 1700 mL / OUT: 850 mL / NET: 850 mL    05-14-23 @ 07:01  -  05-14-23 @ 18:35  --------------------------------------------------------  IN: 1110 mL / OUT: 500 mL / NET: 610 mL    acetaminophen   Oral Liquid .. 650 milliGRAM(s) Oral every 6 hours PRN  acetylcysteine 20%  Inhalation 4 milliLiter(s) Inhalation every 6 hours  albuterol/ipratropium for Nebulization 3 milliLiter(s) Nebulizer every 6 hours  atorvastatin 20 milliGRAM(s) Oral at bedtime  bisacodyl Suppository 10 milliGRAM(s) Rectal daily PRN  chlorhexidine 0.12% Liquid 15 milliLiter(s) Oral Mucosa every 12 hours  chlorhexidine 4% Liquid 1 Application(s) Topical <User Schedule>  dextrose 5%. 1000 milliLiter(s) IV Continuous <Continuous>  dextrose 5%. 1000 milliLiter(s) IV Continuous <Continuous>  dextrose 50% Injectable 25 Gram(s) IV Push once  dextrose 50% Injectable 25 Gram(s) IV Push once  dextrose 50% Injectable 12.5 Gram(s) IV Push once  dextrose Oral Gel 15 Gram(s) Oral once PRN  enoxaparin Injectable 40 milliGRAM(s) SubCutaneous every 24 hours  glucagon  Injectable 1 milliGRAM(s) IntraMuscular once  insulin lispro (ADMELOG) corrective regimen sliding scale   SubCutaneous every 6 hours  insulin NPH human recombinant 8 Unit(s) SubCutaneous every 6 hours  mupirocin 2% Nasal 1 Application(s) Both Nostrils every 12 hours  oxyCODONE    Solution 10 milliGRAM(s) Oral every 6 hours PRN  pantoprazole  Injectable 40 milliGRAM(s) IV Push daily  polyethylene glycol 3350 17 Gram(s) Oral every 12 hours  predniSONE   Tablet 50 milliGRAM(s) Oral daily  senna 2 Tablet(s) Oral at bedtime  sodium chloride 0.65% Nasal 1 Spray(s) Both Nostrils four times a day PRN  Mode: AC/ CMV (Assist Control/ Continuous Mandatory Ventilation), RR (machine): 14, TV (machine): 400, FiO2: 40, PEEP: 5, ITime: 1, MAP: 10, PIP: 26  NEURO EXAM     LABS:  Na: 144 (05-14 @ 17:59), 148 (05-14 @ 04:29), 146 (05-13 @ 01:21), 141 (05-11 @ 22:58)  K: 4.4 (05-14 @ 17:59), 4.0 (05-14 @ 04:29), 3.8 (05-13 @ 01:21), 3.6 (05-11 @ 22:58)  Cl: 110 (05-14 @ 17:59), 110 (05-14 @ 04:29), 111 (05-13 @ 01:21), 104 (05-11 @ 22:58)  CO2: 26 (05-14 @ 17:59), 28 (05-14 @ 04:29), 26 (05-13 @ 01:21), 30 (05-11 @ 22:58)  BUN: 24 (05-14 @ 17:59), 30 (05-14 @ 04:29), 27 (05-13 @ 01:21), 27 (05-11 @ 22:58)  Cr: 0.37 (05-14 @ 17:59), 0.41 (05-14 @ 04:29), 0.44 (05-13 @ 01:21), 0.39 (05-11 @ 22:58)  Glu: 138(05-14 @ 17:59), 131(05-14 @ 04:29), 127(05-13 @ 01:21), 107(05-11 @ 22:58)    Hgb: 9.4 (05-14 @ 04:28), 9.5 (05-13 @ 01:21), 9.7 (05-12 @ 09:58), 9.7 (05-11 @ 22:58)  Hct: 30.0 (05-14 @ 04:28), 29.9 (05-13 @ 01:21), 30.0 (05-12 @ 09:58), 30.4 (05-11 @ 22:58)  WBC: 11.97 (05-14 @ 04:28), 12.46 (05-13 @ 01:21), 11.61 (05-12 @ 09:58), 13.92 (05-11 @ 22:58)  Plt: 246 (05-14 @ 04:28), 200 (05-13 @ 01:21), 196 (05-12 @ 09:58), 188 (05-11 @ 22:58)    INR: 1.05 05-14-23 @ 04:29, 1.06 05-12-23 @ 07:38  PTT: 31.7 05-14-23 @ 04:29, 27.9 05-12-23 @ 07:38              MG crisis intubated, s/p plex 4 sessions   neuro checks q 4 hr , prednisone 50 mg   neurology consult   start Mestinon 60 q 8 hr  , at e she takes 120 q 8 hr   acute respiratory failure intubated, lines in good postiion, NIF -40,  ml ,improving, PS trial tomorrow   NG feeds glucerna at goal, free water 300 q 6 hr, decrease plasmalyte to 300 ml/hr   NPO at midnight , PS at 3 am   hyperglycemia, NPH 8 units q 6 hr , ISS   lovenox 40 mg sc qhs.  O: remains intubated   T(C): 37 (05-14-23 @ 15:00), Max: 37.2 (05-13-23 @ 19:00)  HR: 81 (05-14-23 @ 18:05) (56 - 105)  BP: 108/56 (05-14-23 @ 17:00) (104/55 - 147/72)  RR: 14 (05-14-23 @ 17:00) (14 - 15)  SpO2: 100% (05-14-23 @ 18:05) (100% - 100%)  05-13-23 @ 07:01  -  05-14-23 @ 07:00  --------------------------------------------------------  IN: 1700 mL / OUT: 850 mL / NET: 850 mL    05-14-23 @ 07:01  -  05-14-23 @ 18:35  --------------------------------------------------------  IN: 1110 mL / OUT: 500 mL / NET: 610 mL    acetaminophen   Oral Liquid .. 650 milliGRAM(s) Oral every 6 hours PRN  acetylcysteine 20%  Inhalation 4 milliLiter(s) Inhalation every 6 hours  albuterol/ipratropium for Nebulization 3 milliLiter(s) Nebulizer every 6 hours  atorvastatin 20 milliGRAM(s) Oral at bedtime  bisacodyl Suppository 10 milliGRAM(s) Rectal daily PRN  chlorhexidine 0.12% Liquid 15 milliLiter(s) Oral Mucosa every 12 hours  chlorhexidine 4% Liquid 1 Application(s) Topical <User Schedule>  dextrose 5%. 1000 milliLiter(s) IV Continuous <Continuous>  dextrose 5%. 1000 milliLiter(s) IV Continuous <Continuous>  dextrose 50% Injectable 25 Gram(s) IV Push once  dextrose 50% Injectable 25 Gram(s) IV Push once  dextrose 50% Injectable 12.5 Gram(s) IV Push once  dextrose Oral Gel 15 Gram(s) Oral once PRN  enoxaparin Injectable 40 milliGRAM(s) SubCutaneous every 24 hours  glucagon  Injectable 1 milliGRAM(s) IntraMuscular once  insulin lispro (ADMELOG) corrective regimen sliding scale   SubCutaneous every 6 hours  insulin NPH human recombinant 8 Unit(s) SubCutaneous every 6 hours  mupirocin 2% Nasal 1 Application(s) Both Nostrils every 12 hours  oxyCODONE    Solution 10 milliGRAM(s) Oral every 6 hours PRN  pantoprazole  Injectable 40 milliGRAM(s) IV Push daily  polyethylene glycol 3350 17 Gram(s) Oral every 12 hours  predniSONE   Tablet 50 milliGRAM(s) Oral daily  senna 2 Tablet(s) Oral at bedtime  sodium chloride 0.65% Nasal 1 Spray(s) Both Nostrils four times a day PRN  Mode: AC/ CMV (Assist Control/ Continuous Mandatory Ventilation), RR (machine): 14, TV (machine): 400, FiO2: 40, PEEP: 5, ITime: 1, MAP: 10, PIP: 26  NEURO EXAM     LABS:  Na: 144 (05-14 @ 17:59), 148 (05-14 @ 04:29), 146 (05-13 @ 01:21), 141 (05-11 @ 22:58)  K: 4.4 (05-14 @ 17:59), 4.0 (05-14 @ 04:29), 3.8 (05-13 @ 01:21), 3.6 (05-11 @ 22:58)  Cl: 110 (05-14 @ 17:59), 110 (05-14 @ 04:29), 111 (05-13 @ 01:21), 104 (05-11 @ 22:58)  CO2: 26 (05-14 @ 17:59), 28 (05-14 @ 04:29), 26 (05-13 @ 01:21), 30 (05-11 @ 22:58)  BUN: 24 (05-14 @ 17:59), 30 (05-14 @ 04:29), 27 (05-13 @ 01:21), 27 (05-11 @ 22:58)  Cr: 0.37 (05-14 @ 17:59), 0.41 (05-14 @ 04:29), 0.44 (05-13 @ 01:21), 0.39 (05-11 @ 22:58)  Glu: 138(05-14 @ 17:59), 131(05-14 @ 04:29), 127(05-13 @ 01:21), 107(05-11 @ 22:58)    Hgb: 9.4 (05-14 @ 04:28), 9.5 (05-13 @ 01:21), 9.7 (05-12 @ 09:58), 9.7 (05-11 @ 22:58)  Hct: 30.0 (05-14 @ 04:28), 29.9 (05-13 @ 01:21), 30.0 (05-12 @ 09:58), 30.4 (05-11 @ 22:58)  WBC: 11.97 (05-14 @ 04:28), 12.46 (05-13 @ 01:21), 11.61 (05-12 @ 09:58), 13.92 (05-11 @ 22:58)  Plt: 246 (05-14 @ 04:28), 200 (05-13 @ 01:21), 196 (05-12 @ 09:58), 188 (05-11 @ 22:58)    INR: 1.05 05-14-23 @ 04:29, 1.06 05-12-23 @ 07:38  PTT: 31.7 05-14-23 @ 04:29, 27.9 05-12-23 @ 07:38              MG crisis intubated, s/p plex 4 sessions   neuro checks q 4 hr , prednisone 50 mg   neurology consult   start Mestinon 30 q 8 hr  , at home she takes 120 q 8 hr   acute respiratory failure intubated, lines in good postiion, NIF -40,  ml ,improving, PS trial tomorrow   NG feeds glucerna at goal, free water 300 q 6 hr, decrease plasmalyte to 300 ml/hr   NPO at midnight , PS at 3 am    hyperglycemia, NPH 8 units q 6 hr , ISS   lovenox 40 mg sc qhs.

## 2023-05-14 NOTE — CHART NOTE - NSCHARTNOTEFT_GEN_A_CORE
62 year old female with Hx of MG s/p remote Thymectomy, presented with MG exacerbation requiring intubation.  Remains intubated, s/p #4 plasma exchange.    Vitals and labs reviewed with Apheresis RN, pLEX#5  well tolerated today with albumin replacement.

## 2023-05-14 NOTE — PROGRESS NOTE ADULT - ASSESSMENT
ASSESSMENT/PLAN: MG exacerbation sp 4 dose of PLEX    NEURO:  - neuro checks q4h  - s/p 4 dose of PLEX  - Continue prednisone 50 mg QD started 5/11  - Avoid medications that may worsen the current exacerbation including macrolides, fluoroquinolones, tetracyclines, aminoglycoside, beta-blockers, magnesium, and anti-arrhythmics (procainamide, quinidine, and lidocaine)  - neurology following and recommend starting IVIG after PLEX (5/15)  at home takes mestinon 60 2 tablets TID  - pain control  - PT/OT     CVS:  - MAP>65  - TTE - EF 54%    PULM: at baseline pt is on 2L O2  Intubated in ED 5/5 for hypercapnic resp failure  - Pressure support trials daily as tolerated  - NIF/VC every 4 hours  - VAP bundle  - CXR- unremarkable  - ABG- reviewed    RENAL:  RACQUEL sharma placed 5/5  - IVL  - daily IOs    GI:  - Diet: ON tf at GOAL   - Fluids: IVL  - GI prophylaxis: PPi while intubated   - Bowel regimen: miralax, senna  - LBM 5/13    ENDO:   - FS goal 120-180  - On ISS and NPH 8mgQ6    HEME/ONC:  - SCDs  - Chemoppx: SQL    ID:  - monitor for fevers, leukocytosis likely related to steroids  - No signs of infection

## 2023-05-14 NOTE — PROGRESS NOTE ADULT - SUBJECTIVE AND OBJECTIVE BOX
Patient KAIDEN HATCH is a 62y WITH History of MG s/p thymectomy 2010, with prior exacerbations requiring intubation most recently in late Jan 2023 requiring intubation and PLEX, on 2L O2 at baseline, now readmitted 5/5 with 1 week of diplopia, dysphagia and hypercapnic resp failure requiring intubation. presents with exacerbation.    24 Hour Events/Subjective:  - S/P 4 sessions of PLEX. Next session today   - in the AM patient was hypercapnic on CPAP, was placed back on full support, reports fatigue.         REVIEW OF SYSTEMS:  - negative except as above    VITALS:   - Reviewed    IMAGING/DATA:   - Reviewed      PHYSICAL EXAM:    General: intubated.  CVS: RRR  Pulm: CTAB  GI: Soft, NTND  Extremities: No LE Edema  Neuro: awake, alert, oriented x 3 w choices, follows commands, EOMI w poor upgaze, face symmetric, able to lift b/l arms AG to the elbow, RUE deltoid 4- /5, distally 5/5, LUE deltoid 4-/5, proximally 5/5, able to lift b/l LEs off the bed with 5/5 strength. Neck flexion 3/5.

## 2023-05-15 LAB
ANION GAP SERPL CALC-SCNC: 12 MMOL/L — SIGNIFICANT CHANGE UP (ref 5–17)
ANION GAP SERPL CALC-SCNC: 7 MMOL/L — SIGNIFICANT CHANGE UP (ref 5–17)
BUN SERPL-MCNC: 21 MG/DL — SIGNIFICANT CHANGE UP (ref 7–23)
BUN SERPL-MCNC: 25 MG/DL — HIGH (ref 7–23)
CALCIUM SERPL-MCNC: 8.5 MG/DL — SIGNIFICANT CHANGE UP (ref 8.4–10.5)
CHLORIDE SERPL-SCNC: 103 MMOL/L — SIGNIFICANT CHANGE UP (ref 96–108)
CHLORIDE SERPL-SCNC: 109 MMOL/L — HIGH (ref 96–108)
CO2 SERPL-SCNC: 24 MMOL/L — SIGNIFICANT CHANGE UP (ref 22–31)
CO2 SERPL-SCNC: 27 MMOL/L — SIGNIFICANT CHANGE UP (ref 22–31)
CREAT SERPL-MCNC: 0.39 MG/DL — LOW (ref 0.5–1.3)
CREAT SERPL-MCNC: 0.46 MG/DL — LOW (ref 0.5–1.3)
EGFR: 107 ML/MIN/1.73M2 — SIGNIFICANT CHANGE UP
EGFR: 112 ML/MIN/1.73M2 — SIGNIFICANT CHANGE UP
GAS PNL BLDA: SIGNIFICANT CHANGE UP
GLUCOSE SERPL-MCNC: 190 MG/DL — HIGH (ref 70–99)
GLUCOSE SERPL-MCNC: 90 MG/DL — SIGNIFICANT CHANGE UP (ref 70–99)
HCT VFR BLD CALC: 28.7 % — LOW (ref 34.5–45)
HCT VFR BLD CALC: 29 % — LOW (ref 34.5–45)
HGB BLD-MCNC: 9.3 G/DL — LOW (ref 11.5–15.5)
HGB BLD-MCNC: 9.7 G/DL — LOW (ref 11.5–15.5)
MAGNESIUM SERPL-MCNC: 2.2 MG/DL — SIGNIFICANT CHANGE UP (ref 1.6–2.6)
MAGNESIUM SERPL-MCNC: 2.3 MG/DL — SIGNIFICANT CHANGE UP (ref 1.6–2.6)
MCHC RBC-ENTMCNC: 28.9 PG — SIGNIFICANT CHANGE UP (ref 27–34)
MCHC RBC-ENTMCNC: 29.9 PG — SIGNIFICANT CHANGE UP (ref 27–34)
MCHC RBC-ENTMCNC: 32.4 GM/DL — SIGNIFICANT CHANGE UP (ref 32–36)
MCHC RBC-ENTMCNC: 33.4 GM/DL — SIGNIFICANT CHANGE UP (ref 32–36)
MCV RBC AUTO: 89.1 FL — SIGNIFICANT CHANGE UP (ref 80–100)
MCV RBC AUTO: 89.5 FL — SIGNIFICANT CHANGE UP (ref 80–100)
NRBC # BLD: 0 /100 WBCS — SIGNIFICANT CHANGE UP (ref 0–0)
PHOSPHATE SERPL-MCNC: 2.9 MG/DL — SIGNIFICANT CHANGE UP (ref 2.5–4.5)
PHOSPHATE SERPL-MCNC: 3.9 MG/DL — SIGNIFICANT CHANGE UP (ref 2.5–4.5)
PLATELET # BLD AUTO: 259 K/UL — SIGNIFICANT CHANGE UP (ref 150–400)
PLATELET # BLD AUTO: 280 K/UL — SIGNIFICANT CHANGE UP (ref 150–400)
POTASSIUM SERPL-MCNC: 3.5 MMOL/L — SIGNIFICANT CHANGE UP (ref 3.5–5.3)
POTASSIUM SERPL-MCNC: 4.4 MMOL/L — SIGNIFICANT CHANGE UP (ref 3.5–5.3)
POTASSIUM SERPL-SCNC: 3.5 MMOL/L — SIGNIFICANT CHANGE UP (ref 3.5–5.3)
POTASSIUM SERPL-SCNC: 4.4 MMOL/L — SIGNIFICANT CHANGE UP (ref 3.5–5.3)
RBC # BLD: 3.22 M/UL — LOW (ref 3.8–5.2)
RBC # BLD: 3.24 M/UL — LOW (ref 3.8–5.2)
RBC # FLD: 14.9 % — HIGH (ref 10.3–14.5)
SODIUM SERPL-SCNC: 137 MMOL/L — SIGNIFICANT CHANGE UP (ref 135–145)
SODIUM SERPL-SCNC: 145 MMOL/L — SIGNIFICANT CHANGE UP (ref 135–145)
WBC # BLD: 13.87 K/UL — HIGH (ref 3.8–10.5)
WBC # BLD: 14.33 K/UL — HIGH (ref 3.8–10.5)
WBC # FLD AUTO: 13.87 K/UL — HIGH (ref 3.8–10.5)
WBC # FLD AUTO: 14.33 K/UL — HIGH (ref 3.8–10.5)

## 2023-05-15 PROCEDURE — 99291 CRITICAL CARE FIRST HOUR: CPT

## 2023-05-15 PROCEDURE — 99292 CRITICAL CARE ADDL 30 MIN: CPT

## 2023-05-15 PROCEDURE — 99233 SBSQ HOSP IP/OBS HIGH 50: CPT

## 2023-05-15 PROCEDURE — 99291 CRITICAL CARE FIRST HOUR: CPT | Mod: GC

## 2023-05-15 PROCEDURE — 71045 X-RAY EXAM CHEST 1 VIEW: CPT | Mod: 26

## 2023-05-15 RX ORDER — PYRIDOSTIGMINE BROMIDE 60 MG/5ML
30 SOLUTION ORAL THREE TIMES A DAY
Refills: 0 | Status: DISCONTINUED | OUTPATIENT
Start: 2023-05-15 | End: 2023-05-16

## 2023-05-15 RX ORDER — DEXTROSE 50 % IN WATER 50 %
25 SYRINGE (ML) INTRAVENOUS ONCE
Refills: 0 | Status: COMPLETED | OUTPATIENT
Start: 2023-05-15 | End: 2023-05-15

## 2023-05-15 RX ORDER — DEXAMETHASONE 0.5 MG/5ML
4 ELIXIR ORAL EVERY 6 HOURS
Refills: 0 | Status: COMPLETED | OUTPATIENT
Start: 2023-05-15 | End: 2023-05-16

## 2023-05-15 RX ORDER — OXYCODONE HYDROCHLORIDE 5 MG/1
10 TABLET ORAL EVERY 6 HOURS
Refills: 0 | Status: DISCONTINUED | OUTPATIENT
Start: 2023-05-15 | End: 2023-05-16

## 2023-05-15 RX ORDER — DIPHENHYDRAMINE HCL 50 MG
50 CAPSULE ORAL EVERY 24 HOURS
Refills: 0 | Status: COMPLETED | OUTPATIENT
Start: 2023-05-15 | End: 2023-05-18

## 2023-05-15 RX ORDER — ACETAMINOPHEN 500 MG
650 TABLET ORAL EVERY 24 HOURS
Refills: 0 | Status: DISCONTINUED | OUTPATIENT
Start: 2023-05-15 | End: 2023-05-16

## 2023-05-15 RX ORDER — POTASSIUM CHLORIDE 20 MEQ
40 PACKET (EA) ORAL EVERY 4 HOURS
Refills: 0 | Status: COMPLETED | OUTPATIENT
Start: 2023-05-15 | End: 2023-05-15

## 2023-05-15 RX ORDER — IMMUNE GLOBULIN (HUMAN) 10 G/100ML
30 INJECTION INTRAVENOUS; SUBCUTANEOUS EVERY 24 HOURS
Refills: 0 | Status: COMPLETED | OUTPATIENT
Start: 2023-05-15 | End: 2023-05-18

## 2023-05-15 RX ADMIN — PYRIDOSTIGMINE BROMIDE 30 MILLIGRAM(S): 60 SOLUTION ORAL at 13:30

## 2023-05-15 RX ADMIN — Medication 40 MILLIEQUIVALENT(S): at 05:49

## 2023-05-15 RX ADMIN — OXYCODONE HYDROCHLORIDE 10 MILLIGRAM(S): 5 TABLET ORAL at 16:15

## 2023-05-15 RX ADMIN — Medication 50 MILLIGRAM(S): at 05:47

## 2023-05-15 RX ADMIN — POLYETHYLENE GLYCOL 3350 17 GRAM(S): 17 POWDER, FOR SOLUTION ORAL at 17:19

## 2023-05-15 RX ADMIN — ATORVASTATIN CALCIUM 20 MILLIGRAM(S): 80 TABLET, FILM COATED ORAL at 22:34

## 2023-05-15 RX ADMIN — OXYCODONE HYDROCHLORIDE 10 MILLIGRAM(S): 5 TABLET ORAL at 08:41

## 2023-05-15 RX ADMIN — Medication 50 MILLIGRAM(S): at 13:30

## 2023-05-15 RX ADMIN — OXYCODONE HYDROCHLORIDE 10 MILLIGRAM(S): 5 TABLET ORAL at 09:40

## 2023-05-15 RX ADMIN — PANTOPRAZOLE SODIUM 40 MILLIGRAM(S): 20 TABLET, DELAYED RELEASE ORAL at 11:19

## 2023-05-15 RX ADMIN — CHLORHEXIDINE GLUCONATE 15 MILLILITER(S): 213 SOLUTION TOPICAL at 17:19

## 2023-05-15 RX ADMIN — PYRIDOSTIGMINE BROMIDE 30 MILLIGRAM(S): 60 SOLUTION ORAL at 05:46

## 2023-05-15 RX ADMIN — HUMAN INSULIN 8 UNIT(S): 100 INJECTION, SUSPENSION SUBCUTANEOUS at 11:19

## 2023-05-15 RX ADMIN — CHLORHEXIDINE GLUCONATE 15 MILLILITER(S): 213 SOLUTION TOPICAL at 05:46

## 2023-05-15 RX ADMIN — Medication 4: at 17:19

## 2023-05-15 RX ADMIN — Medication 40 MILLIEQUIVALENT(S): at 10:55

## 2023-05-15 RX ADMIN — Medication 4 MILLIGRAM(S): at 17:19

## 2023-05-15 RX ADMIN — OXYCODONE HYDROCHLORIDE 10 MILLIGRAM(S): 5 TABLET ORAL at 17:00

## 2023-05-15 RX ADMIN — Medication 4 MILLIGRAM(S): at 23:55

## 2023-05-15 RX ADMIN — PYRIDOSTIGMINE BROMIDE 30 MILLIGRAM(S): 60 SOLUTION ORAL at 22:33

## 2023-05-15 RX ADMIN — IMMUNE GLOBULIN (HUMAN) 50 GRAM(S): 10 INJECTION INTRAVENOUS; SUBCUTANEOUS at 14:00

## 2023-05-15 RX ADMIN — MUPIROCIN 1 APPLICATION(S): 20 OINTMENT TOPICAL at 05:46

## 2023-05-15 RX ADMIN — Medication 4 MILLIGRAM(S): at 11:19

## 2023-05-15 RX ADMIN — Medication 25 MILLILITER(S): at 05:45

## 2023-05-15 RX ADMIN — Medication 3 MILLILITER(S): at 17:18

## 2023-05-15 RX ADMIN — Medication 1 SPRAY(S): at 11:16

## 2023-05-15 RX ADMIN — Medication 4 MILLILITER(S): at 11:38

## 2023-05-15 RX ADMIN — ENOXAPARIN SODIUM 40 MILLIGRAM(S): 100 INJECTION SUBCUTANEOUS at 17:19

## 2023-05-15 RX ADMIN — Medication 650 MILLIGRAM(S): at 13:30

## 2023-05-15 RX ADMIN — Medication 3 MILLILITER(S): at 11:37

## 2023-05-15 RX ADMIN — Medication 4: at 11:20

## 2023-05-15 RX ADMIN — MUPIROCIN 1 APPLICATION(S): 20 OINTMENT TOPICAL at 17:20

## 2023-05-15 RX ADMIN — HUMAN INSULIN 8 UNIT(S): 100 INJECTION, SUSPENSION SUBCUTANEOUS at 17:19

## 2023-05-15 RX ADMIN — CHLORHEXIDINE GLUCONATE 1 APPLICATION(S): 213 SOLUTION TOPICAL at 22:34

## 2023-05-15 RX ADMIN — Medication 4 MILLILITER(S): at 17:18

## 2023-05-15 RX ADMIN — Medication 2: at 23:56

## 2023-05-15 RX ADMIN — Medication 650 MILLIGRAM(S): at 14:00

## 2023-05-15 NOTE — PROGRESS NOTE ADULT - SUBJECTIVE AND OBJECTIVE BOX
SUBJECTIVE: Patient seen and examined at bedside this AM with neurology attending. She is feeling better. Reports some blurry vision in her right eye as prior, but no new complaints at this time. Remains intubated, unable to extubate due to no cuff leak.         INTERVAL HISTORY: Completed PLEX x5 on 5/14.       ROS: As above, otherwise negative.       MEDICATIONS:  MEDICATIONS  (STANDING):  acetylcysteine 20%  Inhalation 4 milliLiter(s) Inhalation every 6 hours  albuterol/ipratropium for Nebulization 3 milliLiter(s) Nebulizer every 6 hours  atorvastatin 20 milliGRAM(s) Oral at bedtime  chlorhexidine 0.12% Liquid 15 milliLiter(s) Oral Mucosa every 12 hours  chlorhexidine 4% Liquid 1 Application(s) Topical <User Schedule>  dexAMETHasone  Injectable 4 milliGRAM(s) IV Push every 6 hours  dextrose 5%. 1000 milliLiter(s) (100 mL/Hr) IV Continuous <Continuous>  dextrose 5%. 1000 milliLiter(s) (50 mL/Hr) IV Continuous <Continuous>  dextrose 50% Injectable 25 Gram(s) IV Push once  dextrose 50% Injectable 25 Gram(s) IV Push once  dextrose 50% Injectable 12.5 Gram(s) IV Push once  enoxaparin Injectable 40 milliGRAM(s) SubCutaneous every 24 hours  glucagon  Injectable 1 milliGRAM(s) IntraMuscular once  immune   globulin 10% (GAMMAGARD) IVPB 30 Gram(s) IV Intermittent every 24 hours  insulin lispro (ADMELOG) corrective regimen sliding scale   SubCutaneous every 6 hours  insulin NPH human recombinant 8 Unit(s) SubCutaneous every 6 hours  mupirocin 2% Nasal 1 Application(s) Both Nostrils every 12 hours  pantoprazole  Injectable 40 milliGRAM(s) IV Push daily  polyethylene glycol 3350 17 Gram(s) Oral every 12 hours  pyridostigmine 30 milliGRAM(s) Oral three times a day  senna 2 Tablet(s) Oral at bedtime    MEDICATIONS  (PRN):  acetaminophen   Oral Liquid .. 650 milliGRAM(s) Oral every 6 hours PRN Mild Pain (1 - 3)  bisacodyl Suppository 10 milliGRAM(s) Rectal daily PRN Constipation  dextrose Oral Gel 15 Gram(s) Oral once PRN Blood Glucose LESS THAN 70 milliGRAM(s)/deciliter  oxyCODONE    Solution 10 milliGRAM(s) Oral every 6 hours PRN Severe Pain (7 - 10)  sodium chloride 0.65% Nasal 1 Spray(s) Both Nostrils four times a day PRN Nasal Congestion      VITALS & EXAMINATION:  Vital Signs Last 24 Hrs  T(C): 37.2 (15 May 2023 07:00), Max: 37.2 (15 May 2023 07:00)  T(F): 99 (15 May 2023 07:00), Max: 99 (15 May 2023 07:00)  HR: 81 (15 May 2023 11:50) (55 - 118)  BP: 128/66 (15 May 2023 11:00) (104/55 - 165/80)  BP(mean): 82 (15 May 2023 11:00) (70 - 119)  RR: 22 (15 May 2023 11:00) (11 - 27)  SpO2: 100% (15 May 2023 11:50) (99% - 100%)    Parameters below as of 15 May 2023 11:50  Patient On (Oxygen Delivery Method): ventilator          LABS:  CBC                       9.7    14.33 )-----------( 280      ( 15 May 2023 04:52 )             29.0     Chem 05-15    145  |  109<H>  |  25<H>  ----------------------------<  90  3.5   |  24  |  0.46<L>    Ca    8.5      15 May 2023 04:52  Phos  2.9     05-15  Mg     2.2     05-15      LFTs   Coagulopathy PT/INR - ( 14 May 2023 04:29 )   PT: 12.2 sec;   INR: 1.05 ratio     PTT - ( 14 May 2023 04:29 )  PTT:31.7 sec          STUDIES & IMAGING:    Studies (EKG, EEG, EMG, etc):       Radiology (XR, CT, MR, U/S, TTE/AUGUSTINA):     SUBJECTIVE: Patient seen and examined at bedside this AM with neurology attending. She is feeling better. Reports some blurry vision in her right eye as prior, but no new complaints at this time. Remains intubated, unable to extubate due to no cuff leak.         INTERVAL HISTORY: Completed PLEX x5 on 5/14.       ROS: As above, otherwise negative.       MEDICATIONS:  MEDICATIONS  (STANDING):  acetylcysteine 20%  Inhalation 4 milliLiter(s) Inhalation every 6 hours  albuterol/ipratropium for Nebulization 3 milliLiter(s) Nebulizer every 6 hours  atorvastatin 20 milliGRAM(s) Oral at bedtime  chlorhexidine 0.12% Liquid 15 milliLiter(s) Oral Mucosa every 12 hours  chlorhexidine 4% Liquid 1 Application(s) Topical <User Schedule>  dexAMETHasone  Injectable 4 milliGRAM(s) IV Push every 6 hours  dextrose 5%. 1000 milliLiter(s) (100 mL/Hr) IV Continuous <Continuous>  dextrose 5%. 1000 milliLiter(s) (50 mL/Hr) IV Continuous <Continuous>  dextrose 50% Injectable 25 Gram(s) IV Push once  dextrose 50% Injectable 25 Gram(s) IV Push once  dextrose 50% Injectable 12.5 Gram(s) IV Push once  enoxaparin Injectable 40 milliGRAM(s) SubCutaneous every 24 hours  glucagon  Injectable 1 milliGRAM(s) IntraMuscular once  immune   globulin 10% (GAMMAGARD) IVPB 30 Gram(s) IV Intermittent every 24 hours  insulin lispro (ADMELOG) corrective regimen sliding scale   SubCutaneous every 6 hours  insulin NPH human recombinant 8 Unit(s) SubCutaneous every 6 hours  mupirocin 2% Nasal 1 Application(s) Both Nostrils every 12 hours  pantoprazole  Injectable 40 milliGRAM(s) IV Push daily  polyethylene glycol 3350 17 Gram(s) Oral every 12 hours  pyridostigmine 30 milliGRAM(s) Oral three times a day  senna 2 Tablet(s) Oral at bedtime    MEDICATIONS  (PRN):  acetaminophen   Oral Liquid .. 650 milliGRAM(s) Oral every 6 hours PRN Mild Pain (1 - 3)  bisacodyl Suppository 10 milliGRAM(s) Rectal daily PRN Constipation  dextrose Oral Gel 15 Gram(s) Oral once PRN Blood Glucose LESS THAN 70 milliGRAM(s)/deciliter  oxyCODONE    Solution 10 milliGRAM(s) Oral every 6 hours PRN Severe Pain (7 - 10)  sodium chloride 0.65% Nasal 1 Spray(s) Both Nostrils four times a day PRN Nasal Congestion      VITALS & EXAMINATION:  Vital Signs Last 24 Hrs  T(C): 37.2 (15 May 2023 07:00), Max: 37.2 (15 May 2023 07:00)  T(F): 99 (15 May 2023 07:00), Max: 99 (15 May 2023 07:00)  HR: 81 (15 May 2023 11:50) (55 - 118)  BP: 128/66 (15 May 2023 11:00) (104/55 - 165/80)  BP(mean): 82 (15 May 2023 11:00) (70 - 119)  RR: 22 (15 May 2023 11:00) (11 - 27)  SpO2: 100% (15 May 2023 11:50) (99% - 100%)    Parameters below as of 15 May 2023 11:50  Patient On (Oxygen Delivery Method): ventilator    MS - Intubated, not sedated, awake, attends to all stimuli b/l, no speech output but nods head appropriately to "yes"/"no" questions, follows commands. Due to clinical condition unable to assess (MOLLY) orientation, rep/naming, attn/conc/recent and remote memory/fund of knowledge.  CN - PERRL (3mm OU), EOM with poor upgaze but otherwise intact; mild to moderate ptosis OD and minimal ptosis OS, both worsened with sustained upgaze; VFF, face sens/str/hearing WNL b/l, tongue midline. MOLLY palate. Neck extension 4+-5/5 and neck flexion 4/5.  Motor - Normal bulk/tone. No pronator drift.     RUE: 5/5 biceps, 4+/5 triceps, 5/5 .      LUE: 5/5 biceps, 5/5 triceps, 5/5 .     RLE/LLE: 4/5 hip flexion, 5/5 knee flexion, 5/5 knee extension, 5/5 ankle plantarflexion and dorsiflexion.   Sens - LT intact throughout  DTR's - Neutral b/l plantar response  Coord - FTN intact b/l  Gait and station - MOLLY      LABS:  CBC                       9.7    14.33 )-----------( 280      ( 15 May 2023 04:52 )             29.0     Chem 05-15    145  |  109<H>  |  25<H>  ----------------------------<  90  3.5   |  24  |  0.46<L>    Ca    8.5      15 May 2023 04:52  Phos  2.9     05-15  Mg     2.2     05-15    Coagulopathy PT/INR - ( 14 May 2023 04:29 )   PT: 12.2 sec;   INR: 1.05 ratio     PTT - ( 14 May 2023 04:29 )  PTT:31.7 sec      STUDIES & IMAGING:    Studies (EKG, EEG, EMG, etc):       Radiology (XR, CT, MR, U/S, TTE/AUGUSTINA):     TTE Limited W or WO Ultrasound Enhancing Agent (05.06.23 @ 10:03) >   1. Normal left ventricular cavity size. The left ventricular wall thickness is normal. The left ventricular systolic function is normal. The basal inferior segment is hypokinetic.   2. There is normal left ventricular diastolic function.   3. Normal right ventricular cavity size, normal wall thickness and normal systolic function. The tricuspid annular plane systolic excursion (TAPSE) is 1.8 cm (normal >=1.7 cm).

## 2023-05-15 NOTE — PROGRESS NOTE ADULT - ASSESSMENT
ASSESSMENT/PLAN: MG exacerbation sp 4 dose of PLEX    NEURO:  - neuro checks q4h  - s/p 4 dose of PLEX  - Continue prednisone 50 mg QD started 5/11  - Avoid medications that may worsen the current exacerbation including macrolides, fluoroquinolones, tetracyclines, aminoglycoside, beta-blockers, magnesium, and anti-arrhythmics (procainamide, quinidine, and lidocaine)  - neurology following and recommend starting IVIG after PLEX (5/15)  at home takes mestinon 60 2 tablets TID  - pain control  - PT/OT     CVS:  - MAP>65  - TTE - EF 54%    PULM: at baseline pt is on 2L O2  Intubated in ED 5/5 for hypercapnic resp failure  - Pressure support trials daily as tolerated  - NIF/VC every 4 hours  - VAP bundle  - CXR- unremarkable  - ABG- 7.50/37/151/30    RENAL:  RACQUEL sharma placed 5/5  - IVL  - daily IOs    GI:  - Diet: ON tf at GOAL   - Fluids: IVL  - GI prophylaxis: PPi while intubated   - Bowel regimen: miralax, senna  - LBM 5/13    ENDO:   - FS goal 120-180  - On ISS and NPH 8mgQ6    HEME/ONC:  - SCDs  - Chemoppx: SQL    ID:  - monitor for fevers, leukocytosis likely related to steroids  - No signs of infection      ASSESSMENT/PLAN: MG exacerbation sp 4 dose of PLEX    NEURO:  - neuro checks q4h  - s/p 4 dose of PLEX, neurology requests IVIG (5/15)  - will discontinue prednisone 50 mg QD x 24 hours, and start dex 4Q6 for laryngeal edema x 24 hours and will reassess for cuff leak, will restart prednisone afterwards   - Avoid medications that may worsen the current exacerbation including macrolides, fluoroquinolones, tetracyclines, aminoglycoside, beta-blockers, magnesium, and anti-arrhythmics (procainamide, quinidine, and lidocaine)  - neurology following and recommend starting IVIG after PLEX (5/15)  - continue home takes Mestinon 60 2 tablets TID, restart at a dose of 30 mg BID  - pain control  - PT/OT     CVS:  - MAP>65  - TTE - EF 54%    PULM: at baseline pt is on 2L O2  Intubated in ED 5/5 for hypercapnic resp failure  - Pressure support trials daily as tolerated  - NIF/VC every 4 hours  - VAP bundle  - CXR- unremarkable  - ABG- 7.50/37/151/30    RENAL:  RACQUEL sharma placed 5/5  - IVL  - daily IOs    GI:  - Diet: ON tf at GOAL   - Fluids: IVL  - GI prophylaxis: PPi while intubated   - Bowel regimen: miralax, senna  - LBM 5/1    ENDO:   - FS goal 120-180  - On ISS and NPH 8mgQ6    HEME/ONC:  - SCDs  - Chemoppx: SQL    ID:  - monitor for fevers, leukocytosis likely related to steroids  - No signs of infection

## 2023-05-15 NOTE — PROGRESS NOTE ADULT - SUBJECTIVE AND OBJECTIVE BOX
MG crisis intubated, s/p plex 5 sessions   T(C): 36.9 (05-15-23 @ 03:00), Max: 37.1 (05-14-23 @ 11:00)  HR: 101 (05-15-23 @ 03:07) (55 - 105)  BP: 165/80 (05-15-23 @ 03:00) (104/55 - 165/80)  RR: 11 (05-15-23 @ 03:00) (11 - 16)  SpO2: 100% (05-15-23 @ 03:07) (100% - 100%)  05-13-23 @ 07:01  -  05-14-23 @ 07:00  --------------------------------------------------------  IN: 1700 mL / OUT: 850 mL / NET: 850 mL    05-14-23 @ 07:01  -  05-15-23 @ 05:26  --------------------------------------------------------  IN: 1610 mL / OUT: 700 mL / NET: 910 mL    acetaminophen   Oral Liquid .. 650 milliGRAM(s) Oral every 6 hours PRN  acetylcysteine 20%  Inhalation 4 milliLiter(s) Inhalation every 6 hours  albuterol/ipratropium for Nebulization 3 milliLiter(s) Nebulizer every 6 hours  atorvastatin 20 milliGRAM(s) Oral at bedtime  bisacodyl Suppository 10 milliGRAM(s) Rectal daily PRN  chlorhexidine 0.12% Liquid 15 milliLiter(s) Oral Mucosa every 12 hours  chlorhexidine 4% Liquid 1 Application(s) Topical <User Schedule>  dextrose 5%. 1000 milliLiter(s) IV Continuous <Continuous>  dextrose 5%. 1000 milliLiter(s) IV Continuous <Continuous>  dextrose 50% Injectable 12.5 Gram(s) IV Push once  dextrose 50% Injectable 25 Gram(s) IV Push once  dextrose 50% Injectable 25 Gram(s) IV Push once  dextrose Oral Gel 15 Gram(s) Oral once PRN  enoxaparin Injectable 40 milliGRAM(s) SubCutaneous every 24 hours  glucagon  Injectable 1 milliGRAM(s) IntraMuscular once  insulin lispro (ADMELOG) corrective regimen sliding scale   SubCutaneous every 6 hours  insulin NPH human recombinant 8 Unit(s) SubCutaneous every 6 hours  mupirocin 2% Nasal 1 Application(s) Both Nostrils every 12 hours  oxyCODONE    Solution 10 milliGRAM(s) Oral every 6 hours PRN  pantoprazole  Injectable 40 milliGRAM(s) IV Push daily  polyethylene glycol 3350 17 Gram(s) Oral every 12 hours  predniSONE   Tablet 50 milliGRAM(s) Oral daily  pyridostigmine 30 milliGRAM(s) Oral three times a day  senna 2 Tablet(s) Oral at bedtime  sodium chloride 0.65% Nasal 1 Spray(s) Both Nostrils four times a day PRN  Mode: CPAP with PS, FiO2: 40, PEEP: 5, PS: 10, MAP: 9, PIP: 16  examined at bedside PS 5/5, neuro exam improved neck flexion 4/5, deltoid 4/5         LABS:  Na: 144 (05-14 @ 17:59), 148 (05-14 @ 04:29), 146 (05-13 @ 01:21)  K: 4.4 (05-14 @ 17:59), 4.0 (05-14 @ 04:29), 3.8 (05-13 @ 01:21)  Cl: 110 (05-14 @ 17:59), 110 (05-14 @ 04:29), 111 (05-13 @ 01:21)  CO2: 26 (05-14 @ 17:59), 28 (05-14 @ 04:29), 26 (05-13 @ 01:21)  BUN: 24 (05-14 @ 17:59), 30 (05-14 @ 04:29), 27 (05-13 @ 01:21)  Cr: 0.37 (05-14 @ 17:59), 0.41 (05-14 @ 04:29), 0.44 (05-13 @ 01:21)  Glu: 138(05-14 @ 17:59), 131(05-14 @ 04:29), 127(05-13 @ 01:21)    Hgb: 9.7 (05-15 @ 04:52), 9.4 (05-14 @ 04:28), 9.5 (05-13 @ 01:21), 9.7 (05-12 @ 09:58)  Hct: 29.0 (05-15 @ 04:52), 30.0 (05-14 @ 04:28), 29.9 (05-13 @ 01:21), 30.0 (05-12 @ 09:58)  WBC: 14.33 (05-15 @ 04:52), 11.97 (05-14 @ 04:28), 12.46 (05-13 @ 01:21), 11.61 (05-12 @ 09:58)  Plt: 280 (05-15 @ 04:52), 246 (05-14 @ 04:28), 200 (05-13 @ 01:21), 196 (05-12 @ 09:58)    INR: 1.05 05-14-23 @ 04:29, 1.06 05-12-23 @ 07:38  PTT: 31.7 05-14-23 @ 04:29, 27.9 05-12-23 @ 07:38      neuro checks q 4 hr , prednisone 50 mg on  Mestinon 30 q 8 hr  , will increase as tolerated to 60 q 8 hr, than 120 mg q 8 hr    at PS 0/5 , VC 1.5 L, NIF 25 , minimal secretions   acute respiratory failure intubated, lines in good postilion  will get ABG, if permissive, will extubate  will extubate   NPO     hyperglycemia, NPH 8 units q 6 hr , ISS   lovenox 40 mg sc qhs.     30 critical care time  MG crisis intubated, s/p plex 5 sessions   T(C): 36.9 (05-15-23 @ 03:00), Max: 37.1 (05-14-23 @ 11:00)  HR: 101 (05-15-23 @ 03:07) (55 - 105)  BP: 165/80 (05-15-23 @ 03:00) (104/55 - 165/80)  RR: 11 (05-15-23 @ 03:00) (11 - 16)  SpO2: 100% (05-15-23 @ 03:07) (100% - 100%)  05-13-23 @ 07:01  -  05-14-23 @ 07:00  --------------------------------------------------------  IN: 1700 mL / OUT: 850 mL / NET: 850 mL    05-14-23 @ 07:01  -  05-15-23 @ 05:26  --------------------------------------------------------  IN: 1610 mL / OUT: 700 mL / NET: 910 mL    acetaminophen   Oral Liquid .. 650 milliGRAM(s) Oral every 6 hours PRN  acetylcysteine 20%  Inhalation 4 milliLiter(s) Inhalation every 6 hours  albuterol/ipratropium for Nebulization 3 milliLiter(s) Nebulizer every 6 hours  atorvastatin 20 milliGRAM(s) Oral at bedtime  bisacodyl Suppository 10 milliGRAM(s) Rectal daily PRN  chlorhexidine 0.12% Liquid 15 milliLiter(s) Oral Mucosa every 12 hours  chlorhexidine 4% Liquid 1 Application(s) Topical <User Schedule>  dextrose 5%. 1000 milliLiter(s) IV Continuous <Continuous>  dextrose 5%. 1000 milliLiter(s) IV Continuous <Continuous>  dextrose 50% Injectable 12.5 Gram(s) IV Push once  dextrose 50% Injectable 25 Gram(s) IV Push once  dextrose 50% Injectable 25 Gram(s) IV Push once  dextrose Oral Gel 15 Gram(s) Oral once PRN  enoxaparin Injectable 40 milliGRAM(s) SubCutaneous every 24 hours  glucagon  Injectable 1 milliGRAM(s) IntraMuscular once  insulin lispro (ADMELOG) corrective regimen sliding scale   SubCutaneous every 6 hours  insulin NPH human recombinant 8 Unit(s) SubCutaneous every 6 hours  mupirocin 2% Nasal 1 Application(s) Both Nostrils every 12 hours  oxyCODONE    Solution 10 milliGRAM(s) Oral every 6 hours PRN  pantoprazole  Injectable 40 milliGRAM(s) IV Push daily  polyethylene glycol 3350 17 Gram(s) Oral every 12 hours  predniSONE   Tablet 50 milliGRAM(s) Oral daily  pyridostigmine 30 milliGRAM(s) Oral three times a day  senna 2 Tablet(s) Oral at bedtime  sodium chloride 0.65% Nasal 1 Spray(s) Both Nostrils four times a day PRN  Mode: CPAP with PS, FiO2: 40, PEEP: 5, PS: 10, MAP: 9, PIP: 16  examined at bedside PS 5/5, neuro exam improved neck flexion 4/5, deltoid 4/5         LABS:  Na: 144 (05-14 @ 17:59), 148 (05-14 @ 04:29), 146 (05-13 @ 01:21)  K: 4.4 (05-14 @ 17:59), 4.0 (05-14 @ 04:29), 3.8 (05-13 @ 01:21)  Cl: 110 (05-14 @ 17:59), 110 (05-14 @ 04:29), 111 (05-13 @ 01:21)  CO2: 26 (05-14 @ 17:59), 28 (05-14 @ 04:29), 26 (05-13 @ 01:21)  BUN: 24 (05-14 @ 17:59), 30 (05-14 @ 04:29), 27 (05-13 @ 01:21)  Cr: 0.37 (05-14 @ 17:59), 0.41 (05-14 @ 04:29), 0.44 (05-13 @ 01:21)  Glu: 138(05-14 @ 17:59), 131(05-14 @ 04:29), 127(05-13 @ 01:21)    Hgb: 9.7 (05-15 @ 04:52), 9.4 (05-14 @ 04:28), 9.5 (05-13 @ 01:21), 9.7 (05-12 @ 09:58)  Hct: 29.0 (05-15 @ 04:52), 30.0 (05-14 @ 04:28), 29.9 (05-13 @ 01:21), 30.0 (05-12 @ 09:58)  WBC: 14.33 (05-15 @ 04:52), 11.97 (05-14 @ 04:28), 12.46 (05-13 @ 01:21), 11.61 (05-12 @ 09:58)  Plt: 280 (05-15 @ 04:52), 246 (05-14 @ 04:28), 200 (05-13 @ 01:21), 196 (05-12 @ 09:58)    INR: 1.05 05-14-23 @ 04:29, 1.06 05-12-23 @ 07:38  PTT: 31.7 05-14-23 @ 04:29, 27.9 05-12-23 @ 07:38      neuro checks q 4 hr , prednisone 50 mg on  Mestinon 30 q 8 hr  , will increase as tolerated to 60 q 8 hr, than 120 mg q 8 hr    at PS 0/5 , VC 1.5 L, NIF 25 , minimal secretions   acute respiratory failure intubated, lines in good postilion  will get ABG, if permissive, will extubate  NPO     hyperglycemia, NPH 8 units q 6 hr , ISS   lovenox 40 mg sc qhs.     30 critical care time '    addendum note:  patient has no cuff leak, will attempt extubation with anesthesia and ENT at bedside given that she has been on steroids

## 2023-05-15 NOTE — PROGRESS NOTE ADULT - SUBJECTIVE AND OBJECTIVE BOX
Patient KAIDEN HATCH is a 62y WITH History of MG s/p thymectomy 2010, with prior exacerbations requiring intubation most recently in late Jan 2023 requiring intubation and PLEX, on 2L O2 at baseline, now readmitted 5/5 with 1 week of diplopia, dysphagia and hypercapnic resp failure requiring intubation. presents with exacerbation.    24 Hour Events/Subjective:  - S/P 4 sessions of PLEX. Next session today   - Patient failed extubation due to no cuff leak. However, patient pulling good volumes.        REVIEW OF SYSTEMS:  - negative except as above    VITALS:   - Reviewed    IMAGING/DATA:   - Reviewed      PHYSICAL EXAM:    General: intubated.  CVS: RRR  Pulm: CTAB  GI: Soft, NTND  Extremities: No LE Edema  Neuro: awake, alert, oriented x 3 w choices, follows commands, EOMI w poor upgaze, face symmetric, able to lift b/l arms AG to the elbow, RUE deltoid 4-/5, distally 5/5, LUE deltoid 4-/5, proximally 5/5, able to lift b/l LEs off the bed with 5/5 strength. Neck flexion 4-/5.    Patient KAIDEN HATCH is a 62y WITH History of MG s/p thymectomy 2010, with prior exacerbations requiring intubation most recently in late Jan 2023 requiring intubation and PLEX, on 2L O2 at baseline, now readmitted 5/5 with 1 week of diplopia, dysphagia and hypercapnic resp failure requiring intubation. Presents with exacerbation.    24 Hour Events/Subjective:  - S/P 4 sessions of PLEX. Next session today   - Patient failed extubation due to no cuff leak. However, patient pulling good volumes.        REVIEW OF SYSTEMS:  - negative except as above    VITALS:   - Reviewed    IMAGING/DATA:   - Reviewed      PHYSICAL EXAM:    General: intubated.  CVS: RRR  Pulm: CTAB  GI: Soft, NTND  Extremities: No LE Edema  Neuro: awake, alert, oriented x 3 w choices, follows commands, EOMI w poor upgaze, face symmetric, able to lift b/l arms AG to the elbow, RUE deltoid 4-/5, distally 5/5, LUE deltoid 4-/5, proximally 5/5, able to lift b/l LEs off the bed with 5/5 strength. Neck flexion 4-/5.

## 2023-05-15 NOTE — PROGRESS NOTE ADULT - ASSESSMENT
INCOMPLETE Ms Acosta is a 62 year-old woman with a PMH of myasthenia gravis who presented to the ED on 5/5/23 with worsening diplopia and difficulty speaking, swallowing and breathing x 1 week, admitted for evaluation of management of acute MG exacerbation.     Impression: Acute MG exacerbation, possibly triggered by progressive disease or other toxic/metabolic insult. No stable focal neurologic deficits to suggest cerebrovascular or other acute intracranial event.     Recommendations:   - S/p PLEX x 5 of sessions (5/6-5/14) with some improvement  - S/p IV solumedrol 125mg IV daily x 5 days (5/6-5/10); now on prednisone 50mg PO daily and with taper by 5mg every week until 30mg  - Holding Mestinon for now  - IVIG 0.5g/kg/day x 4 days following PLEX (1st dose administered 5/15)  - PT/OT as tolerated  - Ultimately patient will need better outpatient therapy to prevent relapses, either IVIG infusions, increased prednisone dose, or Soliris (or combination of some)  - Continue to address above medical problems, as you are doing  - Will continue to follow patient with you.    Seen with neurology attending, Dr. Blanchard.

## 2023-05-15 NOTE — PROGRESS NOTE ADULT - SUBJECTIVE AND OBJECTIVE BOX
Night rounds   Patient seen and examined    T(C): 37.2 (05-15-23 @ 15:00), Max: 37.2 (05-15-23 @ 07:00)  HR: 80 (05-15-23 @ 17:30) (55 - 118)  BP: 108/57 (05-15-23 @ 17:00) (106/51 - 165/80)  RR: 14 (05-15-23 @ 17:00) (11 - 27)  SpO2: 100% (05-15-23 @ 17:30) (99% - 100%)  05-14-23 @ 07:01  -  05-15-23 @ 07:00  --------------------------------------------------------  IN: 1610 mL / OUT: 1000 mL / NET: 610 mL    05-15-23 @ 07:01  -  05-15-23 @ 18:25  --------------------------------------------------------  IN: 1560 mL / OUT: 750 mL / NET: 810 mL    acetaminophen     Tablet .. 650 milliGRAM(s) Oral every 24 hours  acetaminophen   Oral Liquid .. 650 milliGRAM(s) Oral every 6 hours PRN  acetylcysteine 20%  Inhalation 4 milliLiter(s) Inhalation every 6 hours  albuterol/ipratropium for Nebulization 3 milliLiter(s) Nebulizer every 6 hours  atorvastatin 20 milliGRAM(s) Oral at bedtime  bisacodyl Suppository 10 milliGRAM(s) Rectal daily PRN  chlorhexidine 0.12% Liquid 15 milliLiter(s) Oral Mucosa every 12 hours  chlorhexidine 4% Liquid 1 Application(s) Topical <User Schedule>  dexAMETHasone  Injectable 4 milliGRAM(s) IV Push every 6 hours  dextrose 5%. 1000 milliLiter(s) IV Continuous <Continuous>  dextrose 5%. 1000 milliLiter(s) IV Continuous <Continuous>  dextrose 50% Injectable 25 Gram(s) IV Push once  diphenhydrAMINE Injectable 50 milliGRAM(s) IV Push every 24 hours  enoxaparin Injectable 40 milliGRAM(s) SubCutaneous every 24 hours  glucagon  Injectable 1 milliGRAM(s) IntraMuscular once  immune   globulin 10% (GAMMAGARD) IVPB 30 Gram(s) IV Intermittent every 24 hours  insulin lispro (ADMELOG) corrective regimen sliding scale   SubCutaneous every 6 hours  insulin NPH human recombinant 8 Unit(s) SubCutaneous every 6 hours  mupirocin 2% Nasal 1 Application(s) Both Nostrils every 12 hours  oxyCODONE    Solution 10 milliGRAM(s) Oral every 6 hours PRN  pantoprazole  Injectable 40 milliGRAM(s) IV Push daily  polyethylene glycol 3350 17 Gram(s) Oral every 12 hours  pyridostigmine 30 milliGRAM(s) Oral three times a day  senna 2 Tablet(s) Oral at bedtime  sodium chloride 0.65% Nasal 1 Spray(s) Both Nostrils four times a day PRN  Mode: AC/ CMV (Assist Control/ Continuous Mandatory Ventilation), RR (machine): 14, TV (machine): 400, FiO2: 40, PEEP: 5, MAP: 8, PIP: 22      Exam unchanged from day rounds     labs and imaging reviewed     Continue same management   no cuff leak, prednisone was changed to decadron  s/p IVIG     Caitlyn Calderon [facility] attending  Night rounds   Patient seen and examined    T(C): 37.2 (05-15-23 @ 15:00), Max: 37.2 (05-15-23 @ 07:00)  HR: 80 (05-15-23 @ 17:30) (55 - 118)  BP: 108/57 (05-15-23 @ 17:00) (106/51 - 165/80)  RR: 14 (05-15-23 @ 17:00) (11 - 27)  SpO2: 100% (05-15-23 @ 17:30) (99% - 100%)  05-14-23 @ 07:01  -  05-15-23 @ 07:00  --------------------------------------------------------  IN: 1610 mL / OUT: 1000 mL / NET: 610 mL    05-15-23 @ 07:01  -  05-15-23 @ 18:25  --------------------------------------------------------  IN: 1560 mL / OUT: 750 mL / NET: 810 mL    acetaminophen     Tablet .. 650 milliGRAM(s) Oral every 24 hours  acetaminophen   Oral Liquid .. 650 milliGRAM(s) Oral every 6 hours PRN  acetylcysteine 20%  Inhalation 4 milliLiter(s) Inhalation every 6 hours  albuterol/ipratropium for Nebulization 3 milliLiter(s) Nebulizer every 6 hours  atorvastatin 20 milliGRAM(s) Oral at bedtime  bisacodyl Suppository 10 milliGRAM(s) Rectal daily PRN  chlorhexidine 0.12% Liquid 15 milliLiter(s) Oral Mucosa every 12 hours  chlorhexidine 4% Liquid 1 Application(s) Topical <User Schedule>  dexAMETHasone  Injectable 4 milliGRAM(s) IV Push every 6 hours  dextrose 5%. 1000 milliLiter(s) IV Continuous <Continuous>  dextrose 5%. 1000 milliLiter(s) IV Continuous <Continuous>  dextrose 50% Injectable 25 Gram(s) IV Push once  diphenhydrAMINE Injectable 50 milliGRAM(s) IV Push every 24 hours  enoxaparin Injectable 40 milliGRAM(s) SubCutaneous every 24 hours  glucagon  Injectable 1 milliGRAM(s) IntraMuscular once  immune   globulin 10% (GAMMAGARD) IVPB 30 Gram(s) IV Intermittent every 24 hours  insulin lispro (ADMELOG) corrective regimen sliding scale   SubCutaneous every 6 hours  insulin NPH human recombinant 8 Unit(s) SubCutaneous every 6 hours  mupirocin 2% Nasal 1 Application(s) Both Nostrils every 12 hours  oxyCODONE    Solution 10 milliGRAM(s) Oral every 6 hours PRN  pantoprazole  Injectable 40 milliGRAM(s) IV Push daily  polyethylene glycol 3350 17 Gram(s) Oral every 12 hours  pyridostigmine 30 milliGRAM(s) Oral three times a day  senna 2 Tablet(s) Oral at bedtime  sodium chloride 0.65% Nasal 1 Spray(s) Both Nostrils four times a day PRN  Mode: AC/ CMV (Assist Control/ Continuous Mandatory Ventilation), RR (machine): 14, TV (machine): 400, FiO2: 40, PEEP: 5, MAP: 8, PIP: 22      Exam Neuro: awake, alert, oriented x 3 w choices, follows commands, EOMI w poor upgaze, face symmetric, able to lift b/l arms AG to the elbow, RUE deltoid 4-/5, distally 5/5, LUE deltoid 4-/5, proximally 5/5, able to lift b/l LEs off the bed with 5/5 strength. Neck flexion 4-/5.     labs and imaging reviewed     MG crisis s/p 5 sessions of PLEX improved neutro exam   neurochecks q 4 hr , s/p IVIG, resume mestinon 30 mg q 8 hr, can increase to 60 if not much secretions    acute respiratory failure intubated, tolerating PS however no cuff leak, prednisone changed to decadron, will attempt tomorrow, NIF,VC q 6 hr   glucerna at goal, NPO afetr midnight, PPI    hyperglycemic on NPH 8 q 6 hr resumed, NPO after midnight, will hold NPH   lovenox 40 mg sc qhs     30 critical care time   [facility]  Night rounds   Patient seen and examined    T(C): 37.2 (05-15-23 @ 15:00), Max: 37.2 (05-15-23 @ 07:00)  HR: 80 (05-15-23 @ 17:30) (55 - 118)  BP: 108/57 (05-15-23 @ 17:00) (106/51 - 165/80)  RR: 14 (05-15-23 @ 17:00) (11 - 27)  SpO2: 100% (05-15-23 @ 17:30) (99% - 100%)  05-14-23 @ 07:01  -  05-15-23 @ 07:00  --------------------------------------------------------  IN: 1610 mL / OUT: 1000 mL / NET: 610 mL    05-15-23 @ 07:01  -  05-15-23 @ 18:25  --------------------------------------------------------  IN: 1560 mL / OUT: 750 mL / NET: 810 mL    acetaminophen     Tablet .. 650 milliGRAM(s) Oral every 24 hours  acetaminophen   Oral Liquid .. 650 milliGRAM(s) Oral every 6 hours PRN  acetylcysteine 20%  Inhalation 4 milliLiter(s) Inhalation every 6 hours  albuterol/ipratropium for Nebulization 3 milliLiter(s) Nebulizer every 6 hours  atorvastatin 20 milliGRAM(s) Oral at bedtime  bisacodyl Suppository 10 milliGRAM(s) Rectal daily PRN  chlorhexidine 0.12% Liquid 15 milliLiter(s) Oral Mucosa every 12 hours  chlorhexidine 4% Liquid 1 Application(s) Topical <User Schedule>  dexAMETHasone  Injectable 4 milliGRAM(s) IV Push every 6 hours  dextrose 5%. 1000 milliLiter(s) IV Continuous <Continuous>  dextrose 5%. 1000 milliLiter(s) IV Continuous <Continuous>  dextrose 50% Injectable 25 Gram(s) IV Push once  diphenhydrAMINE Injectable 50 milliGRAM(s) IV Push every 24 hours  enoxaparin Injectable 40 milliGRAM(s) SubCutaneous every 24 hours  glucagon  Injectable 1 milliGRAM(s) IntraMuscular once  immune   globulin 10% (GAMMAGARD) IVPB 30 Gram(s) IV Intermittent every 24 hours  insulin lispro (ADMELOG) corrective regimen sliding scale   SubCutaneous every 6 hours  insulin NPH human recombinant 8 Unit(s) SubCutaneous every 6 hours  mupirocin 2% Nasal 1 Application(s) Both Nostrils every 12 hours  oxyCODONE    Solution 10 milliGRAM(s) Oral every 6 hours PRN  pantoprazole  Injectable 40 milliGRAM(s) IV Push daily  polyethylene glycol 3350 17 Gram(s) Oral every 12 hours  pyridostigmine 30 milliGRAM(s) Oral three times a day  senna 2 Tablet(s) Oral at bedtime  sodium chloride 0.65% Nasal 1 Spray(s) Both Nostrils four times a day PRN  Mode: AC/ CMV (Assist Control/ Continuous Mandatory Ventilation), RR (machine): 14, TV (machine): 400, FiO2: 40, PEEP: 5, MAP: 8, PIP: 22      Exam Neuro: awake, alert, oriented x 3 w choices, follows commands, EOMI w poor upgaze, face symmetric, able to lift b/l arms AG to the elbow, RUE deltoid 4-/5, distally 5/5, LUE deltoid 4-/5, proximally 5/5, able to lift b/l LEs off the bed with 5/5 strength. Neck flexion 4-/5.     labs and imaging reviewed     MG crisis s/p 5 sessions of PLEX improved neutro exam   neurochecks q 4 hr , s/p IVIG, resume mestinon 30 mg q 8 hr, can increase to 60 if not much secretions    acute respiratory failure intubated, tolerating PS however no cuff leak, prednisone changed to decadron, will attempt tomorrow, NIF,VC q 6 hr   glucerna at goal, NPO afetr midnight, PPI    hyperglycemic on NPH 8 q 6 hr resumed, NPO after midnight, will hold NPH   lovenox 40 mg sc qhs     30 critical care time   [facility]  Night rounds   Patient seen and examined    T(C): 37.2 (05-15-23 @ 15:00), Max: 37.2 (05-15-23 @ 07:00)  HR: 80 (05-15-23 @ 17:30) (55 - 118)  BP: 108/57 (05-15-23 @ 17:00) (106/51 - 165/80)  RR: 14 (05-15-23 @ 17:00) (11 - 27)  SpO2: 100% (05-15-23 @ 17:30) (99% - 100%)  05-14-23 @ 07:01  -  05-15-23 @ 07:00  --------------------------------------------------------  IN: 1610 mL / OUT: 1000 mL / NET: 610 mL    05-15-23 @ 07:01  -  05-15-23 @ 18:25  --------------------------------------------------------  IN: 1560 mL / OUT: 750 mL / NET: 810 mL    acetaminophen     Tablet .. 650 milliGRAM(s) Oral every 24 hours  acetaminophen   Oral Liquid .. 650 milliGRAM(s) Oral every 6 hours PRN  acetylcysteine 20%  Inhalation 4 milliLiter(s) Inhalation every 6 hours  albuterol/ipratropium for Nebulization 3 milliLiter(s) Nebulizer every 6 hours  atorvastatin 20 milliGRAM(s) Oral at bedtime  bisacodyl Suppository 10 milliGRAM(s) Rectal daily PRN  chlorhexidine 0.12% Liquid 15 milliLiter(s) Oral Mucosa every 12 hours  chlorhexidine 4% Liquid 1 Application(s) Topical <User Schedule>  dexAMETHasone  Injectable 4 milliGRAM(s) IV Push every 6 hours  dextrose 5%. 1000 milliLiter(s) IV Continuous <Continuous>  dextrose 5%. 1000 milliLiter(s) IV Continuous <Continuous>  dextrose 50% Injectable 25 Gram(s) IV Push once  diphenhydrAMINE Injectable 50 milliGRAM(s) IV Push every 24 hours  enoxaparin Injectable 40 milliGRAM(s) SubCutaneous every 24 hours  glucagon  Injectable 1 milliGRAM(s) IntraMuscular once  immune   globulin 10% (GAMMAGARD) IVPB 30 Gram(s) IV Intermittent every 24 hours  insulin lispro (ADMELOG) corrective regimen sliding scale   SubCutaneous every 6 hours  insulin NPH human recombinant 8 Unit(s) SubCutaneous every 6 hours  mupirocin 2% Nasal 1 Application(s) Both Nostrils every 12 hours  oxyCODONE    Solution 10 milliGRAM(s) Oral every 6 hours PRN  pantoprazole  Injectable 40 milliGRAM(s) IV Push daily  polyethylene glycol 3350 17 Gram(s) Oral every 12 hours  pyridostigmine 30 milliGRAM(s) Oral three times a day  senna 2 Tablet(s) Oral at bedtime  sodium chloride 0.65% Nasal 1 Spray(s) Both Nostrils four times a day PRN  Mode: AC/ CMV (Assist Control/ Continuous Mandatory Ventilation), RR (machine): 14, TV (machine): 400, FiO2: 40, PEEP: 5, MAP: 8, PIP: 22      Exam Neuro: awake, alert, oriented x 3 w choices, follows commands, EOMI w poor upgaze, face symmetric, able to lift b/l arms AG to the elbow, RUE deltoid 4-/5, distally 5/5, LUE deltoid 4-/5, proximally 5/5, able to lift b/l LEs off the bed with 5/5 strength. Neck flexion 4-/5.     labs and imaging reviewed     MG crisis s/p 5 sessions of PLEX improved neutro exam   neurochecks q 4 hr , s/p IVIG, resume mestinon 30 mg q 8 hr, can increase to 60 if not much secretions    acute respiratory failure intubated, tolerating PS however no cuff leak, prednisone changed to decadron, will attempt tomorrow, NIF,VC q 6 hr   glucerna at goal, NPO afetr midnight, PPI    hyperglycemic on NPH 8 q 6 hr resumed, NPO after midnight, will hold NPH   lovenox 40 mg sc qhs     30 critical care time   [facility]

## 2023-05-16 LAB
ANION GAP SERPL CALC-SCNC: 13 MMOL/L — SIGNIFICANT CHANGE UP (ref 5–17)
BUN SERPL-MCNC: 21 MG/DL — SIGNIFICANT CHANGE UP (ref 7–23)
CALCIUM SERPL-MCNC: 8.9 MG/DL — SIGNIFICANT CHANGE UP (ref 8.4–10.5)
CHLORIDE SERPL-SCNC: 103 MMOL/L — SIGNIFICANT CHANGE UP (ref 96–108)
CO2 SERPL-SCNC: 24 MMOL/L — SIGNIFICANT CHANGE UP (ref 22–31)
CREAT SERPL-MCNC: 0.39 MG/DL — LOW (ref 0.5–1.3)
EGFR: 112 ML/MIN/1.73M2 — SIGNIFICANT CHANGE UP
GAS PNL BLDA: SIGNIFICANT CHANGE UP
GLUCOSE BLDC GLUCOMTR-MCNC: 183 MG/DL — HIGH (ref 70–99)
GLUCOSE SERPL-MCNC: 168 MG/DL — HIGH (ref 70–99)
MAGNESIUM SERPL-MCNC: 2.5 MG/DL — SIGNIFICANT CHANGE UP (ref 1.6–2.6)
PHOSPHATE SERPL-MCNC: 4.1 MG/DL — SIGNIFICANT CHANGE UP (ref 2.5–4.5)
POTASSIUM SERPL-MCNC: 5 MMOL/L — SIGNIFICANT CHANGE UP (ref 3.5–5.3)
POTASSIUM SERPL-SCNC: 5 MMOL/L — SIGNIFICANT CHANGE UP (ref 3.5–5.3)
SODIUM SERPL-SCNC: 140 MMOL/L — SIGNIFICANT CHANGE UP (ref 135–145)

## 2023-05-16 PROCEDURE — 99232 SBSQ HOSP IP/OBS MODERATE 35: CPT | Mod: GC

## 2023-05-16 PROCEDURE — 71045 X-RAY EXAM CHEST 1 VIEW: CPT | Mod: 26

## 2023-05-16 PROCEDURE — 99233 SBSQ HOSP IP/OBS HIGH 50: CPT

## 2023-05-16 RX ORDER — SENNA PLUS 8.6 MG/1
2 TABLET ORAL AT BEDTIME
Refills: 0 | Status: DISCONTINUED | OUTPATIENT
Start: 2023-05-16 | End: 2023-05-23

## 2023-05-16 RX ORDER — PYRIDOSTIGMINE BROMIDE 60 MG/5ML
60 SOLUTION ORAL EVERY 8 HOURS
Refills: 0 | Status: DISCONTINUED | OUTPATIENT
Start: 2023-05-16 | End: 2023-05-18

## 2023-05-16 RX ORDER — ACETAMINOPHEN 500 MG
650 TABLET ORAL EVERY 6 HOURS
Refills: 0 | Status: DISCONTINUED | OUTPATIENT
Start: 2023-05-16 | End: 2023-05-23

## 2023-05-16 RX ORDER — PYRIDOSTIGMINE BROMIDE 60 MG/5ML
60 SOLUTION ORAL EVERY 8 HOURS
Refills: 0 | Status: DISCONTINUED | OUTPATIENT
Start: 2023-05-16 | End: 2023-05-16

## 2023-05-16 RX ORDER — OXYCODONE HYDROCHLORIDE 5 MG/1
5 TABLET ORAL EVERY 6 HOURS
Refills: 0 | Status: DISCONTINUED | OUTPATIENT
Start: 2023-05-16 | End: 2023-05-22

## 2023-05-16 RX ORDER — PANTOPRAZOLE SODIUM 20 MG/1
40 TABLET, DELAYED RELEASE ORAL
Refills: 0 | Status: DISCONTINUED | OUTPATIENT
Start: 2023-05-17 | End: 2023-05-23

## 2023-05-16 RX ORDER — ACETAMINOPHEN 500 MG
650 TABLET ORAL EVERY 24 HOURS
Refills: 0 | Status: COMPLETED | OUTPATIENT
Start: 2023-05-16 | End: 2023-05-19

## 2023-05-16 RX ORDER — ATORVASTATIN CALCIUM 80 MG/1
20 TABLET, FILM COATED ORAL AT BEDTIME
Refills: 0 | Status: DISCONTINUED | OUTPATIENT
Start: 2023-05-16 | End: 2023-05-23

## 2023-05-16 RX ORDER — POLYETHYLENE GLYCOL 3350 17 G/17G
17 POWDER, FOR SOLUTION ORAL EVERY 12 HOURS
Refills: 0 | Status: DISCONTINUED | OUTPATIENT
Start: 2023-05-16 | End: 2023-05-23

## 2023-05-16 RX ADMIN — Medication 3 MILLILITER(S): at 00:38

## 2023-05-16 RX ADMIN — OXYCODONE HYDROCHLORIDE 10 MILLIGRAM(S): 5 TABLET ORAL at 01:14

## 2023-05-16 RX ADMIN — PYRIDOSTIGMINE BROMIDE 60 MILLIGRAM(S): 60 SOLUTION ORAL at 14:11

## 2023-05-16 RX ADMIN — Medication 2: at 05:32

## 2023-05-16 RX ADMIN — SENNA PLUS 2 TABLET(S): 8.6 TABLET ORAL at 22:26

## 2023-05-16 RX ADMIN — IMMUNE GLOBULIN (HUMAN) 50 GRAM(S): 10 INJECTION INTRAVENOUS; SUBCUTANEOUS at 12:45

## 2023-05-16 RX ADMIN — Medication 50 MILLIGRAM(S): at 12:23

## 2023-05-16 RX ADMIN — Medication 4 MILLILITER(S): at 05:28

## 2023-05-16 RX ADMIN — Medication 4 MILLILITER(S): at 17:07

## 2023-05-16 RX ADMIN — Medication 4 MILLILITER(S): at 00:42

## 2023-05-16 RX ADMIN — Medication 3 MILLILITER(S): at 11:07

## 2023-05-16 RX ADMIN — Medication 4 MILLIGRAM(S): at 05:22

## 2023-05-16 RX ADMIN — Medication 3 MILLILITER(S): at 05:27

## 2023-05-16 RX ADMIN — Medication 2: at 12:31

## 2023-05-16 RX ADMIN — ATORVASTATIN CALCIUM 20 MILLIGRAM(S): 80 TABLET, FILM COATED ORAL at 22:27

## 2023-05-16 RX ADMIN — OXYCODONE HYDROCHLORIDE 10 MILLIGRAM(S): 5 TABLET ORAL at 02:14

## 2023-05-16 RX ADMIN — CHLORHEXIDINE GLUCONATE 15 MILLILITER(S): 213 SOLUTION TOPICAL at 05:23

## 2023-05-16 RX ADMIN — MUPIROCIN 1 APPLICATION(S): 20 OINTMENT TOPICAL at 05:23

## 2023-05-16 RX ADMIN — Medication 4 MILLILITER(S): at 11:08

## 2023-05-16 RX ADMIN — ENOXAPARIN SODIUM 40 MILLIGRAM(S): 100 INJECTION SUBCUTANEOUS at 17:14

## 2023-05-16 RX ADMIN — Medication 3 MILLILITER(S): at 17:07

## 2023-05-16 RX ADMIN — PANTOPRAZOLE SODIUM 40 MILLIGRAM(S): 20 TABLET, DELAYED RELEASE ORAL at 12:23

## 2023-05-16 RX ADMIN — Medication 650 MILLIGRAM(S): at 12:23

## 2023-05-16 RX ADMIN — POLYETHYLENE GLYCOL 3350 17 GRAM(S): 17 POWDER, FOR SOLUTION ORAL at 17:15

## 2023-05-16 RX ADMIN — PYRIDOSTIGMINE BROMIDE 60 MILLIGRAM(S): 60 SOLUTION ORAL at 22:27

## 2023-05-16 RX ADMIN — Medication 2: at 17:24

## 2023-05-16 RX ADMIN — PYRIDOSTIGMINE BROMIDE 30 MILLIGRAM(S): 60 SOLUTION ORAL at 05:23

## 2023-05-16 NOTE — PROGRESS NOTE ADULT - SUBJECTIVE AND OBJECTIVE BOX
Patient KAIDEN HATCH is a 62y WITH History of MG s/p thymectomy 2010, with prior exacerbations requiring intubation most recently in late Jan 2023 requiring intubation and PLEX, on 2L O2 at baseline, now readmitted 5/5 with 1 week of diplopia, dysphagia and hypercapnic resp failure requiring intubation. Presents with exacerbation.    24 Hour Events/Subjective:  - S/P 5 sessions of PLEX. s/p 1 session of IVIG.  - 5/16- Patient extubated overnight. Post extubation exam stable, denies any complaints.        REVIEW OF SYSTEMS:  - negative except as above    VITALS:   - Reviewed    IMAGING/DATA:   - Reviewed      PHYSICAL EXAM:    General: intubated.  CVS: RRR  Pulm: CTAB  GI: Soft, NTND  Extremities: No LE Edema  Neuro: awake, alert, oriented x 3 w choices, follows commands, EOMI w poor upgaze, face symmetric, able to lift b/l arms AG to the elbow, RUE deltoid 4-/5, distally 5/5, LUE deltoid 4+/5, proximally 5/5, able to lift b/l LEs off the bed with 5/5 strength. Neck flexion 4+/5.

## 2023-05-16 NOTE — PROGRESS NOTE ADULT - ASSESSMENT
Ms cAosta is a 62 year-old woman with a PMH of myasthenia gravis who presented to the ED on 5/5/23 with worsening diplopia and difficulty speaking, swallowing and breathing x 1 week, admitted for evaluation of management of acute MG exacerbation.     Impression: Acute MG exacerbation, possibly triggered by progressive disease or other toxic/metabolic insult. No stable focal neurologic deficits to suggest cerebrovascular or other acute intracranial event.     Recommendations:   - Continue IVIG x 4 days, day 2/4 (5/15 - )  - Prednisone 50mg qd resumed on 5/11. Continue with taper by 5 mg every week until 30mg (next taper on 5/18)  - Pyridostigmine 60mg Q8H resumed today (5/16)  - S/p PLEX x 5 of sessions (5/6-5/14) with some improvement  - S/p IV solumedrol 125mg IV daily x 5 days (5/6-5/10)  - PT/OT as tolerated  - Ultimately patient will need better outpatient therapy to prevent relapses, either IVIG infusions, increased prednisone dose, or Soliris (or combination of some)  - Continue to address above medical problems, as you are doing  - Will continue to follow patient with you.     Ms Acosta is a 62 year-old woman with a PMH of myasthenia gravis who presented to the ED on 5/5/23 with worsening diplopia and difficulty speaking, swallowing and breathing x 1 week, admitted for evaluation of management of acute MG exacerbation.     Impression: Acute MG exacerbation, possibly triggered by progressive disease or other toxic/metabolic insult. No stable focal neurologic deficits to suggest cerebrovascular or other acute intracranial event.     Recommendations:   - Continue IVIG x 4 days, day 2/4 (5/15 - )  - Prednisone 50mg qd resumed on 5/11. Continue with taper by 5 mg every week until 30mg (next taper on 5/18)  - Pyridostigmine 60mg Q8H resumed today (5/16)  - S/p PLEX x 5 of sessions (5/6-5/14) with some improvement  - S/p IV solumedrol 125mg IV daily x 5 days (5/6-5/10)  - PT/OT as tolerated  - Ultimately patient will need better outpatient therapy to prevent relapses, either IVIG infusions, increased prednisone dose, or Soliris (or combination of some)  - Continue to address above medical problems, as you are doing  - Will continue to follow patient with you.

## 2023-05-16 NOTE — PROGRESS NOTE ADULT - THIS PATIENT HAS THE FOLLOWING CONDITION(S)/DIAGNOSES ON THIS ADMISSION:
Acute Respiratory Failure
Acute Respiratory Failure
None
myasthenia gravis exacerbation
None
None
myasthenia gravis exacerbation
myasthenia gravis exacerbation
Acute Respiratory Failure
None
Acute Respiratory Failure
Acute Respiratory Failure
None
Acute Respiratory Failure
Acute Respiratory Failure
None
Acute Respiratory Failure
None

## 2023-05-16 NOTE — PROGRESS NOTE ADULT - SUBJECTIVE AND OBJECTIVE BOX
SUBJECTIVE:  Patient seen and examined at bedside this AM with neurology attending and team. This AM she was extubated. States she continues to feel stronger. Continues to report diplopia OD when looking far away, otherwise no new complaints.       INTERVAL HISTORY: IVIG day 2.    ROS: As above, otherwise negative.       MEDICATIONS:  MEDICATIONS  (STANDING):  acetaminophen     Tablet .. 650 milliGRAM(s) Oral every 24 hours  acetylcysteine 20%  Inhalation 4 milliLiter(s) Inhalation every 6 hours  albuterol/ipratropium for Nebulization 3 milliLiter(s) Nebulizer every 6 hours  atorvastatin 20 milliGRAM(s) Oral at bedtime  chlorhexidine 4% Liquid 1 Application(s) Topical <User Schedule>  dextrose 5%. 1000 milliLiter(s) (100 mL/Hr) IV Continuous <Continuous>  dextrose 5%. 1000 milliLiter(s) (50 mL/Hr) IV Continuous <Continuous>  dextrose 50% Injectable 25 Gram(s) IV Push once  diphenhydrAMINE Injectable 50 milliGRAM(s) IV Push every 24 hours  enoxaparin Injectable 40 milliGRAM(s) SubCutaneous every 24 hours  glucagon  Injectable 1 milliGRAM(s) IntraMuscular once  immune   globulin 10% (GAMMAGARD) IVPB 30 Gram(s) IV Intermittent every 24 hours  insulin lispro (ADMELOG) corrective regimen sliding scale   SubCutaneous every 6 hours  polyethylene glycol 3350 17 Gram(s) Oral every 12 hours  pyridostigmine 60 milliGRAM(s) Oral every 8 hours  senna 2 Tablet(s) Oral at bedtime    MEDICATIONS  (PRN):  acetaminophen     Tablet .. 650 milliGRAM(s) Oral every 6 hours PRN Temp greater or equal to 38C (100.4F), Mild Pain (1 - 3)  bisacodyl Suppository 10 milliGRAM(s) Rectal daily PRN Constipation  oxyCODONE    IR 5 milliGRAM(s) Oral every 6 hours PRN Moderate Pain (4 - 6)  sodium chloride 0.65% Nasal 1 Spray(s) Both Nostrils four times a day PRN Nasal Congestion      VITALS & EXAMINATION:  Vital Signs Last 24 Hrs  T(C): 37.2 (16 May 2023 14:47), Max: 37.2 (15 May 2023 19:00)  T(F): 98.9 (16 May 2023 14:47), Max: 98.9 (15 May 2023 19:00)  HR: 89 (16 May 2023 14:47) (52 - 93)  BP: 142/69 (16 May 2023 14:47) (103/63 - 166/75)  BP(mean): 89 (16 May 2023 14:47) (72 - 104)  RR: 20 (16 May 2023 14:47) (7 - 20)  SpO2: 96% (16 May 2023 14:47) (96% - 100%)    Parameters below as of 16 May 2023 14:47  Patient On (Oxygen Delivery Method): room air    MS - Eyes open, awake, alert and attentive.  Follows all commands.   CN - PERRL (3mm OU), EOM with poor upgaze but otherwise intact; moderate ptosis OD and minimal ptosis OS, both worsened with sustained upgaze (~20 seconds); VFF, face sens/str/hearing WNL b/l, tongue midline. MOLLY palate. Neck extension 5/5 and neck flexion 4+/5.  Motor - Normal bulk/tone. No pronator drift.     RUE: 4-/5 deltoids, 5/5 biceps, 4+/5 triceps, 5/5 .      LUE: 4-/5 deltoids, 5/5 biceps, 5/5 triceps, 5/5 .     RLE/LLE: 5/5 hip flexion, 5/5 knee flexion, 5/5 knee extension, 5/5 ankle plantarflexion and dorsiflexion.   Sens - LT intact throughout  DTR's - 3+ biceps, triceps, brachioradialis, and patellars b/l. (+) crossed adductors b/l. Neutral b/l plantar response  Coord - FTN intact b/l.  Gait and station - MOLLY.      LABS:  CBC                       9.3    13.87 )-----------( 259      ( 15 May 2023 22:50 )             28.7     Chem 05-16    140  |  103  |  21  ----------------------------<  168<H>  5.0   |  24  |  0.39<L>    Ca    8.9      16 May 2023 01:47  Phos  4.1     05-16  Mg     2.5     05-16        STUDIES & IMAGING:     TTE Limited W or WO Ultrasound Enhancing Agent (05.06.23 @ 10:03) >   1. Normal left ventricular cavity size. The left ventricular wall thickness is normal. The left ventricular systolic function is normal. The basal inferior segment is hypokinetic.   2. There is normal left ventricular diastolic function.   3. Normal right ventricular cavity size, normal wall thickness and normal systolic function. The tricuspid annular plane systolic excursion (TAPSE) is 1.8 cm (normal >=1.7 cm).

## 2023-05-16 NOTE — CHART NOTE - NSCHARTNOTEFT_GEN_A_CORE
Nutrition Follow Up Note  Patient seen for: Nutrition Follow Up     Chart reviewed, events noted. Pt is a 63 yo F with PMH: MG s/p thymectomy  with prior exacerbations requiring intubation, most recently in 2023 with PLEX. On 2L O2 at baseline. Readmitted  with 1 week diplopia, dysphagia and hypercapnic respiratory failure requiring intubation. Presented with MG exacerbation. S/P PLEX; last received 5/15. Extubated .     Source: [] Patient       [x] EMR        [x] RN        [] Family at bedside       [x] Other: interdisciplinary medical team    -If unable to interview patient: pt s/p extubation this morning     Diet Order:   Diet, NPO after Midnight:      NPO Start Date: 15-May-2023,   NPO Start Time: 23:59 (05-15-23)  Diet, NPO with Tube Feed:   Tube Feeding Modality: Nasogastric  Glucerna 1.2 Taye (GLUCERNARTH)  Total Volume for 24 Hours (mL): 1440  Continuous  Starting Tube Feed Rate {mL per Hour}: 20  Increase Tube Feed Rate by (mL): 10     Every 6 hours  Until Goal Tube Feed Rate (mL per Hour): 60  Tube Feed Duration (in Hours): 24  Tube Feed Start Time: 10:00  Supplement Feeding Modality:  Nasogastric  Probiotic Yogurt/Smoothie Cans or Servings Per Day:  2       Frequency:  Daily (05-15-23)    EN Order Provides: 1440ml, 1728kcal and 86g protein    Is current diet order appropriate/adequate? [x] Yes  []  No:     EN Provision (per nursing flow sheet):   (): held for extubation   (5/15): 780ml (54% of goal)  (): 1380ml (96% of goal)  (): 1440ml (100% of goal)  (): 1380ml (96% of goal)    Nutrition-related concerns:  -Last BM (5/15) :x 1; (): x 1. On bowel regimen (senna, Miralax, Dulcolax).   -Continues on antihyperglycemic regimen (NPH 8u q 6 hrs, insulin lispro sliding scale). A1c 6.8%. Off Prednisone. S/P Decadron 5/15.   -Receiving free water flushes of 200ml q 6 hrs for hydration.  -Prescribed Mestinon in setting of MG.     Weights:   Daily Weight in k.8 (-16), Weight in k (-10)    MEDICATIONS  (STANDING):  atorvastatin  dextrose 5%.  dextrose 5%.  dextrose 50% Injectable  glucagon  Injectable  insulin lispro (ADMELOG) corrective regimen sliding scale  insulin NPH human recombinant  pantoprazole  Injectable  polyethylene glycol 3350  senna    Pertinent Labs:  @ 01:47: Na 140, BUN 21, Cr 0.39<L>, <H>, K+ 5.0, Phos 4.1, Mg 2.5, Alk Phos --, ALT/SGPT --, AST/SGOT --, HbA1c --  05-15 @ 22:50: Na 137, BUN 21, Cr 0.39<L>, <H>, K+ 4.4, Phos 3.9, Mg 2.3, Alk Phos --, ALT/SGPT --, AST/SGOT --, HbA1c --    A1C with Estimated Average Glucose Result: 6.8 % (23 @ 00:24)  A1C with Estimated Average Glucose Result: 6.6 % (23 @ 01:13)    Finger Sticks:  POCT Blood Glucose.: 193 mg/dL (05-15 @ 23:47)  POCT Blood Glucose.: 209 mg/dL (05-15 @ 17:17)  POCT Blood Glucose.: 218 mg/dL (05-15 @ 11:18)    Skin per nursing documentation: sacrum DTI  Edema: none noted    based on dosing wt 75kg):   Estimated Energy Needs: (23-27kcal/kg): 1725-2025kcal   Estimated Protein Needs: (1.2-1.4g protein/kg): 90-105g protein   Estimated Fluid Needs: defer to team    Previous Nutrition Diagnosis: increased nutrient needs  Nutrition Diagnosis is: [x] ongoing  [] resolved [] not applicable     New Nutrition Diagnosis: [x] Not applicable    Nutrition Care Plan:  [x] In Progress  [] Achieved  [] Not applicable       Recommendations:      1. As able, recommend Glucerna 1.2 at goal rate 65ml/hr x 24 hrs. Provides: 1560ml, 1872kcal and 94g protein. Based on dosing wt 75kg, provides: ~25kcal/kg and 1.25g protein/kg. Free water flushes deferred to medical team.   2. Monitor GI tolerance. RD to remain available to adjust EN formulary, volume/rate PRN.   3. Advancement to PO diet deferred to medical team; consistency/texture per SLP/medical team. If warranted, consider Consistent Carbohydrate diet to aid in management of BG.   4. May discontinue Laci Active 2x daily, as pt not currently prescribed antibiotics.   5. Recommend vitamin C and multivitamin if no medication contraindications to aid in management of wound healing.   6. Monitor wt trends/labs/skin integrity/hydration status/bowel regularity.     Monitoring and Evaluation:   Continue to monitor nutritional intake, tolerance to diet prescription, weights, labs, skin integrity    RD remains available upon request and will follow up per protocol    Alison Kleiner, RD, [facility] Pager # [phone] or via TEAMS Alison Kleiner Nutrition Follow Up Note  Patient seen for: Nutrition Follow Up     Chart reviewed, events noted. Pt is a 61 yo F with PMH: MG s/p thymectomy  with prior exacerbations requiring intubation, most recently in 2023 with PLEX. On 2L O2 at baseline. Readmitted  with 1 week diplopia, dysphagia and hypercapnic respiratory failure requiring intubation. Presented with MG exacerbation. S/P PLEX; last received 5/15. Extubated .     Source: [] Patient       [x] EMR        [x] RN        [] Family at bedside       [x] Other: interdisciplinary medical team    -If unable to interview patient: pt s/p extubation this morning     Diet Order:   Diet, NPO after Midnight:      NPO Start Date: 15-May-2023,   NPO Start Time: 23:59 (05-15-23)  Diet, NPO with Tube Feed:   Tube Feeding Modality: Nasogastric  Glucerna 1.2 Taye (GLUCERNARTH)  Total Volume for 24 Hours (mL): 1440  Continuous  Starting Tube Feed Rate {mL per Hour}: 20  Increase Tube Feed Rate by (mL): 10     Every 6 hours  Until Goal Tube Feed Rate (mL per Hour): 60  Tube Feed Duration (in Hours): 24  Tube Feed Start Time: 10:00  Supplement Feeding Modality:  Nasogastric  Probiotic Yogurt/Smoothie Cans or Servings Per Day:  2       Frequency:  Daily (05-15-23)    EN Order Provides: 1440ml, 1728kcal and 86g protein    Is current diet order appropriate/adequate? [x] Yes  []  No:     EN Provision (per nursing flow sheet):   (): held for extubation   (5/15): 780ml (54% of goal)  (): 1380ml (96% of goal)  (): 1440ml (100% of goal)  (): 1380ml (96% of goal)    Nutrition-related concerns:  -Last BM (5/15) :x 1; (): x 1. On bowel regimen (senna, Miralax, Dulcolax).   -Continues on antihyperglycemic regimen (NPH 8u q 6 hrs, insulin lispro sliding scale). A1c 6.8%. Off Prednisone. S/P Decadron 5/15.   -Receiving free water flushes of 200ml q 6 hrs for hydration.  -Prescribed Mestinon in setting of MG.     Weights:   Daily Weight in k.8 (-16), Weight in k (-10)    MEDICATIONS  (STANDING):  atorvastatin  dextrose 5%.  dextrose 5%.  dextrose 50% Injectable  glucagon  Injectable  insulin lispro (ADMELOG) corrective regimen sliding scale  insulin NPH human recombinant  pantoprazole  Injectable  polyethylene glycol 3350  senna    Pertinent Labs:  @ 01:47: Na 140, BUN 21, Cr 0.39<L>, <H>, K+ 5.0, Phos 4.1, Mg 2.5, Alk Phos --, ALT/SGPT --, AST/SGOT --, HbA1c --  05-15 @ 22:50: Na 137, BUN 21, Cr 0.39<L>, <H>, K+ 4.4, Phos 3.9, Mg 2.3, Alk Phos --, ALT/SGPT --, AST/SGOT --, HbA1c --    A1C with Estimated Average Glucose Result: 6.8 % (23 @ 00:24)  A1C with Estimated Average Glucose Result: 6.6 % (23 @ 01:13)    Finger Sticks:  POCT Blood Glucose.: 193 mg/dL (05-15 @ 23:47)  POCT Blood Glucose.: 209 mg/dL (05-15 @ 17:17)  POCT Blood Glucose.: 218 mg/dL (05-15 @ 11:18)    Skin per nursing documentation: sacrum DTI  Edema: none noted    based on dosing wt 75kg):   Estimated Energy Needs: (23-27kcal/kg): 1725-2025kcal   Estimated Protein Needs: (1.2-1.4g protein/kg): 90-105g protein   Estimated Fluid Needs: defer to team    Previous Nutrition Diagnosis: increased nutrient needs  Nutrition Diagnosis is: [x] ongoing  [] resolved [] not applicable     New Nutrition Diagnosis: [x] Not applicable    Nutrition Care Plan:  [x] In Progress  [] Achieved  [] Not applicable       Recommendations:      1. As able, recommend Glucerna 1.2 at goal rate 65ml/hr x 24 hrs. Provides: 1560ml, 1872kcal and 94g protein. Based on dosing wt 75kg, provides: ~25kcal/kg and 1.25g protein/kg. Free water flushes deferred to medical team.   2. Monitor GI tolerance. RD to remain available to adjust EN formulary, volume/rate PRN.   3. Advancement to PO diet deferred to medical team; consistency/texture per SLP/medical team. If warranted, consider Consistent Carbohydrate diet to aid in management of BG.   4. May discontinue Laci Active 2x daily, as pt not currently prescribed antibiotics.   5. Recommend vitamin C and multivitamin if no medication contraindications to aid in management of wound healing.   6. Monitor wt trends/labs/skin integrity/hydration status/bowel regularity.     Monitoring and Evaluation:   Continue to monitor nutritional intake, tolerance to diet prescription, weights, labs, skin integrity    RD remains available upon request and will follow up per protocol    Alison Kleiner, RD, [facility] Pager # [phone] or via TEAMS Alison Kleiner Nutrition Follow Up Note  Patient seen for: Nutrition Follow Up     Chart reviewed, events noted. Pt is a 61 yo F with PMH: MG s/p thymectomy  with prior exacerbations requiring intubation, most recently in 2023 with PLEX. On 2L O2 at baseline. Readmitted  with 1 week diplopia, dysphagia and hypercapnic respiratory failure requiring intubation. Presented with MG exacerbation. S/P PLEX; last received 5/15. Extubated .     Source: [] Patient       [x] EMR        [x] RN        [] Family at bedside       [x] Other: interdisciplinary medical team    -If unable to interview patient: pt s/p extubation this morning     Diet Order:   Diet, NPO after Midnight:      NPO Start Date: 15-May-2023,   NPO Start Time: 23:59 (05-15-23)  Diet, NPO with Tube Feed:   Tube Feeding Modality: Nasogastric  Glucerna 1.2 Taye (GLUCERNARTH)  Total Volume for 24 Hours (mL): 1440  Continuous  Starting Tube Feed Rate {mL per Hour}: 20  Increase Tube Feed Rate by (mL): 10     Every 6 hours  Until Goal Tube Feed Rate (mL per Hour): 60  Tube Feed Duration (in Hours): 24  Tube Feed Start Time: 10:00  Supplement Feeding Modality:  Nasogastric  Probiotic Yogurt/Smoothie Cans or Servings Per Day:  2       Frequency:  Daily (05-15-23)    EN Order Provides: 1440ml, 1728kcal and 86g protein    Is current diet order appropriate/adequate? [x] Yes  []  No:     EN Provision (per nursing flow sheet):   (): held for extubation   (5/15): 780ml (54% of goal)  (): 1380ml (96% of goal)  (): 1440ml (100% of goal)  (): 1380ml (96% of goal)    Nutrition-related concerns:  -Last BM (5/15) :x 1; (): x 1. On bowel regimen (senna, Miralax, Dulcolax).   -Continues on antihyperglycemic regimen (NPH 8u q 6 hrs, insulin lispro sliding scale). A1c 6.8%. Off Prednisone. S/P Decadron 5/15.   -Receiving free water flushes of 200ml q 6 hrs for hydration.  -Prescribed Mestinon in setting of MG.     Weights:   Daily Weight in k.8 (-16), Weight in k (-10)    MEDICATIONS  (STANDING):  atorvastatin  dextrose 5%.  dextrose 5%.  dextrose 50% Injectable  glucagon  Injectable  insulin lispro (ADMELOG) corrective regimen sliding scale  insulin NPH human recombinant  pantoprazole  Injectable  polyethylene glycol 3350  senna    Pertinent Labs:  @ 01:47: Na 140, BUN 21, Cr 0.39<L>, <H>, K+ 5.0, Phos 4.1, Mg 2.5, Alk Phos --, ALT/SGPT --, AST/SGOT --, HbA1c --  05-15 @ 22:50: Na 137, BUN 21, Cr 0.39<L>, <H>, K+ 4.4, Phos 3.9, Mg 2.3, Alk Phos --, ALT/SGPT --, AST/SGOT --, HbA1c --    A1C with Estimated Average Glucose Result: 6.8 % (23 @ 00:24)  A1C with Estimated Average Glucose Result: 6.6 % (23 @ 01:13)    Finger Sticks:  POCT Blood Glucose.: 193 mg/dL (05-15 @ 23:47)  POCT Blood Glucose.: 209 mg/dL (05-15 @ 17:17)  POCT Blood Glucose.: 218 mg/dL (05-15 @ 11:18)    Skin per nursing documentation: sacrum DTI  Edema: none noted    based on dosing wt 75kg):   Estimated Energy Needs: (23-27kcal/kg): 1725-2025kcal   Estimated Protein Needs: (1.2-1.4g protein/kg): 90-105g protein   Estimated Fluid Needs: defer to team    Previous Nutrition Diagnosis: increased nutrient needs  Nutrition Diagnosis is: [x] ongoing  [] resolved [] not applicable     New Nutrition Diagnosis: [x] Not applicable    Nutrition Care Plan:  [x] In Progress  [] Achieved  [] Not applicable       Recommendations:      1. As able, recommend Glucerna 1.2 at goal rate 65ml/hr x 24 hrs. Provides: 1560ml, 1872kcal and 94g protein. Based on dosing wt 75kg, provides: ~25kcal/kg and 1.25g protein/kg. Free water flushes deferred to medical team.   2. Monitor GI tolerance. RD to remain available to adjust EN formulary, volume/rate PRN.   3. Advancement to PO diet deferred to medical team; consistency/texture per SLP/medical team. If warranted, consider Consistent Carbohydrate diet to aid in management of BG.   4. May discontinue Laci Active 2x daily, as pt not currently prescribed antibiotics.   5. Recommend vitamin C and multivitamin if no medication contraindications to aid in management of wound healing.   6. Monitor wt trends/labs/skin integrity/hydration status/bowel regularity.     Monitoring and Evaluation:   Continue to monitor nutritional intake, tolerance to diet prescription, weights, labs, skin integrity    RD remains available upon request and will follow up per protocol    Alison Kleiner, RD, [facility] Pager # [phone] or via TEAMS Alison Kleiner

## 2023-05-16 NOTE — PROGRESS NOTE ADULT - ASSESSMENT
ASSESSMENT/PLAN: MG exacerbation sp 5 dose of PLEX. s/p 1 dose of IVIG (start date 5/15)    NEURO:  - neuro checks q4h  - s/p PLEX and on IVIG (1/5)  - will resume prednisone 50 mg QD today  - Avoid medications that may worsen the current exacerbation including macrolides, fluoroquinolones, tetracyclines, aminoglycoside, beta-blockers, magnesium, and anti-arrhythmics (procainamide, quinidine, and lidocaine)  - neurology following and recommend starting IVIG after PLEX (5/15)  - continue home takes Mestinon 60 2 tablets TID  - pain control  - PT/OT     CVS:  - MAP>65  - TTE - EF 54%    PULM:   5/16- s/p extubation    RENAL:  RACQUEL sharma placed 5/5  - IVL  - daily IOs    GI:  - Diet: ON tf at GOAL   - Fluids: IVL  - GI prophylaxis: PPi while intubated   - Bowel regimen: miralax, senna  - LBM 5/15    ENDO:   - FS goal 120-180  - On ISS and NPH 8mgQ6    HEME/ONC:  - SCDs  - Chemoppx: SQL    ID:  - monitor for fevers, leukocytosis likely related to steroids, trending down  - No signs of infection      ASSESSMENT/PLAN: MG exacerbation sp 5 dose of PLEX. s/p 1 dose of IVIG (start date 5/15)    NEURO:  - neuro checks q4h  - s/p PLEX and on IVIG (1/5), CONTINUE DAILY   - will resume prednisone 50 mg QD today  - Avoid medications that may worsen the current exacerbation including macrolides, fluoroquinolones, tetracyclines, aminoglycoside, beta-blockers, magnesium, and anti-arrhythmics (procainamide, quinidine, and lidocaine)  - neurology following and recommend starting IVIG after PLEX (5/15)  - continue home takes Mestinon 60 2 tablets TID  - pain control  - PT/OT     CVS:  - MAP>65  - TTE - EF 54%    PULM:   5/16- s/p extubation  On NC     RENAL:  RACQUEL sharma placed 5/5  - IVL  - daily IOs    GI:  - Diet: Swallow eval then ADAT  - Fluids: IVL  - GI prophylaxis: PPi  - Bowel regimen: miralax, senna  - LBM 5/1    ENDO:   - FS goal 120-180  - On ISS and NPH 8mgQ6    HEME/ONC:  - SCDs  - Chemoppx: SQL    ID:  - monitor for fevers, leukocytosis likely related to steroids, trending down  - No signs of infection       Downgraded to neurology

## 2023-05-16 NOTE — AIRWAY REMOVAL NOTE  ADULT & PEDS - ARTIFICAL AIRWAY REMOVAL COMMENTS
Written order for extubation verified. The patient was identified by full name and birth date compared to the identification band. Present during the procedure was RADHA Meier

## 2023-05-17 LAB
GLUCOSE BLDC GLUCOMTR-MCNC: 130 MG/DL — HIGH (ref 70–99)
GLUCOSE BLDC GLUCOMTR-MCNC: 133 MG/DL — HIGH (ref 70–99)
GLUCOSE BLDC GLUCOMTR-MCNC: 142 MG/DL — HIGH (ref 70–99)
GLUCOSE BLDC GLUCOMTR-MCNC: 204 MG/DL — HIGH (ref 70–99)
GLUCOSE BLDC GLUCOMTR-MCNC: 217 MG/DL — HIGH (ref 70–99)

## 2023-05-17 PROCEDURE — 99232 SBSQ HOSP IP/OBS MODERATE 35: CPT

## 2023-05-17 RX ORDER — INSULIN LISPRO 100/ML
VIAL (ML) SUBCUTANEOUS
Refills: 0 | Status: DISCONTINUED | OUTPATIENT
Start: 2023-05-17 | End: 2023-05-23

## 2023-05-17 RX ORDER — DEXTROSE 50 % IN WATER 50 %
15 SYRINGE (ML) INTRAVENOUS ONCE
Refills: 0 | Status: DISCONTINUED | OUTPATIENT
Start: 2023-05-17 | End: 2023-05-23

## 2023-05-17 RX ORDER — CHLORHEXIDINE GLUCONATE 213 G/1000ML
1 SOLUTION TOPICAL
Refills: 0 | Status: DISCONTINUED | OUTPATIENT
Start: 2023-05-17 | End: 2023-05-23

## 2023-05-17 RX ORDER — INSULIN LISPRO 100/ML
VIAL (ML) SUBCUTANEOUS AT BEDTIME
Refills: 0 | Status: DISCONTINUED | OUTPATIENT
Start: 2023-05-17 | End: 2023-05-23

## 2023-05-17 RX ADMIN — Medication 3 MILLILITER(S): at 12:50

## 2023-05-17 RX ADMIN — Medication 4 MILLILITER(S): at 05:53

## 2023-05-17 RX ADMIN — ATORVASTATIN CALCIUM 20 MILLIGRAM(S): 80 TABLET, FILM COATED ORAL at 21:58

## 2023-05-17 RX ADMIN — Medication 3 MILLILITER(S): at 17:22

## 2023-05-17 RX ADMIN — Medication 3 MILLILITER(S): at 00:27

## 2023-05-17 RX ADMIN — Medication 4 MILLILITER(S): at 23:34

## 2023-05-17 RX ADMIN — PANTOPRAZOLE SODIUM 40 MILLIGRAM(S): 20 TABLET, DELAYED RELEASE ORAL at 08:26

## 2023-05-17 RX ADMIN — PYRIDOSTIGMINE BROMIDE 60 MILLIGRAM(S): 60 SOLUTION ORAL at 15:18

## 2023-05-17 RX ADMIN — Medication 50 MILLIGRAM(S): at 05:52

## 2023-05-17 RX ADMIN — Medication 650 MILLIGRAM(S): at 12:55

## 2023-05-17 RX ADMIN — Medication 4 MILLILITER(S): at 12:50

## 2023-05-17 RX ADMIN — Medication 2: at 11:57

## 2023-05-17 RX ADMIN — CHLORHEXIDINE GLUCONATE 1 APPLICATION(S): 213 SOLUTION TOPICAL at 17:33

## 2023-05-17 RX ADMIN — Medication 50 MILLIGRAM(S): at 11:58

## 2023-05-17 RX ADMIN — IMMUNE GLOBULIN (HUMAN) 50 GRAM(S): 10 INJECTION INTRAVENOUS; SUBCUTANEOUS at 12:35

## 2023-05-17 RX ADMIN — PYRIDOSTIGMINE BROMIDE 60 MILLIGRAM(S): 60 SOLUTION ORAL at 05:52

## 2023-05-17 RX ADMIN — ENOXAPARIN SODIUM 40 MILLIGRAM(S): 100 INJECTION SUBCUTANEOUS at 17:25

## 2023-05-17 RX ADMIN — Medication 650 MILLIGRAM(S): at 11:59

## 2023-05-17 RX ADMIN — PYRIDOSTIGMINE BROMIDE 60 MILLIGRAM(S): 60 SOLUTION ORAL at 21:57

## 2023-05-17 RX ADMIN — Medication 3 MILLILITER(S): at 23:34

## 2023-05-17 RX ADMIN — Medication 4 MILLILITER(S): at 00:27

## 2023-05-17 RX ADMIN — Medication 3 MILLILITER(S): at 05:53

## 2023-05-17 RX ADMIN — Medication 4 MILLILITER(S): at 17:25

## 2023-05-17 RX ADMIN — Medication 2: at 16:23

## 2023-05-17 NOTE — PROGRESS NOTE ADULT - SUBJECTIVE AND OBJECTIVE BOX
SUBJECTIVE:  Patient seen and examined at bedside this AM with neurology attending. The patient states she is feeling much better. She was able to ambulate to the bathroom with assistance and is sitting up in her chair eating breakfast. Continues to report diplopia OD when looking far away. She also prefers soft food at this time w difficulty chewing completely.       INTERVAL HISTORY: IVIG day 2.    ROS: As above, otherwise negative.     MEDICATIONS  (STANDING):  acetaminophen     Tablet .. 650 milliGRAM(s) Oral every 24 hours  acetylcysteine 20%  Inhalation 4 milliLiter(s) Inhalation every 6 hours  albuterol/ipratropium for Nebulization 3 milliLiter(s) Nebulizer every 6 hours  atorvastatin 20 milliGRAM(s) Oral at bedtime  dextrose 5%. 1000 milliLiter(s) (100 mL/Hr) IV Continuous <Continuous>  dextrose 5%. 1000 milliLiter(s) (50 mL/Hr) IV Continuous <Continuous>  dextrose 50% Injectable 25 Gram(s) IV Push once  diphenhydrAMINE Injectable 50 milliGRAM(s) IV Push every 24 hours  enoxaparin Injectable 40 milliGRAM(s) SubCutaneous every 24 hours  glucagon  Injectable 1 milliGRAM(s) IntraMuscular once  immune   globulin 10% (GAMMAGARD) IVPB 30 Gram(s) IV Intermittent every 24 hours  insulin lispro (ADMELOG) corrective regimen sliding scale   SubCutaneous three times a day before meals  insulin lispro (ADMELOG) corrective regimen sliding scale   SubCutaneous at bedtime  pantoprazole    Tablet 40 milliGRAM(s) Oral before breakfast  polyethylene glycol 3350 17 Gram(s) Oral every 12 hours  predniSONE   Tablet 50 milliGRAM(s) Oral daily  pyridostigmine 60 milliGRAM(s) Oral every 8 hours  senna 2 Tablet(s) Oral at bedtime    MEDICATIONS  (PRN):  acetaminophen     Tablet .. 650 milliGRAM(s) Oral every 6 hours PRN Temp greater or equal to 38C (100.4F), Mild Pain (1 - 3)  bisacodyl Suppository 10 milliGRAM(s) Rectal daily PRN Constipation  dextrose Oral Gel 15 Gram(s) Oral once PRN Blood Glucose LESS THAN 70 milliGRAM(s)/deciliter  oxyCODONE    IR 5 milliGRAM(s) Oral every 6 hours PRN Moderate Pain (4 - 6)  sodium chloride 0.65% Nasal 1 Spray(s) Both Nostrils four times a day PRN Nasal Congestion    Vital Signs Last 24 Hrs  T(C): 37 (17 May 2023 08:42), Max: 37.2 (16 May 2023 14:47)  T(F): 98.6 (17 May 2023 08:42), Max: 98.9 (16 May 2023 14:47)  HR: 78 (17 May 2023 08:42) (64 - 92)  BP: 146/74 (17 May 2023 08:42) (119/69 - 146/74)  BP(mean): 89 (16 May 2023 14:47) (89 - 104)  RR: 18 (17 May 2023 08:42) (18 - 20)  SpO2: 98% (17 May 2023 08:42) (94% - 100%)    MS - Eyes open, awake, alert and attentive.  Follows all commands.   CN - PERRL (3mm OU), EOM with poor upgaze but otherwise intact; moderate ptosis OD and minimal ptosis OS, both worsened with sustained upgaze (~20 seconds); VFF, face sens/str/hearing WNL b/l, tongue midline. MOLLY palate. Neck extension 5/5 and neck flexion 4+/5.  Motor - Normal bulk/tone. No pronator drift.     RUE: 4+/5 deltoids, 5/5 biceps, 5+/5 triceps, 5/5 .      LUE: 4+/5 deltoids, 5/5 biceps, 5/5 triceps, 5/5 .     RLE/LLE: 4+/5 hip flexion, 5/5 knee flexion, 5/5 knee extension, 5/5 ankle plantarflexion and dorsiflexion.   Sens - LT intact throughout  DTR's - 2+ biceps, triceps, brachioradialis, and patellars b/l. (+) crossed adductors b/l. Neutral b/l plantar response  Coord - FTN intact b/l.  Gait and station - MOLLY.      LABS:                        9.3    13.87 )-----------( 259      ( 15 May 2023 22:50 )             28.7     05-16    140  |  103  |  21  ----------------------------<  168<H>  5.0   |  24  |  0.39<L>    Ca    8.9      16 May 2023 01:47  Phos  4.1     05-16  Mg     2.5     05-16      STUDIES & IMAGING:   TTE Limited W or WO Ultrasound Enhancing Agent (05.06.23 @ 10:03) >   1. Normal left ventricular cavity size. The left ventricular wall thickness is normal. The left ventricular systolic function is normal. The basal inferior segment is hypokinetic.   2. There is normal left ventricular diastolic function.   3. Normal right ventricular cavity size, normal wall thickness and normal systolic function. The tricuspid annular plane systolic excursion (TAPSE) is 1.8 cm (normal >=1.7 cm). SUBJECTIVE:  Patient seen and examined at bedside this AM with neurology attending. The patient states she is feeling much better. She was able to ambulate to the bathroom with assistance and is sitting up in her chair eating breakfast. Continues to report diplopia OD when looking far away. She also prefers soft food at this time w difficulty chewing completely.       INTERVAL HISTORY: IVIG day 2.    ROS: As above, otherwise negative.     MEDICATIONS  (STANDING):  acetaminophen     Tablet .. 650 milliGRAM(s) Oral every 24 hours  acetylcysteine 20%  Inhalation 4 milliLiter(s) Inhalation every 6 hours  albuterol/ipratropium for Nebulization 3 milliLiter(s) Nebulizer every 6 hours  atorvastatin 20 milliGRAM(s) Oral at bedtime  dextrose 5%. 1000 milliLiter(s) (100 mL/Hr) IV Continuous <Continuous>  dextrose 5%. 1000 milliLiter(s) (50 mL/Hr) IV Continuous <Continuous>  dextrose 50% Injectable 25 Gram(s) IV Push once  diphenhydrAMINE Injectable 50 milliGRAM(s) IV Push every 24 hours  enoxaparin Injectable 40 milliGRAM(s) SubCutaneous every 24 hours  glucagon  Injectable 1 milliGRAM(s) IntraMuscular once  immune   globulin 10% (GAMMAGARD) IVPB 30 Gram(s) IV Intermittent every 24 hours  insulin lispro (ADMELOG) corrective regimen sliding scale   SubCutaneous three times a day before meals  insulin lispro (ADMELOG) corrective regimen sliding scale   SubCutaneous at bedtime  pantoprazole    Tablet 40 milliGRAM(s) Oral before breakfast  polyethylene glycol 3350 17 Gram(s) Oral every 12 hours  predniSONE   Tablet 50 milliGRAM(s) Oral daily  pyridostigmine 60 milliGRAM(s) Oral every 8 hours  senna 2 Tablet(s) Oral at bedtime    MEDICATIONS  (PRN):  acetaminophen     Tablet .. 650 milliGRAM(s) Oral every 6 hours PRN Temp greater or equal to 38C (100.4F), Mild Pain (1 - 3)  bisacodyl Suppository 10 milliGRAM(s) Rectal daily PRN Constipation  dextrose Oral Gel 15 Gram(s) Oral once PRN Blood Glucose LESS THAN 70 milliGRAM(s)/deciliter  oxyCODONE    IR 5 milliGRAM(s) Oral every 6 hours PRN Moderate Pain (4 - 6)  sodium chloride 0.65% Nasal 1 Spray(s) Both Nostrils four times a day PRN Nasal Congestion    Vital Signs Last 24 Hrs  T(C): 37 (17 May 2023 08:42), Max: 37.2 (16 May 2023 14:47)  T(F): 98.6 (17 May 2023 08:42), Max: 98.9 (16 May 2023 14:47)  HR: 78 (17 May 2023 08:42) (64 - 92)  BP: 146/74 (17 May 2023 08:42) (119/69 - 146/74)  BP(mean): 89 (16 May 2023 14:47) (89 - 104)  RR: 18 (17 May 2023 08:42) (18 - 20)  SpO2: 98% (17 May 2023 08:42) (94% - 100%)    MS - Eyes open, awake, alert and attentive.  Follows all commands.   CN - PERRL (3mm OU), Right eye w limited range in extreme lateral/ upward gaze consistent w Lateral Rectus and Superior Oblique weakness. EOM otherwise intact; moderate ptosis right eyre worsened with sustained upgaze (~20 seconds); VFF, face sens/str/hearing WNL b/l, tongue midline. MOLLY palate. Neck extension 5/5 and neck flexion 4+/5.  Motor - Normal bulk/tone. No pronator drift.     RUE: 4+/5 deltoids, 5/5 biceps, 5+/5 triceps, 5/5 .      LUE: 4+/5 deltoids, 5/5 biceps, 5/5 triceps, 5/5 .     RLE/LLE: 4+/5 hip flexion, 5/5 knee flexion, 5/5 knee extension, 5/5 ankle plantarflexion and dorsiflexion.   Sens - LT intact throughout  DTR's - 2+ biceps, triceps, brachioradialis, and patellars b/l. (+) crossed adductors b/l. Neutral b/l plantar response  Coord - FTN intact b/l.  Gait and station - MOLLY.      LABS:                        9.3    13.87 )-----------( 259      ( 15 May 2023 22:50 )             28.7     05-16    140  |  103  |  21  ----------------------------<  168<H>  5.0   |  24  |  0.39<L>    Ca    8.9      16 May 2023 01:47  Phos  4.1     05-16  Mg     2.5     05-16      STUDIES & IMAGING:   TTE Limited W or WO Ultrasound Enhancing Agent (05.06.23 @ 10:03) >   1. Normal left ventricular cavity size. The left ventricular wall thickness is normal. The left ventricular systolic function is normal. The basal inferior segment is hypokinetic.   2. There is normal left ventricular diastolic function.   3. Normal right ventricular cavity size, normal wall thickness and normal systolic function. The tricuspid annular plane systolic excursion (TAPSE) is 1.8 cm (normal >=1.7 cm).

## 2023-05-17 NOTE — PROGRESS NOTE ADULT - ASSESSMENT
Ms Acosta is a 62 year-old woman with a PMH of myasthenia gravis who presented to the ED on 5/5/23 with worsening diplopia and difficulty speaking, swallowing and breathing x 1 week, admitted for evaluation of management of acute MG exacerbation.     Impression: Acute MG exacerbation, possibly triggered by progressive disease or other toxic/metabolic insult. No stable focal neurologic deficits to suggest cerebrovascular or other acute intracranial event.     Recommendations:   - Continue IVIG x 4 days, day 3/4 (5/15 - )  - Pureed Diet  - Prednisone 50mg qd resumed on 5/11. Continue with taper by 5 mg every week until 30mg (next taper on 5/18)  - Protonix for GI Prophylaxis while in Steroid  - Pyridostigmine 60mg Q8H resumed (5/16)  - Follow up with Dr Escudero as outpatient  - S/p PLEX x 5 of sessions (5/6-5/14) with some improvement  - S/p IV solumedrol 125mg IV daily x 5 days (5/6-5/10)  - PT/OT as tolerated  - Ultimately patient will need better outpatient therapy to prevent relapses, either IVIG infusions, increased prednisone dose, or Soliris (or combination of some)  - Continue to address above medical problems, as you are doing  - Will continue to follow patient with you.

## 2023-05-18 LAB
ALBUMIN SERPL ELPH-MCNC: 4 G/DL — SIGNIFICANT CHANGE UP (ref 3.3–5)
ALP SERPL-CCNC: 58 U/L — SIGNIFICANT CHANGE UP (ref 40–120)
ALT FLD-CCNC: 20 U/L — SIGNIFICANT CHANGE UP (ref 10–45)
ANION GAP SERPL CALC-SCNC: 14 MMOL/L — SIGNIFICANT CHANGE UP (ref 5–17)
AST SERPL-CCNC: 15 U/L — SIGNIFICANT CHANGE UP (ref 10–40)
BILIRUB SERPL-MCNC: 0.5 MG/DL — SIGNIFICANT CHANGE UP (ref 0.2–1.2)
BUN SERPL-MCNC: 20 MG/DL — SIGNIFICANT CHANGE UP (ref 7–23)
CALCIUM SERPL-MCNC: 8.4 MG/DL — SIGNIFICANT CHANGE UP (ref 8.4–10.5)
CHLORIDE SERPL-SCNC: 101 MMOL/L — SIGNIFICANT CHANGE UP (ref 96–108)
CO2 SERPL-SCNC: 24 MMOL/L — SIGNIFICANT CHANGE UP (ref 22–31)
CREAT SERPL-MCNC: 0.48 MG/DL — LOW (ref 0.5–1.3)
EGFR: 106 ML/MIN/1.73M2 — SIGNIFICANT CHANGE UP
GLUCOSE BLDC GLUCOMTR-MCNC: 147 MG/DL — HIGH (ref 70–99)
GLUCOSE BLDC GLUCOMTR-MCNC: 179 MG/DL — HIGH (ref 70–99)
GLUCOSE BLDC GLUCOMTR-MCNC: 202 MG/DL — HIGH (ref 70–99)
GLUCOSE BLDC GLUCOMTR-MCNC: 210 MG/DL — HIGH (ref 70–99)
GLUCOSE BLDC GLUCOMTR-MCNC: 253 MG/DL — HIGH (ref 70–99)
GLUCOSE SERPL-MCNC: 263 MG/DL — HIGH (ref 70–99)
HCT VFR BLD CALC: 34.1 % — LOW (ref 34.5–45)
HGB BLD-MCNC: 10.6 G/DL — LOW (ref 11.5–15.5)
MCHC RBC-ENTMCNC: 28.2 PG — SIGNIFICANT CHANGE UP (ref 27–34)
MCHC RBC-ENTMCNC: 31.1 GM/DL — LOW (ref 32–36)
MCV RBC AUTO: 90.7 FL — SIGNIFICANT CHANGE UP (ref 80–100)
NRBC # BLD: 0 /100 WBCS — SIGNIFICANT CHANGE UP (ref 0–0)
PLATELET # BLD AUTO: 339 K/UL — SIGNIFICANT CHANGE UP (ref 150–400)
POTASSIUM SERPL-MCNC: 3.5 MMOL/L — SIGNIFICANT CHANGE UP (ref 3.5–5.3)
POTASSIUM SERPL-SCNC: 3.5 MMOL/L — SIGNIFICANT CHANGE UP (ref 3.5–5.3)
PROT SERPL-MCNC: 7.7 G/DL — SIGNIFICANT CHANGE UP (ref 6–8.3)
RBC # BLD: 3.76 M/UL — LOW (ref 3.8–5.2)
RBC # FLD: 14.9 % — HIGH (ref 10.3–14.5)
SODIUM SERPL-SCNC: 139 MMOL/L — SIGNIFICANT CHANGE UP (ref 135–145)
WBC # BLD: 11.03 K/UL — HIGH (ref 3.8–10.5)
WBC # FLD AUTO: 11.03 K/UL — HIGH (ref 3.8–10.5)

## 2023-05-18 PROCEDURE — 99232 SBSQ HOSP IP/OBS MODERATE 35: CPT

## 2023-05-18 RX ORDER — PYRIDOSTIGMINE BROMIDE 60 MG/5ML
90 SOLUTION ORAL EVERY 8 HOURS
Refills: 0 | Status: DISCONTINUED | OUTPATIENT
Start: 2023-05-18 | End: 2023-05-23

## 2023-05-18 RX ORDER — IMMUNE GLOBULIN (HUMAN) 10 G/100ML
30 INJECTION INTRAVENOUS; SUBCUTANEOUS ONCE
Refills: 0 | Status: COMPLETED | OUTPATIENT
Start: 2023-05-19 | End: 2023-05-19

## 2023-05-18 RX ORDER — ENOXAPARIN SODIUM 100 MG/ML
40 INJECTION SUBCUTANEOUS EVERY 24 HOURS
Refills: 0 | Status: DISCONTINUED | OUTPATIENT
Start: 2023-05-18 | End: 2023-05-21

## 2023-05-18 RX ORDER — PYRIDOSTIGMINE BROMIDE 60 MG/5ML
80 SOLUTION ORAL EVERY 8 HOURS
Refills: 0 | Status: DISCONTINUED | OUTPATIENT
Start: 2023-05-18 | End: 2023-05-18

## 2023-05-18 RX ADMIN — Medication 650 MILLIGRAM(S): at 12:54

## 2023-05-18 RX ADMIN — PYRIDOSTIGMINE BROMIDE 90 MILLIGRAM(S): 60 SOLUTION ORAL at 21:36

## 2023-05-18 RX ADMIN — Medication 3 MILLILITER(S): at 23:44

## 2023-05-18 RX ADMIN — CHLORHEXIDINE GLUCONATE 1 APPLICATION(S): 213 SOLUTION TOPICAL at 05:32

## 2023-05-18 RX ADMIN — IMMUNE GLOBULIN (HUMAN) 50 GRAM(S): 10 INJECTION INTRAVENOUS; SUBCUTANEOUS at 12:54

## 2023-05-18 RX ADMIN — Medication 3 MILLILITER(S): at 05:06

## 2023-05-18 RX ADMIN — Medication 1: at 16:54

## 2023-05-18 RX ADMIN — Medication 650 MILLIGRAM(S): at 05:29

## 2023-05-18 RX ADMIN — Medication 4 MILLILITER(S): at 23:44

## 2023-05-18 RX ADMIN — Medication 50 MILLIGRAM(S): at 12:24

## 2023-05-18 RX ADMIN — POLYETHYLENE GLYCOL 3350 17 GRAM(S): 17 POWDER, FOR SOLUTION ORAL at 16:54

## 2023-05-18 RX ADMIN — Medication 650 MILLIGRAM(S): at 06:30

## 2023-05-18 RX ADMIN — PANTOPRAZOLE SODIUM 40 MILLIGRAM(S): 20 TABLET, DELAYED RELEASE ORAL at 05:06

## 2023-05-18 RX ADMIN — Medication 4 MILLILITER(S): at 16:55

## 2023-05-18 RX ADMIN — Medication 4 MILLILITER(S): at 12:25

## 2023-05-18 RX ADMIN — Medication 50 MILLIGRAM(S): at 05:06

## 2023-05-18 RX ADMIN — Medication 1: at 21:35

## 2023-05-18 RX ADMIN — ENOXAPARIN SODIUM 40 MILLIGRAM(S): 100 INJECTION SUBCUTANEOUS at 21:35

## 2023-05-18 RX ADMIN — Medication 650 MILLIGRAM(S): at 12:24

## 2023-05-18 RX ADMIN — PYRIDOSTIGMINE BROMIDE 90 MILLIGRAM(S): 60 SOLUTION ORAL at 13:05

## 2023-05-18 RX ADMIN — Medication 3 MILLILITER(S): at 16:55

## 2023-05-18 RX ADMIN — Medication 4 MILLILITER(S): at 05:06

## 2023-05-18 RX ADMIN — Medication 3 MILLILITER(S): at 12:25

## 2023-05-18 RX ADMIN — ATORVASTATIN CALCIUM 20 MILLIGRAM(S): 80 TABLET, FILM COATED ORAL at 21:36

## 2023-05-18 RX ADMIN — Medication 2: at 13:05

## 2023-05-18 NOTE — PROGRESS NOTE ADULT - SUBJECTIVE AND OBJECTIVE BOX
SUBJECTIVE/INTERVAL HISTORY:  - NIF -49 and       VITALS & EXAMINATION:  Vital Signs Last 24 Hrs  T(C): 36.7 (18 May 2023 04:30), Max: 37 (17 May 2023 08:42)  T(F): 98 (18 May 2023 04:30), Max: 98.6 (17 May 2023 08:42)  HR: 60 (18 May 2023 04:30) (60 - 100)  BP: 150/78 (18 May 2023 04:30) (142/70 - 161/76)  BP(mean): --  RR: 18 (18 May 2023 04:30) (18 - 18)  SpO2: 95% (18 May 2023 04:30) (95% - 98%)    Parameters below as of 18 May 2023 04:30  Patient On (Oxygen Delivery Method): room air    MS - Eyes open, awake, alert and attentive.  Follows all commands.   CN - Right eye w limited range in extreme lateral/ upward gaze consistent w Lateral Rectus and Superior Oblique weakness. EOM otherwise intact; moderate ptosis right eyre worsened with sustained upgaze (~20 seconds); VFF, face sens/str/hearing WNL b/l, tongue midline. MOLLY palate. Neck extension 5/5 and neck flexion 4+/5.  Motor - Normal bulk/tone. No pronator drift.     RUE: 4+/5 deltoids, 5/5 biceps, 5+/5 triceps, 5/5 .      LUE: 4+/5 deltoids, 5/5 biceps, 5/5 triceps, 5/5 .     RLE/LLE: 4+/5 hip flexion, 5/5 knee flexion, 5/5 knee extension, 5/5 ankle plantarflexion and dorsiflexion.   Sens - LT intact throughout  DTR's - 2+ biceps, triceps, brachioradialis, and patellars b/l. (+) crossed adductors b/l. Neutral b/l plantar response  Coord - FTN intact b/l.  Gait and station - MOLLY.    STUDIES & IMAGING:   TTE Limited W or WO Ultrasound Enhancing Agent (05.06.23 @ 10:03) >   1. Normal left ventricular cavity size. The left ventricular wall thickness is normal. The left ventricular systolic function is normal. The basal inferior segment is hypokinetic.   2. There is normal left ventricular diastolic function.   3. Normal right ventricular cavity size, normal wall thickness and normal systolic function. The tricuspid annular plane systolic excursion (TAPSE) is 1.8 cm (normal >=1.7 cm).     SUBJECTIVE/INTERVAL HISTORY:  - NIF -49 and       VITALS & EXAMINATION:  Vital Signs Last 24 Hrs  T(C): 36.7 (18 May 2023 04:30), Max: 37 (17 May 2023 08:42)  T(F): 98 (18 May 2023 04:30), Max: 98.6 (17 May 2023 08:42)  HR: 60 (18 May 2023 04:30) (60 - 100)  BP: 150/78 (18 May 2023 04:30) (142/70 - 161/76)  BP(mean): --  RR: 18 (18 May 2023 04:30) (18 - 18)  SpO2: 95% (18 May 2023 04:30) (95% - 98%)    Parameters below as of 18 May 2023 04:30  Patient On (Oxygen Delivery Method): room air    Respiratory: One breath counts up to 13  Chest: Has shiley placed on R    MS - Eyes open, awake, alert and attentive.  Follows all commands.   CN - Right eye w limited range in extreme lateral/ upward gaze consistent w Lateral Rectus and Superior Oblique weakness. EOM otherwise intact; moderate ptosis right eyre worsened with sustained upgaze (~20 seconds); VFF, face sens/str/hearing WNL b/l, tongue midline. MOLLY palate. Neck extension 5/5 and neck flexion 4+/5.  Motor - Normal bulk/tone. No pronator drift. Head flexion is at 4-/5     RUE: 4-/5 deltoids, 5/5 biceps, 5+/5 triceps, 5/5 .      LUE: 4-/5 deltoids, 5/5 biceps, 5/5 triceps, 5/5 .     RLE/LLE: 4-/5 hip flexion, 5/5 knee flexion, 5/5 knee extension, 5/5 ankle plantarflexion and dorsiflexion.   Sens - LT intact throughout  Gait and station - MOLLY.    STUDIES & IMAGING:   TTE Limited W or WO Ultrasound Enhancing Agent (05.06.23 @ 10:03) >   1. Normal left ventricular cavity size. The left ventricular wall thickness is normal. The left ventricular systolic function is normal. The basal inferior segment is hypokinetic.   2. There is normal left ventricular diastolic function.   3. Normal right ventricular cavity size, normal wall thickness and normal systolic function. The tricuspid annular plane systolic excursion (TAPSE) is 1.8 cm (normal >=1.7 cm).

## 2023-05-18 NOTE — PROGRESS NOTE ADULT - ASSESSMENT
62 year-old woman with a PMH of myasthenia gravis who presented to the ED on 5/5/23 with worsening diplopia and difficulty speaking, swallowing and breathing x 1 week, admitted for evaluation of management of acute MG exacerbation.     Impression: Acute MG exacerbation, possibly triggered by progressive disease or other toxic/metabolic insult. No stable focal neurologic deficits to suggest cerebrovascular or other acute intracranial event.     Recommendations:   - Continue IVIG x 4 days, day 4/4 (5/15 - 5/18)  - Pureed Diet  - Prednisone 50mg qd resumed on 5/11. Continue with taper by 5 mg every week until 30mg (next taper on 5/18)  - Protonix for GI Prophylaxis while in Steroid  - Pyridostigmine 60mg Q8H resumed (5/16)  - Follow up with Dr Escudero as outpatient  - S/p PLEX x 5 of sessions (5/6-5/14) with some improvement  - S/p IV solumedrol 125mg IV daily x 5 days (5/6-5/10)  - PT/OT as tolerated  - Ultimately patient will need better outpatient therapy to prevent relapses, either IVIG infusions, increased prednisone dose, or Soliris (or combination of some)  - Can f/u with Dr. Escudero at [address] [place] [address] 62 year-old woman with a PMH of myasthenia gravis who presented to the ED on 5/5/23 with worsening diplopia and difficulty speaking, swallowing and breathing x 1 week, admitted for evaluation of management of acute MG exacerbation.     Impression: Acute MG exacerbation, possibly triggered by progressive disease or other toxic/metabolic insult. No stable focal neurologic deficits to suggest cerebrovascular or other acute intracranial event.     Recommendations:   - Continue IVIG x 4 days, day 4/4 (5/15 - 5/18)  - Pureed Diet  - Prednisone 50mg qd resumed on 5/11. Continue with taper by 5 mg every week until 30mg (next taper on 5/18)  - Protonix for GI Prophylaxis while in Steroid  - Pyridostigmine 60mg Q8H resumed (5/16)  - Follow up with Dr Escudero as outpatient  - S/p PLEX x 5 of sessions (5/6-5/14) with some improvement  - S/p IV solumedrol 125mg IV daily x 5 days (5/6-5/10)  - PT/OT as tolerated  - Ultimately patient will need better outpatient therapy to prevent relapses, either IVIG infusions, increased prednisone dose, or Soliris (or combination of some)  - Can f/u with Dr. Escudero at [address] [place] [address] 62 year-old woman with a PMH of myasthenia gravis who presented to the ED on 5/5/23 with worsening diplopia and difficulty speaking, swallowing and breathing x 1 week, admitted for evaluation of management of acute MG exacerbation.     Impression: Acute MG exacerbation, possibly triggered by progressive disease or other toxic/metabolic insult. No stable focal neurologic deficits to suggest cerebrovascular or other acute intracranial event.     Recommendations:   - Continue IVIG x 4 days, day 4/4 (5/15 - 5/18)  - Pureed Diet  - Prednisone 50mg qd resumed on 5/11. Continue with taper by 5 mg every week until 30mg (next taper on 5/18)  - Protonix for GI Prophylaxis while in Steroid  - Pyridostigmine 60mg Q8H resumed (5/16)  - Follow up with Dr Escudero as outpatient  - S/p PLEX x 5 of sessions (5/6-5/14) with some improvement  - S/p IV solumedrol 125mg IV daily x 5 days (5/6-5/10)  - PT/OT as tolerated  - Ultimately patient will need better outpatient therapy to prevent relapses, either IVIG infusions, increased prednisone dose, or Soliris (or combination of some)  - Can f/u with Dr. Escudero at [address] [place] [address] 62 year-old woman with a PMH of myasthenia gravis who presented to the ED on 5/5/23 with worsening diplopia and difficulty speaking, swallowing and breathing x 1 week, admitted for evaluation of management of acute MG exacerbation. Interval neuro exam showing mild worsening of neck flexion weakness, 4-/5 deltoid & hip flexion weakness, one breath counts up to 13. Stil has shiley placed on R. NIF/VC as noted above. On 4th dose of IVIG post PLEX.    Impression: Acute MG exacerbation, possibly triggered by progressive disease or other toxic/metabolic insult. No stable focal neurologic deficits to suggest cerebrovascular or other acute intracranial event.     Recommendations:   [] Continue IVIG x 4 days, day 4/4 (5/15 - 5/18). Likely to extend one more day since weakness persisting  [] Will increase mestinon to 80 mg tid, monitor for secretions (pt takes mestinon 120 mg tid at home)  [] Prednisone 50mg qd resumed on 5/11. Continue current dose, and will go down by 10 mg monthly, next change will be at 6/11/23  [] Protonix for GI Prophylaxis while in Steroid  [x] S/p PLEX x 5 of sessions (5/6-5/14) with some improvement  [x] S/p IV solumedrol 125mg IV daily x 5 days (5/6-5/10)  [] PT/OT as tolerated  [] Pureed Diet  [] Ultimately patient will need better outpatient therapy to prevent relapses, either IVIG infusions, increased prednisone dose, or Soliris (or combination of some)  [] Likely to follow up with Dr. Jayjay kimble at [facility] at [address] [place] [address] Outpatient Clinic since has Medicaid. Will try to set up monthly IVIG vs Soliris with Dr. Escudero at [address] [place] [address]    Case seen and discussed with general neurology attending Dr. Chung 62 year-old woman with a PMH of myasthenia gravis who presented to the ED on 5/5/23 with worsening diplopia and difficulty speaking, swallowing and breathing x 1 week, admitted for evaluation of management of acute MG exacerbation. Interval neuro exam showing mild worsening of neck flexion weakness, 4-/5 deltoid & hip flexion weakness, one breath counts up to 13. Stil has shiley placed on R. NIF/VC as noted above. On 4th dose of IVIG post PLEX.    Impression: Acute MG exacerbation, possibly triggered by progressive disease or other toxic/metabolic insult. No stable focal neurologic deficits to suggest cerebrovascular or other acute intracranial event.     Recommendations:   [] Continue IVIG x 4 days, day 4/4 (5/15 - 5/18). Likely to extend one more day since weakness persisting  [] Will increase mestinon to 80 mg tid, monitor for secretions (pt takes mestinon 120 mg tid at home)  [] Prednisone 50mg qd resumed on 5/11. Continue current dose, and will go down by 10 mg monthly, next change will be at 6/11/23  [] Protonix for GI Prophylaxis while in Steroid  [x] S/p PLEX x 5 of sessions (5/6-5/14) with some improvement  [x] S/p IV solumedrol 125mg IV daily x 5 days (5/6-5/10)  [] PT/OT as tolerated  [] Pureed Diet  [] Ultimately patient will need better outpatient therapy to prevent relapses, either IVIG infusions, increased prednisone dose, or Soliris (or combination of some)  [] Likely to follow up with Dr. Jayjay kimble at [facility] at [address] [place] [address] Outpatient Clinic since has Medicaid. Will try to set up monthly IVIG vs Soliris with Dr. Escudero at [address] [place] [address]    Case seen and discussed with general neurology attending Dr. Chung 62 year-old woman with a PMH of myasthenia gravis who presented to the ED on 5/5/23 with worsening diplopia and difficulty speaking, swallowing and breathing x 1 week, admitted for evaluation of management of acute MG exacerbation. Interval neuro exam showing mild worsening of neck flexion weakness, 4-/5 deltoid & hip flexion weakness, one breath counts up to 13. Stil has shiley placed on R. NIF/VC as noted above. On 4th dose of IVIG post PLEX.    Impression: Acute MG exacerbation, possibly triggered by progressive disease or other toxic/metabolic insult. No stable focal neurologic deficits to suggest cerebrovascular or other acute intracranial event.     Recommendations:   [] Continue IVIG x 4 days, day 4/4 (5/15 - 5/18). Likely to extend one more day since weakness persisting  [] Will increase mestinon to 80 mg tid, monitor for secretions (pt takes mestinon 120 mg tid at home)  [] Prednisone 50mg qd resumed on 5/11. Continue current dose, and will go down by 10 mg monthly, next change will be at 6/11/23  [] Protonix for GI Prophylaxis while in Steroid  [x] S/p PLEX x 5 of sessions (5/6-5/14) with some improvement  [x] S/p IV solumedrol 125mg IV daily x 5 days (5/6-5/10)  [] PT/OT as tolerated  [] Pureed Diet  [] Ultimately patient will need better outpatient therapy to prevent relapses, either IVIG infusions, increased prednisone dose, or Soliris (or combination of some)  [] Likely to follow up with Dr. Jayjay kimble at [facility] at [address] [place] [address] Outpatient Clinic since has Medicaid. Will try to set up monthly IVIG vs Soliris with Dr. Escudero at [address] [place] [address]    Case seen and discussed with general neurology attending Dr. Chung

## 2023-05-19 ENCOUNTER — TRANSCRIPTION ENCOUNTER (OUTPATIENT)
Age: 63
End: 2023-05-19

## 2023-05-19 LAB
ALBUMIN SERPL ELPH-MCNC: 3.9 G/DL — SIGNIFICANT CHANGE UP (ref 3.3–5)
ALP SERPL-CCNC: 52 U/L — SIGNIFICANT CHANGE UP (ref 40–120)
ALT FLD-CCNC: 21 U/L — SIGNIFICANT CHANGE UP (ref 10–45)
ANION GAP SERPL CALC-SCNC: 12 MMOL/L — SIGNIFICANT CHANGE UP (ref 5–17)
AST SERPL-CCNC: 12 U/L — SIGNIFICANT CHANGE UP (ref 10–40)
BASOPHILS # BLD AUTO: 0.03 K/UL — SIGNIFICANT CHANGE UP (ref 0–0.2)
BASOPHILS NFR BLD AUTO: 0.3 % — SIGNIFICANT CHANGE UP (ref 0–2)
BILIRUB SERPL-MCNC: 0.5 MG/DL — SIGNIFICANT CHANGE UP (ref 0.2–1.2)
BUN SERPL-MCNC: 18 MG/DL — SIGNIFICANT CHANGE UP (ref 7–23)
CALCIUM SERPL-MCNC: 8.8 MG/DL — SIGNIFICANT CHANGE UP (ref 8.4–10.5)
CHLORIDE SERPL-SCNC: 103 MMOL/L — SIGNIFICANT CHANGE UP (ref 96–108)
CO2 SERPL-SCNC: 26 MMOL/L — SIGNIFICANT CHANGE UP (ref 22–31)
CREAT SERPL-MCNC: 0.45 MG/DL — LOW (ref 0.5–1.3)
EGFR: 108 ML/MIN/1.73M2 — SIGNIFICANT CHANGE UP
EOSINOPHIL # BLD AUTO: 0.01 K/UL — SIGNIFICANT CHANGE UP (ref 0–0.5)
EOSINOPHIL NFR BLD AUTO: 0.1 % — SIGNIFICANT CHANGE UP (ref 0–6)
GLUCOSE BLDC GLUCOMTR-MCNC: 128 MG/DL — HIGH (ref 70–99)
GLUCOSE BLDC GLUCOMTR-MCNC: 164 MG/DL — HIGH (ref 70–99)
GLUCOSE BLDC GLUCOMTR-MCNC: 179 MG/DL — HIGH (ref 70–99)
GLUCOSE BLDC GLUCOMTR-MCNC: 204 MG/DL — HIGH (ref 70–99)
GLUCOSE SERPL-MCNC: 122 MG/DL — HIGH (ref 70–99)
HCT VFR BLD CALC: 33.2 % — LOW (ref 34.5–45)
HGB BLD-MCNC: 10.8 G/DL — LOW (ref 11.5–15.5)
IMM GRANULOCYTES NFR BLD AUTO: 1.5 % — HIGH (ref 0–0.9)
LYMPHOCYTES # BLD AUTO: 2.04 K/UL — SIGNIFICANT CHANGE UP (ref 1–3.3)
LYMPHOCYTES # BLD AUTO: 21.6 % — SIGNIFICANT CHANGE UP (ref 13–44)
MCHC RBC-ENTMCNC: 29 PG — SIGNIFICANT CHANGE UP (ref 27–34)
MCHC RBC-ENTMCNC: 32.5 GM/DL — SIGNIFICANT CHANGE UP (ref 32–36)
MCV RBC AUTO: 89.2 FL — SIGNIFICANT CHANGE UP (ref 80–100)
MONOCYTES # BLD AUTO: 0.92 K/UL — HIGH (ref 0–0.9)
MONOCYTES NFR BLD AUTO: 9.7 % — SIGNIFICANT CHANGE UP (ref 2–14)
NEUTROPHILS # BLD AUTO: 6.3 K/UL — SIGNIFICANT CHANGE UP (ref 1.8–7.4)
NEUTROPHILS NFR BLD AUTO: 66.8 % — SIGNIFICANT CHANGE UP (ref 43–77)
NRBC # BLD: 0 /100 WBCS — SIGNIFICANT CHANGE UP (ref 0–0)
PLATELET # BLD AUTO: 322 K/UL — SIGNIFICANT CHANGE UP (ref 150–400)
POTASSIUM SERPL-MCNC: 2.8 MMOL/L — CRITICAL LOW (ref 3.5–5.3)
POTASSIUM SERPL-SCNC: 2.8 MMOL/L — CRITICAL LOW (ref 3.5–5.3)
PROT SERPL-MCNC: 8.5 G/DL — HIGH (ref 6–8.3)
RBC # BLD: 3.72 M/UL — LOW (ref 3.8–5.2)
RBC # FLD: 14.7 % — HIGH (ref 10.3–14.5)
SODIUM SERPL-SCNC: 141 MMOL/L — SIGNIFICANT CHANGE UP (ref 135–145)
WBC # BLD: 9.44 K/UL — SIGNIFICANT CHANGE UP (ref 3.8–10.5)
WBC # FLD AUTO: 9.44 K/UL — SIGNIFICANT CHANGE UP (ref 3.8–10.5)

## 2023-05-19 PROCEDURE — 99232 SBSQ HOSP IP/OBS MODERATE 35: CPT

## 2023-05-19 RX ORDER — LANOLIN ALCOHOL/MO/W.PET/CERES
3 CREAM (GRAM) TOPICAL AT BEDTIME
Refills: 0 | Status: DISCONTINUED | OUTPATIENT
Start: 2023-05-19 | End: 2023-05-23

## 2023-05-19 RX ORDER — DIPHENHYDRAMINE HCL 50 MG
25 CAPSULE ORAL EVERY 4 HOURS
Refills: 0 | Status: DISCONTINUED | OUTPATIENT
Start: 2023-05-19 | End: 2023-05-21

## 2023-05-19 RX ORDER — DIPHENHYDRAMINE HCL 50 MG
25 CAPSULE ORAL ONCE
Refills: 0 | Status: COMPLETED | OUTPATIENT
Start: 2023-05-19 | End: 2023-05-19

## 2023-05-19 RX ORDER — HYDRALAZINE HCL 50 MG
10 TABLET ORAL ONCE
Refills: 0 | Status: COMPLETED | OUTPATIENT
Start: 2023-05-19 | End: 2023-05-19

## 2023-05-19 RX ORDER — HYDRALAZINE HCL 50 MG
10 TABLET ORAL
Refills: 0 | Status: DISCONTINUED | OUTPATIENT
Start: 2023-05-19 | End: 2023-05-19

## 2023-05-19 RX ORDER — POTASSIUM CHLORIDE 20 MEQ
40 PACKET (EA) ORAL EVERY 4 HOURS
Refills: 0 | Status: COMPLETED | OUTPATIENT
Start: 2023-05-19 | End: 2023-05-19

## 2023-05-19 RX ORDER — AMLODIPINE BESYLATE 2.5 MG/1
10 TABLET ORAL DAILY
Refills: 0 | Status: DISCONTINUED | OUTPATIENT
Start: 2023-05-20 | End: 2023-05-23

## 2023-05-19 RX ADMIN — IMMUNE GLOBULIN (HUMAN) 50 GRAM(S): 10 INJECTION INTRAVENOUS; SUBCUTANEOUS at 12:24

## 2023-05-19 RX ADMIN — Medication 3 MILLILITER(S): at 11:33

## 2023-05-19 RX ADMIN — PYRIDOSTIGMINE BROMIDE 90 MILLIGRAM(S): 60 SOLUTION ORAL at 08:10

## 2023-05-19 RX ADMIN — Medication 650 MILLIGRAM(S): at 11:34

## 2023-05-19 RX ADMIN — PANTOPRAZOLE SODIUM 40 MILLIGRAM(S): 20 TABLET, DELAYED RELEASE ORAL at 07:53

## 2023-05-19 RX ADMIN — Medication 3 MILLILITER(S): at 05:14

## 2023-05-19 RX ADMIN — ATORVASTATIN CALCIUM 20 MILLIGRAM(S): 80 TABLET, FILM COATED ORAL at 22:05

## 2023-05-19 RX ADMIN — Medication 4 MILLILITER(S): at 05:14

## 2023-05-19 RX ADMIN — Medication 1: at 16:36

## 2023-05-19 RX ADMIN — PYRIDOSTIGMINE BROMIDE 90 MILLIGRAM(S): 60 SOLUTION ORAL at 22:06

## 2023-05-19 RX ADMIN — PYRIDOSTIGMINE BROMIDE 90 MILLIGRAM(S): 60 SOLUTION ORAL at 16:37

## 2023-05-19 RX ADMIN — Medication 10 MILLIGRAM(S): at 05:14

## 2023-05-19 RX ADMIN — Medication 10 MILLIGRAM(S): at 02:18

## 2023-05-19 RX ADMIN — Medication 10 MILLIGRAM(S): at 11:35

## 2023-05-19 RX ADMIN — Medication 2: at 11:35

## 2023-05-19 RX ADMIN — Medication 3 MILLILITER(S): at 17:20

## 2023-05-19 RX ADMIN — Medication 25 MILLIGRAM(S): at 12:42

## 2023-05-19 RX ADMIN — CHLORHEXIDINE GLUCONATE 1 APPLICATION(S): 213 SOLUTION TOPICAL at 05:14

## 2023-05-19 RX ADMIN — Medication 4 MILLILITER(S): at 17:20

## 2023-05-19 RX ADMIN — Medication 10 MILLIGRAM(S): at 00:55

## 2023-05-19 RX ADMIN — Medication 1: at 07:53

## 2023-05-19 RX ADMIN — Medication 40 MILLIEQUIVALENT(S): at 11:35

## 2023-05-19 RX ADMIN — Medication 40 MILLIEQUIVALENT(S): at 08:33

## 2023-05-19 RX ADMIN — Medication 650 MILLIGRAM(S): at 12:15

## 2023-05-19 RX ADMIN — Medication 50 MILLIGRAM(S): at 05:14

## 2023-05-19 RX ADMIN — ENOXAPARIN SODIUM 40 MILLIGRAM(S): 100 INJECTION SUBCUTANEOUS at 22:04

## 2023-05-19 RX ADMIN — Medication 3 MILLIGRAM(S): at 22:05

## 2023-05-19 RX ADMIN — Medication 4 MILLILITER(S): at 11:33

## 2023-05-19 NOTE — PHARMACOTHERAPY INTERVENTION NOTE - COMMENTS
Confirmed with patient's son home medications and NKDA. Patient was taking prednisone on a taper over 1 week during exacerbations  Home Medications:  Albuterol (Eqv-ProAir HFA) 90 mcg/inh inhalation aerosol: 2 puff(s) inhaled every 6 hours, As Needed (08 May 2023 15:56)  amLODIPine 10 mg oral tablet: 1 tab(s) orally once a day (08 May 2023 15:56)  fluticasone 110 mcg/inh inhalation aerosol with adapter: 1 puff(s) inhaled 2 times a day (08 May 2023 15:56)  lovastatin 20 mg oral tablet: 1 tab(s) orally once a day (08 May 2023 15:56)  metFORMIN 500 mg oral tablet: 1 tab(s) orally 3 times a day (08 May 2023 15:56)  predniSONE 20 mg oral tablet: 1 tab(s) orally once a day as needed for MG exacerbation Tapering dose over 1 week during exacerbations (08 May 2023 15:54)  pyridostigmine 60 mg oral tablet: 2 tab(s) orally 3 times a day (08 May 2023 15:56)  
Reviewed appropriate routes of medication administration   MEDICATIONS  (STANDING):  acetaminophen     Tablet .. 650 milliGRAM(s) Oral every 24 hours  acetylcysteine 20%  Inhalation 4 milliLiter(s) Inhalation every 6 hours  albuterol/ipratropium for Nebulization 3 milliLiter(s) Nebulizer every 6 hours  atorvastatin 20 milliGRAM(s) Oral at bedtime  dextrose 5%. 1000 milliLiter(s) (100 mL/Hr) IV Continuous <Continuous>  dextrose 5%. 1000 milliLiter(s) (50 mL/Hr) IV Continuous <Continuous>  dextrose 50% Injectable 25 Gram(s) IV Push once  diphenhydrAMINE Injectable 50 milliGRAM(s) IV Push every 24 hours  enoxaparin Injectable 40 milliGRAM(s) SubCutaneous every 24 hours  glucagon  Injectable 1 milliGRAM(s) IntraMuscular once  immune   globulin 10% (GAMMAGARD) IVPB 30 Gram(s) IV Intermittent every 24 hours  insulin lispro (ADMELOG) corrective regimen sliding scale   SubCutaneous every 6 hours  polyethylene glycol 3350 17 Gram(s) Oral every 12 hours  pyridostigmine 60 milliGRAM(s) Oral every 8 hours  senna 2 Tablet(s) Oral at bedtime    MEDICATIONS  (PRN):  acetaminophen     Tablet .. 650 milliGRAM(s) Oral every 6 hours PRN Temp greater or equal to 38C (100.4F), Mild Pain (1 - 3)  bisacodyl Suppository 10 milliGRAM(s) Rectal daily PRN Constipation  oxyCODONE    IR 5 milliGRAM(s) Oral every 6 hours PRN Moderate Pain (4 - 6)  sodium chloride 0.65% Nasal 1 Spray(s) Both Nostrils four times a day PRN Nasal Congestion  
Reviewed appropriate routes of medication administration   MEDICATIONS  (STANDING):  acetylcysteine 20%  Inhalation 4 milliLiter(s) Inhalation every 6 hours  albuterol/ipratropium for Nebulization 3 milliLiter(s) Nebulizer every 6 hours  atorvastatin 20 milliGRAM(s) Oral at bedtime  chlorhexidine 4% Liquid 1 Application(s) Topical <User Schedule>  dextrose 5%. 1000 milliLiter(s) (50 mL/Hr) IV Continuous <Continuous>  dextrose 5%. 1000 milliLiter(s) (100 mL/Hr) IV Continuous <Continuous>  dextrose 50% Injectable 25 Gram(s) IV Push once  enoxaparin Injectable 40 milliGRAM(s) SubCutaneous every 24 hours  glucagon  Injectable 1 milliGRAM(s) IntraMuscular once  insulin lispro (ADMELOG) corrective regimen sliding scale   SubCutaneous three times a day before meals  insulin lispro (ADMELOG) corrective regimen sliding scale   SubCutaneous at bedtime  melatonin 3 milliGRAM(s) Oral at bedtime  pantoprazole    Tablet 40 milliGRAM(s) Oral before breakfast  polyethylene glycol 3350 17 Gram(s) Oral every 12 hours  predniSONE   Tablet 50 milliGRAM(s) Oral daily  pyridostigmine 90 milliGRAM(s) Oral every 8 hours  senna 2 Tablet(s) Oral at bedtime    MEDICATIONS  (PRN):  acetaminophen     Tablet .. 650 milliGRAM(s) Oral every 6 hours PRN Temp greater or equal to 38C (100.4F), Mild Pain (1 - 3)  bisacodyl Suppository 10 milliGRAM(s) Rectal daily PRN Constipation  dextrose Oral Gel 15 Gram(s) Oral once PRN Blood Glucose LESS THAN 70 milliGRAM(s)/deciliter  diphenhydrAMINE 25 milliGRAM(s) Oral every 4 hours PRN Rash and/or Itching  oxyCODONE    IR 5 milliGRAM(s) Oral every 6 hours PRN Moderate Pain (4 - 6)  sodium chloride 0.65% Nasal 1 Spray(s) Both Nostrils four times a day PRN Nasal Congestion

## 2023-05-19 NOTE — PROGRESS NOTE ADULT - ASSESSMENT
62 year-old woman with a PMH of myasthenia gravis who presented to the ED on 5/5/23 with worsening diplopia and difficulty speaking, swallowing and breathing x 1 week, admitted for evaluation of management of acute MG exacerbation. Interval neuro exam showing mild worsening of neck flexion weakness, 4-/5 deltoid & hip flexion weakness, one breath counts up to 13. Stil has shiley placed on R. NIF/VC as noted above. On 5th dose of IVIG post PLEX.    Impression: Acute MG exacerbation, possibly triggered by progressive disease or other toxic/metabolic insult. No stable focal neurologic deficits to suggest cerebrovascular or other acute intracranial event.     Recommendations:   [] Continue IVIG x 5 days, day 4/4 (5/15 - 5/19)  [] C/w mestinon 90 mg tid, monitor for secretions (pt takes mestinon 120 mg tid at home)  [] Prednisone 50mg qd resumed on 5/11. Continue current dose, and will go down by 10 mg monthly until she reaches 20 g qd, next change will be at 6/11/23  [] Protonix for GI Prophylaxis while in Steroid  [] Replete electrolyte as needed  [] Aim for shiley removal on 5/19  [x] S/p PLEX x 5 of sessions (5/6-5/14) with some improvement  [x] S/p IV solumedrol 125mg IV daily x 5 days (5/6-5/10)  [] PT/OT as tolerated  [] Pureed Diet  [] Ultimately patient will need better outpatient therapy to prevent relapses, either IVIG infusions, increased prednisone dose, or Soliris (or combination of some)  [] Likely to follow up with Dr. Jayjay kimble at [facility] at [address] [place] [address] Outpatient Clinic since has Medicaid. Will try to set up monthly IVIG vs Soliris with Dr. Escudero at [address] [place] [address]    Case seen and discussed with general neurology attending Dr. Chung 62 year-old woman with a PMH of myasthenia gravis who presented to the ED on 5/5/23 with worsening diplopia and difficulty speaking, swallowing and breathing x 1 week, admitted for evaluation of management of acute MG exacerbation. Interval neuro exam showing mild worsening of neck flexion weakness, 4-/5 deltoid & hip flexion weakness, one breath counts up to 13. Stil has shiley placed on R. NIF/VC as noted above. On 5th dose of IVIG post PLEX.    Impression: Acute MG exacerbation, possibly triggered by progressive disease or other toxic/metabolic insult. No stable focal neurologic deficits to suggest cerebrovascular or other acute intracranial event.     Recommendations:   [] Continue IVIG x 5 days, day 4/4 (5/15 - 5/19)  [] C/w mestinon 90 mg tid, monitor for secretions (pt takes mestinon 120 mg tid at home)  [] Prednisone 50mg qd resumed on 5/11. Continue current dose, and will go down by 10 mg monthly until she reaches 20 g qd, next change will be at 6/11/23  [] Protonix for GI Prophylaxis while in Steroid  [] Replete electrolyte as needed  [] Aim for shiley removal on 5/19  [x] S/p PLEX x 5 of sessions (5/6-5/14) with some improvement  [x] S/p IV solumedrol 125mg IV daily x 5 days (5/6-5/10)  [] PT/OT as tolerated  [] Pureed Diet  [] Ultimately patient will need better outpatient therapy to prevent relapses, either IVIG infusions, increased prednisone dose, or Soliris (or combination of some)  [] Likely to follow up with Dr. Jayjay kimble at [facility] at [address] [place] [address] Outpatient Clinic since has Medicaid. Will try to set up monthly IVIG vs Soliris with Dr. Escudero at [address] [place] [address]    Case seen and discussed with general neurology attending Dr. Chung 62 year-old woman with a PMH of myasthenia gravis who presented to the ED on 5/5/23 with worsening diplopia and difficulty speaking, swallowing and breathing x 1 week, admitted for evaluation of management of acute MG exacerbation. Interval neuro exam showing mild worsening of neck flexion weakness, 4-/5 deltoid & hip flexion weakness, one breath counts up to 13. Stil has shiley placed on R. NIF/VC as noted above. On 5th dose of IVIG post PLEX.    Impression: Acute MG exacerbation, possibly triggered by progressive disease or other toxic/metabolic insult. No stable focal neurologic deficits to suggest cerebrovascular or other acute intracranial event.     Recommendations:   [] Continue IVIG x 5 days, day 4/4 (5/15 - 5/19)  [] C/w mestinon 90 mg tid, monitor for secretions (pt takes mestinon 120 mg tid at home)  [] Prednisone 50mg qd resumed on 5/11. Continue current dose, and will go down by 10 mg monthly until she reaches 20 g qd, next change will be at 6/11/23  [] Protonix for GI Prophylaxis while in Steroid  [] Replete electrolyte as needed  [] Aim for shiley removal on 5/19  [x] S/p PLEX x 5 of sessions (5/6-5/14) with some improvement  [x] S/p IV solumedrol 125mg IV daily x 5 days (5/6-5/10)  [] PT/OT as tolerated  [] Pureed Diet  [] Ultimately patient will need better outpatient therapy to prevent relapses, either IVIG infusions, increased prednisone dose, or Soliris (or combination of some)  [] Likely to follow up with Dr. Jayjay kimble at [facility] at [address] [place] [address] Outpatient Clinic since has Medicaid. Will try to set up monthly IVIG vs Soliris with Dr. Escudero at [address] [place] [address]    Case seen and discussed with general neurology attending Dr. Chung

## 2023-05-19 NOTE — DISCHARGE NOTE NURSING/CASE MANAGEMENT/SOCIAL WORK - NSSCNAMETXT_GEN_ALL_CORE
[facility]

## 2023-05-19 NOTE — PROGRESS NOTE ADULT - SUBJECTIVE AND OBJECTIVE BOX
SUBJECTIVE/INTERVAL HISTORY:  - Had trouble sleeping overnight  - SBP ranged in 160-170s  - K 2.8 this morning    VITALS & EXAMINATION:  Vital Signs Last 24 Hrs  T(C): 36.7 (19 May 2023 04:30), Max: 37.1 (19 May 2023 00:08)  T(F): 98 (19 May 2023 04:30), Max: 98.7 (19 May 2023 00:08)  HR: 57 (19 May 2023 04:30) (57 - 112)  BP: 158/82 (19 May 2023 06:30) (141/68 - 184/69)  BP(mean): --  RR: 18 (19 May 2023 04:30) (18 - 18)  SpO2: 95% (19 May 2023 04:30) (94% - 98%)    Parameters below as of 19 May 2023 04:30  Patient On (Oxygen Delivery Method): room air        Respiratory: One breath counts up to 13  Chest: Has shiley placed on R    MS - Eyes open, awake, alert and attentive.  Follows all commands.   CN - Right eye w limited range in extreme lateral/ upward gaze consistent w Lateral Rectus and Superior Oblique weakness. EOM otherwise intact; moderate ptosis right eyre worsened with sustained upgaze (~20 seconds); VFF, face sens/str/hearing WNL b/l, tongue midline. MOLLY palate. Neck extension 5/5 and neck flexion 4+/5.  Motor - Normal bulk/tone. No pronator drift. Head flexion is at 4-/5     RUE: 4-/5 deltoids, 5/5 biceps, 5+/5 triceps, 5/5 .      LUE: 4-/5 deltoids, 5/5 biceps, 5/5 triceps, 5/5 .     RLE/LLE: 4-/5 hip flexion, 5/5 knee flexion, 5/5 knee extension, 5/5 ankle plantarflexion and dorsiflexion.   Sens - LT intact throughout  Gait and station - MOLLY.    LABORATORY:  CBC                       10.8   9.44  )-----------( 322      ( 19 May 2023 06:45 )             33.2     Chem 05-19    141  |  103  |  18  ----------------------------<  122<H>  2.8<LL>   |  26  |  0.45<L>    Ca    8.8      19 May 2023 06:42    TPro  8.5<H>  /  Alb  3.9  /  TBili  0.5  /  DBili  x   /  AST  12  /  ALT  21  /  AlkPhos  52  05-19    LFTs LIVER FUNCTIONS - ( 19 May 2023 06:42 )  Alb: 3.9 g/dL / Pro: 8.5 g/dL / ALK PHOS: 52 U/L / ALT: 21 U/L / AST: 12 U/L / GGT: x           Coagulopathy   Lipid Panel   A1c   Cardiac enzymes     U/A   CSF  Immunological  Other    STUDIES & IMAGING:  Studies (EKG, EEG, EMG, etc):     Radiology (XR, CT, MR, U/S, TTE/AUGUSTINA): SUBJECTIVE/INTERVAL HISTORY:  - Had trouble sleeping overnight  - SBP ranged in 160-170s  - K 2.8 this morning  - Latest NIF/VC -50 and 710    VITALS & EXAMINATION:  Vital Signs Last 24 Hrs  T(C): 36.7 (19 May 2023 04:30), Max: 37.1 (19 May 2023 00:08)  T(F): 98 (19 May 2023 04:30), Max: 98.7 (19 May 2023 00:08)  HR: 57 (19 May 2023 04:30) (57 - 112)  BP: 158/82 (19 May 2023 06:30) (141/68 - 184/69)  BP(mean): --  RR: 18 (19 May 2023 04:30) (18 - 18)  SpO2: 95% (19 May 2023 04:30) (94% - 98%)    Parameters below as of 19 May 2023 04:30  Patient On (Oxygen Delivery Method): room air        Respiratory: One breath counts up to 13  Chest: Has shiley placed on R    MS - Eyes open, awake, alert and attentive.  Follows all commands.   CN - Right eye w limited range in extreme lateral/ upward gaze consistent w Lateral Rectus and Superior Oblique weakness. EOM otherwise intact; moderate ptosis right eyre worsened with sustained upgaze (~20 seconds); VFF, face sens/str/hearing WNL b/l, tongue midline. MOLLY palate. Neck extension 5/5 and neck flexion 4+/5.  Motor - Normal bulk/tone. No pronator drift. Head flexion is at 4-/5     RUE: 4-/5 deltoids, 5/5 biceps, 5+/5 triceps, 5/5 .      LUE: 4-/5 deltoids, 5/5 biceps, 5/5 triceps, 5/5 .     RLE/LLE: 4-/5 hip flexion, 5/5 knee flexion, 5/5 knee extension, 5/5 ankle plantarflexion and dorsiflexion.   Sens - LT intact throughout  Gait and station - MOLLY.    LABORATORY:  CBC                       10.8   9.44  )-----------( 322      ( 19 May 2023 06:45 )             33.2     Chem 05-19    141  |  103  |  18  ----------------------------<  122<H>  2.8<LL>   |  26  |  0.45<L>    Ca    8.8      19 May 2023 06:42    TPro  8.5<H>  /  Alb  3.9  /  TBili  0.5  /  DBili  x   /  AST  12  /  ALT  21  /  AlkPhos  52  05-19    LFTs LIVER FUNCTIONS - ( 19 May 2023 06:42 )  Alb: 3.9 g/dL / Pro: 8.5 g/dL / ALK PHOS: 52 U/L / ALT: 21 U/L / AST: 12 U/L / GGT: x           Coagulopathy   Lipid Panel   A1c   Cardiac enzymes     U/A   CSF  Immunological  Other    STUDIES & IMAGING:  Studies (EKG, EEG, EMG, etc):     Radiology (XR, CT, MR, U/S, TTE/AUGUSTINA): SUBJECTIVE/INTERVAL HISTORY:  - Had trouble sleeping overnight  - SBP ranged in 160-170s  - K 2.8 this morning  - Latest NIF/VC -50 and 710    VITALS & EXAMINATION:  Vital Signs Last 24 Hrs  T(C): 36.7 (19 May 2023 04:30), Max: 37.1 (19 May 2023 00:08)  T(F): 98 (19 May 2023 04:30), Max: 98.7 (19 May 2023 00:08)  HR: 57 (19 May 2023 04:30) (57 - 112)  BP: 158/82 (19 May 2023 06:30) (141/68 - 184/69)  BP(mean): --  RR: 18 (19 May 2023 04:30) (18 - 18)  SpO2: 95% (19 May 2023 04:30) (94% - 98%)    Parameters below as of 19 May 2023 04:30  Patient On (Oxygen Delivery Method): room air        Respiratory: One breath counts up to 13  Chest: Has shiley placed on R    MS - Eyes open, awake, alert and attentive.  Follows all commands.   CN - Right eye w limited range in extreme lateral/ upward gaze consistent w Lateral Rectus and Superior Oblique weakness. EOM otherwise intact; moderate ptosis right eyre worsened with sustained upgaze (~20 seconds); VFF, face sens/str/hearing WNL b/l, tongue midline. MOLLY palate. Neck extension 5/5 and neck flexion 4+/5.  Motor - Normal bulk/tone. No pronator drift. Head flexion is at 4-/5     RUE: 4-/5 deltoids, 5/5 biceps, 5+/5 triceps, 5/5 .      LUE: 4-/5 deltoids, 5/5 biceps, 5/5 triceps, 5/5 .     RLE/LLE: 4+/5 hip flexion, 5/5 knee flexion, 5/5 knee extension, 5/5 ankle plantarflexion and dorsiflexion.   Sens - LT intact throughout  Gait and station - MOLLY.    LABORATORY:  CBC                       10.8   9.44  )-----------( 322      ( 19 May 2023 06:45 )             33.2     Chem 05-19    141  |  103  |  18  ----------------------------<  122<H>  2.8<LL>   |  26  |  0.45<L>    Ca    8.8      19 May 2023 06:42    TPro  8.5<H>  /  Alb  3.9  /  TBili  0.5  /  DBili  x   /  AST  12  /  ALT  21  /  AlkPhos  52  05-19    LFTs LIVER FUNCTIONS - ( 19 May 2023 06:42 )  Alb: 3.9 g/dL / Pro: 8.5 g/dL / ALK PHOS: 52 U/L / ALT: 21 U/L / AST: 12 U/L / GGT: x           Coagulopathy   Lipid Panel   A1c   Cardiac enzymes     U/A   CSF  Immunological  Other    STUDIES & IMAGING:  Studies (EKG, EEG, EMG, etc):     Radiology (XR, CT, MR, U/S, TTE/AUGUSTINA):

## 2023-05-19 NOTE — DISCHARGE NOTE NURSING/CASE MANAGEMENT/SOCIAL WORK - NSDCPEFALRISK_GEN_ALL_CORE
For information on Fall & Injury Prevention, visit: [email or web address]/[email or web address] OR  [email or web address]/[email or web address] OR  https://www.cdc.gov/steadi/patient.html For information on Fall & Injury Prevention, visit: [email or web address] OR  [email or web address]-injury OR  https://www.cdc.gov/steadi/patient.html For information on Fall & Injury Prevention, visit: [email or web address]://www.[facility]/news/[email or web address]-[email or web address]-health-[email or web address]-mobility OR  [email or web address].[facility]/[email or web address]-[email or web address]-[email or web address]-injury OR  https://www.cdc.gov/steadi/patient.html

## 2023-05-19 NOTE — DISCHARGE NOTE NURSING/CASE MANAGEMENT/SOCIAL WORK - PATIENT PORTAL LINK FT
You can access the FollowMyHealth Patient Portal offered by [facility] by registering at the following website: [email or web address]. By joining [employer]’s FollowMyHealth portal, you will also be able to view your health information using other applications (apps) compatible with our system. You can access the FollowMyHealth Patient Portal offered by [facility] by registering at the following website: [email or web address]. By joining [employer]’s FollowMyHealth portal, you will also be able to view your health information using other applications (apps) compatible with our system. You can access the FollowMyHealth Patient Portal offered by [facility] by registering at the following website: [email or web address]. By joining [employer]’s FollowMyHealth portal, you will also be able to view your health information using other applications (apps) compatible with our system.

## 2023-05-20 ENCOUNTER — TRANSCRIPTION ENCOUNTER (OUTPATIENT)
Age: 63
End: 2023-05-20

## 2023-05-20 DIAGNOSIS — R00.0 TACHYCARDIA, UNSPECIFIED: ICD-10-CM

## 2023-05-20 DIAGNOSIS — G70.00 MYASTHENIA GRAVIS WITHOUT (ACUTE) EXACERBATION: ICD-10-CM

## 2023-05-20 DIAGNOSIS — E11.9 TYPE 2 DIABETES MELLITUS WITHOUT COMPLICATIONS: ICD-10-CM

## 2023-05-20 LAB
ANION GAP SERPL CALC-SCNC: 11 MMOL/L — SIGNIFICANT CHANGE UP (ref 5–17)
BUN SERPL-MCNC: 24 MG/DL — HIGH (ref 7–23)
CALCIUM SERPL-MCNC: 8.9 MG/DL — SIGNIFICANT CHANGE UP (ref 8.4–10.5)
CHLORIDE SERPL-SCNC: 102 MMOL/L — SIGNIFICANT CHANGE UP (ref 96–108)
CO2 SERPL-SCNC: 25 MMOL/L — SIGNIFICANT CHANGE UP (ref 22–31)
CREAT SERPL-MCNC: 0.58 MG/DL — SIGNIFICANT CHANGE UP (ref 0.5–1.3)
EGFR: 102 ML/MIN/1.73M2 — SIGNIFICANT CHANGE UP
GLUCOSE BLDC GLUCOMTR-MCNC: 144 MG/DL — HIGH (ref 70–99)
GLUCOSE BLDC GLUCOMTR-MCNC: 154 MG/DL — HIGH (ref 70–99)
GLUCOSE BLDC GLUCOMTR-MCNC: 187 MG/DL — HIGH (ref 70–99)
GLUCOSE BLDC GLUCOMTR-MCNC: 224 MG/DL — HIGH (ref 70–99)
GLUCOSE SERPL-MCNC: 110 MG/DL — HIGH (ref 70–99)
HCT VFR BLD CALC: 34.9 % — SIGNIFICANT CHANGE UP (ref 34.5–45)
HGB BLD-MCNC: 11 G/DL — LOW (ref 11.5–15.5)
MCHC RBC-ENTMCNC: 28.3 PG — SIGNIFICANT CHANGE UP (ref 27–34)
MCHC RBC-ENTMCNC: 31.5 GM/DL — LOW (ref 32–36)
MCV RBC AUTO: 89.7 FL — SIGNIFICANT CHANGE UP (ref 80–100)
NRBC # BLD: 0 /100 WBCS — SIGNIFICANT CHANGE UP (ref 0–0)
PLATELET # BLD AUTO: 412 K/UL — HIGH (ref 150–400)
POTASSIUM SERPL-MCNC: 3.7 MMOL/L — SIGNIFICANT CHANGE UP (ref 3.5–5.3)
POTASSIUM SERPL-SCNC: 3.7 MMOL/L — SIGNIFICANT CHANGE UP (ref 3.5–5.3)
RBC # BLD: 3.89 M/UL — SIGNIFICANT CHANGE UP (ref 3.8–5.2)
RBC # FLD: 15.4 % — HIGH (ref 10.3–14.5)
SODIUM SERPL-SCNC: 138 MMOL/L — SIGNIFICANT CHANGE UP (ref 135–145)
WBC # BLD: 11.6 K/UL — HIGH (ref 3.8–10.5)
WBC # FLD AUTO: 11.6 K/UL — HIGH (ref 3.8–10.5)

## 2023-05-20 PROCEDURE — 99232 SBSQ HOSP IP/OBS MODERATE 35: CPT

## 2023-05-20 PROCEDURE — 71045 X-RAY EXAM CHEST 1 VIEW: CPT | Mod: 26

## 2023-05-20 RX ADMIN — Medication 3 MILLILITER(S): at 23:53

## 2023-05-20 RX ADMIN — Medication 4 MILLILITER(S): at 18:40

## 2023-05-20 RX ADMIN — Medication 3 MILLILITER(S): at 11:56

## 2023-05-20 RX ADMIN — PYRIDOSTIGMINE BROMIDE 90 MILLIGRAM(S): 60 SOLUTION ORAL at 07:48

## 2023-05-20 RX ADMIN — PANTOPRAZOLE SODIUM 40 MILLIGRAM(S): 20 TABLET, DELAYED RELEASE ORAL at 05:02

## 2023-05-20 RX ADMIN — Medication 3 MILLIGRAM(S): at 21:48

## 2023-05-20 RX ADMIN — ATORVASTATIN CALCIUM 20 MILLIGRAM(S): 80 TABLET, FILM COATED ORAL at 21:48

## 2023-05-20 RX ADMIN — PYRIDOSTIGMINE BROMIDE 90 MILLIGRAM(S): 60 SOLUTION ORAL at 14:24

## 2023-05-20 RX ADMIN — Medication 50 MILLIGRAM(S): at 05:02

## 2023-05-20 RX ADMIN — Medication 1: at 16:43

## 2023-05-20 RX ADMIN — Medication 3 MILLILITER(S): at 18:40

## 2023-05-20 RX ADMIN — Medication 1: at 07:45

## 2023-05-20 RX ADMIN — Medication 3 MILLILITER(S): at 00:07

## 2023-05-20 RX ADMIN — Medication 3 MILLILITER(S): at 05:02

## 2023-05-20 RX ADMIN — CHLORHEXIDINE GLUCONATE 1 APPLICATION(S): 213 SOLUTION TOPICAL at 05:05

## 2023-05-20 RX ADMIN — Medication 2: at 11:56

## 2023-05-20 RX ADMIN — Medication 4 MILLILITER(S): at 05:02

## 2023-05-20 RX ADMIN — PYRIDOSTIGMINE BROMIDE 90 MILLIGRAM(S): 60 SOLUTION ORAL at 21:49

## 2023-05-20 RX ADMIN — Medication 4 MILLILITER(S): at 00:07

## 2023-05-20 RX ADMIN — AMLODIPINE BESYLATE 10 MILLIGRAM(S): 2.5 TABLET ORAL at 05:02

## 2023-05-20 RX ADMIN — ENOXAPARIN SODIUM 40 MILLIGRAM(S): 100 INJECTION SUBCUTANEOUS at 21:48

## 2023-05-20 RX ADMIN — Medication 4 MILLILITER(S): at 11:57

## 2023-05-20 RX ADMIN — Medication 4 MILLILITER(S): at 23:53

## 2023-05-20 NOTE — DISCHARGE NOTE PROVIDER - CARE PROVIDERS DIRECT ADDRESSES
,DirectAddress_Unknown,DirectAddress_Unknown ,DirectAddress_Unknown,DirectAddress_Unknown,DirectAddress_Unknown ,DirectAddress_Unknown,DirectAddress_Unknown,DirectAddress_Unknown,[email or web address] ,DirectAddress_Unknown,DirectAddress_Unknown,DirectAddress_Unknown,[email or web address] ,DirectAddress_Unknown,DirectAddress_Unknown,DirectAddress_Unknown,[email or web address]

## 2023-05-20 NOTE — CONSULT NOTE ADULT - ASSESSMENT
62 year-old woman with a PMH of myasthenia gravis who presented to the ED on 5/5/23 with worsening diplopia and difficulty speaking, swallowing and breathing x 1 week, admitted for evaluation of management of acute MG exacerbation. Interval neuro exam stable. Still has shiley placed on R. NIF/VC as noted above. s/p IVIG 5/15-5/19. Shiley removal unable to be done due to copious secretion and coughing when sitting up 30 deg in bed.  Acute MG exacerbation, possibly triggered by progressive disease or other toxic/metabolic insult    Overnight desatted to high 70s, self-suctioned and improved  - HR 140s while walking back and forth in the room  - Noted to have bright red blood while BM overnight, has hemorrhoids  - NIF -50 and   No chest pain or shortness of breath

## 2023-05-20 NOTE — PROVIDER CONTACT NOTE (OTHER) - ASSESSMENT
Pt able to express discomfort in nose/throat. VSS. Afebrile. On full ventilator support.
Pt stable. Ambulating back and forth to bathroom.
Pt is hypertensive with BP of 184/69. Heart rate is 93 and O2 sat above 90 on room air. Pt has no fever. Pt denies any pain.

## 2023-05-20 NOTE — CHART NOTE - NSCHARTNOTEFT_GEN_A_CORE
Tachycardic to 130s, goes up to 140-150s when going back and forth from bathroom. Bedside exam is stable. Patient denies any chest pain, numbness/tingling/weakness throughout body. Pt is self-suctioning secretions. CXR was done to r/o aspiration, awaiting final read. Spoke to tele technician and noted to have persistent tachycardia overnight ranging in 130-140s. Has AV block 2nd degrees intermittently. Will consult cardiology and fu recs.

## 2023-05-20 NOTE — DISCHARGE NOTE PROVIDER - NSDCMRMEDTOKEN_GEN_ALL_CORE_FT
Albuterol (Eqv-ProAir HFA) 90 mcg/inh inhalation aerosol: 2 puff(s) inhaled every 6 hours, As Needed  amLODIPine 10 mg oral tablet: 1 tab(s) orally once a day  fluticasone 110 mcg/inh inhalation aerosol with adapter: 1 puff(s) inhaled 2 times a day  lovastatin 20 mg oral tablet: 1 tab(s) orally once a day  metFORMIN 500 mg oral tablet: 1 tab(s) orally 3 times a day  predniSONE 20 mg oral tablet: 1 tab(s) orally once a day as needed for MG exacerbation Tapering dose over 1 week during exacerbations  pyridostigmine 60 mg oral tablet: 2 tab(s) orally 3 times a day   amLODIPine 10 mg oral tablet: 1 tab(s) orally once a day  atorvastatin 20 mg oral tablet: 1 tab(s) orally once a day (at bedtime)  bisacodyl 10 mg rectal suppository: 1 suppository(ies) rectal once a day As needed Constipation  fluticasone 110 mcg/inh inhalation aerosol with adapter: 1 puff(s) inhaled 2 times a day  ipratropium-albuterol 0.5 mg-2.5 mg/3 mL inhalation solution: 3 milliliter(s) inhaled every 6 hours  melatonin 3 mg oral tablet: 1 tab(s) orally once a day (at bedtime)  metFORMIN 500 mg oral tablet: 1 tab(s) orally 3 times a day  pantoprazole 40 mg oral delayed release tablet: 1 tab(s) orally once a day (before a meal)  polyethylene glycol 3350 oral powder for reconstitution: 17 gram(s) orally every 12 hours  predniSONE 20 mg oral tablet: 1 tab(s) orally once a day as needed for MG exacerbation Tapering dose over 1 week during exacerbations  pyridostigmine 60 mg oral tablet: 2 tab(s) orally 3 times a day  senna leaf extract oral tablet: 2 tab(s) orally once a day (at bedtime)   amLODIPine 10 mg oral tablet: 1 tab(s) orally once a day MDD: 10 mg  atorvastatin 20 mg oral tablet: 1 tab(s) orally once a day (at bedtime) MDD: 20 mg  bisacodyl 10 mg rectal suppository: 1 suppository(ies) rectal once a day as needed for Constipation once daily as needed MDD: 10 mg  ipratropium-albuterol 0.5 mg-2.5 mg/3 mL inhalation solution: 3 milliliter(s) inhaled every 6 hours MDD: 12 ml  melatonin 3 mg oral tablet: 1 tab(s) orally once a day (at bedtime) MDD: 3 mg  metFORMIN 500 mg oral tablet: 1 tab(s) orally 3 times a day MDD: 1500 mg  pantoprazole 40 mg oral delayed release tablet: 1 tab(s) orally once a day (before a meal) MDD: 40 mg  polyethylene glycol 3350 oral powder for reconstitution: 17 gram(s) orally every 12 hours MDD: 34 mg  predniSONE 20 mg oral tablet: 1 tab(s) orally once a day as needed for MG exacerbation Tapering dose over 1 week during exacerbations MDD: 20 mg  pyridostigmine 60 mg oral tablet: 2 tab(s) orally 3 times a day MDD: 360 mg  senna leaf extract oral tablet: 2 tab(s) orally once a day (at bedtime) MDD: 2 tab   alcohol pads: MDD: 2 pads  amLODIPine 10 mg oral tablet: 1 tab(s) orally once a day MDD: 10 mg  atorvastatin 20 mg oral tablet: 1 tab(s) orally once a day (at bedtime) MDD: 20 mg  bisacodyl 10 mg rectal suppository: 1 suppository(ies) rectal once a day as needed for Constipation once daily as needed MDD: 10 mg  glucometer: MDD: 1 meter  glucometer test strips: to be used with glucometer  Glumetza 1000 mg oral tablet, extended release: 1 tab(s) orally 2 times a day MDD: 1 gram  insulin isophane (NPH) 100 units/mL human recombinant subcutaneous suspension: 5 unit(s) subcutaneous once a day during the first month when taking 50 mg prednisone daily MDD: 5 units  insulin isophane (NPH) 100 units/mL human recombinant subcutaneous suspension: 4 unit(s) subcutaneous once a day during the second month when taking 40 mg prednisone daily MDD: 4 units  insulin isophane (NPH) 100 units/mL human recombinant subcutaneous suspension: 3 unit(s) subcutaneous once a day during the third month when taking 30 mg prednisone daily MDD: 3 units  insulin pen: insulin pen for NPH  insulin pen needles: for NPH MDD: 1 needle  ipratropium-albuterol 0.5 mg-2.5 mg/3 mL inhalation solution: 3 milliliter(s) inhaled every 6 hours MDD: 12 ml  lancets: to be used with test strips  melatonin 3 mg oral tablet: 1 tab(s) orally once a day (at bedtime) MDD: 3 mg  pantoprazole 40 mg oral delayed release tablet: 1 tab(s) orally once a day (before a meal) MDD: 40 mg  polyethylene glycol 3350 oral powder for reconstitution: 17 gram(s) orally every 12 hours MDD: 34 mg  predniSONE 10 mg oral tablet: 3 tab(s) orally once a day take 30 mg daily during the third month MDD: 30 mg  predniSONE 20 mg oral tablet: 1 tab(s) orally once a day take 20 mg daily from the fourth month onwards MDD: 20  predniSONE 20 mg oral tablet: 2 tab(s) orally once a day take 40 mg daily during the second month MDD: 40 mg  predniSONE 50 mg oral tablet: 1 tab(s) orally once a day take 50 mg prednisone daily for the first month MDD: 50  pyridostigmine 60 mg oral tablet: 2 tab(s) orally 3 times a day MDD: 360 mg  senna leaf extract oral tablet: 2 tab(s) orally once a day (at bedtime) MDD: 2 tab  Tradjenta 5 mg oral tablet: 1 tab(s) orally once a day from the fourth month onwards MDD: 5 mg   alcohol pads: for DM MDD: 2 pads  amLODIPine 10 mg oral tablet: 1 tab(s) orally once a day MDD: 10 mg  atorvastatin 20 mg oral tablet: 1 tab(s) orally once a day (at bedtime) MDD: 20 mg  bisacodyl 10 mg rectal suppository: 1 suppository(ies) rectal once a day as needed for Constipation once daily as needed MDD: 10 mg  glucometer: for DM MDD: 1 meter  glucometer test strips: to be used with glucometer  Glumetza 1000 mg oral tablet, extended release: 1 tab(s) orally 2 times a day MDD: 1 gram  insulin isophane (NPH) 100 units/mL human recombinant subcutaneous suspension: 5 unit(s) subcutaneous once a day during the first month when taking 50 mg prednisone daily MDD: 5 units  insulin isophane (NPH) 100 units/mL human recombinant subcutaneous suspension: 4 unit(s) subcutaneous once a day during the second month when taking 40 mg prednisone daily MDD: 4 units  insulin isophane (NPH) 100 units/mL human recombinant subcutaneous suspension: 3 unit(s) subcutaneous once a day during the third month when taking 30 mg prednisone daily MDD: 3 units  insulin pen: insulin pen for NPH  insulin pen needles: for NPH MDD: 1 needle  ipratropium-albuterol 0.5 mg-2.5 mg/3 mL inhalation solution: 3 milliliter(s) inhaled every 6 hours MDD: 12 ml  lancets: to be used with test strips  melatonin 3 mg oral tablet: 1 tab(s) orally once a day (at bedtime) MDD: 3 mg  pantoprazole 40 mg oral delayed release tablet: 1 tab(s) orally once a day (before a meal) MDD: 40 mg  polyethylene glycol 3350 oral powder for reconstitution: 17 gram(s) orally every 12 hours MDD: 34 mg  predniSONE 10 mg oral tablet: 3 tab(s) orally once a day take 30 mg daily during the third month MDD: 30 mg  predniSONE 20 mg oral tablet: 1 tab(s) orally once a day take 20 mg daily from the fourth month onwards MDD: 20  predniSONE 20 mg oral tablet: 2 tab(s) orally once a day take 40 mg daily during the second month MDD: 40 mg  predniSONE 50 mg oral tablet: 1 tab(s) orally once a day take 50 mg prednisone daily for the first month MDD: 50  pyridostigmine 60 mg oral tablet: 2 tab(s) orally 3 times a day MDD: 360 mg  senna leaf extract oral tablet: 2 tab(s) orally once a day (at bedtime) MDD: 2 tab  Tradjenta 5 mg oral tablet: 1 tab(s) orally once a day from the fourth month onwards MDD: 5 mg   alcohol pads: for DM MDD: 2 pads  amLODIPine 10 mg oral tablet: 1 tab(s) orally once a day MDD: 10 mg  atorvastatin 20 mg oral tablet: 1 tab(s) orally once a day (at bedtime) MDD: 20 mg  bisacodyl 10 mg rectal suppository: 1 suppository(ies) rectal once a day as needed for Constipation once daily as needed MDD: 10 mg  glucometer: for DM MDD: 1 meter  glucometer test strips: to be used with glucometer  Glumetza 1000 mg oral tablet, extended release: 1 tab(s) orally 2 times a day MDD: 1 gram  insulin isophane (NPH) 100 units/mL human recombinant subcutaneous suspension: 5 unit(s) subcutaneous once a day during the first month when taking 50 mg prednisone daily MDD: 5 units  insulin isophane (NPH) 100 units/mL human recombinant subcutaneous suspension: 4 unit(s) subcutaneous once a day during the second month when taking 40 mg prednisone daily MDD: 4 units  insulin isophane (NPH) 100 units/mL human recombinant subcutaneous suspension: 3 unit(s) subcutaneous once a day during the third month when taking 30 mg prednisone daily MDD: 3 units  insulin pen: insulin pen for NPH  insulin pen needles: for NPH MDD: 1 needle  ipratropium-albuterol 0.5 mg-2.5 mg/3 mL inhalation solution: 3 milliliter(s) inhaled every 6 hours MDD: 12 ml  lancets: to be used with test strips  melatonin 3 mg oral tablet: 1 tab(s) orally once a day (at bedtime) MDD: 3 mg  metFORMIN 1000 mg oral tablet: 1 tab(s) orally 2 times a day MDD: 2 gram  pantoprazole 40 mg oral delayed release tablet: 1 tab(s) orally once a day (before a meal) MDD: 40 mg  polyethylene glycol 3350 oral powder for reconstitution: 17 gram(s) orally every 12 hours MDD: 34 mg  predniSONE 10 mg oral tablet: 3 tab(s) orally once a day take 30 mg daily during the third month MDD: 30 mg  predniSONE 20 mg oral tablet: 1 tab(s) orally once a day take 20 mg daily from the fourth month onwards MDD: 20  predniSONE 20 mg oral tablet: 2 tab(s) orally once a day take 40 mg daily during the second month MDD: 40 mg  predniSONE 50 mg oral tablet: 1 tab(s) orally once a day take 50 mg prednisone daily for the first month MDD: 50  pyridostigmine 60 mg oral tablet: 2 tab(s) orally 3 times a day MDD: 360 mg  senna leaf extract oral tablet: 2 tab(s) orally once a day (at bedtime) MDD: 2 tab  Tradjenta 5 mg oral tablet: 1 tab(s) orally once a day from the fourth month onwards MDD: 5 mg   alcohol pads: for DM MDD: 2 pads  amLODIPine 10 mg oral tablet: 1 tab(s) orally once a day MDD: 10 mg  atorvastatin 20 mg oral tablet: 1 tab(s) orally once a day (at bedtime) MDD: 20 mg  bisacodyl 10 mg rectal suppository: 1 suppository(ies) rectal once a day as needed for Constipation once daily as needed MDD: 10 mg  glucometer: for DM MDD: 1 meter  glucometer test strips: to be used with glucometer  insulin isophane (NPH) 100 units/mL human recombinant subcutaneous suspension: 5 unit(s) subcutaneous once a day during the first month when taking 50 mg prednisone daily MDD: 5 units  insulin isophane (NPH) 100 units/mL human recombinant subcutaneous suspension: 4 unit(s) subcutaneous once a day during the second month when taking 40 mg prednisone daily MDD: 4 units  insulin isophane (NPH) 100 units/mL human recombinant subcutaneous suspension: 3 unit(s) subcutaneous once a day during the third month when taking 30 mg prednisone daily MDD: 3 units  insulin pen: insulin pen for NPH  insulin pen needles: for NPH MDD: 1 needle  ipratropium-albuterol 0.5 mg-2.5 mg/3 mL inhalation solution: 3 milliliter(s) inhaled every 6 hours MDD: 12 ml  lancets: to be used with test strips  melatonin 3 mg oral tablet: 1 tab(s) orally once a day (at bedtime) MDD: 3 mg  metFORMIN 1000 mg oral tablet: 1 tab(s) orally 2 times a day MDD: 2 gram  pantoprazole 40 mg oral delayed release tablet: 1 tab(s) orally once a day (before a meal) MDD: 40 mg  polyethylene glycol 3350 oral powder for reconstitution: 17 gram(s) orally every 12 hours MDD: 34 mg  predniSONE 10 mg oral tablet: 3 tab(s) orally once a day take 30 mg daily during the third month MDD: 30 mg  predniSONE 20 mg oral tablet: 1 tab(s) orally once a day take 20 mg daily from the fourth month onwards MDD: 20  predniSONE 20 mg oral tablet: 2 tab(s) orally once a day take 40 mg daily during the second month MDD: 40 mg  predniSONE 50 mg oral tablet: 1 tab(s) orally once a day take 50 mg prednisone daily for the first month MDD: 50  pyridostigmine 60 mg oral tablet: 2 tab(s) orally 3 times a day MDD: 360 mg  senna leaf extract oral tablet: 2 tab(s) orally once a day (at bedtime) MDD: 2 tab  Tradjenta 5 mg oral tablet: 1 tab(s) orally once a day from the fourth month onwards MDD: 5 mg

## 2023-05-20 NOTE — DISCHARGE NOTE PROVIDER - NPI NUMBER (FOR [employer] USE ONLY) :
[[id number]],[UNKNOWN] [[id number]],[UNKNOWN] [[id number]],[UNKNOWN] [[id number]],[UNKNOWN],[[id number]] [[id number]],[UNKNOWN],[[id number]] [[id number]],[UNKNOWN],[[id number]] [[id number]],[[id number]],[UNKNOWN],[[id number]] [[id number]],[UNKNOWN],[[id number]] [[id number]],[[id number]],[UNKNOWN],[[id number]]

## 2023-05-20 NOTE — CONSULT NOTE ADULT - SUBJECTIVE AND OBJECTIVE BOX
CHIEF COMPLAINT:  No previous cardiac evaluation   Son at bedside translated     HISTORY OF PRESENT ILLNESS:  62 year-old woman with a PMH of myasthenia gravis who presented to the ED on 5/5/23 with worsening diplopia and difficulty speaking, swallowing and breathing x 1 week, admitted for evaluation of management of acute MG exacerbation. Interval neuro exam stable. Still has shiley placed on R. NIF/VC as noted above. s/p IVIG 5/15-5/19. Shiley removal unable to be done due to copious secretion and coughing when sitting up 30 deg in bed.  Overnight desatted to high 70s, self-suctioned and improved  - HR 140s while walking back and forth in the room  - Noted to have bright red blood while BM overnight, has hemorrhoids  - NIF -50 and   No chest pain or shortness of breath     PAST MEDICAL & SURGICAL HISTORY:  Myasthenia gravis      Diabetes mellitus      Hypertension              MEDICATIONS:  amLODIPine   Tablet 10 milliGRAM(s) Oral daily  enoxaparin Injectable 40 milliGRAM(s) SubCutaneous every 24 hours      acetylcysteine 20%  Inhalation 4 milliLiter(s) Inhalation every 6 hours  albuterol/ipratropium for Nebulization 3 milliLiter(s) Nebulizer every 6 hours    acetaminophen     Tablet .. 650 milliGRAM(s) Oral every 6 hours PRN  diphenhydrAMINE 25 milliGRAM(s) Oral every 4 hours PRN  melatonin 3 milliGRAM(s) Oral at bedtime  oxyCODONE    IR 5 milliGRAM(s) Oral every 6 hours PRN    bisacodyl Suppository 10 milliGRAM(s) Rectal daily PRN  pantoprazole    Tablet 40 milliGRAM(s) Oral before breakfast  polyethylene glycol 3350 17 Gram(s) Oral every 12 hours  senna 2 Tablet(s) Oral at bedtime    atorvastatin 20 milliGRAM(s) Oral at bedtime  dextrose 50% Injectable 25 Gram(s) IV Push once  dextrose Oral Gel 15 Gram(s) Oral once PRN  glucagon  Injectable 1 milliGRAM(s) IntraMuscular once  insulin lispro (ADMELOG) corrective regimen sliding scale   SubCutaneous three times a day before meals  insulin lispro (ADMELOG) corrective regimen sliding scale   SubCutaneous at bedtime  predniSONE   Tablet 50 milliGRAM(s) Oral daily    chlorhexidine 4% Liquid 1 Application(s) Topical <User Schedule>  dextrose 5%. 1000 milliLiter(s) IV Continuous <Continuous>  dextrose 5%. 1000 milliLiter(s) IV Continuous <Continuous>  sodium chloride 0.65% Nasal 1 Spray(s) Both Nostrils four times a day PRN      FAMILY HISTORY:      SOCIAL HISTORY:    [ ] Non-smoker  [ ] Smoker  [ ] Alcohol    Allergies    peanuts (Unknown)  No Known Drug Allergies    Intolerances    	    REVIEW OF SYSTEMS:  CONSTITUTIONAL: No fever, weight loss, or fatigue  EYES: No eye pain, visual disturbances, or discharge  ENMT:  No difficulty hearing, tinnitus, vertigo; No sinus or throat pain  NECK: No pain or stiffness  RESPIRATORY: No cough, wheezing, chills or hemoptysis; No Shortness of Breath  CARDIOVASCULAR: No chest pain, palpitations, passing out, dizziness, or leg swelling  GASTROINTESTINAL: No abdominal or epigastric pain. No nausea, vomiting, or hematemesis; No diarrhea or constipation. No melena or hematochezia.  GENITOURINARY: No dysuria, frequency, hematuria, or incontinence  NEUROLOGICAL: No headaches, memory loss, loss of strength, numbness, or tremors  SKIN: No itching, burning, rashes, or lesions   LYMPH Nodes: No enlarged glands  ENDOCRINE: No heat or cold intolerance; No hair loss  MUSCULOSKELETAL: No joint pain or swelling; No muscle, back, or extremity pain  PSYCHIATRIC: No depression, anxiety, mood swings, or difficulty sleeping  HEME/LYMPH: No easy bruising, or bleeding gums  ALLERY AND IMMUNOLOGIC: No hives or eczema	    [ ] All others negative	  [ ] Unable to obtain    PHYSICAL EXAM:  T(C): 36.7 (05-20-23 @ 21:30), Max: 36.9 (05-20-23 @ 08:17)  HR: 97 (05-20-23 @ 21:30) (79 - 123)  BP: 136/75 (05-20-23 @ 21:30) (136/75 - 182/84)  RR: 18 (05-20-23 @ 21:30) (18 - 18)  SpO2: 96% (05-20-23 @ 21:30) (94% - 96%)  Wt(kg): --  I&O's Summary    19 May 2023 07:01  -  20 May 2023 07:00  --------------------------------------------------------  IN: 1300 mL / OUT: 0 mL / NET: 1300 mL    20 May 2023 07:01  -  20 May 2023 22:06  --------------------------------------------------------  IN: 680 mL / OUT: 0 mL / NET: 680 mL        Appearance: NAD  HEENT:   Normal oral mucosa, PERRL, EOMI	  Lymphatic: No lymphadenopathy  Cardiovascular: Normal S1 S2, No JVD, No murmurs, No edema  Respiratory: Lungs clear to auscultation	  Psychiatry: A & O x 3, Mood & affect appropriate  Gastrointestinal:  Soft, Non-tender, + BS	  Skin: No rashes, No ecchymoses, No cyanosis	  Neurologic: MS - Eyes open, awake, alert and attentive.  Follows all commands.   CN - Right eye w limited range in extreme lateral/ upward gaze consistent w Lateral Rectus and Superior Oblique weakness. EOM otherwise intact; moderate ptosis right eyre worsened with sustained upgaze (~20 seconds); VFF, face sens/str/hearing WNL b/l, tongue midline. MOLLY palate. Neck extension 5/5 and neck flexion 4+/5.  Motor - Normal bulk/tone. No pronator drift. Head flexion is at 4-/5   RUE: 4-/5 deltoids, 5/5 biceps, 5+/5 triceps, 5/5 .    LUE: 4-/5 deltoids, 5/5 biceps, 5/5 triceps, 5/5 .   RLE/LLE: 4+/5 hip flexion, 5/5 knee flexion, 5/5 knee extension, 5/5 ankle plantarflexion and dorsiflexion.   Sens - LT intact throughout  Gait and station - MOLLY.  Extremities: Normal range of motion, No clubbing, cyanosis or edema  Vascular: Peripheral pulses palpable 2+ bilaterally    TELEMETRY: 	SR ST 130s     ECG:  	Sinus tach Anterolateral infarct no acute ischemic stt changes   RADIOLOGY:  < from: Xray Chest 1 View- PORTABLE-Routine (Xray Chest 1 View- PORTABLE-Routine .) (05.20.23 @ 12:31) >    ACC: [id number] EXAM:  XR CHEST PORTABLE ROUTINE 1V   ORDERED BY: ELKE RANDALL     PROCEDURE DATE:  05/20/2023          INTERPRETATION:  CLINICAL INDICATION: Patient with myasthenia gravis   crisis with increasing cough and secretions, evaluate for aspiration    EXAM: Frontal radiograph of the chest.    COMPARISON: Chest radiograph from 5/16/2023    FINDINGS:  Interval extubation and removal of enteric tube. Redemonstrated   right-sided hemodialysis catheter with tip in the right atrium.  No focal consolidations.  There is no pleural effusion or pneumothorax.  The heart is normal in size  Sternotomy wires.    IMPRESSION:  Interval patient removal of enteric tube. Right-sided hemodialysis   catheter remains unchanged in position.  No focal consolidations.    --- End of Report ---           DA LIVE MD; Resident Radiologist  This document has been electronically signed.  GABE MCKENNA MD; Attending Interventional Radiologist  This document has been electronically signed. May 20 2023  6:20PM    < end of copied text >  < from: TTE Limited W or WO Ultrasound Enhancing Agent (05.06.23 @ 10:03) >    TRANSTHORACIC ECHOCARDIOGRAM REPORT  ________________________________________________________________________________                                      _______       Pt. Name:       KAIDEN HATHC Study Date:    5/6/2023  MRN:            [id number]    YOB: 1960  Accession #:    [id number]     Age:           62 years  Account#:       [id number]  Gender:        F  Heart Rate:                   Height:        65.00 in (165.10 cm)  Rhythm:                       Weight:        363.75 lb (165.00 kg)  Blood Pressure: 124/60 mmHg   BSA/BMI:       2.55 m² / 60.53 kg/m²  ________________________________________________________________________________________  Referring Physician:    [id number] Kevin Milner  Interpreting Physician: Jerald Gray  Primary Sonographer:    Heather Reyes [facility]    CPT:               3D RECONST W/O WKSTATION - 31495.m; ECHO TTE WO CON COMP W                     DOPP - 79168.m  Indication(s):     Heart failure, unspecified - I50.9  Procedure:     Limited transthoracic echocardiogram. Real time and full                     volume 3-dimensional imaging performed.  Ordering Location: [facility]  Study Information: Image quality for this study is good.    _______________________________________________________________________________________  CONCLUSIONS:      1. Normal left ventricular cavity size. The left ventricular wall thickness is normal. The left ventricular systolic function is normal. The basal inferior segment is hypokinetic.   2. There is normal left ventricular diastolic function.   3. Normal right ventricular cavity size, normal wall thickness and normal systolic function. The tricuspid annular plane systolic excursion (TAPSE) is 1.8 cm (normal >=1.7 cm).    ________________________________________________________________________________________  FINDINGS:  Left Ventricle:  Normal left ventricular cavity size. The left ventricular wall thickness is normal. The left ventricular systolic function is normal with a calculatedejection fraction of 54 % by the Fontanez's biplane method of disks55 % by 3D. There is normal left ventricular diastolic function, with normal filling pressure.  LV Wall Scoring: The basal inferior segment is hypokinetic. All remaining scored  segments are normal.       Right Ventricle:  Normal right ventricular cavity size, normal wall thickness and normal systolic function. Tricuspid annular plane systolic excursion (TAPSE) is 1.8 cm (normal >=1.7 cm).     Left Atrium:  The left atrium is normal, with an indexed volume of 15 ml/m².     Right Atrium:  The right atrium is normal.     Aortic Valve:  The aortic valve is tricuspid with normal structure without stenosis. There is no evidence of aortic regurgitation.     Mitral Valve:  There is mitral valve thickening of the anterior and posterior leaflets particulary at the tips. There is mild mitral regurgitation.     Tricuspid Valve:  Structurally normal tricuspid valve with normal leaflet excursion. There is mild-moderate tricuspid regurgitation.     Pulmonic Valve:  Structurally normal pulmonic valve with normal leaflet excursion. There is trace pulmonic regurgitation.     Aorta:  The aortic annulus and aortic root appear normal in size.     Pericardium:  No pericardial effusion seen.  ____________________________________________________________________  QUANTITATIVE DATA  Left Ventricle Measurements                         Indexed to BSA  IVSd (2D):   1.0 cm  LVPWd (2D):  1.0 cm  LVIDd (2D):  4.9 cm  LVIDs (2D):  3.2 cm  LV Mass:    177 g    69.5 g/m²     MV E Vmax:    0.63 m/s  MV A Vmax:    1.07 m/s  MV E/A:       0.59  e' lateral:   7.29 cm/s  e' medial:    8.38 cm/s  E/e' lateral: 8.70  E/e' medial:  7.57  E/e' Average: 8.09    Aorta Measurements     Ao Root: 2.8 cm       Left Atrium Measurements     LA Diam 2D: 3.00 cm    Right Ventricle Measurements     TAPSE: 1.8 cm    < end of copied text >    OTHER: 	  	  LABS:	 	    CARDIAC MARKERS:                                  11.0   11.60 )-----------( 412      ( 20 May 2023 07:07 )             34.9     05-20    138  |  102  |  24<H>  ----------------------------<  110<H>  3.7   |  25  |  0.58    Ca    8.9      20 May 2023 07:08    TPro  8.5<H>  /  Alb  3.9  /  TBili  0.5  /  DBili  x   /  AST  12  /  ALT  21  /  AlkPhos  52  05-19    proBNP:   Lipid Profile:   HgA1c:   TSH:            CHIEF COMPLAINT:  No previous cardiac evaluation   Son at bedside translated     HISTORY OF PRESENT ILLNESS:  62 year-old woman with a PMH of myasthenia gravis who presented to the ED on 5/5/23 with worsening diplopia and difficulty speaking, swallowing and breathing x 1 week, admitted for evaluation of management of acute MG exacerbation. Interval neuro exam stable. Still has shiley placed on R. NIF/VC as noted above. s/p IVIG 5/15-5/19. Shiley removal unable to be done due to copious secretion and coughing when sitting up 30 deg in bed.  Overnight desatted to high 70s, self-suctioned and improved  - HR 140s while walking back and forth in the room  - Noted to have bright red blood while BM overnight, has hemorrhoids  - NIF -50 and   No chest pain or shortness of breath     PAST MEDICAL & SURGICAL HISTORY:  Myasthenia gravis      Diabetes mellitus      Hypertension              MEDICATIONS:  amLODIPine   Tablet 10 milliGRAM(s) Oral daily  enoxaparin Injectable 40 milliGRAM(s) SubCutaneous every 24 hours      acetylcysteine 20%  Inhalation 4 milliLiter(s) Inhalation every 6 hours  albuterol/ipratropium for Nebulization 3 milliLiter(s) Nebulizer every 6 hours    acetaminophen     Tablet .. 650 milliGRAM(s) Oral every 6 hours PRN  diphenhydrAMINE 25 milliGRAM(s) Oral every 4 hours PRN  melatonin 3 milliGRAM(s) Oral at bedtime  oxyCODONE    IR 5 milliGRAM(s) Oral every 6 hours PRN    bisacodyl Suppository 10 milliGRAM(s) Rectal daily PRN  pantoprazole    Tablet 40 milliGRAM(s) Oral before breakfast  polyethylene glycol 3350 17 Gram(s) Oral every 12 hours  senna 2 Tablet(s) Oral at bedtime    atorvastatin 20 milliGRAM(s) Oral at bedtime  dextrose 50% Injectable 25 Gram(s) IV Push once  dextrose Oral Gel 15 Gram(s) Oral once PRN  glucagon  Injectable 1 milliGRAM(s) IntraMuscular once  insulin lispro (ADMELOG) corrective regimen sliding scale   SubCutaneous three times a day before meals  insulin lispro (ADMELOG) corrective regimen sliding scale   SubCutaneous at bedtime  predniSONE   Tablet 50 milliGRAM(s) Oral daily    chlorhexidine 4% Liquid 1 Application(s) Topical <User Schedule>  dextrose 5%. 1000 milliLiter(s) IV Continuous <Continuous>  dextrose 5%. 1000 milliLiter(s) IV Continuous <Continuous>  sodium chloride 0.65% Nasal 1 Spray(s) Both Nostrils four times a day PRN      FAMILY HISTORY:      SOCIAL HISTORY:    [ ] Non-smoker  [ ] Smoker  [ ] Alcohol    Allergies    peanuts (Unknown)  No Known Drug Allergies    Intolerances    	    REVIEW OF SYSTEMS:  CONSTITUTIONAL: No fever, weight loss, or fatigue  EYES: No eye pain, visual disturbances, or discharge  ENMT:  No difficulty hearing, tinnitus, vertigo; No sinus or throat pain  NECK: No pain or stiffness  RESPIRATORY: No cough, wheezing, chills or hemoptysis; No Shortness of Breath  CARDIOVASCULAR: No chest pain, palpitations, passing out, dizziness, or leg swelling  GASTROINTESTINAL: No abdominal or epigastric pain. No nausea, vomiting, or hematemesis; No diarrhea or constipation. No melena or hematochezia.  GENITOURINARY: No dysuria, frequency, hematuria, or incontinence  NEUROLOGICAL: No headaches, memory loss, loss of strength, numbness, or tremors  SKIN: No itching, burning, rashes, or lesions   LYMPH Nodes: No enlarged glands  ENDOCRINE: No heat or cold intolerance; No hair loss  MUSCULOSKELETAL: No joint pain or swelling; No muscle, back, or extremity pain  PSYCHIATRIC: No depression, anxiety, mood swings, or difficulty sleeping  HEME/LYMPH: No easy bruising, or bleeding gums  ALLERY AND IMMUNOLOGIC: No hives or eczema	    [ ] All others negative	  [ ] Unable to obtain    PHYSICAL EXAM:  T(C): 36.7 (05-20-23 @ 21:30), Max: 36.9 (05-20-23 @ 08:17)  HR: 97 (05-20-23 @ 21:30) (79 - 123)  BP: 136/75 (05-20-23 @ 21:30) (136/75 - 182/84)  RR: 18 (05-20-23 @ 21:30) (18 - 18)  SpO2: 96% (05-20-23 @ 21:30) (94% - 96%)  Wt(kg): --  I&O's Summary    19 May 2023 07:01  -  20 May 2023 07:00  --------------------------------------------------------  IN: 1300 mL / OUT: 0 mL / NET: 1300 mL    20 May 2023 07:01  -  20 May 2023 22:06  --------------------------------------------------------  IN: 680 mL / OUT: 0 mL / NET: 680 mL        Appearance: NAD  HEENT:   Normal oral mucosa, PERRL, EOMI	  Lymphatic: No lymphadenopathy  Cardiovascular: Normal S1 S2, No JVD, No murmurs, No edema  Respiratory: Lungs clear to auscultation	  Psychiatry: A & O x 3, Mood & affect appropriate  Gastrointestinal:  Soft, Non-tender, + BS	  Skin: No rashes, No ecchymoses, No cyanosis	  Neurologic: MS - Eyes open, awake, alert and attentive.  Follows all commands.   CN - Right eye w limited range in extreme lateral/ upward gaze consistent w Lateral Rectus and Superior Oblique weakness. EOM otherwise intact; moderate ptosis right eyre worsened with sustained upgaze (~20 seconds); VFF, face sens/str/hearing WNL b/l, tongue midline. MOLLY palate. Neck extension 5/5 and neck flexion 4+/5.  Motor - Normal bulk/tone. No pronator drift. Head flexion is at 4-/5   RUE: 4-/5 deltoids, 5/5 biceps, 5+/5 triceps, 5/5 .    LUE: 4-/5 deltoids, 5/5 biceps, 5/5 triceps, 5/5 .   RLE/LLE: 4+/5 hip flexion, 5/5 knee flexion, 5/5 knee extension, 5/5 ankle plantarflexion and dorsiflexion.   Sens - LT intact throughout  Gait and station - MOLLY.  Extremities: Normal range of motion, No clubbing, cyanosis or edema  Vascular: Peripheral pulses palpable 2+ bilaterally    TELEMETRY: 	SR ST 130s     ECG:  	Sinus tach Anterolateral infarct no acute ischemic stt changes   RADIOLOGY:  < from: Xray Chest 1 View- PORTABLE-Routine (Xray Chest 1 View- PORTABLE-Routine .) (05.20.23 @ 12:31) >    ACC: [id number] EXAM:  XR CHEST PORTABLE ROUTINE 1V   ORDERED BY: ELKE RANDALL     PROCEDURE DATE:  05/20/2023          INTERPRETATION:  CLINICAL INDICATION: Patient with myasthenia gravis   crisis with increasing cough and secretions, evaluate for aspiration    EXAM: Frontal radiograph of the chest.    COMPARISON: Chest radiograph from 5/16/2023    FINDINGS:  Interval extubation and removal of enteric tube. Redemonstrated   right-sided hemodialysis catheter with tip in the right atrium.  No focal consolidations.  There is no pleural effusion or pneumothorax.  The heart is normal in size  Sternotomy wires.    IMPRESSION:  Interval patient removal of enteric tube. Right-sided hemodialysis   catheter remains unchanged in position.  No focal consolidations.    --- End of Report ---           DA LIVE MD; Resident Radiologist  This document has been electronically signed.  GABE MCKENNA MD; Attending Interventional Radiologist  This document has been electronically signed. May 20 2023  6:20PM    < end of copied text >  < from: TTE Limited W or WO Ultrasound Enhancing Agent (05.06.23 @ 10:03) >    TRANSTHORACIC ECHOCARDIOGRAM REPORT  ________________________________________________________________________________                                      _______       Pt. Name:       KAIDEN HATCH Study Date:    5/6/2023  MRN:            [id number]    YOB: 1960  Accession #:    [id number]     Age:           62 years  Account#:       [id number]  Gender:        F  Heart Rate:                   Height:        65.00 in (165.10 cm)  Rhythm:                       Weight:        363.75 lb (165.00 kg)  Blood Pressure: 124/60 mmHg   BSA/BMI:       2.55 m² / 60.53 kg/m²  ________________________________________________________________________________________  Referring Physician:    [id number] Kevin Milner  Interpreting Physician: Jerald Gray  Primary Sonographer:    Heather Reyes [facility]    CPT:               3D RECONST W/O WKSTATION - 16387.m; ECHO TTE WO CON COMP W                     DOPP - 63024.m  Indication(s):     Heart failure, unspecified - I50.9  Procedure:     Limited transthoracic echocardiogram. Real time and full                     volume 3-dimensional imaging performed.  Ordering Location: [facility]  Study Information: Image quality for this study is good.    _______________________________________________________________________________________  CONCLUSIONS:      1. Normal left ventricular cavity size. The left ventricular wall thickness is normal. The left ventricular systolic function is normal. The basal inferior segment is hypokinetic.   2. There is normal left ventricular diastolic function.   3. Normal right ventricular cavity size, normal wall thickness and normal systolic function. The tricuspid annular plane systolic excursion (TAPSE) is 1.8 cm (normal >=1.7 cm).    ________________________________________________________________________________________  FINDINGS:  Left Ventricle:  Normal left ventricular cavity size. The left ventricular wall thickness is normal. The left ventricular systolic function is normal with a calculatedejection fraction of 54 % by the Fontanez's biplane method of disks55 % by 3D. There is normal left ventricular diastolic function, with normal filling pressure.  LV Wall Scoring: The basal inferior segment is hypokinetic. All remaining scored  segments are normal.       Right Ventricle:  Normal right ventricular cavity size, normal wall thickness and normal systolic function. Tricuspid annular plane systolic excursion (TAPSE) is 1.8 cm (normal >=1.7 cm).     Left Atrium:  The left atrium is normal, with an indexed volume of 15 ml/m².     Right Atrium:  The right atrium is normal.     Aortic Valve:  The aortic valve is tricuspid with normal structure without stenosis. There is no evidence of aortic regurgitation.     Mitral Valve:  There is mitral valve thickening of the anterior and posterior leaflets particulary at the tips. There is mild mitral regurgitation.     Tricuspid Valve:  Structurally normal tricuspid valve with normal leaflet excursion. There is mild-moderate tricuspid regurgitation.     Pulmonic Valve:  Structurally normal pulmonic valve with normal leaflet excursion. There is trace pulmonic regurgitation.     Aorta:  The aortic annulus and aortic root appear normal in size.     Pericardium:  No pericardial effusion seen.  ____________________________________________________________________  QUANTITATIVE DATA  Left Ventricle Measurements                         Indexed to BSA  IVSd (2D):   1.0 cm  LVPWd (2D):  1.0 cm  LVIDd (2D):  4.9 cm  LVIDs (2D):  3.2 cm  LV Mass:    177 g    69.5 g/m²     MV E Vmax:    0.63 m/s  MV A Vmax:    1.07 m/s  MV E/A:       0.59  e' lateral:   7.29 cm/s  e' medial:    8.38 cm/s  E/e' lateral: 8.70  E/e' medial:  7.57  E/e' Average: 8.09    Aorta Measurements     Ao Root: 2.8 cm       Left Atrium Measurements     LA Diam 2D: 3.00 cm    Right Ventricle Measurements     TAPSE: 1.8 cm    < end of copied text >    OTHER: 	  	  LABS:	 	    CARDIAC MARKERS:                                  11.0   11.60 )-----------( 412      ( 20 May 2023 07:07 )             34.9     05-20    138  |  102  |  24<H>  ----------------------------<  110<H>  3.7   |  25  |  0.58    Ca    8.9      20 May 2023 07:08    TPro  8.5<H>  /  Alb  3.9  /  TBili  0.5  /  DBili  x   /  AST  12  /  ALT  21  /  AlkPhos  52  05-19    proBNP:   Lipid Profile:   HgA1c:   TSH:            CHIEF COMPLAINT:  No previous cardiac evaluation   Son at bedside translated     HISTORY OF PRESENT ILLNESS:  62 year-old woman with a PMH of myasthenia gravis who presented to the ED on 5/5/23 with worsening diplopia and difficulty speaking, swallowing and breathing x 1 week, admitted for evaluation of management of acute MG exacerbation. Interval neuro exam stable. Still has shiley placed on R. NIF/VC as noted above. s/p IVIG 5/15-5/19. Shiley removal unable to be done due to copious secretion and coughing when sitting up 30 deg in bed.  Overnight desatted to high 70s, self-suctioned and improved  - HR 140s while walking back and forth in the room  - Noted to have bright red blood while BM overnight, has hemorrhoids  - NIF -50 and   No chest pain or shortness of breath     PAST MEDICAL & SURGICAL HISTORY:  Myasthenia gravis      Diabetes mellitus      Hypertension              MEDICATIONS:  amLODIPine   Tablet 10 milliGRAM(s) Oral daily  enoxaparin Injectable 40 milliGRAM(s) SubCutaneous every 24 hours      acetylcysteine 20%  Inhalation 4 milliLiter(s) Inhalation every 6 hours  albuterol/ipratropium for Nebulization 3 milliLiter(s) Nebulizer every 6 hours    acetaminophen     Tablet .. 650 milliGRAM(s) Oral every 6 hours PRN  diphenhydrAMINE 25 milliGRAM(s) Oral every 4 hours PRN  melatonin 3 milliGRAM(s) Oral at bedtime  oxyCODONE    IR 5 milliGRAM(s) Oral every 6 hours PRN    bisacodyl Suppository 10 milliGRAM(s) Rectal daily PRN  pantoprazole    Tablet 40 milliGRAM(s) Oral before breakfast  polyethylene glycol 3350 17 Gram(s) Oral every 12 hours  senna 2 Tablet(s) Oral at bedtime    atorvastatin 20 milliGRAM(s) Oral at bedtime  dextrose 50% Injectable 25 Gram(s) IV Push once  dextrose Oral Gel 15 Gram(s) Oral once PRN  glucagon  Injectable 1 milliGRAM(s) IntraMuscular once  insulin lispro (ADMELOG) corrective regimen sliding scale   SubCutaneous three times a day before meals  insulin lispro (ADMELOG) corrective regimen sliding scale   SubCutaneous at bedtime  predniSONE   Tablet 50 milliGRAM(s) Oral daily    chlorhexidine 4% Liquid 1 Application(s) Topical <User Schedule>  dextrose 5%. 1000 milliLiter(s) IV Continuous <Continuous>  dextrose 5%. 1000 milliLiter(s) IV Continuous <Continuous>  sodium chloride 0.65% Nasal 1 Spray(s) Both Nostrils four times a day PRN      FAMILY HISTORY:      SOCIAL HISTORY:    [ ] Non-smoker  [ ] Smoker  [ ] Alcohol    Allergies    peanuts (Unknown)  No Known Drug Allergies    Intolerances    	    REVIEW OF SYSTEMS:  CONSTITUTIONAL: No fever, weight loss, or fatigue  EYES: No eye pain, visual disturbances, or discharge  ENMT:  No difficulty hearing, tinnitus, vertigo; No sinus or throat pain  NECK: No pain or stiffness  RESPIRATORY: No cough, wheezing, chills or hemoptysis; No Shortness of Breath  CARDIOVASCULAR: No chest pain, palpitations, passing out, dizziness, or leg swelling  GASTROINTESTINAL: No abdominal or epigastric pain. No nausea, vomiting, or hematemesis; No diarrhea or constipation. No melena or hematochezia.  GENITOURINARY: No dysuria, frequency, hematuria, or incontinence  NEUROLOGICAL: No headaches, memory loss, loss of strength, numbness, or tremors  SKIN: No itching, burning, rashes, or lesions   LYMPH Nodes: No enlarged glands  ENDOCRINE: No heat or cold intolerance; No hair loss  MUSCULOSKELETAL: No joint pain or swelling; No muscle, back, or extremity pain  PSYCHIATRIC: No depression, anxiety, mood swings, or difficulty sleeping  HEME/LYMPH: No easy bruising, or bleeding gums  ALLERY AND IMMUNOLOGIC: No hives or eczema	    [ ] All others negative	  [ ] Unable to obtain    PHYSICAL EXAM:  T(C): 36.7 (05-20-23 @ 21:30), Max: 36.9 (05-20-23 @ 08:17)  HR: 97 (05-20-23 @ 21:30) (79 - 123)  BP: 136/75 (05-20-23 @ 21:30) (136/75 - 182/84)  RR: 18 (05-20-23 @ 21:30) (18 - 18)  SpO2: 96% (05-20-23 @ 21:30) (94% - 96%)  Wt(kg): --  I&O's Summary    19 May 2023 07:01  -  20 May 2023 07:00  --------------------------------------------------------  IN: 1300 mL / OUT: 0 mL / NET: 1300 mL    20 May 2023 07:01  -  20 May 2023 22:06  --------------------------------------------------------  IN: 680 mL / OUT: 0 mL / NET: 680 mL        Appearance: NAD  HEENT:   Normal oral mucosa, PERRL, EOMI	  Lymphatic: No lymphadenopathy  Cardiovascular: Normal S1 S2, No JVD, No murmurs, No edema  Respiratory: Lungs clear to auscultation	  Psychiatry: A & O x 3, Mood & affect appropriate  Gastrointestinal:  Soft, Non-tender, + BS	  Skin: No rashes, No ecchymoses, No cyanosis	  Neurologic: MS - Eyes open, awake, alert and attentive.  Follows all commands.   CN - Right eye w limited range in extreme lateral/ upward gaze consistent w Lateral Rectus and Superior Oblique weakness. EOM otherwise intact; moderate ptosis right eyre worsened with sustained upgaze (~20 seconds); VFF, face sens/str/hearing WNL b/l, tongue midline. MOLLY palate. Neck extension 5/5 and neck flexion 4+/5.  Motor - Normal bulk/tone. No pronator drift. Head flexion is at 4-/5   RUE: 4-/5 deltoids, 5/5 biceps, 5+/5 triceps, 5/5 .    LUE: 4-/5 deltoids, 5/5 biceps, 5/5 triceps, 5/5 .   RLE/LLE: 4+/5 hip flexion, 5/5 knee flexion, 5/5 knee extension, 5/5 ankle plantarflexion and dorsiflexion.   Sens - LT intact throughout  Gait and station - MOLLY.  Extremities: Normal range of motion, No clubbing, cyanosis or edema  Vascular: Peripheral pulses palpable 2+ bilaterally    TELEMETRY: 	SR ST 130s     ECG:  	Sinus tach Anterolateral infarct no acute ischemic stt changes   RADIOLOGY:  < from: Xray Chest 1 View- PORTABLE-Routine (Xray Chest 1 View- PORTABLE-Routine .) (05.20.23 @ 12:31) >    ACC: [id number] EXAM:  XR CHEST PORTABLE ROUTINE 1V   ORDERED BY: ELKE RANDALL     PROCEDURE DATE:  05/20/2023          INTERPRETATION:  CLINICAL INDICATION: Patient with myasthenia gravis   crisis with increasing cough and secretions, evaluate for aspiration    EXAM: Frontal radiograph of the chest.    COMPARISON: Chest radiograph from 5/16/2023    FINDINGS:  Interval extubation and removal of enteric tube. Redemonstrated   right-sided hemodialysis catheter with tip in the right atrium.  No focal consolidations.  There is no pleural effusion or pneumothorax.  The heart is normal in size  Sternotomy wires.    IMPRESSION:  Interval patient removal of enteric tube. Right-sided hemodialysis   catheter remains unchanged in position.  No focal consolidations.    --- End of Report ---           DA LIVE MD; Resident Radiologist  This document has been electronically signed.  GABE MCKENNA MD; Attending Interventional Radiologist  This document has been electronically signed. May 20 2023  6:20PM    < end of copied text >  < from: [employer] W or WO Ultrasound Enhancing Agent (05.06.23 @ 10:03) >    TRANSTHORACIC ECHOCARDIOGRAM REPORT  ________________________________________________________________________________                                      _______       Pt. Name:       KAIDEN HATCH Study Date:    5/6/2023  MRN:            [id number]    YOB: 1960  Accession #:    [id number]     Age:           62 years  Account#:       [id number]  Gender:        F  Heart Rate:                   Height:        65.00 in (165.10 cm)  Rhythm:                       Weight:        363.75 lb (165.00 kg)  Blood Pressure: 124/60 mmHg   BSA/BMI:       2.55 m² / 60.53 kg/m²  ________________________________________________________________________________________  Referring Physician:    [id number] Kevin Milner  Interpreting Physician: Jerald Gray  Primary Sonographer:    Heather Reyes [facility]    CPT:               3D RECONST W/O WKSTATION - 83135.m; ECHO TTE WO CON COMP W                     DOPP - 52541.m  Indication(s):     Heart failure, unspecified - I50.9  Procedure:     Limited transthoracic echocardiogram. Real time and full                     volume 3-dimensional imaging performed.  Ordering Location: [facility]  Study Information: Image quality for this study is good.    _______________________________________________________________________________________  CONCLUSIONS:      1. Normal left ventricular cavity size. The left ventricular wall thickness is normal. The left ventricular systolic function is normal. The basal inferior segment is hypokinetic.   2. There is normal left ventricular diastolic function.   3. Normal right ventricular cavity size, normal wall thickness and normal systolic function. The tricuspid annular plane systolic excursion (TAPSE) is 1.8 cm (normal >=1.7 cm).    ________________________________________________________________________________________  FINDINGS:  Left Ventricle:  Normal left ventricular cavity size. The left ventricular wall thickness is normal. The left ventricular systolic function is normal with a calculatedejection fraction of 54 % by the Fontanez's biplane method of disks55 % by 3D. There is normal left ventricular diastolic function, with normal filling pressure.  LV Wall Scoring: The basal inferior segment is hypokinetic. All remaining scored  segments are normal.       Right Ventricle:  Normal right ventricular cavity size, normal wall thickness and normal systolic function. Tricuspid annular plane systolic excursion (TAPSE) is 1.8 cm (normal >=1.7 cm).     Left Atrium:  The left atrium is normal, with an indexed volume of 15 ml/m².     Right Atrium:  The right atrium is normal.     Aortic Valve:  The aortic valve is tricuspid with normal structure without stenosis. There is no evidence of aortic regurgitation.     Mitral Valve:  There is mitral valve thickening of the anterior and posterior leaflets particulary at the tips. There is mild mitral regurgitation.     Tricuspid Valve:  Structurally normal tricuspid valve with normal leaflet excursion. There is mild-moderate tricuspid regurgitation.     Pulmonic Valve:  Structurally normal pulmonic valve with normal leaflet excursion. There is trace pulmonic regurgitation.     Aorta:  The aortic annulus and aortic root appear normal in size.     Pericardium:  No pericardial effusion seen.  ____________________________________________________________________  QUANTITATIVE DATA  Left Ventricle Measurements                         Indexed to BSA  IVSd (2D):   1.0 cm  LVPWd (2D):  1.0 cm  LVIDd (2D):  4.9 cm  LVIDs (2D):  3.2 cm  LV Mass:    177 g    69.5 g/m²     MV E Vmax:    0.63 m/s  MV A Vmax:    1.07 m/s  MV E/A:       0.59  e' lateral:   7.29 cm/s  e' medial:    8.38 cm/s  E/e' lateral: 8.70  E/e' medial:  7.57  E/e' Average: 8.09    Aorta Measurements     Ao Root: 2.8 cm       Left Atrium Measurements     LA Diam 2D: 3.00 cm    Right Ventricle Measurements     TAPSE: 1.8 cm    < end of copied text >    OTHER: 	  	  LABS:	 	    CARDIAC MARKERS:                                  11.0   11.60 )-----------( 412      ( 20 May 2023 07:07 )             34.9     05-20    138  |  102  |  24<H>  ----------------------------<  110<H>  3.7   |  25  |  0.58    Ca    8.9      20 May 2023 07:08    TPro  8.5<H>  /  Alb  3.9  /  TBili  0.5  /  DBili  x   /  AST  12  /  ALT  21  /  AlkPhos  52  05-19    proBNP:   Lipid Profile:   HgA1c:   TSH:

## 2023-05-20 NOTE — PROVIDER CONTACT NOTE (OTHER) - SITUATION
HR went up in the 140's
Pt having repeated complaints of feeling tightness in nose and throat. Nasal spray ordered 5/10
Pt SBP is above parameter. Hydralazine was given twice, once at 12 am and again at 2 am. Blood pressure was still above parameter, 184/69 when reassessed at 3 am.

## 2023-05-20 NOTE — PROGRESS NOTE ADULT - ASSESSMENT
62 year-old woman with a PMH of myasthenia gravis who presented to the ED on 5/5/23 with worsening diplopia and difficulty speaking, swallowing and breathing x 1 week, admitted for evaluation of management of acute MG exacerbation. Interval neuro exam showing mild worsening of neck flexion weakness, 4-/5 deltoid & hip flexion weakness, one breath counts up to 13. Stil has shiley placed on R. NIF/VC as noted above. s/p IVIG 5/15-5/19    Impression: Acute MG exacerbation, possibly triggered by progressive disease or other toxic/metabolic insult. No stable focal neurologic deficits to suggest cerebrovascular or other acute intracranial event.     Recommendations:   [] C/w mestinon 90 mg tid, monitor for secretions (pt takes mestinon 120 mg tid at home)  [] Prednisone 50mg qd resumed on 5/11. Continue current dose, and will go down by 10 mg monthly until she reaches 20 g qd, next change will be at 6/11/23  [] Protonix for GI Prophylaxis while in Steroid  [] Replete electrolyte as needed  [] Aim for shiley removal on 5/20  [x] S/p PLEX x 5 of sessions (5/6-5/14) with some improvement  [x] S/p IV solumedrol 125mg IV daily x 5 days (5/6-5/10)  [] PT/OT as tolerated  [] Pureed Diet  [] Ultimately patient will need better outpatient therapy to prevent relapses, either IVIG infusions, increased prednisone dose, or Soliris (or combination of some)  [] Likely to follow up with Dr. Jayjay kimble at [facility] at [address] [place] [address] Outpatient Clinic since has Medicaid. Will try to set up monthly IVIG vs Soliris with Dr. Escudero at [address] [place] [address]    Case to be seen and discussed with general neurology attending  62 year-old woman with a PMH of myasthenia gravis who presented to the ED on 5/5/23 with worsening diplopia and difficulty speaking, swallowing and breathing x 1 week, admitted for evaluation of management of acute MG exacerbation. Interval neuro exam showing mild worsening of neck flexion weakness, 4-/5 deltoid & hip flexion weakness, one breath counts up to 13. Stil has shiley placed on R. NIF/VC as noted above. s/p IVIG 5/15-5/19    Impression: Acute MG exacerbation, possibly triggered by progressive disease or other toxic/metabolic insult. No stable focal neurologic deficits to suggest cerebrovascular or other acute intracranial event.     Recommendations:   [] C/w mestinon 90 mg tid, monitor for secretions (pt takes mestinon 120 mg tid at home)  [] Prednisone 50mg qd resumed on 5/11. Continue current dose, and will go down by 10 mg monthly until she reaches 20 g qd, next change will be at 6/11/23  [] Protonix for GI Prophylaxis while in Steroid  [] Replete electrolyte as needed  [] Aim for shiley removal on 5/20  [x] S/p PLEX x 5 of sessions (5/6-5/14) with some improvement  [x] S/p IV solumedrol 125mg IV daily x 5 days (5/6-5/10)  [] PT/OT as tolerated  [] Pureed Diet  [] Ultimately patient will need better outpatient therapy to prevent relapses, either IVIG infusions, increased prednisone dose, or Soliris (or combination of some)  [] Likely to follow up with Dr. Jayjay kimble at [facility] at [address] [place] [address] Outpatient Clinic since has Medicaid. Will try to set up monthly IVIG vs Soliris with Dr. Escudero at [address] [place] [address]    Case to be seen and discussed with general neurology attending  62 year-old woman with a PMH of myasthenia gravis who presented to the ED on 5/5/23 with worsening diplopia and difficulty speaking, swallowing and breathing x 1 week, admitted for evaluation of management of acute MG exacerbation. Interval neuro exam showing mild worsening of neck flexion weakness, 4-/5 deltoid & hip flexion weakness, one breath counts up to 13. Stil has shiley placed on R. NIF/VC as noted above. s/p IVIG 5/15-5/19    Impression: Acute MG exacerbation, possibly triggered by progressive disease or other toxic/metabolic insult. No stable focal neurologic deficits to suggest cerebrovascular or other acute intracranial event.     Recommendations:   [] C/w mestinon 90 mg tid, monitor for secretions (pt takes mestinon 120 mg tid at home)  [] Prednisone 50mg qd resumed on 5/11. Continue current dose, and will go down by 10 mg monthly until she reaches 20 g qd, next change will be at 6/11/23  [] Protonix for GI Prophylaxis while in Steroid  [] Replete electrolyte as needed  [] Aim for shiley removal on 5/20  [x] S/p PLEX x 5 of sessions (5/6-5/14) with some improvement  [x] S/p IV solumedrol 125mg IV daily x 5 days (5/6-5/10)  [] PT/OT as tolerated  [] Pureed Diet  [] Ultimately patient will need better outpatient therapy to prevent relapses, either IVIG infusions, increased prednisone dose, or Soliris (or combination of some)  [] Likely to follow up with Dr. Jayjay kimble at [facility] at [address] [place] [address] Outpatient Clinic since has Medicaid. Will try to set up monthly IVIG vs Soliris with Dr. Escudero at [address] [place] [address]    Case to be seen and discussed with general neurology attending  62 year-old woman with a PMH of myasthenia gravis who presented to the ED on 5/5/23 with worsening diplopia and difficulty speaking, swallowing and breathing x 1 week, admitted for evaluation of management of acute MG exacerbation. Interval neuro exam stable. Still has shiley placed on R. NIF/VC as noted above. s/p IVIG 5/15-5/19    Impression: Acute MG exacerbation, possibly triggered by progressive disease or other toxic/metabolic insult. No stable focal neurologic deficits to suggest cerebrovascular or other acute intracranial event.     Recommendations:   [] C/w mestinon 90 mg tid, monitor for secretions (pt takes mestinon 120 mg tid at home)  [] Prednisone 50mg qd resumed on 5/11. Continue current dose, and will go down by 10 mg monthly until she reaches 20 g qd, next change will be at 6/11/23  [] Protonix for GI Prophylaxis while in Steroid  [] Replete electrolyte as needed  [] Aim for shiley removal on 5/20  [x] S/p PLEX x 5 of sessions (5/6-5/14) with some improvement  [x] S/p IV solumedrol 125mg IV daily x 5 days (5/6-5/10)  [] PT/OT as tolerated  [] Pureed Diet  [] Ultimately patient will need better outpatient therapy to prevent relapses, either IVIG infusions, increased prednisone dose, or Soliris (or combination of some)  [] Likely to follow up with Dr. Jayjay kimble at [facility] at [address] [place] [address] Outpatient Clinic since has Medicaid. Will try to set up monthly IVIG vs Soliris with Dr. Escudero at [address] [place] [address]    Case to be seen and discussed with general neurology attending  62 year-old woman with a PMH of myasthenia gravis who presented to the ED on 5/5/23 with worsening diplopia and difficulty speaking, swallowing and breathing x 1 week, admitted for evaluation of management of acute MG exacerbation. Interval neuro exam stable. Still has shiley placed on R. NIF/VC as noted above. s/p IVIG 5/15-5/19    Impression: Acute MG exacerbation, possibly triggered by progressive disease or other toxic/metabolic insult. No stable focal neurologic deficits to suggest cerebrovascular or other acute intracranial event.     Recommendations:   [] C/w mestinon 90 mg tid, monitor for secretions (pt takes mestinon 120 mg tid at home)  [] Prednisone 50mg qd resumed on 5/11. Continue current dose, and will go down by 10 mg monthly until she reaches 20 g qd, next change will be at 6/11/23  [] Protonix for GI Prophylaxis while in Steroid  [] Replete electrolyte as needed  [] Aim for shiley removal on 5/20  [x] S/p PLEX x 5 of sessions (5/6-5/14) with some improvement  [x] S/p IV solumedrol 125mg IV daily x 5 days (5/6-5/10)  [] PT/OT as tolerated  [] Pureed Diet  [] Ultimately patient will need better outpatient therapy to prevent relapses, either IVIG infusions, increased prednisone dose, or Soliris (or combination of some)  [] Likely to follow up with Dr. Jayjay kimble at [facility] at [address] [place] [address] Outpatient Clinic since has Medicaid. Will try to set up monthly IVIG vs Soliris with Dr. Escudero at [address] [place] [address]    Case to be seen and discussed with general neurology attending  62 year-old woman with a PMH of myasthenia gravis who presented to the ED on 5/5/23 with worsening diplopia and difficulty speaking, swallowing and breathing x 1 week, admitted for evaluation of management of acute MG exacerbation. Interval neuro exam stable. Still has shiley placed on R. NIF/VC as noted above. s/p IVIG 5/15-5/19    Impression: Acute MG exacerbation, possibly triggered by progressive disease or other toxic/metabolic insult. No stable focal neurologic deficits to suggest cerebrovascular or other acute intracranial event.     Recommendations:   [] C/w mestinon 90 mg tid, monitor for secretions (pt takes mestinon 120 mg tid at home)  [] Prednisone 50mg qd resumed on 5/11. Continue current dose, and will go down by 10 mg monthly until she reaches 20 g qd, next change will be at 6/11/23  [] Protonix for GI Prophylaxis while in Steroid  [] Replete electrolyte as needed  [] Aim for shiley removal on 5/20  [x] S/p PLEX x 5 of sessions (5/6-5/14) with some improvement  [x] S/p IV solumedrol 125mg IV daily x 5 days (5/6-5/10)  [] PT/OT as tolerated  [] Pureed Diet  [] Ultimately patient will need better outpatient therapy to prevent relapses, either IVIG infusions, increased prednisone dose, or Soliris (or combination of some)  [] Likely to follow up with Dr. Jayjay kimble at [facility] at [address] [place] [address] Outpatient Clinic since has Medicaid. Will try to set up monthly IVIG vs Soliris with Dr. Escudero at [address] [place] [address]    Case to be seen and discussed with general neurology attending  62 year-old woman with a PMH of myasthenia gravis who presented to the ED on 5/5/23 with worsening diplopia and difficulty speaking, swallowing and breathing x 1 week, admitted for evaluation of management of acute MG exacerbation. Interval neuro exam stable. Still has shiley placed on R. NIF/VC as noted above. s/p IVIG 5/15-5/19. Shiley removal unable to be done due to copious secretion and coughing when sitting up 30 deg in bed.    Impression: Acute MG exacerbation, possibly triggered by progressive disease or other toxic/metabolic insult. No stable focal neurologic deficits to suggest cerebrovascular or other acute intracranial event.     Recommendations:   [] C/w mestinon 90 mg tid, monitor for secretions (pt takes mestinon 120 mg tid at home)  [] Prednisone 50mg qd resumed on 5/11. Continue current dose, and will go down by 10 mg monthly until she reaches 20 g qd, next change will be at 6/11/23  [] Protonix for GI Prophylaxis while in Steroid  [] Replete electrolyte as needed  [] Will f/u with [facility] for shiley removal, was instructed to call back in PM  [x] S/p PLEX x 5 of sessions (5/6-5/14) with some improvement  [x] S/p IV solumedrol 125mg IV daily x 5 days (5/6-5/10)  [] PT/OT as tolerated  [] Pureed Diet  [] Ultimately patient will need better outpatient therapy to prevent relapses, either IVIG infusions, increased prednisone dose, or Soliris (or combination of some)  [] Likely to follow up with Dr. Jayjay kimble at [facility] at [address] [place] [address] Outpatient Clinic since has Medicaid. Will try to set up monthly IVIG vs Soliris with Dr. Escudero at [address] [place] [address]    Case to be seen and discussed with general neurology attending  62 year-old woman with a PMH of myasthenia gravis who presented to the ED on 5/5/23 with worsening diplopia and difficulty speaking, swallowing and breathing x 1 week, admitted for evaluation of management of acute MG exacerbation. Interval neuro exam stable. Still has shiley placed on R. NIF/VC as noted above. s/p IVIG 5/15-5/19. Shiley removal unable to be done due to copious secretion and coughing when sitting up 30 deg in bed.    Impression: Acute MG exacerbation, possibly triggered by progressive disease or other toxic/metabolic insult. No stable focal neurologic deficits to suggest cerebrovascular or other acute intracranial event.     Recommendations:   [] C/w mestinon 90 mg tid, monitor for secretions (pt takes mestinon 120 mg tid at home)  [] Prednisone 50mg qd resumed on 5/11. Continue current dose, and will go down by 10 mg monthly until she reaches 20 g qd, next change will be at 6/11/23  [] Protonix for GI Prophylaxis while in Steroid  [] Replete electrolyte as needed  [] Will f/u with [facility] for shiley removal, was instructed to call back in PM  [x] S/p PLEX x 5 of sessions (5/6-5/14) with some improvement  [x] S/p IV solumedrol 125mg IV daily x 5 days (5/6-5/10)  [] PT/OT as tolerated  [] Pureed Diet  [] Ultimately patient will need better outpatient therapy to prevent relapses, either IVIG infusions, increased prednisone dose, or Soliris (or combination of some)  [] Likely to follow up with Dr. Jayjay kimble at [facility] at [address] [place] [address] Outpatient Clinic since has Medicaid. Will try to set up monthly IVIG vs Soliris with Dr. Escudero at [address] [place] [address]    Case to be seen and discussed with general neurology attending  62 year-old woman with a PMH of myasthenia gravis who presented to the ED on 5/5/23 with worsening diplopia and difficulty speaking, swallowing and breathing x 1 week, admitted for evaluation of management of acute MG exacerbation. Interval neuro exam stable. Still has shiley placed on R. NIF/VC as noted above. s/p IVIG 5/15-5/19. Shiley removal unable to be done due to copious secretion and coughing when sitting up 30 deg in bed.    Impression: Acute MG exacerbation, possibly triggered by progressive disease or other toxic/metabolic insult. No stable focal neurologic deficits to suggest cerebrovascular or other acute intracranial event.     Recommendations:   [] C/w mestinon 90 mg tid, monitor for secretions (pt takes mestinon 120 mg tid at home)  [] Prednisone 50mg qd resumed on 5/11. Continue current dose, and will go down by 10 mg monthly until she reaches 20 g qd, next change will be at 6/11/23  [] Protonix for GI Prophylaxis while in Steroid  [] Replete electrolyte as needed  [] Will f/u with [facility] for shiley removal, was instructed to call back in PM  [x] S/p PLEX x 5 of sessions (5/6-5/14) with some improvement  [x] S/p IV solumedrol 125mg IV daily x 5 days (5/6-5/10)  [] PT/OT as tolerated  [] Pureed Diet  [] Ultimately patient will need better outpatient therapy to prevent relapses, either IVIG infusions, increased prednisone dose, or Soliris (or combination of some)  [] Likely to follow up with Dr. Jayjay kimble at [facility] at [address] [place] [address] Outpatient Clinic since has Medicaid. Will try to set up monthly IVIG vs Soliris with Dr. Escudero at [address] [place] [address]    Case to be seen and discussed with general neurology attending  62 year-old woman with a PMH of myasthenia gravis who presented to the ED on 5/5/23 with worsening diplopia and difficulty speaking, swallowing and breathing x 1 week, admitted for evaluation of management of acute MG exacerbation. Interval neuro exam stable. Still has shiley placed on R. NIF/VC as noted above. s/p IVIG 5/15-5/19. Shiley removal unable to be done due to copious secretion and coughing when sitting up 30 deg in bed.    Impression: Acute MG exacerbation, possibly triggered by progressive disease or other toxic/metabolic insult    Recommendations:   [] C/w mestinon 90 mg tid, monitor for secretions (pt takes mestinon 120 mg tid at home)  [] Prednisone 50mg qd resumed on 5/11. Continue current dose, and will go down by 10 mg monthly until she reaches 20 g qd, next change will be at 6/11/23  [] Protonix for GI Prophylaxis while in Steroid  [] Replete electrolyte as needed  [] Will f/u with [facility] for shiley removal, was instructed to call back in PM  [] Monitor further bleeding in BM for now  [x] S/p PLEX x 5 of sessions (5/6-5/14) with some improvement  [x] S/p IV solumedrol 125mg IV daily x 5 days (5/6-5/10)  [] PT/OT as tolerated  [] Pureed Diet  [] Ultimately patient will need better outpatient therapy to prevent relapses, either IVIG infusions, increased prednisone dose, or Soliris (or combination of some)  [] Likely to follow up with Dr. Jayjay kimble at [facility] at [address] [place] [address] Outpatient Clinic since has Medicaid. Will try to set up monthly IVIG vs Soliris with Dr. Escudero at [address] [place] [address]    Case to be seen and discussed with general neurology attending  62 year-old woman with a PMH of myasthenia gravis who presented to the ED on 5/5/23 with worsening diplopia and difficulty speaking, swallowing and breathing x 1 week, admitted for evaluation of management of acute MG exacerbation. Interval neuro exam stable. Still has shiley placed on R. NIF/VC as noted above. s/p IVIG 5/15-5/19. Shiley removal unable to be done due to copious secretion and coughing when sitting up 30 deg in bed.    Impression: Acute MG exacerbation, possibly triggered by progressive disease or other toxic/metabolic insult    Recommendations:   [] C/w mestinon 90 mg tid, monitor for secretions (pt takes mestinon 120 mg tid at home)  [] Prednisone 50mg qd resumed on 5/11. Continue current dose, and will go down by 10 mg monthly until she reaches 20 g qd, next change will be at 6/11/23  [] Protonix for GI Prophylaxis while in Steroid  [] Replete electrolyte as needed  [] Will f/u with [facility] for shiley removal, was instructed to call back in PM  [] Monitor further bleeding in BM for now  [x] S/p PLEX x 5 of sessions (5/6-5/14) with some improvement  [x] S/p IV solumedrol 125mg IV daily x 5 days (5/6-5/10)  [] PT/OT as tolerated  [] Pureed Diet  [] Ultimately patient will need better outpatient therapy to prevent relapses, either IVIG infusions, increased prednisone dose, or Soliris (or combination of some)  [] Likely to follow up with Dr. Jayjay kimble at [facility] at [address] [place] [address] Outpatient Clinic since has Medicaid. Will try to set up monthly IVIG vs Soliris with Dr. Escudero at [address] [place] [address]    Case to be seen and discussed with general neurology attending  62 year-old woman with a PMH of myasthenia gravis who presented to the ED on 5/5/23 with worsening diplopia and difficulty speaking, swallowing and breathing x 1 week, admitted for evaluation of management of acute MG exacerbation. Interval neuro exam stable. Still has shiley placed on R. NIF/VC as noted above. s/p IVIG 5/15-5/19. Shiley removal unable to be done due to copious secretion and coughing when sitting up 30 deg in bed.    Impression: Acute MG exacerbation, possibly triggered by progressive disease or other toxic/metabolic insult    Recommendations:   [] C/w mestinon 90 mg tid, monitor for secretions (pt takes mestinon 120 mg tid at home)  [] Prednisone 50mg qd resumed on 5/11. Continue current dose, and will go down by 10 mg monthly until she reaches 20 g qd, next change will be at 6/11/23  [] Protonix for GI Prophylaxis while in Steroid  [] Replete electrolyte as needed  [] Will f/u with [facility] for shiley removal, was instructed to call back in PM  [] Monitor further bleeding in BM for now  [x] S/p PLEX x 5 of sessions (5/6-5/14) with some improvement  [x] S/p IV solumedrol 125mg IV daily x 5 days (5/6-5/10)  [] PT/OT as tolerated  [] Pureed Diet  [] Ultimately patient will need better outpatient therapy to prevent relapses, either IVIG infusions, increased prednisone dose, or Soliris (or combination of some)  [] Likely to follow up with Dr. Jayjay kimble at [facility] at [address] [place] [address] Outpatient Clinic since has Medicaid. Will try to set up monthly IVIG vs Soliris with Dr. Escudero at [address] [place] [address]    Case to be seen and discussed with general neurology attending

## 2023-05-20 NOTE — DISCHARGE NOTE PROVIDER - CARE PROVIDER_API CALL
GAURAV CASTANEDA  Neurology  [address] [place] [address]  Phone: [phone]  Fax: [phone]  Established Patient  Follow Up Time: 1 week    Neurology Resident Clinic,   [address] [address]  Phone: [phone]  Fax: (   )    -  Follow Up Time:    GAURAV CASTANEDA  Neurology  [address] [place] [address]  Phone: [phone]  Fax: [phone]  Established Patient  Follow Up Time: 1 week    Neurology Resident Clinic,   [address] [address]  Phone: [phone]  Fax: (   )    -  Follow Up Time:    GAURAV CASTANEDA  Neurology  [address] [place] [address]  Phone: [phone]  Fax: [phone]  Established Patient  Follow Up Time: 1 week    Neurology Resident Clinic,   [address] [address]  Phone: [phone]  Fax: (   )    -  Follow Up Time:    Delroy Ro  Neurology  [address] [facility] [address]  [place] [address]  Phone: [phone]  Fax: [phone]  Established Patient  Follow Up Time: 1 week    Neurology Resident Clinic,   4 [facility]  [address] [place] [address]  Phone: [phone]  Fax: (   )    -  Follow Up Time:     Kee Escudero)  Electrodiagnostic Medicine; Internal Medicine; Neurology  [address] [facility] [address]  [place], [place] [id number]  Phone: [phone]  Fax: [phone]  Follow Up Time: 1 month   Delroy Ro  Neurology  [address] [facility] [address]  [place] [address]  Phone: [phone]  Fax: [phone]  Established Patient  Follow Up Time: 1 week    Neurology Resident Clinic,   4 [facility]  [address] [place] [address]  Phone: [phone]  Fax: (   )    -  Follow Up Time:     Kee Escudero)  Electrodiagnostic Medicine; Internal Medicine; Neurology  [address] [facility] 150  [place], [place] [address]  Phone: [phone]  Fax: [phone]  Follow Up Time: 1 month   Delroy Ro  Neurology  [address] [facility] [address]  [place] [address]  Phone: [phone]  Fax: [phone]  Established Patient  Follow Up Time: 1 week    Neurology Resident Clinic,   4 [facility]  300 [place] [address]  Phone: [phone]  Fax: (   )    -  Follow Up Time:     Kee Escudero)  Electrodiagnostic Medicine; Internal Medicine; Neurology  [address] [facility] 150  [place], [place] [id number]  Phone: [phone]  Fax: [phone]  Follow Up Time: 1 month   Delroy Ro  Neurology  [address] [facility] [address]  [place] [address]  Phone: [phone]  Fax: [phone]  Established Patient  Follow Up Time: 1 week    Kee Escudero)  Electrodiagnostic Medicine; Internal Medicine; Neurology  [address] [facility] 150  [place], [place] [id number]  Phone: [phone]  Fax: [phone]  Follow Up Time: 1 month    Neurology Resident Clinic,   4 [facility]  300 [place] [address]  Phone: [phone]  Fax: (   )    -  Follow Up Time:     Chalo Phillips (DO)  EndocrinologyMetabDiabetes  [address] [place] [address]  Phone: [phone]  Fax: [phone]  Follow Up Time: Routine   Delroy Ro  Neurology  [address] [facility] [address]  [place] [address]  Phone: [phone]  Fax: [phone]  Established Patient  Follow Up Time: 1 week    Kee Escudero)  Electrodiagnostic Medicine; Internal Medicine; Neurology  [address] [facility] 150  [place], [place] [id number]  Phone: [phone]  Fax: [phone]  Follow Up Time: 1 month    Neurology Resident Clinic,   4 [facility]  [address] [place] [address]  Phone: [phone]  Fax: (   )    -  Follow Up Time:     Chalo Phillips (DO)  EndocrinologyMetabDiabetes  [address] [place] [address]  Phone: [phone]  Fax: [phone]  Follow Up Time: Routine   Delroy Ro  Neurology  [address] [facility] [address]  [place] [address]  Phone: [phone]  Fax: [phone]  Established Patient  Follow Up Time: 1 week    Kee Escudero)  Electrodiagnostic Medicine; Internal Medicine; Neurology  [address] [facility] 150  [place], [place] [id number]  Phone: [phone]  Fax: [phone]  Follow Up Time: 1 month    Neurology Resident Clinic,   4 [facility]  [address] [place] [address]  Phone: [phone]  Fax: (   )    -  Follow Up Time:     Chalo Phillips (DO)  EndocrinologyMetabDiabetes  [address] [place] [address]  Phone: [phone]  Fax: [phone]  Follow Up Time: Routine

## 2023-05-20 NOTE — DISCHARGE NOTE PROVIDER - NSDCCPCAREPLAN_GEN_ALL_CORE_FT
PRINCIPAL DISCHARGE DIAGNOSIS  Diagnosis: Myasthenia gravis in crisis  Assessment and Plan of Treatment: You came in with worsening symptoms of myasthenia gravis. You were treated with plasma exchange followed by IVIG for 5 sessions each. You will likely require immune therapy either monthly IVIG or soliris. Please follow up with your outpatient neurologist within 1-2 weeks and take medications as prescribed. If you have worsening double vision, trouble breathing, walking difficulty, and/or fall with trauma on the head, please visit the nearest emergency room for urgent evaluation and treatment.     PRINCIPAL DISCHARGE DIAGNOSIS  Diagnosis: Myasthenia gravis in crisis  Assessment and Plan of Treatment: You came in with worsening symptoms of myasthenia gravis. You were treated with plasma exchange followed by IVIG for 5 sessions each. You will likely require immune therapy either monthly IVIG or soliris. Please follow up with your outpatient neurologist within 1-2 weeks and take medications as prescribed. If you have worsening double vision, trouble breathing, walking difficulty, and/or fall with trauma on the head, please visit the nearest emergency room for urgent evaluation and treatment.  Pt follows with Dr. Delroy Ro, likely will need monthly IVIG infusion vs soliris.  Likely to follow up with Dr. Ro vs. Dr. Escudero vs at [facility] at [address] [place] [address] Outpatient Clinic since has Medicaid. Will likely need monthly IVIG vs Soliris with Dr. Escudero at [address] [place] [address]     PRINCIPAL DISCHARGE DIAGNOSIS  Diagnosis: Myasthenia gravis in crisis  Assessment and Plan of Treatment: You came in with worsening symptoms of myasthenia gravis. You were treated with plasma exchange followed by IVIG for 5 sessions each. You will likely require immune therapy either monthly IVIG or soliris. Please follow up with your outpatient neurologist within 1-2 weeks and take medications as prescribed. If you have worsening double vision, trouble breathing, walking difficulty, and/or fall with trauma on the head, please visit the nearest emergency room for urgent evaluation and treatment.  Pt follows with Dr. Delroy Ro, likely will need monthly IVIG infusion vs soliris.  Likely to follow up with Dr. Ro vs. Dr. Escudero vs at [facility] at [address] [place] [address] Outpatient Clinic since has Medicaid. Will likely need monthly IVIG vs Soliris with Dr. Escudero at [address] [place] [address]     PRINCIPAL DISCHARGE DIAGNOSIS  Diagnosis: Myasthenia gravis in crisis  Assessment and Plan of Treatment: You came in with worsening symptoms of myasthenia gravis. You were treated with plasma exchange followed by IVIG for 5 sessions each. You will likely require immune therapy either monthly IVIG or soliris. Please follow up with your outpatient neurologist within 1-2 weeks and take medications as prescribed. If you have worsening double vision, trouble breathing, walking difficulty, and/or fall with trauma on the head, please visit the nearest emergency room for urgent evaluation and treatment.  Pt follows with Dr. Delroy Ro, likely will need monthly IVIG infusion vs soliris.  Likely to follow up with Dr. Ro vs. Dr. Escudero vs at [facility] at [address] [place] [address] Outpatient Clinic since has Medicaid. Will likely need monthly IVIG vs Soliris with Dr. Escudero at [address] [place] [address]     PRINCIPAL DISCHARGE DIAGNOSIS  Diagnosis: Myasthenia gravis in crisis  Assessment and Plan of Treatment: You came in with worsening symptoms of myasthenia gravis. You were treated with plasma exchange followed by IVIG for 5 sessions each. You will likely require immune therapy either monthly IVIG or soliris. Please follow up with your outpatient neurologist within 1-2 weeks and take medications as prescribed. If you have worsening double vision, trouble breathing, walking difficulty, and/or fall with trauma on the head, please visit the nearest emergency room for urgent evaluation and treatment.  For the prednisone taper,, it will be by 10 mg every month. Currently starting at 50 mg daily now, after 1 month decrease to 40 mg daily, after another month to 30 mg daily, after another month to 20 mg daily after which it can be continued at 20 mg daily.   Pt follows with Dr. Delroy Ro, likely will need monthly IVIG infusion vs soliris.  Likely to follow up with Dr. Ro vs. Dr. Escudero vs at [facility] at [address] [place] [address] Outpatient [facility] since has Medicaid. Will likely need monthly IVIG vs Soliris with Dr. Escudero at [address] [place] [address] clinic      SECONDARY DISCHARGE DIAGNOSES  Diagnosis: Steroid-induced hyperglycemia  Assessment and Plan of Treatment: Endocrine recs:   For d/c:   -When taking:   Prednisone 50 mg daily - take NPH 5 units with the dose of Prednisone + Metformin 1g BID   Prednisone 40 mg daily - take NPH 4 units with the dose of Prednisone + Metformin 1g BID   Prednisone 30 mg daily - take NPH 3 units with the dose of Prednisone + Metformin 1g BID   Prednisone 20 mg daily - Metformin 1g BID + Tradjenta 5 mg daily + Metformin 1g BID       PRINCIPAL DISCHARGE DIAGNOSIS  Diagnosis: Myasthenia gravis in crisis  Assessment and Plan of Treatment: You came in with worsening symptoms of myasthenia gravis. You were treated with plasma exchange followed by IVIG for 5 sessions each. You will likely require immune therapy either monthly IVIG or soliris. Please follow up with your outpatient neurologist within 1-2 weeks and take medications as prescribed. If you have worsening double vision, trouble breathing, walking difficulty, and/or fall with trauma on the head, please visit the nearest emergency room for urgent evaluation and treatment.  For the prednisone taper,, it will be by 10 mg every month. Currently starting at 50 mg daily now, after 1 month decrease to 40 mg daily, after another month to 30 mg daily, after another month to 20 mg daily after which it can be continued at 20 mg daily.   Pt follows with Dr. Delroy Ro, likely will need monthly IVIG infusion vs soliris.  Likely to follow up with Dr. Ro vs. Dr. Escudero vs at [facility] at [address] [place] [address] Outpatient [facility] since has Medicaid. Will likely need monthly IVIG vs Soliris with Dr. Escudero at [address] [place] [address] clinic      SECONDARY DISCHARGE DIAGNOSES  Diagnosis: Steroid-induced hyperglycemia  Assessment and Plan of Treatment: Endocrine recs:   For d/c:   -When taking:   Prednisone 50 mg daily - take NPH 5 units with the dose of Prednisone + Metformin 1g BID   Prednisone 40 mg daily - take NPH 4 units with the dose of Prednisone + Metformin 1g BID   Prednisone 30 mg daily - take NPH 3 units with the dose of Prednisone + Metformin 1g BID   Prednisone 20 mg daily - Metformin 1g BID + Tradjenta 5 mg daily + Metformin 1g BID       PRINCIPAL DISCHARGE DIAGNOSIS  Diagnosis: Myasthenia gravis in crisis  Assessment and Plan of Treatment: You came in with worsening symptoms of myasthenia gravis. You were treated with plasma exchange followed by IVIG for 5 sessions each. You will likely require immune therapy either monthly IVIG or soliris. Please follow up with your outpatient neurologist within 1-2 weeks and take medications as prescribed. If you have worsening double vision, trouble breathing, walking difficulty, and/or fall with trauma on the head, please visit the nearest emergency room for urgent evaluation and treatment.  For the prednisone taper,, it will be by 10 mg every month. Currently starting at 50 mg daily now, after 1 month decrease to 40 mg daily, after another month to 30 mg daily, after another month to 20 mg daily after which it can be continued at 20 mg daily.   Pt follows with Dr. Delroy Ro, likely will need monthly IVIG infusion vs soliris.  Likely to follow up with Dr. Ro vs. Dr. Escudero vs at [facility] at [address] [place] [address] Outpatient [facility] since has Medicaid. Will likely need monthly IVIG vs Soliris with Dr. Escudero at [address] [place] [address] clinic      SECONDARY DISCHARGE DIAGNOSES  Diagnosis: Steroid-induced hyperglycemia  Assessment and Plan of Treatment: Endocrine recs:   For d/c:   -When taking:   Prednisone 50 mg daily - take NPH 5 units with the dose of Prednisone + Metformin 1g BID   Prednisone 40 mg daily - take NPH 4 units with the dose of Prednisone + Metformin 1g BID   Prednisone 30 mg daily - take NPH 3 units with the dose of Prednisone + Metformin 1g BID   Prednisone 20 mg daily - Metformin 1g BID + Tradjenta 5 mg daily + Metformin 1g BID

## 2023-05-20 NOTE — PROGRESS NOTE ADULT - SUBJECTIVE AND OBJECTIVE BOX
SUBJECTIVE/INTERVAL HISTORY:  - Overnight desatted to high 70s, self-suctioned and improved  - NIF -50 and     VITALS & EXAMINATION:  Vital Signs Last 24 Hrs  T(C): 36.8 (20 May 2023 05:00), Max: 37.1 (19 May 2023 12:17)  T(F): 98.2 (20 May 2023 05:00), Max: 98.8 (19 May 2023 12:17)  HR: 84 (20 May 2023 05:00) (79 - 109)  BP: 151/74 (20 May 2023 06:49) (151/74 - 182/84)  BP(mean): --  RR: 18 (20 May 2023 05:00) (17 - 18)  SpO2: 94% (20 May 2023 05:00) (94% - 98%)    Parameters below as of 20 May 2023 05:00  Patient On (Oxygen Delivery Method): room air        Respiratory: One breath counts up to 13  Chest: Has shiley placed on R    MS - Eyes open, awake, alert and attentive.  Follows all commands.   CN - Right eye w limited range in extreme lateral/ upward gaze consistent w Lateral Rectus and Superior Oblique weakness. EOM otherwise intact; moderate ptosis right eyre worsened with sustained upgaze (~20 seconds); VFF, face sens/str/hearing WNL b/l, tongue midline. MOLLY palate. Neck extension 5/5 and neck flexion 4+/5.  Motor - Normal bulk/tone. No pronator drift. Head flexion is at 4-/5     RUE: 4-/5 deltoids, 5/5 biceps, 5+/5 triceps, 5/5 .      LUE: 4-/5 deltoids, 5/5 biceps, 5/5 triceps, 5/5 .     RLE/LLE: 4+/5 hip flexion, 5/5 knee flexion, 5/5 knee extension, 5/5 ankle plantarflexion and dorsiflexion.   Sens - LT intact throughout  Gait and station - MOLLY.      LABORATORY:  CBC                       10.8   9.44  )-----------( 322      ( 19 May 2023 06:45 )             33.2     Chem 05-19    141  |  103  |  18  ----------------------------<  122<H>  2.8<LL>   |  26  |  0.45<L>    Ca    8.8      19 May 2023 06:42    TPro  8.5<H>  /  Alb  3.9  /  TBili  0.5  /  DBili  x   /  AST  12  /  ALT  21  /  AlkPhos  52  05-19    LFTs LIVER FUNCTIONS - ( 19 May 2023 06:42 )  Alb: 3.9 g/dL / Pro: 8.5 g/dL / ALK PHOS: 52 U/L / ALT: 21 U/L / AST: 12 U/L / GGT: x          SUBJECTIVE/INTERVAL HISTORY:  - Overnight desatted to high 70s, self-suctioned and improved  - NIF -50 and   - Robert Language Line Solution  used [id number]. Provider attempted to sit patient up 30 degrees in bed and was removing dressing. Patient had copious secretion and coughing. Placed on nasal cannula for now (can be downtitrated prn) and CXR ordered. The line was not able to be removed but changed dressing on 5/20/23      VITALS & EXAMINATION:  Vital Signs Last 24 Hrs  T(C): 36.8 (20 May 2023 05:00), Max: 37.1 (19 May 2023 12:17)  T(F): 98.2 (20 May 2023 05:00), Max: 98.8 (19 May 2023 12:17)  HR: 84 (20 May 2023 05:00) (79 - 109)  BP: 151/74 (20 May 2023 06:49) (151/74 - 182/84)  BP(mean): --  RR: 18 (20 May 2023 05:00) (17 - 18)  SpO2: 94% (20 May 2023 05:00) (94% - 98%)    Parameters below as of 20 May 2023 05:00  Patient On (Oxygen Delivery Method): room air        Respiratory: One breath counts up to 13  Chest: Has shiley placed on R    MS - Eyes open, awake, alert and attentive.  Follows all commands.   CN - Right eye w limited range in extreme lateral/ upward gaze consistent w Lateral Rectus and Superior Oblique weakness. EOM otherwise intact; moderate ptosis right eyre worsened with sustained upgaze (~20 seconds); VFF, face sens/str/hearing WNL b/l, tongue midline. MOLLY palate. Neck extension 5/5 and neck flexion 4+/5.  Motor - Normal bulk/tone. No pronator drift. Head flexion is at 4-/5     RUE: 4-/5 deltoids, 5/5 biceps, 5+/5 triceps, 5/5 .      LUE: 4-/5 deltoids, 5/5 biceps, 5/5 triceps, 5/5 .     RLE/LLE: 4+/5 hip flexion, 5/5 knee flexion, 5/5 knee extension, 5/5 ankle plantarflexion and dorsiflexion.   Sens - LT intact throughout  Gait and station - MOLLY.      LABORATORY:  CBC                       10.8   9.44  )-----------( 322      ( 19 May 2023 06:45 )             33.2     Chem 05-19    141  |  103  |  18  ----------------------------<  122<H>  2.8<LL>   |  26  |  0.45<L>    Ca    8.8      19 May 2023 06:42    TPro  8.5<H>  /  Alb  3.9  /  TBili  0.5  /  DBili  x   /  AST  12  /  ALT  21  /  AlkPhos  52  05-19    LFTs LIVER FUNCTIONS - ( 19 May 2023 06:42 )  Alb: 3.9 g/dL / Pro: 8.5 g/dL / ALK PHOS: 52 U/L / ALT: 21 U/L / AST: 12 U/L / GGT: x          SUBJECTIVE/INTERVAL HISTORY:  - Overnight desatted to high 70s, self-suctioned and improved  - NIF -50 and   - Robert Language Line Solution  used [id number]. Provider attempted to sit patient up 30 degrees in bed and was removing dressing. Patient had copious secretion and coughing. Placed on nasal cannula for now (can be downtitrated prn) and CXR ordered. The line was not able to be removed but changed dressing on 5/20/23      VITALS & EXAMINATION:  Vital Signs Last 24 Hrs  T(C): 36.8 (20 May 2023 05:00), Max: 37.1 (19 May 2023 12:17)  T(F): 98.2 (20 May 2023 05:00), Max: 98.8 (19 May 2023 12:17)  HR: 84 (20 May 2023 05:00) (79 - 109)  BP: 151/74 (20 May 2023 06:49) (151/74 - 182/84)  BP(mean): --  RR: 18 (20 May 2023 05:00) (17 - 18)  SpO2: 94% (20 May 2023 05:00) (94% - 98%)    Parameters below as of 20 May 2023 05:00  Patient On (Oxygen Delivery Method): room air        Respiratory: One breath counts up to 13  Chest: Has shiley placed on R    MS - Eyes open, awake, alert and attentive.  Follows all commands.   CN - Right eye w limited range in extreme lateral/ upward gaze consistent w Lateral Rectus and Superior Oblique weakness. EOM otherwise intact; moderate ptosis right eyre worsened with sustained upgaze (~20 seconds); VFF, face sens/str/hearing WNL b/l, tongue midline. MOLLY palate. Neck extension 5/5 and neck flexion 4+/5.  Motor - Normal bulk/tone. No pronator drift. Head flexion is at 4-/5     RUE: 4-/5 deltoids, 5/5 biceps, 5+/5 triceps, 5/5 .      LUE: 4-/5 deltoids, 5/5 biceps, 5/5 triceps, 5/5 .     RLE/LLE: 4+/5 hip flexion, 5/5 knee flexion, 5/5 knee extension, 5/5 ankle plantarflexion and dorsiflexion.   Sens - LT intact throughout  Gait and station - MOLLY.      LABORATORY:  CBC                       10.8   9.44  )-----------( 322      ( 19 May 2023 06:45 )             33.2     Chem 05-19    141  |  103  |  18  ----------------------------<  122<H>  2.8<LL>   |  26  |  0.45<L>    Ca    8.8      19 May 2023 06:42    TPro  8.5<H>  /  Alb  3.9  /  TBili  0.5  /  DBili  x   /  AST  12  /  ALT  21  /  AlkPhos  52  05-19    LFTs LIVER FUNCTIONS - ( 19 May 2023 06:42 )  Alb: 3.9 g/dL / Pro: 8.5 g/dL / ALK PHOS: 52 U/L / ALT: 21 U/L / AST: 12 U/L / GGT: x          SUBJECTIVE/INTERVAL HISTORY:  - Overnight desatted to high 70s, self-suctioned and improved  - NIF -50 and   - Robert Language Line Solution  used [id number]. Provider attempted to sit patient up 30 degrees in bed and was removing dressing. Patient had copious secretion and coughing. Placed on nasal cannula for now (can be downtitrated prn) and CXR ordered. The line was not able to be removed but changed dressing on 5/20/23      VITALS & EXAMINATION:  Vital Signs Last 24 Hrs  T(C): 36.8 (20 May 2023 05:00), Max: 37.1 (19 May 2023 12:17)  T(F): 98.2 (20 May 2023 05:00), Max: 98.8 (19 May 2023 12:17)  HR: 84 (20 May 2023 05:00) (79 - 109)  BP: 151/74 (20 May 2023 06:49) (151/74 - 182/84)  BP(mean): --  RR: 18 (20 May 2023 05:00) (17 - 18)  SpO2: 94% (20 May 2023 05:00) (94% - 98%)    Parameters below as of 20 May 2023 05:00  Patient On (Oxygen Delivery Method): room air        Respiratory: One breath counts up to 13  Chest: Has shiley placed on R    MS - Eyes open, awake, alert and attentive.  Follows all commands.   CN - Right eye w limited range in extreme lateral/ upward gaze consistent w Lateral Rectus and Superior Oblique weakness. EOM otherwise intact; moderate ptosis right eyre worsened with sustained upgaze (~20 seconds); VFF, face sens/str/hearing WNL b/l, tongue midline. MOLLY palate. Neck extension 5/5 and neck flexion 4+/5.  Motor - Normal bulk/tone. No pronator drift. Head flexion is at 4-/5     RUE: 4-/5 deltoids, 5/5 biceps, 5+/5 triceps, 5/5 .      LUE: 4-/5 deltoids, 5/5 biceps, 5/5 triceps, 5/5 .     RLE/LLE: 4+/5 hip flexion, 5/5 knee flexion, 5/5 knee extension, 5/5 ankle plantarflexion and dorsiflexion.   Sens - LT intact throughout  Gait and station - MOLLY.      LABORATORY:  CBC                       10.8   9.44  )-----------( 322      ( 19 May 2023 06:45 )             33.2     Chem 05-19    141  |  103  |  18  ----------------------------<  122<H>  2.8<LL>   |  26  |  0.45<L>    Ca    8.8      19 May 2023 06:42    TPro  8.5<H>  /  Alb  3.9  /  TBili  0.5  /  DBili  x   /  AST  12  /  ALT  21  /  AlkPhos  52  05-19    LFTs LIVER FUNCTIONS - ( 19 May 2023 06:42 )  Alb: 3.9 g/dL / Pro: 8.5 g/dL / ALK PHOS: 52 U/L / ALT: 21 U/L / AST: 12 U/L / GGT: x          SUBJECTIVE/INTERVAL HISTORY:  - Overnight desatted to high 70s, self-suctioned and improved  - HR 140s while walking back and forth in the room  - Noted to have bright red blood while BM overnight, has hemorrhoids  - NIF -50 and   - Korean Language Line Solution  used [id number]. Provider attempted to sit patient up 30 degrees in bed and was removing dressing. Patient had copious secretion and coughing. Placed on nasal cannula for now (can be downtitrated prn) and CXR ordered. The line was not able to be removed but changed dressing on 5/20/23      VITALS & EXAMINATION:  Vital Signs Last 24 Hrs  T(C): 36.8 (20 May 2023 05:00), Max: 37.1 (19 May 2023 12:17)  T(F): 98.2 (20 May 2023 05:00), Max: 98.8 (19 May 2023 12:17)  HR: 84 (20 May 2023 05:00) (79 - 109)  BP: 151/74 (20 May 2023 06:49) (151/74 - 182/84)  BP(mean): --  RR: 18 (20 May 2023 05:00) (17 - 18)  SpO2: 94% (20 May 2023 05:00) (94% - 98%)    Parameters below as of 20 May 2023 05:00  Patient On (Oxygen Delivery Method): room air        Respiratory: One breath counts up to 13  Chest: Has shiley placed on R    MS - Eyes open, awake, alert and attentive.  Follows all commands.   CN - Right eye w limited range in extreme lateral/ upward gaze consistent w Lateral Rectus and Superior Oblique weakness. EOM otherwise intact; moderate ptosis right eyre worsened with sustained upgaze (~20 seconds); VFF, face sens/str/hearing WNL b/l, tongue midline. MOLLY palate. Neck extension 5/5 and neck flexion 4+/5.  Motor - Normal bulk/tone. No pronator drift. Head flexion is at 4-/5   RUE: 4-/5 deltoids, 5/5 biceps, 5+/5 triceps, 5/5 .    LUE: 4-/5 deltoids, 5/5 biceps, 5/5 triceps, 5/5 .   RLE/LLE: 4+/5 hip flexion, 5/5 knee flexion, 5/5 knee extension, 5/5 ankle plantarflexion and dorsiflexion.   Sens - LT intact throughout  Gait and station - MOLLY.      LABORATORY:  CBC                       10.8   9.44  )-----------( 322      ( 19 May 2023 06:45 )             33.2     Chem 05-19    141  |  103  |  18  ----------------------------<  122<H>  2.8<LL>   |  26  |  0.45<L>    Ca    8.8      19 May 2023 06:42    TPro  8.5<H>  /  Alb  3.9  /  TBili  0.5  /  DBili  x   /  AST  12  /  ALT  21  /  AlkPhos  52  05-19    LFTs LIVER FUNCTIONS - ( 19 May 2023 06:42 )  Alb: 3.9 g/dL / Pro: 8.5 g/dL / ALK PHOS: 52 U/L / ALT: 21 U/L / AST: 12 U/L / GGT: x          SUBJECTIVE/INTERVAL HISTORY:  - Overnight desatted to high 70s, self-suctioned and improved  - HR 140s while walking back and forth in the room  - Noted to have bright red blood while BM overnight, has hemorrhoids  - NIF -50 and   - Wolof Language Line Solution  used [id number]. Provider attempted to sit patient up 30 degrees in bed and was removing dressing. Patient had copious secretion and coughing. Placed on nasal cannula for now (can be downtitrated prn) and CXR ordered. The line was not able to be removed but changed dressing on 5/20/23      VITALS & EXAMINATION:  Vital Signs Last 24 Hrs  T(C): 36.8 (20 May 2023 05:00), Max: 37.1 (19 May 2023 12:17)  T(F): 98.2 (20 May 2023 05:00), Max: 98.8 (19 May 2023 12:17)  HR: 84 (20 May 2023 05:00) (79 - 109)  BP: 151/74 (20 May 2023 06:49) (151/74 - 182/84)  BP(mean): --  RR: 18 (20 May 2023 05:00) (17 - 18)  SpO2: 94% (20 May 2023 05:00) (94% - 98%)    Parameters below as of 20 May 2023 05:00  Patient On (Oxygen Delivery Method): room air        Respiratory: One breath counts up to 13  Chest: Has shiley placed on R    MS - Eyes open, awake, alert and attentive.  Follows all commands.   CN - Right eye w limited range in extreme lateral/ upward gaze consistent w Lateral Rectus and Superior Oblique weakness. EOM otherwise intact; moderate ptosis right eyre worsened with sustained upgaze (~20 seconds); VFF, face sens/str/hearing WNL b/l, tongue midline. MOLLY palate. Neck extension 5/5 and neck flexion 4+/5.  Motor - Normal bulk/tone. No pronator drift. Head flexion is at 4-/5   RUE: 4-/5 deltoids, 5/5 biceps, 5+/5 triceps, 5/5 .    LUE: 4-/5 deltoids, 5/5 biceps, 5/5 triceps, 5/5 .   RLE/LLE: 4+/5 hip flexion, 5/5 knee flexion, 5/5 knee extension, 5/5 ankle plantarflexion and dorsiflexion.   Sens - LT intact throughout  Gait and station - MOLLY.      LABORATORY:  CBC                       10.8   9.44  )-----------( 322      ( 19 May 2023 06:45 )             33.2     Chem 05-19    141  |  103  |  18  ----------------------------<  122<H>  2.8<LL>   |  26  |  0.45<L>    Ca    8.8      19 May 2023 06:42    TPro  8.5<H>  /  Alb  3.9  /  TBili  0.5  /  DBili  x   /  AST  12  /  ALT  21  /  AlkPhos  52  05-19    LFTs LIVER FUNCTIONS - ( 19 May 2023 06:42 )  Alb: 3.9 g/dL / Pro: 8.5 g/dL / ALK PHOS: 52 U/L / ALT: 21 U/L / AST: 12 U/L / GGT: x          SUBJECTIVE/INTERVAL HISTORY:  - Overnight desatted to high 70s, self-suctioned and improved  - HR 140s while walking back and forth in the room  - Noted to have bright red blood while BM overnight, has hemorrhoids  - NIF -50 and   - Nepali Language Line Solution  used [id number]. Provider attempted to sit patient up 30 degrees in bed and was removing dressing. Patient had copious secretion and coughing. Placed on nasal cannula for now (can be downtitrated prn) and CXR ordered. The line was not able to be removed but changed dressing on 5/20/23      VITALS & EXAMINATION:  Vital Signs Last 24 Hrs  T(C): 36.8 (20 May 2023 05:00), Max: 37.1 (19 May 2023 12:17)  T(F): 98.2 (20 May 2023 05:00), Max: 98.8 (19 May 2023 12:17)  HR: 84 (20 May 2023 05:00) (79 - 109)  BP: 151/74 (20 May 2023 06:49) (151/74 - 182/84)  BP(mean): --  RR: 18 (20 May 2023 05:00) (17 - 18)  SpO2: 94% (20 May 2023 05:00) (94% - 98%)    Parameters below as of 20 May 2023 05:00  Patient On (Oxygen Delivery Method): room air        Respiratory: One breath counts up to 13  Chest: Has shiley placed on R    MS - Eyes open, awake, alert and attentive.  Follows all commands.   CN - Right eye w limited range in extreme lateral/ upward gaze consistent w Lateral Rectus and Superior Oblique weakness. EOM otherwise intact; moderate ptosis right eyre worsened with sustained upgaze (~20 seconds); VFF, face sens/str/hearing WNL b/l, tongue midline. MOLLY palate. Neck extension 5/5 and neck flexion 4+/5.  Motor - Normal bulk/tone. No pronator drift. Head flexion is at 4-/5   RUE: 4-/5 deltoids, 5/5 biceps, 5+/5 triceps, 5/5 .    LUE: 4-/5 deltoids, 5/5 biceps, 5/5 triceps, 5/5 .   RLE/LLE: 4+/5 hip flexion, 5/5 knee flexion, 5/5 knee extension, 5/5 ankle plantarflexion and dorsiflexion.   Sens - LT intact throughout  Gait and station - MOLLY.      LABORATORY:  CBC                       10.8   9.44  )-----------( 322      ( 19 May 2023 06:45 )             33.2     Chem 05-19    141  |  103  |  18  ----------------------------<  122<H>  2.8<LL>   |  26  |  0.45<L>    Ca    8.8      19 May 2023 06:42    TPro  8.5<H>  /  Alb  3.9  /  TBili  0.5  /  DBili  x   /  AST  12  /  ALT  21  /  AlkPhos  52  05-19    LFTs LIVER FUNCTIONS - ( 19 May 2023 06:42 )  Alb: 3.9 g/dL / Pro: 8.5 g/dL / ALK PHOS: 52 U/L / ALT: 21 U/L / AST: 12 U/L / GGT: x          SUBJECTIVE/INTERVAL HISTORY:  - Overnight desatted to high 70s, self-suctioned and improved  - HR 140s while walking back and forth in the room  - Noted to have bright red blood while BM overnight, has hemorrhoids  - NIF -50 and   - Frisian Language Line Solution  used [id number]. Provider attempted to sit patient up 30 degrees in bed and was removing dressing. Patient had copious secretion and coughing. Placed on nasal cannula for now (can be down titrated prn) and CXR ordered. The line was not able to be removed but changed dressing on 5/20/23      VITALS & EXAMINATION:  Vital Signs Last 24 Hrs  T(C): 36.8 (20 May 2023 05:00), Max: 37.1 (19 May 2023 12:17)  T(F): 98.2 (20 May 2023 05:00), Max: 98.8 (19 May 2023 12:17)  HR: 84 (20 May 2023 05:00) (79 - 109)  BP: 151/74 (20 May 2023 06:49) (151/74 - 182/84)  BP(mean): --  RR: 18 (20 May 2023 05:00) (17 - 18)  SpO2: 94% (20 May 2023 05:00) (94% - 98%)    Parameters below as of 20 May 2023 05:00  Patient On (Oxygen Delivery Method): room air        Respiratory: One breath counts up to 13  Chest: Has shiley placed on R    MS - Eyes open, awake, alert and attentive.  Follows all commands.   CN - Right eye w limited range in extreme lateral/ upward gaze consistent w Lateral Rectus and Superior Oblique weakness. EOM otherwise intact; moderate ptosis right eyre worsened with sustained upgaze (~20 seconds); VFF, face sens/str/hearing WNL b/l, tongue midline. MOLLY palate. Neck extension 5/5 and neck flexion 4+/5.  Motor - Normal bulk/tone. No pronator drift. Head flexion is at 4-/5   RUE: 4-/5 deltoids, 5/5 biceps, 5+/5 triceps, 5/5 .    LUE: 4-/5 deltoids, 5/5 biceps, 5/5 triceps, 5/5 .   RLE/LLE: 4+/5 hip flexion, 5/5 knee flexion, 5/5 knee extension, 5/5 ankle plantarflexion and dorsiflexion.   Sens - LT intact throughout  Gait and station - MOLLY.      LABORATORY:  CBC                       10.8   9.44  )-----------( 322      ( 19 May 2023 06:45 )             33.2     Chem 05-19    141  |  103  |  18  ----------------------------<  122<H>  2.8<LL>   |  26  |  0.45<L>    Ca    8.8      19 May 2023 06:42    TPro  8.5<H>  /  Alb  3.9  /  TBili  0.5  /  DBili  x   /  AST  12  /  ALT  21  /  AlkPhos  52  05-19    LFTs LIVER FUNCTIONS - ( 19 May 2023 06:42 )  Alb: 3.9 g/dL / Pro: 8.5 g/dL / ALK PHOS: 52 U/L / ALT: 21 U/L / AST: 12 U/L / GGT: x          SUBJECTIVE/INTERVAL HISTORY:  - Overnight desatted to high 70s, self-suctioned and improved  - HR 140s while walking back and forth in the room  - Noted to have bright red blood while BM overnight, has hemorrhoids  - NIF -50 and   - Arabic Language Line Solution  used [id number]. Provider attempted to sit patient up 30 degrees in bed and was removing dressing. Patient had copious secretion and coughing. Placed on nasal cannula for now (can be down titrated prn) and CXR ordered. The line was not able to be removed but changed dressing on 5/20/23      VITALS & EXAMINATION:  Vital Signs Last 24 Hrs  T(C): 36.8 (20 May 2023 05:00), Max: 37.1 (19 May 2023 12:17)  T(F): 98.2 (20 May 2023 05:00), Max: 98.8 (19 May 2023 12:17)  HR: 84 (20 May 2023 05:00) (79 - 109)  BP: 151/74 (20 May 2023 06:49) (151/74 - 182/84)  BP(mean): --  RR: 18 (20 May 2023 05:00) (17 - 18)  SpO2: 94% (20 May 2023 05:00) (94% - 98%)    Parameters below as of 20 May 2023 05:00  Patient On (Oxygen Delivery Method): room air        Respiratory: One breath counts up to 13  Chest: Has shiley placed on R    MS - Eyes open, awake, alert and attentive.  Follows all commands.   CN - Right eye w limited range in extreme lateral/ upward gaze consistent w Lateral Rectus and Superior Oblique weakness. EOM otherwise intact; moderate ptosis right eyre worsened with sustained upgaze (~20 seconds); VFF, face sens/str/hearing WNL b/l, tongue midline. MOLLY palate. Neck extension 5/5 and neck flexion 4+/5.  Motor - Normal bulk/tone. No pronator drift. Head flexion is at 4-/5   RUE: 4-/5 deltoids, 5/5 biceps, 5+/5 triceps, 5/5 .    LUE: 4-/5 deltoids, 5/5 biceps, 5/5 triceps, 5/5 .   RLE/LLE: 4+/5 hip flexion, 5/5 knee flexion, 5/5 knee extension, 5/5 ankle plantarflexion and dorsiflexion.   Sens - LT intact throughout  Gait and station - MOLLY.      LABORATORY:  CBC                       10.8   9.44  )-----------( 322      ( 19 May 2023 06:45 )             33.2     Chem 05-19    141  |  103  |  18  ----------------------------<  122<H>  2.8<LL>   |  26  |  0.45<L>    Ca    8.8      19 May 2023 06:42    TPro  8.5<H>  /  Alb  3.9  /  TBili  0.5  /  DBili  x   /  AST  12  /  ALT  21  /  AlkPhos  52  05-19    LFTs LIVER FUNCTIONS - ( 19 May 2023 06:42 )  Alb: 3.9 g/dL / Pro: 8.5 g/dL / ALK PHOS: 52 U/L / ALT: 21 U/L / AST: 12 U/L / GGT: x          SUBJECTIVE/INTERVAL HISTORY:  - Overnight desatted to high 70s, self-suctioned and improved  - HR 140s while walking back and forth in the room  - Noted to have bright red blood while BM overnight, has hemorrhoids  - NIF -50 and   - Maori Language Line Solution  used [id number]. Provider attempted to sit patient up 30 degrees in bed and was removing dressing. Patient had copious secretion and coughing. Placed on nasal cannula for now (can be down titrated prn) and CXR ordered. The line was not able to be removed but changed dressing on 5/20/23      VITALS & EXAMINATION:  Vital Signs Last 24 Hrs  T(C): 36.8 (20 May 2023 05:00), Max: 37.1 (19 May 2023 12:17)  T(F): 98.2 (20 May 2023 05:00), Max: 98.8 (19 May 2023 12:17)  HR: 84 (20 May 2023 05:00) (79 - 109)  BP: 151/74 (20 May 2023 06:49) (151/74 - 182/84)  BP(mean): --  RR: 18 (20 May 2023 05:00) (17 - 18)  SpO2: 94% (20 May 2023 05:00) (94% - 98%)    Parameters below as of 20 May 2023 05:00  Patient On (Oxygen Delivery Method): room air        Respiratory: One breath counts up to 13  Chest: Has shiley placed on R    MS - Eyes open, awake, alert and attentive.  Follows all commands.   CN - Right eye w limited range in extreme lateral/ upward gaze consistent w Lateral Rectus and Superior Oblique weakness. EOM otherwise intact; moderate ptosis right eyre worsened with sustained upgaze (~20 seconds); VFF, face sens/str/hearing WNL b/l, tongue midline. MOLLY palate. Neck extension 5/5 and neck flexion 4+/5.  Motor - Normal bulk/tone. No pronator drift. Head flexion is at 4-/5   RUE: 4-/5 deltoids, 5/5 biceps, 5+/5 triceps, 5/5 .    LUE: 4-/5 deltoids, 5/5 biceps, 5/5 triceps, 5/5 .   RLE/LLE: 4+/5 hip flexion, 5/5 knee flexion, 5/5 knee extension, 5/5 ankle plantarflexion and dorsiflexion.   Sens - LT intact throughout  Gait and station - MOLLY.      LABORATORY:  CBC                       10.8   9.44  )-----------( 322      ( 19 May 2023 06:45 )             33.2     Chem 05-19    141  |  103  |  18  ----------------------------<  122<H>  2.8<LL>   |  26  |  0.45<L>    Ca    8.8      19 May 2023 06:42    TPro  8.5<H>  /  Alb  3.9  /  TBili  0.5  /  DBili  x   /  AST  12  /  ALT  21  /  AlkPhos  52  05-19    LFTs LIVER FUNCTIONS - ( 19 May 2023 06:42 )  Alb: 3.9 g/dL / Pro: 8.5 g/dL / ALK PHOS: 52 U/L / ALT: 21 U/L / AST: 12 U/L / GGT: x

## 2023-05-20 NOTE — DISCHARGE NOTE PROVIDER - HOSPITAL COURSE
62y (1960) Malayam speaking woman with a PMHx significant for myasthenia gravis presenting to the ED for worsening difficulty with double vision, swallowing, talking, and breathing that has progressively gotten worse for the previous one week. At home pt was taking mestinon 120 mg tid and prednisone 10 mg PRN. Has prior hx of MG crisis that required intubation 2/1-2/11/23 in the setting of COVID and at that time treatd with PLEX followed by IVIG. Pt required intubation and was admitted to [facility]. Was treated with PLEX and IVIG. Currently on mestinon 90 mg tid and prednisone 50 mg qd with plan for monthly taper down to 20 mg qd. Pt follows with Dr. Delroy Ro, likely will need monthly IVIG infusion vs soliris. Pt is medically stable for discharge at this time. Plan to follow with outpatient neurologist within 1-2 weeks.  62y (1960) Malayam speaking woman with a PMHx significant for myasthenia gravis presenting to the ED for worsening difficulty with double vision, swallowing, talking, and breathing that has progressively gotten worse for the previous one week. Pt was admitted to the neurology floor and monitored. At home pt was taking mestinon 120 mg tid and prednisone 10 mg PRN. Has prior hx of MG crisis that required intubation 2/1-2/11/23 in the setting of COVID and at that time treatd with PLEX followed by IVIG. Pt required intubation and was admitted to [facility]SCU. Was treated with PLEX and IVIG. Currently on mestinon 90 mg tid and prednisone 50 mg qd with plan for monthly taper by 10 mg down to 20 mg qd. Endocrine was called for hyperglycemia. Pt follows with Dr. Delroy Ro, likely will need monthly IVIG infusion vs soliris.  Likely to follow up with Dr. Ro vs. Dr. Escudero vs at [facility] [address] [place] [address] Outpatient Clinic since has Medicaid. Will likely need monthly IVIG vs Soliris with Dr. Escudero at [address] [place] [address] clinic Pt is medically stable for discharge at this time. Plan to follow with outpatient neurologist within 1-2 weeks.     CXR 5/20: Interval patient removal of enteric tube. Right-sided hemodialysis catheter remains unchanged in position. No focal consolidations.  62y (1960) Malayam speaking woman with a PMHx significant for myasthenia gravis presenting to the ED for worsening difficulty with double vision, swallowing, talking, and breathing that has progressively gotten worse for the previous one week. Pt was admitted to the neurology floor and monitored. At home pt was taking mestinon 120 mg tid and prednisone 10 mg PRN. Has prior hx of MG crisis that required intubation 2/1-2/11/23 in the setting of COVID and at that time treatd with PLEX followed by IVIG. Pt required intubation and was admitted to [facility]SCU. Was treated with PLEX and IVIG. Currently on mestinon 90 mg tid and prednisone 50 mg qd with plan for monthly taper by 10 mg down to 20 mg qd. Endocrine was called for hyperglycemia. Pt follows with Dr. Delroy Ro, likely will need monthly IVIG infusion vs soliris.  Likely to follow up with Dr. Ro vs. Dr. Escudero vs at [facility] at [address] [place] [address] Outpatient Clinic since has Medicaid. Will likely need monthly IVIG vs Soliris with Dr. Escudero at [address] [place] [address] clinic Pt is medically stable for discharge at this time. Plan to follow with outpatient neurologist within 1-2 weeks.     CXR 5/20: Interval patient removal of enteric tube. Right-sided hemodialysis catheter remains unchanged in position. No focal consolidations.  62y (1960) Malayam speaking woman with a PMHx significant for myasthenia gravis presenting to the ED for worsening difficulty with double vision, swallowing, talking, and breathing that has progressively gotten worse for the previous one week. Pt was admitted to the neurology floor and monitored. At home pt was taking mestinon 120 mg tid and prednisone 10 mg PRN. Has prior hx of MG crisis that required intubation 2/1-2/11/23 in the setting of COVID and at that time treatd with PLEX followed by IVIG. Pt required intubation and was admitted to NSCU. Was treated with PLEX and IVIG. Currently on mestinon 90 mg tid and prednisone 50 mg qd with plan for monthly taper by 10 mg down to 20 mg qd. Endocrine was called for hyperglycemia. Pt follows with Dr. Delroy Ro, likely will need monthly IVIG infusion vs soliris.  Likely to follow up with Dr. Ro vs. Dr. Escudero vs at [facility] at [address] [place] [address] Outpatient Clinic since has Medicaid. Will likely need monthly IVIG vs Soliris with Dr. Escudero at [address] [place] [address] clinic Pt is medically stable for discharge at this time. Plan to follow with outpatient neurologist within 1-2 weeks.     CXR 5/20: Interval patient removal of enteric tube. Right-sided hemodialysis catheter remains unchanged in position. No focal consolidations.  62y (1960) Malayam speaking woman with a PMHx significant for myasthenia gravis presenting to the ED for worsening difficulty with double vision, swallowing, talking, and breathing that has progressively gotten worse for the previous one week. Pt was admitted to the neurology floor and monitored. At home pt was taking mestinon 120 mg tid and prednisone 10 mg PRN. Has prior hx of MG crisis that required intubation 2/1-2/11/23 in the setting of COVID and at that time treatd with PLEX followed by IVIG. Pt required intubation and was admitted to NSCU. Was treated with PLEX and IVIG. Currently on mestinon 90 mg tid and prednisone 50 mg qd with plan for monthly taper by 10 mg down to 20 mg qd. Endocrine was called for hyperglycemia. Pt follows with Dr. Delroy Ro, likely will need monthly IVIG infusion vs soliris.  Likely to follow up with Dr. Ro vs. Dr. Escudero vs at [facility] at [address] [place] [address] Outpatient Clinic since has Medicaid. Will likely need monthly IVIG vs Soliris with Dr. Escudero at [address] [place] [address] clinic Pt is medically stable for discharge at this time. Plan to follow with outpatient neurologist within 1-2 weeks.     CXR 5/20: Interval patient removal of enteric tube. Right-sided hemodialysis catheter remains unchanged in position. No focal consolidations.      62y (1960) Malayam speaking woman with a PMHx significant for myasthenia gravis presenting to the ED for worsening difficulty with double vision, swallowing, talking, and breathing that has progressively gotten worse for the previous one week. Pt was admitted to the neurology floor and monitored. At home pt was taking mestinon 120 mg tid and prednisone 10 mg PRN. Has prior hx of MG crisis that required intubation 2/1-2/11/23 in the setting of COVID and at that time treatd with PLEX followed by IVIG. Pt required intubation and was admitted to NSCU. Was treated with PLEX and IVIG. Currently on mestinon 90 mg tid and prednisone 50 mg qd with plan for monthly taper by 10 mg down to 20 mg qd. Endocrine was called for hyperglycemia. Pt follows with Dr. Delroy Ro, likely will need monthly IVIG infusion vs soliris.  Likely to follow up with Dr. Ro vs. Dr. Escudero vs at [facility] at [address] [place] [address] Outpatient Clinic since has Medicaid. Will likely need monthly IVIG vs Soliris with Dr. Escudero at [address] [place] [address] clinic Pt is medically stable for discharge at this time. Plan to follow with outpatient neurologist within 1-2 weeks.     CXR 5/20: Interval patient removal of enteric tube. Right-sided hemodialysis catheter remains unchanged in position. No focal consolidations.      62y (1960) Malayam speaking woman with a PMHx significant for myasthenia gravis presenting to the ED for worsening difficulty with double vision, swallowing, talking, and breathing that has progressively gotten worse for the previous one week. Pt was admitted to the neurology floor and monitored. At home pt was taking mestinon 120 mg tid and prednisone 10 mg PRN. Has prior hx of MG crisis that required intubation 2/1-2/11/23 in the setting of COVID and at that time treatd with PLEX followed by IVIG. Pt required intubation and was admitted to NSCU. Was treated with PLEX and IVIG. Currently on mestinon 90 mg tid and prednisone 50 mg qd with plan for monthly taper by 10 mg down to 20 mg qd. Endocrine was called for hyperglycemia. Pt follows with Dr. Delroy Ro, likely will need monthly IVIG infusion vs soliris.  Likely to follow up with Dr. Ro vs. Dr. Escudero vs at [facility] at [address] [place] [address] Outpatient Clinic since has Medicaid. Will likely need monthly IVIG vs Soliris with Dr. Escudero at [address] [place] [address] clinic Pt is medically stable for discharge at this time. Plan to follow with outpatient neurologist within 1-2 weeks.     CXR 5/20: Interval patient removal of enteric tube. Right-sided hemodialysis catheter remains unchanged in position. No focal consolidations.

## 2023-05-20 NOTE — DISCHARGE NOTE PROVIDER - PROVIDER TOKENS
PROVIDER:[TOKEN:[[id number]],FOLLOWUP:[1 week],ESTABLISHEDPATIENT:[T]],FREE:[LAST:[[facility]],PHONE:[phone]],FAX:[(   )    -],ADDRESS:[[address] [place] [address]]] PROVIDER:[TOKEN:[[id number]],FOLLOWUP:[1 week],ESTABLISHEDPATIENT:[T]],FREE:[LAST:[[facility]],PHONE:[phone]],FAX:[(   )    -],ADDRESS:[[address] [place] [address]]] PROVIDER:[TOKEN:[[id number]],FOLLOWUP:[1 week],ESTABLISHEDPATIENT:[T]],FREE:[LAST:[[facility]],PHONE:[phone]],FAX:[(   )    -],ADDRESS:[[address] [place] [address]]] PROVIDER:[TOKEN:[[id number]],FOLLOWUP:[1 week],ESTABLISHEDPATIENT:[T]],FREE:[LAST:[[facility]],PHONE:[phone]],FAX:[(   )    -],ADDRESS:[[address] [place] [address]]],PROVIDER:[TOKEN:[[id number]],FOLLOWUP:[1 month]] PROVIDER:[TOKEN:[[id number]],FOLLOWUP:[1 week],ESTABLISHEDPATIENT:[T]],FREE:[LAST:[[facility]],PHONE:[phone]],FAX:[(   )    -],ADDRESS:[[address] [place] [address]]],PROVIDER:[TOKEN:[[id number]],FOLLOWUP:[1 month]] PROVIDER:[TOKEN:[[id number]],FOLLOWUP:[1 week],ESTABLISHEDPATIENT:[T]],FREE:[LAST:[[facility]],PHONE:[phone]],FAX:[(   )    -],ADDRESS:[[address] [place] [address]]],PROVIDER:[TOKEN:[[id number]],FOLLOWUP:[1 month]] PROVIDER:[TOKEN:[[id number]],FOLLOWUP:[1 week],ESTABLISHEDPATIENT:[T]],PROVIDER:[TOKEN:[[id number]],FOLLOWUP:[1 month]],FREE:[LAST:[[facility]],PHONE:[phone]],FAX:[(   )    -],ADDRESS:[[address] [place] [address]]],PROVIDER:[TOKEN:[[id number]],FOLLOWUP:[Routine]] PROVIDER:[TOKEN:[[id number]],FOLLOWUP:[1 week],ESTABLISHEDPATIENT:[T]],PROVIDER:[TOKEN:[[id number]],FOLLOWUP:[1 month]],FREE:[LAST:[[facility]],PHONE:[phone]],FAX:[(   )    -],ADDRESS:[[address] [place] [address]]],PROVIDER:[TOKEN:[[id number]],FOLLOWUP:[Routine]] PROVIDER:[TOKEN:[[id number]],FOLLOWUP:[1 week],ESTABLISHEDPATIENT:[T]],PROVIDER:[TOKEN:[[id number]],FOLLOWUP:[1 month]],FREE:[LAST:[[facility]],PHONE:[phone]],FAX:[(   )    -],ADDRESS:[[address] [place] [address]]],PROVIDER:[TOKEN:[[id number]],FOLLOWUP:[Routine]]

## 2023-05-20 NOTE — PROVIDER CONTACT NOTE (OTHER) - ACTION/TREATMENT ORDERED:
MD went to see pt.
MD Horvath made aware. Will give hydralazine at 6 am for when it is scheduled.
PA Eslis assessed at bedside. Deep suctioned throat. Suctioned nose. Ordered tetracaine/benzocaine/butamben spray.

## 2023-05-21 LAB
ANION GAP SERPL CALC-SCNC: 13 MMOL/L — SIGNIFICANT CHANGE UP (ref 5–17)
BUN SERPL-MCNC: 24 MG/DL — HIGH (ref 7–23)
CALCIUM SERPL-MCNC: 8.6 MG/DL — SIGNIFICANT CHANGE UP (ref 8.4–10.5)
CHLORIDE SERPL-SCNC: 101 MMOL/L — SIGNIFICANT CHANGE UP (ref 96–108)
CO2 SERPL-SCNC: 27 MMOL/L — SIGNIFICANT CHANGE UP (ref 22–31)
CREAT SERPL-MCNC: 0.62 MG/DL — SIGNIFICANT CHANGE UP (ref 0.5–1.3)
D DIMER BLD IA.RAPID-MCNC: 362 NG/ML DDU — HIGH
EGFR: 100 ML/MIN/1.73M2 — SIGNIFICANT CHANGE UP
GLUCOSE BLDC GLUCOMTR-MCNC: 123 MG/DL — HIGH (ref 70–99)
GLUCOSE BLDC GLUCOMTR-MCNC: 195 MG/DL — HIGH (ref 70–99)
GLUCOSE BLDC GLUCOMTR-MCNC: 202 MG/DL — HIGH (ref 70–99)
GLUCOSE BLDC GLUCOMTR-MCNC: 212 MG/DL — HIGH (ref 70–99)
GLUCOSE SERPL-MCNC: 131 MG/DL — HIGH (ref 70–99)
HCT VFR BLD CALC: 33.8 % — LOW (ref 34.5–45)
HGB BLD-MCNC: 10.9 G/DL — LOW (ref 11.5–15.5)
MCHC RBC-ENTMCNC: 28.9 PG — SIGNIFICANT CHANGE UP (ref 27–34)
MCHC RBC-ENTMCNC: 32.2 GM/DL — SIGNIFICANT CHANGE UP (ref 32–36)
MCV RBC AUTO: 89.7 FL — SIGNIFICANT CHANGE UP (ref 80–100)
NRBC # BLD: 0 /100 WBCS — SIGNIFICANT CHANGE UP (ref 0–0)
PLATELET # BLD AUTO: 331 K/UL — SIGNIFICANT CHANGE UP (ref 150–400)
POTASSIUM SERPL-MCNC: 3.2 MMOL/L — LOW (ref 3.5–5.3)
POTASSIUM SERPL-SCNC: 3.2 MMOL/L — LOW (ref 3.5–5.3)
RBC # BLD: 3.77 M/UL — LOW (ref 3.8–5.2)
RBC # FLD: 15.1 % — HIGH (ref 10.3–14.5)
SODIUM SERPL-SCNC: 141 MMOL/L — SIGNIFICANT CHANGE UP (ref 135–145)
WBC # BLD: 11.2 K/UL — HIGH (ref 3.8–10.5)
WBC # FLD AUTO: 11.2 K/UL — HIGH (ref 3.8–10.5)

## 2023-05-21 PROCEDURE — 71275 CT ANGIOGRAPHY CHEST: CPT | Mod: 26

## 2023-05-21 PROCEDURE — 99232 SBSQ HOSP IP/OBS MODERATE 35: CPT

## 2023-05-21 RX ORDER — POTASSIUM CHLORIDE 20 MEQ
20 PACKET (EA) ORAL
Refills: 0 | Status: COMPLETED | OUTPATIENT
Start: 2023-05-21 | End: 2023-05-21

## 2023-05-21 RX ORDER — POTASSIUM CHLORIDE 20 MEQ
10 PACKET (EA) ORAL
Refills: 0 | Status: DISCONTINUED | OUTPATIENT
Start: 2023-05-21 | End: 2023-05-21

## 2023-05-21 RX ADMIN — Medication 4 MILLILITER(S): at 17:11

## 2023-05-21 RX ADMIN — AMLODIPINE BESYLATE 10 MILLIGRAM(S): 2.5 TABLET ORAL at 05:32

## 2023-05-21 RX ADMIN — Medication 2: at 16:19

## 2023-05-21 RX ADMIN — Medication 2: at 08:05

## 2023-05-21 RX ADMIN — Medication 1: at 11:48

## 2023-05-21 RX ADMIN — Medication 4 MILLILITER(S): at 11:48

## 2023-05-21 RX ADMIN — ENOXAPARIN SODIUM 40 MILLIGRAM(S): 100 INJECTION SUBCUTANEOUS at 21:59

## 2023-05-21 RX ADMIN — PYRIDOSTIGMINE BROMIDE 90 MILLIGRAM(S): 60 SOLUTION ORAL at 15:57

## 2023-05-21 RX ADMIN — Medication 20 MILLIEQUIVALENT(S): at 11:48

## 2023-05-21 RX ADMIN — Medication 3 MILLILITER(S): at 17:11

## 2023-05-21 RX ADMIN — Medication 50 MILLIGRAM(S): at 05:32

## 2023-05-21 RX ADMIN — Medication 3 MILLILITER(S): at 11:49

## 2023-05-21 RX ADMIN — Medication 3 MILLILITER(S): at 05:32

## 2023-05-21 RX ADMIN — Medication 100 MILLIEQUIVALENT(S): at 08:05

## 2023-05-21 RX ADMIN — Medication 20 MILLIEQUIVALENT(S): at 15:56

## 2023-05-21 RX ADMIN — CHLORHEXIDINE GLUCONATE 1 APPLICATION(S): 213 SOLUTION TOPICAL at 05:35

## 2023-05-21 RX ADMIN — PYRIDOSTIGMINE BROMIDE 90 MILLIGRAM(S): 60 SOLUTION ORAL at 22:02

## 2023-05-21 RX ADMIN — PYRIDOSTIGMINE BROMIDE 90 MILLIGRAM(S): 60 SOLUTION ORAL at 08:06

## 2023-05-21 RX ADMIN — ATORVASTATIN CALCIUM 20 MILLIGRAM(S): 80 TABLET, FILM COATED ORAL at 22:03

## 2023-05-21 RX ADMIN — PANTOPRAZOLE SODIUM 40 MILLIGRAM(S): 20 TABLET, DELAYED RELEASE ORAL at 08:06

## 2023-05-21 RX ADMIN — Medication 4 MILLILITER(S): at 05:33

## 2023-05-21 NOTE — PROGRESS NOTE ADULT - ASSESSMENT
62 year-old woman with a PMH of myasthenia gravis who presented to the ED on 5/5/23 with worsening diplopia and difficulty speaking, swallowing and breathing x 1 week, admitted for evaluation of management of acute MG exacerbation. Interval neuro exam stable. Still has shiley placed on R. NIF/VC as noted above. s/p IVIG 5/15-5/19. Shiley removal unable to be done due to copious secretion and coughing when sitting up 30 deg in bed.    Impression: Acute MG exacerbation, possibly triggered by progressive disease or other toxic/metabolic insult    Recommendations:   [x] CXR 5/20/23: right-sided hemodialysis catheter with tip in the right atrium. No focal consolidations. There is no pleural effusion or pneumothorax. The heart is normal in size. Sternotomy wires.  [] C/w mestinon 90 mg tid, monitor for secretions (pt takes mestinon 120 mg tid at home)  [] Prednisone 50mg qd resumed on 5/11. Continue current dose, and will go down by 10 mg monthly until she reaches 20 g qd, next change will be at 6/11/23  [] Protonix for GI Prophylaxis while in Steroid  [] Replete electrolyte as needed  [] Will f/u with NSCU for shiley removal, was instructed to call back in PM  [] Monitor further bleeding in BM for now  [x] S/p PLEX x 5 of sessions (5/6-5/14) with some improvement  [x] S/p IV solumedrol 125mg IV daily x 5 days (5/6-5/10)  [] PT/OT as tolerated  [] Pureed Diet  [] Ultimately patient will need better outpatient therapy to prevent relapses, either IVIG infusions, increased prednisone dose, or Soliris (or combination of some)  [] Likely to follow up with Dr. Jayjay kimble at [facility] at [address] [place] [address] Outpatient Clinic since has Medicaid. Will try to set up monthly IVIG vs Soliris with Dr. Escudero at [address] [place] [address]   62 year-old woman with a PMH of myasthenia gravis who presented to the ED on 5/5/23 with worsening diplopia and difficulty speaking, swallowing and breathing x 1 week, admitted for evaluation of management of acute MG exacerbation. Interval neuro exam stable. Still has shiley placed on R. NIF/VC as noted above. s/p IVIG 5/15-5/19. Shiley removal unable to be done due to copious secretion and coughing when sitting up 30 deg in bed.    Impression: Acute MG exacerbation, possibly triggered by progressive disease or other toxic/metabolic insult    Recommendations:   [x] CXR 5/20/23: right-sided hemodialysis catheter with tip in the right atrium. No focal consolidations. There is no pleural effusion or pneumothorax. The heart is normal in size. Sternotomy wires.  [] C/w mestinon 90 mg tid, monitor for secretions (pt takes mestinon 120 mg tid at home)  [] Prednisone 50mg qd resumed on 5/11. Continue current dose, and will go down by 10 mg monthly until she reaches 20 g qd, next change will be at 6/11/23  [] Protonix for GI Prophylaxis while in Steroid  [] Replete electrolyte as needed  [] Will f/u with NSCU for shiley removal, was instructed to call back in PM  [] Monitor further bleeding in BM for now  [x] S/p PLEX x 5 of sessions (5/6-5/14) with some improvement  [x] S/p IV solumedrol 125mg IV daily x 5 days (5/6-5/10)  [] PT/OT as tolerated  [] Pureed Diet  [] Ultimately patient will need better outpatient therapy to prevent relapses, either IVIG infusions, increased prednisone dose, or Soliris (or combination of some)  [] Likely to follow up with Dr. Jayjay kimble at [facility] at [address] [place] [address] Outpatient Clinic since has Medicaid. Will try to set up monthly IVIG vs Soliris with Dr. Escudero at [address] [place] [address]   62 year-old woman with a PMH of myasthenia gravis who presented to the ED on 5/5/23 with worsening diplopia and difficulty speaking, swallowing and breathing x 1 week, admitted for evaluation of management of acute MG exacerbation. Interval neuro exam stable. Still has shiley placed on R. NIF/VC as noted above. s/p IVIG 5/15-5/19. Shiley removal unable to be done due to copious secretion and coughing when sitting up 30 deg in bed.    Impression: Acute MG exacerbation, possibly triggered by progressive disease or other toxic/metabolic insult    Recommendations:   [x] CXR 5/20/23: right-sided hemodialysis catheter with tip in the right atrium. No focal consolidations. There is no pleural effusion or pneumothorax. The heart is normal in size. Sternotomy wires.  [] C/w mestinon 90 mg tid, monitor for secretions (pt takes mestinon 120 mg tid at home)  [] Prednisone 50mg qd resumed on 5/11. Continue current dose, and will go down by 10 mg monthly until she reaches 20 g qd, next change will be at 6/11/23  [] Protonix for GI Prophylaxis while in Steroid  [] Replete electrolyte as needed  [] Will f/u with NSCU for shiley removal, was instructed to call back in PM  [] Monitor further bleeding in BM for now  [x] S/p PLEX x 5 of sessions (5/6-5/14) with some improvement  [x] S/p IV solumedrol 125mg IV daily x 5 days (5/6-5/10)  [] PT/OT as tolerated  [] Pureed Diet  [] Ultimately patient will need better outpatient therapy to prevent relapses, either IVIG infusions, increased prednisone dose, or Soliris (or combination of some)  [] Likely to follow up with Dr. Jayjay kimble at [facility] at [address] [place] [address] Outpatient Clinic since has Medicaid. Will try to set up monthly IVIG vs Soliris with Dr. Escudero at [address] [place] [address]   62 year-old woman with a PMH of myasthenia gravis who presented to the ED on 5/5/23 with worsening diplopia and difficulty speaking, swallowing and breathing x 1 week, admitted for evaluation of management of acute MG exacerbation. Interval neuro exam stable. Still has shiley placed on R. NIF/VC as noted above. s/p IVIG 5/15-5/19. Shiley removal unable to be done due to copious secretion and coughing when sitting up 30 deg in bed. Evaluating for tachycardia, respiratory secretions currently to improve to then remove shiley.     Impression: Acute MG exacerbation, possibly triggered by progressive disease or other toxic/metabolic insult    Recommendations:   [ ] CT chest w/ con to r/o PE given tachycardia, respiratory symptoms. Called to expedite and should be done today. Re-evaluated patient in afternoon and clinically stable.   [x] d/w NSCU 5/20 PM for shiley removal, recommended to improve respiratory status prior to removal. Also received lovenox last night.   [ ] Cards recs appreciated for tachycardia   [x] CXR 5/20/23: right-sided hemodialysis catheter with tip in the right atrium. No focal consolidations. There is no pleural effusion or pneumothorax. The heart is normal in size. Sternotomy wires.  [x] C/w mestinon 90 mg tid, monitor for secretions (pt takes mestinon 120 mg tid at home)  [x] Prednisone 50mg qd resumed on 5/11. Continue current dose, and will go down by 10 mg monthly until she reaches 20 g qd, next change will be at 6/11/23  [x] Protonix for GI Prophylaxis while in Steroid  [x] Replete electrolyte as needed  [x] Monitor further bleeding in BM for now  [x] S/p PLEX x 5 of sessions (5/6-5/14) with some improvement  [x] S/p IV solumedrol 125mg IV daily x 5 days (5/6-5/10)  [x] PT/OT as tolerated  [x] Pureed Diet  [] Ultimately patient will need better outpatient therapy to prevent relapses, either IVIG infusions, increased prednisone dose, or Soliris (or combination of some)  [] Likely to follow up with Dr. Jayjay kimble at [facility] at [address] [place] [address] Outpatient Clinic since has Medicaid. Will try to set up monthly IVIG vs Soliris with Dr. Escudero at [facility]    Patient was seen on rounds with neuro attending. Plan above in agreement with neuro attending at time of note documentation and any resident documentation updates. Discussed patient care with patient and in agreement. Delay in patient note entry/ updates to note due to patient care. Plan finalized/addended once signed by attending.       62 year-old woman with a PMH of myasthenia gravis who presented to the ED on 5/5/23 with worsening diplopia and difficulty speaking, swallowing and breathing x 1 week, admitted for evaluation of management of acute MG exacerbation. Interval neuro exam stable. Still has shiley placed on R. NIF/VC as noted above. s/p IVIG 5/15-5/19. Shiley removal unable to be done due to copious secretion and coughing when sitting up 30 deg in bed. Evaluating for tachycardia, respiratory secretions currently to improve to then remove shiley.     Impression: Acute MG exacerbation, possibly triggered by progressive disease or other toxic/metabolic insult    Recommendations:   [ ] CT chest w/ con to r/o PE given tachycardia, respiratory symptoms. Called to expedite and should be done today. Re-evaluated patient in afternoon and clinically stable.   [x] d/w NSCU 5/20 PM for shiley removal, recommended to improve respiratory status prior to removal. Also received lovenox last night.   [ ] Cards recs appreciated for tachycardia   [x] CXR 5/20/23: right-sided hemodialysis catheter with tip in the right atrium. No focal consolidations. There is no pleural effusion or pneumothorax. The heart is normal in size. Sternotomy wires.  [x] C/w mestinon 90 mg tid, monitor for secretions (pt takes mestinon 120 mg tid at home)  [x] Prednisone 50mg qd resumed on 5/11. Continue current dose, and will go down by 10 mg monthly until she reaches 20 g qd, next change will be at 6/11/23  [x] Protonix for GI Prophylaxis while in Steroid  [x] Replete electrolyte as needed  [x] Monitor further bleeding in BM for now  [x] S/p PLEX x 5 of sessions (5/6-5/14) with some improvement  [x] S/p IV solumedrol 125mg IV daily x 5 days (5/6-5/10)  [x] PT/OT as tolerated  [x] Pureed Diet  [] Ultimately patient will need better outpatient therapy to prevent relapses, either IVIG infusions, increased prednisone dose, or Soliris (or combination of some)  [] Likely to follow up with Dr. Jayjay kimble at [facility] at [address] [place] [address] Outpatient Clinic since has Medicaid. Will try to set up monthly IVIG vs Soliris with Dr. Escudero at [facility]    Patient was seen on rounds with neuro attending. Plan above in agreement with neuro attending at time of note documentation and any resident documentation updates. Discussed patient care with patient and in agreement. Delay in patient note entry/ updates to note due to patient care. Plan finalized/addended once signed by attending.       62 year-old woman with a PMH of myasthenia gravis who presented to the ED on 5/5/23 with worsening diplopia and difficulty speaking, swallowing and breathing x 1 week, admitted for evaluation of management of acute MG exacerbation. Interval neuro exam stable. Still has shiley placed on R. NIF/VC as noted above. s/p IVIG 5/15-5/19. Shiley removal unable to be done due to copious secretion and coughing when sitting up 30 deg in bed. Evaluating for tachycardia, respiratory secretions currently to improve to then remove shiley.     Impression: Acute MG exacerbation, possibly triggered by progressive disease or other toxic/metabolic insult    Recommendations:   [ ] CT chest w/ con to r/o PE given tachycardia, respiratory symptoms. Called to expedite and should be done today. Re-evaluated patient in afternoon and clinically stable.   [x] d/w NSCU 5/20 PM for shiley removal, recommended to improve respiratory status prior to removal. Also received lovenox last night.   [ ] Cards recs appreciated for tachycardia   [x] CXR 5/20/23: right-sided hemodialysis catheter with tip in the right atrium. No focal consolidations. There is no pleural effusion or pneumothorax. The heart is normal in size. Sternotomy wires.  [x] C/w mestinon 90 mg tid, monitor for secretions (pt takes mestinon 120 mg tid at home)  [x] Prednisone 50mg qd resumed on 5/11. Continue current dose, and will go down by 10 mg monthly until she reaches 20 g qd, next change will be at 6/11/23  [x] Protonix for GI Prophylaxis while in Steroid  [x] Replete electrolyte as needed  [x] Monitor further bleeding in BM for now  [x] S/p PLEX x 5 of sessions (5/6-5/14) with some improvement  [x] S/p IV solumedrol 125mg IV daily x 5 days (5/6-5/10)  [x] PT/OT as tolerated  [x] Pureed Diet  [] Ultimately patient will need better outpatient therapy to prevent relapses, either IVIG infusions, increased prednisone dose, or Soliris (or combination of some)  [] Likely to follow up with Dr. Jayjay kimble at [facility] at [address] [place] [address] Outpatient Clinic since has Medicaid. Will try to set up monthly IVIG vs Soliris with Dr. Escudero at [facility]    Patient was seen on rounds with neuro attending. Plan above in agreement with neuro attending at time of note documentation and any resident documentation updates. Discussed patient care with patient and in agreement. Delay in patient note entry/ updates to note due to patient care. Plan finalized/addended once signed by attending.

## 2023-05-21 NOTE — PROGRESS NOTE ADULT - SUBJECTIVE AND OBJECTIVE BOX
Neurology Progress Note    SUBJECTIVE/OBJECTIVE/INTERVAL EVENTS:       Had tachycardic events yesterday, consulted cards  Desat episodes that improved with self suctioning  Prior BRB w/ hx of hemorrhoids, monitoring hgb  NIF -50,  yesterday late afternoon/evening  Yesterday patient had attempt to remove shiley catheter although had copious seccretions, coughing. Was discussed with NSICU and recommended to await till respiratory status improves. Dressing was changed 5/20/23    MEDICATIONS  (STANDING):  acetylcysteine 20%  Inhalation 4 milliLiter(s) Inhalation every 6 hours  albuterol/ipratropium for Nebulization 3 milliLiter(s) Nebulizer every 6 hours  amLODIPine   Tablet 10 milliGRAM(s) Oral daily  atorvastatin 20 milliGRAM(s) Oral at bedtime  chlorhexidine 4% Liquid 1 Application(s) Topical <User Schedule>  dextrose 5%. 1000 milliLiter(s) (100 mL/Hr) IV Continuous <Continuous>  dextrose 5%. 1000 milliLiter(s) (50 mL/Hr) IV Continuous <Continuous>  dextrose 50% Injectable 25 Gram(s) IV Push once  enoxaparin Injectable 40 milliGRAM(s) SubCutaneous every 24 hours  glucagon  Injectable 1 milliGRAM(s) IntraMuscular once  insulin lispro (ADMELOG) corrective regimen sliding scale   SubCutaneous three times a day before meals  insulin lispro (ADMELOG) corrective regimen sliding scale   SubCutaneous at bedtime  melatonin 3 milliGRAM(s) Oral at bedtime  pantoprazole    Tablet 40 milliGRAM(s) Oral before breakfast  polyethylene glycol 3350 17 Gram(s) Oral every 12 hours  predniSONE   Tablet 50 milliGRAM(s) Oral daily  pyridostigmine 90 milliGRAM(s) Oral every 8 hours  senna 2 Tablet(s) Oral at bedtime    MEDICATIONS  (PRN):  acetaminophen     Tablet .. 650 milliGRAM(s) Oral every 6 hours PRN Temp greater or equal to 38C (100.4F), Mild Pain (1 - 3)  bisacodyl Suppository 10 milliGRAM(s) Rectal daily PRN Constipation  dextrose Oral Gel 15 Gram(s) Oral once PRN Blood Glucose LESS THAN 70 milliGRAM(s)/deciliter  diphenhydrAMINE 25 milliGRAM(s) Oral every 4 hours PRN Rash and/or Itching  oxyCODONE    IR 5 milliGRAM(s) Oral every 6 hours PRN Moderate Pain (4 - 6)  sodium chloride 0.65% Nasal 1 Spray(s) Both Nostrils four times a day PRN Nasal Congestion      VITALS & EXAMINATION:  Vital Signs Last 24 Hrs  T(C): 36.8 (21 May 2023 05:00), Max: 36.9 (20 May 2023 08:17)  T(F): 98.2 (21 May 2023 05:00), Max: 98.4 (20 May 2023 08:17)  HR: 106 (21 May 2023 05:00) (97 - 123)  BP: 168/77 (21 May 2023 05:00) (136/75 - 168/77)  BP(mean): --  RR: 18 (21 May 2023 05:00) (18 - 18)  SpO2: 94% (21 May 2023 05:00) (94% - 96%)    Parameters below as of 21 May 2023 05:00  Patient On (Oxygen Delivery Method): room air         LABORATORY:  CBC                       10.9   11.20 )-----------( 331      ( 21 May 2023 06:20 )             33.8     Chem 05-21    141  |  101  |  24<H>  ----------------------------<  131<H>  3.2<L>   |  27  |  0.62    Ca    8.6      21 May 2023 06:20      LFTs   Coagulopathy   Lipid Panel   A1c   Cardiac enzymes     U/A   CSF  Immunological  Other    STUDIES & IMAGING: (EEG, CT, MR, U/S, TTE/AUGUSTINA): Neurology Progress Note    SUBJECTIVE/OBJECTIVE/INTERVAL EVENTS:  Patient seen evaluated bedside. She didn't want  today and wanted to communicate in English. Reports symptoms have improved respiratory wise, no double vision, no worsening head/neck flexion/extension, able to ambulate. Secretions are still there but she self suctions as she needs. Has been intermittently tachycardic that she reports is not worsening. No worsening SOB/ chest pain.      Had tachycardic events yesterday, consulted cards  Desat episodes that improved with self suctioning  Prior BRB w/ hx of hemorrhoids, monitoring hgb  NIF -50,  yesterday late afternoon/evening  Yesterday patient had attempt to remove shiley catheter although had copious seccretions, coughing. Was discussed with NSICU and recommended to await till respiratory status improves. Dressing was changed 5/20/23    MEDICATIONS  (STANDING):  acetylcysteine 20%  Inhalation 4 milliLiter(s) Inhalation every 6 hours  albuterol/ipratropium for Nebulization 3 milliLiter(s) Nebulizer every 6 hours  amLODIPine   Tablet 10 milliGRAM(s) Oral daily  atorvastatin 20 milliGRAM(s) Oral at bedtime  chlorhexidine 4% Liquid 1 Application(s) Topical <User Schedule>  dextrose 5%. 1000 milliLiter(s) (100 mL/Hr) IV Continuous <Continuous>  dextrose 5%. 1000 milliLiter(s) (50 mL/Hr) IV Continuous <Continuous>  dextrose 50% Injectable 25 Gram(s) IV Push once  enoxaparin Injectable 40 milliGRAM(s) SubCutaneous every 24 hours  glucagon  Injectable 1 milliGRAM(s) IntraMuscular once  insulin lispro (ADMELOG) corrective regimen sliding scale   SubCutaneous three times a day before meals  insulin lispro (ADMELOG) corrective regimen sliding scale   SubCutaneous at bedtime  melatonin 3 milliGRAM(s) Oral at bedtime  pantoprazole    Tablet 40 milliGRAM(s) Oral before breakfast  polyethylene glycol 3350 17 Gram(s) Oral every 12 hours  predniSONE   Tablet 50 milliGRAM(s) Oral daily  pyridostigmine 90 milliGRAM(s) Oral every 8 hours  senna 2 Tablet(s) Oral at bedtime    MEDICATIONS  (PRN):  acetaminophen     Tablet .. 650 milliGRAM(s) Oral every 6 hours PRN Temp greater or equal to 38C (100.4F), Mild Pain (1 - 3)  bisacodyl Suppository 10 milliGRAM(s) Rectal daily PRN Constipation  dextrose Oral Gel 15 Gram(s) Oral once PRN Blood Glucose LESS THAN 70 milliGRAM(s)/deciliter  diphenhydrAMINE 25 milliGRAM(s) Oral every 4 hours PRN Rash and/or Itching  oxyCODONE    IR 5 milliGRAM(s) Oral every 6 hours PRN Moderate Pain (4 - 6)  sodium chloride 0.65% Nasal 1 Spray(s) Both Nostrils four times a day PRN Nasal Congestion      VITALS & EXAMINATION:  Vital Signs Last 24 Hrs  T(C): 36.8 (21 May 2023 05:00), Max: 36.9 (20 May 2023 08:17)  T(F): 98.2 (21 May 2023 05:00), Max: 98.4 (20 May 2023 08:17)  HR: 106 (21 May 2023 05:00) (97 - 123)  BP: 168/77 (21 May 2023 05:00) (136/75 - 168/77)  BP(mean): --  RR: 18 (21 May 2023 05:00) (18 - 18)  SpO2: 94% (21 May 2023 05:00) (94% - 96%)    Parameters below as of 21 May 2023 05:00  Patient On (Oxygen Delivery Method): room air    Respiratory: One breath counts up to 20  Chest: Has shiley placed on R    MS - Eyes open, awake, alert and attentive.  Follows all commands.   CN - Right eye w limited range in extreme lateral/ upward gaze consistent w Lateral Rectus and Superior Oblique weakness. EOM otherwise intact; moderate ptosis right eyre worsened with sustained upgaze (~20 seconds); VFF, face sens/str/hearing WNL b/l, tongue midline. MOLLY palate. Neck extension 5/5 and neck flexion 4+/5.  Motor - Normal bulk/tone. No pronator drift. Head flexion is at 4-/5   RUE: 4-/5 deltoids, 5/5 biceps, 5+/5 triceps, 5/5 .    LUE: 4-/5 deltoids, 5/5 biceps, 5/5 triceps, 5/5 .   RLE/LLE: 4+/5 hip flexion, 5/5 knee flexion, 5/5 knee extension, 5/5 ankle plantarflexion and dorsiflexion.   Sens - LT intact throughout  Gait and station - MOLLY.       LABORATORY:  CBC                       10.9   11.20 )-----------( 331      ( 21 May 2023 06:20 )             33.8     Chem 05-21    141  |  101  |  24<H>  ----------------------------<  131<H>  3.2<L>   |  27  |  0.62    Ca    8.6      21 May 2023 06:20      LFTs   Coagulopathy   Lipid Panel   A1c   Cardiac enzymes     U/A   CSF  Immunological  Other    STUDIES & IMAGING: (EEG, CT, MR, U/S, TTE/AUGUSTINA):

## 2023-05-21 NOTE — PROGRESS NOTE ADULT - SUBJECTIVE AND OBJECTIVE BOX
Subjective: Patient seen and examined. No new events except as noted.   resting comfortably       REVIEW OF SYSTEMS:    CONSTITUTIONAL: +weakness, fevers or chills  EYES/ENT: No visual changes;  No vertigo or throat pain   NECK: No pain or stiffness  RESPIRATORY: No cough, wheezing, hemoptysis; No shortness of breath  CARDIOVASCULAR: No chest pain or palpitations  GASTROINTESTINAL: No abdominal or epigastric pain. No nausea, vomiting, or hematemesis; No diarrhea or constipation. No melena or hematochezia.  GENITOURINARY: No dysuria, frequency or hematuria  NEUROLOGICAL: No numbness or weakness  SKIN: No itching, burning, rashes, or lesions   All other review of systems is negative unless indicated above.    MEDICATIONS:  MEDICATIONS  (STANDING):  acetylcysteine 20%  Inhalation 4 milliLiter(s) Inhalation every 6 hours  albuterol/ipratropium for Nebulization 3 milliLiter(s) Nebulizer every 6 hours  amLODIPine   Tablet 10 milliGRAM(s) Oral daily  atorvastatin 20 milliGRAM(s) Oral at bedtime  chlorhexidine 4% Liquid 1 Application(s) Topical <User Schedule>  dextrose 5%. 1000 milliLiter(s) (100 mL/Hr) IV Continuous <Continuous>  dextrose 5%. 1000 milliLiter(s) (50 mL/Hr) IV Continuous <Continuous>  dextrose 50% Injectable 25 Gram(s) IV Push once  enoxaparin Injectable 40 milliGRAM(s) SubCutaneous every 24 hours  glucagon  Injectable 1 milliGRAM(s) IntraMuscular once  insulin lispro (ADMELOG) corrective regimen sliding scale   SubCutaneous at bedtime  insulin lispro (ADMELOG) corrective regimen sliding scale   SubCutaneous three times a day before meals  melatonin 3 milliGRAM(s) Oral at bedtime  pantoprazole    Tablet 40 milliGRAM(s) Oral before breakfast  polyethylene glycol 3350 17 Gram(s) Oral every 12 hours  potassium chloride  10 mEq/100 mL IVPB 10 milliEquivalent(s) IV Intermittent every 1 hour  predniSONE   Tablet 50 milliGRAM(s) Oral daily  pyridostigmine 90 milliGRAM(s) Oral every 8 hours  senna 2 Tablet(s) Oral at bedtime      PHYSICAL EXAM:  T(C): 36.8 (05-21-23 @ 05:00), Max: 36.9 (05-20-23 @ 08:17)  HR: 106 (05-21-23 @ 05:00) (97 - 123)  BP: 168/77 (05-21-23 @ 05:00) (136/75 - 168/77)  RR: 18 (05-21-23 @ 05:00) (18 - 18)  SpO2: 94% (05-21-23 @ 05:00) (94% - 96%)  Wt(kg): --  I&O's Summary    20 May 2023 07:01  -  21 May 2023 07:00  --------------------------------------------------------  IN: 680 mL / OUT: 0 mL / NET: 680 mL          Appearance: Normal	  HEENT:   Normal oral mucosa, PERRL, EOMI	  Lymphatic: No lymphadenopathy , no edema  Cardiovascular: Normal S1 S2, No JVD, No murmurs , Peripheral pulses palpable 2+ bilaterally  Respiratory: Lungs clear to auscultation, normal effort 	  Gastrointestinal:  Soft, Non-tender, + BS	  Skin: No rashes, No ecchymoses, No cyanosis, warm to touch  Musculoskeletal: Normal range of motion, normal strength  Psychiatry:  Mood & affect appropriate  Ext: No edema      LABS:    CARDIAC MARKERS:                                10.9   11.20 )-----------( 331      ( 21 May 2023 06:20 )             33.8     05-21    141  |  101  |  24<H>  ----------------------------<  131<H>  3.2<L>   |  27  |  0.62    Ca    8.6      21 May 2023 06:20      proBNP:   Lipid Profile:   HgA1c:   TSH:       D-Dimer Assay, Quantitative: 362: "D-Dimer result less than or equal to 229ng/mL DDU correlates with the       TELEMETRY: 	  SR ST   ECG:  	  RADIOLOGY:   DIAGNOSTIC TESTING:  [ ] Echocardiogram:  [ ]  Catheterization:  [ ] Stress Test:    OTHER:

## 2023-05-21 NOTE — CHART NOTE - NSCHARTNOTEFT_GEN_A_CORE
Neurology    Patient re-valuated around 2:30PM, resting comfortably in chair, sleeping. Denied any worsening weakness, shortness of breath, chest pain. Felt otherwise comfortable. Discussed that we are awaiting CT of chest, expedited although there are multiple CT emergencies currently so CT scanner said they will plan for today as best as able. Neurology    Patient re-valuated around 2:30PM, resting comfortably in chair, sleeping. Denied any worsening weakness, shortness of breath, chest pain. Shiley site dry, without signs of infection, redness, bleeding. Felt otherwise comfortable. Discussed that we are awaiting CT of chest, expedited although there are multiple CT emergencies currently so CT scanner said they will plan for today as best as able.    Patient re-evaluated in later afternoon, clinical / respiratory status stable as prior eval. Neurology    Patient re-valuated around 2:30PM, resting comfortably in chair, sleeping. Denied any worsening weakness, shortness of breath, chest pain. Shiley site dry, without signs of infection, redness, bleeding. Felt otherwise comfortable. Discussed that we are awaiting CT of chest, expedited although there are multiple CT emergencies currently so CT scanner said they will plan for today as best as able.    Patient re-evaluated in later afternoon, clinical / respiratory status stable as prior eval. Will sign out to night team to f/u CT chest imaging. D/w neuro attending. Neurology    Patient re-valuated around 2:30PM, resting comfortably in chair, sleeping. Denied any worsening weakness, shortness of breath, chest pain. Shiley site dry, without signs of infection, redness, bleeding. Felt otherwise comfortable. Discussed that we are awaiting CT of chest, expedited although there are multiple CT emergencies currently so CT scanner said they will plan for today as best as able.    Patient re-evaluated in later afternoon, clinical / respiratory status stable as prior eval. Will sign out to night team to f/u CT chest imaging. D/w neuro attending.    Re-evaluated patient at 7PM, sitting in chair comfortably. No new/ worsening symptoms. Prelim CT chest report without PE, pending full report.

## 2023-05-22 LAB
ACRM BINDING ANTIBODY: 1.45 NMOL/L — HIGH (ref 0–0.24)
ALBUMIN SERPL ELPH-MCNC: 4.2 G/DL — SIGNIFICANT CHANGE UP (ref 3.3–5)
ALP SERPL-CCNC: 70 U/L — SIGNIFICANT CHANGE UP (ref 40–120)
ALT FLD-CCNC: 22 U/L — SIGNIFICANT CHANGE UP (ref 10–45)
ANION GAP SERPL CALC-SCNC: 14 MMOL/L — SIGNIFICANT CHANGE UP (ref 5–17)
AST SERPL-CCNC: 15 U/L — SIGNIFICANT CHANGE UP (ref 10–40)
BASOPHILS # BLD AUTO: 0.03 K/UL — SIGNIFICANT CHANGE UP (ref 0–0.2)
BASOPHILS NFR BLD AUTO: 0.3 % — SIGNIFICANT CHANGE UP (ref 0–2)
BILIRUB SERPL-MCNC: 0.8 MG/DL — SIGNIFICANT CHANGE UP (ref 0.2–1.2)
BUN SERPL-MCNC: 21 MG/DL — SIGNIFICANT CHANGE UP (ref 7–23)
CALCIUM SERPL-MCNC: 9 MG/DL — SIGNIFICANT CHANGE UP (ref 8.4–10.5)
CHLORIDE SERPL-SCNC: 101 MMOL/L — SIGNIFICANT CHANGE UP (ref 96–108)
CO2 SERPL-SCNC: 24 MMOL/L — SIGNIFICANT CHANGE UP (ref 22–31)
CREAT SERPL-MCNC: 0.59 MG/DL — SIGNIFICANT CHANGE UP (ref 0.5–1.3)
EGFR: 101 ML/MIN/1.73M2 — SIGNIFICANT CHANGE UP
EOSINOPHIL # BLD AUTO: 0.08 K/UL — SIGNIFICANT CHANGE UP (ref 0–0.5)
EOSINOPHIL NFR BLD AUTO: 0.8 % — SIGNIFICANT CHANGE UP (ref 0–6)
GLUCOSE BLDC GLUCOMTR-MCNC: 146 MG/DL — HIGH (ref 70–99)
GLUCOSE BLDC GLUCOMTR-MCNC: 227 MG/DL — HIGH (ref 70–99)
GLUCOSE BLDC GLUCOMTR-MCNC: 272 MG/DL — HIGH (ref 70–99)
GLUCOSE BLDC GLUCOMTR-MCNC: 309 MG/DL — HIGH (ref 70–99)
GLUCOSE SERPL-MCNC: 110 MG/DL — HIGH (ref 70–99)
HCT VFR BLD CALC: 42.5 % — SIGNIFICANT CHANGE UP (ref 34.5–45)
HGB BLD-MCNC: 13.1 G/DL — SIGNIFICANT CHANGE UP (ref 11.5–15.5)
IMM GRANULOCYTES NFR BLD AUTO: 0.5 % — SIGNIFICANT CHANGE UP (ref 0–0.9)
LYMPHOCYTES # BLD AUTO: 3.35 K/UL — HIGH (ref 1–3.3)
LYMPHOCYTES # BLD AUTO: 34.3 % — SIGNIFICANT CHANGE UP (ref 13–44)
MAGNESIUM SERPL-MCNC: 2.3 MG/DL — SIGNIFICANT CHANGE UP (ref 1.6–2.6)
MCHC RBC-ENTMCNC: 28.1 PG — SIGNIFICANT CHANGE UP (ref 27–34)
MCHC RBC-ENTMCNC: 30.8 GM/DL — LOW (ref 32–36)
MCV RBC AUTO: 91.2 FL — SIGNIFICANT CHANGE UP (ref 80–100)
MONOCYTES # BLD AUTO: 0.65 K/UL — SIGNIFICANT CHANGE UP (ref 0–0.9)
MONOCYTES NFR BLD AUTO: 6.6 % — SIGNIFICANT CHANGE UP (ref 2–14)
NEUTROPHILS # BLD AUTO: 5.62 K/UL — SIGNIFICANT CHANGE UP (ref 1.8–7.4)
NEUTROPHILS NFR BLD AUTO: 57.5 % — SIGNIFICANT CHANGE UP (ref 43–77)
NRBC # BLD: 0 /100 WBCS — SIGNIFICANT CHANGE UP (ref 0–0)
PHOSPHATE SERPL-MCNC: 4.3 MG/DL — SIGNIFICANT CHANGE UP (ref 2.5–4.5)
PLATELET # BLD AUTO: 340 K/UL — SIGNIFICANT CHANGE UP (ref 150–400)
POTASSIUM SERPL-MCNC: 3.6 MMOL/L — SIGNIFICANT CHANGE UP (ref 3.5–5.3)
POTASSIUM SERPL-SCNC: 3.6 MMOL/L — SIGNIFICANT CHANGE UP (ref 3.5–5.3)
PROT SERPL-MCNC: 9.3 G/DL — HIGH (ref 6–8.3)
RBC # BLD: 4.66 M/UL — SIGNIFICANT CHANGE UP (ref 3.8–5.2)
RBC # FLD: 15.5 % — HIGH (ref 10.3–14.5)
SODIUM SERPL-SCNC: 139 MMOL/L — SIGNIFICANT CHANGE UP (ref 135–145)
WBC # BLD: 9.78 K/UL — SIGNIFICANT CHANGE UP (ref 3.8–10.5)
WBC # FLD AUTO: 9.78 K/UL — SIGNIFICANT CHANGE UP (ref 3.8–10.5)

## 2023-05-22 PROCEDURE — 99232 SBSQ HOSP IP/OBS MODERATE 35: CPT

## 2023-05-22 RX ADMIN — PYRIDOSTIGMINE BROMIDE 90 MILLIGRAM(S): 60 SOLUTION ORAL at 22:49

## 2023-05-22 RX ADMIN — Medication 3 MILLILITER(S): at 17:08

## 2023-05-22 RX ADMIN — ATORVASTATIN CALCIUM 20 MILLIGRAM(S): 80 TABLET, FILM COATED ORAL at 22:50

## 2023-05-22 RX ADMIN — PYRIDOSTIGMINE BROMIDE 90 MILLIGRAM(S): 60 SOLUTION ORAL at 06:34

## 2023-05-22 RX ADMIN — AMLODIPINE BESYLATE 10 MILLIGRAM(S): 2.5 TABLET ORAL at 05:50

## 2023-05-22 RX ADMIN — Medication 3 MILLILITER(S): at 00:10

## 2023-05-22 RX ADMIN — Medication 650 MILLIGRAM(S): at 06:39

## 2023-05-22 RX ADMIN — Medication 3 MILLILITER(S): at 11:31

## 2023-05-22 RX ADMIN — Medication 4 MILLILITER(S): at 00:10

## 2023-05-22 RX ADMIN — Medication 4 MILLILITER(S): at 05:46

## 2023-05-22 RX ADMIN — CHLORHEXIDINE GLUCONATE 1 APPLICATION(S): 213 SOLUTION TOPICAL at 05:49

## 2023-05-22 RX ADMIN — Medication 3: at 11:29

## 2023-05-22 RX ADMIN — Medication 4 MILLILITER(S): at 17:07

## 2023-05-22 RX ADMIN — Medication 2: at 16:29

## 2023-05-22 RX ADMIN — Medication 50 MILLIGRAM(S): at 05:50

## 2023-05-22 RX ADMIN — Medication 3 MILLILITER(S): at 05:48

## 2023-05-22 RX ADMIN — Medication 4: at 08:05

## 2023-05-22 RX ADMIN — PANTOPRAZOLE SODIUM 40 MILLIGRAM(S): 20 TABLET, DELAYED RELEASE ORAL at 05:46

## 2023-05-22 RX ADMIN — PYRIDOSTIGMINE BROMIDE 90 MILLIGRAM(S): 60 SOLUTION ORAL at 13:20

## 2023-05-22 RX ADMIN — Medication 650 MILLIGRAM(S): at 05:49

## 2023-05-22 RX ADMIN — Medication 4 MILLILITER(S): at 11:30

## 2023-05-22 NOTE — PROGRESS NOTE ADULT - SUBJECTIVE AND OBJECTIVE BOX
Neurology Progress Note    SUBJECTIVE/OBJECTIVE/INTERVAL EVENTS:       MEDICATIONS  (STANDING):  acetylcysteine 20%  Inhalation 4 milliLiter(s) Inhalation every 6 hours  albuterol/ipratropium for Nebulization 3 milliLiter(s) Nebulizer every 6 hours  amLODIPine   Tablet 10 milliGRAM(s) Oral daily  atorvastatin 20 milliGRAM(s) Oral at bedtime  chlorhexidine 4% Liquid 1 Application(s) Topical <User Schedule>  dextrose 5%. 1000 milliLiter(s) (50 mL/Hr) IV Continuous <Continuous>  dextrose 5%. 1000 milliLiter(s) (100 mL/Hr) IV Continuous <Continuous>  dextrose 50% Injectable 25 Gram(s) IV Push once  glucagon  Injectable 1 milliGRAM(s) IntraMuscular once  insulin lispro (ADMELOG) corrective regimen sliding scale   SubCutaneous three times a day before meals  insulin lispro (ADMELOG) corrective regimen sliding scale   SubCutaneous at bedtime  melatonin 3 milliGRAM(s) Oral at bedtime  pantoprazole    Tablet 40 milliGRAM(s) Oral before breakfast  polyethylene glycol 3350 17 Gram(s) Oral every 12 hours  predniSONE   Tablet 50 milliGRAM(s) Oral daily  pyridostigmine 90 milliGRAM(s) Oral every 8 hours  senna 2 Tablet(s) Oral at bedtime    MEDICATIONS  (PRN):  acetaminophen     Tablet .. 650 milliGRAM(s) Oral every 6 hours PRN Temp greater or equal to 38C (100.4F), Mild Pain (1 - 3)  bisacodyl Suppository 10 milliGRAM(s) Rectal daily PRN Constipation  dextrose Oral Gel 15 Gram(s) Oral once PRN Blood Glucose LESS THAN 70 milliGRAM(s)/deciliter  oxyCODONE    IR 5 milliGRAM(s) Oral every 6 hours PRN Moderate Pain (4 - 6)  sodium chloride 0.65% Nasal 1 Spray(s) Both Nostrils four times a day PRN Nasal Congestion      VITALS & EXAMINATION:  Vital Signs Last 24 Hrs  T(C): 37 (22 May 2023 05:11), Max: 37 (22 May 2023 05:11)  T(F): 98.6 (22 May 2023 05:11), Max: 98.6 (22 May 2023 05:11)  HR: 97 (22 May 2023 05:11) (97 - 122)  BP: 134/76 (22 May 2023 05:11) (116/79 - 142/78)  BP(mean): --  RR: 18 (22 May 2023 05:11) (18 - 18)  SpO2: 96% (22 May 2023 05:11) (94% - 99%)    Parameters below as of 22 May 2023 05:11  Patient On (Oxygen Delivery Method): room air         LABORATORY:  CBC                       13.1   9.78  )-----------( 340      ( 22 May 2023 06:28 )             42.5     Chem 05-22    139  |  101  |  21  ----------------------------<  110<H>  3.6   |  24  |  0.59    Ca    9.0      22 May 2023 06:28  Phos  4.3     05-22  Mg     2.3     05-22    TPro  9.3<H>  /  Alb  4.2  /  TBili  0.8  /  DBili  x   /  AST  15  /  ALT  22  /  AlkPhos  70  05-22    LFTs LIVER FUNCTIONS - ( 22 May 2023 06:28 )  Alb: 4.2 g/dL / Pro: 9.3 g/dL / ALK PHOS: 70 U/L / ALT: 22 U/L / AST: 15 U/L / GGT: x           Coagulopathy   Lipid Panel   A1c   Cardiac enzymes     U/A   CSF  Immunological  Other    STUDIES & IMAGING: (EEG, CT, MR, U/S, TTE/AUGUSTINA):    < from: CT Angio Chest PE Protocol w/ IV Cont (05.21.23 @ 18:15) >    ******PRELIMINARY REPORT******      ******PRELIMINARY REPORT******         ACC: [id number] EXAM:  CT ANGIO CHEST PULM ART [facility]   ORDERED BY: NATHEN CHARLES     PROCEDURE DATE:  05/21/2023    ******PRELIMINARY REPORT******      ******PRELIMINARY REPORT******       INTERPRETATION:  no pulmonary embolism to the lobar arteries, limited   distal evaluation due to respiratory motion. streaky opacity in the super   right lower lobe. follow up final report in the am.       Neurology Progress Note    SUBJECTIVE/OBJECTIVE/INTERVAL EVENTS:       MEDICATIONS  (STANDING):  acetylcysteine 20%  Inhalation 4 milliLiter(s) Inhalation every 6 hours  albuterol/ipratropium for Nebulization 3 milliLiter(s) Nebulizer every 6 hours  amLODIPine   Tablet 10 milliGRAM(s) Oral daily  atorvastatin 20 milliGRAM(s) Oral at bedtime  chlorhexidine 4% Liquid 1 Application(s) Topical <User Schedule>  dextrose 5%. 1000 milliLiter(s) (50 mL/Hr) IV Continuous <Continuous>  dextrose 5%. 1000 milliLiter(s) (100 mL/Hr) IV Continuous <Continuous>  dextrose 50% Injectable 25 Gram(s) IV Push once  glucagon  Injectable 1 milliGRAM(s) IntraMuscular once  insulin lispro (ADMELOG) corrective regimen sliding scale   SubCutaneous three times a day before meals  insulin lispro (ADMELOG) corrective regimen sliding scale   SubCutaneous at bedtime  melatonin 3 milliGRAM(s) Oral at bedtime  pantoprazole    Tablet 40 milliGRAM(s) Oral before breakfast  polyethylene glycol 3350 17 Gram(s) Oral every 12 hours  predniSONE   Tablet 50 milliGRAM(s) Oral daily  pyridostigmine 90 milliGRAM(s) Oral every 8 hours  senna 2 Tablet(s) Oral at bedtime    MEDICATIONS  (PRN):  acetaminophen     Tablet .. 650 milliGRAM(s) Oral every 6 hours PRN Temp greater or equal to 38C (100.4F), Mild Pain (1 - 3)  bisacodyl Suppository 10 milliGRAM(s) Rectal daily PRN Constipation  dextrose Oral Gel 15 Gram(s) Oral once PRN Blood Glucose LESS THAN 70 milliGRAM(s)/deciliter  oxyCODONE    IR 5 milliGRAM(s) Oral every 6 hours PRN Moderate Pain (4 - 6)  sodium chloride 0.65% Nasal 1 Spray(s) Both Nostrils four times a day PRN Nasal Congestion      VITALS & EXAMINATION:  Vital Signs Last 24 Hrs  T(C): 37 (22 May 2023 05:11), Max: 37 (22 May 2023 05:11)  T(F): 98.6 (22 May 2023 05:11), Max: 98.6 (22 May 2023 05:11)  HR: 97 (22 May 2023 05:11) (97 - 122)  BP: 134/76 (22 May 2023 05:11) (116/79 - 142/78)  BP(mean): --  RR: 18 (22 May 2023 05:11) (18 - 18)  SpO2: 96% (22 May 2023 05:11) (94% - 99%)    Parameters below as of 22 May 2023 05:11  Patient On (Oxygen Delivery Method): room air         LABORATORY:  CBC                       13.1   9.78  )-----------( 340      ( 22 May 2023 06:28 )             42.5     Chem 05-22    139  |  101  |  21  ----------------------------<  110<H>  3.6   |  24  |  0.59    Ca    9.0      22 May 2023 06:28  Phos  4.3     05-22  Mg     2.3     05-22    TPro  9.3<H>  /  Alb  4.2  /  TBili  0.8  /  DBili  x   /  AST  15  /  ALT  22  /  AlkPhos  70  05-22    LFTs LIVER FUNCTIONS - ( 22 May 2023 06:28 )  Alb: 4.2 g/dL / Pro: 9.3 g/dL / ALK PHOS: 70 U/L / ALT: 22 U/L / AST: 15 U/L / GGT: x           Coagulopathy   Lipid Panel   A1c   Cardiac enzymes     U/A   CSF  Immunological  Other    STUDIES & IMAGING: (EEG, CT, MR, U/S, TTE/AUGUSTINA):    < from: CT Angio Chest PE Protocol w/ IV Cont (05.21.23 @ 18:15) >    ******PRELIMINARY REPORT******      ******PRELIMINARY REPORT******         ACC: [id number] EXAM:  CT ANGIO CHEST PULM ART [facility]   ORDERED BY: NATHEN CHARLES     PROCEDURE DATE:  05/21/2023    ******PRELIMINARY REPORT******      ******PRELIMINARY REPORT******       INTERPRETATION:  no pulmonary embolism to the lobar arteries, limited   distal evaluation due to respiratory motion. streaky opacity in the super   right lower lobe. follow up final report in the am.       Neurology Progress Note    SUBJECTIVE/OBJECTIVE/INTERVAL EVENTS:       MEDICATIONS  (STANDING):  acetylcysteine 20%  Inhalation 4 milliLiter(s) Inhalation every 6 hours  albuterol/ipratropium for Nebulization 3 milliLiter(s) Nebulizer every 6 hours  amLODIPine   Tablet 10 milliGRAM(s) Oral daily  atorvastatin 20 milliGRAM(s) Oral at bedtime  chlorhexidine 4% Liquid 1 Application(s) Topical <User Schedule>  dextrose 5%. 1000 milliLiter(s) (50 mL/Hr) IV Continuous <Continuous>  dextrose 5%. 1000 milliLiter(s) (100 mL/Hr) IV Continuous <Continuous>  dextrose 50% Injectable 25 Gram(s) IV Push once  glucagon  Injectable 1 milliGRAM(s) IntraMuscular once  insulin lispro (ADMELOG) corrective regimen sliding scale   SubCutaneous three times a day before meals  insulin lispro (ADMELOG) corrective regimen sliding scale   SubCutaneous at bedtime  melatonin 3 milliGRAM(s) Oral at bedtime  pantoprazole    Tablet 40 milliGRAM(s) Oral before breakfast  polyethylene glycol 3350 17 Gram(s) Oral every 12 hours  predniSONE   Tablet 50 milliGRAM(s) Oral daily  pyridostigmine 90 milliGRAM(s) Oral every 8 hours  senna 2 Tablet(s) Oral at bedtime    MEDICATIONS  (PRN):  acetaminophen     Tablet .. 650 milliGRAM(s) Oral every 6 hours PRN Temp greater or equal to 38C (100.4F), Mild Pain (1 - 3)  bisacodyl Suppository 10 milliGRAM(s) Rectal daily PRN Constipation  dextrose Oral Gel 15 Gram(s) Oral once PRN Blood Glucose LESS THAN 70 milliGRAM(s)/deciliter  oxyCODONE    IR 5 milliGRAM(s) Oral every 6 hours PRN Moderate Pain (4 - 6)  sodium chloride 0.65% Nasal 1 Spray(s) Both Nostrils four times a day PRN Nasal Congestion      VITALS & EXAMINATION:  Vital Signs Last 24 Hrs  T(C): 37 (22 May 2023 05:11), Max: 37 (22 May 2023 05:11)  T(F): 98.6 (22 May 2023 05:11), Max: 98.6 (22 May 2023 05:11)  HR: 97 (22 May 2023 05:11) (97 - 122)  BP: 134/76 (22 May 2023 05:11) (116/79 - 142/78)  BP(mean): --  RR: 18 (22 May 2023 05:11) (18 - 18)  SpO2: 96% (22 May 2023 05:11) (94% - 99%)    Parameters below as of 22 May 2023 05:11  Patient On (Oxygen Delivery Method): room air         LABORATORY:  CBC                       13.1   9.78  )-----------( 340      ( 22 May 2023 06:28 )             42.5     Chem 05-22    139  |  101  |  21  ----------------------------<  110<H>  3.6   |  24  |  0.59    Ca    9.0      22 May 2023 06:28  Phos  4.3     05-22  Mg     2.3     05-22    TPro  9.3<H>  /  Alb  4.2  /  TBili  0.8  /  DBili  x   /  AST  15  /  ALT  22  /  AlkPhos  70  05-22    LFTs LIVER FUNCTIONS - ( 22 May 2023 06:28 )  Alb: 4.2 g/dL / Pro: 9.3 g/dL / ALK PHOS: 70 U/L / ALT: 22 U/L / AST: 15 U/L / GGT: x           Coagulopathy   Lipid Panel   A1c   Cardiac enzymes     U/A   CSF  Immunological  Other    STUDIES & IMAGING: (EEG, CT, MR, U/S, TTE/AUGUSTINA):    < from: CT Angio Chest PE Protocol w/ IV Cont (05.21.23 @ 18:15) >    ******PRELIMINARY REPORT******      ******PRELIMINARY REPORT******         ACC: [id number] EXAM:  CT ANGIO CHEST PULM ART [facility]   ORDERED BY: NATHEN CHARLES     PROCEDURE DATE:  05/21/2023    ******PRELIMINARY REPORT******      ******PRELIMINARY REPORT******       INTERPRETATION:  no pulmonary embolism to the lobar arteries, limited   distal evaluation due to respiratory motion. streaky opacity in the super   right lower lobe. follow up final report in the am.       Neurology Progress Note    SUBJECTIVE/OBJECTIVE/INTERVAL EVENTS:      Latest NIF/VC -50/670     MEDICATIONS  (STANDING):  acetylcysteine 20%  Inhalation 4 milliLiter(s) Inhalation every 6 hours  albuterol/ipratropium for Nebulization 3 milliLiter(s) Nebulizer every 6 hours  amLODIPine   Tablet 10 milliGRAM(s) Oral daily  atorvastatin 20 milliGRAM(s) Oral at bedtime  chlorhexidine 4% Liquid 1 Application(s) Topical <User Schedule>  dextrose 5%. 1000 milliLiter(s) (50 mL/Hr) IV Continuous <Continuous>  dextrose 5%. 1000 milliLiter(s) (100 mL/Hr) IV Continuous <Continuous>  dextrose 50% Injectable 25 Gram(s) IV Push once  glucagon  Injectable 1 milliGRAM(s) IntraMuscular once  insulin lispro (ADMELOG) corrective regimen sliding scale   SubCutaneous three times a day before meals  insulin lispro (ADMELOG) corrective regimen sliding scale   SubCutaneous at bedtime  melatonin 3 milliGRAM(s) Oral at bedtime  pantoprazole    Tablet 40 milliGRAM(s) Oral before breakfast  polyethylene glycol 3350 17 Gram(s) Oral every 12 hours  predniSONE   Tablet 50 milliGRAM(s) Oral daily  pyridostigmine 90 milliGRAM(s) Oral every 8 hours  senna 2 Tablet(s) Oral at bedtime    MEDICATIONS  (PRN):  acetaminophen     Tablet .. 650 milliGRAM(s) Oral every 6 hours PRN Temp greater or equal to 38C (100.4F), Mild Pain (1 - 3)  bisacodyl Suppository 10 milliGRAM(s) Rectal daily PRN Constipation  dextrose Oral Gel 15 Gram(s) Oral once PRN Blood Glucose LESS THAN 70 milliGRAM(s)/deciliter  oxyCODONE    IR 5 milliGRAM(s) Oral every 6 hours PRN Moderate Pain (4 - 6)  sodium chloride 0.65% Nasal 1 Spray(s) Both Nostrils four times a day PRN Nasal Congestion      VITALS & EXAMINATION:  Vital Signs Last 24 Hrs  T(C): 37 (22 May 2023 05:11), Max: 37 (22 May 2023 05:11)  T(F): 98.6 (22 May 2023 05:11), Max: 98.6 (22 May 2023 05:11)  HR: 97 (22 May 2023 05:11) (97 - 122)  BP: 134/76 (22 May 2023 05:11) (116/79 - 142/78)  BP(mean): --  RR: 18 (22 May 2023 05:11) (18 - 18)  SpO2: 96% (22 May 2023 05:11) (94% - 99%)    Parameters below as of 22 May 2023 05:11  Patient On (Oxygen Delivery Method): room air         LABORATORY:  CBC                       13.1   9.78  )-----------( 340      ( 22 May 2023 06:28 )             42.5     Chem 05-22    139  |  101  |  21  ----------------------------<  110<H>  3.6   |  24  |  0.59    Ca    9.0      22 May 2023 06:28  Phos  4.3     05-22  Mg     2.3     05-22    TPro  9.3<H>  /  Alb  4.2  /  TBili  0.8  /  DBili  x   /  AST  15  /  ALT  22  /  AlkPhos  70  05-22    LFTs LIVER FUNCTIONS - ( 22 May 2023 06:28 )  Alb: 4.2 g/dL / Pro: 9.3 g/dL / ALK PHOS: 70 U/L / ALT: 22 U/L / AST: 15 U/L / GGT: x           Coagulopathy   Lipid Panel   A1c   Cardiac enzymes     U/A   CSF  Immunological  Other    STUDIES & IMAGING: (EEG, CT, MR, U/S, TTE/AUGUSTINA):    < from: CT Angio Chest PE Protocol w/ IV Cont (05.21.23 @ 18:15) >    ******PRELIMINARY REPORT******      ******PRELIMINARY REPORT******         ACC: [id number] EXAM:  CT ANGIO CHEST PULM [facility]   ORDERED BY: NATHEN CHARLES     PROCEDURE DATE:  05/21/2023    ******PRELIMINARY REPORT******      ******PRELIMINARY REPORT******       INTERPRETATION:  no pulmonary embolism to the lobar arteries, limited   distal evaluation due to respiratory motion. streaky opacity in the super   right lower lobe. follow up final report in the am.       Neurology Progress Note    SUBJECTIVE/OBJECTIVE/INTERVAL EVENTS:      Latest NIF/VC -50/670     MEDICATIONS  (STANDING):  acetylcysteine 20%  Inhalation 4 milliLiter(s) Inhalation every 6 hours  albuterol/ipratropium for Nebulization 3 milliLiter(s) Nebulizer every 6 hours  amLODIPine   Tablet 10 milliGRAM(s) Oral daily  atorvastatin 20 milliGRAM(s) Oral at bedtime  chlorhexidine 4% Liquid 1 Application(s) Topical <User Schedule>  dextrose 5%. 1000 milliLiter(s) (50 mL/Hr) IV Continuous <Continuous>  dextrose 5%. 1000 milliLiter(s) (100 mL/Hr) IV Continuous <Continuous>  dextrose 50% Injectable 25 Gram(s) IV Push once  glucagon  Injectable 1 milliGRAM(s) IntraMuscular once  insulin lispro (ADMELOG) corrective regimen sliding scale   SubCutaneous three times a day before meals  insulin lispro (ADMELOG) corrective regimen sliding scale   SubCutaneous at bedtime  melatonin 3 milliGRAM(s) Oral at bedtime  pantoprazole    Tablet 40 milliGRAM(s) Oral before breakfast  polyethylene glycol 3350 17 Gram(s) Oral every 12 hours  predniSONE   Tablet 50 milliGRAM(s) Oral daily  pyridostigmine 90 milliGRAM(s) Oral every 8 hours  senna 2 Tablet(s) Oral at bedtime    MEDICATIONS  (PRN):  acetaminophen     Tablet .. 650 milliGRAM(s) Oral every 6 hours PRN Temp greater or equal to 38C (100.4F), Mild Pain (1 - 3)  bisacodyl Suppository 10 milliGRAM(s) Rectal daily PRN Constipation  dextrose Oral Gel 15 Gram(s) Oral once PRN Blood Glucose LESS THAN 70 milliGRAM(s)/deciliter  oxyCODONE    IR 5 milliGRAM(s) Oral every 6 hours PRN Moderate Pain (4 - 6)  sodium chloride 0.65% Nasal 1 Spray(s) Both Nostrils four times a day PRN Nasal Congestion      VITALS & EXAMINATION:  Vital Signs Last 24 Hrs  T(C): 37 (22 May 2023 05:11), Max: 37 (22 May 2023 05:11)  T(F): 98.6 (22 May 2023 05:11), Max: 98.6 (22 May 2023 05:11)  HR: 97 (22 May 2023 05:11) (97 - 122)  BP: 134/76 (22 May 2023 05:11) (116/79 - 142/78)  BP(mean): --  RR: 18 (22 May 2023 05:11) (18 - 18)  SpO2: 96% (22 May 2023 05:11) (94% - 99%)    Parameters below as of 22 May 2023 05:11  Patient On (Oxygen Delivery Method): room air         LABORATORY:  CBC                       13.1   9.78  )-----------( 340      ( 22 May 2023 06:28 )             42.5     Chem 05-22    139  |  101  |  21  ----------------------------<  110<H>  3.6   |  24  |  0.59    Ca    9.0      22 May 2023 06:28  Phos  4.3     05-22  Mg     2.3     05-22    TPro  9.3<H>  /  Alb  4.2  /  TBili  0.8  /  DBili  x   /  AST  15  /  ALT  22  /  AlkPhos  70  05-22    LFTs LIVER FUNCTIONS - ( 22 May 2023 06:28 )  Alb: 4.2 g/dL / Pro: 9.3 g/dL / ALK PHOS: 70 U/L / ALT: 22 U/L / AST: 15 U/L / GGT: x           Coagulopathy   Lipid Panel   A1c   Cardiac enzymes     U/A   CSF  Immunological  Other    STUDIES & IMAGING: (EEG, CT, MR, U/S, TTE/AUGUSTINA):    < from: CT Angio Chest PE Protocol w/ IV Cont (05.21.23 @ 18:15) >    ******PRELIMINARY REPORT******      ******PRELIMINARY REPORT******         ACC: [id number] EXAM:  CT ANGIO CHEST PULM [facility]   ORDERED BY: NATHEN CHARLES     PROCEDURE DATE:  05/21/2023    ******PRELIMINARY REPORT******      ******PRELIMINARY REPORT******       INTERPRETATION:  no pulmonary embolism to the lobar arteries, limited   distal evaluation due to respiratory motion. streaky opacity in the super   right lower lobe. follow up final report in the am.       Neurology Progress Note    SUBJECTIVE/OBJECTIVE/INTERVAL EVENTS:      Latest NIF/VC -50/670     MEDICATIONS  (STANDING):  acetylcysteine 20%  Inhalation 4 milliLiter(s) Inhalation every 6 hours  albuterol/ipratropium for Nebulization 3 milliLiter(s) Nebulizer every 6 hours  amLODIPine   Tablet 10 milliGRAM(s) Oral daily  atorvastatin 20 milliGRAM(s) Oral at bedtime  chlorhexidine 4% Liquid 1 Application(s) Topical <User Schedule>  dextrose 5%. 1000 milliLiter(s) (50 mL/Hr) IV Continuous <Continuous>  dextrose 5%. 1000 milliLiter(s) (100 mL/Hr) IV Continuous <Continuous>  dextrose 50% Injectable 25 Gram(s) IV Push once  glucagon  Injectable 1 milliGRAM(s) IntraMuscular once  insulin lispro (ADMELOG) corrective regimen sliding scale   SubCutaneous three times a day before meals  insulin lispro (ADMELOG) corrective regimen sliding scale   SubCutaneous at bedtime  melatonin 3 milliGRAM(s) Oral at bedtime  pantoprazole    Tablet 40 milliGRAM(s) Oral before breakfast  polyethylene glycol 3350 17 Gram(s) Oral every 12 hours  predniSONE   Tablet 50 milliGRAM(s) Oral daily  pyridostigmine 90 milliGRAM(s) Oral every 8 hours  senna 2 Tablet(s) Oral at bedtime    MEDICATIONS  (PRN):  acetaminophen     Tablet .. 650 milliGRAM(s) Oral every 6 hours PRN Temp greater or equal to 38C (100.4F), Mild Pain (1 - 3)  bisacodyl Suppository 10 milliGRAM(s) Rectal daily PRN Constipation  dextrose Oral Gel 15 Gram(s) Oral once PRN Blood Glucose LESS THAN 70 milliGRAM(s)/deciliter  oxyCODONE    IR 5 milliGRAM(s) Oral every 6 hours PRN Moderate Pain (4 - 6)  sodium chloride 0.65% Nasal 1 Spray(s) Both Nostrils four times a day PRN Nasal Congestion      VITALS & EXAMINATION:  Vital Signs Last 24 Hrs  T(C): 37 (22 May 2023 05:11), Max: 37 (22 May 2023 05:11)  T(F): 98.6 (22 May 2023 05:11), Max: 98.6 (22 May 2023 05:11)  HR: 97 (22 May 2023 05:11) (97 - 122)  BP: 134/76 (22 May 2023 05:11) (116/79 - 142/78)  BP(mean): --  RR: 18 (22 May 2023 05:11) (18 - 18)  SpO2: 96% (22 May 2023 05:11) (94% - 99%)    Parameters below as of 22 May 2023 05:11  Patient On (Oxygen Delivery Method): room air         LABORATORY:  CBC                       13.1   9.78  )-----------( 340      ( 22 May 2023 06:28 )             42.5     Chem 05-22    139  |  101  |  21  ----------------------------<  110<H>  3.6   |  24  |  0.59    Ca    9.0      22 May 2023 06:28  Phos  4.3     05-22  Mg     2.3     05-22    TPro  9.3<H>  /  Alb  4.2  /  TBili  0.8  /  DBili  x   /  AST  15  /  ALT  22  /  AlkPhos  70  05-22    LFTs LIVER FUNCTIONS - ( 22 May 2023 06:28 )  Alb: 4.2 g/dL / Pro: 9.3 g/dL / ALK PHOS: 70 U/L / ALT: 22 U/L / AST: 15 U/L / GGT: x           Coagulopathy   Lipid Panel   A1c   Cardiac enzymes     U/A   CSF  Immunological  Other    STUDIES & IMAGING: (EEG, CT, MR, U/S, TTE/AUGUSTINA):    < from: CT Angio Chest PE Protocol w/ IV Cont (05.21.23 @ 18:15) >    ******PRELIMINARY REPORT******      ******PRELIMINARY REPORT******         ACC: [id number] EXAM:  [facility]   ORDERED BY: NATHEN CHARLES     PROCEDURE DATE:  05/21/2023    ******PRELIMINARY REPORT******      ******PRELIMINARY REPORT******       INTERPRETATION:  no pulmonary embolism to the lobar arteries, limited   distal evaluation due to respiratory motion. streaky opacity in the super   right lower lobe. follow up final report in the am.       Neurology Progress Note    SUBJECTIVE/OBJECTIVE/INTERVAL EVENTS:  Patient seen evaluated bedside with neuro attending. Patient translated with eV[employer]ment  [id number]. Reports continued improvement in her MG symptoms. No double vision, improving ptosis, improving respiratory status. Better head/neck str. Ambulating around. Latest NIF/VC -50/670.     MEDICATIONS  (STANDING):  acetylcysteine 20%  Inhalation 4 milliLiter(s) Inhalation every 6 hours  albuterol/ipratropium for Nebulization 3 milliLiter(s) Nebulizer every 6 hours  amLODIPine   Tablet 10 milliGRAM(s) Oral daily  atorvastatin 20 milliGRAM(s) Oral at bedtime  chlorhexidine 4% Liquid 1 Application(s) Topical <User Schedule>  dextrose 5%. 1000 milliLiter(s) (50 mL/Hr) IV Continuous <Continuous>  dextrose 5%. 1000 milliLiter(s) (100 mL/Hr) IV Continuous <Continuous>  dextrose 50% Injectable 25 Gram(s) IV Push once  glucagon  Injectable 1 milliGRAM(s) IntraMuscular once  insulin lispro (ADMELOG) corrective regimen sliding scale   SubCutaneous three times a day before meals  insulin lispro (ADMELOG) corrective regimen sliding scale   SubCutaneous at bedtime  melatonin 3 milliGRAM(s) Oral at bedtime  pantoprazole    Tablet 40 milliGRAM(s) Oral before breakfast  polyethylene glycol 3350 17 Gram(s) Oral every 12 hours  predniSONE   Tablet 50 milliGRAM(s) Oral daily  pyridostigmine 90 milliGRAM(s) Oral every 8 hours  senna 2 Tablet(s) Oral at bedtime    MEDICATIONS  (PRN):  acetaminophen     Tablet .. 650 milliGRAM(s) Oral every 6 hours PRN Temp greater or equal to 38C (100.4F), Mild Pain (1 - 3)  bisacodyl Suppository 10 milliGRAM(s) Rectal daily PRN Constipation  dextrose Oral Gel 15 Gram(s) Oral once PRN Blood Glucose LESS THAN 70 milliGRAM(s)/deciliter  oxyCODONE    IR 5 milliGRAM(s) Oral every 6 hours PRN Moderate Pain (4 - 6)  sodium chloride 0.65% Nasal 1 Spray(s) Both Nostrils four times a day PRN Nasal Congestion    VITALS & EXAMINATION:  Vital Signs Last 24 Hrs  T(C): 37 (22 May 2023 05:11), Max: 37 (22 May 2023 05:11)  T(F): 98.6 (22 May 2023 05:11), Max: 98.6 (22 May 2023 05:11)  HR: 97 (22 May 2023 05:11) (97 - 122)  BP: 134/76 (22 May 2023 05:11) (116/79 - 142/78)  BP(mean): --  RR: 18 (22 May 2023 05:11) (18 - 18)  SpO2: 96% (22 May 2023 05:11) (94% - 99%)    Parameters below as of 22 May 2023 05:11  Patient On (Oxygen Delivery Method): room air    Respiratory: One breath counts up to 21  Chest: Has shiley placed on R    MS - Eyes open, awake, alert and attentive.  Follows all commands.   CN - EOM mostly intact; very subtle ptosis right eye; sustained upgaze >20 seconds without nani worsening.  VFF, face sens/str/hearing WNL b/l, tongue midline. MOLLY palate. Neck extension 5/5 and neck flexion 4+/5.  Motor - Normal bulk/tone. No pronator drift. Head flexion is at 4-/5   RUE: 4-/5 deltoids, 5/5 biceps, 5+/5 triceps, 5/5 .    LUE: 4-/5 deltoids, 5/5 biceps, 5/5 triceps, 5/5 .   RLE/LLE: 4+/5 hip flexion, 5/5 knee flexion, 5/5 knee extension, 5/5 ankle plantarflexion and dorsiflexion.   Sens - LT intact throughout  Gait and station - MOLLY.       LABORATORY:  CBC                       13.1   9.78  )-----------( 340      ( 22 May 2023 06:28 )             42.5     Chem 05-22    139  |  101  |  21  ----------------------------<  110<H>  3.6   |  24  |  0.59    Ca    9.0      22 May 2023 06:28  Phos  4.3     05-22  Mg     2.3     05-22    TPro  9.3<H>  /  Alb  4.2  /  TBili  0.8  /  DBili  x   /  AST  15  /  ALT  22  /  AlkPhos  70  05-22    LFTs LIVER FUNCTIONS - ( 22 May 2023 06:28 )  Alb: 4.2 g/dL / Pro: 9.3 g/dL / ALK PHOS: 70 U/L / ALT: 22 U/L / AST: 15 U/L / GGT: x           Coagulopathy   Lipid Panel   A1c   Cardiac enzymes     U/A   CSF  Immunological  Other    STUDIES & IMAGING: (EEG, CT, MR, U/S, TTE/AUGUSTINA):    < from: CT Angio Chest PE Protocol w/ IV Cont (05.21.23 @ 18:15) >    ******PRELIMINARY REPORT******      ******PRELIMINARY REPORT******     ACC: [id number] EXAM:  CT ANGIO CHEST PULM ART WAWI   ORDERED BY: NATHEN CHARLES     PROCEDURE DATE:  05/21/2023    ******PRELIMINARY REPORT******      ******PRELIMINARY REPORT******       INTERPRETATION:  no pulmonary embolism to the lobar arteries, limited   distal evaluation due to respiratory motion. streaky opacity in the super   right lower lobe. follow up final report in the am.       Neurology Progress Note    SUBJECTIVE/OBJECTIVE/INTERVAL EVENTS:  Patient seen evaluated bedside with neuro attending. Patient translated with [employer]  [id number]. Reports continued improvement in her MG symptoms. No double vision, improving ptosis, improving respiratory status. Better head/neck str. Ambulating around. Latest NIF/VC -50/670.     MEDICATIONS  (STANDING):  acetylcysteine 20%  Inhalation 4 milliLiter(s) Inhalation every 6 hours  albuterol/ipratropium for Nebulization 3 milliLiter(s) Nebulizer every 6 hours  amLODIPine   Tablet 10 milliGRAM(s) Oral daily  atorvastatin 20 milliGRAM(s) Oral at bedtime  chlorhexidine 4% Liquid 1 Application(s) Topical <User Schedule>  dextrose 5%. 1000 milliLiter(s) (50 mL/Hr) IV Continuous <Continuous>  dextrose 5%. 1000 milliLiter(s) (100 mL/Hr) IV Continuous <Continuous>  dextrose 50% Injectable 25 Gram(s) IV Push once  glucagon  Injectable 1 milliGRAM(s) IntraMuscular once  insulin lispro (ADMELOG) corrective regimen sliding scale   SubCutaneous three times a day before meals  insulin lispro (ADMELOG) corrective regimen sliding scale   SubCutaneous at bedtime  melatonin 3 milliGRAM(s) Oral at bedtime  pantoprazole    Tablet 40 milliGRAM(s) Oral before breakfast  polyethylene glycol 3350 17 Gram(s) Oral every 12 hours  predniSONE   Tablet 50 milliGRAM(s) Oral daily  pyridostigmine 90 milliGRAM(s) Oral every 8 hours  senna 2 Tablet(s) Oral at bedtime    MEDICATIONS  (PRN):  acetaminophen     Tablet .. 650 milliGRAM(s) Oral every 6 hours PRN Temp greater or equal to 38C (100.4F), Mild Pain (1 - 3)  bisacodyl Suppository 10 milliGRAM(s) Rectal daily PRN Constipation  dextrose Oral Gel 15 Gram(s) Oral once PRN Blood Glucose LESS THAN 70 milliGRAM(s)/deciliter  oxyCODONE    IR 5 milliGRAM(s) Oral every 6 hours PRN Moderate Pain (4 - 6)  sodium chloride 0.65% Nasal 1 Spray(s) Both Nostrils four times a day PRN Nasal Congestion    VITALS & EXAMINATION:  Vital Signs Last 24 Hrs  T(C): 37 (22 May 2023 05:11), Max: 37 (22 May 2023 05:11)  T(F): 98.6 (22 May 2023 05:11), Max: 98.6 (22 May 2023 05:11)  HR: 97 (22 May 2023 05:11) (97 - 122)  BP: 134/76 (22 May 2023 05:11) (116/79 - 142/78)  BP(mean): --  RR: 18 (22 May 2023 05:11) (18 - 18)  SpO2: 96% (22 May 2023 05:11) (94% - 99%)    Parameters below as of 22 May 2023 05:11  Patient On (Oxygen Delivery Method): room air    Respiratory: One breath counts up to 21  Chest: Has shiley placed on R    MS - Eyes open, awake, alert and attentive.  Follows all commands.   CN - EOM mostly intact; very subtle ptosis right eye; sustained upgaze >20 seconds without nani worsening.  VFF, face sens/str/hearing WNL b/l, tongue midline. MOLLY palate. Neck extension 5/5 and neck flexion 4+/5.  Motor - Normal bulk/tone. No pronator drift. Head flexion is at 4-/5   RUE: 4-/5 deltoids, 5/5 biceps, 5+/5 triceps, 5/5 .    LUE: 4-/5 deltoids, 5/5 biceps, 5/5 triceps, 5/5 .   RLE/LLE: 4+/5 hip flexion, 5/5 knee flexion, 5/5 knee extension, 5/5 ankle plantarflexion and dorsiflexion.   Sens - LT intact throughout  Gait and station - MOLLY.       LABORATORY:  CBC                       13.1   9.78  )-----------( 340      ( 22 May 2023 06:28 )             42.5     Chem 05-22    139  |  101  |  21  ----------------------------<  110<H>  3.6   |  24  |  0.59    Ca    9.0      22 May 2023 06:28  Phos  4.3     05-22  Mg     2.3     05-22    TPro  9.3<H>  /  Alb  4.2  /  TBili  0.8  /  DBili  x   /  AST  15  /  ALT  22  /  AlkPhos  70  05-22    LFTs LIVER FUNCTIONS - ( 22 May 2023 06:28 )  Alb: 4.2 g/dL / Pro: 9.3 g/dL / ALK PHOS: 70 U/L / ALT: 22 U/L / AST: 15 U/L / GGT: x           Coagulopathy   Lipid Panel   A1c   Cardiac enzymes     U/A   CSF  Immunological  Other    STUDIES & IMAGING: (EEG, CT, MR, U/S, TTE/AUGUSTINA):    < from: CT Angio Chest PE Protocol w/ IV Cont (05.21.23 @ 18:15) >    ******PRELIMINARY REPORT******      ******PRELIMINARY REPORT******     ACC: [id number] EXAM:  CT ANGIO CHEST PULM ART WAWI   ORDERED BY: NATHEN CHARLES     PROCEDURE DATE:  05/21/2023    ******PRELIMINARY REPORT******      ******PRELIMINARY REPORT******       INTERPRETATION:  no pulmonary embolism to the lobar arteries, limited   distal evaluation due to respiratory motion. streaky opacity in the super   right lower lobe. follow up final report in the am.       Neurology Progress Note    SUBJECTIVE/OBJECTIVE/INTERVAL EVENTS:  Patient seen evaluated bedside with neuro attending. Patient translated with [employer]  [id number]. Reports continued improvement in her MG symptoms. No double vision, improving ptosis, improving respiratory status. Better head/neck str. Ambulating around. Latest NIF/VC -50/670.     MEDICATIONS  (STANDING):  acetylcysteine 20%  Inhalation 4 milliLiter(s) Inhalation every 6 hours  albuterol/ipratropium for Nebulization 3 milliLiter(s) Nebulizer every 6 hours  amLODIPine   Tablet 10 milliGRAM(s) Oral daily  atorvastatin 20 milliGRAM(s) Oral at bedtime  chlorhexidine 4% Liquid 1 Application(s) Topical <User Schedule>  dextrose 5%. 1000 milliLiter(s) (50 mL/Hr) IV Continuous <Continuous>  dextrose 5%. 1000 milliLiter(s) (100 mL/Hr) IV Continuous <Continuous>  dextrose 50% Injectable 25 Gram(s) IV Push once  glucagon  Injectable 1 milliGRAM(s) IntraMuscular once  insulin lispro (ADMELOG) corrective regimen sliding scale   SubCutaneous three times a day before meals  insulin lispro (ADMELOG) corrective regimen sliding scale   SubCutaneous at bedtime  melatonin 3 milliGRAM(s) Oral at bedtime  pantoprazole    Tablet 40 milliGRAM(s) Oral before breakfast  polyethylene glycol 3350 17 Gram(s) Oral every 12 hours  predniSONE   Tablet 50 milliGRAM(s) Oral daily  pyridostigmine 90 milliGRAM(s) Oral every 8 hours  senna 2 Tablet(s) Oral at bedtime    MEDICATIONS  (PRN):  acetaminophen     Tablet .. 650 milliGRAM(s) Oral every 6 hours PRN Temp greater or equal to 38C (100.4F), Mild Pain (1 - 3)  bisacodyl Suppository 10 milliGRAM(s) Rectal daily PRN Constipation  dextrose Oral Gel 15 Gram(s) Oral once PRN Blood Glucose LESS THAN 70 milliGRAM(s)/deciliter  oxyCODONE    IR 5 milliGRAM(s) Oral every 6 hours PRN Moderate Pain (4 - 6)  sodium chloride 0.65% Nasal 1 Spray(s) Both Nostrils four times a day PRN Nasal Congestion    VITALS & EXAMINATION:  Vital Signs Last 24 Hrs  T(C): 37 (22 May 2023 05:11), Max: 37 (22 May 2023 05:11)  T(F): 98.6 (22 May 2023 05:11), Max: 98.6 (22 May 2023 05:11)  HR: 97 (22 May 2023 05:11) (97 - 122)  BP: 134/76 (22 May 2023 05:11) (116/79 - 142/78)  BP(mean): --  RR: 18 (22 May 2023 05:11) (18 - 18)  SpO2: 96% (22 May 2023 05:11) (94% - 99%)    Parameters below as of 22 May 2023 05:11  Patient On (Oxygen Delivery Method): room air    Respiratory: One breath counts up to 21  Chest: Has shiley placed on R    MS - Eyes open, awake, alert and attentive.  Follows all commands.   CN - EOM mostly intact; very subtle ptosis right eye; sustained upgaze >20 seconds without nani worsening.  VFF, face sens/str/hearing WNL b/l, tongue midline. MOLLY palate. Neck extension 5/5 and neck flexion 4+/5.  Motor - Normal bulk/tone. No pronator drift. Head flexion is at 4-/5   RUE: 4-/5 deltoids, 5/5 biceps, 5+/5 triceps, 5/5 .    LUE: 4-/5 deltoids, 5/5 biceps, 5/5 triceps, 5/5 .   RLE/LLE: 4+/5 hip flexion, 5/5 knee flexion, 5/5 knee extension, 5/5 ankle plantarflexion and dorsiflexion.   Sens - LT intact throughout  Gait and station - MOLLY.       LABORATORY:  CBC                       13.1   9.78  )-----------( 340      ( 22 May 2023 06:28 )             42.5     Chem 05-22    139  |  101  |  21  ----------------------------<  110<H>  3.6   |  24  |  0.59    Ca    9.0      22 May 2023 06:28  Phos  4.3     05-22  Mg     2.3     05-22    TPro  9.3<H>  /  Alb  4.2  /  TBili  0.8  /  DBili  x   /  AST  15  /  ALT  22  /  AlkPhos  70  05-22    LFTs LIVER FUNCTIONS - ( 22 May 2023 06:28 )  Alb: 4.2 g/dL / Pro: 9.3 g/dL / ALK PHOS: 70 U/L / ALT: 22 U/L / AST: 15 U/L / GGT: x           Coagulopathy   Lipid Panel   A1c   Cardiac enzymes     U/A   CSF  Immunological  Other    STUDIES & IMAGING: (EEG, CT, MR, U/S, TTE/AUGUSTINA):    < from: CT Angio Chest PE Protocol w/ IV Cont (05.21.23 @ 18:15) >    ******PRELIMINARY REPORT******      ******PRELIMINARY REPORT******     ACC: [id number] EXAM:  CT ANGIO CHEST PULM ART WAWI   ORDERED BY: NATHEN CHARLES     PROCEDURE DATE:  05/21/2023    ******PRELIMINARY REPORT******      ******PRELIMINARY REPORT******       INTERPRETATION:  no pulmonary embolism to the lobar arteries, limited   distal evaluation due to respiratory motion. streaky opacity in the super   right lower lobe. follow up final report in the am.

## 2023-05-22 NOTE — CHART NOTE - NSCHARTNOTESELECT_GEN_ALL_CORE
Blood Bank
Transfer Call/Event Note
Nutrition Services
Neurology
Nutrition Services
TPE/Blood Bank
Blood Bank
Blood Bank

## 2023-05-22 NOTE — CHART NOTE - NSCHARTNOTEFT_GEN_A_CORE
[id number] for Antonio used. Procedure explained to patient.  Removed shiley today at bedside using sterile techniques, cleaned region with chlorhexidine, removed sutures. Patient positioned in trendelenberg and shiley removed with pressure applied and patient "humming."   Held pressure for 15 minutes. No bleeding noted and patient tolerated procedure well. Advised to lie flat post procedure.  [id number] for Antonio used. Procedure explained to patient.  Removed shiley today at bedside using sterile techniques, cleaned region with chlorhexidine, removed sutures. Patient positioned in trendelenberg and shiley removed with pressure applied and patient "humming."   Held pressure for 15 minutes. No bleeding noted and patient tolerated procedure well. Advised to lie flat post procedure.  [id number] for Antonio used. Procedure explained to patient.  Removed shiley today at bedside using sterile techniques, cleaned region with chlorhexidine, removed sutures. Patient positioned in trendelenberg and shiley removed with pressure applied and patient "humming."   Held pressure for 15 minutes. No bleeding noted and patient tolerated procedure well. Advised to lie flat post procedure.  [id number] for Antonio used. Procedure explained to patient.  Removed shiley today at bedside using sterile techniques, cleaned region with chlorhexidine, removed sutures. Patient positioned in trendelenberg and shiley removed with pressure applied and patient "humming."   Held pressure for 15 minutes. No bleeding noted and patient tolerated procedure well. Advised to lie flat post procedure.  Will resume lovenox dvt ppx tomorrow if clinically stable.  [id number] for Antonio used. Procedure explained to patient.  Removed shiley today at bedside using sterile techniques, cleaned region with chlorhexidine, removed sutures. Patient positioned in trendelenberg and shiley removed with pressure applied and patient "humming."   Held pressure for 15 minutes. No bleeding noted and patient tolerated procedure well. Advised to lie flat post procedure.  Will resume lovenox dvt ppx tomorrow if clinically stable.  [id number] for Antonio used. Procedure explained to patient.  Removed shiley today at bedside using sterile techniques, cleaned region with chlorhexidine, removed sutures. Patient positioned in trendelenberg and shiley removed with pressure applied and patient "humming."   Held pressure for 15 minutes. No bleeding noted and patient tolerated procedure well. Advised to lie flat post procedure.  Will resume lovenox dvt ppx tomorrow if clinically stable.

## 2023-05-22 NOTE — PROGRESS NOTE ADULT - SUBJECTIVE AND OBJECTIVE BOX
Subjective: Patient seen and examined. No new events except as noted.     REVIEW OF SYSTEMS:    CONSTITUTIONAL: +weakness, fevers or chills  EYES/ENT: No visual changes;  No vertigo or throat pain   NECK: No pain or stiffness  RESPIRATORY: No cough, wheezing, hemoptysis; No shortness of breath  CARDIOVASCULAR: No chest pain or palpitations  GASTROINTESTINAL: No abdominal or epigastric pain. No nausea, vomiting, or hematemesis; No diarrhea or constipation. No melena or hematochezia.  GENITOURINARY: No dysuria, frequency or hematuria  NEUROLOGICAL: No numbness or weakness  SKIN: No itching, burning, rashes, or lesions   All other review of systems is negative unless indicated above.    MEDICATIONS:  MEDICATIONS  (STANDING):  acetylcysteine 20%  Inhalation 4 milliLiter(s) Inhalation every 6 hours  albuterol/ipratropium for Nebulization 3 milliLiter(s) Nebulizer every 6 hours  amLODIPine   Tablet 10 milliGRAM(s) Oral daily  atorvastatin 20 milliGRAM(s) Oral at bedtime  chlorhexidine 4% Liquid 1 Application(s) Topical <User Schedule>  dextrose 5%. 1000 milliLiter(s) (100 mL/Hr) IV Continuous <Continuous>  dextrose 5%. 1000 milliLiter(s) (50 mL/Hr) IV Continuous <Continuous>  dextrose 50% Injectable 25 Gram(s) IV Push once  glucagon  Injectable 1 milliGRAM(s) IntraMuscular once  insulin lispro (ADMELOG) corrective regimen sliding scale   SubCutaneous three times a day before meals  insulin lispro (ADMELOG) corrective regimen sliding scale   SubCutaneous at bedtime  melatonin 3 milliGRAM(s) Oral at bedtime  pantoprazole    Tablet 40 milliGRAM(s) Oral before breakfast  polyethylene glycol 3350 17 Gram(s) Oral every 12 hours  predniSONE   Tablet 50 milliGRAM(s) Oral daily  pyridostigmine 90 milliGRAM(s) Oral every 8 hours  senna 2 Tablet(s) Oral at bedtime      PHYSICAL EXAM:  T(C): 36.9 (05-22-23 @ 16:56), Max: 37 (05-22-23 @ 05:11)  HR: 88 (05-22-23 @ 16:56) (88 - 109)  BP: 148/78 (05-22-23 @ 16:56) (119/77 - 148/78)  RR: 18 (05-22-23 @ 16:56) (18 - 18)  SpO2: 95% (05-22-23 @ 16:56) (95% - 98%)  Wt(kg): --  I&O's Summary    22 May 2023 07:01  -  22 May 2023 16:59  --------------------------------------------------------  IN: 360 mL / OUT: 0 mL / NET: 360 mL          Appearance: Normal	  HEENT:   Normal oral mucosa, PERRL, EOMI	  Lymphatic: No lymphadenopathy , no edema  Cardiovascular: Normal S1 S2, No JVD, No murmurs , Peripheral pulses palpable 2+ bilaterally  Respiratory: Lungs clear to auscultation, normal effort 	  Gastrointestinal:  Soft, Non-tender, + BS	  Skin: No rashes, No ecchymoses, No cyanosis, warm to touch  Musculoskeletal: Normal range of motion, normal strength  Psychiatry:  Mood & affect appropriate  Ext: No edema      LABS:    CARDIAC MARKERS:                                13.1   9.78  )-----------( 340      ( 22 May 2023 06:28 )             42.5     05-22    139  |  101  |  21  ----------------------------<  110<H>  3.6   |  24  |  0.59    Ca    9.0      22 May 2023 06:28  Phos  4.3     05-22  Mg     2.3     05-22    TPro  9.3<H>  /  Alb  4.2  /  TBili  0.8  /  DBili  x   /  AST  15  /  ALT  22  /  AlkPhos  70  05-22    proBNP:   Lipid Profile:   HgA1c:   TSH:             TELEMETRY: 	 SR ST  ECG:  	  RADIOLOGY: < from: CT Angio Chest PE Protocol w/ IV Cont (05.21.23 @ 18:15) >  ACC: [id number] EXAM:  CT ANGIO CHEST PULM ART [facility]   ORDERED BY: NATHEN CHARLES     PROCEDURE DATE:  05/21/2023          INTERPRETATION:  CLINICAL INFORMATION: Difficulty breathing and elevated   d-dimer    COMPARISON: Chest x-ray 5/20/2023    CONTRAST/COMPLICATIONS:  IV Contrast: Omnipaque 350  53 cc administered   47 cc discarded  Oral Contrast: NONE  Complications: None reported at time of study completion    PROCEDURE:  CT Angiography of the Chest.  Sagittal and coronal reformats were performed as well as 3D (MIP)   reconstructions.    FINDINGS:    Limited evaluation due to respiratory motion.    LUNGS AND AIRWAYS: Patent central airways. Bibasilar linear atelectasis.    PLEURA: No pleural effusion.    MEDIASTINUM AND MONIQUE: No lymphadenopathy.    VESSELS: No central or lobar pulmonary embolism. Evaluation of the   segmental and subsegmental branches are limited due to respiratory   motion. Right IJ approach hemodialysis catheter tip within the right   atrium.    HEART: Heart size is normal. No pericardial effusion.    CHEST WALL AND LOWER NECK: Slightly heterogeneous left thyroid lobe.    VISUALIZED UPPER ABDOMEN: Right hepatic lobe cyst.    BONES: Degenerative changes. Sternotomy.    IMPRESSION:  No central or lobar pulmonary embolism. Evaluation of the segmental and   subsegmental branches are limited due to respiratory motion.    --- End of Report ---          < end of copied text >    DIAGNOSTIC TESTING:  [ ] Echocardiogram:  [ ]  Catheterization:  [ ] Stress Test:    OTHER: 	           Subjective: Patient seen and examined. No new events except as noted.     REVIEW OF SYSTEMS:    CONSTITUTIONAL: +weakness, fevers or chills  EYES/ENT: No visual changes;  No vertigo or throat pain   NECK: No pain or stiffness  RESPIRATORY: No cough, wheezing, hemoptysis; No shortness of breath  CARDIOVASCULAR: No chest pain or palpitations  GASTROINTESTINAL: No abdominal or epigastric pain. No nausea, vomiting, or hematemesis; No diarrhea or constipation. No melena or hematochezia.  GENITOURINARY: No dysuria, frequency or hematuria  NEUROLOGICAL: No numbness or weakness  SKIN: No itching, burning, rashes, or lesions   All other review of systems is negative unless indicated above.    MEDICATIONS:  MEDICATIONS  (STANDING):  acetylcysteine 20%  Inhalation 4 milliLiter(s) Inhalation every 6 hours  albuterol/ipratropium for Nebulization 3 milliLiter(s) Nebulizer every 6 hours  amLODIPine   Tablet 10 milliGRAM(s) Oral daily  atorvastatin 20 milliGRAM(s) Oral at bedtime  chlorhexidine 4% Liquid 1 Application(s) Topical <User Schedule>  dextrose 5%. 1000 milliLiter(s) (100 mL/Hr) IV Continuous <Continuous>  dextrose 5%. 1000 milliLiter(s) (50 mL/Hr) IV Continuous <Continuous>  dextrose 50% Injectable 25 Gram(s) IV Push once  glucagon  Injectable 1 milliGRAM(s) IntraMuscular once  insulin lispro (ADMELOG) corrective regimen sliding scale   SubCutaneous three times a day before meals  insulin lispro (ADMELOG) corrective regimen sliding scale   SubCutaneous at bedtime  melatonin 3 milliGRAM(s) Oral at bedtime  pantoprazole    Tablet 40 milliGRAM(s) Oral before breakfast  polyethylene glycol 3350 17 Gram(s) Oral every 12 hours  predniSONE   Tablet 50 milliGRAM(s) Oral daily  pyridostigmine 90 milliGRAM(s) Oral every 8 hours  senna 2 Tablet(s) Oral at bedtime      PHYSICAL EXAM:  T(C): 36.9 (05-22-23 @ 16:56), Max: 37 (05-22-23 @ 05:11)  HR: 88 (05-22-23 @ 16:56) (88 - 109)  BP: 148/78 (05-22-23 @ 16:56) (119/77 - 148/78)  RR: 18 (05-22-23 @ 16:56) (18 - 18)  SpO2: 95% (05-22-23 @ 16:56) (95% - 98%)  Wt(kg): --  I&O's Summary    22 May 2023 07:01  -  22 May 2023 16:59  --------------------------------------------------------  IN: 360 mL / OUT: 0 mL / NET: 360 mL          Appearance: Normal	  HEENT:   Normal oral mucosa, PERRL, EOMI	  Lymphatic: No lymphadenopathy , no edema  Cardiovascular: Normal S1 S2, No JVD, No murmurs , Peripheral pulses palpable 2+ bilaterally  Respiratory: Lungs clear to auscultation, normal effort 	  Gastrointestinal:  Soft, Non-tender, + BS	  Skin: No rashes, No ecchymoses, No cyanosis, warm to touch  Musculoskeletal: Normal range of motion, normal strength  Psychiatry:  Mood & affect appropriate  Ext: No edema      LABS:    CARDIAC MARKERS:                                13.1   9.78  )-----------( 340      ( 22 May 2023 06:28 )             42.5     05-22    139  |  101  |  21  ----------------------------<  110<H>  3.6   |  24  |  0.59    Ca    9.0      22 May 2023 06:28  Phos  4.3     05-22  Mg     2.3     05-22    TPro  9.3<H>  /  Alb  4.2  /  TBili  0.8  /  DBili  x   /  AST  15  /  ALT  22  /  AlkPhos  70  05-22    proBNP:   Lipid Profile:   HgA1c:   TSH:             TELEMETRY: 	 SR ST  ECG:  	  RADIOLOGY: < from: CT Angio Chest PE Protocol w/ IV Cont (05.21.23 @ 18:15) >  ACC: [id number] EXAM:  CT ANGIO CHEST PULM ART [facility]   ORDERED BY: NATHEN CHARLES     PROCEDURE DATE:  05/21/2023          INTERPRETATION:  CLINICAL INFORMATION: Difficulty breathing and elevated   d-dimer    COMPARISON: Chest x-ray 5/20/2023    CONTRAST/COMPLICATIONS:  IV Contrast: Omnipaque 350  53 cc administered   47 cc discarded  Oral Contrast: NONE  Complications: None reported at time of study completion    PROCEDURE:  CT Angiography of the Chest.  Sagittal and coronal reformats were performed as well as 3D (MIP)   reconstructions.    FINDINGS:    Limited evaluation due to respiratory motion.    LUNGS AND AIRWAYS: Patent central airways. Bibasilar linear atelectasis.    PLEURA: No pleural effusion.    MEDIASTINUM AND MONIQUE: No lymphadenopathy.    VESSELS: No central or lobar pulmonary embolism. Evaluation of the   segmental and subsegmental branches are limited due to respiratory   motion. Right IJ approach hemodialysis catheter tip within the right   atrium.    HEART: Heart size is normal. No pericardial effusion.    CHEST WALL AND LOWER NECK: Slightly heterogeneous left thyroid lobe.    VISUALIZED UPPER ABDOMEN: Right hepatic lobe cyst.    BONES: Degenerative changes. Sternotomy.    IMPRESSION:  No central or lobar pulmonary embolism. Evaluation of the segmental and   subsegmental branches are limited due to respiratory motion.    --- End of Report ---          < end of copied text >    DIAGNOSTIC TESTING:  [ ] Echocardiogram:  [ ]  Catheterization:  [ ] Stress Test:    OTHER: 	           Subjective: Patient seen and examined. No new events except as noted.     REVIEW OF SYSTEMS:    CONSTITUTIONAL: +weakness, fevers or chills  EYES/ENT: No visual changes;  No vertigo or throat pain   NECK: No pain or stiffness  RESPIRATORY: No cough, wheezing, hemoptysis; No shortness of breath  CARDIOVASCULAR: No chest pain or palpitations  GASTROINTESTINAL: No abdominal or epigastric pain. No nausea, vomiting, or hematemesis; No diarrhea or constipation. No melena or hematochezia.  GENITOURINARY: No dysuria, frequency or hematuria  NEUROLOGICAL: No numbness or weakness  SKIN: No itching, burning, rashes, or lesions   All other review of systems is negative unless indicated above.    MEDICATIONS:  MEDICATIONS  (STANDING):  acetylcysteine 20%  Inhalation 4 milliLiter(s) Inhalation every 6 hours  albuterol/ipratropium for Nebulization 3 milliLiter(s) Nebulizer every 6 hours  amLODIPine   Tablet 10 milliGRAM(s) Oral daily  atorvastatin 20 milliGRAM(s) Oral at bedtime  chlorhexidine 4% Liquid 1 Application(s) Topical <User Schedule>  dextrose 5%. 1000 milliLiter(s) (100 mL/Hr) IV Continuous <Continuous>  dextrose 5%. 1000 milliLiter(s) (50 mL/Hr) IV Continuous <Continuous>  dextrose 50% Injectable 25 Gram(s) IV Push once  glucagon  Injectable 1 milliGRAM(s) IntraMuscular once  insulin lispro (ADMELOG) corrective regimen sliding scale   SubCutaneous three times a day before meals  insulin lispro (ADMELOG) corrective regimen sliding scale   SubCutaneous at bedtime  melatonin 3 milliGRAM(s) Oral at bedtime  pantoprazole    Tablet 40 milliGRAM(s) Oral before breakfast  polyethylene glycol 3350 17 Gram(s) Oral every 12 hours  predniSONE   Tablet 50 milliGRAM(s) Oral daily  pyridostigmine 90 milliGRAM(s) Oral every 8 hours  senna 2 Tablet(s) Oral at bedtime      PHYSICAL EXAM:  T(C): 36.9 (05-22-23 @ 16:56), Max: 37 (05-22-23 @ 05:11)  HR: 88 (05-22-23 @ 16:56) (88 - 109)  BP: 148/78 (05-22-23 @ 16:56) (119/77 - 148/78)  RR: 18 (05-22-23 @ 16:56) (18 - 18)  SpO2: 95% (05-22-23 @ 16:56) (95% - 98%)  Wt(kg): --  I&O's Summary    22 May 2023 07:01  -  22 May 2023 16:59  --------------------------------------------------------  IN: 360 mL / OUT: 0 mL / NET: 360 mL          Appearance: Normal	  HEENT:   Normal oral mucosa, PERRL, EOMI	  Lymphatic: No lymphadenopathy , no edema  Cardiovascular: Normal S1 S2, No JVD, No murmurs , Peripheral pulses palpable 2+ bilaterally  Respiratory: Lungs clear to auscultation, normal effort 	  Gastrointestinal:  Soft, Non-tender, + BS	  Skin: No rashes, No ecchymoses, No cyanosis, warm to touch  Musculoskeletal: Normal range of motion, normal strength  Psychiatry:  Mood & affect appropriate  Ext: No edema      LABS:    CARDIAC MARKERS:                                13.1   9.78  )-----------( 340      ( 22 May 2023 06:28 )             42.5     05-22    139  |  101  |  21  ----------------------------<  110<H>  3.6   |  24  |  0.59    Ca    9.0      22 May 2023 06:28  Phos  4.3     05-22  Mg     2.3     05-22    TPro  9.3<H>  /  Alb  4.2  /  TBili  0.8  /  DBili  x   /  AST  15  /  ALT  22  /  AlkPhos  70  05-22    proBNP:   Lipid Profile:   HgA1c:   TSH:             TELEMETRY: 	 SR ST  ECG:  	  RADIOLOGY: < from: CT Angio Chest PE Protocol w/ IV Cont (05.21.23 @ 18:15) >  ACC: [id number] EXAM:  CT ANGIO CHEST PULM ART [facility]   ORDERED BY: NATHEN CHARLES     PROCEDURE DATE:  05/21/2023          INTERPRETATION:  CLINICAL INFORMATION: Difficulty breathing and elevated   d-dimer    COMPARISON: Chest x-ray 5/20/2023    CONTRAST/COMPLICATIONS:  IV Contrast: Omnipaque 350  53 cc administered   47 cc discarded  Oral Contrast: NONE  Complications: None reported at time of study completion    PROCEDURE:  CT Angiography of the Chest.  Sagittal and coronal reformats were performed as well as 3D (MIP)   reconstructions.    FINDINGS:    Limited evaluation due to respiratory motion.    LUNGS AND AIRWAYS: Patent central airways. Bibasilar linear atelectasis.    PLEURA: No pleural effusion.    MEDIASTINUM AND MONIQUE: No lymphadenopathy.    VESSELS: No central or lobar pulmonary embolism. Evaluation of the   segmental and subsegmental branches are limited due to respiratory   motion. Right IJ approach hemodialysis catheter tip within the right   atrium.    HEART: Heart size is normal. No pericardial effusion.    CHEST WALL AND LOWER NECK: Slightly heterogeneous left thyroid lobe.    VISUALIZED UPPER ABDOMEN: Right hepatic lobe cyst.    BONES: Degenerative changes. Sternotomy.    IMPRESSION:  No central or lobar pulmonary embolism. Evaluation of the segmental and   subsegmental branches are limited due to respiratory motion.    --- End of Report ---          < end of copied text >    DIAGNOSTIC TESTING:  [ ] Echocardiogram:  [ ]  Catheterization:  [ ] Stress Test:    OTHER:

## 2023-05-22 NOTE — PROGRESS NOTE ADULT - ASSESSMENT
62 year-old woman with a PMH of myasthenia gravis who presented to the ED on 5/5/23 with worsening diplopia and difficulty speaking, swallowing and breathing x 1 week, admitted for evaluation of management of acute MG exacerbation. Interval neuro exam stable. Still has shiley placed on R. NIF/VC as noted above. s/p IVIG 5/15-5/19. Shiley removal unable to be done due to copious secretion and coughing when sitting up 30 deg in bed. Evaluating for tachycardia, respiratory secretions currently to improve to then remove shiley.     Impression: Acute MG exacerbation, possibly triggered by progressive disease or other toxic/metabolic insult    Recommendations:   [ ] CT chest w/ con to r/o PE given tachycardia, respiratory symptoms. Prelim negative, f/u official read  [x] d/w NSCU 5/20 PM for shiley removal, recommended to improve respiratory status prior to removal.    [x] Cards recs appreciated for tachycardia   [x] CXR 5/20/23: right-sided hemodialysis catheter with tip in the right atrium. No focal consolidations. There is no pleural effusion or pneumothorax. The heart is normal in size. Sternotomy wires.  [x] C/w mestinon 90 mg tid, monitor for secretions (pt takes mestinon 120 mg tid at home)  [x] Prednisone 50mg qd resumed on 5/11. Continue current dose, and will go down by 10 mg monthly until she reaches 20 g qd, next change will be at 6/11/23  [x] Protonix for GI Prophylaxis while in Steroid  [x] Replete electrolyte as needed  [x] Monitor further bleeding in BM for now  [x] S/p PLEX x 5 of sessions (5/6-5/14) with some improvement  [x] S/p IV solumedrol 125mg IV daily x 5 days (5/6-5/10)  [x] PT/OT as tolerated  [x] Pureed Diet  [] Ultimately patient will need better outpatient therapy to prevent relapses, either IVIG infusions, increased prednisone dose, or Soliris (or combination of some)  [] Likely to follow up with Dr. [facility] kim[facility] at [address] [place] [address] Outpatient [facility] since has Medicaid. Will try to set up monthly IVIG vs Soliris with Dr. Escudero at [address] [place] [address]    62 year-old woman with a PMH of myasthenia gravis who presented to the ED on 5/5/23 with worsening diplopia and difficulty speaking, swallowing and breathing x 1 week, admitted for evaluation of management of acute MG exacerbation. Interval neuro exam stable. Still has shiley placed on R. NIF/VC as noted above. s/p IVIG 5/15-5/19. Shiley removal unable to be done due to copious secretion and coughing when sitting up 30 deg in bed. Evaluating for tachycardia, respiratory secretions currently to improve to then remove shiley.     Impression: Acute MG exacerbation, possibly triggered by progressive disease or other toxic/metabolic insult    Recommendations:   [ ] CT chest w/ con to r/o PE given tachycardia, respiratory symptoms. Prelim negative, f/u official read  [x] d/w NSCU 5/20 PM for shiley removal, recommended to improve respiratory status prior to removal.    [x] Cards recs appreciated for tachycardia   [x] CXR 5/20/23: right-sided hemodialysis catheter with tip in the right atrium. No focal consolidations. There is no pleural effusion or pneumothorax. The heart is normal in size. Sternotomy wires.  [x] C/w mestinon 90 mg tid, monitor for secretions (pt takes mestinon 120 mg tid at home)  [x] Prednisone 50mg qd resumed on 5/11. Continue current dose, and will go down by 10 mg monthly until she reaches 20 g qd, next change will be at 6/11/23  [x] Protonix for GI Prophylaxis while in Steroid  [x] Replete electrolyte as needed  [x] Monitor further bleeding in BM for now  [x] S/p PLEX x 5 of sessions (5/6-5/14) with some improvement  [x] S/p IV solumedrol 125mg IV daily x 5 days (5/6-5/10)  [x] PT/OT as tolerated  [x] Pureed Diet  [] Ultimately patient will need better outpatient therapy to prevent relapses, either IVIG infusions, increased prednisone dose, or Soliris (or combination of some)  [] Likely to follow up with Dr. Jayjay kimble at [facility] at [address] [place] [address] Outpatient [facility] since has Medicaid. Will try to set up monthly IVIG vs Soliris with Dr. Escudero at [address] [place] [address]    62 year-old woman with a PMH of myasthenia gravis who presented to the ED on 5/5/23 with worsening diplopia and difficulty speaking, swallowing and breathing x 1 week, admitted for evaluation of management of acute MG exacerbation. Interval neuro exam stable. Still has shiley placed on R. NIF/VC as noted above. s/p IVIG 5/15-5/19. Shiley removal unable to be done due to copious secretion and coughing when sitting up 30 deg in bed. Evaluating for tachycardia, respiratory secretions currently to improve to then remove shiley.     Impression: Acute MG exacerbation, possibly triggered by progressive disease or other toxic/metabolic insult    Recommendations:   [ ] CT chest w/ con to r/o PE given tachycardia, respiratory symptoms. Prelim negative, f/u official read  [x] d/w NSCU 5/20 PM for shiley removal, recommended to improve respiratory status prior to removal.    [x] Cards recs appreciated for tachycardia   [x] CXR 5/20/23: right-sided hemodialysis catheter with tip in the right atrium. No focal consolidations. There is no pleural effusion or pneumothorax. The heart is normal in size. Sternotomy wires.  [x] C/w mestinon 90 mg tid, monitor for secretions (pt takes mestinon 120 mg tid at home)  [x] Prednisone 50mg qd resumed on 5/11. Continue current dose, and will go down by 10 mg monthly until she reaches 20 g qd, next change will be at 6/11/23  [x] Protonix for GI Prophylaxis while in Steroid  [x] Replete electrolyte as needed  [x] Monitor further bleeding in BM for now  [x] S/p PLEX x 5 of sessions (5/6-5/14) with some improvement  [x] S/p IV solumedrol 125mg IV daily x 5 days (5/6-5/10)  [x] PT/OT as tolerated  [x] Pureed Diet  [] Ultimately patient will need better outpatient therapy to prevent relapses, either IVIG infusions, increased prednisone dose, or Soliris (or combination of some)  [] Likely to follow up with Dr. Jayjay kimble at [facility] at [address] [place] [address] Outpatient [facility] since has Medicaid. Will try to set up monthly IVIG vs Soliris with Dr. Escudero at [address] [place] [address]    62 year-old woman with a PMH of myasthenia gravis who presented to the ED on 5/5/23 with worsening diplopia and difficulty speaking, swallowing and breathing x 1 week, admitted for evaluation of management of acute MG exacerbation. Interval neuro exam stable. Still has shiley placed on R. NIF/VC as noted above. s/p IVIG 5/15-5/19. Shiley removal unable to be done due to copious secretion and coughing when sitting up 30 deg in bed. Evaluating for tachycardia, respiratory secretions currently to improve to then remove shiley.     Impression: Acute MG exacerbation, possibly triggered by progressive disease or other toxic/metabolic insult    Recommendations:   [ ] CT chest w/ con to r/o PE given tachycardia, respiratory symptoms. Prelim negative, f/u official read  [ ] given chronic steroid use, checking hepatitis b panel, quant tb  [x] d/w NSCU 5/20 PM for shiley removal, recommended to improve respiratory status prior to removal.    [x] Cards recs appreciated for tachycardia   [x] CXR 5/20/23: right-sided hemodialysis catheter with tip in the right atrium. No focal consolidations. There is no pleural effusion or pneumothorax. The heart is normal in size. Sternotomy wires.  [x] C/w mestinon 90 mg tid, monitor for secretions (pt takes mestinon 120 mg tid at home)  [x] Prednisone 50mg qd resumed on 5/11. Continue current dose, and will go down by 10 mg monthly until she reaches 20 g qd, next change will be at 6/11/23  [x] Protonix for GI Prophylaxis while in Steroid  [x] Replete electrolyte as needed  [x] Monitor further bleeding in BM for now  [x] S/p PLEX x 5 of sessions (5/6-5/14) with some improvement  [x] S/p IV solumedrol 125mg IV daily x 5 days (5/6-5/10)  [x] PT/OT as tolerated  [x] Pureed Diet  [] Ultimately patient will need better outpatient therapy to prevent relapses, either IVIG infusions, increased prednisone dose, or Soliris (or combination of some)  [] Likely to follow up with Dr. Jayjay kimble at [facility] at [address] [place] [address] Outpatient Clinic since has Medicaid. Will try to set up monthly IVIG vs Soliris with Dr. Escudero at [address] [place] [address]    62 year-old woman with a PMH of myasthenia gravis who presented to the ED on 5/5/23 with worsening diplopia and difficulty speaking, swallowing and breathing x 1 week, admitted for evaluation of management of acute MG exacerbation. Interval neuro exam stable. Still has shiley placed on R. NIF/VC as noted above. s/p IVIG 5/15-5/19. Shiley removal unable to be done due to copious secretion and coughing when sitting up 30 deg in bed. Evaluating for tachycardia, respiratory secretions currently to improve to then remove shiley.     Impression: Acute MG exacerbation, possibly triggered by progressive disease or other toxic/metabolic insult    Recommendations:   [ ] CT chest w/ con to r/o PE given tachycardia, respiratory symptoms. Prelim negative, f/u official read  [ ] given chronic steroid use, checking hepatitis b panel, quant tb  [x] d/w NSCU 5/20 PM for shiley removal, recommended to improve respiratory status prior to removal.    [x] Cards recs appreciated for tachycardia   [x] CXR 5/20/23: right-sided hemodialysis catheter with tip in the right atrium. No focal consolidations. There is no pleural effusion or pneumothorax. The heart is normal in size. Sternotomy wires.  [x] C/w mestinon 90 mg tid, monitor for secretions (pt takes mestinon 120 mg tid at home)  [x] Prednisone 50mg qd resumed on 5/11. Continue current dose, and will go down by 10 mg monthly until she reaches 20 g qd, next change will be at 6/11/23  [x] Protonix for GI Prophylaxis while in Steroid  [x] Replete electrolyte as needed  [x] Monitor further bleeding in BM for now  [x] S/p PLEX x 5 of sessions (5/6-5/14) with some improvement  [x] S/p IV solumedrol 125mg IV daily x 5 days (5/6-5/10)  [x] PT/OT as tolerated  [x] Pureed Diet  [] Ultimately patient will need better outpatient therapy to prevent relapses, either IVIG infusions, increased prednisone dose, or Soliris (or combination of some)  [] Likely to follow up with Dr. Jayjay kimble at [facility] at [address] [place] [address] Outpatient Clinic since has Medicaid. Will try to set up monthly IVIG vs Soliris with Dr. Escudero at [address] [place] [address]    62 year-old woman with a PMH of myasthenia gravis who presented to the ED on 5/5/23 with worsening diplopia and difficulty speaking, swallowing and breathing x 1 week, admitted for evaluation of management of acute MG exacerbation. Interval neuro exam stable. Still has shiley placed on R. NIF/VC as noted above. s/p IVIG 5/15-5/19. Shiley removal unable to be done due to copious secretion and coughing when sitting up 30 deg in bed. Evaluating for tachycardia, respiratory secretions currently to improve to then remove shiley.     Impression: Acute MG exacerbation, possibly triggered by progressive disease or other toxic/metabolic insult    Recommendations:   [ ] CT chest w/ con to r/o PE given tachycardia, respiratory symptoms. Prelim negative, f/u official read  [ ] given chronic steroid use, checking hepatitis b panel, quant tb  [x] d/w NSCU 5/20 PM for shiley removal, recommended to improve respiratory status prior to removal.    [x] Cards recs appreciated for tachycardia   [x] CXR 5/20/23: right-sided hemodialysis catheter with tip in the right atrium. No focal consolidations. There is no pleural effusion or pneumothorax. The heart is normal in size. Sternotomy wires.  [x] C/w mestinon 90 mg tid, monitor for secretions (pt takes mestinon 120 mg tid at home)  [x] Prednisone 50mg qd resumed on 5/11. Continue current dose, and will go down by 10 mg monthly until she reaches 20 g qd, next change will be at 6/11/23  [x] Protonix for GI Prophylaxis while in Steroid  [x] Replete electrolyte as needed  [x] Monitor further bleeding in BM for now  [x] S/p PLEX x 5 of sessions (5/6-5/14) with some improvement  [x] S/p IV solumedrol 125mg IV daily x 5 days (5/6-5/10)  [x] PT/OT as tolerated  [x] Pureed Diet  [] Ultimately patient will need better outpatient therapy to prevent relapses, either IVIG infusions, increased prednisone dose, or Soliris (or combination of some)  [] Likely to follow up with Dr. Jayjay kimble at [facility] at [address] [place] [address] Outpatient Clinic since has Medicaid. Will try to set up monthly IVIG vs Soliris with Dr. Escudero at [address] [place] [address]    62 year-old woman with a PMH of myasthenia gravis who presented to the ED on 5/5/23 with worsening diplopia and difficulty speaking, swallowing and breathing x 1 week, admitted for evaluation of management of acute MG exacerbation. Interval neuro exam stable. Still has shiley placed on R. NIF/VC as noted above. s/p IVIG 5/15-5/19. Shiley removal unable to be done due to copious secretion and coughing when sitting up 30 deg in bed. Evaluating for tachycardia, respiratory secretions currently to improve to then remove shiley.     Impression: Acute MG exacerbation, possibly triggered by progressive disease or other toxic/metabolic insult    Recommendations:   [ ] Tentative plan to remove shiley today.   [ ] given chronic steroid use, checking hepatitis b panel, quant tb  [ ] consider pulm eval if secretions continue to be problematic to patient  [x] CT chest w/ con 5/21/23: No central or lobar pulmonary embolism. Evaluation of the segmental and subsegmental branches are limited due to respiratory motion.  [x] d/w NSCU 5/20 PM for shiley removal, recommended to improve respiratory status prior to removal.    [x] Cards recs appreciated for tachycardia   [x] CXR 5/20/23: right-sided hemodialysis catheter with tip in the right atrium. No focal consolidations. There is no pleural effusion or pneumothorax. The heart is normal in size. Sternotomy wires.  [x] C/w mestinon 90 mg tid, monitor for secretions (pt takes mestinon 120 mg tid at home)  [x] Prednisone 50mg qd resumed on 5/11. Continue current dose, and will go down by 10 mg monthly until she reaches 20 g qd, next change will be at 6/11/23  [x] Protonix for GI Prophylaxis while in Steroid  [x] Replete electrolyte as needed  [x] Monitor further bleeding in BM for now  [x] S/p PLEX x 5 of sessions (5/6-5/14) with some improvement  [x] S/p IV solumedrol 125mg IV daily x 5 days (5/6-5/10)  [x] PT/OT as tolerated  [x] Pureed Diet  [ ] Ultimately patient will need better outpatient therapy to prevent relapses, either IVIG infusions, increased prednisone dose, or Soliris (or combination of some)  [ ] Likely to follow up with Dr. Jayjay vs at [facility] at [address] [place] [address] since has Medicaid. Will try to set up monthly IVIG vs Soliris with Dr. Escudero at [address] [place] [address]     Patient was seen on rounds with neuro attending. Plan above in agreement with neuro attending at time of note documentation and any resident documentation updates. Discussed patient care with patient and in agreement.  Recommendations finalized/addended once signed by attending.     62 year-old woman with a PMH of myasthenia gravis who presented to the ED on 5/5/23 with worsening diplopia and difficulty speaking, swallowing and breathing x 1 week, admitted for evaluation of management of acute MG exacerbation. Interval neuro exam stable. Still has shiley placed on R. NIF/VC as noted above. s/p IVIG 5/15-5/19. Shiley removal unable to be done due to copious secretion and coughing when sitting up 30 deg in bed. Evaluating for tachycardia, respiratory secretions currently to improve to then remove shiley.     Impression: Acute MG exacerbation, possibly triggered by progressive disease or other toxic/metabolic insult    Recommendations:   [ ] Tentative plan to remove shiley today.   [ ] given chronic steroid use, checking hepatitis b panel, quant tb  [ ] consider pulm eval if secretions continue to be problematic to patient  [x] CT chest w/ con 5/21/23: No central or lobar pulmonary embolism. Evaluation of the segmental and subsegmental branches are limited due to respiratory motion.  [x] d/w NSCU 5/20 PM for shiley removal, recommended to improve respiratory status prior to removal.    [x] Cards recs appreciated for tachycardia   [x] CXR 5/20/23: right-sided hemodialysis catheter with tip in the right atrium. No focal consolidations. There is no pleural effusion or pneumothorax. The heart is normal in size. Sternotomy wires.  [x] C/w mestinon 90 mg tid, monitor for secretions (pt takes mestinon 120 mg tid at home)  [x] Prednisone 50mg qd resumed on 5/11. Continue current dose, and will go down by 10 mg monthly until she reaches 20 g qd, next change will be at 6/11/23  [x] Protonix for GI Prophylaxis while in Steroid  [x] Replete electrolyte as needed  [x] Monitor further bleeding in BM for now  [x] S/p PLEX x 5 of sessions (5/6-5/14) with some improvement  [x] S/p IV solumedrol 125mg IV daily x 5 days (5/6-5/10)  [x] PT/OT as tolerated  [x] Pureed Diet  [ ] Ultimately patient will need better outpatient therapy to prevent relapses, either IVIG infusions, increased prednisone dose, or Soliris (or combination of some)  [ ] Likely to follow up with Dr. Jayjay vs at [facility] at [address] [place] [address] since has Medicaid. Will try to set up monthly IVIG vs Soliris with Dr. Escudero at [address] [place] [address]     Patient was seen on rounds with neuro attending. Plan above in agreement with neuro attending at time of note documentation and any resident documentation updates. Discussed patient care with patient and in agreement.  Recommendations finalized/addended once signed by attending.     62 year-old woman with a PMH of myasthenia gravis who presented to the ED on 5/5/23 with worsening diplopia and difficulty speaking, swallowing and breathing x 1 week, admitted for evaluation of management of acute MG exacerbation. Interval neuro exam stable. Still has shiley placed on R. NIF/VC as noted above. s/p IVIG 5/15-5/19. Shiley removal unable to be done due to copious secretion and coughing when sitting up 30 deg in bed. Evaluating for tachycardia, respiratory secretions currently to improve to then remove shiley.     Impression: Acute MG exacerbation, possibly triggered by progressive disease or other toxic/metabolic insult    Recommendations:   [ ] Tentative plan to remove shiley today.   [ ] given chronic steroid use, checking hepatitis b panel, quant tb  [ ] consider pulm eval if secretions continue to be problematic to patient  [x] CT chest w/ con 5/21/23: No central or lobar pulmonary embolism. Evaluation of the segmental and subsegmental branches are limited due to respiratory motion.  [x] d/w NSCU 5/20 PM for shiley removal, recommended to improve respiratory status prior to removal.    [x] Cards recs appreciated for tachycardia   [x] CXR 5/20/23: right-sided hemodialysis catheter with tip in the right atrium. No focal consolidations. There is no pleural effusion or pneumothorax. The heart is normal in size. Sternotomy wires.  [x] C/w mestinon 90 mg tid, monitor for secretions (pt takes mestinon 120 mg tid at home)  [x] Prednisone 50mg qd resumed on 5/11. Continue current dose, and will go down by 10 mg monthly until she reaches 20 g qd, next change will be at 6/11/23  [x] Protonix for GI Prophylaxis while in Steroid  [x] Replete electrolyte as needed  [x] Monitor further bleeding in BM for now  [x] S/p PLEX x 5 of sessions (5/6-5/14) with some improvement  [x] S/p IV solumedrol 125mg IV daily x 5 days (5/6-5/10)  [x] PT/OT as tolerated  [x] Pureed Diet  [ ] Ultimately patient will need better outpatient therapy to prevent relapses, either IVIG infusions, increased prednisone dose, or Soliris (or combination of some)  [ ] Likely to follow up with Dr. Jayjay vs at [facility] at [address] [place] [address] since has Medicaid. Will try to set up monthly IVIG vs Soliris with Dr. Escudero at [address] [place] [address]     Patient was seen on rounds with neuro attending. Plan above in agreement with neuro attending at time of note documentation and any resident documentation updates. Discussed patient care with patient and in agreement.  Recommendations finalized/addended once signed by attending.     62 year-old woman with a PMH of myasthenia gravis who presented to the ED on 5/5/23 with worsening diplopia and difficulty speaking, swallowing and breathing x 1 week, admitted for evaluation of management of acute MG exacerbation. Interval neuro exam stable. Still has shiley placed on R. NIF/VC as noted above. s/p IVIG 5/15-5/19. Shiley removal unable to be done due to copious secretion and coughing when sitting up 30 deg in bed. Evaluating for tachycardia, respiratory secretions currently to improve to then remove shiley.     Impression: Acute MG exacerbation, possibly triggered by progressive disease or other toxic/metabolic insult    Recommendations:   [ ] Tentative plan to remove shiley today.   [ ] given chronic steroid use, checking hepatitis b panel, quant tb  [ ] consider pulm eval if secretions continue to be problematic to patient  [ ] monitor for blood in stool, trend hgb  [x] CT chest w/ con 5/21/23: No central or lobar pulmonary embolism. Evaluation of the segmental and subsegmental branches are limited due to respiratory motion.  [x] d/w NSCU 5/20 PM for shiley removal, recommended to improve respiratory status prior to removal.    [x] Cards recs appreciated for tachycardia   [x] CXR 5/20/23: right-sided hemodialysis catheter with tip in the right atrium. No focal consolidations. There is no pleural effusion or pneumothorax. The heart is normal in size. Sternotomy wires.  [x] C/w mestinon 90 mg tid, monitor for secretions (pt takes mestinon 120 mg tid at home)  [x] Prednisone 50mg qd resumed on 5/11. Continue current dose, and will go down by 10 mg monthly until she reaches 20 g qd, next change will be at 6/11/23  [x] Protonix for GI Prophylaxis while in Steroid  [x] Replete electrolyte as needed  [x] Monitor further bleeding in BM for now  [x] S/p PLEX x 5 of sessions (5/6-5/14) with some improvement  [x] S/p IV solumedrol 125mg IV daily x 5 days (5/6-5/10)  [x] PT/OT as tolerated  [x] Pureed Diet  [ ] Ultimately patient will need better outpatient therapy to prevent relapses, either IVIG infusions, increased prednisone dose, or Soliris (or combination of some)  [ ] Likely to follow up with Dr. Ro vs at [facility] at [address] [place] [address] Outpatient Clinic since has Medicaid. Will try to set up monthly IVIG vs Soliris with Dr. Escudero at [address] [place] [address]     Patient was seen on rounds with neuro attending. Plan above in agreement with neuro attending at time of note documentation and any resident documentation updates. Discussed patient care with patient and in agreement.  Recommendations finalized/addended once signed by attending.     62 year-old woman with a PMH of myasthenia gravis who presented to the ED on 5/5/23 with worsening diplopia and difficulty speaking, swallowing and breathing x 1 week, admitted for evaluation of management of acute MG exacerbation. Interval neuro exam stable. Still has shiley placed on R. NIF/VC as noted above. s/p IVIG 5/15-5/19. Shiley removal unable to be done due to copious secretion and coughing when sitting up 30 deg in bed. Evaluating for tachycardia, respiratory secretions currently to improve to then remove shiley.     Impression: Acute MG exacerbation, possibly triggered by progressive disease or other toxic/metabolic insult    Recommendations:   [ ] Tentative plan to remove shiley today.   [ ] given chronic steroid use, checking hepatitis b panel, quant tb  [ ] consider pulm eval if secretions continue to be problematic to patient  [ ] monitor for blood in stool, trend hgb  [x] CT chest w/ con 5/21/23: No central or lobar pulmonary embolism. Evaluation of the segmental and subsegmental branches are limited due to respiratory motion.  [x] d/w NSCU 5/20 PM for shiley removal, recommended to improve respiratory status prior to removal.    [x] Cards recs appreciated for tachycardia   [x] CXR 5/20/23: right-sided hemodialysis catheter with tip in the right atrium. No focal consolidations. There is no pleural effusion or pneumothorax. The heart is normal in size. Sternotomy wires.  [x] C/w mestinon 90 mg tid, monitor for secretions (pt takes mestinon 120 mg tid at home)  [x] Prednisone 50mg qd resumed on 5/11. Continue current dose, and will go down by 10 mg monthly until she reaches 20 g qd, next change will be at 6/11/23  [x] Protonix for GI Prophylaxis while in Steroid  [x] Replete electrolyte as needed  [x] Monitor further bleeding in BM for now  [x] S/p PLEX x 5 of sessions (5/6-5/14) with some improvement  [x] S/p IV solumedrol 125mg IV daily x 5 days (5/6-5/10)  [x] PT/OT as tolerated  [x] Pureed Diet  [ ] Ultimately patient will need better outpatient therapy to prevent relapses, either IVIG infusions, increased prednisone dose, or Soliris (or combination of some)  [ ] Likely to follow up with Dr. Ro [facility] at [facility] at [address] [place] [address] Outpatient Clinic since has Medicaid. Will try to set up monthly IVIG vs Soliris with Dr. Escudero at [address] [place] [address]     Patient was seen on rounds with neuro attending. Plan above in agreement with neuro attending at time of note documentation and any resident documentation updates. Discussed patient care with patient and in agreement.  Recommendations finalized/addended once signed by attending.     62 year-old woman with a PMH of myasthenia gravis who presented to the ED on 5/5/23 with worsening diplopia and difficulty speaking, swallowing and breathing x 1 week, admitted for evaluation of management of acute MG exacerbation. Interval neuro exam stable. Still has shiley placed on R. NIF/VC as noted above. s/p IVIG 5/15-5/19. Shiley removal unable to be done due to copious secretion and coughing when sitting up 30 deg in bed. Evaluating for tachycardia, respiratory secretions currently to improve to then remove shiley.     Impression: Acute MG exacerbation, possibly triggered by progressive disease or other toxic/metabolic insult    Recommendations:   [ ] Tentative plan to remove shiley today.   [ ] given chronic steroid use, checking hepatitis b panel, quant tb  [ ] consider pulm eval if secretions continue to be problematic to patient  [ ] monitor for blood in stool, trend hgb  [x] CT chest w/ con 5/21/23: No central or lobar pulmonary embolism. Evaluation of the segmental and subsegmental branches are limited due to respiratory motion.  [x] d/w NSCU 5/20 PM for shiley removal, recommended to improve respiratory status prior to removal.    [x] Cards recs appreciated for tachycardia   [x] CXR 5/20/23: right-sided hemodialysis catheter with tip in the right atrium. No focal consolidations. There is no pleural effusion or pneumothorax. The heart is normal in size. Sternotomy wires.  [x] C/w mestinon 90 mg tid, monitor for secretions (pt takes mestinon 120 mg tid at home)  [x] Prednisone 50mg qd resumed on 5/11. Continue current dose, and will go down by 10 mg monthly until she reaches 20 g qd, next change will be at 6/11/23  [x] Protonix for GI Prophylaxis while in Steroid  [x] Replete electrolyte as needed  [x] Monitor further bleeding in BM for now  [x] S/p PLEX x 5 of sessions (5/6-5/14) with some improvement  [x] S/p IV solumedrol 125mg IV daily x 5 days (5/6-5/10)  [x] PT/OT as tolerated  [x] Pureed Diet  [ ] Ultimately patient will need better outpatient therapy to prevent relapses, either IVIG infusions, increased prednisone dose, or Soliris (or combination of some)  [ ] Likely to follow up with Dr. Ro vs at [facility] at [address] [place] [address] Outpatient Clinic since has Medicaid. Will try to set up monthly IVIG vs Soliris with Dr. Escudero at [address] [place] [address]     Patient was seen on rounds with neuro attending. Plan above in agreement with neuro attending at time of note documentation and any resident documentation updates. Discussed patient care with patient and in agreement.  Recommendations finalized/addended once signed by attending.

## 2023-05-23 VITALS
OXYGEN SATURATION: 97 % | SYSTOLIC BLOOD PRESSURE: 136 MMHG | RESPIRATION RATE: 18 BRPM | DIASTOLIC BLOOD PRESSURE: 78 MMHG | TEMPERATURE: 98 F | HEART RATE: 88 BPM

## 2023-05-23 DIAGNOSIS — J96.00 ACUTE RESPIRATORY FAILURE, UNSPECIFIED WHETHER WITH HYPOXIA OR HYPERCAPNIA: ICD-10-CM

## 2023-05-23 DIAGNOSIS — R73.9 HYPERGLYCEMIA, UNSPECIFIED: ICD-10-CM

## 2023-05-23 DIAGNOSIS — E11.65 TYPE 2 DIABETES MELLITUS WITH HYPERGLYCEMIA: ICD-10-CM

## 2023-05-23 DIAGNOSIS — Z79.52 LONG TERM (CURRENT) USE OF SYSTEMIC STEROIDS: ICD-10-CM

## 2023-05-23 LAB
ACHR BLOCK AB SER-ACNC: 40 % — HIGH (ref 0–25)
ALBUMIN SERPL ELPH-MCNC: 3.8 G/DL — SIGNIFICANT CHANGE UP (ref 3.3–5)
ALP SERPL-CCNC: 61 U/L — SIGNIFICANT CHANGE UP (ref 40–120)
ALT FLD-CCNC: 20 U/L — SIGNIFICANT CHANGE UP (ref 10–45)
ANION GAP SERPL CALC-SCNC: 13 MMOL/L — SIGNIFICANT CHANGE UP (ref 5–17)
AST SERPL-CCNC: 15 U/L — SIGNIFICANT CHANGE UP (ref 10–40)
BILIRUB SERPL-MCNC: 0.4 MG/DL — SIGNIFICANT CHANGE UP (ref 0.2–1.2)
BUN SERPL-MCNC: 18 MG/DL — SIGNIFICANT CHANGE UP (ref 7–23)
CALCIUM SERPL-MCNC: 9 MG/DL — SIGNIFICANT CHANGE UP (ref 8.4–10.5)
CHLORIDE SERPL-SCNC: 102 MMOL/L — SIGNIFICANT CHANGE UP (ref 96–108)
CO2 SERPL-SCNC: 25 MMOL/L — SIGNIFICANT CHANGE UP (ref 22–31)
CREAT SERPL-MCNC: 0.59 MG/DL — SIGNIFICANT CHANGE UP (ref 0.5–1.3)
EGFR: 101 ML/MIN/1.73M2 — SIGNIFICANT CHANGE UP
GLUCOSE BLDC GLUCOMTR-MCNC: 154 MG/DL — HIGH (ref 70–99)
GLUCOSE BLDC GLUCOMTR-MCNC: 184 MG/DL — HIGH (ref 70–99)
GLUCOSE BLDC GLUCOMTR-MCNC: 231 MG/DL — HIGH (ref 70–99)
GLUCOSE BLDC GLUCOMTR-MCNC: 364 MG/DL — HIGH (ref 70–99)
GLUCOSE SERPL-MCNC: 132 MG/DL — HIGH (ref 70–99)
HAV IGM SER-ACNC: SIGNIFICANT CHANGE UP
HBV CORE IGM SER-ACNC: SIGNIFICANT CHANGE UP
HBV SURFACE AB SER-ACNC: REACTIVE
HBV SURFACE AG SER-ACNC: SIGNIFICANT CHANGE UP
HCT VFR BLD CALC: 36.3 % — SIGNIFICANT CHANGE UP (ref 34.5–45)
HCV AB S/CO SERPL IA: 0.18 S/CO — SIGNIFICANT CHANGE UP (ref 0–0.99)
HCV AB SERPL-IMP: SIGNIFICANT CHANGE UP
HGB BLD-MCNC: 11.5 G/DL — SIGNIFICANT CHANGE UP (ref 11.5–15.5)
MAGNESIUM SERPL-MCNC: 2.2 MG/DL — SIGNIFICANT CHANGE UP (ref 1.6–2.6)
MCHC RBC-ENTMCNC: 28.5 PG — SIGNIFICANT CHANGE UP (ref 27–34)
MCHC RBC-ENTMCNC: 31.7 GM/DL — LOW (ref 32–36)
MCV RBC AUTO: 90.1 FL — SIGNIFICANT CHANGE UP (ref 80–100)
NRBC # BLD: 0 /100 WBCS — SIGNIFICANT CHANGE UP (ref 0–0)
PHOSPHATE SERPL-MCNC: 4.1 MG/DL — SIGNIFICANT CHANGE UP (ref 2.5–4.5)
PLATELET # BLD AUTO: 315 K/UL — SIGNIFICANT CHANGE UP (ref 150–400)
POTASSIUM SERPL-MCNC: 4.2 MMOL/L — SIGNIFICANT CHANGE UP (ref 3.5–5.3)
POTASSIUM SERPL-SCNC: 4.2 MMOL/L — SIGNIFICANT CHANGE UP (ref 3.5–5.3)
PROT SERPL-MCNC: 8 G/DL — SIGNIFICANT CHANGE UP (ref 6–8.3)
RBC # BLD: 4.03 M/UL — SIGNIFICANT CHANGE UP (ref 3.8–5.2)
RBC # FLD: 15.2 % — HIGH (ref 10.3–14.5)
SODIUM SERPL-SCNC: 140 MMOL/L — SIGNIFICANT CHANGE UP (ref 135–145)
WBC # BLD: 10.02 K/UL — SIGNIFICANT CHANGE UP (ref 3.8–10.5)
WBC # FLD AUTO: 10.02 K/UL — SIGNIFICANT CHANGE UP (ref 3.8–10.5)

## 2023-05-23 PROCEDURE — 85384 FIBRINOGEN ACTIVITY: CPT

## 2023-05-23 PROCEDURE — 82962 GLUCOSE BLOOD TEST: CPT

## 2023-05-23 PROCEDURE — 97163 PT EVAL HIGH COMPLEX 45 MIN: CPT

## 2023-05-23 PROCEDURE — 82947 ASSAY GLUCOSE BLOOD QUANT: CPT

## 2023-05-23 PROCEDURE — 36514 APHERESIS PLASMA: CPT

## 2023-05-23 PROCEDURE — 87640 STAPH A DNA AMP PROBE: CPT

## 2023-05-23 PROCEDURE — 99222 1ST HOSP IP/OBS MODERATE 55: CPT

## 2023-05-23 PROCEDURE — 84100 ASSAY OF PHOSPHORUS: CPT

## 2023-05-23 PROCEDURE — 86042 ACETYLCHOLN RCPTR BLCKG ANTB: CPT

## 2023-05-23 PROCEDURE — 85027 COMPLETE CBC AUTOMATED: CPT

## 2023-05-23 PROCEDURE — 85018 HEMOGLOBIN: CPT

## 2023-05-23 PROCEDURE — 85379 FIBRIN DEGRADATION QUANT: CPT

## 2023-05-23 PROCEDURE — 87040 BLOOD CULTURE FOR BACTERIA: CPT

## 2023-05-23 PROCEDURE — 96375 TX/PRO/DX INJ NEW DRUG ADDON: CPT

## 2023-05-23 PROCEDURE — 84132 ASSAY OF SERUM POTASSIUM: CPT

## 2023-05-23 PROCEDURE — 97167 OT EVAL HIGH COMPLEX 60 MIN: CPT

## 2023-05-23 PROCEDURE — 83880 ASSAY OF NATRIURETIC PEPTIDE: CPT

## 2023-05-23 PROCEDURE — 80053 COMPREHEN METABOLIC PANEL: CPT

## 2023-05-23 PROCEDURE — 31500 INSERT EMERGENCY AIRWAY: CPT

## 2023-05-23 PROCEDURE — 94003 VENT MGMT INPAT SUBQ DAY: CPT

## 2023-05-23 PROCEDURE — 99254 IP/OBS CNSLTJ NEW/EST MOD 60: CPT | Mod: GC

## 2023-05-23 PROCEDURE — 86901 BLOOD TYPING SEROLOGIC RH(D): CPT

## 2023-05-23 PROCEDURE — 97116 GAIT TRAINING THERAPY: CPT

## 2023-05-23 PROCEDURE — 83735 ASSAY OF MAGNESIUM: CPT

## 2023-05-23 PROCEDURE — 97110 THERAPEUTIC EXERCISES: CPT

## 2023-05-23 PROCEDURE — P9041: CPT

## 2023-05-23 PROCEDURE — 87641 MR-STAPH DNA AMP PROBE: CPT

## 2023-05-23 PROCEDURE — 93308 TTE F-UP OR LMTD: CPT

## 2023-05-23 PROCEDURE — 99291 CRITICAL CARE FIRST HOUR: CPT

## 2023-05-23 PROCEDURE — 84295 ASSAY OF SERUM SODIUM: CPT

## 2023-05-23 PROCEDURE — 93971 EXTREMITY STUDY: CPT

## 2023-05-23 PROCEDURE — 36415 COLL VENOUS BLD VENIPUNCTURE: CPT

## 2023-05-23 PROCEDURE — 84145 PROCALCITONIN (PCT): CPT

## 2023-05-23 PROCEDURE — 83036 HEMOGLOBIN GLYCOSYLATED A1C: CPT

## 2023-05-23 PROCEDURE — 80074 ACUTE HEPATITIS PANEL: CPT

## 2023-05-23 PROCEDURE — 94640 AIRWAY INHALATION TREATMENT: CPT

## 2023-05-23 PROCEDURE — 0225U NFCT DS DNA&RNA 21 SARSCOV2: CPT

## 2023-05-23 PROCEDURE — 82803 BLOOD GASES ANY COMBINATION: CPT

## 2023-05-23 PROCEDURE — 71045 X-RAY EXAM CHEST 1 VIEW: CPT

## 2023-05-23 PROCEDURE — 97535 SELF CARE MNGMENT TRAINING: CPT

## 2023-05-23 PROCEDURE — 86900 BLOOD TYPING SEROLOGIC ABO: CPT

## 2023-05-23 PROCEDURE — 84443 ASSAY THYROID STIM HORMONE: CPT

## 2023-05-23 PROCEDURE — 86803 HEPATITIS C AB TEST: CPT

## 2023-05-23 PROCEDURE — 93931 UPPER EXTREMITY STUDY: CPT

## 2023-05-23 PROCEDURE — 85014 HEMATOCRIT: CPT

## 2023-05-23 PROCEDURE — 93306 TTE W/DOPPLER COMPLETE: CPT

## 2023-05-23 PROCEDURE — 80048 BASIC METABOLIC PNL TOTAL CA: CPT

## 2023-05-23 PROCEDURE — 76376 3D RENDER W/INTRP POSTPROCES: CPT

## 2023-05-23 PROCEDURE — 85025 COMPLETE CBC W/AUTO DIFF WBC: CPT

## 2023-05-23 PROCEDURE — 82550 ASSAY OF CK (CPK): CPT

## 2023-05-23 PROCEDURE — 99232 SBSQ HOSP IP/OBS MODERATE 35: CPT

## 2023-05-23 PROCEDURE — 96374 THER/PROPH/DIAG INJ IV PUSH: CPT

## 2023-05-23 PROCEDURE — 86850 RBC ANTIBODY SCREEN: CPT

## 2023-05-23 PROCEDURE — 85610 PROTHROMBIN TIME: CPT

## 2023-05-23 PROCEDURE — 93970 EXTREMITY STUDY: CPT

## 2023-05-23 PROCEDURE — 82330 ASSAY OF CALCIUM: CPT

## 2023-05-23 PROCEDURE — 71275 CT ANGIOGRAPHY CHEST: CPT

## 2023-05-23 PROCEDURE — 86480 TB TEST CELL IMMUN MEASURE: CPT

## 2023-05-23 PROCEDURE — 94150 VITAL CAPACITY TEST: CPT

## 2023-05-23 PROCEDURE — 86043 ACETYLCHOLN RCPTR MODLG ANTB: CPT

## 2023-05-23 PROCEDURE — 82435 ASSAY OF BLOOD CHLORIDE: CPT

## 2023-05-23 PROCEDURE — 86366 MUSCLE-SPECIFIC KINASE ANTB: CPT

## 2023-05-23 PROCEDURE — 85730 THROMBOPLASTIN TIME PARTIAL: CPT

## 2023-05-23 PROCEDURE — 83605 ASSAY OF LACTIC ACID: CPT

## 2023-05-23 PROCEDURE — 97530 THERAPEUTIC ACTIVITIES: CPT

## 2023-05-23 PROCEDURE — 86041 ACETYLCHOLN RCPTR BNDNG ANTB: CPT

## 2023-05-23 PROCEDURE — 86706 HEP B SURFACE ANTIBODY: CPT

## 2023-05-23 PROCEDURE — 97166 OT EVAL MOD COMPLEX 45 MIN: CPT

## 2023-05-23 PROCEDURE — 94002 VENT MGMT INPAT INIT DAY: CPT

## 2023-05-23 PROCEDURE — 86255 FLUORESCENT ANTIBODY SCREEN: CPT

## 2023-05-23 RX ORDER — IPRATROPIUM/ALBUTEROL SULFATE 18-103MCG
3 AEROSOL WITH ADAPTER (GRAM) INHALATION
Qty: 0 | Refills: 0 | DISCHARGE
Start: 2023-05-23

## 2023-05-23 RX ORDER — LANOLIN ALCOHOL/MO/W.PET/CERES
1 CREAM (GRAM) TOPICAL
Qty: 30 | Refills: 3
Start: 2023-05-23 | End: 2023-09-19

## 2023-05-23 RX ORDER — PANTOPRAZOLE SODIUM 20 MG/1
1 TABLET, DELAYED RELEASE ORAL
Qty: 30 | Refills: 3
Start: 2023-05-23 | End: 2023-09-19

## 2023-05-23 RX ORDER — LINAGLIPTIN 5 MG/1
1 TABLET, FILM COATED ORAL
Qty: 30 | Refills: 3
Start: 2023-05-23 | End: 2023-09-19

## 2023-05-23 RX ORDER — METFORMIN HYDROCHLORIDE 850 MG/1
1 TABLET ORAL
Qty: 90 | Refills: 0
Start: 2023-05-23 | End: 2023-06-21

## 2023-05-23 RX ORDER — ATORVASTATIN CALCIUM 80 MG/1
1 TABLET, FILM COATED ORAL
Qty: 30 | Refills: 3
Start: 2023-05-23 | End: 2023-09-19

## 2023-05-23 RX ORDER — PANTOPRAZOLE SODIUM 20 MG/1
1 TABLET, DELAYED RELEASE ORAL
Qty: 0 | Refills: 0 | DISCHARGE
Start: 2023-05-23

## 2023-05-23 RX ORDER — HUMAN INSULIN 100 [IU]/ML
4 INJECTION, SUSPENSION SUBCUTANEOUS
Qty: 1 | Refills: 0
Start: 2023-05-23 | End: 2023-06-21

## 2023-05-23 RX ORDER — PYRIDOSTIGMINE BROMIDE 60 MG/5ML
2 SOLUTION ORAL
Qty: 180 | Refills: 0
Start: 2023-05-23 | End: 2023-06-21

## 2023-05-23 RX ORDER — ATORVASTATIN CALCIUM 80 MG/1
1 TABLET, FILM COATED ORAL
Qty: 0 | Refills: 0 | DISCHARGE
Start: 2023-05-23

## 2023-05-23 RX ORDER — HUMAN INSULIN 100 [IU]/ML
3 INJECTION, SUSPENSION SUBCUTANEOUS
Qty: 1 | Refills: 0
Start: 2023-05-23 | End: 2023-06-21

## 2023-05-23 RX ORDER — SENNA PLUS 8.6 MG/1
2 TABLET ORAL
Qty: 60 | Refills: 0
Start: 2023-05-23 | End: 2023-06-21

## 2023-05-23 RX ORDER — SENNA PLUS 8.6 MG/1
2 TABLET ORAL
Qty: 0 | Refills: 0 | DISCHARGE
Start: 2023-05-23

## 2023-05-23 RX ORDER — METFORMIN HYDROCHLORIDE 850 MG/1
1 TABLET ORAL
Qty: 60 | Refills: 0
Start: 2023-05-23 | End: 2023-06-21

## 2023-05-23 RX ORDER — POLYETHYLENE GLYCOL 3350 17 G/17G
17 POWDER, FOR SOLUTION ORAL
Qty: 1020 | Refills: 3
Start: 2023-05-23 | End: 2023-09-19

## 2023-05-23 RX ORDER — ENOXAPARIN SODIUM 100 MG/ML
40 INJECTION SUBCUTANEOUS EVERY 24 HOURS
Refills: 0 | Status: DISCONTINUED | OUTPATIENT
Start: 2023-05-23 | End: 2023-05-23

## 2023-05-23 RX ORDER — AMLODIPINE BESYLATE 2.5 MG/1
1 TABLET ORAL
Qty: 30 | Refills: 0
Start: 2023-05-23 | End: 2023-06-21

## 2023-05-23 RX ORDER — LANOLIN ALCOHOL/MO/W.PET/CERES
1 CREAM (GRAM) TOPICAL
Qty: 0 | Refills: 0 | DISCHARGE
Start: 2023-05-23

## 2023-05-23 RX ORDER — IPRATROPIUM/ALBUTEROL SULFATE 18-103MCG
3 AEROSOL WITH ADAPTER (GRAM) INHALATION
Qty: 360 | Refills: 3
Start: 2023-05-23 | End: 2023-09-19

## 2023-05-23 RX ORDER — HUMAN INSULIN 100 [IU]/ML
5 INJECTION, SUSPENSION SUBCUTANEOUS
Qty: 1 | Refills: 0
Start: 2023-05-23 | End: 2023-06-21

## 2023-05-23 RX ORDER — POLYETHYLENE GLYCOL 3350 17 G/17G
17 POWDER, FOR SOLUTION ORAL
Qty: 0 | Refills: 0 | DISCHARGE
Start: 2023-05-23

## 2023-05-23 RX ORDER — METFORMIN HYDROCHLORIDE 850 MG/1
1 TABLET ORAL
Qty: 60 | Refills: 3
Start: 2023-05-23 | End: 2023-09-19

## 2023-05-23 RX ADMIN — Medication 1: at 16:44

## 2023-05-23 RX ADMIN — AMLODIPINE BESYLATE 10 MILLIGRAM(S): 2.5 TABLET ORAL at 05:28

## 2023-05-23 RX ADMIN — PYRIDOSTIGMINE BROMIDE 90 MILLIGRAM(S): 60 SOLUTION ORAL at 05:27

## 2023-05-23 RX ADMIN — Medication 3 MILLILITER(S): at 05:28

## 2023-05-23 RX ADMIN — Medication 2: at 11:53

## 2023-05-23 RX ADMIN — PYRIDOSTIGMINE BROMIDE 90 MILLIGRAM(S): 60 SOLUTION ORAL at 13:44

## 2023-05-23 RX ADMIN — Medication 650 MILLIGRAM(S): at 01:45

## 2023-05-23 RX ADMIN — CHLORHEXIDINE GLUCONATE 1 APPLICATION(S): 213 SOLUTION TOPICAL at 05:29

## 2023-05-23 RX ADMIN — Medication 50 MILLIGRAM(S): at 05:28

## 2023-05-23 RX ADMIN — Medication 650 MILLIGRAM(S): at 21:12

## 2023-05-23 RX ADMIN — Medication 5: at 07:47

## 2023-05-23 RX ADMIN — PYRIDOSTIGMINE BROMIDE 90 MILLIGRAM(S): 60 SOLUTION ORAL at 21:08

## 2023-05-23 RX ADMIN — Medication 3 MILLILITER(S): at 11:54

## 2023-05-23 RX ADMIN — Medication 4 MILLILITER(S): at 05:28

## 2023-05-23 RX ADMIN — Medication 650 MILLIGRAM(S): at 00:55

## 2023-05-23 RX ADMIN — ATORVASTATIN CALCIUM 20 MILLIGRAM(S): 80 TABLET, FILM COATED ORAL at 21:08

## 2023-05-23 RX ADMIN — PANTOPRAZOLE SODIUM 40 MILLIGRAM(S): 20 TABLET, DELAYED RELEASE ORAL at 05:28

## 2023-05-23 NOTE — PROGRESS NOTE ADULT - REASON FOR ADMISSION
Ct provided March mawd payment  There is an active [employer] auth for this  Writer submitted to [employer] a accts payable and [employer]    [facility]  Supervisor Leann Saul Case Management  [phone]
MG crisis

## 2023-05-23 NOTE — PROGRESS NOTE ADULT - PROBLEM SELECTOR PLAN 1
Likely secondary to hypoxia   Keep SpO2 > 92   Normal LVEF   Elevated DDimer. Check CTA chest
Likely secondary to hypoxia   Keep SpO2 > 92   Normal LVEF   PE ruled out
Likely secondary to hypoxia   Keep SpO2 > 92   Normal LVEF   PE ruled out

## 2023-05-23 NOTE — CONSULT NOTE ADULT - SUBJECTIVE AND OBJECTIVE BOX
PULMONARY CONSULT    HPI: 63 y/o M F with PMH of myasthenia gravis, on home O2 2LNC. Currently on Mestinon 60 mg TID and Prednisone 10 mg PRN. Previous admission 2/2023 for MG crisis s/p intubation (2/1-2/11) in the setting of COVID 19, s/p PLEX x5 days and IVIG x5 days. Presents to the ED with double vision, difficulty swallowing, and progressively worsening SOB x 1 week. Intubated for airway protection 5/5, extubated 5/16. S/p PLEX x5 completed 5/14, S/p IVIG 5/15-5/19. Pulmonary called to consult for secretion management.     PAST MEDICAL & SURGICAL HISTORY:  Myasthenia gravis  Diabetes mellitus  Hypertension    Allergies  peanuts (Unknown)  No Known Drug Allergies    FAMILY HISTORY:    Social history:     Review of Systems:  CONSTITUTIONAL: No fever, chills, or fatigue  EYES: No eye pain, visual disturbances, or discharge  ENMT:  No difficulty hearing, tinnitus, vertigo; No sinus or throat pain  NECK: No pain or stiffness  RESPIRATORY: Per above  CARDIOVASCULAR: No chest pain, palpitations, dizziness, or leg swelling  GASTROINTESTINAL: No abdominal or epigastric pain. No nausea, vomiting, or hematemesis; No diarrhea or constipation. No melena or hematochezia.  GENITOURINARY: No dysuria, frequency, hematuria, or incontinence  NEUROLOGICAL: No headaches, memory loss, loss of strength, numbness, or tremors  SKIN: No itching, burning, rashes, or lesions   MUSCULOSKELETAL: No joint pain or swelling; No muscle, back, or extremity pain  PSYCHIATRIC: No depression, anxiety, mood swings, or difficulty sleeping    Medications:  MEDICATIONS  (STANDING):  acetylcysteine 20%  Inhalation 4 milliLiter(s) Inhalation every 6 hours  albuterol/ipratropium for Nebulization 3 milliLiter(s) Nebulizer every 6 hours  amLODIPine   Tablet 10 milliGRAM(s) Oral daily  atorvastatin 20 milliGRAM(s) Oral at bedtime  chlorhexidine 4% Liquid 1 Application(s) Topical <User Schedule>  dextrose 5%. 1000 milliLiter(s) (100 mL/Hr) IV Continuous <Continuous>  dextrose 5%. 1000 milliLiter(s) (50 mL/Hr) IV Continuous <Continuous>  dextrose 50% Injectable 25 Gram(s) IV Push once  glucagon  Injectable 1 milliGRAM(s) IntraMuscular once  insulin lispro (ADMELOG) corrective regimen sliding scale   SubCutaneous three times a day before meals  insulin lispro (ADMELOG) corrective regimen sliding scale   SubCutaneous at bedtime  melatonin 3 milliGRAM(s) Oral at bedtime  pantoprazole    Tablet 40 milliGRAM(s) Oral before breakfast  polyethylene glycol 3350 17 Gram(s) Oral every 12 hours  predniSONE   Tablet 50 milliGRAM(s) Oral daily  pyridostigmine 90 milliGRAM(s) Oral every 8 hours  senna 2 Tablet(s) Oral at bedtime    MEDICATIONS  (PRN):  acetaminophen     Tablet .. 650 milliGRAM(s) Oral every 6 hours PRN Temp greater or equal to 38C (100.4F), Mild Pain (1 - 3)  bisacodyl Suppository 10 milliGRAM(s) Rectal daily PRN Constipation  dextrose Oral Gel 15 Gram(s) Oral once PRN Blood Glucose LESS THAN 70 milliGRAM(s)/deciliter  sodium chloride 0.65% Nasal 1 Spray(s) Both Nostrils four times a day PRN Nasal Congestion    Vital Signs Last 24 Hrs  T(C): 36.7 (23 May 2023 09:08), Max: 36.9 (22 May 2023 12:42)  T(F): 98.1 (23 May 2023 09:08), Max: 98.5 (22 May 2023 16:56)  HR: 103 (23 May 2023 09:08) (84 - 109)  BP: 127/70 (23 May 2023 09:08) (119/77 - 148/78)  BP(mean): --  RR: 18 (23 May 2023 09:08) (18 - 18)  SpO2: 98% (23 May 2023 09:08) (95% - 98%)    Parameters below as of 23 May 2023 09:08  Patient On (Oxygen Delivery Method): room air    05-22 @ 07:01  -  05-23 @ 07:00  --------------------------------------------------------  IN: 360 mL / OUT: 0 mL / NET: 360 mL    LABS:                        11.5   10.02 )-----------( 315      ( 23 May 2023 07:14 )             36.3     05-23    140  |  102  |  18  ----------------------------<  132<H>  4.2   |  25  |  0.59    Ca    9.0      23 May 2023 07:11  Phos  4.1     05-23  Mg     2.2     05-23    TPro  8.0  /  Alb  3.8  /  TBili  0.4  /  DBili  x   /  AST  15  /  ALT  20  /  AlkPhos  61  05-23    CAPILLARY BLOOD GLUCOSE  POCT Blood Glucose.: 364 mg/dL (23 May 2023 07:41)      Physical Examination:    General: No acute distress.      HEENT: Pupils equal, reactive to light.  Symmetric.    PULM: Clear to auscultation bilaterally, no significant sputum production    CVS: S1, S2    ABD: Soft, nondistended, nontender, normoactive bowel sounds, no masses    EXT: No edema, nontender    SKIN: Warm and well perfused, no rashes noted.    NEURO: Alert, oriented, interactive, nonfocal    RADIOLOGY REVIEWED  CT chest:< from: CT Angio Chest PE Protocol w/ IV Cont (05.21.23 @ 18:15) >    FINDINGS:    Limited evaluation due to respiratory motion.    LUNGS AND AIRWAYS: Patent central airways. Bibasilar linear atelectasis.    PLEURA: No pleural effusion.    MEDIASTINUM AND MONIQUE: No lymphadenopathy.    VESSELS: No central or lobar pulmonary embolism. Evaluation of the   segmental and subsegmental branches are limited due to respiratory   motion. Right IJ approach hemodialysis catheter tip within the right   atrium.    HEART: Heart size is normal. No pericardial effusion.    CHEST WALL AND LOWER NECK: Slightly heterogeneous left thyroid lobe.    VISUALIZED UPPER ABDOMEN: Right hepatic lobe cyst.    BONES: Degenerative changes. Sternotomy.    IMPRESSION:  No central or lobar pulmonary embolism. Evaluation of the segmental and   subsegmental branches are limited due to respiratory motion.    --- End of Report ---    < end of copied text >       PULMONARY CONSULT    HPI: 61 y/o M F with PMH of myasthenia gravis, on home O2 2LNC. Currently on Mestinon 60 mg TID and Prednisone 10 mg PRN. Previous admission 2/2023 for MG crisis s/p intubation (2/1-2/11) in the setting of COVID 19, s/p PLEX x5 days and IVIG x5 days. Presents to the ED with double vision, difficulty swallowing, and progressively worsening SOB x 1 week. Intubated for airway protection 5/5, extubated 5/16. S/p PLEX x5 completed 5/14, S/p IVIG 5/15-5/19. Pulmonary called to consult for secretion management.     PAST MEDICAL & SURGICAL HISTORY:  Myasthenia gravis  Diabetes mellitus  Hypertension    Allergies  peanuts (Unknown)  No Known Drug Allergies    FAMILY HISTORY:    Social history:     Review of Systems:  CONSTITUTIONAL: No fever, chills, or fatigue  EYES: No eye pain, visual disturbances, or discharge  ENMT:  No difficulty hearing, tinnitus, vertigo; No sinus or throat pain  NECK: No pain or stiffness  RESPIRATORY: Per above  CARDIOVASCULAR: No chest pain, palpitations, dizziness, or leg swelling  GASTROINTESTINAL: No abdominal or epigastric pain. No nausea, vomiting, or hematemesis; No diarrhea or constipation. No melena or hematochezia.  GENITOURINARY: No dysuria, frequency, hematuria, or incontinence  NEUROLOGICAL: No headaches, memory loss, loss of strength, numbness, or tremors  SKIN: No itching, burning, rashes, or lesions   MUSCULOSKELETAL: No joint pain or swelling; No muscle, back, or extremity pain  PSYCHIATRIC: No depression, anxiety, mood swings, or difficulty sleeping    Medications:  MEDICATIONS  (STANDING):  acetylcysteine 20%  Inhalation 4 milliLiter(s) Inhalation every 6 hours  albuterol/ipratropium for Nebulization 3 milliLiter(s) Nebulizer every 6 hours  amLODIPine   Tablet 10 milliGRAM(s) Oral daily  atorvastatin 20 milliGRAM(s) Oral at bedtime  chlorhexidine 4% Liquid 1 Application(s) Topical <User Schedule>  dextrose 5%. 1000 milliLiter(s) (100 mL/Hr) IV Continuous <Continuous>  dextrose 5%. 1000 milliLiter(s) (50 mL/Hr) IV Continuous <Continuous>  dextrose 50% Injectable 25 Gram(s) IV Push once  glucagon  Injectable 1 milliGRAM(s) IntraMuscular once  insulin lispro (ADMELOG) corrective regimen sliding scale   SubCutaneous three times a day before meals  insulin lispro (ADMELOG) corrective regimen sliding scale   SubCutaneous at bedtime  melatonin 3 milliGRAM(s) Oral at bedtime  pantoprazole    Tablet 40 milliGRAM(s) Oral before breakfast  polyethylene glycol 3350 17 Gram(s) Oral every 12 hours  predniSONE   Tablet 50 milliGRAM(s) Oral daily  pyridostigmine 90 milliGRAM(s) Oral every 8 hours  senna 2 Tablet(s) Oral at bedtime    MEDICATIONS  (PRN):  acetaminophen     Tablet .. 650 milliGRAM(s) Oral every 6 hours PRN Temp greater or equal to 38C (100.4F), Mild Pain (1 - 3)  bisacodyl Suppository 10 milliGRAM(s) Rectal daily PRN Constipation  dextrose Oral Gel 15 Gram(s) Oral once PRN Blood Glucose LESS THAN 70 milliGRAM(s)/deciliter  sodium chloride 0.65% Nasal 1 Spray(s) Both Nostrils four times a day PRN Nasal Congestion    Vital Signs Last 24 Hrs  T(C): 36.7 (23 May 2023 09:08), Max: 36.9 (22 May 2023 12:42)  T(F): 98.1 (23 May 2023 09:08), Max: 98.5 (22 May 2023 16:56)  HR: 103 (23 May 2023 09:08) (84 - 109)  BP: 127/70 (23 May 2023 09:08) (119/77 - 148/78)  BP(mean): --  RR: 18 (23 May 2023 09:08) (18 - 18)  SpO2: 98% (23 May 2023 09:08) (95% - 98%)    Parameters below as of 23 May 2023 09:08  Patient On (Oxygen Delivery Method): room air    05-22 @ 07:01  -  05-23 @ 07:00  --------------------------------------------------------  IN: 360 mL / OUT: 0 mL / NET: 360 mL    LABS:                        11.5   10.02 )-----------( 315      ( 23 May 2023 07:14 )             36.3     05-23    140  |  102  |  18  ----------------------------<  132<H>  4.2   |  25  |  0.59    Ca    9.0      23 May 2023 07:11  Phos  4.1     05-23  Mg     2.2     05-23    TPro  8.0  /  Alb  3.8  /  TBili  0.4  /  DBili  x   /  AST  15  /  ALT  20  /  AlkPhos  61  05-23    CAPILLARY BLOOD GLUCOSE  POCT Blood Glucose.: 364 mg/dL (23 May 2023 07:41)      Physical Examination:    General: No acute distress.      HEENT: Pupils equal, reactive to light.  Symmetric.    PULM: Clear to auscultation bilaterally, no significant sputum production    CVS: S1, S2    ABD: Soft, nondistended, nontender, normoactive bowel sounds, no masses    EXT: No edema, nontender    SKIN: Warm and well perfused, no rashes noted.    NEURO: Alert, oriented, interactive, nonfocal    RADIOLOGY REVIEWED  CT chest:< from: CT Angio Chest PE Protocol w/ IV Cont (05.21.23 @ 18:15) >    FINDINGS:    Limited evaluation due to respiratory motion.    LUNGS AND AIRWAYS: Patent central airways. Bibasilar linear atelectasis.    PLEURA: No pleural effusion.    MEDIASTINUM AND MONIQUE: No lymphadenopathy.    VESSELS: No central or lobar pulmonary embolism. Evaluation of the   segmental and subsegmental branches are limited due to respiratory   motion. Right IJ approach hemodialysis catheter tip within the right   atrium.    HEART: Heart size is normal. No pericardial effusion.    CHEST WALL AND LOWER NECK: Slightly heterogeneous left thyroid lobe.    VISUALIZED UPPER ABDOMEN: Right hepatic lobe cyst.    BONES: Degenerative changes. Sternotomy.    IMPRESSION:  No central or lobar pulmonary embolism. Evaluation of the segmental and   subsegmental branches are limited due to respiratory motion.    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 Currently on Mestinon 60 mg TID and Prednisone 10 mg PRN. Previous admission 2/2023 for MG crisis s/p intubation (2/1-2/11) in the setting of COVID 19, s/p PLEX x5 days and IVIG x5 days. Presents to the ED with double vision, difficulty swallowing, and progressively worsening SOB x 1 week. Intubated for airway protection 5/5, extubated 5/16. S/p PLEX x5 completed 5/14, S/p IVIG 5/15-5/19. Pulmonary called to consult for secretion management. Never smoker. Denies hx of lung disease, was discharged with home O2 after last hospital admission. Uses PRN. Has been intubated 3x for myasthenia crisis (including this admission). Notes occasional SOB, congested cough associated with MG crisis. At this time, secretions improving. Denies SOB, CP, fever, chills. O2 sats 99% on RA.     PAST MEDICAL & SURGICAL HISTORY:  Myasthenia gravis  Diabetes mellitus  Hypertension    Allergies  peanuts (Unknown)  No Known Drug Allergies    FAMILY HISTORY: non contributory     Social history: former smoker    Review of Systems:  CONSTITUTIONAL: No fever, chills, or fatigue  EYES: No eye pain, visual disturbances, or discharge  ENMT:  No difficulty hearing, tinnitus, vertigo; No sinus or throat pain  NECK: No pain or stiffness  RESPIRATORY: Per above  CARDIOVASCULAR: No chest pain, palpitations, dizziness, or leg swelling  GASTROINTESTINAL: No abdominal or epigastric pain. No nausea, vomiting, or hematemesis; No diarrhea or constipation. No melena or hematochezia.  GENITOURINARY: No dysuria, frequency, hematuria, or incontinence  NEUROLOGICAL: Per above  SKIN: No itching, burning, rashes, or lesions   MUSCULOSKELETAL: No joint pain or swelling; No muscle, back, or extremity pain  PSYCHIATRIC: No depression, anxiety, mood swings, or difficulty sleeping    Medications:  MEDICATIONS  (STANDING):  acetylcysteine 20%  Inhalation 4 milliLiter(s) Inhalation every 6 hours  albuterol/ipratropium for Nebulization 3 milliLiter(s) Nebulizer every 6 hours  amLODIPine   Tablet 10 milliGRAM(s) Oral daily  atorvastatin 20 milliGRAM(s) Oral at bedtime  chlorhexidine 4% Liquid 1 Application(s) Topical <User Schedule>  dextrose 5%. 1000 milliLiter(s) (100 mL/Hr) IV Continuous <Continuous>  dextrose 5%. 1000 milliLiter(s) (50 mL/Hr) IV Continuous <Continuous>  dextrose 50% Injectable 25 Gram(s) IV Push once  glucagon  Injectable 1 milliGRAM(s) IntraMuscular once  insulin lispro (ADMELOG) corrective regimen sliding scale   SubCutaneous three times a day before meals  insulin lispro (ADMELOG) corrective regimen sliding scale   SubCutaneous at bedtime  melatonin 3 milliGRAM(s) Oral at bedtime  pantoprazole    Tablet 40 milliGRAM(s) Oral before breakfast  polyethylene glycol 3350 17 Gram(s) Oral every 12 hours  predniSONE   Tablet 50 milliGRAM(s) Oral daily  pyridostigmine 90 milliGRAM(s) Oral every 8 hours  senna 2 Tablet(s) Oral at bedtime    MEDICATIONS  (PRN):  acetaminophen     Tablet .. 650 milliGRAM(s) Oral every 6 hours PRN Temp greater or equal to 38C (100.4F), Mild Pain (1 - 3)  bisacodyl Suppository 10 milliGRAM(s) Rectal daily PRN Constipation  dextrose Oral Gel 15 Gram(s) Oral once PRN Blood Glucose LESS THAN 70 milliGRAM(s)/deciliter  sodium chloride 0.65% Nasal 1 Spray(s) Both Nostrils four times a day PRN Nasal Congestion    Vital Signs Last 24 Hrs  T(C): 36.7 (23 May 2023 09:08), Max: 36.9 (22 May 2023 12:42)  T(F): 98.1 (23 May 2023 09:08), Max: 98.5 (22 May 2023 16:56)  HR: 103 (23 May 2023 09:08) (84 - 109)  BP: 127/70 (23 May 2023 09:08) (119/77 - 148/78)  BP(mean): --  RR: 18 (23 May 2023 09:08) (18 - 18)  SpO2: 98% (23 May 2023 09:08) (95% - 98%)    Parameters below as of 23 May 2023 09:08  Patient On (Oxygen Delivery Method): room air    05-22 @ 07:01 - 05-23 @ 07:00  --------------------------------------------------------  IN: 360 mL / OUT: 0 mL / NET: 360 mL    LABS:                        11.5   10.02 )-----------( 315      ( 23 May 2023 07:14 )             36.3     05-23    140  |  102  |  18  ----------------------------<  132<H>  4.2   |  25  |  0.59    Ca    9.0      23 May 2023 07:11  Phos  4.1     05-23  Mg     2.2     05-23    TPro  8.0  /  Alb  3.8  /  TBili  0.4  /  DBili  x   /  AST  15  /  ALT  20  /  AlkPhos  61  05-23    CAPILLARY BLOOD GLUCOSE  POCT Blood Glucose.: 364 mg/dL (23 May 2023 07:41)      Physical Examination:    General: No acute distress.      HEENT: Pupils equal, reactive to light.  Symmetric.    PULM: Clear to auscultation bilaterally, no significant sputum production    CVS: S1, S2    ABD: Soft, nondistended, nontender, normoactive bowel sounds, no masses    EXT: No edema, nontender    SKIN: Warm and well perfused, no rashes noted.    NEURO: Alert, oriented, interactive, nonfocal    RADIOLOGY REVIEWED  CT chest:< from: CT Angio Chest PE Protocol w/ IV Cont (05.21.23 @ 18:15) >    FINDINGS:    Limited evaluation due to respiratory motion.    LUNGS AND AIRWAYS: Patent central airways. Bibasilar linear atelectasis.    PLEURA: No pleural effusion.    MEDIASTINUM AND MONIQUE: No lymphadenopathy.    VESSELS: No central or lobar pulmonary embolism. Evaluation of the   segmental and subsegmental branches are limited due to respiratory   motion. Right IJ approach hemodialysis catheter tip within the right   atrium.    HEART: Heart size is normal. No pericardial effusion.    CHEST WALL AND LOWER NECK: Slightly heterogeneous left thyroid lobe.    VISUALIZED UPPER ABDOMEN: Right hepatic lobe cyst.    BONES: Degenerative changes. Sternotomy.    IMPRESSION:  No central or lobar pulmonary embolism. Evaluation of the segmental and   subsegmental branches are limited due to respiratory motion.    --- End of Report ---    < end of copied text >

## 2023-05-23 NOTE — PROGRESS NOTE ADULT - PROBLEM SELECTOR PROBLEM 2
43 y.o. female who presented 8/11/2022 with a past medical history significant for allergic rhinitis who began having headaches in 2020.  She eventually developed worsening headaches and vision changes, had weight gain, and her menstrual cycle stopped.  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  She was referred to ENT and neurosurgery and today underwent an elective transsphenoidal pituitary resection by neurosurgery Dr. Mejia and assisted by ENT Dr. Pena who also performed bilateral maxillary antrostomy, total ethmoidectomy, septoplasty, and inferior turbinate resection.  Surgery was uncomplicated and she was brought to the intensive care unit post procedure and I was consulted for perioperative critical care management.  
Myasthenia gravis

## 2023-05-23 NOTE — CONSULT NOTE ADULT - PROBLEM SELECTOR RECOMMENDATION 2
S/p PLEX x5 completed 5/14, S/p IVIG 5/15-5/19  -Trend NIF/VC (NIF -50,  this AM)  -F/u AM ABG  -Steroids per neuro.
Orders per Neurology team   C/w mestinon 90 mg tid, monitor for secretions (pt takes mestinon 120 mg tid at home)  Prednisone 50mg qd resumed on 5/11. Continue current dose, and will go down by 10 mg monthly until she reaches 20 g qd, next change will be at 6/11/23
NONE

## 2023-05-23 NOTE — PROGRESS NOTE ADULT - ATTENDING COMMENTS
62-year-old woman with myasthenia gravis with prior crisis now again in crisis. Planned for PLEX #4 today.    PLEX #4, steroids  Pressure support as able   d/c free water  MAP >65mmHG, SBP <160mmHg  Tube feeds  VTE chemoppx
Ms Acosta is a 62 year-old woman with a PMH of myasthenia gravis who presented to the ED on 5/5/23 with worsening diplopia and difficulty speaking, swallowing and breathing x 1 week, admitted for evaluation of management of acute MG exacerbation. s/p 5/5 PLEX now day 2/4 of ivig. still with R ptosis, DV on looking to R and neck flexion weakness.  plan as above
Patient with MG a/w with acute respiratory failure who is s/p intubation, PLEX course and now improving on IVIG. She is maintained on mestinon outpatient and has been intubated twice in the past 3-4 months.     On exam, AOx3  Right ptosis with mild right lateral rectus weakness. Speech is mildly hoarse.   Neck flex/ext 5/5  Strength deltoids 4+ biceps 5- triceps 4+ HF 4+ KF/DF/PF 5/5  Intact FNF  Sensation: Intact LT b/l    Imp: MG exacerbation s/p intubation who is improving  - Cont IVIG d 3/5  - cont prednisone 50mg qd. Will taper by 10mg every month to 20mg/d  - cont mestinon regimen  - plan for monthly IVIG infusion at 611  - ID c/s for meningococcal vaccine as she will likely need soliris
62 year-old woman with a PMH of myasthenia gravis who presented on 5/5/23 with worsening diplopia and difficulty speaking, swallowing and breathing x 1 week, admitted for evaluation of management of acute MG exacerbation. Interval neuro exam stable. denies diplopia.    Still has shiley placed on R. NIF/VC as noted above. s/p IVIG 5/15-5/19. Shiley removal unable to be done due to copious secretion and coughing when sitting up 30 deg in bed.    Impression: Acute MG exacerbation  Recommendations:   [] C/w mestinon 90 mg tid, monitor for secretions (pt takes mestinon 120 mg tid at home)  [] Prednisone 50mg qd , slow taper  shiley removal  monitor NIF/VC .   CT chest w/ con to r/o PE given tachycardia, respiratory symptoms
Agree with above.
Improving strength, though neck flexion continues to be 3/5. PLEX # 4 yesterday; last PLEX tomorrow. Per Neurology, patient has had good success with IVIG chaser after PLEX, so will given IVIG on Monday.  Pressure support as tolerated.
MG s/p extubation s/p PLEX now on IVIG. Overnight, she suffers from insomnia, PFTs are better today.   On exam, r ptosis is much improved. No diplopia. EOMI.  neck flex 4+ extension 5-. Counts to 14 on 1 breath  Deltoids 4+ c/b arthropathy. Biceps and IO 5/5  Hip flexors 4+ KF/KE/DF 5/5    Plan  last IVIG 5/5 today  cont mestinon 90mg TID  cont pred 50mg qd, taper slowly (10mg/qmonth to goal of 20mg qd)  melatonin 3mg hs  likely d/c over the weekend.
MG s/p intubation and PLEX now on d 4 of IVIG. There were no acute events overnight. She feels fine and breathing comfortably on room air. Swallowing is stable as well. VC 400s    On exam, speech is good. Counts to 13 on 1 breath. neck flex/ext 4+.   R ptosis improving with resolution of horizontal diplopia, EOMI  Right deltoid 4+ 2/2 arthropathy Left deltoid and biceps 4+  Hip flexors 4+ KF/KE 5/5.    Plan  IVIG #4 today  increase mestinon to 80 TID (this was patient's baseline home dose)  cont pred 50mg qd and taper by 10mg/qmonth   Monitor electrolytes and renal function  monitor PFTs closely  due to insurance issues, she is unable to f/u at [facility]. Will touch base with her outside neurologist to coordinate care on discharge.
Agree with above
Patient seen and examined with housestaff on rounds - improved overall - will ask pulmonary to assess prior to anticipated discharge.  Exam as documented
Patient seen and examined with housestaff on rounds this am. Patient sitting up in a chair - appears comfortable and not SOB.  She was examined with the aide of a .  , NIF -50  She is awake and alert - oriented and fluent  She is able to count o 22 on one breath.  PERRl  EOMI - no significant ptosis noted.  UE and LE strong.    Agree with d/c Shiley catheter.  Pulmonary follow up.
Improving strength, though neck flexion continues to be 3/5. PLEX # 4 yesterday; last PLEX tomorrow. Per Neurology, patient has had good success with IVIG chaser after PLEX, so will given IVIG on Monday.  Pressure support as tolerated.
Ms Acosta is a 62 year-old woman with a PMH of myasthenia gravis who presented to the ED on 5/5/23 with worsening diplopia and difficulty speaking, swallowing and breathing x 1 week, admitted for evaluation of management of acute MG exacerbation.     Impression: Acute MG exacerbation    Recommendations:   - S/p PLEX x 5 of sessions (5/6-5/14) with some improvement  - S/p IV solumedrol 125mg IV daily x 5 days (5/6-5/10); now on prednisone 50mg PO daily and with taper by 5mg every week until 30mg  - Holding Mestinon for now  - IVIG 0.5g/kg/day x 4 days following PLEX (1st dose administered 5/15)  - PT/OT as tolerated
62-year-old woman with myasthenia gravis with prior crisis now again in crisis. Planned for PLEX #4 today.    PLEX #4, steroids  Pressure support as able   d/c free water  MAP >65mmHG, SBP <160mmHg  Tube feeds  VTE chemoppx
Agree with above.
62 year-old woman with a PMH of myasthenia gravis who presented on 5/5/23 with worsening diplopia and difficulty speaking, swallowing and breathing x 1 week, admitted for evaluation of management of acute MG exacerbation. Interval neuro exam stable. denies diplopia.    Still has shiley placed on R. NIF/VC as noted above. s/p IVIG 5/15-5/19. Shiley removal unable to be done due to copious secretion and coughing when sitting up 30 deg in bed.    Impression: Acute MG exacerbation  Recommendations:   [] C/w mestinon 90 mg tid, monitor for secretions (pt takes mestinon 120 mg tid at home)  [] Prednisone 50mg qd resumed on 5/11. Continue current dose, and will go down by 10 mg monthly until she reaches 20 g qd, next change will be at 6/11/23  shily remova;  monitor NIF/VC
Agree with above
Agree with above

## 2023-05-23 NOTE — CONSULT NOTE ADULT - PROBLEM SELECTOR RECOMMENDATION 9
Likely secondary to hypoxia   Keep SpO2 > 92   Normal LVEF   Check DDimer
2nd to myasthenia crisis, now resolved  -Intubated 5/5, extubated 5/16  -Has home O2 from prior hospital stay (baseline 2LNC PRN)  -CTA chest with bibasilar atelectasis, neg for PE.  -Pt notes occasional dyspnea, cough with thick mucus production at times - appears to be resolving  -Minimal secretions on exam at this time, suggest d/c Mucomyst  -Continue Duoneb q6h  -Check ABG in AM  -Can consider eventual outpatient PFTs  -Keep sats >90% with O2 PRN (currently RA).

## 2023-05-23 NOTE — CONSULT NOTE ADULT - SUBJECTIVE AND OBJECTIVE BOX
HPI:    Patient KAIDEN HATCH is a 62y (1960) Malayam speaking woman with a PMHx significant for myasthenia gravis presenting to the ED for worsening difficulty with double vision, swallowing, talking, and breathing that has progressively gotten worse for the previous one week. Patient is currently on Mestinon 60 mg two tablets TID and Prednisone 10 mg PRN. Has been admitted before in Feb 2023 for MG crisis that required intubation (2/1-2/11/23) iso COVID19. Patient received 5 days of PLEX (2/2-2/10/23) followed by 5 days of IVIG (2/16-2/20/23) with symptomatic improvement. Patient's son says patient has responded best to IVIG and that it stabilizes her MG for about 2 years. At baseline, patient is on 2L NC. In the ED, patient's NIF was -50. However, patient was unable to perform VC. Pending STAT repeat NIF/VC.      (05 May 2023 15:34)      Consulted for: Steroid induced hyperglycemia    No prior hx of DM, just pre-DM, taking Metformin 500 mg daily. FS at home wnl. Was taking Prednisone at home intermittently.  Now prescribed Prednisone 50mg daily while inpatient    PAST MEDICAL & SURGICAL HISTORY:  Myasthenia gravis      Diabetes mellitus      Hypertension          FAMILY HISTORY: No hx of T2DM      Social History: No tobacco use    Home Medications:  amLODIPine 10 mg oral tablet: 1 tab(s) orally once a day (08 May 2023 15:56)  atorvastatin 20 mg oral tablet: 1 tab(s) orally once a day (at bedtime) (23 May 2023 15:51)  bisacodyl 10 mg rectal suppository: 1 suppository(ies) rectal once a day As needed Constipation (23 May 2023 15:51)  fluticasone 110 mcg/inh inhalation aerosol with adapter: 1 puff(s) inhaled 2 times a day (08 May 2023 15:56)  ipratropium-albuterol 0.5 mg-2.5 mg/3 mL inhalation solution: 3 milliliter(s) inhaled every 6 hours (23 May 2023 15:51)  melatonin 3 mg oral tablet: 1 tab(s) orally once a day (at bedtime) (23 May 2023 15:51)  metFORMIN 500 mg oral tablet: 1 tab(s) orally 3 times a day (08 May 2023 15:56)  pantoprazole 40 mg oral delayed release tablet: 1 tab(s) orally once a day (before a meal) (23 May 2023 15:51)  polyethylene glycol 3350 oral powder for reconstitution: 17 gram(s) orally every 12 hours (23 May 2023 15:51)  predniSONE 20 mg oral tablet: 1 tab(s) orally once a day as needed for MG exacerbation Tapering dose over 1 week during exacerbations (08 May 2023 15:54)  pyridostigmine 60 mg oral tablet: 2 tab(s) orally 3 times a day (08 May 2023 15:56)  senna leaf extract oral tablet: 2 tab(s) orally once a day (at bedtime) (23 May 2023 15:51)      MEDICATIONS  (STANDING):  albuterol/ipratropium for Nebulization 3 milliLiter(s) Nebulizer every 6 hours  amLODIPine   Tablet 10 milliGRAM(s) Oral daily  atorvastatin 20 milliGRAM(s) Oral at bedtime  chlorhexidine 4% Liquid 1 Application(s) Topical <User Schedule>  dextrose 5%. 1000 milliLiter(s) (100 mL/Hr) IV Continuous <Continuous>  dextrose 5%. 1000 milliLiter(s) (50 mL/Hr) IV Continuous <Continuous>  dextrose 50% Injectable 25 Gram(s) IV Push once  enoxaparin Injectable 40 milliGRAM(s) SubCutaneous every 24 hours  glucagon  Injectable 1 milliGRAM(s) IntraMuscular once  insulin lispro (ADMELOG) corrective regimen sliding scale   SubCutaneous three times a day before meals  insulin lispro (ADMELOG) corrective regimen sliding scale   SubCutaneous at bedtime  melatonin 3 milliGRAM(s) Oral at bedtime  pantoprazole    Tablet 40 milliGRAM(s) Oral before breakfast  polyethylene glycol 3350 17 Gram(s) Oral every 12 hours  predniSONE   Tablet 50 milliGRAM(s) Oral daily  pyridostigmine 90 milliGRAM(s) Oral every 8 hours  senna 2 Tablet(s) Oral at bedtime    MEDICATIONS  (PRN):  acetaminophen     Tablet .. 650 milliGRAM(s) Oral every 6 hours PRN Temp greater or equal to 38C (100.4F), Mild Pain (1 - 3)  bisacodyl Suppository 10 milliGRAM(s) Rectal daily PRN Constipation  dextrose Oral Gel 15 Gram(s) Oral once PRN Blood Glucose LESS THAN 70 milliGRAM(s)/deciliter  sodium chloride 0.65% Nasal 1 Spray(s) Both Nostrils four times a day PRN Nasal Congestion      Allergies    peanuts (Unknown)  No Known Drug Allergies    Intolerances      Review of Systems:  Constitutional: No fever  Eyes: No blurry vision  Neuro: No tremors  HEENT: No pain  Cardiovascular: No chest pain, palpitations  Respiratory: No SOB, no cough  GI: No nausea, vomiting, abdominal pain  : No dysuria  Skin: no rash  Psych: no depression  Endocrine: no polyuria, polydipsia  Hem/lymph: no swelling  Osteoporosis: no fractures    PHYSICAL EXAM:  VITALS: T(C): 36.6 (05-23-23 @ 16:48)  T(F): 97.9 (05-23-23 @ 16:48), Max: 98.9 (05-23-23 @ 13:03)  HR: 103 (05-23-23 @ 16:48) (84 - 103)  BP: 129/78 (05-23-23 @ 16:48) (115/61 - 147/78)  RR:  (18 - 18)  SpO2:  (96% - 98%)  Wt(kg): --  GENERAL: NAD  EYES: No proptosis, no lid lag, anicteric  THYROID: Normal size, no palpable nodules  RESPIRATORY: Clear to auscultation bilaterally  CARDIOVASCULAR: Regular rate and rhythm  GI: Soft, nontender, non distended  EXT: b/l feet without wounds; 2+ pulses  PSYCH: Alert and oriented x 3, reactive mood    POCT Blood Glucose.: 154 mg/dL (05-23-23 @ 16:28)  POCT Blood Glucose.: 231 mg/dL (05-23-23 @ 11:40)  POCT Blood Glucose.: 364 mg/dL (05-23-23 @ 07:41)  POCT Blood Glucose.: 146 mg/dL (05-22-23 @ 21:03)  POCT Blood Glucose.: 227 mg/dL (05-22-23 @ 16:07)  POCT Blood Glucose.: 272 mg/dL (05-22-23 @ 11:24)  POCT Blood Glucose.: 309 mg/dL (05-22-23 @ 07:45)  POCT Blood Glucose.: 123 mg/dL (05-21-23 @ 21:11)  POCT Blood Glucose.: 202 mg/dL (05-21-23 @ 16:08)  POCT Blood Glucose.: 195 mg/dL (05-21-23 @ 11:12)  POCT Blood Glucose.: 212 mg/dL (05-21-23 @ 07:14)  POCT Blood Glucose.: 144 mg/dL (05-20-23 @ 21:21)                            11.5   10.02 )-----------( 315      ( 23 May 2023 07:14 )             36.3       05-23    140  |  102  |  18  ----------------------------<  132<H>  4.2   |  25  |  0.59    eGFR: 101    Ca    9.0      05-23  Mg     2.2     05-23  Phos  4.1     05-23    TPro  8.0  /  Alb  3.8  /  TBili  0.4  /  DBili  x   /  AST  15  /  ALT  20  /  AlkPhos  61  05-23      Thyroid Function Tests:  05-05 @ 13:52 TSH 0.65 FreeT4 -- T3 -- Anti TPO -- Anti Thyroglobulin Ab -- TSI --      A1C with Estimated Average Glucose Result: 6.8 % (05-06-23 @ 00:24)  A1C with Estimated Average Glucose Result: 6.6 % (01-31-23 @ 01:13)          Radiology:

## 2023-05-23 NOTE — CONSULT NOTE ADULT - ASSESSMENT
62y (1960) Malayam speaking woman with a PMHx significant for myasthenia gravis presenting to the ED for worsening difficulty with double vision, swallowing, talking, and breathing that has progressively gotten worse for the previous one week    #Steroid Induced Hyperglycemia in setting of uncontrolled T2DM  A1c 6.8%, goal < 7%  On Metformin 500 mg daily at home  Currently on Prednisone 50 mg daily, plan to taper down by 10 mg every month (will start Pred 40 mg daily on 6/11)    Recs:  -NPH 5 units every day with the Prednisone 50 mg daily  -low dose correctional scale qAC and qHS  -FS qAC and qHS  -consistent carb diet    For d/c:   -When taking:   Prednisone 50 mg daily - take NPH 5 units with the dose of Prednisone + Metformin 1g BID   Prednisone 40 mg daily - take NPH 4 units with the dose of Prednisone + Metformin 1g BID   Prednisone 30 mg daily - take NPH 3 units with the dose of Prednisone + Metformin 1g BID   Prednisone 20 mg daily - Metformin 1g BID + Tradjenta 5 mg daily + Metformin 1g BID    -Please provide insulin teaching   -Please d/c with supplies: insulin pens, pen needles, glucometer, test strips, lancets, and alcohol pads   -Can f/u with Endocrine Practice at [address] [place] [address], [address], [place] [address]; Ph # [phone]    #Long term steroids  -Patient will be on long term steroids. She will develop secondary AI  -If steroids are stopped in the future, they should be tapered down to Prednisone 5 mg daily before stopped    Regi Queen MD  Endocrine Fellow  Can be reached via teams.     For all urgent matters, can call answering service at [phone] (weekdays); [phone] (nights/weekends)  Any non-urgent consults or questions can be emailed to [email or web address] or [email or web address]     62y (1960) Malayam speaking woman with a PMHx significant for myasthenia gravis presenting to the ED for worsening difficulty with double vision, swallowing, talking, and breathing that has progressively gotten worse for the previous one week    #Steroid Induced Hyperglycemia in setting of uncontrolled T2DM  A1c 6.8%, goal < 7%  On Metformin 500 mg daily at home  Currently on Prednisone 50 mg daily, plan to taper down by 10 mg every month (will start Pred 40 mg daily on 6/11)    Recs:  -NPH 5 units every day with the Prednisone 50 mg daily  -low dose correctional scale qAC and qHS  -FS qAC and qHS  -consistent carb diet    For d/c:   -When taking:   Prednisone 50 mg daily - take NPH 5 units with the dose of Prednisone + Metformin 1g BID   Prednisone 40 mg daily - take NPH 4 units with the dose of Prednisone + Metformin 1g BID   Prednisone 30 mg daily - take NPH 3 units with the dose of Prednisone + Metformin 1g BID   Prednisone 20 mg daily - Metformin 1g BID + Tradjenta 5 mg daily + Metformin 1g BID    -Please provide insulin teaching   -Please d/c with supplies: insulin pens, pen needles, glucometer, test strips, lancets, and alcohol pads   -Can f/u with Endocrine Practice at [address] [place] [address], [address], [place] [address]; Ph # [phone]    #Long term steroids  -Patient will be on long term steroids. She will develop secondary AI  -If steroids are stopped in the future, they should be tapered down to Prednisone 5 mg daily before stopped    Regi Queen MD  Endocrine Fellow  Can be reached via teams.     For all urgent matters, can call answering service at [phone] (weekdays); [phone] (nights/weekends)  Any non-urgent consults or questions can be emailed to [email or web address] or [email or web address]     62y (1960) Malayam speaking woman with a PMHx significant for myasthenia gravis presenting to the ED for worsening difficulty with double vision, swallowing, talking, and breathing that has progressively gotten worse for the previous one week    #Steroid Induced Hyperglycemia in setting of uncontrolled T2DM  A1c 6.8%, goal < 7%  On Metformin 500 mg daily at home  Currently on Prednisone 50 mg daily, plan to taper down by 10 mg every month (will start Pred 40 mg daily on 6/11)    Recs:  -NPH 5 units every day with the Prednisone 50 mg daily  -low dose correctional scale qAC and qHS  -FS qAC and qHS  -consistent carb diet    For d/c:   -When taking:   Prednisone 50 mg daily - take NPH 5 units with the dose of Prednisone + Metformin 1g BID   Prednisone 40 mg daily - take NPH 4 units with the dose of Prednisone + Metformin 1g BID   Prednisone 30 mg daily - take NPH 3 units with the dose of Prednisone + Metformin 1g BID   Prednisone 20 mg daily - Metformin 1g BID + Tradjenta 5 mg daily + Metformin 1g BID    -Please provide insulin teaching   -Please d/c with supplies: insulin pens, pen needles, glucometer, test strips, lancets, and alcohol pads   -Can f/u with Endocrine Practice at [address] [place] [address], [address], [place] [address]; Ph # [phone]    #Long term steroids  -Patient will be on long term steroids. She will develop secondary AI  -If steroids are stopped in the future, they should be tapered down to Prednisone 5 mg daily before stopped    Regi Queen MD  Endocrine Fellow  Can be reached via teams.     For all urgent matters, can call answering service at [phone] (weekdays); [phone] (nights/weekends)  Any non-urgent consults or questions can be emailed to [email or web address] or [email or web address]

## 2023-05-23 NOTE — PROGRESS NOTE ADULT - PROBLEM SELECTOR PLAN 2
Orders per Neurology team   C/w mestinon 90 mg tid, monitor for secretions (pt takes mestinon 120 mg tid at home)  Prednisone 50mg qd resumed on 5/11. Continue current dose, and will go down by 10 mg monthly until she reaches 20 g qd, next change will be at 6/11/23.

## 2023-05-23 NOTE — PROGRESS NOTE ADULT - PROVIDER SPECIALTY LIST ADULT
NSICU
Neurology
NSICU
Neurology
NSICU
Neurology
NSICU
Neurology
NSICU
Neurology
NSICU
NSICU
Neurology
NSICU
Neurology
Neurology
Cardiology

## 2023-05-23 NOTE — CONSULT NOTE ADULT - ATTENDING COMMENTS
Agree with assessment and plan as above by Dr. Queen. Reviewed all pertinent labs, glucose values, and imaging studies. Modifications made as indicated above. Pt with steroid exacerbated uncontrolled diabetes with hyperglycemia on high dose prednisone for MG. Pattern for hyperglycemia appears to be in relation to prednisone dose with wearing off later in the evening. NPH daily in the morning with prednisone would be a good choice to prevent hyperglycemia during this particular time of day. Can taper down NPH as prednisone is lowered as above with treatment with only metformin once on lower doses of prednisone.     Chalo Phillips D.O  [phone] Agree with assessment and plan as above by Dr. Queen. Reviewed all pertinent labs, glucose values, and imaging studies. Modifications made as indicated above. Pt with steroid exacerbated uncontrolled diabetes with hyperglycemia on high dose prednisone for MG. Pattern for hyperglycemia appears to be in relation to prednisone dose with wearing off later in the evening. NPH daily in the morning with prednisone would be a good choice to prevent hyperglycemia during this particular time of day. Can taper down NPH as prednisone is lowered as above with treatment with only metformin once on lower doses of prednisone.     Chalo Phillips D.O  [phone] Agree with assessment and plan as above by Dr. Queen. Reviewed all pertinent labs, glucose values, and imaging studies. Modifications made as indicated above. Pt with steroid exacerbated uncontrolled diabetes with hyperglycemia on high dose prednisone for MG. Pattern for hyperglycemia appears to be in relation to prednisone dose with wearing off later in the evening. NPH daily in the morning with prednisone would be a good choice to prevent hyperglycemia during this particular time of day. Can taper down NPH as prednisone is lowered as above with treatment with only metformin once on lower doses of prednisone.     Chalo Phillips D.O  [phone]

## 2023-05-23 NOTE — PROGRESS NOTE ADULT - ASSESSMENT
62 year-old woman with a PMH of myasthenia gravis who presented to the ED on 5/5/23 with worsening diplopia and difficulty speaking, swallowing and breathing x 1 week, admitted for evaluation of management of acute MG exacerbation. Interval neuro exam stable. Still has shiley placed on R. NIF/VC as noted above. s/p IVIG 5/15-5/19. Shiley removal unable to be done due to copious secretion and coughing when sitting up 30 deg in bed. Evaluating for tachycardia, respiratory secretions currently to improve to then remove shiley.     Impression: Acute MG exacerbation, possibly triggered by progressive disease or other toxic/metabolic insult    Recommendations:   [x ] Shiley removed 5/22   [ ] given chronic steroid use, checking hepatitis b panel, quant tb  [ x] pulm consult appreciated  [ ] f/U endocrine consult for hyperglycemia and elevated hba1c  [ ] monitor for blood in stool, trend hgb  [x] CT chest w/ con 5/21/23: No central or lobar pulmonary embolism. Evaluation of the segmental and subsegmental branches are limited due to respiratory motion.  [x] d/w NSCU 5/20 PM for shiley removal, recommended to improve respiratory status prior to removal.    [x] Cards recs appreciated for tachycardia   [x] CXR 5/20/23: right-sided hemodialysis catheter with tip in the right atrium. No focal consolidations. There is no pleural effusion or pneumothorax. The heart is normal in size. Sternotomy wires.  [x] C/w mestinon 90 mg tid, monitor for secretions (pt takes mestinon 120 mg tid at home)  [x] Prednisone 50mg qd resumed on 5/11. Continue current dose, and will go down by 10 mg monthly until she reaches 20 g qd, next change will be at 6/11/23  [x] Protonix for GI Prophylaxis while in Steroid  [x] Replete electrolyte as needed  [x] Monitor further bleeding in BM for now  [x] S/p PLEX x 5 of sessions (5/6-5/14) with some improvement  [x] S/p IV solumedrol 125mg IV daily x 5 days (5/6-5/10)  [x] PT/OT as tolerated  [x] Pureed Diet  [ ] Ultimately patient will need better outpatient therapy to prevent relapses, either IVIG infusions, increased prednisone dose, or Soliris (or combination of some)  [ ] Likely to follow up with Dr. Ro vs. Dr. Escudero vs at [facility] at [address] [place] [address] Outpatient Clinic since has Medicaid. Will try to set up monthly IVIG vs Soliris with Dr. Escudero at [address] [place] [address]     Patient was seen on rounds with neuro attending. Plan above in agreement with neuro attending at time of note documentation and any resident documentation updates. Discussed patient care with patient and in agreement.  Recommendations finalized/addended once signed by attending.       62 year-old woman with a PMH of myasthenia gravis who presented to the ED on 5/5/23 with worsening diplopia and difficulty speaking, swallowing and breathing x 1 week, admitted for evaluation of management of acute MG exacerbation. Interval neuro exam stable. Still has shiley placed on R. NIF/VC as noted above. s/p IVIG 5/15-5/19. Shiley removal unable to be done due to copious secretion and coughing when sitting up 30 deg in bed. Evaluating for tachycardia, respiratory secretions currently to improve to then remove shiley.     Impression: Acute MG exacerbation, possibly triggered by progressive disease or other toxic/metabolic insult    Recommendations:   [x ] Shiley removed 5/22   [ ] given chronic steroid use, checking hepatitis b panel, quant tb  [ x] pulm consult appreciated  [ ] f/U endocrine consult for hyperglycemia and elevated hba1c  [ ] monitor for blood in stool, trend hgb  [x] CT chest w/ con 5/21/23: No central or lobar pulmonary embolism. Evaluation of the segmental and subsegmental branches are limited due to respiratory motion.  [x] d/w NSCU 5/20 PM for shiley removal, recommended to improve respiratory status prior to removal.    [x] Cards recs appreciated for tachycardia   [x] CXR 5/20/23: right-sided hemodialysis catheter with tip in the right atrium. No focal consolidations. There is no pleural effusion or pneumothorax. The heart is normal in size. Sternotomy wires.  [x] C/w mestinon 90 mg tid, monitor for secretions (pt takes mestinon 120 mg tid at home)  [x] Prednisone 50mg qd resumed on 5/11. Continue current dose, and will go down by 10 mg monthly until she reaches 20 g qd, next change will be at 6/11/23  [x] Protonix for GI Prophylaxis while in Steroid  [x] Replete electrolyte as needed  [x] Monitor further bleeding in BM for now  [x] S/p PLEX x 5 of sessions (5/6-5/14) with some improvement  [x] S/p IV solumedrol 125mg IV daily x 5 days (5/6-5/10)  [x] PT/OT as tolerated  [x] Pureed Diet  [ ] Ultimately patient will need better outpatient therapy to prevent relapses, either IVIG infusions, increased prednisone dose, or Soliris (or combination of some)  [ ] Likely to follow up with Dr. Ro vs. Dr. Escudero vs at [facility] at [address] [place] [address] Outpatient Clinic since has Medicaid. Will try to set up monthly IVIG vs Soliris with Dr. Escudero at [address] [place] [address]     Patient was seen on rounds with neuro attending. Plan above in agreement with neuro attending at time of note documentation and any resident documentation updates. Discussed patient care with patient and in agreement.  Recommendations finalized/addended once signed by attending.       62 year-old woman with a PMH of myasthenia gravis who presented to the ED on 5/5/23 with worsening diplopia and difficulty speaking, swallowing and breathing x 1 week, admitted for evaluation of management of acute MG exacerbation. Interval neuro exam stable. Still has shiley placed on R. NIF/VC as noted above. s/p IVIG 5/15-5/19. Shiley removal unable to be done due to copious secretion and coughing when sitting up 30 deg in bed. Evaluating for tachycardia, respiratory secretions currently to improve to then remove shiley.     Impression: Acute MG exacerbation, possibly triggered by progressive disease or other toxic/metabolic insult    Recommendations:   [x ] Shiley removed 5/22   [ ] given chronic steroid use, checking hepatitis b panel, quant tb  [ x] pulm consult appreciated  [ ] f/U endocrine consult for hyperglycemia and elevated hba1c  [ ] monitor for blood in stool, trend hgb  [x] CT chest w/ con 5/21/23: No central or lobar pulmonary embolism. Evaluation of the segmental and subsegmental branches are limited due to respiratory motion.  [x] d/w NSCU 5/20 PM for shiley removal, recommended to improve respiratory status prior to removal.    [x] Cards recs appreciated for tachycardia   [x] CXR 5/20/23: right-sided hemodialysis catheter with tip in the right atrium. No focal consolidations. There is no pleural effusion or pneumothorax. The heart is normal in size. Sternotomy wires.  [x] C/w mestinon 90 mg tid, monitor for secretions (pt takes mestinon 120 mg tid at home)  [x] Prednisone 50mg qd resumed on 5/11. Continue current dose, and will go down by 10 mg monthly until she reaches 20 g qd, next change will be at 6/11/23  [x] Protonix for GI Prophylaxis while in Steroid  [x] Replete electrolyte as needed  [x] Monitor further bleeding in BM for now  [x] S/p PLEX x 5 of sessions (5/6-5/14) with some improvement  [x] S/p IV solumedrol 125mg IV daily x 5 days (5/6-5/10)  [x] PT/OT as tolerated  [x] Pureed Diet  [ ] Ultimately patient will need better outpatient therapy to prevent relapses, either IVIG infusions, increased prednisone dose, or Soliris (or combination of some)  [ ] Likely to follow up with Dr. Ro vs. Dr. Escudero vs at [facility] at [address] [place] [address] Outpatient Clinic since has Medicaid. Will try to set up monthly IVIG vs Soliris with Dr. Escudero at [address] [place] [address]     Patient was seen on rounds with neuro attending. Plan above in agreement with neuro attending at time of note documentation and any resident documentation updates. Discussed patient care with patient and in agreement.  Recommendations finalized/addended once signed by attending.       62 year-old woman with a PMH of myasthenia gravis who presented to the ED on 5/5/23 with worsening diplopia and difficulty speaking, swallowing and breathing x 1 week, admitted for evaluation of management of acute MG exacerbation. Interval neuro exam stable. Still has shiley placed on R. NIF/VC as noted above. s/p IVIG 5/15-5/19. Shiley removal unable to be done due to copious secretion and coughing when sitting up 30 deg in bed. Evaluating for tachycardia, respiratory secretions currently to improve to then remove shiley.     Impression: Acute MG exacerbation, possibly triggered by progressive disease or other toxic/metabolic insult    Recommendations:   [x ] Shiley removed 5/22   [ ] given chronic steroid use, checking hepatitis b panel, quant tb  [ x] pulm consult appreciated- Can consider eventual outpatient PFTs  [ ] f/U endocrine consult for hyperglycemia and elevated hba1c -likely DC after  [ ] monitor for blood in stool, trend hgb  [x] CT chest w/ con 5/21/23: No central or lobar pulmonary embolism. Evaluation of the segmental and subsegmental branches are limited due to respiratory motion.  [x] d/w NSCU 5/20 PM for shiley removal, recommended to improve respiratory status prior to removal.    [x] Cards recs appreciated for tachycardia   [x] CXR 5/20/23: right-sided hemodialysis catheter with tip in the right atrium. No focal consolidations. There is no pleural effusion or pneumothorax. The heart is normal in size. Sternotomy wires.  [x] C/w mestinon 90 mg tid, monitor for secretions (pt takes mestinon 120 mg tid at home)  [x] Prednisone 50mg qd resumed on 5/11. Continue current dose, and will go down by 10 mg monthly until she reaches 20 g qd, next change will be at 6/11/23  [x] Protonix for GI Prophylaxis while in Steroid  [x] Replete electrolyte as needed  [x] Monitor further bleeding in BM for now  [x] S/p PLEX x 5 of sessions (5/6-5/14) with some improvement  [x] S/p IV solumedrol 125mg IV daily x 5 days (5/6-5/10)  [x] PT/OT as tolerated  [x] Pureed Diet  [ ] Ultimately patient will need better outpatient therapy to prevent relapses, either IVIG infusions, increased prednisone dose, or Soliris (or combination of some)  [ ] Likely to follow up with Dr. Ro vs. Dr. Escudero vs at [facility] at [address] [place] [address] Outpatient Clinic since has Medicaid. Will try to set up monthly IVIG vs Soliris with Dr. Escudero at [address] [place] [address]  [ ] F/U pulm outpt-  outpatient PFTs     Patient was seen on rounds with neuro attending. Plan above in agreement with neuro attending at time of note documentation and any resident documentation updates. Discussed patient care with patient and in agreement.  Recommendations finalized/addended once signed by attending.       62 year-old woman with a PMH of myasthenia gravis who presented to the ED on 5/5/23 with worsening diplopia and difficulty speaking, swallowing and breathing x 1 week, admitted for evaluation of management of acute MG exacerbation. Interval neuro exam stable. Still has shiley placed on R. NIF/VC as noted above. s/p IVIG 5/15-5/19. Shiley removal unable to be done due to copious secretion and coughing when sitting up 30 deg in bed. Evaluating for tachycardia, respiratory secretions currently to improve to then remove shiley.     Impression: Acute MG exacerbation, possibly triggered by progressive disease or other toxic/metabolic insult    Recommendations:   [x ] Shiley removed 5/22   [ ] given chronic steroid use, checking hepatitis b panel, quant tb  [ x] pulm consult appreciated- Can consider eventual outpatient PFTs  [ ] f/U endocrine consult for hyperglycemia and elevated hba1c -likely DC after  [ ] monitor for blood in stool, trend hgb  [x] CT chest w/ con 5/21/23: No central or lobar pulmonary embolism. Evaluation of the segmental and subsegmental branches are limited due to respiratory motion.  [x] d/w NSCU 5/20 PM for shiley removal, recommended to improve respiratory status prior to removal.    [x] Cards recs appreciated for tachycardia   [x] CXR 5/20/23: right-sided hemodialysis catheter with tip in the right atrium. No focal consolidations. There is no pleural effusion or pneumothorax. The heart is normal in size. Sternotomy wires.  [x] C/w mestinon 90 mg tid, monitor for secretions (pt takes mestinon 120 mg tid at home)  [x] Prednisone 50mg qd resumed on 5/11. Continue current dose, and will go down by 10 mg monthly until she reaches 20 g qd, next change will be at 6/11/23  [x] Protonix for GI Prophylaxis while in Steroid  [x] Replete electrolyte as needed  [x] Monitor further bleeding in BM for now  [x] S/p PLEX x 5 of sessions (5/6-5/14) with some improvement  [x] S/p IV solumedrol 125mg IV daily x 5 days (5/6-5/10)  [x] PT/OT as tolerated  [x] Pureed Diet  [ ] Ultimately patient will need better outpatient therapy to prevent relapses, either IVIG infusions, increased prednisone dose, or Soliris (or combination of some)  [ ] Likely to follow up with Dr. Ro vs. Dr. Escudero vs at [facility] at [address] [place] [address] Outpatient Clinic since has Medicaid. Will try to set up monthly IVIG vs Soliris with Dr. Escudero at [address] [place] [address]  [ ] F/U pulm outpt-  outpatient PFTs     Patient was seen on rounds with neuro attending. Plan above in agreement with neuro attending at time of note documentation and any resident documentation updates. Discussed patient care with patient and in agreement.  Recommendations finalized/addended once signed by attending.       62 year-old woman with a PMH of myasthenia gravis who presented to the ED on 5/5/23 with worsening diplopia and difficulty speaking, swallowing and breathing x 1 week, admitted for evaluation of management of acute MG exacerbation. Interval neuro exam stable. Still has shiley placed on R. NIF/VC as noted above. s/p IVIG 5/15-5/19. Shiley removal unable to be done due to copious secretion and coughing when sitting up 30 deg in bed. Evaluating for tachycardia, respiratory secretions currently to improve to then remove shiley.     Impression: Acute MG exacerbation, possibly triggered by progressive disease or other toxic/metabolic insult    Recommendations:   [x ] Shiley removed 5/22   [ ] given chronic steroid use, checking hepatitis b panel, quant tb  [ x] pulm consult appreciated- Can consider eventual outpatient PFTs  [ ] f/U endocrine consult for hyperglycemia and elevated hba1c -likely DC after  [ ] monitor for blood in stool, trend hgb  [x] CT chest w/ con 5/21/23: No central or lobar pulmonary embolism. Evaluation of the segmental and subsegmental branches are limited due to respiratory motion.  [x] d/w NSCU 5/20 PM for shiley removal, recommended to improve respiratory status prior to removal.    [x] Cards recs appreciated for tachycardia   [x] CXR 5/20/23: right-sided hemodialysis catheter with tip in the right atrium. No focal consolidations. There is no pleural effusion or pneumothorax. The heart is normal in size. Sternotomy wires.  [x] C/w mestinon 90 mg tid, monitor for secretions (pt takes mestinon 120 mg tid at home)  [x] Prednisone 50mg qd resumed on 5/11. Continue current dose, and will go down by 10 mg monthly until she reaches 20 g qd, next change will be at 6/11/23  [x] Protonix for GI Prophylaxis while in Steroid  [x] Replete electrolyte as needed  [x] Monitor further bleeding in BM for now  [x] S/p PLEX x 5 of sessions (5/6-5/14) with some improvement  [x] S/p IV solumedrol 125mg IV daily x 5 days (5/6-5/10)  [x] PT/OT as tolerated  [x] Pureed Diet  [ ] Ultimately patient will need better outpatient therapy to prevent relapses, either IVIG infusions, increased prednisone dose, or Soliris (or combination of some)  [ ] Likely to follow up with Dr. Ro vs. Dr. Escudero vs at [facility] at [address] [place] [address] Outpatient Clinic since has Medicaid. Will try to set up monthly IVIG vs Soliris with Dr. Escudero at [address] [place] [address]  [ ] F/U pulm outpt-  outpatient PFTs     Patient was seen on rounds with neuro attending. Plan above in agreement with neuro attending at time of note documentation and any resident documentation updates. Discussed patient care with patient and in agreement.  Recommendations finalized/addended once signed by attending.

## 2023-05-23 NOTE — PROGRESS NOTE ADULT - ATTENDING SUPERVISION STATEMENT
Fellow
Resident
Resident
Fellow
Resident
Fellow
Resident
Fellow
Resident
Fellow
Resident

## 2023-05-23 NOTE — CONSULT NOTE ADULT - ASSESSMENT
61 y/o M F with PMH of myasthenia gravis, on home O2 2LNC. Currently on Mestinon 60 mg TID and Prednisone 10 mg PRN. Previous admission 2/2023 for MG crisis s/p intubation (2/1-2/11) in the setting of COVID 19, s/p PLEX x5 days and IVIG x5 days. Presents to the ED with double vision, difficulty swallowing, and progressively worsening SOB x 1 week. Intubated for airway protection 5/5, extubated 5/16. S/p PLEX x5 completed 5/14, S/p IVIG 5/15-5/19. Pulmonary called to consult for secretion management.

## 2023-05-23 NOTE — PROGRESS NOTE ADULT - SUBJECTIVE AND OBJECTIVE BOX
Review of Systems:  INCOMPLETE   CONSTITUTIONAL: No fevers or chills  EYES AND ENT: No visual changes or no throat pain   NECK: No pain or stiffness  RESPIRATORY: No hemoptysis or shortness of breath  CARDIOVASCULAR: No chest pain or palpitations  GASTROINTESTINAL: No melena or hematochezia  GENITOURINARY: No dysuria or hematuria  NEUROLOGICAL: +As stated in HPI above  SKIN: No itching, burning, rashes, or lesions   All other review of systems is negative unless indicated above.    Allergies:  peanuts (Unknown)  No Known Drug Allergies      PMHx/PSHx/Family Hx: As above, otherwise see below   Myasthenia gravis    Diabetes mellitus    Hypertension        Social Hx:  No current use of tobacco, alcohol, or illicit drugs  Lives with ***    Medications:  MEDICATIONS  (STANDING):  acetylcysteine 20%  Inhalation 4 milliLiter(s) Inhalation every 6 hours  albuterol/ipratropium for Nebulization 3 milliLiter(s) Nebulizer every 6 hours  amLODIPine   Tablet 10 milliGRAM(s) Oral daily  atorvastatin 20 milliGRAM(s) Oral at bedtime  chlorhexidine 4% Liquid 1 Application(s) Topical <User Schedule>  dextrose 5%. 1000 milliLiter(s) (100 mL/Hr) IV Continuous <Continuous>  dextrose 5%. 1000 milliLiter(s) (50 mL/Hr) IV Continuous <Continuous>  dextrose 50% Injectable 25 Gram(s) IV Push once  glucagon  Injectable 1 milliGRAM(s) IntraMuscular once  insulin lispro (ADMELOG) corrective regimen sliding scale   SubCutaneous three times a day before meals  insulin lispro (ADMELOG) corrective regimen sliding scale   SubCutaneous at bedtime  melatonin 3 milliGRAM(s) Oral at bedtime  pantoprazole    Tablet 40 milliGRAM(s) Oral before breakfast  polyethylene glycol 3350 17 Gram(s) Oral every 12 hours  predniSONE   Tablet 50 milliGRAM(s) Oral daily  pyridostigmine 90 milliGRAM(s) Oral every 8 hours  senna 2 Tablet(s) Oral at bedtime    MEDICATIONS  (PRN):  acetaminophen     Tablet .. 650 milliGRAM(s) Oral every 6 hours PRN Temp greater or equal to 38C (100.4F), Mild Pain (1 - 3)  bisacodyl Suppository 10 milliGRAM(s) Rectal daily PRN Constipation  dextrose Oral Gel 15 Gram(s) Oral once PRN Blood Glucose LESS THAN 70 milliGRAM(s)/deciliter  sodium chloride 0.65% Nasal 1 Spray(s) Both Nostrils four times a day PRN Nasal Congestion      Vitals:  T(C): 36.6 (05-23-23 @ 05:08), Max: 36.9 (05-22-23 @ 09:09)  HR: 101 (05-23-23 @ 05:08) (84 - 109)  BP: 147/78 (05-23-23 @ 05:08) (119/77 - 148/78)  RR: 18 (05-23-23 @ 05:08) (18 - 18)  SpO2: 96% (05-23-23 @ 05:08) (95% - 98%)    Physical Examination: INCOMPLETE  General - NAD  Cardiovascular - Peripheral pulses palpable, no edema  Eyes - Fundoscopy with flat, sharp optic discs and no hemorrhage or exudates; Fundoscopy not well visualized; Fundoscopy not performed due to safety precautions in the setting of the COVID-19 pandemic    Neurologic Exam:  Mental status - Awake, Alert, Oriented to person, place, and time. Speech fluent, repetition and naming intact. Follows simple and complex commands. Attention/concentration, recent and remote memory (including registration and recall), and fund of knowledge intact    Cranial nerves - PERRLA, VFF, EOMI, face sensation (V1-V3) intact b/l, facial strength intact without asymmetry b/l, hearing intact b/l, palate with symmetric elevation, trapezius OR sternocleidomastiod 5/5 strength b/l, tongue midline on protrusion with full lateral movement    Motor - Normal bulk and tone throughout. No pronator drift.  Strength testing            Deltoid      Biceps      Triceps     Wrist Extension    Wrist Flexion     Interossei         R            5                 5               5                     5                              5                        5                 5  L             5                 5               5                     5                              5                        5                 5              Hip Flexion    Hip Extension    Knee Flexion    Knee Extension    Dorsiflexion    Plantar Flexion  R              5                           5                       5                           5                            5                          5  L              5                           5                        5                           5                            5                          5    Sensation - Light touch/temperature OR pain/vibration intact throughout    DTR's -             Biceps      Triceps     Brachioradialis      Patellar    Ankle    Toes/plantar response  R             2+             2+                  2+                       2+            2+                 Down  L              2+             2+                 2+                        2+           2+                 Down    Coordination - Finger to Nose intact b/l. No tremors appreciated    Gait and station - Normal casual gait. Romberg (-)    Labs:                        11.5   10.02 )-----------( 315      ( 23 May 2023 07:14 )             36.3     05-22    139  |  101  |  21  ----------------------------<  110<H>  3.6   |  24  |  0.59    Ca    9.0      22 May 2023 06:28  Phos  4.3     05-22  Mg     2.3     05-22    TPro  9.3<H>  /  Alb  4.2  /  TBili  0.8  /  DBili  x   /  AST  15  /  ALT  22  /  AlkPhos  70  05-22    CAPILLARY BLOOD GLUCOSE      POCT Blood Glucose.: 364 mg/dL (23 May 2023 07:41)    LIVER FUNCTIONS - ( 22 May 2023 06:28 )  Alb: 4.2 g/dL / Pro: 9.3 g/dL / ALK PHOS: 70 U/L / ALT: 22 U/L / AST: 15 U/L / GGT: x               CSF:                  Radiology:     Pt seen at bedside with neurology team. Pt states she has been coughing at times but denies any diplopia or any other acute complaints.   Pt states she is improving. No acute overnight events. [facility]  [id number].     Review of Systems:    CONSTITUTIONAL: No fevers or chills  EYES AND ENT: No visual changes or no throat pain   NECK: No pain or stiffness  RESPIRATORY: Coughing at times.   NEUROLOGICAL: +As stated in HPI above  SKIN: No itching, burning, rashes, or lesions   All other review of systems is negative unless indicated above.    Allergies:  peanuts (Unknown)  No Known Drug Allergies      PMHx/PSHx/Family Hx: As above, otherwise see below   Myasthenia gravis    Diabetes mellitus    Hypertension        Social Hx:  No current use of tobacco, alcohol, or illicit drugs    Medications:  MEDICATIONS  (STANDING):  acetylcysteine 20%  Inhalation 4 milliLiter(s) Inhalation every 6 hours  albuterol/ipratropium for Nebulization 3 milliLiter(s) Nebulizer every 6 hours  amLODIPine   Tablet 10 milliGRAM(s) Oral daily  atorvastatin 20 milliGRAM(s) Oral at bedtime  chlorhexidine 4% Liquid 1 Application(s) Topical <User Schedule>  dextrose 5%. 1000 milliLiter(s) (100 mL/Hr) IV Continuous <Continuous>  dextrose 5%. 1000 milliLiter(s) (50 mL/Hr) IV Continuous <Continuous>  dextrose 50% Injectable 25 Gram(s) IV Push once  glucagon  Injectable 1 milliGRAM(s) IntraMuscular once  insulin lispro (ADMELOG) corrective regimen sliding scale   SubCutaneous three times a day before meals  insulin lispro (ADMELOG) corrective regimen sliding scale   SubCutaneous at bedtime  melatonin 3 milliGRAM(s) Oral at bedtime  pantoprazole    Tablet 40 milliGRAM(s) Oral before breakfast  polyethylene glycol 3350 17 Gram(s) Oral every 12 hours  predniSONE   Tablet 50 milliGRAM(s) Oral daily  pyridostigmine 90 milliGRAM(s) Oral every 8 hours  senna 2 Tablet(s) Oral at bedtime    MEDICATIONS  (PRN):  acetaminophen     Tablet .. 650 milliGRAM(s) Oral every 6 hours PRN Temp greater or equal to 38C (100.4F), Mild Pain (1 - 3)  bisacodyl Suppository 10 milliGRAM(s) Rectal daily PRN Constipation  dextrose Oral Gel 15 Gram(s) Oral once PRN Blood Glucose LESS THAN 70 milliGRAM(s)/deciliter  sodium chloride 0.65% Nasal 1 Spray(s) Both Nostrils four times a day PRN Nasal Congestion      Vitals:  T(C): 36.6 (05-23-23 @ 05:08), Max: 36.9 (05-22-23 @ 09:09)  HR: 101 (05-23-23 @ 05:08) (84 - 109)  BP: 147/78 (05-23-23 @ 05:08) (119/77 - 148/78)  RR: 18 (05-23-23 @ 05:08) (18 - 18)  SpO2: 96% (05-23-23 @ 05:08) (95% - 98%)    Physical Examination:   General - NAD  Cardiovascular - Peripheral pulses palpable, no edema  Eyes - Fundoscopy not performed due to safety precautions in the setting of the COVID-19 pandemic    Neurologic Exam:  Respiratory: One breath counts up to 25.   Chest: Has shiley placed on R    MS - Eyes open, awake, alert and attentive.  Follows all commands.   CN - EOM mostly intact; very subtle ptosis right eye; sustained upgaze >20 seconds without nani worsening. No diplopia.  VFF, face sens/str/hearing WNL b/l, tongue midline. MOLLY palate. Neck extension 5/5 and neck flexion 4+/5.  Motor - Normal bulk/tone. No pronator drift.   RUE: 4-/5 deltoids, 5/5 biceps, 5+/5 triceps, 5/5 .    LUE: 4-/5 deltoids, 5/5 biceps, 5/5 triceps, 5/5 .   RLE/LLE: 4+/5 hip flexion, 5/5 knee flexion, 5/5 knee extension, 5/5 ankle plantarflexion and dorsiflexion.   Sens - LT intact throughout  Gait and station - MOLLY.    Labs:                        11.5   10.02 )-----------( 315      ( 23 May 2023 07:14 )             36.3     05-22    139  |  101  |  21  ----------------------------<  110<H>  3.6   |  24  |  0.59    Ca    9.0      22 May 2023 06:28  Phos  4.3     05-22  Mg     2.3     05-22    TPro  9.3<H>  /  Alb  4.2  /  TBili  0.8  /  DBili  x   /  AST  15  /  ALT  22  /  AlkPhos  70  05-22    CAPILLARY BLOOD GLUCOSE      POCT Blood Glucose.: 364 mg/dL (23 May 2023 07:41)    LIVER FUNCTIONS - ( 22 May 2023 06:28 )  Alb: 4.2 g/dL / Pro: 9.3 g/dL / ALK PHOS: 70 U/L / ALT: 22 U/L / AST: 15 U/L / GGT: x               CSF:                  Radiology:    < from: CT Angio Chest PE Protocol w/ IV Cont (05.21.23 @ 18:15) >    ******PRELIMINARY REPORT******      ******PRELIMINARY REPORT******     ACC: [id number] EXAM:  CT ANGIO CHEST PULM [facility]   ORDERED BY: NATHEN CHARLES     PROCEDURE DATE:  05/21/2023    ******PRELIMINARY REPORT******      ******PRELIMINARY REPORT******       INTERPRETATION:  no pulmonary embolism to the lobar arteries, limited   distal evaluation due to respiratory motion. streaky opacity in the super   right lower lobe. follow up final report in the am.   Pt seen at bedside with neurology team. Pt states she has been coughing at times but denies any diplopia or any other acute complaints.   Pt states she is improving. No acute overnight events. [facility]  [id number].     Review of Systems:    CONSTITUTIONAL: No fevers or chills  EYES AND ENT: No visual changes or no throat pain   NECK: No pain or stiffness  RESPIRATORY: Coughing at times.   NEUROLOGICAL: +As stated in HPI above  SKIN: No itching, burning, rashes, or lesions   All other review of systems is negative unless indicated above.    Allergies:  peanuts (Unknown)  No Known Drug Allergies      PMHx/PSHx/Family Hx: As above, otherwise see below   Myasthenia gravis    Diabetes mellitus    Hypertension        Social Hx:  No current use of tobacco, alcohol, or illicit drugs    Medications:  MEDICATIONS  (STANDING):  acetylcysteine 20%  Inhalation 4 milliLiter(s) Inhalation every 6 hours  albuterol/ipratropium for Nebulization 3 milliLiter(s) Nebulizer every 6 hours  amLODIPine   Tablet 10 milliGRAM(s) Oral daily  atorvastatin 20 milliGRAM(s) Oral at bedtime  chlorhexidine 4% Liquid 1 Application(s) Topical <User Schedule>  dextrose 5%. 1000 milliLiter(s) (100 mL/Hr) IV Continuous <Continuous>  dextrose 5%. 1000 milliLiter(s) (50 mL/Hr) IV Continuous <Continuous>  dextrose 50% Injectable 25 Gram(s) IV Push once  glucagon  Injectable 1 milliGRAM(s) IntraMuscular once  insulin lispro (ADMELOG) corrective regimen sliding scale   SubCutaneous three times a day before meals  insulin lispro (ADMELOG) corrective regimen sliding scale   SubCutaneous at bedtime  melatonin 3 milliGRAM(s) Oral at bedtime  pantoprazole    Tablet 40 milliGRAM(s) Oral before breakfast  polyethylene glycol 3350 17 Gram(s) Oral every 12 hours  predniSONE   Tablet 50 milliGRAM(s) Oral daily  pyridostigmine 90 milliGRAM(s) Oral every 8 hours  senna 2 Tablet(s) Oral at bedtime    MEDICATIONS  (PRN):  acetaminophen     Tablet .. 650 milliGRAM(s) Oral every 6 hours PRN Temp greater or equal to 38C (100.4F), Mild Pain (1 - 3)  bisacodyl Suppository 10 milliGRAM(s) Rectal daily PRN Constipation  dextrose Oral Gel 15 Gram(s) Oral once PRN Blood Glucose LESS THAN 70 milliGRAM(s)/deciliter  sodium chloride 0.65% Nasal 1 Spray(s) Both Nostrils four times a day PRN Nasal Congestion      Vitals:  T(C): 36.6 (05-23-23 @ 05:08), Max: 36.9 (05-22-23 @ 09:09)  HR: 101 (05-23-23 @ 05:08) (84 - 109)  BP: 147/78 (05-23-23 @ 05:08) (119/77 - 148/78)  RR: 18 (05-23-23 @ 05:08) (18 - 18)  SpO2: 96% (05-23-23 @ 05:08) (95% - 98%)    Physical Examination:   General - NAD  Cardiovascular - Peripheral pulses palpable, no edema  Eyes - Fundoscopy not performed due to safety precautions in the setting of the COVID-19 pandemic    Neurologic Exam:  Respiratory: One breath counts up to 25.   Chest: Has shiley placed on R    MS - Eyes open, awake, alert and attentive.  Follows all commands.   CN - EOM mostly intact; very subtle ptosis right eye; sustained upgaze >20 seconds without nani worsening. No diplopia.  VFF, face sens/str/hearing WNL b/l, tongue midline. MOLLY palate. Neck extension 5/5 and neck flexion 4+/5.  Motor - Normal bulk/tone. No pronator drift.   RUE: 4-/5 deltoids, 5/5 biceps, 5+/5 triceps, 5/5 .    LUE: 4-/5 deltoids, 5/5 biceps, 5/5 triceps, 5/5 .   RLE/LLE: 4+/5 hip flexion, 5/5 knee flexion, 5/5 knee extension, 5/5 ankle plantarflexion and dorsiflexion.   Sens - LT intact throughout  Gait and station - MOLLY.    Labs:                        11.5   10.02 )-----------( 315      ( 23 May 2023 07:14 )             36.3     05-22    139  |  101  |  21  ----------------------------<  110<H>  3.6   |  24  |  0.59    Ca    9.0      22 May 2023 06:28  Phos  4.3     05-22  Mg     2.3     05-22    TPro  9.3<H>  /  Alb  4.2  /  TBili  0.8  /  DBili  x   /  AST  15  /  ALT  22  /  AlkPhos  70  05-22    CAPILLARY BLOOD GLUCOSE      POCT Blood Glucose.: 364 mg/dL (23 May 2023 07:41)    LIVER FUNCTIONS - ( 22 May 2023 06:28 )  Alb: 4.2 g/dL / Pro: 9.3 g/dL / ALK PHOS: 70 U/L / ALT: 22 U/L / AST: 15 U/L / GGT: x               CSF:                  Radiology:    < from: CT Angio Chest PE Protocol w/ IV Cont (05.21.23 @ 18:15) >    ******PRELIMINARY REPORT******      ******PRELIMINARY REPORT******     ACC: [id number] EXAM:  CT ANGIO CHEST PULM [facility]   ORDERED BY: NATHEN CHARLES     PROCEDURE DATE:  05/21/2023    ******PRELIMINARY REPORT******      ******PRELIMINARY REPORT******       INTERPRETATION:  no pulmonary embolism to the lobar arteries, limited   distal evaluation due to respiratory motion. streaky opacity in the super   right lower lobe. follow up final report in the am.   Pt seen at bedside with neurology team. Pt states she has been coughing at times but denies any diplopia or any other acute complaints.   Pt states she is improving. No acute overnight events. [facility]  [id number].     Review of Systems:    CONSTITUTIONAL: No fevers or chills  EYES AND ENT: No visual changes or no throat pain   NECK: No pain or stiffness  RESPIRATORY: Coughing at times.   NEUROLOGICAL: +As stated in HPI above  SKIN: No itching, burning, rashes, or lesions   All other review of systems is negative unless indicated above.    Allergies:  peanuts (Unknown)  No Known Drug Allergies      PMHx/PSHx/Family Hx: As above, otherwise see below   Myasthenia gravis    Diabetes mellitus    Hypertension        Social Hx:  No current use of tobacco, alcohol, or illicit drugs    Medications:  MEDICATIONS  (STANDING):  acetylcysteine 20%  Inhalation 4 milliLiter(s) Inhalation every 6 hours  albuterol/ipratropium for Nebulization 3 milliLiter(s) Nebulizer every 6 hours  amLODIPine   Tablet 10 milliGRAM(s) Oral daily  atorvastatin 20 milliGRAM(s) Oral at bedtime  chlorhexidine 4% Liquid 1 Application(s) Topical <User Schedule>  dextrose 5%. 1000 milliLiter(s) (100 mL/Hr) IV Continuous <Continuous>  dextrose 5%. 1000 milliLiter(s) (50 mL/Hr) IV Continuous <Continuous>  dextrose 50% Injectable 25 Gram(s) IV Push once  glucagon  Injectable 1 milliGRAM(s) IntraMuscular once  insulin lispro (ADMELOG) corrective regimen sliding scale   SubCutaneous three times a day before meals  insulin lispro (ADMELOG) corrective regimen sliding scale   SubCutaneous at bedtime  melatonin 3 milliGRAM(s) Oral at bedtime  pantoprazole    Tablet 40 milliGRAM(s) Oral before breakfast  polyethylene glycol 3350 17 Gram(s) Oral every 12 hours  predniSONE   Tablet 50 milliGRAM(s) Oral daily  pyridostigmine 90 milliGRAM(s) Oral every 8 hours  senna 2 Tablet(s) Oral at bedtime    MEDICATIONS  (PRN):  acetaminophen     Tablet .. 650 milliGRAM(s) Oral every 6 hours PRN Temp greater or equal to 38C (100.4F), Mild Pain (1 - 3)  bisacodyl Suppository 10 milliGRAM(s) Rectal daily PRN Constipation  dextrose Oral Gel 15 Gram(s) Oral once PRN Blood Glucose LESS THAN 70 milliGRAM(s)/deciliter  sodium chloride 0.65% Nasal 1 Spray(s) Both Nostrils four times a day PRN Nasal Congestion      Vitals:  T(C): 36.6 (05-23-23 @ 05:08), Max: 36.9 (05-22-23 @ 09:09)  HR: 101 (05-23-23 @ 05:08) (84 - 109)  BP: 147/78 (05-23-23 @ 05:08) (119/77 - 148/78)  RR: 18 (05-23-23 @ 05:08) (18 - 18)  SpO2: 96% (05-23-23 @ 05:08) (95% - 98%)    Physical Examination:   General - NAD  Cardiovascular - Peripheral pulses palpable, no edema  Eyes - Fundoscopy not performed due to safety precautions in the setting of the COVID-19 pandemic    Neurologic Exam:  Respiratory: One breath counts up to 25.   Chest: Has shiley placed on R    MS - Eyes open, awake, alert and attentive.  Follows all commands.   CN - EOM mostly intact; very subtle ptosis right eye; sustained upgaze >20 seconds without nani worsening. No diplopia.  VFF, face sens/str/hearing WNL b/l, tongue midline. MOLLY palate. Neck extension 5/5 and neck flexion 4+/5.  Motor - Normal bulk/tone. No pronator drift.   RUE: 4-/5 deltoids, 5/5 biceps, 5+/5 triceps, 5/5 .    LUE: 4-/5 deltoids, 5/5 biceps, 5/5 triceps, 5/5 .   RLE/LLE: 4+/5 hip flexion, 5/5 knee flexion, 5/5 knee extension, 5/5 ankle plantarflexion and dorsiflexion.   Sens - LT intact throughout  Gait and station - MOLLY.    Labs:                        11.5   10.02 )-----------( 315      ( 23 May 2023 07:14 )             36.3     05-22    139  |  101  |  21  ----------------------------<  110<H>  3.6   |  24  |  0.59    Ca    9.0      22 May 2023 06:28  Phos  4.3     05-22  Mg     2.3     05-22    TPro  9.3<H>  /  Alb  4.2  /  TBili  0.8  /  DBili  x   /  AST  15  /  ALT  22  /  AlkPhos  70  05-22    CAPILLARY BLOOD GLUCOSE      POCT Blood Glucose.: 364 mg/dL (23 May 2023 07:41)    LIVER FUNCTIONS - ( 22 May 2023 06:28 )  Alb: 4.2 g/dL / Pro: 9.3 g/dL / ALK PHOS: 70 U/L / ALT: 22 U/L / AST: 15 U/L / GGT: x               CSF:                  Radiology:    < from: CT Angio Chest PE Protocol w/ IV Cont (05.21.23 @ 18:15) >    ******PRELIMINARY REPORT******      ******PRELIMINARY REPORT******     ACC: [id number] EXAM:  CT ANGIO CHEST PULM [facility]   ORDERED BY: NATHEN CHARLES     PROCEDURE DATE:  05/21/2023    ******PRELIMINARY REPORT******      ******PRELIMINARY REPORT******       INTERPRETATION:  no pulmonary embolism to the lobar arteries, limited   distal evaluation due to respiratory motion. streaky opacity in the super   right lower lobe. follow up final report in the am.

## 2023-05-23 NOTE — PROGRESS NOTE ADULT - SUBJECTIVE AND OBJECTIVE BOX
Subjective: Patient seen and examined. No new events except as noted.   + secretions     REVIEW OF SYSTEMS:    CONSTITUTIONAL:+ weakness, fevers or chills  EYES/ENT: No visual changes;  No vertigo or throat pain   NECK: No pain or stiffness  RESPIRATORY: No cough, wheezing, hemoptysis; No shortness of breath  CARDIOVASCULAR: No chest pain or palpitations  GASTROINTESTINAL: No abdominal or epigastric pain. No nausea, vomiting, or hematemesis; No diarrhea or constipation. No melena or hematochezia.  GENITOURINARY: No dysuria, frequency or hematuria  NEUROLOGICAL: No numbness or weakness  SKIN: No itching, burning, rashes, or lesions   All other review of systems is negative unless indicated above.    MEDICATIONS:  MEDICATIONS  (STANDING):  acetylcysteine 20%  Inhalation 4 milliLiter(s) Inhalation every 6 hours  albuterol/ipratropium for Nebulization 3 milliLiter(s) Nebulizer every 6 hours  amLODIPine   Tablet 10 milliGRAM(s) Oral daily  atorvastatin 20 milliGRAM(s) Oral at bedtime  chlorhexidine 4% Liquid 1 Application(s) Topical <User Schedule>  dextrose 5%. 1000 milliLiter(s) (100 mL/Hr) IV Continuous <Continuous>  dextrose 5%. 1000 milliLiter(s) (50 mL/Hr) IV Continuous <Continuous>  dextrose 50% Injectable 25 Gram(s) IV Push once  glucagon  Injectable 1 milliGRAM(s) IntraMuscular once  insulin lispro (ADMELOG) corrective regimen sliding scale   SubCutaneous three times a day before meals  insulin lispro (ADMELOG) corrective regimen sliding scale   SubCutaneous at bedtime  melatonin 3 milliGRAM(s) Oral at bedtime  pantoprazole    Tablet 40 milliGRAM(s) Oral before breakfast  polyethylene glycol 3350 17 Gram(s) Oral every 12 hours  predniSONE   Tablet 50 milliGRAM(s) Oral daily  pyridostigmine 90 milliGRAM(s) Oral every 8 hours  senna 2 Tablet(s) Oral at bedtime      PHYSICAL EXAM:  T(C): 36.7 (05-23-23 @ 09:08), Max: 36.9 (05-22-23 @ 12:42)  HR: 103 (05-23-23 @ 09:08) (84 - 109)  BP: 127/70 (05-23-23 @ 09:08) (119/77 - 148/78)  RR: 18 (05-23-23 @ 09:08) (18 - 18)  SpO2: 98% (05-23-23 @ 09:08) (95% - 98%)  Wt(kg): --  I&O's Summary    22 May 2023 07:01  -  23 May 2023 07:00  --------------------------------------------------------  IN: 360 mL / OUT: 0 mL / NET: 360 mL          Appearance: Normal	  HEENT:   Normal oral mucosa, PERRL, EOMI	  Lymphatic: No lymphadenopathy , no edema  Cardiovascular: Normal S1 S2, No JVD, No murmurs , Peripheral pulses palpable 2+ bilaterally  Respiratory: decreased bs   Gastrointestinal:  Soft, Non-tender, + BS	  Skin: No rashes, No ecchymoses, No cyanosis, warm to touch  Musculoskeletal: Normal range of motion, normal strength  Psychiatry:  Mood & affect appropriate  Ext: No edema      LABS:    CARDIAC MARKERS:                                11.5   10.02 )-----------( 315      ( 23 May 2023 07:14 )             36.3     05-23    140  |  102  |  18  ----------------------------<  132<H>  4.2   |  25  |  0.59    Ca    9.0      23 May 2023 07:11  Phos  4.1     05-23  Mg     2.2     05-23    TPro  8.0  /  Alb  3.8  /  TBili  0.4  /  DBili  x   /  AST  15  /  ALT  20  /  AlkPhos  61  05-23    proBNP:   Lipid Profile:   HgA1c:   TSH:             TELEMETRY: 	  SR ST  ECG:  	  RADIOLOGY:   DIAGNOSTIC TESTING:  [ ] Echocardiogram:  [ ]  Catheterization:  [ ] Stress Test:    OTHER:

## 2023-05-24 LAB
GAMMA INTERFERON BACKGROUND BLD IA-ACNC: 0.04 IU/ML — SIGNIFICANT CHANGE UP
M TB IFN-G BLD-IMP: POSITIVE
M TB IFN-G CD4+ BCKGRND COR BLD-ACNC: 2.35 IU/ML — SIGNIFICANT CHANGE UP
M TB IFN-G CD4+CD8+ BCKGRND COR BLD-ACNC: 2.02 IU/ML — SIGNIFICANT CHANGE UP
QUANT TB PLUS MITOGEN MINUS NIL: 9.42 IU/ML — SIGNIFICANT CHANGE UP

## 2023-05-24 RX ORDER — ISOPROPYL ALCOHOL, BENZOCAINE .7; .06 ML/ML; ML/ML
0 SWAB TOPICAL
Qty: 100 | Refills: 1
Start: 2023-05-24

## 2023-05-25 RX ORDER — HUMAN INSULIN 100 [IU]/ML
4 INJECTION, SUSPENSION SUBCUTANEOUS
Qty: 1 | Refills: 0
Start: 2023-05-25 | End: 2023-06-23

## 2023-05-25 RX ORDER — ALBUTEROL 90 UG/1
2 AEROSOL, METERED ORAL
Qty: 2 | Refills: 3
Start: 2023-05-25

## 2023-05-25 RX ORDER — HUMAN INSULIN 100 [IU]/ML
3 INJECTION, SUSPENSION SUBCUTANEOUS
Qty: 1 | Refills: 0
Start: 2023-05-25 | End: 2023-06-23

## 2023-05-25 RX ORDER — HUMAN INSULIN 100 [IU]/ML
5 INJECTION, SUSPENSION SUBCUTANEOUS
Qty: 1 | Refills: 0
Start: 2023-05-25 | End: 2023-06-23

## 2023-06-06 LAB
LRP4 AUTOANTIBODY TEST: SIGNIFICANT CHANGE UP
MUSK IGG SER IA-MCNC: SIGNIFICANT CHANGE UP

## 2023-06-07 LAB
ACHR MOD AB SER-ACNC: 64 % — HIGH (ref 0–45)

## 2023-06-24 NOTE — ED CLERICAL - DIVISION
Acutely worsening over the past few days in setting of continued diuretic use.  Diuretics were held 6/21 however the sodium remained 125.  Could be contributing to patient's encephalopathy  - Started albumin 25 g q.12 on 6/22  - Na up to 129 on 6/24, continue albumin as above     [facility]... [facility]... [facility]...

## 2023-09-13 NOTE — ED ADULT NURSE NOTE - ED COMFORT CARE
Cardiology Clinic Progress Note  Paras Ramos MRN# [id number]   YOB: 1955 Age: 68 year old   Primary Cardiologist: Dr. Wu  Reason for visit: 3 month follow up             Assessment and Plan:       1.  Coronary artery disease, s/p AYO to LAD (3/2021), s/p AYO to mPDA (1/2023)  -Residual stenosis of 50% in the proximal LAD, D2 30%, D1 30%, and RPDA 40%  -1/2023 LDL 41, on Atorvastatin 80mg   -DAPT with aspirin and brillinta    2.  Hypertension, controlled  -On carvedilol, amlodipine, losartan, and spironolactone    3.  Type 2 diabetes, controlled  -7/2023 HgbA1c 6.0%    4.  Obstructive sleep apnea, not compliant with CPAP    5. Morbid obesity, BMI 42.93    6. Ischemic cardiomyopathy, recovered  -2021 EF 25 to 30%  -1/2023 EF 55 to 60%    Paras feels overall well however continues to have symptoms concerning for atypical angina, but similar in nature to his presenting symptoms in January 2023. Considering he has residual LAD disease, although not flow limiting, I will start him on a low dose of isosorbide. Advised him to contact us if he has any side effects and not to use with PDE inhibitors with this medication.    Recommend a follow up with sleep medicine to assess for an alternative CPAP or adjust settings.     He is considering having left hip arthroplasty due to continued pain and is wondering about holding his brillinta temporarily for this surgery. It does limit his mobility and limit his ability to exercise. I did encourage him to continue exercising and working on weight loss. We did discuss the option of the [place] weight management services referral, however he has done this in the past with health [facility] weight management services and prefers to wait until after his hip arthroplasty.    His diabetes and hypertension are currently well controlled.  He is due for a repeat fasting lipid profile in January, however his previous LDL was well controlled on his current dose of  atorvastatin.    Changes today: Start imdur 15mg daily     Follow up plan: Follow up with Dr. Wu in January with fasting lipid/ALT prior         History of Presenting Illness:    Paras Ramos is a very pleasant 68 year old male with a history of CAD s/p AYO x2 to LAD 3/2021, HTN, ICM, dyslipidemia, family history of CAD, RAUL/CPAP, Obesity, DM2, COVID-19 + April 2021.     Patient was initially seen in cardiology clinic in 2021 after experiencing substernal chest discomfort.  An echocardiogram at that time showed severe LV dysfunction with an EF of 25 to 30%.  A subsequent coronary angiography was done and showed two-vessel obstructive coronary disease with diffuse mid to distal LAD stenosis that was revascularized with drug-eluting stents x2.  He also had a focal stenosis of 70% in the RPDA which was originally treated with medical therapy.    In June 2021 he underwent a cardiac MRI which showed moderately reduced LV function with an EF of 36%.  No ischemia on stress perfusion imaging and subendocardial enhancement was consistent with a prior infarct in the circumflex area.    He was seen in follow-up by Dr. Wu in January 2023 at which point he was experiencing exertional chest discomfort and dyspnea over the last 6 months.  Coronary angiography was recommended which revealed a 90% and PDA lesion and was treated with AYO x1.  Residual stenosis of 50% in the proximal LAD, D2 30%, D1 30%, and RPDA 40%.  Repeat echocardiogram showed an ejection fraction of 55 to 60% without any significant valvular disease.    Patient is here today for follow-up. He continues to get chest tightness sporadically at rest and with exertion that does not radiate and resolves on its own without stopping his activity. Lasts 1-2 minutes. He has used nitroglycerin a few years ago it did not resolve the pain and he has not tried it again since. These symptoms have been present for years, and have improved since his last intervention in  January and completing cardiac rehab. He does have issues with GERD and has not tried any PPI recently.  Denies any bleeding issues and has been compliant with his dual antiplatelet therapy.    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 He goes fishing and hunting often, typically without exertional chest tightness.    He will have dizziness or lightheadedness without pre-syncope if he stands up too fast. He has not been wearing his cpap, he says he feels worse when he wears it and often takes it off at night without knowing it.     Denies tachycardia or palpitations.     Blood pressure 120/80 and HR 60 in clinic today.        Recent Hospitalizations   None in 2023          Social History      Social History     Socioeconomic History    Marital status:      Spouse name: Not on file    Number of children: Not on file    Years of education: Not on file    Highest education level: Not on file   Occupational History    Not on file   Tobacco Use    Smoking status: Never    Smokeless tobacco: Never   Substance and Sexual Activity    Alcohol use: Yes     Comment: 1 can beer every 2 weeks    Drug use: No    Sexual activity: Not on file   Other Topics Concern    Not on file   Social History Narrative    Not on file     Social Determinants of Health     Financial Resource Strain: Not on file   Food Insecurity: Not on file   Transportation Needs: Not on file   Physical Activity: Not on file   Stress: Not on file   Social Connections: Not on file   Intimate Partner Violence: Not on file   Housing Stability: Not on file            Review of Systems:   Skin:  not assessed     Eyes:       ENT:  not assessed    Respiratory:  Positive for dyspnea on exertion;sleep apnea  Cardiovascular:    Positive for;chest pain;edema;lightheadedness  Gastroenterology: not assessed    Genitourinary:  not assessed    Musculoskeletal:  not assessed    Neurologic:  not  "assessed    Psychiatric:  not assessed    Heme/Lymph/Imm:  not assessed    Endocrine:  not assessed           Physical Exam:   Vitals: /80   Pulse 60   Ht 1.88 m (6' 2\")   Wt (!) 151.7 kg (334 lb 6.4 oz)   SpO2 97%   BMI 42.93 kg/m     Wt Readings from Last 4 Encounters:   09/14/23 (!) 151.7 kg (334 lb 6.4 oz)   02/06/23 (!) 154.4 kg (340 lb 8 oz)   01/28/23 (!) 150 kg (330 lb 12.8 oz)   01/10/23 (!) 155.1 kg (342 lb)     GEN: well nourished, in no acute distress.  HEENT:  Pupils equal, round. Sclerae nonicteric.   NECK: Supple, no masses appreciated.  No JVD with patient supine.  C/V:  Regular rate and rhythm, no murmur, rub or gallop.    RESP: Respirations are unlabored. Clear to auscultation bilaterally without wheezing, rales, or rhonchi.  GI: Abdomen soft, nontender.  EXTREM: Trace bilateral LE edema.  NEURO: Alert and oriented, cooperative.  SKIN: Warm and dry.       Data:     LIPID RESULTS:  Lab Results   Component Value Date    CHOL 116 01/27/2023    CHOL 177 03/09/2021    HDL 41 01/27/2023    HDL 46 03/09/2021    LDL 41 01/27/2023    LDL 98 03/09/2021    TRIG 172 (H) 01/27/2023    TRIG 167 (H) 03/09/2021     LIVER ENZYME RESULTS:  Lab Results   Component Value Date    AST 18 05/27/2021    ALT 32 05/27/2021     CBC RESULTS:  Lab Results   Component Value Date    WBC 8.3 01/27/2023    WBC 8.5 05/27/2021    RBC 4.60 01/27/2023    RBC 4.74 05/27/2021    HGB 13.9 01/28/2023    HGB 13.9 05/27/2021    HCT 42.9 01/27/2023    HCT 44.5 05/27/2021    MCV 93 01/27/2023    MCV 94 05/27/2021    MCH 31.1 01/27/2023    MCH 29.3 05/27/2021    MCHC 33.3 01/27/2023    MCHC 31.2 (L) 05/27/2021    RDW 12.7 01/27/2023    RDW 14.2 05/27/2021     01/27/2023     05/27/2021     BMP RESULTS:  Lab Results   Component Value Date     01/31/2023     05/27/2021    POTASSIUM 4.3 01/31/2023    POTASSIUM 4.1 05/27/2021    CHLORIDE 102 01/31/2023    CHLORIDE 107 05/27/2021    CO2 23 01/31/2023    CO2 28 " 05/27/2021    ANIONGAP 16 (H) 01/31/2023    ANIONGAP 5 05/27/2021     (H) 01/31/2023     (H) 05/27/2021    BUN 16.3 01/31/2023    BUN 12 05/27/2021    CR 0.95 01/31/2023    CR 0.95 05/27/2021    GFRESTIMATED 87 01/31/2023    GFRESTIMATED 83 05/27/2021    GFRESTBLACK >90 05/27/2021    CASE 9.6 01/31/2023    CASE 9.2 05/27/2021      A1C RESULTS:  Lab Results   Component Value Date    A1C 7.0 (H) 03/08/2021     INR RESULTS:  Lab Results   Component Value Date    INR 1.02 01/27/2023            Medications     Current Outpatient Medications   Medication Sig Dispense Refill    amLODIPine (NORVASC) 5 MG tablet Take 5 mg by mouth daily       aspirin (ASA) 81 MG EC tablet Take 1 tablet (81 mg) by mouth daily Start tomorrow. 30 tablet 3    atorvastatin (LIPITOR) 80 MG tablet Take 1 tablet (80 mg) by mouth At Bedtime 90 tablet 3    carvedilol (COREG) 12.5 MG tablet Take 1 tablet (12.5 mg) by mouth 2 times daily (with meals) 180 tablet 4    HYDROcodone-acetaminophen (NORCO) 5-325 MG tablet Take 1-2 tablets by mouth every 4 hours as needed for moderate to severe pain 10 tablet 0    isosorbide mononitrate (IMDUR) 30 MG 24 hr tablet Take 0.5 tablets (15 mg) by mouth daily 45 tablet 3    losartan (COZAAR) 100 MG tablet Take 100 mg by mouth daily       metFORMIN (GLUCOPHAGE-XR) 500 MG 24 hr tablet Take 500 mg by mouth daily (with dinner)      Multiple Vitamin (MULTI-VITAMIN PO)       naproxen sodium (ALEVE) 220 MG capsule Take 220 mg by mouth 2 times daily (with meals)      nitroGLYcerin (NITROSTAT) 0.4 MG sublingual tablet For chest pain place 1 tablet under the tongue every 5 minutes for 3 doses. If symptoms persist 5 minutes after 1st dose call 911. 15 tablet 0    spironolactone (ALDACTONE) 25 MG tablet Take 1 tablet (25 mg) by mouth daily 90 tablet 3    ticagrelor (BRILINTA) 90 MG tablet Take 1 tablet (90 mg) by mouth 2 times daily 180 tablet 3          Past Medical History     Past Medical History:   Diagnosis Date     Arthritis     OA of ankle and hip    Colon polyp     Coronary artery disease involving native coronary artery of native heart without angina pectoris 3/18/2021    coronary angiogram with DESx2 to LAD, residual RPDA disease 3/2021     Dyslipidemia 3/18/2021    ED (erectile dysfunction)     Gastro-oesophageal reflux disease     Hip dislocation, right (H)     Hypertension     Ischemic cardiomyopathy 3/18/2021    OA (osteoarthritis) of ankle     Osteoarthritis of hip     Sleep apnea     no cpap    T2DM (type 2 diabetes mellitus) (H) 3/18/2021    Testicular hypofunction      Past Surgical History:   Procedure Laterality Date    ANKLE SURGERY Right     ARTHRODESIS ANKLE  12/9/2013    Procedure: ARTHRODESIS ANKLE;;  Surgeon: Venkata Mccallum MD;  Location:  [facility]    ARTHROPLASTY ANKLE  12/9/2013    Procedure: ARTHROPLASTY ANKLE;  RIGHT TOTAL ANKLE ARTHROPLASTY, SUBTALAR FUSION, LIGAMENT REPAIR (Tornier VALENCIA)^;  Surgeon: Venkata Mccallum MD;  Location:  [facility]    ARTHROPLASTY HIP  7/23/2014    Procedure: ARTHROPLASTY HIP;  Surgeon: Cirilo Nassar MD;  Location:  [facility]    ARTHROPLASTY REVISION HIP Right 11/14/2014    Procedure: ARTHROPLASTY REVISION HIP;  Surgeon: Cirilo Nassar MD;  Location:  OR    ARTHROPLASTY REVISION HIP Right 11/9/2015    Procedure: ARTHROPLASTY REVISION HIP;  Surgeon: José Estrella MD;  Location:  OR    ARTHROPLASTY REVISION HIP Right 2/1/2017    Procedure: ARTHROPLASTY REVISION HIP;  Surgeon: Cirilo Nassar MD;  Location:  OR    CHOLECYSTECTOMY      CHOLECYSTECTOMY  2000    COLONOSCOPY      CV CORONARY ANGIOGRAM N/A 3/9/2021    Procedure: Coronary Angiogram;  Surgeon: Dilip Zamora MD;  [facility]:  [facility]    CV CORONARY ANGIOGRAM N/A 1/27/2023    Procedure: Coronary Angiogram;  Surgeon: Zeyad Caldwell MD;  Location:  [facility]    CV INSTANTANEOUS WAVE-FREE RATIO N/A 3/9/2021    Procedure: Instantaneous Wave-Free Ratio;   Surgeon: Dilip Zamora MD;  Location:  [facility]    CV INTRAVASULAR ULTRASOUND N/A 3/9/2021    Procedure: Intravascular Ultrasound;  Surgeon: Dilip Zamora MD;  Location:  [facility]    CV PCI STENT DRUG ELUTING N/A 3/9/2021    Procedure: Percutaneous Coronary Intervention Stent Drug Eluting;  Surgeon: Dilip Zamora MD;  Location:  [facility]    HC CLOSED TX TRAUMATIC HIP DISLOC W/O ANESTH Right 2/15/2015    Procedure: HIP MANIPULATION / CLOSED REDUCTION;  Surgeon: Cliff Boss MD;  Location: [facility] OR;  Service: Orthopedics    JOINT REPLACEMENT      REMOVE HARDWARE ANKLE Right 3/8/2021    Procedure: RIGHT ANKLE STAPLE REMOVAL;  Surgeon: Venkata Mccallum MD;  Location:  OR    REPAIR LIGAMENT ANKLE  12/9/2013    Procedure: REPAIR LIGAMENT ANKLE;;  Surgeon: Venkata Mccallum MD;  [facility]     Family History   Problem Relation Age of Onset    Heart Failure Mother     Coronary Stenting Mother     Heart Surgery Mother             Allergies   Patient has no known allergies.        Ginger Ozuna NP  [facility]  Pager: [phone]    Patient informed/Family informed/Explanation of wait/Warm blanket

## 2023-11-03 ENCOUNTER — INPATIENT (INPATIENT)
Facility: HOSPITAL | Age: 63
LOS: 19 days | Discharge: HOME | DRG: 56 | End: 2023-11-23
Attending: STUDENT IN AN ORGANIZED HEALTH CARE EDUCATION/TRAINING PROGRAM | Admitting: PSYCHIATRY & NEUROLOGY
Payer: MEDICAID

## 2023-11-03 VITALS
OXYGEN SATURATION: 100 % | SYSTOLIC BLOOD PRESSURE: 131 MMHG | HEIGHT: 65 IN | RESPIRATION RATE: 36 BRPM | HEART RATE: 80 BPM | WEIGHT: 145.95 LBS | DIASTOLIC BLOOD PRESSURE: 80 MMHG

## 2023-11-03 DIAGNOSIS — G70.01 MYASTHENIA GRAVIS WITH (ACUTE) EXACERBATION: ICD-10-CM

## 2023-11-03 LAB
ALBUMIN SERPL ELPH-MCNC: 4.5 G/DL — SIGNIFICANT CHANGE UP (ref 3.3–5)
ALP SERPL-CCNC: 90 U/L — SIGNIFICANT CHANGE UP (ref 40–120)
ALT FLD-CCNC: 16 U/L — SIGNIFICANT CHANGE UP (ref 10–45)
ANION GAP SERPL CALC-SCNC: 11 MMOL/L — SIGNIFICANT CHANGE UP (ref 5–17)
ANION GAP SERPL CALC-SCNC: 14 MMOL/L — SIGNIFICANT CHANGE UP (ref 5–17)
AST SERPL-CCNC: 17 U/L — SIGNIFICANT CHANGE UP (ref 10–40)
BASE EXCESS BLDV CALC-SCNC: 7.6 MMOL/L — HIGH (ref -2–3)
BASOPHILS # BLD AUTO: 0.04 K/UL — SIGNIFICANT CHANGE UP (ref 0–0.2)
BASOPHILS NFR BLD AUTO: 0.3 % — SIGNIFICANT CHANGE UP (ref 0–2)
BILIRUB SERPL-MCNC: 0.3 MG/DL — SIGNIFICANT CHANGE UP (ref 0.2–1.2)
BUN SERPL-MCNC: 18 MG/DL — SIGNIFICANT CHANGE UP (ref 7–23)
BUN SERPL-MCNC: 19 MG/DL — SIGNIFICANT CHANGE UP (ref 7–23)
CA-I SERPL-SCNC: 1.21 MMOL/L — SIGNIFICANT CHANGE UP (ref 1.15–1.33)
CALCIUM SERPL-MCNC: 10.1 MG/DL — SIGNIFICANT CHANGE UP (ref 8.4–10.5)
CALCIUM SERPL-MCNC: 9.4 MG/DL — SIGNIFICANT CHANGE UP (ref 8.4–10.5)
CHLORIDE BLDV-SCNC: 94 MMOL/L — LOW (ref 96–108)
CHLORIDE SERPL-SCNC: 92 MMOL/L — LOW (ref 96–108)
CHLORIDE SERPL-SCNC: 93 MMOL/L — LOW (ref 96–108)
CO2 BLDV-SCNC: 40 MMOL/L — HIGH (ref 22–26)
CO2 SERPL-SCNC: 29 MMOL/L — SIGNIFICANT CHANGE UP (ref 22–31)
CO2 SERPL-SCNC: 30 MMOL/L — SIGNIFICANT CHANGE UP (ref 22–31)
CREAT SERPL-MCNC: 0.56 MG/DL — SIGNIFICANT CHANGE UP (ref 0.5–1.3)
CREAT SERPL-MCNC: 0.57 MG/DL — SIGNIFICANT CHANGE UP (ref 0.5–1.3)
EGFR: 101 ML/MIN/1.73M2 — SIGNIFICANT CHANGE UP
EGFR: 102 ML/MIN/1.73M2 — SIGNIFICANT CHANGE UP
EOSINOPHIL # BLD AUTO: 0.01 K/UL — SIGNIFICANT CHANGE UP (ref 0–0.5)
EOSINOPHIL NFR BLD AUTO: 0.1 % — SIGNIFICANT CHANGE UP (ref 0–6)
GAS PNL BLDV: 129 MMOL/L — LOW (ref 136–145)
GAS PNL BLDV: SIGNIFICANT CHANGE UP
GLUCOSE BLDV-MCNC: 130 MG/DL — HIGH (ref 70–99)
GLUCOSE SERPL-MCNC: 117 MG/DL — HIGH (ref 70–99)
GLUCOSE SERPL-MCNC: 126 MG/DL — HIGH (ref 70–99)
HCO3 BLDV-SCNC: 38 MMOL/L — HIGH (ref 22–29)
HCT VFR BLD CALC: 41.9 % — SIGNIFICANT CHANGE UP (ref 34.5–45)
HCT VFR BLD CALC: 47.1 % — HIGH (ref 34.5–45)
HCT VFR BLDA CALC: 49 % — HIGH (ref 34.5–46.5)
HGB BLD CALC-MCNC: 16.3 G/DL — HIGH (ref 11.7–16.1)
HGB BLD-MCNC: 13.4 G/DL — SIGNIFICANT CHANGE UP (ref 11.5–15.5)
HGB BLD-MCNC: 15.1 G/DL — SIGNIFICANT CHANGE UP (ref 11.5–15.5)
IMM GRANULOCYTES NFR BLD AUTO: 0.4 % — SIGNIFICANT CHANGE UP (ref 0–0.9)
LACTATE BLDV-MCNC: 1.8 MMOL/L — SIGNIFICANT CHANGE UP (ref 0.5–2)
LYMPHOCYTES # BLD AUTO: 1.94 K/UL — SIGNIFICANT CHANGE UP (ref 1–3.3)
LYMPHOCYTES # BLD AUTO: 13.7 % — SIGNIFICANT CHANGE UP (ref 13–44)
MAGNESIUM SERPL-MCNC: 2 MG/DL — SIGNIFICANT CHANGE UP (ref 1.6–2.6)
MCHC RBC-ENTMCNC: 28.2 PG — SIGNIFICANT CHANGE UP (ref 27–34)
MCHC RBC-ENTMCNC: 28.3 PG — SIGNIFICANT CHANGE UP (ref 27–34)
MCHC RBC-ENTMCNC: 32 GM/DL — SIGNIFICANT CHANGE UP (ref 32–36)
MCHC RBC-ENTMCNC: 32.1 GM/DL — SIGNIFICANT CHANGE UP (ref 32–36)
MCV RBC AUTO: 88 FL — SIGNIFICANT CHANGE UP (ref 80–100)
MCV RBC AUTO: 88.2 FL — SIGNIFICANT CHANGE UP (ref 80–100)
MONOCYTES # BLD AUTO: 0.32 K/UL — SIGNIFICANT CHANGE UP (ref 0–0.9)
MONOCYTES NFR BLD AUTO: 2.3 % — SIGNIFICANT CHANGE UP (ref 2–14)
NEUTROPHILS # BLD AUTO: 11.82 K/UL — HIGH (ref 1.8–7.4)
NEUTROPHILS NFR BLD AUTO: 83.2 % — HIGH (ref 43–77)
NRBC # BLD: 0 /100 WBCS — SIGNIFICANT CHANGE UP (ref 0–0)
PCO2 BLDV: 79 MMHG — HIGH (ref 39–42)
PH BLDV: 7.29 — LOW (ref 7.32–7.43)
PHOSPHATE SERPL-MCNC: 4.9 MG/DL — HIGH (ref 2.5–4.5)
PLATELET # BLD AUTO: 337 K/UL — SIGNIFICANT CHANGE UP (ref 150–400)
PLATELET # BLD AUTO: 390 K/UL — SIGNIFICANT CHANGE UP (ref 150–400)
PO2 BLDV: 44 MMHG — SIGNIFICANT CHANGE UP (ref 25–45)
POTASSIUM BLDV-SCNC: 4.1 MMOL/L — SIGNIFICANT CHANGE UP (ref 3.5–5.1)
POTASSIUM SERPL-MCNC: 4.3 MMOL/L — SIGNIFICANT CHANGE UP (ref 3.5–5.3)
POTASSIUM SERPL-MCNC: 4.4 MMOL/L — SIGNIFICANT CHANGE UP (ref 3.5–5.3)
POTASSIUM SERPL-SCNC: 4.3 MMOL/L — SIGNIFICANT CHANGE UP (ref 3.5–5.3)
POTASSIUM SERPL-SCNC: 4.4 MMOL/L — SIGNIFICANT CHANGE UP (ref 3.5–5.3)
PROT SERPL-MCNC: 7.6 G/DL — SIGNIFICANT CHANGE UP (ref 6–8.3)
RBC # BLD: 4.76 M/UL — SIGNIFICANT CHANGE UP (ref 3.8–5.2)
RBC # BLD: 5.34 M/UL — HIGH (ref 3.8–5.2)
RBC # FLD: 13.2 % — SIGNIFICANT CHANGE UP (ref 10.3–14.5)
RBC # FLD: 13.4 % — SIGNIFICANT CHANGE UP (ref 10.3–14.5)
SAO2 % BLDV: 64 % — LOW (ref 67–88)
SODIUM SERPL-SCNC: 134 MMOL/L — LOW (ref 135–145)
SODIUM SERPL-SCNC: 135 MMOL/L — SIGNIFICANT CHANGE UP (ref 135–145)
WBC # BLD: 11.77 K/UL — HIGH (ref 3.8–10.5)
WBC # BLD: 14.19 K/UL — HIGH (ref 3.8–10.5)
WBC # FLD AUTO: 11.77 K/UL — HIGH (ref 3.8–10.5)
WBC # FLD AUTO: 14.19 K/UL — HIGH (ref 3.8–10.5)

## 2023-11-03 PROCEDURE — 99291 CRITICAL CARE FIRST HOUR: CPT

## 2023-11-03 PROCEDURE — 71045 X-RAY EXAM CHEST 1 VIEW: CPT | Mod: 26

## 2023-11-03 RX ORDER — POLYETHYLENE GLYCOL 3350 17 G/17G
17 POWDER, FOR SOLUTION ORAL EVERY 12 HOURS
Refills: 0 | Status: DISCONTINUED | OUTPATIENT
Start: 2023-11-03 | End: 2023-11-15

## 2023-11-03 RX ORDER — AMLODIPINE BESYLATE 2.5 MG/1
5 TABLET ORAL DAILY
Refills: 0 | Status: DISCONTINUED | OUTPATIENT
Start: 2023-11-03 | End: 2023-11-03

## 2023-11-03 RX ORDER — METOPROLOL TARTRATE 50 MG
12.5 TABLET ORAL EVERY 12 HOURS
Refills: 0 | Status: DISCONTINUED | OUTPATIENT
Start: 2023-11-03 | End: 2023-11-03

## 2023-11-03 RX ORDER — ENOXAPARIN SODIUM 100 MG/ML
40 INJECTION SUBCUTANEOUS EVERY 24 HOURS
Refills: 0 | Status: DISCONTINUED | OUTPATIENT
Start: 2023-11-03 | End: 2023-11-23

## 2023-11-03 RX ORDER — CHLORHEXIDINE GLUCONATE 213 G/1000ML
1 SOLUTION TOPICAL AT BEDTIME
Refills: 0 | Status: DISCONTINUED | OUTPATIENT
Start: 2023-11-03 | End: 2023-11-20

## 2023-11-03 RX ORDER — SENNA PLUS 8.6 MG/1
2 TABLET ORAL AT BEDTIME
Refills: 0 | Status: DISCONTINUED | OUTPATIENT
Start: 2023-11-03 | End: 2023-11-15

## 2023-11-03 RX ORDER — DIPHENHYDRAMINE HCL 50 MG
25 CAPSULE ORAL ONCE
Refills: 0 | Status: COMPLETED | OUTPATIENT
Start: 2023-11-03 | End: 2023-11-03

## 2023-11-03 RX ORDER — INSULIN LISPRO 100/ML
VIAL (ML) SUBCUTANEOUS EVERY 6 HOURS
Refills: 0 | Status: DISCONTINUED | OUTPATIENT
Start: 2023-11-03 | End: 2023-11-12

## 2023-11-03 RX ORDER — ATORVASTATIN CALCIUM 80 MG/1
20 TABLET, FILM COATED ORAL AT BEDTIME
Refills: 0 | Status: DISCONTINUED | OUTPATIENT
Start: 2023-11-03 | End: 2023-11-03

## 2023-11-03 RX ORDER — PANTOPRAZOLE SODIUM 20 MG/1
40 TABLET, DELAYED RELEASE ORAL
Refills: 0 | Status: DISCONTINUED | OUTPATIENT
Start: 2023-11-03 | End: 2023-11-03

## 2023-11-03 RX ORDER — IMMUNE GLOBULIN (HUMAN) 10 G/100ML
25 INJECTION INTRAVENOUS; SUBCUTANEOUS ONCE
Refills: 0 | Status: COMPLETED | OUTPATIENT
Start: 2023-11-03 | End: 2023-11-03

## 2023-11-03 RX ORDER — PYRIDOSTIGMINE BROMIDE 60 MG/5ML
120 SOLUTION ORAL EVERY 8 HOURS
Refills: 0 | Status: DISCONTINUED | OUTPATIENT
Start: 2023-11-03 | End: 2023-11-04

## 2023-11-03 RX ORDER — SODIUM CHLORIDE 9 MG/ML
1000 INJECTION INTRAMUSCULAR; INTRAVENOUS; SUBCUTANEOUS
Refills: 0 | Status: DISCONTINUED | OUTPATIENT
Start: 2023-11-03 | End: 2023-11-05

## 2023-11-03 RX ORDER — SENNA PLUS 8.6 MG/1
2 TABLET ORAL AT BEDTIME
Refills: 0 | Status: DISCONTINUED | OUTPATIENT
Start: 2023-11-03 | End: 2023-11-03

## 2023-11-03 RX ORDER — ACETAMINOPHEN 500 MG
1000 TABLET ORAL ONCE
Refills: 0 | Status: COMPLETED | OUTPATIENT
Start: 2023-11-03 | End: 2023-11-03

## 2023-11-03 RX ORDER — PANTOPRAZOLE SODIUM 20 MG/1
40 TABLET, DELAYED RELEASE ORAL
Refills: 0 | Status: DISCONTINUED | OUTPATIENT
Start: 2023-11-03 | End: 2023-11-18

## 2023-11-03 RX ADMIN — Medication 25 MILLIGRAM(S): at 18:35

## 2023-11-03 RX ADMIN — PYRIDOSTIGMINE BROMIDE 120 MILLIGRAM(S): 60 SOLUTION ORAL at 22:06

## 2023-11-03 RX ADMIN — Medication 1000 MILLIGRAM(S): at 19:00

## 2023-11-03 RX ADMIN — Medication 400 MILLIGRAM(S): at 18:39

## 2023-11-03 RX ADMIN — IMMUNE GLOBULIN (HUMAN) 125 GRAM(S): 10 INJECTION INTRAVENOUS; SUBCUTANEOUS at 20:30

## 2023-11-03 RX ADMIN — ATORVASTATIN CALCIUM 20 MILLIGRAM(S): 80 TABLET, FILM COATED ORAL at 22:06

## 2023-11-03 RX ADMIN — ENOXAPARIN SODIUM 40 MILLIGRAM(S): 100 INJECTION SUBCUTANEOUS at 22:06

## 2023-11-03 NOTE — ED PROVIDER NOTE - ATTENDING CONTRIBUTION TO CARE
MD Villagran:  patient seen and evaluated with the resident.  I was present for key portions of the History & Physical, and I agree with the Impression & Plan.    Patient is a 63-year-old female brought in by EMS from home for double vision, difficulty swallowing, talking breathing.  Similar to prior exacerbation mid May 2023 during which time she was admitted for approximately 1 month requiring intubation, Plex therapy and IVIG.    Vital signs: Within normal limits  General: Adult female masked facies  HEENT: Fatigable upward gaze  Neck: Supple  Lungs: Clear to auscultation bilaterally  Abdomen: Soft, nondistended nontender  Extremity: No edema  Neurologic: Can barely detect audible speech    Medical Decision Making  History physical concerning for myasthenic crisis, will check blood gas, NIF, vital capacity, during prior admissions patient has required intubation, PLEX, and IVIG.      We will avoid medications that may worsen the current exacerbation including macrolides, fluoroquinolones, tetracyclines, aminoglycoside, beta-blockers, magnesium, and anti-arrhythmics (procainamide, quinidine, and lidocaine).    Neurology will be consulted    ECG directly visualized by me and shows normal sinus rhythm, rate 84, , QRS 88, QTc 420.  No STEMI pattern appreciated    Upon my evaluation, this patient had a high probability of imminent or life-threatening deterioration due to myasethenic crisis which required my direct attention, intervention, and personal management.  The patient has a medical condition that impairs on or more vital organ systems.  Frequent personal assessment and adjustment of medical interventions was performed.     I have personally provided 30 minutes of critical care time exclusive of time spent on separately billable procedures.  Time includes review of laboratory data, radiology results, discussion with consultants, patient and family; monitoring for potential decompensation, as well as time spent retrieving data and reviewing the chart and documenting the visit.  Interventions we performed as documented above. MD Villagran:  patient seen and evaluated with the resident.  I was present for key portions of the History & Physical, and I agree with the Impression & Plan.    Patient is a 63-year-old female brought in by EMS from home for double vision, difficulty swallowing, talking breathing.  Similar to prior exacerbation mid May 2023 during which time she was admitted for approximately 1 month requiring intubation, Plex therapy and IVIG.    Vital signs: Within normal limits  General: Adult female masked facies  HEENT: Fatigable upward gaze  Neck: Supple  Lungs: Clear to auscultation bilaterally  Abdomen: Soft, nondistended nontender  Extremity: No edema  Neurologic: Can barely detect audible speech    Medical Decision Making  History physical concerning for myasthenic crisis, will check blood gas, NIF, vital capacity, during prior admissions patient has required intubation, PLEX, and IVIG.      We will avoid medications that may worsen the current exacerbation including macrolides, fluoroquinolones, tetracyclines, aminoglycoside, beta-blockers, magnesium, and anti-arrhythmics (procainamide, quinidine, and lidocaine).    RT contacted for STAT NIF and VC, which are -40, and 0.480L, respectively  Neurology will be consulted    ECG directly visualized by me and shows normal sinus rhythm, rate 84, , QRS 88, QTc 420.  No STEMI pattern appreciated    Upon my evaluation, this patient had a high probability of imminent or life-threatening deterioration due to myasthenic crisis which required my direct attention, intervention, and personal management.  The patient has a medical condition that impairs on or more vital organ systems.  Frequent personal assessment and adjustment of medical interventions was performed.     I have personally provided 30 minutes of critical care time exclusive of time spent on separately billable procedures.  Time includes review of laboratory data, radiology results, discussion with consultants, patient and family; monitoring for potential decompensation, as well as time spent retrieving data and reviewing the chart and documenting the visit.  Interventions we performed as documented above.

## 2023-11-03 NOTE — ED ADULT TRIAGE NOTE - NS ED NURSE AMBULANCES
[facility]

## 2023-11-03 NOTE — CONSULT NOTE ADULT - ASSESSMENT
Patient KAIDEN HATCH is a 63y (1960) woman with a PMHx significant for myasthenia gravis diagnosis presenting to the ED for worsening difficulty with double vision, swallowing, talking, and breathing that has progressively gotten worse for the previous one week. Patient is currently on Mestinon 60 mg two tablets TID and Prednisone 10 mg --> uptitrated to 20 mg, she was not taking it per son for a month or so and was discharged on 50 mg after her hospitalization in 06/2023. Has been admitted before in June 2023, requiring intubation and PLEX/IVIG and in Feb 2023 for MG crisis that required intubation (2/1-2/11/23) iso COVID19. Patient received 5 days of PLEX (2/2-2/10/23) followed by 5 days of IVIG (2/16-2/20/23) with symptomatic improvement. Patient's son says patient has responded best to IVIG and that it stabilizes her MG for about 2 years. At baseline, patient is on 2L NC. In the ED, patient's NIF was -50. Her VC was 430. Patient also endorsed increased mucus secretion in her throat, and reliance on her oxygen tank.     Impression: Myasthenia gravis with acute exacerbation    Recommendations:  - NICU Consult for suspected crisis   - Recommend PLEX   - Trend NIF/VC q1, or max available  - Q1 neurochecks  - On  at home mestinone 120  Patient KAIDEN HATCH is a 63y (1960) woman with a PMHx significant for myasthenia gravis diagnosis presenting to the ED for worsening difficulty with double vision, swallowing, talking, and breathing that has progressively gotten worse for the previous one week. Patient is currently on Mestinon 60 mg two tablets TID and Prednisone 10 mg --> uptitrated to 20 mg, she was not taking it per son for a month or so and was discharged on 50 mg after her hospitalization in 06/2023. Has been admitted before in June 2023, requiring intubation and PLEX/IVIG and in Feb 2023 for MG crisis that required intubation (2/1-2/11/23) iso COVID19. Patient received 5 days of PLEX (2/2-2/10/23) followed by 5 days of IVIG (2/16-2/20/23) with symptomatic improvement. Patient's son says patient has responded best to IVIG and that it stabilizes her MG for about 2 years. At baseline, patient is on 2L NC. In the ED, patient's NIF was -50. Her VC was 430. Patient also endorsed increased mucus secretion in her throat, and reliance on her oxygen tank.     Impression: Myasthenia gravis with acute exacerbation    Recommendations:  - NICU Consult for suspected crisis, Dr. Hair to see patient   - Recommend PLEX if VC not improving  - If VC improves, can consider iVIG, however, given severity of patient's MG likely better to pursue PLEX   - Trend NIF/VC q1, or max available  - Q1 neurochecks  - On  at home mestinone 120 mg TID and prednisone 20 mg qd      Case discussed with Dr. Schofield.

## 2023-11-03 NOTE — ED ADULT NURSE NOTE - NSFALLRISKINTERV_ED_ALL_ED
Assistance OOB with selected safe patient handling equipment if applicable/Assistance with ambulation/Communicate fall risk and risk factors to all staff, patient, and family/Monitor gait and stability/Provide visual cue: yellow wristband, yellow gown, etc/Reinforce activity limits and safety measures with patient and family/Call bell, personal items and telephone in reach/Instruct patient to call for assistance before getting out of bed/chair/stretcher/Non-slip footwear applied when patient is off stretcher/[place] to call system/Physically safe environment - no spills, clutter or unnecessary equipment/Purposeful Proactive Rounding/Room/bathroom lighting operational, light cord in reach Assistance OOB with selected safe patient handling equipment if applicable/Assistance with ambulation/Communicate fall risk and risk factors to all staff, patient, and family/Monitor gait and stability/Provide visual cue: yellow wristband, yellow gown, etc/Reinforce activity limits and safety measures with patient and family/Call bell, personal items and telephone in reach/Instruct patient to call for assistance before getting out of bed/chair/stretcher/Non-slip footwear applied when patient is off stretcher/[place] to call system/Physically safe environment - no spills, clutter or unnecessary equipment/Purposeful Proactive Rounding/Room/bathroom lighting operational, light cord in reach Assistance OOB with selected safe patient handling equipment if applicable/Assistance with ambulation/Communicate fall risk and risk factors to all staff, patient, and family/Monitor gait and stability/Provide visual cue: yellow wristband, yellow gown, etc/Reinforce activity limits and safety measures with patient and family/Call bell, personal items and telephone in reach/Instruct patient to call for assistance before getting out of bed/chair/stretcher/Non-slip footwear applied when patient is off stretcher/[place] to call system/Physically safe environment - no spills, clutter or unnecessary equipment/Purposeful Proactive Rounding/Room/bathroom lighting operational, light cord in reach

## 2023-11-03 NOTE — PROGRESS NOTE ADULT - SUBJECTIVE AND OBJECTIVE BOX
HOSPITAL COURSE:   Patient KAIDEN HATCH is a 63y (1960) woman with a PMHx significant for myasthenia gravis diagnosis presenting to the ED for worsening difficulty with double vision, swallowing, talking, and breathing that has progressively gotten worse for the previous one week. Patient is currently on Mestinon 60 mg two tablets TID and Prednisone 10 mg --> uptitrated to 20 mg, she was not taking it per son for a month or so and was discharged on 50 mg after her hospitalization in 06/2023. Has been admitted before in June 2023, requiring intubation and PLEX/IVIG and in Feb 2023 for MG crisis that required intubation (2/1-2/11/23) iso COVID19. Patient received 5 days of PLEX (2/2-2/10/23) followed by 5 days of IVIG (2/16-2/20/23) with symptomatic improvement. Patient's son says patient has responded best to IVIG and that it stabilizes her MG for about 2 years. At baseline, patient is on 2L NC. In the ED, patient's NIF was -50. Her VC was 430. Patient also endorsed increased mucus secretion in her throat, and reliance on her oxygen tank.       Admission Scores  GCS: 15       24 hour Events: Patient evaluated at bedside. Patient was breathing comfortably on room air. Was able to count up to 18 in one breath. Reports worsening of symptoms for the past week, denies any recent illness. Patient reports progressive worsening of her symptoms.     Allergies    peanuts (Unknown)  No Known Drug Allergies    Intolerances        REVIEW OF SYSTEMS: [ ] Unable to Assess due to neurologic exam   [x] All ROS addressed below are non-contributory, except:  Neuro: [ ] Headache [ ] Back pain [ ] Numbness [x] Weakness [ ] Ataxia [ ] Dizziness [ ] Aphasia [x] Dysarthria [ ] Visual disturbance  Resp: [ ] Shortness of breath/dyspnea, [ ] Orthopnea [ ] Cough  CV: [ ] Chest pain [ ] Palpitation [ ] Lightheadedness [ ] Syncope  Renal: [ ] Thirst [ ] Edema  GI: [ ] Nausea [ ] Emesis [ ] Abdominal pain [ ] Constipation [ ] Diarrhea  Hem: [ ] Hematemesis [ ] bright red blood per rectum  ID: [ ] Fever [ ] Chills [ ] Dysuria  ENT: [ ] Rhinorrhea      DEVICES:   [ ] Restraints [ ] ET tube [ ] central line [ ] arterial line [ ] morales [ ] NGT/OGT [ ] EVD [ ] LD [ ] MYAH/HMV [ ] Trach [ ] PEG [ ] Chest Tube     VITALS:   Vital Signs Last 24 Hrs  T(C): --  T(F): --  HR: 74 (03 Nov 2023 17:10) (74 - 80)  BP: 131/80 (03 Nov 2023 15:16) (131/80 - 131/80)  BP(mean): --  RR: 36 (03 Nov 2023 15:16) (36 - 36)  SpO2: 100% (03 Nov 2023 17:10) (99% - 100%)    Parameters below as of 03 Nov 2023 16:10  Patient On (Oxygen Delivery Method): nasal cannula, 3L NC       CAPILLARY BLOOD GLUCOSE        I&O's Summary      Respiratory:        LABS:                        15.1   14.19 )-----------( 390      ( 03 Nov 2023 16:13 )             47.1     11-03    135  |  92<L>  |  18  ----------------------------<  126<H>  4.3   |  29  |  0.56             MEDICATION LEVELS:     IVF FLUIDS/MEDICATIONS:   MEDICATIONS  (STANDING):  acetaminophen   IVPB .. 1000 milliGRAM(s) IV Intermittent Once  diphenhydrAMINE Injectable 25 milliGRAM(s) IV Push Once  immune   globulin 10% (GAMMAGARD) IVPB 25 Gram(s) IV Intermittent once    MEDICATIONS  (PRN):        IMAGING:      EXAMINATION:  PHYSICAL EXAM:    Constitutional: No Acute Distress     Neurological: Awake, alert oriented to person, place and time, Following Commands, PERRL, EOMI, No Gaze Preference, Face Symmetrical, Speech nasal, bilateral ptosis noted. Hypophonic. Neck extension 5/5.     Motor exam:          Upper extremity                         Delt     Bicep     Tricep    HG                                                 R         3/5        5/5        5/5       5/5                                               L          4+/5        5/5        5/5       5/5          Lower extremity                        HF         KF        KE       DF         PF                                                  R        5/5        5/5        5/5       5/5         5/5                                               L         5/5        5/5       5/5       5/5          5/5                                                 Sensation: [x ] intact to light touch  [ ] decreased:     Pulmonary: Clear to Auscultation, No rales, No rhonchi, No wheezes     Cardiovascular: S1, S2, Regular rate and rhythm     Gastrointestinal: Soft, Non-tender, Non-distended     Extremities: No calf tenderness

## 2023-11-03 NOTE — CONSULT NOTE ADULT - ATTENDING COMMENTS
Son at bedside translating Antonio (official  was offered and declined)    64 F with myasthenia gravis dx 2013, with thymus surgery in 2010 per son, multiple admissions for myasthenic crisis including intubation.  Most recent admission 06/2023, when pt was given PLEX and IVIg, discharged with pyridostigmine 90 mg TID and prednisone 50 mg daily.  Son and pt state prednisone was tapered off and stopped around 9/15/23.  In the past 2 weeks, pt has had worsening weakness, fatigue. 1 week of dysphagia. Has dyspnea when lying flat. Voice has become nasal. 2 days of worsening diplopia and head drop (difficulty holding head up).  Restarted prednisone 10 mg daily 2 weeks ago, then increased to prednisone 20 mg daily with no improvement.   Pyridostigmine was increased to 120 mg TID last week. Takes it at 08:00/13:00/20:00 - notes worst weakness and dysphagia upon waking up in the morning, improved around mid-day, and worsening again at night.  Also reporting diarrhea x1 week, which pt attributes to dysphagia preventing her from taking food with pyridostigmine. Urinary frequency for years, possibly also worse in the past week.  Also reports b/l anterior thigh numbness x1 month, which is improving.  Follows with neurologist Dr. Delroy Ro at [facility].    Using home O2 since 06/2023. Doesn't go out, only walks short distances at home (to the bathroom).    Exam 11/3/23 afternoon:  Intermittently nasal voice  Neck flex 2/5, neck ext 3 to 4-/5  Mild-moderate b/l ptosis  Upgaze impaired, worse in L eye (minimal to absent upgaze L eye), otherwise EOMI. Not maintaining sustained upgaze.  3/5 b/l shoulder abd, 5/5 b/l shoulder add, EF/EE, HF  Gait deferred due to acute illness    Per discussion with respiratory therapist in ED, afternoon testing:  ml, NIF -40    Leukocytosis  Afebrile  venous pCO2 elevated at 79  Cr normal    Assessment:  Myasthenic crisis with signs of respiratory compromise  Discussed with son and patient about PLEX vs. IVIg - that PLEX would be recommended especially if respiratory status does not improve, as PLEX has faster effect than IVIg. Son and patient preferred IVIg, with son stating IVIg has helped in the past, and patient hesitant to have a line placed for PLEX.  Patient was given 1 dose of IVIg 400 mg/kg on 11/3 and admitted to neuroICU. Intubated later.    Recommendations:  -PLEX as patient has required intubation - 5 sessions, once every other day, may consider additional 2 sessions and/or IVIg after PLEX pending clinical course  -increase to prednisone 40 mg daily when PLEX is started  -continue pyridostigmine 120 mg q8h enterally. may need to decrease dose or discontinue if excess secretions/diarrhea  -infectious workup  -SLP, PT, OT  -follow up with neuromuscular specialist after discharge - can follow at [address] [place] [address] [phone] if patient desires    Julianna Schofield MD Son at bedside translating Antonio (official  was offered and declined)    64 F with myasthenia gravis dx 2013, with thymus surgery in 2010 per son, multiple admissions for myasthenic crisis including intubation.  Most recent admission 06/2023, when pt was given PLEX and IVIg, discharged with pyridostigmine 90 mg TID and prednisone 50 mg daily.  Son and pt state prednisone was tapered off and stopped around 9/15/23.  In the past 2 weeks, pt has had worsening weakness, fatigue. 1 week of dysphagia. Has dyspnea when lying flat. Voice has become nasal. 2 days of worsening diplopia and head drop (difficulty holding head up).  Restarted prednisone 10 mg daily 2 weeks ago, then increased to prednisone 20 mg daily with no improvement.   Pyridostigmine was increased to 120 mg TID last week. Takes it at 08:00/13:00/20:00 - notes worst weakness and dysphagia upon waking up in the morning, improved around mid-day, and worsening again at night.  Also reporting diarrhea x1 week, which pt attributes to dysphagia preventing her from taking food with pyridostigmine. Urinary frequency for years, possibly also worse in the past week.  Also reports b/l anterior thigh numbness x1 month, which is improving.  Follows with neurologist Dr. Delroy Ro at [facility].    Using home O2 since 06/2023. Doesn't go out, only walks short distances at home (to the bathroom).    Exam 11/3/23 afternoon:  Intermittently nasal voice  Neck flex 2/5, neck ext 3 to 4-/5  Mild-moderate b/l ptosis  Upgaze impaired, worse in L eye (minimal to absent upgaze L eye), otherwise EOMI. Not maintaining sustained upgaze.  3/5 b/l shoulder abd, 5/5 b/l shoulder add, EF/EE, HF  Gait deferred due to acute illness    Per discussion with respiratory therapist in ED, afternoon testing:  ml, NIF -40    Leukocytosis  Afebrile  venous pCO2 elevated at 79  Cr normal    Assessment:  Myasthenic crisis with signs of respiratory compromise  Discussed with son and patient about PLEX vs. IVIg - that PLEX would be recommended especially if respiratory status does not improve, as PLEX has faster effect than IVIg. Son and patient preferred IVIg, with son stating IVIg has helped in the past, and patient hesitant to have a line placed for PLEX.  Patient was given 1 dose of IVIg 400 mg/kg on 11/3 and admitted to neuroICU. Intubated later.    Recommendations:  -PLEX as patient has required intubation - 5 sessions, once every other day, may consider additional 2 sessions and/or IVIg after PLEX pending clinical course  -increase to prednisone 40 mg daily when PLEX is started  -continue pyridostigmine 120 mg q8h enterally. may need to decrease dose or discontinue if excess secretions/diarrhea  -infectious workup  -SLP, PT, OT  -follow up with neuromuscular specialist after discharge - can follow at [address] [place] [address] [phone] if patient desires    Julianna Schofield MD Son at bedside translating Antonio (official  was offered and declined)    64 F with myasthenia gravis dx 2013, with thymus surgery in 2010 per son, multiple admissions for myasthenic crisis including intubation.  Most recent admission 06/2023, when pt was given PLEX and IVIg, discharged with pyridostigmine 90 mg TID and prednisone 50 mg daily.  Son and pt state prednisone was tapered off and stopped around 9/15/23.  In the past 2 weeks, pt has had worsening weakness, fatigue. 1 week of dysphagia. Has dyspnea when lying flat. Voice has become nasal. 2 days of worsening diplopia and head drop (difficulty holding head up).  Restarted prednisone 10 mg daily 2 weeks ago, then increased to prednisone 20 mg daily with no improvement.   Pyridostigmine was increased to 120 mg TID last week. Takes it at 08:00/13:00/20:00 - notes worst weakness and dysphagia upon waking up in the morning, improved around mid-day, and worsening again at night.  Also reporting diarrhea x1 week, which pt attributes to dysphagia preventing her from taking food with pyridostigmine. Urinary frequency for years, possibly also worse in the past week.  Also reports b/l anterior thigh numbness x1 month, which is improving.  Follows with neurologist Dr. Delroy Ro at [facility].    Using home O2 since 06/2023. Doesn't go out, only walks short distances at home (to the bathroom).    Exam 11/3/23 afternoon:  Intermittently nasal voice  Neck flex 2/5, neck ext 3 to 4-/5  Mild-moderate b/l ptosis  Upgaze impaired, worse in L eye (minimal to absent upgaze L eye), otherwise EOMI. Not maintaining sustained upgaze.  3/5 b/l shoulder abd, 5/5 b/l shoulder add, EF/EE, HF  Gait deferred due to acute illness    Per discussion with respiratory therapist in ED, afternoon testing:  ml, NIF -40    Leukocytosis  Afebrile  venous pCO2 elevated at 79  Cr normal    Assessment:  Myasthenic crisis with signs of respiratory compromise  Discussed with son and patient about PLEX vs. IVIg - that PLEX would be recommended especially if respiratory status does not improve, as PLEX has faster effect than IVIg. Son and patient preferred IVIg, with son stating IVIg has helped in the past, and patient hesitant to have a line placed for PLEX.  Patient was given 1 dose of IVIg 400 mg/kg on 11/3 and admitted to neuroICU. Intubated later.    Recommendations:  -PLEX as patient has required intubation - 5 sessions, once every other day, may consider additional 2 sessions and/or IVIg after PLEX pending clinical course  -increase to prednisone 40 mg daily when PLEX is started  -continue pyridostigmine 120 mg q8h enterally. may need to decrease dose or discontinue if excess secretions/diarrhea  -infectious workup  -SLP, PT, OT  -follow up with neuromuscular specialist after discharge - can follow at [address] [place] [address] [phone] if patient desires    Julianna Schofield MD

## 2023-11-03 NOTE — PROGRESS NOTE ADULT - ASSESSMENT
ASSESSMENT/PLAN: 62 y/o female with myasthenia gravis, impending respiratory failure.       NEURO:  Neurochecks Q2H  Continue Mestinon 60 mg 2 tabs TID  Continue Prednisone 20 mg QD  Patient will need IVIG x 5 days duration, if deteriorates on IVIG, will start PLEX  Avoid medications that may worsen the current exacerbation including macrolides, fluoroquinolones, tetracyclines, aminoglycoside, beta-blockers, magnesium, and anti-arrhythmics (procainamide, quinidine, and lidocaine)  Neurology following, appreciate their recommendations  Activity: [x] mobilize as tolerated [] Bedrest [x] PT [x] OT [] PMNR    PULM:  Currently patient is not in respiratory distress  CXR, UA to r/o any infectious etiology  Would check NIF/VC Q4 hours in ICU setting. Recommend elective intubation if values consistently less than 1 L and -20 cm H20, respectively OR patient in acute respiratory distress not responsive to NIPPV    CV:  MAP>65  TTE - EF 54%    RENAL:  IVF while not on diet   daily IOs    GI:  Diet: ON tf at GOAL   Fluids: IVL  GI prophylaxis: PPI while on steroids  Bowel regimen: miralax, senna    ENDO:   FS goal 120-180  Place on ISS and adjsut insulin as needed  A1C from May 2023 6.8    HEME/ONC:  Monitor H/H  SCDs  Chemoppx: SQL    ID:  Monitor for fevers, leukocytosis likely related to steroids  No signs of infection   Continue to monitor for infection    Disposition: ICU       ASSESSMENT/PLAN: 64 y/o female with myasthenia gravis, impending respiratory failure.       NEURO:  Neurochecks Q2H  Continue Mestinon 60 mg 2 tabs TID (no secretions)   Continue Prednisone 20 mg QD  Patient will need IVIG x 5 days duration, son and patients would rather get IVIG over PLEX  Avoid medications that may worsen the current exacerbation including macrolides, fluoroquinolones, tetracyclines, aminoglycoside, beta-blockers, magnesium, and anti-arrhythmics (procainamide, quinidine, and lidocaine)  Neurology following, appreciate their recommendations  Activity: [x] mobilize as tolerated [] Bedrest [x] PT [x] OT [] PMNR    PULM:  Currently patient is not in respiratory distress  CXR, UA to r/o any infectious etiology  Would check NIF/VC Q4 hours in ICU setting. Recommend elective intubation if values consistently less than 1 L and -20 cm H20, respectively OR patient in acute respiratory distress     CV:  MAP>65  TTE - EF 54%    RENAL:  IVF while not on diet   daily IOs    GI:  Diet: ON tf at GOAL   Fluids: IVL  GI prophylaxis: PPI while on steroids  Bowel regimen: miralax, senna    ENDO:   FS goal 120-180  Place on ISS and adjsut insulin as needed  A1C from May 2023 6.8    HEME/ONC:  Monitor H/H  SCDs  Chemoppx: SQL    ID:  Monitor for fevers, leukocytosis likely related to steroids  No signs of infection   Continue to monitor for infection    Disposition: ICU       ASSESSMENT/PLAN: 62 y/o female with myasthenia gravis, impending respiratory failure.       NEURO:  Neurochecks Q2H  Continue Mestinon 60 mg 2 tabs TID (no secretions)   Continue Prednisone 20 mg QD  Patient will need IVIG x 5 days duration, son and patients would rather get IVIG over PLEX  Avoid medications that may worsen the current exacerbation including macrolides, fluoroquinolones, tetracyclines, aminoglycoside, beta-blockers, magnesium, and anti-arrhythmics (procainamide, quinidine, and lidocaine)  Neurology following, appreciate their recommendations  Activity: [x] mobilize as tolerated [] Bedrest [x] PT [x] OT [] PMNR    PULM:  Currently patient is not in respiratory distress  CXR, UA to r/o any infectious etiology  Would check NIF/VC Q4 hours in ICU setting. Recommend elective intubation if values consistently less than 1 L and -20 cm H20, respectively OR patient in acute respiratory distress     CV:  MAP>65  TTE - EF 54%    RENAL:  IVF while not on diet   daily IOs    GI:  Diet: ON tf at GOAL   Fluids: IVL  GI prophylaxis: PPI while on steroids  Bowel regimen: miralax, senna    ENDO:   FS goal 120-180  Place on ISS and adjsut insulin as needed  A1C from May 2023 6.8    HEME/ONC:  Monitor H/H  SCDs  Chemoppx: SQL    ID:  Monitor for fevers, leukocytosis likely related to steroids  No signs of infection   Continue to monitor for infection    Disposition: ICU

## 2023-11-03 NOTE — ED PROVIDER NOTE - PROGRESS NOTE DETAILS
MD Villagran: patient seen and evaluated by neurology who agree that the patient needs to be in an ICU setting.  Neuro ICU attending is on their way to see the patient.  Patient has a PCO2 of 79 with a pH of 7.29.  This likely represents an acute on chronic respiratory acidosis.  CPAP has been ordered.

## 2023-11-03 NOTE — ED PROVIDER NOTE - CHILD ABUSE FACILITY
[facility]

## 2023-11-03 NOTE — CONSULT NOTE ADULT - SUBJECTIVE AND OBJECTIVE BOX
Neurology - Consult Note    -  Spectra: [phone] ([facility]), [phone] ([facility])  -    HPI: Patient KAIDEN HATCH is a 63y (1960) woman with a PMHx significant for myasthenia gravis diagnosis presenting to the ED for worsening difficulty with double vision, swallowing, talking, and breathing that has progressively gotten worse for the previous one week. Patient is currently on Mestinon 60 mg two tablets TID and Prednisone 10 mg --> uptitrated to 20 mg, she was not taking it per son for a month or so and was discharged on 50 mg after her hospitalization in 06/2023. Has been admitted before in June 2023, requiring intubation and PLEX/IVIG and in Feb 2023 for MG crisis that required intubation (2/1-2/11/23) iso COVID19. Patient received 5 days of PLEX (2/2-2/10/23) followed by 5 days of IVIG (2/16-2/20/23) with symptomatic improvement. Patient's son says patient has responded best to IVIG and that it stabilizes her MG for about 2 years. At baseline, patient is on 2L NC. In the ED, patient's NIF was -50. Her VC was 430. Patient also endorsed increased mucus secretion in her throat, and reliance on her oxygen tank.       Review of Systems:    NEUROLOGICAL: +As stated in HPI above  All other review of systems is negative unless indicated above.    Allergies:  peanuts (Unknown)  No Known Drug Allergies      PMHx/PSHx/Family Hx: As above, otherwise see below   Myasthenia gravis    Diabetes mellitus    Hypertension        Social Hx:  No current use of tobacco, alcohol, or illicit drugs  Lives with son   Medications:  MEDICATIONS  (STANDING):    MEDICATIONS  (PRN):      Vitals:  T(C): --  HR: 80 (11-03-23 @ 15:16) (80 - 80)  BP: 131/80 (11-03-23 @ 15:16) (131/80 - 131/80)  RR: 36 (11-03-23 @ 15:16) (36 - 36)  SpO2: 99% (11-03-23 @ 16:10) (99% - 100%)    Physical Examination:  Neurologic Exam:  Mental status - Awake, Alert, Oriented to person, place, and time. Speech easily interrupted secondary to difficulty breathing. Follows simple and complex commands. Attention/concentration, recent and remote memory (including registration and recall), and fund of knowledge intact    Cranial nerves - PERRLA, VFF, EOMI, bilateral ptosis, no nystagmus noted, face sensation (V1-V3) intact b/l, facial strength intact without asymmetry b/l, hearing intact b/l, palate with symmetric elevation, trapezius OR sternocleidomastiod 5/5 strength b/l, tongue midline on protrusion with full lateral movement    Motor - Normal bulk and tone throughout. No pronator drift.  Strength testing            Deltoid      Biceps      Triceps     Wrist Extension    Wrist Flexion     Interossei         R            5                 5               5                     5                              5                        5                 5  L             5                 5               5                     5                              5                        5                 5    Fatigues after shoulder abduction/adduction 5x. Only able to count to 8 when holding breath.               Hip Flexion    Hip Extension    Knee Flexion    Knee Extension    Dorsiflexion    Plantar Flexion  R              5                           5                       5                           5                            5                          5  L              5                           5                        5                           5                            5                          5    Sensation - Light touch, pain/vibration intact throughout    DTR's -             Biceps      Triceps     Brachioradialis      Patellar    Ankle    Toes/plantar response  R             2+             2+                  2+                       2+            2+                 Down  L              2+             2+                 2+                        2+           2+                 Down    Coordination - Finger to Nose intact b/l. No tremors appreciated        Labs:                        15.1   14.19 )-----------( 390      ( 03 Nov 2023 16:13 )             47.1           CAPILLARY BLOOD GLUCOSE              CSF:                  Radiology:     Neurology - Consult Note    -  Spectra: [phone] ([facility]), [phone] ([facility])  -    HPI: Patient KAIDEN HATCH is a 63y (1960) woman with a PMHx significant for myasthenia gravis diagnosis presenting to the ED for worsening difficulty with double vision, swallowing, talking, and breathing that has progressively gotten worse for the previous one week. Patient is currently on Mestinon 60 mg two tablets TID and Prednisone 10 mg --> uptitrated to 20 mg, she was not taking it per son for a month or so and was discharged on 50 mg after her hospitalization in 06/2023. Has been admitted before in June 2023, requiring intubation and PLEX/IVIG and in Feb 2023 for MG crisis that required intubation (2/1-2/11/23) iso COVID19. Patient received 5 days of PLEX (2/2-2/10/23) followed by 5 days of IVIG (2/16-2/20/23) with symptomatic improvement. Patient's son says patient has responded best to IVIG and that it stabilizes her MG for about 2 years. At baseline, patient is on 2L NC. In the ED, patient's NIF was -50. Her VC was 430. Patient also endorsed increased mucus secretion in her throat, and reliance on her oxygen tank.       Review of Systems:    NEUROLOGICAL: +As stated in HPI above  All other review of systems is negative unless indicated above.    Allergies:  peanuts (Unknown)  No Known Drug Allergies      PMHx/PSHx/Family Hx: As above, otherwise see below   Myasthenia gravis    Diabetes mellitus    Hypertension        Social Hx:  No current use of tobacco, alcohol, or illicit drugs  Lives with son   Medications:  MEDICATIONS  (STANDING):    MEDICATIONS  (PRN):      Vitals:  T(C): --  HR: 80 (11-03-23 @ 15:16) (80 - 80)  BP: 131/80 (11-03-23 @ 15:16) (131/80 - 131/80)  RR: 36 (11-03-23 @ 15:16) (36 - 36)  SpO2: 99% (11-03-23 @ 16:10) (99% - 100%)    Physical Examination:  Neurologic Exam:  Mental status - Awake, Alert, Oriented to person, place, and time. Speech easily interrupted secondary to difficulty breathing. Follows simple and complex commands. Attention/concentration, recent and remote memory (including registration and recall), and fund of knowledge intact    Cranial nerves - PERRLA, VFF, EOMI, bilateral ptosis, no nystagmus noted, face sensation (V1-V3) intact b/l, facial strength intact without asymmetry b/l, hearing intact b/l, palate with symmetric elevation, trapezius OR sternocleidomastiod 5/5 strength b/l, tongue midline on protrusion with full lateral movement    Motor - Normal bulk and tone throughout. No pronator drift.  Strength testing            Deltoid      Biceps      Triceps     Wrist Extension    Wrist Flexion     Interossei         R            5                 5               5                     5                              5                        5                 5  L             5                 5               5                     5                              5                        5                 5    Fatigues after shoulder abduction/adduction 5x. Only able to count to 8 when holding breath.               Hip Flexion    Hip Extension    Knee Flexion    Knee Extension    Dorsiflexion    Plantar Flexion  R              5                           5                       5                           5                            5                          5  L              5                           5                        5                           5                            5                          5    Sensation - Light touch, pain/vibration intact throughout    DTR's -             Biceps      Triceps     Brachioradialis      Patellar    Ankle    Toes/plantar response  R             2+             2+                  2+                       2+            2+                 Down  L              2+             2+                 2+                        2+           2+                 Down    Coordination - Finger to Nose intact b/l. No tremors appreciated        Labs:                        15.1   14.19 )-----------( 390      ( 03 Nov 2023 16:13 )             47.1           CAPILLARY BLOOD GLUCOSE              CSF:                  Radiology:     Neurology - Consult Note    -  Spectra: [phone] ([facility]), [phone] ([facility])  -    HPI: Patient KAIDEN HATCH is a 63y (1960) woman with a PMHx significant for myasthenia gravis diagnosis presenting to the ED for worsening difficulty with double vision, swallowing, talking, and breathing that has progressively gotten worse for the previous one week. Patient is currently on Mestinon 60 mg two tablets TID and Prednisone 10 mg --> uptitrated to 20 mg, she was not taking it per son for a month or so and was discharged on 50 mg after her hospitalization in 06/2023. Has been admitted before in June 2023, requiring intubation and PLEX/IVIG and in Feb 2023 for MG crisis that required intubation (2/1-2/11/23) iso COVID19. Patient received 5 days of PLEX (2/2-2/10/23) followed by 5 days of IVIG (2/16-2/20/23) with symptomatic improvement. Patient's son says patient has responded best to IVIG and that it stabilizes her MG for about 2 years. At baseline, patient is on 2L NC. In the ED, patient's NIF was -50. Her VC was 430. Patient also endorsed increased mucus secretion in her throat, and reliance on her oxygen tank.       Review of Systems:    NEUROLOGICAL: +As stated in HPI above  All other review of systems is negative unless indicated above.    Allergies:  peanuts (Unknown)  No Known Drug Allergies      PMHx/PSHx/Family Hx: As above, otherwise see below   Myasthenia gravis    Diabetes mellitus    Hypertension        Social Hx:  No current use of tobacco, alcohol, or illicit drugs  Lives with son   Medications:  MEDICATIONS  (STANDING):    MEDICATIONS  (PRN):      Vitals:  T(C): --  HR: 80 (11-03-23 @ 15:16) (80 - 80)  BP: 131/80 (11-03-23 @ 15:16) (131/80 - 131/80)  RR: 36 (11-03-23 @ 15:16) (36 - 36)  SpO2: 99% (11-03-23 @ 16:10) (99% - 100%)    Physical Examination:  Neurologic Exam:  Mental status - Awake, Alert, Oriented to person, place, and time. Speech easily interrupted secondary to difficulty breathing. Follows simple and complex commands. Attention/concentration, recent and remote memory (including registration and recall), and fund of knowledge intact    Cranial nerves - PERRLA, VFF, EOMI, bilateral ptosis, no nystagmus noted, face sensation (V1-V3) intact b/l, facial strength intact without asymmetry b/l, hearing intact b/l, palate with symmetric elevation, trapezius OR sternocleidomastiod 5/5 strength b/l, tongue midline on protrusion with full lateral movement    Motor - Normal bulk and tone throughout. No pronator drift.  Strength testing            Deltoid      Biceps      Triceps     Wrist Extension    Wrist Flexion     Interossei         R            5                 5               5                     5                              5                        5                 5  L             5                 5               5                     5                              5                        5                 5    Fatigues after shoulder abduction/adduction 5x. Only able to count to 8 when holding breath.               Hip Flexion    Hip Extension    Knee Flexion    Knee Extension    Dorsiflexion    Plantar Flexion  R              5                           5                       5                           5                            5                          5  L              5                           5                        5                           5                            5                          5    Sensation - Light touch, pain/vibration intact throughout    DTR's -             Biceps      Triceps     Brachioradialis      Patellar    Ankle    Toes/plantar response  R             2+             2+                  2+                       2+            2+                 Down  L              2+             2+                 2+                        2+           2+                 Down    Coordination - Finger to Nose intact b/l. No tremors appreciated        Labs:                        15.1   14.19 )-----------( 390      ( 03 Nov 2023 16:13 )             47.1           CAPILLARY BLOOD GLUCOSE              CSF:                  Radiology:

## 2023-11-03 NOTE — ED PROVIDER NOTE - CLINICAL SUMMARY MEDICAL DECISION MAKING FREE TEXT BOX
Medical Decision Making  History physical concerning for myasthenic crisis, will check blood gas, NIF, vital capacity, during prior admissions patient has required intubation, PLEX, and IVIG.      We will avoid medications that may worsen the current exacerbation including macrolides, fluoroquinolones, tetracyclines, aminoglycoside, beta-blockers, magnesium, and anti-arrhythmics (procainamide, quinidine, and lidocaine).    RT contacted for STAT NIF and VC, which are -40, and 0.480L, respectively  Neurology will be consulted    ECG directly visualized by me and shows normal sinus rhythm, rate 84, , QRS 88, QTc 420.  No STEMI pattern appreciated    Upon my evaluation, this patient had a high probability of imminent or life-threatening deterioration due to myasthenic crisis which required my direct attention, intervention, and personal management.  The patient has a medical condition that impairs on or more vital organ systems.  Frequent personal assessment and adjustment of medical interventions was performed.     I have personally provided 30 minutes of critical care time exclusive of time spent on separately billable procedures.  Time includes review of laboratory data, radiology results, discussion with consultants, patient and family; monitoring for potential decompensation, as well as time spent retrieving data and reviewing the chart and documenting the visit.  Interventions we performed as documented above.

## 2023-11-03 NOTE — ED ADULT NURSE NOTE - OBJECTIVE STATEMENT
64 y/o F A&Ox3 HTN, DM, myasthenia gravis denies PSH presents to the ED from home c/o generalized weakness. Pt reports feeling like her "myasthenia gravis" is getting worse. Pt  endorses worsening weakness, difficulty breathing, difficulty speaking and vision changes x1 week. Upon ED arrival pt breathing is even and labored, placed on 2L NC. Skin is warm, dry & in tact. Pt denies CP, dizziness, N/V/D, numbness, tingling, fevers, chills. IV access obtained by EMS, 2nd IV obtained by RN. Call bell within reach, comfort &s safety provided. 62 y/o F A&Ox3 HTN, DM, myasthenia gravis denies PSH presents to the ED from home c/o generalized weakness. Pt reports feeling like her "myasthenia gravis" is getting worse. Pt  endorses worsening weakness, difficulty breathing, difficulty speaking and vision changes x1 week. Upon ED arrival pt breathing is even and labored, placed on 2L NC. Skin is warm, dry & in tact. Pt denies CP, dizziness, N/V/D, numbness, tingling, fevers, chills. IV access obtained by EMS, 2nd IV obtained by RN. Call bell within reach, comfort &s safety provided.

## 2023-11-04 LAB
A1C WITH ESTIMATED AVERAGE GLUCOSE RESULT: 6.7 % — HIGH (ref 4–5.6)
ANION GAP SERPL CALC-SCNC: 14 MMOL/L — SIGNIFICANT CHANGE UP (ref 5–17)
ANION GAP SERPL CALC-SCNC: 15 MMOL/L — SIGNIFICANT CHANGE UP (ref 5–17)
APPEARANCE UR: CLEAR — SIGNIFICANT CHANGE UP
BACTERIA # UR AUTO: NEGATIVE /HPF — SIGNIFICANT CHANGE UP
BILIRUB UR-MCNC: NEGATIVE — SIGNIFICANT CHANGE UP
BUN SERPL-MCNC: 18 MG/DL — SIGNIFICANT CHANGE UP (ref 7–23)
BUN SERPL-MCNC: 22 MG/DL — SIGNIFICANT CHANGE UP (ref 7–23)
CALCIUM SERPL-MCNC: 8.4 MG/DL — SIGNIFICANT CHANGE UP (ref 8.4–10.5)
CALCIUM SERPL-MCNC: 8.7 MG/DL — SIGNIFICANT CHANGE UP (ref 8.4–10.5)
CAST: 10 /LPF — HIGH (ref 0–4)
CHLORIDE SERPL-SCNC: 102 MMOL/L — SIGNIFICANT CHANGE UP (ref 96–108)
CHLORIDE SERPL-SCNC: 99 MMOL/L — SIGNIFICANT CHANGE UP (ref 96–108)
CHOLEST SERPL-MCNC: 214 MG/DL — HIGH
CO2 SERPL-SCNC: 20 MMOL/L — LOW (ref 22–31)
CO2 SERPL-SCNC: 22 MMOL/L — SIGNIFICANT CHANGE UP (ref 22–31)
COLOR SPEC: SIGNIFICANT CHANGE UP
CREAT SERPL-MCNC: 0.57 MG/DL — SIGNIFICANT CHANGE UP (ref 0.5–1.3)
CREAT SERPL-MCNC: 0.63 MG/DL — SIGNIFICANT CHANGE UP (ref 0.5–1.3)
DIFF PNL FLD: NEGATIVE — SIGNIFICANT CHANGE UP
EGFR: 101 ML/MIN/1.73M2 — SIGNIFICANT CHANGE UP
EGFR: 99 ML/MIN/1.73M2 — SIGNIFICANT CHANGE UP
ESTIMATED AVERAGE GLUCOSE: 146 MG/DL — HIGH (ref 68–114)
GAS PNL BLDA: SIGNIFICANT CHANGE UP
GLUCOSE SERPL-MCNC: 120 MG/DL — HIGH (ref 70–99)
GLUCOSE SERPL-MCNC: 81 MG/DL — SIGNIFICANT CHANGE UP (ref 70–99)
GLUCOSE UR QL: NEGATIVE MG/DL — SIGNIFICANT CHANGE UP
HCT VFR BLD CALC: 36.4 % — SIGNIFICANT CHANGE UP (ref 34.5–45)
HDLC SERPL-MCNC: 113 MG/DL — SIGNIFICANT CHANGE UP
HGB BLD-MCNC: 12 G/DL — SIGNIFICANT CHANGE UP (ref 11.5–15.5)
HYALINE CASTS # UR AUTO: PRESENT
KETONES UR-MCNC: 15 MG/DL
LEUKOCYTE ESTERASE UR-ACNC: NEGATIVE — SIGNIFICANT CHANGE UP
LIPID PNL WITH DIRECT LDL SERPL: 88 MG/DL — SIGNIFICANT CHANGE UP
MAGNESIUM SERPL-MCNC: 1.8 MG/DL — SIGNIFICANT CHANGE UP (ref 1.6–2.6)
MCHC RBC-ENTMCNC: 28.2 PG — SIGNIFICANT CHANGE UP (ref 27–34)
MCHC RBC-ENTMCNC: 33 GM/DL — SIGNIFICANT CHANGE UP (ref 32–36)
MCV RBC AUTO: 85.6 FL — SIGNIFICANT CHANGE UP (ref 80–100)
NITRITE UR-MCNC: NEGATIVE — SIGNIFICANT CHANGE UP
NON HDL CHOLESTEROL: 102 MG/DL — SIGNIFICANT CHANGE UP
NRBC # BLD: 0 /100 WBCS — SIGNIFICANT CHANGE UP (ref 0–0)
PH UR: 5.5 — SIGNIFICANT CHANGE UP (ref 5–8)
PHOSPHATE SERPL-MCNC: 2.8 MG/DL — SIGNIFICANT CHANGE UP (ref 2.5–4.5)
PLATELET # BLD AUTO: 293 K/UL — SIGNIFICANT CHANGE UP (ref 150–400)
POTASSIUM SERPL-MCNC: 3.5 MMOL/L — SIGNIFICANT CHANGE UP (ref 3.5–5.3)
POTASSIUM SERPL-MCNC: 4.2 MMOL/L — SIGNIFICANT CHANGE UP (ref 3.5–5.3)
POTASSIUM SERPL-SCNC: 3.5 MMOL/L — SIGNIFICANT CHANGE UP (ref 3.5–5.3)
POTASSIUM SERPL-SCNC: 4.2 MMOL/L — SIGNIFICANT CHANGE UP (ref 3.5–5.3)
PROT UR-MCNC: 30 MG/DL
RBC # BLD: 4.25 M/UL — SIGNIFICANT CHANGE UP (ref 3.8–5.2)
RBC # FLD: 13.8 % — SIGNIFICANT CHANGE UP (ref 10.3–14.5)
RBC CASTS # UR COMP ASSIST: 10 /HPF — HIGH (ref 0–4)
REVIEW: SIGNIFICANT CHANGE UP
SODIUM SERPL-SCNC: 136 MMOL/L — SIGNIFICANT CHANGE UP (ref 135–145)
SP GR SPEC: 1.03 — SIGNIFICANT CHANGE UP (ref 1–1.03)
SQUAMOUS # UR AUTO: 4 /HPF — SIGNIFICANT CHANGE UP (ref 0–5)
T4 AB SER-ACNC: 9.7 UG/DL — SIGNIFICANT CHANGE UP (ref 4.6–12)
TRIGL SERPL-MCNC: 81 MG/DL — SIGNIFICANT CHANGE UP
TSH SERPL-MCNC: 0.7 UIU/ML — SIGNIFICANT CHANGE UP (ref 0.27–4.2)
TSH SERPL-MCNC: 0.95 UIU/ML — SIGNIFICANT CHANGE UP (ref 0.27–4.2)
UROBILINOGEN FLD QL: 0.2 MG/DL — SIGNIFICANT CHANGE UP (ref 0.2–1)
WBC # BLD: 15.4 K/UL — HIGH (ref 3.8–10.5)
WBC # FLD AUTO: 15.4 K/UL — HIGH (ref 3.8–10.5)
WBC UR QL: 2 /HPF — SIGNIFICANT CHANGE UP (ref 0–5)

## 2023-11-04 PROCEDURE — 99291 CRITICAL CARE FIRST HOUR: CPT

## 2023-11-04 PROCEDURE — 99232 SBSQ HOSP IP/OBS MODERATE 35: CPT

## 2023-11-04 PROCEDURE — 93970 EXTREMITY STUDY: CPT | Mod: 26

## 2023-11-04 PROCEDURE — 71045 X-RAY EXAM CHEST 1 VIEW: CPT | Mod: 26

## 2023-11-04 RX ORDER — FENTANYL CITRATE 50 UG/ML
25 INJECTION INTRAVENOUS ONCE
Refills: 0 | Status: DISCONTINUED | OUTPATIENT
Start: 2023-11-04 | End: 2023-11-04

## 2023-11-04 RX ORDER — PROPOFOL 10 MG/ML
30 INJECTION, EMULSION INTRAVENOUS
Qty: 500 | Refills: 0 | Status: DISCONTINUED | OUTPATIENT
Start: 2023-11-03 | End: 2023-11-04

## 2023-11-04 RX ORDER — DIPHENHYDRAMINE HCL 50 MG
25 CAPSULE ORAL
Refills: 0 | Status: COMPLETED | OUTPATIENT
Start: 2023-11-04 | End: 2023-11-07

## 2023-11-04 RX ORDER — POTASSIUM CHLORIDE 20 MEQ
10 PACKET (EA) ORAL
Refills: 0 | Status: DISCONTINUED | OUTPATIENT
Start: 2023-11-04 | End: 2023-11-04

## 2023-11-04 RX ORDER — ACETAMINOPHEN 500 MG
1000 TABLET ORAL
Refills: 0 | Status: COMPLETED | OUTPATIENT
Start: 2023-11-04 | End: 2023-11-04

## 2023-11-04 RX ORDER — CHLORHEXIDINE GLUCONATE 213 G/1000ML
15 SOLUTION TOPICAL EVERY 12 HOURS
Refills: 0 | Status: DISCONTINUED | OUTPATIENT
Start: 2023-11-03 | End: 2023-11-09

## 2023-11-04 RX ORDER — IMMUNE GLOBULIN (HUMAN) 10 G/100ML
25 INJECTION INTRAVENOUS; SUBCUTANEOUS
Refills: 0 | Status: COMPLETED | OUTPATIENT
Start: 2023-11-04 | End: 2023-11-07

## 2023-11-04 RX ORDER — SODIUM CHLORIDE 9 MG/ML
1000 INJECTION, SOLUTION INTRAVENOUS ONCE
Refills: 0 | Status: COMPLETED | OUTPATIENT
Start: 2023-11-04 | End: 2023-11-05

## 2023-11-04 RX ORDER — POTASSIUM CHLORIDE 20 MEQ
40 PACKET (EA) ORAL ONCE
Refills: 0 | Status: COMPLETED | OUTPATIENT
Start: 2023-11-04 | End: 2023-11-04

## 2023-11-04 RX ORDER — SODIUM CHLORIDE 9 MG/ML
1000 INJECTION INTRAMUSCULAR; INTRAVENOUS; SUBCUTANEOUS ONCE
Refills: 0 | Status: COMPLETED | OUTPATIENT
Start: 2023-11-04 | End: 2023-11-04

## 2023-11-04 RX ORDER — IPRATROPIUM/ALBUTEROL SULFATE 18-103MCG
3 AEROSOL WITH ADAPTER (GRAM) INHALATION EVERY 6 HOURS
Refills: 0 | Status: DISCONTINUED | OUTPATIENT
Start: 2023-11-04 | End: 2023-11-23

## 2023-11-04 RX ORDER — PHENYLEPHRINE HYDROCHLORIDE 10 MG/ML
0.1 INJECTION INTRAVENOUS
Qty: 40 | Refills: 0 | Status: DISCONTINUED | OUTPATIENT
Start: 2023-11-04 | End: 2023-11-05

## 2023-11-04 RX ORDER — IMMUNE GLOBULIN (HUMAN) 10 G/100ML
25 INJECTION INTRAVENOUS; SUBCUTANEOUS
Refills: 0 | Status: DISCONTINUED | OUTPATIENT
Start: 2023-11-04 | End: 2023-11-04

## 2023-11-04 RX ORDER — FENTANYL CITRATE 50 UG/ML
50 INJECTION INTRAVENOUS ONCE
Refills: 0 | Status: DISCONTINUED | OUTPATIENT
Start: 2023-11-04 | End: 2023-11-04

## 2023-11-04 RX ADMIN — Medication 500 MILLIGRAM(S): at 20:24

## 2023-11-04 RX ADMIN — CHLORHEXIDINE GLUCONATE 15 MILLILITER(S): 213 SOLUTION TOPICAL at 17:29

## 2023-11-04 RX ADMIN — PANTOPRAZOLE SODIUM 40 MILLIGRAM(S): 20 TABLET, DELAYED RELEASE ORAL at 06:33

## 2023-11-04 RX ADMIN — Medication 40 MILLIEQUIVALENT(S): at 17:44

## 2023-11-04 RX ADMIN — FENTANYL CITRATE 25 MICROGRAM(S): 50 INJECTION INTRAVENOUS at 01:00

## 2023-11-04 RX ADMIN — Medication 3 MILLILITER(S): at 06:34

## 2023-11-04 RX ADMIN — POLYETHYLENE GLYCOL 3350 17 GRAM(S): 17 POWDER, FOR SOLUTION ORAL at 17:44

## 2023-11-04 RX ADMIN — FENTANYL CITRATE 25 MICROGRAM(S): 50 INJECTION INTRAVENOUS at 00:40

## 2023-11-04 RX ADMIN — SODIUM CHLORIDE 1000 MILLILITER(S): 9 INJECTION INTRAMUSCULAR; INTRAVENOUS; SUBCUTANEOUS at 00:12

## 2023-11-04 RX ADMIN — Medication 3 MILLILITER(S): at 17:09

## 2023-11-04 RX ADMIN — Medication 3 MILLILITER(S): at 23:05

## 2023-11-04 RX ADMIN — PHENYLEPHRINE HYDROCHLORIDE 2.48 MICROGRAM(S)/KG/MIN: 10 INJECTION INTRAVENOUS at 19:33

## 2023-11-04 RX ADMIN — IMMUNE GLOBULIN (HUMAN) 125 GRAM(S): 10 INJECTION INTRAVENOUS; SUBCUTANEOUS at 21:26

## 2023-11-04 RX ADMIN — Medication 20 MILLIGRAM(S): at 05:11

## 2023-11-04 RX ADMIN — SODIUM CHLORIDE 75 MILLILITER(S): 9 INJECTION INTRAMUSCULAR; INTRAVENOUS; SUBCUTANEOUS at 19:32

## 2023-11-04 RX ADMIN — Medication 60 MILLIGRAM(S): at 17:47

## 2023-11-04 RX ADMIN — Medication 25 MILLIGRAM(S): at 20:24

## 2023-11-04 RX ADMIN — CHLORHEXIDINE GLUCONATE 15 MILLILITER(S): 213 SOLUTION TOPICAL at 05:11

## 2023-11-04 RX ADMIN — POLYETHYLENE GLYCOL 3350 17 GRAM(S): 17 POWDER, FOR SOLUTION ORAL at 05:11

## 2023-11-04 RX ADMIN — CHLORHEXIDINE GLUCONATE 1 APPLICATION(S): 213 SOLUTION TOPICAL at 02:33

## 2023-11-04 RX ADMIN — ENOXAPARIN SODIUM 40 MILLIGRAM(S): 100 INJECTION SUBCUTANEOUS at 23:25

## 2023-11-04 RX ADMIN — Medication 3 MILLILITER(S): at 11:25

## 2023-11-04 NOTE — PROGRESS NOTE ADULT - SUBJECTIVE AND OBJECTIVE BOX
Neurology Progress Note    SUBJECTIVE/OBJECTIVE/INTERVAL EVENTS:       Translation provided by son at bedside  INTERVAL HISTORY: Intubated, started on IVIG, improving    REVIEW OF SYSTEMS: +right shoulder pain, improving strength    VITALS & EXAMINATION:  Vital Signs Last 24 Hrs  T(C): 37.2 (2023 07:00), Max: 37.4 (2023 19:00)  T(F): 99 (2023 07:00), Max: 99.3 (2023 19:00)  HR: 60 (2023 10:45) (59 - 114)  BP: 95/53 (2023 00:45) (85/49 - 169/154)  BP(mean): 65 (2023 00:45) (55 - 159)  RR: 16 (2023 10:45) (15 - 36)  SpO2: 100% (2023 10:45) (98% - 100%)    Parameters below as of 2023 04:00  Patient On (Oxygen Delivery Method): ventilator    O2 Concentration (%): 70    General:  Constitutional: Female, appears stated age, nontoxic, not in distress,    Head: Normocephalic;   Eyes: clear sclera;        Neurological (>12):  MS: Awake, alert.  Follows commands  Language: Speech is absent, intubated   CNs: PERRL VFF. EOMI, can maintain 60second vertigle gaze, no ptosis,. , No facial asymmetry b/l. Hearing grossly normal b/l.     Motor - Normal bulk and tone throughout. No pronator drift.    L/R (out of 5 each)       Deltoid  5/4(pain limited)    Biceps   5/5      Triceps  5/5            5/5   L/R (out of 5 each)       Hip Flexion  5/5 (limited rom)  Knee Extension  5/5     Sensation: Intact to LT b/l.  Reflexes L/R: Diffusely absent, after repeated DT stimulation, (3-4) lucinda to elicit 2+ b/l patellar    LABORATORY:  CBC                       13.4   11.77 )-----------( 337      ( 2023 20:25 )             41.9     Chem 11-03    134<L>  |  93<L>  |  19  ----------------------------<  117<H>  4.4   |  30  |  0.57    Ca    9.4      2023 20:22  Phos  4.9     11-  Mg     2.0     11-    TPro  6.3  /  Alb  3.7  /  TBili  0.3  /  DBili  x   /  AST  16  /  ALT  15  /  AlkPhos  76  11-03    LFTs LIVER FUNCTIONS - ( 2023 20:22 )  Alb: 3.7 g/dL / Pro: 6.3 g/dL / ALK PHOS: 76 U/L / ALT: 15 U/L / AST: 16 U/L / GGT: x           Coagulopathy   Lipid Panel  Chol 214<H> LDL --  Trig 81  A1c   Cardiac enzymes     U/A Urinalysis Basic - ( 2023 07:28 )    Color: Dark Yellow / Appearance: Clear / S.030 / pH: x  Gluc: x / Ketone: 15 mg/dL  / Bili: Negative / Urobili: 0.2 mg/dL   Blood: x / Protein: 30 mg/dL / Nitrite: Negative   Leuk Esterase: Negative / RBC: 10 /HPF / WBC 2 /HPF   Sq Epi: x / Non Sq Epi: 4 /HPF / Bacteria: Negative /HPF      CSF  Immunological  Other    STUDIES & IMAGING: (EEG, CT, MR, U/S, TTE/AUGUSTINA):

## 2023-11-04 NOTE — CHART NOTE - NSCHARTNOTEFT_GEN_A_CORE
Patient brought up to NSCU with family at bedside @ 18:30 on 2L NC, at this time patient O2 saturation had dropped to 89-90% and increased to 4L NC. Patient then desaturated to 88-90% while on 4L of NC, then placed in BiPAP by RT. Pt was given IVIG and mestinon via NG tube during this time. Pt continued to become progressively weaker and tachypneic. PT VC reported to be less than 300cc by RT. In setting of increasing weakness and increasing oxygen demands, the collaborative decision was made by MD Montano and MD Calderon to emergently intubate @ 23:30 with Anesthesia and RT @ bedside.     Patient son, Justin at [phone], was called and notified of intubation and informed of the above events. Patient's son verbalized during conversation that family and son do not want and do not agree to PLEX treatment at this time, but is agreeable to continue with IVIG treatment. Conversation with son relayed to MD Calderon and Dusty, will inform day team. Patient brought up to NSCU with family at bedside @ 18:30 on 2L NC, at this time patient O2 saturation had dropped to 89-90% and increased to 4L NC. Patient then desaturated to 88-90% while on 4L of NC, then placed in BiPAP by RT. Pt was given IVIG and mestinon via NG tube during this time. Pt continued to become progressively weaker and tachypneic. PT VC reported to be less than 300cc by RT. In setting of increasing weakness and increasing oxygen demands, the collaborative decision was made by MD Montano and MD Calderon to emergently intubate @ 23:30 with Anesthesia and RT @ bedside.     Patient son, Justin at [phone], was called and notified of intubation and informed of the above events. Patient's son verbalized during conversation that family and son do not want and do not agree to PLEX treatment at this time, but is agreeable to continue with IVIG treatment. Conversation with son relayed to MD Calderon and Dusty, will inform day team. Patient brought up to NSCU with family at bedside @ 18:30 on 2L NC, at this time patient O2 saturation had dropped to 89-90% and increased to 4L NC. Patient then desaturated to 88-90% while on 4L of NC, then placed in BiPAP by RT. Pt was given IVIG and mestinon via NG tube during this time. Pt continued to become progressively weaker and tachypneic. PT VC reported to be less than 300cc by RT. In setting of increasing weakness and increasing oxygen demands, the collaborative decision was made by MD Montano and MD Calderon to emergently intubate @ 23:30 with Anesthesia and RT @ bedside.     Patient son, Justin at [phone], was called and notified of intubation and informed of the above events. Patient's son verbalized during conversation that family and son do not want and do not agree to PLEX treatment at this time, but is agreeable to continue with IVIG treatment. Conversation with son relayed to MD Calderon and Dusty, will inform day team.

## 2023-11-04 NOTE — PROGRESS NOTE ADULT - CRITICAL CARE ATTENDING COMMENT
Myasthenia crisis s/p 1 dose IVIG, overnight had worsening respiratory function, low VC, worsening weakness, neck flexion 0/5, desaturation, she was electively intubated. son informed. Was considering shifting from IVIG to PLEX, however, son resisting PLEX, given no large evidence favoring PLEX, will continue with IVIG for now, increase prednisone to 60 mg, d/c mestinone, monitor NIF and VC, start TF, lovenox 40 mg sc qhs , hypotensive post-intubation, on phenyleprhine, keep NS 75 ml/hr, bolus as needed
Myasthenia exacerbation, previous history of intubation from Myasthenia crisis, was started on prednisone 2 weeks ao, no fever or signs of Upper tract infection, admit to the NSCU, start IVIG, continue mestinone given no secretions, NIF and VC every 4 hrs, if worsening respiratory function will need intubation, continue prednisone for now, lovenox 40 mg sc qhs, NPO

## 2023-11-04 NOTE — PROGRESS NOTE ADULT - ASSESSMENT
Patient KAIDEN HATCH is a 63y (1960) woman with a PMHx significant for myasthenia gravis s/p thymectomy presenting to the ED for worsening difficulty with double vision, swallowing, talking, and breathing that has progressively gotten worse for the previous one week. Patient is currently on Mestinon 60 mg two tablets TID and Prednisone 10 mg --> uptitrated to 20 mg, she was not taking it per son for a month or so and was discharged on 50 mg after her hospitalization in 06/2023. Has been admitted before in June 2023, requiring intubation and PLEX/IVIG and in Feb 2023 for MG crisis that required intubation (2/1-2/11/23) iso COVID19. Patient received 5 days of PLEX (2/2-2/10/23) followed by 5 days of IVIG (2/16-2/20/23) with symptomatic improvement. Patient's son says patient has responded best to IVIG and that it stabilizes her MG for about 2 years. At baseline, patient is on 2L NC. In the ED, patient's NIF was -50. Her VC was 430. Patient also endorsed increased mucus secretion in her throat, and reliance on her oxygen tank.     Impression: Myasthenia gravis with acute exacerbation    Recommendations:  [] Would recommend PLEX as first line but given patient/family preference, please continue with IVIG  []Hold Mestinon given secretions  []Trend NIF/VC,Extubate when able per NSCU  []Continue Prednisone 60mg QD  []Toxic/metabolic workup per ICU (Alkalemia likely iatrogenic, Leukocytosis likely iatrogenic, UA non infectious, CXR without consolidation, afebrile, consider RVP); if no trigger for crisis detected would consider DC on increased immunosuppression and/or lower pyridostigmine given pyridostigmine was recently increased and prednisone was DCed 9/15 but restarted at lower dose 2 weeks prior to admission. If AE from prolonged steroids would consider outpatient IVIG +/- Ocrelizumab. Has established outpatient neurologist in [facility] [place].     Case not yet discussed with Dr. Reeder Patient KAIDEN HATCH is a 63y (1960) woman with a PMHx significant for myasthenia gravis s/p thymectomy presenting to the ED for worsening difficulty with double vision, swallowing, talking, and breathing that has progressively gotten worse for the previous one week. Patient is currently on Mestinon 60 mg two tablets TID and Prednisone 10 mg --> uptitrated to 20 mg, she was not taking it per son for a month or so and was discharged on 50 mg after her hospitalization in 06/2023. Has been admitted before in June 2023, requiring intubation and PLEX/IVIG and in Feb 2023 for MG crisis that required intubation (2/1-2/11/23) iso COVID19. Patient received 5 days of PLEX (2/2-2/10/23) followed by 5 days of IVIG (2/16-2/20/23) with symptomatic improvement. Patient's son says patient has responded best to IVIG and that it stabilizes her MG for about 2 years. At baseline, patient is on 2L NC. In the ED, patient's NIF was -50. Her VC was 430. Patient also endorsed increased mucus secretion in her throat, and reliance on her oxygen tank.     Impression: Myasthenia gravis with acute exacerbation    Recommendations:  [] Would recommend PLEX as first line but given patient/family preference, please continue with IVIG  []Hold Mestinon given secretions  []Trend NIF/VC,Extubate when able per NSCU  []Continue Prednisone 60mg QD  []Toxic/metabolic workup per ICU (Alkalemia likely iatrogenic, Leukocytosis likely iatrogenic, UA non infectious, CXR without consolidation, afebrile, consider RVP); if no trigger for crisis detected would consider DC on increased immunosuppression and/or lower pyridostigmine given pyridostigmine was recently increased and prednisone was DCed 9/15 but restarted at lower dose 2 weeks prior to admission. If AE from prolonged steroids would consider outpatient IVIG +/- Ocrelizumab. Has established outpatient neurologist in [facility] [place].     Case not yet discussed with Dr. Reeder Patient KAIDEN HATCH is a 63y (1960) woman with a PMHx significant for myasthenia gravis s/p thymectomy presenting to the ED for worsening difficulty with double vision, swallowing, talking, and breathing that has progressively gotten worse for the previous one week. Patient is currently on Mestinon 60 mg two tablets TID and Prednisone 10 mg --> uptitrated to 20 mg, she was not taking it per son for a month or so and was discharged on 50 mg after her hospitalization in 06/2023. Has been admitted before in June 2023, requiring intubation and PLEX/IVIG and in Feb 2023 for MG crisis that required intubation (2/1-2/11/23) iso COVID19. Patient received 5 days of PLEX (2/2-2/10/23) followed by 5 days of IVIG (2/16-2/20/23) with symptomatic improvement. Patient's son says patient has responded best to IVIG and that it stabilizes her MG for about 2 years. At baseline, patient is on 2L NC. In the ED, patient's NIF was -50. Her VC was 430. Patient also endorsed increased mucus secretion in her throat, and reliance on her oxygen tank.     Impression: Myasthenia gravis with acute exacerbation    Recommendations:  [] Would recommend PLEX as first line but given patient/family preference, please continue with IVIG  []Hold Mestinon given secretions  []Trend NIF/VC,Extubate when able per NSCU  []Continue Prednisone 60mg QD  []Toxic/metabolic workup per ICU (Alkalemia likely iatrogenic, Leukocytosis likely iatrogenic, UA non infectious, CXR without consolidation, afebrile, consider RVP); if no trigger for crisis detected would consider DC on increased immunosuppression and/or lower pyridostigmine given pyridostigmine was recently increased and prednisone was DCed 9/15 but restarted at lower dose 2 weeks prior to admission. If AE from prolonged steroids would consider outpatient IVIG +/- Ocrelizumab. Has established outpatient neurologist in [facility] [place].     Case not yet discussed with Dr. Reeder Patient KAIDEN HATCH is a 63y (1960) woman with a PMHx significant for myasthenia gravis s/p thymectomy presenting to the ED for worsening difficulty with double vision, swallowing, talking, and breathing that has progressively gotten worse for the previous one week. Patient is currently on Mestinon 60 mg two tablets TID and Prednisone 10 mg --> uptitrated to 20 mg, she was not taking it per son for a month or so and was discharged on 50 mg after her hospitalization in 06/2023. Has been admitted before in June 2023, requiring intubation and PLEX/IVIG and in Feb 2023 for MG crisis that required intubation (2/1-2/11/23) iso COVID19. Patient received 5 days of PLEX (2/2-2/10/23) followed by 5 days of IVIG (2/16-2/20/23) with symptomatic improvement. Patient's son says patient has responded best to IVIG and that it stabilizes her MG for about 2 years. At baseline, patient is on 2L NC. In the ED, patient's NIF was -50. Her VC was 430. Patient also endorsed increased mucus secretion in her throat, and reliance on her oxygen tank.     Impression: Myasthenia gravis with acute exacerbation    Recommendations:  [] Would recommend PLEX as first line but given patient/family preference, please continue with IVIG  []Hold Mestinon given secretions  []Trend NIF/VC,Extubate when able per NSCU  []Continue Prednisone 60mg QD  []Toxic/metabolic workup per ICU (Alkalemia likely iatrogenic, Leukocytosis likely iatrogenic, UA non infectious, CXR without consolidation, afebrile, consider RVP); if no trigger for crisis detected would consider DC on increased immunosuppression and/or lower pyridostigmine given pyridostigmine was recently increased and prednisone was DCed 9/15 but restarted at lower dose 2 weeks prior to admission. If AE from prolonged steroids would consider outpatient IVIG +/- Ocrelizumab. Has established outpatient neurologist in [facility] [place].   []follow up with neuromuscular specialist after discharge - can follow at [address] [place] [address] [phone] if patient desires    Case not yet discussed with Dr. Reeder Patient KAIDEN HATCH is a 63y (1960) woman with a PMHx significant for myasthenia gravis s/p thymectomy presenting to the ED for worsening difficulty with double vision, swallowing, talking, and breathing that has progressively gotten worse for the previous one week. Patient is currently on Mestinon 60 mg two tablets TID and Prednisone 10 mg --> uptitrated to 20 mg, she was not taking it per son for a month or so and was discharged on 50 mg after her hospitalization in 06/2023. Has been admitted before in June 2023, requiring intubation and PLEX/IVIG and in Feb 2023 for MG crisis that required intubation (2/1-2/11/23) iso COVID19. Patient received 5 days of PLEX (2/2-2/10/23) followed by 5 days of IVIG (2/16-2/20/23) with symptomatic improvement. Patient's son says patient has responded best to IVIG and that it stabilizes her MG for about 2 years. At baseline, patient is on 2L NC. In the ED, patient's NIF was -50. Her VC was 430. Patient also endorsed increased mucus secretion in her throat, and reliance on her oxygen tank.     Impression: Myasthenia gravis with acute exacerbation    Recommendations:  [] Would recommend PLEX as first line but given patient/family preference, please continue with IVIG  []Hold Mestinon given secretions  []Trend NIF/VC,Extubate when able per NSCU  []Continue Prednisone 60mg QD  []Toxic/metabolic workup per ICU (Alkalemia likely iatrogenic, Leukocytosis likely iatrogenic, UA non infectious, CXR without consolidation, afebrile, consider RVP); if no trigger for crisis detected would consider DC on increased immunosuppression and/or lower pyridostigmine given pyridostigmine was recently increased and prednisone was DCed 9/15 but restarted at lower dose 2 weeks prior to admission. If AE from prolonged steroids would consider outpatient IVIG +/- Ocrelizumab. Has established outpatient neurologist in [facility] [place].   []follow up with neuromuscular specialist after discharge - can follow at [address] [place] [address] [phone] if patient desires    Case not yet discussed with Dr. Reeder Patient KAIDEN HATCH is a 63y (1960) woman with a PMHx significant for myasthenia gravis s/p thymectomy presenting to the ED for worsening difficulty with double vision, swallowing, talking, and breathing that has progressively gotten worse for the previous one week. Patient is currently on Mestinon 60 mg two tablets TID and Prednisone 10 mg --> uptitrated to 20 mg, she was not taking it per son for a month or so and was discharged on 50 mg after her hospitalization in 06/2023. Has been admitted before in June 2023, requiring intubation and PLEX/IVIG and in Feb 2023 for MG crisis that required intubation (2/1-2/11/23) iso COVID19. Patient received 5 days of PLEX (2/2-2/10/23) followed by 5 days of IVIG (2/16-2/20/23) with symptomatic improvement. Patient's son says patient has responded best to IVIG and that it stabilizes her MG for about 2 years. At baseline, patient is on 2L NC. In the ED, patient's NIF was -50. Her VC was 430. Patient also endorsed increased mucus secretion in her throat, and reliance on her oxygen tank.     Impression: Myasthenia gravis with acute exacerbation    Recommendations:  [] Would recommend PLEX as first line but given patient/family preference, please continue with IVIG  []Hold Mestinon given secretions  []Trend NIF/VC,Extubate when able per NSCU  []Continue Prednisone 60mg QD  []Toxic/metabolic workup per ICU (Alkalemia likely iatrogenic, Leukocytosis likely iatrogenic, UA non infectious, CXR without consolidation, afebrile, consider RVP); if no trigger for crisis detected would consider DC on increased immunosuppression and/or lower pyridostigmine given pyridostigmine was recently increased and prednisone was DCed 9/15 but restarted at lower dose 2 weeks prior to admission. If AE from prolonged steroids would consider outpatient IVIG +/- Ocrelizumab. Has established outpatient neurologist in [facility] [place].   []follow up with neuromuscular specialist after discharge - can follow at [address] [place] [address] [phone] if patient desires    Case not yet discussed with Dr. Reeder none

## 2023-11-04 NOTE — PROGRESS NOTE ADULT - ASSESSMENT
ASSESSMENT/PLAN: 64 y/o female with myasthenia gravis, impending respiratory failure.       NEURO:  Neurochecks Q2H  Discontinue Mestinon, patient with copious secretions   Increased Prednisone to 60 mg QD  Patient will need IVIG x 5 days duration, son and patients would rather get IVIG over PLEX- IVIG  (1/5 dose)  Avoid medications that may worsen the current exacerbation including macrolides, fluoroquinolones, tetracyclines, aminoglycoside, beta-blockers, magnesium, and anti-arrhythmics (procainamide, quinidine, and lidocaine)  Neurology following, appreciate their recommendations  Activity: [x] mobilize as tolerated [] Bedrest [x] PT [x] OT [] PMNR    PULM:  Intubated for airway protection  ABG/CXR reviewed  Currently patient is not in respiratory distress  Would check NIF/VC Q4 hours in ICU setting    CV:  MAP>65  TTE - EF 54%  On rich currently, will wean off rich    RENAL:  Hyponatremic, will repeat BMP  daily IOs    GI:  Diet: ON tf at GOAL   GI prophylaxis: PPI while on steroids  Bowel regimen: miralax, senna    ENDO:   FS goal 120-180  Place on ISS and adjsut insulin as needed  A1C 6.7    HEME/ONC:  Monitor H/H  SCDs  Chemoppx: SQL    ID:  Monitor for fevers, leukocytosis likely related to steroids  No signs of infection   Continue to monitor for infection    Disposition: ICU

## 2023-11-04 NOTE — PROGRESS NOTE ADULT - SUBJECTIVE AND OBJECTIVE BOX
HOSPITAL COURSE:   Patient KAIDEN HATCH is a 63y (1960) woman with a PMHx significant for myasthenia gravis diagnosis presenting to the ED for worsening difficulty with double vision, swallowing, talking, and breathing that has progressively gotten worse for the previous one week. Patient is currently on Mestinon 60 mg two tablets TID and Prednisone 10 mg --> uptitrated to 20 mg, she was not taking it per son for a month or so and was discharged on 50 mg after her hospitalization in 06/2023. Has been admitted before in June 2023, requiring intubation and PLEX/IVIG and in Feb 2023 for MG crisis that required intubation (2/1-2/11/23) iso COVID19. Patient received 5 days of PLEX (2/2-2/10/23) followed by 5 days of IVIG (2/16-2/20/23) with symptomatic improvement. Patient's son says patient has responded best to IVIG and that it stabilizes her MG for about 2 years. At baseline, patient is on 2L NC. In the ED, patient's NIF was -50. Her VC was 430. Patient also endorsed increased mucus secretion in her throat, and reliance on her oxygen tank.       Admission Scores  GCS: 15       24 hour Events: Patient evaluated at bedside. Patient was breathing comfortably on room air. Was able to count up to 18 in one breath. Reports worsening of symptoms for the past week, denies any recent illness. Patient reports progressive worsening of her symptoms.     11/4- Patient intubated overnight for worsening respiratory status. Patient received 1 dose of IVIG.     Allergies    peanuts (Unknown)  No Known Drug Allergies    Intolerances        REVIEW OF SYSTEMS: [ ] Unable to Assess due to neurologic exam   [x ] All ROS addressed below are non-contributory, except:  Neuro: [ ] Headache [ ] Back pain [ ] Numbness [ ] Weakness [ ] Ataxia [ ] Dizziness [ ] Aphasia [ ] Dysarthria [ ] Visual disturbance  Resp: [ ] Shortness of breath/dyspnea, [ ] Orthopnea [ ] Cough  CV: [ ] Chest pain [ ] Palpitation [ ] Lightheadedness [ ] Syncope  Renal: [ ] Thirst [ ] Edema  GI: [ ] Nausea [ ] Emesis [ ] Abdominal pain [ ] Constipation [ ] Diarrhea  Hem: [ ] Hematemesis [ ] bright red blood per rectum  ID: [ ] Fever [ ] Chills [ ] Dysuria  ENT: [ ] Rhinorrhea      DEVICES:   [ ] Restraints [ x] ET tube [ ] central line [x ] arterial line [ ] morales [ ] NGT/OGT [ ] EVD [ ] LD [ ] MYAH/HMV [ ] Trach [ ] PEG [ ] Chest Tube     VITALS:   Vital Signs Last 24 Hrs  T(C): 37.2 (04 Nov 2023 07:00), Max: 37.4 (03 Nov 2023 19:00)  T(F): 99 (04 Nov 2023 07:00), Max: 99.3 (03 Nov 2023 19:00)  HR: 59 (04 Nov 2023 09:30) (59 - 114)  BP: 95/53 (04 Nov 2023 00:45) (85/49 - 169/154)  BP(mean): 65 (04 Nov 2023 00:45) (55 - 159)  RR: 16 (04 Nov 2023 09:30) (15 - 36)  SpO2: 100% (04 Nov 2023 09:30) (98% - 100%)    Parameters below as of 04 Nov 2023 04:00  Patient On (Oxygen Delivery Method): ventilator    O2 Concentration (%): 70  CAPILLARY BLOOD GLUCOSE      POCT Blood Glucose.: 103 mg/dL (04 Nov 2023 05:17)  POCT Blood Glucose.: 98 mg/dL (04 Nov 2023 00:27)    I&O's Summary    03 Nov 2023 07:01  -  04 Nov 2023 07:00  --------------------------------------------------------  IN: 1716.1 mL / OUT: 1000 mL / NET: 716.1 mL    04 Nov 2023 08:01  -  04 Nov 2023 09:37  --------------------------------------------------------  IN: 92.3 mL / OUT: 0 mL / NET: 92.3 mL        Respiratory:  Mode: AC/ CMV (Assist Control/ Continuous Mandatory Ventilation)  RR (machine): 16  TV (machine): 420  FiO2: 40  PEEP: 5  ITime: 1  MAP: 11  PIP: 28    ABG - ( 04 Nov 2023 06:16 )  pH, Arterial: 7.53  pH, Blood: x     /  pCO2: 34    /  pO2: 128   / HCO3: 28    / Base Excess: 5.7   /  SaO2: 99.6                LABS:                        13.4   11.77 )-----------( 337      ( 03 Nov 2023 20:25 )             41.9     11-03    134<L>  |  93<L>  |  19  ----------------------------<  117<H>  4.4   |  30  |  0.57             MEDICATION LEVELS:     IVF FLUIDS/MEDICATIONS:   MEDICATIONS  (STANDING):  albuterol/ipratropium for Nebulization 3 milliLiter(s) Nebulizer every 6 hours  chlorhexidine 0.12% Liquid 15 milliLiter(s) Oral Mucosa every 12 hours  chlorhexidine 4% Liquid 1 Application(s) Topical at bedtime  enoxaparin Injectable 40 milliGRAM(s) SubCutaneous every 24 hours  insulin lispro (ADMELOG) corrective regimen sliding scale   SubCutaneous every 6 hours  pantoprazole   Suspension 40 milliGRAM(s) Oral before breakfast  phenylephrine    Infusion 0.1 MICROgram(s)/kG/Min (2.48 mL/Hr) IV Continuous <Continuous>  polyethylene glycol 3350 17 Gram(s) Oral every 12 hours  predniSONE   Tablet 20 milliGRAM(s) Oral daily  propofol Infusion 30 MICROgram(s)/kG/Min (11.9 mL/Hr) IV Continuous <Continuous>  sodium chloride 0.9%. 1000 milliLiter(s) (75 mL/Hr) IV Continuous <Continuous>    MEDICATIONS  (PRN):  bisacodyl 5 milliGRAM(s) Oral daily PRN Constipation  senna 2 Tablet(s) Oral at bedtime PRN Constipation        IMAGING:      EXAMINATION:  PHYSICAL EXAM:    Constitutional: Intubated    Neurological: Awake, alert, following commands. Intubated. Bilateral ptosis. Pupils 3 mm and reactive. Gaze midline. Neck flexion 1/5, Neck extension 0/5, Bilateral deltoids 0/5, distally RUE 4+/5, LUE distally 4/5. Bilateral LE 4+/5. Effort dependent exam.                                                 Sensation: [x] intact to light touch  [ ] decreased:     Pulmonary: Clear to Auscultation, No rales, No rhonchi, No wheezes     Cardiovascular: S1, S2, Regular rate and rhythm     Gastrointestinal: Soft, Non-tender, Non-distended     Extremities: No calf tenderness

## 2023-11-04 NOTE — PROGRESS NOTE ADULT - SUBJECTIVE AND OBJECTIVE BOX
HOSPITAL COURSE:   Patient KAIDEN HATCH is a 63y (1960) woman with a PMHx significant for myasthenia gravis diagnosis presenting to the ED for worsening difficulty with double vision, swallowing, talking, and breathing that has progressively gotten worse for the previous one week. Patient is currently on Mestinon 60 mg two tablets TID and Prednisone 10 mg --> uptitrated to 20 mg, she was not taking it per son for a month or so and was discharged on 50 mg after her hospitalization in 06/2023. Has been admitted before in June 2023, requiring intubation and PLEX/IVIG and in Feb 2023 for MG crisis that required intubation (2/1-2/11/23) iso COVID19. Patient received 5 days of PLEX (2/2-2/10/23) followed by 5 days of IVIG (2/16-2/20/23) with symptomatic improvement. Patient's son says patient has responded best to IVIG and that it stabilizes her MG for about 2 years. At baseline, patient is on 2L NC. In the ED, patient's NIF was -50. Her VC was 430. Patient also endorsed increased mucus secretion in her throat, and reliance on her oxygen tank.       Admission Scores  GCS: 15       24 hour Events: Patient evaluated at bedside. Patient was breathing comfortably on room air. Was able to count up to 18 in one breath. Reports worsening of symptoms for the past week, denies any recent illness. Patient reports progressive worsening of her symptoms.     11/4- Patient intubated overnight for worsening respiratory status. Patient received 1 dose of IVIG.     Allergies    peanuts (Unknown)  No Known Drug Allergies    Intolerances        REVIEW OF SYSTEMS: [ ] Unable to Assess due to neurologic exam   [x ] All ROS addressed below are non-contributory, except:  Neuro: [ ] Headache [ ] Back pain [ ] Numbness [ ] Weakness [ ] Ataxia [ ] Dizziness [ ] Aphasia [ ] Dysarthria [ ] Visual disturbance  Resp: [ ] Shortness of breath/dyspnea, [ ] Orthopnea [ ] Cough  CV: [ ] Chest pain [ ] Palpitation [ ] Lightheadedness [ ] Syncope  Renal: [ ] Thirst [ ] Edema  GI: [ ] Nausea [ ] Emesis [ ] Abdominal pain [ ] Constipation [ ] Diarrhea  Hem: [ ] Hematemesis [ ] bright red blood per rectum  ID: [ ] Fever [ ] Chills [ ] Dysuria  ENT: [ ] Rhinorrhea      DEVICES:   [ ] Restraints [ x] ET tube [ ] central line [x ] arterial line [ ] morales [ ] NGT/OGT [ ] EVD [ ] LD [ ] MYAH/HMV [ ] Trach [ ] PEG [ ] Chest Tube     VITALS:   Vital Signs Last 24 Hrs  T(C): 37.2 (04 Nov 2023 07:00), Max: 37.4 (03 Nov 2023 19:00)  T(F): 99 (04 Nov 2023 07:00), Max: 99.3 (03 Nov 2023 19:00)  HR: 59 (04 Nov 2023 09:30) (59 - 114)  BP: 95/53 (04 Nov 2023 00:45) (85/49 - 169/154)  BP(mean): 65 (04 Nov 2023 00:45) (55 - 159)  RR: 16 (04 Nov 2023 09:30) (15 - 36)  SpO2: 100% (04 Nov 2023 09:30) (98% - 100%)    Parameters below as of 04 Nov 2023 04:00  Patient On (Oxygen Delivery Method): ventilator    O2 Concentration (%): 70  CAPILLARY BLOOD GLUCOSE      POCT Blood Glucose.: 103 mg/dL (04 Nov 2023 05:17)  POCT Blood Glucose.: 98 mg/dL (04 Nov 2023 00:27)    I&O's Summary    03 Nov 2023 07:01  -  04 Nov 2023 07:00  --------------------------------------------------------  IN: 1716.1 mL / OUT: 1000 mL / NET: 716.1 mL    04 Nov 2023 08:01  -  04 Nov 2023 09:37  --------------------------------------------------------  IN: 92.3 mL / OUT: 0 mL / NET: 92.3 mL        Respiratory:  Mode: AC/ CMV (Assist Control/ Continuous Mandatory Ventilation)  RR (machine): 16  TV (machine): 420  FiO2: 40  PEEP: 5  ITime: 1  MAP: 11  PIP: 28    ABG - ( 04 Nov 2023 06:16 )  pH, Arterial: 7.53  pH, Blood: x     /  pCO2: 34    /  pO2: 128   / HCO3: 28    / Base Excess: 5.7   /  SaO2: 99.6                LABS:                        13.4   11.77 )-----------( 337      ( 03 Nov 2023 20:25 )             41.9     11-03    134<L>  |  93<L>  |  19  ----------------------------<  117<H>  4.4   |  30  |  0.57             MEDICATION LEVELS:     IVF FLUIDS/MEDICATIONS:   MEDICATIONS  (STANDING):  albuterol/ipratropium for Nebulization 3 milliLiter(s) Nebulizer every 6 hours  chlorhexidine 0.12% Liquid 15 milliLiter(s) Oral Mucosa every 12 hours  chlorhexidine 4% Liquid 1 Application(s) Topical at bedtime  enoxaparin Injectable 40 milliGRAM(s) SubCutaneous every 24 hours  insulin lispro (ADMELOG) corrective regimen sliding scale   SubCutaneous every 6 hours  pantoprazole   Suspension 40 milliGRAM(s) Oral before breakfast  phenylephrine    Infusion 0.1 MICROgram(s)/kG/Min (2.48 mL/Hr) IV Continuous <Continuous>  polyethylene glycol 3350 17 Gram(s) Oral every 12 hours  predniSONE   Tablet 20 milliGRAM(s) Oral daily  propofol Infusion 30 MICROgram(s)/kG/Min (11.9 mL/Hr) IV Continuous <Continuous>  sodium chloride 0.9%. 1000 milliLiter(s) (75 mL/Hr) IV Continuous <Continuous>    MEDICATIONS  (PRN):  bisacodyl 5 milliGRAM(s) Oral daily PRN Constipation  senna 2 Tablet(s) Oral at bedtime PRN Constipation        IMAGING:      EXAMINATION:  PHYSICAL EXAM:    Constitutional: Intubated    Neurological: Awake, alert, following commands. Intubated. Bilateral ptosis. Pupils 3 mm and reactive. Gaze midline. Neck flexion 1/5, Neck extension 0/5, Bilateral deltoids 0/5, distally RUE 4+/5, LUE distally 4/5. Bilateral LE 4+/5. Effort dependent exam.                                                 Sensation: [x] intact to light touch  [ ] decreased:     Pulmonary: Clear to Auscultation, No rales, No rhonchi, No wheezes     Cardiovascular: S1, S2, Regular rate and rhythm     Gastrointestinal: Soft, Non-tender, Non-distended     Extremities: No calf tenderness    HOSPITAL COURSE:   Patient KAIDEN HATCH is a 63y (1960) woman with a PMHx significant for myasthenia gravis diagnosis presenting to the ED for worsening difficulty with double vision, swallowing, talking, and breathing that has progressively gotten worse for the previous one week. Patient is currently on Mestinon 60 mg two tablets TID and Prednisone 10 mg --> uptitrated to 20 mg, she was not taking it per son for a month or so and was discharged on 50 mg after her hospitalization in 06/2023. Has been admitted before in June 2023, requiring intubation and PLEX/IVIG and in Feb 2023 for MG crisis that required intubation (2/1-2/11/23) iso COVID19. Patient received 5 days of PLEX (2/2-2/10/23) followed by 5 days of IVIG (2/16-2/20/23) with symptomatic improvement. Patient's son says patient has responded best to IVIG and that it stabilizes her MG for about 2 years. At baseline, patient is on 2L NC. In the ED, patient's NIF was -50. Her VC was 430. Patient also endorsed increased mucus secretion in her throat, and reliance on her oxygen tank.       Admission Scores  GCS: 15       24 hour Events: Patient evaluated at bedside. Patient was breathing comfortably on room air. Was able to count up to 18 in one breath. Reports worsening of symptoms for the past week, denies any recent illness. Patient reports progressive worsening of her symptoms.     11/4- Patient intubated overnight for worsening respiratory status. Patient received 1 dose of IVIG.  11/4-11/5: No acute On events, 2nd dose of IVIG w/o complications    Allergies    peanuts (Unknown)  No Known Drug Allergies    Intolerances      REVIEW OF SYSTEMS: [ ] Unable to Assess due to neurologic exam   [x ] All ROS addressed below are non-contributory, except:  Neuro: [ ] Headache [ ] Back pain [ ] Numbness [ ] Weakness [ ] Ataxia [ ] Dizziness [ ] Aphasia [ ] Dysarthria [ ] Visual disturbance  Resp: [ ] Shortness of breath/dyspnea, [ ] Orthopnea [ ] Cough  CV: [ ] Chest pain [ ] Palpitation [ ] Lightheadedness [ ] Syncope  Renal: [ ] Thirst [ ] Edema  GI: [ ] Nausea [ ] Emesis [ ] Abdominal pain [ ] Constipation [ ] Diarrhea  Hem: [ ] Hematemesis [ ] bright red blood per rectum  ID: [ ] Fever [ ] Chills [ ] Dysuria  ENT: [ ] Rhinorrhea      DEVICES:   [ ] Restraints [ x] ET tube [ ] central line [x ] arterial line [ ] morales [ ] NGT/OGT [ ] EVD [ ] LD [ ] MYAH/HMV [ ] Trach [ ] PEG [ ] Chest Tube     ICU Vital Signs Last 24 Hrs  T(C): 37.7 (04 Nov 2023 19:00), Max: 37.7 (04 Nov 2023 19:00)  T(F): 99.9 (04 Nov 2023 19:00), Max: 99.9 (04 Nov 2023 19:00)  HR: 60 (04 Nov 2023 23:25) (59 - 87)  BP: 95/53 (04 Nov 2023 00:45) (85/49 - 169/154)  BP(mean): 65 (04 Nov 2023 00:45) (55 - 159)  ABP: 120/46 (04 Nov 2023 22:00) (99/61 - 163/63)  ABP(mean): 70 (04 Nov 2023 22:00) (60 - 102)  RR: 16 (04 Nov 2023 22:00) (15 - 26)  SpO2: 100% (04 Nov 2023 23:25) (98% - 100%)    O2 Parameters below as of 04 Nov 2023 23:25  Patient On (Oxygen Delivery Method): ventilator    CAPILLARY BLOOD GLUCOSE      POCT Blood Glucose.: 106 mg/dL (04 Nov 2023 23:28)  POCT Blood Glucose.: 87 mg/dL (04 Nov 2023 17:20)  POCT Blood Glucose.: 92 mg/dL (04 Nov 2023 11:25)  POCT Blood Glucose.: 103 mg/dL (04 Nov 2023 05:17)  POCT Blood Glucose.: 98 mg/dL (04 Nov 2023 00:27)      I&O's Summary    03 Nov 2023 07:01  -  04 Nov 2023 07:00  --------------------------------------------------------  IN: 1716.2 mL / OUT: 1000 mL / NET: 716.2 mL    04 Nov 2023 08:01  -  04 Nov 2023 23:46  --------------------------------------------------------  IN: 1550.9 mL / OUT: 700 mL / NET: 850.9 mL        Respiratory:  Mode: AC/ CMV (Assist Control/ Continuous Mandatory Ventilation)  RR (machine): 16  TV (machine): 420  FiO2: 40  PEEP: 5  ITime: 1  MAP: 11  PIP: 28    ABG - ( 04 Nov 2023 06:16 )  pH, Arterial: 7.53  pH, Blood: x     /  pCO2: 34    /  pO2: 128   / HCO3: 28    / Base Excess: 5.7   /  SaO2: 99.6                              12.0   15.40 )-----------( 293      ( 04 Nov 2023 21:29 )             36.4   11-04    136  |  102  |  18  ----------------------------<  120<H>  4.2   |  20<L>  |  0.57    Ca    8.4      04 Nov 2023 21:29  Phos  2.8     11-04  Mg     1.8     11-04    TPro  6.3  /  Alb  3.7  /  TBili  0.3  /  DBili  x   /  AST  16  /  ALT  15  /  AlkPhos  76  11-03      MEDICATIONS  (STANDING):  albuterol/ipratropium for Nebulization 3 milliLiter(s) Nebulizer every 6 hours  chlorhexidine 0.12% Liquid 15 milliLiter(s) Oral Mucosa every 12 hours  chlorhexidine 4% Liquid 1 Application(s) Topical at bedtime  diphenhydrAMINE Injectable 25 milliGRAM(s) IV Push <User Schedule>  enoxaparin Injectable 40 milliGRAM(s) SubCutaneous every 24 hours  immune   globulin 10% (GAMMAGARD) IVPB 25 Gram(s) IV Intermittent <User Schedule>  insulin lispro (ADMELOG) corrective regimen sliding scale   SubCutaneous every 6 hours  multiple electrolytes Injection Type 1 Bolus 1000 milliLiter(s) IV Bolus once  pantoprazole   Suspension 40 milliGRAM(s) Oral before breakfast  phenylephrine    Infusion 0.1 MICROgram(s)/kG/Min (2.48 mL/Hr) IV Continuous <Continuous>  polyethylene glycol 3350 17 Gram(s) Oral every 12 hours  predniSONE   Tablet 60 milliGRAM(s) Oral daily  sodium chloride 0.9%. 1000 milliLiter(s) (75 mL/Hr) IV Continuous <Continuous>    MEDICATIONS  (PRN):  bisacodyl 5 milliGRAM(s) Oral daily PRN Constipation  senna 2 Tablet(s) Oral at bedtime PRN Constipation        EXAMINATION:  PHYSICAL EXAM:    Constitutional: Intubated    Neurological: Awake, alert, following commands. Intubated. Bilateral ptosis. Pupils 3 mm and reactive. Gaze midline. Neck flexion 3/5, Neck extension 4/5, Bilateral deltoids 3/5, distally RUE 4+/5, LUE distally 4/5. Bilateral LE 4+/5. Effort dependent exam.                                                 Sensation: [x] intact to light touch  [ ] decreased:     Pulmonary: Clear to Auscultation, No rales, No rhonchi, No wheezes     Cardiovascular: S1, S2, Regular rate and rhythm     Gastrointestinal: Soft, Non-tender, Non-distended     Extremities: No calf tenderness

## 2023-11-04 NOTE — PROGRESS NOTE ADULT - ASSESSMENT
ASSESSMENT/PLAN: 62 y/o female with myasthenia gravis, impending respiratory failure.       NEURO:  Neurochecks Q2H  Discontinue Mestinon, patient with copious secretions   Increased Prednisone to 60 mg QD  Patient will need IVIG x 5 days duration, son and patients would rather get IVIG over PLEX- IVIG  (1/5 dose)  Avoid medications that may worsen the current exacerbation including macrolides, fluoroquinolones, tetracyclines, aminoglycoside, beta-blockers, magnesium, and anti-arrhythmics (procainamide, quinidine, and lidocaine)  Neurology following, appreciate their recommendations  Activity: [x] mobilize as tolerated [] Bedrest [x] PT [x] OT [] PMNR    PULM:  Intubated for airway protection  ABG/CXR reviewed  Currently patient is not in respiratory distress  Would check NIF/VC Q4 hours in ICU setting    CV:  MAP>65  TTE - EF 54%  On rich currently, will wean off rich    RENAL:  Hyponatremic, will repeat BMP  daily IOs    GI:  Diet: ON tf at GOAL   GI prophylaxis: PPI while on steroids  Bowel regimen: miralax, senna    ENDO:   FS goal 120-180  Place on ISS and adjsut insulin as needed  A1C 6.7    HEME/ONC:  Monitor H/H  SCDs  Chemoppx: SQL    ID:  Monitor for fevers, leukocytosis likely related to steroids  No signs of infection   Continue to monitor for infection    Disposition: ICU       ASSESSMENT/PLAN: 64 y/o female with myasthenia gravis, impending respiratory failure.       NEURO:  Neurochecks Q2H  Discontinue Mestinon, patient with copious secretions   Increased Prednisone to 60 mg QD  Patient will need IVIG x 5 days duration, son and patients would rather get IVIG over PLEX- IVIG  (1/5 dose)  Avoid medications that may worsen the current exacerbation including macrolides, fluoroquinolones, tetracyclines, aminoglycoside, beta-blockers, magnesium, and anti-arrhythmics (procainamide, quinidine, and lidocaine)  Neurology following, appreciate their recommendations  Activity: [x] mobilize as tolerated [] Bedrest [x] PT [x] OT [] PMNR    PULM:  Intubated for airway protection  ABG/CXR reviewed  Currently patient is not in respiratory distress  Would check NIF/VC Q4 hours in ICU setting    CV:  MAP>65  TTE - EF 54%  On rich currently, will wean off rich    RENAL:  Hyponatremic, will repeat BMP  daily IOs    GI:  Diet: ON tf at GOAL   GI prophylaxis: PPI while on steroids  Bowel regimen: miralax, senna    ENDO:   FS goal 120-180  Place on ISS and adjsut insulin as needed  A1C 6.7    HEME/ONC:  Monitor H/H  SCDs  Chemoppx: SQL    ID:  Monitor for fevers, leukocytosis likely related to steroids  No signs of infection   Continue to monitor for infection    Disposition: ICU       ASSESSMENT/PLAN: 64 y/o female with myasthenia gravis, impending respiratory failure.       NEURO:  Neurochecks Q2H  Discontinue Mestinon, patient with copious secretions   Increased Prednisone to 60 mg QD  Patient will need IVIG x 5 days duration, son and patients would rather get IVIG over PLEX- IVIG  (1/5 dose)  Avoid medications that may worsen the current exacerbation including macrolides, fluoroquinolones, tetracyclines, aminoglycoside, beta-blockers, magnesium, and anti-arrhythmics (procainamide, quinidine, and lidocaine)  Neurology following, appreciate their recommendations  Activity: [x] mobilize as tolerated [] Bedrest [x] PT [x] OT [] PMNR    PULM:  Intubated for airway protection  ABG/CXR reviewed  Currently patient is not in respiratory distress  Would check NIF/VC Q4 hours in ICU setting    CV:  MAP>65  TTE - EF 54%  On rich currently, will wean off rich    RENAL:  daily IOs  replete lytes PRN    GI:  Diet: ON tf at GOAL   GI prophylaxis: PPI while on steroids  Bowel regimen: miralax, senna    ENDO:   FS goal 120-180  Place on ISS and adjsut insulin as needed  A1C 6.7    HEME/ONC:  Monitor H/H  SCDs  Chemoppx: SQL    ID:  Monitor for fevers, leukocytosis likely related to steroids  No signs of infection   Continue to monitor for infection    Disposition: ICU

## 2023-11-04 NOTE — CHART NOTE - NSCHARTNOTEFT_GEN_A_CORE
CAPRINI SCORE [CLOT] Score on Admission for     AGE RELATED RISK FACTORS                                                       MOBILITY RELATED FACTORS  [ ] Age 41-60 years                                            (1 Point)                  [ x] Bed rest                                                        (1 Point)  [x ] Age: 61-74 years                                           (2 Points)                 [ ] Plaster cast                                                   (2 Points)  [ ] Age= 75 years                                              (3 Points)                 [ ] Bed bound for more than 72 hours                 (2 Points)    DISEASE RELATED RISK FACTORS                                               GENDER SPECIFIC FACTORS  [ ] Edema in the lower extremities                       (1 Point)                  [ ] Pregnancy                                                     (1 Point)  [ ] Varicose veins                                               (1 Point)                  [ ] Post-partum < 6 weeks                                   (1 Point)             [ ] BMI > 25 Kg/m2                                            (1 Point)                  [ ] Hormonal therapy  or oral contraception          (1 Point)                 [ ] Sepsis (in the previous month)                        (1 Point)                  [ ] History of pregnancy complications                 (1 point)  [ ] Pneumonia or serious lung disease                                               [ ] Unexplained or recurrent                     (1 Point)           (in the previous month)                               (1 Point)  [ ] Abnormal pulmonary function test                     (1 Point)                 SURGERY RELATED RISK FACTORS (include planned surgeries)  [ ] Acute myocardial infarction                              (1 Point)                 [ ]  Section                                             (1 Point)  [ ] Congestive heart failure (in the previous month)  (1 Point)         [ ] Minor surgery                                                  (1 Point)   [ ] Inflammatory bowel disease                             (1 Point)                 [ ] Arthroscopic surgery                                        (2 Points)  [ ] Central venous access                                      (2 Points)                [ ] General surgery lasting more than 45 minutes   (2 Points)       [ ] Stroke (in the previous month)                          (5 Points)               [ ] Elective arthroplasty                                         (5 Points)            [ ] current or past malignancy                              (2 Points)                                                                                                       HEMATOLOGY RELATED FACTORS                                                 TRAUMA RELATED RISK FACTORS  [ ] Prior episodes of VTE                                     (3 Points)                [ ] Fracture of the hip, pelvis, or leg                       (5 Points)  [ ] Positive family history for VTE                         (3 Points)                 [ ] Acute spinal cord injury (in the previous month)  (5 Points)  [ ] Prothrombin 10829 A                                     (3 Points)                 [ ] Paralysis  (less than 1 month)                             (5 Points)  [ ] Factor V Leiden                                             (3 Points)                  [ ] Multiple Trauma within 1 month                        (5 Points)  [ ] Lupus anticoagulants                                     (3 Points)                                                           [ ] Anticardiolipin antibodies                               (3 Points)                                                       [ ] High homocysteine in the blood                      (3 Points)                                             [ ] Other congenital or acquired thrombophilia      (3 Points)                                                [ ] Heparin induced thrombocytopenia                  (3 Points)                                          Total Score [     3     ]    Risk:  Very low 0   Low 1 to 2   Moderate 3 to 4   High =5       VTE Prophylasix Recommednations:  [ x mechanical pneumatic compression devices                                      [ ] contraindicated: _____________________  [ ] chemo prophylasix                                                                                   [ x] contraindicated _____________________    **** HIGH LIKELIHOOD DVT PRESENT ON ADMISSION  [ ] (please order LE dopplers within 24 hours of admission) CAPRINI SCORE [CLOT] Score on Admission for     AGE RELATED RISK FACTORS                                                       MOBILITY RELATED FACTORS  [ ] Age 41-60 years                                            (1 Point)                  [ x] Bed rest                                                        (1 Point)  [x ] Age: 61-74 years                                           (2 Points)                 [ ] Plaster cast                                                   (2 Points)  [ ] Age= 75 years                                              (3 Points)                 [ ] Bed bound for more than 72 hours                 (2 Points)    DISEASE RELATED RISK FACTORS                                               GENDER SPECIFIC FACTORS  [ ] Edema in the lower extremities                       (1 Point)                  [ ] Pregnancy                                                     (1 Point)  [ ] Varicose veins                                               (1 Point)                  [ ] Post-partum < 6 weeks                                   (1 Point)             [ ] BMI > 25 Kg/m2                                            (1 Point)                  [ ] Hormonal therapy  or oral contraception          (1 Point)                 [ ] Sepsis (in the previous month)                        (1 Point)                  [ ] History of pregnancy complications                 (1 point)  [ ] Pneumonia or serious lung disease                                               [ ] Unexplained or recurrent                     (1 Point)           (in the previous month)                               (1 Point)  [ ] Abnormal pulmonary function test                     (1 Point)                 SURGERY RELATED RISK FACTORS (include planned surgeries)  [ ] Acute myocardial infarction                              (1 Point)                 [ ]  Section                                             (1 Point)  [ ] Congestive heart failure (in the previous month)  (1 Point)         [ ] Minor surgery                                                  (1 Point)   [ ] Inflammatory bowel disease                             (1 Point)                 [ ] Arthroscopic surgery                                        (2 Points)  [ ] Central venous access                                      (2 Points)                [ ] General surgery lasting more than 45 minutes   (2 Points)       [ ] Stroke (in the previous month)                          (5 Points)               [ ] Elective arthroplasty                                         (5 Points)            [ ] current or past malignancy                              (2 Points)                                                                                                       HEMATOLOGY RELATED FACTORS                                                 TRAUMA RELATED RISK FACTORS  [ ] Prior episodes of VTE                                     (3 Points)                [ ] Fracture of the hip, pelvis, or leg                       (5 Points)  [ ] Positive family history for VTE                         (3 Points)                 [ ] Acute spinal cord injury (in the previous month)  (5 Points)  [ ] Prothrombin 74777 A                                     (3 Points)                 [ ] Paralysis  (less than 1 month)                             (5 Points)  [ ] Factor V Leiden                                             (3 Points)                  [ ] Multiple Trauma within 1 month                        (5 Points)  [ ] Lupus anticoagulants                                     (3 Points)                                                           [ ] Anticardiolipin antibodies                               (3 Points)                                                       [ ] High homocysteine in the blood                      (3 Points)                                             [ ] Other congenital or acquired thrombophilia      (3 Points)                                                [ ] Heparin induced thrombocytopenia                  (3 Points)                                          Total Score [     3     ]    Risk:  Very low 0   Low 1 to 2   Moderate 3 to 4   High =5       VTE Prophylasix Recommednations:  [ x mechanical pneumatic compression devices                                      [ ] contraindicated: _____________________  [ ] chemo prophylasix                                                                                   [ x] contraindicated _____________________    **** HIGH LIKELIHOOD DVT PRESENT ON ADMISSION  [ ] (please order LE dopplers within 24 hours of admission) CAPRINI SCORE [CLOT] Score on Admission for     AGE RELATED RISK FACTORS                                                       MOBILITY RELATED FACTORS  [ ] Age 41-60 years                                            (1 Point)                  [ x] Bed rest                                                        (1 Point)  [x ] Age: 61-74 years                                           (2 Points)                 [ ] Plaster cast                                                   (2 Points)  [ ] Age= 75 years                                              (3 Points)                 [ ] Bed bound for more than 72 hours                 (2 Points)    DISEASE RELATED RISK FACTORS                                               GENDER SPECIFIC FACTORS  [ ] Edema in the lower extremities                       (1 Point)                  [ ] Pregnancy                                                     (1 Point)  [ ] Varicose veins                                               (1 Point)                  [ ] Post-partum < 6 weeks                                   (1 Point)             [ ] BMI > 25 Kg/m2                                            (1 Point)                  [ ] Hormonal therapy  or oral contraception          (1 Point)                 [ ] Sepsis (in the previous month)                        (1 Point)                  [ ] History of pregnancy complications                 (1 point)  [ ] Pneumonia or serious lung disease                                               [ ] Unexplained or recurrent                     (1 Point)           (in the previous month)                               (1 Point)  [ ] Abnormal pulmonary function test                     (1 Point)                 SURGERY RELATED RISK FACTORS (include planned surgeries)  [ ] Acute myocardial infarction                              (1 Point)                 [ ]  Section                                             (1 Point)  [ ] Congestive heart failure (in the previous month)  (1 Point)         [ ] Minor surgery                                                  (1 Point)   [ ] Inflammatory bowel disease                             (1 Point)                 [ ] Arthroscopic surgery                                        (2 Points)  [ ] Central venous access                                      (2 Points)                [ ] General surgery lasting more than 45 minutes   (2 Points)       [ ] Stroke (in the previous month)                          (5 Points)               [ ] Elective arthroplasty                                         (5 Points)            [ ] current or past malignancy                              (2 Points)                                                                                                       HEMATOLOGY RELATED FACTORS                                                 TRAUMA RELATED RISK FACTORS  [ ] Prior episodes of VTE                                     (3 Points)                [ ] Fracture of the hip, pelvis, or leg                       (5 Points)  [ ] Positive family history for VTE                         (3 Points)                 [ ] Acute spinal cord injury (in the previous month)  (5 Points)  [ ] Prothrombin 13969 A                                     (3 Points)                 [ ] Paralysis  (less than 1 month)                             (5 Points)  [ ] Factor V Leiden                                             (3 Points)                  [ ] Multiple Trauma within 1 month                        (5 Points)  [ ] Lupus anticoagulants                                     (3 Points)                                                           [ ] Anticardiolipin antibodies                               (3 Points)                                                       [ ] High homocysteine in the blood                      (3 Points)                                             [ ] Other congenital or acquired thrombophilia      (3 Points)                                                [ ] Heparin induced thrombocytopenia                  (3 Points)                                          Total Score [     3     ]    Risk:  Very low 0   Low 1 to 2   Moderate 3 to 4   High =5       VTE Prophylasix Recommednations:  [ x mechanical pneumatic compression devices                                      [ ] contraindicated: _____________________  [ ] chemo prophylasix                                                                                   [ x] contraindicated _____________________    **** HIGH LIKELIHOOD DVT PRESENT ON ADMISSION  [ ] (please order LE dopplers within 24 hours of admission)

## 2023-11-04 NOTE — PROGRESS NOTE ADULT - ATTENDING COMMENTS
KAIDEN HATCH is a 63y (1960) woman with a PMHx significant for myasthenia gravis s/p thymectomy who presented to the ED due to worsening double vision, swallowing, talking, and breathing over last one week. She is currently on Mestinon 60 mg two tablets TID and Prednisone 10 mg --> uptitrated to 20 mg, she was not taking it per son for a month or so and was discharged on 50 mg after her hospitalization in 06/2023. She has had recurrent hospitalizations with best response to IVIG as it had previously stabilized her condition. At baseline, patient is on 2L NC, is reliant on oxygen and has progressively worsened.  Her she is now intubated and receving positive airway support.    On exam:  Pt is intubated   + blink, gag, pupils bilaterally reactive.  MS: Awake, alert.  Follows commands  Language: Speech is absent, intubated   CNs: PERRL VFF. EOMI, can maintain 60second upward gaze, no ptosis,. , No facial asymmetry b/l. Hearing grossly normal b/l.       Impression: Myasthenia gravis with acute exacerbation    Recommendations:  [] Continue with IVIG  []Hold Mestinon given secretions  []Trend NIF/VC,Extubate when able per NSCU  []Continue Prednisone 60mg QD  []Toxic/metabolic workup per ICU (Alkalemia likely iatrogenic, Leukocytosis likely iatrogenic, UA non infectious, CXR without consolidation, afebrile, consider RVP); if no trigger for crisis detected would consider DC on increased immunosuppression and/or lower pyridostigmine given pyridostigmine was recently increased and prednisone was DCed 9/15 but restarted at lower dose 2 weeks prior to admission. If AE from prolonged steroids would consider outpatient IVIG +/- Ocrelizumab. Has established outpatient neurologist in [facility] [place].   []follow up with neuromuscular specialist after discharge - can follow at [address] [place] [address] [phone] if patient desires KAIDEN HATCH is a 63y (1960) woman with a PMHx significant for myasthenia gravis s/p thymectomy who presented to the ED due to worsening double vision, swallowing, talking, and breathing over last one week. She is currently on Mestinon 60 mg two tablets TID and Prednisone 10 mg --> uptitrated to 20 mg, she was not taking it per son for a month or so and was discharged on 50 mg after her hospitalization in 06/2023. She has had recurrent hospitalizations with best response to IVIG as it had previously stabilized her condition. At baseline, patient is on 2L NC, is reliant on oxygen and has progressively worsened.  Her she is now intubated and receving positive airway support.    On exam:  Pt is intubated   + blink, gag, pupils bilaterally reactive.  MS: Awake, alert.  Follows commands  Language: Speech is absent, intubated   CNs: PERRL VFF. EOMI, can maintain 60second upward gaze, no ptosis,. , No facial asymmetry b/l. Hearing grossly normal b/l.       Impression: Myasthenia gravis with acute exacerbation    Recommendations:  [] Continue with IVIG  []Hold Mestinon given secretions  []Trend NIF/VC,Extubate when able per NSCU  []Continue Prednisone 60mg QD  []Toxic/metabolic workup per ICU (Alkalemia likely iatrogenic, Leukocytosis likely iatrogenic, UA non infectious, CXR without consolidation, afebrile, consider RVP); if no trigger for crisis detected would consider DC on increased immunosuppression and/or lower pyridostigmine given pyridostigmine was recently increased and prednisone was DCed 9/15 but restarted at lower dose 2 weeks prior to admission. If AE from prolonged steroids would consider outpatient IVIG +/- Ocrelizumab. Has established outpatient neurologist in [facility] [place].   []follow up with neuromuscular specialist after discharge - can follow at [address] [place] [address] [phone] if patient desires KAIDEN HATCH is a 63y (1960) woman with a PMHx significant for myasthenia gravis s/p thymectomy who presented to the ED due to worsening double vision, swallowing, talking, and breathing over last one week. She is currently on Mestinon 60 mg two tablets TID and Prednisone 10 mg --> uptitrated to 20 mg, she was not taking it per son for a month or so and was discharged on 50 mg after her hospitalization in 06/2023. She has had recurrent hospitalizations with best response to IVIG as it had previously stabilized her condition. At baseline, patient is on 2L NC, is reliant on oxygen and has progressively worsened.  Her she is now intubated and receving positive airway support.    On exam:  Pt is intubated   + blink, gag, pupils bilaterally reactive.  MS: Awake, alert.  Follows commands  Language: Speech is absent, intubated   CNs: PERRL VFF. EOMI, can maintain 60second upward gaze, no ptosis,. , No facial asymmetry b/l. Hearing grossly normal b/l.       Impression: Myasthenia gravis with acute exacerbation    Recommendations:  [] Continue with IVIG  []Hold Mestinon given secretions  []Trend NIF/VC,Extubate when able per NSCU  []Continue Prednisone 60mg QD  []Toxic/metabolic workup per ICU (Alkalemia likely iatrogenic, Leukocytosis likely iatrogenic, UA non infectious, CXR without consolidation, afebrile, consider RVP); if no trigger for crisis detected would consider DC on increased immunosuppression and/or lower pyridostigmine given pyridostigmine was recently increased and prednisone was DCed 9/15 but restarted at lower dose 2 weeks prior to admission. If AE from prolonged steroids would consider outpatient IVIG +/- Ocrelizumab. Has established outpatient neurologist in [facility] [place].   []follow up with neuromuscular specialist after discharge - can follow at [address] [place] [address] [phone] if patient desires

## 2023-11-05 PROCEDURE — 99291 CRITICAL CARE FIRST HOUR: CPT

## 2023-11-05 PROCEDURE — 71045 X-RAY EXAM CHEST 1 VIEW: CPT | Mod: 26

## 2023-11-05 PROCEDURE — 93308 TTE F-UP OR LMTD: CPT | Mod: 26

## 2023-11-05 PROCEDURE — 93321 DOPPLER ECHO F-UP/LMTD STD: CPT | Mod: 26

## 2023-11-05 RX ORDER — SODIUM CHLORIDE 9 MG/ML
4 INJECTION INTRAMUSCULAR; INTRAVENOUS; SUBCUTANEOUS EVERY 6 HOURS
Refills: 0 | Status: DISCONTINUED | OUTPATIENT
Start: 2023-11-05 | End: 2023-11-06

## 2023-11-05 RX ADMIN — SENNA PLUS 2 TABLET(S): 8.6 TABLET ORAL at 21:00

## 2023-11-05 RX ADMIN — CHLORHEXIDINE GLUCONATE 1 APPLICATION(S): 213 SOLUTION TOPICAL at 21:14

## 2023-11-05 RX ADMIN — Medication 3 MILLILITER(S): at 17:33

## 2023-11-05 RX ADMIN — PANTOPRAZOLE SODIUM 40 MILLIGRAM(S): 20 TABLET, DELAYED RELEASE ORAL at 08:00

## 2023-11-05 RX ADMIN — CHLORHEXIDINE GLUCONATE 15 MILLILITER(S): 213 SOLUTION TOPICAL at 17:15

## 2023-11-05 RX ADMIN — Medication 2: at 00:04

## 2023-11-05 RX ADMIN — Medication 3 MILLILITER(S): at 23:08

## 2023-11-05 RX ADMIN — CHLORHEXIDINE GLUCONATE 15 MILLILITER(S): 213 SOLUTION TOPICAL at 04:31

## 2023-11-05 RX ADMIN — IMMUNE GLOBULIN (HUMAN) 125 GRAM(S): 10 INJECTION INTRAVENOUS; SUBCUTANEOUS at 20:12

## 2023-11-05 RX ADMIN — CHLORHEXIDINE GLUCONATE 1 APPLICATION(S): 213 SOLUTION TOPICAL at 04:10

## 2023-11-05 RX ADMIN — Medication 60 MILLIGRAM(S): at 05:13

## 2023-11-05 RX ADMIN — Medication 2: at 17:14

## 2023-11-05 RX ADMIN — Medication 2: at 23:30

## 2023-11-05 RX ADMIN — SODIUM CHLORIDE 1000 MILLILITER(S): 9 INJECTION, SOLUTION INTRAVENOUS at 00:05

## 2023-11-05 RX ADMIN — SODIUM CHLORIDE 4 MILLILITER(S): 9 INJECTION INTRAMUSCULAR; INTRAVENOUS; SUBCUTANEOUS at 23:09

## 2023-11-05 RX ADMIN — Medication 3 MILLILITER(S): at 05:03

## 2023-11-05 RX ADMIN — Medication 3 MILLILITER(S): at 11:40

## 2023-11-05 RX ADMIN — Medication 4: at 12:33

## 2023-11-05 RX ADMIN — ENOXAPARIN SODIUM 40 MILLIGRAM(S): 100 INJECTION SUBCUTANEOUS at 21:14

## 2023-11-05 RX ADMIN — Medication 25 MILLIGRAM(S): at 20:11

## 2023-11-05 NOTE — PROGRESS NOTE ADULT - SUBJECTIVE AND OBJECTIVE BOX
· Subjective and Objective:   HOSPITAL COURSE:   Patient KAIDEN HATCH is a 63y (1960) woman with a PMHx significant for myasthenia gravis diagnosis presenting to the ED for worsening difficulty with double vision, swallowing, talking, and breathing that has progressively gotten worse for the previous one week. Patient is currently on Mestinon 60 mg two tablets TID and Prednisone 10 mg --> uptitrated to 20 mg, she was not taking it per son for a month or so and was discharged on 50 mg after her hospitalization in 06/2023. Has been admitted before in June 2023, requiring intubation and PLEX/IVIG and in Feb 2023 for MG crisis that required intubation (2/1-2/11/23) iso COVID19. Patient received 5 days of PLEX (2/2-2/10/23) followed by 5 days of IVIG (2/16-2/20/23) with symptomatic improvement. Patient's son says patient has responded best to IVIG and that it stabilizes her MG for about 2 years. At baseline, patient is on 2L NC. In the ED, patient's NIF was -50. Her VC was 430. Patient also endorsed increased mucus secretion in her throat, and reliance on her oxygen tank.       Admission Scores  GCS: 15       24 hour Events: Patient evaluated at bedside. Patient was breathing comfortably on room air. Was able to count up to 18 in one breath. Reports worsening of symptoms for the past week, denies any recent illness. Patient reports progressive worsening of her symptoms.     11/4- Patient intubated overnight for worsening respiratory status. Patient received 1 dose of IVIG.     Allergies    peanuts (Unknown)  No Known Drug Allergies    Intolerances        REVIEW OF SYSTEMS: [ ] Unable to Assess due to neurologic exam   [x ] All ROS addressed below are non-contributory, except:  Neuro: [ ] Headache [ ] Back pain [ ] Numbness [ ] Weakness [ ] Ataxia [ ] Dizziness [ ] Aphasia [ ] Dysarthria [ ] Visual disturbance  Resp: [ ] Shortness of breath/dyspnea, [ ] Orthopnea [ ] Cough  CV: [ ] Chest pain [ ] Palpitation [ ] Lightheadedness [ ] Syncope  Renal: [ ] Thirst [ ] Edema  GI: [ ] Nausea [ ] Emesis [ ] Abdominal pain [ ] Constipation [ ] Diarrhea  Hem: [ ] Hematemesis [ ] bright red blood per rectum  ID: [ ] Fever [ ] Chills [ ] Dysuria  ENT: [ ] Rhinorrhea      DEVICES:   [ ] Restraints [ x] ET tube [ ] central line [x ] arterial line [ ] morales [ ] NGT/OGT [ ] EVD [ ] LD [ ] MYAH/HMV [ ] Trach [ ] PEG [ ] Chest Tube     O: s/p IVIG x 2       T(C): 37.7 (11-05-23 @ 03:00), Max: 37.7 (11-04-23 @ 19:00)  HR: 55 (11-05-23 @ 07:00) (55 - 87)  BP: --  RR: 16 (11-05-23 @ 07:00) (16 - 23)  SpO2: 100% (11-05-23 @ 07:00) (100% - 100%)  11-04-23 @ 08:01  -  11-05-23 @ 07:00  --------------------------------------------------------  IN: 3650.8 mL / OUT: 1700 mL / NET: 1950.8 mL    albuterol/ipratropium for Nebulization 3 milliLiter(s) Nebulizer every 6 hours  bisacodyl 5 milliGRAM(s) Oral daily PRN  chlorhexidine 0.12% Liquid 15 milliLiter(s) Oral Mucosa every 12 hours  chlorhexidine 4% Liquid 1 Application(s) Topical at bedtime  diphenhydrAMINE Injectable 25 milliGRAM(s) IV Push <User Schedule>  enoxaparin Injectable 40 milliGRAM(s) SubCutaneous every 24 hours  immune   globulin 10% (GAMMAGARD) IVPB 25 Gram(s) IV Intermittent <User Schedule>  insulin lispro (ADMELOG) corrective regimen sliding scale   SubCutaneous every 6 hours  pantoprazole   Suspension 40 milliGRAM(s) Oral before breakfast  phenylephrine    Infusion 0.1 MICROgram(s)/kG/Min IV Continuous <Continuous>  polyethylene glycol 3350 17 Gram(s) Oral every 12 hours  predniSONE   Tablet 60 milliGRAM(s) Oral daily  senna 2 Tablet(s) Oral at bedtime PRN  sodium chloride 0.9%. 1000 milliLiter(s) IV Continuous <Continuous>  Mode: AC/ CMV (Assist Control/ Continuous Mandatory Ventilation), RR (machine): 16, TV (machine): 420, FiO2: 40, PEEP: 5, ITime: 1, MAP: 11, PIP: 25        EXAMINATION:  PHYSICAL EXAM:    Constitutional: Intubated    Neurological: Awake, alert, following commands. Intubated. improved bilateral ptosis. Pupils 3 mm and reactive. Gaze midline. Neck flexion 3/5, Neck extension 3/5, Bilateral deltoids 3/5, distally RUE 4+/5, LUE distally 4/5. Bilateral LE 4+/5. Effort dependent exam.                                                 Sensation: [x] intact to light touch  [ ] decreased:     Pulmonary: Clear to Auscultation, No rales, No rhonchi, No wheezes     Cardiovascular: S1, S2, Regular rate and rhythm     Gastrointestinal: Soft, Non-tender, Non-distended     Extremities: No calf tenderness       LABS:  Na: 136 (11-04 @ 21:29), 136 (11-04 @ 13:50), 134 (11-03 @ 20:22), 135 (11-03 @ 16:13)  K: 4.2 (11-04 @ 21:29), 3.5 (11-04 @ 13:50), 4.4 (11-03 @ 20:22), 4.3 (11-03 @ 16:13)  Cl: 102 (11-04 @ 21:29), 99 (11-04 @ 13:50), 93 (11-03 @ 20:22), 92 (11-03 @ 16:13)  CO2: 20 (11-04 @ 21:29), 22 (11-04 @ 13:50), 30 (11-03 @ 20:22), 29 (11-03 @ 16:13)  BUN: 18 (11-04 @ 21:29), 22 (11-04 @ 13:50), 19 (11-03 @ 20:22), 18 (11-03 @ 16:13)  Cr: 0.57 (11-04 @ 21:29), 0.63 (11-04 @ 13:50), 0.57 (11-03 @ 20:22), 0.56 (11-03 @ 16:13)  Glu: 120(11-04 @ 21:29), 81(11-04 @ 13:50), 117(11-03 @ 20:22), 126(11-03 @ 16:13)    Hgb: 12.0 (11-04 @ 21:29), 13.4 (11-03 @ 20:25), 15.1 (11-03 @ 16:13)  Hct: 36.4 (11-04 @ 21:29), 41.9 (11-03 @ 20:25), 47.1 (11-03 @ 16:13)  WBC: 15.40 (11-04 @ 21:29), 11.77 (11-03 @ 20:25), 14.19 (11-03 @ 16:13)  Plt: 293 (11-04 @ 21:29), 337 (11-03 @ 20:25), 390 (11-03 @ 16:13)    INR:   PTT:             Assessment and Plan: 	  ASSESSMENT/PLAN: 62 y/o female with myasthenia gravis crisis  acute respiratory failure intubated       NEURO:  Neurochecks Q4H  Mestinon held     Prednisone to 60 mg QD   IVIG x day 3/5 of IVIG , son and patients would rather get IVIG over PLEX, neuro exam improved   will get NIF and VC now   Avoid medications that may worsen the current exacerbation including macrolides, fluoroquinolones, tetracyclines, aminoglycoside, beta-blockers, magnesium, and anti-arrhythmics (procainamide, quinidine, and lidocaine)  Neurology following, appreciate their recommendations  Activity: [x] mobilize as tolerated [] Bedrest [x] PT [x] OT [] PMNR    PULM:  acute respiratory failure   Intubated  for hypoxia   chestxray clar, lines in good position   thin secretions   Would check NIF/VC Q4 hours in ICU setting , NIF -20     CV:  MAP>65 mmhg   off phenylephrine   TTE - EF 54%      RENAL:  IV   daily IOs    GI:  Diet: TF at goal   GI prophylaxis: PPI while on steroids  Bowel regimen: miralax, senna, last BM yesterday     ENDO:   FS goal 120-180  Place on ISS and adjsut insulin as needed  A1C 6.7    HEME/ONC:  Monitor H/H  SCDs  Chemoppx: SQL    ID:  afebrile     Disposition: ICU             · Subjective and Objective:   HOSPITAL COURSE:   Patient KAIDEN HATCH is a 63y (1960) woman with a PMHx significant for myasthenia gravis diagnosis presenting to the ED for worsening difficulty with double vision, swallowing, talking, and breathing that has progressively gotten worse for the previous one week. Patient is currently on Mestinon 60 mg two tablets TID and Prednisone 10 mg --> uptitrated to 20 mg, she was not taking it per son for a month or so and was discharged on 50 mg after her hospitalization in 06/2023. Has been admitted before in June 2023, requiring intubation and PLEX/IVIG and in Feb 2023 for MG crisis that required intubation (2/1-2/11/23) iso COVID19. Patient received 5 days of PLEX (2/2-2/10/23) followed by 5 days of IVIG (2/16-2/20/23) with symptomatic improvement. Patient's son says patient has responded best to IVIG and that it stabilizes her MG for about 2 years. At baseline, patient is on 2L NC. In the ED, patient's NIF was -50. Her VC was 430. Patient also endorsed increased mucus secretion in her throat, and reliance on her oxygen tank.       Admission Scores  GCS: 15       24 hour Events: Patient evaluated at bedside. Patient was breathing comfortably on room air. Was able to count up to 18 in one breath. Reports worsening of symptoms for the past week, denies any recent illness. Patient reports progressive worsening of her symptoms.     11/4- Patient intubated overnight for worsening respiratory status. Patient received 1 dose of IVIG.     Allergies    peanuts (Unknown)  No Known Drug Allergies    Intolerances        REVIEW OF SYSTEMS: [ ] Unable to Assess due to neurologic exam   [x ] All ROS addressed below are non-contributory, except:  Neuro: [ ] Headache [ ] Back pain [ ] Numbness [ ] Weakness [ ] Ataxia [ ] Dizziness [ ] Aphasia [ ] Dysarthria [ ] Visual disturbance  Resp: [ ] Shortness of breath/dyspnea, [ ] Orthopnea [ ] Cough  CV: [ ] Chest pain [ ] Palpitation [ ] Lightheadedness [ ] Syncope  Renal: [ ] Thirst [ ] Edema  GI: [ ] Nausea [ ] Emesis [ ] Abdominal pain [ ] Constipation [ ] Diarrhea  Hem: [ ] Hematemesis [ ] bright red blood per rectum  ID: [ ] Fever [ ] Chills [ ] Dysuria  ENT: [ ] Rhinorrhea      DEVICES:   [ ] Restraints [ x] ET tube [ ] central line [x ] arterial line [ ] morales [ ] NGT/OGT [ ] EVD [ ] LD [ ] MYAH/HMV [ ] Trach [ ] PEG [ ] Chest Tube     O: s/p IVIG x 2       T(C): 37.7 (11-05-23 @ 03:00), Max: 37.7 (11-04-23 @ 19:00)  HR: 55 (11-05-23 @ 07:00) (55 - 87)  BP: --  RR: 16 (11-05-23 @ 07:00) (16 - 23)  SpO2: 100% (11-05-23 @ 07:00) (100% - 100%)  11-04-23 @ 08:01  -  11-05-23 @ 07:00  --------------------------------------------------------  IN: 3650.8 mL / OUT: 1700 mL / NET: 1950.8 mL    albuterol/ipratropium for Nebulization 3 milliLiter(s) Nebulizer every 6 hours  bisacodyl 5 milliGRAM(s) Oral daily PRN  chlorhexidine 0.12% Liquid 15 milliLiter(s) Oral Mucosa every 12 hours  chlorhexidine 4% Liquid 1 Application(s) Topical at bedtime  diphenhydrAMINE Injectable 25 milliGRAM(s) IV Push <User Schedule>  enoxaparin Injectable 40 milliGRAM(s) SubCutaneous every 24 hours  immune   globulin 10% (GAMMAGARD) IVPB 25 Gram(s) IV Intermittent <User Schedule>  insulin lispro (ADMELOG) corrective regimen sliding scale   SubCutaneous every 6 hours  pantoprazole   Suspension 40 milliGRAM(s) Oral before breakfast  phenylephrine    Infusion 0.1 MICROgram(s)/kG/Min IV Continuous <Continuous>  polyethylene glycol 3350 17 Gram(s) Oral every 12 hours  predniSONE   Tablet 60 milliGRAM(s) Oral daily  senna 2 Tablet(s) Oral at bedtime PRN  sodium chloride 0.9%. 1000 milliLiter(s) IV Continuous <Continuous>  Mode: AC/ CMV (Assist Control/ Continuous Mandatory Ventilation), RR (machine): 16, TV (machine): 420, FiO2: 40, PEEP: 5, ITime: 1, MAP: 11, PIP: 25        EXAMINATION:  PHYSICAL EXAM:    Constitutional: Intubated    Neurological: Awake, alert, following commands. Intubated. improved bilateral ptosis. Pupils 3 mm and reactive. Gaze midline. Neck flexion 3/5, Neck extension 3/5, Bilateral deltoids 3/5, distally RUE 4+/5, LUE distally 4/5. Bilateral LE 4+/5. Effort dependent exam.                                                 Sensation: [x] intact to light touch  [ ] decreased:     Pulmonary: Clear to Auscultation, No rales, No rhonchi, No wheezes     Cardiovascular: S1, S2, Regular rate and rhythm     Gastrointestinal: Soft, Non-tender, Non-distended     Extremities: No calf tenderness       LABS:  Na: 136 (11-04 @ 21:29), 136 (11-04 @ 13:50), 134 (11-03 @ 20:22), 135 (11-03 @ 16:13)  K: 4.2 (11-04 @ 21:29), 3.5 (11-04 @ 13:50), 4.4 (11-03 @ 20:22), 4.3 (11-03 @ 16:13)  Cl: 102 (11-04 @ 21:29), 99 (11-04 @ 13:50), 93 (11-03 @ 20:22), 92 (11-03 @ 16:13)  CO2: 20 (11-04 @ 21:29), 22 (11-04 @ 13:50), 30 (11-03 @ 20:22), 29 (11-03 @ 16:13)  BUN: 18 (11-04 @ 21:29), 22 (11-04 @ 13:50), 19 (11-03 @ 20:22), 18 (11-03 @ 16:13)  Cr: 0.57 (11-04 @ 21:29), 0.63 (11-04 @ 13:50), 0.57 (11-03 @ 20:22), 0.56 (11-03 @ 16:13)  Glu: 120(11-04 @ 21:29), 81(11-04 @ 13:50), 117(11-03 @ 20:22), 126(11-03 @ 16:13)    Hgb: 12.0 (11-04 @ 21:29), 13.4 (11-03 @ 20:25), 15.1 (11-03 @ 16:13)  Hct: 36.4 (11-04 @ 21:29), 41.9 (11-03 @ 20:25), 47.1 (11-03 @ 16:13)  WBC: 15.40 (11-04 @ 21:29), 11.77 (11-03 @ 20:25), 14.19 (11-03 @ 16:13)  Plt: 293 (11-04 @ 21:29), 337 (11-03 @ 20:25), 390 (11-03 @ 16:13)    INR:   PTT:             Assessment and Plan: 	  ASSESSMENT/PLAN: 64 y/o female with myasthenia gravis crisis  acute respiratory failure intubated       NEURO:  Neurochecks Q4H  Mestinon held     Prednisone to 60 mg QD   IVIG x day 3/5 of IVIG , son and patients would rather get IVIG over PLEX, neuro exam improved   will get NIF and VC now   Avoid medications that may worsen the current exacerbation including macrolides, fluoroquinolones, tetracyclines, aminoglycoside, beta-blockers, magnesium, and anti-arrhythmics (procainamide, quinidine, and lidocaine)  Neurology following, appreciate their recommendations  Activity: [x] mobilize as tolerated [] Bedrest [x] PT [x] OT [] PMNR    PULM:  acute respiratory failure   Intubated  for hypoxia   chestxray clar, lines in good position   thin secretions   Would check NIF/VC Q4 hours in ICU setting , NIF -20     CV:  MAP>65 mmhg   off phenylephrine   TTE - EF 54%      RENAL:  IV   daily IOs    GI:  Diet: TF at goal   GI prophylaxis: PPI while on steroids  Bowel regimen: miralax, senna, last BM yesterday     ENDO:   FS goal 120-180  Place on ISS and adjsut insulin as needed  A1C 6.7    HEME/ONC:  Monitor H/H  SCDs  Chemoppx: SQL    ID:  afebrile     Disposition: ICU

## 2023-11-06 LAB
ANION GAP SERPL CALC-SCNC: 10 MMOL/L — SIGNIFICANT CHANGE UP (ref 5–17)
BUN SERPL-MCNC: 22 MG/DL — SIGNIFICANT CHANGE UP (ref 7–23)
CALCIUM SERPL-MCNC: 8.7 MG/DL — SIGNIFICANT CHANGE UP (ref 8.4–10.5)
CHLORIDE SERPL-SCNC: 108 MMOL/L — SIGNIFICANT CHANGE UP (ref 96–108)
CO2 SERPL-SCNC: 23 MMOL/L — SIGNIFICANT CHANGE UP (ref 22–31)
CREAT SERPL-MCNC: 0.51 MG/DL — SIGNIFICANT CHANGE UP (ref 0.5–1.3)
EGFR: 104 ML/MIN/1.73M2 — SIGNIFICANT CHANGE UP
GLUCOSE SERPL-MCNC: 167 MG/DL — HIGH (ref 70–99)
HCT VFR BLD CALC: 30.9 % — LOW (ref 34.5–45)
HGB BLD-MCNC: 10.3 G/DL — LOW (ref 11.5–15.5)
IGA FLD-MCNC: 197 MG/DL — SIGNIFICANT CHANGE UP (ref 84–499)
IGG FLD-MCNC: 795 MG/DL — SIGNIFICANT CHANGE UP (ref 610–1660)
IGM SERPL-MCNC: 56 MG/DL — SIGNIFICANT CHANGE UP (ref 35–242)
KAPPA LC SER QL IFE: 1.24 MG/DL — SIGNIFICANT CHANGE UP (ref 0.33–1.94)
KAPPA/LAMBDA FREE LIGHT CHAIN RATIO, SERUM: 2.03 RATIO — HIGH (ref 0.26–1.65)
LAMBDA LC SER QL IFE: 0.61 MG/DL — SIGNIFICANT CHANGE UP (ref 0.57–2.63)
MAGNESIUM SERPL-MCNC: 2.1 MG/DL — SIGNIFICANT CHANGE UP (ref 1.6–2.6)
MCHC RBC-ENTMCNC: 28.5 PG — SIGNIFICANT CHANGE UP (ref 27–34)
MCHC RBC-ENTMCNC: 33.3 GM/DL — SIGNIFICANT CHANGE UP (ref 32–36)
MCV RBC AUTO: 85.4 FL — SIGNIFICANT CHANGE UP (ref 80–100)
NRBC # BLD: 0 /100 WBCS — SIGNIFICANT CHANGE UP (ref 0–0)
PHOSPHATE SERPL-MCNC: 3.1 MG/DL — SIGNIFICANT CHANGE UP (ref 2.5–4.5)
PLATELET # BLD AUTO: 227 K/UL — SIGNIFICANT CHANGE UP (ref 150–400)
POTASSIUM SERPL-MCNC: 3.6 MMOL/L — SIGNIFICANT CHANGE UP (ref 3.5–5.3)
POTASSIUM SERPL-SCNC: 3.6 MMOL/L — SIGNIFICANT CHANGE UP (ref 3.5–5.3)
RBC # BLD: 3.62 M/UL — LOW (ref 3.8–5.2)
RBC # FLD: 14.3 % — SIGNIFICANT CHANGE UP (ref 10.3–14.5)
SODIUM SERPL-SCNC: 141 MMOL/L — SIGNIFICANT CHANGE UP (ref 135–145)
WBC # BLD: 9.42 K/UL — SIGNIFICANT CHANGE UP (ref 3.8–10.5)
WBC # FLD AUTO: 9.42 K/UL — SIGNIFICANT CHANGE UP (ref 3.8–10.5)

## 2023-11-06 PROCEDURE — 99291 CRITICAL CARE FIRST HOUR: CPT

## 2023-11-06 PROCEDURE — 99233 SBSQ HOSP IP/OBS HIGH 50: CPT | Mod: GC

## 2023-11-06 PROCEDURE — 71045 X-RAY EXAM CHEST 1 VIEW: CPT | Mod: 26

## 2023-11-06 RX ORDER — FENTANYL CITRATE 50 UG/ML
25 INJECTION INTRAVENOUS ONCE
Refills: 0 | Status: DISCONTINUED | OUTPATIENT
Start: 2023-11-06 | End: 2023-11-06

## 2023-11-06 RX ORDER — POTASSIUM CHLORIDE 20 MEQ
40 PACKET (EA) ORAL ONCE
Refills: 0 | Status: DISCONTINUED | OUTPATIENT
Start: 2023-11-06 | End: 2023-11-07

## 2023-11-06 RX ORDER — DEXMEDETOMIDINE HYDROCHLORIDE IN 0.9% SODIUM CHLORIDE 4 UG/ML
0.2 INJECTION INTRAVENOUS
Qty: 200 | Refills: 0 | Status: DISCONTINUED | OUTPATIENT
Start: 2023-11-06 | End: 2023-11-06

## 2023-11-06 RX ADMIN — FENTANYL CITRATE 25 MICROGRAM(S): 50 INJECTION INTRAVENOUS at 09:30

## 2023-11-06 RX ADMIN — Medication 2: at 05:20

## 2023-11-06 RX ADMIN — Medication 25 MILLIGRAM(S): at 19:59

## 2023-11-06 RX ADMIN — Medication 3 MILLILITER(S): at 23:18

## 2023-11-06 RX ADMIN — SODIUM CHLORIDE 4 MILLILITER(S): 9 INJECTION INTRAMUSCULAR; INTRAVENOUS; SUBCUTANEOUS at 05:38

## 2023-11-06 RX ADMIN — Medication 3 MILLILITER(S): at 11:52

## 2023-11-06 RX ADMIN — FENTANYL CITRATE 25 MICROGRAM(S): 50 INJECTION INTRAVENOUS at 09:08

## 2023-11-06 RX ADMIN — ENOXAPARIN SODIUM 40 MILLIGRAM(S): 100 INJECTION SUBCUTANEOUS at 22:29

## 2023-11-06 RX ADMIN — Medication 60 MILLIGRAM(S): at 05:16

## 2023-11-06 RX ADMIN — Medication 3 MILLILITER(S): at 05:38

## 2023-11-06 RX ADMIN — CHLORHEXIDINE GLUCONATE 1 APPLICATION(S): 213 SOLUTION TOPICAL at 22:29

## 2023-11-06 RX ADMIN — IMMUNE GLOBULIN (HUMAN) 125 GRAM(S): 10 INJECTION INTRAVENOUS; SUBCUTANEOUS at 20:01

## 2023-11-06 RX ADMIN — Medication 10 MILLIGRAM(S): at 12:21

## 2023-11-06 RX ADMIN — Medication 4: at 12:29

## 2023-11-06 RX ADMIN — CHLORHEXIDINE GLUCONATE 15 MILLILITER(S): 213 SOLUTION TOPICAL at 05:17

## 2023-11-06 RX ADMIN — PANTOPRAZOLE SODIUM 40 MILLIGRAM(S): 20 TABLET, DELAYED RELEASE ORAL at 08:34

## 2023-11-06 RX ADMIN — CHLORHEXIDINE GLUCONATE 15 MILLILITER(S): 213 SOLUTION TOPICAL at 17:39

## 2023-11-06 RX ADMIN — Medication 3 MILLILITER(S): at 17:09

## 2023-11-06 RX ADMIN — POLYETHYLENE GLYCOL 3350 17 GRAM(S): 17 POWDER, FOR SOLUTION ORAL at 05:17

## 2023-11-06 NOTE — DIETITIAN INITIAL EVALUATION ADULT - NS FNS DIET ORDER
Diet, NPO with Tube Feed:   Tube Feeding Modality: Nasogastric  Jevity 1.2 Taye (JEVITY1.2RTH)  Total Volume for 24 Hours (mL): 1200  Continuous  Starting Tube Feed Rate {mL per Hour}: 20  Increase Tube Feed Rate by (mL): 10     Every 4 hours  Until Goal Tube Feed Rate (mL per Hour): 50  Tube Feed Duration (in Hours): 24  Tube Feed Start Time: 16:00  Supplement Feeding Modality:  Nasogastric  Probiotic Yogurt/Smoothie Cans or Servings Per Day:  2       Frequency:  Daily (11-04-23 @ 15:30) [Active]

## 2023-11-06 NOTE — DIETITIAN INITIAL EVALUATION ADULT - ENTERAL
Recommend updating tube feeding order. Consider Glucerna 1.2 due to noted PMHx of DM2. 55ml/hr x 24 hours to provide a total volume of 1320ml, 1584kcal (24kcal/kg), 79g protein (1.2g/kg) and 1063ml free water. defer free water flush to team.

## 2023-11-06 NOTE — PROGRESS NOTE ADULT - ATTENDING COMMENTS
remains intubated.  will get IVIG today, tomorrow.  CPAP today.  neurology following.  consider extubating directly to BiPAP vs hi-flow, possibly in the next ~24 hours.

## 2023-11-06 NOTE — DIETITIAN INITIAL EVALUATION ADULT - NSFNSGIIOFT_GEN_A_CORE
11-05-23 @ 07:01  -  11-06-23 @ 07:00  --------------------------------------------------------  OUT:  Total OUT: 0 mL    Total NET: 1150 mL      11-06-23 @ 07:01  -  11-06-23 @ 09:20  --------------------------------------------------------  OUT:  Total OUT: 0 mL    Total NET: 100 mL  .  no noted nausea/vomiting/constipation/diarrhea. Last bowel movement per flow sheets 11/4.

## 2023-11-06 NOTE — DIETITIAN INITIAL EVALUATION ADULT - PERTINENT LABORATORY DATA
11-05    141  |  108  |  22  ----------------------------<  167<H>  3.6   |  23  |  0.51    Ca    8.7      05 Nov 2023 23:46  Phos  3.1     11-05  Mg     2.1     11-05    POCT Blood Glucose.: 173 mg/dL (11-06-23 @ 05:19)  A1C with Estimated Average Glucose Result: 6.7 % (11-03-23 @ 20:25)  A1C with Estimated Average Glucose Result: 6.8 % (05-06-23 @ 00:24)  A1C with Estimated Average Glucose Result: 6.6 % (01-31-23 @ 01:13)

## 2023-11-06 NOTE — DIETITIAN INITIAL EVALUATION ADULT - NS FNS WEIGHT CHANGE REASON
weight per dietitian initial evaluation 2/1/23: 72kg (158.4 pounds)  Dosing weight this admission: 66.2kg (145.6 pounds)  this indicates a 12.8 pound / 8% loss x ~9 months. RD will continue to monitor trends./unintentional

## 2023-11-06 NOTE — DIETITIAN INITIAL EVALUATION ADULT - OTHER INFO
Hgb A1c 6.7%, POCT Blood glucose being monitored. noted PMHx of DM2. On insulin regimen to maintain glycemic control. prednisone ordered which may elevated BG levels.   Precedex ordered.

## 2023-11-06 NOTE — PROGRESS NOTE ADULT - SUBJECTIVE AND OBJECTIVE BOX
HOSPITAL COURSE:  HOSPITAL COURSE:   Patient KAIDEN HATCH is a 63y (1960) woman with a PMHx significant for myasthenia gravis diagnosis presenting to the ED for worsening difficulty with double vision, swallowing, talking, and breathing that has progressively gotten worse for the previous one week. Patient is currently on Mestinon 60 mg two tablets TID and Prednisone 10 mg --> uptitrated to 20 mg, she was not taking it per son for a month or so and was discharged on 50 mg after her hospitalization in 06/2023. Has been admitted before in June 2023, requiring intubation and PLEX/IVIG and in Feb 2023 for MG crisis that required intubation (2/1-2/11/23) iso COVID19. Patient received 5 days of PLEX (2/2-2/10/23) followed by 5 days of IVIG (2/16-2/20/23) with symptomatic improvement. Patient's son says patient has responded best to IVIG and that it stabilizes her MG for about 2 years. At baseline, patient is on 2L NC. In the ED, patient's NIF was -50. Her VC was 430. Patient also endorsed increased mucus secretion in her throat, and reliance on her oxygen tank.       Admission Scores  GCS: 15       24 hour Events: Patient evaluated at bedside. Patient was breathing comfortably on room air. Was able to count up to 18 in one breath. Reports worsening of symptoms for the past week, denies any recent illness. Patient reports progressive worsening of her symptoms.     11/4- Patient intubated overnight for worsening respiratory status. Patient received 1 dose of IVIG.   11/6- No events    Allergies    peanuts (Unknown)  No Known Drug Allergies    Intolerances        REVIEW OF SYSTEMS: [ ] Unable to Assess due to neurologic exam   [ x] All ROS addressed below are non-contributory, except:  Neuro: [ ] Headache [ ] Back pain [ ] Numbness [ ] Weakness [ ] Ataxia [ ] Dizziness [ ] Aphasia [ ] Dysarthria [ ] Visual disturbance  Resp: [ ] Shortness of breath/dyspnea, [ ] Orthopnea [ ] Cough  CV: [ ] Chest pain [ ] Palpitation [ ] Lightheadedness [ ] Syncope  Renal: [ ] Thirst [ ] Edema  GI: [ ] Nausea [ ] Emesis [ ] Abdominal pain [ ] Constipation [ ] Diarrhea  Hem: [ ] Hematemesis [ ] bright red blood per rectum  ID: [ ] Fever [ ] Chills [ ] Dysuria  ENT: [ ] Rhinorrhea      DEVICES:   [ ] Restraints [ ] ET tube [ ] central line [ ] arterial line [ ] morales [ ] NGT/OGT [ ] EVD [ ] LD [ ] MYAH/HMV [ ] Trach [ ] PEG [ ] Chest Tube     VITALS:   Vital Signs Last 24 Hrs  T(C): 36.9 (06 Nov 2023 03:00), Max: 37.6 (05 Nov 2023 11:00)  T(F): 98.5 (06 Nov 2023 03:00), Max: 99.7 (05 Nov 2023 11:00)  HR: 76 (06 Nov 2023 05:00) (55 - 128)  BP: --  BP(mean): --  RR: 16 (06 Nov 2023 05:00) (15 - 22)  SpO2: 100% (06 Nov 2023 05:00) (94% - 100%)    Parameters below as of 05 Nov 2023 23:35  Patient On (Oxygen Delivery Method): ventilator      CAPILLARY BLOOD GLUCOSE      POCT Blood Glucose.: 173 mg/dL (06 Nov 2023 05:19)  POCT Blood Glucose.: 185 mg/dL (05 Nov 2023 23:16)  POCT Blood Glucose.: 166 mg/dL (05 Nov 2023 17:11)  POCT Blood Glucose.: 216 mg/dL (05 Nov 2023 12:23)    I&O's Summary    04 Nov 2023 08:01  -  05 Nov 2023 07:00  --------------------------------------------------------  IN: 3650.8 mL / OUT: 1700 mL / NET: 1950.8 mL    05 Nov 2023 07:01  -  06 Nov 2023 06:04  --------------------------------------------------------  IN: 1245 mL / OUT: 1000 mL / NET: 245 mL        Respiratory:  Mode: AC/ CMV (Assist Control/ Continuous Mandatory Ventilation)  RR (machine): 16  TV (machine): 420  FiO2: 40  PEEP: 5  ITime: 1  MAP: 11  PIP: 26    ABG - ( 04 Nov 2023 06:16 )  pH, Arterial: 7.53  pH, Blood: x     /  pCO2: 34    /  pO2: 128   / HCO3: 28    / Base Excess: 5.7   /  SaO2: 99.6                LABS:                        10.3   9.42  )-----------( 227      ( 05 Nov 2023 23:46 )             30.9     11-05    141  |  108  |  22  ----------------------------<  167<H>  3.6   |  23  |  0.51             MEDICATION LEVELS:     IVF FLUIDS/MEDICATIONS:   MEDICATIONS  (STANDING):  albuterol/ipratropium for Nebulization 3 milliLiter(s) Nebulizer every 6 hours  chlorhexidine 0.12% Liquid 15 milliLiter(s) Oral Mucosa every 12 hours  chlorhexidine 4% Liquid 1 Application(s) Topical at bedtime  diphenhydrAMINE Injectable 25 milliGRAM(s) IV Push <User Schedule>  enoxaparin Injectable 40 milliGRAM(s) SubCutaneous every 24 hours  immune   globulin 10% (GAMMAGARD) IVPB 25 Gram(s) IV Intermittent <User Schedule>  insulin lispro (ADMELOG) corrective regimen sliding scale   SubCutaneous every 6 hours  pantoprazole   Suspension 40 milliGRAM(s) Oral before breakfast  polyethylene glycol 3350 17 Gram(s) Oral every 12 hours  potassium chloride   Solution 40 milliEquivalent(s) Oral once  predniSONE   Tablet 60 milliGRAM(s) Oral daily  sodium chloride 3%  Inhalation 4 milliLiter(s) Inhalation every 6 hours    MEDICATIONS  (PRN):  bisacodyl 5 milliGRAM(s) Oral daily PRN Constipation  senna 2 Tablet(s) Oral at bedtime PRN Constipation        IMAGING:      EXAMINATION:  Constitutional: Intubated    Neurological: Awake, alert, following commands. Intubated. improved bilateral ptosis. Pupils 3 mm and reactive. Gaze midline. Neck flexion 3/5, Neck extension 3/5, Bilateral deltoids 3/5, distally RUE 4+/5, LUE distally 4/5. Bilateral LE 4+/5. Effort dependent exam.                                                 Sensation: [x] intact to light touch  [ ] decreased:     Pulmonary: Clear to Auscultation, No rales, No rhonchi, No wheezes     Cardiovascular: S1, S2, Regular rate and rhythm     Gastrointestinal: Soft, Non-tender, Non-distended     Extremities: No calf tenderness    HOSPITAL COURSE:  HOSPITAL COURSE:   Patient KAIDEN HATCH is a 63y (1960) woman with a PMHx significant for myasthenia gravis diagnosis presenting to the ED for worsening difficulty with double vision, swallowing, talking, and breathing that has progressively gotten worse for the previous one week. Patient is currently on Mestinon 60 mg two tablets TID and Prednisone 10 mg --> uptitrated to 20 mg, she was not taking it per son for a month or so and was discharged on 50 mg after her hospitalization in 06/2023. Has been admitted before in June 2023, requiring intubation and PLEX/IVIG and in Feb 2023 for MG crisis that required intubation (2/1-2/11/23) iso COVID19. Patient received 5 days of PLEX (2/2-2/10/23) followed by 5 days of IVIG (2/16-2/20/23) with symptomatic improvement. Patient's son says patient has responded best to IVIG and that it stabilizes her MG for about 2 years. At baseline, patient is on 2L NC. In the ED, patient's NIF was -50. Her VC was 430. Patient also endorsed increased mucus secretion in her throat, and reliance on her oxygen tank.       Admission Scores  GCS: 15       24 hour Events: Patient evaluated at bedside. Patient was breathing comfortably on room air. Was able to count up to 18 in one breath. Reports worsening of symptoms for the past week, denies any recent illness. Patient reports progressive worsening of her symptoms.     11/4- Patient intubated overnight for worsening respiratory status. Patient received 1 dose of IVIG.   11/6- No events    Allergies    peanuts (Unknown)  No Known Drug Allergies    Intolerances        REVIEW OF SYSTEMS: [ ] Unable to Assess due to neurologic exam   [ x] All ROS addressed below are non-contributory, except:  Neuro: [ ] Headache [ ] Back pain [ ] Numbness [ ] Weakness [ ] Ataxia [ ] Dizziness [ ] Aphasia [ ] Dysarthria [ ] Visual disturbance  Resp: [ ] Shortness of breath/dyspnea, [ ] Orthopnea [ ] Cough  CV: [ ] Chest pain [ ] Palpitation [ ] Lightheadedness [ ] Syncope  Renal: [ ] Thirst [ ] Edema  GI: [ ] Nausea [ ] Emesis [ ] Abdominal pain [ ] Constipation [ ] Diarrhea  Hem: [ ] Hematemesis [ ] bright red blood per rectum  ID: [ ] Fever [ ] Chills [ ] Dysuria  ENT: [ ] Rhinorrhea      DEVICES:   [ ] Restraints [X ] ET tube [ ] central line [ ] arterial line [ ] morales [ ] NGT/OGT [ ] EVD [ ] LD [ ] MYAH/HMV [ ] Trach [ ] PEG [ ] Chest Tube     VITALS:   Vital Signs Last 24 Hrs  T(C): 36.9 (06 Nov 2023 03:00), Max: 37.6 (05 Nov 2023 11:00)  T(F): 98.5 (06 Nov 2023 03:00), Max: 99.7 (05 Nov 2023 11:00)  HR: 76 (06 Nov 2023 05:00) (55 - 128)  BP: --  BP(mean): --  RR: 16 (06 Nov 2023 05:00) (15 - 22)  SpO2: 100% (06 Nov 2023 05:00) (94% - 100%)    Parameters below as of 05 Nov 2023 23:35  Patient On (Oxygen Delivery Method): ventilator      CAPILLARY BLOOD GLUCOSE      POCT Blood Glucose.: 173 mg/dL (06 Nov 2023 05:19)  POCT Blood Glucose.: 185 mg/dL (05 Nov 2023 23:16)  POCT Blood Glucose.: 166 mg/dL (05 Nov 2023 17:11)  POCT Blood Glucose.: 216 mg/dL (05 Nov 2023 12:23)    I&O's Summary    04 Nov 2023 08:01  -  05 Nov 2023 07:00  --------------------------------------------------------  IN: 3650.8 mL / OUT: 1700 mL / NET: 1950.8 mL    05 Nov 2023 07:01  -  06 Nov 2023 06:04  --------------------------------------------------------  IN: 1245 mL / OUT: 1000 mL / NET: 245 mL        Respiratory:  Mode: AC/ CMV (Assist Control/ Continuous Mandatory Ventilation)  RR (machine): 16  TV (machine): 420  FiO2: 40  PEEP: 5  ITime: 1  MAP: 11  PIP: 26    ABG - ( 04 Nov 2023 06:16 )  pH, Arterial: 7.53  pH, Blood: x     /  pCO2: 34    /  pO2: 128   / HCO3: 28    / Base Excess: 5.7   /  SaO2: 99.6                LABS:                        10.3   9.42  )-----------( 227      ( 05 Nov 2023 23:46 )             30.9     11-05    141  |  108  |  22  ----------------------------<  167<H>  3.6   |  23  |  0.51             MEDICATION LEVELS:     IVF FLUIDS/MEDICATIONS:   MEDICATIONS  (STANDING):  albuterol/ipratropium for Nebulization 3 milliLiter(s) Nebulizer every 6 hours  chlorhexidine 0.12% Liquid 15 milliLiter(s) Oral Mucosa every 12 hours  chlorhexidine 4% Liquid 1 Application(s) Topical at bedtime  diphenhydrAMINE Injectable 25 milliGRAM(s) IV Push <User Schedule>  enoxaparin Injectable 40 milliGRAM(s) SubCutaneous every 24 hours  immune   globulin 10% (GAMMAGARD) IVPB 25 Gram(s) IV Intermittent <User Schedule>  insulin lispro (ADMELOG) corrective regimen sliding scale   SubCutaneous every 6 hours  pantoprazole   Suspension 40 milliGRAM(s) Oral before breakfast  polyethylene glycol 3350 17 Gram(s) Oral every 12 hours  potassium chloride   Solution 40 milliEquivalent(s) Oral once  predniSONE   Tablet 60 milliGRAM(s) Oral daily  sodium chloride 3%  Inhalation 4 milliLiter(s) Inhalation every 6 hours    MEDICATIONS  (PRN):  bisacodyl 5 milliGRAM(s) Oral daily PRN Constipation  senna 2 Tablet(s) Oral at bedtime PRN Constipation        IMAGING:      EXAMINATION:  Constitutional: Intubated    Neurological: Awake, alert, following commands. Intubated. Improved bilateral ptosis L ptosis worse than right. Pupils 3 mm and reactive. Gaze midline. Neck flexion 3/5, Neck extension 5/5, Right deltoid 4/5, distally 5/5, L deltoid 4/5, rest 5/5. Bilateral LE 5/5.                                                 Sensation: [x] intact to light touch  [ ] decreased:     Pulmonary: Clear to Auscultation, No rales, No rhonchi, No wheezes     Cardiovascular: S1, S2, Regular rate and rhythm     Gastrointestinal: Soft, Non-tender, Non-distended     Extremities: No calf tenderness

## 2023-11-06 NOTE — PROGRESS NOTE ADULT - ASSESSMENT
ASSESSMENT/PLAN: 62 y/o female with myasthenia gravis, impending respiratory failure.       NEURO:  Neurochecks Q2H  Discontinue Mestinon, patient with copious secretions   Increased Prednisone to 60 mg QD  Patient will need IVIG x 5 days duration, son and patients would rather get IVIG over PLEX- IVIG  (1/5 dose)  Avoid medications that may worsen the current exacerbation including macrolides, fluoroquinolones, tetracyclines, aminoglycoside, beta-blockers, magnesium, and anti-arrhythmics (procainamide, quinidine, and lidocaine)  Neurology following, appreciate their recommendations  Activity: [x] mobilize as tolerated [] Bedrest [x] PT [x] OT [] PMNR    PULM:  Intubated for airway protection  ABG/CXR reviewed  Currently patient is not in respiratory distress  Would check NIF/VC Q4 hours in ICU setting    CV:  MAP>65  TTE - EF 54%  On rich currently, will wean off rich    RENAL:  Hyponatremic, will repeat BMP  daily IOs    GI:  Diet: ON tf at GOAL   GI prophylaxis: PPI while on steroids  Bowel regimen: miralax, senna    ENDO:   FS goal 120-180  Place on ISS and adjsut insulin as needed  A1C 6.7    HEME/ONC:  Monitor H/H  SCDs  Chemoppx: SQL    ID:  Monitor for fevers, leukocytosis likely related to steroids  No signs of infection   Continue to monitor for infection    Disposition: ICU       ASSESSMENT/PLAN: 64 y/o female with myasthenia gravis, impending respiratory failure.       NEURO:  Neurochecks Q2H  Discontinue Mestinon, patient with copious secretions   Increased Prednisone to 60 mg QD  Patient will need IVIG x 5 days duration, son and patients would rather get IVIG over PLEX- IVIG  (1/5 dose)  Avoid medications that may worsen the current exacerbation including macrolides, fluoroquinolones, tetracyclines, aminoglycoside, beta-blockers, magnesium, and anti-arrhythmics (procainamide, quinidine, and lidocaine)  Neurology following, appreciate their recommendations  Activity: [x] mobilize as tolerated [] Bedrest [x] PT [x] OT [] PMNR    PULM:  Intubated for airway protection  ABG/CXR reviewed  Currently patient is not in respiratory distress  Would check NIF/VC Q4 hours in ICU setting    CV:  MAP>65  TTE - EF 54%  On rich currently, will wean off rich    RENAL:  Hyponatremic, will repeat BMP  daily IOs    GI:  Diet: ON tf at GOAL   GI prophylaxis: PPI while on steroids  Bowel regimen: miralax, senna    ENDO:   FS goal 120-180  Place on ISS and adjsut insulin as needed  A1C 6.7    HEME/ONC:  Monitor H/H  SCDs  Chemoppx: SQL    ID:  Monitor for fevers, leukocytosis likely related to steroids  No signs of infection   Continue to monitor for infection    Disposition: ICU       ASSESSMENT/PLAN: 62 y/o female with myasthenia gravis, impending respiratory failure.       NEURO:  Neurochecks Q2H  Discontinue Mestinon, patient with copious secretions   Increased Prednisone to 60 mg QD  Patient will need IVIG x 5 days duration, son and patients would rather get IVIG over PLEX- IVIG  (4/5 dose)  Avoid medications that may worsen the current exacerbation including macrolides, fluoroquinolones, tetracyclines, aminoglycoside, beta-blockers, magnesium, and anti-arrhythmics (procainamide, quinidine, and lidocaine)  Neurology following, appreciate their recommendations  Activity: [x] mobilize as tolerated [] Bedrest [x] PT [x] OT [] PMNR    PULM:  Intubated for airway protection  CPAP as tolerated   ABG/CXR reviewed  Currently patient is not in respiratory distress  On 3% Na inhalation x 2 more days- can consider Mucomyst and N acetylcysteine if fails, Duonebs Q6H  Would check NIF/VC Q4 hours in ICU setting  Aggressive Chest PT  Copious thick secretions     CV:  MAP>65  TTE - EF 54%, elevated right atrial pressure   Off pressors    RENAL:  Stable renal function  IVL  daily IOs    GI:  Diet: ON tf at GOAL   GI prophylaxis: PPI while on steroids  Bowel regimen: miralax, senna, enema  LBM: PTA    ENDO:   FS goal 120-180  Place on ISS and adjust insulin as needed  A1C 6.7    HEME/ONC:  Monitor H/H  SCDs  Chemoppx: SQL    ID:  No fevers, improved leukocyte counts  No signs of infection     Disposition: ICU

## 2023-11-06 NOTE — PROGRESS NOTE ADULT - SUBJECTIVE AND OBJECTIVE BOX
Neurology Progress Note     Subjective: NAEON. IVIG day 4/5. Patient feeling better, as she expressed when writing on paper. Translation provided via neurology attending, Dr. Hodgson.     Objective:  MEDICATIONS  (STANDING):  albuterol/ipratropium for Nebulization 3 milliLiter(s) Nebulizer every 6 hours  bisacodyl Suppository 10 milliGRAM(s) Rectal daily  chlorhexidine 0.12% Liquid 15 milliLiter(s) Oral Mucosa every 12 hours  chlorhexidine 4% Liquid 1 Application(s) Topical at bedtime  dexMEDEtomidine Infusion 0.2 MICROgram(s)/kG/Hr (3.31 mL/Hr) IV Continuous <Continuous>  diphenhydrAMINE Injectable 25 milliGRAM(s) IV Push <User Schedule>  enoxaparin Injectable 40 milliGRAM(s) SubCutaneous every 24 hours  immune   globulin 10% (GAMMAGARD) IVPB 25 Gram(s) IV Intermittent <User Schedule>  insulin lispro (ADMELOG) corrective regimen sliding scale   SubCutaneous every 6 hours  pantoprazole   Suspension 40 milliGRAM(s) Oral before breakfast  polyethylene glycol 3350 17 Gram(s) Oral every 12 hours  potassium chloride   Solution 40 milliEquivalent(s) Oral once  predniSONE   Tablet 60 milliGRAM(s) Oral daily  sodium chloride 3%  Inhalation 4 milliLiter(s) Inhalation every 6 hours    MEDICATIONS  (PRN):  senna 2 Tablet(s) Oral at bedtime PRN Constipation    Allergies  peanuts (Unknown)  No Known Drug Allergies    Vital Signs Last 24 Hrs  T(C): 36.9 (06 Nov 2023 03:00), Max: 37.5 (05 Nov 2023 15:00)  T(F): 98.5 (06 Nov 2023 03:00), Max: 99.5 (05 Nov 2023 15:00)  HR: 87 (06 Nov 2023 11:00) (56 - 135)  BP: --  BP(mean): --  RR: 16 (06 Nov 2023 11:00) (15 - 23)  SpO2: 100% (06 Nov 2023 11:00) (94% - 100%)    Parameters below as of 06 Nov 2023 06:03  Patient On (Oxygen Delivery Method): ventilator      NEUROLOGICAL EXAM:  MS: Intubated, no verbal output. Awake, alert, follows simple commands. On precedex. No sedation.   CN: VFF, EOMI, PERRL, V1-3 intact, no facial asymmetry, t/p midline, SCM/trap intact.  Motor: Lifts arm up but slight drift bilat.  Deltoids 4/5 bilat. Hip flex 5/5.  Knee flex ext 5/5. Dorsi plantar 5/5.   Tone: normal. Bulk: normal. DTR 2+ symm.  Plantar flex b/l.   Sensation: intact to LT/PP 4x.   Coordination: appropriate for level of strength   Gait:  MOLLY given intubated     Labs:   cbc                      10.3   9.42  )-----------( 227      ( 05 Nov 2023 23:46 )             30.9     [id number]    141  |  108  |  22  ----------------------------<  167<H>  3.6   |  23  |  0.51    Ca    8.7      05 Nov 2023 23:46  Phos  3.1     11-05  Mg     2.1     11-05      Coags  [id number] Chol 214<H> LDL --  Trig 81  A1C  CardiacMarkers    LFTs  UAUrinalysis Basic - ( 05 Nov 2023 23:46 )    Color: x / Appearance: x / SG: x / pH: x  Gluc: 167 mg/dL / Ketone: x  / Bili: x / Urobili: x   Blood: x / Protein: x / Nitrite: x   Leuk Esterase: x / RBC: x / WBC x   Sq Epi: x / Non Sq Epi: x / Bacteria: x      CSF  Immunological Labs    Radiology:  < from: [facility] Duplex Lower Ext Vein Scan, Bilat (11.04.23 @ 17:20) >    IMPRESSION:  No evidence of deep venous thrombosis in either lower extremity.    < end of copied text >  < from: Xray Chest 1 View- PORTABLE-Urgent (Xray Chest 1 View- PORTABLE-Urgent .) (11.04.23 @ 02:13) >  ET tube tipbelow the level of clavicles.  Enteric tube looped in the stomach.    < end of copied text >  < from: CT Angio Chest PE Protocol w/ IV Cont (05.21.23 @ 18:15) >  No central or lobar pulmonary embolism. Evaluation of the segmental and   subsegmental branches are limited due to respiratory motion.    < end of copied text >   Neurology Progress Note     Subjective: NAEON. IVIG day 4/5. Patient feeling better, as she expressed when writing on paper. Translation provided via neurology attending, Dr. Hodgson.     Objective:  MEDICATIONS  (STANDING):  albuterol/ipratropium for Nebulization 3 milliLiter(s) Nebulizer every 6 hours  bisacodyl Suppository 10 milliGRAM(s) Rectal daily  chlorhexidine 0.12% Liquid 15 milliLiter(s) Oral Mucosa every 12 hours  chlorhexidine 4% Liquid 1 Application(s) Topical at bedtime  dexMEDEtomidine Infusion 0.2 MICROgram(s)/kG/Hr (3.31 mL/Hr) IV Continuous <Continuous>  diphenhydrAMINE Injectable 25 milliGRAM(s) IV Push <User Schedule>  enoxaparin Injectable 40 milliGRAM(s) SubCutaneous every 24 hours  immune   globulin 10% (GAMMAGARD) IVPB 25 Gram(s) IV Intermittent <User Schedule>  insulin lispro (ADMELOG) corrective regimen sliding scale   SubCutaneous every 6 hours  pantoprazole   Suspension 40 milliGRAM(s) Oral before breakfast  polyethylene glycol 3350 17 Gram(s) Oral every 12 hours  potassium chloride   Solution 40 milliEquivalent(s) Oral once  predniSONE   Tablet 60 milliGRAM(s) Oral daily  sodium chloride 3%  Inhalation 4 milliLiter(s) Inhalation every 6 hours    MEDICATIONS  (PRN):  senna 2 Tablet(s) Oral at bedtime PRN Constipation    Allergies  peanuts (Unknown)  No Known Drug Allergies    Vital Signs Last 24 Hrs  T(C): 36.9 (06 Nov 2023 03:00), Max: 37.5 (05 Nov 2023 15:00)  T(F): 98.5 (06 Nov 2023 03:00), Max: 99.5 (05 Nov 2023 15:00)  HR: 87 (06 Nov 2023 11:00) (56 - 135)  BP: --  BP(mean): --  RR: 16 (06 Nov 2023 11:00) (15 - 23)  SpO2: 100% (06 Nov 2023 11:00) (94% - 100%)    Parameters below as of 06 Nov 2023 06:03  Patient On (Oxygen Delivery Method): ventilator      NEUROLOGICAL EXAM:  MS: Intubated, no verbal output. Awake, alert, follows simple commands. On precedex. No sedation.   CN: VFF, EOMI, PERRL, V1-3 intact, no facial asymmetry, t/p midline, SCM/trap intact.  Motor: Lifts arm up but slight drift bilat.  Deltoids 4/5 bilat. Hip flex 5/5.  Knee flex ext 5/5. Dorsi plantar 5/5.   Tone: normal. Bulk: normal. DTR 2+ symm.  Plantar flex b/l.   Sensation: intact to LT/PP 4x.   Coordination: appropriate for level of strength   Gait:  MOLLY given intubated     Labs:   cbc                      10.3   9.42  )-----------( 227      ( 05 Nov 2023 23:46 )             30.9     [id number]    141  |  108  |  22  ----------------------------<  167<H>  3.6   |  23  |  0.51    Ca    8.7      05 Nov 2023 23:46  Phos  3.1     11-05  Mg     2.1     11-05      Coags  [id number] Chol 214<H> LDL --  Trig 81  A1C  CardiacMarkers    LFTs  UAUrinalysis Basic - ( 05 Nov 2023 23:46 )    Color: x / Appearance: x / SG: x / pH: x  Gluc: 167 mg/dL / Ketone: x  / Bili: x / Urobili: x   Blood: x / Protein: x / Nitrite: x   Leuk Esterase: x / RBC: x / WBC x   Sq Epi: x / Non Sq Epi: x / Bacteria: x      CSF  Immunological Labs    Radiology:  < from: [facility] Duplex Lower Ext Vein Scan, Bilat (11.04.23 @ 17:20) >    IMPRESSION:  No evidence of deep venous thrombosis in either lower extremity.    < end of copied text >  < from: Xray Chest 1 View- PORTABLE-Urgent (Xray Chest 1 View- PORTABLE-Urgent .) (11.04.23 @ 02:13) >  ET tube tipbelow the level of clavicles.  Enteric tube looped in the stomach.    < end of copied text >  < from: CT Angio Chest PE Protocol w/ IV Cont (05.21.23 @ 18:15) >  No central or lobar pulmonary embolism. Evaluation of the segmental and   subsegmental branches are limited due to respiratory motion.    < end of copied text >   Neurology Progress Note     Subjective: NAEON. IVIG day 4/5. Patient feeling better, as she expressed when writing on paper. Translation provided via neurology attending, Dr. Hodgson.     Objective:  MEDICATIONS  (STANDING):  albuterol/ipratropium for Nebulization 3 milliLiter(s) Nebulizer every 6 hours  bisacodyl Suppository 10 milliGRAM(s) Rectal daily  chlorhexidine 0.12% Liquid 15 milliLiter(s) Oral Mucosa every 12 hours  chlorhexidine 4% Liquid 1 Application(s) Topical at bedtime  dexMEDEtomidine Infusion 0.2 MICROgram(s)/kG/Hr (3.31 mL/Hr) IV Continuous <Continuous>  diphenhydrAMINE Injectable 25 milliGRAM(s) IV Push <User Schedule>  enoxaparin Injectable 40 milliGRAM(s) SubCutaneous every 24 hours  immune   globulin 10% (GAMMAGARD) IVPB 25 Gram(s) IV Intermittent <User Schedule>  insulin lispro (ADMELOG) corrective regimen sliding scale   SubCutaneous every 6 hours  pantoprazole   Suspension 40 milliGRAM(s) Oral before breakfast  polyethylene glycol 3350 17 Gram(s) Oral every 12 hours  potassium chloride   Solution 40 milliEquivalent(s) Oral once  predniSONE   Tablet 60 milliGRAM(s) Oral daily  sodium chloride 3%  Inhalation 4 milliLiter(s) Inhalation every 6 hours    MEDICATIONS  (PRN):  senna 2 Tablet(s) Oral at bedtime PRN Constipation    Allergies  peanuts (Unknown)  No Known Drug Allergies    Vital Signs Last 24 Hrs  T(C): 36.9 (06 Nov 2023 03:00), Max: 37.5 (05 Nov 2023 15:00)  T(F): 98.5 (06 Nov 2023 03:00), Max: 99.5 (05 Nov 2023 15:00)  HR: 87 (06 Nov 2023 11:00) (56 - 135)  BP: --  BP(mean): --  RR: 16 (06 Nov 2023 11:00) (15 - 23)  SpO2: 100% (06 Nov 2023 11:00) (94% - 100%)    Parameters below as of 06 Nov 2023 06:03  Patient On (Oxygen Delivery Method): ventilator      NEUROLOGICAL EXAM:  MS: Intubated, no verbal output. Awake, alert, follows simple commands. On precedex. No sedation.   CN: VFF, EOMI, PERRL, V1-3 intact, no facial asymmetry, t/p midline, SCM/trap intact.  Motor: Lifts arm up but slight drift bilat.  Deltoids 4/5 bilat. Hip flex 5/5.  Knee flex ext 5/5. Dorsi plantar 5/5.   Tone: normal. Bulk: normal. DTR 2+ symm.  Plantar flex b/l.   Sensation: intact to LT/PP 4x.   Coordination: appropriate for level of strength   Gait:  MOLLY given intubated     Labs:   cbc                      10.3   9.42  )-----------( 227      ( 05 Nov 2023 23:46 )             30.9     [id number]    141  |  108  |  22  ----------------------------<  167<H>  3.6   |  23  |  0.51    Ca    8.7      05 Nov 2023 23:46  Phos  3.1     11-05  Mg     2.1     11-05      Coags  [id number] Chol 214<H> LDL --  Trig 81  A1C  CardiacMarkers    LFTs  UAUrinalysis Basic - ( 05 Nov 2023 23:46 )    Color: x / Appearance: x / SG: x / pH: x  Gluc: 167 mg/dL / Ketone: x  / Bili: x / Urobili: x   Blood: x / Protein: x / Nitrite: x   Leuk Esterase: x / RBC: x / WBC x   Sq Epi: x / Non Sq Epi: x / Bacteria: x      CSF  Immunological Labs    Radiology:  < from: [facility] Duplex Lower Ext Vein Scan, Bilat (11.04.23 @ 17:20) >    IMPRESSION:  No evidence of deep venous thrombosis in either lower extremity.    < end of copied text >  < from: Xray Chest 1 View- PORTABLE-Urgent (Xray Chest 1 View- PORTABLE-Urgent .) (11.04.23 @ 02:13) >  ET tube tipbelow the level of clavicles.  Enteric tube looped in the stomach.    < end of copied text >  < from: CT Angio Chest PE Protocol w/ IV Cont (05.21.23 @ 18:15) >  No central or lobar pulmonary embolism. Evaluation of the segmental and   subsegmental branches are limited due to respiratory motion.    < end of copied text >   Neurology Progress Note     Subjective: NAEON. IVIG day 4/5. Patient feeling better, as she expressed when writing on paper. Translation provided via neurology attending, Dr. Hodgson.     Objective:  MEDICATIONS  (STANDING):  albuterol/ipratropium for Nebulization 3 milliLiter(s) Nebulizer every 6 hours  bisacodyl Suppository 10 milliGRAM(s) Rectal daily  chlorhexidine 0.12% Liquid 15 milliLiter(s) Oral Mucosa every 12 hours  chlorhexidine 4% Liquid 1 Application(s) Topical at bedtime  dexMEDEtomidine Infusion 0.2 MICROgram(s)/kG/Hr (3.31 mL/Hr) IV Continuous <Continuous>  diphenhydrAMINE Injectable 25 milliGRAM(s) IV Push <User Schedule>  enoxaparin Injectable 40 milliGRAM(s) SubCutaneous every 24 hours  immune   globulin 10% (GAMMAGARD) IVPB 25 Gram(s) IV Intermittent <User Schedule>  insulin lispro (ADMELOG) corrective regimen sliding scale   SubCutaneous every 6 hours  pantoprazole   Suspension 40 milliGRAM(s) Oral before breakfast  polyethylene glycol 3350 17 Gram(s) Oral every 12 hours  potassium chloride   Solution 40 milliEquivalent(s) Oral once  predniSONE   Tablet 60 milliGRAM(s) Oral daily  sodium chloride 3%  Inhalation 4 milliLiter(s) Inhalation every 6 hours    MEDICATIONS  (PRN):  senna 2 Tablet(s) Oral at bedtime PRN Constipation    Allergies  peanuts (Unknown)  No Known Drug Allergies    Vital Signs Last 24 Hrs  T(C): 36.9 (06 Nov 2023 03:00), Max: 37.5 (05 Nov 2023 15:00)  T(F): 98.5 (06 Nov 2023 03:00), Max: 99.5 (05 Nov 2023 15:00)  HR: 87 (06 Nov 2023 11:00) (56 - 135)  BP: --  BP(mean): --  RR: 16 (06 Nov 2023 11:00) (15 - 23)  SpO2: 100% (06 Nov 2023 11:00) (94% - 100%)    Parameters below as of 06 Nov 2023 06:03  Patient On (Oxygen Delivery Method): ventilator      NEUROLOGICAL EXAM:  MS: Intubated, no verbal output. Awake, alert, follows simple commands. On precedex. No sedation.   CN: VFF, EOMI, PERRL, V1-3 intact, no facial asymmetry, t/p midline, SCM/trap intact.  Motor:   Deltoids 4/5 bilat. Hip flex 5/5.  Knee flex ext 5/5. Dorsi plantar 5/5.   Tone: normal. Bulk: normal. DTR 2+ symm.  Plantar flex b/l.   Sensation: intact to LT/PP 4x.   Coordination: appropriate for level of strength   Gait:  MOLLY given intubated     Labs:   cbc                      10.3   9.42  )-----------( 227      ( 05 Nov 2023 23:46 )             30.9     [id number]    141  |  108  |  22  ----------------------------<  167<H>  3.6   |  23  |  0.51    Ca    8.7      05 Nov 2023 23:46  Phos  3.1     11-05  Mg     2.1     11-05      Coags  [id number] Chol 214<H> LDL --  Trig 81  A1C  CardiacMarkers    LFTs  UAUrinalysis Basic - ( 05 Nov 2023 23:46 )    Color: x / Appearance: x / SG: x / pH: x  Gluc: 167 mg/dL / Ketone: x  / Bili: x / Urobili: x   Blood: x / Protein: x / Nitrite: x   Leuk Esterase: x / RBC: x / WBC x   Sq Epi: x / Non Sq Epi: x / Bacteria: x      CSF  Immunological Labs    Radiology:  < from: [facility] Duplex Lower Ext Vein Scan, Bilat (11.04.23 @ 17:20) >    IMPRESSION:  No evidence of deep venous thrombosis in either lower extremity.    < end of copied text >  < from: Xray Chest 1 View- PORTABLE-Urgent (Xray Chest 1 View- PORTABLE-Urgent .) (11.04.23 @ 02:13) >  ET tube tipbelow the level of clavicles.  Enteric tube looped in the stomach.    < end of copied text >  < from: CT Angio Chest PE Protocol w/ IV Cont (05.21.23 @ 18:15) >  No central or lobar pulmonary embolism. Evaluation of the segmental and   subsegmental branches are limited due to respiratory motion.    < end of copied text >   Neurology Progress Note     Subjective: NAEON. IVIG day 4/5. Patient feeling better, as she expressed when writing on paper. Translation provided via neurology attending, Dr. Hodgson.     Objective:  MEDICATIONS  (STANDING):  albuterol/ipratropium for Nebulization 3 milliLiter(s) Nebulizer every 6 hours  bisacodyl Suppository 10 milliGRAM(s) Rectal daily  chlorhexidine 0.12% Liquid 15 milliLiter(s) Oral Mucosa every 12 hours  chlorhexidine 4% Liquid 1 Application(s) Topical at bedtime  dexMEDEtomidine Infusion 0.2 MICROgram(s)/kG/Hr (3.31 mL/Hr) IV Continuous <Continuous>  diphenhydrAMINE Injectable 25 milliGRAM(s) IV Push <User Schedule>  enoxaparin Injectable 40 milliGRAM(s) SubCutaneous every 24 hours  immune   globulin 10% (GAMMAGARD) IVPB 25 Gram(s) IV Intermittent <User Schedule>  insulin lispro (ADMELOG) corrective regimen sliding scale   SubCutaneous every 6 hours  pantoprazole   Suspension 40 milliGRAM(s) Oral before breakfast  polyethylene glycol 3350 17 Gram(s) Oral every 12 hours  potassium chloride   Solution 40 milliEquivalent(s) Oral once  predniSONE   Tablet 60 milliGRAM(s) Oral daily  sodium chloride 3%  Inhalation 4 milliLiter(s) Inhalation every 6 hours    MEDICATIONS  (PRN):  senna 2 Tablet(s) Oral at bedtime PRN Constipation    Allergies  peanuts (Unknown)  No Known Drug Allergies    Vital Signs Last 24 Hrs  T(C): 36.9 (06 Nov 2023 03:00), Max: 37.5 (05 Nov 2023 15:00)  T(F): 98.5 (06 Nov 2023 03:00), Max: 99.5 (05 Nov 2023 15:00)  HR: 87 (06 Nov 2023 11:00) (56 - 135)  BP: --  BP(mean): --  RR: 16 (06 Nov 2023 11:00) (15 - 23)  SpO2: 100% (06 Nov 2023 11:00) (94% - 100%)    Parameters below as of 06 Nov 2023 06:03  Patient On (Oxygen Delivery Method): ventilator      NEUROLOGICAL EXAM:  MS: Intubated, no verbal output. Awake, alert, follows simple commands. On precedex. No sedation.   CN: VFF, EOMI, PERRL, V1-3 intact, no facial asymmetry, t/p midline, SCM/trap intact.  Motor:   Deltoids 4/5 bilat. Hip flex 5/5.  Knee flex ext 5/5. Dorsi plantar 5/5.   Tone: normal. Bulk: normal. DTR 2+ symm.  Plantar flex b/l.   Sensation: intact to LT/PP 4x.   Coordination: appropriate for level of strength   Gait:  MOLLY given intubated     Labs:   cbc                      10.3   9.42  )-----------( 227      ( 05 Nov 2023 23:46 )             30.9     [id number]    141  |  108  |  22  ----------------------------<  167<H>  3.6   |  23  |  0.51    Ca    8.7      05 Nov 2023 23:46  Phos  3.1     11-05  Mg     2.1     11-05      Coags  [id number] Chol 214<H> LDL --  Trig 81  A1C  CardiacMarkers    LFTs  UAUrinalysis Basic - ( 05 Nov 2023 23:46 )    Color: x / Appearance: x / SG: x / pH: x  Gluc: 167 mg/dL / Ketone: x  / Bili: x / Urobili: x   Blood: x / Protein: x / Nitrite: x   Leuk Esterase: x / RBC: x / WBC x   Sq Epi: x / Non Sq Epi: x / Bacteria: x      CSF  Immunological Labs    Radiology:  < from: [facility] Duplex Lower Ext Vein Scan, Bilat (11.04.23 @ 17:20) >    IMPRESSION:  No evidence of deep venous thrombosis in either lower extremity.    < end of copied text >  < from: Xray Chest 1 View- PORTABLE-Urgent (Xray Chest 1 View- PORTABLE-Urgent .) (11.04.23 @ 02:13) >  ET tube tipbelow the level of clavicles.  Enteric tube looped in the stomach.    < end of copied text >  < from: CT Angio Chest PE Protocol w/ IV Cont (05.21.23 @ 18:15) >  No central or lobar pulmonary embolism. Evaluation of the segmental and   subsegmental branches are limited due to respiratory motion.    < end of copied text >   Neurology Progress Note     Subjective: NAEON. IVIG day 4/5. Patient feeling better, as she expressed when writing on paper. Translation provided via neurology attending, Dr. Hodgson.     Objective:  MEDICATIONS  (STANDING):  albuterol/ipratropium for Nebulization 3 milliLiter(s) Nebulizer every 6 hours  bisacodyl Suppository 10 milliGRAM(s) Rectal daily  chlorhexidine 0.12% Liquid 15 milliLiter(s) Oral Mucosa every 12 hours  chlorhexidine 4% Liquid 1 Application(s) Topical at bedtime  dexMEDEtomidine Infusion 0.2 MICROgram(s)/kG/Hr (3.31 mL/Hr) IV Continuous <Continuous>  diphenhydrAMINE Injectable 25 milliGRAM(s) IV Push <User Schedule>  enoxaparin Injectable 40 milliGRAM(s) SubCutaneous every 24 hours  immune   globulin 10% (GAMMAGARD) IVPB 25 Gram(s) IV Intermittent <User Schedule>  insulin lispro (ADMELOG) corrective regimen sliding scale   SubCutaneous every 6 hours  pantoprazole   Suspension 40 milliGRAM(s) Oral before breakfast  polyethylene glycol 3350 17 Gram(s) Oral every 12 hours  potassium chloride   Solution 40 milliEquivalent(s) Oral once  predniSONE   Tablet 60 milliGRAM(s) Oral daily  sodium chloride 3%  Inhalation 4 milliLiter(s) Inhalation every 6 hours    MEDICATIONS  (PRN):  senna 2 Tablet(s) Oral at bedtime PRN Constipation    Allergies  peanuts (Unknown)  No Known Drug Allergies    Vital Signs Last 24 Hrs  T(C): 36.9 (06 Nov 2023 03:00), Max: 37.5 (05 Nov 2023 15:00)  T(F): 98.5 (06 Nov 2023 03:00), Max: 99.5 (05 Nov 2023 15:00)  HR: 87 (06 Nov 2023 11:00) (56 - 135)  BP: --  BP(mean): --  RR: 16 (06 Nov 2023 11:00) (15 - 23)  SpO2: 100% (06 Nov 2023 11:00) (94% - 100%)    Parameters below as of 06 Nov 2023 06:03  Patient On (Oxygen Delivery Method): ventilator      NEUROLOGICAL EXAM:  MS: Intubated, no verbal output. Awake, alert, follows simple commands. On precedex. No sedation.   CN: VFF, EOMI, PERRL, V1-3 intact, no facial asymmetry, t/p midline, SCM/trap intact.  Motor:   Deltoids 4/5 bilat. Hip flex 5/5.  Knee flex ext 5/5. Dorsi plantar 5/5.   Tone: normal. Bulk: normal. DTR 2+ symm.  Plantar flex b/l.   Sensation: intact to LT/PP 4x.   Coordination: appropriate for level of strength   Gait:  MOLLY given intubated     Labs:   cbc                      10.3   9.42  )-----------( 227      ( 05 Nov 2023 23:46 )             30.9     [id number]    141  |  108  |  22  ----------------------------<  167<H>  3.6   |  23  |  0.51    Ca    8.7      05 Nov 2023 23:46  Phos  3.1     11-05  Mg     2.1     11-05      Coags  [id number] Chol 214<H> LDL --  Trig 81  A1C  CardiacMarkers    LFTs  UAUrinalysis Basic - ( 05 Nov 2023 23:46 )    Color: x / Appearance: x / SG: x / pH: x  Gluc: 167 mg/dL / Ketone: x  / Bili: x / Urobili: x   Blood: x / Protein: x / Nitrite: x   Leuk Esterase: x / RBC: x / WBC x   Sq Epi: x / Non Sq Epi: x / Bacteria: x      CSF  Immunological Labs    Radiology:  < from: [facility] Duplex Lower Ext Vein Scan, Bilat (11.04.23 @ 17:20) >    IMPRESSION:  No evidence of deep venous thrombosis in either lower extremity.    < end of copied text >  < from: Xray Chest 1 View- PORTABLE-Urgent (Xray Chest 1 View- PORTABLE-Urgent .) (11.04.23 @ 02:13) >  ET tube tipbelow the level of clavicles.  Enteric tube looped in the stomach.    < end of copied text >  < from: CT Angio Chest PE Protocol w/ IV Cont (05.21.23 @ 18:15) >  No central or lobar pulmonary embolism. Evaluation of the segmental and   subsegmental branches are limited due to respiratory motion.    < end of copied text >

## 2023-11-06 NOTE — PHARMACOTHERAPY INTERVENTION NOTE - COMMENTS
Per respiratory therapist Reyna and was informed patient only has oral secretions and recommended to discontinue sodium chloride 3% inhalation. EKTA Reddy informed and patient to trial off of 3%NACL inhalation

## 2023-11-06 NOTE — PROGRESS NOTE ADULT - ATTENDING COMMENTS
Patient seen and examined bedside. During follow up visit, patient's  was present and I appreciate that. As per patient and , she is feeling better.     Detailed neuro exam:  General: Patient is comfortably lying on the bed without acute distress and currently orally intubated.  Mental and Psychological Status: The patient is alert and oriented to person, place and exact date (via writing). Follows simple and complex commands.   Cranial Nerve Exam: Visual fields are full to confrontation. Pupils are round, equal, and reactive.  V1-V3 are intact to light touch. There is no facial weakness or asymmetry. Hearing is grossly intact. Shoulder shrug is 4/5 bilaterally.   Motor Exam: Bulk, tone, and strength are normal except proximal muscle bilaterally at upper extremities 4/5 and Neck flex & extensor 4+ to -5/5/5. There are no resting tremors.  Sensory Examination: Sensation is intact to light touch throughout.  Posture, Station and Gait: Currently orally intubated.    Impression and plan:                                                  Ms. Acosta is 63 year old right handed woman with a PMHx significant for myasthenia gravis s/p thymectomy on Mestinon 12 mg tablets TID and Prednisone 60 mg presenting to the ED for worsening difficulty with double vision, swallowing, talking, and breathing that has progressively gotten worse for the previous one week.  Myasthenic gravis crisis with signs of respiratory failure and subsequently she required mechanical ventilation for air way protection. Started on IVIG and clinically she is doing better. Today is 4th day.  Continue Steroids with GI prophylaxis   Continue medical management, neuro- check and fall precaution.  GI and DVT prophylaxis.  I agree with above exam, assessment and plan.    Patient is at high risk for complications and morbidity or mortality. There is high probability of imminent or life threatening deterioration in the patient's condition.  I discussed the diagnosis and treatment plan with the patient and . Risk benefit and alternatives to the treatment were discussed. All questions and concerns were addressed. The patient and  demonstrated good understanding of the treatment plan.    My cumulative time taking care of this critically ill patient is 50 minutes  If you have any further questions, please do not hesitate to contact our team.  Thank you for allowing us to participate in this patient care.

## 2023-11-06 NOTE — DIETITIAN INITIAL EVALUATION ADULT - PERTINENT MEDS FT
MEDICATIONS  (STANDING):  albuterol/ipratropium for Nebulization 3 milliLiter(s) Nebulizer every 6 hours  chlorhexidine 0.12% Liquid 15 milliLiter(s) Oral Mucosa every 12 hours  chlorhexidine 4% Liquid 1 Application(s) Topical at bedtime  diphenhydrAMINE Injectable 25 milliGRAM(s) IV Push <User Schedule>  enoxaparin Injectable 40 milliGRAM(s) SubCutaneous every 24 hours  immune   globulin 10% (GAMMAGARD) IVPB 25 Gram(s) IV Intermittent <User Schedule>  insulin lispro (ADMELOG) corrective regimen sliding scale   SubCutaneous every 6 hours  pantoprazole   Suspension 40 milliGRAM(s) Oral before breakfast  polyethylene glycol 3350 17 Gram(s) Oral every 12 hours  potassium chloride   Solution 40 milliEquivalent(s) Oral once  predniSONE   Tablet 60 milliGRAM(s) Oral daily  sodium chloride 3%  Inhalation 4 milliLiter(s) Inhalation every 6 hours    MEDICATIONS  (PRN):  bisacodyl 5 milliGRAM(s) Oral daily PRN Constipation  senna 2 Tablet(s) Oral at bedtime PRN Constipation

## 2023-11-06 NOTE — DIETITIAN INITIAL EVALUATION ADULT - REASON FOR ADMISSION
63y (1960) woman with a PMHx significant for myasthenia gravis diagnosis presenting to the ED for worsening difficulty with double vision, swallowing, talking, and breathing that has progressively gotten worse for the previous one week.  11/4- Patient intubated overnight for worsening respiratory status.

## 2023-11-06 NOTE — PROGRESS NOTE ADULT - ASSESSMENT
Patient KAIDEN HATCH is a 63y (1960) woman with a PMHx significant for myasthenia gravis s/p thymectomy presenting to the ED for worsening difficulty with double vision, swallowing, talking, and breathing that has progressively gotten worse for the previous one week. Patient is currently on Mestinon 60 mg two tablets TID and Prednisone 10 mg --> uptitrated to 20 mg, she was not taking it per son for a month or so and was discharged on 50 mg after her hospitalization in 06/2023. Has been admitted before in June 2023, requiring intubation and PLEX/IVIG and in Feb 2023 for MG crisis that required intubation (2/1-2/11/23) iso COVID19. Patient received 5 days of PLEX (2/2-2/10/23) followed by 5 days of IVIG (2/16-2/20/23) with symptomatic improvement. Patient's son says patient has responded best to IVIG and that it stabilizes her MG for about 2 years. At baseline, patient is on 2L NC. In the ED, patient's NIF was -50. Her VC was 430. Patient also endorsed increased mucus secretion in her throat, and reliance on her oxygen tank.     Impression: Myasthenia gravis with acute exacerbation. Overall, patient's condition improving.     Recommendations:  [] Would recommend PLEX as first line but given patient/family preference, please continue with IVIG day 4/5.   []Hold Mestinon given secretions  []Trend NIF/VC, Extubate when able per NSCU  []Continue Prednisone 60mg QD  []Toxic/metabolic workup per ICU (Alkalemia likely iatrogenic, Leukocytosis likely iatrogenic, UA non infectious, CXR without consolidation, afebrile, consider RVP); if no trigger for crisis detected would consider DC on increased immunosuppression and/or lower pyridostigmine given pyridostigmine was recently increased and prednisone was DCed 9/15 but restarted at lower dose 2 weeks prior to admission. If AE from prolonged steroids would consider outpatient IVIG +/- Ocrelizumab. [place] outpatient [place] [facility] [place]   []follow up with neuromuscular specialist after discharge - can follow at [address] [place] [address] [phone] if patient desires. Patient's family provided with neuromuscular specialist names.     Case seen and d/w Dr. Hodgson, neurology attending. Spoke with  at bedside and plan communicated to NSCU.    Patient KAIDEN HATCH is a 63y (1960) woman with a PMHx significant for myasthenia gravis s/p thymectomy presenting to the ED for worsening difficulty with double vision, swallowing, talking, and breathing that has progressively gotten worse for the previous one week. Patient is currently on Mestinon 60 mg two tablets TID and Prednisone 10 mg --> uptitrated to 20 mg, she was not taking it per son for a month or so and was discharged on 50 mg after her hospitalization in 06/2023. Has been admitted before in June 2023, requiring intubation and PLEX/IVIG and in Feb 2023 for MG crisis that required intubation (2/1-2/11/23) iso COVID19. Patient received 5 days of PLEX (2/2-2/10/23) followed by 5 days of IVIG (2/16-2/20/23) with symptomatic improvement. Patient's son says patient has responded best to IVIG and that it stabilizes her MG for about 2 years. At baseline, patient is on 2L NC. In the ED, patient's NIF was -50. Her VC was 430. Patient also endorsed increased mucus secretion in her throat, and reliance on her oxygen tank.     Impression: Myasthenia gravis with acute exacerbation. Overall, patient's condition improving.     Recommendations:  [] Would recommend PLEX as first line but given patient/family preference, please continue with IVIG day 4/5.   []Hold Mestinon given secretions  []Trend NIF/VC, Extubate when able per NSCU  []Continue Prednisone 60mg QD  []Toxic/metabolic workup per ICU (Alkalemia likely iatrogenic, Leukocytosis likely iatrogenic, UA non infectious, CXR without consolidation, afebrile, consider RVP); if no trigger for crisis detected would consider DC on increased immunosuppression and/or lower pyridostigmine given pyridostigmine was recently increased and prednisone was DCed 9/15 but restarted at lower dose 2 weeks prior to admission. If AE from prolonged steroids would consider outpatient IVIG +/- Ocrelizumab. Has established outpatient neurologist in [facility] [place].   []follow up with neuromuscular specialist after discharge - can follow at [address] [place] [address] [phone] if patient desires. Patient's family provided with neuromuscular specialist names.     Case seen and d/w Dr. Hodgson, neurology attending. Spoke with  at bedside and plan communicated to NSCU.    Patient KAIDEN HATCH is a 63y (1960) woman with a PMHx significant for myasthenia gravis s/p thymectomy presenting to the ED for worsening difficulty with double vision, swallowing, talking, and breathing that has progressively gotten worse for the previous one week. Patient is currently on Mestinon 60 mg two tablets TID and Prednisone 10 mg --> uptitrated to 20 mg, she was not taking it per son for a month or so and was discharged on 50 mg after her hospitalization in 06/2023. Has been admitted before in June 2023, requiring intubation and PLEX/IVIG and in Feb 2023 for MG crisis that required intubation (2/1-2/11/23) iso COVID19. Patient received 5 days of PLEX (2/2-2/10/23) followed by 5 days of IVIG (2/16-2/20/23) with symptomatic improvement. Patient's son says patient has responded best to IVIG and that it stabilizes her MG for about 2 years. At baseline, patient is on 2L NC. In the ED, patient's NIF was -50. Her VC was 430. Patient also endorsed increased mucus secretion in her throat, and reliance on her oxygen tank.     Impression: Myasthenia gravis with acute exacerbation. Overall, patient's condition improving.     Recommendations:  [] Would recommend PLEX as first line but given patient/family preference, please continue with IVIG day 4/5.   []Hold Mestinon given secretions  []Trend NIF/VC, Extubate when able per NSCU  []Continue Prednisone 60mg QD  []Toxic/metabolic workup per ICU (Alkalemia likely iatrogenic, Leukocytosis likely iatrogenic, UA non infectious, CXR without consolidation, afebrile, consider RVP); if no trigger for crisis detected would consider DC on increased immunosuppression and/or lower pyridostigmine given pyridostigmine was recently increased and prednisone was DCed 9/15 but restarted at lower dose 2 weeks prior to admission. If AE from prolonged steroids would consider outpatient IVIG +/- Ocrelizumab. Has established outpatient neurologist in [facility] [place].   []follow up with neuromuscular specialist after discharge - can follow at [address] [place] [address] [phone] if patient desires. Patient's family provided with neuromuscular specialist names.     Case seen and d/w Dr. Hodgson, neurology attending. Spoke with  at bedside and plan communicated to NSCU.

## 2023-11-07 LAB
ANION GAP SERPL CALC-SCNC: 13 MMOL/L — SIGNIFICANT CHANGE UP (ref 5–17)
BUN SERPL-MCNC: 25 MG/DL — HIGH (ref 7–23)
CALCIUM SERPL-MCNC: 8.7 MG/DL — SIGNIFICANT CHANGE UP (ref 8.4–10.5)
CHLORIDE SERPL-SCNC: 107 MMOL/L — SIGNIFICANT CHANGE UP (ref 96–108)
CO2 SERPL-SCNC: 22 MMOL/L — SIGNIFICANT CHANGE UP (ref 22–31)
CREAT SERPL-MCNC: 0.47 MG/DL — LOW (ref 0.5–1.3)
EGFR: 106 ML/MIN/1.73M2 — SIGNIFICANT CHANGE UP
GLUCOSE SERPL-MCNC: 188 MG/DL — HIGH (ref 70–99)
HCT VFR BLD CALC: 34.9 % — SIGNIFICANT CHANGE UP (ref 34.5–45)
HGB BLD-MCNC: 11.5 G/DL — SIGNIFICANT CHANGE UP (ref 11.5–15.5)
MAGNESIUM SERPL-MCNC: 2.1 MG/DL — SIGNIFICANT CHANGE UP (ref 1.6–2.6)
MCHC RBC-ENTMCNC: 28 PG — SIGNIFICANT CHANGE UP (ref 27–34)
MCHC RBC-ENTMCNC: 33 GM/DL — SIGNIFICANT CHANGE UP (ref 32–36)
MCV RBC AUTO: 85.1 FL — SIGNIFICANT CHANGE UP (ref 80–100)
NRBC # BLD: 0 /100 WBCS — SIGNIFICANT CHANGE UP (ref 0–0)
PHOSPHATE SERPL-MCNC: 4.5 MG/DL — SIGNIFICANT CHANGE UP (ref 2.5–4.5)
PLATELET # BLD AUTO: 244 K/UL — SIGNIFICANT CHANGE UP (ref 150–400)
POTASSIUM SERPL-MCNC: 3.8 MMOL/L — SIGNIFICANT CHANGE UP (ref 3.5–5.3)
POTASSIUM SERPL-SCNC: 3.8 MMOL/L — SIGNIFICANT CHANGE UP (ref 3.5–5.3)
RBC # BLD: 4.1 M/UL — SIGNIFICANT CHANGE UP (ref 3.8–5.2)
RBC # FLD: 14.4 % — SIGNIFICANT CHANGE UP (ref 10.3–14.5)
SODIUM SERPL-SCNC: 142 MMOL/L — SIGNIFICANT CHANGE UP (ref 135–145)
WBC # BLD: 8.93 K/UL — SIGNIFICANT CHANGE UP (ref 3.8–10.5)
WBC # FLD AUTO: 8.93 K/UL — SIGNIFICANT CHANGE UP (ref 3.8–10.5)

## 2023-11-07 PROCEDURE — 99291 CRITICAL CARE FIRST HOUR: CPT

## 2023-11-07 PROCEDURE — 99233 SBSQ HOSP IP/OBS HIGH 50: CPT | Mod: GC

## 2023-11-07 PROCEDURE — 71045 X-RAY EXAM CHEST 1 VIEW: CPT | Mod: 26

## 2023-11-07 RX ORDER — POTASSIUM CHLORIDE 20 MEQ
20 PACKET (EA) ORAL ONCE
Refills: 0 | Status: COMPLETED | OUTPATIENT
Start: 2023-11-07 | End: 2023-11-07

## 2023-11-07 RX ORDER — ACETAMINOPHEN 500 MG
1000 TABLET ORAL ONCE
Refills: 0 | Status: COMPLETED | OUTPATIENT
Start: 2023-11-07 | End: 2023-11-07

## 2023-11-07 RX ADMIN — Medication 3 MILLILITER(S): at 05:40

## 2023-11-07 RX ADMIN — Medication 25 MILLIGRAM(S): at 19:37

## 2023-11-07 RX ADMIN — ENOXAPARIN SODIUM 40 MILLIGRAM(S): 100 INJECTION SUBCUTANEOUS at 21:11

## 2023-11-07 RX ADMIN — Medication 60 MILLIGRAM(S): at 05:31

## 2023-11-07 RX ADMIN — POLYETHYLENE GLYCOL 3350 17 GRAM(S): 17 POWDER, FOR SOLUTION ORAL at 05:31

## 2023-11-07 RX ADMIN — Medication 20 MILLIEQUIVALENT(S): at 17:44

## 2023-11-07 RX ADMIN — CHLORHEXIDINE GLUCONATE 15 MILLILITER(S): 213 SOLUTION TOPICAL at 18:46

## 2023-11-07 RX ADMIN — Medication 2: at 11:16

## 2023-11-07 RX ADMIN — Medication 1000 MILLIGRAM(S): at 17:00

## 2023-11-07 RX ADMIN — Medication 3 MILLILITER(S): at 17:09

## 2023-11-07 RX ADMIN — IMMUNE GLOBULIN (HUMAN) 125 GRAM(S): 10 INJECTION INTRAVENOUS; SUBCUTANEOUS at 20:15

## 2023-11-07 RX ADMIN — Medication 3 MILLILITER(S): at 11:13

## 2023-11-07 RX ADMIN — Medication 400 MILLIGRAM(S): at 16:29

## 2023-11-07 RX ADMIN — CHLORHEXIDINE GLUCONATE 15 MILLILITER(S): 213 SOLUTION TOPICAL at 05:31

## 2023-11-07 RX ADMIN — CHLORHEXIDINE GLUCONATE 1 APPLICATION(S): 213 SOLUTION TOPICAL at 21:11

## 2023-11-07 RX ADMIN — Medication 3 MILLILITER(S): at 23:00

## 2023-11-07 RX ADMIN — PANTOPRAZOLE SODIUM 40 MILLIGRAM(S): 20 TABLET, DELAYED RELEASE ORAL at 07:19

## 2023-11-07 NOTE — PROGRESS NOTE ADULT - ATTENDING COMMENTS
remains critically ill.  NIF is unfortunately significantly weaker today.  family still resistant to PLEX, wants to continue IVIG, last dose today, and continue to monitor.  neurology aware.  still plan to extubate to BiPAP vs hi-laine when ready.

## 2023-11-07 NOTE — PROGRESS NOTE ADULT - SUBJECTIVE AND OBJECTIVE BOX
Neurology Progress Note     Subjective: NAEON. IVIG day 5/5. Patient's NIF and VC declining. Patient says she feels better, but not ready to be extubated given respiratory function. Translation provided via neurology attending, Dr. Hodgson.     MEDICATIONS  (STANDING):  albuterol/ipratropium for Nebulization 3 milliLiter(s) Nebulizer every 6 hours  chlorhexidine 0.12% Liquid 15 milliLiter(s) Oral Mucosa every 12 hours  chlorhexidine 4% Liquid 1 Application(s) Topical at bedtime  diphenhydrAMINE Injectable 25 milliGRAM(s) IV Push <User Schedule>  enoxaparin Injectable 40 milliGRAM(s) SubCutaneous every 24 hours  immune   globulin 10% (GAMMAGARD) IVPB 25 Gram(s) IV Intermittent <User Schedule>  insulin lispro (ADMELOG) corrective regimen sliding scale   SubCutaneous every 6 hours  pantoprazole   Suspension 40 milliGRAM(s) Oral before breakfast  polyethylene glycol 3350 17 Gram(s) Oral every 12 hours  potassium chloride   Solution 40 milliEquivalent(s) Oral once  predniSONE   Tablet 60 milliGRAM(s) Oral daily    MEDICATIONS  (PRN):  senna 2 Tablet(s) Oral at bedtime PRN Constipation    Allergies  peanuts (Unknown)  No Known Drug Allergies    Intolerances      Vital Signs Last 24 Hrs  T(C): 36.7 (07 Nov 2023 07:00), Max: 37 (06 Nov 2023 23:00)  T(F): 98 (07 Nov 2023 07:00), Max: 98.6 (06 Nov 2023 23:00)  HR: 58 (07 Nov 2023 09:01) (52 - 117)  BP: --  BP(mean): --  RR: 16 (07 Nov 2023 09:00) (16 - 17)  SpO2: 100% (07 Nov 2023 09:01) (100% - 100%)    Parameters below as of 07 Nov 2023 07:00      O2 Concentration (%): 40    NEUROLOGICAL EXAM:  MS: Intubated, no verbal output. Awake, alert, follows simple commands.   CN: B/L eye ptosis L >R, VFF, EOMI, PERRL, V1-3 intact, no facial asymmetry, t/p midline, SCM/trap intact.  Motor:   Deltoids 4+/5 bilat. Hip flex 5/5.  Knee flex ext 5/5. Dorsi plantar 5/5.   Tone: normal. Bulk: normal. DTR 2+ symm.  Plantar flex b/l.   Sensation: intact to LT/PP 4x.   Coordination: appropriate for level of strength   Gait:  MOLLY given intubated     Labs:   cbc                      10.3   9.42  )-----------( 227      ( 05 Nov 2023 23:46 )             30.9     [id number]    141  |  108  |  22  ----------------------------<  167<H>  3.6   |  23  |  0.51    Ca    8.7      05 Nov 2023 23:46  Phos  3.1     11-05  Mg     2.1     11-05      Coags  [id number] Chol 214<H> LDL --  Trig 81  A1C  CardiacMarkers    LFTs  UAUrinalysis Basic - ( 05 Nov 2023 23:46 )    Color: x / Appearance: x / SG: x / pH: x  Gluc: 167 mg/dL / Ketone: x  / Bili: x / Urobili: x   Blood: x / Protein: x / Nitrite: x   Leuk Esterase: x / RBC: x / WBC x   Sq Epi: x / Non Sq Epi: x / Bacteria: x      Radiology:  < from: [facility] Duplex Lower Ext Vein Scan, Bilat (11.04.23 @ 17:20) >    IMPRESSION:  No evidence of deep venous thrombosis in either lower extremity.    < end of copied text >  < from: Xray Chest 1 View- PORTABLE-Urgent (Xray Chest 1 View- PORTABLE-Urgent .) (11.04.23 @ 02:13) >  ET tube tipbelow the level of clavicles.  Enteric tube looped in the stomach.    < end of copied text >  < from: CT Angio Chest PE Protocol w/ IV Cont (05.21.23 @ 18:15) >  No central or lobar pulmonary embolism. Evaluation of the segmental and   subsegmental branches are limited due to respiratory motion.    < end of copied text >   Neurology Progress Note     Subjective: NAEON. IVIG day 5/5. Patient's NIF and VC declining. Patient says she feels better, but not ready to be extubated given respiratory function. Translation provided via neurology attending, Dr. Hodgson.     MEDICATIONS  (STANDING):  albuterol/ipratropium for Nebulization 3 milliLiter(s) Nebulizer every 6 hours  chlorhexidine 0.12% Liquid 15 milliLiter(s) Oral Mucosa every 12 hours  chlorhexidine 4% Liquid 1 Application(s) Topical at bedtime  diphenhydrAMINE Injectable 25 milliGRAM(s) IV Push <User Schedule>  enoxaparin Injectable 40 milliGRAM(s) SubCutaneous every 24 hours  immune   globulin 10% (GAMMAGARD) IVPB 25 Gram(s) IV Intermittent <User Schedule>  insulin lispro (ADMELOG) corrective regimen sliding scale   SubCutaneous every 6 hours  pantoprazole   Suspension 40 milliGRAM(s) Oral before breakfast  polyethylene glycol 3350 17 Gram(s) Oral every 12 hours  potassium chloride   Solution 40 milliEquivalent(s) Oral once  predniSONE   Tablet 60 milliGRAM(s) Oral daily    MEDICATIONS  (PRN):  senna 2 Tablet(s) Oral at bedtime PRN Constipation    Allergies  peanuts (Unknown)  No Known Drug Allergies    Intolerances      Vital Signs Last 24 Hrs  T(C): 36.7 (07 Nov 2023 07:00), Max: 37 (06 Nov 2023 23:00)  T(F): 98 (07 Nov 2023 07:00), Max: 98.6 (06 Nov 2023 23:00)  HR: 58 (07 Nov 2023 09:01) (52 - 117)  BP: --  BP(mean): --  RR: 16 (07 Nov 2023 09:00) (16 - 17)  SpO2: 100% (07 Nov 2023 09:01) (100% - 100%)    Parameters below as of 07 Nov 2023 07:00      O2 Concentration (%): 40    NEUROLOGICAL EXAM:  MS: Intubated, no verbal output. Awake, alert, follows simple commands.   CN: B/L eye ptosis L >R, VFF, EOMI, PERRL, V1-3 intact, no facial asymmetry, t/p midline, SCM/trap intact.  Motor:   Deltoids 4+/5 bilat. Hip flex 5/5.  Knee flex ext 5/5. Dorsi plantar 5/5.   Tone: normal. Bulk: normal. DTR 2+ symm.  Plantar flex b/l.   Sensation: intact to LT/PP 4x.   Coordination: appropriate for level of strength   Gait:  MOLLY given intubated     Labs:   cbc                      10.3   9.42  )-----------( 227      ( 05 Nov 2023 23:46 )             30.9     [id number]    141  |  108  |  22  ----------------------------<  167<H>  3.6   |  23  |  0.51    Ca    8.7      05 Nov 2023 23:46  Phos  3.1     11-05  Mg     2.1     11-05      Coags  [id number] Chol 214<H> LDL --  Trig 81  A1C  CardiacMarkers    LFTs  UAUrinalysis Basic - ( 05 Nov 2023 23:46 )    Color: x / Appearance: x / SG: x / pH: x  Gluc: 167 mg/dL / Ketone: x  / Bili: x / Urobili: x   Blood: x / Protein: x / Nitrite: x   Leuk Esterase: x / RBC: x / WBC x   Sq Epi: x / Non Sq Epi: x / Bacteria: x      Radiology:  < from: [facility] Duplex Lower Ext Vein Scan, Bilat (11.04.23 @ 17:20) >    IMPRESSION:  No evidence of deep venous thrombosis in either lower extremity.    < end of copied text >  < from: Xray Chest 1 View- PORTABLE-Urgent (Xray Chest 1 View- PORTABLE-Urgent .) (11.04.23 @ 02:13) >  ET tube tipbelow the level of clavicles.  Enteric tube looped in the stomach.    < end of copied text >  < from: CT Angio Chest PE Protocol w/ IV Cont (05.21.23 @ 18:15) >  No central or lobar pulmonary embolism. Evaluation of the segmental and   subsegmental branches are limited due to respiratory motion.    < end of copied text >   Neurology Progress Note     Subjective: NAEON. IVIG day 5/5. Patient's NIF and VC declining. Patient says she feels better, but not ready to be extubated given respiratory function. Translation provided via neurology attending, Dr. Hodgson.     MEDICATIONS  (STANDING):  albuterol/ipratropium for Nebulization 3 milliLiter(s) Nebulizer every 6 hours  chlorhexidine 0.12% Liquid 15 milliLiter(s) Oral Mucosa every 12 hours  chlorhexidine 4% Liquid 1 Application(s) Topical at bedtime  diphenhydrAMINE Injectable 25 milliGRAM(s) IV Push <User Schedule>  enoxaparin Injectable 40 milliGRAM(s) SubCutaneous every 24 hours  immune   globulin 10% (GAMMAGARD) IVPB 25 Gram(s) IV Intermittent <User Schedule>  insulin lispro (ADMELOG) corrective regimen sliding scale   SubCutaneous every 6 hours  pantoprazole   Suspension 40 milliGRAM(s) Oral before breakfast  polyethylene glycol 3350 17 Gram(s) Oral every 12 hours  potassium chloride   Solution 40 milliEquivalent(s) Oral once  predniSONE   Tablet 60 milliGRAM(s) Oral daily    MEDICATIONS  (PRN):  senna 2 Tablet(s) Oral at bedtime PRN Constipation    Allergies  peanuts (Unknown)  No Known Drug Allergies    Intolerances      Vital Signs Last 24 Hrs  T(C): 36.7 (07 Nov 2023 07:00), Max: 37 (06 Nov 2023 23:00)  T(F): 98 (07 Nov 2023 07:00), Max: 98.6 (06 Nov 2023 23:00)  HR: 58 (07 Nov 2023 09:01) (52 - 117)  BP: --  BP(mean): --  RR: 16 (07 Nov 2023 09:00) (16 - 17)  SpO2: 100% (07 Nov 2023 09:01) (100% - 100%)    Parameters below as of 07 Nov 2023 07:00      O2 Concentration (%): 40    NEUROLOGICAL EXAM:  MS: Intubated, no verbal output. Awake, alert, follows simple commands.   CN: B/L eye ptosis L >R, VFF, EOMI, PERRL, V1-3 intact, no facial asymmetry, t/p midline, SCM/trap intact.  Motor:   Deltoids 4+/5 bilat. Hip flex 5/5.  Knee flex ext 5/5. Dorsi plantar 5/5.   Tone: normal. Bulk: normal. DTR 2+ symm.  Plantar flex b/l.   Sensation: intact to LT/PP 4x.   Coordination: appropriate for level of strength   Gait:  MOLLY given intubated     Labs:   cbc                      10.3   9.42  )-----------( 227      ( 05 Nov 2023 23:46 )             30.9     [id number]    141  |  108  |  22  ----------------------------<  167<H>  3.6   |  23  |  0.51    Ca    8.7      05 Nov 2023 23:46  Phos  3.1     11-05  Mg     2.1     11-05      Coags  [id number] Chol 214<H> LDL --  Trig 81  A1C  CardiacMarkers    LFTs  UAUrinalysis Basic - ( 05 Nov 2023 23:46 )    Color: x / Appearance: x / SG: x / pH: x  Gluc: 167 mg/dL / Ketone: x  / Bili: x / Urobili: x   Blood: x / Protein: x / Nitrite: x   Leuk Esterase: x / RBC: x / WBC x   Sq Epi: x / Non Sq Epi: x / Bacteria: x      Radiology:  < from: [facility] Duplex Lower Ext Vein Scan, Bilat (11.04.23 @ 17:20) >    IMPRESSION:  No evidence of deep venous thrombosis in either lower extremity.    < end of copied text >  < from: Xray Chest 1 View- PORTABLE-Urgent (Xray Chest 1 View- PORTABLE-Urgent .) (11.04.23 @ 02:13) >  ET tube tipbelow the level of clavicles.  Enteric tube looped in the stomach.    < end of copied text >  < from: CT Angio Chest PE Protocol w/ IV Cont (05.21.23 @ 18:15) >  No central or lobar pulmonary embolism. Evaluation of the segmental and   subsegmental branches are limited due to respiratory motion.    < end of copied text >

## 2023-11-07 NOTE — PROGRESS NOTE ADULT - ASSESSMENT
ASSESSMENT/PLAN: 62 y/o female with myasthenia gravis, impending respiratory failure.       NEURO:  Neurochecks Q4H  Discontinue Mestinon, patient with copious secretions   Increased Prednisone to 60 mg QD  Patient will need IVIG x 5 days duration, son and patients would rather get IVIG over PLEX- IVIG  (4/5 dose)  Avoid medications that may worsen the current exacerbation including macrolides, fluoroquinolones, tetracyclines, aminoglycoside, beta-blockers, magnesium, and anti-arrhythmics (procainamide, quinidine, and lidocaine)  Neurology following, appreciate their recommendations  Activity: [x] mobilize as tolerated [] Bedrest [x] PT [x] OT [] PMNR    PULM:  Intubated for airway protection  CPAP as tolerated   ABG/CXR reviewed  Currently patient is not in respiratory distress  On 3% Na inhalation x 2 more days- can consider Mucomyst and N acetylcysteine if fails, Duonebs Q6H  Would check NIF/VC Q4 hours in ICU setting, NIF -20/  this AM  Aggressive Chest PT  Copious thick secretions     CV:  MAP>65  TTE - EF 54%, elevated right atrial pressure   Off pressors    RENAL:  Stable renal function  IVL  daily IOs    GI:  Diet: ON tf at GOAL   GI prophylaxis: PPI while on steroids  Bowel regimen: miralax, senna, enema  LBM: PTA    ENDO:   FS goal 120-180  Place on ISS and adjust insulin as needed  A1C 6.7    HEME/ONC:  Monitor H/H  SCDs  Chemoppx: SQL    ID:  No fevers, improved leukocyte counts  No signs of infection     Disposition: ICU       ASSESSMENT/PLAN: 64 y/o female with myasthenia gravis, respiratory failure.     NEURO:  Neurochecks q4h  Discontinue Mestinon, patient with copious secretions   Increased Prednisone to 60 mg QD  Patient will need IVIG x 5 days duration, son and patients would rather get IVIG over PLEX- IVIG  (4/5 dose)  Avoid medications that may worsen the current exacerbation including macrolides, fluoroquinolones, tetracyclines, aminoglycoside, beta-blockers, magnesium, and anti-arrhythmics (procainamide, quinidine, and lidocaine)  Neurology following, appreciate their recommendations  Activity: [x] mobilize as tolerated [] Bedrest [x] PT [x] OT [] PMNR    PULM:  Intubated for airway protection  CPAP as tolerated   ABG/CXR reviewed  Currently patient is not in respiratory distress  Duonebs q6h for secretions; D/C 3% NaCl inhalation; can consider mucomyst and N-acetylcysteine if worsens  Check NIF/VC Q4 hours in ICU setting, NIF -12/  this AM  Aggressive Chest PT    CV:  MAP>65  TTE - EF 54%, elevated right atrial pressure   Off pressors    RENAL:  Stable renal function  IVL  daily IOs    GI:  Diet: ON tf, 40cc/hr of 55cc/hr goal  GI prophylaxis: PPI while on steroids  Bowel regimen: miralax, senna; dulcolax suppositories PRN  LBM: PTA    ENDO:   FS goal 120-180  ISS and adjust insulin as needed  A1C 6.7    HEME/ONC:  Monitor H/H  SCDs  Chemoppx: SQL    ID:  No fevers  No signs of infection     Disposition: ICU   ASSESSMENT/PLAN: 62 y/o female with myasthenia gravis, respiratory failure.     NEURO:  Neurochecks q4h  Discontinue Mestinon, patient with copious secretions   Increased Prednisone to 60 mg QD  Patient will need IVIG x 5 days duration, son and patients would rather get IVIG over PLEX- IVIG  (4/5 dose)  Avoid medications that may worsen the current exacerbation including macrolides, fluoroquinolones, tetracyclines, aminoglycoside, beta-blockers, magnesium, and anti-arrhythmics (procainamide, quinidine, and lidocaine)  Neurology following, appreciate their recommendations  Activity: [x] mobilize as tolerated [] Bedrest [x] PT [x] OT [] PMNR    PULM:  Intubated for airway protection  CPAP as tolerated   ABG/CXR reviewed  Currently patient is not in respiratory distress  Duonebs q6h for secretions; D/C 3% NaCl inhalation; can consider mucomyst and N-acetylcysteine if worsens  Check NIF/VC Q4 hours in ICU setting, NIF -12/  this AM  Aggressive Chest PT    CV:  MAP>65  TTE - EF 54%, elevated right atrial pressure   Off pressors    RENAL:  Stable renal function  IVL  daily IOs    GI:  Diet: ON tf, 40cc/hr of 55cc/hr goal  GI prophylaxis: PPI while on steroids  Bowel regimen: miralax, senna; dulcolax suppositories PRN  LBM: PTA    ENDO:   FS goal 120-180  ISS and adjust insulin as needed  A1C 6.7    HEME/ONC:  Monitor H/H  SCDs  Chemoppx: SQL    ID:  No fevers  No signs of infection     Disposition: ICU   ASSESSMENT/PLAN: 62 y/o female with myasthenia gravis, respiratory failure. Demonstrating worsening respiratory effort on SBT, NIF and FVC below baseline required for extubation to BiPAP. Recommend Neuro discusses risks & benefits of PLEX with family given decompensation of respiratory effort.    NEURO:  Neurochecks q4h  Discontinue Mestinon, patient with copious secretions, will consider restarting prior to extubation, currently held in setting of copious secretions   Continue Prednisone to 60 mg QD  Patient & family member expressed preference for IVIG > PLEX; today is day 5/5 of IVIG  Given poor respiratory effort, recommend Neuro d/w family PLEX  Avoid medications that may worsen the current exacerbation including macrolides, fluoroquinolones, tetracyclines, aminoglycoside, beta-blockers, magnesium, and anti-arrhythmics (procainamide, quinidine, and lidocaine)  Neurology following, appreciate their recommendations  Activity: [x] mobilize as tolerated [] Bedrest [x] PT [x] OT [] PMNR    PULM:  Intubated for airway protection given NIF -12;   CPAP as tolerated; daily SBTs  CXR reviewed  Currently patient is not in respiratory distress  Duonebs q6h for secretions  Check NIF/VC Q4 hours in ICU setting, NIF -12/  this AM  Aggressive Chest PT    CV:  MAP>65  TTE - EF 54%, elevated right atrial pressure   Off pressors    RENAL:  Stable renal function  IVL  daily IOs    GI:  Diet: ON tf, restart tube feeds  GI prophylaxis: PPI while on steroids  Bowel regimen: miralax, senna; dulcolax suppositories PRN  LBM: 11/6    ENDO:   FS goal 120-180  ISS and adjust insulin as needed  A1C 6.7    HEME/ONC:  Monitor H/H  SCDs  Chemoppx: SQL    ID:  No fevers  No signs of infection     Disposition: ICU   ASSESSMENT/PLAN: 64 y/o female with myasthenia gravis, respiratory failure. Demonstrating worsening respiratory effort on SBT, NIF and FVC below baseline required for extubation to BiPAP. Recommend Neuro discusses risks & benefits of PLEX with family given decompensation of respiratory effort.    NEURO:  Neurochecks q4h  Discontinue Mestinon, patient with copious secretions, will consider restarting prior to extubation, currently held in setting of copious secretions   Continue Prednisone to 60 mg QD  Patient & family member expressed preference for IVIG > PLEX; today is day 5/5 of IVIG  Given poor respiratory effort, recommend Neuro d/w family PLEX  Avoid medications that may worsen the current exacerbation including macrolides, fluoroquinolones, tetracyclines, aminoglycoside, beta-blockers, magnesium, and anti-arrhythmics (procainamide, quinidine, and lidocaine)  Neurology following, appreciate their recommendations  Activity: [x] mobilize as tolerated [] Bedrest [x] PT [x] OT [] PMNR    PULM:  Intubated for airway protection given NIF -12;   CPAP as tolerated; daily SBTs  CXR reviewed  Currently patient is not in respiratory distress  Duonebs q6h for secretions  Check NIF/VC Q4 hours in ICU setting, NIF -12/  this AM  Aggressive Chest PT    CV:  MAP>65  TTE - EF 54%, elevated right atrial pressure   Off pressors    RENAL:  Stable renal function  IVL  daily IOs    GI:  Diet: ON tf, restart tube feeds  GI prophylaxis: PPI while on steroids  Bowel regimen: miralax, senna; dulcolax suppositories PRN  LBM: 11/6    ENDO:   FS goal 120-180  ISS and adjust insulin as needed  A1C 6.7    HEME/ONC:  Monitor H/H  SCDs  Chemoppx: SQL    ID:  No fevers  No signs of infection     Disposition: ICU   ASSESSMENT/PLAN: 62 y/o female with myasthenia gravis, respiratory failure. Demonstrating worsening respiratory effort on SBT, NIF and FVC below baseline required for extubation to BiPAP. Discussed completion of IVIG course and monitoring NIF, FVC accordingly - if no improvement by EOD, Neuro to d/w family risks & benefits of PLEX.    NEURO:  Neurochecks q4h  Discontinue Mestinon, patient with copious secretions, will consider restarting prior to extubation, currently held in setting of copious secretions   Continue Prednisone to 60 mg QD  Patient & family member expressed preference for IVIG > PLEX; today is day 5/5 of IVIG  D/W Neuro Attending that PT will receive dose 5/5 of IVIG; if no improvement of poor respiratory effort by EOD, Neuro will discuss risks & benefits of PLEX w/ family  Avoid medications that may worsen the current exacerbation including macrolides, fluoroquinolones, tetracyclines, aminoglycoside, beta-blockers, magnesium, and anti-arrhythmics (procainamide, quinidine, and lidocaine)  Neurology following, appreciate their recommendations  Activity: [x] mobilize as tolerated [] Bedrest [x] PT [x] OT [] PMNR    PULM:  Intubated for airway protection  CPAP as tolerated; daily SBTs  CXR reviewed  Currently patient is not in respiratory distress  Duonebs q6h for secretions  Check NIF/VC Q4 hours in ICU setting, NIF -12/  this AM  Aggressive Chest PT    CV:  MAP>65  TTE - EF 54%, elevated right atrial pressure   Off pressors    RENAL:  Stable renal function  IVL  daily IOs    GI:  Diet: ON tf, restart tube feeds  GI prophylaxis: PPI while on steroids  Bowel regimen: miralax, senna; dulcolax suppositories PRN  LBM: 11/6    ENDO:   FS goal 120-180  ISS and adjust insulin as needed  A1C 6.7    HEME/ONC:  Monitor H/H  SCDs  Chemoppx: SQL    ID:  No fevers  No signs of infection     Disposition: ICU

## 2023-11-07 NOTE — PROGRESS NOTE ADULT - ASSESSMENT
64 y/o female with multiple MG exacerbation, now presents for worsening weakness.     Assessment and Plan:   · Assessment	  ASSESSMENT/PLAN: 64 y/o female with myasthenia gravis, impending respiratory failure.       NEURO:  Neurochecks Q4H  Discontinue Mestinon, patient with copious secretions   Increased Prednisone to 60 mg QD  Patient will need IVIG x 5 days duration, son and patients would rather get IVIG over PLEX- IVIG  (5/5 dose)  Avoid medications that may worsen the current exacerbation including macrolides, fluoroquinolones, tetracyclines, aminoglycoside, beta-blockers, magnesium, and anti-arrhythmics (procainamide, quinidine, and lidocaine)  Neurology following, appreciate their recommendations  Activity: [x] mobilize as tolerated [] Bedrest [x] PT [x] OT [] PMNR    PULM:  Intubated for airway protection  ABG/CXR reviewed  Currently patient is not in respiratory distress  Would check NIF/VC Q4 hours in ICU setting  NIF -20/     CV:  MAP>65  TTE - EF 54%  On rich currently, will wean off rich    RENAL:  Hyponatremic, will repeat BMP  daily IOs    GI:  Diet: ON tf at GOAL   GI prophylaxis: PPI while on steroids  Bowel regimen: miralax, senna    ENDO:   FS goal 120-180  Place on ISS and adjsut insulin as needed  A1C 6.7    HEME/ONC:  Monitor H/H  SCDs  Chemoppx: SQL    ID:  Monitor for fevers, leukocytosis likely related to steroids  No signs of infection   Continue to monitor for infection    Disposition: ICU   62 y/o female with multiple MG exacerbation, now presents for worsening weakness.     Assessment and Plan:   · Assessment	  ASSESSMENT/PLAN: 62 y/o female with myasthenia gravis, impending respiratory failure.       NEURO:  Neurochecks Q4H  Discontinue Mestinon, patient with copious secretions   Increased Prednisone to 60 mg QD  Patient will need IVIG x 5 days duration, son and patients would rather get IVIG over PLEX- IVIG  (5/5 dose)  Avoid medications that may worsen the current exacerbation including macrolides, fluoroquinolones, tetracyclines, aminoglycoside, beta-blockers, magnesium, and anti-arrhythmics (procainamide, quinidine, and lidocaine)  Neurology following, appreciate their recommendations  Activity: [x] mobilize as tolerated [] Bedrest [x] PT [x] OT [] PMNR    PULM:  Intubated for airway protection  ABG/CXR reviewed  Currently patient is not in respiratory distress  Would check NIF/VC Q4 hours in ICU setting  NIF -20/     CV:  MAP>65  TTE - EF 54%  On irch currently, will wean off rich    RENAL:  Hyponatremic, will repeat BMP  daily IOs    GI:  Diet: ON tf at GOAL   GI prophylaxis: PPI while on steroids  Bowel regimen: miralax, senna    ENDO:   FS goal 120-180  Place on ISS and adjsut insulin as needed  A1C 6.7    HEME/ONC:  Monitor H/H  SCDs  Chemoppx: SQL    ID:  Monitor for fevers, leukocytosis likely related to steroids  No signs of infection   Continue to monitor for infection    Disposition: ICU

## 2023-11-07 NOTE — PROGRESS NOTE ADULT - ATTENDING COMMENTS
Patient seen and examined bedside. During follow up visit, patient's  was present and I appreciate that. As per patient and , she is feeling better.     Detailed neuro exam:  General: Patient is comfortably lying on the bed without acute distress and currently orally intubated.  Mental and Psychological Status: The patient is alert and oriented to person, place and exact date (via writing). Follows simple and complex commands.   Cranial Nerve Exam: Bilateral eye ptosis L>R Visual fields are full to confrontation. Pupils are round, equal, and reactive.  V1-V3 are intact to light touch. There is no facial weakness or asymmetry. Hearing is grossly intact. Shoulder shrug is 4/5 bilaterally.   Motor Exam: Bulk, tone, and strength are normal except proximal muscle bilaterally at upper extremities 4/5. There are no resting tremors.  Sensory Examination: Sensation is intact to light touch throughout.  Posture, Station and Gait: Currently orally intubated.    Impression and plan:                                                  Ms. Acosta is 63 year old right handed woman with a PMHx significant for myasthenia gravis s/p thymectomy on Mestinon 12 mg tablets TID and Prednisone 60 mg presenting to the ED for worsening difficulty with double vision, swallowing, talking, and breathing that has progressively gotten worse for the previous one week.  Myasthenic gravis crisis with signs of respiratory failure and subsequently she required mechanical ventilation for air way protection. Started on IVIG and clinically she is doing better. Today is 5th day. Today we spoke with neuro-ICU team they reported worsening respiratory effort on SBT, NIF and FVC below baseline required for extubation to BiPAP.  Today we reinforce the family regarding risks & benefits of PLEX since patient's respiratory efforts remains poor. Family would like to completion of IVIG course.   Continue Steroids with GI prophylaxis   Continue medical management, neuro- check and fall precaution.  GI and DVT prophylaxis.  I agree with above exam, assessment and plan.    Patient is at high risk for complications and morbidity or mortality. There is high probability of imminent or life threatening deterioration in the patient's condition.    I discussed the diagnosis and treatment plan with the patient and . Risk benefit and alternatives to the treatment were discussed. All questions and concerns were addressed. The patient and  demonstrated good understanding of the treatment plan.    My cumulative time taking care of this critically ill patient is 30 minutes  If you have any further questions, please do not hesitate to contact our team.  Thank you for allowing us to participate in this patient care.

## 2023-11-07 NOTE — PROGRESS NOTE ADULT - SUBJECTIVE AND OBJECTIVE BOX
HOSPITAL COURSE:  HOSPITAL COURSE:   Patient KAIDEN HATCH is a 63y (1960) woman with a PMHx significant for myasthenia gravis diagnosis presenting to the ED for worsening difficulty with double vision, swallowing, talking, and breathing that has progressively gotten worse for the previous one week. Patient is currently on Mestinon 60 mg two tablets TID and Prednisone 10 mg --> uptitrated to 20 mg, she was not taking it per son for a month or so and was discharged on 50 mg after her hospitalization in 06/2023. Has been admitted before in June 2023, requiring intubation and PLEX/IVIG and in Feb 2023 for MG crisis that required intubation (2/1-2/11/23) iso COVID19. Patient received 5 days of PLEX (2/2-2/10/23) followed by 5 days of IVIG (2/16-2/20/23) with symptomatic improvement. Patient's son says patient has responded best to IVIG and that it stabilizes her MG for about 2 years. At baseline, patient is on 2L NC. In the ED, patient's NIF was -50. Her VC was 430. Patient also endorsed increased mucus secretion in her throat, and reliance on her oxygen tank.       Admission Scores  GCS: 15       24 hour Events: Patient evaluated at bedside. Patient was breathing comfortably on room air. Was able to count up to 18 in one breath. Reports worsening of symptoms for the past week, denies any recent illness. Patient reports progressive worsening of her symptoms.     11/4- Patient intubated overnight for worsening respiratory status. Patient received 1 dose of IVIG.   11/6- No events  11/7- Patient briefly tolerated CPAP before becoming apneic. Copious oral and inline secretions. NIF -20, .    Allergies    peanuts (Unknown)  No Known Drug Allergies    Intolerances        REVIEW OF SYSTEMS: [ ] Unable to Assess due to neurologic exam   [x] All ROS addressed below are non-contributory, except:  Neuro: [ ] Headache [ ] Back pain [ ] Numbness [ ] Weakness [ ] Ataxia [ ] Dizziness [ ] Aphasia [ ] Dysarthria [ ] Visual disturbance  Resp: [ ] Shortness of breath/dyspnea, [ ] Orthopnea [ ] Cough  CV: [ ] Chest pain [ ] Palpitation [ ] Lightheadedness [ ] Syncope  Renal: [ ] Thirst [ ] Edema  GI: [ ] Nausea [ ] Emesis [ ] Abdominal pain [ ] Constipation [ ] Diarrhea  Hem: [ ] Hematemesis [ ] bright red blood per rectum  ID: [ ] Fever [ ] Chills [ ] Dysuria  ENT: [ ] Rhinorrhea      DEVICES:   [ ] Restraints [ ] ET tube [ ] central line [ ] arterial line [ ] morales [ ] NGT/OGT [ ] EVD [ ] LD [ ] MYAH/HMV [ ] Trach [ ] PEG [ ] Chest Tube     VITALS:   Vital Signs Last 24 Hrs  T(C): 36.9 (07 Nov 2023 03:00), Max: 37 (06 Nov 2023 23:00)  T(F): 98.4 (07 Nov 2023 03:00), Max: 98.6 (06 Nov 2023 23:00)  HR: 117 (07 Nov 2023 05:50) (52 - 135)  BP: --  BP(mean): --  RR: 17 (07 Nov 2023 05:00) (16 - 23)  SpO2: 100% (07 Nov 2023 05:50) (100% - 100%)    Parameters below as of 07 Nov 2023 05:50  Patient On (Oxygen Delivery Method): ventilator      CAPILLARY BLOOD GLUCOSE      POCT Blood Glucose.: 108 mg/dL (07 Nov 2023 05:37)  POCT Blood Glucose.: 118 mg/dL (07 Nov 2023 00:47)  POCT Blood Glucose.: 141 mg/dL (06 Nov 2023 17:38)  POCT Blood Glucose.: 204 mg/dL (06 Nov 2023 12:27)    I&O's Summary    05 Nov 2023 07:01  -  06 Nov 2023 07:00  --------------------------------------------------------  IN: 1345 mL / OUT: 1000 mL / NET: 345 mL    06 Nov 2023 07:01  -  07 Nov 2023 06:38  --------------------------------------------------------  IN: 1023.2 mL / OUT: 850 mL / NET: 173.2 mL        Respiratory:  Mode: AC/ CMV (Assist Control/ Continuous Mandatory Ventilation)  RR (machine): 16  TV (machine): 420  FiO2: 40  PEEP: 5  ITime: 1  MAP: 11  PIP: 30        LABS:                        10.3   9.42  )-----------( 227      ( 05 Nov 2023 23:46 )             30.9     11-05    141  |  108  |  22  ----------------------------<  167<H>  3.6   |  23  |  0.51             MEDICATION LEVELS:     IVF FLUIDS/MEDICATIONS:   MEDICATIONS  (STANDING):  albuterol/ipratropium for Nebulization 3 milliLiter(s) Nebulizer every 6 hours  bisacodyl Suppository 10 milliGRAM(s) Rectal daily  chlorhexidine 0.12% Liquid 15 milliLiter(s) Oral Mucosa every 12 hours  chlorhexidine 4% Liquid 1 Application(s) Topical at bedtime  diphenhydrAMINE Injectable 25 milliGRAM(s) IV Push <User Schedule>  enoxaparin Injectable 40 milliGRAM(s) SubCutaneous every 24 hours  immune   globulin 10% (GAMMAGARD) IVPB 25 Gram(s) IV Intermittent <User Schedule>  insulin lispro (ADMELOG) corrective regimen sliding scale   SubCutaneous every 6 hours  pantoprazole   Suspension 40 milliGRAM(s) Oral before breakfast  polyethylene glycol 3350 17 Gram(s) Oral every 12 hours  potassium chloride   Solution 40 milliEquivalent(s) Oral once  predniSONE   Tablet 60 milliGRAM(s) Oral daily    MEDICATIONS  (PRN):  senna 2 Tablet(s) Oral at bedtime PRN Constipation        IMAGING:      EXAMINATION:  PHYSICAL EXAM:    Constitutional: No Acute Distress     Neurological: Awake, alert oriented to person, place and time, Following Commands, PERRL, EOMI, No Gaze Preference, Face Symmetrical, Speech Fluent, No dysmetria, No ataxia, No nystagmus     Motor exam:          Upper extremity                         Delt     Bicep     Tricep    HG                                                 R         4/5        5/5        5/5       5/5                                               L          4+/5        5/5        5/5       5/5          Lower extremity                        HF         KF        KE       DF         PF                                                  R        5/5        5/5        5/5       5/5         5/5                                               L         5/5        5/5       5/5       5/5          5/5                                                 Sensation: [x ] intact to light touch  [ ] decreased:     Pulmonary: Clear to Auscultation, No rales, No rhonchi, No wheezes     Cardiovascular: S1, S2, Regular rate and rhythm     Gastrointestinal: Soft, Non-tender, Non-distended     Extremities: No calf tenderness     Incision:    Home

## 2023-11-07 NOTE — PROGRESS NOTE ADULT - ASSESSMENT
Patient KAIDEN HATCH is a 63y (1960) woman with a PMHx significant for myasthenia gravis s/p thymectomy presenting to the ED for worsening difficulty with double vision, swallowing, talking, and breathing that has progressively gotten worse for the previous one week. Patient is currently on Mestinon 60 mg two tablets TID and Prednisone 10 mg --> uptitrated to 20 mg, she was not taking it per son for a month or so and was discharged on 50 mg after her hospitalization in 06/2023. Has been admitted before in June 2023, requiring intubation and PLEX/IVIG and in Feb 2023 for MG crisis that required intubation (2/1-2/11/23) iso COVID19. Patient received 5 days of PLEX (2/2-2/10/23) followed by 5 days of IVIG (2/16-2/20/23) with symptomatic improvement. Patient's son says patient has responded best to IVIG and that it stabilizes her MG for about 2 years. At baseline, patient is on 2L NC. In the ED, patient's NIF was -50. Her VC was 430. Patient also endorsed increased mucus secretion in her throat, and reliance on her oxygen tank.     Impression: Myasthenia gravis with acute exacerbation. Overall, patient's condition improving.     Recommendations:  [] Would recommend PLEX as first line but given patient/family preference, please continue with IVIG day 5/5.   [] Given decline in respiratory function ( -12 NIF, 230 VC), would recommend PLEX. Will discuss with son and .   []Hold Mestinon given secretions  []Trend NIF/VC, Extubate when able per NSCU  []Continue Prednisone 60mg QD  []Toxic/metabolic workup per ICU (Alkalemia likely iatrogenic, Leukocytosis likely iatrogenic, UA non infectious, CXR without consolidation, afebrile, consider RVP); if no trigger for crisis detected would consider DC on increased immunosuppression and/or lower pyridostigmine given pyridostigmine was recently increased and prednisone was DCed 9/15 but restarted at lower dose 2 weeks prior to admission. If AE from prolonged steroids would consider outpatient IVIG +/- Ocrelizumab. [place] [facility] [place]   []follow up with neuromuscular specialist after discharge - can follow at [address] [place] [address] [phone] if patient desires. Patient's family provided with neuromuscular specialist names.     Case seen and d/w Dr. Hodgson, neurology attending. Spoke with  at bedside and plan communicated to NSCU.    Patient KAIDEN HATCH is a 63y (1960) woman with a PMHx significant for myasthenia gravis s/p thymectomy presenting to the ED for worsening difficulty with double vision, swallowing, talking, and breathing that has progressively gotten worse for the previous one week. Patient is currently on Mestinon 60 mg two tablets TID and Prednisone 10 mg --> uptitrated to 20 mg, she was not taking it per son for a month or so and was discharged on 50 mg after her hospitalization in 06/2023. Has been admitted before in June 2023, requiring intubation and PLEX/IVIG and in Feb 2023 for MG crisis that required intubation (2/1-2/11/23) iso COVID19. Patient received 5 days of PLEX (2/2-2/10/23) followed by 5 days of IVIG (2/16-2/20/23) with symptomatic improvement. Patient's son says patient has responded best to IVIG and that it stabilizes her MG for about 2 years. At baseline, patient is on 2L NC. In the ED, patient's NIF was -50. Her VC was 430. Patient also endorsed increased mucus secretion in her throat, and reliance on her oxygen tank.     Impression: Myasthenia gravis with acute exacerbation. Overall, patient's condition improving.     Recommendations:  [] Would recommend PLEX as first line but given patient/family preference, please continue with IVIG day 5/5.   [] Given decline in respiratory function ( -12 NIF, 230 VC), would recommend PLEX. Will discuss with son and .   []Hold Mestinon given secretions  []Trend NIF/VC, Extubate when able per NSCU  []Continue Prednisone 60mg QD  []Toxic/metabolic workup per ICU (Alkalemia likely iatrogenic, Leukocytosis likely iatrogenic, UA non infectious, CXR without consolidation, afebrile, consider RVP); if no trigger for crisis detected would consider DC on increased immunosuppression and/or lower pyridostigmine given pyridostigmine was recently increased and prednisone was DCed 9/15 but restarted at lower dose 2 weeks prior to admission. If AE from prolonged steroids would consider outpatient IVIG +/- Ocrelizumab. [place] [facility] [place]   []follow up with neuromuscular specialist after discharge - can follow at [address] [place] [address] [phone] if patient desires. Patient's family provided with neuromuscular specialist names.     Case seen and d/w Dr. Hodgson, neurology attending. Spoke with  at bedside and plan communicated to NSCU.    Patient KAIDEN HATCH is a 63y (1960) woman with a PMHx significant for myasthenia gravis s/p thymectomy presenting to the ED for worsening difficulty with double vision, swallowing, talking, and breathing that has progressively gotten worse for the previous one week. Patient is currently on Mestinon 60 mg two tablets TID and Prednisone 10 mg --> uptitrated to 20 mg, she was not taking it per son for a month or so and was discharged on 50 mg after her hospitalization in 06/2023. Has been admitted before in June 2023, requiring intubation and PLEX/IVIG and in Feb 2023 for MG crisis that required intubation (2/1-2/11/23) iso COVID19. Patient received 5 days of PLEX (2/2-2/10/23) followed by 5 days of IVIG (2/16-2/20/23) with symptomatic improvement. Patient's son says patient has responded best to IVIG and that it stabilizes her MG for about 2 years. At baseline, patient is on 2L NC. In the ED, patient's NIF was -50. Her VC was 430. Patient also endorsed increased mucus secretion in her throat, and reliance on her oxygen tank.     Impression: Myasthenia gravis with acute exacerbation. Overall, patient's condition improving.     Recommendations:  [] Would recommend PLEX as first line but given patient/family preference, please continue with IVIG day 5/5.   [] Given decline in respiratory function ( -12 NIF, 230 VC), would recommend PLEX. Will discuss with son and .   []Hold Mestinon given secretions  []Trend NIF/VC, Extubate when able per NSCU  []Continue Prednisone 60mg QD  []Toxic/metabolic workup per ICU (Alkalemia likely iatrogenic, Leukocytosis likely iatrogenic, UA non infectious, CXR without consolidation, afebrile, consider RVP); if no trigger for crisis detected would consider DC on increased immunosuppression and/or lower pyridostigmine given pyridostigmine was recently increased and prednisone was DCed 9/15 but restarted at lower dose 2 weeks prior to admission. If AE from prolonged steroids would consider outpatient IVIG +/- Ocrelizumab. [place] [facility] [place]   [place]follow up with neuromuscular specialist after discharge - can follow at [address] [place] [address] [phone] if patient desires. Patient's family provided with neuromuscular specialist names.     Case seen and d/w Dr. Hodgson, neurology attending. Spoke with  at bedside and plan communicated to NSCU.

## 2023-11-07 NOTE — PROGRESS NOTE ADULT - SUBJECTIVE AND OBJECTIVE BOX
HOSPITAL COURSE:      Admission Scores  GCS:   HH:   MF:   NIHSS:   RASS:    CAM-ICU:   ICH:    24 hour Events:       Allergies    peanuts (Unknown)  No Known Drug Allergies    Intolerances        REVIEW OF SYSTEMS: [ ] Unable to Assess due to neurologic exam   [ ] All ROS addressed below are non-contributory, except:  Neuro: [ ] Headache [ ] Back pain [ ] Numbness [ ] Weakness [ ] Ataxia [ ] Dizziness [ ] Aphasia [ ] Dysarthria [ ] Visual disturbance  Resp: [ ] Shortness of breath/dyspnea, [ ] Orthopnea [ ] Cough  CV: [ ] Chest pain [ ] Palpitation [ ] Lightheadedness [ ] Syncope  Renal: [ ] Thirst [ ] Edema  GI: [ ] Nausea [ ] Emesis [ ] Abdominal pain [ ] Constipation [ ] Diarrhea  Hem: [ ] Hematemesis [ ] bright red blood per rectum  ID: [ ] Fever [ ] Chills [ ] Dysuria  ENT: [ ] Rhinorrhea      DEVICES:   [ ] Restraints [ ] ET tube [ ] central line [ ] arterial line [ ] morales [ ] NGT/OGT [ ] EVD [ ] LD [ ] MYAH/HMV [ ] Trach [ ] PEG [ ] Chest Tube     VITALS:   Vital Signs Last 24 Hrs  T(C): 36.9 (07 Nov 2023 03:00), Max: 37 (06 Nov 2023 23:00)  T(F): 98.4 (07 Nov 2023 03:00), Max: 98.6 (06 Nov 2023 23:00)  HR: 117 (07 Nov 2023 05:50) (52 - 135)  BP: --  BP(mean): --  RR: 17 (07 Nov 2023 05:00) (16 - 23)  SpO2: 100% (07 Nov 2023 05:50) (100% - 100%)    Parameters below as of 07 Nov 2023 05:50  Patient On (Oxygen Delivery Method): ventilator      CAPILLARY BLOOD GLUCOSE      POCT Blood Glucose.: 108 mg/dL (07 Nov 2023 05:37)  POCT Blood Glucose.: 118 mg/dL (07 Nov 2023 00:47)  POCT Blood Glucose.: 141 mg/dL (06 Nov 2023 17:38)  POCT Blood Glucose.: 204 mg/dL (06 Nov 2023 12:27)    I&O's Summary    06 Nov 2023 07:01  -  07 Nov 2023 07:00  --------------------------------------------------------  IN: 1023.2 mL / OUT: 850 mL / NET: 173.2 mL        Respiratory:  Mode: AC/ CMV (Assist Control/ Continuous Mandatory Ventilation)  RR (machine): 16  TV (machine): 420  FiO2: 40  PEEP: 5  ITime: 1  MAP: 11  PIP: 30        LABS:                        10.3   9.42  )-----------( 227      ( 05 Nov 2023 23:46 )             30.9     11-05    141  |  108  |  22  ----------------------------<  167<H>  3.6   |  23  |  0.51             MEDICATION LEVELS:     IVF FLUIDS/MEDICATIONS:   MEDICATIONS  (STANDING):  albuterol/ipratropium for Nebulization 3 milliLiter(s) Nebulizer every 6 hours  bisacodyl Suppository 10 milliGRAM(s) Rectal daily  chlorhexidine 0.12% Liquid 15 milliLiter(s) Oral Mucosa every 12 hours  chlorhexidine 4% Liquid 1 Application(s) Topical at bedtime  diphenhydrAMINE Injectable 25 milliGRAM(s) IV Push <User Schedule>  enoxaparin Injectable 40 milliGRAM(s) SubCutaneous every 24 hours  immune   globulin 10% (GAMMAGARD) IVPB 25 Gram(s) IV Intermittent <User Schedule>  insulin lispro (ADMELOG) corrective regimen sliding scale   SubCutaneous every 6 hours  pantoprazole   Suspension 40 milliGRAM(s) Oral before breakfast  polyethylene glycol 3350 17 Gram(s) Oral every 12 hours  potassium chloride   Solution 40 milliEquivalent(s) Oral once  predniSONE   Tablet 60 milliGRAM(s) Oral daily    MEDICATIONS  (PRN):  senna 2 Tablet(s) Oral at bedtime PRN Constipation        IMAGING:      EXAMINATION:  PHYSICAL EXAM:    Constitutional: No Acute Distress     Neurological: Awake, alert oriented to person, place and time, Following Commands, PERRL, EOMI, No Gaze Preference, Face Symmetrical, Speech Fluent, No dysmetria, No ataxia, No nystagmus     Motor exam:          Upper extremity                         Delt     Bicep     Tricep    HG                                                 R         4+/5        5/5        5/5       5/5                                               L          4/5        5/5        5/5       5/5          Lower extremity                        HF         KF        KE       DF         PF                                                  R        5/5 5/5 5/5 5/5 5/5                                               L         5/5 5/5 5/5 5/5 5/5                                                 Sensation: [x] intact to light touch  [ ] decreased:     Pulmonary: Clear to Auscultation, No rales, No rhonchi, No wheezes     Cardiovascular: S1, S2, Regular rate and rhythm     Gastrointestinal: Soft, Non-tender, Non-distended     Extremities: No calf tenderness     Incision:    HOSPITAL COURSE:  Patient KAIDEN HATCH is a 63y (1960) woman with a PMHx significant for myasthenia gravis diagnosis presenting to the ED for worsening difficulty with double vision, swallowing, talking, and breathing that has progressively gotten worse for the previous one week. Patient is currently on Mestinon 60 mg two tablets TID and Prednisone 10 mg --> uptitrated to 20 mg, she was not taking it per son for a month or so and was discharged on 50 mg after her hospitalization in 06/2023. Has been admitted before in June 2023, requiring intubation and PLEX/IVIG and in Feb 2023 for MG crisis that required intubation (2/1-2/11/23) iso COVID19. Patient received 5 days of PLEX (2/2-2/10/23) followed by 5 days of IVIG (2/16-2/20/23) with symptomatic improvement. Patient's son says patient has responded best to IVIG and that it stabilizes her MG for about 2 years. At baseline, patient is on 2L NC. In the ED, patient's NIF was -50. Her VC was 430. Patient also endorsed increased mucus secretion in her throat, and reliance on her oxygen tank.       Admission Scores  GCS: 15       24 hour Events: Patient evaluated at bedside. Reports improvement in symptoms w/ resolution of diplopia. No complaints of pain or dyspnea.     11/4- Patient intubated overnight for worsening respiratory status. Patient received dose 2/5 of IVIG  11/6- No events; poor respiratory effort on SBT  11/7- No events; last dose of IVIG    Admission Scores  GCS: 15  NIHSS: 0  RASS: 0  CAM-ICU: 0    Allergies  peanuts (Unknown)  No Known Drug Allergies    Intolerances  No Known    REVIEW OF SYSTEMS: [ ] Unable to Assess due to neurologic exam   [X] All ROS addressed below are non-contributory, except:  Neuro: [ ] Headache [ ] Back pain [ ] Numbness [X] Weakness [ ] Ataxia [ ] Dizziness [ ] Aphasia [ ] Dysarthria [ ] Visual disturbance  Resp: [ ] Shortness of breath/dyspnea, [ ] Orthopnea [ ] Cough  CV: [ ] Chest pain [ ] Palpitation [ ] Lightheadedness [ ] Syncope  Renal: [ ] Thirst [ ] Edema  GI: [ ] Nausea [ ] Emesis [ ] Abdominal pain [ ] Constipation [ ] Diarrhea  Hem: [ ] Hematemesis [ ] bright red blood per rectum  ID: [ ] Fever [ ] Chills [ ] Dysuria  ENT: [ ] Rhinorrhea      DEVICES:   [ ] Restraints [X] ET tube [ ] central line [X] arterial line - right radial [ ] morales [X] NGT/OGT [ ] EVD [ ] LD [ ] MYAH/HMV [ ] Trach [ ] PEG [ ] Chest Tube     VITALS:   Vital Signs Last 24 Hrs  T(C): 36.9 (07 Nov 2023 03:00), Max: 37 (06 Nov 2023 23:00)  T(F): 98.4 (07 Nov 2023 03:00), Max: 98.6 (06 Nov 2023 23:00)  HR: 117 (07 Nov 2023 05:50) (52 - 135)  BP: --  BP(mean): --  RR: 17 (07 Nov 2023 05:00) (16 - 23)  SpO2: 100% (07 Nov 2023 05:50) (100% - 100%)    Parameters below as of 07 Nov 2023 05:50  Patient On (Oxygen Delivery Method): ventilator      CAPILLARY BLOOD GLUCOSE  POCT Blood Glucose.: 108 mg/dL (07 Nov 2023 05:37)  POCT Blood Glucose.: 118 mg/dL (07 Nov 2023 00:47)  POCT Blood Glucose.: 141 mg/dL (06 Nov 2023 17:38)  POCT Blood Glucose.: 204 mg/dL (06 Nov 2023 12:27)    I&O's Summary    06 Nov 2023 07:01  -  07 Nov 2023 07:00  --------------------------------------------------------  IN: 1023.2 mL / OUT: 850 mL / NET: 173.2 mL        Respiratory:  Mode: AC/CMV/PRVC (Assist Control/ Continuous Mandatory Ventilation)  RR (machine): 16  TV (machine): 420  FiO2: 40  PEEP: 5  ITime: 1  MAP: 11  PIP: 30      LABS:                        10.3   9.42  )-----------( 227      ( 05 Nov 2023 23:46 )             30.9     11-05    141  |  108  |  22  ----------------------------<  167<H>  3.6   |  23  |  0.51             MEDICATION LEVELS:     IVF FLUIDS/MEDICATIONS:   MEDICATIONS  (STANDING):  albuterol/ipratropium for Nebulization 3 milliLiter(s) Nebulizer every 6 hours  bisacodyl Suppository 10 milliGRAM(s) Rectal daily  chlorhexidine 0.12% Liquid 15 milliLiter(s) Oral Mucosa every 12 hours  chlorhexidine 4% Liquid 1 Application(s) Topical at bedtime  diphenhydrAMINE Injectable 25 milliGRAM(s) IV Push <User Schedule>  enoxaparin Injectable 40 milliGRAM(s) SubCutaneous every 24 hours  immune   globulin 10% (GAMMAGARD) IVPB 25 Gram(s) IV Intermittent <User Schedule>  insulin lispro (ADMELOG) corrective regimen sliding scale   SubCutaneous every 6 hours  pantoprazole   Suspension 40 milliGRAM(s) Oral before breakfast  polyethylene glycol 3350 17 Gram(s) Oral every 12 hours  potassium chloride   Solution 40 milliEquivalent(s) Oral once  predniSONE   Tablet 60 milliGRAM(s) Oral daily    MEDICATIONS  (PRN):  senna 2 Tablet(s) Oral at bedtime PRN Constipation        IMAGING:  < from: Xray Chest 1 View- PORTABLE-Routine (Xray Chest 1 View- PORTABLE-Routine in AM.) (11.06.23 @ 05:03) >  IMPRESSION:  Clear lungs.    < end of copied text >      EXAMINATION:  PHYSICAL EXAM:    Constitutional: No Acute Distress     Neurological: Awake, alert oriented to person, place and time, Following Commands, PERRL, EOMI, No Gaze Preference, Face Symmetrical, Speech Fluent, No dysmetria, No ataxia, No nystagmus, left-sided ptosis    Motor exam:          Upper extremity                         Delt     Bicep     Tricep    HG                                                 R         4+/5        5/5        5/5       5/5                                               L          4/5        5/5        5/5       5/5          Lower extremity                        HF         KF        KE       DF         PF                                                  R        5/5        5/5        5/5       5/5         5/5                                               L         5/5        5/5       5/5       5/5          5/5                                                 Neck flexion: 3/5  Neck extension: 3/5    Sensation: [x] intact to light touch  [ ] decreased:     Pulmonary: Clear to Auscultation, coarse breath sounds, bilateral rhonchi, no rales, no wheezes     Cardiovascular: S1, S2, Regular rate and rhythm     Gastrointestinal: Soft, Non-tender, Non-distended     Extremities: No calf tenderness     Incision: No incision   HOSPITAL COURSE:  Patient KAIDEN HATCH is a 63y (1960) woman with a PMHx significant for myasthenia gravis diagnosis presenting to the ED for worsening difficulty with double vision, swallowing, talking, and breathing that has progressively gotten worse for the previous one week. Patient is currently on Mestinon 60 mg two tablets TID and Prednisone 10 mg --> uptitrated to 20 mg, she was not taking it per son for a month or so and was discharged on 50 mg after her hospitalization in 06/2023. Has been admitted before in June 2023, requiring intubation and PLEX/IVIG and in Feb 2023 for MG crisis that required intubation (2/1-2/11/23) iso COVID19. Patient received 5 days of PLEX (2/2-2/10/23) followed by 5 days of IVIG (2/16-2/20/23) with symptomatic improvement. Patient's son says patient has responded best to IVIG and that it stabilizes her MG for about 2 years. At baseline, patient is on 2L NC. In the ED, patient's NIF was -50. Her VC was 430. Patient also endorsed increased mucus secretion in her throat, and reliance on her oxygen tank.       Admission Scores  GCS: 15       24 hour Events: Patient evaluated at bedside. Reports improvement in symptoms w/ resolution of diplopia. No complaints of pain or dyspnea.     11/4- Patient intubated overnight for worsening respiratory status. Patient received dose 2/5 of IVIG  11/6- No events; poor respiratory effort on SBT  11/7- Apnea on SBT; worsening respiratory effort; last dose of IVIG TODAY    Admission Scores  GCS: 15  RASS: 0  CAM-ICU: 0    Allergies  peanuts (Unknown)  No Known Drug Allergies    Intolerances  No Known    REVIEW OF SYSTEMS: [ ] Unable to Assess due to neurologic exam   [X] All ROS addressed below are non-contributory, except:  Neuro: [ ] Headache [ ] Back pain [ ] Numbness [X] Weakness [ ] Ataxia [ ] Dizziness [ ] Aphasia [ ] Dysarthria [ ] Visual disturbance  Resp: [ ] Shortness of breath/dyspnea, [ ] Orthopnea [ ] Cough  CV: [ ] Chest pain [ ] Palpitation [ ] Lightheadedness [ ] Syncope  Renal: [ ] Thirst [ ] Edema  GI: [ ] Nausea [ ] Emesis [ ] Abdominal pain [ ] Constipation [ ] Diarrhea  Hem: [ ] Hematemesis [ ] bright red blood per rectum  ID: [ ] Fever [ ] Chills [ ] Dysuria  ENT: [ ] Rhinorrhea      DEVICES:   [ ] Restraints [X] ET tube [ ] central line [X] arterial line - right radial [ ] morales [X] NGT/OGT [ ] EVD [ ] LD [ ] MYAH/HMV [ ] Trach [ ] PEG [ ] Chest Tube     VITALS:   Vital Signs Last 24 Hrs  T(C): 36.9 (07 Nov 2023 03:00), Max: 37 (06 Nov 2023 23:00)  T(F): 98.4 (07 Nov 2023 03:00), Max: 98.6 (06 Nov 2023 23:00)  HR: 117 (07 Nov 2023 05:50) (52 - 135)  BP: --  BP(mean): --  RR: 17 (07 Nov 2023 05:00) (16 - 23)  SpO2: 100% (07 Nov 2023 05:50) (100% - 100%)    Parameters below as of 07 Nov 2023 05:50  Patient On (Oxygen Delivery Method): ventilator      CAPILLARY BLOOD GLUCOSE  POCT Blood Glucose.: 108 mg/dL (07 Nov 2023 05:37)  POCT Blood Glucose.: 118 mg/dL (07 Nov 2023 00:47)  POCT Blood Glucose.: 141 mg/dL (06 Nov 2023 17:38)  POCT Blood Glucose.: 204 mg/dL (06 Nov 2023 12:27)    I&O's Summary    06 Nov 2023 07:01  -  07 Nov 2023 07:00  --------------------------------------------------------  IN: 1023.2 mL / OUT: 850 mL / NET: 173.2 mL        Respiratory:  Mode: AC/CMV/PRVC (Assist Control/ Continuous Mandatory Ventilation)  RR (machine): 16  TV (machine): 420  FiO2: 40  PEEP: 5  ITime: 1  MAP: 11  PIP: 30      LABS:                        10.3   9.42  )-----------( 227       05 Nov 2023 23:46 )             30.9     11-05    141  |  108  |  22  ----------------------------<  167<H>  3.6   |  23  |  0.51             MEDICATION LEVELS:     IVF FLUIDS/MEDICATIONS:   MEDICATIONS  (STANDING):  albuterol/ipratropium for Nebulization 3 milliLiter(s) Nebulizer every 6 hours  bisacodyl Suppository 10 milliGRAM(s) Rectal daily  chlorhexidine 0.12% Liquid 15 milliLiter(s) Oral Mucosa every 12 hours  chlorhexidine 4% Liquid 1 Application(s) Topical at bedtime  diphenhydrAMINE Injectable 25 milliGRAM(s) IV Push <User Schedule>  enoxaparin Injectable 40 milliGRAM(s) SubCutaneous every 24 hours  immune   globulin 10% (GAMMAGARD) IVPB 25 Gram(s) IV Intermittent <User Schedule>  insulin lispro (ADMELOG) corrective regimen sliding scale   SubCutaneous every 6 hours  pantoprazole   Suspension 40 milliGRAM(s) Oral before breakfast  polyethylene glycol 3350 17 Gram(s) Oral every 12 hours  potassium chloride   Solution 40 milliEquivalent(s) Oral once  predniSONE   Tablet 60 milliGRAM(s) Oral daily    MEDICATIONS  (PRN):  senna 2 Tablet(s) Oral at bedtime PRN Constipation        IMAGING:  < from: Xray Chest 1 View- PORTABLE-Routine (Xray Chest 1 View- PORTABLE-Routine in AM.) (11.06.23 @ 05:03) >  IMPRESSION:  Clear lungs.    < end of copied text >      EXAMINATION:  PHYSICAL EXAM:    Constitutional: No Acute Distress     Neurological: Awake, alert oriented to person, place and time, Following Commands, PERRL, EOMI, No Gaze Preference, Face Symmetrical, Speech Fluent, No dysmetria, No ataxia, No nystagmus, left-sided ptosis    Motor exam:          Upper extremity                         Delt     Bicep     Tricep    HG                                                 R         4+/5        5/5        5/5       5/5                                               L          4/5        5/5        5/5       5/5          Lower extremity                        HF         KF        KE       DF         PF                                                  R        5/5        5/5        5/5       5/5         5/5                                               L         5/5        5/5       5/5       5/5          5/5                                                 Neck flexion: 3/5  Neck extension: 5/5    Sensation: [x] intact to light touch  [ ] decreased:     Pulmonary: Clear to Auscultation, coarse breath sounds, bilateral rhonchi, no rales, no wheezes    Cardiovascular: S1, S2, Regular rate and rhythm     Gastrointestinal: Soft, Non-tender, Non-distended     Extremities: No calf tenderness     Incision: No incision   HOSPITAL COURSE:  Patient KAIDEN HATCH is a 63y (1960) woman with a PMHx significant for myasthenia gravis diagnosis presenting to the ED for worsening difficulty with double vision, swallowing, talking, and breathing that has progressively gotten worse for the previous one week. Patient is currently on Mestinon 60 mg two tablets TID and Prednisone 10 mg --> uptitrated to 20 mg, she was not taking it per son for a month or so and was discharged on 50 mg after her hospitalization in 06/2023. Has been admitted before in June 2023, requiring intubation and PLEX/IVIG and in Feb 2023 for MG crisis that required intubation (2/1-2/11/23) iso COVID19. Patient received 5 days of PLEX (2/2-2/10/23) followed by 5 days of IVIG (2/16-2/20/23) with symptomatic improvement. Patient's son says patient has responded best to IVIG and that it stabilizes her MG for about 2 years. At baseline, patient is on 2L NC. In the ED, patient's NIF was -50. Her VC was 430. Patient also endorsed increased mucus secretion in her throat, and reliance on her oxygen tank.       Admission Scores  GCS: 15       24 hour Events: Patient evaluated at bedside. Reports improvement in symptoms w/ resolution of diplopia. No complaints of pain or dyspnea.     11/4- Patient intubated overnight for worsening respiratory status. Patient received dose 2/5 of IVIG  11/6- No events; poor respiratory effort on SBT  11/7- Apneic on SBT; worsening respiratory effort; last dose of IVIG TODAY    Admission Scores  GCS: 15  RASS: 0  CAM-ICU: 0    Allergies  peanuts (Unknown)  No Known Drug Allergies    Intolerances  No Known    REVIEW OF SYSTEMS: [ ] Unable to Assess due to neurologic exam   [X] All ROS addressed below are non-contributory, except:  Neuro: [ ] Headache [ ] Back pain [ ] Numbness [X] Weakness [ ] Ataxia [ ] Dizziness [ ] Aphasia [ ] Dysarthria [ ] Visual disturbance  Resp: [ ] Shortness of breath/dyspnea, [ ] Orthopnea [ ] Cough  CV: [ ] Chest pain [ ] Palpitation [ ] Lightheadedness [ ] Syncope  Renal: [ ] Thirst [ ] Edema  GI: [ ] Nausea [ ] Emesis [ ] Abdominal pain [ ] Constipation [ ] Diarrhea  Hem: [ ] Hematemesis [ ] bright red blood per rectum  ID: [ ] Fever [ ] Chills [ ] Dysuria  ENT: [ ] Rhinorrhea      DEVICES:   [ ] Restraints [X] ET tube [ ] central line [X] arterial line - right radial [ ] morales [X] NGT/OGT [ ] EVD [ ] LD [ ] MYAH/HMV [ ] Trach [ ] PEG [ ] Chest Tube     VITALS:   Vital Signs Last 24 Hrs  T(C): 36.9 (07 Nov 2023 03:00), Max: 37 (06 Nov 2023 23:00)  T(F): 98.4 (07 Nov 2023 03:00), Max: 98.6 (06 Nov 2023 23:00)  HR: 117 (07 Nov 2023 05:50) (52 - 135)  BP: --  BP(mean): --  RR: 17 (07 Nov 2023 05:00) (16 - 23)  SpO2: 100% (07 Nov 2023 05:50) (100% - 100%)    Parameters below as of 07 Nov 2023 05:50  Patient On (Oxygen Delivery Method): ventilator      CAPILLARY BLOOD GLUCOSE  POCT Blood Glucose.: 108 mg/dL (07 Nov 2023 05:37)  POCT Blood Glucose.: 118 mg/dL (07 Nov 2023 00:47)  POCT Blood Glucose.: 141 mg/dL (06 Nov 2023 17:38)  POCT Blood Glucose.: 204 mg/dL (06 Nov 2023 12:27)    I&O's Summary    06 Nov 2023 07:01  -  07 Nov 2023 07:00  --------------------------------------------------------  IN: 1023.2 mL / OUT: 850 mL / NET: 173.2 mL        Respiratory:  Mode: AC/CMV/PRVC (Assist Control/ Continuous Mandatory Ventilation)  RR (machine): 16  TV (machine): 420  FiO2: 40  PEEP: 5  ITime: 1  MAP: 11  PIP: 30      LABS:                        10.3   9.42  )-----------( 227       05 Nov 2023 23:46 )             30.9     11-05    141  |  108  |  22  ----------------------------<  167<H>  3.6   |  23  |  0.51             MEDICATION LEVELS:     IVF FLUIDS/MEDICATIONS:   MEDICATIONS  (STANDING):  albuterol/ipratropium for Nebulization 3 milliLiter(s) Nebulizer every 6 hours  bisacodyl Suppository 10 milliGRAM(s) Rectal daily  chlorhexidine 0.12% Liquid 15 milliLiter(s) Oral Mucosa every 12 hours  chlorhexidine 4% Liquid 1 Application(s) Topical at bedtime  diphenhydrAMINE Injectable 25 milliGRAM(s) IV Push <User Schedule>  enoxaparin Injectable 40 milliGRAM(s) SubCutaneous every 24 hours  immune   globulin 10% (GAMMAGARD) IVPB 25 Gram(s) IV Intermittent <User Schedule>  insulin lispro (ADMELOG) corrective regimen sliding scale   SubCutaneous every 6 hours  pantoprazole   Suspension 40 milliGRAM(s) Oral before breakfast  polyethylene glycol 3350 17 Gram(s) Oral every 12 hours  potassium chloride   Solution 40 milliEquivalent(s) Oral once  predniSONE   Tablet 60 milliGRAM(s) Oral daily    MEDICATIONS  (PRN):  senna 2 Tablet(s) Oral at bedtime PRN Constipation        IMAGING:  < from: Xray Chest 1 View- PORTABLE-Routine (Xray Chest 1 View- PORTABLE-Routine in AM.) (11.06.23 @ 05:03) >  IMPRESSION:  Clear lungs.    < end of copied text >      EXAMINATION:  PHYSICAL EXAM:    Constitutional: No Acute Distress     Neurological: Awake, alert oriented to person, place and time, Following Commands, PERRL, EOMI, No Gaze Preference, Face Symmetrical, Speech Fluent, No dysmetria, No ataxia, No nystagmus, left-sided ptosis    Motor exam:          Upper extremity                         Delt     Bicep     Tricep    HG                                                 R         4+/5        5/5        5/5       5/5                                               L          4/5        5/5        5/5       5/5          Lower extremity                        HF         KF        KE       DF         PF                                                  R        5/5        5/5        5/5       5/5         5/5                                               L         5/5        5/5       5/5       5/5          5/5                                                 Neck flexion: 3/5  Neck extension: 5/5    Sensation: [x] intact to light touch  [ ] decreased:     Pulmonary: Clear to Auscultation, coarse breath sounds, bilateral rhonchi, no rales, no wheezes    Cardiovascular: S1, S2, Regular rate and rhythm     Gastrointestinal: Soft, Non-tender, Non-distended     Extremities: No calf tenderness     Incision: No incision

## 2023-11-08 LAB
APTT BLD: 27.1 SEC — SIGNIFICANT CHANGE UP (ref 24.5–35.6)
APTT BLD: 28 SEC — SIGNIFICANT CHANGE UP (ref 24.5–35.6)
CA-I BLD-SCNC: 1.16 MMOL/L — SIGNIFICANT CHANGE UP (ref 1.15–1.33)
CULTURE RESULTS: SIGNIFICANT CHANGE UP
FIBRINOGEN PPP-MCNC: 407 MG/DL — SIGNIFICANT CHANGE UP (ref 200–445)
HCT VFR BLD CALC: 31.3 % — LOW (ref 34.5–45)
HGB BLD-MCNC: 10.3 G/DL — LOW (ref 11.5–15.5)
INR BLD: 1.11 RATIO — SIGNIFICANT CHANGE UP (ref 0.85–1.18)
INR BLD: 1.12 RATIO — SIGNIFICANT CHANGE UP (ref 0.85–1.18)
MAGNESIUM SERPL-MCNC: 2.2 MG/DL — SIGNIFICANT CHANGE UP (ref 1.6–2.6)
MCHC RBC-ENTMCNC: 28.3 PG — SIGNIFICANT CHANGE UP (ref 27–34)
MCHC RBC-ENTMCNC: 32.9 GM/DL — SIGNIFICANT CHANGE UP (ref 32–36)
MCV RBC AUTO: 86 FL — SIGNIFICANT CHANGE UP (ref 80–100)
NRBC # BLD: 0 /100 WBCS — SIGNIFICANT CHANGE UP (ref 0–0)
PHOSPHATE SERPL-MCNC: 4 MG/DL — SIGNIFICANT CHANGE UP (ref 2.5–4.5)
PLATELET # BLD AUTO: 225 K/UL — SIGNIFICANT CHANGE UP (ref 150–400)
PROTHROM AB SERPL-ACNC: 11.6 SEC — SIGNIFICANT CHANGE UP (ref 9.5–13)
PROTHROM AB SERPL-ACNC: 11.7 SEC — SIGNIFICANT CHANGE UP (ref 9.5–13)
RBC # BLD: 3.64 M/UL — LOW (ref 3.8–5.2)
RBC # FLD: 14.4 % — SIGNIFICANT CHANGE UP (ref 10.3–14.5)
SPECIMEN SOURCE: SIGNIFICANT CHANGE UP
WBC # BLD: 6.34 K/UL — SIGNIFICANT CHANGE UP (ref 3.8–10.5)
WBC # FLD AUTO: 6.34 K/UL — SIGNIFICANT CHANGE UP (ref 3.8–10.5)

## 2023-11-08 PROCEDURE — 71045 X-RAY EXAM CHEST 1 VIEW: CPT | Mod: 26,77

## 2023-11-08 PROCEDURE — 36556 INSERT NON-TUNNEL CV CATH: CPT

## 2023-11-08 PROCEDURE — 36514 APHERESIS PLASMA: CPT

## 2023-11-08 PROCEDURE — 71045 X-RAY EXAM CHEST 1 VIEW: CPT | Mod: 26

## 2023-11-08 PROCEDURE — 99291 CRITICAL CARE FIRST HOUR: CPT

## 2023-11-08 PROCEDURE — 99233 SBSQ HOSP IP/OBS HIGH 50: CPT | Mod: GC

## 2023-11-08 PROCEDURE — 99291 CRITICAL CARE FIRST HOUR: CPT | Mod: 25

## 2023-11-08 PROCEDURE — 99253 IP/OBS CNSLTJ NEW/EST LOW 45: CPT | Mod: 25

## 2023-11-08 RX ORDER — PROPOFOL 10 MG/ML
10 INJECTION, EMULSION INTRAVENOUS
Qty: 500 | Refills: 0 | Status: DISCONTINUED | OUTPATIENT
Start: 2023-11-08 | End: 2023-11-08

## 2023-11-08 RX ORDER — ACETAMINOPHEN 500 MG
1000 TABLET ORAL ONCE
Refills: 0 | Status: COMPLETED | OUTPATIENT
Start: 2023-11-08 | End: 2023-11-08

## 2023-11-08 RX ORDER — SODIUM CHLORIDE 9 MG/ML
500 INJECTION INTRAMUSCULAR; INTRAVENOUS; SUBCUTANEOUS ONCE
Refills: 0 | Status: COMPLETED | OUTPATIENT
Start: 2023-11-08 | End: 2023-11-08

## 2023-11-08 RX ORDER — FENTANYL CITRATE 50 UG/ML
50 INJECTION INTRAVENOUS ONCE
Refills: 0 | Status: DISCONTINUED | OUTPATIENT
Start: 2023-11-08 | End: 2023-11-08

## 2023-11-08 RX ORDER — SODIUM CHLORIDE 9 MG/ML
4 INJECTION INTRAMUSCULAR; INTRAVENOUS; SUBCUTANEOUS EVERY 12 HOURS
Refills: 0 | Status: DISCONTINUED | OUTPATIENT
Start: 2023-11-08 | End: 2023-11-09

## 2023-11-08 RX ORDER — FENTANYL CITRATE 50 UG/ML
25 INJECTION INTRAVENOUS ONCE
Refills: 0 | Status: DISCONTINUED | OUTPATIENT
Start: 2023-11-08 | End: 2023-11-08

## 2023-11-08 RX ORDER — DEXMEDETOMIDINE HYDROCHLORIDE IN 0.9% SODIUM CHLORIDE 4 UG/ML
0.2 INJECTION INTRAVENOUS
Qty: 200 | Refills: 0 | Status: DISCONTINUED | OUTPATIENT
Start: 2023-11-08 | End: 2023-11-08

## 2023-11-08 RX ORDER — SODIUM CHLORIDE 9 MG/ML
10 INJECTION INTRAMUSCULAR; INTRAVENOUS; SUBCUTANEOUS
Refills: 0 | Status: DISCONTINUED | OUTPATIENT
Start: 2023-11-08 | End: 2023-11-23

## 2023-11-08 RX ORDER — MIDAZOLAM HYDROCHLORIDE 1 MG/ML
2 INJECTION, SOLUTION INTRAMUSCULAR; INTRAVENOUS ONCE
Refills: 0 | Status: DISCONTINUED | OUTPATIENT
Start: 2023-11-08 | End: 2023-11-08

## 2023-11-08 RX ORDER — CHLORHEXIDINE GLUCONATE 213 G/1000ML
1 SOLUTION TOPICAL
Refills: 0 | Status: DISCONTINUED | OUTPATIENT
Start: 2023-11-08 | End: 2023-11-20

## 2023-11-08 RX ADMIN — Medication 3 MILLILITER(S): at 17:55

## 2023-11-08 RX ADMIN — Medication 2: at 01:37

## 2023-11-08 RX ADMIN — Medication 1000 MILLIGRAM(S): at 18:20

## 2023-11-08 RX ADMIN — MIDAZOLAM HYDROCHLORIDE 2 MILLIGRAM(S): 1 INJECTION, SOLUTION INTRAMUSCULAR; INTRAVENOUS at 15:25

## 2023-11-08 RX ADMIN — SODIUM CHLORIDE 4 MILLILITER(S): 9 INJECTION INTRAMUSCULAR; INTRAVENOUS; SUBCUTANEOUS at 23:45

## 2023-11-08 RX ADMIN — FENTANYL CITRATE 50 MICROGRAM(S): 50 INJECTION INTRAVENOUS at 15:55

## 2023-11-08 RX ADMIN — Medication 400 MILLIGRAM(S): at 17:50

## 2023-11-08 RX ADMIN — Medication 3 MILLILITER(S): at 05:08

## 2023-11-08 RX ADMIN — CHLORHEXIDINE GLUCONATE 1 APPLICATION(S): 213 SOLUTION TOPICAL at 23:11

## 2023-11-08 RX ADMIN — FENTANYL CITRATE 25 MICROGRAM(S): 50 INJECTION INTRAVENOUS at 15:40

## 2023-11-08 RX ADMIN — CHLORHEXIDINE GLUCONATE 15 MILLILITER(S): 213 SOLUTION TOPICAL at 05:01

## 2023-11-08 RX ADMIN — Medication 3 MILLILITER(S): at 23:43

## 2023-11-08 RX ADMIN — CHLORHEXIDINE GLUCONATE 15 MILLILITER(S): 213 SOLUTION TOPICAL at 17:50

## 2023-11-08 RX ADMIN — DEXMEDETOMIDINE HYDROCHLORIDE IN 0.9% SODIUM CHLORIDE 3.31 MICROGRAM(S)/KG/HR: 4 INJECTION INTRAVENOUS at 15:15

## 2023-11-08 RX ADMIN — FENTANYL CITRATE 25 MICROGRAM(S): 50 INJECTION INTRAVENOUS at 15:30

## 2023-11-08 RX ADMIN — ENOXAPARIN SODIUM 40 MILLIGRAM(S): 100 INJECTION SUBCUTANEOUS at 22:59

## 2023-11-08 RX ADMIN — PANTOPRAZOLE SODIUM 40 MILLIGRAM(S): 20 TABLET, DELAYED RELEASE ORAL at 07:00

## 2023-11-08 RX ADMIN — Medication 60 MILLIGRAM(S): at 05:01

## 2023-11-08 RX ADMIN — Medication 2: at 13:08

## 2023-11-08 RX ADMIN — SODIUM CHLORIDE 500 MILLILITER(S): 9 INJECTION INTRAMUSCULAR; INTRAVENOUS; SUBCUTANEOUS at 16:40

## 2023-11-08 RX ADMIN — Medication 3 MILLILITER(S): at 11:27

## 2023-11-08 NOTE — CONSULT NOTE ADULT - NSCONSULTADDITIONALINFOA_GEN_ALL_CORE
Therapeutic plasma exchange (TPE) procedure (including replacement with 5% albumin and/or plasma and anticoagulation), benefits, risks and complications of the procedure (electrolyte imbalance, fluid imbalance, transfusion transmitted infections and transfusion reactions which may be life threatening) and alternative options explained in detail to the patient who verbalized understanding and consented to the procedure after all questions answered. [facility]  Steven ([id number]) was used. Therapeutic plasma exchange (TPE) procedure (including replacement with 5% albumin and/or plasma and anticoagulation), benefits, risks and complications of the procedure (electrolyte imbalance, fluid imbalance, transfusion transmitted infections and transfusion reactions which may be life threatening) and alternative options explained in detail to the patient who verbalized understanding and consented to the procedure after all questions answered. [facility]  Steven ([id number]) was used. Therapeutic plasma exchange (TPE) procedure (including replacement with 5% albumin and/or plasma and anticoagulation), benefits, risks and complications of the procedure (electrolyte imbalance, fluid imbalance, transfusion transmitted infections and transfusion reactions which may be life threatening) and alternative options explained in detail to the patient who verbalized understanding and consented to the procedure after all questions answered. [facility]  Steven ([id number]) was used.

## 2023-11-08 NOTE — PATIENT PROFILE ADULT - TOBACCO USE
Never smoker

## 2023-11-08 NOTE — PHYSICAL THERAPY INITIAL EVALUATION ADULT - LEVEL OF INDEPENDENCE: SIT/SUPINE, REHAB EVAL
Quality 226: Preventive Care And Screening: Tobacco Use: Screening And Cessation Intervention: Patient screened for tobacco use and is an ex/non-smoker Detail Level: Detailed Quality 431: Preventive Care And Screening: Unhealthy Alcohol Use - Screening: Patient not identified as an unhealthy alcohol user when screened for unhealthy alcohol use using a systematic screening method moderate assist (50% patients effort)

## 2023-11-08 NOTE — PROGRESS NOTE ADULT - ATTENDING COMMENTS
Patient seen and examined bedside. During follow up visit, patient's  was present and I appreciate that. As per patient and , she is feeling better.  Unfortunately her respiratory efforts remains poor.     Detailed neuro exam:  General: Patient is comfortably lying on the bed without acute distress and currently orally intubated.  Mental and Psychological Status: The patient is alert and oriented to person, place and exact date (via writing). Follows simple and complex commands.   Cranial Nerve Exam: Bilateral eye ptosis L>R Visual fields are full to confrontation. Pupils are round, equal, and reactive.  V1-V3 are intact to light touch. There is no facial weakness or asymmetry. Hearing is grossly intact. Shoulder shrug is 4/5 bilaterally.   Motor Exam: Bulk, tone, and strength are normal except proximal muscle bilaterally at upper extremities 4/5. There are no resting tremors.  Sensory Examination: Sensation is intact to light touch throughout.  Posture, Station and Gait: Currently orally intubated.    Impression and plan:                                                  Ms. Acosta is 63 year old right handed woman with a PMHx significant for myasthenia gravis s/p thymectomy on Mestinon 12 mg tablets TID and Prednisone 60 mg presenting to the ED for worsening difficulty with double vision, swallowing, talking, and breathing that has progressively gotten worse for the previous one week.  Myasthenic gravis crisis with signs of respiratory failure and subsequently she required mechanical ventilation for air way protection. Completed IVIG 5 days and clinically she is doing better except  respiratory effort on SBT, NIF and FVC below baseline required for extubation to BiPAP.  Again, Today we reinforce the family regarding risks & benefits of PLEX since patient's respiratory efforts remains poor. Family agree regarding starting PLEX.  Therefore, we PLEX as soon as possible and plan of care discussed neuro - ICU team.     Continue Steroids with GI prophylaxis   Continue medical management, neuro- check and fall precaution.  GI and DVT prophylaxis.  I agree with above exam, assessment and plan.    Patient is at high risk for complications and morbidity or mortality. There is high probability of imminent or life threatening deterioration in the patient's condition.    I discussed the diagnosis and treatment plan with the patient and . Risk benefit and alternatives to the treatment were discussed. All questions and concerns were addressed. The patient and  demonstrated good understanding of the treatment plan.    My cumulative time taking care of this critically ill patient is 35 minutes  If you have any further questions, please do not hesitate to contact our team.  Thank you for allowing us to participate in this patient care.

## 2023-11-08 NOTE — CONSULT NOTE ADULT - SUBJECTIVE AND OBJECTIVE BOX
Patient is a 63y old  Female who presents to ED with a chief complaint of worsening difficulty with double vision, swallowing, talking, and breathing that has progressively gotten worse for the previous one week. Patient is currently on Mestinon 60 mg two tablets TID and Prednisone 10 mg --> uptitrated to 20 mg, she was not taking it per son for a month or so and was discharged on 50 mg after her hospitalization in 06/2023. Has been admitted before in June 2023, requiring intubation and PLEX/IVIG and in Feb 2023 for MG crisis that required intubation (2/1-2/11/23) iso COVID19. Patient received 5 days of PLEX (2/2-2/10/23) followed by 5 days of IVIG (2/16-2/20/23) with symptomatic improvement. Patient's son says patient has responded best to IVIG and that it stabilizes her MG for about 2 years. At baseline, patient is on 2L NC. In the ED, patient's NIF was -50. Her VC was 430. Patient also endorsed increased mucus secretion in her throat, and reliance on her oxygen tank.   11/4- Patient intubated overnight for worsening respiratory status. (06 Nov 2023 09:19)      HPI:      Female    63y    PAST MEDICAL & SURGICAL HISTORY:  Myasthenia gravis      Diabetes mellitus      Hypertension          MEDICATIONS  (STANDING):  acetaminophen   IVPB .. 1000 milliGRAM(s) IV Intermittent once  albuterol/ipratropium for Nebulization 3 milliLiter(s) Nebulizer every 6 hours  chlorhexidine 0.12% Liquid 15 milliLiter(s) Oral Mucosa every 12 hours  chlorhexidine 4% Liquid 1 Application(s) Topical at bedtime  enoxaparin Injectable 40 milliGRAM(s) SubCutaneous every 24 hours  insulin lispro (ADMELOG) corrective regimen sliding scale   SubCutaneous every 6 hours  pantoprazole   Suspension 40 milliGRAM(s) Oral before breakfast  polyethylene glycol 3350 17 Gram(s) Oral every 12 hours  predniSONE   Tablet 60 milliGRAM(s) Oral daily    MEDICATIONS  (PRN):  senna 2 Tablet(s) Oral at bedtime PRN Constipation      Social History:  Drinking:   Y(  )   N(  )                Smoking:  Y(  )  N(  )           Marital Status:  Y(  )  N(  )      FAMILY HISTORY:        Allergies    peanuts (Unknown)  No Known Drug Allergies    Intolerances        REVIEW OF SYSTEMS:    CONSTITUTIONAL: No weakness, fevers or chills  EYES/ENT: No visual changes;  No vertigo or throat pain   RESPIRATORY: No cough, wheezing, hemoptysis; No shortness of breath  CARDIOVASCULAR: No chest pain or palpitations  GASTROINTESTINAL: No abdominal or epigastric pain. No nausea, vomiting, or hematemesis; No diarrhea or constipation. No melena or hematochezia.  MUSCULOSKELETAL: Full range of motion  NEUROLOGICAL: No numbness or weakness  HEMATOLOGY: No anemia, no bleeding  SKIN: No itching, burning, rashes, petechia, or lesions  ENDOCRINE: No diabetes, no thyroid disease  All other review of systems is negative unless indicated above.  Unable to obtain:    Vital Signs Last 24 Hrs  T(C): 37 (08 Nov 2023 03:00), Max: 37 (07 Nov 2023 19:00)  T(F): 98.6 (08 Nov 2023 03:00), Max: 98.6 (07 Nov 2023 19:00)  HR: 78 (08 Nov 2023 13:00) (57 - 150)  BP: --  BP(mean): --  RR: 21 (08 Nov 2023 13:00) (14 - 22)  SpO2: 100% (08 Nov 2023 13:00) (100% - 100%)    Parameters below as of 08 Nov 2023 05:17  Patient On (Oxygen Delivery Method): ventilator        Daily     Daily     PHYSICAL EXAM:  General: WN/WD NAD  Eyes: No jaundice  ENT:  Respiratory: CTA B/L  CV: RRR, no murmurs  Abdominal: Soft, NT, ND +BS  Musculoskeletal/Extremities: full range of motion X 4, no edema  Neurology: A&Ox3, no focal deficits   Skin: No rash, no petechia  Catheters/Tubes/Wires:                          11.5   8.93  )-----------( 244      ( 07 Nov 2023 14:36 )             34.9       Hematocrit: 34.9 % (11-07 @ 14:36)  Hematocrit: 30.9 % (11-05 @ 23:46)    11-07    142  |  107  |  25<H>  ----------------------------<  188<H>  3.8   |  22  |  0.47<L>    Ca    8.7      07 Nov 2023 14:36  Phos  4.5     11-07  Mg     2.1     11-07

## 2023-11-08 NOTE — CONSULT NOTE ADULT - ASSESSMENT
63 year old female admitted with MG exacerbation. Transfusion medicine has been consulted to complete a series of 5 TPE sessions over approximately 10 days.  Pending Shiley Catheter placement.  Informed consent was obtained.  Plan for TPE#1 today. One plasma volume is to be replaced with 5 % albumin as replacement fluid

## 2023-11-08 NOTE — PROGRESS NOTE ADULT - ASSESSMENT
ASSESSMENT/PLAN: 62 y/o female with myasthenia gravis, respiratory failure. Demonstrating worsening respiratory effort on SBT, NIF and FVC below baseline required for extubation to BiPAP. Discussed completion of IVIG course and monitoring NIF, FVC accordingly - if no improvement by EOD, Neuro to d/w family risks & benefits of PLEX.    NEURO:  Neurochecks q4h  Discontinue Mestinon, patient with copious secretions, will consider restarting prior to extubation, currently held in setting of copious secretions   Continue Prednisone to 60 mg QD  Patient & family member expressed preference for IVIG > PLEX; today is day 5/5 of IVIG  D/W Neuro Attending that PT will receive dose 5/5 of IVIG; if no improvement of poor respiratory effort by EOD, Neuro will discuss risks & benefits of PLEX w/ family  Avoid medications that may worsen the current exacerbation including macrolides, fluoroquinolones, tetracyclines, aminoglycoside, beta-blockers, magnesium, and anti-arrhythmics (procainamide, quinidine, and lidocaine)  Neurology following, appreciate their recommendations  Activity: [x] mobilize as tolerated [] Bedrest [x] PT [x] OT [] PMNR    PULM:  Intubated for airway protection  CPAP as tolerated; daily SBTs  CXR reviewed  Currently patient is not in respiratory distress  Duonebs q6h for secretions  Check NIF/VC Q4 hours in ICU setting, NIF -12/  this AM  Aggressive Chest PT    CV:  MAP>65  TTE - EF 54%, elevated right atrial pressure   Off pressors    RENAL:  Stable renal function  IVL  daily IOs    GI:  Diet: ON tf, restart tube feeds  GI prophylaxis: PPI while on steroids  Bowel regimen: miralax, senna; dulcolax suppositories PRN  LBM: 11/6    ENDO:   FS goal 120-180  ISS and adjust insulin as needed  A1C 6.7    HEME/ONC:  Monitor H/H  SCDs  Chemoppx: SQL    ID:  No fevers  No signs of infection     Disposition: ICU   ASSESSMENT/PLAN: 64 y/o female with myasthenia gravis, respiratory failure. Demonstrating worsening respiratory effort on SBT, NIF and FVC below baseline required for extubation to BiPAP. Discussed completion of IVIG course and monitoring NIF, FVC accordingly - if no improvement by EOD, Neuro to d/w family risks & benefits of PLEX.    NEURO:  Neurochecks q4h  Discontinue Mestinon, patient with copious secretions, will consider restarting prior to extubation, currently held in setting of copious secretions   Continue Prednisone to 60 mg QD  Patient & family member expressed preference for IVIG > PLEX; today is day 5/5 of IVIG  D/W Neuro Attending that PT will receive dose 5/5 of IVIG; if no improvement of poor respiratory effort by EOD, Neuro will discuss risks & benefits of PLEX w/ family  Avoid medications that may worsen the current exacerbation including macrolides, fluoroquinolones, tetracyclines, aminoglycoside, beta-blockers, magnesium, and anti-arrhythmics (procainamide, quinidine, and lidocaine)  Neurology following, appreciate their recommendations  Activity: [x] mobilize as tolerated [] Bedrest [x] PT [x] OT [] PMNR    PULM:  Intubated for airway protection  CPAP as tolerated; daily SBTs  CXR reviewed  Currently patient is not in respiratory distress  Duonebs q6h for secretions  Check NIF/VC Q4 hours in ICU setting, NIF -12/  this AM  Aggressive Chest PT    CV:  MAP>65  TTE - EF 54%, elevated right atrial pressure   Off pressors    RENAL:  Stable renal function  IVL  daily IOs    GI:  Diet: ON tf, restart tube feeds  GI prophylaxis: PPI while on steroids  Bowel regimen: miralax, senna; dulcolax suppositories PRN  LBM: 11/6    ENDO:   FS goal 120-180  ISS and adjust insulin as needed  A1C 6.7    HEME/ONC:  Monitor H/H  SCDs  Chemoppx: SQL    ID:  No fevers  No signs of infection     Disposition: ICU   ASSESSMENT/PLAN: 64 y/o female with myasthenia gravis, respiratory failure. Demonstrating worsening respiratory effort on SBT, NIF and FVC below baseline required for extubation to BiPAP. Discussed completion of IVIG course and monitoring NIF, FVC accordingly - if no improvement by EOD, Neuro to d/w family risks & benefits of PLEX.    NEURO:  Neurochecks q4h  Discontinue Mestinon, patient with copious secretions, will consider restarting prior to extubation, currently held in setting of copious secretions   Continue Prednisone to 60 mg QD  Patient & family member expressed preference for IVIG > PLEX; today is day 5/5 of IVIG  D/W Neuro Attending that PT will receive dose 5/5 of IVIG; if no improvement of poor respiratory effort by EOD, Neuro will discuss risks & benefits of PLEX w/ family  Avoid medications that may worsen the current exacerbation including macrolides, fluoroquinolones, tetracyclines, aminoglycoside, beta-blockers, magnesium, and anti-arrhythmics (procainamide, quinidine, and lidocaine)  Neurology following, appreciate their recommendations  Activity: [x] mobilize as tolerated [] Bedrest [x] PT [x] OT [] PMNR    PULM:  Intubated for airway protection  CPAP as tolerated; daily SBTs  CXR reviewed  Duonebs q6h for secretions  Check NIF/VC Q4 hours in ICU setting, NIF -12/  this AM  Aggressive Chest PT    CV:  MAP>65  TTE - EF 54%, elevated right atrial pressure   Off pressors    RENAL:  Stable renal function  IVL  daily IOs    GI:  Diet: ON TF  GI prophylaxis: PPI while on steroids + intubated  Bowel regimen: miralax, senna; dulcolax suppositories PRN  LBM: 11/6    ENDO:   FS goal 120-180  ISS and adjust insulin as needed  A1C 6.7    HEME/ONC:  Monitor H/H  SCDs  Chemoppx: SQL    ID:  No fevers, no leukocytosis  Thick mucous inline secretions, will continue to monitor   No signs of infection     Disposition: ICU   ASSESSMENT/PLAN: 62 y/o female with myasthenia gravis, respiratory failure. Demonstrating worsening respiratory effort on SBT, NIF and FVC below baseline required for extubation to BiPAP. Discussed completion of IVIG course and monitoring NIF, FVC accordingly - if no improvement by EOD, Neuro to d/w family risks & benefits of PLEX.    NEURO:  Neurochecks q4h  Discontinue Mestinon, patient with copious secretions, will consider restarting prior to extubation, currently held in setting of copious secretions   Continue Prednisone to 60 mg QD  Patient & family member expressed preference for IVIG > PLEX; today is day 5/5 of IVIG  D/W Neuro Attending that PT will receive dose 5/5 of IVIG; if no improvement of poor respiratory effort by EOD, Neuro will discuss risks & benefits of PLEX w/ family  Avoid medications that may worsen the current exacerbation including macrolides, fluoroquinolones, tetracyclines, aminoglycoside, beta-blockers, magnesium, and anti-arrhythmics (procainamide, quinidine, and lidocaine)  Neurology following, appreciate their recommendations  Activity: [x] mobilize as tolerated [] Bedrest [x] PT [x] OT [] PMNR    PULM:  Intubated for airway protection  CPAP as tolerated; daily SBTs  CXR reviewed  Duonebs q6h for secretions  Check NIF/VC Q4 hours in ICU setting, NIF -12/  this AM  Aggressive Chest PT    CV:  MAP>65  TTE - EF 54%, elevated right atrial pressure   Off pressors    RENAL:  Stable renal function  IVL  daily IOs    GI:  Diet: ON TF  GI prophylaxis: PPI while on steroids + intubated  Bowel regimen: miralax, senna; dulcolax suppositories PRN  LBM: 11/6    ENDO:   FS goal 120-180  ISS and adjust insulin as needed  A1C 6.7    HEME/ONC:  Monitor H/H  SCDs  Chemoppx: SQL    ID:  No fevers, no leukocytosis  Thick mucous inline secretions, will continue to monitor   No signs of infection     Disposition: ICU   ASSESSMENT/PLAN: 64 y/o female with myasthenia gravis, respiratory failure. Demonstrating worsening respiratory effort on SBT, NIF and FVC below baseline required for extubation to BiPAP despite completion of IVIG. Plan is to d/w PT, family risks & benefits of PLEX, possible transfer to [facility] for continuity of care given poor respiratory effort, need for prolonged endotracheal intubation.    NEURO:  Neurochecks q4h  Discontinue Mestinon, patient with copious secretions, will consider restarting prior to extubation, currently held in setting of copious secretions   Continue Prednisone to 60 mg QD  Patient & family member expressed preference for IVIG > PLEX; yesterday was day 5/5 of IVIG  PT would likely benefit from PLEX given variable, sub-threshold respiratory effort despite IVIG  Avoid medications that may worsen the current exacerbation including macrolides, fluoroquinolones, tetracyclines, aminoglycoside, beta-blockers, magnesium, and anti-arrhythmics (procainamide, quinidine, and lidocaine)  Neurology following, appreciate their recommendations  Activity: [x] mobilize as tolerated [] Bedrest [x] PT [x] OT [] PMNR    PULM:  Intubated for airway protection  CPAP as tolerated; daily SBTs  CXR reviewed  Duonebs q6h for secretions  Check NIF/VC Q4 hours in ICU setting, NIF -12/  this AM  Aggressive Chest PT    CV:  MAP>65  TTE - EF 54%, elevated right atrial pressure   Off pressors    RENAL:  Stable renal function  IVL  daily IOs    GI:  Diet: ON TF  GI prophylaxis: PPI while on steroids + intubated  Bowel regimen: miralax, senna; dulcolax suppositories PRN  LBM: 11/8    ENDO:   FS goal 120-180  ISS and adjust insulin as needed  A1C 6.7    HEME/ONC:  Monitor H/H  SCDs  Chemoppx: SQL    ID:  No fevers, no leukocytosis  Thick mucous inline secretions, will continue to monitor   No signs of infection     Disposition: ICU   ASSESSMENT/PLAN: 64 y/o female with myasthenia gravis, respiratory failure. Demonstrating worsening respiratory effort on SBT, NIF and FVC below baseline required for extubation to BiPAP despite completion of IVIG. Plan is to d/w PT, family risks & benefits of PLEX, possible transfer to [facility] for continuity of care given poor respiratory effort, need for prolonged endotracheal intubation.    NEURO:  Neurochecks q4h  Discontinue Mestinon, patient with copious secretions, will consider restarting prior to extubation, currently held in setting of copious secretions   Continue Prednisone to 60 mg QD  Patient & family member expressed preference for IVIG > PLEX; yesterday was day 5/5 of IVIG  PT would likely benefit from PLEX given variable, sub-threshold respiratory effort despite IVIG  Avoid medications that may worsen the current exacerbation including macrolides, fluoroquinolones, tetracyclines, aminoglycoside, beta-blockers, magnesium, and anti-arrhythmics (procainamide, quinidine, and lidocaine)  Neurology following, appreciate their recommendations  Activity: [x] mobilize as tolerated [] Bedrest [x] PT [x] OT [] PMNR    PULM:  Intubated for airway protection  CPAP as tolerated; daily SBTs  CXR reviewed  Duonebs q6h for secretions  Check NIF/VC Q4 hours in ICU setting, NIF -12/  this AM  Aggressive Chest PT    CV:  MAP>65  TTE - EF 54%, elevated right atrial pressure   Off pressors    RENAL:  Stable renal function  IVL  daily IOs    GI:  Diet: ON TF  GI prophylaxis: PPI while on steroids + intubated  Bowel regimen: miralax, senna; dulcolax suppositories PRN  LBM: 11/8    ENDO:   FS goal 120-180  ISS and adjust insulin as needed  A1C 6.7    HEME/ONC:  Monitor H/H  SCDs  Chemoppx: SQL    ID:  No fevers, no leukocytosis  Thick mucous inline secretions, will continue to monitor   No signs of infection     Disposition: ICU   ASSESSMENT/PLAN: 64 y/o female with myasthenia gravis, respiratory failure. Demonstrating worsening respiratory effort on SBT, NIF and FVC below baseline required for extubation to BiPAP despite completion of IVIG. Plan is to d/w PT, family risks & benefits of PLEX, possible transfer to [facility] for continuity of care given poor respiratory effort, need for prolonged endotracheal intubation.    NEURO:  Neurochecks q4h  Discontinue Mestinon, patient with copious secretions, will consider restarting prior to extubation, currently held in setting of copious secretions   Continue Prednisone to 60 mg QD  Patient & family member expressed preference for IVIG > PLEX; yesterday was day 5/5 of IVIG  PT would likely benefit from PLEX given variable, sub-threshold respiratory effort despite IVIG  Avoid medications that may worsen the current exacerbation including macrolides, fluoroquinolones, tetracyclines, aminoglycoside, beta-blockers, magnesium, and anti-arrhythmics (procainamide, quinidine, and lidocaine)  Neurology following, appreciate their recommendations  Activity: [x] mobilize as tolerated [] Bedrest [x] PT [x] OT [] PMNR    PULM:  Intubated for airway protection  CPAP as tolerated; daily SBTs  CXR reviewed  Duonebs q6h for secretions  Check NIF/VC Q4 hours in ICU setting, NIF -12/  this AM  Aggressive Chest PT    CV:  MAP>65  TTE - EF 54%, elevated right atrial pressure   Off pressors    RENAL:  Stable renal function  IVL  daily IOs    GI:  Diet: ON TF  GI prophylaxis: PPI while on steroids + intubated  Bowel regimen: miralax, senna; dulcolax suppositories PRN  LBM: 11/8    ENDO:   FS goal 120-180  ISS and adjust insulin as needed  A1C 6.7    HEME/ONC:  Monitor H/H  SCDs  Chemoppx: SQL    ID:  No fevers, no leukocytosis  Thick mucous inline secretions, will continue to monitor   No signs of infection     Disposition: ICU

## 2023-11-08 NOTE — PATIENT PROFILE ADULT - FALL HARM RISK - HARM RISK INTERVENTIONS
Assistance with ambulation/Assistance OOB with selected safe patient handling equipment/Communicate Risk of Fall with Harm to all staff/Discuss with provider need for PT consult/Monitor gait and stability/Provide patient with walking aids - walker, cane, crutches/Reinforce activity limits and safety measures with patient and family/Tailored Fall Risk Interventions/Visual Cue: Yellow wristband and red socks/Bed in lowest position, wheels locked, appropriate side rails in place/Call bell, personal items and telephone in reach/Instruct patient to call for assistance before getting out of bed or chair/Non-slip footwear when patient is out of bed/[place] to call system/Physically safe environment - no spills, clutter or unnecessary equipment/Purposeful Proactive Rounding/Room/bathroom lighting operational, light cord in reach Assistance with ambulation/Assistance OOB with selected safe patient handling equipment/Communicate Risk of Fall with Harm to all staff/Discuss with provider need for PT consult/Monitor gait and stability/Provide patient with walking aids - walker, cane, crutches/Reinforce activity limits and safety measures with patient and family/Tailored Fall Risk Interventions/Visual Cue: Yellow wristband and red socks/Bed in lowest position, wheels locked, appropriate side rails in place/Call bell, personal items and telephone in reach/Instruct patient to call for assistance before getting out of bed or chair/Non-slip footwear when patient is out of bed/[place] to call system/Physically safe environment - no spills, clutter or unnecessary equipment/Purposeful Proactive Rounding/Room/bathroom lighting operational, light cord in reach Assistance with ambulation/Assistance OOB with selected safe patient handling equipment/Communicate Risk of Fall with Harm to all staff/Discuss with provider need for PT consult/Monitor gait and stability/Provide patient with walking aids - walker, cane, crutches/Reinforce activity limits and safety measures with patient and family/Tailored Fall Risk Interventions/Visual Cue: Yellow wristband and red socks/Bed in lowest position, wheels locked, appropriate side rails in place/Call bell, personal items and telephone in reach/Instruct patient to call for assistance before getting out of bed or chair/Non-slip footwear when patient is out of bed/[place] to call system/Physically safe environment - no spills, clutter or unnecessary equipment/Purposeful Proactive Rounding/Room/bathroom lighting operational, light cord in reach

## 2023-11-08 NOTE — PROGRESS NOTE ADULT - ASSESSMENT
ASSESSMENT/PLAN: 62 y/o female with myasthenia gravis, respiratory failure. Demonstrating worsening respiratory effort on SBT, NIF and FVC below baseline required for extubation to BiPAP despite completion of IVIG. Plan is to d/w PT, family risks & benefits of PLEX, possible transfer to Critical access hospital[facility] for continuity of care given poor respiratory effort, need for prolonged endotracheal intubation.    NEURO:  Neurochecks q4h  Discontinue Mestinon, patient with copious secretions, will consider restarting prior to extubation, currently held in setting of copious secretions   Continue Prednisone to 60 mg QD  Patient & family member expressed preference for IVIG > PLEX; yesterday was day 5/5 of IVIG  PT would likely benefit from PLEX given variable, sub-threshold respiratory effort despite IVIG  Avoid medications that may worsen the current exacerbation including macrolides, fluoroquinolones, tetracyclines, aminoglycoside, beta-blockers, magnesium, and anti-arrhythmics (procainamide, quinidine, and lidocaine)  Neurology following, appreciate their recommendations  Activity: [x] mobilize as tolerated [] Bedrest [x] PT [x] OT [] PMNR    PULM:  Intubated for airway protection  CPAP as tolerated; daily SBTs  CXR reviewed  Duonebs q6h for secretions  Check NIF/VC Q4 hours in ICU setting, NIF -12/  this AM  Aggressive Chest PT    CV:  MAP>65  TTE - EF 54%, elevated right atrial pressure   Off pressors    RENAL:  Stable renal function  IVL  daily IOs    GI:  Diet: ON TF  GI prophylaxis: PPI while on steroids + intubated  Bowel regimen: miralax, senna; dulcolax suppositories PRN  LBM: 11/8    ENDO:   FS goal 120-180  ISS and adjust insulin as needed  A1C 6.7    HEME/ONC:  Monitor H/H  SCDs  Chemoppx: SQL    ID:  No fevers, no leukocytosis  Thick mucous inline secretions, will continue to monitor   No signs of infection     Disposition: ICU   ASSESSMENT/PLAN: 62 y/o female with myasthenia gravis, respiratory failure. Demonstrating worsening respiratory effort on SBT, NIF and FVC below baseline required for extubation to BiPAP despite completion of IVIG. Plan is to d/w PT, family risks & benefits of PLEX, possible transfer to Critical access hospital+/[place] for continuity of care given poor respiratory effort, need for prolonged endotracheal intubation.    NEURO:  Neurochecks q4h  Discontinue Mestinon, patient with copious secretions, will consider restarting prior to extubation, currently held in setting of copious secretions   Continue Prednisone to 60 mg QD  Patient & family member expressed preference for IVIG > PLEX; yesterday was day 5/5 of IVIG  PT would likely benefit from PLEX given variable, sub-threshold respiratory effort despite IVIG  Avoid medications that may worsen the current exacerbation including macrolides, fluoroquinolones, tetracyclines, aminoglycoside, beta-blockers, magnesium, and anti-arrhythmics (procainamide, quinidine, and lidocaine)  Neurology following, appreciate their recommendations  Activity: [x] mobilize as tolerated [] Bedrest [x] PT [x] OT [] PMNR    PULM:  Intubated for airway protection  CPAP as tolerated; daily SBTs  CXR reviewed  Duonebs q6h for secretions  Check NIF/VC Q4 hours in ICU setting, NIF -12/  this AM  Aggressive Chest PT    CV:  MAP>65  TTE - EF 54%, elevated right atrial pressure   Off pressors    RENAL:  Stable renal function  IVL  daily IOs    GI:  Diet: ON TF  GI prophylaxis: PPI while on steroids + intubated  Bowel regimen: miralax, senna; dulcolax suppositories PRN  LBM: 11/8    ENDO:   FS goal 120-180  ISS and adjust insulin as needed  A1C 6.7    HEME/ONC:  Monitor H/H  SCDs  Chemoppx: SQL    ID:  No fevers, no leukocytosis  Thick mucous inline secretions, will continue to monitor   No signs of infection     Disposition: ICU   ASSESSMENT/PLAN: 62 y/o female with myasthenia gravis, respiratory failure. Demonstrating worsening respiratory effort on SBT, NIF and FVC below baseline required for extubation to BiPAP despite completion of IVIG. Plan is to d/w PT, family risks & benefits of PLEX, possible transfer to [facility] for continuity of care given poor respiratory effort, need for prolonged endotracheal intubation.    NEURO:  Neurochecks q4h  Discontinue Mestinon, patient with copious secretions, will consider restarting prior to extubation, currently held in setting of copious secretions   Continue Prednisone to 60 mg QD  Patient & family member expressed preference for IVIG > PLEX; yesterday was day 5/5 of IVIG  PT would likely benefit from PLEX given variable, sub-threshold respiratory effort despite IVIG  Avoid medications that may worsen the current exacerbation including macrolides, fluoroquinolones, tetracyclines, aminoglycoside, beta-blockers, magnesium, and anti-arrhythmics (procainamide, quinidine, and lidocaine)  Neurology following, appreciate their recommendations  Activity: [x] mobilize as tolerated [] Bedrest [x] PT [x] OT [] PMNR    PULM:  Intubated for airway protection  CPAP as tolerated; daily SBTs  CXR reviewed  Duonebs q6h for secretions  Check NIF/VC Q4 hours in ICU setting, NIF -12/  this AM  Aggressive Chest PT    CV:  MAP>65  TTE - EF 54%, elevated right atrial pressure   Off pressors    RENAL:  Stable renal function  IVL  daily IOs    GI:  Diet: ON TF  GI prophylaxis: PPI while on steroids + intubated  Bowel regimen: miralax, senna; dulcolax suppositories PRN  LBM: 11/8    ENDO:   FS goal 120-180  ISS and adjust insulin as needed  A1C 6.7    HEME/ONC:  Monitor H/H  SCDs  Chemoppx: SQL    ID:  No fevers, no leukocytosis  Thick mucous inline secretions, will continue to monitor   No signs of infection     Disposition: ICU   ASSESSMENT/PLAN: 64 y/o female with myasthenia gravis, respiratory failure. Demonstrating worsening respiratory effort on SBT, NIF and FVC below baseline required for extubation to BiPAP despite completion of IVIG. Plan is to d/w PT, family risks & benefits of PLEX, possible transfer to Critical access hospital+/[place] for continuity of care given poor respiratory effort, need for prolonged endotracheal intubation.    NEURO:  Neurochecks q4h  Discontinue Mestinon, patient with copious secretions, will consider restarting prior to extubation, currently held in setting of copious secretions   Continue Prednisone to 60 mg QD  Family agrees to PLEX 11/8 8 PM first dose, finished 5 day course of IVIG  Avoid medications that may worsen the current exacerbation including macrolides, fluoroquinolones, tetracyclines, aminoglycoside, beta-blockers, magnesium, and anti-arrhythmics (procainamide, quinidine, and lidocaine)  Neurology following, appreciate their recommendations  Activity: [x] mobilize as tolerated [] Bedrest [x] PT [x] OT [] PMNR    PULM:  Intubated for airway protection  CPAP as tolerated; daily SBTs, NPO for possible extubation trial in AM  CXR reviewed  Duonebs q6h & 3%inhalation for secretions  Check NIF/VC Q4 hours in ICU setting, NIF -12/  this AM  Aggressive Chest PT    CV:  MAP>65  TTE - EF 54%, elevated right atrial pressure   Off pressors    RENAL:  Stable renal function  IVL  daily IOs    GI:  Diet: ON TF  GI prophylaxis: PPI while on steroids + intubated  Bowel regimen: miralax, senna; dulcolax suppositories PRN  LBM: 11/8    ENDO:   FS goal 120-180  ISS and adjust insulin as needed  A1C 6.7    HEME/ONC:  Monitor H/H  SCDs  Chemoppx: SQL    ID:  No fevers, no leukocytosis  Thick mucous inline secretions, will continue to monitor   No signs of infection     Disposition: ICU   ASSESSMENT/PLAN: 64 y/o female with myasthenia gravis, respiratory failure. Demonstrating worsening respiratory effort on SBT, NIF and FVC below baseline required for extubation to BiPAP despite completion of IVIG. Plan is to d/w PT, family risks & benefits of PLEX, possible transfer to [facility] for continuity of care given poor respiratory effort, need for prolonged endotracheal intubation.    NEURO:  Neurochecks q4h  Discontinue Mestinon, patient with copious secretions, will consider restarting prior to extubation, currently held in setting of copious secretions   Continue Prednisone to 60 mg QD  Family agrees to PLEX 11/8 8 PM first dose, finished 5 day course of IVIG  Avoid medications that may worsen the current exacerbation including macrolides, fluoroquinolones, tetracyclines, aminoglycoside, beta-blockers, magnesium, and anti-arrhythmics (procainamide, quinidine, and lidocaine)  Neurology following, appreciate their recommendations  Activity: [x] mobilize as tolerated [] Bedrest [x] PT [x] OT [] PMNR    PULM:  Intubated for airway protection  CPAP as tolerated; daily SBTs, NPO for possible extubation trial in AM  CXR reviewed  Duonebs q6h & 3%inhalation for secretions  Check NIF/VC Q4 hours in ICU setting, NIF -12/  this AM  Aggressive Chest PT    CV:  MAP>65  TTE - EF 54%, elevated right atrial pressure   Off pressors    RENAL:  Stable renal function  IVL  daily IOs    GI:  Diet: ON TF  GI prophylaxis: PPI while on steroids + intubated  Bowel regimen: miralax, senna; dulcolax suppositories PRN  LBM: 11/8    ENDO:   FS goal 120-180  ISS and adjust insulin as needed  A1C 6.7    HEME/ONC:  Monitor H/H  SCDs  Chemoppx: SQL    ID:  No fevers, no leukocytosis  Thick mucous inline secretions, will continue to monitor   No signs of infection     Disposition: ICU   ASSESSMENT/PLAN: 64 y/o female with myasthenia gravis, respiratory failure. Demonstrating worsening respiratory effort on SBT, NIF and FVC below baseline required for extubation to BiPAP despite completion of IVIG. Plan is to d/w PT, family risks & benefits of PLEX, possible transfer to [facility] for continuity of care given poor respiratory effort, need for prolonged endotracheal intubation.    NEURO:  Neurochecks q4h  Discontinue Mestinon, patient with copious secretions, will consider restarting prior to extubation, currently held in setting of copious secretions   Continue Prednisone to 60 mg QD  Family agrees to PLEX 11/8 8 PM first dose, finished 5 day course of IVIG  Avoid medications that may worsen the current exacerbation including macrolides, fluoroquinolones, tetracyclines, aminoglycoside, beta-blockers, magnesium, and anti-arrhythmics (procainamide, quinidine, and lidocaine)  Neurology following, appreciate their recommendations  Activity: [x] mobilize as tolerated [] Bedrest [x] PT [x] OT [] PMNR    PULM:  Intubated for airway protection  CPAP as tolerated; daily SBTs, NPO for possible extubation trial in AM  CXR reviewed  Duonebs q6h & 3%inhalation for secretions  Check NIF/VC Q4 hours in ICU setting, NIF -12/  this AM  Aggressive Chest PT    CV:  MAP>65  TTE - EF 54%, elevated right atrial pressure   Off pressors    RENAL:  Stable renal function  IVL  daily IOs    GI:  Diet: ON TF  GI prophylaxis: PPI while on steroids + intubated  Bowel regimen: miralax, senna; dulcolax suppositories PRN  LBM: 11/8    ENDO:   FS goal 120-180  ISS and adjust insulin as needed  A1C 6.7    HEME/ONC:  Monitor H/H  SCDs  Chemoppx: SQL    ID:  No fevers, no leukocytosis  Thick mucous inline secretions, will continue to monitor   No signs of infection     Disposition: ICU

## 2023-11-08 NOTE — PROCEDURE NOTE - NSINDICATIONS_GEN_A_CORE
arterial puncture to obtain ABG's/cannulation purposes/monitoring purposes
critical illness/venous access

## 2023-11-08 NOTE — OCCUPATIONAL THERAPY INITIAL EVALUATION ADULT - GENERAL OBSERVATIONS, REHAB EVAL
Pt presents semi-supine in NAD, +IV, +a-line, +ICU monitoring, +oral intubation, +NGT. Pt presents semi-supine in NAD, +IV, +ICU monitoring, +oral intubation, +NGT.

## 2023-11-08 NOTE — PHYSICAL THERAPY INITIAL EVALUATION ADULT - GENERAL OBSERVATIONS, REHAB EVAL
Pt received semi-supine in bed in NAD, +A-line, +intubated. Pt intubated by alert, able to write answers on paper, Pt AOX4.

## 2023-11-08 NOTE — PROGRESS NOTE ADULT - ASSESSMENT
Patient KAIDEN HATCH is a 63y (1960) woman with a PMHx significant for myasthenia gravis s/p thymectomy presenting to the ED for worsening difficulty with double vision, swallowing, talking, and breathing that has progressively gotten worse for the previous one week. Patient is currently on Mestinon 60 mg two tablets TID and Prednisone 10 mg --> uptitrated to 20 mg, she was not taking it per son for a month or so and was discharged on 50 mg after her hospitalization in 06/2023. Has been admitted before in June 2023, requiring intubation and PLEX/IVIG and in Feb 2023 for MG crisis that required intubation (2/1-2/11/23) iso COVID19. Patient received 5 days of PLEX (2/2-2/10/23) followed by 5 days of IVIG (2/16-2/20/23) with symptomatic improvement. Patient's son says patient has responded best to IVIG and that it stabilizes her MG for about 2 years. At baseline, patient is on 2L NC. In the ED, patient's NIF was -50. Her VC was 430. Patient also endorsed increased mucus secretion in her throat, and reliance on her oxygen tank. Now sp IVIG 5/5 without improvmenet.   11/8: NIF and VC -12/~300. DIscussed with family and patiet. They are now amenable to PLEX. discussed with NSCU team    Impression: Myasthenia gravis with acute exacerbation. NIF/VC remain poor.     Recommendations:  [] PLEX, patient and family now amenable  [] Hold Mestinon given secretions  [] Trend NIF/VC, Extubate when able per NSCU  [] Continue Prednisone 60mg QD  []Toxic/metabolic workup per ICU (Alkalemia likely iatrogenic, Leukocytosis likely iatrogenic, UA non infectious, CXR without consolidation, afebrile, consider RVP); if no trigger for crisis detected would consider DC on increased immunosuppression and/or lower pyridostigmine given pyridostigmine was recently increased and prednisone was DCed 9/15 but restarted at lower dose 2 weeks prior to admission. If AE from prolonged steroids would consider outpatient IVIG +/- Ocrelizumab. Has established outpatient neurologist in [facility] [place].   []Follow up with neuromuscular specialist after discharge - can follow at [address] [place] [address] [phone] if patient desires. Can schedule appointment with Dr. Fermin Mayer or Dr. Eimly Cvoarrubias of neuromuscular neurology at [address] [place] [address] 1-2 weeks after discharge.    Case seen and d/w Dr. Hodgson, neurology attending. Spoke with  at bedside and plan communicated to NSCU.    Patient KAIDEN HATCH is a 63y (1960) woman with a PMHx significant for myasthenia gravis s/p thymectomy presenting to the ED for worsening difficulty with double vision, swallowing, talking, and breathing that has progressively gotten worse for the previous one week. Patient is currently on Mestinon 60 mg two tablets TID and Prednisone 10 mg --> uptitrated to 20 mg, she was not taking it per son for a month or so and was discharged on 50 mg after her hospitalization in 06/2023. Has been admitted before in June 2023, requiring intubation and PLEX/IVIG and in Feb 2023 for MG crisis that required intubation (2/1-2/11/23) iso COVID19. Patient received 5 days of PLEX (2/2-2/10/23) followed by 5 days of IVIG (2/16-2/20/23) with symptomatic improvement. Patient's son says patient has responded best to IVIG and that it stabilizes her MG for about 2 years. At baseline, patient is on 2L NC. In the ED, patient's NIF was -50. Her VC was 430. Patient also endorsed increased mucus secretion in her throat, and reliance on her oxygen tank. Now sp IVIG 5/5 without improvmenet.   11/8: NIF and VC -12/~300. DIscussed with family and patiet. They are now amenable to PLEX. discussed with NSCU team    Impression: Myasthenia gravis with acute exacerbation. NIF/VC remain poor.     Recommendations:  [] PLEX, patient and family now amenable  [] Hold Mestinon given secretions  [] Trend NIF/VC, Extubate when able per NSCU  [] Continue Prednisone 60mg QD  []Toxic/metabolic workup per ICU (Alkalemia likely iatrogenic, Leukocytosis likely iatrogenic, UA non infectious, CXR without consolidation, afebrile, consider RVP); if no trigger for crisis detected would consider DC on increased immunosuppression and/or lower pyridostigmine given pyridostigmine was recently increased and prednisone was DCed 9/15 but restarted at lower dose 2 weeks prior to admission. If AE from prolonged steroids would consider outpatient IVIG +/- Ocrelizumab. [place] [facility] [place]   []Follow up with neuromuscular specialist after discharge - can follow at [address] [place] [address] [phone] if patient desires. Can schedule appointment with Dr. Fermin Mayer or Dr. Emily Covarrubias of neuromuscular neurology at [address] [place] [address] 1-2 weeks after discharge.    Case seen and d/w Dr. Hodgson, neurology attending. Spoke with  at bedside and plan communicated to NSCU.    Patient KAIDEN HATCH is a 63y (1960) woman with a PMHx significant for myasthenia gravis s/p thymectomy presenting to the ED for worsening difficulty with double vision, swallowing, talking, and breathing that has progressively gotten worse for the previous one week. Patient is currently on Mestinon 60 mg two tablets TID and Prednisone 10 mg --> uptitrated to 20 mg, she was not taking it per son for a month or so and was discharged on 50 mg after her hospitalization in 06/2023. Has been admitted before in June 2023, requiring intubation and PLEX/IVIG and in Feb 2023 for MG crisis that required intubation (2/1-2/11/23) iso COVID19. Patient received 5 days of PLEX (2/2-2/10/23) followed by 5 days of IVIG (2/16-2/20/23) with symptomatic improvement. Patient's son says patient has responded best to IVIG and that it stabilizes her MG for about 2 years. At baseline, patient is on 2L NC. In the ED, patient's NIF was -50. Her VC was 430. Patient also endorsed increased mucus secretion in her throat, and reliance on her oxygen tank. Now sp IVIG 5/5 without improvmenet.   11/8: NIF and VC -12/~300. DIscussed with family and patiet. They are now amenable to PLEX. discussed with NSCU team    Impression: Myasthenia gravis with acute exacerbation. NIF/VC remain poor.     Recommendations:  [] PLEX, patient and family now amenable  [] Hold Mestinon given secretions  [] Trend NIF/VC, Extubate when able per NSCU  [] Continue Prednisone 60mg QD  []Toxic/metabolic workup per ICU (Alkalemia likely iatrogenic, Leukocytosis likely iatrogenic, UA non infectious, CXR without consolidation, afebrile, consider RVP); if no trigger for crisis detected would consider DC on increased immunosuppression and/or lower pyridostigmine given pyridostigmine was recently increased and prednisone was DCed 9/15 but restarted at lower dose 2 weeks prior to admission. If AE from prolonged steroids would consider outpatient IVIG +/- Ocrelizumab. Has established outpatient neurologist in [facility] [place].   []Follow up with neuromuscular specialist after discharge - can follow at [address] [place] [address] [phone] if patient desires. Can schedule appointment with Dr. Fermin Mayer or Dr. Emily Covarrubias of neuromuscular neurology at [address] [place] [address] 1-2 weeks after discharge.    Case seen and d/w Dr. Hodgson, neurology attending. Spoke with  at bedside and plan communicated to NSCU.

## 2023-11-08 NOTE — PROGRESS NOTE ADULT - SUBJECTIVE AND OBJECTIVE BOX
HOSPITAL COURSE:  Patient KAIDEN HATCH is a 63y woman presenting with myasthenia gravis exacerbation.      Admission Scores  GCS: 15       24 hour Events: Patient evaluated at bedside. Reports improvement in symptoms w/ exception to copious oral secretions. No complaints of pain or dyspnea.     11/4- Patient intubated overnight for worsening respiratory status. Patient received dose 2/5 of IVIG  11/6- No events; poor respiratory effort on SBT  11/7- Apneic on SBT; worsening respiratory effort; last dose of IVIG TODAY  11/8- NIF -12 and  this AM. No overnight events.     Allergies    peanuts (Unknown)  No Known Drug Allergies    Intolerances        REVIEW OF SYSTEMS: [ ] Unable to Assess due to neurologic exam   [x] All ROS addressed below are non-contributory, except:  Neuro: [ ] Headache [ ] Back pain [ ] Numbness [ ] Weakness [ ] Ataxia [ ] Dizziness [ ] Aphasia [ ] Dysarthria [ ] Visual disturbance  Resp: [ ] Shortness of breath/dyspnea, [ ] Orthopnea [ ] Cough  CV: [ ] Chest pain [ ] Palpitation [ ] Lightheadedness [ ] Syncope  Renal: [ ] Thirst [ ] Edema  GI: [ ] Nausea [ ] Emesis [ ] Abdominal pain [ ] Constipation [ ] Diarrhea  Hem: [ ] Hematemesis [ ] bright red blood per rectum  ID: [ ] Fever [ ] Chills [ ] Dysuria  ENT: [ ] Rhinorrhea      DEVICES:   [ ] Restraints [ ] ET tube [ ] central line [ ] arterial line [ ] morales [ ] NGT/OGT [ ] EVD [ ] LD [ ] MYAH/HMV [ ] Trach [ ] PEG [ ] Chest Tube     VITALS:   Vital Signs Last 24 Hrs  T(C): 37 (08 Nov 2023 03:00), Max: 37 (07 Nov 2023 19:00)  T(F): 98.6 (08 Nov 2023 03:00), Max: 98.6 (07 Nov 2023 19:00)  HR: 118 (08 Nov 2023 05:17) (57 - 150)  BP: --  BP(mean): --  RR: 20 (08 Nov 2023 05:00) (14 - 22)  SpO2: 100% (08 Nov 2023 05:17) (100% - 100%)    Parameters below as of 08 Nov 2023 05:17  Patient On (Oxygen Delivery Method): ventilator      CAPILLARY BLOOD GLUCOSE      POCT Blood Glucose.: 111 mg/dL (08 Nov 2023 04:59)  POCT Blood Glucose.: 155 mg/dL (08 Nov 2023 01:34)  POCT Blood Glucose.: 149 mg/dL (07 Nov 2023 17:39)  POCT Blood Glucose.: 166 mg/dL (07 Nov 2023 11:14)    I&O's Summary    06 Nov 2023 07:01  -  07 Nov 2023 07:00  --------------------------------------------------------  IN: 1083.2 mL / OUT: 850 mL / NET: 233.2 mL    07 Nov 2023 07:01  -  08 Nov 2023 05:57  --------------------------------------------------------  IN: 1040 mL / OUT: 850 mL / NET: 190 mL        Respiratory:  Mode: AC/ CMV (Assist Control/ Continuous Mandatory Ventilation)  RR (machine): 16  TV (machine): 420  FiO2: 40  PEEP: 5  ITime: 1  MAP: 11  PIP: 27        LABS:                        11.5   8.93  )-----------( 244      (07 Nov 2023 14:36 )             34.9     11-07    142  |  107  |  25<H>  ----------------------------<  188<H>  3.8   |  22  |  0.47<L>             MEDICATION LEVELS:     IVF FLUIDS/MEDICATIONS:   MEDICATIONS  (STANDING):  albuterol/ipratropium for Nebulization 3 milliLiter(s) Nebulizer every 6 hours  chlorhexidine 0.12% Liquid 15 milliLiter(s) Oral Mucosa every 12 hours  chlorhexidine 4% Liquid 1 Application(s) Topical at bedtime  enoxaparin Injectable 40 milliGRAM(s) SubCutaneous every 24 hours  insulin lispro (ADMELOG) corrective regimen sliding scale   SubCutaneous every 6 hours  pantoprazole   Suspension 40 milliGRAM(s) Oral before breakfast  polyethylene glycol 3350 17 Gram(s) Oral every 12 hours  predniSONE   Tablet 60 milliGRAM(s) Oral daily    MEDICATIONS  (PRN):  senna 2 Tablet(s) Oral at bedtime PRN Constipation        IMAGING:      EXAMINATION:    PHYSICAL EXAM:    Constitutional: No Acute Distress     Neurological: Awake, alert oriented to person, place and time, Following Commands, PERRL, EOMI, No Gaze Preference, Face Symmetrical, Speech Fluent, No dysmetria, No ataxia, No nystagmus, No diplopia Resolution of left-sided ptosis.    Motor exam:          Upper extremity                         Delt     Bicep     Tricep    HG                                                 R         4/5        5/5        5/5       5/5                                               L          4/5        5/5        5/5       5/5          Lower extremity                        HF         KF        KE       DF         PF                                                  R        5/5        5/5        5/5       5/5         5/5                                               L         5/5        5/5       5/5       5/5          5/5                                                 Neck flexion: 3/5  Neck extension: 5/5    Sensation: [x] intact to light touch  [ ] decreased:     Pulmonary: Clear to Auscultation, coarse breath sounds, bilateral rhonchi, no rales, no wheezes    Cardiovascular: S1, S2, Regular rate and rhythm     Gastrointestinal: Soft, Non-tender, Non-distended     Extremities: No calf tenderness    HOSPITAL COURSE:  Patient KAIDEN HATCH is a 63y woman presenting with myasthenia gravis exacerbation.      Admission Scores  GCS: 15       24 hour Events: Patient evaluated at bedside. Reports improvement in symptoms w/ exception to copious oral secretions. No complaints of pain or dyspnea.     11/4- Patient intubated overnight for worsening respiratory status. Patient received dose 2/5 of IVIG  11/6- No events; poor respiratory effort on SBT  11/7- Apneic on SBT; worsening respiratory effort; last dose of IVIG TODAY  11/8- NIF -12 and  this AM. No overnight events.     Allergies    peanuts (Unknown)  No Known Drug Allergies    Intolerances        REVIEW OF SYSTEMS: [ ] Unable to Assess due to neurologic exam   [x] All ROS addressed below are non-contributory, except:  Neuro: [ ] Headache [ ] Back pain [ ] Numbness [ ] Weakness [ ] Ataxia [ ] Dizziness [ ] Aphasia [ ] Dysarthria [ ] Visual disturbance  Resp: [ ] Shortness of breath/dyspnea, [ ] Orthopnea [ ] Cough  CV: [ ] Chest pain [ ] Palpitation [ ] Lightheadedness [ ] Syncope  Renal: [ ] Thirst [ ] Edema  GI: [ ] Nausea [ ] Emesis [ ] Abdominal pain [ ] Constipation [ ] Diarrhea  Hem: [ ] Hematemesis [ ] bright red blood per rectum  ID: [ ] Fever [ ] Chills [ ] Dysuria  ENT: [ ] Rhinorrhea      DEVICES:   [ ] Restraints [ ] ET tube [ ] central line [ ] arterial line [ ] morales [ ] NGT/OGT [ ] EVD [ ] LD [ ] MYAH/HMV [ ] Trach [ ] PEG [ ] Chest Tube     ICU Vital Signs Last 24 Hrs  T(C): 37 (08 Nov 2023 23:00), Max: 37 (08 Nov 2023 23:00)  T(F): 98.6 (08 Nov 2023 23:00), Max: 98.6 (08 Nov 2023 23:00)  HR: 58 (09 Nov 2023 03:11) (44 - 120)  BP: --  BP(mean): --  ABP: 107/45 (09 Nov 2023 01:00) (107/45 - 163/81)  ABP(mean): 66 (09 Nov 2023 01:00) (66 - 712)  RR: 16 (09 Nov 2023 01:00) (16 - 22)  SpO2: 100% (09 Nov 2023 03:11) (99% - 100%)    O2 Parameters below as of 09 Nov 2023 00:30  Patient On (Oxygen Delivery Method): ventilator            Parameters below as of 08 Nov 2023 05:17  Patient On (Oxygen Delivery Method): ventilator      CAPILLARY BLOOD GLUCOSE      POCT Blood Glucose.: 111 mg/dL (08 Nov 2023 04:59)  POCT Blood Glucose.: 155 mg/dL (08 Nov 2023 01:34)  POCT Blood Glucose.: 149 mg/dL (07 Nov 2023 17:39)  POCT Blood Glucose.: 166 mg/dL (07 Nov 2023 11:14)    I&O's Summary    06 Nov 2023 07:01  -  07 Nov 2023 07:00  --------------------------------------------------------  IN: 1083.2 mL / OUT: 850 mL / NET: 233.2 mL    07 Nov 2023 07:01  -  08 Nov 2023 05:57  --------------------------------------------------------  IN: 1040 mL / OUT: 850 mL / NET: 190 mL        Respiratory:  Mode: AC/ CMV (Assist Control/ Continuous Mandatory Ventilation)  RR (machine): 16  TV (machine): 420  FiO2: 40  PEEP: 5  ITime: 1  MAP: 11  PIP: 27        LABS:                        11.5   8.93  )-----------( 244      (07 Nov 2023 14:36 )             34.9     11-07    142  |  107  |  25<H>  ----------------------------<  188<H>  3.8   |  22  |  0.47<L>             MEDICATION LEVELS:     IVF FLUIDS/MEDICATIONS:   MEDICATIONS  (STANDING):  albuterol/ipratropium for Nebulization 3 milliLiter(s) Nebulizer every 6 hours  chlorhexidine 0.12% Liquid 15 milliLiter(s) Oral Mucosa every 12 hours  chlorhexidine 4% Liquid 1 Application(s) Topical at bedtime  enoxaparin Injectable 40 milliGRAM(s) SubCutaneous every 24 hours  insulin lispro (ADMELOG) corrective regimen sliding scale   SubCutaneous every 6 hours  pantoprazole   Suspension 40 milliGRAM(s) Oral before breakfast  polyethylene glycol 3350 17 Gram(s) Oral every 12 hours  predniSONE   Tablet 60 milliGRAM(s) Oral daily    MEDICATIONS  (PRN):  senna 2 Tablet(s) Oral at bedtime PRN Constipation        IMAGING:      EXAMINATION:    PHYSICAL EXAM:    Constitutional: No Acute Distress     Neurological: Awake, alert oriented to person, place and time, Following Commands, PERRL, EOMI, No Gaze Preference, Face Symmetrical, Speech Fluent, No dysmetria, No ataxia, No nystagmus, No diplopia Resolution of left-sided ptosis.    Motor exam:          Upper extremity                         Delt     Bicep     Tricep    HG                                                 R         4/5        5/5        5/5       5/5                                               L          4/5        5/5        5/5       5/5          Lower extremity                        HF         KF        KE       DF         PF                                                  R        5/5        5/5        5/5       5/5         5/5                                               L         5/5        5/5       5/5       5/5          5/5                                                 Neck flexion: 3/5  Neck extension: 5/5    Sensation: [x] intact to light touch  [ ] decreased:     Pulmonary: Clear to Auscultation, coarse breath sounds, bilateral rhonchi, no rales, no wheezes    Cardiovascular: S1, S2, Regular rate and rhythm     Gastrointestinal: Soft, Non-tender, Non-distended     Extremities: No calf tenderness    HOSPITAL COURSE:  Patient KAIDEN HATCH is a 63y woman presenting with myasthenia gravis exacerbation.      Admission Scores  GCS: 15       24 hour Events: Patient evaluated at bedside. Reports improvement in symptoms w/ exception to copious oral secretions. No complaints of pain or dyspnea.     11/4- Patient intubated overnight for worsening respiratory status. Patient received dose 2/5 of IVIG  11/6- No events; poor respiratory effort on SBT  11/7- Apneic on SBT; worsening respiratory effort; last dose of IVIG TODAY  11/8- NIF -12 and  this AM. No overnight events.     Allergies    peanuts (Unknown)  No Known Drug Allergies    Intolerances        REVIEW OF SYSTEMS: [ ] Unable to Assess due to neurologic exam   [x] All ROS addressed below are non-contributory, except:  Neuro: [ ] Headache [ ] Back pain [ ] Numbness [ ] Weakness [ ] Ataxia [ ] Dizziness [ ] Aphasia [ ] Dysarthria [ ] Visual disturbance  Resp: [ ] Shortness of breath/dyspnea, [ ] Orthopnea [ ] Cough  CV: [ ] Chest pain [ ] Palpitation [ ] Lightheadedness [ ] Syncope  Renal: [ ] Thirst [ ] Edema  GI: [ ] Nausea [ ] Emesis [ ] Abdominal pain [ ] Constipation [ ] Diarrhea  Hem: [ ] Hematemesis [ ] bright red blood per rectum  ID: [ ] Fever [ ] Chills [ ] Dysuria  ENT: [ ] Rhinorrhea      DEVICES:   [ ] Restraints [ ] ET tube [ ] central line [ ] arterial line [ ] morales [ ] NGT/OGT [ ] EVD [ ] LD [ ] MYAH/HMV [ ] Trach [ ] PEG [ ] Chest Tube     ICU Vital Signs Last 24 Hrs  T(C): 37 (08 Nov 2023 23:00), Max: 37 (08 Nov 2023 23:00)  T(F): 98.6 (08 Nov 2023 23:00), Max: 98.6 (08 Nov 2023 23:00)  HR: 58 (09 Nov 2023 03:11) (44 - 120)  BP: --  BP(mean): --  ABP: 107/45 (09 Nov 2023 01:00) (107/45 - 163/81)  ABP(mean): 66 (09 Nov 2023 01:00) (66 - 712)  RR: 16 (09 Nov 2023 01:00) (16 - 22)  SpO2: 100% (09 Nov 2023 03:11) (99% - 100%)    O2 Parameters below as of 09 Nov 2023 00:30  Patient On (Oxygen Delivery Method): ventilator        ICU Vital Signs Last 24 Hrs  T(C): 37 (08 Nov 2023 23:00), Max: 37 (08 Nov 2023 23:00)  T(F): 98.6 (08 Nov 2023 23:00), Max: 98.6 (08 Nov 2023 23:00)  HR: 58 (09 Nov 2023 03:11) (44 - 120)  BP: --  BP(mean): --  ABP: 107/45 (09 Nov 2023 01:00) (107/45 - 163/81)  ABP(mean): 66 (09 Nov 2023 01:00) (66 - 712)  RR: 16 (09 Nov 2023 01:00) (16 - 22)  SpO2: 100% (09 Nov 2023 03:11) (99% - 100%)    O2 Parameters below as of 09 Nov 2023 00:30  Patient On (Oxygen Delivery Method): ventilator        I&O's Summary    07 Nov 2023 07:01  -  08 Nov 2023 07:00  --------------------------------------------------------  IN: 1040 mL / OUT: 850 mL / NET: 190 mL    08 Nov 2023 07:01  -  09 Nov 2023 03:25  --------------------------------------------------------  IN: 930 mL / OUT: 1050 mL / NET: -120 mL          Respiratory:  Mode: AC/ CMV (Assist Control/ Continuous Mandatory Ventilation)  RR (machine): 16  TV (machine): 420  FiO2: 40  PEEP: 5  ITime: 1  MAP: 11  PIP: 27        LABS:                              10.3   6.34  )-----------( 225      ( 08 Nov 2023 20:07 )             31.3   11-08    140  |  108  |  31<H>  ----------------------------<  133<H>  3.6   |  22  |  0.46<L>    Ca    8.7      08 Nov 2023 20:08  Phos  4.0     11-08  Mg     2.2     11-08    TPro  7.6  /  Alb  2.8<L>  /  TBili  0.2  /  DBili  x   /  AST  12  /  ALT  14  /  AlkPhos  72  11-08         MEDICATIONS  (STANDING):  albuterol/ipratropium for Nebulization 3 milliLiter(s) Nebulizer every 6 hours  chlorhexidine 0.12% Liquid 15 milliLiter(s) Oral Mucosa every 12 hours  chlorhexidine 4% Liquid 1 Application(s) Topical <User Schedule>  chlorhexidine 4% Liquid 1 Application(s) Topical at bedtime  enoxaparin Injectable 40 milliGRAM(s) SubCutaneous every 24 hours  insulin lispro (ADMELOG) corrective regimen sliding scale   SubCutaneous every 6 hours  pantoprazole   Suspension 40 milliGRAM(s) Oral before breakfast  polyethylene glycol 3350 17 Gram(s) Oral every 12 hours  predniSONE   Tablet 60 milliGRAM(s) Oral daily  sodium chloride 3%  Inhalation 4 milliLiter(s) Inhalation every 12 hours    MEDICATIONS  (PRN):  ketorolac   Injectable 30 milliGRAM(s) IV Push every 8 hours PRN Mild Pain (1 - 3)  oxyCODONE    IR 5 milliGRAM(s) Oral every 4 hours PRN Moderate Pain (4 - 6)  oxyCODONE    IR 10 milliGRAM(s) Oral every 4 hours PRN Severe Pain (7 - 10)  senna 2 Tablet(s) Oral at bedtime PRN Constipation  sodium chloride 0.9% lock flush 10 milliLiter(s) IV Push every 1 hour PRN Pre/post blood products, medications, blood draw, and to maintain line patency      IMAGING:      EXAMINATION:    PHYSICAL EXAM:    Constitutional: No Acute Distress     Neurological:  Intubated, AOx3 w/ choices, EOMI, neck flexion 3/5, neck extension 5/5, BL Bi/tri 4+5/ HG 5/5, BL LE 4+/5      Sensation: [x] intact to light touch  [ ] decreased:     Pulmonary: Clear to Auscultation, coarse breath sounds, bilateral rhonchi, no rales, no wheezes    Cardiovascular: S1, S2, Regular rate and rhythm     Gastrointestinal: Soft, Non-tender, Non-distended     Extremities: No calf tenderness

## 2023-11-08 NOTE — PHYSICAL THERAPY INITIAL EVALUATION ADULT - DID THE PATIENT HAVE SURGERY?
SPOKE TO PT. AND LET HER KNOW THAT DR. FALCON REVIEWED HER RECORDS TO TRANSFER OB CARE AT 36 WEEKS.  DR FALCON DENIED TO TAKE HER ON AS A NEW OB PATIENT AT THIS TIME SINCE SHE WILL BE 38 WEEKS BEFORE DR FALCON IS ABLE TO SEE HER.  PATIENT VOICED SHE UNDERSTOOD.   
n/a

## 2023-11-08 NOTE — PHYSICAL THERAPY INITIAL EVALUATION ADULT - [id number]_[id number]_CORE
If you are a smoker, it is important for your health to stop smoking. Please be aware that second hand smoke is also harmful. 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

## 2023-11-08 NOTE — PROGRESS NOTE ADULT - SUBJECTIVE AND OBJECTIVE BOX
HOSPITAL COURSE:  Patient KAIDEN HATCH is a 63y woman presenting with myasthenia gravis exacerbation.      Admission Scores  GCS: 15       24 hour Events: Patient evaluated at bedside. Reports improvement in symptoms w/ resolution of diplopia. No complaints of pain or dyspnea.     11/4- Patient intubated overnight for worsening respiratory status. Patient received dose 2/5 of IVIG  11/6- No events; poor respiratory effort on SBT  11/7- Apneic on SBT; worsening respiratory effort; last dose of IVIG TODAY  11/8-     Allergies    peanuts (Unknown)  No Known Drug Allergies    Intolerances        REVIEW OF SYSTEMS: [ ] Unable to Assess due to neurologic exam   [ x] All ROS addressed below are non-contributory, except:  Neuro: [ ] Headache [ ] Back pain [ ] Numbness [ ] Weakness [ ] Ataxia [ ] Dizziness [ ] Aphasia [ ] Dysarthria [ ] Visual disturbance  Resp: [ ] Shortness of breath/dyspnea, [ ] Orthopnea [ ] Cough  CV: [ ] Chest pain [ ] Palpitation [ ] Lightheadedness [ ] Syncope  Renal: [ ] Thirst [ ] Edema  GI: [ ] Nausea [ ] Emesis [ ] Abdominal pain [ ] Constipation [ ] Diarrhea  Hem: [ ] Hematemesis [ ] bright red blood per rectum  ID: [ ] Fever [ ] Chills [ ] Dysuria  ENT: [ ] Rhinorrhea      DEVICES:   [ ] Restraints [ ] ET tube [ ] central line [ ] arterial line [ ] morales [ ] NGT/OGT [ ] EVD [ ] LD [ ] MYAH/HMV [ ] Trach [ ] PEG [ ] Chest Tube     VITALS:   Vital Signs Last 24 Hrs  T(C): 37 (08 Nov 2023 03:00), Max: 37 (07 Nov 2023 19:00)  T(F): 98.6 (08 Nov 2023 03:00), Max: 98.6 (07 Nov 2023 19:00)  HR: 118 (08 Nov 2023 05:17) (57 - 150)  BP: --  BP(mean): --  RR: 20 (08 Nov 2023 05:00) (14 - 22)  SpO2: 100% (08 Nov 2023 05:17) (100% - 100%)    Parameters below as of 08 Nov 2023 05:17  Patient On (Oxygen Delivery Method): ventilator      CAPILLARY BLOOD GLUCOSE      POCT Blood Glucose.: 111 mg/dL (08 Nov 2023 04:59)  POCT Blood Glucose.: 155 mg/dL (08 Nov 2023 01:34)  POCT Blood Glucose.: 149 mg/dL (07 Nov 2023 17:39)  POCT Blood Glucose.: 166 mg/dL (07 Nov 2023 11:14)    I&O's Summary    06 Nov 2023 07:01  -  07 Nov 2023 07:00  --------------------------------------------------------  IN: 1083.2 mL / OUT: 850 mL / NET: 233.2 mL    07 Nov 2023 07:01  -  08 Nov 2023 05:57  --------------------------------------------------------  IN: 1040 mL / OUT: 850 mL / NET: 190 mL        Respiratory:  Mode: AC/ CMV (Assist Control/ Continuous Mandatory Ventilation)  RR (machine): 16  TV (machine): 420  FiO2: 40  PEEP: 5  ITime: 1  MAP: 11  PIP: 27        LABS:                        11.5   8.93  )-----------( 244      ( 07 Nov 2023 14:36 )             34.9     11-07    142  |  107  |  25<H>  ----------------------------<  188<H>  3.8   |  22  |  0.47<L>             MEDICATION LEVELS:     IVF FLUIDS/MEDICATIONS:   MEDICATIONS  (STANDING):  albuterol/ipratropium for Nebulization 3 milliLiter(s) Nebulizer every 6 hours  chlorhexidine 0.12% Liquid 15 milliLiter(s) Oral Mucosa every 12 hours  chlorhexidine 4% Liquid 1 Application(s) Topical at bedtime  enoxaparin Injectable 40 milliGRAM(s) SubCutaneous every 24 hours  insulin lispro (ADMELOG) corrective regimen sliding scale   SubCutaneous every 6 hours  pantoprazole   Suspension 40 milliGRAM(s) Oral before breakfast  polyethylene glycol 3350 17 Gram(s) Oral every 12 hours  predniSONE   Tablet 60 milliGRAM(s) Oral daily    MEDICATIONS  (PRN):  senna 2 Tablet(s) Oral at bedtime PRN Constipation        IMAGING:      EXAMINATION:    PHYSICAL EXAM:    Constitutional: No Acute Distress     Neurological: Awake, alert oriented to person, place and time, Following Commands, PERRL, EOMI, No Gaze Preference, Face Symmetrical, Speech Fluent, No dysmetria, No ataxia, No nystagmus, left-sided ptosis    Motor exam:          Upper extremity                         Delt     Bicep     Tricep    HG                                                 R         4+/5        5/5        5/5       5/5                                               L          4/5        5/5        5/5       5/5          Lower extremity                        HF         KF        KE       DF         PF                                                  R        5/5        5/5        5/5       5/5         5/5                                               L         5/5        5/5       5/5       5/5          5/5                                                 Neck flexion: 3/5  Neck extension: 5/5    Sensation: [x] intact to light touch  [ ] decreased:     Pulmonary: Clear to Auscultation, coarse breath sounds, bilateral rhonchi, no rales, no wheezes    Cardiovascular: S1, S2, Regular rate and rhythm     Gastrointestinal: Soft, Non-tender, Non-distended     Extremities: No calf tenderness    HOSPITAL COURSE:  Patient KAIDEN HATCH is a 63y woman presenting with myasthenia gravis exacerbation.      Admission Scores  GCS: 15       24 hour Events: Patient evaluated at bedside. Reports improvement in symptoms w/ resolution of diplopia. No complaints of pain or dyspnea.     11/4- Patient intubated overnight for worsening respiratory status. Patient received dose 2/5 of IVIG  11/6- No events; poor respiratory effort on SBT  11/7- Apneic on SBT; worsening respiratory effort; last dose of IVIG TODAY  11/8- NIF -12 and  this AM. No overnight events.     Allergies    peanuts (Unknown)  No Known Drug Allergies    Intolerances        REVIEW OF SYSTEMS: [ ] Unable to Assess due to neurologic exam   [x] All ROS addressed below are non-contributory, except:  Neuro: [ ] Headache [ ] Back pain [ ] Numbness [ ] Weakness [ ] Ataxia [ ] Dizziness [ ] Aphasia [ ] Dysarthria [ ] Visual disturbance  Resp: [ ] Shortness of breath/dyspnea, [ ] Orthopnea [ ] Cough  CV: [ ] Chest pain [ ] Palpitation [ ] Lightheadedness [ ] Syncope  Renal: [ ] Thirst [ ] Edema  GI: [ ] Nausea [ ] Emesis [ ] Abdominal pain [ ] Constipation [ ] Diarrhea  Hem: [ ] Hematemesis [ ] bright red blood per rectum  ID: [ ] Fever [ ] Chills [ ] Dysuria  ENT: [ ] Rhinorrhea      DEVICES:   [ ] Restraints [ ] ET tube [ ] central line [ ] arterial line [ ] morales [ ] NGT/OGT [ ] EVD [ ] LD [ ] MYAH/HMV [ ] Trach [ ] PEG [ ] Chest Tube     VITALS:   Vital Signs Last 24 Hrs  T(C): 37 (08 Nov 2023 03:00), Max: 37 (07 Nov 2023 19:00)  T(F): 98.6 (08 Nov 2023 03:00), Max: 98.6 (07 Nov 2023 19:00)  HR: 118 (08 Nov 2023 05:17) (57 - 150)  BP: --  BP(mean): --  RR: 20 (08 Nov 2023 05:00) (14 - 22)  SpO2: 100% (08 Nov 2023 05:17) (100% - 100%)    Parameters below as of 08 Nov 2023 05:17  Patient On (Oxygen Delivery Method): ventilator      CAPILLARY BLOOD GLUCOSE      POCT Blood Glucose.: 111 mg/dL (08 Nov 2023 04:59)  POCT Blood Glucose.: 155 mg/dL (08 Nov 2023 01:34)  POCT Blood Glucose.: 149 mg/dL (07 Nov 2023 17:39)  POCT Blood Glucose.: 166 mg/dL (07 Nov 2023 11:14)    I&O's Summary    06 Nov 2023 07:01  -  07 Nov 2023 07:00  --------------------------------------------------------  IN: 1083.2 mL / OUT: 850 mL / NET: 233.2 mL    07 Nov 2023 07:01  -  08 Nov 2023 05:57  --------------------------------------------------------  IN: 1040 mL / OUT: 850 mL / NET: 190 mL        Respiratory:  Mode: AC/ CMV (Assist Control/ Continuous Mandatory Ventilation)  RR (machine): 16  TV (machine): 420  FiO2: 40  PEEP: 5  ITime: 1  MAP: 11  PIP: 27        LABS:                        11.5   8.93  )-----------( 244      ( 07 Nov 2023 14:36 )             34.9     11-07    142  |  107  |  25<H>  ----------------------------<  188<H>  3.8   |  22  |  0.47<L>             MEDICATION LEVELS:     IVF FLUIDS/MEDICATIONS:   MEDICATIONS  (STANDING):  albuterol/ipratropium for Nebulization 3 milliLiter(s) Nebulizer every 6 hours  chlorhexidine 0.12% Liquid 15 milliLiter(s) Oral Mucosa every 12 hours  chlorhexidine 4% Liquid 1 Application(s) Topical at bedtime  enoxaparin Injectable 40 milliGRAM(s) SubCutaneous every 24 hours  insulin lispro (ADMELOG) corrective regimen sliding scale   SubCutaneous every 6 hours  pantoprazole   Suspension 40 milliGRAM(s) Oral before breakfast  polyethylene glycol 3350 17 Gram(s) Oral every 12 hours  predniSONE   Tablet 60 milliGRAM(s) Oral daily    MEDICATIONS  (PRN):  senna 2 Tablet(s) Oral at bedtime PRN Constipation        IMAGING:      EXAMINATION:    PHYSICAL EXAM:    Constitutional: No Acute Distress     Neurological: Awake, alert oriented to person, place and time, Following Commands, PERRL, EOMI, No Gaze Preference, Face Symmetrical, Speech Fluent, No dysmetria, No ataxia, No nystagmus, left-sided ptosis    Motor exam:          Upper extremity                         Delt     Bicep     Tricep    HG                                                 R         4+/5        5/5        5/5       5/5                                               L          4/5        5/5        5/5       5/5          Lower extremity                        HF         KF        KE       DF         PF                                                  R        5/5        5/5        5/5       5/5         5/5                                               L         5/5        5/5       5/5       5/5          5/5                                                 Neck flexion: 3/5  Neck extension: 5/5    Sensation: [x] intact to light touch  [ ] decreased:     Pulmonary: Clear to Auscultation, coarse breath sounds, bilateral rhonchi, no rales, no wheezes    Cardiovascular: S1, S2, Regular rate and rhythm     Gastrointestinal: Soft, Non-tender, Non-distended     Extremities: No calf tenderness    HOSPITAL COURSE:  Patient KAIDEN HATCH is a 63y woman presenting with myasthenia gravis exacerbation.      Admission Scores  GCS: 15       24 hour Events: Patient evaluated at bedside. Reports improvement in symptoms w/ exception to copious oral secretions. No complaints of pain or dyspnea.     11/4- Patient intubated overnight for worsening respiratory status. Patient received dose 2/5 of IVIG  11/6- No events; poor respiratory effort on SBT  11/7- Apneic on SBT; worsening respiratory effort; last dose of IVIG TODAY  11/8- NIF -12 and  this AM. No overnight events.     Allergies    peanuts (Unknown)  No Known Drug Allergies    Intolerances        REVIEW OF SYSTEMS: [ ] Unable to Assess due to neurologic exam   [x] All ROS addressed below are non-contributory, except:  Neuro: [ ] Headache [ ] Back pain [ ] Numbness [ ] Weakness [ ] Ataxia [ ] Dizziness [ ] Aphasia [ ] Dysarthria [ ] Visual disturbance  Resp: [ ] Shortness of breath/dyspnea, [ ] Orthopnea [ ] Cough  CV: [ ] Chest pain [ ] Palpitation [ ] Lightheadedness [ ] Syncope  Renal: [ ] Thirst [ ] Edema  GI: [ ] Nausea [ ] Emesis [ ] Abdominal pain [ ] Constipation [ ] Diarrhea  Hem: [ ] Hematemesis [ ] bright red blood per rectum  ID: [ ] Fever [ ] Chills [ ] Dysuria  ENT: [ ] Rhinorrhea      DEVICES:   [ ] Restraints [ ] ET tube [ ] central line [ ] arterial line [ ] morales [ ] NGT/OGT [ ] EVD [ ] LD [ ] MYAH/HMV [ ] Trach [ ] PEG [ ] Chest Tube     VITALS:   Vital Signs Last 24 Hrs  T(C): 37 (08 Nov 2023 03:00), Max: 37 (07 Nov 2023 19:00)  T(F): 98.6 (08 Nov 2023 03:00), Max: 98.6 (07 Nov 2023 19:00)  HR: 118 (08 Nov 2023 05:17) (57 - 150)  BP: --  BP(mean): --  RR: 20 (08 Nov 2023 05:00) (14 - 22)  SpO2: 100% (08 Nov 2023 05:17) (100% - 100%)    Parameters below as of 08 Nov 2023 05:17  Patient On (Oxygen Delivery Method): ventilator      CAPILLARY BLOOD GLUCOSE      POCT Blood Glucose.: 111 mg/dL (08 Nov 2023 04:59)  POCT Blood Glucose.: 155 mg/dL (08 Nov 2023 01:34)  POCT Blood Glucose.: 149 mg/dL (07 Nov 2023 17:39)  POCT Blood Glucose.: 166 mg/dL (07 Nov 2023 11:14)    I&O's Summary    06 Nov 2023 07:01  -  07 Nov 2023 07:00  --------------------------------------------------------  IN: 1083.2 mL / OUT: 850 mL / NET: 233.2 mL    07 Nov 2023 07:01  -  08 Nov 2023 05:57  --------------------------------------------------------  IN: 1040 mL / OUT: 850 mL / NET: 190 mL        Respiratory:  Mode: AC/ CMV (Assist Control/ Continuous Mandatory Ventilation)  RR (machine): 16  TV (machine): 420  FiO2: 40  PEEP: 5  ITime: 1  MAP: 11  PIP: 27        LABS:                        11.5   8.93  )-----------( 244      (07 Nov 2023 14:36 )             34.9     11-07    142  |  107  |  25<H>  ----------------------------<  188<H>  3.8   |  22  |  0.47<L>             MEDICATION LEVELS:     IVF FLUIDS/MEDICATIONS:   MEDICATIONS  (STANDING):  albuterol/ipratropium for Nebulization 3 milliLiter(s) Nebulizer every 6 hours  chlorhexidine 0.12% Liquid 15 milliLiter(s) Oral Mucosa every 12 hours  chlorhexidine 4% Liquid 1 Application(s) Topical at bedtime  enoxaparin Injectable 40 milliGRAM(s) SubCutaneous every 24 hours  insulin lispro (ADMELOG) corrective regimen sliding scale   SubCutaneous every 6 hours  pantoprazole   Suspension 40 milliGRAM(s) Oral before breakfast  polyethylene glycol 3350 17 Gram(s) Oral every 12 hours  predniSONE   Tablet 60 milliGRAM(s) Oral daily    MEDICATIONS  (PRN):  senna 2 Tablet(s) Oral at bedtime PRN Constipation        IMAGING:      EXAMINATION:    PHYSICAL EXAM:    Constitutional: No Acute Distress     Neurological: Awake, alert oriented to person, place and time, Following Commands, PERRL, EOMI, No Gaze Preference, Face Symmetrical, Speech Fluent, No dysmetria, No ataxia, No nystagmus, No diplopia Resolution of left-sided ptosis.    Motor exam:          Upper extremity                         Delt     Bicep     Tricep    HG                                                 R         4/5        5/5        5/5       5/5                                               L          4/5        5/5        5/5       5/5          Lower extremity                        HF         KF        KE       DF         PF                                                  R        5/5        5/5        5/5       5/5         5/5                                               L         5/5        5/5       5/5       5/5          5/5                                                 Neck flexion: 3/5  Neck extension: 5/5    Sensation: [x] intact to light touch  [ ] decreased:     Pulmonary: Clear to Auscultation, coarse breath sounds, bilateral rhonchi, no rales, no wheezes    Cardiovascular: S1, S2, Regular rate and rhythm     Gastrointestinal: Soft, Non-tender, Non-distended     Extremities: No calf tenderness

## 2023-11-08 NOTE — PHYSICAL THERAPY INITIAL EVALUATION ADULT - ADDITIONAL COMMENTS
Pt lives in pvt house with  with 3-4 steps to enter. Pt owns and uses RW in the house,  states pt rarely leaves the home but when she does he assists.

## 2023-11-08 NOTE — OCCUPATIONAL THERAPY INITIAL EVALUATION ADULT - LIVES WITH, PROFILE
Pt lives with spouse in PH +3 [place] and first floor setup. Pt reports independence with all aspects of self care and functional mobility, occasionally uses RW. Pt on home O2.

## 2023-11-08 NOTE — PROGRESS NOTE ADULT - SUBJECTIVE AND OBJECTIVE BOX
Neurology Progress Note    SUBJECTIVE/OBJECTIVE/INTERVAL EVENTS: Patient seen and examined at bedside w/ neuro attending and team.     INTERVAL HISTORY: Remains intubated, NIF and VC remain poor (-12/~300ml)    REVIEW OF SYSTEMS:     VITALS & EXAMINATION:  Vital Signs Last 24 Hrs  T(C): 37 (08 Nov 2023 03:00), Max: 37 (07 Nov 2023 19:00)  T(F): 98.6 (08 Nov 2023 03:00), Max: 98.6 (07 Nov 2023 19:00)  HR: 120 (08 Nov 2023 10:05) (57 - 150)  BP: --  BP(mean): --  RR: 16 (08 Nov 2023 08:00) (14 - 22)  SpO2: 100% (08 Nov 2023 10:05) (100% - 100%)    Parameters below as of 08 Nov 2023 05:17  Patient On (Oxygen Delivery Method): ventilator          NEUROLOGICAL EXAM:  MS: Intubated, no verbal output. Awake, alert, follows simple commands.   CN: B/L eye ptosis L >R, VFF, EOMI, PERRL, V1-3 intact, no facial asymmetry, t/p midline, SCM/trap intact.  Motor:   Deltoids 4/5 bilat. Hip flex 5/5.  Knee flex ext 5/5. Dorsi plantar 5/5.   Tone: normal. Bulk: normal. DTR 2+ symm.  Plantar flex b/l.   Sensation: intact to LT 4x.   Coordination: appropriate for level of strength   Gait:  MOLLY given intubated       LABORATORY:  CBC                       11.5   8.93  )-----------( 244      ( 07 Nov 2023 14:36 )             34.9     Chem 11-07    142  |  107  |  25<H>  ----------------------------<  188<H>  3.8   |  22  |  0.47<L>    Ca    8.7      07 Nov 2023 14:36  Phos  4.5     11-07  Mg     2.1     11-07      LFTs   Coagulopathy   Lipid Panel 11-03 Chol 214<H> LDL --  Trig 81  A1c   Cardiac enzymes     U/A Urinalysis Basic - ( 07 Nov 2023 14:36 )    Color: x / Appearance: x / SG: x / pH: x  Gluc: 188 mg/dL / Ketone: x  / Bili: x / Urobili: x   Blood: x / Protein: x / Nitrite: x   Leuk Esterase: x / RBC: x / WBC x   Sq Epi: x / Non Sq Epi: x / Bacteria: x      CSF  Immunological  Other    STUDIES & IMAGING: (EEG, CT, MR, U/S, TTE/AUGUSTINA):

## 2023-11-08 NOTE — PHYSICAL THERAPY INITIAL EVALUATION ADULT - PERTINENT HX OF CURRENT PROBLEM, REHAB EVAL
62 y/o female with myasthenia gravis, respiratory failure. Demonstrating worsening respiratory effort on SBT, NIF and FVC below baseline required for extubation to BiPAP despite completion of IVIG. Plan is to d/w PT, family risks & benefits of PLEX, possible transfer to [facility] for continuity of care given poor respiratory effort, need for prolonged endotracheal intubation.  11/4- Patient intubated overnight for worsening respiratory status. Patient received dose 2/5 of IVIG  11/6- No events; poor respiratory effort on SBT  11/7- Apneic on SBT; worsening respiratory effort; last dose of IVIG TODAY  11/8- NIF -12 and  this AM. No overnight events. 64 y/o female with myasthenia gravis, respiratory failure. Demonstrating worsening respiratory effort on SBT, NIF and FVC below baseline required for extubation to BiPAP despite completion of IVIG. Plan is to d/w PT, family risks & benefits of PLEX, possible transfer to [facility] for continuity of care given poor respiratory effort, need for prolonged endotracheal intubation.  11/4- Patient intubated overnight for worsening respiratory status. Patient received dose 2/5 of IVIG  11/6- No events; poor respiratory effort on SBT  11/7- Apneic on SBT; worsening respiratory effort; last dose of IVIG TODAY  11/8- NIF -12 and  this AM. No overnight events. 64 y/o female with myasthenia gravis, respiratory failure. Demonstrating worsening respiratory effort on SBT, NIF and FVC below baseline required for extubation to BiPAP despite completion of IVIG. Plan is to d/w PT, family risks & benefits of PLEX, possible transfer to [facility] for continuity of care given poor respiratory effort, need for prolonged endotracheal intubation.  11/4- Patient intubated overnight for worsening respiratory status. Patient received dose 2/5 of IVIG  11/6- No events; poor respiratory effort on SBT  11/7- Apneic on SBT; worsening respiratory effort; last dose of IVIG TODAY  11/8- NIF -12 and  this AM. No overnight events.

## 2023-11-08 NOTE — OCCUPATIONAL THERAPY INITIAL EVALUATION ADULT - PERTINENT HX OF CURRENT PROBLEM, REHAB EVAL
63y F with a PMHx significant for myasthenia gravis diagnosis presenting to the ED for worsening difficulty with double vision, swallowing, talking, and breathing that has progressively gotten worse for the previous one week.  Patient's son says patient has responded best to IVIG and that it stabilizes her MG for about 2 years. At baseline, patient is on 2L NC. In the ED, patient's NIF was -50. Her VC was 430. Patient also endorsed increased mucus secretion in her throat, and reliance on her oxygen tank. Pt intubated overnight for respiratory failure.
63F with a PMHx significant for myasthenia gravis s/p thymectomy presenting to the ED for worsening difficulty with double vision, swallowing, talking, and breathing that has progressively gotten worse for the previous one week. Patient is currently on Mestinon 60 mg two tablets TID and Prednisone 10 mg --> uptitrated to 20 mg, she was not taking it per son for a month or so and was discharged on 50 mg after her hospitalization in 06/2023. Has been admitted before in June 2023, requiring intubation and PLEX/IVIG and in Feb 2023 for MG crisis that required intubation (2/1-2/11/23) iso COVID19. Patient received 5 days of PLEX (2/2-2/10/23) followed by 5 days of IVIG (2/16-2/20/23) with symptomatic improvement. Patient's son says patient has responded best to IVIG and that it stabilizes her MG for about 2 years. At baseline, patient is on 2L NC. In the ED, patient's NIF was -50. Her VC was 430. Patient also endorsed increased mucus secretion in her throat, and reliance on her oxygen tank. Now sp IVIG 5/5 without improvement.  CHEST XR 11/7: (-).  VA DUPLEX BLE: (-).

## 2023-11-09 PROCEDURE — 99222 1ST HOSP IP/OBS MODERATE 55: CPT

## 2023-11-09 PROCEDURE — 93010 ELECTROCARDIOGRAM REPORT: CPT

## 2023-11-09 PROCEDURE — 99233 SBSQ HOSP IP/OBS HIGH 50: CPT | Mod: GC

## 2023-11-09 PROCEDURE — 99291 CRITICAL CARE FIRST HOUR: CPT

## 2023-11-09 PROCEDURE — 71045 X-RAY EXAM CHEST 1 VIEW: CPT | Mod: 26

## 2023-11-09 RX ORDER — KETOROLAC TROMETHAMINE 30 MG/ML
30 SYRINGE (ML) INJECTION EVERY 8 HOURS
Refills: 0 | Status: DISCONTINUED | OUTPATIENT
Start: 2023-11-09 | End: 2023-11-09

## 2023-11-09 RX ORDER — SODIUM CHLORIDE 9 MG/ML
500 INJECTION INTRAMUSCULAR; INTRAVENOUS; SUBCUTANEOUS ONCE
Refills: 0 | Status: COMPLETED | OUTPATIENT
Start: 2023-11-09 | End: 2023-11-09

## 2023-11-09 RX ORDER — POTASSIUM CHLORIDE 20 MEQ
40 PACKET (EA) ORAL ONCE
Refills: 0 | Status: COMPLETED | OUTPATIENT
Start: 2023-11-09 | End: 2023-11-09

## 2023-11-09 RX ORDER — CHLORHEXIDINE GLUCONATE 213 G/1000ML
15 SOLUTION TOPICAL EVERY 12 HOURS
Refills: 0 | Status: DISCONTINUED | OUTPATIENT
Start: 2023-11-09 | End: 2023-11-18

## 2023-11-09 RX ORDER — ACETAMINOPHEN 500 MG
1000 TABLET ORAL ONCE
Refills: 0 | Status: COMPLETED | OUTPATIENT
Start: 2023-11-09 | End: 2023-11-09

## 2023-11-09 RX ORDER — OXYCODONE HYDROCHLORIDE 5 MG/1
10 TABLET ORAL EVERY 4 HOURS
Refills: 0 | Status: DISCONTINUED | OUTPATIENT
Start: 2023-11-09 | End: 2023-11-10

## 2023-11-09 RX ORDER — OXYCODONE HYDROCHLORIDE 5 MG/1
5 TABLET ORAL EVERY 4 HOURS
Refills: 0 | Status: DISCONTINUED | OUTPATIENT
Start: 2023-11-09 | End: 2023-11-15

## 2023-11-09 RX ADMIN — Medication 4: at 11:44

## 2023-11-09 RX ADMIN — Medication 60 MILLIGRAM(S): at 05:05

## 2023-11-09 RX ADMIN — OXYCODONE HYDROCHLORIDE 5 MILLIGRAM(S): 5 TABLET ORAL at 22:21

## 2023-11-09 RX ADMIN — OXYCODONE HYDROCHLORIDE 5 MILLIGRAM(S): 5 TABLET ORAL at 09:50

## 2023-11-09 RX ADMIN — Medication 3 MILLILITER(S): at 20:44

## 2023-11-09 RX ADMIN — Medication 3 MILLILITER(S): at 17:50

## 2023-11-09 RX ADMIN — CHLORHEXIDINE GLUCONATE 1 APPLICATION(S): 213 SOLUTION TOPICAL at 21:51

## 2023-11-09 RX ADMIN — OXYCODONE HYDROCHLORIDE 5 MILLIGRAM(S): 5 TABLET ORAL at 21:51

## 2023-11-09 RX ADMIN — OXYCODONE HYDROCHLORIDE 5 MILLIGRAM(S): 5 TABLET ORAL at 09:20

## 2023-11-09 RX ADMIN — SODIUM CHLORIDE 500 MILLILITER(S): 9 INJECTION INTRAMUSCULAR; INTRAVENOUS; SUBCUTANEOUS at 09:21

## 2023-11-09 RX ADMIN — Medication 40 MILLIEQUIVALENT(S): at 05:04

## 2023-11-09 RX ADMIN — Medication 400 MILLIGRAM(S): at 02:16

## 2023-11-09 RX ADMIN — Medication 3 MILLILITER(S): at 05:33

## 2023-11-09 RX ADMIN — PANTOPRAZOLE SODIUM 40 MILLIGRAM(S): 20 TABLET, DELAYED RELEASE ORAL at 06:07

## 2023-11-09 RX ADMIN — Medication 2: at 17:24

## 2023-11-09 RX ADMIN — CHLORHEXIDINE GLUCONATE 1 APPLICATION(S): 213 SOLUTION TOPICAL at 05:05

## 2023-11-09 RX ADMIN — ENOXAPARIN SODIUM 40 MILLIGRAM(S): 100 INJECTION SUBCUTANEOUS at 21:51

## 2023-11-09 RX ADMIN — Medication 1000 MILLIGRAM(S): at 02:46

## 2023-11-09 RX ADMIN — Medication 3 MILLILITER(S): at 11:29

## 2023-11-09 RX ADMIN — SODIUM CHLORIDE 4 MILLILITER(S): 9 INJECTION INTRAMUSCULAR; INTRAVENOUS; SUBCUTANEOUS at 05:33

## 2023-11-09 RX ADMIN — CHLORHEXIDINE GLUCONATE 15 MILLILITER(S): 213 SOLUTION TOPICAL at 05:04

## 2023-11-09 NOTE — PROGRESS NOTE ADULT - ATTENDING COMMENTS
Patient seen and examined bedside. L    Detailed neuro exam:  General: Patient is comfortably lying on the bed without acute distress and currently orally intubated.  Mental and Psychological Status: The patient is alert and oriented to person, place and exact date (via writing). Follows simple and complex commands.   Cranial Nerve Exam: Bilateral eye ptosis L>R Visual fields are full to confrontation. Pupils are round, equal, and reactive.  V1-V3 are intact to light touch. There is no facial weakness or asymmetry. Hearing is grossly intact. Shoulder shrug is 4/5 bilaterally.   Motor Exam: Bulk, tone, and strength are normal except proximal muscle bilaterally at upper extremities 4/5. There are no resting tremors.  Sensory Examination: Sensation is intact to light touch throughout.  Posture, Station and Gait: Currently orally intubated.    Impression and plan:                                                  Ms. Acosta is 63 year old right handed woman with a PMHx significant for myasthenia gravis s/p thymectomy on Mestinon 12 mg tablets TID and Prednisone 60 mg presenting to the ED for worsening difficulty with double vision, swallowing, talking, and breathing that has progressively gotten worse for the previous one week.  Myasthenic gravis crisis with signs of respiratory failure and subsequently she required mechanical ventilation for air way protection. s/p IVIG 5 days and clinically she made improvement except respiratory effort on SBT, NIF and FVC below baseline required for extubation to BiPAP.  Subsequently, last night she received Plasmapheresis and respiratory effort got improved.      Continue Plasmapheresis for 5 session.    Continue Steroids with GI prophylaxis.  Continue medical management, neuro- check and fall precaution.  GI and DVT prophylaxis.  I agree with above exam, assessment and plan.    Patient is at high risk for complications and morbidity or mortality. There is high probability of imminent or life threatening deterioration in the patient's condition.    My cumulative time taking care of this critically ill patient is 30 minutes  If you have any further questions, please do not hesitate to contact our team.  Thank you for allowing us to participate in this patient care.

## 2023-11-09 NOTE — PROGRESS NOTE ADULT - ASSESSMENT
ASSESSMENT/PLAN: 62 y/o female with myasthenia gravis, respiratory failure. Demonstrating worsening respiratory effort on SBT, NIF and FVC below baseline required for extubation to BiPAP despite completion of IVIG. Plan is to d/w PT, family risks & benefits of PLEX - patient consented to insertion of shiley, initiation of PLEX - S/P tx w/ PLEX 1/5. Care initiated w/ [facility] neuromuscular neurology Dr Covarrubias (will see pt today). PT shows signs of improved respiratory effort.    NEURO:  Neurochecks q4h  PLEX tx 2/5 tomorrow  Daily CiPAP as tolerated  Recommend early ambulation w/ PT/OT  Neuromuscular Neurology to see today to establish care  Hold Mestinon, patient with copious secretions, will consider restarting prior to extubation, currently held in setting of copious secretions   Continue Prednisone to 60 mg QD  Avoid medications that may worsen the current exacerbation including macrolides, fluoroquinolones, tetracyclines, aminoglycoside, beta-blockers, magnesium, and anti-arrhythmics (procainamide, quinidine, and lidocaine)  Neurology following, appreciate their recommendations  Activity: [x] mobilize as tolerated [] Bedrest [x] PT [x] OT [] PMNR    PULM:  Intubated for airway protection  CPAP as tolerated; daily SBTs  CXR reviewed  Duonebs q6h for secretions  Check NIF/VC Q4 hours in ICU setting, NIF -20/  this AM showing improvement post-PLEX  Aggressive Chest PT    CV:  MAP>65  TTE - EF 54%, elevated right atrial pressure   Off pressors    RENAL:  Stable renal function  IVL  daily IOs    GI:  Diet: ON TF  GI prophylaxis: PPI while on steroids + intubated  Bowel regimen: miralax, senna; dulcolax suppositories PRN  LBM: 11/8 - recommend PRN suppository    ENDO:   FS goal 120-180  ISS and adjust insulin as needed  A1C 6.7    HEME/ONC:  Monitor H/H  SCDs  Chemoppx: SQL    ID:  No fevers, no leukocytosis  Thick mucous inline secretions, will continue to monitor   No signs of infection     Disposition: ICU ASSESSMENT/PLAN: 62 y/o female with myasthenia gravis, respiratory failure. Demonstrating worsening respiratory effort on SBT, NIF and FVC below baseline required for extubation to BiPAP despite completion of IVIG. Plan is to d/w PT, family risks & benefits of PLEX - patient consented to insertion of shiley, initiation of PLEX - S/P tx w/ PLEX 1/5. Care initiated w/ [facility] neuromuscular neurology Dr Covarrubias (will see pt today). PT shows signs of improved respiratory effort.    NEURO:  Neurochecks q4h  PLEX tx 2/5 tomorrow  Daily CiPAP as tolerated  Recommend early ambulation w/ PT/OT  Neuromuscular Neurology to see today to establish care  Hold Mestinon, patient with copious secretions, will consider restarting prior to extubation, currently held in setting of copious secretions   Continue Prednisone to 60 mg QD  Avoid medications that may worsen the current exacerbation including macrolides, fluoroquinolones, tetracyclines, aminoglycoside, beta-blockers, magnesium, and anti-arrhythmics (procainamide, quinidine, and lidocaine)  Neurology following, appreciate their recommendations  Activity: [x] mobilize as tolerated [] Bedrest [x] PT [x] OT [] PMNR    PULM:  Intubated for airway protection  CPAP as tolerated; daily SBTs  CXR reviewed  Duonebs q6h for secretions  Check NIF/VC Q4 hours in ICU setting, NIF -20/  this AM showing improvement post-PLEX  Aggressive Chest PT    CV:  MAP>65  TTE - EF 54%, elevated right atrial pressure   Off pressors    RENAL:  Stable renal function  IVL  daily IOs    GI:  Diet: ON TF  GI prophylaxis: PPI while on steroids + intubated  Bowel regimen: miralax, senna; dulcolax suppositories PRN  LBM: 11/8 - recommend PRN suppository    ENDO:   FS goal 120-180  ISS and adjust insulin as needed  A1C 6.7    HEME/ONC:  Monitor H/H  SCDs  Chemoppx: SQL    ID:  No fevers, no leukocytosis  Thick mucous inline secretions, will continue to monitor   No signs of infection     Disposition: ICU ASSESSMENT/PLAN: 64 y/o female with myasthenia gravis, respiratory failure. Demonstrating worsening respiratory effort on SBT, NIF and FVC below baseline required for extubation to BiPAP despite completion of IVIG. Plan is to d/w PT, family risks & benefits of PLEX - patient consented to insertion of shiley, initiation of PLEX - S/P tx w/ PLEX 1/5. Care initiated w/ [facility] neuromuscular neurology Dr Covarrubias (will see pt today). PT shows signs of improved respiratory effort.    NEURO:  Neurochecks q4h  PLEX tx 2/5 tomorrow  Daily CiPAP as tolerated  Recommend early ambulation w/ PT/OT  Neuromuscular Neurology to see today to establish care  Hold Mestinon, patient with copious secretions, will consider restarting prior to extubation, currently held in setting of copious secretions   Continue Prednisone to 60 mg QD  Avoid medications that may worsen the current exacerbation including macrolides, fluoroquinolones, tetracyclines, aminoglycoside, beta-blockers, magnesium, and anti-arrhythmics (procainamide, quinidine, and lidocaine)  Neurology following, appreciate their recommendations  Activity: [x] mobilize as tolerated [] Bedrest [x] PT [x] OT [] PMNR    PULM:  Intubated for airway protection  CPAP as tolerated; daily SBTs  CXR reviewed  Duonebs q6h for secretions  Check NIF/VC Q4 hours in ICU setting, NIF -20/  this AM showing improvement post-PLEX  Aggressive Chest PT    CV:  MAP>65  TTE - EF 54%, elevated right atrial pressure   Off pressors    RENAL:  Stable renal function  IVL  daily IOs    GI:  Diet: ON TF  GI prophylaxis: PPI while on steroids + intubated  Bowel regimen: miralax, senna; dulcolax suppositories PRN  LBM: 11/8 - recommend PRN suppository    ENDO:   FS goal 120-180  ISS and adjust insulin as needed  A1C 6.7    HEME/ONC:  Monitor H/H  SCDs  Chemoppx: SQL    ID:  No fevers, no leukocytosis  Thick mucous inline secretions, will continue to monitor   No signs of infection     Disposition: ICU ASSESSMENT/PLAN: 64 y/o female with myasthenia gravis, respiratory failure. Demonstrating worsening respiratory effort on SBT, NIF and FVC below baseline required for extubation to BiPAP despite completion of IVIG. Plan is to d/w PT, family risks & benefits of PLEX - patient consented to insertion of shiley, initiation of PLEX - S/P tx w/ PLEX 1/5. Care initiated w/ [facility] neuromuscular neurology Dr Covarrubias. PT shows signs of improved respiratory effort. Continues to CPAP well    NEURO:  Neurochecks q4h  PLEX tx 2/5 tomorrow  Daily CiPAP as tolerated  Recommend early ambulation w/ PT/OT  Neuromuscular Neurology to see today to establish care  Hold Mestinon, patient with copious secretions, will consider restarting prior to extubation, currently held in setting of copious secretions, retrial extubation in AM  Continue Prednisone to 60 mg QD  Avoid medications that may worsen the current exacerbation including macrolides, fluoroquinolones, tetracyclines, aminoglycoside, beta-blockers, magnesium, and anti-arrhythmics (procainamide, quinidine, and lidocaine)  Neurology following, appreciate their recommendations  Activity: [x] mobilize as tolerated [] Bedrest [x] PT [x] OT [] PMNR    PULM:  Intubated for airway protection  CPAP as tolerated; daily SBTs  CXR reviewed  Duonebs q6h for secretions  Check NIF/VC Q4 hours in ICU setting, NIF -20/  this AM showing improvement post-PLEX  Aggressive Chest PT    CV:  MAP>65  TTE - EF 54%, elevated right atrial pressure   Off pressors    RENAL:  Stable renal function  IVL  daily IOs    GI:  Diet: ON TF  GI prophylaxis: PPI while on steroids + intubated  Bowel regimen: miralax, senna; dulcolax suppositories PRN  LBM: 11/8 - recommend PRN suppository    ENDO:   FS goal 120-180  ISS and adjust insulin as needed  A1C 6.7    HEME/ONC:  Monitor H/H  SCDs  Chemoppx: SQL    ID:  No fevers, no leukocytosis  Thick mucous inline secretions, will continue to monitor   No signs of infection     Disposition: ICU ASSESSMENT/PLAN: 64 y/o female with myasthenia gravis, respiratory failure. Demonstrating worsening respiratory effort on SBT, NIF and FVC below baseline required for extubation to BiPAP despite completion of IVIG. Plan is to d/w PT, family risks & benefits of PLEX - patient consented to insertion of shiley, initiation of PLEX - S/P tx w/ PLEX 1/5. Care initiated w/ [facility] neuromuscular neurology Dr Covarrubias. PT shows signs of improved respiratory effort. Continues to CPAP well    NEURO:  Neurochecks q4h  PLEX tx 2/5 tomorrow  Daily CiPAP as tolerated  Recommend early ambulation w/ PT/OT  Neuromuscular Neurology to see today to establish care  Hold Mestinon, patient with copious secretions, will consider restarting prior to extubation, currently held in setting of copious secretions, retrial extubation in AM  Continue Prednisone to 60 mg QD  Avoid medications that may worsen the current exacerbation including macrolides, fluoroquinolones, tetracyclines, aminoglycoside, beta-blockers, magnesium, and anti-arrhythmics (procainamide, quinidine, and lidocaine)  Neurology following, appreciate their recommendations  Activity: [x] mobilize as tolerated [] Bedrest [x] PT [x] OT [] PMNR    PULM:  Intubated for airway protection  CPAP as tolerated; daily SBTs  CXR reviewed  Duonebs q6h for secretions  Check NIF/VC Q4 hours in ICU setting, NIF -20/  this AM showing improvement post-PLEX  Aggressive Chest PT    CV:  MAP>65  TTE - EF 54%, elevated right atrial pressure   Off pressors    RENAL:  Stable renal function  IVL  daily IOs    GI:  Diet: ON TF  GI prophylaxis: PPI while on steroids + intubated  Bowel regimen: miralax, senna; dulcolax suppositories PRN  LBM: 11/8 - recommend PRN suppository    ENDO:   FS goal 120-180  ISS and adjust insulin as needed  A1C 6.7    HEME/ONC:  Monitor H/H  SCDs  Chemoppx: SQL    ID:  No fevers, no leukocytosis  Thick mucous inline secretions, will continue to monitor   No signs of infection     Disposition: ICU ASSESSMENT/PLAN: 62 y/o female with myasthenia gravis, respiratory failure. Demonstrating worsening respiratory effort on SBT, NIF and FVC below baseline required for extubation to BiPAP despite completion of IVIG. Plan is to d/w PT, family risks & benefits of PLEX - patient consented to insertion of shiley, initiation of PLEX - S/P tx w/ PLEX 1/5. Care initiated w/ [facility] neuromuscular neurology Dr Covarrubias. PT shows signs of improved respiratory effort. Continues to CPAP well    NEURO:  Neurochecks q4h  PLEX tx 2/5 tomorrow  Daily CiPAP as tolerated  Recommend early ambulation w/ PT/OT  Neuromuscular Neurology to see today to establish care  Hold Mestinon, patient with copious secretions, will consider restarting prior to extubation, currently held in setting of copious secretions, retrial extubation in AM  Continue Prednisone to 60 mg QD  Avoid medications that may worsen the current exacerbation including macrolides, fluoroquinolones, tetracyclines, aminoglycoside, beta-blockers, magnesium, and anti-arrhythmics (procainamide, quinidine, and lidocaine)  Neurology following, appreciate their recommendations  Activity: [x] mobilize as tolerated [] Bedrest [x] PT [x] OT [] PMNR    PULM:  Intubated for airway protection  CPAP as tolerated; daily SBTs  CXR reviewed  Duonebs q6h for secretions  Check NIF/VC Q4 hours in ICU setting, NIF -20/  this AM showing improvement post-PLEX  Aggressive Chest PT    CV:  MAP>65  TTE - EF 54%, elevated right atrial pressure   Off pressors    RENAL:  Stable renal function  IVL  daily IOs    GI:  Diet: ON TF  GI prophylaxis: PPI while on steroids + intubated  Bowel regimen: miralax, senna; dulcolax suppositories PRN  LBM: 11/8 - recommend PRN suppository    ENDO:   FS goal 120-180  ISS and adjust insulin as needed  A1C 6.7    HEME/ONC:  Monitor H/H  SCDs  Chemoppx: SQL    ID:  No fevers, no leukocytosis  Thick mucous inline secretions, will continue to monitor   No signs of infection     Disposition: ICU

## 2023-11-09 NOTE — CHART NOTE - NSCHARTNOTEFT_GEN_A_CORE
Dr Emily Covarrubias (Neurology Neuromuscular specialist) and I spoke with the PT, spouse and PT's son at bedside at ~12 PM on 11/9/2023    Plan for PT discussed as follows:  -PT needs immunosuppression which can take 12-18 months to take max effect to avoid MG exacerbation. Dr Covarrubias advises starting Azathioprine 50 BID or Cellcept 500 while in NSICU; discussed starting while here with fellow  -Dr Covarrubias will follow outpatient; to be established if novel immunosuppressive (e.g. Eculizumab) is approved by insurance  -In meantime C/W Pred 60; attempt to taper while inpatient, but D/C on lowest dose PT can tolerate w/out symptom exacerbation  -Advise PT that steroids MUST be continued while home and will be managed by Dr Covarrubias outpatient  -CT chest to be ordered by NSICU to evaluate for residual thymoma  -Check LFTs, Hep B/C ABs, thyroid levels   -Advise PT, family that IVIG q3-4 wks will be required on D/C to avoid hospitalization Dr Emily Covarrubias (Neurology Neuromuscular specialist) and I spoke with the PT, spouse and PT's son at bedside at ~12 PM on 11/9/2023    Plan for PT discussed as follows:  -PT needs immunosuppression which can take 12-18 months to take max effect to avoid MG exacerbation. Dr Covarrubias advises starting Azathioprine 50 BID or Cellcept 500 TID while in NSICU; discussed starting while here with fellow  -Dr Covarrubias will follow outpatient; to be established if novel immunosuppressive (e.g. Eculizumab) is approved by insurance  -In meantime C/W Pred 60; attempt to taper while inpatient, but D/C on lowest dose PT can tolerate w/out symptom exacerbation  -Advise PT that steroids MUST be continued while home and will be managed by Dr Covarrubias outpatient  -CT chest to be ordered by NSICU to evaluate for residual thymoma  -Check LFTs, Hep B/C ABs, thyroid levels   -Advise PT, family that IVIG q3-4 wks will be required on D/C to avoid hospitalization.        Attending's addendum.    Ms. Melissa was diagnosed with MG around 2014. In 2010 she was found to have thymoma, at that time her clinical symptom was tired and fatigue. Dr Emily Covarrubias (Neurology Neuromuscular specialist) and I spoke with the PT, spouse and PT's son at bedside at ~12 PM on 11/9/2023    Plan for PT discussed as follows:  -PT needs immunosuppression which can take 12-18 months to take max effect to avoid MG exacerbation. Dr Covarrubias advises starting Azathioprine 50 BID or Cellcept 500 TID while in NSICU; discussed starting while here with fellow  -Dr Covarrubias will follow outpatient; to be established if novel immunosuppressive (e.g. Eculizumab) is approved by insurance  -In meantime C/W Pred 60; attempt to taper while inpatient, but D/C on lowest dose PT can tolerate w/out symptom exacerbation  -Advise PT that steroids MUST be continued while home and will be managed by Dr Covarrubias outpatient  -CT chest to be ordered by NSICU to evaluate for residual thymoma  -Check LFTs, Hep B/C ABs, thyroid levels   -Advise PT, family that IVIG q3-4 wks will be required on D/C to avoid hospitalization.        Attending's addendum.    Ms. Melissa was diagnosed with MG around 2014. In 2010 she was found to have thymoma, at that time her clinical symptom was tired and fatigue. She takes Mestinon 120 mg TID which helped her symptom however if she missed or delayed her dose, she would experienced symptom. She had IVIG from time to time, per son, with IVIG and prednisone (after being hospitalized), her symptom would be quiet for sometimes. However, over the past year, she had MG exacerbation with crisis at least 4 times which required intubation and IVIG/PLEX. Last admission was in May 2023 s/p IVIG and was DC with prednisone 50 mg tapering dose. She never took immunosuppressants.   This admission, she came with double vision, difficulty swallowing, talking, and breathing that has progressively gotten worse for the previous one week. Patient is currently on Mestinon 60 mg two tablets TID and Prednisone 10 mg --> uptitrated to 20 mg. Prednisone was stopped about a month before her exacerbation.   Hospital course, she was intubated on the day of admission, IVIG was started 5 days, she did not get better, hence PLEX was started right after finishing IVIG.   Today is the first day after 1st PLEX. Per team, patient is better, NIF is better.   On exam, she has incomplete ptosis left eye with induced ptosis and enhanced ptosis. limited right lateral rectus with reported diplopia on that direction. neck flexion 2+/5. Neck extension 4/5. UEs proximal muscles 4-/5, distal 5/5. LEs proximal 4/5, distal 5/5. DTRs normal. BBK absent. Normal sensory.     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   continue Prednisone now is 60 mg daily. It can cause weakness as well given it is high dose. can try to taper when patient is stable, should not be less than 30 mg daily.  CT chest to check for residual thymoma.   check TSH, FT4 as it can cause worsening of MG.   discussed with her son about immunosuppressant such as Azathioprine or Cellcept, these medications will take time to see full effect at least 12-18 months. She needs some medications to help her intertrim. Can start while in the hospital if ok with primary team.   new novel medications such as ecrulizumab, efgatigamod. These are IV medications, need meningococcal vaccine prior.   patient is intubated, if secretion is concerned, can hold mestinon for now. and can restart once secretion is cleared.

## 2023-11-09 NOTE — PROGRESS NOTE ADULT - ASSESSMENT
Assessment  63y (1960) woman with a PMHx significant for myasthenia gravis s/p thymectomy presenting to the ED for worsening difficulty with double vision, swallowing, talking, and breathing that has progressively gotten worse for the previous one week. Patient is currently on Mestinon 60 mg two tablets TID and Prednisone 10 mg --> uptitrated to 20 mg, she was not taking it per son for a month or so and was discharged on 50 mg after her hospitalization in 06/2023. Has been admitted before in June 2023, requiring intubation and PLEX/IVIG and in Feb 2023 for MG crisis that required intubation (2/1-2/11/23) iso COVID19. Patient received 5 days of PLEX (2/2-2/10/23) followed by 5 days of IVIG (2/16-2/20/23) with symptomatic improvement. Patient's son says patient has responded best to IVIG and that it stabilizes her MG for about 2 years. At baseline, patient is on 2L NC. In the ED, patient's NIF was -50. Her VC was 430. Patient also endorsed increased mucus secretion in her throat, and reliance on her oxygen tank. Now sp IVIG 5/5 without improvement. 11/8: NIF and VC -12/~300. DIscussed with family and patient. They are now amenable to PLEX. discussed with NSCU team.    Impression: Myasthenia gravis with acute exacerbation. Now on PLEX.    Recommendations:  [] continue with PLEX, patient and family now amenable  [] Hold Mestinon given secretions  [] Trend NIF/VC, Extubate when able per NSCU  [] Continue Prednisone 60mg QD + GI prophylaxis  []Toxic/metabolic workup per ICU (Alkalemia likely iatrogenic, Leukocytosis likely iatrogenic, UA non infectious, CXR without consolidation, afebrile, consider RVP); if no trigger for crisis detected would consider DC on increased immunosuppression and/or lower pyridostigmine given pyridostigmine was recently increased and prednisone was DCed 9/15 but restarted at lower dose 2 weeks prior to admission. If AE from prolonged steroids would consider outpatient IVIG +/- Ocrelizumab. [place] [facility] [place]   []Follow up with neuromuscular specialist after discharge - can follow at [address] [place] [address] [phone] if patient desires. Can schedule appointment with Dr. Fermin Mayer or Dr. Emily Covarrubias of neuromuscular neurology at [address] [place] [address] 1-2 weeks after discharge.    Patient seen with team and attending. Note finalized upon attending attestation. Assessment  63y (1960) woman with a PMHx significant for myasthenia gravis s/p thymectomy presenting to the ED for worsening difficulty with double vision, swallowing, talking, and breathing that has progressively gotten worse for the previous one week. Patient is currently on Mestinon 60 mg two tablets TID and Prednisone 10 mg --> uptitrated to 20 mg, she was not taking it per son for a month or so and was discharged on 50 mg after her hospitalization in 06/2023. Has been admitted before in June 2023, requiring intubation and PLEX/IVIG and in Feb 2023 for MG crisis that required intubation (2/1-2/11/23) iso COVID19. Patient received 5 days of PLEX (2/2-2/10/23) followed by 5 days of IVIG (2/16-2/20/23) with symptomatic improvement. Patient's son says patient has responded best to IVIG and that it stabilizes her MG for about 2 years. At baseline, patient is on 2L NC. In the ED, patient's NIF was -50. Her VC was 430. Patient also endorsed increased mucus secretion in her throat, and reliance on her oxygen tank. Now sp IVIG 5/5 without improvement. 11/8: NIF and VC -12/~300. DIscussed with family and patient. They are now amenable to PLEX. discussed with NSCU team.    Impression: Myasthenia gravis with acute exacerbation. Now on PLEX.    Recommendations:  [] continue with PLEX, patient and family now amenable  [] Hold Mestinon given secretions  [] Trend NIF/VC, Extubate when able per NSCU  [] Continue Prednisone 60mg QD + GI prophylaxis  []Toxic/metabolic workup per ICU (Alkalemia likely iatrogenic, Leukocytosis likely iatrogenic, UA non infectious, CXR without consolidation, afebrile, consider RVP); if no trigger for crisis detected would consider DC on increased immunosuppression and/or lower pyridostigmine given pyridostigmine was recently increased and prednisone was DCed 9/15 but restarted at lower dose 2 weeks prior to admission. If AE from prolonged steroids would consider outpatient IVIG +/- Ocrelizumab. [place] [facility] [place]   []Follow up with neuromuscular specialist after discharge - can follow at [address] [place] [address] [phone] if patient desires. Can schedule appointment with Dr. Fermin Mayer or Dr. Emily Covarrubias of neuromuscular neurology at [address] [place] [address] 1-2 weeks after discharge.    Patient seen with team and attending. Note finalized upon attending attestation. Assessment  63y (1960) woman with a PMHx significant for myasthenia gravis s/p thymectomy presenting to the ED for worsening difficulty with double vision, swallowing, talking, and breathing that has progressively gotten worse for the previous one week. Patient is currently on Mestinon 60 mg two tablets TID and Prednisone 10 mg --> uptitrated to 20 mg, she was not taking it per son for a month or so and was discharged on 50 mg after her hospitalization in 06/2023. Has been admitted before in June 2023, requiring intubation and PLEX/IVIG and in Feb 2023 for MG crisis that required intubation (2/1-2/11/23) iso COVID19. Patient received 5 days of PLEX (2/2-2/10/23) followed by 5 days of IVIG (2/16-2/20/23) with symptomatic improvement. Patient's son says patient has responded best to IVIG and that it stabilizes her MG for about 2 years. At baseline, patient is on 2L NC. In the ED, patient's NIF was -50. Her VC was 430. Patient also endorsed increased mucus secretion in her throat, and reliance on her oxygen tank. Now sp IVIG 5/5 without improvement. 11/8: NIF and VC -12/~300. DIscussed with family and patient. They are now amenable to PLEX. discussed with NSCU team.    Impression: Myasthenia gravis with acute exacerbation. Now on PLEX.    Recommendations:  [] continue with PLEX, patient and family now amenable  [] Hold Mestinon given secretions  [] Trend NIF/VC, Extubate when able per NSCU  [] Continue Prednisone 60mg QD + GI prophylaxis  []Toxic/metabolic workup per ICU (Alkalemia likely iatrogenic, Leukocytosis likely iatrogenic, UA non infectious, CXR without consolidation, afebrile, consider RVP); if no trigger for crisis detected would consider DC on increased immunosuppression and/or lower pyridostigmine given pyridostigmine was recently increased and prednisone was DCed 9/15 but restarted at lower dose 2 weeks prior to admission. If AE from prolonged steroids would consider outpatient IVIG +/- Ocrelizumab. Has established outpatient neurologist in [facility] [place].   []Follow up with neuromuscular specialist after discharge - can follow at [address] [place] [address] [phone] if patient desires. Can schedule appointment with Dr. Fermin Mayer or Dr. Emily Covarrubias of neuromuscular neurology at [address] [place] [address] 1-2 weeks after discharge.    Patient seen with team and attending. Note finalized upon attending attestation.

## 2023-11-09 NOTE — PROGRESS NOTE ADULT - SUBJECTIVE AND OBJECTIVE BOX
Neurology Progress Note    SUBJECTIVE/OBJECTIVE/INTERVAL EVENTS: Patient seen and examined at bedside w/ neuro attending and team.     INTERVAL HISTORY:    REVIEW OF SYSTEMS: Otherwise denies fever, chills, headaches, vision changes, nausea, vomiting, hearing change, focal weakness, focal numbness, bowel/ bladder incontinence. Few questions of a 10-system ROS was performed and is negative except for those items noted above and/or in the HPI.    VITALS & EXAMINATION:  Vital Signs Last 24 Hrs  T(C): 37 (09 Nov 2023 07:00), Max: 37 (08 Nov 2023 23:00)  T(F): 98.6 (09 Nov 2023 07:00), Max: 98.6 (08 Nov 2023 23:00)  HR: 80 (09 Nov 2023 09:00) (44 - 111)  BP: --  BP(mean): --  RR: 17 (09 Nov 2023 09:00) (16 - 29)  SpO2: 100% (09 Nov 2023 09:00) (99% - 100%)    Parameters below as of 09 Nov 2023 05:45  Patient On (Oxygen Delivery Method): ventilator        General:  Constitutional: Female, appears stated age, nontoxic, not in distress,    Head: Normocephalic;   Eyes: clear sclera;   Extremities: No cyanosis;   Resp: breathing comfortably  Neck: no nuchal rigidity  Fundoscopic exam:      Neurological (>12):  MS: Awake, alert.  Oriented person place situation. Follows commands. Attends to examiner  Language: Speech is hypophonic, clear, fluent, good repetition,  comprehension, registration of words.  CNs: PERRL (R 3mm, L 3mm). VFF. EOMI. No disconjugate gaze, nystagmus. V1-3 intact LT, No facial asymmetry b/l. Hearing grossly normal b/l. Tongue midline and can move side to side.     Motor - Normal bulk and tone throughout. No pronator drift.    L/R (out of 5 each)       Deltoid  5/5    Biceps   5/5      Triceps  5/5         Wrist Extension 5/5   Wrist Flexion  5/5   Interossei 5/5     5/5   L/R (out of 5 each)       Hip Flexion  5/5    Hip Extension  5/5  Knee Extension  5/5  Dorsiflexion  5/5      Plantar Flexion 5/5     Sensation: Intact to LT b/l. Cortical: Extinction on DSS (neglect): none  Reflexes L/R:  Biceps(C5) 2/2  BR(C6) 2/2   Triceps(C7)  2/2 Patellar(L4)   2/2   Ankles 2/2   Toes: mute b/l  Coordination: No dysmetria to FTN b/l UE  Gait: Normal Romberg. No postural instability. Normal stance. Gait baseline.    LABORATORY:  CBC                       10.3   6.34  )-----------( 225      ( 08 Nov 2023 20:07 )             31.3     Chem 11-08    140  |  108  |  31<H>  ----------------------------<  133<H>  3.6   |  22  |  0.46<L>    Ca    8.7      08 Nov 2023 20:08  Phos  4.0     11-08  Mg     2.2     11-08    TPro  7.6  /  Alb  2.8<L>  /  TBili  0.2  /  DBili  x   /  AST  12  /  ALT  14  /  AlkPhos  72  11-08    LFTs LIVER FUNCTIONS - ( 08 Nov 2023 20:08 )  Alb: 2.8 g/dL / Pro: 7.6 g/dL / ALK PHOS: 72 U/L / ALT: 14 U/L / AST: 12 U/L / GGT: x           Coagulopathy PT/INR - ( 08 Nov 2023 20:07 )   PT: 11.6 sec;   INR: 1.11 ratio         PTT - ( 08 Nov 2023 20:07 )  PTT:27.1 sec  Lipid Panel 11-03 Chol 214<H> LDL --  Trig 81  A1c   Cardiac enzymes     U/A Urinalysis Basic - ( 08 Nov 2023 20:08 )    Color: x / Appearance: x / SG: x / pH: x  Gluc: 133 mg/dL / Ketone: x  / Bili: x / Urobili: x   Blood: x / Protein: x / Nitrite: x   Leuk Esterase: x / RBC: x / WBC x   Sq Epi: x / Non Sq Epi: x / Bacteria: x      CSF  Immunological  Other    STUDIES & IMAGING: (EEG, CT, MR, U/S, TTE/AUGUSTINA): Neurology Progress Note    SUBJECTIVE/OBJECTIVE/INTERVAL EVENTS: Patient seen and examined at bedside w/ neuro attending and team.     INTERVAL HISTORY: S/p 1x plex.  3am NIF/VC -17/600  6am NIF/VC -13/550    REVIEW OF SYSTEMS: Otherwise negative except for those items noted above and/or in the HPI.    VITALS & EXAMINATION:  Vital Signs Last 24 Hrs  T(C): 37 (09 Nov 2023 07:00), Max: 37 (08 Nov 2023 23:00)  T(F): 98.6 (09 Nov 2023 07:00), Max: 98.6 (08 Nov 2023 23:00)  HR: 80 (09 Nov 2023 09:00) (44 - 111)  BP: --  BP(mean): --  RR: 17 (09 Nov 2023 09:00) (16 - 29)  SpO2: 100% (09 Nov 2023 09:00) (99% - 100%)    Parameters below as of 09 Nov 2023 05:45  Patient On (Oxygen Delivery Method): ventilator    General:  Constitutional: Female, appears stated age, intubated     Neurological (>12):  MS: Intubated, no verbal output. Awake, alert, follows simple commands.   CN: B/L eye ptosis L >R, VFF, EOMI, PERRL, V1-3 intact, no facial asymmetry, t/p midline, SCM/trap intact.  Motor:   Deltoids 4/5 bilat. Hip flex 5/5.  Knee flex ext 5/5. Dorsi plantar 5/5.   Tone: normal. Bulk: normal. DTR 2+ symm.  Plantar flex b/l.   Sensation: intact to LT 4x.   Coordination: appropriate for level of strength   Gait:  MOLLY given intubated     LABORATORY:  CBC                       10.3   6.34  )-----------( 225      ( 08 Nov 2023 20:07 )             31.3     Chem 11-08    140  |  108  |  31<H>  ----------------------------<  133<H>  3.6   |  22  |  0.46<L>    Ca    8.7      08 Nov 2023 20:08  Phos  4.0     11-08  Mg     2.2     11-08    TPro  7.6  /  Alb  2.8<L>  /  TBili  0.2  /  DBili  x   /  AST  12  /  ALT  14  /  AlkPhos  72  11-08    LFTs LIVER FUNCTIONS - ( 08 Nov 2023 20:08 )  Alb: 2.8 g/dL / Pro: 7.6 g/dL / ALK PHOS: 72 U/L / ALT: 14 U/L / AST: 12 U/L / GGT: x         Coagulopathy PT/INR - ( 08 Nov 2023 20:07 )   PT: 11.6 sec;   INR: 1.11 ratio    PTT - ( 08 Nov 2023 20:07 )  PTT:27.1 sec  Lipid Panel 11-03 Chol 214<H> LDL --  Trig 81    U/A Urinalysis Basic - ( 08 Nov 2023 20:08 )  Color: x / Appearance: x / SG: x / pH: x  Gluc: 133 mg/dL / Ketone: x  / Bili: x / Urobili: x   Blood: x / Protein: x / Nitrite: x   Leuk Esterase: x / RBC: x / WBC x   Sq Epi: x / Non Sq Epi: x / Bacteria: x    STUDIES & IMAGING: (EEG, CT, MR, U/S, TTE/AUGUSTINA):

## 2023-11-09 NOTE — PROGRESS NOTE ADULT - SUBJECTIVE AND OBJECTIVE BOX
HOSPITAL COURSE:  Patient KAIDEN HATCH is a 63y woman presenting with myasthenia gravis exacerbation.    Admission Scores  GCS: 15     24 hour Events: Patient evaluated at bedside. Reports improvement in symptoms w/ exception to copious oral secretions. No complaints of pain or dyspnea.     11/4- Patient intubated overnight for worsening respiratory status. Patient received dose 2/5 of IVIG  11/6- No events; poor respiratory effort on SBT  11/7- Apneic on SBT; worsening respiratory effort; last dose of IVIG TODAY  11/8- NIF -12 and  this AM. PT received shiley in right IJ w/ conscious sedation for initiation of PLEX. S/P tx 1/5 w/ PLEX.  11/9- NIF -20 and VC 630this AM.    Allergies    peanuts (Unknown)  No Known Drug Allergies    Intolerances      REVIEW OF SYSTEMS: [ ] Unable to Assess due to neurologic exam   [x] All ROS addressed below are non-contributory, except:  Neuro: [ ] Headache [ ] Back pain [ ] Numbness [ ] Weakness [ ] Ataxia [ ] Dizziness [ ] Aphasia [ ] Dysarthria [ ] Visual disturbance  Resp: [ ] Shortness of breath/dyspnea, [ ] Orthopnea [ ] Cough  CV: [ ] Chest pain [ ] Palpitation [ ] Lightheadedness [ ] Syncope  Renal: [ ] Thirst [ ] Edema  GI: [ ] Nausea [ ] Emesis [ ] Abdominal pain [ ] Constipation [ ] Diarrhea  Hem: [ ] Hematemesis [ ] bright red blood per rectum  ID: [ ] Fever [ ] Chills [ ] Dysuria  ENT: [ ] Rhinorrhea      DEVICES:   [ ] Restraints [ ] ET tube [x] central line - edith in rt IJ [x] arterial line [ ] morales [ ] NGT/OGT [ ] EVD [ ] LD [ ] MYAH/HMV [ ] Trach [ ] PEG [ ] Chest Tube     VITALS:   Vital Signs Last 24 Hrs  T(C): 37 (09 Nov 2023 07:00), Max: 37 (08 Nov 2023 23:00)  T(F): 98.6 (09 Nov 2023 07:00), Max: 98.6 (08 Nov 2023 23:00)  HR: 80 (09 Nov 2023 09:00) (44 - 120)  BP: --  BP(mean): --  RR: 17 (09 Nov 2023 09:00) (16 - 29)  SpO2: 100% (09 Nov 2023 09:00) (99% - 100%)    Parameters below as of 09 Nov 2023 05:45  Patient On (Oxygen Delivery Method): ventilator      CAPILLARY BLOOD GLUCOSE  POCT Blood Glucose.: 131 mg/dL (09 Nov 2023 05:44)  POCT Blood Glucose.: 138 mg/dL (09 Nov 2023 01:07)  POCT Blood Glucose.: 120 mg/dL (08 Nov 2023 16:44)  POCT Blood Glucose.: 177 mg/dL (08 Nov 2023 12:52)      I&O's Summary  08 Nov 2023 07:01  -  09 Nov 2023 07:00  --------------------------------------------------------  IN: 980 mL / OUT: 1300 mL / NET: -320 mL      Respiratory:  Mode: PRVC/AC/CMV (Assist Control/ Continuous Mandatory Ventilation)  RR (machine): 16  TV (machine): 420  FiO2: 40  PEEP: 5  ITime: 1  MAP: 11  PIP: 27      LABS:                        10.3   6.34  )-----------( 225      (08 Nov 2023 20:07 )             31.3     11-08    140  |  108  |  31<H>  ----------------------------<  133<H>  3.6   |  22  |  0.46<L>    Ca    8.7      08 Nov 2023 20:08  Phos  4.0     11-08  Mg     2.2     11-08    TPro  7.6  /  Alb  2.8<L>  /  TBili  0.2  /  DBili  x   /  AST  12  /  ALT  14  /  AlkPhos  72  11-08       MEDICATIONS:  MEDICATIONS  (STANDING):  albuterol/ipratropium for Nebulization 3 milliLiter(s) Nebulizer every 6 hours  chlorhexidine 0.12% Liquid 15 milliLiter(s) Oral Mucosa every 12 hours  chlorhexidine 4% Liquid 1 Application(s) Topical at bedtime  chlorhexidine 4% Liquid 1 Application(s) Topical <User Schedule>  enoxaparin Injectable 40 milliGRAM(s) SubCutaneous every 24 hours  insulin lispro (ADMELOG) corrective regimen sliding scale   SubCutaneous every 6 hours  pantoprazole   Suspension 40 milliGRAM(s) Oral before breakfast  polyethylene glycol 3350 17 Gram(s) Oral every 12 hours  predniSONE   Tablet 60 milliGRAM(s) Oral daily  sodium chloride 3%  Inhalation 4 milliLiter(s) Inhalation every 12 hours    MEDICATIONS  (PRN):  ketorolac   Injectable 30 milliGRAM(s) IV Push every 8 hours PRN Mild Pain (1 - 3)  oxyCODONE    IR 5 milliGRAM(s) Oral every 4 hours PRN Moderate Pain (4 - 6)  oxyCODONE    IR 10 milliGRAM(s) Oral every 4 hours PRN Severe Pain (7 - 10)  senna 2 Tablet(s) Oral at bedtime PRN Constipation  sodium chloride 0.9% lock flush 10 milliLiter(s) IV Push every 1 hour PRN Pre/post blood products, medications, blood draw, and to maintain line patency      IMAGING:  < from: Xray Chest 1 View- PORTABLE-Routine (Xray Chest 1 View- PORTABLE-Routine in AM.) (11.08.23 @ 06:48) >  Clear lungs.    < end of copied text >    EXAMINATION:    PHYSICAL EXAM:    Constitutional: No Acute Distress     Neurological: Awake, alert oriented to person, place and time, Following Commands, PERRL, EOMI, No Gaze Preference, Face Symmetrical, Speech Fluent, No dysmetria, No ataxia, No nystagmus, No diplopia. Left-sided ptosis.    Motor exam:          Upper extremity                         Delt     Bicep     Tricep    HG                                                 R         4/5        5/5        5/5       5/5                                               L          4/5        5/5        5/5       5/5          Lower extremity                        HF         KF        KE       DF         PF                                                  R        5/5        5/5        5/5       5/5         5/5                                               L         5/5        5/5       5/5       5/5          5/5                                                 Neck flexion: 3/5  Neck extension: 5/5    Sensation: [x] intact to light touch  [ ] decreased:     Pulmonary: Clear to Auscultation, coarse breath sounds, bilateral rhonchi, no rales, no wheezes    Cardiovascular: S1, S2, Regular rate and rhythm     Gastrointestinal: Soft, Non-tender, Non-distended     Extremities: No calf tenderness    HOSPITAL COURSE:  Patient KAIDEN HATCH is a 63y woman presenting with myasthenia gravis exacerbation.    Admission Scores  GCS: 15     24 hour Events: Patient evaluated at bedside. Reports improvement in symptoms w/ exception to copious oral secretions. No complaints of pain or dyspnea.     11/4- Patient intubated overnight for worsening respiratory status. Patient received dose 2/5 of IVIG  11/6- No events; poor respiratory effort on SBT  11/7- Apneic on SBT; worsening respiratory effort; last dose of IVIG TODAY  11/8- NIF -12 and  this AM. PT received shiley in right IJ w/ conscious sedation for initiation of PLEX. S/P tx 1/5 w/ PLEX.  11/9- NIF -20 and VC 630this AM.    Allergies    peanuts (Unknown)  No Known Drug Allergies    Intolerances      REVIEW OF SYSTEMS: [ ] Unable to Assess due to neurologic exam   [x] All ROS addressed below are non-contributory, except:  Neuro: [ ] Headache [ ] Back pain [ ] Numbness [ ] Weakness [ ] Ataxia [ ] Dizziness [ ] Aphasia [ ] Dysarthria [ ] Visual disturbance  Resp: [ ] Shortness of breath/dyspnea, [ ] Orthopnea [ ] Cough  CV: [ ] Chest pain [ ] Palpitation [ ] Lightheadedness [ ] Syncope  Renal: [ ] Thirst [ ] Edema  GI: [ ] Nausea [ ] Emesis [ ] Abdominal pain [ ] Constipation [ ] Diarrhea  Hem: [ ] Hematemesis [ ] bright red blood per rectum  ID: [ ] Fever [ ] Chills [ ] Dysuria  ENT: [ ] Rhinorrhea      DEVICES:   [ ] Restraints [ ] ET tube [x] central line - edith in rt IJ [x] arterial line [ ] morales [ ] NGT/OGT [ ] EVD [ ] LD [ ] MYAH/HMV [ ] Trach [ ] PEG [ ] Chest Tube     ICU Vital Signs Last 24 Hrs  T(C): 36.8 (09 Nov 2023 19:00), Max: 37 (09 Nov 2023 07:00)  T(F): 98.3 (09 Nov 2023 19:00), Max: 98.6 (09 Nov 2023 07:00)  HR: 71 (09 Nov 2023 21:18) (44 - 115)  BP: 154/72 (09 Nov 2023 19:00) (122/58 - 154/72)  BP(mean): 96 (09 Nov 2023 19:00) (77 - 97)  ABP: 99/40 (09 Nov 2023 09:00) (99/40 - 152/63)  ABP(mean): 61 (09 Nov 2023 09:00) (61 - 97)  RR: 17 (09 Nov 2023 19:00) (12 - 29)  SpO2: 98% (09 Nov 2023 21:18) (98% - 100%)    O2 Parameters below as of 09 Nov 2023 21:18  Patient On (Oxygen Delivery Method): ventilator      I&O's Summary    08 Nov 2023 07:01  -  09 Nov 2023 07:00  --------------------------------------------------------  IN: 980 mL / OUT: 1300 mL / NET: -320 mL    09 Nov 2023 07:01  -  09 Nov 2023 23:57  --------------------------------------------------------  IN: 995 mL / OUT: 500 mL / NET: 495 mL      Respiratory:  Mode: PRVC/AC/CMV (Assist Control/ Continuous Mandatory Ventilation)  RR (machine): 16  TV (machine): 420  FiO2: 40  PEEP: 5  ITime: 1  MAP: 11  PIP: 27      LABS:                                      10.3   6.34  )-----------( 225      ( 08 Nov 2023 20:07 )             31.3   11-08    140  |  108  |  31<H>  ----------------------------<  133<H>  3.6   |  22  |  0.46<L>    Ca    8.7      08 Nov 2023 20:08  Phos  4.0     11-08  Mg     2.2     11-08    TPro  7.6  /  Alb  2.8<L>  /  TBili  0.2  /  DBili  x   /  AST  12  /  ALT  14  /  AlkPhos  72  11-08    MEDICATIONS  (STANDING):  albuterol/ipratropium for Nebulization 3 milliLiter(s) Nebulizer every 6 hours  chlorhexidine 0.12% Liquid 15 milliLiter(s) Oral Mucosa every 12 hours  chlorhexidine 4% Liquid 1 Application(s) Topical <User Schedule>  chlorhexidine 4% Liquid 1 Application(s) Topical at bedtime  enoxaparin Injectable 40 milliGRAM(s) SubCutaneous every 24 hours  insulin lispro (ADMELOG) corrective regimen sliding scale   SubCutaneous every 6 hours  pantoprazole   Suspension 40 milliGRAM(s) Oral before breakfast  polyethylene glycol 3350 17 Gram(s) Oral every 12 hours  predniSONE   Tablet 60 milliGRAM(s) Oral daily    MEDICATIONS  (PRN):  ketorolac   Injectable 30 milliGRAM(s) IV Push every 8 hours PRN Mild Pain (1 - 3)  oxyCODONE    IR 5 milliGRAM(s) Oral every 4 hours PRN Moderate Pain (4 - 6)  oxyCODONE    IR 10 milliGRAM(s) Oral every 4 hours PRN Severe Pain (7 - 10)  senna 2 Tablet(s) Oral at bedtime PRN Constipation  sodium chloride 0.9% lock flush 10 milliLiter(s) IV Push every 1 hour PRN Pre/post blood products, medications, blood draw, and to maintain line patency      IMAGING:  < from: Xray Chest 1 View- PORTABLE-Routine (Xray Chest 1 View- PORTABLE-Routine in AM.) (11.08.23 @ 06:48) >  Clear lungs.    < end of copied text >    EXAMINATION:    PHYSICAL EXAM:    Constitutional: No Acute Distress     Neurological: Intubated, AOx3 w/ choices, EOMI, neck flexion 3/5, neck extension 5/5, L eye ptosis,  BL Bi/tri 4+5/ HG 5/5, BL LE 4+/5     Sensation: [x] intact to light touch  [ ] decreased:     Pulmonary: Clear to Auscultation, coarse breath sounds, bilateral rhonchi, no rales, no wheezes    Cardiovascular: S1, S2, Regular rate and rhythm     Gastrointestinal: Soft, Non-tender, Non-distended     Extremities: No calf tenderness

## 2023-11-09 NOTE — PROGRESS NOTE ADULT - SUBJECTIVE AND OBJECTIVE BOX
HOSPITAL COURSE:  Patient KAIDEN HATCH is a 63y woman presenting with myasthenia gravis exacerbation.    Admission Scores  GCS: 15     24 hour Events: Patient evaluated at bedside. Reports improvement in symptoms w/ exception to copious oral secretions. No complaints of pain or dyspnea.     11/4- Patient intubated overnight for worsening respiratory status. Patient received dose 2/5 of IVIG  11/6- No events; poor respiratory effort on SBT  11/7- Apneic on SBT; worsening respiratory effort; last dose of IVIG TODAY  11/8- NIF -12 and  this AM. PT received shiley in right IJ w/ conscious sedation for initiation of PLEX. S/P tx 1/5 w/ PLEX.  11/9- NIF -20 and VC 630this AM.    Allergies    peanuts (Unknown)  No Known Drug Allergies    Intolerances      REVIEW OF SYSTEMS: [ ] Unable to Assess due to neurologic exam   [x] All ROS addressed below are non-contributory, except:  Neuro: [ ] Headache [ ] Back pain [ ] Numbness [ ] Weakness [ ] Ataxia [ ] Dizziness [ ] Aphasia [ ] Dysarthria [ ] Visual disturbance  Resp: [ ] Shortness of breath/dyspnea, [ ] Orthopnea [ ] Cough  CV: [ ] Chest pain [ ] Palpitation [ ] Lightheadedness [ ] Syncope  Renal: [ ] Thirst [ ] Edema  GI: [ ] Nausea [ ] Emesis [ ] Abdominal pain [ ] Constipation [ ] Diarrhea  Hem: [ ] Hematemesis [ ] bright red blood per rectum  ID: [ ] Fever [ ] Chills [ ] Dysuria  ENT: [ ] Rhinorrhea      DEVICES:   [ ] Restraints [ ] ET tube [x] central line - edith in rt IJ [x] arterial line [ ] morales [ ] NGT/OGT [ ] EVD [ ] LD [ ] MYAH/HMV [ ] Trach [ ] PEG [ ] Chest Tube     VITALS:   Vital Signs Last 24 Hrs  T(C): 37 (09 Nov 2023 07:00), Max: 37 (08 Nov 2023 23:00)  T(F): 98.6 (09 Nov 2023 07:00), Max: 98.6 (08 Nov 2023 23:00)  HR: 80 (09 Nov 2023 09:00) (44 - 120)  BP: --  BP(mean): --  RR: 17 (09 Nov 2023 09:00) (16 - 29)  SpO2: 100% (09 Nov 2023 09:00) (99% - 100%)    Parameters below as of 09 Nov 2023 05:45  Patient On (Oxygen Delivery Method): ventilator      CAPILLARY BLOOD GLUCOSE  POCT Blood Glucose.: 131 mg/dL (09 Nov 2023 05:44)  POCT Blood Glucose.: 138 mg/dL (09 Nov 2023 01:07)  POCT Blood Glucose.: 120 mg/dL (08 Nov 2023 16:44)  POCT Blood Glucose.: 177 mg/dL (08 Nov 2023 12:52)      I&O's Summary  08 Nov 2023 07:01  -  09 Nov 2023 07:00  --------------------------------------------------------  IN: 980 mL / OUT: 1300 mL / NET: -320 mL      Respiratory:  Mode: PRVC/AC/CMV (Assist Control/ Continuous Mandatory Ventilation)  RR (machine): 16  TV (machine): 420  FiO2: 40  PEEP: 5  ITime: 1  MAP: 11  PIP: 27      LABS:                        10.3   6.34  )-----------( 225      (08 Nov 2023 20:07 )             31.3     11-08    140  |  108  |  31<H>  ----------------------------<  133<H>  3.6   |  22  |  0.46<L>    Ca    8.7      08 Nov 2023 20:08  Phos  4.0     11-08  Mg     2.2     11-08    TPro  7.6  /  Alb  2.8<L>  /  TBili  0.2  /  DBili  x   /  AST  12  /  ALT  14  /  AlkPhos  72  11-08       MEDICATIONS:  MEDICATIONS  (STANDING):  albuterol/ipratropium for Nebulization 3 milliLiter(s) Nebulizer every 6 hours  chlorhexidine 0.12% Liquid 15 milliLiter(s) Oral Mucosa every 12 hours  chlorhexidine 4% Liquid 1 Application(s) Topical at bedtime  chlorhexidine 4% Liquid 1 Application(s) Topical <User Schedule>  enoxaparin Injectable 40 milliGRAM(s) SubCutaneous every 24 hours  insulin lispro (ADMELOG) corrective regimen sliding scale   SubCutaneous every 6 hours  pantoprazole   Suspension 40 milliGRAM(s) Oral before breakfast  polyethylene glycol 3350 17 Gram(s) Oral every 12 hours  predniSONE   Tablet 60 milliGRAM(s) Oral daily  sodium chloride 3%  Inhalation 4 milliLiter(s) Inhalation every 12 hours    MEDICATIONS  (PRN):  ketorolac   Injectable 30 milliGRAM(s) IV Push every 8 hours PRN Mild Pain (1 - 3)  oxyCODONE    IR 5 milliGRAM(s) Oral every 4 hours PRN Moderate Pain (4 - 6)  oxyCODONE    IR 10 milliGRAM(s) Oral every 4 hours PRN Severe Pain (7 - 10)  senna 2 Tablet(s) Oral at bedtime PRN Constipation  sodium chloride 0.9% lock flush 10 milliLiter(s) IV Push every 1 hour PRN Pre/post blood products, medications, blood draw, and to maintain line patency      IMAGING:  < from: Xray Chest 1 View- PORTABLE-Routine (Xray Chest 1 View- PORTABLE-Routine in AM.) (11.08.23 @ 06:48) >  Clear lungs.    < end of copied text >    EXAMINATION:    PHYSICAL EXAM:    Constitutional: No Acute Distress     Neurological: Awake, alert oriented to person, place and time, Following Commands, PERRL, EOMI, No Gaze Preference, Face Symmetrical, Speech Fluent, No dysmetria, No ataxia, No nystagmus, No diplopia. Left-sided ptosis.    Motor exam:          Upper extremity                         Delt     Bicep     Tricep    HG                                                 R         4/5        5/5        5/5       5/5                                               L          4/5        5/5        5/5       5/5          Lower extremity                        HF         KF        KE       DF         PF                                                  R        5/5        5/5        5/5       5/5         5/5                                               L         5/5        5/5       5/5       5/5          5/5                                                 Neck flexion: 3/5  Neck extension: 5/5    Sensation: [x] intact to light touch  [ ] decreased:     Pulmonary: Clear to Auscultation, coarse breath sounds, bilateral rhonchi, no rales, no wheezes    Cardiovascular: S1, S2, Regular rate and rhythm     Gastrointestinal: Soft, Non-tender, Non-distended     Extremities: No calf tenderness

## 2023-11-09 NOTE — PROGRESS NOTE ADULT - ASSESSMENT
ASSESSMENT/PLAN: 64 y/o female with myasthenia gravis, respiratory failure. Demonstrating worsening respiratory effort on SBT, NIF and FVC below baseline required for extubation to BiPAP despite completion of IVIG. Plan is to d/w PT, family risks & benefits of PLEX - patient consented to insertion of shiley, initiation of PLEX - S/P tx w/ PLEX 1/5. Care initiated w/ [facility] neuromuscular neurology Dr Covarrubias (will see pt today). PT shows signs of improved respiratory effort.    NEURO:  Neurochecks q4h  PLEX tx 2/5 tomorrow  Daily CiPAP as tolerated  Neuromuscular Neurology to see today to establish care  Hold Mestinon, patient with copious secretions, will consider restarting prior to extubation, currently held in setting of copious secretions   Continue Prednisone to 60 mg QD  Avoid medications that may worsen the current exacerbation including macrolides, fluoroquinolones, tetracyclines, aminoglycoside, beta-blockers, magnesium, and anti-arrhythmics (procainamide, quinidine, and lidocaine)  Neurology following, appreciate their recommendations  Activity: [x] mobilize as tolerated [] Bedrest [x] PT [x] OT [] PMNR    PULM:  Intubated for airway protection  CPAP as tolerated; daily SBTs  CXR reviewed  Duonebs q6h for secretions  Check NIF/VC Q4 hours in ICU setting, NIF -20/  this AM showing improvement post-PLEX  Aggressive Chest PT    CV:  MAP>65  TTE - EF 54%, elevated right atrial pressure   Off pressors    RENAL:  Stable renal function  IVL  daily IOs    GI:  Diet: ON TF  GI prophylaxis: PPI while on steroids + intubated  Bowel regimen: miralax, senna; dulcolax suppositories PRN  LBM: 11/8 - recommend PRN suppository    ENDO:   FS goal 120-180  ISS and adjust insulin as needed  A1C 6.7    HEME/ONC:  Monitor H/H  SCDs  Chemoppx: SQL    ID:  No fevers, no leukocytosis  Thick mucous inline secretions, will continue to monitor   No signs of infection     Disposition: ICU ASSESSMENT/PLAN: 64 y/o female with myasthenia gravis, respiratory failure. Demonstrating worsening respiratory effort on SBT, NIF and FVC below baseline required for extubation to BiPAP despite completion of IVIG. Plan is to d/w PT, family risks & benefits of PLEX - patient consented to insertion of shiley, initiation of PLEX - S/P tx w/ PLEX 1/5. Care initiated w/ [facility] neuromuscular neurology Dr Covarrubias (will see pt today). PT shows signs of improved respiratory effort.    NEURO:  Neurochecks q4h  PLEX tx 2/5 tomorrow  Daily CiPAP as tolerated  Neuromuscular Neurology to see today to establish care  Hold Mestinon, patient with copious secretions, will consider restarting prior to extubation, currently held in setting of copious secretions   Continue Prednisone to 60 mg QD  Avoid medications that may worsen the current exacerbation including macrolides, fluoroquinolones, tetracyclines, aminoglycoside, beta-blockers, magnesium, and anti-arrhythmics (procainamide, quinidine, and lidocaine)  Neurology following, appreciate their recommendations  Activity: [x] mobilize as tolerated [] Bedrest [x] PT [x] OT [] PMNR    PULM:  Intubated for airway protection  CPAP as tolerated; daily SBTs  CXR reviewed  Duonebs q6h for secretions  Check NIF/VC Q4 hours in ICU setting, NIF -20/  this AM showing improvement post-PLEX  Aggressive Chest PT    CV:  MAP>65  TTE - EF 54%, elevated right atrial pressure   Off pressors    RENAL:  Stable renal function  IVL  daily IOs    GI:  Diet: ON TF  GI prophylaxis: PPI while on steroids + intubated  Bowel regimen: miralax, senna; dulcolax suppositories PRN  LBM: 11/8 - recommend PRN suppository    ENDO:   FS goal 120-180  ISS and adjust insulin as needed  A1C 6.7    HEME/ONC:  Monitor H/H  SCDs  Chemoppx: SQL    ID:  No fevers, no leukocytosis  Thick mucous inline secretions, will continue to monitor   No signs of infection     Disposition: ICU ASSESSMENT/PLAN: 64 y/o female with myasthenia gravis, respiratory failure. Demonstrating worsening respiratory effort on SBT, NIF and FVC below baseline required for extubation to BiPAP despite completion of IVIG. Plan is to d/w PT, family risks & benefits of PLEX - patient consented to insertion of shiley, initiation of PLEX - S/P tx w/ PLEX 1/5. Care initiated w/ [facility] neuromuscular neurology Dr Covarrubias (will see pt today). PT shows signs of improved respiratory effort.    NEURO:  Neurochecks q4h  PLEX tx 2/5 tomorrow  Daily CiPAP as tolerated  Neuromuscular Neurology to see today to establish care  Hold Mestinon, patient with copious secretions, will consider restarting prior to extubation, currently held in setting of copious secretions   Continue Prednisone to 60 mg QD  Avoid medications that may worsen the current exacerbation including macrolides, fluoroquinolones, tetracyclines, aminoglycoside, beta-blockers, magnesium, and anti-arrhythmics (procainamide, quinidine, and lidocaine)  Neurology following, appreciate their recommendations  Activity: [x] mobilize as tolerated [] Bedrest [x] PT [x] OT [] PMNR    PULM:  Intubated for airway protection  CPAP as tolerated; daily SBTs  CXR reviewed  Duonebs q6h for secretions  Check NIF/VC Q4 hours in ICU setting, NIF -20/  this AM showing improvement post-PLEX  Aggressive Chest PT    CV:  MAP>65  TTE - EF 54%, elevated right atrial pressure   Off pressors    RENAL:  Stable renal function  IVL  daily IOs    GI:  Diet: ON TF  GI prophylaxis: PPI while on steroids + intubated  Bowel regimen: miralax, senna; dulcolax suppositories PRN  LBM: 11/8 - recommend PRN suppository    ENDO:   FS goal 120-180  ISS and adjust insulin as needed  A1C 6.7    HEME/ONC:  Monitor H/H  SCDs  Chemoppx: SQL    ID:  No fevers, no leukocytosis  Thick mucous inline secretions, will continue to monitor   No signs of infection     Disposition: ICU ASSESSMENT/PLAN: 62 y/o female with myasthenia gravis, respiratory failure. Demonstrating worsening respiratory effort on SBT, NIF and FVC below baseline required for extubation to BiPAP despite completion of IVIG. Plan is to d/w PT, family risks & benefits of PLEX - patient consented to insertion of shiley, initiation of PLEX - S/P tx w/ PLEX 1/5. Care initiated w/ [facility] neuromuscular neurology Dr Covarrubias (will see pt today). PT shows signs of improved respiratory effort.    NEURO:  Neurochecks q4h  PLEX tx 2/5 tomorrow  Daily CiPAP as tolerated  Recommend early ambulation w/ PT/OT  Neuromuscular Neurology to see today to establish care  Hold Mestinon, patient with copious secretions, will consider restarting prior to extubation, currently held in setting of copious secretions   Continue Prednisone to 60 mg QD  Avoid medications that may worsen the current exacerbation including macrolides, fluoroquinolones, tetracyclines, aminoglycoside, beta-blockers, magnesium, and anti-arrhythmics (procainamide, quinidine, and lidocaine)  Neurology following, appreciate their recommendations  Activity: [x] mobilize as tolerated [] Bedrest [x] PT [x] OT [] PMNR    PULM:  Intubated for airway protection  CPAP as tolerated; daily SBTs  CXR reviewed  Duonebs q6h for secretions  Check NIF/VC Q4 hours in ICU setting, NIF -20/  this AM showing improvement post-PLEX  Aggressive Chest PT    CV:  MAP>65  TTE - EF 54%, elevated right atrial pressure   Off pressors    RENAL:  Stable renal function  IVL  daily IOs    GI:  Diet: ON TF  GI prophylaxis: PPI while on steroids + intubated  Bowel regimen: miralax, senna; dulcolax suppositories PRN  LBM: 11/8 - recommend PRN suppository    ENDO:   FS goal 120-180  ISS and adjust insulin as needed  A1C 6.7    HEME/ONC:  Monitor H/H  SCDs  Chemoppx: SQL    ID:  No fevers, no leukocytosis  Thick mucous inline secretions, will continue to monitor   No signs of infection     Disposition: ICU ASSESSMENT/PLAN: 64 y/o female with myasthenia gravis, respiratory failure. Demonstrating worsening respiratory effort on SBT, NIF and FVC below baseline required for extubation to BiPAP despite completion of IVIG. Plan is to d/w PT, family risks & benefits of PLEX - patient consented to insertion of shiley, initiation of PLEX - S/P tx w/ PLEX 1/5. Care initiated w/ [facility] neuromuscular neurology Dr Covarrubias (will see pt today). PT shows signs of improved respiratory effort.    NEURO:  Neurochecks q4h  PLEX tx 2/5 tomorrow  Daily CiPAP as tolerated  Recommend early ambulation w/ PT/OT  Neuromuscular Neurology to see today to establish care  Hold Mestinon, patient with copious secretions, will consider restarting prior to extubation, currently held in setting of copious secretions   Continue Prednisone to 60 mg QD  Avoid medications that may worsen the current exacerbation including macrolides, fluoroquinolones, tetracyclines, aminoglycoside, beta-blockers, magnesium, and anti-arrhythmics (procainamide, quinidine, and lidocaine)  Neurology following, appreciate their recommendations  Activity: [x] mobilize as tolerated [] Bedrest [x] PT [x] OT [] PMNR    PULM:  Intubated for airway protection  CPAP as tolerated; daily SBTs  CXR reviewed  Duonebs q6h for secretions  Check NIF/VC Q4 hours in ICU setting, NIF -20/  this AM showing improvement post-PLEX  Aggressive Chest PT    CV:  MAP>65  TTE - EF 54%, elevated right atrial pressure   Off pressors    RENAL:  Stable renal function  IVL  daily IOs    GI:  Diet: ON TF  GI prophylaxis: PPI while on steroids + intubated  Bowel regimen: miralax, senna; dulcolax suppositories PRN  LBM: 11/8 - recommend PRN suppository    ENDO:   FS goal 120-180  ISS and adjust insulin as needed  A1C 6.7    HEME/ONC:  Monitor H/H  SCDs  Chemoppx: SQL    ID:  No fevers, no leukocytosis  Thick mucous inline secretions, will continue to monitor   No signs of infection     Disposition: ICU ASSESSMENT/PLAN: 64 y/o female with myasthenia gravis, respiratory failure. Demonstrating worsening respiratory effort on SBT, NIF and FVC below baseline required for extubation to BiPAP despite completion of IVIG. Plan is to d/w PT, family risks & benefits of PLEX - patient consented to insertion of shiley, initiation of PLEX - S/P tx w/ PLEX 1/5. Care initiated w/ [facility] neuromuscular neurology Dr Covarrubias (will see pt today). PT shows signs of improved respiratory effort.    NEURO:  Neurochecks q4h  PLEX tx 2/5 tomorrow  Daily CiPAP as tolerated  Recommend early ambulation w/ PT/OT  Neuromuscular Neurology to see today to establish care  Hold Mestinon, patient with copious secretions, will consider restarting prior to extubation, currently held in setting of copious secretions   Continue Prednisone to 60 mg QD  Avoid medications that may worsen the current exacerbation including macrolides, fluoroquinolones, tetracyclines, aminoglycoside, beta-blockers, magnesium, and anti-arrhythmics (procainamide, quinidine, and lidocaine)  Neurology following, appreciate their recommendations  Activity: [x] mobilize as tolerated [] Bedrest [x] PT [x] OT [] PMNR    PULM:  Intubated for airway protection  CPAP as tolerated; daily SBTs  CXR reviewed  Duonebs q6h for secretions  Check NIF/VC Q4 hours in ICU setting, NIF -20/  this AM showing improvement post-PLEX  Aggressive Chest PT    CV:  MAP>65  TTE - EF 54%, elevated right atrial pressure   Off pressors    RENAL:  Stable renal function  IVL  daily IOs    GI:  Diet: ON TF  GI prophylaxis: PPI while on steroids + intubated  Bowel regimen: miralax, senna; dulcolax suppositories PRN  LBM: 11/8 - recommend PRN suppository    ENDO:   FS goal 120-180  ISS and adjust insulin as needed  A1C 6.7    HEME/ONC:  Monitor H/H  SCDs  Chemoppx: SQL    ID:  No fevers, no leukocytosis  Thick mucous inline secretions, will continue to monitor   No signs of infection     Disposition: ICU

## 2023-11-10 LAB
HCT VFR BLD CALC: 33.4 % — LOW (ref 34.5–45)
HGB BLD-MCNC: 11 G/DL — LOW (ref 11.5–15.5)
MCHC RBC-ENTMCNC: 28.5 PG — SIGNIFICANT CHANGE UP (ref 27–34)
MCHC RBC-ENTMCNC: 32.9 GM/DL — SIGNIFICANT CHANGE UP (ref 32–36)
MCV RBC AUTO: 86.5 FL — SIGNIFICANT CHANGE UP (ref 80–100)
NRBC # BLD: 0 /100 WBCS — SIGNIFICANT CHANGE UP (ref 0–0)
PLATELET # BLD AUTO: 233 K/UL — SIGNIFICANT CHANGE UP (ref 150–400)
RBC # BLD: 3.86 M/UL — SIGNIFICANT CHANGE UP (ref 3.8–5.2)
RBC # FLD: 14.5 % — SIGNIFICANT CHANGE UP (ref 10.3–14.5)
WBC # BLD: 12.73 K/UL — HIGH (ref 3.8–10.5)
WBC # FLD AUTO: 12.73 K/UL — HIGH (ref 3.8–10.5)

## 2023-11-10 PROCEDURE — 71045 X-RAY EXAM CHEST 1 VIEW: CPT | Mod: 26

## 2023-11-10 PROCEDURE — 99291 CRITICAL CARE FIRST HOUR: CPT

## 2023-11-10 PROCEDURE — 99233 SBSQ HOSP IP/OBS HIGH 50: CPT | Mod: GC

## 2023-11-10 PROCEDURE — 36514 APHERESIS PLASMA: CPT

## 2023-11-10 RX ORDER — INSULIN HUMAN 100 [IU]/ML
3 INJECTION, SOLUTION SUBCUTANEOUS
Qty: 100 | Refills: 0 | Status: DISCONTINUED | OUTPATIENT
Start: 2023-11-10 | End: 2023-11-11

## 2023-11-10 RX ORDER — DEXTROSE 50 % IN WATER 50 %
50 SYRINGE (ML) INTRAVENOUS
Refills: 0 | Status: DISCONTINUED | OUTPATIENT
Start: 2023-11-10 | End: 2023-11-12

## 2023-11-10 RX ORDER — DEXTROSE 50 % IN WATER 50 %
25 SYRINGE (ML) INTRAVENOUS
Refills: 0 | Status: DISCONTINUED | OUTPATIENT
Start: 2023-11-10 | End: 2023-11-12

## 2023-11-10 RX ORDER — ACETAMINOPHEN 500 MG
650 TABLET ORAL EVERY 6 HOURS
Refills: 0 | Status: DISCONTINUED | OUTPATIENT
Start: 2023-11-10 | End: 2023-11-20

## 2023-11-10 RX ORDER — AZATHIOPRINE 100 MG/1
50 TABLET ORAL
Refills: 0 | Status: DISCONTINUED | OUTPATIENT
Start: 2023-11-10 | End: 2023-11-20

## 2023-11-10 RX ORDER — SODIUM CHLORIDE 9 MG/ML
500 INJECTION INTRAMUSCULAR; INTRAVENOUS; SUBCUTANEOUS ONCE
Refills: 0 | Status: COMPLETED | OUTPATIENT
Start: 2023-11-10 | End: 2023-11-10

## 2023-11-10 RX ORDER — LANOLIN ALCOHOL/MO/W.PET/CERES
3 CREAM (GRAM) TOPICAL ONCE
Refills: 0 | Status: COMPLETED | OUTPATIENT
Start: 2023-11-10 | End: 2023-11-11

## 2023-11-10 RX ADMIN — CHLORHEXIDINE GLUCONATE 15 MILLILITER(S): 213 SOLUTION TOPICAL at 17:43

## 2023-11-10 RX ADMIN — POLYETHYLENE GLYCOL 3350 17 GRAM(S): 17 POWDER, FOR SOLUTION ORAL at 17:38

## 2023-11-10 RX ADMIN — Medication 3 MILLILITER(S): at 05:45

## 2023-11-10 RX ADMIN — ENOXAPARIN SODIUM 40 MILLIGRAM(S): 100 INJECTION SUBCUTANEOUS at 22:18

## 2023-11-10 RX ADMIN — OXYCODONE HYDROCHLORIDE 5 MILLIGRAM(S): 5 TABLET ORAL at 22:17

## 2023-11-10 RX ADMIN — AZATHIOPRINE 50 MILLIGRAM(S): 100 TABLET ORAL at 17:39

## 2023-11-10 RX ADMIN — Medication 60 MILLIGRAM(S): at 05:44

## 2023-11-10 RX ADMIN — Medication 3 MILLILITER(S): at 17:19

## 2023-11-10 RX ADMIN — OXYCODONE HYDROCHLORIDE 5 MILLIGRAM(S): 5 TABLET ORAL at 22:47

## 2023-11-10 RX ADMIN — Medication 3 MILLILITER(S): at 21:29

## 2023-11-10 RX ADMIN — PANTOPRAZOLE SODIUM 40 MILLIGRAM(S): 20 TABLET, DELAYED RELEASE ORAL at 05:45

## 2023-11-10 RX ADMIN — CHLORHEXIDINE GLUCONATE 1 APPLICATION(S): 213 SOLUTION TOPICAL at 22:18

## 2023-11-10 RX ADMIN — Medication 4: at 11:43

## 2023-11-10 RX ADMIN — SODIUM CHLORIDE 500 MILLILITER(S): 9 INJECTION INTRAMUSCULAR; INTRAVENOUS; SUBCUTANEOUS at 17:39

## 2023-11-10 RX ADMIN — CHLORHEXIDINE GLUCONATE 1 APPLICATION(S): 213 SOLUTION TOPICAL at 05:43

## 2023-11-10 RX ADMIN — Medication 3 MILLILITER(S): at 11:14

## 2023-11-10 RX ADMIN — SENNA PLUS 2 TABLET(S): 8.6 TABLET ORAL at 22:17

## 2023-11-10 RX ADMIN — Medication 8: at 17:39

## 2023-11-10 RX ADMIN — CHLORHEXIDINE GLUCONATE 15 MILLILITER(S): 213 SOLUTION TOPICAL at 05:44

## 2023-11-10 NOTE — PROGRESS NOTE ADULT - ATTENDING COMMENTS
Patient seen and examined bedside. No new overnight events were reported.     Detailed neuro exam:  General: Patient is comfortably lying on the bed without acute distress and currently orally intubated.  Mental and Psychological Status: The patient is alert and oriented to person, place and exact date (via writing). Follows simple and complex commands.   Cranial Nerve Exam: Bilateral eye ptosis L>R Visual fields are full to confrontation. Pupils are round, equal, and reactive.  V1-V3 are intact to light touch. There is no facial weakness or asymmetry. Hearing is grossly intact. Shoulder shrug is 4/5 bilaterally.   Motor Exam: Bulk, tone, and strength are normal except proximal muscle bilaterally at upper extremities 4/5. There are no resting tremors.  Sensory Examination: Sensation is intact to light touch throughout.  Posture, Station and Gait: Currently orally intubated.    Impression and plan:                                                  Ms. Acosta is 63 year old right handed woman with a PMHx significant for myasthenia gravis s/p thymectomy on Mestinon 12 mg tablets TID and Prednisone 60 mg presenting to the ED for worsening difficulty with double vision, swallowing, talking, and breathing that has progressively gotten worse for the previous one week.  Myasthenic gravis crisis with signs of respiratory failure and subsequently she required mechanical ventilation for air way protection. s/p IVIG 5 days and clinically she made improvement except respiratory effort on SBT, NIF and FVC below baseline required for extubation to BiPAP.  Today second dose of Plasmapheresis is scheduled.     Continue Plasmapheresis for 5 session.   Azathioprine 50 mg BID   Continue Steroids with GI prophylaxis.  Continue medical management, neuro- check and fall precaution.  GI and DVT prophylaxis.  I agree with above exam, assessment and plan.    Patient is at high risk for complications and morbidity or mortality. There is high probability of imminent or life threatening deterioration in the patient's condition.    If you have any further questions, please do not hesitate to contact our team.  Thank you for allowing us to participate in this patient care.

## 2023-11-10 NOTE — PHARMACOTHERAPY INTERVENTION NOTE - COMMENTS
Spoke with Molly medical assistant at Dr. Jordon Perez office and patient last took the Pneumococcal vaccine May 2022 and influenza this year. Confirmed with patient and her  at bedside

## 2023-11-10 NOTE — CHART NOTE - NSCHARTNOTEFT_GEN_A_CORE
Nutrition Follow Up Note  Patient seen for: Nutrition Follow Up     Chart reviewed, events noted. Pt is a 62 yo F with PMH: myasthenia gravis, respiratory failure. PLEX therapy initiated in-house. Intubated. CPAP as tolerated.     Source: [] Patient       [x] EMR        [x] RN        [] Family at bedside       [x] Other: interdisciplinary medical team     -If unable to interview patient: [x] Trach/Vent/BiPAP  [] Disoriented/confused/inappropriate to interview    Diet Order:   Diet, NPO with Tube Feed:   Tube Feeding Modality: Nasogastric  Glucerna 1.2 Taye (GLUCERNARTH)  Total Volume for 24 Hours (mL): 1320  Continuous  Starting Tube Feed Rate {mL per Hour}: 20  Increase Tube Feed Rate by (mL): 10     Every 4 hours  Until Goal Tube Feed Rate (mL per Hour): 55  Tube Feed Duration (in Hours): 24  Tube Feed Start Time: 15:00  Supplement Feeding Modality:  Nasogastric  Probiotic Yogurt/Smoothie Cans or Servings Per Day:  2       Frequency:  Daily (11-06-23)    EN Order Provides: 1320ml, 1584kcal and 79g protein     EN Provision (per nursing flow sheet):   (11/10): feeds infusing at 55ml/hr  (11/9): 880ml (67% of goal)  (11/8): 830ml (63% of goal; s/p TPE session #1)  (11/7): 580ml (44% of goal; titrating up towards goal rate)  (11/6): 950ml (72% of goal; feeds changed to Glucerna 1.2 at 55ml/hr)    Nutrition-related concerns:  -Last BM 11/8 x 1. On bowel regimen (senna, Miralax).   -Prescribed Prednisone (steroid); at risk for elevated FSBG in setting of steroid use, A1c 6.7%. Continues on insulin lispro sliding scale to aid in management of BG.   -Receiving PLEX therapy (1/5 thus far). Plan for 5 sessions.   -Allergy to PEANUTS noted.     Weights:   Daily     MEDICATIONS  (STANDING):  insulin lispro (ADMELOG) corrective regimen sliding scale  pantoprazole   Suspension  polyethylene glycol 3350  predniSONE   Tablet    Pertinent Labs:   A1C with Estimated Average Glucose Result: 6.7 % (11-03-23 @ 20:25)    Finger Sticks:  POCT Blood Glucose.: 127 mg/dL (11-10 @ 05:08)  POCT Blood Glucose.: 118 mg/dL (11-10 @ 00:28)  POCT Blood Glucose.: 154 mg/dL (11-09 @ 17:17)  POCT Blood Glucose.: 207 mg/dL (11-09 @ 11:37)    Triglycerides, Serum: 81 mg/dL (11-03-23 @ 20:24)    Skin per nursing documentation: no documented pressure injuries   Edema: none noted    (based on dosing wt 66.2kg):   Estimated Energy Needs: (23-28kcal/kg): 1533-1854kcal  Estimated Protein Needs: (1.0-1.2g protein/kg): 66-79g protein  Estimated Fluid Needs: defer to team    Previous Nutrition Diagnosis: swallowing difficulty   Nutrition Diagnosis is: [x] ongoing  [] resolved [] not applicable     New Nutrition Diagnosis: [x] Not applicable    Nutrition Care Plan:  [x] In Progress  [] Achieved  [] Not applicable    Nutrition Interventions:     Education Provided:       [] Yes:  [x] No:        Recommendations:      1. Continue Glucerna 1.2 at 55ml/hr x 24 hours to provide a total volume of 1320ml, 1584kcal (24kcal/kg), 79g protein (1.2g/kg) and 1063ml free water. defer free water flush to team.  2. Monitor GI tolerance. RD to remain available to adjust EN formulary, volume/rate PRN.   3. Recommend multivitamin (if no medication contraindications) to aid in prevention of micronutrient deficiencies.     Monitoring and Evaluation:   Continue to monitor nutritional intake, tolerance to diet prescription, weights, labs, skin integrity      RD remains available upon request and will follow up per protocol Nutrition Follow Up Note  Patient seen for: Nutrition Follow Up     Chart reviewed, events noted. Pt is a 64 yo F with PMH: myasthenia gravis, respiratory failure. PLEX therapy initiated in-house. Intubated. CPAP as tolerated.     Source: [] Patient       [x] EMR        [x] RN        [] Family at bedside       [x] Other: interdisciplinary medical team     -If unable to interview patient: [x] Trach/Vent/BiPAP  [] Disoriented/confused/inappropriate to interview    Diet Order:   Diet, NPO with Tube Feed:   Tube Feeding Modality: Nasogastric  Glucerna 1.2 Taye (GLUCERNARTH)  Total Volume for 24 Hours (mL): 1320  Continuous  Starting Tube Feed Rate {mL per Hour}: 20  Increase Tube Feed Rate by (mL): 10     Every 4 hours  Until Goal Tube Feed Rate (mL per Hour): 55  Tube Feed Duration (in Hours): 24  Tube Feed Start Time: 15:00  Supplement Feeding Modality:  Nasogastric  Probiotic Yogurt/Smoothie Cans or Servings Per Day:  2       Frequency:  Daily (11-06-23)    EN Order Provides: 1320ml, 1584kcal and 79g protein     EN Provision (per nursing flow sheet):   (11/10): feeds infusing at 55ml/hr  (11/9): 880ml (67% of goal)  (11/8): 830ml (63% of goal; s/p TPE session #1)  (11/7): 580ml (44% of goal; titrating up towards goal rate)  (11/6): 950ml (72% of goal; feeds changed to Glucerna 1.2 at 55ml/hr)    Nutrition-related concerns:  -Last BM 11/8 x 1. On bowel regimen (senna, Miralax).   -Prescribed Prednisone (steroid); at risk for elevated FSBG in setting of steroid use, A1c 6.7%. Continues on insulin lispro sliding scale to aid in management of BG.   -Receiving PLEX therapy (1/5 thus far). Plan for 5 sessions.   -Allergy to PEANUTS noted.     Weights:   Daily     MEDICATIONS  (STANDING):  insulin lispro (ADMELOG) corrective regimen sliding scale  pantoprazole   Suspension  polyethylene glycol 3350  predniSONE   Tablet    Pertinent Labs:   A1C with Estimated Average Glucose Result: 6.7 % (11-03-23 @ 20:25)    Finger Sticks:  POCT Blood Glucose.: 127 mg/dL (11-10 @ 05:08)  POCT Blood Glucose.: 118 mg/dL (11-10 @ 00:28)  POCT Blood Glucose.: 154 mg/dL (11-09 @ 17:17)  POCT Blood Glucose.: 207 mg/dL (11-09 @ 11:37)    Triglycerides, Serum: 81 mg/dL (11-03-23 @ 20:24)    Skin per nursing documentation: no documented pressure injuries   Edema: none noted    (based on dosing wt 66.2kg):   Estimated Energy Needs: (23-28kcal/kg): 1533-1854kcal  Estimated Protein Needs: (1.0-1.2g protein/kg): 66-79g protein  Estimated Fluid Needs: defer to team    Previous Nutrition Diagnosis: swallowing difficulty   Nutrition Diagnosis is: [x] ongoing  [] resolved [] not applicable     New Nutrition Diagnosis: [x] Not applicable    Nutrition Care Plan:  [x] In Progress  [] Achieved  [] Not applicable    Nutrition Interventions:     Education Provided:       [] Yes:  [x] No:        Recommendations:      1. Continue Glucerna 1.2 at 55ml/hr x 24 hours to provide a total volume of 1320ml, 1584kcal (24kcal/kg), 79g protein (1.2g/kg) and 1063ml free water. defer free water flush to team.  2. Monitor GI tolerance. RD to remain available to adjust EN formulary, volume/rate PRN.   3. Recommend multivitamin (if no medication contraindications) to aid in prevention of micronutrient deficiencies.     Monitoring and Evaluation:   Continue to monitor nutritional intake, tolerance to diet prescription, weights, labs, skin integrity      RD remains available upon request and will follow up per protocol

## 2023-11-10 NOTE — PROGRESS NOTE ADULT - ASSESSMENT
Assessment  63y (1960) woman with a PMHx significant for myasthenia gravis s/p thymectomy presenting to the ED for worsening difficulty with double vision, swallowing, talking, and breathing that has progressively gotten worse for the previous one week. Patient is currently on Mestinon 60 mg two tablets TID and Prednisone 10 mg --> uptitrated to 20 mg, she was not taking it per son for a month or so and was discharged on 50 mg after her hospitalization in 06/2023. Has been admitted before in June 2023, requiring intubation and PLEX/IVIG and in Feb 2023 for MG crisis that required intubation (2/1-2/11/23) iso COVID19. Patient received 5 days of PLEX (2/2-2/10/23) followed by 5 days of IVIG (2/16-2/20/23) with symptomatic improvement. Patient's son says patient has responded best to IVIG and that it stabilizes her MG for about 2 years. At baseline, patient is on 2L NC. In the ED, patient's NIF was -50. Her VC was 430. Patient also endorsed increased mucus secretion in her throat, and reliance on her oxygen tank. Now sp IVIG 5/5 without improvement. 11/8: NIF and VC -12/~300. DIscussed with family and patient. They are now amenable to PLEX. discussed with NSCU team.  11/9 minimal improvement in VC, NIF remains poor 11/10 NIF/VC -10/430    Impression: Myasthenia gravis with acute exacerbation. Now on PLEX.   11/10: Clinically not yet improved    Recommendations:  [] continue with PLEX, (1st session 11/8, 2nd session planned 11/10pm)  [] Hold Mestinon given secretions  [] Trend NIF/VC, Extubate when able per NSCU  [] Continue Prednisone 60mg QD + GI prophylaxis  [] CT chest check for residual thymoma  [] If no contraindications can do meningococcal vaccine prior to potential immunosuppression to be decided by Dr. Covarrubias in the future  []Follow up with neuromuscular specialist after discharge - can follow at [address] [place] [address] [phone] if patient desires. Can schedule appointment with Dr. Fermin Mayer or Dr. Emily Covarrubias of neuromuscular neurology at [address] [place] [address] 1-2 weeks after discharge.    Patient seen with team and attending. Note finalized upon attending attestation. Assessment  63y (1960) woman with a PMHx significant for myasthenia gravis s/p thymectomy presenting to the ED for worsening difficulty with double vision, swallowing, talking, and breathing that has progressively gotten worse for the previous one week. Patient is currently on Mestinon 60 mg two tablets TID and Prednisone 10 mg --> uptitrated to 20 mg, she was not taking it per son for a month or so and was discharged on 50 mg after her hospitalization in 06/2023. Has been admitted before in June 2023, requiring intubation and PLEX/IVIG and in Feb 2023 for MG crisis that required intubation (2/1-2/11/23) iso COVID19. Patient received 5 days of PLEX (2/2-2/10/23) followed by 5 days of IVIG (2/16-2/20/23) with symptomatic improvement. Patient's son says patient has responded best to IVIG and that it stabilizes her MG for about 2 years. At baseline, patient is on 2L NC. In the ED, patient's NIF was -50. Her VC was 430. Patient also endorsed increased mucus secretion in her throat, and reliance on her oxygen tank. Now sp IVIG 5/5 without improvement. 11/8: NIF and VC -12/~300. DIscussed with family and patient. They are now amenable to PLEX. discussed with NSCU team.  11/9 minimal improvement in VC, NIF remains poor 11/10 NIF/VC -10/430    Impression: Myasthenia gravis with acute exacerbation. Now on PLEX.   11/10: Clinically not yet improved    Recommendations:  [] continue with PLEX, (1st session 11/8, 2nd session planned 11/10pm)  [] Hold Mestinon given secretions  [] Trend NIF/VC, Extubate when able per NSCU  [] Continue Prednisone 60mg QD + GI prophylaxis  [] CT chest check for residual thymoma  [] If no contraindications can do meningococcal vaccine prior to potential immunosuppression to be decided by Dr. Covarrubias in the future  []Follow up with neuromuscular specialist after discharge - can follow at [address] [place] [address] [phone] if patient desires. Can schedule appointment with Dr. Fermin Mayer or Dr. Emily Covarrubias of neuromuscular neurology at [address] [place] [address] 1-2 weeks after discharge.    Patient seen with team and attending. Note finalized upon attending attestation. Assessment  63y (1960) woman with a PMHx significant for myasthenia gravis s/p thymectomy presenting to the ED for worsening difficulty with double vision, swallowing, talking, and breathing that has progressively gotten worse for the previous one week. Patient is currently on Mestinon 60 mg two tablets TID and Prednisone 10 mg --> uptitrated to 20 mg, she was not taking it per son for a month or so and was discharged on 50 mg after her hospitalization in 06/2023. Has been admitted before in June 2023, requiring intubation and PLEX/IVIG and in Feb 2023 for MG crisis that required intubation (2/1-2/11/23) iso COVID19. Patient received 5 days of PLEX (2/2-2/10/23) followed by 5 days of IVIG (2/16-2/20/23) with symptomatic improvement. Patient's son says patient has responded best to IVIG and that it stabilizes her MG for about 2 years. At baseline, patient is on 2L NC. In the ED, patient's NIF was -50. Her VC was 430. Patient also endorsed increased mucus secretion in her throat, and reliance on her oxygen tank. Now sp IVIG 5/5 without improvement. 11/8: NIF and VC -12/~300. DIscussed with family and patient. They are now amenable to PLEX. discussed with NSCU team.  11/9 minimal improvement in VC, NIF remains poor 11/10 NIF/VC -10/430    Impression: Myasthenia gravis with acute exacerbation. Now on PLEX.   11/10: Clinically not yet improved    Recommendations:  [] continue with PLEX, (1st session 11/8, 2nd session planned 11/10pm)  [] Hold Mestinon given secretions  [] Trend NIF/VC, Extubate when able per NSCU  [] Continue Prednisone 60mg QD + GI prophylaxis  [] CT chest check for residual thymoma  [] If no contraindications can do meningococcal vaccine prior to potential immunosuppression to be decided by Dr. Covarrubias in the future  []Follow up with neuromuscular specialist after discharge - can follow at [address] [place] [address] [phone] if patient desires. Can schedule appointment with Dr. Fermin Mayer or Dr. Emily Covarrubias of neuromuscular neurology at [address] [place] [address] 1-2 weeks after discharge.    Patient seen with team and attending. Note finalized upon attending attestation.

## 2023-11-10 NOTE — PROGRESS NOTE ADULT - SUBJECTIVE AND OBJECTIVE BOX
HOSPITAL COURSE:  Patient KAIDEN HATCH is a 63y woman presenting with myasthenia gravis exacerbation.    Admission Scores  GCS: 15     24 hour Events: Patient evaluated at bedside. Reports improvement in symptoms w/ exception to copious oral secretions. No complaints of pain or dyspnea.     11/4- Patient intubated overnight for worsening respiratory status. Patient received dose 2/5 of IVIG  11/6- No events; poor respiratory effort on SBT  11/7- Apneic on SBT; worsening respiratory effort; last dose of IVIG TODAY  11/8- NIF -12 and  this AM. PT received shiley in right IJ w/ conscious sedation for initiation of PLEX. S/P tx 1/5 w/ PLEX.  11/9- NIF -20 and VC 630this AM.  11/10- Patient due for second dose of PLEX. No events overnight.     Allergies    peanuts (Unknown)  No Known Drug Allergies    Intolerances        REVIEW OF SYSTEMS: [ ] Unable to Assess due to neurologic exam   [ x] All ROS addressed below are non-contributory, except:  Neuro: [ ] Headache [ ] Back pain [ ] Numbness [ ] Weakness [ ] Ataxia [ ] Dizziness [ ] Aphasia [ ] Dysarthria [ ] Visual disturbance  Resp: [ ] Shortness of breath/dyspnea, [ ] Orthopnea [ ] Cough  CV: [ ] Chest pain [ ] Palpitation [ ] Lightheadedness [ ] Syncope  Renal: [ ] Thirst [ ] Edema  GI: [ ] Nausea [ ] Emesis [ ] Abdominal pain [ ] Constipation [ ] Diarrhea  Hem: [ ] Hematemesis [ ] bright red blood per rectum  ID: [ ] Fever [ ] Chills [ ] Dysuria  ENT: [ ] Rhinorrhea      DEVICES:   [ ] Restraints [ ] ET tube [ ] central line [ ] arterial line [ ] morales [ ] NGT/OGT [ ] EVD [ ] LD [ ] MYAH/HMV [ ] Trach [ ] PEG [ ] Chest Tube     VITALS:   Vital Signs Last 24 Hrs  T(C): 37 (10 Nov 2023 03:00), Max: 37 (10 Nov 2023 03:00)  T(F): 98.6 (10 Nov 2023 03:00), Max: 98.6 (10 Nov 2023 03:00)  HR: 101 (10 Nov 2023 06:28) (52 - 118)  BP: 127/61 (10 Nov 2023 05:00) (116/68 - 154/72)  BP(mean): 82 (10 Nov 2023 05:00) (75 - 97)  RR: 18 (10 Nov 2023 05:00) (12 - 18)  SpO2: 99% (10 Nov 2023 06:28) (94% - 100%)    Parameters below as of 10 Nov 2023 05:15  Patient On (Oxygen Delivery Method): ventilator      CAPILLARY BLOOD GLUCOSE      POCT Blood Glucose.: 127 mg/dL (10 Nov 2023 05:08)  POCT Blood Glucose.: 118 mg/dL (10 Nov 2023 00:28)  POCT Blood Glucose.: 154 mg/dL (09 Nov 2023 17:17)  POCT Blood Glucose.: 207 mg/dL (09 Nov 2023 11:37)    I&O's Summary    09 Nov 2023 07:01  -  10 Nov 2023 07:00  --------------------------------------------------------  IN: 1650 mL / OUT: 850 mL / NET: 800 mL        Respiratory:  Mode: AC/ CMV (Assist Control/ Continuous Mandatory Ventilation)  RR (machine): 16  TV (machine): 420  FiO2: 30  PEEP: 5  ITime: 1  MAP: 10  PIP: 22        LABS:                        10.3   6.34  )-----------( 225      ( 08 Nov 2023 20:07 )             31.3     11-08    140  |  108  |  31<H>  ----------------------------<  133<H>  3.6   |  22  |  0.46<L>             MEDICATION LEVELS:     IVF FLUIDS/MEDICATIONS:   MEDICATIONS  (STANDING):  albuterol/ipratropium for Nebulization 3 milliLiter(s) Nebulizer every 6 hours  chlorhexidine 0.12% Liquid 15 milliLiter(s) Oral Mucosa every 12 hours  chlorhexidine 4% Liquid 1 Application(s) Topical at bedtime  chlorhexidine 4% Liquid 1 Application(s) Topical <User Schedule>  enoxaparin Injectable 40 milliGRAM(s) SubCutaneous every 24 hours  insulin lispro (ADMELOG) corrective regimen sliding scale   SubCutaneous every 6 hours  pantoprazole   Suspension 40 milliGRAM(s) Oral before breakfast  polyethylene glycol 3350 17 Gram(s) Oral every 12 hours  predniSONE   Tablet 60 milliGRAM(s) Oral daily    MEDICATIONS  (PRN):  ketorolac   Injectable 30 milliGRAM(s) IV Push every 8 hours PRN Mild Pain (1 - 3)  oxyCODONE    IR 5 milliGRAM(s) Oral every 4 hours PRN Moderate Pain (4 - 6)  oxyCODONE    IR 10 milliGRAM(s) Oral every 4 hours PRN Severe Pain (7 - 10)  senna 2 Tablet(s) Oral at bedtime PRN Constipation  sodium chloride 0.9% lock flush 10 milliLiter(s) IV Push every 1 hour PRN Pre/post blood products, medications, blood draw, and to maintain line patency        IMAGING:      EXAMINATION:  PHYSICAL EXAM:    Constitutional: No Acute Distress     Neurological: Intubated, AOx3 w/ choices, EOMI, neck flexion 3/5, neck extension 5/5, L eye ptosis,  BL Bi/tri 4+5/ HG 5/5, BL LE 5/5     Sensation: [x] intact to light touch  [ ] decreased:     Pulmonary: Clear to Auscultation, coarse breath sounds, bilateral rhonchi, no rales, no wheezes    Cardiovascular: S1, S2, Regular rate and rhythm     Gastrointestinal: Soft, Non-tender, Non-distended     Extremities: No calf tenderness    HOSPITAL COURSE:  Patient KAIDEN HATCH is a 63y woman presenting with myasthenia gravis exacerbation.    Admission Scores  GCS: 15     24 hour Events: Patient evaluated at bedside. Reports improvement in symptoms w/ exception to copious oral secretions. No complaints of pain or dyspnea.     11/4- Patient intubated overnight for worsening respiratory status. Patient received dose 2/5 of IVIG  11/6- No events; poor respiratory effort on SBT  11/7- Apneic on SBT; worsening respiratory effort; last dose of IVIG TODAY  11/8- NIF -12 and  this AM. PT received shiley in right IJ w/ conscious sedation for initiation of PLEX. S/P tx 1/5 w/ PLEX.  11/9- NIF -20 and VC 630this AM.  11/10- Patient due for second dose of PLEX. No events overnight.     Allergies    peanuts (Unknown)  No Known Drug Allergies    Intolerances        REVIEW OF SYSTEMS: [ ] Unable to Assess due to neurologic exam   [ x] All ROS addressed below are non-contributory, except:  Neuro: [ ] Headache [ ] Back pain [ ] Numbness [ ] Weakness [ ] Ataxia [ ] Dizziness [ ] Aphasia [ ] Dysarthria [ ] Visual disturbance  Resp: [ ] Shortness of breath/dyspnea, [ ] Orthopnea [ ] Cough  CV: [ ] Chest pain [ ] Palpitation [ ] Lightheadedness [ ] Syncope  Renal: [ ] Thirst [ ] Edema  GI: [ ] Nausea [ ] Emesis [ ] Abdominal pain [ ] Constipation [ ] Diarrhea  Hem: [ ] Hematemesis [ ] bright red blood per rectum  ID: [ ] Fever [ ] Chills [ ] Dysuria  ENT: [ ] Rhinorrhea      DEVICES:   [ ] Restraints [ ] ET tube [ ] central line [ ] arterial line [ ] morales [ ] NGT/OGT [ ] EVD [ ] LD [ ] MYAH/HMV [ ] Trach [ ] PEG [ ] Chest Tube     Vital Signs Last 24 Hrs  T(C): 36.8 (10 Nov 2023 23:00), Max: 37.8 (10 Nov 2023 15:00)  T(F): 98.2 (10 Nov 2023 23:00), Max: 100 (10 Nov 2023 15:00)  HR: 77 (11 Nov 2023 01:00) (48 - 119)  BP: 107/56 (11 Nov 2023 01:00) (90/52 - 143/69)  BP(mean): 72 (11 Nov 2023 01:00) (62 - 91)  RR: 16 (11 Nov 2023 01:00) (16 - 18)  SpO2: 99% (11 Nov 2023 01:00) (97% - 100%)    Parameters below as of 10 Nov 2023 21:31  Patient On (Oxygen Delivery Method): ventilator        CAPILLARY BLOOD GLUCOSE      POCT Blood Glucose.: 127 mg/dL (10 Nov 2023 05:08)  POCT Blood Glucose.: 118 mg/dL (10 Nov 2023 00:28)  POCT Blood Glucose.: 154 mg/dL (09 Nov 2023 17:17)  POCT Blood Glucose.: 207 mg/dL (09 Nov 2023 11:37)    I&O's Summary    09 Nov 2023 07:01  -  10 Nov 2023 07:00  --------------------------------------------------------  IN: 1650 mL / OUT: 850 mL / NET: 800 mL    10 Nov 2023 07:01  -  11 Nov 2023 02:43  --------------------------------------------------------  IN: 1200 mL / OUT: 300 mL / NET: 900 mL          Respiratory:  Mode: AC/ CMV (Assist Control/ Continuous Mandatory Ventilation)  RR (machine): 16  TV (machine): 420  FiO2: 30  PEEP: 5  ITime: 1  MAP: 10  PIP: 22        LABS:                                              11.0   12.73 )-----------( 233      ( 10 Nov 2023 10:11 )             33.4            MEDICATIONS  (STANDING):  albuterol/ipratropium for Nebulization 3 milliLiter(s) Nebulizer every 6 hours  azaTHIOprine 50 milliGRAM(s) Oral two times a day  chlorhexidine 0.12% Liquid 15 milliLiter(s) Oral Mucosa every 12 hours  chlorhexidine 4% Liquid 1 Application(s) Topical <User Schedule>  chlorhexidine 4% Liquid 1 Application(s) Topical at bedtime  dextrose 50% Injectable 50 milliLiter(s) IV Push every 15 minutes  dextrose 50% Injectable 25 milliLiter(s) IV Push every 15 minutes  enoxaparin Injectable 40 milliGRAM(s) SubCutaneous every 24 hours  insulin lispro (ADMELOG) corrective regimen sliding scale   SubCutaneous every 6 hours  pantoprazole   Suspension 40 milliGRAM(s) Oral before breakfast  polyethylene glycol 3350 17 Gram(s) Oral every 12 hours  predniSONE   Tablet 60 milliGRAM(s) Oral daily    MEDICATIONS  (PRN):  acetaminophen   Oral Liquid .. 650 milliGRAM(s) Oral every 6 hours PRN Temp greater or equal to 38C (100.4F), Mild Pain (1 - 3)  ketorolac   Injectable 30 milliGRAM(s) IV Push every 8 hours PRN Mild Pain (1 - 3)  oxyCODONE    IR 5 milliGRAM(s) Oral every 4 hours PRN Moderate Pain (4 - 6)  senna 2 Tablet(s) Oral at bedtime PRN Constipation  sodium chloride 0.9% lock flush 10 milliLiter(s) IV Push every 1 hour PRN Pre/post blood products, medications, blood draw, and to maintain line patency      IMAGING:      EXAMINATION:  PHYSICAL EXAM:    Constitutional: No Acute Distress     Neurological: Intubated, AOx3 w/ choices, EOMI, neck flexion 3/5, neck extension 5/5, L eye ptosis,  BL Bi/tri 4+5/ HG 5/5, BL LE 5/5     Sensation: [x] intact to light touch  [ ] decreased:     Pulmonary: Clear to Auscultation, coarse breath sounds, bilateral rhonchi, no rales, no wheezes    Cardiovascular: S1, S2, Regular rate and rhythm     Gastrointestinal: Soft, Non-tender, Non-distended     Extremities: No calf tenderness

## 2023-11-10 NOTE — CHART NOTE - NSCHARTNOTEFT_GEN_A_CORE
63 year old female admitted with MG exacerbation. Transfusion medicine has been consulted to complete a series of 5 TPE sessions over approximately 10 days.    TPE #1 was successfully completed on 11/08/23. One plasma volume was replaced with 5% albumin as replacement fluid. Patient tolerated the procedure well.    TPE #2 was successfully completed on 11/10/23. One plasma volume was replaced with 5% albumin as replacement fluid. Patient tolerated the procedure well.    TPE #3 is scheduled for 11/13/23.

## 2023-11-10 NOTE — PROGRESS NOTE ADULT - SUBJECTIVE AND OBJECTIVE BOX
HOSPITAL COURSE:  Patient KAIDEN HATCH is a 63y woman presenting with myasthenia gravis exacerbation.    Admission Scores  GCS: 15     24 hour Events: Patient evaluated at bedside. Reports improvement in symptoms w/ exception to copious oral secretions. No complaints of pain or dyspnea.     11/4- Patient intubated overnight for worsening respiratory status. Patient received dose 2/5 of IVIG  11/6- No events; poor respiratory effort on SBT  11/7- Apneic on SBT; worsening respiratory effort; last dose of IVIG TODAY  11/8- NIF -12 and  this AM. PT received shiley in right IJ w/ conscious sedation for initiation of PLEX. S/P tx 1/5 w/ PLEX.  11/9- NIF -20 and VC 630this AM.  11/10- Patient due for second dose of PLEX. No events overnight.     Allergies    peanuts (Unknown)  No Known Drug Allergies    Intolerances        REVIEW OF SYSTEMS: [ ] Unable to Assess due to neurologic exam   [ x] All ROS addressed below are non-contributory, except:  Neuro: [ ] Headache [ ] Back pain [ ] Numbness [ ] Weakness [ ] Ataxia [ ] Dizziness [ ] Aphasia [ ] Dysarthria [ ] Visual disturbance  Resp: [ ] Shortness of breath/dyspnea, [ ] Orthopnea [ ] Cough  CV: [ ] Chest pain [ ] Palpitation [ ] Lightheadedness [ ] Syncope  Renal: [ ] Thirst [ ] Edema  GI: [ ] Nausea [ ] Emesis [ ] Abdominal pain [ ] Constipation [ ] Diarrhea  Hem: [ ] Hematemesis [ ] bright red blood per rectum  ID: [ ] Fever [ ] Chills [ ] Dysuria  ENT: [ ] Rhinorrhea      DEVICES:   [ ] Restraints [ ] ET tube [ ] central line [ ] arterial line [ ] morales [ ] NGT/OGT [ ] EVD [ ] LD [ ] MYAH/HMV [ ] Trach [ ] PEG [ ] Chest Tube     VITALS:   Vital Signs Last 24 Hrs  T(C): 37 (10 Nov 2023 03:00), Max: 37 (10 Nov 2023 03:00)  T(F): 98.6 (10 Nov 2023 03:00), Max: 98.6 (10 Nov 2023 03:00)  HR: 101 (10 Nov 2023 06:28) (52 - 118)  BP: 127/61 (10 Nov 2023 05:00) (116/68 - 154/72)  BP(mean): 82 (10 Nov 2023 05:00) (75 - 97)  RR: 18 (10 Nov 2023 05:00) (12 - 18)  SpO2: 99% (10 Nov 2023 06:28) (94% - 100%)    Parameters below as of 10 Nov 2023 05:15  Patient On (Oxygen Delivery Method): ventilator      CAPILLARY BLOOD GLUCOSE      POCT Blood Glucose.: 127 mg/dL (10 Nov 2023 05:08)  POCT Blood Glucose.: 118 mg/dL (10 Nov 2023 00:28)  POCT Blood Glucose.: 154 mg/dL (09 Nov 2023 17:17)  POCT Blood Glucose.: 207 mg/dL (09 Nov 2023 11:37)    I&O's Summary    09 Nov 2023 07:01  -  10 Nov 2023 07:00  --------------------------------------------------------  IN: 1650 mL / OUT: 850 mL / NET: 800 mL        Respiratory:  Mode: AC/ CMV (Assist Control/ Continuous Mandatory Ventilation)  RR (machine): 16  TV (machine): 420  FiO2: 30  PEEP: 5  ITime: 1  MAP: 10  PIP: 22        LABS:                        10.3   6.34  )-----------( 225      ( 08 Nov 2023 20:07 )             31.3     11-08    140  |  108  |  31<H>  ----------------------------<  133<H>  3.6   |  22  |  0.46<L>             MEDICATION LEVELS:     IVF FLUIDS/MEDICATIONS:   MEDICATIONS  (STANDING):  albuterol/ipratropium for Nebulization 3 milliLiter(s) Nebulizer every 6 hours  chlorhexidine 0.12% Liquid 15 milliLiter(s) Oral Mucosa every 12 hours  chlorhexidine 4% Liquid 1 Application(s) Topical at bedtime  chlorhexidine 4% Liquid 1 Application(s) Topical <User Schedule>  enoxaparin Injectable 40 milliGRAM(s) SubCutaneous every 24 hours  insulin lispro (ADMELOG) corrective regimen sliding scale   SubCutaneous every 6 hours  pantoprazole   Suspension 40 milliGRAM(s) Oral before breakfast  polyethylene glycol 3350 17 Gram(s) Oral every 12 hours  predniSONE   Tablet 60 milliGRAM(s) Oral daily    MEDICATIONS  (PRN):  ketorolac   Injectable 30 milliGRAM(s) IV Push every 8 hours PRN Mild Pain (1 - 3)  oxyCODONE    IR 5 milliGRAM(s) Oral every 4 hours PRN Moderate Pain (4 - 6)  oxyCODONE    IR 10 milliGRAM(s) Oral every 4 hours PRN Severe Pain (7 - 10)  senna 2 Tablet(s) Oral at bedtime PRN Constipation  sodium chloride 0.9% lock flush 10 milliLiter(s) IV Push every 1 hour PRN Pre/post blood products, medications, blood draw, and to maintain line patency        IMAGING:      EXAMINATION:  PHYSICAL EXAM:    Constitutional: No Acute Distress     Neurological: Intubated, AOx3 w/ choices, EOMI, neck flexion 3/5, neck extension 5/5, L eye ptosis,  BL Bi/tri 4+5/ HG 5/5, BL LE 4+/5     Sensation: [x] intact to light touch  [ ] decreased:     Pulmonary: Clear to Auscultation, coarse breath sounds, bilateral rhonchi, no rales, no wheezes    Cardiovascular: S1, S2, Regular rate and rhythm     Gastrointestinal: Soft, Non-tender, Non-distended     Extremities: No calf tenderness    HOSPITAL COURSE:  Patient KAIDEN HATCH is a 63y woman presenting with myasthenia gravis exacerbation.    Admission Scores  GCS: 15     24 hour Events: Patient evaluated at bedside. Reports improvement in symptoms w/ exception to copious oral secretions. No complaints of pain or dyspnea.     11/4- Patient intubated overnight for worsening respiratory status. Patient received dose 2/5 of IVIG  11/6- No events; poor respiratory effort on SBT  11/7- Apneic on SBT; worsening respiratory effort; last dose of IVIG TODAY  11/8- NIF -12 and  this AM. PT received shiley in right IJ w/ conscious sedation for initiation of PLEX. S/P tx 1/5 w/ PLEX.  11/9- NIF -20 and VC 630this AM.  11/10- Patient due for second dose of PLEX. No events overnight.     Allergies    peanuts (Unknown)  No Known Drug Allergies    Intolerances        REVIEW OF SYSTEMS: [ ] Unable to Assess due to neurologic exam   [ x] All ROS addressed below are non-contributory, except:  Neuro: [ ] Headache [ ] Back pain [ ] Numbness [ ] Weakness [ ] Ataxia [ ] Dizziness [ ] Aphasia [ ] Dysarthria [ ] Visual disturbance  Resp: [ ] Shortness of breath/dyspnea, [ ] Orthopnea [ ] Cough  CV: [ ] Chest pain [ ] Palpitation [ ] Lightheadedness [ ] Syncope  Renal: [ ] Thirst [ ] Edema  GI: [ ] Nausea [ ] Emesis [ ] Abdominal pain [ ] Constipation [ ] Diarrhea  Hem: [ ] Hematemesis [ ] bright red blood per rectum  ID: [ ] Fever [ ] Chills [ ] Dysuria  ENT: [ ] Rhinorrhea      DEVICES:   [ ] Restraints [ ] ET tube [ ] central line [ ] arterial line [ ] morales [ ] NGT/OGT [ ] EVD [ ] LD [ ] MYAH/HMV [ ] Trach [ ] PEG [ ] Chest Tube     VITALS:   Vital Signs Last 24 Hrs  T(C): 37 (10 Nov 2023 03:00), Max: 37 (10 Nov 2023 03:00)  T(F): 98.6 (10 Nov 2023 03:00), Max: 98.6 (10 Nov 2023 03:00)  HR: 101 (10 Nov 2023 06:28) (52 - 118)  BP: 127/61 (10 Nov 2023 05:00) (116/68 - 154/72)  BP(mean): 82 (10 Nov 2023 05:00) (75 - 97)  RR: 18 (10 Nov 2023 05:00) (12 - 18)  SpO2: 99% (10 Nov 2023 06:28) (94% - 100%)    Parameters below as of 10 Nov 2023 05:15  Patient On (Oxygen Delivery Method): ventilator      CAPILLARY BLOOD GLUCOSE      POCT Blood Glucose.: 127 mg/dL (10 Nov 2023 05:08)  POCT Blood Glucose.: 118 mg/dL (10 Nov 2023 00:28)  POCT Blood Glucose.: 154 mg/dL (09 Nov 2023 17:17)  POCT Blood Glucose.: 207 mg/dL (09 Nov 2023 11:37)    I&O's Summary    09 Nov 2023 07:01  -  10 Nov 2023 07:00  --------------------------------------------------------  IN: 1650 mL / OUT: 850 mL / NET: 800 mL        Respiratory:  Mode: AC/ CMV (Assist Control/ Continuous Mandatory Ventilation)  RR (machine): 16  TV (machine): 420  FiO2: 30  PEEP: 5  ITime: 1  MAP: 10  PIP: 22        LABS:                        10.3   6.34  )-----------( 225      ( 08 Nov 2023 20:07 )             31.3     11-08    140  |  108  |  31<H>  ----------------------------<  133<H>  3.6   |  22  |  0.46<L>             MEDICATION LEVELS:     IVF FLUIDS/MEDICATIONS:   MEDICATIONS  (STANDING):  albuterol/ipratropium for Nebulization 3 milliLiter(s) Nebulizer every 6 hours  chlorhexidine 0.12% Liquid 15 milliLiter(s) Oral Mucosa every 12 hours  chlorhexidine 4% Liquid 1 Application(s) Topical at bedtime  chlorhexidine 4% Liquid 1 Application(s) Topical <User Schedule>  enoxaparin Injectable 40 milliGRAM(s) SubCutaneous every 24 hours  insulin lispro (ADMELOG) corrective regimen sliding scale   SubCutaneous every 6 hours  pantoprazole   Suspension 40 milliGRAM(s) Oral before breakfast  polyethylene glycol 3350 17 Gram(s) Oral every 12 hours  predniSONE   Tablet 60 milliGRAM(s) Oral daily    MEDICATIONS  (PRN):  ketorolac   Injectable 30 milliGRAM(s) IV Push every 8 hours PRN Mild Pain (1 - 3)  oxyCODONE    IR 5 milliGRAM(s) Oral every 4 hours PRN Moderate Pain (4 - 6)  oxyCODONE    IR 10 milliGRAM(s) Oral every 4 hours PRN Severe Pain (7 - 10)  senna 2 Tablet(s) Oral at bedtime PRN Constipation  sodium chloride 0.9% lock flush 10 milliLiter(s) IV Push every 1 hour PRN Pre/post blood products, medications, blood draw, and to maintain line patency        IMAGING:      EXAMINATION:  PHYSICAL EXAM:    Constitutional: No Acute Distress     Neurological: Intubated, AOx3 w/ choices, EOMI, neck flexion 3/5, neck extension 5/5, L eye ptosis,  BL Bi/tri 4+5/ HG 5/5, BL LE 5/5     Sensation: [x] intact to light touch  [ ] decreased:     Pulmonary: Clear to Auscultation, coarse breath sounds, bilateral rhonchi, no rales, no wheezes    Cardiovascular: S1, S2, Regular rate and rhythm     Gastrointestinal: Soft, Non-tender, Non-distended     Extremities: No calf tenderness

## 2023-11-10 NOTE — PROGRESS NOTE ADULT - ASSESSMENT
ASSESSMENT/PLAN: 62 y/o female with myasthenia gravis, respiratory failure. Demonstrating worsening respiratory effort on SBT, NIF and FVC below baseline required for extubation to BiPAP despite completion of IVIG. Plan is to d/w PT, family risks & benefits of PLEX - patient consented to insertion of shiley, initiation of PLEX - S/P tx w/ PLEX 1/5. Care initiated w/ [facility] neuromuscular neurology Dr Covarrubias. PT shows signs of improved respiratory effort. Continues to CPAP well    NEURO:  Neurochecks q4h  PLEX tx 2/5 tomorrow  Daily CiPAP as tolerated  Recommend early ambulation w/ PT/OT  Neuromuscular Neurology to see today to establish care  Hold Mestinon, patient with copious secretions, will consider restarting prior to extubation, currently held in setting of copious secretions, retrial extubation in AM  Continue Prednisone to 60 mg QD  Avoid medications that may worsen the current exacerbation including macrolides, fluoroquinolones, tetracyclines, aminoglycoside, beta-blockers, magnesium, and anti-arrhythmics (procainamide, quinidine, and lidocaine)  Neurology following, appreciate their recommendations  Activity: [x] mobilize as tolerated [] Bedrest [x] PT [x] OT [] PMNR    PULM:  Intubated for airway protection  CPAP as tolerated; daily SBTs  CXR reviewed  Duonebs q6h for secretions  Check NIF/VC Q4 hours in ICU setting, NIF -20/  this AM showing improvement post-PLEX  Aggressive Chest PT    CV:  MAP>65  TTE - EF 54%, elevated right atrial pressure   Off pressors    RENAL:  Stable renal function  IVL  daily IOs    GI:  Diet: ON TF  GI prophylaxis: PPI while on steroids + intubated  Bowel regimen: miralax, senna; dulcolax suppositories PRN  LBM: 11/8 - recommend PRN suppository    ENDO:   FS goal 120-180  ISS and adjust insulin as needed  A1C 6.7    HEME/ONC:  Monitor H/H  SCDs  Chemoppx: SQL    ID:  No fevers, no leukocytosis  Thick mucous inline secretions, will continue to monitor   No signs of infection     Disposition: ICU ASSESSMENT/PLAN: 64 y/o female with myasthenia gravis, respiratory failure. Demonstrating worsening respiratory effort on SBT, NIF and FVC below baseline required for extubation to BiPAP despite completion of IVIG. Plan is to d/w PT, family risks & benefits of PLEX - patient consented to insertion of shiley, initiation of PLEX - S/P tx w/ PLEX 1/5. Care initiated w/ [facility] neuromuscular neurology Dr Covarrubias. PT shows signs of improved respiratory effort. Continues to CPAP well    NEURO:  Neurochecks q4h  PLEX tx 2/5 tomorrow  Daily CiPAP as tolerated  Recommend early ambulation w/ PT/OT  Neuromuscular Neurology to see today to establish care  Hold Mestinon, patient with copious secretions, will consider restarting prior to extubation, currently held in setting of copious secretions, retrial extubation in AM  Continue Prednisone to 60 mg QD  Avoid medications that may worsen the current exacerbation including macrolides, fluoroquinolones, tetracyclines, aminoglycoside, beta-blockers, magnesium, and anti-arrhythmics (procainamide, quinidine, and lidocaine)  Neurology following, appreciate their recommendations  Activity: [x] mobilize as tolerated [] Bedrest [x] PT [x] OT [] PMNR    PULM:  Intubated for airway protection  CPAP as tolerated; daily SBTs  CXR reviewed  Duonebs q6h for secretions  Check NIF/VC Q4 hours in ICU setting, NIF -20/  this AM showing improvement post-PLEX  Aggressive Chest PT    CV:  MAP>65  TTE - EF 54%, elevated right atrial pressure   Off pressors    RENAL:  Stable renal function  IVL  daily IOs    GI:  Diet: ON TF  GI prophylaxis: PPI while on steroids + intubated  Bowel regimen: miralax, senna; dulcolax suppositories PRN  LBM: 11/8 - recommend PRN suppository    ENDO:   FS goal 120-180  ISS and adjust insulin as needed  A1C 6.7    HEME/ONC:  Monitor H/H  SCDs  Chemoppx: SQL    ID:  No fevers, no leukocytosis  Thick mucous inline secretions, will continue to monitor   No signs of infection     Disposition: ICU ASSESSMENT/PLAN: 62 y/o female with myasthenia gravis, respiratory failure. Demonstrating worsening respiratory effort on SBT, NIF and FVC below baseline required for extubation to BiPAP despite completion of IVIG. Plan is to d/w PT, family risks & benefits of PLEX - patient consented to insertion of shiley, initiation of PLEX - S/P tx w/ PLEX 1/5. Care initiated w/ [facility] neuromuscular neurology Dr Covarrubias. PT shows signs of improved respiratory effort. Continues to CPAP well    NEURO:  Neurochecks q4h  PLEX tx 2/5 tomorrow  Daily CiPAP as tolerated  Recommend early ambulation w/ PT/OT  Neuromuscular Neurology to see today to establish care  Hold Mestinon, patient with copious secretions, will consider restarting prior to extubation, currently held in setting of copious secretions, retrial extubation in AM  Continue Prednisone to 60 mg QD  Avoid medications that may worsen the current exacerbation including macrolides, fluoroquinolones, tetracyclines, aminoglycoside, beta-blockers, magnesium, and anti-arrhythmics (procainamide, quinidine, and lidocaine)  Neurology following, appreciate their recommendations  Activity: [x] mobilize as tolerated [] Bedrest [x] PT [x] OT [] PMNR    PULM:  Intubated for airway protection  CPAP as tolerated; daily SBTs  CXR reviewed  Duonebs q6h for secretions  Check NIF/VC Q4 hours in ICU setting, NIF -20/  this AM showing improvement post-PLEX  Aggressive Chest PT    CV:  MAP>65  TTE - EF 54%, elevated right atrial pressure   Off pressors    RENAL:  Stable renal function  IVL  daily IOs    GI:  Diet: ON TF  GI prophylaxis: PPI while on steroids + intubated  Bowel regimen: miralax, senna; dulcolax suppositories PRN  LBM: 11/8 - recommend PRN suppository    ENDO:   FS goal 120-180  ISS and adjust insulin as needed  A1C 6.7    HEME/ONC:  Monitor H/H  SCDs  Chemoppx: SQL    ID:  No fevers, no leukocytosis  Thick mucous inline secretions, will continue to monitor   No signs of infection     Disposition: ICU ASSESSMENT/PLAN: 64 y/o female with myasthenia gravis, respiratory failure. Demonstrating worsening respiratory effort on SBT, NIF and FVC below baseline required for extubation to BiPAP despite completion of IVIG. Plan is to d/w PT, family risks & benefits of PLEX - patient consented to insertion of shiley, initiation of PLEX - S/P tx w/ PLEX 1/5. Care initiated w/ [facility] neuromuscular neurology Dr Covarrubias. PT shows signs of improved respiratory effort. Continues to CPAP well    NEURO:  Neurochecks q4h  PLEX tx 2/5 today, patient will need either cellcept or azathioprine, will start today depending on side effect profile  Daily CiPAP as tolerated  Recommend early ambulation w/ PT/OT  Neuromuscular Neurology following  Hold Mestinon, patient with copious secretions, will consider restarting prior to extubation, currently held in setting of copious secretions, retrial extubation in AM  Continue Prednisone to 60 mg QD  Avoid medications that may worsen the current exacerbation including macrolides, fluoroquinolones, tetracyclines, aminoglycoside, beta-blockers, magnesium, and anti-arrhythmics (procainamide, quinidine, and lidocaine)  Neurology following, appreciate their recommendations  Activity: [x] mobilize as tolerated [] Bedrest [x] PT [x] OT [] PMNR    PULM:  Intubated for airway protection  CPAP as tolerated; daily SBTs, not tolerating  CXR reviewed  Duonebs q6h for secretions  Check NIF/VC Q4 hours in ICU setting, NIF -10/  this AM  Aggressive Chest PT    CV:  MAP>65  TTE - EF 54%, elevated right atrial pressure   Off pressors    RENAL:  Stable renal function  IVL  daily IOs    GI:  Diet: ON TF  GI prophylaxis: PPI while on steroids + intubated  Bowel regimen: miralax, senna; dulcolax suppositories PRN  LBM: 11/8     ENDO:   FS goal 120-180  ISS and adjust insulin as needed  A1C 6.7    HEME/ONC:  Monitor H/H  SCDs  Chemoppx: SQL    ID:  No fevers, no leukocytosis  Less thick inline secretions, oral secretions  No signs of infection     Disposition: ICU ASSESSMENT/PLAN: 62 y/o female with myasthenia gravis, respiratory failure. Demonstrating worsening respiratory effort on SBT, NIF and FVC below baseline required for extubation to BiPAP despite completion of IVIG. Plan is to d/w PT, family risks & benefits of PLEX - patient consented to insertion of shiley, initiation of PLEX - S/P tx w/ PLEX 1/5. Care initiated w/ [facility] neuromuscular neurology Dr Covarrubias. PT shows signs of improved respiratory effort. Continues to CPAP well    NEURO:  Neurochecks q4h  PLEX tx 2/5 today, patient will need either cellcept or azathioprine, will start today depending on side effect profile  Daily CiPAP as tolerated  Recommend early ambulation w/ PT/OT  Neuromuscular Neurology following  Hold Mestinon, patient with copious secretions, will consider restarting prior to extubation, currently held in setting of copious secretions, retrial extubation in AM  Continue Prednisone to 60 mg QD  Avoid medications that may worsen the current exacerbation including macrolides, fluoroquinolones, tetracyclines, aminoglycoside, beta-blockers, magnesium, and anti-arrhythmics (procainamide, quinidine, and lidocaine)  Neurology following, appreciate their recommendations  Activity: [x] mobilize as tolerated [] Bedrest [x] PT [x] OT [] PMNR    PULM:  Intubated for airway protection  CPAP as tolerated; daily SBTs, not tolerating  CXR reviewed  Duonebs q6h for secretions  Check NIF/VC Q4 hours in ICU setting, NIF -10/  this AM  Aggressive Chest PT    CV:  MAP>65  TTE - EF 54%, elevated right atrial pressure   Off pressors    RENAL:  Stable renal function  IVL  daily IOs    GI:  Diet: ON TF  GI prophylaxis: PPI while on steroids + intubated  Bowel regimen: miralax, senna; dulcolax suppositories PRN  LBM: 11/8     ENDO:   FS goal 120-180  ISS and adjust insulin as needed  A1C 6.7    HEME/ONC:  Monitor H/H  SCDs  Chemoppx: SQL    ID:  No fevers, no leukocytosis  Less thick inline secretions, oral secretions  No signs of infection     Disposition: ICU ASSESSMENT/PLAN: 62 y/o female with myasthenia gravis, respiratory failure. Demonstrating worsening respiratory effort on SBT, NIF and FVC below baseline required for extubation to BiPAP despite completion of IVIG. Plan is to d/w PT, family risks & benefits of PLEX - patient consented to insertion of shiley, initiation of PLEX - S/P tx w/ PLEX 1/5. Care initiated w/ [facility] neuromuscular neurology Dr Covarrubias. PT shows signs of improved respiratory effort. Continues to CPAP well    NEURO:  Neurochecks q4h  PLEX tx 2/5 today, patient will need either cellcept or azathioprine, will start today depending on side effect profile  Daily CiPAP as tolerated  Recommend early ambulation w/ PT/OT  Neuromuscular Neurology following  Hold Mestinon, patient with copious secretions, will consider restarting prior to extubation, currently held in setting of copious secretions, retrial extubation in AM  Continue Prednisone to 60 mg QD  Avoid medications that may worsen the current exacerbation including macrolides, fluoroquinolones, tetracyclines, aminoglycoside, beta-blockers, magnesium, and anti-arrhythmics (procainamide, quinidine, and lidocaine)  Neurology following, appreciate their recommendations  Activity: [x] mobilize as tolerated [] Bedrest [x] PT [x] OT [] PMNR    PULM:  Intubated for airway protection  CPAP as tolerated; daily SBTs, not tolerating  CXR reviewed  Duonebs q6h for secretions  Check NIF/VC Q4 hours in ICU setting, NIF -10/  this AM  Aggressive Chest PT    CV:  MAP>65  TTE - EF 54%, elevated right atrial pressure   Off pressors    RENAL:  Stable renal function  IVL  daily IOs    GI:  Diet: ON TF  GI prophylaxis: PPI while on steroids + intubated  Bowel regimen: miralax, senna; dulcolax suppositories PRN  LBM: 11/8     ENDO:   FS goal 120-180  ISS and adjust insulin as needed  A1C 6.7    HEME/ONC:  Monitor H/H  SCDs  Chemoppx: SQL    ID:  No fevers, no leukocytosis  Less thick inline secretions, oral secretions  No signs of infection     Disposition: ICU

## 2023-11-10 NOTE — PROGRESS NOTE ADULT - ASSESSMENT
ASSESSMENT/PLAN: 62 y/o female with myasthenia gravis, respiratory failure. Demonstrating worsening respiratory effort on SBT, NIF and FVC below baseline required for extubation to BiPAP despite completion of IVIG. Plan is to d/w PT, family risks & benefits of PLEX - patient consented to insertion of shiley, initiation of PLEX - S/P tx w/ PLEX 1/5. Care initiated w/ [facility] neuromuscular neurology Dr Covarrubias. PT shows signs of improved respiratory effort. Continues to CPAP well    NEURO:  Neurochecks q4h  PLEX tx 2/5 today, patient will need either cellcept or azathioprine, will start today depending on side effect profile  Daily CiPAP as tolerated  Recommend early ambulation w/ PT/OT  Neuromuscular Neurology following  Hold Mestinon, patient with copious secretions, will consider restarting prior to extubation, currently held in setting of copious secretions, retrial extubation in AM  Continue Prednisone to 60 mg QD  Avoid medications that may worsen the current exacerbation including macrolides, fluoroquinolones, tetracyclines, aminoglycoside, beta-blockers, magnesium, and anti-arrhythmics (procainamide, quinidine, and lidocaine)  Neurology following, appreciate their recommendations  Activity: [x] mobilize as tolerated [] Bedrest [x] PT [x] OT [] PMNR    PULM:  Intubated for airway protection  CPAP as tolerated; daily SBTs, not tolerating  CXR reviewed  Duonebs q6h for secretions  Check NIF/VC Q4 hours in ICU setting, NIF -10/  this AM  Aggressive Chest PT    CV:  MAP>65  TTE - EF 54%, elevated right atrial pressure   Off pressors    RENAL:  Stable renal function  IVL  daily IOs    GI:  Diet: ON TF  GI prophylaxis: PPI while on steroids + intubated  Bowel regimen: miralax, senna; dulcolax suppositories PRN  LBM: 11/8     ENDO:   FS goal 120-180  ISS and adjust insulin as needed  A1C 6.7    HEME/ONC:  Monitor H/H  SCDs  Chemoppx: SQL    ID:  No fevers, no leukocytosis  Less thick inline secretions, oral secretions  No signs of infection     Disposition: ICU ASSESSMENT/PLAN: 64 y/o female with myasthenia gravis, respiratory failure. Demonstrating worsening respiratory effort on SBT, NIF and FVC below baseline required for extubation to BiPAP despite completion of IVIG. Plan is to d/w PT, family risks & benefits of PLEX - patient consented to insertion of shiley, initiation of PLEX - S/P tx w/ PLEX 1/5. Care initiated w/ [facility] neuromuscular neurology Dr Covarrubias. PT shows signs of improved respiratory effort. Continues to CPAP well    NEURO:  Neurochecks q4h  PLEX tx 2/5 today, patient will need either cellcept or azathioprine, will start today depending on side effect profile  Daily CiPAP as tolerated  Recommend early ambulation w/ PT/OT  Neuromuscular Neurology following  Hold Mestinon, patient with copious secretions, will consider restarting prior to extubation, currently held in setting of copious secretions, retrial extubation in AM  Continue Prednisone to 60 mg QD  Avoid medications that may worsen the current exacerbation including macrolides, fluoroquinolones, tetracyclines, aminoglycoside, beta-blockers, magnesium, and anti-arrhythmics (procainamide, quinidine, and lidocaine)  Neurology following, appreciate their recommendations  Activity: [x] mobilize as tolerated [] Bedrest [x] PT [x] OT [] PMNR    PULM:  Intubated for airway protection  CPAP as tolerated; daily SBTs, not tolerating  CXR reviewed  Duonebs q6h for secretions  Check NIF/VC Q4 hours in ICU setting, NIF -10/  this AM  Aggressive Chest PT    CV:  MAP>65  TTE - EF 54%, elevated right atrial pressure   Off pressors    RENAL:  Stable renal function  IVL  daily IOs    GI:  Diet: ON TF  GI prophylaxis: PPI while on steroids + intubated  Bowel regimen: miralax, senna; dulcolax suppositories PRN  LBM: 11/8     ENDO:   FS goal 120-180  ISS and adjust insulin as needed  A1C 6.7    HEME/ONC:  Monitor H/H  SCDs  Chemoppx: SQL    ID:  No fevers, no leukocytosis  Less thick inline secretions, oral secretions  No signs of infection     Disposition: ICU ASSESSMENT/PLAN: 62 y/o female with myasthenia gravis, respiratory failure. Demonstrating worsening respiratory effort on SBT, NIF and FVC below baseline required for extubation to BiPAP despite completion of IVIG. Plan is to d/w PT, family risks & benefits of PLEX - patient consented to insertion of shiley, initiation of PLEX - S/P tx w/ PLEX 1/5. Care initiated w/ [facility] neuromuscular neurology Dr Covarrubias. PT shows signs of improved respiratory effort. Continues to CPAP well    NEURO:  Neurochecks q4h  PLEX tx 2/5 today, patient will need either cellcept or azathioprine, will start today depending on side effect profile  Daily CiPAP as tolerated  Recommend early ambulation w/ PT/OT  Neuromuscular Neurology following  Hold Mestinon, patient with copious secretions, will consider restarting prior to extubation, currently held in setting of copious secretions, retrial extubation in AM  Continue Prednisone to 60 mg QD  Avoid medications that may worsen the current exacerbation including macrolides, fluoroquinolones, tetracyclines, aminoglycoside, beta-blockers, magnesium, and anti-arrhythmics (procainamide, quinidine, and lidocaine)  Neurology following, appreciate their recommendations  Activity: [x] mobilize as tolerated [] Bedrest [x] PT [x] OT [] PMNR    PULM:  Intubated for airway protection  CPAP as tolerated; daily SBTs, not tolerating  CXR reviewed  Duonebs q6h for secretions  Check NIF/VC Q4 hours in ICU setting, NIF -10/  this AM  Aggressive Chest PT    CV:  MAP>65  TTE - EF 54%, elevated right atrial pressure   Off pressors    RENAL:  Stable renal function  IVL  daily IOs    GI:  Diet: ON TF  GI prophylaxis: PPI while on steroids + intubated  Bowel regimen: miralax, senna; dulcolax suppositories PRN  LBM: 11/8     ENDO:   FS goal 120-180  ISS and adjust insulin as needed  A1C 6.7    HEME/ONC:  Monitor H/H  SCDs  Chemoppx: SQL    ID:  No fevers, no leukocytosis  Less thick inline secretions, oral secretions  No signs of infection     Disposition: ICU ASSESSMENT/PLAN: 64 y/o female with myasthenia gravis, respiratory failure. Demonstrating worsening respiratory effort on SBT, NIF and FVC below baseline required for extubation to BiPAP despite completion of IVIG. Plan is to d/w PT, family risks & benefits of PLEX - patient consented to insertion of shiley, initiation of PLEX - S/P tx w/ PLEX 1/5. Care initiated w/ [facility] neuromuscular neurology Dr Covarrubias. PT shows signs of improved respiratory effort. Continues to CPAP well, Secretions are improving at this time    NEURO:  Neurochecks q4h  PLEX tx 2/5 today, patient will need either cellcept or azathioprine, will start today depending on side effect profile  Daily CiPAP as tolerated  Recommend early ambulation w/ PT/OT  Neuromuscular Neurology following  Hold Mestinon, patient with copious secretions, will consider restarting prior to extubation, currently held in setting of copious secretions, retrial extubation in AM  Continue Prednisone to 60 mg QD  Avoid medications that may worsen the current exacerbation including macrolides, fluoroquinolones, tetracyclines, aminoglycoside, beta-blockers, magnesium, and anti-arrhythmics (procainamide, quinidine, and lidocaine)  Neurology following, appreciate their recommendations  Activity: [x] mobilize as tolerated [] Bedrest [x] PT [x] OT [] PMNR    PULM:  Intubated for airway protection  CPAP as tolerated; daily SBTs, not tolerating  CXR reviewed  Duonebs q6h for secretions  Check NIF/VC Q4 hours in ICU setting, NIF -10/  this AM  Aggressive Chest PT    CV:  MAP>65  TTE - EF 54%, elevated right atrial pressure   Off pressors    RENAL:  Stable renal function  IVL  daily IOs    GI:  Diet: ON TF  GI prophylaxis: PPI while on steroids + intubated  Bowel regimen: miralax, senna; dulcolax suppositories PRN  LBM: 11/8     ENDO:   FS goal 120-180  ISS and adjust insulin as needed  A1C 6.7    HEME/ONC:  Monitor H/H  SCDs  Chemoppx: SQL    ID:  No fevers, no leukocytosis  Less thick inline secretions, oral secretions  No signs of infection     Disposition: ICU ASSESSMENT/PLAN: 62 y/o female with myasthenia gravis, respiratory failure. Demonstrating worsening respiratory effort on SBT, NIF and FVC below baseline required for extubation to BiPAP despite completion of IVIG. Plan is to d/w PT, family risks & benefits of PLEX - patient consented to insertion of shiley, initiation of PLEX - S/P tx w/ PLEX 1/5. Care initiated w/ [facility] neuromuscular neurology Dr Covarrubias. PT shows signs of improved respiratory effort. Continues to CPAP well, Secretions are improving at this time    NEURO:  Neurochecks q4h  PLEX tx 2/5 today, patient will need either cellcept or azathioprine, will start today depending on side effect profile  Daily CiPAP as tolerated  Recommend early ambulation w/ PT/OT  Neuromuscular Neurology following  Hold Mestinon, patient with copious secretions, will consider restarting prior to extubation, currently held in setting of copious secretions, retrial extubation in AM  Continue Prednisone to 60 mg QD  Avoid medications that may worsen the current exacerbation including macrolides, fluoroquinolones, tetracyclines, aminoglycoside, beta-blockers, magnesium, and anti-arrhythmics (procainamide, quinidine, and lidocaine)  Neurology following, appreciate their recommendations  Activity: [x] mobilize as tolerated [] Bedrest [x] PT [x] OT [] PMNR    PULM:  Intubated for airway protection  CPAP as tolerated; daily SBTs, not tolerating  CXR reviewed  Duonebs q6h for secretions  Check NIF/VC Q4 hours in ICU setting, NIF -10/  this AM  Aggressive Chest PT    CV:  MAP>65  TTE - EF 54%, elevated right atrial pressure   Off pressors    RENAL:  Stable renal function  IVL  daily IOs    GI:  Diet: ON TF  GI prophylaxis: PPI while on steroids + intubated  Bowel regimen: miralax, senna; dulcolax suppositories PRN  LBM: 11/8     ENDO:   FS goal 120-180  ISS and adjust insulin as needed  A1C 6.7    HEME/ONC:  Monitor H/H  SCDs  Chemoppx: SQL    ID:  No fevers, no leukocytosis  Less thick inline secretions, oral secretions  No signs of infection     Disposition: ICU ASSESSMENT/PLAN: 64 y/o female with myasthenia gravis, respiratory failure. Demonstrating worsening respiratory effort on SBT, NIF and FVC below baseline required for extubation to BiPAP despite completion of IVIG. Plan is to d/w PT, family risks & benefits of PLEX - patient consented to insertion of shiley, initiation of PLEX - S/P tx w/ PLEX 1/5. Care initiated w/ [facility] neuromuscular neurology Dr Covarrubias. PT shows signs of improved respiratory effort. Continues to CPAP well, Secretions are improving at this time    NEURO:  Neurochecks q4h  PLEX tx 2/5 today, patient will need either cellcept or azathioprine, will start today depending on side effect profile  Daily CiPAP as tolerated  Recommend early ambulation w/ PT/OT  Neuromuscular Neurology following  Hold Mestinon, patient with copious secretions, will consider restarting prior to extubation, currently held in setting of copious secretions, retrial extubation in AM  Continue Prednisone to 60 mg QD  Avoid medications that may worsen the current exacerbation including macrolides, fluoroquinolones, tetracyclines, aminoglycoside, beta-blockers, magnesium, and anti-arrhythmics (procainamide, quinidine, and lidocaine)  Neurology following, appreciate their recommendations  Activity: [x] mobilize as tolerated [] Bedrest [x] PT [x] OT [] PMNR    PULM:  Intubated for airway protection  CPAP as tolerated; daily SBTs, not tolerating  CXR reviewed  Duonebs q6h for secretions  Check NIF/VC Q4 hours in ICU setting, NIF -10/  this AM  Aggressive Chest PT    CV:  MAP>65  TTE - EF 54%, elevated right atrial pressure   Off pressors    RENAL:  Stable renal function  IVL  daily IOs    GI:  Diet: ON TF  GI prophylaxis: PPI while on steroids + intubated  Bowel regimen: miralax, senna; dulcolax suppositories PRN  LBM: 11/8     ENDO:   FS goal 120-180  ISS and adjust insulin as needed  A1C 6.7    HEME/ONC:  Monitor H/H  SCDs  Chemoppx: SQL    ID:  No fevers, no leukocytosis  Less thick inline secretions, oral secretions  No signs of infection     Disposition: ICU

## 2023-11-11 LAB
ANION GAP SERPL CALC-SCNC: 12 MMOL/L — SIGNIFICANT CHANGE UP (ref 5–17)
APTT BLD: 31.7 SEC — SIGNIFICANT CHANGE UP (ref 24.5–35.6)
BUN SERPL-MCNC: 39 MG/DL — HIGH (ref 7–23)
CA-I BLD-SCNC: 1.23 MMOL/L — SIGNIFICANT CHANGE UP (ref 1.15–1.33)
CALCIUM SERPL-MCNC: 8.9 MG/DL — SIGNIFICANT CHANGE UP (ref 8.4–10.5)
CHLORIDE SERPL-SCNC: 113 MMOL/L — HIGH (ref 96–108)
CO2 SERPL-SCNC: 21 MMOL/L — LOW (ref 22–31)
CREAT SERPL-MCNC: 0.59 MG/DL — SIGNIFICANT CHANGE UP (ref 0.5–1.3)
EGFR: 101 ML/MIN/1.73M2 — SIGNIFICANT CHANGE UP
FIBRINOGEN PPP-MCNC: 152 MG/DL — LOW (ref 200–445)
GLUCOSE SERPL-MCNC: 133 MG/DL — HIGH (ref 70–99)
HCT VFR BLD CALC: 31.9 % — LOW (ref 34.5–45)
HGB BLD-MCNC: 10.5 G/DL — LOW (ref 11.5–15.5)
MAGNESIUM SERPL-MCNC: 2.2 MG/DL — SIGNIFICANT CHANGE UP (ref 1.6–2.6)
MCHC RBC-ENTMCNC: 28.6 PG — SIGNIFICANT CHANGE UP (ref 27–34)
MCHC RBC-ENTMCNC: 32.9 GM/DL — SIGNIFICANT CHANGE UP (ref 32–36)
MCV RBC AUTO: 86.9 FL — SIGNIFICANT CHANGE UP (ref 80–100)
NRBC # BLD: 0 /100 WBCS — SIGNIFICANT CHANGE UP (ref 0–0)
PHOSPHATE SERPL-MCNC: 3.8 MG/DL — SIGNIFICANT CHANGE UP (ref 2.5–4.5)
PLATELET # BLD AUTO: 207 K/UL — SIGNIFICANT CHANGE UP (ref 150–400)
POTASSIUM SERPL-MCNC: 3.4 MMOL/L — LOW (ref 3.5–5.3)
POTASSIUM SERPL-SCNC: 3.4 MMOL/L — LOW (ref 3.5–5.3)
RBC # BLD: 3.67 M/UL — LOW (ref 3.8–5.2)
RBC # FLD: 14.6 % — HIGH (ref 10.3–14.5)
SODIUM SERPL-SCNC: 146 MMOL/L — HIGH (ref 135–145)
WBC # BLD: 12.67 K/UL — HIGH (ref 3.8–10.5)
WBC # FLD AUTO: 12.67 K/UL — HIGH (ref 3.8–10.5)

## 2023-11-11 PROCEDURE — 99291 CRITICAL CARE FIRST HOUR: CPT

## 2023-11-11 PROCEDURE — 71045 X-RAY EXAM CHEST 1 VIEW: CPT | Mod: 26

## 2023-11-11 RX ORDER — POTASSIUM CHLORIDE 20 MEQ
40 PACKET (EA) ORAL EVERY 4 HOURS
Refills: 0 | Status: COMPLETED | OUTPATIENT
Start: 2023-11-11 | End: 2023-11-11

## 2023-11-11 RX ADMIN — Medication 650 MILLIGRAM(S): at 06:31

## 2023-11-11 RX ADMIN — Medication 650 MILLIGRAM(S): at 07:01

## 2023-11-11 RX ADMIN — Medication 4: at 11:41

## 2023-11-11 RX ADMIN — AZATHIOPRINE 50 MILLIGRAM(S): 100 TABLET ORAL at 05:27

## 2023-11-11 RX ADMIN — AZATHIOPRINE 50 MILLIGRAM(S): 100 TABLET ORAL at 18:10

## 2023-11-11 RX ADMIN — Medication 3 MILLILITER(S): at 17:47

## 2023-11-11 RX ADMIN — Medication 650 MILLIGRAM(S): at 13:00

## 2023-11-11 RX ADMIN — Medication 3 MILLILITER(S): at 06:16

## 2023-11-11 RX ADMIN — CHLORHEXIDINE GLUCONATE 15 MILLILITER(S): 213 SOLUTION TOPICAL at 05:28

## 2023-11-11 RX ADMIN — CHLORHEXIDINE GLUCONATE 1 APPLICATION(S): 213 SOLUTION TOPICAL at 21:10

## 2023-11-11 RX ADMIN — Medication 60 MILLIGRAM(S): at 05:27

## 2023-11-11 RX ADMIN — Medication 3 MILLILITER(S): at 11:18

## 2023-11-11 RX ADMIN — PANTOPRAZOLE SODIUM 40 MILLIGRAM(S): 20 TABLET, DELAYED RELEASE ORAL at 05:27

## 2023-11-11 RX ADMIN — CHLORHEXIDINE GLUCONATE 1 APPLICATION(S): 213 SOLUTION TOPICAL at 05:27

## 2023-11-11 RX ADMIN — Medication 650 MILLIGRAM(S): at 12:29

## 2023-11-11 RX ADMIN — Medication 3 MILLIGRAM(S): at 00:01

## 2023-11-11 RX ADMIN — Medication 40 MILLIEQUIVALENT(S): at 05:27

## 2023-11-11 RX ADMIN — Medication 40 MILLIEQUIVALENT(S): at 11:14

## 2023-11-11 RX ADMIN — ENOXAPARIN SODIUM 40 MILLIGRAM(S): 100 INJECTION SUBCUTANEOUS at 21:06

## 2023-11-11 RX ADMIN — CHLORHEXIDINE GLUCONATE 15 MILLILITER(S): 213 SOLUTION TOPICAL at 18:10

## 2023-11-11 RX ADMIN — Medication 3 MILLILITER(S): at 23:40

## 2023-11-11 NOTE — PROGRESS NOTE ADULT - ASSESSMENT
ASSESSMENT/PLAN: 64 y/o female with myasthenia gravis, respiratory failure. Demonstrating worsening respiratory effort on SBT, NIF and FVC below baseline required for extubation to BiPAP despite completion of IVIG. Plan is to d/w PT, family risks & benefits of PLEX - patient consented to insertion of shiley, initiation of PLEX - S/P tx w/ PLEX 1/5. Care initiated w/ [facility] neuromuscular neurology Dr Covarrubias. PT shows signs of improved respiratory effort. Continues to CPAP well, Secretions are improving at this time    NEURO:  Neurochecks q4h  PLEX tx 2/5, patient started azathioprine on 11/10   Daily CiPAP as tolerated  Recommend early ambulation w/ PT/OT  Neuromuscular Neurology following  Hold Mestinon, patient with copious secretions, will consider restarting prior to extubation, currently held in setting of copious secretions, retrial extubation in AM  Continue Prednisone to 60 mg QD  Avoid medications that may worsen the current exacerbation including macrolides, fluoroquinolones, tetracyclines, aminoglycoside, beta-blockers, magnesium, and anti-arrhythmics (procainamide, quinidine, and lidocaine)  Neurology following, appreciate their recommendations  Activity: [x] mobilize as tolerated [] Bedrest [x] PT [x] OT [] PMNR    PULM:  Intubated for airway protection  CPAP as tolerated; daily SBTs, not tolerating  CXR reviewed  Duonebs q6h for secretions  Check NIF/VC Q4 hours in ICU setting, NIF -20/  this AM  Aggressive Chest PT    CV:  MAP>65  TTE - EF 54%, elevated right atrial pressure   Off pressors    RENAL:  Stable renal function  IVL  daily IOs    GI:  Diet: ON TF  GI prophylaxis: PPI while on steroids + intubated  Bowel regimen: miralax, senna; dulcolax suppositories PRN  LBM: 11/8     ENDO:   FS goal 120-180  ISS and adjust insulin as needed  A1C 6.7    HEME/ONC:  Monitor H/H  SCDs  Chemoppx: SQL    ID:  No fevers, no leukocytosis  Less thick inline secretions, oral secretions  No signs of infection     Disposition: ICU ASSESSMENT/PLAN: 64 y/o female with myasthenia gravis, respiratory failure. Demonstrating worsening respiratory effort on SBT, NIF and FVC below baseline required for extubation to BiPAP despite completion of IVIG. Plan is to d/w PT, family risks & benefits of PLEX - patient consented to insertion of shiley, initiation of PLEX - S/P tx w/ PLEX 1/5. Care initiated w/ [facility] neuromuscular neurology Dr Covarrubias. PT shows signs of improved respiratory effort. Continues to CPAP well, Secretions are improving at this time    NEURO:  Neurochecks q4h  PLEX tx 2/5, patient started azathioprine on 11/10   Daily CiPAP as tolerated  Recommend early ambulation w/ PT/OT  Neuromuscular Neurology following  Hold Mestinon, patient with copious secretions, will consider restarting prior to extubation, currently held in setting of copious secretions, retrial extubation in AM  Continue Prednisone to 60 mg QD  Avoid medications that may worsen the current exacerbation including macrolides, fluoroquinolones, tetracyclines, aminoglycoside, beta-blockers, magnesium, and anti-arrhythmics (procainamide, quinidine, and lidocaine)  Neurology following, appreciate their recommendations  Activity: [x] mobilize as tolerated [] Bedrest [x] PT [x] OT [] PMNR    PULM:  Intubated for airway protection  CPAP as tolerated; daily SBTs, not tolerating  CXR reviewed  Duonebs q6h for secretions  Check NIF/VC Q4 hours in ICU setting, NIF -20/  this AM  Aggressive Chest PT    CV:  MAP>65  TTE - EF 54%, elevated right atrial pressure   Off pressors    RENAL:  Stable renal function  IVL  daily IOs    GI:  Diet: ON TF  GI prophylaxis: PPI while on steroids + intubated  Bowel regimen: miralax, senna; dulcolax suppositories PRN  LBM: 11/8     ENDO:   FS goal 120-180  ISS and adjust insulin as needed  A1C 6.7    HEME/ONC:  Monitor H/H  SCDs  Chemoppx: SQL    ID:  No fevers, no leukocytosis  Less thick inline secretions, oral secretions  No signs of infection     Disposition: ICU ASSESSMENT/PLAN: 62 y/o female with myasthenia gravis, respiratory failure. Demonstrating worsening respiratory effort on SBT, NIF and FVC below baseline required for extubation to BiPAP despite completion of IVIG. Plan is to d/w PT, family risks & benefits of PLEX - patient consented to insertion of shiley, initiation of PLEX - S/P tx w/ PLEX 1/5. Care initiated w/ [facility] neuromuscular neurology Dr Covarrubias. PT shows signs of improved respiratory effort. Continues to CPAP well, Secretions are improving at this time    NEURO:  Neurochecks q4h  PLEX tx 2/5, patient started azathioprine on 11/10   Daily CiPAP as tolerated  Recommend early ambulation w/ PT/OT  Neuromuscular Neurology following  Hold Mestinon, patient with copious secretions, will consider restarting prior to extubation, currently held in setting of copious secretions, retrial extubation in AM  Continue Prednisone to 60 mg QD  Avoid medications that may worsen the current exacerbation including macrolides, fluoroquinolones, tetracyclines, aminoglycoside, beta-blockers, magnesium, and anti-arrhythmics (procainamide, quinidine, and lidocaine)  Neurology following, appreciate their recommendations  Activity: [x] mobilize as tolerated [] Bedrest [x] PT [x] OT [] PMNR    PULM:  Intubated for airway protection  CPAP as tolerated; daily SBTs, not tolerating  CXR reviewed  Duonebs q6h for secretions  Check NIF/VC Q4 hours in ICU setting, NIF -20/  this AM  Aggressive Chest PT    CV:  MAP>65  TTE - EF 54%, elevated right atrial pressure   Off pressors    RENAL:  Stable renal function  IVL  daily IOs    GI:  Diet: ON TF  GI prophylaxis: PPI while on steroids + intubated  Bowel regimen: miralax, senna; dulcolax suppositories PRN  LBM: 11/8     ENDO:   FS goal 120-180  ISS and adjust insulin as needed  A1C 6.7    HEME/ONC:  Monitor H/H  SCDs  Chemoppx: SQL    ID:  No fevers, no leukocytosis  Less thick inline secretions, oral secretions  No signs of infection     Disposition: ICU ASSESSMENT/PLAN: 62 y/o female with myasthenia gravis, respiratory failure. Demonstrating worsening respiratory effort on SBT, NIF and FVC below baseline required for extubation to BiPAP despite completion of IVIG. Plan is to d/w PT, family risks & benefits of PLEX - patient consented to insertion of shiley, initiation of PLEX - S/P tx w/ PLEX 1/5. Care initiated w/ [facility] neuromuscular neurology Dr Covarrubias. PT shows signs of improved respiratory effort. Continues to CPAP well, Secretions are improving at this time    NEURO:  Neurochecks q4h  PLEX tx 2/5, patient started azathioprine on 11/10   Recommend early ambulation w/ PT/OT  Neuromuscular Neurology following  Hold Mestinon, patient with copious secretions, will consider restarting prior to extubation, currently held in setting of copious secretions, retrial extubation in AM  Continue Prednisone to 60 mg QD  Avoid medications that may worsen the current exacerbation including macrolides, fluoroquinolones, tetracyclines, aminoglycoside, beta-blockers, magnesium, and anti-arrhythmics (procainamide, quinidine, and lidocaine)  Neurology following, appreciate their recommendations  Activity: [x] mobilize as tolerated [] Bedrest [x] PT [x] OT [] PMNR    PULM:  Intubated for airway protection  CPAP as tolerated; daily SBTs, not tolerating  CXR reviewed  Duonebs q6h for secretions (oral)  Check NIF/VC Q4 hours in ICU setting, NIF -20/  this AM  Aggressive Chest PT    CV:  MAP>65  TTE - EF 54%, elevated right atrial pressure     RENAL:  Stable renal function  IVL  daily IOs    GI:  Diet: ON TF  GI prophylaxis: PPI while on steroids + intubated  Bowel regimen: miralax, senna; dulcolax suppositories PRN  LBM: 11/11    ENDO:   FS goal 120-180  Medium scale  A1C 6.7    HEME/ONC:  Monitor H/H  SCDs  Chemoppx: SQL    ID:  No fevers, elevated leukocytosis  Less thick inline secretions, oral secretions  No signs of infection     Disposition: ICU ASSESSMENT/PLAN: 62 y/o female with myasthenia gravis, respiratory failure. Demonstrating worsening respiratory effort on SBT, NIF and FVC below baseline required for extubation to BiPAP despite completion of IVIG. Plan is to d/w PT, family risks & benefits of PLEX - patient consented to insertion of shiley, initiation of PLEX - S/P tx w/ PLEX 1/5. Care initiated w/ [facility] neuromuscular neurology Dr Covarrubias. PT shows signs of improved respiratory effort. Continues to CPAP well, Secretions are improving at this time    NEURO:  Neurochecks q4h  PLEX tx 2/5, patient started azathioprine on 11/10   Recommend early ambulation w/ PT/OT  Neuromuscular Neurology following  Hold Mestinon, patient with copious secretions, will consider restarting prior to extubation, currently held in setting of copious secretions, retrial extubation in AM  Continue Prednisone to 60 mg QD  Avoid medications that may worsen the current exacerbation including macrolides, fluoroquinolones, tetracyclines, aminoglycoside, beta-blockers, magnesium, and anti-arrhythmics (procainamide, quinidine, and lidocaine)  Neurology following, appreciate their recommendations  Activity: [x] mobilize as tolerated [] Bedrest [x] PT [x] OT [] PMNR    PULM:  Intubated for airway protection  CPAP as tolerated; daily SBTs, not tolerating  CXR reviewed  Duonebs q6h for secretions (oral)  Check NIF/VC Q4 hours in ICU setting, NIF -20/  this AM  Aggressive Chest PT    CV:  MAP>65  TTE - EF 54%, elevated right atrial pressure     RENAL:  Stable renal function  IVL  daily IOs    GI:  Diet: ON TF  GI prophylaxis: PPI while on steroids + intubated  Bowel regimen: miralax, senna; dulcolax suppositories PRN  LBM: 11/11    ENDO:   FS goal 120-180  Medium scale  A1C 6.7    HEME/ONC:  Monitor H/H  SCDs  Chemoppx: SQL    ID:  No fevers, elevated leukocytosis  Less thick inline secretions, oral secretions  No signs of infection     Disposition: ICU ASSESSMENT/PLAN: 64 y/o female with myasthenia gravis, respiratory failure. Demonstrating worsening respiratory effort on SBT, NIF and FVC below baseline required for extubation to BiPAP despite completion of IVIG. Plan is to d/w PT, family risks & benefits of PLEX - patient consented to insertion of shiley, initiation of PLEX - S/P tx w/ PLEX 1/5. Care initiated w/ [facility] neuromuscular neurology Dr Covarrubias. PT shows signs of improved respiratory effort. Continues to CPAP well, Secretions are improving at this time    NEURO:  Neurochecks q4h  PLEX tx 2/5, patient started azathioprine on 11/10   Recommend early ambulation w/ PT/OT  Neuromuscular Neurology following  Hold Mestinon, patient with copious secretions, will consider restarting prior to extubation, currently held in setting of copious secretions, retrial extubation in AM  Continue Prednisone to 60 mg QD  Avoid medications that may worsen the current exacerbation including macrolides, fluoroquinolones, tetracyclines, aminoglycoside, beta-blockers, magnesium, and anti-arrhythmics (procainamide, quinidine, and lidocaine)  Neurology following, appreciate their recommendations  Activity: [x] mobilize as tolerated [] Bedrest [x] PT [x] OT [] PMNR    PULM:  Intubated for airway protection  CPAP as tolerated; daily SBTs, not tolerating  CXR reviewed  Duonebs q6h for secretions (oral)  Check NIF/VC Q4 hours in ICU setting, NIF -20/  this AM  Aggressive Chest PT    CV:  MAP>65  TTE - EF 54%, elevated right atrial pressure     RENAL:  Stable renal function  IVL  daily IOs    GI:  Diet: ON TF  GI prophylaxis: PPI while on steroids + intubated  Bowel regimen: miralax, senna; dulcolax suppositories PRN  LBM: 11/11    ENDO:   FS goal 120-180  Medium scale  A1C 6.7    HEME/ONC:  Monitor H/H  SCDs  Chemoppx: SQL    ID:  No fevers, elevated leukocytosis  Less thick inline secretions, oral secretions  No signs of infection     Disposition: ICU

## 2023-11-11 NOTE — PROGRESS NOTE ADULT - SUBJECTIVE AND OBJECTIVE BOX
HOSPITAL COURSE:  Patient KAIDEN HATCH is a 63y woman presenting with myasthenia gravis exacerbation.    Admission Scores  GCS: 15     24 hour Events: Patient evaluated at bedside. Reports improvement in symptoms w/ exception to copious oral secretions. No complaints of pain or dyspnea.     11/4- Patient intubated overnight for worsening respiratory status. Patient received dose 2/5 of IVIG  11/6- No events; poor respiratory effort on SBT  11/7- Apneic on SBT; worsening respiratory effort; last dose of IVIG TODAY  11/8- NIF -12 and  this AM. PT received shiley in right IJ w/ conscious sedation for initiation of PLEX. S/P tx 1/5 w/ PLEX.  11/9- NIF -20 and VC 630this AM.  11/10- Patient due for second dose of PLEX. No events overnight.   11/11- No events overnight.     Allergies    peanuts (Unknown)  No Known Drug Allergies    Intolerances        REVIEW OF SYSTEMS: [ ] Unable to Assess due to neurologic exam   [ x] All ROS addressed below are non-contributory, except:  Neuro: [ ] Headache [ ] Back pain [ ] Numbness [ ] Weakness [ ] Ataxia [ ] Dizziness [ ] Aphasia [ ] Dysarthria [ ] Visual disturbance  Resp: [ ] Shortness of breath/dyspnea, [ ] Orthopnea [ ] Cough  CV: [ ] Chest pain [ ] Palpitation [ ] Lightheadedness [ ] Syncope  Renal: [ ] Thirst [ ] Edema  GI: [ ] Nausea [ ] Emesis [ ] Abdominal pain [ ] Constipation [ ] Diarrhea  Hem: [ ] Hematemesis [ ] bright red blood per rectum  ID: [ ] Fever [ ] Chills [ ] Dysuria  ENT: [ ] Rhinorrhea      DEVICES:   [ ] Restraints [ ] ET tube [ ] central line [ ] arterial line [ ] morales [ ] NGT/OGT [ ] EVD [ ] LD [ ] MYAH/HMV [ ] Trach [ ] PEG [ ] Chest Tube     VITALS:   Vital Signs Last 24 Hrs  T(C): 36.9 (11 Nov 2023 03:00), Max: 37.8 (10 Nov 2023 15:00)  T(F): 98.5 (11 Nov 2023 03:00), Max: 100 (10 Nov 2023 15:00)  HR: 74 (11 Nov 2023 05:00) (46 - 119)  BP: 126/59 (11 Nov 2023 05:00) (90/52 - 143/69)  BP(mean): 78 (11 Nov 2023 05:00) (62 - 91)  RR: 16 (11 Nov 2023 05:00) (16 - 17)  SpO2: 100% (11 Nov 2023 05:00) (97% - 100%)    Parameters below as of 10 Nov 2023 21:31  Patient On (Oxygen Delivery Method): ventilator      CAPILLARY BLOOD GLUCOSE      POCT Blood Glucose.: 123 mg/dL (11 Nov 2023 06:30)  POCT Blood Glucose.: 127 mg/dL (11 Nov 2023 01:53)  POCT Blood Glucose.: 101 mg/dL (10 Nov 2023 20:25)  POCT Blood Glucose.: 149 mg/dL (10 Nov 2023 18:56)  POCT Blood Glucose.: 304 mg/dL (10 Nov 2023 17:37)  POCT Blood Glucose.: 214 mg/dL (10 Nov 2023 11:38)    I&O's Summary    10 Nov 2023 07:01  -  11 Nov 2023 07:00  --------------------------------------------------------  IN: 1200 mL / OUT: 300 mL / NET: 900 mL        Respiratory:  Mode: AC/ CMV (Assist Control/ Continuous Mandatory Ventilation)  RR (machine): 16  TV (machine): 420  FiO2: 30  PEEP: 5  ITime: 1  MAP: 11  PIP: 29        LABS:                        10.5   12.67 )-----------( 207      ( 11 Nov 2023 02:37 )             31.9     11-11    146<H>  |  113<H>  |  39<H>  ----------------------------<  133<H>  3.4<L>   |  21<L>  |  0.59             MEDICATION LEVELS:     IVF FLUIDS/MEDICATIONS:   MEDICATIONS  (STANDING):  albuterol/ipratropium for Nebulization 3 milliLiter(s) Nebulizer every 6 hours  azaTHIOprine 50 milliGRAM(s) Oral two times a day  chlorhexidine 0.12% Liquid 15 milliLiter(s) Oral Mucosa every 12 hours  chlorhexidine 4% Liquid 1 Application(s) Topical <User Schedule>  chlorhexidine 4% Liquid 1 Application(s) Topical at bedtime  dextrose 50% Injectable 50 milliLiter(s) IV Push every 15 minutes  dextrose 50% Injectable 25 milliLiter(s) IV Push every 15 minutes  enoxaparin Injectable 40 milliGRAM(s) SubCutaneous every 24 hours  insulin lispro (ADMELOG) corrective regimen sliding scale   SubCutaneous every 6 hours  pantoprazole   Suspension 40 milliGRAM(s) Oral before breakfast  polyethylene glycol 3350 17 Gram(s) Oral every 12 hours  potassium chloride   Solution 40 milliEquivalent(s) Oral every 4 hours  predniSONE   Tablet 60 milliGRAM(s) Oral daily    MEDICATIONS  (PRN):  acetaminophen   Oral Liquid .. 650 milliGRAM(s) Oral every 6 hours PRN Temp greater or equal to 38C (100.4F), Mild Pain (1 - 3)  ketorolac   Injectable 30 milliGRAM(s) IV Push every 8 hours PRN Mild Pain (1 - 3)  oxyCODONE    IR 5 milliGRAM(s) Oral every 4 hours PRN Moderate Pain (4 - 6)  senna 2 Tablet(s) Oral at bedtime PRN Constipation  sodium chloride 0.9% lock flush 10 milliLiter(s) IV Push every 1 hour PRN Pre/post blood products, medications, blood draw, and to maintain line patency        IMAGING:      EXAMINATION:  PHYSICAL EXAM:    Constitutional: No Acute Distress     Neurological: Awake, alert oriented to person, place and time, Following Commands, PERRL, EOMI, No Gaze Preference, Face Symmetrical, Speech Fluent, No dysmetria, No ataxia, No nystagmus     Motor exam:          Upper extremity                         Delt     Bicep     Tricep    HG                                                 R         5/5        5/5        5/5       5/5                                               L          5/5        5/5        5/5       5/5          Lower extremity                        HF         KF        KE       DF         PF                                                  R        5/5        5/5        5/5       5/5         5/5                                               L         5/5        5/5       5/5       5/5          5/5                                                 Sensation: [ ] intact to light touch  [ ] decreased:     Reflexes: Deep Tendon Reflexes Intact     Pulmonary: Clear to Auscultation, No rales, No rhonchi, No wheezes     Cardiovascular: S1, S2, Regular rate and rhythm     Gastrointestinal: Soft, Non-tender, Non-distended     Extremities: No calf tenderness     Incision:    HOSPITAL COURSE:  Patient KAIDEN HATCH is a 63y woman presenting with myasthenia gravis exacerbation.    Admission Scores  GCS: 15     24 hour Events: Patient evaluated at bedside. Reports improvement in symptoms w/ exception to copious oral secretions. No complaints of pain or dyspnea.     11/4- Patient intubated overnight for worsening respiratory status. Patient received dose 2/5 of IVIG  11/6- No events; poor respiratory effort on SBT  11/7- Apneic on SBT; worsening respiratory effort; last dose of IVIG TODAY  11/8- NIF -12 and  this AM. PT received shiley in right IJ w/ conscious sedation for initiation of PLEX. S/P tx 1/5 w/ PLEX.  11/9- NIF -20 and VC 630this AM.  11/10- Patient due for second dose of PLEX. No events overnight.   11/11- No events overnight.     Allergies    peanuts (Unknown)  No Known Drug Allergies    Intolerances        REVIEW OF SYSTEMS: [ ] Unable to Assess due to neurologic exam   [ x] All ROS addressed below are non-contributory, except:  Neuro: [ ] Headache [ ] Back pain [ ] Numbness [ ] Weakness [ ] Ataxia [ ] Dizziness [ ] Aphasia [ ] Dysarthria [ ] Visual disturbance  Resp: [ ] Shortness of breath/dyspnea, [ ] Orthopnea [ ] Cough  CV: [ ] Chest pain [ ] Palpitation [ ] Lightheadedness [ ] Syncope  Renal: [ ] Thirst [ ] Edema  GI: [ ] Nausea [ ] Emesis [ ] Abdominal pain [ ] Constipation [ ] Diarrhea  Hem: [ ] Hematemesis [ ] bright red blood per rectum  ID: [ ] Fever [ ] Chills [ ] Dysuria  ENT: [ ] Rhinorrhea      DEVICES:   [ ] Restraints [ ] ET tube [ ] central line [ ] arterial line [ ] morales [ ] NGT/OGT [ ] EVD [ ] LD [ ] MYAH/HMV [ ] Trach [ ] PEG [ ] Chest Tube     VITALS:   Vital Signs Last 24 Hrs  T(C): 36.9 (11 Nov 2023 03:00), Max: 37.8 (10 Nov 2023 15:00)  T(F): 98.5 (11 Nov 2023 03:00), Max: 100 (10 Nov 2023 15:00)  HR: 74 (11 Nov 2023 05:00) (46 - 119)  BP: 126/59 (11 Nov 2023 05:00) (90/52 - 143/69)  BP(mean): 78 (11 Nov 2023 05:00) (62 - 91)  RR: 16 (11 Nov 2023 05:00) (16 - 17)  SpO2: 100% (11 Nov 2023 05:00) (97% - 100%)    Parameters below as of 10 Nov 2023 21:31  Patient On (Oxygen Delivery Method): ventilator      CAPILLARY BLOOD GLUCOSE      POCT Blood Glucose.: 123 mg/dL (11 Nov 2023 06:30)  POCT Blood Glucose.: 127 mg/dL (11 Nov 2023 01:53)  POCT Blood Glucose.: 101 mg/dL (10 Nov 2023 20:25)  POCT Blood Glucose.: 149 mg/dL (10 Nov 2023 18:56)  POCT Blood Glucose.: 304 mg/dL (10 Nov 2023 17:37)  POCT Blood Glucose.: 214 mg/dL (10 Nov 2023 11:38)    I&O's Summary    10 Nov 2023 07:01  -  11 Nov 2023 07:00  --------------------------------------------------------  IN: 1200 mL / OUT: 300 mL / NET: 900 mL        Respiratory:  Mode: AC/ CMV (Assist Control/ Continuous Mandatory Ventilation)  RR (machine): 16  TV (machine): 420  FiO2: 30  PEEP: 5  ITime: 1  MAP: 11  PIP: 29        LABS:                        10.5   12.67 )-----------( 207      ( 11 Nov 2023 02:37 )             31.9     11-11    146<H>  |  113<H>  |  39<H>  ----------------------------<  133<H>  3.4<L>   |  21<L>  |  0.59             MEDICATION LEVELS:     IVF FLUIDS/MEDICATIONS:   MEDICATIONS  (STANDING):  albuterol/ipratropium for Nebulization 3 milliLiter(s) Nebulizer every 6 hours  azaTHIOprine 50 milliGRAM(s) Oral two times a day  chlorhexidine 0.12% Liquid 15 milliLiter(s) Oral Mucosa every 12 hours  chlorhexidine 4% Liquid 1 Application(s) Topical <User Schedule>  chlorhexidine 4% Liquid 1 Application(s) Topical at bedtime  dextrose 50% Injectable 50 milliLiter(s) IV Push every 15 minutes  dextrose 50% Injectable 25 milliLiter(s) IV Push every 15 minutes  enoxaparin Injectable 40 milliGRAM(s) SubCutaneous every 24 hours  insulin lispro (ADMELOG) corrective regimen sliding scale   SubCutaneous every 6 hours  pantoprazole   Suspension 40 milliGRAM(s) Oral before breakfast  polyethylene glycol 3350 17 Gram(s) Oral every 12 hours  potassium chloride   Solution 40 milliEquivalent(s) Oral every 4 hours  predniSONE   Tablet 60 milliGRAM(s) Oral daily    MEDICATIONS  (PRN):  acetaminophen   Oral Liquid .. 650 milliGRAM(s) Oral every 6 hours PRN Temp greater or equal to 38C (100.4F), Mild Pain (1 - 3)  ketorolac   Injectable 30 milliGRAM(s) IV Push every 8 hours PRN Mild Pain (1 - 3)  oxyCODONE    IR 5 milliGRAM(s) Oral every 4 hours PRN Moderate Pain (4 - 6)  senna 2 Tablet(s) Oral at bedtime PRN Constipation  sodium chloride 0.9% lock flush 10 milliLiter(s) IV Push every 1 hour PRN Pre/post blood products, medications, blood draw, and to maintain line patency        IMAGING:      EXAMINATION:  PHYSICAL EXAM:    Constitutional: Intubated    Neurological: Awake, alert oriented to person, place and time (WITH choices), Following Commands, PERRL, EOMI, No Gaze Preference, Face Symmetrical. Neck extension 5/5, Neck flexion 4+/5.    Motor exam:          Upper extremity                         Delt     Bicep     Tricep    HG                                                 R         4-/5        5/5        5/5       5/5                                               L          4/5        5/5        5/5       5/5          Lower extremity                        HF         KF        KE       DF         PF                                                  R        5/5        5/5        5/5       5/5         5/5                                               L         5/5        5/5       5/5       5/5          5/5                                                 Sensation: [x ] intact to light touch  [ ] decreased:     Pulmonary: Clear to Auscultation, No rales, No rhonchi, No wheezes     Cardiovascular: S1, S2, Regular rate and rhythm     Gastrointestinal: Soft, Non-tender, Non-distended     Extremities: No calf tenderness     Incision:    HOSPITAL COURSE:  Patient KAIDEN HATCH is a 63y woman presenting with myasthenia gravis exacerbation.    Admission Scores  GCS: 15     24 hour Events: Patient evaluated at bedside. Reports improvement in symptoms w/ exception to copious oral secretions. No complaints of pain or dyspnea.     11/4- Patient intubated overnight for worsening respiratory status. Patient received dose 2/5 of IVIG  11/6- No events; poor respiratory effort on SBT  11/7- Apneic on SBT; worsening respiratory effort; last dose of IVIG TODAY  11/8- NIF -12 and  this AM. PT received shiley in right IJ w/ conscious sedation for initiation of PLEX. S/P tx 1/5 w/ PLEX.  11/9- NIF -20 and VC 630this AM.  11/10- Patient due for second dose of PLEX. No events overnight.   11/11- No events overnight. Has been tolerating CPAP since 5 AM. Oral secretions present. Strength improved.     Allergies    peanuts (Unknown)  No Known Drug Allergies    Intolerances        REVIEW OF SYSTEMS: [ ] Unable to Assess due to neurologic exam   [ x] All ROS addressed below are non-contributory, except:  Neuro: [ ] Headache [ ] Back pain [ ] Numbness [ ] Weakness [ ] Ataxia [ ] Dizziness [ ] Aphasia [ ] Dysarthria [ ] Visual disturbance  Resp: [ ] Shortness of breath/dyspnea, [ ] Orthopnea [ ] Cough  CV: [ ] Chest pain [ ] Palpitation [ ] Lightheadedness [ ] Syncope  Renal: [ ] Thirst [ ] Edema  GI: [ ] Nausea [ ] Emesis [ ] Abdominal pain [ ] Constipation [ ] Diarrhea  Hem: [ ] Hematemesis [ ] bright red blood per rectum  ID: [ ] Fever [ ] Chills [ ] Dysuria  ENT: [ ] Rhinorrhea      DEVICES:   [ ] Restraints [ ] ET tube [ ] central line [ ] arterial line [ ] morales [ ] NGT/OGT [ ] EVD [ ] LD [ ] MYAH/HMV [ ] Trach [ ] PEG [ ] Chest Tube     VITALS:   Vital Signs Last 24 Hrs  T(C): 36.9 (11 Nov 2023 03:00), Max: 37.8 (10 Nov 2023 15:00)  T(F): 98.5 (11 Nov 2023 03:00), Max: 100 (10 Nov 2023 15:00)  HR: 74 (11 Nov 2023 05:00) (46 - 119)  BP: 126/59 (11 Nov 2023 05:00) (90/52 - 143/69)  BP(mean): 78 (11 Nov 2023 05:00) (62 - 91)  RR: 16 (11 Nov 2023 05:00) (16 - 17)  SpO2: 100% (11 Nov 2023 05:00) (97% - 100%)    Parameters below as of 10 Nov 2023 21:31  Patient On (Oxygen Delivery Method): ventilator      CAPILLARY BLOOD GLUCOSE      POCT Blood Glucose.: 123 mg/dL (11 Nov 2023 06:30)  POCT Blood Glucose.: 127 mg/dL (11 Nov 2023 01:53)  POCT Blood Glucose.: 101 mg/dL (10 Nov 2023 20:25)  POCT Blood Glucose.: 149 mg/dL (10 Nov 2023 18:56)  POCT Blood Glucose.: 304 mg/dL (10 Nov 2023 17:37)  POCT Blood Glucose.: 214 mg/dL (10 Nov 2023 11:38)    I&O's Summary    10 Nov 2023 07:01  -  11 Nov 2023 07:00  --------------------------------------------------------  IN: 1200 mL / OUT: 300 mL / NET: 900 mL        Respiratory:  Mode: AC/ CMV (Assist Control/ Continuous Mandatory Ventilation)  RR (machine): 16  TV (machine): 420  FiO2: 30  PEEP: 5  ITime: 1  MAP: 11  PIP: 29        LABS:                        10.5   12.67 )-----------( 207      ( 11 Nov 2023 02:37 )             31.9     11-11    146<H>  |  113<H>  |  39<H>  ----------------------------<  133<H>  3.4<L>   |  21<L>  |  0.59             MEDICATION LEVELS:     IVF FLUIDS/MEDICATIONS:   MEDICATIONS  (STANDING):  albuterol/ipratropium for Nebulization 3 milliLiter(s) Nebulizer every 6 hours  azaTHIOprine 50 milliGRAM(s) Oral two times a day  chlorhexidine 0.12% Liquid 15 milliLiter(s) Oral Mucosa every 12 hours  chlorhexidine 4% Liquid 1 Application(s) Topical <User Schedule>  chlorhexidine 4% Liquid 1 Application(s) Topical at bedtime  dextrose 50% Injectable 50 milliLiter(s) IV Push every 15 minutes  dextrose 50% Injectable 25 milliLiter(s) IV Push every 15 minutes  enoxaparin Injectable 40 milliGRAM(s) SubCutaneous every 24 hours  insulin lispro (ADMELOG) corrective regimen sliding scale   SubCutaneous every 6 hours  pantoprazole   Suspension 40 milliGRAM(s) Oral before breakfast  polyethylene glycol 3350 17 Gram(s) Oral every 12 hours  potassium chloride   Solution 40 milliEquivalent(s) Oral every 4 hours  predniSONE   Tablet 60 milliGRAM(s) Oral daily    MEDICATIONS  (PRN):  acetaminophen   Oral Liquid .. 650 milliGRAM(s) Oral every 6 hours PRN Temp greater or equal to 38C (100.4F), Mild Pain (1 - 3)  ketorolac   Injectable 30 milliGRAM(s) IV Push every 8 hours PRN Mild Pain (1 - 3)  oxyCODONE    IR 5 milliGRAM(s) Oral every 4 hours PRN Moderate Pain (4 - 6)  senna 2 Tablet(s) Oral at bedtime PRN Constipation  sodium chloride 0.9% lock flush 10 milliLiter(s) IV Push every 1 hour PRN Pre/post blood products, medications, blood draw, and to maintain line patency        IMAGING:      EXAMINATION:  PHYSICAL EXAM:    Constitutional: Intubated    Neurological: Awake, alert oriented to person, place and time (WITH choices), Following Commands, PERRL, EOMI, No Gaze Preference, Face Symmetrical. Neck extension 5/5, Neck flexion 4+/5.    Motor exam:          Upper extremity                         Delt     Bicep     Tricep    HG                                                 R         4-/5        5/5        5/5       5/5                                               L          4/5        5/5        5/5       5/5          Lower extremity                        HF         KF        KE       DF         PF                                                  R        5/5        5/5        5/5       5/5         5/5                                               L         5/5        5/5       5/5       5/5          5/5                                                 Sensation: [x ] intact to light touch  [ ] decreased:     Pulmonary: Clear to Auscultation, No rales, No rhonchi, No wheezes     Cardiovascular: S1, S2, Regular rate and rhythm     Gastrointestinal: Soft, Non-tender, Non-distended     Extremities: No calf tenderness     Incision:

## 2023-11-11 NOTE — PROGRESS NOTE ADULT - ASSESSMENT
ASSESSMENT/PLAN: 64 y/o female with myasthenia gravis, respiratory failure. Demonstrating worsening respiratory effort on SBT, NIF and FVC below baseline required for extubation to BiPAP despite completion of IVIG. Plan is to d/w PT, family risks & benefits of PLEX - patient consented to insertion of shiley, initiation of PLEX - S/P tx w/ PLEX 1/5. Care initiated w/ [facility] neuromuscular neurology Dr Covarrubias. PT shows signs of improved respiratory effort. Continues to CPAP well, Secretions are improving at this time    NEURO:  Neurochecks q4h  PLEX tx 2/5, patient started azathioprine on 11/10   Recommend early ambulation w/ PT/OT  Neuromuscular Neurology following  Hold Mestinon, patient with copious secretions, will consider restarting prior to extubation, currently held in setting of copious secretions, retrial extubation in AM  Continue Prednisone to 60 mg QD  Avoid medications that may worsen the current exacerbation including macrolides, fluoroquinolones, tetracyclines, aminoglycoside, beta-blockers, magnesium, and anti-arrhythmics (procainamide, quinidine, and lidocaine)  Neurology following, appreciate their recommendations  Activity: [x] mobilize as tolerated [] Bedrest [x] PT [x] OT [] PMNR    PULM:  Intubated for airway protection  CPAP as tolerated; daily SBTs, not tolerating  CXR reviewed  Duonebs q6h for secretions (oral)  Check NIF/VC Q4 hours in ICU setting, NIF -20/  this AM  Aggressive Chest PT    CV:  MAP>65  TTE - EF 54%, elevated right atrial pressure     RENAL:  Stable renal function  IVL  daily IOs    GI:  Diet: ON TF  GI prophylaxis: PPI while on steroids + intubated  Bowel regimen: miralax, senna; dulcolax suppositories PRN  LBM: 11/11    ENDO:   FS goal 120-180  Medium scale  A1C 6.7    HEME/ONC:  Monitor H/H  SCDs  Chemoppx: SQL    ID:  No fevers, elevated leukocytosis  Less thick inline secretions, oral secretions  No signs of infection     Disposition: ICU ASSESSMENT/PLAN: 62 y/o female with myasthenia gravis, respiratory failure. Demonstrating worsening respiratory effort on SBT, NIF and FVC below baseline required for extubation to BiPAP despite completion of IVIG. Plan is to d/w PT, family risks & benefits of PLEX - patient consented to insertion of shiley, initiation of PLEX - S/P tx w/ PLEX 1/5. Care initiated w/ [facility] neuromuscular neurology Dr Covarrubias. PT shows signs of improved respiratory effort. Continues to CPAP well, Secretions are improving at this time    NEURO:  Neurochecks q4h  PLEX tx 2/5, patient started azathioprine on 11/10   Recommend early ambulation w/ PT/OT  Neuromuscular Neurology following  Hold Mestinon, patient with copious secretions, will consider restarting prior to extubation, currently held in setting of copious secretions, retrial extubation in AM  Continue Prednisone to 60 mg QD  Avoid medications that may worsen the current exacerbation including macrolides, fluoroquinolones, tetracyclines, aminoglycoside, beta-blockers, magnesium, and anti-arrhythmics (procainamide, quinidine, and lidocaine)  Neurology following, appreciate their recommendations  Activity: [x] mobilize as tolerated [] Bedrest [x] PT [x] OT [] PMNR    PULM:  Intubated for airway protection  CPAP as tolerated; daily SBTs, not tolerating  CXR reviewed  Duonebs q6h for secretions (oral)  Check NIF/VC Q4 hours in ICU setting, NIF -20/  this AM  Aggressive Chest PT    CV:  MAP>65  TTE - EF 54%, elevated right atrial pressure     RENAL:  Stable renal function  IVL  daily IOs    GI:  Diet: ON TF  GI prophylaxis: PPI while on steroids + intubated  Bowel regimen: miralax, senna; dulcolax suppositories PRN  LBM: 11/11    ENDO:   FS goal 120-180  Medium scale  A1C 6.7    HEME/ONC:  Monitor H/H  SCDs  Chemoppx: SQL    ID:  No fevers, elevated leukocytosis  Less thick inline secretions, oral secretions  No signs of infection     Disposition: ICU ASSESSMENT/PLAN: 62 y/o female with myasthenia gravis, respiratory failure. Demonstrating worsening respiratory effort on SBT, NIF and FVC below baseline required for extubation to BiPAP despite completion of IVIG. Plan is to d/w PT, family risks & benefits of PLEX - patient consented to insertion of shiley, initiation of PLEX - S/P tx w/ PLEX 1/5. Care initiated w/ [facility] neuromuscular neurology Dr Covarrubias. PT shows signs of improved respiratory effort. Continues to CPAP well, Secretions are improving at this time    NEURO:  Neurochecks q4h  PLEX tx 2/5, patient started azathioprine on 11/10   Recommend early ambulation w/ PT/OT  Neuromuscular Neurology following  Hold Mestinon, patient with copious secretions, will consider restarting prior to extubation, currently held in setting of copious secretions, retrial extubation in AM  Continue Prednisone to 60 mg QD  Avoid medications that may worsen the current exacerbation including macrolides, fluoroquinolones, tetracyclines, aminoglycoside, beta-blockers, magnesium, and anti-arrhythmics (procainamide, quinidine, and lidocaine)  Neurology following, appreciate their recommendations  Activity: [x] mobilize as tolerated [] Bedrest [x] PT [x] OT [] PMNR    PULM:  Intubated for airway protection  CPAP as tolerated; daily SBTs, not tolerating  CXR reviewed  Duonebs q6h for secretions (oral)  Check NIF/VC Q4 hours in ICU setting, NIF -20/  this AM  Aggressive Chest PT    CV:  MAP>65  TTE - EF 54%, elevated right atrial pressure     RENAL:  Stable renal function  IVL  daily IOs    GI:  Diet: ON TF  GI prophylaxis: PPI while on steroids + intubated  Bowel regimen: miralax, senna; dulcolax suppositories PRN  LBM: 11/11    ENDO:   FS goal 120-180  Medium scale  A1C 6.7    HEME/ONC:  Monitor H/H  SCDs  Chemoppx: SQL    ID:  No fevers, elevated leukocytosis  Less thick inline secretions, oral secretions  No signs of infection     Disposition: ICU ASSESSMENT/PLAN: 62 y/o female with myasthenia gravis, respiratory failure. Demonstrating worsening respiratory effort on SBT, NIF and FVC below baseline required for extubation to BiPAP despite completion of IVIG. Plan is to d/w PT, family risks & benefits of PLEX - patient consented to insertion of shiley, initiation of PLEX - S/P tx w/ PLEX 1/5. Care initiated w/ [facility] neuromuscular neurology Dr Covarrubias. PT shows signs of improved respiratory effort. Continues to CPAP well, Secretions are improving at this time    NEURO:  Neurochecks q4h  PLEX tx 2/5, patient started azathioprine on 11/10   Recommend early ambulation w/ PT/OT  Neurology following  Hold Mestinon, patient with copious secretions, will consider restarting prior to extubation, currently held in setting of copious secretions, retrial extubation in AM  Continue Prednisone to 60 mg QD  Avoid medications that may worsen the current exacerbation including macrolides, fluoroquinolones, tetracyclines, aminoglycoside, beta-blockers, magnesium, and anti-arrhythmics (procainamide, quinidine, and lidocaine)  Neurology following, appreciate their recommendations  Activity: [x] mobilize as tolerated [] Bedrest [x] PT [x] OT [] PMNR    PULM:  Intubated for airway protection  CPAP as tolerated; daily SBTs, not tolerating  CXR reviewed  Duonebs q6h for secretions (oral)  Check NIF/VC Q4 hours in ICU setting  Aggressive Chest PT    CV:  MAP>65  TTE - EF 54%, elevated right atrial pressure     RENAL:  Stable renal function  IVL  daily IOs    GI:  Diet: ON TF  GI prophylaxis: PPI while on steroids + intubated  Bowel regimen: miralax, senna; dulcolax suppositories PRN  LBM: 11/11    ENDO:   FS goal 120-180  Medium scale  A1C 6.7    HEME/ONC:  Monitor H/H  SCDs  Chemoppx: SQL    ID:  No fevers, elevated leukocytosis  Less thick inline secretions, oral secretions  No signs of infection     Disposition: ICU ASSESSMENT/PLAN: 62 y/o female with myasthenia gravis, respiratory failure. Demonstrating worsening respiratory effort on SBT, NIF and FVC below baseline required for extubation to BiPAP despite completion of IVIG. Plan is to d/w PT, family risks & benefits of PLEX - patient consented to insertion of shiley, initiation of PLEX - S/P tx w/ PLEX 1/5. Care initiated w/ [facility] neuromuscular neurology Dr Covarrubias. PT shows signs of improved respiratory effort. Continues to CPAP well, Secretions are improving at this time    NEURO:  Neurochecks q4h  PLEX tx 2/5, patient started azathioprine on 11/10   Recommend early ambulation w/ PT/OT  Neurology following  Hold Mestinon, patient with copious secretions, will consider restarting prior to extubation, currently held in setting of copious secretions, retrial extubation in AM  Continue Prednisone to 60 mg QD  Avoid medications that may worsen the current exacerbation including macrolides, fluoroquinolones, tetracyclines, aminoglycoside, beta-blockers, magnesium, and anti-arrhythmics (procainamide, quinidine, and lidocaine)  Neurology following, appreciate their recommendations  Activity: [x] mobilize as tolerated [] Bedrest [x] PT [x] OT [] PMNR    PULM:  Intubated for airway protection  CPAP as tolerated; daily SBTs, not tolerating  CXR reviewed  Duonebs q6h for secretions (oral)  Check NIF/VC Q4 hours in ICU setting  Aggressive Chest PT    CV:  MAP>65  TTE - EF 54%, elevated right atrial pressure     RENAL:  Stable renal function  IVL  daily IOs    GI:  Diet: ON TF  GI prophylaxis: PPI while on steroids + intubated  Bowel regimen: miralax, senna; dulcolax suppositories PRN  LBM: 11/11    ENDO:   FS goal 120-180  Medium scale  A1C 6.7    HEME/ONC:  Monitor H/H  SCDs  Chemoppx: SQL    ID:  No fevers, elevated leukocytosis  Less thick inline secretions, oral secretions  No signs of infection     Disposition: ICU ASSESSMENT/PLAN: 64 y/o female with myasthenia gravis, respiratory failure. Demonstrating worsening respiratory effort on SBT, NIF and FVC below baseline required for extubation to BiPAP despite completion of IVIG. Plan is to d/w PT, family risks & benefits of PLEX - patient consented to insertion of shiley, initiation of PLEX - S/P tx w/ PLEX 1/5. Care initiated w/ [facility] neuromuscular neurology Dr Covarrubias. PT shows signs of improved respiratory effort. Continues to CPAP well, Secretions are improving at this time    NEURO:  Neurochecks q4h  PLEX tx 2/5, patient started azathioprine on 11/10   Recommend early ambulation w/ PT/OT  Neurology following  Hold Mestinon, patient with copious secretions, will consider restarting prior to extubation, currently held in setting of copious secretions, retrial extubation in AM  Continue Prednisone to 60 mg QD  Avoid medications that may worsen the current exacerbation including macrolides, fluoroquinolones, tetracyclines, aminoglycoside, beta-blockers, magnesium, and anti-arrhythmics (procainamide, quinidine, and lidocaine)  Neurology following, appreciate their recommendations  Activity: [x] mobilize as tolerated [] Bedrest [x] PT [x] OT [] PMNR    PULM:  Intubated for airway protection  CPAP as tolerated; daily SBTs, not tolerating  CXR reviewed  Duonebs q6h for secretions (oral)  Check NIF/VC Q4 hours in ICU setting  Aggressive Chest PT    CV:  MAP>65  TTE - EF 54%, elevated right atrial pressure     RENAL:  Stable renal function  IVL  daily IOs    GI:  Diet: ON TF  GI prophylaxis: PPI while on steroids + intubated  Bowel regimen: miralax, senna; dulcolax suppositories PRN  LBM: 11/11    ENDO:   FS goal 120-180  Medium scale  A1C 6.7    HEME/ONC:  Monitor H/H  SCDs  Chemoppx: SQL    ID:  No fevers, elevated leukocytosis  Less thick inline secretions, oral secretions  No signs of infection     Disposition: ICU

## 2023-11-11 NOTE — PROGRESS NOTE ADULT - SUBJECTIVE AND OBJECTIVE BOX
HOSPITAL COURSE:  Patient KAIDEN HATCH is a 63y woman presenting with myasthenia gravis exacerbation.    Admission Scores  GCS: 15     24 hour Events: Patient evaluated at bedside. Reports improvement in symptoms w/ exception to copious oral secretions. No complaints of pain or dyspnea.     11/4- Patient intubated overnight for worsening respiratory status. Patient received dose 2/5 of IVIG  11/6- No events; poor respiratory effort on SBT  11/7- Apneic on SBT; worsening respiratory effort; last dose of IVIG TODAY  11/8- NIF -12 and  this AM. PT received shiley in right IJ w/ conscious sedation for initiation of PLEX. S/P tx 1/5 w/ PLEX.  11/9- NIF -20 and VC 630this AM.  11/10- Patient due for second dose of PLEX. No events overnight.   11/11- No events overnight. Has been tolerating CPAP since 5 AM. Oral secretions present. Strength improved.     Allergies    peanuts (Unknown)  No Known Drug Allergies    Intolerances        REVIEW OF SYSTEMS: [ ] Unable to Assess due to neurologic exam   [ x] All ROS addressed below are non-contributory, except:  Neuro: [ ] Headache [ ] Back pain [ ] Numbness [ ] Weakness [ ] Ataxia [ ] Dizziness [ ] Aphasia [ ] Dysarthria [ ] Visual disturbance  Resp: [ ] Shortness of breath/dyspnea, [ ] Orthopnea [ ] Cough  CV: [ ] Chest pain [ ] Palpitation [ ] Lightheadedness [ ] Syncope  Renal: [ ] Thirst [ ] Edema  GI: [ ] Nausea [ ] Emesis [ ] Abdominal pain [ ] Constipation [ ] Diarrhea  Hem: [ ] Hematemesis [ ] bright red blood per rectum  ID: [ ] Fever [ ] Chills [ ] Dysuria  ENT: [ ] Rhinorrhea      DEVICES:   [ ] Restraints [ ] ET tube [ ] central line [ ] arterial line [ ] morales [ ] NGT/OGT [ ] EVD [ ] LD [ ] MYAH/HMV [ ] Trach [ ] PEG [ ] Chest Tube     VITALS:   Vital Signs Last 24 Hrs  T(C): 36.9 (11 Nov 2023 03:00), Max: 37.8 (10 Nov 2023 15:00)  T(F): 98.5 (11 Nov 2023 03:00), Max: 100 (10 Nov 2023 15:00)  HR: 74 (11 Nov 2023 05:00) (46 - 119)  BP: 126/59 (11 Nov 2023 05:00) (90/52 - 143/69)  BP(mean): 78 (11 Nov 2023 05:00) (62 - 91)  RR: 16 (11 Nov 2023 05:00) (16 - 17)  SpO2: 100% (11 Nov 2023 05:00) (97% - 100%)    Parameters below as of 10 Nov 2023 21:31  Patient On (Oxygen Delivery Method): ventilator      CAPILLARY BLOOD GLUCOSE      POCT Blood Glucose.: 123 mg/dL (11 Nov 2023 06:30)  POCT Blood Glucose.: 127 mg/dL (11 Nov 2023 01:53)  POCT Blood Glucose.: 101 mg/dL (10 Nov 2023 20:25)  POCT Blood Glucose.: 149 mg/dL (10 Nov 2023 18:56)  POCT Blood Glucose.: 304 mg/dL (10 Nov 2023 17:37)  POCT Blood Glucose.: 214 mg/dL (10 Nov 2023 11:38)    I&O's Summary    10 Nov 2023 07:01  -  11 Nov 2023 07:00  --------------------------------------------------------  IN: 1200 mL / OUT: 300 mL / NET: 900 mL        Respiratory:  Mode: AC/ CMV (Assist Control/ Continuous Mandatory Ventilation)  RR (machine): 16  TV (machine): 420  FiO2: 30  PEEP: 5  ITime: 1  MAP: 11  PIP: 29        LABS:                        10.5   12.67 )-----------( 207      ( 11 Nov 2023 02:37 )             31.9     11-11    146<H>  |  113<H>  |  39<H>  ----------------------------<  133<H>  3.4<L>   |  21<L>  |  0.59             MEDICATION LEVELS:     IVF FLUIDS/MEDICATIONS:   MEDICATIONS  (STANDING):  albuterol/ipratropium for Nebulization 3 milliLiter(s) Nebulizer every 6 hours  azaTHIOprine 50 milliGRAM(s) Oral two times a day  chlorhexidine 0.12% Liquid 15 milliLiter(s) Oral Mucosa every 12 hours  chlorhexidine 4% Liquid 1 Application(s) Topical <User Schedule>  chlorhexidine 4% Liquid 1 Application(s) Topical at bedtime  dextrose 50% Injectable 50 milliLiter(s) IV Push every 15 minutes  dextrose 50% Injectable 25 milliLiter(s) IV Push every 15 minutes  enoxaparin Injectable 40 milliGRAM(s) SubCutaneous every 24 hours  insulin lispro (ADMELOG) corrective regimen sliding scale   SubCutaneous every 6 hours  pantoprazole   Suspension 40 milliGRAM(s) Oral before breakfast  polyethylene glycol 3350 17 Gram(s) Oral every 12 hours  potassium chloride   Solution 40 milliEquivalent(s) Oral every 4 hours  predniSONE   Tablet 60 milliGRAM(s) Oral daily    MEDICATIONS  (PRN):  acetaminophen   Oral Liquid .. 650 milliGRAM(s) Oral every 6 hours PRN Temp greater or equal to 38C (100.4F), Mild Pain (1 - 3)  ketorolac   Injectable 30 milliGRAM(s) IV Push every 8 hours PRN Mild Pain (1 - 3)  oxyCODONE    IR 5 milliGRAM(s) Oral every 4 hours PRN Moderate Pain (4 - 6)  senna 2 Tablet(s) Oral at bedtime PRN Constipation  sodium chloride 0.9% lock flush 10 milliLiter(s) IV Push every 1 hour PRN Pre/post blood products, medications, blood draw, and to maintain line patency        IMAGING:      EXAMINATION:  PHYSICAL EXAM:    Constitutional: Intubated    Neurological: Awake, alert oriented to person, place and time (WITH choices), Following Commands, PERRL, EOMI, No Gaze Preference, Face Symmetrical. Neck extension 5/5, Neck flexion 4+/5.    Motor exam:          Upper extremity                         Delt     Bicep     Tricep    HG                                                 R         4-/5        5/5        5/5       5/5                                               L          4/5        5/5        5/5       5/5          Lower extremity                        HF         KF        KE       DF         PF                                                  R        5/5        5/5        5/5       5/5         5/5                                               L         5/5        5/5       5/5       5/5          5/5                                                 Sensation: [x ] intact to light touch  [ ] decreased:     Pulmonary: Clear to Auscultation, No rales, No rhonchi, No wheezes     Cardiovascular: S1, S2, Regular rate and rhythm     Gastrointestinal: Soft, Non-tender, Non-distended     Extremities: No calf tenderness     Incision:    HOSPITAL COURSE:  Patient KAIDEN HATCH is a 63y woman presenting with myasthenia gravis exacerbation.    Admission Scores  GCS: 15     24 hour Events: Patient evaluated at bedside. Reports improvement in symptoms w/ exception to copious oral secretions. No complaints of pain or dyspnea.     11/4- Patient intubated overnight for worsening respiratory status. Patient received dose 2/5 of IVIG  11/6- No events; poor respiratory effort on SBT  11/7- Apneic on SBT; worsening respiratory effort; last dose of IVIG TODAY  11/8- NIF -12 and  this AM. PT received shiley in right IJ w/ conscious sedation for initiation of PLEX. S/P tx 1/5 w/ PLEX.  11/9- NIF -20 and VC 630this AM.  11/10- Patient due for second dose of PLEX. No events overnight.   11/11- No events overnight. Has been tolerating CPAP since 5 AM. Oral secretions present. Strength improved.     Allergies    peanuts (Unknown)  No Known Drug Allergies    Intolerances      REVIEW OF SYSTEMS: [ ] Unable to Assess due to neurologic exam   [ x] All ROS addressed below are non-contributory, except:  Neuro: [ ] Headache [ ] Back pain [ ] Numbness [ ] Weakness [ ] Ataxia [ ] Dizziness [ ] Aphasia [ ] Dysarthria [ ] Visual disturbance  Resp: [ ] Shortness of breath/dyspnea, [ ] Orthopnea [ ] Cough  CV: [ ] Chest pain [ ] Palpitation [ ] Lightheadedness [ ] Syncope  Renal: [ ] Thirst [ ] Edema  GI: [ ] Nausea [ ] Emesis [ ] Abdominal pain [ ] Constipation [ ] Diarrhea  Hem: [ ] Hematemesis [ ] bright red blood per rectum  ID: [ ] Fever [ ] Chills [ ] Dysuria  ENT: [ ] Rhinorrhea    ICU Vital Signs Last 24 Hrs  T(C): 37 (11 Nov 2023 15:00), Max: 37 (11 Nov 2023 07:00)  T(F): 98.6 (11 Nov 2023 15:00), Max: 98.6 (11 Nov 2023 07:00)  HR: 111 (11 Nov 2023 17:48) (46 - 115)  BP: 162/69 (11 Nov 2023 17:00) (104/54 - 162/69)  BP(mean): 97 (11 Nov 2023 17:00) (70 - 97)  ABP: --  ABP(mean): --  RR: 16 (11 Nov 2023 17:00) (15 - 25)  SpO2: 98% (11 Nov 2023 17:48) (97% - 100%)    O2 Parameters below as of 11 Nov 2023 17:48  Patient On (Oxygen Delivery Method): ventilator      CAPILLARY BLOOD GLUCOSE      POCT Blood Glucose.: 122 mg/dL (11 Nov 2023 18:42)  POCT Blood Glucose.: 206 mg/dL (11 Nov 2023 11:27)  POCT Blood Glucose.: 123 mg/dL (11 Nov 2023 06:30)  POCT Blood Glucose.: 127 mg/dL (11 Nov 2023 01:53)  POCT Blood Glucose.: 101 mg/dL (10 Nov 2023 20:25)      I&O's Summary    10 Nov 2023 07:01  -  11 Nov 2023 07:00  --------------------------------------------------------  IN: 1200 mL / OUT: 300 mL / NET: 900 mL    11 Nov 2023 07:01  -  11 Nov 2023 20:08  --------------------------------------------------------  IN: 765 mL / OUT: 0 mL / NET: 765 mL      Respiratory:  Mode: AC/ CMV (Assist Control/ Continuous Mandatory Ventilation)  RR (machine): 16  TV (machine): 420  FiO2: 30  PEEP: 5  ITime: 1  MAP: 11  PIP: 29        LABS:                        10.5   12.67 )-----------( 207      ( 11 Nov 2023 02:37 )             31.9   11-11    146<H>  |  113<H>  |  39<H>  ----------------------------<  133<H>  3.4<L>   |  21<L>  |  0.59    Ca    8.9      11 Nov 2023 02:37  Phos  3.8     11-11  Mg     2.2     11-11      MEDICATIONS  (STANDING):  albuterol/ipratropium for Nebulization 3 milliLiter(s) Nebulizer every 6 hours  azaTHIOprine 50 milliGRAM(s) Oral two times a day  chlorhexidine 0.12% Liquid 15 milliLiter(s) Oral Mucosa every 12 hours  chlorhexidine 4% Liquid 1 Application(s) Topical at bedtime  chlorhexidine 4% Liquid 1 Application(s) Topical <User Schedule>  dextrose 50% Injectable 50 milliLiter(s) IV Push every 15 minutes  dextrose 50% Injectable 25 milliLiter(s) IV Push every 15 minutes  enoxaparin Injectable 40 milliGRAM(s) SubCutaneous every 24 hours  insulin lispro (ADMELOG) corrective regimen sliding scale   SubCutaneous every 6 hours  pantoprazole   Suspension 40 milliGRAM(s) Oral before breakfast  polyethylene glycol 3350 17 Gram(s) Oral every 12 hours  predniSONE   Tablet 60 milliGRAM(s) Oral daily    MEDICATIONS  (PRN):  acetaminophen   Oral Liquid .. 650 milliGRAM(s) Oral every 6 hours PRN Temp greater or equal to 38C (100.4F), Mild Pain (1 - 3)  ketorolac   Injectable 30 milliGRAM(s) IV Push every 8 hours PRN Mild Pain (1 - 3)  oxyCODONE    IR 5 milliGRAM(s) Oral every 4 hours PRN Moderate Pain (4 - 6)  senna 2 Tablet(s) Oral at bedtime PRN Constipation  sodium chloride 0.9% lock flush 10 milliLiter(s) IV Push every 1 hour PRN Pre/post blood products, medications, blood draw, and to maintain line patency      EXAMINATION:  PHYSICAL EXAM:    Constitutional: Intubated    Neurological: Awake, alert oriented to person, place and time (WITH choices), Following Commands, PERRL, EOMI, No Gaze Preference, Face Symmetrical. Neck extension 5/5, Neck flexion 4+/5.    Motor exam:          Upper extremity                         Delt     Bicep     Tricep    HG                                                 R         4-/5        5/5        5/5       5/5                                               L          4/5        5/5        5/5       5/5          Lower extremity                        HF         KF        KE       DF         PF                                                  R        5/5        5/5        5/5       5/5         5/5                                               L         5/5        5/5       5/5       5/5          5/5                                                 Sensation: [x ] intact to light touch  [ ] decreased:     Pulmonary: Clear to Auscultation, No rales, No rhonchi, No wheezes     Cardiovascular: S1, S2, Regular rate and rhythm     Gastrointestinal: Soft, Non-tender, Non-distended     Extremities: No calf tenderness

## 2023-11-12 PROCEDURE — 71045 X-RAY EXAM CHEST 1 VIEW: CPT | Mod: 26

## 2023-11-12 PROCEDURE — 99233 SBSQ HOSP IP/OBS HIGH 50: CPT | Mod: GC

## 2023-11-12 PROCEDURE — 99291 CRITICAL CARE FIRST HOUR: CPT

## 2023-11-12 RX ORDER — POTASSIUM CHLORIDE 20 MEQ
40 PACKET (EA) ORAL ONCE
Refills: 0 | Status: COMPLETED | OUTPATIENT
Start: 2023-11-12 | End: 2023-11-12

## 2023-11-12 RX ADMIN — ENOXAPARIN SODIUM 40 MILLIGRAM(S): 100 INJECTION SUBCUTANEOUS at 21:09

## 2023-11-12 RX ADMIN — PANTOPRAZOLE SODIUM 40 MILLIGRAM(S): 20 TABLET, DELAYED RELEASE ORAL at 09:53

## 2023-11-12 RX ADMIN — Medication 3 MILLILITER(S): at 18:17

## 2023-11-12 RX ADMIN — CHLORHEXIDINE GLUCONATE 15 MILLILITER(S): 213 SOLUTION TOPICAL at 17:36

## 2023-11-12 RX ADMIN — CHLORHEXIDINE GLUCONATE 1 APPLICATION(S): 213 SOLUTION TOPICAL at 21:09

## 2023-11-12 RX ADMIN — CHLORHEXIDINE GLUCONATE 1 APPLICATION(S): 213 SOLUTION TOPICAL at 05:11

## 2023-11-12 RX ADMIN — Medication 3 MILLILITER(S): at 23:47

## 2023-11-12 RX ADMIN — Medication 40 MILLIEQUIVALENT(S): at 22:26

## 2023-11-12 RX ADMIN — Medication 650 MILLIGRAM(S): at 03:05

## 2023-11-12 RX ADMIN — Medication 650 MILLIGRAM(S): at 04:05

## 2023-11-12 RX ADMIN — CHLORHEXIDINE GLUCONATE 15 MILLILITER(S): 213 SOLUTION TOPICAL at 05:12

## 2023-11-12 RX ADMIN — Medication 3 MILLILITER(S): at 11:15

## 2023-11-12 RX ADMIN — Medication 3 MILLILITER(S): at 06:05

## 2023-11-12 RX ADMIN — Medication 650 MILLIGRAM(S): at 23:00

## 2023-11-12 RX ADMIN — AZATHIOPRINE 50 MILLIGRAM(S): 100 TABLET ORAL at 17:36

## 2023-11-12 RX ADMIN — Medication 60 MILLIGRAM(S): at 05:11

## 2023-11-12 RX ADMIN — Medication 650 MILLIGRAM(S): at 22:15

## 2023-11-12 RX ADMIN — AZATHIOPRINE 50 MILLIGRAM(S): 100 TABLET ORAL at 05:11

## 2023-11-12 NOTE — PROGRESS NOTE ADULT - SUBJECTIVE AND OBJECTIVE BOX
[facility] ATTENDING -- ADDITIONAL PROGRESS NOTE    Nighttime rounds were performed -- please refer to earlier Progress Note for HPI details.    T(C): 36.8 (11-12-23 @ 19:00), Max: 37.1 (11-12-23 @ 11:00)  HR: 63 (11-12-23 @ 21:40) (48 - 88)  BP: 151/66 (11-12-23 @ 19:00) (136/67 - 151/66)  RR: 20 (11-12-23 @ 19:00) (16 - 20)  SpO2: 100% (11-12-23 @ 21:40) (96% - 100%)  Wt(kg): --    Relevant labwork and imaging reviewed.    repeat MG crises   remains intubated  PRVC 16/420/5/30%  PLEX #3 tomorrow  prednisone 60mg daily   azathioprine 50mg Q12   MVI supp  cont tube feeding   11/12 BM   CPAP as tolerated  Q4 checks   lovenox ppx

## 2023-11-12 NOTE — PROGRESS NOTE ADULT - SUBJECTIVE AND OBJECTIVE BOX
*************************************  NEUROLOGY PROGRESS NOTE  **************************************    KAIDEN HATCH  Female  MRN-[id number]    Subjective:    Interval History:    - Failed CPAP trial yesterday  - Patient tolerating CPAP this morning  - NIF -23 and  this morning. Mildly improved from yesterday NIF/VC -20/300  - Awaiting PLEX session 3/5 on Mon 11/13      VITAL SIGNS:  Vital Signs Last 24 Hrs  T(C): 37.1 (12 Nov 2023 11:00), Max: 37.1 (12 Nov 2023 11:00)  T(F): 98.7 (12 Nov 2023 11:00), Max: 98.7 (12 Nov 2023 11:00)  HR: 78 (12 Nov 2023 11:28) (48 - 115)  BP: 141/61 (12 Nov 2023 07:00) (130/56 - 162/69)  BP(mean): 83 (12 Nov 2023 07:00) (79 - 885)  RR: 18 (12 Nov 2023 11:00) (16 - 18)  SpO2: 100% (12 Nov 2023 11:28) (98% - 100%)    Parameters below as of 12 Nov 2023 11:28  Patient On (Oxygen Delivery Method): ventilator        PHYSICAL EXAMINATION:  Patient On (Oxygen Delivery Method): ventilator  General:  Constitutional: Female, appears stated age, intubated     Neurological (>12):  MS: Intubated, no verbal output. Awake, alert, follows simple and complex commands.   CN: B/L eye ptosis L >R, VFF, EOMI, PERRL, V1-3 intact, no facial asymmetry, t/p midline  Motor:   Deltoids 4/5 bilat, weaker on R proximal UE than L UE   Bi/Tri/Grp 5/5 bilat   Hip flex 5/5.  Knee flex ext 5/5. Dorsi plantar 5/5.  bilaterl  Tone: normal. Bulk: normal.   DTR deferred as patient expressing pain, was 2+ in left BR  Sensation: intact to light touch throughout   Coordination: appropriate for level of strength   Gait:  MOLLY given intubated     LABS:                          10.5   12.67 )-----------( 207      ( 11 Nov 2023 02:37 )             31.9     11-11    146<H>  |  113<H>  |  39<H>  ----------------------------<  133<H>  3.4<L>   |  21<L>  |  0.59    Ca    8.9      11 Nov 2023 02:37  Phos  3.8     11-11  Mg     2.2     11-11      PTT - ( 11 Nov 2023 02:37 )  PTT:31.7 sec      RADIOLOGY & ADDITIONAL STUDIES:           *************************************  NEUROLOGY PROGRESS NOTE  **************************************    KAIDEN HATCH  Female  MRN-[id number]    Subjective:    Interval History:    - Failed CPAP trial yesterday  - Patient tolerating CPAP this morning  - NIF -23 and  this morning. Mildly improved from yesterday NIF/VC -20/300  - Awaiting PLEX session 3/5 on Mon 11/13      VITAL SIGNS:  Vital Signs Last 24 Hrs  T(C): 37.1 (12 Nov 2023 11:00), Max: 37.1 (12 Nov 2023 11:00)  T(F): 98.7 (12 Nov 2023 11:00), Max: 98.7 (12 Nov 2023 11:00)  HR: 78 (12 Nov 2023 11:28) (48 - 115)  BP: 141/61 (12 Nov 2023 07:00) (130/56 - 162/69)  BP(mean): 83 (12 Nov 2023 07:00) (79 - 885)  RR: 18 (12 Nov 2023 11:00) (16 - 18)  SpO2: 100% (12 Nov 2023 11:28) (98% - 100%)    Parameters below as of 12 Nov 2023 11:28  Patient On (Oxygen Delivery Method): ventilator        PHYSICAL EXAMINATION:  Patient On (Oxygen Delivery Method): ventilator  General:  Constitutional: Female, appears stated age, intubated     Neurological (>12):  MS: Intubated, no verbal output. Awake, alert, follows simple and complex commands.   CN: B/L eye ptosis L >R, VFF, EOMI, PERRL, V1-3 intact, no facial asymmetry, t/p midline  Motor:   Deltoids 4/5 bilat, weaker on R proximal UE than L UE   Bi/Tri/Grp 5/5 bilat   Hip flex 5/5.  Knee flex ext 5/5. Dorsi plantar 5/5.  bilaterl  Tone: normal. Bulk: normal.   DTR deferred as patient expressing pain, was 2+ in left BR  Sensation: intact to light touch throughout   Coordination: appropriate for level of strength   Gait:  MOLLY given intubated     LABS:                          10.5   12.67 )-----------( 207      ( 11 Nov 2023 02:37 )             31.9     11-11    146<H>  |  113<H>  |  39<H>  ----------------------------<  133<H>  3.4<L>   |  21<L>  |  0.59    Ca    8.9      11 Nov 2023 02:37  Phos  3.8     11-11  Mg     2.2     11-11      PTT - ( 11 Nov 2023 02:37 )  PTT:31.7 sec      RADIOLOGY & ADDITIONAL STUDIES:           *************************************  NEUROLOGY PROGRESS NOTE  **************************************    KAIDEN HATCH  Female  MRN-[id number]    Subjective:    Interval History:    - Failed CPAP trial yesterday  - Patient tolerating CPAP this morning  - NIF -23 and  this morning. Mildly improved from yesterday NIF/VC -20/300  - Awaiting PLEX session 3/5 on Mon 11/13      VITAL SIGNS:  Vital Signs Last 24 Hrs  T(C): 37.1 (12 Nov 2023 11:00), Max: 37.1 (12 Nov 2023 11:00)  T(F): 98.7 (12 Nov 2023 11:00), Max: 98.7 (12 Nov 2023 11:00)  HR: 78 (12 Nov 2023 11:28) (48 - 115)  BP: 141/61 (12 Nov 2023 07:00) (130/56 - 162/69)  BP(mean): 83 (12 Nov 2023 07:00) (79 - 885)  RR: 18 (12 Nov 2023 11:00) (16 - 18)  SpO2: 100% (12 Nov 2023 11:28) (98% - 100%)    Parameters below as of 12 Nov 2023 11:28  Patient On (Oxygen Delivery Method): ventilator        PHYSICAL EXAMINATION:  Patient On (Oxygen Delivery Method): ventilator  General:  Constitutional: Female, appears stated age, intubated     Neurological (>12):  MS: Intubated, no verbal output. Awake, alert, follows simple and complex commands.   CN: B/L eye ptosis L >R, VFF, EOMI, PERRL, V1-3 intact, no facial asymmetry, t/p midline  Motor:   Deltoids 4/5 bilat, weaker on R proximal UE than L UE   Bi/Tri/Grp 5/5 bilat   Hip flex 5/5.  Knee flex ext 5/5. Dorsi plantar 5/5.  bilaterl  Tone: normal. Bulk: normal.   DTR deferred as patient expressing pain, was 2+ in left BR  Sensation: intact to light touch throughout   Coordination: appropriate for level of strength   Gait:  MOLLY given intubated     LABS:                          10.5   12.67 )-----------( 207      ( 11 Nov 2023 02:37 )             31.9     11-11    146<H>  |  113<H>  |  39<H>  ----------------------------<  133<H>  3.4<L>   |  21<L>  |  0.59    Ca    8.9      11 Nov 2023 02:37  Phos  3.8     11-11  Mg     2.2     11-11      PTT - ( 11 Nov 2023 02:37 )  PTT:31.7 sec      RADIOLOGY & ADDITIONAL STUDIES:           *************************************  NEUROLOGY PROGRESS NOTE  **************************************    KAIDEN HATCH  Female  MRN-[id number]    Subjective:    Interval History:    - Failed CPAP trial yesterday  - No acute events overnight. Patient tolerating CPAP this morning, secretions improving   - NIF -23 and  this morning. Mildly improved from yesterday NIF/VC -20/300.   - Awaiting PLEX session 3/5 on Mon 11/13      VITAL SIGNS:  Vital Signs Last 24 Hrs  T(C): 37.1 (12 Nov 2023 11:00), Max: 37.1 (12 Nov 2023 11:00)  T(F): 98.7 (12 Nov 2023 11:00), Max: 98.7 (12 Nov 2023 11:00)  HR: 78 (12 Nov 2023 11:28) (48 - 115)  BP: 141/61 (12 Nov 2023 07:00) (130/56 - 162/69)  BP(mean): 83 (12 Nov 2023 07:00) (79 - 885)  RR: 18 (12 Nov 2023 11:00) (16 - 18)  SpO2: 100% (12 Nov 2023 11:28) (98% - 100%)    Parameters below as of 12 Nov 2023 11:28  Patient On (Oxygen Delivery Method): ventilator        PHYSICAL EXAMINATION:  Patient On (Oxygen Delivery Method): ventilator  General:  Constitutional: Female, appears stated age, intubated     Neurological (>12):  MS: Intubated, no verbal output. Awake, alert, follows simple and complex commands.   CN: B/L eye ptosis L >R, VFF, EOMI, PERRL, V1-3 intact, no facial asymmetry, t/p midline  Motor:   Deltoids 4/5 bilat, weaker on R proximal UE than L UE   Bi/Tri/Grp 5/5 bilat  Hip flex 5/5.  Knee flex ext 5/5. Dorsi plantar 5/5.  bilateral  Tone: normal. Bulk: normal.   DTR: 2+ REFLEXES   Sensation: intact to light touch throughout   Coordination: appropriate for level of strength, no dysmetria noted on FTN  Gait:  MOLLY as patient intubated     LABS:                          10.5   12.67 )-----------( 207      ( 11 Nov 2023 02:37 )             31.9     11-11    146<H>  |  113<H>  |  39<H>  ----------------------------<  133<H>  3.4<L>   |  21<L>  |  0.59    Ca    8.9      11 Nov 2023 02:37  Phos  3.8     11-11  Mg     2.2     11-11      PTT - ( 11 Nov 2023 02:37 )  PTT:31.7 sec      RADIOLOGY & ADDITIONAL STUDIES:           *************************************  NEUROLOGY PROGRESS NOTE  **************************************    KAIDEN HATCH  Female  MRN-[id number]    Subjective:    Interval History:    - Failed CPAP trial yesterday  - No acute events overnight. Patient tolerating CPAP this morning, secretions improving   - NIF -23 and  this morning. Mildly improved from yesterday NIF/VC -20/300.   - Awaiting PLEX session 3/5 on Mon 11/13      VITAL SIGNS:  Vital Signs Last 24 Hrs  T(C): 37.1 (12 Nov 2023 11:00), Max: 37.1 (12 Nov 2023 11:00)  T(F): 98.7 (12 Nov 2023 11:00), Max: 98.7 (12 Nov 2023 11:00)  HR: 78 (12 Nov 2023 11:28) (48 - 115)  BP: 141/61 (12 Nov 2023 07:00) (130/56 - 162/69)  BP(mean): 83 (12 Nov 2023 07:00) (79 - 885)  RR: 18 (12 Nov 2023 11:00) (16 - 18)  SpO2: 100% (12 Nov 2023 11:28) (98% - 100%)    Parameters below as of 12 Nov 2023 11:28  Patient On (Oxygen Delivery Method): ventilator        PHYSICAL EXAMINATION:  Patient On (Oxygen Delivery Method): ventilator  General:  Constitutional: Female, appears stated age, intubated     Neurological (>12):  MS: Intubated, no verbal output. Awake, alert, follows simple and complex commands.   CN: B/L eye ptosis L >R, VFF, EOMI, PERRL, V1-3 intact, no facial asymmetry, t/p midline  Motor:   Deltoids 4/5 bilat, weaker on R proximal UE than L UE   Bi/Tri/Grp 5/5 bilat  Hip flex 5/5.  Knee flex ext 5/5. Dorsi plantar 5/5.  bilateral  Tone: normal. Bulk: normal.   DTR: 2+ REFLEXES   Sensation: intact to light touch throughout   Coordination: appropriate for level of strength, no dysmetria noted on FTN  Gait:  MOLLY as patient intubated     LABS:                          10.5   12.67 )-----------( 207      ( 11 Nov 2023 02:37 )             31.9     11-11    146<H>  |  113<H>  |  39<H>  ----------------------------<  133<H>  3.4<L>   |  21<L>  |  0.59    Ca    8.9      11 Nov 2023 02:37  Phos  3.8     11-11  Mg     2.2     11-11      PTT - ( 11 Nov 2023 02:37 )  PTT:31.7 sec      RADIOLOGY & ADDITIONAL STUDIES:           *************************************  NEUROLOGY PROGRESS NOTE  **************************************    KAIDEN HATCH  Female  MRN-[id number]    Subjective:    Interval History:    - Failed CPAP trial yesterday  - No acute events overnight. Patient tolerating CPAP this morning, secretions improving   - NIF -23 and  this morning. Mildly improved from yesterday NIF/VC -20/300.   - Awaiting PLEX session 3/5 on Mon 11/13      VITAL SIGNS:  Vital Signs Last 24 Hrs  T(C): 37.1 (12 Nov 2023 11:00), Max: 37.1 (12 Nov 2023 11:00)  T(F): 98.7 (12 Nov 2023 11:00), Max: 98.7 (12 Nov 2023 11:00)  HR: 78 (12 Nov 2023 11:28) (48 - 115)  BP: 141/61 (12 Nov 2023 07:00) (130/56 - 162/69)  BP(mean): 83 (12 Nov 2023 07:00) (79 - 885)  RR: 18 (12 Nov 2023 11:00) (16 - 18)  SpO2: 100% (12 Nov 2023 11:28) (98% - 100%)    Parameters below as of 12 Nov 2023 11:28  Patient On (Oxygen Delivery Method): ventilator        PHYSICAL EXAMINATION:  Patient On (Oxygen Delivery Method): ventilator  General:  Constitutional: Female, appears stated age, intubated     Neurological (>12):  MS: Intubated, no verbal output. Awake, alert, follows simple and complex commands.   CN: B/L eye ptosis L >R, VFF, EOMI, PERRL, V1-3 intact, no facial asymmetry, t/p midline  Motor:   Deltoids 4/5 bilat, weaker on R proximal UE than L UE   Bi/Tri/Grp 5/5 bilat  Hip flex 5/5.  Knee flex ext 5/5. Dorsi plantar 5/5.  bilateral  Tone: normal. Bulk: normal.   DTR: 2+ REFLEXES   Sensation: intact to light touch throughout   Coordination: appropriate for level of strength, no dysmetria noted on FTN  Gait:  MOLLY as patient intubated     LABS:                          10.5   12.67 )-----------( 207      ( 11 Nov 2023 02:37 )             31.9     11-11    146<H>  |  113<H>  |  39<H>  ----------------------------<  133<H>  3.4<L>   |  21<L>  |  0.59    Ca    8.9      11 Nov 2023 02:37  Phos  3.8     11-11  Mg     2.2     11-11      PTT - ( 11 Nov 2023 02:37 )  PTT:31.7 sec      RADIOLOGY & ADDITIONAL STUDIES:           *************************************  NEUROLOGY PROGRESS NOTE  **************************************    KAIDEN HATCH  Female  MRN-[id number]    Subjective: 63y (1960) woman with a PMHx significant for myasthenia gravis s/p thymectomy presenting to the ED for worsening difficulty with double vision, swallowing, talking, and breathing that has progressively gotten worse for the previous one week. Patient is currently on Mestinon 60 mg two tablets TID and Prednisone 10 mg --> uptitrated to 20 mg, she was not taking it per son for a month or so and was discharged on 50 mg after her hospitalization in 06/2023. Has been admitted before in June 2023, requiring intubation and PLEX/IVIG and in Feb 2023 for MG crisis that required intubation (2/1-2/11/23) iso COVID19. Patient received 5 days of PLEX (2/2-2/10/23) followed by 5 days of IVIG (2/16-2/20/23) with symptomatic improvement. Patient's son says patient has responded best to IVIG and that it stabilizes her MG for about 2 years. At baseline, patient is on 2L NC. In the ED, patient's NIF was -50. Her VC was 430. Patient also endorsed increased mucus secretion in her throat, and reliance on her oxygen tank. Now sp IVIG 5/5 without improvement. 11/8: NIF and VC -12/~300. Started Azathioprine and PLEX, now s/p 2 sessions with mild clinical improvement NIF -23/  this morning.    Interval History:    - Failed CPAP trial yesterday  - No acute events overnight. Patient tolerating CPAP this morning, secretions improving   - NIF -23 and  this morning. Mildly improved from yesterday NIF/VC -20/300.   - Awaiting PLEX session 3/5 on Mon 11/13      VITAL SIGNS:  Vital Signs Last 24 Hrs  T(C): 37.1 (12 Nov 2023 11:00), Max: 37.1 (12 Nov 2023 11:00)  T(F): 98.7 (12 Nov 2023 11:00), Max: 98.7 (12 Nov 2023 11:00)  HR: 78 (12 Nov 2023 11:28) (48 - 115)  BP: 141/61 (12 Nov 2023 07:00) (130/56 - 162/69)  BP(mean): 83 (12 Nov 2023 07:00) (79 - 885)  RR: 18 (12 Nov 2023 11:00) (16 - 18)  SpO2: 100% (12 Nov 2023 11:28) (98% - 100%)    Parameters below as of 12 Nov 2023 11:28  Patient On (Oxygen Delivery Method): ventilator        PHYSICAL EXAMINATION:  Patient On (Oxygen Delivery Method): ventilator  General:  Constitutional: Female, appears stated age, intubated     Neurological (>12):  MS: Intubated, no verbal output. Awake, alert, follows simple and complex commands.   CN: B/L eye ptosis L >R, VFF, EOMI, PERRL, V1-3 intact, no facial asymmetry, t/p midline  Motor:   Deltoids 4/5 bilat, weaker on R proximal UE than L UE   Bi/Tri/Grp 5/5 bilat  Hip flex 5/5.  Knee flex ext 5/5. Dorsi plantar 5/5.  bilateral  Tone: normal. Bulk: normal.   DTR: 2+ REFLEXES   Sensation: intact to light touch throughout   Coordination: appropriate for level of strength, no dysmetria noted on FTN  Gait:  MOLLY as patient intubated     LABS:                          10.5   12.67 )-----------( 207      ( 11 Nov 2023 02:37 )             31.9     11-11    146<H>  |  113<H>  |  39<H>  ----------------------------<  133<H>  3.4<L>   |  21<L>  |  0.59    Ca    8.9      11 Nov 2023 02:37  Phos  3.8     11-11  Mg     2.2     11-11      PTT - ( 11 Nov 2023 02:37 )  PTT:31.7 sec      RADIOLOGY & ADDITIONAL STUDIES:           *************************************  NEUROLOGY PROGRESS NOTE  **************************************    KAIDEN HATCH  Female  MRN-[id number]    Subjective: 63y (1960) woman with a PMHx significant for myasthenia gravis s/p thymectomy presenting to the ED for worsening difficulty with double vision, swallowing, talking, and breathing that has progressively gotten worse for the previous one week. Patient is currently on Mestinon 60 mg two tablets TID and Prednisone 10 mg --> uptitrated to 20 mg, she was not taking it per son for a month or so and was discharged on 50 mg after her hospitalization in 06/2023. Has been admitted before in June 2023, requiring intubation and PLEX/IVIG and in Feb 2023 for MG crisis that required intubation (2/1-2/11/23) iso COVID19. Patient received 5 days of PLEX (2/2-2/10/23) followed by 5 days of IVIG (2/16-2/20/23) with symptomatic improvement. Patient's son says patient has responded best to IVIG and that it stabilizes her MG for about 2 years. At baseline, patient is on 2L NC. In the ED, patient's NIF was -50. Her VC was 430. Patient also endorsed increased mucus secretion in her throat, and reliance on her oxygen tank. Now sp IVIG 5/5 without improvement. 11/8: NIF and VC -12/~300. Started Azathioprine and PLEX, now s/p 2 sessions with mild clinical improvement NIF -23/  this morning.    Interval History:    - Failed CPAP trial yesterday  - No acute events overnight. Patient tolerating CPAP this morning, secretions improving   - NIF -23 and  this morning. Mildly improved from yesterday NIF/VC -20/300.   - Awaiting PLEX session 3/5 on Mon 11/13      VITAL SIGNS:  Vital Signs Last 24 Hrs  T(C): 37.1 (12 Nov 2023 11:00), Max: 37.1 (12 Nov 2023 11:00)  T(F): 98.7 (12 Nov 2023 11:00), Max: 98.7 (12 Nov 2023 11:00)  HR: 78 (12 Nov 2023 11:28) (48 - 115)  BP: 141/61 (12 Nov 2023 07:00) (130/56 - 162/69)  BP(mean): 83 (12 Nov 2023 07:00) (79 - 885)  RR: 18 (12 Nov 2023 11:00) (16 - 18)  SpO2: 100% (12 Nov 2023 11:28) (98% - 100%)    Parameters below as of 12 Nov 2023 11:28  Patient On (Oxygen Delivery Method): ventilator        PHYSICAL EXAMINATION:  Patient On (Oxygen Delivery Method): ventilator  General:  Constitutional: Female, appears stated age, intubated     Neurological (>12):  MS: Intubated, no verbal output. Awake, alert, follows simple and complex commands.   CN: B/L eye ptosis L >R, VFF, EOMI, PERRL, V1-3 intact, no facial asymmetry, t/p midline  Motor:   Deltoids 4/5 bilat, weaker on R proximal UE than L UE   Bi/Tri/Grp 5/5 bilat  Hip flex 5/5.  Knee flex ext 5/5. Dorsi plantar 5/5.  bilateral  Tone: normal. Bulk: normal.   DTR: 2+ REFLEXES   Sensation: intact to light touch throughout   Coordination: appropriate for level of strength, no dysmetria noted on FTN  Gait:  MOLLY as patient intubated     LABS:                          10.5   12.67 )-----------( 207      ( 11 Nov 2023 02:37 )             31.9     11-11    146<H>  |  113<H>  |  39<H>  ----------------------------<  133<H>  3.4<L>   |  21<L>  |  0.59    Ca    8.9      11 Nov 2023 02:37  Phos  3.8     11-11  Mg     2.2     11-11      PTT - ( 11 Nov 2023 02:37 )  PTT:31.7 sec      RADIOLOGY & ADDITIONAL STUDIES:           *************************************  NEUROLOGY PROGRESS NOTE  **************************************    KAIDEN HATCH  Female  MRN-[id number]    Subjective: 63y (1960) woman with a PMHx significant for myasthenia gravis s/p thymectomy presenting to the ED for worsening difficulty with double vision, swallowing, talking, and breathing that has progressively gotten worse for the previous one week. Patient is currently on Mestinon 60 mg two tablets TID and Prednisone 10 mg --> uptitrated to 20 mg, she was not taking it per son for a month or so and was discharged on 50 mg after her hospitalization in 06/2023. Has been admitted before in June 2023, requiring intubation and PLEX/IVIG and in Feb 2023 for MG crisis that required intubation (2/1-2/11/23) iso COVID19. Patient received 5 days of PLEX (2/2-2/10/23) followed by 5 days of IVIG (2/16-2/20/23) with symptomatic improvement. Patient's son says patient has responded best to IVIG and that it stabilizes her MG for about 2 years. At baseline, patient is on 2L NC. In the ED, patient's NIF was -50. Her VC was 430. Patient also endorsed increased mucus secretion in her throat, and reliance on her oxygen tank. Now sp IVIG 5/5 without improvement. 11/8: NIF and VC -12/~300. Started Azathioprine and PLEX, now s/p 2 sessions with mild clinical improvement NIF -23/  this morning.    Interval History:    - Failed CPAP trial yesterday  - No acute events overnight. Patient tolerating CPAP this morning, secretions improving   - NIF -23 and  this morning. Mildly improved from yesterday NIF/VC -20/300.   - Awaiting PLEX session 3/5 on Mon 11/13      VITAL SIGNS:  Vital Signs Last 24 Hrs  T(C): 37.1 (12 Nov 2023 11:00), Max: 37.1 (12 Nov 2023 11:00)  T(F): 98.7 (12 Nov 2023 11:00), Max: 98.7 (12 Nov 2023 11:00)  HR: 78 (12 Nov 2023 11:28) (48 - 115)  BP: 141/61 (12 Nov 2023 07:00) (130/56 - 162/69)  BP(mean): 83 (12 Nov 2023 07:00) (79 - 885)  RR: 18 (12 Nov 2023 11:00) (16 - 18)  SpO2: 100% (12 Nov 2023 11:28) (98% - 100%)    Parameters below as of 12 Nov 2023 11:28  Patient On (Oxygen Delivery Method): ventilator        PHYSICAL EXAMINATION:  Patient On (Oxygen Delivery Method): ventilator  General:  Constitutional: Female, appears stated age, intubated     Neurological (>12):  MS: Intubated, no verbal output. Awake, alert, follows simple and complex commands.   CN: B/L eye ptosis L >R, VFF, EOMI, PERRL, V1-3 intact, no facial asymmetry, t/p midline  Motor:   Deltoids 4/5 bilat, weaker on R proximal UE than L UE   Bi/Tri/Grp 5/5 bilat  Hip flex 5/5.  Knee flex ext 5/5. Dorsi plantar 5/5.  bilateral  Tone: normal. Bulk: normal.   DTR: 2+ REFLEXES   Sensation: intact to light touch throughout   Coordination: appropriate for level of strength, no dysmetria noted on FTN  Gait:  MOLLY as patient intubated     LABS:                          10.5   12.67 )-----------( 207      ( 11 Nov 2023 02:37 )             31.9     11-11    146<H>  |  113<H>  |  39<H>  ----------------------------<  133<H>  3.4<L>   |  21<L>  |  0.59    Ca    8.9      11 Nov 2023 02:37  Phos  3.8     11-11  Mg     2.2     11-11      PTT - ( 11 Nov 2023 02:37 )  PTT:31.7 sec      RADIOLOGY & ADDITIONAL STUDIES:

## 2023-11-12 NOTE — PROGRESS NOTE ADULT - SUBJECTIVE AND OBJECTIVE BOX
HOSPITAL COURSE:  Patient KAIDEN HATCH is a 63y woman presenting with myasthenia gravis exacerbation.    Admission Scores  GCS: 15     24 hour Events: Patient evaluated at bedside. Reports improvement in symptoms w/ exception to copious oral secretions. No complaints of pain or dyspnea.     11/4- Patient intubated overnight for worsening respiratory status. Patient received dose 2/5 of IVIG  11/6- No events; poor respiratory effort on SBT  11/7- Apneic on SBT; worsening respiratory effort; last dose of IVIG TODAY  11/8- NIF -12 and  this AM. PT received shiley in right IJ w/ conscious sedation for initiation of PLEX. S/P tx 1/5 w/ PLEX.  11/9- NIF -20 and VC 630this AM.  11/10- Patient due for second dose of PLEX. No events overnight.   11/11- No events overnight. Has been tolerating CPAP since 5 AM. Oral secretions present. Strength improved.   11/12     Allergies    peanuts (Unknown)  No Known Drug Allergies    Intolerances      REVIEW OF SYSTEMS: [ ] Unable to Assess due to neurologic exam   [ x] All ROS addressed below are non-contributory, except:  Neuro: [ ] Headache [ ] Back pain [ ] Numbness [ ] Weakness [ ] Ataxia [ ] Dizziness [ ] Aphasia [ ] Dysarthria [ ] Visual disturbance  Resp: [ ] Shortness of breath/dyspnea, [ ] Orthopnea [ ] Cough  CV: [ ] Chest pain [ ] Palpitation [ ] Lightheadedness [ ] Syncope  Renal: [ ] Thirst [ ] Edema  GI: [ ] Nausea [ ] Emesis [ ] Abdominal pain [ ] Constipation [ ] Diarrhea  Hem: [ ] Hematemesis [ ] bright red blood per rectum  ID: [ ] Fever [ ] Chills [ ] Dysuria  ENT: [ ] Rhinorrhea    ICU Vital Signs Last 24 Hrs  T(C): 37 (11 Nov 2023 15:00), Max: 37 (11 Nov 2023 07:00)  T(F): 98.6 (11 Nov 2023 15:00), Max: 98.6 (11 Nov 2023 07:00)  HR: 111 (11 Nov 2023 17:48) (46 - 115)  BP: 162/69 (11 Nov 2023 17:00) (104/54 - 162/69)  BP(mean): 97 (11 Nov 2023 17:00) (70 - 97)  ABP: --  ABP(mean): --  RR: 16 (11 Nov 2023 17:00) (15 - 25)  SpO2: 98% (11 Nov 2023 17:48) (97% - 100%)    O2 Parameters below as of 11 Nov 2023 17:48  Patient On (Oxygen Delivery Method): ventilator      CAPILLARY BLOOD GLUCOSE      POCT Blood Glucose.: 122 mg/dL (11 Nov 2023 18:42)  POCT Blood Glucose.: 206 mg/dL (11 Nov 2023 11:27)  POCT Blood Glucose.: 123 mg/dL (11 Nov 2023 06:30)  POCT Blood Glucose.: 127 mg/dL (11 Nov 2023 01:53)  POCT Blood Glucose.: 101 mg/dL (10 Nov 2023 20:25)      I&O's Summary    10 Nov 2023 07:01  -  11 Nov 2023 07:00  --------------------------------------------------------  IN: 1200 mL / OUT: 300 mL / NET: 900 mL    11 Nov 2023 07:01  -  11 Nov 2023 20:08  --------------------------------------------------------  IN: 765 mL / OUT: 0 mL / NET: 765 mL      Respiratory:  Mode: AC/ CMV (Assist Control/ Continuous Mandatory Ventilation)  RR (machine): 16  TV (machine): 420  FiO2: 30  PEEP: 5  ITime: 1  MAP: 11  PIP: 29        LABS:                        10.5   12.67 )-----------( 207      ( 11 Nov 2023 02:37 )             31.9   11-11    146<H>  |  113<H>  |  39<H>  ----------------------------<  133<H>  3.4<L>   |  21<L>  |  0.59    Ca    8.9      11 Nov 2023 02:37  Phos  3.8     11-11  Mg     2.2     11-11      MEDICATIONS  (STANDING):  albuterol/ipratropium for Nebulization 3 milliLiter(s) Nebulizer every 6 hours  azaTHIOprine 50 milliGRAM(s) Oral two times a day  chlorhexidine 0.12% Liquid 15 milliLiter(s) Oral Mucosa every 12 hours  chlorhexidine 4% Liquid 1 Application(s) Topical at bedtime  chlorhexidine 4% Liquid 1 Application(s) Topical <User Schedule>  dextrose 50% Injectable 50 milliLiter(s) IV Push every 15 minutes  dextrose 50% Injectable 25 milliLiter(s) IV Push every 15 minutes  enoxaparin Injectable 40 milliGRAM(s) SubCutaneous every 24 hours  insulin lispro (ADMELOG) corrective regimen sliding scale   SubCutaneous every 6 hours  pantoprazole   Suspension 40 milliGRAM(s) Oral before breakfast  polyethylene glycol 3350 17 Gram(s) Oral every 12 hours  predniSONE   Tablet 60 milliGRAM(s) Oral daily    MEDICATIONS  (PRN):  acetaminophen   Oral Liquid .. 650 milliGRAM(s) Oral every 6 hours PRN Temp greater or equal to 38C (100.4F), Mild Pain (1 - 3)  ketorolac   Injectable 30 milliGRAM(s) IV Push every 8 hours PRN Mild Pain (1 - 3)  oxyCODONE    IR 5 milliGRAM(s) Oral every 4 hours PRN Moderate Pain (4 - 6)  senna 2 Tablet(s) Oral at bedtime PRN Constipation  sodium chloride 0.9% lock flush 10 milliLiter(s) IV Push every 1 hour PRN Pre/post blood products, medications, blood draw, and to maintain line patency      EXAMINATION:  PHYSICAL EXAM:    Constitutional: Intubated    Neurological: Awake, alert oriented to person, place and time (WITH choices), Following Commands, PERRL, EOMI, No Gaze Preference, Face Symmetrical. Neck extension 5/5, Neck flexion 4+/5.    Motor exam:          Upper extremity                         Delt     Bicep     Tricep    HG                                                 R         4-/5        5/5        5/5       5/5                                               L          4/5        5/5        5/5       5/5          Lower extremity                        HF         KF        KE       DF         PF                                                  R        5/5        5/5        5/5       5/5         5/5                                               L         5/5        5/5       5/5       5/5          5/5                                                 Sensation: [x ] intact to light touch  [ ] decreased:     Pulmonary: Clear to Auscultation, No rales, No rhonchi, No wheezes     Cardiovascular: S1, S2, Regular rate and rhythm     Gastrointestinal: Soft, Non-tender, Non-distended     Extremities: No calf tenderness        HOSPITAL COURSE:  Patient KAIDEN HATCH is a 63y woman presenting with myasthenia gravis exacerbation.    Admission Scores  GCS: 15     24 hour Events: Patient evaluated at bedside. Reports improvement in symptoms w/ exception to copious oral secretions. No complaints of pain or dyspnea.     11/4- Patient intubated overnight for worsening respiratory status. Patient received dose 2/5 of IVIG  11/6- No events; poor respiratory effort on SBT  11/7- Apneic on SBT; worsening respiratory effort; last dose of IVIG TODAY  11/8- NIF -12 and  this AM. PT received shiley in right IJ w/ conscious sedation for initiation of PLEX. S/P tx 1/5 w/ PLEX.  11/9- NIF -20 and VC 630this AM.  11/10- Patient due for second dose of PLEX. No events overnight.   11/11- No events overnight. Has been tolerating CPAP since 5 AM. Oral secretions present. Strength improved.   11/12 no events, no PS too much secretions    Allergies    peanuts (Unknown)  No Known Drug Allergies    Intolerances      REVIEW OF SYSTEMS: [ ] Unable to Assess due to neurologic exam   [ x] All ROS addressed below are non-contributory, except:  Neuro: [ ] Headache [ ] Back pain [ ] Numbness [ ] Weakness [ ] Ataxia [ ] Dizziness [ ] Aphasia [ ] Dysarthria [ ] Visual disturbance  Resp: [ ] Shortness of breath/dyspnea, [ ] Orthopnea [ ] Cough  CV: [ ] Chest pain [ ] Palpitation [ ] Lightheadedness [ ] Syncope  Renal: [ ] Thirst [ ] Edema  GI: [ ] Nausea [ ] Emesis [ ] Abdominal pain [ ] Constipation [ ] Diarrhea  Hem: [ ] Hematemesis [ ] bright red blood per rectum  ID: [ ] Fever [ ] Chills [ ] Dysuria  ENT: [ ] Rhinorrhea    ICU Vital Signs Last 24 Hrs  T(C): 37 (11 Nov 2023 15:00), Max: 37 (11 Nov 2023 07:00)  T(F): 98.6 (11 Nov 2023 15:00), Max: 98.6 (11 Nov 2023 07:00)  HR: 111 (11 Nov 2023 17:48) (46 - 115)  BP: 162/69 (11 Nov 2023 17:00) (104/54 - 162/69)  BP(mean): 97 (11 Nov 2023 17:00) (70 - 97)  ABP: --  ABP(mean): --  RR: 16 (11 Nov 2023 17:00) (15 - 25)  SpO2: 98% (11 Nov 2023 17:48) (97% - 100%)    O2 Parameters below as of 11 Nov 2023 17:48  Patient On (Oxygen Delivery Method): ventilator      CAPILLARY BLOOD GLUCOSE      POCT Blood Glucose.: 122 mg/dL (11 Nov 2023 18:42)  POCT Blood Glucose.: 206 mg/dL (11 Nov 2023 11:27)  POCT Blood Glucose.: 123 mg/dL (11 Nov 2023 06:30)  POCT Blood Glucose.: 127 mg/dL (11 Nov 2023 01:53)  POCT Blood Glucose.: 101 mg/dL (10 Nov 2023 20:25)      I&O's Summary    10 Nov 2023 07:01  -  11 Nov 2023 07:00  --------------------------------------------------------  IN: 1200 mL / OUT: 300 mL / NET: 900 mL    11 Nov 2023 07:01  -  11 Nov 2023 20:08  --------------------------------------------------------  IN: 765 mL / OUT: 0 mL / NET: 765 mL      Respiratory:  Mode: AC/ CMV (Assist Control/ Continuous Mandatory Ventilation)  RR (machine): 16  TV (machine): 420  FiO2: 30  PEEP: 5  ITime: 1  MAP: 11  PIP: 29        LABS:                        10.5   12.67 )-----------( 207      ( 11 Nov 2023 02:37 )             31.9   11-11    146<H>  |  113<H>  |  39<H>  ----------------------------<  133<H>  3.4<L>   |  21<L>  |  0.59    Ca    8.9      11 Nov 2023 02:37  Phos  3.8     11-11  Mg     2.2     11-11      MEDICATIONS  (STANDING):  albuterol/ipratropium for Nebulization 3 milliLiter(s) Nebulizer every 6 hours  azaTHIOprine 50 milliGRAM(s) Oral two times a day  chlorhexidine 0.12% Liquid 15 milliLiter(s) Oral Mucosa every 12 hours  chlorhexidine 4% Liquid 1 Application(s) Topical at bedtime  chlorhexidine 4% Liquid 1 Application(s) Topical <User Schedule>  dextrose 50% Injectable 50 milliLiter(s) IV Push every 15 minutes  dextrose 50% Injectable 25 milliLiter(s) IV Push every 15 minutes  enoxaparin Injectable 40 milliGRAM(s) SubCutaneous every 24 hours  insulin lispro (ADMELOG) corrective regimen sliding scale   SubCutaneous every 6 hours  pantoprazole   Suspension 40 milliGRAM(s) Oral before breakfast  polyethylene glycol 3350 17 Gram(s) Oral every 12 hours  predniSONE   Tablet 60 milliGRAM(s) Oral daily    MEDICATIONS  (PRN):  acetaminophen   Oral Liquid .. 650 milliGRAM(s) Oral every 6 hours PRN Temp greater or equal to 38C (100.4F), Mild Pain (1 - 3)  ketorolac   Injectable 30 milliGRAM(s) IV Push every 8 hours PRN Mild Pain (1 - 3)  oxyCODONE    IR 5 milliGRAM(s) Oral every 4 hours PRN Moderate Pain (4 - 6)  senna 2 Tablet(s) Oral at bedtime PRN Constipation  sodium chloride 0.9% lock flush 10 milliLiter(s) IV Push every 1 hour PRN Pre/post blood products, medications, blood draw, and to maintain line patency      EXAMINATION:  PHYSICAL EXAM:    Constitutional: Intubated    Neurological: Awake, alert oriented to person, place and time (WITH choices), Following Commands, PERRL, EOMI, No Gaze Preference, Face Symmetrical. Neck extension 5/5, Neck flexion 4+/5.    Motor exam:          Upper extremity                         Delt     Bicep     Tricep    HG                                                 R         4-/5        5/5        5/5       5/5                                               L          4/5        5/5        5/5       5/5          Lower extremity                        HF         KF        KE       DF         PF                                                  R        5/5        5/5        5/5       5/5         5/5                                               L         5/5        5/5       5/5       5/5          5/5                                                 Sensation: [x ] intact to light touch  [ ] decreased:     Pulmonary: Clear to Auscultation, No rales, No rhonchi, No wheezes     Cardiovascular: S1, S2, Regular rate and rhythm     Gastrointestinal: Soft, Non-tender, Non-distended     Extremities: No calf tenderness

## 2023-11-12 NOTE — PROGRESS NOTE ADULT - ATTENDING COMMENTS
Patient seen and examined bedside. No new overnight events were reported. During follow up visit, patient's  was present and I appreciate that. As per patient she is doing fine.     Detailed neuro exam:  General: Patient is comfortably lying on the bed without acute distress and currently orally intubated.  Mental and Psychological Status: The patient is alert and oriented to person, place and exact date (via writing). Follows simple and complex commands.   Cranial Nerve Exam: Bilateral eye ptosis L>R Visual fields are full to confrontation. Pupils are round, equal, and reactive.  V1-V3 are intact to light touch. There is no facial weakness or asymmetry. Hearing is grossly intact. Shoulder shrug is 4/5 bilaterally.   Motor Exam: Bulk, tone, and strength are normal except proximal muscle bilaterally at upper extremities 4/5. There are no resting tremors.  Sensory Examination: Sensation is intact to light touch throughout.  Posture, Station and Gait: Currently orally intubated.    Impression and plan:                                                  Ms. Acosta is 63 year old right handed woman with a PMHx significant for myasthenia gravis s/p thymectomy on Mestinon 12 mg tablets TID and Prednisone 60 mg presenting to the ED for worsening difficulty with double vision, swallowing, talking, and breathing that has progressively gotten worse for the previous one week.  Myasthenic gravis crisis with signs of respiratory failure and subsequently she required mechanical ventilation for air way protection. s/p IVIG 5 days and clinically she made improvement except respiratory effort on SBT, NIF and FVC below baseline required for extubation to BiPAP.  Currently on Plasmapheresis two dosed completed. Clinically she remains stable.     Continue Plasmapheresis for 5 session.   Azathioprine 50 mg BID   Continue Steroids with GI prophylaxis.  Continue medical management, neuro- check and fall precaution.  GI and DVT prophylaxis.  I agree with above exam, assessment and plan.    Patient is at high risk for complications and morbidity or mortality. There is high probability of imminent or life threatening deterioration in the patient's condition.    I discussed the diagnosis, treatment plan and prognosis with the patient's family. Risk benefit and alternatives to the treatment were discussed. All questions and concerns were addressed. The patient's family demonstrated good understanding of the treatment plan.    My cumulative time taking care of this critically ill patient is 55 minutes  If you have any further questions, please do not hesitate to contact our team.  Thank you for allowing us to participate in this patient care.

## 2023-11-12 NOTE — PROGRESS NOTE ADULT - ASSESSMENT
ASSESSMENT/PLAN: 64 y/o female with myasthenia gravis, respiratory failure. Demonstrating worsening respiratory effort on SBT, NIF and FVC below baseline required for extubation to BiPAP despite completion of IVIG. Plan is to d/w PT, family risks & benefits of PLEX - patient consented to insertion of shiley, initiation of PLEX - S/P tx w/ PLEX 1/5. Care initiated w/ [facility] neuromuscular neurology Dr Covarrubias. PT shows signs of improved respiratory effort. Continues to CPAP well, Secretions are improving at this time    NEURO:  Neurochecks q4h  PLEX tx 2/5, patient started azathioprine on 11/10   Recommend early ambulation w/ PT/OT  Neurology following  Hold Mestinon, patient with copious secretions, will consider restarting prior to extubation, currently held in setting of copious secretions, retrial extubation in AM  Continue Prednisone to 60 mg QD  Avoid medications that may worsen the current exacerbation including macrolides, fluoroquinolones, tetracyclines, aminoglycoside, beta-blockers, magnesium, and anti-arrhythmics (procainamide, quinidine, and lidocaine)  Neurology following, appreciate their recommendations  Activity: [x] mobilize as tolerated [] Bedrest [x] PT [x] OT [] PMNR    PULM:  Intubated for airway protection  CPAP as tolerated; daily SBTs, not tolerating  CXR reviewed  Duonebs q6h for secretions (oral)  Check NIF/VC Q4 hours in ICU setting  Aggressive Chest PT    CV:  MAP>65  TTE - EF 54%, elevated right atrial pressure     RENAL:  Stable renal function  IVL  daily IOs    GI:  Diet: ON TF  GI prophylaxis: PPI while on steroids + intubated  Bowel regimen: miralax, senna; dulcolax suppositories PRN  LBM: 11/11    ENDO:   FS goal 120-180  Medium scale  A1C 6.7    HEME/ONC:  Monitor H/H  SCDs  Chemoppx: SQL    ID:  No fevers, elevated leukocytosis  Less thick inline secretions, oral secretions  No signs of infection     Disposition: ICU ASSESSMENT/PLAN: 64 y/o female with myasthenia gravis, respiratory failure. Demonstrating worsening respiratory effort on SBT, NIF and FVC below baseline required for extubation to BiPAP despite completion of IVIG. Plan is to d/w PT, family risks & benefits of PLEX - patient consented to insertion of shiley, initiation of PLEX - S/P tx w/ PLEX 1/5. Care initiated w/ [facility] neuromuscular neurology Dr Covarrubias. PT shows signs of improved respiratory effort. Continues to CPAP well, Secretions are improving at this time    NEURO:  Neurochecks q4h  PLEX tx 2/5, patient started azathioprine on 11/10   Recommend early ambulation w/ PT/OT  Neurology following  Hold Mestinon, patient with copious secretions, will consider restarting prior to extubation, currently held in setting of copious secretions, retrial extubation in AM  Continue Prednisone to 60 mg QD  Avoid medications that may worsen the current exacerbation including macrolides, fluoroquinolones, tetracyclines, aminoglycoside, beta-blockers, magnesium, and anti-arrhythmics (procainamide, quinidine, and lidocaine)  Neurology following, appreciate their recommendations  Activity: [x] mobilize as tolerated [] Bedrest [x] PT [x] OT [] PMNR    PULM:  Intubated for airway protection  CPAP as tolerated; daily SBTs, not tolerating  CXR reviewed  Duonebs q6h for secretions (oral)  Check NIF/VC Q4 hours in ICU setting  Aggressive Chest PT    CV:  MAP>65  TTE - EF 54%, elevated right atrial pressure     RENAL:  Stable renal function  IVL  daily IOs    GI:  Diet: ON TF  GI prophylaxis: PPI while on steroids + intubated  Bowel regimen: miralax, senna; dulcolax suppositories PRN  LBM: 11/11    ENDO:   FS goal 120-180  Medium scale  A1C 6.7    HEME/ONC:  Monitor H/H  SCDs  Chemoppx: SQL    ID:  No fevers, elevated leukocytosis  Less thick inline secretions, oral secretions  No signs of infection     Disposition: ICU ASSESSMENT/PLAN: 62 y/o female with myasthenia gravis, respiratory failure. Demonstrating worsening respiratory effort on SBT, NIF and FVC below baseline required for extubation to BiPAP despite completion of IVIG. Plan is to d/w PT, family risks & benefits of PLEX - patient consented to insertion of shiley, initiation of PLEX - S/P tx w/ PLEX 1/5. Care initiated w/ [facility] neuromuscular neurology Dr Covarrubias. PT shows signs of improved respiratory effort. Continues to CPAP well, Secretions are improving at this time    NEURO:  Neurochecks q4h  PLEX tx 2/5, patient started azathioprine on 11/10   Recommend early ambulation w/ PT/OT  Neurology following  Hold Mestinon, patient with copious secretions, will consider restarting prior to extubation, currently held in setting of copious secretions, retrial extubation in AM  Continue Prednisone to 60 mg QD  Avoid medications that may worsen the current exacerbation including macrolides, fluoroquinolones, tetracyclines, aminoglycoside, beta-blockers, magnesium, and anti-arrhythmics (procainamide, quinidine, and lidocaine)  Neurology following, appreciate their recommendations  Activity: [x] mobilize as tolerated [] Bedrest [x] PT [x] OT [] PMNR    PULM:  Intubated for airway protection  CPAP as tolerated; daily SBTs, not tolerating  CXR reviewed  Duonebs q6h for secretions (oral)  Check NIF/VC Q4 hours in ICU setting  Aggressive Chest PT    CV:  MAP>65  TTE - EF 54%, elevated right atrial pressure     RENAL:  Stable renal function  IVL  daily IOs    GI:  Diet: ON TF  GI prophylaxis: PPI while on steroids + intubated  Bowel regimen: miralax, senna; dulcolax suppositories PRN  LBM: 11/11    ENDO:   FS goal 120-180  Medium scale  A1C 6.7    HEME/ONC:  Monitor H/H  SCDs  Chemoppx: SQL    ID:  No fevers, elevated leukocytosis  Less thick inline secretions, oral secretions  No signs of infection     Disposition: ICU ASSESSMENT/PLAN: 62 y/o female with myasthenia gravis, respiratory failure. Demonstrating worsening respiratory effort on SBT, NIF and FVC below baseline required for extubation to BiPAP despite completion of IVIG. Plan is to d/w PT, family risks & benefits of PLEX - patient consented to insertion of shiley, initiation of PLEX - S/P tx w/ PLEX 1/5. Care initiated w/ [facility] neuromuscular neurology Dr Covarrubias. PT shows signs of improved respiratory effort. Continues to CPAP well, Secretions are improving at this time    NEURO:  Neurochecks q4h  PLEX tx 2/5 next monday 11/13, patient started azathioprine on 11/10   Recommend early ambulation w/ PT/OT  Neurology following  Hold Mestinon, patient with copious secretions, will consider restarting prior to extubation, currently held in setting of copious secretions, retrial extubation in AM  Continue Prednisone to 60 mg QD  Avoid medications that may worsen the current exacerbation including macrolides, fluoroquinolones, tetracyclines, aminoglycoside, beta-blockers, magnesium, and anti-arrhythmics (procainamide, quinidine, and lidocaine), muscle relaxants   Activity: [x] mobilize as tolerated [] Bedrest [x] PT [x] OT [] PMNR    PULM:  Intubated for airway protection  CPAP as tolerated; daily SBTs, not tolerating  CXR reviewed  Duonebs q6h for secretions (oral)  Check NIF/VC Q4 hours in ICU setting  Aggressive Chest PT    CV:  MAP>65  TTE - EF 54%, elevated right atrial pressure     RENAL:  Stable renal function  IVL  daily IOs    GI:  Diet: ON TF  GI prophylaxis: PPI while on steroids + intubated  Bowel regimen: miralax, senna; dulcolax suppositories PRN  LBM: 11/11    ENDO:   FS goal 120-180  Medium scale  A1C 6.7    HEME/ONC:  Monitor H/H  SCDs  Chemoppx: SQL  LED needs repeat     ID:  No fevers, elevated leukocytosis  Less thick inline secretions, oral secretions  No signs of infection     Disposition: ICU ASSESSMENT/PLAN: 64 y/o female with myasthenia gravis, respiratory failure. Demonstrating worsening respiratory effort on SBT, NIF and FVC below baseline required for extubation to BiPAP despite completion of IVIG. Plan is to d/w PT, family risks & benefits of PLEX - patient consented to insertion of shiley, initiation of PLEX - S/P tx w/ PLEX 1/5. Care initiated w/ [facility] neuromuscular neurology Dr Covarrubias. PT shows signs of improved respiratory effort. Continues to CPAP well, Secretions are improving at this time    NEURO:  Neurochecks q4h  PLEX tx 2/5 next monday 11/13, patient started azathioprine on 11/10   Recommend early ambulation w/ PT/OT  Neurology following  Hold Mestinon, patient with copious secretions, will consider restarting prior to extubation, currently held in setting of copious secretions, retrial extubation in AM  Continue Prednisone to 60 mg QD  Avoid medications that may worsen the current exacerbation including macrolides, fluoroquinolones, tetracyclines, aminoglycoside, beta-blockers, magnesium, and anti-arrhythmics (procainamide, quinidine, and lidocaine), muscle relaxants   Activity: [x] mobilize as tolerated [] Bedrest [x] PT [x] OT [] PMNR    PULM:  Intubated for airway protection  CPAP as tolerated; daily SBTs, not tolerating  CXR reviewed  Duonebs q6h for secretions (oral)  Check NIF/VC Q4 hours in ICU setting  Aggressive Chest PT    CV:  MAP>65  TTE - EF 54%, elevated right atrial pressure     RENAL:  Stable renal function  IVL  daily IOs    GI:  Diet: ON TF  GI prophylaxis: PPI while on steroids + intubated  Bowel regimen: miralax, senna; dulcolax suppositories PRN  LBM: 11/11    ENDO:   FS goal 120-180  Medium scale  A1C 6.7    HEME/ONC:  Monitor H/H  SCDs  Chemoppx: SQL  LED needs repeat     ID:  No fevers, elevated leukocytosis  Less thick inline secretions, oral secretions  No signs of infection     Disposition: ICU ASSESSMENT/PLAN: 62 y/o female with myasthenia gravis, respiratory failure. Demonstrating worsening respiratory effort on SBT, NIF and FVC below baseline required for extubation to BiPAP despite completion of IVIG. Plan is to d/w PT, family risks & benefits of PLEX - patient consented to insertion of shiley, initiation of PLEX - S/P tx w/ PLEX 1/5. Care initiated w/ [facility] neuromuscular neurology Dr Covarrubias. PT shows signs of improved respiratory effort. Continues to CPAP well, Secretions are improving at this time    NEURO:  Neurochecks q4h  PLEX tx 2/5 next monday 11/13, patient started azathioprine on 11/10   Recommend early ambulation w/ PT/OT  Neurology following  Hold Mestinon, patient with copious secretions, will consider restarting prior to extubation, currently held in setting of copious secretions, retrial extubation in AM  Continue Prednisone to 60 mg QD  Avoid medications that may worsen the current exacerbation including macrolides, fluoroquinolones, tetracyclines, aminoglycoside, beta-blockers, magnesium, and anti-arrhythmics (procainamide, quinidine, and lidocaine), muscle relaxants   Activity: [x] mobilize as tolerated [] Bedrest [x] PT [x] OT [] PMNR    PULM:  Intubated for airway protection  CPAP as tolerated; daily SBTs, not tolerating  CXR reviewed  Duonebs q6h for secretions (oral)  Check NIF/VC Q4 hours in ICU setting  Aggressive Chest PT    CV:  MAP>65  TTE - EF 54%, elevated right atrial pressure     RENAL:  Stable renal function  IVL  daily IOs    GI:  Diet: ON TF  GI prophylaxis: PPI while on steroids + intubated  Bowel regimen: miralax, senna; dulcolax suppositories PRN  LBM: 11/11    ENDO:   FS goal 120-180  Medium scale  A1C 6.7    HEME/ONC:  Monitor H/H  SCDs  Chemoppx: SQL  LED needs repeat     ID:  No fevers, elevated leukocytosis  Less thick inline secretions, oral secretions  No signs of infection     Disposition: ICU

## 2023-11-12 NOTE — PROGRESS NOTE ADULT - ASSESSMENT
63y (1960) woman with a PMHx significant for myasthenia gravis s/p thymectomy presenting to the ED for worsening difficulty with double vision, swallowing, talking, and breathing that has progressively gotten worse for the previous one week. Patient is currently on Mestinon 60 mg two tablets TID and Prednisone 10 mg --> uptitrated to 20 mg, she was not taking it per son for a month or so and was discharged on 50 mg after her hospitalization in 06/2023. Has been admitted before in June 2023, requiring intubation and PLEX/IVIG and in Feb 2023 for MG crisis that required intubation (2/1-2/11/23) iso COVID19. Patient received 5 days of PLEX (2/2-2/10/23) followed by 5 days of IVIG (2/16-2/20/23) with symptomatic improvement. Patient's son says patient has responded best to IVIG and that it stabilizes her MG for about 2 years. At baseline, patient is on 2L NC. In the ED, patient's NIF was -50. Her VC was 430. Patient also endorsed increased mucus secretion in her throat, and reliance on her oxygen tank. Now sp IVIG 5/5 without improvement. 11/8: NIF and VC -12/~300. DIscussed with family and patient. They are now amenable to PLEX. discussed with NSCU team.  11/9 minimal improvement in VC, NIF remains poor 11/10 NIF/VC -10/430    Impression: Myasthenia gravis with acute exacerbation. Now on PLEX.   11/10: Clinically not yet improved    Recommendations:  [] continue with PLEX, (1st session 11/8, 2nd session planned 11/10pm)  [] Hold Mestinon given secretions  [] Trend NIF/VC, Extubate when able per NSCU  [] Continue Prednisone 60mg QD + GI prophylaxis  [] CT chest check for residual thymoma  [] If no contraindications can do meningococcal vaccine prior to potential immunosuppression to be decided by Dr. Covarrubias in the future  []Follow up with neuromuscular specialist after discharge - can follow at [address] [place] [address] [phone] if patient desires. Can schedule appointment with Dr. Fermin Mayer or Dr. Emily Covarrubias of neuromuscular neurology at [address] [place] [address] 1-2 weeks after discharge.   63y (1960) woman with a PMHx significant for myasthenia gravis s/p thymectomy presenting to the ED for worsening difficulty with double vision, swallowing, talking, and breathing that has progressively gotten worse for the previous one week. Patient is currently on Mestinon 60 mg two tablets TID and Prednisone 10 mg --> uptitrated to 20 mg, she was not taking it per son for a month or so and was discharged on 50 mg after her hospitalization in 06/2023. Has been admitted before in June 2023, requiring intubation and PLEX/IVIG and in Feb 2023 for MG crisis that required intubation (2/1-2/11/23) iso COVID19. Patient received 5 days of PLEX (2/2-2/10/23) followed by 5 days of IVIG (2/16-2/20/23) with symptomatic improvement. Patient's son says patient has responded best to IVIG and that it stabilizes her MG for about 2 years. At baseline, patient is on 2L NC. In the ED, patient's NIF was -50. Her VC was 430. Patient also endorsed increased mucus secretion in her throat, and reliance on her oxygen tank. Now sp IVIG 5/5 without improvement. 11/8: NIF and VC -12/~300. DIscussed with family and patient. They are now amenable to PLEX. discussed with NSCU team.  11/9 minimal improvement in VC, NIF remains poor 11/10 NIF/VC -10/430    Impression: Myasthenia gravis with acute exacerbation. Now on PLEX.   11/10: Clinically not yet improved    Recommendations:  [] continue with PLEX, (1st session 11/8, 2nd session planned 11/10pm)  [] Hold Mestinon given secretions  [] Trend NIF/VC, Extubate when able per NSCU  [] Continue Prednisone 60mg QD + GI prophylaxis  [] CT chest check for residual thymoma  [] If no contraindications can do meningococcal vaccine prior to potential immunosuppression to be decided by Dr. Covarrubias in the future  []Follow up with neuromuscular specialist after discharge - can follow at [address] [place] [address] [phone] if patient desires. Can schedule appointment with Dr. Fermin Mayer or Dr. Emily Covarrubias of neuromuscular neurology at [address] [place] [address] 1-2 weeks after discharge.   63y (1960) woman with a PMHx significant for myasthenia gravis s/p thymectomy presenting to the ED for worsening difficulty with double vision, swallowing, talking, and breathing that has progressively gotten worse for the previous one week. Patient is currently on Mestinon 60 mg two tablets TID and Prednisone 10 mg --> uptitrated to 20 mg, she was not taking it per son for a month or so and was discharged on 50 mg after her hospitalization in 06/2023. Has been admitted before in June 2023, requiring intubation and PLEX/IVIG and in Feb 2023 for MG crisis that required intubation (2/1-2/11/23) iso COVID19. Patient received 5 days of PLEX (2/2-2/10/23) followed by 5 days of IVIG (2/16-2/20/23) with symptomatic improvement. Patient's son says patient has responded best to IVIG and that it stabilizes her MG for about 2 years. At baseline, patient is on 2L NC. In the ED, patient's NIF was -50. Her VC was 430. Patient also endorsed increased mucus secretion in her throat, and reliance on her oxygen tank. Now sp IVIG 5/5 without improvement. 11/8: NIF and VC -12/~300. DIscussed with family and patient. They are now amenable to PLEX. discussed with NSCU team.  11/9 minimal improvement in VC, NIF remains poor 11/10 NIF/VC -10/430    Impression: Myasthenia gravis with acute exacerbation. Now on PLEX.   11/10: Clinically not yet improved    Recommendations:  [] continue with PLEX, (1st session 11/8, 2nd session planned 11/10pm)  [] Hold Mestinon given secretions  [] Trend NIF/VC, Extubate when able per NSCU  [] Continue Prednisone 60mg QD + GI prophylaxis  [] CT chest check for residual thymoma  [] If no contraindications can do meningococcal vaccine prior to potential immunosuppression to be decided by Dr. Covarrubias in the future  []Follow up with neuromuscular specialist after discharge - can follow at [address] [place] [address] [phone] if patient desires. Can schedule appointment with Dr. Fermin Mayer or Dr. Emily Covarrubias of neuromuscular neurology at [address] [place] [address] 1-2 weeks after discharge.   63y (1960) woman with a PMHx significant for myasthenia gravis s/p thymectomy presenting to the ED for worsening difficulty with double vision, swallowing, talking, and breathing that has progressively gotten worse for the previous one week. Patient is currently on Mestinon 60 mg two tablets TID and Prednisone 10 mg --> uptitrated to 20 mg, she was not taking it per son for a month or so and was discharged on 50 mg after her hospitalization in 06/2023. Has been admitted before in June 2023, requiring intubation and PLEX/IVIG and in Feb 2023 for MG crisis that required intubation (2/1-2/11/23) iso COVID19. Patient received 5 days of PLEX (2/2-2/10/23) followed by 5 days of IVIG (2/16-2/20/23) with symptomatic improvement. Patient's son says patient has responded best to IVIG and that it stabilizes her MG for about 2 years. At baseline, patient is on 2L NC. In the ED, patient's NIF was -50. Her VC was 430. Patient also endorsed increased mucus secretion in her throat, and reliance on her oxygen tank. Now sp IVIG 5/5 without improvement. 11/8: NIF and VC -12/~300. DIscussed with family and patient. They are now amenable to PLEX. discussed with NSCU team.  11/9 minimal improvement in VC, NIF remains poor 11/10 NIF/VC -10/430    Impression: Myasthenia gravis with acute exacerbation. Now on PLEX with mild clinical improvement     Recommendations:  [] Continue with PLEX for 5 sessions. Third session planned for 11/13  [] Continue Azathiprine   [] Hold Mestinon given secretions  [] Trend NIF/VC, Extubate when able per NSCU  [] Continue Prednisone 60mg QD + GI prophylaxis  [] CT chest check for residual thymoma  [] If no contraindications can do meningococcal vaccine prior to potential immunosuppression to be decided by Dr. Covarrubias in the future  []Follow up with neuromuscular specialist after discharge - can follow at [address] [place] [address] [phone] if patient desires. Can schedule appointment with Dr. Fermin Mayer or Dr. Emily Covarrubias of neuromuscular neurology at [address] [place] [address] 1-2 weeks after discharge.   63y (1960) woman with a PMHx significant for myasthenia gravis s/p thymectomy presenting to the ED for worsening difficulty with double vision, swallowing, talking, and breathing that has progressively gotten worse for the previous one week. Patient is currently on Mestinon 60 mg two tablets TID and Prednisone 10 mg --> uptitrated to 20 mg, she was not taking it per son for a month or so and was discharged on 50 mg after her hospitalization in 06/2023. Has been admitted before in June 2023, requiring intubation and PLEX/IVIG and in Feb 2023 for MG crisis that required intubation (2/1-2/11/23) iso COVID19. Patient received 5 days of PLEX (2/2-2/10/23) followed by 5 days of IVIG (2/16-2/20/23) with symptomatic improvement. Patient's son says patient has responded best to IVIG and that it stabilizes her MG for about 2 years. At baseline, patient is on 2L NC. In the ED, patient's NIF was -50. Her VC was 430. Patient also endorsed increased mucus secretion in her throat, and reliance on her oxygen tank. Now sp IVIG 5/5 without improvement. 11/8: NIF and VC -12/~300. DIscussed with family and patient. They are now amenable to PLEX. discussed with NSCU team.  11/9 minimal improvement in VC, NIF remains poor 11/10 NIF/VC -10/430    Impression: Myasthenia gravis with acute exacerbation. Now on PLEX with mild clinical improvement     Recommendations:  [] Continue with PLEX for 5 sessions. Third session planned for 11/13  [] Continue Azathiprine   [] Hold Mestinon given secretions  [] Trend NIF/VC, Extubate when able per NSCU  [] Continue Prednisone 60mg QD + GI prophylaxis  [] CT chest check for residual thymoma  [] If no contraindications can do meningococcal vaccine prior to potential immunosuppression to be decided by Dr. Covarrubias in the future  []Follow up with neuromuscular specialist after discharge - can follow at [address] [place] [address] [phone] if patient desires. Can schedule appointment with Dr. Fermin Mayer or Dr. Emily Covarrubias of neuromuscular neurology at [address] [place] [address] 1-2 weeks after discharge.   63y (1960) woman with a PMHx significant for myasthenia gravis s/p thymectomy presenting to the ED for worsening difficulty with double vision, swallowing, talking, and breathing that has progressively gotten worse for the previous one week. Patient is currently on Mestinon 60 mg two tablets TID and Prednisone 10 mg --> uptitrated to 20 mg, she was not taking it per son for a month or so and was discharged on 50 mg after her hospitalization in 06/2023. Has been admitted before in June 2023, requiring intubation and PLEX/IVIG and in Feb 2023 for MG crisis that required intubation (2/1-2/11/23) iso COVID19. Patient received 5 days of PLEX (2/2-2/10/23) followed by 5 days of IVIG (2/16-2/20/23) with symptomatic improvement. Patient's son says patient has responded best to IVIG and that it stabilizes her MG for about 2 years. At baseline, patient is on 2L NC. In the ED, patient's NIF was -50. Her VC was 430. Patient also endorsed increased mucus secretion in her throat, and reliance on her oxygen tank. Now sp IVIG 5/5 without improvement. 11/8: NIF and VC -12/~300. DIscussed with family and patient. They are now amenable to PLEX. discussed with NSCU team.  11/9 minimal improvement in VC, NIF remains poor 11/10 NIF/VC -10/430    Impression: Myasthenia gravis with acute exacerbation. Now on PLEX with mild clinical improvement     Recommendations:  [] Continue with PLEX for 5 sessions. Third session planned for 11/13  [] Continue Azathiprine   [] Hold Mestinon given secretions  [] Trend NIF/VC, Extubate when able per NSCU  [] Continue Prednisone 60mg QD + GI prophylaxis  [] CT chest check for residual thymoma  [] If no contraindications can do meningococcal vaccine prior to potential immunosuppression to be decided by Dr. Covarrubias in the future  []Follow up with neuromuscular specialist after discharge - can follow at [address] [place] [address] [phone] if patient desires. Can schedule appointment with Dr. Fermin Mayer or Dr. Emily Covarrubias of neuromuscular neurology at [address] [place] [address] 1-2 weeks after discharge.   63y (1960) woman with a PMHx significant for myasthenia gravis s/p thymectomy presenting to the ED for worsening difficulty with double vision, swallowing, talking, and breathing that has progressively gotten worse for the previous one week. Patient is currently on Mestinon 60 mg two tablets TID and Prednisone 10 mg --> uptitrated to 20 mg, she was not taking it per son for a month or so and was discharged on 50 mg after her hospitalization in 06/2023. Has been admitted before in June 2023, requiring intubation and PLEX/IVIG and in Feb 2023 for MG crisis that required intubation (2/1-2/11/23) iso COVID19. Patient received 5 days of PLEX (2/2-2/10/23) followed by 5 days of IVIG (2/16-2/20/23) with symptomatic improvement. Patient's son says patient has responded best to IVIG and that it stabilizes her MG for about 2 years. At baseline, patient is on 2L NC. In the ED, patient's NIF was -50. Her VC was 430. Patient also endorsed increased mucus secretion in her throat, and reliance on her oxygen tank. Now sp IVIG 5/5 without improvement. 11/8: NIF and VC -12/~300. Started Azathioprine and PLEX, now s/p 2 sessions with mild clinical improvement NIF -23/  this morning.    Impression:   Myasthenia gravis with acute exacerbation. Now on PLEX with mild clinical improvement     Recommendations:  [] Continue with PLEX for 5 sessions. Third session planned for 11/13  [] Continue Azathioprine 50mg BID  [] Hold Mestinon given secretions  [] Trend NIF/VC, Extubate when able per NSCU  [] Continue Prednisone 60mg QD + GI prophylaxis  [] CT chest check for residual thymoma  [] If no contraindications can do meningococcal vaccine prior to potential immunosuppression to be decided by Dr. Covarrubias in the future  []Follow up with neuromuscular specialist after discharge - can follow at [address] [place] [address] [phone] if patient desires. Can schedule appointment with Dr. Fermin Mayer or Dr. Emily Covarrubias of neuromuscular neurology at [address] [place] [address] 1-2 weeks after discharge.    Case seen and discussed with Neurology attending Dr. Hodgson 63y (1960) woman with a PMHx significant for myasthenia gravis s/p thymectomy presenting to the ED for worsening difficulty with double vision, swallowing, talking, and breathing that has progressively gotten worse for the previous one week. Patient is currently on Mestinon 60 mg two tablets TID and Prednisone 10 mg --> uptitrated to 20 mg, she was not taking it per son for a month or so and was discharged on 50 mg after her hospitalization in 06/2023. Has been admitted before in June 2023, requiring intubation and PLEX/IVIG and in Feb 2023 for MG crisis that required intubation (2/1-2/11/23) iso COVID19. Patient received 5 days of PLEX (2/2-2/10/23) followed by 5 days of IVIG (2/16-2/20/23) with symptomatic improvement. Patient's son says patient has responded best to IVIG and that it stabilizes her MG for about 2 years. At baseline, patient is on 2L NC. In the ED, patient's NIF was -50. Her VC was 430. Patient also endorsed increased mucus secretion in her throat, and reliance on her oxygen tank. Now sp IVIG 5/5 without improvement. 11/8: NIF and VC -12/~300. Started Azathioprine and PLEX, now s/p 2 sessions with mild clinical improvement NIF -23/  this morning.    Impression:   Myasthenia gravis with acute exacerbation. Now on PLEX with mild clinical improvement     Recommendations:  [] Continue with PLEX for 5 sessions. Third session planned for 11/13  [] Continue Azathioprine 50mg BID  [] Hold Mestinon given secretions  [] Trend NIF/VC, Extubate when able per NSCU  [] Continue Prednisone 60mg QD + GI prophylaxis  [] CT chest check for residual thymoma  [] If no contraindications can do meningococcal vaccine prior to potential immunosuppression to be decided by Dr. Covarrubias in the future  []Follow up with neuromuscular specialist after discharge - can follow at [address] [place] [address] [phone] if patient desires. Can schedule appointment with Dr. Fermin Mayer or Dr. Emily Covarrubias of neuromuscular neurology at [address] [place] [address] 1-2 weeks after discharge.    Case seen and discussed with Neurology attending Dr. Hodgson 63y (1960) woman with a PMHx significant for myasthenia gravis s/p thymectomy presenting to the ED for worsening difficulty with double vision, swallowing, talking, and breathing that has progressively gotten worse for the previous one week. Patient is currently on Mestinon 60 mg two tablets TID and Prednisone 10 mg --> uptitrated to 20 mg, she was not taking it per son for a month or so and was discharged on 50 mg after her hospitalization in 06/2023. Has been admitted before in June 2023, requiring intubation and PLEX/IVIG and in Feb 2023 for MG crisis that required intubation (2/1-2/11/23) iso COVID19. Patient received 5 days of PLEX (2/2-2/10/23) followed by 5 days of IVIG (2/16-2/20/23) with symptomatic improvement. Patient's son says patient has responded best to IVIG and that it stabilizes her MG for about 2 years. At baseline, patient is on 2L NC. In the ED, patient's NIF was -50. Her VC was 430. Patient also endorsed increased mucus secretion in her throat, and reliance on her oxygen tank. Now sp IVIG 5/5 without improvement. 11/8: NIF and VC -12/~300. Started Azathioprine and PLEX, now s/p 2 sessions with mild clinical improvement NIF -23/  this morning.    Impression:   Myasthenia gravis with acute exacerbation. Now on PLEX with mild clinical improvement     Recommendations:  [] Continue with PLEX for 5 sessions. Third session planned for 11/13  [] Continue Azathioprine 50mg BID  [] Hold Mestinon given secretions  [] Trend NIF/VC, Extubate when able per NSCU  [] Continue Prednisone 60mg QD + GI prophylaxis  [] CT chest check for residual thymoma  [] If no contraindications can do meningococcal vaccine prior to potential immunosuppression to be decided by Dr. Covarrubias in the future  []Follow up with neuromuscular specialist after discharge - can follow at [address] [place] [address] [phone] if patient desires. Can schedule appointment with Dr. Fermin Mayer or Dr. Emily Covarrubias of neuromuscular neurology at [address] [place] [address] 1-2 weeks after discharge.    Case seen and discussed with Neurology attending Dr. Hodgson

## 2023-11-13 LAB
ANION GAP SERPL CALC-SCNC: 11 MMOL/L — SIGNIFICANT CHANGE UP (ref 5–17)
APTT BLD: 27.4 SEC — SIGNIFICANT CHANGE UP (ref 24.5–35.6)
BUN SERPL-MCNC: 36 MG/DL — HIGH (ref 7–23)
CA-I BLD-SCNC: 1.19 MMOL/L — SIGNIFICANT CHANGE UP (ref 1.15–1.33)
CALCIUM SERPL-MCNC: 9.4 MG/DL — SIGNIFICANT CHANGE UP (ref 8.4–10.5)
CHLORIDE SERPL-SCNC: 110 MMOL/L — HIGH (ref 96–108)
CO2 SERPL-SCNC: 24 MMOL/L — SIGNIFICANT CHANGE UP (ref 22–31)
CREAT SERPL-MCNC: 0.47 MG/DL — LOW (ref 0.5–1.3)
EGFR: 106 ML/MIN/1.73M2 — SIGNIFICANT CHANGE UP
FIBRINOGEN PPP-MCNC: 266 MG/DL — SIGNIFICANT CHANGE UP (ref 200–445)
GLUCOSE SERPL-MCNC: 210 MG/DL — HIGH (ref 70–99)
HCT VFR BLD CALC: 33.7 % — LOW (ref 34.5–45)
HGB BLD-MCNC: 10.9 G/DL — LOW (ref 11.5–15.5)
INR BLD: 0.95 RATIO — SIGNIFICANT CHANGE UP (ref 0.85–1.18)
MAGNESIUM SERPL-MCNC: 2.5 MG/DL — SIGNIFICANT CHANGE UP (ref 1.6–2.6)
MCHC RBC-ENTMCNC: 28.7 PG — SIGNIFICANT CHANGE UP (ref 27–34)
MCHC RBC-ENTMCNC: 32.3 GM/DL — SIGNIFICANT CHANGE UP (ref 32–36)
MCV RBC AUTO: 88.7 FL — SIGNIFICANT CHANGE UP (ref 80–100)
NRBC # BLD: 0 /100 WBCS — SIGNIFICANT CHANGE UP (ref 0–0)
PHOSPHATE SERPL-MCNC: 3.7 MG/DL — SIGNIFICANT CHANGE UP (ref 2.5–4.5)
PLATELET # BLD AUTO: 282 K/UL — SIGNIFICANT CHANGE UP (ref 150–400)
POTASSIUM SERPL-MCNC: 4.1 MMOL/L — SIGNIFICANT CHANGE UP (ref 3.5–5.3)
POTASSIUM SERPL-SCNC: 4.1 MMOL/L — SIGNIFICANT CHANGE UP (ref 3.5–5.3)
PROTHROM AB SERPL-ACNC: 10.5 SEC — SIGNIFICANT CHANGE UP (ref 9.5–13)
RBC # BLD: 3.8 M/UL — SIGNIFICANT CHANGE UP (ref 3.8–5.2)
RBC # FLD: 14.6 % — HIGH (ref 10.3–14.5)
SODIUM SERPL-SCNC: 145 MMOL/L — SIGNIFICANT CHANGE UP (ref 135–145)
WBC # BLD: 12.65 K/UL — HIGH (ref 3.8–10.5)
WBC # FLD AUTO: 12.65 K/UL — HIGH (ref 3.8–10.5)

## 2023-11-13 PROCEDURE — 71250 CT THORAX DX C-: CPT | Mod: 26

## 2023-11-13 PROCEDURE — 71045 X-RAY EXAM CHEST 1 VIEW: CPT | Mod: 26

## 2023-11-13 PROCEDURE — 99291 CRITICAL CARE FIRST HOUR: CPT | Mod: GC

## 2023-11-13 PROCEDURE — 36514 APHERESIS PLASMA: CPT

## 2023-11-13 PROCEDURE — 93970 EXTREMITY STUDY: CPT | Mod: 26

## 2023-11-13 RX ORDER — SODIUM CHLORIDE 9 MG/ML
500 INJECTION INTRAMUSCULAR; INTRAVENOUS; SUBCUTANEOUS ONCE
Refills: 0 | Status: COMPLETED | OUTPATIENT
Start: 2023-11-13 | End: 2023-11-13

## 2023-11-13 RX ORDER — ACETAMINOPHEN 500 MG
1000 TABLET ORAL ONCE
Refills: 0 | Status: COMPLETED | OUTPATIENT
Start: 2023-11-13 | End: 2023-11-13

## 2023-11-13 RX ADMIN — SODIUM CHLORIDE 500 MILLILITER(S): 9 INJECTION INTRAMUSCULAR; INTRAVENOUS; SUBCUTANEOUS at 05:04

## 2023-11-13 RX ADMIN — AZATHIOPRINE 50 MILLIGRAM(S): 100 TABLET ORAL at 17:56

## 2023-11-13 RX ADMIN — CHLORHEXIDINE GLUCONATE 15 MILLILITER(S): 213 SOLUTION TOPICAL at 05:03

## 2023-11-13 RX ADMIN — POLYETHYLENE GLYCOL 3350 17 GRAM(S): 17 POWDER, FOR SOLUTION ORAL at 17:56

## 2023-11-13 RX ADMIN — Medication 3 MILLILITER(S): at 11:06

## 2023-11-13 RX ADMIN — CHLORHEXIDINE GLUCONATE 1 APPLICATION(S): 213 SOLUTION TOPICAL at 05:04

## 2023-11-13 RX ADMIN — CHLORHEXIDINE GLUCONATE 15 MILLILITER(S): 213 SOLUTION TOPICAL at 17:56

## 2023-11-13 RX ADMIN — Medication 400 MILLIGRAM(S): at 12:30

## 2023-11-13 RX ADMIN — Medication 650 MILLIGRAM(S): at 21:50

## 2023-11-13 RX ADMIN — PANTOPRAZOLE SODIUM 40 MILLIGRAM(S): 20 TABLET, DELAYED RELEASE ORAL at 05:03

## 2023-11-13 RX ADMIN — CHLORHEXIDINE GLUCONATE 1 APPLICATION(S): 213 SOLUTION TOPICAL at 21:42

## 2023-11-13 RX ADMIN — POLYETHYLENE GLYCOL 3350 17 GRAM(S): 17 POWDER, FOR SOLUTION ORAL at 05:04

## 2023-11-13 RX ADMIN — AZATHIOPRINE 50 MILLIGRAM(S): 100 TABLET ORAL at 05:03

## 2023-11-13 RX ADMIN — Medication 60 MILLIGRAM(S): at 05:04

## 2023-11-13 RX ADMIN — Medication 3 MILLILITER(S): at 17:09

## 2023-11-13 RX ADMIN — Medication 1000 MILLIGRAM(S): at 13:00

## 2023-11-13 RX ADMIN — Medication 650 MILLIGRAM(S): at 22:45

## 2023-11-13 RX ADMIN — Medication 3 MILLILITER(S): at 05:51

## 2023-11-13 RX ADMIN — ENOXAPARIN SODIUM 40 MILLIGRAM(S): 100 INJECTION SUBCUTANEOUS at 21:42

## 2023-11-13 NOTE — PROGRESS NOTE ADULT - SUBJECTIVE AND OBJECTIVE BOX
NSCU ATTENDING -- ADDITIONAL PROGRESS NOTE    Nighttime rounds were performed -- please refer to earlier Progress Note for HPI details.    ICU Vital Signs Last 24 Hrs  T(C): 37.2 (13 Nov 2023 19:00), Max: 37.2 (13 Nov 2023 19:00)  T(F): 99 (13 Nov 2023 19:00), Max: 99 (13 Nov 2023 19:00)  HR: 53 (13 Nov 2023 19:00) (53 - 112)  BP: 155/67 (13 Nov 2023 19:00) (106/55 - 155/67)  BP(mean): 92 (13 Nov 2023 19:00) (70 - 92)  ABP: --  ABP(mean): --  RR: 16 (13 Nov 2023 19:00) (16 - 16)  SpO2: 100% (13 Nov 2023 19:00) (97% - 100%)    O2 Parameters below as of 13 Nov 2023 19:00  Patient On (Oxygen Delivery Method): ventilator    O2 Concentration (%): 30      Relevant labwork and imaging reviewed.    repeat MG crises   remains intubated  PRVC 16/420/5/30%  PLEX #4 11/15  prednisone 60mg daily   azathioprine 50mg Q12   MVI supp  cont tube feeding   11/12 BM   CPAP as tolerated  Q4 checks   lovenox ppx     HOSPITAL COURSE:  Patient KAIDEN HATCH is a 63y woman presenting with myasthenia gravis exacerbation.    Admission Scores  GCS: 15     24 hour Events: Patient evaluated at bedside. Reports improvement in symptoms w/ exception to copious oral secretions. No complaints of pain or dyspnea.     11/4- Patient intubated overnight for worsening respiratory status. Patient received dose 2/5 of IVIG  11/6- No events; poor respiratory effort on SBT  11/7- Apneic on SBT; worsening respiratory effort; last dose of IVIG TODAY  11/8- NIF -12 and  this AM. PT received shiley in right IJ w/ conscious sedation for initiation of PLEX. S/P tx 1/5 w/ PLEX.  11/9- NIF -20 and VC 630this AM.  11/10- Patient due for second dose of PLEX. No events overnight.   11/11- No events overnight. Has been tolerating CPAP since 5 AM. Oral secretions present. Strength improved.   11/12 no events, no PS too much secretions  11/13- Patient with improved secretions. No overnight events. Will get 3rd dose of PLEX today.   11/14- No overnight events.     Allergies    peanuts (Unknown)  No Known Drug Allergies    Intolerances        REVIEW OF SYSTEMS: [ ] Unable to Assess due to neurologic exam   [ X] All ROS addressed below are non-contributory, except:  Neuro: [ ] Headache [ ] Back pain [ ] Numbness [ ] Weakness [ ] Ataxia [ ] Dizziness [ ] Aphasia [ ] Dysarthria [ ] Visual disturbance  Resp: [ ] Shortness of breath/dyspnea, [ ] Orthopnea [ ] Cough  CV: [ ] Chest pain [ ] Palpitation [ ] Lightheadedness [ ] Syncope  Renal: [ ] Thirst [ ] Edema  GI: [ ] Nausea [ ] Emesis [ ] Abdominal pain [ ] Constipation [ ] Diarrhea  Hem: [ ] Hematemesis [ ] bright red blood per rectum  ID: [ ] Fever [ ] Chills [ ] Dysuria  ENT: [ ] Rhinorrhea      DEVICES:   [ ] Restraints [ ] ET tube [ ] central line [ ] arterial line [ ] morales [ ] NGT/OGT [ ] EVD [ ] LD [ ] MYAH/HMV [ ] Trach [ ] PEG [ ] Chest Tube     VITALS:   Vital Signs Last 24 Hrs  T(C): 36.8 (14 Nov 2023 07:00), Max: 37.2 (13 Nov 2023 19:00)  T(F): 98.2 (14 Nov 2023 07:00), Max: 99 (13 Nov 2023 19:00)  HR: 107 (14 Nov 2023 09:23) (53 - 137)  BP: 139/79 (14 Nov 2023 07:00) (106/55 - 155/67)  BP(mean): 93 (14 Nov 2023 07:00) (70 - 97)  RR: 16 (14 Nov 2023 07:00) (16 - 20)  SpO2: 98% (14 Nov 2023 09:23) (97% - 100%)    Parameters below as of 14 Nov 2023 07:00  Patient On (Oxygen Delivery Method): ventilator    O2 Concentration (%): 30  CAPILLARY BLOOD GLUCOSE        I&O's Summary    13 Nov 2023 07:01  -  14 Nov 2023 07:00  --------------------------------------------------------  IN: 1545 mL / OUT: 350 mL / NET: 1195 mL    14 Nov 2023 07:01  -  14 Nov 2023 10:30  --------------------------------------------------------  IN: 165 mL / OUT: 0 mL / NET: 165 mL        Respiratory:  Mode: AC/ CMV (Assist Control/ Continuous Mandatory Ventilation)  RR (machine): 16  TV (machine): 420  FiO2: 30  PEEP: 5  ITime: 0.9  MAP: 11  PIP: 25        LABS:                        10.9   12.65 )-----------( 282      ( 13 Nov 2023 09:39 )             33.7     11-13    145  |  110<H>  |  36<H>  ----------------------------<  210<H>  4.1   |  24  |  0.47<L>             MEDICATION LEVELS:     IVF FLUIDS/MEDICATIONS:   MEDICATIONS  (STANDING):  albuterol/ipratropium for Nebulization 3 milliLiter(s) Nebulizer every 6 hours  azaTHIOprine 50 milliGRAM(s) Oral two times a day  chlorhexidine 0.12% Liquid 15 milliLiter(s) Oral Mucosa every 12 hours  chlorhexidine 4% Liquid 1 Application(s) Topical <User Schedule>  chlorhexidine 4% Liquid 1 Application(s) Topical at bedtime  enoxaparin Injectable 40 milliGRAM(s) SubCutaneous every 24 hours  pantoprazole   Suspension 40 milliGRAM(s) Oral before breakfast  polyethylene glycol 3350 17 Gram(s) Oral every 12 hours  predniSONE   Tablet 60 milliGRAM(s) Oral daily    MEDICATIONS  (PRN):  acetaminophen   Oral Liquid .. 650 milliGRAM(s) Oral every 6 hours PRN Temp greater or equal to 38C (100.4F), Mild Pain (1 - 3)  ketorolac   Injectable 30 milliGRAM(s) IV Push every 8 hours PRN Mild Pain (1 - 3)  oxyCODONE    IR 5 milliGRAM(s) Oral every 4 hours PRN Moderate Pain (4 - 6)  senna 2 Tablet(s) Oral at bedtime PRN Constipation  sodium chloride 0.9% lock flush 10 milliLiter(s) IV Push every 1 hour PRN Pre/post blood products, medications, blood draw, and to maintain line patency        IMAGING:      EXAMINATION:  PHYSICAL EXAM:    Constitutional: Intubated    Neurological: Awake, alert oriented to person, place and time (WITH choices), Following Commands, PERRL, EOMI, No Gaze Preference, Face Symmetrical. Neck extension 5/5, Neck flexion 5/5.    Motor exam:          Upper extremity                         Delt     Bicep     Tricep    HG                                                 R         4/5        5/5        5/5       5/5                                               L          4+/5        5/5        5/5       5/5          Lower extremity                        HF         KF        KE       DF         PF                                                  R        5/5        5/5        5/5       5/5         5/5                                               L         5/5        5/5       5/5       5/5          5/5                                                 Sensation: [x ] intact to light touch  [ ] decreased:     Pulmonary: Clear to Auscultation, No rales, No rhonchi, No wheezes     Cardiovascular: S1, S2, Regular rate and rhythm     Gastrointestinal: Soft, Non-tender, Non-distended     Extremities: No calf tenderness    HOSPITAL COURSE:  Patient KAIDEN HATCH is a 63y woman presenting with myasthenia gravis exacerbation.    Admission Scores  GCS: 15     24 hour Events: Patient evaluated at bedside. Reports improvement in symptoms w/ exception to copious oral secretions. No complaints of pain or dyspnea.     11/4- Patient intubated overnight for worsening respiratory status. Patient received dose 2/5 of IVIG  11/6- No events; poor respiratory effort on SBT  11/7- Apneic on SBT; worsening respiratory effort; last dose of IVIG TODAY  11/8- NIF -12 and  this AM. PT received shiley in right IJ w/ conscious sedation for initiation of PLEX. S/P tx 1/5 w/ PLEX.  11/9- NIF -20 and VC 630this AM.  11/10- Patient due for second dose of PLEX. No events overnight.   11/11- No events overnight. Has been tolerating CPAP since 5 AM. Oral secretions present. Strength improved.   11/12 no events, no PS too much secretions  11/13- Patient with improved secretions. No overnight events. Will get 3rd dose of PLEX today.   11/14- No overnight events.     Allergies    peanuts (Unknown)  No Known Drug Allergies    Intolerances        REVIEW OF SYSTEMS: [ ] Unable to Assess due to neurologic exam   [ X] All ROS addressed below are non-contributory, except:  Neuro: [ ] Headache [ ] Back pain [ ] Numbness [ ] Weakness [ ] Ataxia [ ] Dizziness [ ] Aphasia [ ] Dysarthria [ ] Visual disturbance  Resp: [ ] Shortness of breath/dyspnea, [ ] Orthopnea [ ] Cough  CV: [ ] Chest pain [ ] Palpitation [ ] Lightheadedness [ ] Syncope  Renal: [ ] Thirst [ ] Edema  GI: [ ] Nausea [ ] Emesis [ ] Abdominal pain [ ] Constipation [ ] Diarrhea  Hem: [ ] Hematemesis [ ] bright red blood per rectum  ID: [ ] Fever [ ] Chills [ ] Dysuria  ENT: [ ] Rhinorrhea      DEVICES:   [ ] Restraints [ ] ET tube [ ] central line [ ] arterial line [ ] moraels [ ] NGT/OGT [ ] EVD [ ] LD [ ] MYAH/HMV [ ] Trach [ ] PEG [ ] Chest Tube     VITALS:   Vital Signs Last 24 Hrs  T(C): 36.8 (14 Nov 2023 07:00), Max: 37.2 (13 Nov 2023 19:00)  T(F): 98.2 (14 Nov 2023 07:00), Max: 99 (13 Nov 2023 19:00)  HR: 107 (14 Nov 2023 09:23) (53 - 137)  BP: 139/79 (14 Nov 2023 07:00) (106/55 - 155/67)  BP(mean): 93 (14 Nov 2023 07:00) (70 - 97)  RR: 16 (14 Nov 2023 07:00) (16 - 20)  SpO2: 98% (14 Nov 2023 09:23) (97% - 100%)    Parameters below as of 14 Nov 2023 07:00  Patient On (Oxygen Delivery Method): ventilator    O2 Concentration (%): 30  CAPILLARY BLOOD GLUCOSE        I&O's Summary    13 Nov 2023 07:01  -  14 Nov 2023 07:00  --------------------------------------------------------  IN: 1545 mL / OUT: 350 mL / NET: 1195 mL    14 Nov 2023 07:01  -  14 Nov 2023 10:30  --------------------------------------------------------  IN: 165 mL / OUT: 0 mL / NET: 165 mL        Respiratory:  Mode: AC/ CMV (Assist Control/ Continuous Mandatory Ventilation)  RR (machine): 16  TV (machine): 420  FiO2: 30  PEEP: 5  ITime: 0.9  MAP: 11  PIP: 25        LABS:                        10.9   12.65 )-----------( 282      ( 13 Nov 2023 09:39 )             33.7     11-13    145  |  110<H>  |  36<H>  ----------------------------<  210<H>  4.1   |  24  |  0.47<L>             MEDICATION LEVELS:     IVF FLUIDS/MEDICATIONS:   MEDICATIONS  (STANDING):  albuterol/ipratropium for Nebulization 3 milliLiter(s) Nebulizer every 6 hours  azaTHIOprine 50 milliGRAM(s) Oral two times a day  chlorhexidine 0.12% Liquid 15 milliLiter(s) Oral Mucosa every 12 hours  chlorhexidine 4% Liquid 1 Application(s) Topical <User Schedule>  chlorhexidine 4% Liquid 1 Application(s) Topical at bedtime  enoxaparin Injectable 40 milliGRAM(s) SubCutaneous every 24 hours  pantoprazole   Suspension 40 milliGRAM(s) Oral before breakfast  polyethylene glycol 3350 17 Gram(s) Oral every 12 hours  predniSONE   Tablet 60 milliGRAM(s) Oral daily    MEDICATIONS  (PRN):  acetaminophen   Oral Liquid .. 650 milliGRAM(s) Oral every 6 hours PRN Temp greater or equal to 38C (100.4F), Mild Pain (1 - 3)  ketorolac   Injectable 30 milliGRAM(s) IV Push every 8 hours PRN Mild Pain (1 - 3)  oxyCODONE    IR 5 milliGRAM(s) Oral every 4 hours PRN Moderate Pain (4 - 6)  senna 2 Tablet(s) Oral at bedtime PRN Constipation  sodium chloride 0.9% lock flush 10 milliLiter(s) IV Push every 1 hour PRN Pre/post blood products, medications, blood draw, and to maintain line patency        IMAGING:      EXAMINATION:  PHYSICAL EXAM:    Constitutional: Intubated    Neurological: Awake, alert oriented to person, place and time (WITH choices), Following Commands, PERRL, EOMI, No Gaze Preference, Face Symmetrical. Neck extension 5/5, Neck flexion 5/5.    Motor exam:          Upper extremity                         Delt     Bicep     Tricep    HG                                                 R         4/5        5/5        5/5       5/5                                               L          4+/5        5/5        5/5       5/5          Lower extremity                        HF         KF        KE       DF         PF                                                  R        5/5        5/5        5/5       5/5         5/5                                               L         5/5        5/5       5/5       5/5          5/5                                                 Sensation: [x ] intact to light touch  [ ] decreased:     Pulmonary: Clear to Auscultation, No rales, No rhonchi, No wheezes     Cardiovascular: S1, S2, Regular rate and rhythm     Gastrointestinal: Soft, Non-tender, Non-distended     Extremities: No calf tenderness

## 2023-11-13 NOTE — CHART NOTE - NSCHARTNOTEFT_GEN_A_CORE
63 year old female admitted with MG exacerbation. Transfusion medicine has been consulted to complete a series of 5 TPE sessions over approximately 10 days.    TPE #1 was successfully completed on 11/08/23. One plasma volume was replaced with 5% albumin as replacement fluid. Patient tolerated the procedure well.    TPE #2 was successfully completed on 11/10/23. One plasma volume was replaced with 5% albumin as replacement fluid. Patient tolerated the procedure well.    TPE #3 today. Pt notes and labs reviewed. Fibrinogen 266mg/dl. iCa 1.19mmol/L. One plasma volume was replaced with 5% albumin as replacement fluid. Patient tolerated the procedure well.    TPE #4 is scheduled for 11/15/23.

## 2023-11-13 NOTE — PROGRESS NOTE ADULT - SUBJECTIVE AND OBJECTIVE BOX
HOSPITAL COURSE:      Admission Scores  GCS:   HH:   MF:   NIHSS:   RASS:    CAM-ICU:   ICH:    24 hour Events:       Allergies    peanuts (Unknown)  No Known Drug Allergies    Intolerances        REVIEW OF SYSTEMS: [ ] Unable to Assess due to neurologic exam   [ ] All ROS addressed below are non-contributory, except:  Neuro: [ ] Headache [ ] Back pain [ ] Numbness [ ] Weakness [ ] Ataxia [ ] Dizziness [ ] Aphasia [ ] Dysarthria [ ] Visual disturbance  Resp: [ ] Shortness of breath/dyspnea, [ ] Orthopnea [ ] Cough  CV: [ ] Chest pain [ ] Palpitation [ ] Lightheadedness [ ] Syncope  Renal: [ ] Thirst [ ] Edema  GI: [ ] Nausea [ ] Emesis [ ] Abdominal pain [ ] Constipation [ ] Diarrhea  Hem: [ ] Hematemesis [ ] bright red blood per rectum  ID: [ ] Fever [ ] Chills [ ] Dysuria  ENT: [ ] Rhinorrhea      DEVICES:   [ ] Restraints [ ] ET tube [ ] central line [ ] arterial line [ ] morales [ ] NGT/OGT [ ] EVD [ ] LD [ ] MYAH/HMV [ ] Trach [ ] PEG [ ] Chest Tube     VITALS:   Vital Signs Last 24 Hrs  T(C): 36.9 (13 Nov 2023 07:00), Max: 37.1 (12 Nov 2023 11:00)  T(F): 98.4 (13 Nov 2023 07:00), Max: 98.7 (12 Nov 2023 11:00)  HR: 67 (13 Nov 2023 07:00) (57 - 112)  BP: 144/68 (13 Nov 2023 07:00) (127/62 - 151/66)  BP(mean): 91 (13 Nov 2023 07:00) (82 - 99)  RR: 16 (13 Nov 2023 07:00) (16 - 20)  SpO2: 100% (13 Nov 2023 07:00) (96% - 100%)    Parameters below as of 13 Nov 2023 07:00  Patient On (Oxygen Delivery Method): ventilator    O2 Concentration (%): 30  CAPILLARY BLOOD GLUCOSE        I&O's Summary    12 Nov 2023 07:01  -  13 Nov 2023 07:00  --------------------------------------------------------  IN: 1920 mL / OUT: 550 mL / NET: 1370 mL        Respiratory:  Mode: AC/ CMV (Assist Control/ Continuous Mandatory Ventilation)  RR (machine): 16  TV (machine): 420  FiO2: 30  PEEP: 5  ITime: 1  MAP: 11  PIP: 28        LABS:               MEDICATION LEVELS:     IVF FLUIDS/MEDICATIONS:   MEDICATIONS  (STANDING):  albuterol/ipratropium for Nebulization 3 milliLiter(s) Nebulizer every 6 hours  azaTHIOprine 50 milliGRAM(s) Oral two times a day  chlorhexidine 0.12% Liquid 15 milliLiter(s) Oral Mucosa every 12 hours  chlorhexidine 4% Liquid 1 Application(s) Topical <User Schedule>  chlorhexidine 4% Liquid 1 Application(s) Topical at bedtime  enoxaparin Injectable 40 milliGRAM(s) SubCutaneous every 24 hours  pantoprazole   Suspension 40 milliGRAM(s) Oral before breakfast  polyethylene glycol 3350 17 Gram(s) Oral every 12 hours  predniSONE   Tablet 60 milliGRAM(s) Oral daily    MEDICATIONS  (PRN):  acetaminophen   Oral Liquid .. 650 milliGRAM(s) Oral every 6 hours PRN Temp greater or equal to 38C (100.4F), Mild Pain (1 - 3)  ketorolac   Injectable 30 milliGRAM(s) IV Push every 8 hours PRN Mild Pain (1 - 3)  oxyCODONE    IR 5 milliGRAM(s) Oral every 4 hours PRN Moderate Pain (4 - 6)  senna 2 Tablet(s) Oral at bedtime PRN Constipation  sodium chloride 0.9% lock flush 10 milliLiter(s) IV Push every 1 hour PRN Pre/post blood products, medications, blood draw, and to maintain line patency        IMAGING:      EXAMINATION:  PHYSICAL EXAM:    Constitutional: Intubated    Neurological: Awake, alert oriented to person, place and time, Following Commands, PERRL, EOMI, No Gaze Preference    Motor exam:          Upper extremity                         Delt     Bicep     Tricep    HG                                                 R         4/5        5/5        5/5       5/5                                               L          4+5        5/5        5/5       5/5          Lower extremity                        HF         KF        KE       DF         PF                                                  R        5/5        5/5        5/5       5/5         5/5                                               L         5/5        5/5       5/5       5/5          5/5    Neck extension 5/5  Neck flexion: 4+/5                                                 Sensation: [x] intact to light touch  [ ] decreased:     Pulmonary: Clear to Auscultation, No rales, No rhonchi, No wheezes     Cardiovascular: S1, S2, Regular rate and rhythm     Gastrointestinal: Soft, Non-tender, Non-distended     Extremities: No calf tenderness     Incision:    HOSPITAL COURSE:  Patient KAIDEN HATCH is a 63y woman presenting with myasthenia gravis exacerbation.    Admission Scores  GCS: 15     24 hour Events: Patient evaluated at bedside. Reports improvement in symptoms w/ exception to copious oral secretions. No complaints of pain or dyspnea.     11/4- Patient intubated overnight for worsening respiratory status. Patient received dose 2/5 of IVIG  11/6- No events; poor respiratory effort on SBT  11/7- Apneic on SBT; worsening respiratory effort; last dose of IVIG TODAY  11/8- NIF -12 and  this AM. PT received shiley in right IJ w/ conscious sedation for initiation of PLEX. S/P tx 1/5 w/ PLEX.  11/9- NIF -20 and VC 630this AM.  11/10- Patient due for second dose of PLEX. No events overnight.   11/11- No events overnight. Has been tolerating CPAP since 5 AM. Oral secretions present. Strength improved.   11/12 no events, no PS too much secretions  11/13- Patient with improved secretions. No overnight events. Will get 3rd dose of PLEX today.     Allergies    peanuts (Unknown)  No Known Drug Allergies    Intolerances        REVIEW OF SYSTEMS: [ ] Unable to Assess due to neurologic exam   [ ] All ROS addressed below are non-contributory, except:  Neuro: [ ] Headache [ ] Back pain [ ] Numbness [ ] Weakness [ ] Ataxia [ ] Dizziness [ ] Aphasia [ ] Dysarthria [ ] Visual disturbance  Resp: [ ] Shortness of breath/dyspnea, [ ] Orthopnea [ ] Cough  CV: [ ] Chest pain [ ] Palpitation [ ] Lightheadedness [ ] Syncope  Renal: [ ] Thirst [ ] Edema  GI: [ ] Nausea [ ] Emesis [ ] Abdominal pain [ ] Constipation [ ] Diarrhea  Hem: [ ] Hematemesis [ ] bright red blood per rectum  ID: [ ] Fever [ ] Chills [ ] Dysuria  ENT: [ ] Rhinorrhea      DEVICES:   [ ] Restraints [ ] ET tube [ ] central line [ ] arterial line [ ] morales [ ] NGT/OGT [ ] EVD [ ] LD [ ] MYAH/HMV [ ] Trach [ ] PEG [ ] Chest Tube     VITALS:   Vital Signs Last 24 Hrs  T(C): 36.9 (13 Nov 2023 07:00), Max: 37.1 (12 Nov 2023 11:00)  T(F): 98.4 (13 Nov 2023 07:00), Max: 98.7 (12 Nov 2023 11:00)  HR: 67 (13 Nov 2023 07:00) (57 - 112)  BP: 144/68 (13 Nov 2023 07:00) (127/62 - 151/66)  BP(mean): 91 (13 Nov 2023 07:00) (82 - 99)  RR: 16 (13 Nov 2023 07:00) (16 - 20)  SpO2: 100% (13 Nov 2023 07:00) (96% - 100%)    Parameters below as of 13 Nov 2023 07:00  Patient On (Oxygen Delivery Method): ventilator    O2 Concentration (%): 30  CAPILLARY BLOOD GLUCOSE        I&O's Summary    12 Nov 2023 07:01  -  13 Nov 2023 07:00  --------------------------------------------------------  IN: 1920 mL / OUT: 550 mL / NET: 1370 mL        Respiratory:  Mode: AC/ CMV (Assist Control/ Continuous Mandatory Ventilation)  RR (machine): 16  TV (machine): 420  FiO2: 30  PEEP: 5  ITime: 1  MAP: 11  PIP: 28        LABS:               MEDICATION LEVELS:     IVF FLUIDS/MEDICATIONS:   MEDICATIONS  (STANDING):  albuterol/ipratropium for Nebulization 3 milliLiter(s) Nebulizer every 6 hours  azaTHIOprine 50 milliGRAM(s) Oral two times a day  chlorhexidine 0.12% Liquid 15 milliLiter(s) Oral Mucosa every 12 hours  chlorhexidine 4% Liquid 1 Application(s) Topical <User Schedule>  chlorhexidine 4% Liquid 1 Application(s) Topical at bedtime  enoxaparin Injectable 40 milliGRAM(s) SubCutaneous every 24 hours  pantoprazole   Suspension 40 milliGRAM(s) Oral before breakfast  polyethylene glycol 3350 17 Gram(s) Oral every 12 hours  predniSONE   Tablet 60 milliGRAM(s) Oral daily    MEDICATIONS  (PRN):  acetaminophen   Oral Liquid .. 650 milliGRAM(s) Oral every 6 hours PRN Temp greater or equal to 38C (100.4F), Mild Pain (1 - 3)  ketorolac   Injectable 30 milliGRAM(s) IV Push every 8 hours PRN Mild Pain (1 - 3)  oxyCODONE    IR 5 milliGRAM(s) Oral every 4 hours PRN Moderate Pain (4 - 6)  senna 2 Tablet(s) Oral at bedtime PRN Constipation  sodium chloride 0.9% lock flush 10 milliLiter(s) IV Push every 1 hour PRN Pre/post blood products, medications, blood draw, and to maintain line patency        IMAGING:      EXAMINATION:  PHYSICAL EXAM:    Constitutional: Intubated    Neurological: Awake, alert oriented to person, place and time, Following Commands, PERRL, EOMI, No Gaze Preference    Motor exam:          Upper extremity                         Delt     Bicep     Tricep    HG                                                 R         4/5        5/5        5/5       5/5                                               L          4+5        5/5        5/5       5/5          Lower extremity                        HF         KF        KE       DF         PF                                                  R        5/5        5/5        5/5       5/5         5/5                                               L         5/5        5/5       5/5       5/5          5/5    Neck extension 5/5  Neck flexion: 4+/5                                                 Sensation: [x] intact to light touch  [ ] decreased:     Pulmonary: Clear to Auscultation, No rales, No rhonchi, No wheezes     Cardiovascular: S1, S2, Regular rate and rhythm     Gastrointestinal: Soft, Non-tender, Non-distended     Extremities: No calf tenderness     Incision:    HOSPITAL COURSE:  Patient KAIDEN HATCH is a 63y woman presenting with myasthenia gravis exacerbation.    Admission Scores  GCS: 15     24 hour Events: Patient evaluated at bedside. Reports improvement in symptoms w/ exception to copious oral secretions. No complaints of pain or dyspnea.     11/4- Patient intubated overnight for worsening respiratory status. Patient received dose 2/5 of IVIG  11/6- No events; poor respiratory effort on SBT  11/7- Apneic on SBT; worsening respiratory effort; last dose of IVIG TODAY  11/8- NIF -12 and  this AM. PT received shiley in right IJ w/ conscious sedation for initiation of PLEX. S/P tx 1/5 w/ PLEX.  11/9- NIF -20 and VC 630this AM.  11/10- Patient due for second dose of PLEX. No events overnight.   11/11- No events overnight. Has been tolerating CPAP since 5 AM. Oral secretions present. Strength improved.   11/12 no events, no PS too much secretions  11/13- Patient with improved secretions. No overnight events. Will get 3rd dose of PLEX today.     Allergies    peanuts (Unknown)  No Known Drug Allergies    Intolerances        REVIEW OF SYSTEMS: [ ] Unable to Assess due to neurologic exam   [X] All ROS addressed below are non-contributory, except:  Neuro: [ ] Headache [ ] Back pain [ ] Numbness [ ] Weakness [ ] Ataxia [ ] Dizziness [ ] Aphasia [ ] Dysarthria [ ] Visual disturbance  Resp: [ ] Shortness of breath/dyspnea, [ ] Orthopnea [ ] Cough  CV: [ ] Chest pain [ ] Palpitation [ ] Lightheadedness [ ] Syncope  Renal: [ ] Thirst [ ] Edema  GI: [ ] Nausea [ ] Emesis [ ] Abdominal pain [ ] Constipation [ ] Diarrhea  Hem: [ ] Hematemesis [ ] bright red blood per rectum  ID: [ ] Fever [ ] Chills [ ] Dysuria  ENT: [ ] Rhinorrhea      DEVICES:   [ ] Restraints [ ] ET tube [ ] central line [ ] arterial line [ ] morales [ ] NGT/OGT [ ] EVD [ ] LD [ ] MYAH/HMV [ ] Trach [ ] PEG [ ] Chest Tube     VITALS:   Vital Signs Last 24 Hrs  T(C): 36.9 (13 Nov 2023 07:00), Max: 37.1 (12 Nov 2023 11:00)  T(F): 98.4 (13 Nov 2023 07:00), Max: 98.7 (12 Nov 2023 11:00)  HR: 67 (13 Nov 2023 07:00) (57 - 112)  BP: 144/68 (13 Nov 2023 07:00) (127/62 - 151/66)  BP(mean): 91 (13 Nov 2023 07:00) (82 - 99)  RR: 16 (13 Nov 2023 07:00) (16 - 20)  SpO2: 100% (13 Nov 2023 07:00) (96% - 100%)    Parameters below as of 13 Nov 2023 07:00  Patient On (Oxygen Delivery Method): ventilator    O2 Concentration (%): 30  CAPILLARY BLOOD GLUCOSE        I&O's Summary    12 Nov 2023 07:01  -  13 Nov 2023 07:00  --------------------------------------------------------  IN: 1920 mL / OUT: 550 mL / NET: 1370 mL        Respiratory:  Mode: AC/ CMV (Assist Control/ Continuous Mandatory Ventilation)  RR (machine): 16  TV (machine): 420  FiO2: 30  PEEP: 5  ITime: 1  MAP: 11  PIP: 28        LABS:               MEDICATION LEVELS:     IVF FLUIDS/MEDICATIONS:   MEDICATIONS  (STANDING):  albuterol/ipratropium for Nebulization 3 milliLiter(s) Nebulizer every 6 hours  azaTHIOprine 50 milliGRAM(s) Oral two times a day  chlorhexidine 0.12% Liquid 15 milliLiter(s) Oral Mucosa every 12 hours  chlorhexidine 4% Liquid 1 Application(s) Topical <User Schedule>  chlorhexidine 4% Liquid 1 Application(s) Topical at bedtime  enoxaparin Injectable 40 milliGRAM(s) SubCutaneous every 24 hours  pantoprazole   Suspension 40 milliGRAM(s) Oral before breakfast  polyethylene glycol 3350 17 Gram(s) Oral every 12 hours  predniSONE   Tablet 60 milliGRAM(s) Oral daily    MEDICATIONS  (PRN):  acetaminophen   Oral Liquid .. 650 milliGRAM(s) Oral every 6 hours PRN Temp greater or equal to 38C (100.4F), Mild Pain (1 - 3)  ketorolac   Injectable 30 milliGRAM(s) IV Push every 8 hours PRN Mild Pain (1 - 3)  oxyCODONE    IR 5 milliGRAM(s) Oral every 4 hours PRN Moderate Pain (4 - 6)  senna 2 Tablet(s) Oral at bedtime PRN Constipation  sodium chloride 0.9% lock flush 10 milliLiter(s) IV Push every 1 hour PRN Pre/post blood products, medications, blood draw, and to maintain line patency        IMAGING:      EXAMINATION:  PHYSICAL EXAM:    Constitutional: Intubated    Neurological: Awake, alert oriented to person, place and time, Following Commands, PERRL, EOMI, No Gaze Preference    Motor exam:          Upper extremity                         Delt     Bicep     Tricep    HG                                                 R         4/5        5/5        5/5       5/5                                               L          4+5        5/5        5/5       5/5          Lower extremity                        HF         KF        KE       DF         PF                                                  R        5/5        5/5        5/5       5/5         5/5                                               L         5/5        5/5       5/5       5/5          5/5    Neck extension 5/5  Neck flexion: 4+/5                                                 Sensation: [x] intact to light touch  [ ] decreased:     Pulmonary: Clear to Auscultation, No rales, No rhonchi, No wheezes     Cardiovascular: S1, S2, Regular rate and rhythm     Gastrointestinal: Soft, Non-tender, Non-distended     Extremities: No calf tenderness     Incision:    HOSPITAL COURSE:  Patient KAIDEN HATCH is a 63y woman presenting with myasthenia gravis exacerbation.    Admission Scores  GCS: 15     24 hour Events: Patient evaluated at bedside. Reports improvement in symptoms w/ exception to copious oral secretions. No complaints of pain or dyspnea.     11/4- Patient intubated overnight for worsening respiratory status. Patient received dose 2/5 of IVIG  11/6- No events; poor respiratory effort on SBT  11/7- Apneic on SBT; worsening respiratory effort; last dose of IVIG TODAY  11/8- NIF -12 and  this AM. PT received shiley in right IJ w/ conscious sedation for initiation of PLEX. S/P tx 1/5 w/ PLEX.  11/9- NIF -20 and VC 630this AM.  11/10- Patient due for second dose of PLEX. No events overnight.   11/11- No events overnight. Has been tolerating CPAP since 5 AM. Oral secretions present. Strength improved.   11/12 no events, no PS too much secretions  11/13- Patient with improved secretions. No overnight events. Will get 3rd dose of PLEX today.   11/14- No overnight events.     Allergies    peanuts (Unknown)  No Known Drug Allergies    Intolerances        REVIEW OF SYSTEMS: [ ] Unable to Assess due to neurologic exam   [X] All ROS addressed below are non-contributory, except:  Neuro: [ ] Headache [ ] Back pain [ ] Numbness [ ] Weakness [ ] Ataxia [ ] Dizziness [ ] Aphasia [ ] Dysarthria [ ] Visual disturbance  Resp: [ ] Shortness of breath/dyspnea, [ ] Orthopnea [ ] Cough  CV: [ ] Chest pain [ ] Palpitation [ ] Lightheadedness [ ] Syncope  Renal: [ ] Thirst [ ] Edema  GI: [ ] Nausea [ ] Emesis [ ] Abdominal pain [ ] Constipation [ ] Diarrhea  Hem: [ ] Hematemesis [ ] bright red blood per rectum  ID: [ ] Fever [ ] Chills [ ] Dysuria  ENT: [ ] Rhinorrhea      DEVICES:   [ ] Restraints [ ] ET tube [ ] central line [ ] arterial line [ ] morales [ ] NGT/OGT [ ] EVD [ ] LD [ ] MYAH/HMV [ ] Trach [ ] PEG [ ] Chest Tube     VITALS:   Vital Signs Last 24 Hrs  T(C): 36.9 (13 Nov 2023 07:00), Max: 37.1 (12 Nov 2023 11:00)  T(F): 98.4 (13 Nov 2023 07:00), Max: 98.7 (12 Nov 2023 11:00)  HR: 67 (13 Nov 2023 07:00) (57 - 112)  BP: 144/68 (13 Nov 2023 07:00) (127/62 - 151/66)  BP(mean): 91 (13 Nov 2023 07:00) (82 - 99)  RR: 16 (13 Nov 2023 07:00) (16 - 20)  SpO2: 100% (13 Nov 2023 07:00) (96% - 100%)    Parameters below as of 13 Nov 2023 07:00  Patient On (Oxygen Delivery Method): ventilator    O2 Concentration (%): 30  CAPILLARY BLOOD GLUCOSE        I&O's Summary    12 Nov 2023 07:01  -  13 Nov 2023 07:00  --------------------------------------------------------  IN: 1920 mL / OUT: 550 mL / NET: 1370 mL        Respiratory:  Mode: AC/ CMV (Assist Control/ Continuous Mandatory Ventilation)  RR (machine): 16  TV (machine): 420  FiO2: 30  PEEP: 5  ITime: 1  MAP: 11  PIP: 28        LABS:               MEDICATION LEVELS:     IVF FLUIDS/MEDICATIONS:   MEDICATIONS  (STANDING):  albuterol/ipratropium for Nebulization 3 milliLiter(s) Nebulizer every 6 hours  azaTHIOprine 50 milliGRAM(s) Oral two times a day  chlorhexidine 0.12% Liquid 15 milliLiter(s) Oral Mucosa every 12 hours  chlorhexidine 4% Liquid 1 Application(s) Topical <User Schedule>  chlorhexidine 4% Liquid 1 Application(s) Topical at bedtime  enoxaparin Injectable 40 milliGRAM(s) SubCutaneous every 24 hours  pantoprazole   Suspension 40 milliGRAM(s) Oral before breakfast  polyethylene glycol 3350 17 Gram(s) Oral every 12 hours  predniSONE   Tablet 60 milliGRAM(s) Oral daily    MEDICATIONS  (PRN):  acetaminophen   Oral Liquid .. 650 milliGRAM(s) Oral every 6 hours PRN Temp greater or equal to 38C (100.4F), Mild Pain (1 - 3)  ketorolac   Injectable 30 milliGRAM(s) IV Push every 8 hours PRN Mild Pain (1 - 3)  oxyCODONE    IR 5 milliGRAM(s) Oral every 4 hours PRN Moderate Pain (4 - 6)  senna 2 Tablet(s) Oral at bedtime PRN Constipation  sodium chloride 0.9% lock flush 10 milliLiter(s) IV Push every 1 hour PRN Pre/post blood products, medications, blood draw, and to maintain line patency        IMAGING:      EXAMINATION:  PHYSICAL EXAM:    Constitutional: Intubated    Neurological: Awake, alert oriented to person, place and time, Following Commands, PERRL, EOMI, No Gaze Preference    Motor exam:          Upper extremity                         Delt     Bicep     Tricep    HG                                                 R         4/5        5/5        5/5       5/5                                               L          4+5        5/5        5/5       5/5          Lower extremity                        HF         KF        KE       DF         PF                                                  R        5/5        5/5        5/5       5/5         5/5                                               L         5/5        5/5       5/5       5/5          5/5    Neck extension 5/5  Neck flexion: 5/5                                                 Sensation: [x] intact to light touch  [ ] decreased:     Pulmonary: Clear to Auscultation, No rales, No rhonchi, No wheezes     Cardiovascular: S1, S2, Regular rate and rhythm     Gastrointestinal: Soft, Non-tender, Non-distended     Extremities: No calf tenderness     Incision:

## 2023-11-13 NOTE — PROGRESS NOTE ADULT - ASSESSMENT
ASSESSMENT/PLAN: 62 y/o female with myasthenia gravis, respiratory failure. Demonstrating worsening respiratory effort on SBT, NIF and FVC below baseline required for extubation to BiPAP despite completion of IVIG. Plan is to d/w PT, family risks & benefits of PLEX - patient consented to insertion of shiley, initiation of PLEX - S/P tx w/ PLEX 1/5. Care initiated w/ [facility] neuromuscular neurology Dr Covarrubias. PT shows signs of improved respiratory effort. Continues to CPAP well, Secretions are improving at this time    NEURO:  Neurochecks q4h  PLEX tx 2/5 next monday 11/13, patient started azathioprine on 11/10   Recommend early ambulation w/ PT/OT  Neurology following  Hold Mestinon, patient with copious secretions, will consider restarting prior to extubation, currently held in setting of copious secretions, retrial extubation in AM  Continue Prednisone to 60 mg QD  Avoid medications that may worsen the current exacerbation including macrolides, fluoroquinolones, tetracyclines, aminoglycoside, beta-blockers, magnesium, and anti-arrhythmics (procainamide, quinidine, and lidocaine), muscle relaxants   Activity: [x] mobilize as tolerated [] Bedrest [x] PT [x] OT [] PMNR    PULM:  Intubated for airway protection  CPAP as tolerated; daily SBTs, not tolerating  CXR reviewed  Duonebs q6h for secretions (oral)  Check NIF/VC Q4 hours in ICU setting  Aggressive Chest PT    CV:  MAP>65  TTE - EF 54%, elevated right atrial pressure     RENAL:  Stable renal function  IVL  daily IOs    GI:  Diet: ON TF  GI prophylaxis: PPI while on steroids + intubated  Bowel regimen: miralax, senna; dulcolax suppositories PRN  LBM: 11/11    ENDO:   FS goal 120-180  Medium scale  A1C 6.7    HEME/ONC:  Monitor H/H  SCDs  Chemoppx: SQL  LED needs repeat     ID:  No fevers, elevated leukocytosis  Less thick inline secretions, oral secretions  No signs of infection     Disposition: ICU ASSESSMENT/PLAN: 64 y/o female with myasthenia gravis, respiratory failure. Demonstrating worsening respiratory effort on SBT, NIF and FVC below baseline required for extubation to BiPAP despite completion of IVIG. Plan is to d/w PT, family risks & benefits of PLEX - patient consented to insertion of shiley, initiation of PLEX - S/P tx w/ PLEX 1/5. Care initiated w/ [facility] neuromuscular neurology Dr Covarrubias. PT shows signs of improved respiratory effort. Continues to CPAP well, Secretions are improving at this time    NEURO:  Neurochecks q4h  PLEX tx 2/5 next monday 11/13, patient started azathioprine on 11/10   Recommend early ambulation w/ PT/OT  Neurology following  Hold Mestinon, patient with copious secretions, will consider restarting prior to extubation, currently held in setting of copious secretions, retrial extubation in AM  Continue Prednisone to 60 mg QD  Avoid medications that may worsen the current exacerbation including macrolides, fluoroquinolones, tetracyclines, aminoglycoside, beta-blockers, magnesium, and anti-arrhythmics (procainamide, quinidine, and lidocaine), muscle relaxants   Activity: [x] mobilize as tolerated [] Bedrest [x] PT [x] OT [] PMNR    PULM:  Intubated for airway protection  CPAP as tolerated; daily SBTs, not tolerating  CXR reviewed  Duonebs q6h for secretions (oral)  Check NIF/VC Q4 hours in ICU setting  Aggressive Chest PT    CV:  MAP>65  TTE - EF 54%, elevated right atrial pressure     RENAL:  Stable renal function  IVL  daily IOs    GI:  Diet: ON TF  GI prophylaxis: PPI while on steroids + intubated  Bowel regimen: miralax, senna; dulcolax suppositories PRN  LBM: 11/11    ENDO:   FS goal 120-180  Medium scale  A1C 6.7    HEME/ONC:  Monitor H/H  SCDs  Chemoppx: SQL  LED needs repeat     ID:  No fevers, elevated leukocytosis  Less thick inline secretions, oral secretions  No signs of infection     Disposition: ICU ASSESSMENT/PLAN: 62 y/o female with myasthenia gravis, respiratory failure. Demonstrating worsening respiratory effort on SBT, NIF and FVC below baseline required for extubation to BiPAP despite completion of IVIG. Plan is to d/w PT, family risks & benefits of PLEX - patient consented to insertion of shiley, initiation of PLEX - S/P tx w/ PLEX 1/5. Care initiated w/ [facility] neuromuscular neurology Dr Covarrubias. PT shows signs of improved respiratory effort. Continues to CPAP well, Secretions are improving at this time    NEURO:  Neurochecks q4h  PLEX tx 2/5 next monday 11/13, patient started azathioprine on 11/10   Recommend early ambulation w/ PT/OT  Neurology following  Hold Mestinon, patient with copious secretions, will consider restarting prior to extubation, currently held in setting of copious secretions, retrial extubation in AM  Continue Prednisone to 60 mg QD  Avoid medications that may worsen the current exacerbation including macrolides, fluoroquinolones, tetracyclines, aminoglycoside, beta-blockers, magnesium, and anti-arrhythmics (procainamide, quinidine, and lidocaine), muscle relaxants   Activity: [x] mobilize as tolerated [] Bedrest [x] PT [x] OT [] PMNR    PULM:  Intubated for airway protection  CPAP as tolerated; daily SBTs, not tolerating  CXR reviewed  Duonebs q6h for secretions (oral)  Check NIF/VC Q4 hours in ICU setting  Aggressive Chest PT    CV:  MAP>65  TTE - EF 54%, elevated right atrial pressure     RENAL:  Stable renal function  IVL  daily IOs    GI:  Diet: ON TF  GI prophylaxis: PPI while on steroids + intubated  Bowel regimen: miralax, senna; dulcolax suppositories PRN  LBM: 11/11    ENDO:   FS goal 120-180  Medium scale  A1C 6.7    HEME/ONC:  Monitor H/H  SCDs  Chemoppx: SQL  LED needs repeat     ID:  No fevers, elevated leukocytosis  Less thick inline secretions, oral secretions  No signs of infection     Disposition: ICU ASSESSMENT/PLAN: 62 y/o female with myasthenia gravis, respiratory failure. Demonstrating worsening respiratory effort on SBT, NIF and FVC below baseline required for extubation to BiPAP despite completion of IVIG. Plan is to d/w PT, family risks & benefits of PLEX - patient consented to insertion of shiley, initiation of PLEX - S/P tx w/ PLEX 1/5. Care initiated w/ [facility] neuromuscular neurology Dr Covarrubias. PT shows signs of improved respiratory effort. Continues to CPAP well, Secretions are improving at this time    NEURO:  Neurochecks q4h  PLEX tx 2/5 next monday 11/13, patient started azathioprine on 11/10   Recommend early ambulation w/ PT/OT  Neurology following  Will obtain CT chest to rule out residual thymoma   Hold Mestinon, patient with copious secretions, will consider restarting prior to extubation, currently held in setting of copious secretions, retrial extubation in AM  Continue Prednisone to 60 mg QD  Avoid medications that may worsen the current exacerbation including macrolides, fluoroquinolones, tetracyclines, aminoglycoside, beta-blockers, magnesium, and anti-arrhythmics (procainamide, quinidine, and lidocaine), muscle relaxants   Activity: [x] mobilize as tolerated [] Bedrest [x] PT [x] OT [] PMNR    PULM:  Intubated for airway protection  CPAP as tolerated; daily SBTs, not tolerating  CXR reviewed  Duonebs q6h for secretions (oral)  Check NIF/VC Q4 hours in ICU setting  11/13- NIF -14,   Aggressive Chest PT    CV:  MAP>65  TTE - EF 54%, elevated right atrial pressure     RENAL:  Stable renal function  IVL  daily IOs    GI:  Diet: ON TF  GI prophylaxis: PPI while on steroids + intubated  Bowel regimen: miralax, senna; dulcolax suppositories PRN  LBM: 11/11    ENDO:   FS goal 120-180  Medium scale  A1C 6.7    HEME/ONC:  Monitor H/H  SCDs  Chemoppx: SQL  LED needs repeat     ID:  No fevers, no leukocytosis  Less thick inline secretions, oral secretions  No signs of infection     Disposition: ICU ASSESSMENT/PLAN: 64 y/o female with myasthenia gravis, respiratory failure. Demonstrating worsening respiratory effort on SBT, NIF and FVC below baseline required for extubation to BiPAP despite completion of IVIG. Plan is to d/w PT, family risks & benefits of PLEX - patient consented to insertion of shiley, initiation of PLEX - S/P tx w/ PLEX 1/5. Care initiated w/ [facility] neuromuscular neurology Dr Covarrubias. PT shows signs of improved respiratory effort. Continues to CPAP well, Secretions are improving at this time    NEURO:  Neurochecks q4h  PLEX tx 2/5 next monday 11/13, patient started azathioprine on 11/10   Recommend early ambulation w/ PT/OT  Neurology following  Will obtain CT chest to rule out residual thymoma   Hold Mestinon, patient with copious secretions, will consider restarting prior to extubation, currently held in setting of copious secretions, retrial extubation in AM  Continue Prednisone to 60 mg QD  Avoid medications that may worsen the current exacerbation including macrolides, fluoroquinolones, tetracyclines, aminoglycoside, beta-blockers, magnesium, and anti-arrhythmics (procainamide, quinidine, and lidocaine), muscle relaxants   Activity: [x] mobilize as tolerated [] Bedrest [x] PT [x] OT [] PMNR    PULM:  Intubated for airway protection  CPAP as tolerated; daily SBTs, not tolerating  CXR reviewed  Duonebs q6h for secretions (oral)  Check NIF/VC Q4 hours in ICU setting  11/13- NIF -14,   Aggressive Chest PT    CV:  MAP>65  TTE - EF 54%, elevated right atrial pressure     RENAL:  Stable renal function  IVL  daily IOs    GI:  Diet: ON TF  GI prophylaxis: PPI while on steroids + intubated  Bowel regimen: miralax, senna; dulcolax suppositories PRN  LBM: 11/11    ENDO:   FS goal 120-180  Medium scale  A1C 6.7    HEME/ONC:  Monitor H/H  SCDs  Chemoppx: SQL  LED needs repeat     ID:  No fevers, no leukocytosis  Less thick inline secretions, oral secretions  No signs of infection     Disposition: ICU ASSESSMENT/PLAN: 64 y/o female with myasthenia gravis, respiratory failure. Demonstrating worsening respiratory effort on SBT, NIF and FVC below baseline required for extubation to BiPAP despite completion of IVIG. Plan is to d/w PT, family risks & benefits of PLEX - patient consented to insertion of shiley, initiation of PLEX - S/P tx w/ PLEX 1/5. Care initiated w/ [facility] neuromuscular neurology Dr Covarrubias. PT shows signs of improved respiratory effort. Continues to CPAP well, Secretions are improving at this time    NEURO:  Neurochecks q4h  PLEX tx 2/5 next monday 11/13, patient started azathioprine on 11/10   Recommend early ambulation w/ PT/OT  Neurology following  Will obtain CT chest to rule out residual thymoma   Hold Mestinon, patient with copious secretions, will consider restarting prior to extubation, currently held in setting of copious secretions, retrial extubation in AM  Continue Prednisone to 60 mg QD  Avoid medications that may worsen the current exacerbation including macrolides, fluoroquinolones, tetracyclines, aminoglycoside, beta-blockers, magnesium, and anti-arrhythmics (procainamide, quinidine, and lidocaine), muscle relaxants   Activity: [x] mobilize as tolerated [] Bedrest [x] PT [x] OT [] PMNR    PULM:  Intubated for airway protection  CPAP as tolerated; daily SBTs, not tolerating  CXR reviewed  Duonebs q6h for secretions (oral)  Check NIF/VC Q4 hours in ICU setting  11/13- NIF -14,   Aggressive Chest PT    CV:  MAP>65  TTE - EF 54%, elevated right atrial pressure     RENAL:  Stable renal function  IVL  daily IOs    GI:  Diet: ON TF  GI prophylaxis: PPI while on steroids + intubated  Bowel regimen: miralax, senna; dulcolax suppositories PRN  LBM: 11/11    ENDO:   FS goal 120-180  Medium scale  A1C 6.7    HEME/ONC:  Monitor H/H  SCDs  Chemoppx: SQL  LED needs repeat     ID:  No fevers, no leukocytosis  Less thick inline secretions, oral secretions  No signs of infection     Disposition: ICU ASSESSMENT/PLAN: 62 y/o female with myasthenia gravis, respiratory failure. Demonstrating worsening respiratory effort on SBT, NIF and FVC below baseline required for extubation to BiPAP despite completion of IVIG. Plan is to d/w PT, family risks & benefits of PLEX - patient consented to insertion of shiley, initiation of PLEX - S/P tx w/ PLEX 1/5. Care initiated w/ [facility] neuromuscular neurology Dr Covarrubias. PT shows signs of improved respiratory effort. Continues to CPAP well, Secretions are improving at this time    NEURO:  Neurochecks q4h  PLEX tx 2/5 next monday 11/13, patient started azathioprine on 11/10   Recommend early ambulation w/ PT/OT  Neurology following  Will obtain CT chest to rule out residual thymoma   Hold Mestinon, patient with copious secretions, will consider restarting prior to extubation, currently held in setting of copious secretions, retrial extubation in AM  Continue Prednisone to 60 mg QD  Avoid medications that may worsen the current exacerbation including macrolides, fluoroquinolones, tetracyclines, aminoglycoside, beta-blockers, magnesium, and anti-arrhythmics (procainamide, quinidine, and lidocaine), muscle relaxants   Activity: [x] mobilize as tolerated [] Bedrest [x] PT [x] OT [] PMNR    PULM:  Intubated for airway protection  CPAP as tolerated; daily SBTs, not tolerating  CXR reviewed- RLL opacity  Duonebs q6h for secretions (oral)  Check NIF/VC Q4 hours in ICU setting  11/13- NIF -20,   Aggressive Chest PT    CV:  MAP>65  TTE - EF 54%, elevated right atrial pressure     RENAL:  Stable renal function  IVL  daily IOs    GI:  Diet: ON TF  GI prophylaxis: PPI while on steroids + intubated  Bowel regimen: miralax, senna; dulcolax suppositories PRN  LBM: 11/12    ENDO:   FS goal 120-180  Medium scale  A1C 6.7    HEME/ONC:  Monitor H/H  SCDs  Chemoppx: SQL  LED needs repeat     ID:  No fevers, no leukocytosis reactive likely 2/2 steroids   Less thick inline secretions, oral secretions  No signs of infection     Disposition: ICU ASSESSMENT/PLAN: 64 y/o female with myasthenia gravis, respiratory failure. Demonstrating worsening respiratory effort on SBT, NIF and FVC below baseline required for extubation to BiPAP despite completion of IVIG. Plan is to d/w PT, family risks & benefits of PLEX - patient consented to insertion of shiley, initiation of PLEX - S/P tx w/ PLEX 1/5. Care initiated w/ [facility] neuromuscular neurology Dr Covarrubias. PT shows signs of improved respiratory effort. Continues to CPAP well, Secretions are improving at this time    NEURO:  Neurochecks q4h  PLEX tx 2/5 next monday 11/13, patient started azathioprine on 11/10   Recommend early ambulation w/ PT/OT  Neurology following  Will obtain CT chest to rule out residual thymoma   Hold Mestinon, patient with copious secretions, will consider restarting prior to extubation, currently held in setting of copious secretions, retrial extubation in AM  Continue Prednisone to 60 mg QD  Avoid medications that may worsen the current exacerbation including macrolides, fluoroquinolones, tetracyclines, aminoglycoside, beta-blockers, magnesium, and anti-arrhythmics (procainamide, quinidine, and lidocaine), muscle relaxants   Activity: [x] mobilize as tolerated [] Bedrest [x] PT [x] OT [] PMNR    PULM:  Intubated for airway protection  CPAP as tolerated; daily SBTs, not tolerating  CXR reviewed- RLL opacity  Duonebs q6h for secretions (oral)  Check NIF/VC Q4 hours in ICU setting  11/13- NIF -20,   Aggressive Chest PT    CV:  MAP>65  TTE - EF 54%, elevated right atrial pressure     RENAL:  Stable renal function  IVL  daily IOs    GI:  Diet: ON TF  GI prophylaxis: PPI while on steroids + intubated  Bowel regimen: miralax, senna; dulcolax suppositories PRN  LBM: 11/12    ENDO:   FS goal 120-180  Medium scale  A1C 6.7    HEME/ONC:  Monitor H/H  SCDs  Chemoppx: SQL  LED needs repeat     ID:  No fevers, no leukocytosis reactive likely 2/2 steroids   Less thick inline secretions, oral secretions  No signs of infection     Disposition: ICU ASSESSMENT/PLAN: 62 y/o female with myasthenia gravis, respiratory failure. Demonstrating worsening respiratory effort on SBT, NIF and FVC below baseline required for extubation to BiPAP despite completion of IVIG. Plan is to d/w PT, family risks & benefits of PLEX - patient consented to insertion of shiley, initiation of PLEX - S/P tx w/ PLEX 1/5. Care initiated w/ [facility] neuromuscular neurology Dr Covarrubias. PT shows signs of improved respiratory effort. Continues to CPAP well, Secretions are improving at this time    NEURO:  Neurochecks q4h  PLEX tx 2/5 next monday 11/13, patient started azathioprine on 11/10   Recommend early ambulation w/ PT/OT  Neurology following  Will obtain CT chest to rule out residual thymoma   Hold Mestinon, patient with copious secretions, will consider restarting prior to extubation, currently held in setting of copious secretions, retrial extubation in AM  Continue Prednisone to 60 mg QD  Avoid medications that may worsen the current exacerbation including macrolides, fluoroquinolones, tetracyclines, aminoglycoside, beta-blockers, magnesium, and anti-arrhythmics (procainamide, quinidine, and lidocaine), muscle relaxants   Activity: [x] mobilize as tolerated [] Bedrest [x] PT [x] OT [] PMNR    PULM:  Intubated for airway protection  CPAP as tolerated; daily SBTs, not tolerating  CXR reviewed- RLL opacity  Duonebs q6h for secretions (oral)  Check NIF/VC Q4 hours in ICU setting  11/13- NIF -20,   Aggressive Chest PT    CV:  MAP>65  TTE - EF 54%, elevated right atrial pressure     RENAL:  Stable renal function  IVL  daily IOs    GI:  Diet: ON TF  GI prophylaxis: PPI while on steroids + intubated  Bowel regimen: miralax, senna; dulcolax suppositories PRN  LBM: 11/12    ENDO:   FS goal 120-180  Medium scale  A1C 6.7    HEME/ONC:  Monitor H/H  SCDs  Chemoppx: SQL  LED needs repeat     ID:  No fevers, no leukocytosis reactive likely 2/2 steroids   Less thick inline secretions, oral secretions  No signs of infection     Disposition: ICU

## 2023-11-13 NOTE — PROGRESS NOTE ADULT - ASSESSMENT
ASSESSMENT/PLAN: 64 y/o female with myasthenia gravis, respiratory failure. Demonstrating worsening respiratory effort on SBT, NIF and FVC below baseline required for extubation to BiPAP despite completion of IVIG. Plan is to d/w PT, family risks & benefits of PLEX - patient consented to insertion of shiley, initiation of PLEX - S/P tx w/ PLEX 1/5. Care initiated w/ [facility] neuromuscular neurology Dr Covarrubias. PT shows signs of improved respiratory effort. Continues to CPAP well, Secretions are improving at this time    NEURO:  Neurochecks q4h  PLEX tx 2/5 next monday 11/13, patient started azathioprine on 11/10   Recommend early ambulation w/ PT/OT  Neurology following  Will obtain CT chest to rule out residual thymoma   Hold Mestinon, Will consider restarting prior to extubation, currently held in setting of copious secretions, retrial extubation in AM  Continue Prednisone to 60 mg QD  Avoid medications that may worsen the current exacerbation including macrolides, fluoroquinolones, tetracyclines, aminoglycoside, beta-blockers, magnesium, and anti-arrhythmics (procainamide, quinidine, and lidocaine), muscle relaxants   Activity: [x] mobilize as tolerated [] Bedrest [x] PT [x] OT [] PMNR    PULM:  Intubated for airway protection  CPAP as tolerated; daily SBTs, not tolerating  CT chest- with atelectasis, no evidence of residual thymoma   CXR reviewed- RLL opacity  Duonebs q6h for secretions (oral)  Check NIF/VC Q4 hours in ICU setting  11/13- NIF -15,   Aggressive Chest PT    CV:  MAP>65  TTE - EF 54%, elevated right atrial pressure     RENAL:  Stable renal function  IVL  daily IOs    GI:  Diet: ON TF  GI prophylaxis: PPI while on steroids + intubated  Bowel regimen: miralax, senna; dulcolax suppositories PRN  LBM: 11/12    ENDO:   FS goal 120-180  Medium scale  A1C 6.7    HEME/ONC:  Monitor H/H  SCDs  Chemoppx: SQL  LED negative     ID:  No fevers, no leukocytosis reactive likely 2/2 steroids   coptious thick inline secretions, oral secretions  No signs of infection     Disposition: ICU ASSESSMENT/PLAN: 64 y/o female with myasthenia gravis, respiratory failure. Demonstrating worsening respiratory effort on SBT, NIF and FVC below baseline required for extubation to BiPAP despite completion of IVIG. Plan is to d/w PT, family risks & benefits of PLEX - patient consented to insertion of shiley, initiation of PLEX - S/P tx w/ PLEX 1/5. Care initiated w/ [facility] neuromuscular neurology Dr Covarrubias. PT shows signs of improved respiratory effort. Continues to CPAP well, Secretions are improving at this time    NEURO:  Neurochecks q4h  PLEX tx 2/5 next monday 11/13, patient started azathioprine on 11/10   Recommend early ambulation w/ PT/OT  Neurology following  Will obtain CT chest to rule out residual thymoma   Hold Mestinon, Will consider restarting prior to extubation, currently held in setting of copious secretions, retrial extubation in AM  Continue Prednisone to 60 mg QD  Avoid medications that may worsen the current exacerbation including macrolides, fluoroquinolones, tetracyclines, aminoglycoside, beta-blockers, magnesium, and anti-arrhythmics (procainamide, quinidine, and lidocaine), muscle relaxants   Activity: [x] mobilize as tolerated [] Bedrest [x] PT [x] OT [] PMNR    PULM:  Intubated for airway protection  CPAP as tolerated; daily SBTs, not tolerating  CT chest- with atelectasis, no evidence of residual thymoma   CXR reviewed- RLL opacity  Duonebs q6h for secretions (oral)  Check NIF/VC Q4 hours in ICU setting  11/13- NIF -15,   Aggressive Chest PT    CV:  MAP>65  TTE - EF 54%, elevated right atrial pressure     RENAL:  Stable renal function  IVL  daily IOs    GI:  Diet: ON TF  GI prophylaxis: PPI while on steroids + intubated  Bowel regimen: miralax, senna; dulcolax suppositories PRN  LBM: 11/12    ENDO:   FS goal 120-180  Medium scale  A1C 6.7    HEME/ONC:  Monitor H/H  SCDs  Chemoppx: SQL  LED negative     ID:  No fevers, no leukocytosis reactive likely 2/2 steroids   coptious thick inline secretions, oral secretions  No signs of infection     Disposition: ICU ASSESSMENT/PLAN: 64 y/o female with myasthenia gravis, respiratory failure. Demonstrating worsening respiratory effort on SBT, NIF and FVC below baseline required for extubation to BiPAP despite completion of IVIG. Plan is to d/w PT, family risks & benefits of PLEX - patient consented to insertion of shiley, initiation of PLEX - S/P tx w/ PLEX 1/5. Care initiated w/ [facility] neuromuscular neurology Dr Covarrubias. PT shows signs of improved respiratory effort. Continues to CPAP well, Secretions are improving at this time    NEURO:  Neurochecks q4h  PLEX tx 2/5 next monday 11/13, patient started azathioprine on 11/10   Recommend early ambulation w/ PT/OT  Neurology following  Will obtain CT chest to rule out residual thymoma   Hold Mestinon, Will consider restarting prior to extubation, currently held in setting of copious secretions, retrial extubation in AM  Continue Prednisone to 60 mg QD  Avoid medications that may worsen the current exacerbation including macrolides, fluoroquinolones, tetracyclines, aminoglycoside, beta-blockers, magnesium, and anti-arrhythmics (procainamide, quinidine, and lidocaine), muscle relaxants   Activity: [x] mobilize as tolerated [] Bedrest [x] PT [x] OT [] PMNR    PULM:  Intubated for airway protection  CPAP as tolerated; daily SBTs, not tolerating  CT chest- with atelectasis, no evidence of residual thymoma   CXR reviewed- RLL opacity  Duonebs q6h for secretions (oral)  Check NIF/VC Q4 hours in ICU setting  11/13- NIF -15,   Aggressive Chest PT    CV:  MAP>65  TTE - EF 54%, elevated right atrial pressure     RENAL:  Stable renal function  IVL  daily IOs    GI:  Diet: ON TF  GI prophylaxis: PPI while on steroids + intubated  Bowel regimen: miralax, senna; dulcolax suppositories PRN  LBM: 11/12    ENDO:   FS goal 120-180  Medium scale  A1C 6.7    HEME/ONC:  Monitor H/H  SCDs  Chemoppx: SQL  LED negative     ID:  No fevers, no leukocytosis reactive likely 2/2 steroids   coptious thick inline secretions, oral secretions  No signs of infection     Disposition: ICU

## 2023-11-13 NOTE — PROGRESS NOTE ADULT - ATTENDING COMMENTS
remains intubated.  continuing with PLEX, already finished IVIG, neuro continues to follow.  last several NIFs still poor (20 or less), guarded prognosis regarding extubation at this time.

## 2023-11-13 NOTE — PROGRESS NOTE ADULT - ATTENDING COMMENTS
remains with less-than-ideal FVC and NIF to consider extubation.  PLEX will be finished on 11/15 and reassess at that point.  suspect patient will end up trach-dependant.  neuro following.

## 2023-11-14 PROCEDURE — 71045 X-RAY EXAM CHEST 1 VIEW: CPT | Mod: 26

## 2023-11-14 PROCEDURE — 99291 CRITICAL CARE FIRST HOUR: CPT

## 2023-11-14 RX ADMIN — Medication 650 MILLIGRAM(S): at 19:10

## 2023-11-14 RX ADMIN — CHLORHEXIDINE GLUCONATE 1 APPLICATION(S): 213 SOLUTION TOPICAL at 22:20

## 2023-11-14 RX ADMIN — Medication 60 MILLIGRAM(S): at 05:32

## 2023-11-14 RX ADMIN — Medication 3 MILLILITER(S): at 18:14

## 2023-11-14 RX ADMIN — Medication 3 MILLILITER(S): at 00:46

## 2023-11-14 RX ADMIN — Medication 650 MILLIGRAM(S): at 11:15

## 2023-11-14 RX ADMIN — Medication 3 MILLILITER(S): at 23:42

## 2023-11-14 RX ADMIN — POLYETHYLENE GLYCOL 3350 17 GRAM(S): 17 POWDER, FOR SOLUTION ORAL at 05:33

## 2023-11-14 RX ADMIN — AZATHIOPRINE 50 MILLIGRAM(S): 100 TABLET ORAL at 17:12

## 2023-11-14 RX ADMIN — Medication 650 MILLIGRAM(S): at 19:40

## 2023-11-14 RX ADMIN — CHLORHEXIDINE GLUCONATE 1 APPLICATION(S): 213 SOLUTION TOPICAL at 05:33

## 2023-11-14 RX ADMIN — ENOXAPARIN SODIUM 40 MILLIGRAM(S): 100 INJECTION SUBCUTANEOUS at 22:19

## 2023-11-14 RX ADMIN — Medication 3 MILLILITER(S): at 12:23

## 2023-11-14 RX ADMIN — Medication 650 MILLIGRAM(S): at 12:25

## 2023-11-14 RX ADMIN — AZATHIOPRINE 50 MILLIGRAM(S): 100 TABLET ORAL at 05:32

## 2023-11-14 RX ADMIN — PANTOPRAZOLE SODIUM 40 MILLIGRAM(S): 20 TABLET, DELAYED RELEASE ORAL at 07:43

## 2023-11-14 RX ADMIN — Medication 3 MILLILITER(S): at 05:45

## 2023-11-14 RX ADMIN — CHLORHEXIDINE GLUCONATE 15 MILLILITER(S): 213 SOLUTION TOPICAL at 17:12

## 2023-11-14 RX ADMIN — CHLORHEXIDINE GLUCONATE 15 MILLILITER(S): 213 SOLUTION TOPICAL at 05:32

## 2023-11-14 NOTE — PROGRESS NOTE ADULT - ASSESSMENT
ASSESSMENT/PLAN: 62 y/o female with myasthenia gravis, respiratory failure. Demonstrating worsening respiratory effort on SBT, NIF and FVC below baseline required for extubation to BiPAP despite completion of IVIG. Plan is to d/w PT, family risks & benefits of PLEX - patient consented to insertion of shiley, initiation of PLEX - S/P tx w/ PLEX 1/5. Care initiated w/ [facility] neuromuscular neurology Dr Covarrubias. PT shows signs of improved respiratory effort. Continues to CPAP well, Secretions are improving at this time    NEURO:  Neurochecks q4h  PLEX tx 2/5 next monday 11/15, patient started azathioprine on 11/10   Recommend early ambulation w/ PT/OT  Neurology following  Will obtain CT chest to rule out residual thymoma   Hold Mestinon, Will consider restarting prior to extubation, currently held in setting of copious secretions, retrial extubation in AM  Continue Prednisone to 60 mg QD  Avoid medications that may worsen the current exacerbation including macrolides, fluoroquinolones, tetracyclines, aminoglycoside, beta-blockers, magnesium, and anti-arrhythmics (procainamide, quinidine, and lidocaine), muscle relaxants   Activity: [x] mobilize as tolerated [] Bedrest [x] PT [x] OT [] PMNR    PULM:  Intubated for airway protection  CPAP as tolerated; daily SBTs, not tolerating  CT chest- with atelectasis, no evidence of residual thymoma   CXR reviewed- RLL opacity  Duonebs q6h for secretions (oral)  Check NIF/VC Q4 hours in ICU setting  11/13- NIF -15,   Aggressive Chest PT    CV:  MAP>65  TTE - EF 54%, elevated right atrial pressure     RENAL:  Stable renal function  IVL  daily IOs    GI:  Diet: ON TF  GI prophylaxis: PPI while on steroids + intubated  Bowel regimen: miralax, senna; dulcolax suppositories PRN  LBM: 11/12    ENDO:   FS goal 120-180  Medium scale  A1C 6.7    HEME/ONC:  Monitor H/H  SCDs  Chemoppx: SQL  LED negative     ID:  No fevers, no leukocytosis reactive likely 2/2 steroids   coptious thick inline secretions, oral secretions  No signs of infection     Disposition: ICU ASSESSMENT/PLAN: 64 y/o female with myasthenia gravis, respiratory failure. Demonstrating worsening respiratory effort on SBT, NIF and FVC below baseline required for extubation to BiPAP despite completion of IVIG. Plan is to d/w PT, family risks & benefits of PLEX - patient consented to insertion of shiley, initiation of PLEX - S/P tx w/ PLEX 1/5. Care initiated w/ [facility] neuromuscular neurology Dr Covarrubias. PT shows signs of improved respiratory effort. Continues to CPAP well, Secretions are improving at this time    NEURO:  Neurochecks q4h  PLEX tx 2/5 next monday 11/15, patient started azathioprine on 11/10   Recommend early ambulation w/ PT/OT  Neurology following  Will obtain CT chest to rule out residual thymoma   Hold Mestinon, Will consider restarting prior to extubation, currently held in setting of copious secretions, retrial extubation in AM  Continue Prednisone to 60 mg QD  Avoid medications that may worsen the current exacerbation including macrolides, fluoroquinolones, tetracyclines, aminoglycoside, beta-blockers, magnesium, and anti-arrhythmics (procainamide, quinidine, and lidocaine), muscle relaxants   Activity: [x] mobilize as tolerated [] Bedrest [x] PT [x] OT [] PMNR    PULM:  Intubated for airway protection  CPAP as tolerated; daily SBTs, not tolerating  CT chest- with atelectasis, no evidence of residual thymoma   CXR reviewed- RLL opacity  Duonebs q6h for secretions (oral)  Check NIF/VC Q4 hours in ICU setting  11/13- NIF -15,   Aggressive Chest PT    CV:  MAP>65  TTE - EF 54%, elevated right atrial pressure     RENAL:  Stable renal function  IVL  daily IOs    GI:  Diet: ON TF  GI prophylaxis: PPI while on steroids + intubated  Bowel regimen: miralax, senna; dulcolax suppositories PRN  LBM: 11/12    ENDO:   FS goal 120-180  Medium scale  A1C 6.7    HEME/ONC:  Monitor H/H  SCDs  Chemoppx: SQL  LED negative     ID:  No fevers, no leukocytosis reactive likely 2/2 steroids   coptious thick inline secretions, oral secretions  No signs of infection     Disposition: ICU ASSESSMENT/PLAN: 62 y/o female with myasthenia gravis, respiratory failure. Demonstrating worsening respiratory effort on SBT, NIF and FVC below baseline required for extubation to BiPAP despite completion of IVIG. Plan is to d/w PT, family risks & benefits of PLEX - patient consented to insertion of shiley, initiation of PLEX - S/P tx w/ PLEX 1/5. Care initiated w/ [facility] neuromuscular neurology Dr Covarrubias. PT shows signs of improved respiratory effort. Continues to CPAP well, Secretions are improving at this time    NEURO:  Neurochecks q4h  PLEX tx 2/5 next monday 11/15, patient started azathioprine on 11/10   Recommend early ambulation w/ PT/OT  Neurology following  Will obtain CT chest to rule out residual thymoma   Hold Mestinon, Will consider restarting prior to extubation, currently held in setting of copious secretions, retrial extubation in AM  Continue Prednisone to 60 mg QD  Avoid medications that may worsen the current exacerbation including macrolides, fluoroquinolones, tetracyclines, aminoglycoside, beta-blockers, magnesium, and anti-arrhythmics (procainamide, quinidine, and lidocaine), muscle relaxants   Activity: [x] mobilize as tolerated [] Bedrest [x] PT [x] OT [] PMNR    PULM:  Intubated for airway protection  CPAP as tolerated; daily SBTs, not tolerating  CT chest- with atelectasis, no evidence of residual thymoma   CXR reviewed- RLL opacity  Duonebs q6h for secretions (oral)  Check NIF/VC Q4 hours in ICU setting  11/13- NIF -15,   Aggressive Chest PT    CV:  MAP>65  TTE - EF 54%, elevated right atrial pressure     RENAL:  Stable renal function  IVL  daily IOs    GI:  Diet: ON TF  GI prophylaxis: PPI while on steroids + intubated  Bowel regimen: miralax, senna; dulcolax suppositories PRN  LBM: 11/12    ENDO:   FS goal 120-180  Medium scale  A1C 6.7    HEME/ONC:  Monitor H/H  SCDs  Chemoppx: SQL  LED negative     ID:  No fevers, no leukocytosis reactive likely 2/2 steroids   coptious thick inline secretions, oral secretions  No signs of infection     Disposition: ICU ASSESSMENT/PLAN: 64 y/o female with myasthenia gravis, respiratory failure. Demonstrating worsening respiratory effort on SBT, NIF and FVC below baseline required for extubation to BiPAP despite completion of IVIG. Plan is to d/w PT, family risks & benefits of PLEX - patient consented to insertion of shiley, initiation of PLEX - S/P tx w/ PLEX 1/5. Care initiated w/ [facility] neuromuscular neurology Dr Covarrubias. PT shows signs of improved respiratory effort. Continues to CPAP well, Secretions are improving at this time    NEURO:  Neurochecks q4h  PLEX tx 4/5 11/15  patient started azathioprine on 11/10   Recommend early ambulation w/ PT/OT  Neurology following  Will obtain CT chest to rule out residual thymoma, f/u read  Hold Mestinon, Will consider restarting prior to extubation, currently held in setting of copious secretions, retrial extubation in AM  Continue Prednisone to 60 mg QD  Avoid medications that may worsen the current exacerbation including macrolides, fluoroquinolones, tetracyclines, aminoglycoside, beta-blockers, magnesium, and anti-arrhythmics (procainamide, quinidine, and lidocaine), muscle relaxants   Activity: [x] mobilize as tolerated [] Bedrest [x] PT [x] OT [] PMNR    PULM:  Intubated for airway protection  CPAP as tolerated; daily SBTs, not tolerating  CT chest- with atelectasis, no evidence of residual thymoma   CXR reviewed- RLL opacity  Duonebs q6h for secretions (oral)  Check NIF/VC Q4 hours in ICU setting  11/13- NIF -15,   Aggressive Chest PT    CV:  MAP>65  TTE - EF 54%, elevated right atrial pressure     RENAL:  Stable renal function  IVL  daily IOs    GI:  Diet: ON TF  GI prophylaxis: PPI while on steroids + intubated  Bowel regimen: miralax, senna; dulcolax suppositories PRN  LBM: 11/12    ENDO:   FS goal 120-180  Medium scale  A1C 6.7    HEME/ONC:  Monitor H/H  SCDs  Chemoppx: SQL  LED negative     ID:  No fevers, no leukocytosis reactive likely 2/2 steroids   coptious thick inline secretions, oral secretions  No signs of infection     Disposition: ICU ASSESSMENT/PLAN: 62 y/o female with myasthenia gravis, respiratory failure. Demonstrating worsening respiratory effort on SBT, NIF and FVC below baseline required for extubation to BiPAP despite completion of IVIG. Plan is to d/w PT, family risks & benefits of PLEX - patient consented to insertion of shiley, initiation of PLEX - S/P tx w/ PLEX 1/5. Care initiated w/ [facility] neuromuscular neurology Dr Covarrubias. PT shows signs of improved respiratory effort. Continues to CPAP well, Secretions are improving at this time    NEURO:  Neurochecks q4h  PLEX tx 4/5 11/15  patient started azathioprine on 11/10   Recommend early ambulation w/ PT/OT  Neurology following  Will obtain CT chest to rule out residual thymoma, f/u read  Hold Mestinon, Will consider restarting prior to extubation, currently held in setting of copious secretions, retrial extubation in AM  Continue Prednisone to 60 mg QD  Avoid medications that may worsen the current exacerbation including macrolides, fluoroquinolones, tetracyclines, aminoglycoside, beta-blockers, magnesium, and anti-arrhythmics (procainamide, quinidine, and lidocaine), muscle relaxants   Activity: [x] mobilize as tolerated [] Bedrest [x] PT [x] OT [] PMNR    PULM:  Intubated for airway protection  CPAP as tolerated; daily SBTs, not tolerating  CT chest- with atelectasis, no evidence of residual thymoma   CXR reviewed- RLL opacity  Duonebs q6h for secretions (oral)  Check NIF/VC Q4 hours in ICU setting  11/13- NIF -15,   Aggressive Chest PT    CV:  MAP>65  TTE - EF 54%, elevated right atrial pressure     RENAL:  Stable renal function  IVL  daily IOs    GI:  Diet: ON TF  GI prophylaxis: PPI while on steroids + intubated  Bowel regimen: miralax, senna; dulcolax suppositories PRN  LBM: 11/12    ENDO:   FS goal 120-180  Medium scale  A1C 6.7    HEME/ONC:  Monitor H/H  SCDs  Chemoppx: SQL  LED negative     ID:  No fevers, no leukocytosis reactive likely 2/2 steroids   coptious thick inline secretions, oral secretions  No signs of infection     Disposition: ICU ASSESSMENT/PLAN: 64 y/o female with myasthenia gravis, respiratory failure. Demonstrating worsening respiratory effort on SBT, NIF and FVC below baseline required for extubation to BiPAP despite completion of IVIG. Plan is to d/w PT, family risks & benefits of PLEX - patient consented to insertion of shiley, initiation of PLEX - S/P tx w/ PLEX 1/5. Care initiated w/ [facility] neuromuscular neurology Dr Covarrubias. PT shows signs of improved respiratory effort. Continues to CPAP well, Secretions are improving at this time    NEURO:  Neurochecks q4h  PLEX tx 4/5 11/15  patient started azathioprine on 11/10   Recommend early ambulation w/ PT/OT  Neurology following  Will obtain CT chest to rule out residual thymoma, f/u read  Hold Mestinon, Will consider restarting prior to extubation, currently held in setting of copious secretions, retrial extubation in AM  Continue Prednisone to 60 mg QD  Avoid medications that may worsen the current exacerbation including macrolides, fluoroquinolones, tetracyclines, aminoglycoside, beta-blockers, magnesium, and anti-arrhythmics (procainamide, quinidine, and lidocaine), muscle relaxants   Activity: [x] mobilize as tolerated [] Bedrest [x] PT [x] OT [] PMNR    PULM:  Intubated for airway protection  CPAP as tolerated; daily SBTs, not tolerating  CT chest- with atelectasis, no evidence of residual thymoma   CXR reviewed- RLL opacity  Duonebs q6h for secretions (oral)  Check NIF/VC Q4 hours in ICU setting  11/13- NIF -15,   Aggressive Chest PT    CV:  MAP>65  TTE - EF 54%, elevated right atrial pressure     RENAL:  Stable renal function  IVL  daily IOs    GI:  Diet: ON TF  GI prophylaxis: PPI while on steroids + intubated  Bowel regimen: miralax, senna; dulcolax suppositories PRN  LBM: 11/12    ENDO:   FS goal 120-180  Medium scale  A1C 6.7    HEME/ONC:  Monitor H/H  SCDs  Chemoppx: SQL  LED negative     ID:  No fevers, no leukocytosis reactive likely 2/2 steroids   coptious thick inline secretions, oral secretions  No signs of infection     Disposition: ICU 98.2

## 2023-11-14 NOTE — CHART NOTE - NSCHARTNOTEFT_GEN_A_CORE
Nutrition Follow Up Note  Patient seen for: Nutrition Follow Up    Chart reviewed, events noted. Pt is a 62 yo F presented with Myasthenia Gravis exacerbation. Intubated. Receiving PLEX therapy. S/P IVIG.     Source: [] Patient       [x] EMR        [x] RN        [] Family at bedside       [x] Other: interdisciplinary medical team    -If unable to interview patient: [x] Trach/Vent/BiPAP  [x] Disoriented/confused/inappropriate to interview    Diet Order:   Diet, NPO with Tube Feed:   Tube Feeding Modality: Nasogastric  Glucerna 1.2 Taye (GLUCERNARTH)  Total Volume for 24 Hours (mL): 1320  Continuous  Starting Tube Feed Rate {mL per Hour}: 20  Increase Tube Feed Rate by (mL): 10     Every 4 hours  Until Goal Tube Feed Rate (mL per Hour): 55  Tube Feed Duration (in Hours): 24  Tube Feed Start Time: 15:00  Supplement Feeding Modality:  Nasogastric  Probiotic Yogurt/Smoothie Cans or Servings Per Day:  2       Frequency:  Daily (11-06-23)    EN Order Provides: 1320ml, 1584kcal and 79g protein     EN Provision (per nursing flow sheet):   (11/14): feeds infusing at 55ml/hr  (11/13): 96% of goal  (11/12): 83% of goal  (11/11): 75% of goal  (11/10): 100% of goal    Nutrition-related concerns:  -Last BM (11/12): x 1; (11/11): x 3. On bowel regimen (senna, Miralax). Prescribed Protonix.   -Prescribed Prednisone (steroid) at risk for elevated FSBG. A1c 6.7%. Antihyperglycemic regimen deferred to team, appears to be discontinued at this time. S/P insulin drip (11/10-11); insulin lispro sliding scale (discontinued 11/12).   -Remains intubated. Undergoing PLEX therapy. Has received 3 out of 5 thus far.     Weights:   Daily     MEDICATIONS  (STANDING):  pantoprazole   Suspension  polyethylene glycol 3350  predniSONE   Tablet    Pertinent Labs:   A1C with Estimated Average Glucose Result: 6.7 % (11-03-23 @ 20:25)    Finger Sticks:    Triglycerides, Serum: 81 mg/dL (11-03-23 @ 20:24)    Skin per nursing documentation: no documented pressure injuries   Edema: none noted     based on dosing wt 66.2kg):   Estimated Energy Needs: (25-30kcal/kg): 1655-1986kcal  Estimated Protein Needs: (1.2-1.5g protein/kg): 79-99g protein  Estimated Fluid Needs: defer to team    Previous Nutrition Diagnosis: swallowing difficulty   Nutrition Diagnosis is: [x] ongoing  [] resolved [] not applicable     New Nutrition Diagnosis: [x] Not applicable    Nutrition Care Plan:  [x] In Progress  [] Achieved  [] Not applicable    Nutrition Interventions:     Education Provided:       [] Yes:  [x] No:        Recommendations:      1. Increase Glucerna 1.2 to 65ml/hr x 24 hours. Based on dosing wt 66.2kg, to provide a total volume of 1560ml, 1872kcal (28.3kcal/kg), 94g protein (1.4g/kg) and 1063ml free water. defer free water flush to team.  2. Monitor GI tolerance. RD to remain available to adjust EN formulary, volume/rate PRN.   3. Recommend multivitamin (if no medication contraindications) to aid in prevention of micronutrient deficiencies.     Monitoring and Evaluation:   Continue to monitor nutritional intake, tolerance to diet prescription, weights, labs, skin integrity    RD remains available upon request and will follow up per protocol    Joselyn Gomez RD, available via TEAMS Nutrition Follow Up Note  Patient seen for: Nutrition Follow Up    Chart reviewed, events noted. Pt is a 64 yo F presented with Myasthenia Gravis exacerbation. Intubated. Receiving PLEX therapy. S/P IVIG.     Source: [] Patient       [x] EMR        [x] RN        [] Family at bedside       [x] Other: interdisciplinary medical team    -If unable to interview patient: [x] Trach/Vent/BiPAP  [x] Disoriented/confused/inappropriate to interview    Diet Order:   Diet, NPO with Tube Feed:   Tube Feeding Modality: Nasogastric  Glucerna 1.2 Taye (GLUCERNARTH)  Total Volume for 24 Hours (mL): 1320  Continuous  Starting Tube Feed Rate {mL per Hour}: 20  Increase Tube Feed Rate by (mL): 10     Every 4 hours  Until Goal Tube Feed Rate (mL per Hour): 55  Tube Feed Duration (in Hours): 24  Tube Feed Start Time: 15:00  Supplement Feeding Modality:  Nasogastric  Probiotic Yogurt/Smoothie Cans or Servings Per Day:  2       Frequency:  Daily (11-06-23)    EN Order Provides: 1320ml, 1584kcal and 79g protein     EN Provision (per nursing flow sheet):   (11/14): feeds infusing at 55ml/hr  (11/13): 96% of goal  (11/12): 83% of goal  (11/11): 75% of goal  (11/10): 100% of goal    Nutrition-related concerns:  -Last BM (11/12): x 1; (11/11): x 3. On bowel regimen (senna, Miralax). Prescribed Protonix.   -Prescribed Prednisone (steroid) at risk for elevated FSBG. A1c 6.7%. Antihyperglycemic regimen deferred to team, appears to be discontinued at this time. S/P insulin drip (11/10-11); insulin lispro sliding scale (discontinued 11/12).   -Remains intubated. Undergoing PLEX therapy. Has received 3 out of 5 thus far.     Weights:   Daily     MEDICATIONS  (STANDING):  pantoprazole   Suspension  polyethylene glycol 3350  predniSONE   Tablet    Pertinent Labs:   A1C with Estimated Average Glucose Result: 6.7 % (11-03-23 @ 20:25)    Finger Sticks:    Triglycerides, Serum: 81 mg/dL (11-03-23 @ 20:24)    Skin per nursing documentation: no documented pressure injuries   Edema: none noted     based on dosing wt 66.2kg):   Estimated Energy Needs: (25-30kcal/kg): 1655-1986kcal  Estimated Protein Needs: (1.2-1.5g protein/kg): 79-99g protein  Estimated Fluid Needs: defer to team    Previous Nutrition Diagnosis: swallowing difficulty   Nutrition Diagnosis is: [x] ongoing  [] resolved [] not applicable     New Nutrition Diagnosis: [x] Not applicable    Nutrition Care Plan:  [x] In Progress  [] Achieved  [] Not applicable    Nutrition Interventions:     Education Provided:       [] Yes:  [x] No:        Recommendations:      1. Increase Glucerna 1.2 to 65ml/hr x 24 hours. Based on dosing wt 66.2kg, to provide a total volume of 1560ml, 1872kcal (28.3kcal/kg), 94g protein (1.4g/kg) and 1063ml free water. defer free water flush to team.  2. Monitor GI tolerance. RD to remain available to adjust EN formulary, volume/rate PRN.   3. Recommend multivitamin (if no medication contraindications) to aid in prevention of micronutrient deficiencies.     Monitoring and Evaluation:   Continue to monitor nutritional intake, tolerance to diet prescription, weights, labs, skin integrity    RD remains available upon request and will follow up per protocol    Joselyn Gomez RD, available via TEAMS

## 2023-11-14 NOTE — PROGRESS NOTE ADULT - SUBJECTIVE AND OBJECTIVE BOX
HOSPITAL COURSE:  Patient KAIDEN HATCH is a 63y woman presenting with myasthenia gravis exacerbation.    Admission Scores  GCS: 15     24 hour Events: Patient evaluated at bedside. Reports improvement in symptoms w/ exception to copious oral secretions. No complaints of pain or dyspnea.     11/4- Patient intubated overnight for worsening respiratory status. Patient received dose 2/5 of IVIG  11/6- No events; poor respiratory effort on SBT  11/7- Apneic on SBT; worsening respiratory effort; last dose of IVIG TODAY  11/8- NIF -12 and  this AM. PT received shiley in right IJ w/ conscious sedation for initiation of PLEX. S/P tx 1/5 w/ PLEX.  11/9- NIF -20 and VC 630this AM.  11/10- Patient due for second dose of PLEX. No events overnight.   11/11- No events overnight. Has been tolerating CPAP since 5 AM. Oral secretions present. Strength improved.   11/12 no events, no PS too much secretions  11/13- Patient with improved secretions. No overnight events. Will get 3rd dose of PLEX today.   11/14- No overnight events.     Allergies    peanuts (Unknown)  No Known Drug Allergies    Intolerances        REVIEW OF SYSTEMS: [ ] Unable to Assess due to neurologic exam   [ X] All ROS addressed below are non-contributory, except:  Neuro: [ ] Headache [ ] Back pain [ ] Numbness [ ] Weakness [ ] Ataxia [ ] Dizziness [ ] Aphasia [ ] Dysarthria [ ] Visual disturbance  Resp: [ ] Shortness of breath/dyspnea, [ ] Orthopnea [ ] Cough  CV: [ ] Chest pain [ ] Palpitation [ ] Lightheadedness [ ] Syncope  Renal: [ ] Thirst [ ] Edema  GI: [ ] Nausea [ ] Emesis [ ] Abdominal pain [ ] Constipation [ ] Diarrhea  Hem: [ ] Hematemesis [ ] bright red blood per rectum  ID: [ ] Fever [ ] Chills [ ] Dysuria  ENT: [ ] Rhinorrhea      DEVICES:   [ ] Restraints [ ] ET tube [ ] central line [ ] arterial line [ ] morales [ ] NGT/OGT [ ] EVD [ ] LD [ ] MYAH/HMV [ ] Trach [ ] PEG [ ] Chest Tube     VITALS:   Vital Signs Last 24 Hrs  T(C): 36.8 (14 Nov 2023 07:00), Max: 37.2 (13 Nov 2023 19:00)  T(F): 98.2 (14 Nov 2023 07:00), Max: 99 (13 Nov 2023 19:00)  HR: 107 (14 Nov 2023 09:23) (53 - 137)  BP: 139/79 (14 Nov 2023 07:00) (106/55 - 155/67)  BP(mean): 93 (14 Nov 2023 07:00) (70 - 97)  RR: 16 (14 Nov 2023 07:00) (16 - 20)  SpO2: 98% (14 Nov 2023 09:23) (97% - 100%)    Parameters below as of 14 Nov 2023 07:00  Patient On (Oxygen Delivery Method): ventilator    O2 Concentration (%): 30  CAPILLARY BLOOD GLUCOSE        I&O's Summary    13 Nov 2023 07:01  -  14 Nov 2023 07:00  --------------------------------------------------------  IN: 1545 mL / OUT: 350 mL / NET: 1195 mL    14 Nov 2023 07:01  -  14 Nov 2023 10:30  --------------------------------------------------------  IN: 165 mL / OUT: 0 mL / NET: 165 mL        Respiratory:  Mode: AC/ CMV (Assist Control/ Continuous Mandatory Ventilation)  RR (machine): 16  TV (machine): 420  FiO2: 30  PEEP: 5  ITime: 0.9  MAP: 11  PIP: 25        LABS:                        10.9   12.65 )-----------( 282      ( 13 Nov 2023 09:39 )             33.7     11-13    145  |  110<H>  |  36<H>  ----------------------------<  210<H>  4.1   |  24  |  0.47<L>             MEDICATION LEVELS:     IVF FLUIDS/MEDICATIONS:   MEDICATIONS  (STANDING):  albuterol/ipratropium for Nebulization 3 milliLiter(s) Nebulizer every 6 hours  azaTHIOprine 50 milliGRAM(s) Oral two times a day  chlorhexidine 0.12% Liquid 15 milliLiter(s) Oral Mucosa every 12 hours  chlorhexidine 4% Liquid 1 Application(s) Topical <User Schedule>  chlorhexidine 4% Liquid 1 Application(s) Topical at bedtime  enoxaparin Injectable 40 milliGRAM(s) SubCutaneous every 24 hours  pantoprazole   Suspension 40 milliGRAM(s) Oral before breakfast  polyethylene glycol 3350 17 Gram(s) Oral every 12 hours  predniSONE   Tablet 60 milliGRAM(s) Oral daily    MEDICATIONS  (PRN):  acetaminophen   Oral Liquid .. 650 milliGRAM(s) Oral every 6 hours PRN Temp greater or equal to 38C (100.4F), Mild Pain (1 - 3)  ketorolac   Injectable 30 milliGRAM(s) IV Push every 8 hours PRN Mild Pain (1 - 3)  oxyCODONE    IR 5 milliGRAM(s) Oral every 4 hours PRN Moderate Pain (4 - 6)  senna 2 Tablet(s) Oral at bedtime PRN Constipation  sodium chloride 0.9% lock flush 10 milliLiter(s) IV Push every 1 hour PRN Pre/post blood products, medications, blood draw, and to maintain line patency        IMAGING:      EXAMINATION:  PHYSICAL EXAM:    Constitutional: Intubated    Neurological: Awake, alert oriented to person, place and time (WITH choices), Following Commands, PERRL, EOMI, No Gaze Preference, Face Symmetrical. Neck extension 5/5, Neck flexion 5/5.    Motor exam:          Upper extremity                         Delt     Bicep     Tricep    HG                                                 R         4/5        5/5        5/5       5/5                                               L          4+/5        5/5        5/5       5/5          Lower extremity                        HF         KF        KE       DF         PF                                                  R        5/5        5/5        5/5       5/5         5/5                                               L         5/5        5/5       5/5       5/5          5/5                                                 Sensation: [x ] intact to light touch  [ ] decreased:     Pulmonary: Clear to Auscultation, No rales, No rhonchi, No wheezes     Cardiovascular: S1, S2, Regular rate and rhythm     Gastrointestinal: Soft, Non-tender, Non-distended     Extremities: No calf tenderness    HOSPITAL COURSE:  Patient KAIDEN HATCH is a 63y woman presenting with myasthenia gravis exacerbation.    Admission Scores  GCS: 15     24 hour Events: Patient evaluated at bedside. Reports improvement in symptoms w/ exception to copious oral secretions. No complaints of pain or dyspnea.     11/4- Patient intubated overnight for worsening respiratory status. Patient received dose 2/5 of IVIG  11/6- No events; poor respiratory effort on SBT  11/7- Apneic on SBT; worsening respiratory effort; last dose of IVIG TODAY  11/8- NIF -12 and  this AM. PT received shiley in right IJ w/ conscious sedation for initiation of PLEX. S/P tx 1/5 w/ PLEX.  11/9- NIF -20 and VC 630this AM.  11/10- Patient due for second dose of PLEX. No events overnight.   11/11- No events overnight. Has been tolerating CPAP since 5 AM. Oral secretions present. Strength improved.   11/12 no events, no PS too much secretions  11/13- Patient with improved secretions. No overnight events. Will get 3rd dose of PLEX today.   11/14- No overnight events.     Allergies    peanuts (Unknown)  No Known Drug Allergies    Intolerances      REVIEW OF SYSTEMS: [ ] Unable to Assess due to neurologic exam   [ X] All ROS addressed below are non-contributory, except:  Neuro: [ ] Headache [ ] Back pain [ ] Numbness [ ] Weakness [ ] Ataxia [ ] Dizziness [ ] Aphasia [ ] Dysarthria [ ] Visual disturbance  Resp: [ ] Shortness of breath/dyspnea, [ ] Orthopnea [ ] Cough  CV: [ ] Chest pain [ ] Palpitation [ ] Lightheadedness [ ] Syncope  Renal: [ ] Thirst [ ] Edema  GI: [ ] Nausea [ ] Emesis [ ] Abdominal pain [ ] Constipation [ ] Diarrhea  Hem: [ ] Hematemesis [ ] bright red blood per rectum  ID: [ ] Fever [ ] Chills [ ] Dysuria  ENT: [ ] Rhinorrhea    VITALS:   ICU Vital Signs Last 24 Hrs  T(C): 37.2 (14 Nov 2023 23:00), Max: 37.2 (14 Nov 2023 23:00)  T(F): 99 (14 Nov 2023 23:00), Max: 99 (14 Nov 2023 23:00)  HR: 71 (14 Nov 2023 23:00) (59 - 117)  BP: 145/66 (14 Nov 2023 23:00) (130/71 - 153/62)  BP(mean): 90 (14 Nov 2023 23:00) (84 - 96)  ABP: --  ABP(mean): --  RR: 18 (14 Nov 2023 23:00) (16 - 21)  SpO2: 100% (14 Nov 2023 23:00) (98% - 100%)    O2 Parameters below as of 14 Nov 2023 19:00  Patient On (Oxygen Delivery Method): ventilator    O2 Concentration (%): 30    I&O's Summary    13 Nov 2023 07:01  -  14 Nov 2023 07:00  --------------------------------------------------------  IN: 1545 mL / OUT: 350 mL / NET: 1195 mL    14 Nov 2023 07:01  -  14 Nov 2023 23:40  --------------------------------------------------------  IN: 880 mL / OUT: 350 mL / NET: 530 mL      Respiratory:  Mode: AC/ CMV (Assist Control/ Continuous Mandatory Ventilation)  RR (machine): 16  TV (machine): 420  FiO2: 30  PEEP: 5  ITime: 0.9  MAP: 11  PIP: 25        LABS:                        10.9   12.65 )-----------( 282      ( 13 Nov 2023 09:39 )             33.7   11-13    145  |  110<H>  |  36<H>  ----------------------------<  210<H>  4.1   |  24  |  0.47<L>    Ca    9.4      13 Nov 2023 09:39  Phos  3.7     11-13  Mg     2.5     11-13              MEDICATIONS  (STANDING):  albuterol/ipratropium for Nebulization 3 milliLiter(s) Nebulizer every 6 hours  azaTHIOprine 50 milliGRAM(s) Oral two times a day  chlorhexidine 0.12% Liquid 15 milliLiter(s) Oral Mucosa every 12 hours  chlorhexidine 4% Liquid 1 Application(s) Topical <User Schedule>  chlorhexidine 4% Liquid 1 Application(s) Topical at bedtime  enoxaparin Injectable 40 milliGRAM(s) SubCutaneous every 24 hours  pantoprazole   Suspension 40 milliGRAM(s) Oral before breakfast  polyethylene glycol 3350 17 Gram(s) Oral every 12 hours  predniSONE   Tablet 60 milliGRAM(s) Oral daily    MEDICATIONS  (PRN):  acetaminophen   Oral Liquid .. 650 milliGRAM(s) Oral every 6 hours PRN Temp greater or equal to 38C (100.4F), Mild Pain (1 - 3)  ketorolac   Injectable 30 milliGRAM(s) IV Push every 8 hours PRN Mild Pain (1 - 3)  oxyCODONE    IR 5 milliGRAM(s) Oral every 4 hours PRN Moderate Pain (4 - 6)  senna 2 Tablet(s) Oral at bedtime PRN Constipation  sodium chloride 0.9% lock flush 10 milliLiter(s) IV Push every 1 hour PRN Pre/post blood products, medications, blood draw, and to maintain line patency        EXAMINATION:  PHYSICAL EXAM:    Constitutional: Intubated    Neurological: Awake, alert oriented to person, place and time (WITH choices), Following Commands, PERRL, EOMI, No Gaze Preference, Face Symmetrical. Neck extension 5/5, Neck flexion 5/5.    Motor exam:          Upper extremity                         Delt     Bicep     Tricep    HG                                                 R         4/5        5/5        5/5       5/5                                               L          4+/5        5/5        5/5       5/5          Lower extremity                        HF         KF        KE       DF         PF                                                  R        5/5        5/5        5/5       5/5         5/5                                               L         5/5        5/5       5/5       5/5          5/5                                                 Sensation: [x ] intact to light touch  [ ] decreased:     Pulmonary: Clear to Auscultation, No rales, No rhonchi, No wheezes     Cardiovascular: S1, S2, Regular rate and rhythm     Gastrointestinal: Soft, Non-tender, Non-distended     Extremities: No calf tenderness

## 2023-11-15 LAB
ALBUMIN SERPL ELPH-MCNC: 4.3 G/DL — SIGNIFICANT CHANGE UP (ref 3.3–5)
ALP SERPL-CCNC: 18 U/L — LOW (ref 40–120)
ALT FLD-CCNC: 7 U/L — LOW (ref 10–45)
ANION GAP SERPL CALC-SCNC: 12 MMOL/L — SIGNIFICANT CHANGE UP (ref 5–17)
ANION GAP SERPL CALC-SCNC: 13 MMOL/L — SIGNIFICANT CHANGE UP (ref 5–17)
AST SERPL-CCNC: 5 U/L — LOW (ref 10–40)
BILIRUB SERPL-MCNC: 0.3 MG/DL — SIGNIFICANT CHANGE UP (ref 0.2–1.2)
BUN SERPL-MCNC: 29 MG/DL — HIGH (ref 7–23)
BUN SERPL-MCNC: 35 MG/DL — HIGH (ref 7–23)
CA-I BLD-SCNC: 1.1 MMOL/L — LOW (ref 1.15–1.33)
CA-I BLD-SCNC: 1.22 MMOL/L — SIGNIFICANT CHANGE UP (ref 1.15–1.33)
CALCIUM SERPL-MCNC: 8.2 MG/DL — LOW (ref 8.4–10.5)
CALCIUM SERPL-MCNC: 9.6 MG/DL — SIGNIFICANT CHANGE UP (ref 8.4–10.5)
CHLORIDE SERPL-SCNC: 110 MMOL/L — HIGH (ref 96–108)
CHLORIDE SERPL-SCNC: 113 MMOL/L — HIGH (ref 96–108)
CO2 SERPL-SCNC: 21 MMOL/L — LOW (ref 22–31)
CO2 SERPL-SCNC: 24 MMOL/L — SIGNIFICANT CHANGE UP (ref 22–31)
CREAT SERPL-MCNC: 0.42 MG/DL — LOW (ref 0.5–1.3)
CREAT SERPL-MCNC: 0.47 MG/DL — LOW (ref 0.5–1.3)
EGFR: 106 ML/MIN/1.73M2 — SIGNIFICANT CHANGE UP
EGFR: 109 ML/MIN/1.73M2 — SIGNIFICANT CHANGE UP
FIBRINOGEN PPP-MCNC: 161 MG/DL — LOW (ref 200–445)
FIBRINOGEN PPP-MCNC: 71 MG/DL — CRITICAL LOW (ref 200–445)
GLUCOSE SERPL-MCNC: 129 MG/DL — HIGH (ref 70–99)
GLUCOSE SERPL-MCNC: 225 MG/DL — HIGH (ref 70–99)
HCT VFR BLD CALC: 32.6 % — LOW (ref 34.5–45)
HCT VFR BLD CALC: 34.1 % — LOW (ref 34.5–45)
HGB BLD-MCNC: 10.1 G/DL — LOW (ref 11.5–15.5)
HGB BLD-MCNC: 10.8 G/DL — LOW (ref 11.5–15.5)
MAGNESIUM SERPL-MCNC: 2.6 MG/DL — SIGNIFICANT CHANGE UP (ref 1.6–2.6)
MCHC RBC-ENTMCNC: 28.6 PG — SIGNIFICANT CHANGE UP (ref 27–34)
MCHC RBC-ENTMCNC: 31 GM/DL — LOW (ref 32–36)
MCHC RBC-ENTMCNC: 31.7 GM/DL — LOW (ref 32–36)
MCV RBC AUTO: 90.5 FL — SIGNIFICANT CHANGE UP (ref 80–100)
MCV RBC AUTO: 92.4 FL — SIGNIFICANT CHANGE UP (ref 80–100)
NRBC # BLD: 0 /100 WBCS — SIGNIFICANT CHANGE UP (ref 0–0)
PHOSPHATE SERPL-MCNC: 4.5 MG/DL — SIGNIFICANT CHANGE UP (ref 2.5–4.5)
PLATELET # BLD AUTO: 265 K/UL — SIGNIFICANT CHANGE UP (ref 150–400)
PLATELET # BLD AUTO: 276 K/UL — SIGNIFICANT CHANGE UP (ref 150–400)
POTASSIUM SERPL-MCNC: 3.8 MMOL/L — SIGNIFICANT CHANGE UP (ref 3.5–5.3)
POTASSIUM SERPL-SCNC: 3.8 MMOL/L — SIGNIFICANT CHANGE UP (ref 3.5–5.3)
PROT SERPL-MCNC: 4.9 G/DL — LOW (ref 6–8.3)
RBC # BLD: 3.53 M/UL — LOW (ref 3.8–5.2)
RBC # BLD: 3.77 M/UL — LOW (ref 3.8–5.2)
RBC # FLD: 15.2 % — HIGH (ref 10.3–14.5)
RBC # FLD: 15.3 % — HIGH (ref 10.3–14.5)
SODIUM SERPL-SCNC: 146 MMOL/L — HIGH (ref 135–145)
SODIUM SERPL-SCNC: 147 MMOL/L — HIGH (ref 135–145)
WBC # BLD: 16.03 K/UL — HIGH (ref 3.8–10.5)
WBC # BLD: 18.41 K/UL — HIGH (ref 3.8–10.5)
WBC # FLD AUTO: 16.03 K/UL — HIGH (ref 3.8–10.5)
WBC # FLD AUTO: 18.41 K/UL — HIGH (ref 3.8–10.5)

## 2023-11-15 PROCEDURE — 99291 CRITICAL CARE FIRST HOUR: CPT

## 2023-11-15 PROCEDURE — 99291 CRITICAL CARE FIRST HOUR: CPT | Mod: GC

## 2023-11-15 PROCEDURE — 36514 APHERESIS PLASMA: CPT

## 2023-11-15 PROCEDURE — 71045 X-RAY EXAM CHEST 1 VIEW: CPT | Mod: 26

## 2023-11-15 RX ORDER — CALCIUM GLUCONATE 100 MG/ML
2 VIAL (ML) INTRAVENOUS ONCE
Refills: 0 | Status: COMPLETED | OUTPATIENT
Start: 2023-11-15 | End: 2023-11-15

## 2023-11-15 RX ORDER — POTASSIUM CHLORIDE 20 MEQ
20 PACKET (EA) ORAL ONCE
Refills: 0 | Status: COMPLETED | OUTPATIENT
Start: 2023-11-15 | End: 2023-11-15

## 2023-11-15 RX ORDER — POTASSIUM CHLORIDE 20 MEQ
40 PACKET (EA) ORAL ONCE
Refills: 0 | Status: COMPLETED | OUTPATIENT
Start: 2023-11-15 | End: 2023-11-15

## 2023-11-15 RX ORDER — SODIUM CHLORIDE 9 MG/ML
1000 INJECTION INTRAMUSCULAR; INTRAVENOUS; SUBCUTANEOUS ONCE
Refills: 0 | Status: COMPLETED | OUTPATIENT
Start: 2023-11-15 | End: 2023-11-15

## 2023-11-15 RX ORDER — SODIUM CHLORIDE 9 MG/ML
1000 INJECTION, SOLUTION INTRAVENOUS ONCE
Refills: 0 | Status: COMPLETED | OUTPATIENT
Start: 2023-11-15 | End: 2023-11-15

## 2023-11-15 RX ADMIN — CHLORHEXIDINE GLUCONATE 1 APPLICATION(S): 213 SOLUTION TOPICAL at 22:47

## 2023-11-15 RX ADMIN — SODIUM CHLORIDE 1000 MILLILITER(S): 9 INJECTION, SOLUTION INTRAVENOUS at 23:30

## 2023-11-15 RX ADMIN — PANTOPRAZOLE SODIUM 40 MILLIGRAM(S): 20 TABLET, DELAYED RELEASE ORAL at 06:51

## 2023-11-15 RX ADMIN — OXYCODONE HYDROCHLORIDE 5 MILLIGRAM(S): 5 TABLET ORAL at 15:28

## 2023-11-15 RX ADMIN — Medication 650 MILLIGRAM(S): at 20:00

## 2023-11-15 RX ADMIN — ENOXAPARIN SODIUM 40 MILLIGRAM(S): 100 INJECTION SUBCUTANEOUS at 22:47

## 2023-11-15 RX ADMIN — Medication 3 MILLILITER(S): at 17:21

## 2023-11-15 RX ADMIN — Medication 40 MILLIEQUIVALENT(S): at 17:27

## 2023-11-15 RX ADMIN — CHLORHEXIDINE GLUCONATE 15 MILLILITER(S): 213 SOLUTION TOPICAL at 05:47

## 2023-11-15 RX ADMIN — Medication 650 MILLIGRAM(S): at 10:19

## 2023-11-15 RX ADMIN — AZATHIOPRINE 50 MILLIGRAM(S): 100 TABLET ORAL at 05:48

## 2023-11-15 RX ADMIN — Medication 60 MILLIGRAM(S): at 05:48

## 2023-11-15 RX ADMIN — Medication 200 GRAM(S): at 17:27

## 2023-11-15 RX ADMIN — Medication 3 MILLILITER(S): at 11:07

## 2023-11-15 RX ADMIN — CHLORHEXIDINE GLUCONATE 1 APPLICATION(S): 213 SOLUTION TOPICAL at 05:48

## 2023-11-15 RX ADMIN — Medication 650 MILLIGRAM(S): at 10:50

## 2023-11-15 RX ADMIN — Medication 20 MILLIEQUIVALENT(S): at 05:48

## 2023-11-15 RX ADMIN — SODIUM CHLORIDE 1000 MILLILITER(S): 9 INJECTION INTRAMUSCULAR; INTRAVENOUS; SUBCUTANEOUS at 03:26

## 2023-11-15 RX ADMIN — Medication 3 MILLILITER(S): at 06:08

## 2023-11-15 RX ADMIN — AZATHIOPRINE 50 MILLIGRAM(S): 100 TABLET ORAL at 17:27

## 2023-11-15 RX ADMIN — Medication 650 MILLIGRAM(S): at 20:30

## 2023-11-15 RX ADMIN — OXYCODONE HYDROCHLORIDE 5 MILLIGRAM(S): 5 TABLET ORAL at 16:00

## 2023-11-15 NOTE — PROGRESS NOTE ADULT - ASSESSMENT
ASSESSMENT/PLAN: 64 y/o female with myasthenia gravis, respiratory failure. Demonstrating worsening respiratory effort on SBT, NIF and FVC below baseline required for extubation to BiPAP despite completion of IVIG. Plan is to d/w PT, family risks & benefits of PLEX - patient consented to insertion of shiley, initiation of PLEX - S/P tx w/ PLEX 1/5. Care initiated w/ [facility] neuromuscular neurology Dr Covarrubias. PT shows signs of improved respiratory effort. Continues to CPAP well, Secretions are improving at this time    NEURO:  Neurochecks q4h  PLEX tx 4/5 11/15  patient started azathioprine on 11/10   Recommend early ambulation w/ PT/OT  Neurology following  CT chest - no evidence of thymoma   Hold Mestinon, Will consider restarting prior to extubation, currently held in setting of copious secretions, retrial extubation in AM  Continue Prednisone to 60 mg QD  Avoid medications that may worsen the current exacerbation including macrolides, fluoroquinolones, tetracyclines, aminoglycoside, beta-blockers, magnesium, and anti-arrhythmics (procainamide, quinidine, and lidocaine), muscle relaxants   Activity: [x] mobilize as tolerated [] Bedrest [x] PT [x] OT [] PMNR    PULM:  Intubated for airway protection  CPAP as tolerated; daily SBTs, not tolerating  CT chest- with atelectasis, no evidence of residual thymoma   CXR reviewed- RLL opacity  Duonebs q6h for secretions (oral)  Check NIF/VC Q6 hours in ICU setting  11/15- NIF -32,   Aggressive Chest PT    CV:  MAP>65  TTE - EF 54%, elevated right atrial pressure     RENAL:  Stable renal function  IVL  daily IOs    GI:  Diet: ON TF  GI prophylaxis: PPI while on steroids + intubated  Bowel regimen: miralax, senna; dulcolax suppositories PRN  LBM: 11/14    ENDO:   FS goal 120-180  Medium scale  A1C 6.7    HEME/ONC:  Monitor H/H  SCDs  Chemoppx: SQL  LED negative     ID:  No fevers  coptious thick inline secretions, oral secretions  No signs of infection     Disposition: ICU ASSESSMENT/PLAN: 62 y/o female with myasthenia gravis, respiratory failure. Demonstrating worsening respiratory effort on SBT, NIF and FVC below baseline required for extubation to BiPAP despite completion of IVIG. Plan is to d/w PT, family risks & benefits of PLEX - patient consented to insertion of shiley, initiation of PLEX - S/P tx w/ PLEX 1/5. Care initiated w/ [facility] neuromuscular neurology Dr Covarrubias. PT shows signs of improved respiratory effort. Continues to CPAP well, Secretions are improving at this time    NEURO:  Neurochecks q4h  PLEX tx 4/5 11/15  patient started azathioprine on 11/10   Recommend early ambulation w/ PT/OT  Neurology following  CT chest - no evidence of thymoma   Hold Mestinon, Will consider restarting prior to extubation, currently held in setting of copious secretions, retrial extubation in AM  Continue Prednisone to 60 mg QD  Avoid medications that may worsen the current exacerbation including macrolides, fluoroquinolones, tetracyclines, aminoglycoside, beta-blockers, magnesium, and anti-arrhythmics (procainamide, quinidine, and lidocaine), muscle relaxants   Activity: [x] mobilize as tolerated [] Bedrest [x] PT [x] OT [] PMNR    PULM:  Intubated for airway protection  CPAP as tolerated; daily SBTs, not tolerating  CT chest- with atelectasis, no evidence of residual thymoma   CXR reviewed- RLL opacity  Duonebs q6h for secretions (oral)  Check NIF/VC Q6 hours in ICU setting  11/15- NIF -32,   Aggressive Chest PT    CV:  MAP>65  TTE - EF 54%, elevated right atrial pressure     RENAL:  Stable renal function  IVL  daily IOs    GI:  Diet: ON TF  GI prophylaxis: PPI while on steroids + intubated  Bowel regimen: miralax, senna; dulcolax suppositories PRN  LBM: 11/14    ENDO:   FS goal 120-180  Medium scale  A1C 6.7    HEME/ONC:  Monitor H/H  SCDs  Chemoppx: SQL  LED negative     ID:  No fevers  coptious thick inline secretions, oral secretions  No signs of infection     Disposition: ICU ASSESSMENT/PLAN: 64 y/o female with myasthenia gravis, respiratory failure. Demonstrating worsening respiratory effort on SBT, NIF and FVC below baseline required for extubation to BiPAP despite completion of IVIG. Plan is to d/w PT, family risks & benefits of PLEX - patient consented to insertion of shiley, initiation of PLEX - S/P tx w/ PLEX 1/5. Care initiated w/ [facility] neuromuscular neurology Dr Covarrubias. PT shows signs of improved respiratory effort. Continues to CPAP well, Secretions are improving at this time    NEURO:  Neurochecks q4h  PLEX tx 4/5 11/15  patient started azathioprine on 11/10   Recommend early ambulation w/ PT/OT  Neurology following  CT chest - no evidence of thymoma   Hold Mestinon, Will consider restarting prior to extubation, currently held in setting of copious secretions, retrial extubation in AM  Continue Prednisone to 60 mg QD  Avoid medications that may worsen the current exacerbation including macrolides, fluoroquinolones, tetracyclines, aminoglycoside, beta-blockers, magnesium, and anti-arrhythmics (procainamide, quinidine, and lidocaine), muscle relaxants   Activity: [x] mobilize as tolerated [] Bedrest [x] PT [x] OT [] PMNR    PULM:  Intubated for airway protection  CPAP as tolerated; daily SBTs, not tolerating  CT chest- with atelectasis, no evidence of residual thymoma   CXR reviewed- RLL opacity  Duonebs q6h for secretions (oral)  Check NIF/VC Q6 hours in ICU setting  11/15- NIF -32,   Aggressive Chest PT    CV:  MAP>65  TTE - EF 54%, elevated right atrial pressure     RENAL:  Stable renal function  IVL  daily IOs    GI:  Diet: ON TF  GI prophylaxis: PPI while on steroids + intubated  Bowel regimen: miralax, senna; dulcolax suppositories PRN  LBM: 11/14    ENDO:   FS goal 120-180  Medium scale  A1C 6.7    HEME/ONC:  Monitor H/H  SCDs  Chemoppx: SQL  LED negative     ID:  No fevers  coptious thick inline secretions, oral secretions  No signs of infection     Disposition: ICU ASSESSMENT/PLAN: 62 y/o female with myasthenia gravis, respiratory failure. Demonstrating worsening respiratory effort on SBT, NIF and FVC below baseline required for extubation to BiPAP despite completion of IVIG. Plan is to d/w PT, family risks & benefits of PLEX - patient consented to insertion of shiley, initiation of PLEX - S/P tx w/ PLEX 1/5. Care initiated w/ [facility] neuromuscular neurology Dr Covarrubias. PT shows signs of improved respiratory effort. Continues to CPAP well, Secretions are improving at this time    NEURO:  Neurochecks q4h  PLEX tx 4/5 11/15, next treatment PLEX 11/17  patient started azathioprine on 11/10   Recommend early ambulation w/ PT/OT  Neurology following  CT chest - no evidence of thymoma   Hold Mestinon, Will consider restarting prior to extubation, currently held in setting of copious secretions, retrial extubation in AM  Continue Prednisone to 60 mg QD  Avoid medications that may worsen the current exacerbation including macrolides, fluoroquinolones, tetracyclines, aminoglycoside, beta-blockers, magnesium, and anti-arrhythmics (procainamide, quinidine, and lidocaine), muscle relaxants   Activity: [x] mobilize as tolerated [] Bedrest [x] PT [x] OT [] PMNR    PULM:  Intubated for airway protection  CPAP as tolerated; daily SBTs, not tolerating  CT chest- with atelectasis, no evidence of residual thymoma   CXR reviewed- RLL opacity  Duonebs q6h for secretions (oral)  Check NIF/VC Q6 hours in ICU setting  11/15- NIF -32,   Aggressive Chest PT    CV:  MAP>65  TTE - EF 54%, elevated right atrial pressure     RENAL:  Stable renal function  IVL  daily IOs    GI:  Diet: ON TF  GI prophylaxis: PPI while on steroids + intubated  Bowel regimen: miralax, senna; dulcolax suppositories PRN  LBM: 11/14    ENDO:   FS goal 120-180  Medium scale  A1C 6.7    HEME/ONC:  Monitor H/H  SCDs  Chemoppx: SQL  LED negative     ID:  No fevers  coptious thick inline secretions, oral secretions  No signs of infection     Disposition: ICU ASSESSMENT/PLAN: 64 y/o female with myasthenia gravis, respiratory failure. Demonstrating worsening respiratory effort on SBT, NIF and FVC below baseline required for extubation to BiPAP despite completion of IVIG. Plan is to d/w PT, family risks & benefits of PLEX - patient consented to insertion of shiley, initiation of PLEX - S/P tx w/ PLEX 1/5. Care initiated w/ [facility] neuromuscular neurology Dr Covarrubias. PT shows signs of improved respiratory effort. Continues to CPAP well, Secretions are improving at this time    NEURO:  Neurochecks q4h  PLEX tx 4/5 11/15, next treatment PLEX 11/17  patient started azathioprine on 11/10   Recommend early ambulation w/ PT/OT  Neurology following  CT chest - no evidence of thymoma   Hold Mestinon, Will consider restarting prior to extubation, currently held in setting of copious secretions, retrial extubation in AM  Continue Prednisone to 60 mg QD  Avoid medications that may worsen the current exacerbation including macrolides, fluoroquinolones, tetracyclines, aminoglycoside, beta-blockers, magnesium, and anti-arrhythmics (procainamide, quinidine, and lidocaine), muscle relaxants   Activity: [x] mobilize as tolerated [] Bedrest [x] PT [x] OT [] PMNR    PULM:  Intubated for airway protection  CPAP as tolerated; daily SBTs, not tolerating  CT chest- with atelectasis, no evidence of residual thymoma   CXR reviewed- RLL opacity  Duonebs q6h for secretions (oral)  Check NIF/VC Q6 hours in ICU setting  11/15- NIF -32,   Aggressive Chest PT    CV:  MAP>65  TTE - EF 54%, elevated right atrial pressure     RENAL:  Stable renal function  IVL  daily IOs    GI:  Diet: ON TF  GI prophylaxis: PPI while on steroids + intubated  Bowel regimen: miralax, senna; dulcolax suppositories PRN  LBM: 11/14    ENDO:   FS goal 120-180  Medium scale  A1C 6.7    HEME/ONC:  Monitor H/H  SCDs  Chemoppx: SQL  LED negative     ID:  No fevers  coptious thick inline secretions, oral secretions  No signs of infection     Disposition: ICU ASSESSMENT/PLAN: 64 y/o female with myasthenia gravis, respiratory failure. Demonstrating worsening respiratory effort on SBT, NIF and FVC below baseline required for extubation to BiPAP despite completion of IVIG. Plan is to d/w PT, family risks & benefits of PLEX - patient consented to insertion of shiley, initiation of PLEX - S/P tx w/ PLEX 1/5. Care initiated w/ [facility] neuromuscular neurology Dr Covarrubias. PT shows signs of improved respiratory effort. Continues to CPAP well, Secretions are improving at this time    NEURO:  Neurochecks q4h  PLEX tx 4/5 11/15, next treatment PLEX 11/17  patient started azathioprine on 11/10   Recommend early ambulation w/ PT/OT  Neurology following  CT chest - no evidence of thymoma   Hold Mestinon, Will consider restarting prior to extubation, currently held in setting of copious secretions, retrial extubation in AM  Continue Prednisone to 60 mg QD  Avoid medications that may worsen the current exacerbation including macrolides, fluoroquinolones, tetracyclines, aminoglycoside, beta-blockers, magnesium, and anti-arrhythmics (procainamide, quinidine, and lidocaine), muscle relaxants   Activity: [x] mobilize as tolerated [] Bedrest [x] PT [x] OT [] PMNR    PULM:  Intubated for airway protection  CPAP as tolerated; daily SBTs, not tolerating  CT chest- with atelectasis, no evidence of residual thymoma   CXR reviewed- RLL opacity  Duonebs q6h for secretions (oral)  Check NIF/VC Q6 hours in ICU setting  11/15- NIF -32,   Aggressive Chest PT    CV:  MAP>65  TTE - EF 54%, elevated right atrial pressure     RENAL:  Stable renal function  IVL  daily IOs    GI:  Diet: ON TF  GI prophylaxis: PPI while on steroids + intubated  Bowel regimen: miralax, senna; dulcolax suppositories PRN  LBM: 11/14    ENDO:   FS goal 120-180  Medium scale  A1C 6.7    HEME/ONC:  Monitor H/H  SCDs  Chemoppx: SQL  LED negative     ID:  No fevers  coptious thick inline secretions, oral secretions  No signs of infection     Disposition: ICU

## 2023-11-15 NOTE — PROGRESS NOTE ADULT - ASSESSMENT
ASSESSMENT/PLAN: 62 y/o female with myasthenia gravis, respiratory failure. Demonstrating worsening respiratory effort on SBT, NIF and FVC below baseline required for extubation to BiPAP despite completion of IVIG. Plan is to d/w PT, family risks & benefits of PLEX - patient consented to insertion of shiley, initiation of PLEX - S/P tx w/ PLEX 1/5. Care initiated w/ [facility] neuromuscular neurology Dr Covarrubias. PT shows signs of improved respiratory effort. Continues to CPAP well, Secretions are improving at this time    NEURO:  Neurochecks q4h  PLEX tx 4/5 11/15  patient started azathioprine on 11/10   Recommend early ambulation w/ PT/OT  Neurology following  Will obtain CT chest to rule out residual thymoma, f/u read  Hold Mestinon, Will consider restarting prior to extubation, currently held in setting of copious secretions, retrial extubation in AM  Continue Prednisone to 60 mg QD  Avoid medications that may worsen the current exacerbation including macrolides, fluoroquinolones, tetracyclines, aminoglycoside, beta-blockers, magnesium, and anti-arrhythmics (procainamide, quinidine, and lidocaine), muscle relaxants   Activity: [x] mobilize as tolerated [] Bedrest [x] PT [x] OT [] PMNR    PULM:  Intubated for airway protection  CPAP as tolerated; daily SBTs, not tolerating  CT chest- with atelectasis, no evidence of residual thymoma   CXR reviewed- RLL opacity  Duonebs q6h for secretions (oral)  Check NIF/VC Q4 hours in ICU setting  11/15- NIF -18,   Aggressive Chest PT    CV:  MAP>65  TTE - EF 54%, elevated right atrial pressure     RENAL:  Stable renal function  IVL  daily IOs    GI:  Diet: ON TF  GI prophylaxis: PPI while on steroids + intubated  Bowel regimen: miralax, senna; dulcolax suppositories PRN  LBM: 11/12    ENDO:   FS goal 120-180  Medium scale  A1C 6.7    HEME/ONC:  Monitor H/H  SCDs  Chemoppx: SQL  LED negative     ID:  No fevers, no leukocytosis reactive likely 2/2 steroids   coptious thick inline secretions, oral secretions  No signs of infection     Disposition: ICU ASSESSMENT/PLAN: 62 y/o female with myasthenia gravis, respiratory failure. Demonstrating worsening respiratory effort on SBT, NIF and FVC below baseline required for extubation to BiPAP despite completion of IVIG. Plan is to d/w PT, family risks & benefits of PLEX - patient consented to insertion of shiley, initiation of PLEX - S/P tx w/ PLEX 1/5. Care initiated w/ [facility] neuromuscular neurology Dr Covarrubias. PT shows signs of improved respiratory effort. Continues to CPAP well, Secretions are improving at this time    NEURO:  Neurochecks q4h  PLEX tx 4/5 11/15  patient started azathioprine on 11/10   Recommend early ambulation w/ PT/OT  Neurology following  Will obtain CT chest to rule out residual thymoma, f/u read  Hold Mestinon, Will consider restarting prior to extubation, currently held in setting of copious secretions, retrial extubation in AM  Continue Prednisone to 60 mg QD  Avoid medications that may worsen the current exacerbation including macrolides, fluoroquinolones, tetracyclines, aminoglycoside, beta-blockers, magnesium, and anti-arrhythmics (procainamide, quinidine, and lidocaine), muscle relaxants   Activity: [x] mobilize as tolerated [] Bedrest [x] PT [x] OT [] PMNR    PULM:  Intubated for airway protection  CPAP as tolerated; daily SBTs, not tolerating  CT chest- with atelectasis, no evidence of residual thymoma   CXR reviewed- RLL opacity  Duonebs q6h for secretions (oral)  Check NIF/VC Q4 hours in ICU setting  11/15- NIF -18,   Aggressive Chest PT    CV:  MAP>65  TTE - EF 54%, elevated right atrial pressure     RENAL:  Stable renal function  IVL  daily IOs    GI:  Diet: ON TF  GI prophylaxis: PPI while on steroids + intubated  Bowel regimen: miralax, senna; dulcolax suppositories PRN  LBM: 11/12    ENDO:   FS goal 120-180  Medium scale  A1C 6.7    HEME/ONC:  Monitor H/H  SCDs  Chemoppx: SQL  LED negative     ID:  No fevers, no leukocytosis reactive likely 2/2 steroids   coptious thick inline secretions, oral secretions  No signs of infection     Disposition: ICU ASSESSMENT/PLAN: 62 y/o female with myasthenia gravis, respiratory failure. Demonstrating worsening respiratory effort on SBT, NIF and FVC below baseline required for extubation to BiPAP despite completion of IVIG. Plan is to d/w PT, family risks & benefits of PLEX - patient consented to insertion of shiley, initiation of PLEX - S/P tx w/ PLEX 1/5. Care initiated w/ [facility] neuromuscular neurology Dr Covarrubias. PT shows signs of improved respiratory effort. Continues to CPAP well, Secretions are improving at this time    NEURO:  Neurochecks q4h  PLEX tx 4/5 11/15  patient started azathioprine on 11/10   Recommend early ambulation w/ PT/OT  Neurology following  Will obtain CT chest to rule out residual thymoma, f/u read  Hold Mestinon, Will consider restarting prior to extubation, currently held in setting of copious secretions, retrial extubation in AM  Continue Prednisone to 60 mg QD  Avoid medications that may worsen the current exacerbation including macrolides, fluoroquinolones, tetracyclines, aminoglycoside, beta-blockers, magnesium, and anti-arrhythmics (procainamide, quinidine, and lidocaine), muscle relaxants   Activity: [x] mobilize as tolerated [] Bedrest [x] PT [x] OT [] PMNR    PULM:  Intubated for airway protection  CPAP as tolerated; daily SBTs, not tolerating  CT chest- with atelectasis, no evidence of residual thymoma   CXR reviewed- RLL opacity  Duonebs q6h for secretions (oral)  Check NIF/VC Q4 hours in ICU setting  11/15- NIF -18,   Aggressive Chest PT    CV:  MAP>65  TTE - EF 54%, elevated right atrial pressure     RENAL:  Stable renal function  IVL  daily IOs    GI:  Diet: ON TF  GI prophylaxis: PPI while on steroids + intubated  Bowel regimen: miralax, senna; dulcolax suppositories PRN  LBM: 11/12    ENDO:   FS goal 120-180  Medium scale  A1C 6.7    HEME/ONC:  Monitor H/H  SCDs  Chemoppx: SQL  LED negative     ID:  No fevers, no leukocytosis reactive likely 2/2 steroids   coptious thick inline secretions, oral secretions  No signs of infection     Disposition: ICU ASSESSMENT/PLAN: 62 y/o female with myasthenia gravis, respiratory failure. Demonstrating worsening respiratory effort on SBT, NIF and FVC below baseline required for extubation to BiPAP despite completion of IVIG. Plan is to d/w PT, family risks & benefits of PLEX - patient consented to insertion of shiley, initiation of PLEX - S/P tx w/ PLEX 1/5. Care initiated w/ [facility] neuromuscular neurology Dr Covarrubias. PT shows signs of improved respiratory effort. Continues to CPAP well, Secretions are improving at this time    NEURO:  Neurochecks q4h  PLEX tx 4/5 11/15  patient started azathioprine on 11/10   Recommend early ambulation w/ PT/OT  Neurology following  CT chest - no evidence of thymoma   Hold Mestinon, Will consider restarting prior to extubation, currently held in setting of copious secretions, retrial extubation in AM  Continue Prednisone to 60 mg QD  Avoid medications that may worsen the current exacerbation including macrolides, fluoroquinolones, tetracyclines, aminoglycoside, beta-blockers, magnesium, and anti-arrhythmics (procainamide, quinidine, and lidocaine), muscle relaxants   Activity: [x] mobilize as tolerated [] Bedrest [x] PT [x] OT [] PMNR    PULM:  Intubated for airway protection  CPAP as tolerated; daily SBTs, not tolerating  CT chest- with atelectasis, no evidence of residual thymoma   CXR reviewed- RLL opacity  Duonebs q6h for secretions (oral)  Check NIF/VC Q6 hours in ICU setting  11/15- NIF -32,   Aggressive Chest PT    CV:  MAP>65  TTE - EF 54%, elevated right atrial pressure     RENAL:  Stable renal function  IVL  daily IOs    GI:  Diet: ON TF  GI prophylaxis: PPI while on steroids + intubated  Bowel regimen: miralax, senna; dulcolax suppositories PRN  LBM: 11/14    ENDO:   FS goal 120-180  Medium scale  A1C 6.7    HEME/ONC:  Monitor H/H  SCDs  Chemoppx: SQL  LED negative     ID:  No fevers  coptious thick inline secretions, oral secretions  No signs of infection     Disposition: ICU ASSESSMENT/PLAN: 64 y/o female with myasthenia gravis, respiratory failure. Demonstrating worsening respiratory effort on SBT, NIF and FVC below baseline required for extubation to BiPAP despite completion of IVIG. Plan is to d/w PT, family risks & benefits of PLEX - patient consented to insertion of shiley, initiation of PLEX - S/P tx w/ PLEX 1/5. Care initiated w/ [facility] neuromuscular neurology Dr Covarrubias. PT shows signs of improved respiratory effort. Continues to CPAP well, Secretions are improving at this time    NEURO:  Neurochecks q4h  PLEX tx 4/5 11/15  patient started azathioprine on 11/10   Recommend early ambulation w/ PT/OT  Neurology following  CT chest - no evidence of thymoma   Hold Mestinon, Will consider restarting prior to extubation, currently held in setting of copious secretions, retrial extubation in AM  Continue Prednisone to 60 mg QD  Avoid medications that may worsen the current exacerbation including macrolides, fluoroquinolones, tetracyclines, aminoglycoside, beta-blockers, magnesium, and anti-arrhythmics (procainamide, quinidine, and lidocaine), muscle relaxants   Activity: [x] mobilize as tolerated [] Bedrest [x] PT [x] OT [] PMNR    PULM:  Intubated for airway protection  CPAP as tolerated; daily SBTs, not tolerating  CT chest- with atelectasis, no evidence of residual thymoma   CXR reviewed- RLL opacity  Duonebs q6h for secretions (oral)  Check NIF/VC Q6 hours in ICU setting  11/15- NIF -32,   Aggressive Chest PT    CV:  MAP>65  TTE - EF 54%, elevated right atrial pressure     RENAL:  Stable renal function  IVL  daily IOs    GI:  Diet: ON TF  GI prophylaxis: PPI while on steroids + intubated  Bowel regimen: miralax, senna; dulcolax suppositories PRN  LBM: 11/14    ENDO:   FS goal 120-180  Medium scale  A1C 6.7    HEME/ONC:  Monitor H/H  SCDs  Chemoppx: SQL  LED negative     ID:  No fevers  coptious thick inline secretions, oral secretions  No signs of infection     Disposition: ICU ASSESSMENT/PLAN: 64 y/o female with myasthenia gravis, respiratory failure. Demonstrating worsening respiratory effort on SBT, NIF and FVC below baseline required for extubation to BiPAP despite completion of IVIG. Plan is to d/w PT, family risks & benefits of PLEX - patient consented to insertion of shiley, initiation of PLEX - S/P tx w/ PLEX 1/5. Care initiated w/ [facility] neuromuscular neurology Dr Covarrubias. PT shows signs of improved respiratory effort. Continues to CPAP well, Secretions are improving at this time    NEURO:  Neurochecks q4h  PLEX tx 4/5 11/15  patient started azathioprine on 11/10   Recommend early ambulation w/ PT/OT  Neurology following  CT chest - no evidence of thymoma   Hold Mestinon, Will consider restarting prior to extubation, currently held in setting of copious secretions, retrial extubation in AM  Continue Prednisone to 60 mg QD  Avoid medications that may worsen the current exacerbation including macrolides, fluoroquinolones, tetracyclines, aminoglycoside, beta-blockers, magnesium, and anti-arrhythmics (procainamide, quinidine, and lidocaine), muscle relaxants   Activity: [x] mobilize as tolerated [] Bedrest [x] PT [x] OT [] PMNR    PULM:  Intubated for airway protection  CPAP as tolerated; daily SBTs, not tolerating  CT chest- with atelectasis, no evidence of residual thymoma   CXR reviewed- RLL opacity  Duonebs q6h for secretions (oral)  Check NIF/VC Q6 hours in ICU setting  11/15- NIF -32,   Aggressive Chest PT    CV:  MAP>65  TTE - EF 54%, elevated right atrial pressure     RENAL:  Stable renal function  IVL  daily IOs    GI:  Diet: ON TF  GI prophylaxis: PPI while on steroids + intubated  Bowel regimen: miralax, senna; dulcolax suppositories PRN  LBM: 11/14    ENDO:   FS goal 120-180  Medium scale  A1C 6.7    HEME/ONC:  Monitor H/H  SCDs  Chemoppx: SQL  LED negative     ID:  No fevers  coptious thick inline secretions, oral secretions  No signs of infection     Disposition: ICU

## 2023-11-15 NOTE — CHART NOTE - NSCHARTNOTEFT_GEN_A_CORE
63 year old female admitted with MG exacerbation. Transfusion medicine has been consulted to complete a series of 5 TPE sessions over approximately 10 days.    TPE #1 was successfully completed on 11/08/23. One plasma volume was replaced with 5% albumin as replacement fluid. Patient tolerated the procedure well.    TPE #2 was successfully completed on 11/10/23. One plasma volume was replaced with 5% albumin as replacement fluid. Patient tolerated the procedure well.    TPE #3 was successfully completed on 11/13/23. Pt notes and labs were reviewed. Fibrinogen 266mg/dl. iCa 1.19mmol/L. One plasma volume was replaced with 5% albumin as replacement fluid. Patient tolerated the procedure well.    TPE #4 was successfully completed today. One plasma volume was replaced with 5% albumin as replacement fluid. Patient tolerated the procedure well. The post TPE fibrinogen was <100 but no bleeding noted. The patient's fibrinogen will likely self replete over 24-48 hours. Cryo transfusion recommended if bleeding noted or prior to the patient undergoing any invasive procedures.     TPE #5 is scheduled for 11/17/23.

## 2023-11-15 NOTE — PROGRESS NOTE ADULT - SUBJECTIVE AND OBJECTIVE BOX
HOSPITAL COURSE:  Patient KAIDEN HATCH is a 63y woman presenting with myasthenia gravis exacerbation.    Admission Scores  GCS: 15     24 hour Events: Patient evaluated at bedside. Reports improvement in symptoms w/ exception to copious oral secretions. No complaints of pain or dyspnea.     11/4- Patient intubated overnight for worsening respiratory status. Patient received dose 2/5 of IVIG  11/6- No events; poor respiratory effort on SBT  11/7- Apneic on SBT; worsening respiratory effort; last dose of IVIG TODAY  11/8- NIF -12 and  this AM. PT received shiley in right IJ w/ conscious sedation for initiation of PLEX. S/P tx 1/5 w/ PLEX.  11/9- NIF -20 and VC 630this AM.  11/10- Patient due for second dose of PLEX. No events overnight.   11/11- No events overnight. Has been tolerating CPAP since 5 AM. Oral secretions present. Strength improved.   11/12 no events, no PS too much secretions  11/13- Patient with improved secretions. No overnight events. Will get 3rd dose of PLEX today.   11/14- No overnight events.   11/15- No events overnight.       Allergies    peanuts (Unknown)  No Known Drug Allergies    Intolerances        REVIEW OF SYSTEMS: [ ] Unable to Assess due to neurologic exam   [x ] All ROS addressed below are non-contributory, except:  Neuro: [ ] Headache [ ] Back pain [ ] Numbness [ ] Weakness [ ] Ataxia [ ] Dizziness [ ] Aphasia [ ] Dysarthria [ ] Visual disturbance  Resp: [ ] Shortness of breath/dyspnea, [ ] Orthopnea [ ] Cough  CV: [ ] Chest pain [ ] Palpitation [ ] Lightheadedness [ ] Syncope  Renal: [ ] Thirst [ ] Edema  GI: [ ] Nausea [ ] Emesis [ ] Abdominal pain [ ] Constipation [ ] Diarrhea  Hem: [ ] Hematemesis [ ] bright red blood per rectum  ID: [ ] Fever [ ] Chills [ ] Dysuria  ENT: [ ] Rhinorrhea      DEVICES:   [ ] Restraints [ ] ET tube [ ] central line [ ] arterial line [ ] morales [ ] NGT/OGT [ ] EVD [ ] LD [ ] MYAH/HMV [ ] Trach [ ] PEG [ ] Chest Tube     VITALS:   Vital Signs Last 24 Hrs  T(C): 36.2 (15 Nov 2023 07:00), Max: 37.2 (14 Nov 2023 23:00)  T(F): 97.1 (15 Nov 2023 07:00), Max: 99 (14 Nov 2023 23:00)  HR: 65 (15 Nov 2023 07:00) (59 - 117)  BP: 131/55 (15 Nov 2023 07:00) (130/71 - 153/62)  BP(mean): 78 (15 Nov 2023 07:00) (78 - 96)  RR: 19 (15 Nov 2023 07:00) (16 - 21)  SpO2: 100% (15 Nov 2023 07:00) (98% - 100%)    Parameters below as of 15 Nov 2023 07:00  Patient On (Oxygen Delivery Method): ventilator    O2 Concentration (%): 30  CAPILLARY BLOOD GLUCOSE        I&O's Summary    14 Nov 2023 07:01  -  15 Nov 2023 07:00  --------------------------------------------------------  IN: 2380 mL / OUT: 500 mL / NET: 1880 mL        Respiratory:  Mode: AC/ CMV (Assist Control/ Continuous Mandatory Ventilation)  RR (machine): 16  TV (machine): 420  FiO2: 30  PEEP: 5  ITime: 0.9  MAP: 10  PIP: 23        LABS:                        10.9   12.65 )-----------( 282      ( 13 Nov 2023 09:39 )             33.7     11-15    147<H>  |  110<H>  |  35<H>  ----------------------------<  129<H>  3.8   |  24  |  0.47<L>             MEDICATION LEVELS:     IVF FLUIDS/MEDICATIONS:   MEDICATIONS  (STANDING):  albuterol/ipratropium for Nebulization 3 milliLiter(s) Nebulizer every 6 hours  azaTHIOprine 50 milliGRAM(s) Oral two times a day  chlorhexidine 0.12% Liquid 15 milliLiter(s) Oral Mucosa every 12 hours  chlorhexidine 4% Liquid 1 Application(s) Topical at bedtime  chlorhexidine 4% Liquid 1 Application(s) Topical <User Schedule>  enoxaparin Injectable 40 milliGRAM(s) SubCutaneous every 24 hours  pantoprazole   Suspension 40 milliGRAM(s) Oral before breakfast  predniSONE   Tablet 60 milliGRAM(s) Oral daily    MEDICATIONS  (PRN):  acetaminophen   Oral Liquid .. 650 milliGRAM(s) Oral every 6 hours PRN Temp greater or equal to 38C (100.4F), Mild Pain (1 - 3)  oxyCODONE    IR 5 milliGRAM(s) Oral every 4 hours PRN Moderate Pain (4 - 6)  sodium chloride 0.9% lock flush 10 milliLiter(s) IV Push every 1 hour PRN Pre/post blood products, medications, blood draw, and to maintain line patency        IMAGING:      EXAMINATION:  PHYSICAL EXAM:    Constitutional: No Acute Distress     Neurological: Awake, alert oriented to person, place and time, Following Commands, PERRL, EOMI, No Gaze Preference, Face Symmetrical, Speech Fluent, No dysmetria, No ataxia, No nystagmus     Motor exam:          Upper extremity                         Delt     Bicep     Tricep    HG                                                 R         5/5        5/5        5/5       5/5                                               L          5/5        5/5        5/5       5/5          Lower extremity                        HF         KF        KE       DF         PF                                                  R        5/5        5/5        5/5       5/5         5/5                                               L         5/5        5/5       5/5       5/5          5/5    Pulmonary: Clear to Auscultation, No rales, No rhonchi, No wheezes     Cardiovascular: S1, S2, Regular rate and rhythm     Gastrointestinal: Soft, Non-tender, Non-distended     Extremities: No calf tenderness     Incision:    HOSPITAL COURSE:  Patient KAIDEN HATCH is a 63y woman presenting with myasthenia gravis exacerbation.    Admission Scores  GCS: 15     24 hour Events: Patient evaluated at bedside. Reports improvement in symptoms w/ exception to copious oral secretions. No complaints of pain or dyspnea.     11/4- Patient intubated overnight for worsening respiratory status. Patient received dose 2/5 of IVIG  11/6- No events; poor respiratory effort on SBT  11/7- Apneic on SBT; worsening respiratory effort; last dose of IVIG TODAY  11/8- NIF -12 and  this AM. PT received shiley in right IJ w/ conscious sedation for initiation of PLEX. S/P tx 1/5 w/ PLEX.  11/9- NIF -20 and VC 630this AM.  11/10- Patient due for second dose of PLEX. No events overnight.   11/11- No events overnight. Has been tolerating CPAP since 5 AM. Oral secretions present. Strength improved.   11/12 no events, no PS too much secretions  11/13- Patient with improved secretions. No overnight events. Will get 3rd dose of PLEX today.   11/14- No overnight events.   11/15- No events overnight.       Allergies    peanuts (Unknown)  No Known Drug Allergies    Intolerances        REVIEW OF SYSTEMS: [ ] Unable to Assess due to neurologic exam   [x ] All ROS addressed below are non-contributory, except:  Neuro: [ ] Headache [ ] Back pain [ ] Numbness [ ] Weakness [ ] Ataxia [ ] Dizziness [ ] Aphasia [ ] Dysarthria [ ] Visual disturbance  Resp: [ ] Shortness of breath/dyspnea, [ ] Orthopnea [ ] Cough  CV: [ ] Chest pain [ ] Palpitation [ ] Lightheadedness [ ] Syncope  Renal: [ ] Thirst [ ] Edema  GI: [ ] Nausea [ ] Emesis [ ] Abdominal pain [ ] Constipation [ ] Diarrhea  Hem: [ ] Hematemesis [ ] bright red blood per rectum  ID: [ ] Fever [ ] Chills [ ] Dysuria  ENT: [ ] Rhinorrhea      DEVICES:   [ ] Restraints [ ] ET tube [ ] central line [ ] arterial line [ ] morales [ ] NGT/OGT [ ] EVD [ ] LD [ ] MYAH/HMV [ ] Trach [ ] PEG [ ] Chest Tube     VITALS:   Vital Signs Last 24 Hrs  T(C): 36.2 (15 Nov 2023 07:00), Max: 37.2 (14 Nov 2023 23:00)  T(F): 97.1 (15 Nov 2023 07:00), Max: 99 (14 Nov 2023 23:00)  HR: 65 (15 Nov 2023 07:00) (59 - 117)  BP: 131/55 (15 Nov 2023 07:00) (130/71 - 153/62)  BP(mean): 78 (15 Nov 2023 07:00) (78 - 96)  RR: 19 (15 Nov 2023 07:00) (16 - 21)  SpO2: 100% (15 Nov 2023 07:00) (98% - 100%)    Parameters below as of 15 Nov 2023 07:00  Patient On (Oxygen Delivery Method): ventilator    O2 Concentration (%): 30  CAPILLARY BLOOD GLUCOSE        I&O's Summary    14 Nov 2023 07:01  -  15 Nov 2023 07:00  --------------------------------------------------------  IN: 2380 mL / OUT: 500 mL / NET: 1880 mL        Respiratory:  Mode: AC/ CMV (Assist Control/ Continuous Mandatory Ventilation)  RR (machine): 16  TV (machine): 420  FiO2: 30  PEEP: 5  ITime: 0.9  MAP: 10  PIP: 23        LABS:                        10.9   12.65 )-----------( 282      ( 13 Nov 2023 09:39 )             33.7     11-15    147<H>  |  110<H>  |  35<H>  ----------------------------<  129<H>  3.8   |  24  |  0.47<L>             MEDICATION LEVELS:     IVF FLUIDS/MEDICATIONS:   MEDICATIONS  (STANDING):  albuterol/ipratropium for Nebulization 3 milliLiter(s) Nebulizer every 6 hours  azaTHIOprine 50 milliGRAM(s) Oral two times a day  chlorhexidine 0.12% Liquid 15 milliLiter(s) Oral Mucosa every 12 hours  chlorhexidine 4% Liquid 1 Application(s) Topical at bedtime  chlorhexidine 4% Liquid 1 Application(s) Topical <User Schedule>  enoxaparin Injectable 40 milliGRAM(s) SubCutaneous every 24 hours  pantoprazole   Suspension 40 milliGRAM(s) Oral before breakfast  predniSONE   Tablet 60 milliGRAM(s) Oral daily    MEDICATIONS  (PRN):  acetaminophen   Oral Liquid .. 650 milliGRAM(s) Oral every 6 hours PRN Temp greater or equal to 38C (100.4F), Mild Pain (1 - 3)  oxyCODONE    IR 5 milliGRAM(s) Oral every 4 hours PRN Moderate Pain (4 - 6)  sodium chloride 0.9% lock flush 10 milliLiter(s) IV Push every 1 hour PRN Pre/post blood products, medications, blood draw, and to maintain line patency        IMAGING:      EXAMINATION:  PHYSICAL EXAM:    Constitutional: Intubated    Neurological: Awake, alert oriented to person, place and time, Following Commands, PERRL, EOMI, No Gaze Preference, Face Symmetrical, NE 5/5, NF 4+/5    Motor exam:          Upper extremity                         Delt     Bicep     Tricep    HG                                                 R         4/5        5/5        5/5       5/5                                               L          4+/5        5/5        5/5       5/5          Lower extremity                        HF         KF        KE       DF         PF                                                  R        5/5        5/5        5/5       5/5         5/5                                               L         5/5        5/5       5/5       5/5          5/5    Pulmonary: Clear to Auscultation, No rales, No rhonchi, No wheezes     Cardiovascular: S1, S2, Regular rate and rhythm     Gastrointestinal: Soft, Non-tender, Non-distended     Extremities: No calf tenderness

## 2023-11-15 NOTE — CHART NOTE - NSCHARTNOTEFT_GEN_A_CORE
Notified of low fibrinogen level. Discussed with Transfusion medicine provider. Fibrinogen level may be altered post plasmapheresis. Will monitor for sign of active bleeding. Patient hemodynamically stable. Will obtain repeat lab test prior to next plasmapheresis. If concern for active bleeding, recommend please re-call transfusion medicine and consider cryoprecipitate.

## 2023-11-15 NOTE — PROGRESS NOTE ADULT - SUBJECTIVE AND OBJECTIVE BOX
HOSPITAL COURSE:  Patient KAIDEN HATCH is a 63y woman presenting with myasthenia gravis exacerbation.    Admission Scores  GCS: 15     24 hour Events: Patient evaluated at bedside. Reports improvement in symptoms w/ exception to copious oral secretions. No complaints of pain or dyspnea.     11/4- Patient intubated overnight for worsening respiratory status. Patient received dose 2/5 of IVIG  11/6- No events; poor respiratory effort on SBT  11/7- Apneic on SBT; worsening respiratory effort; last dose of IVIG TODAY  11/8- NIF -12 and  this AM. PT received shiley in right IJ w/ conscious sedation for initiation of PLEX. S/P tx 1/5 w/ PLEX.  11/9- NIF -20 and VC 630this AM.  11/10- Patient due for second dose of PLEX. No events overnight.   11/11- No events overnight. Has been tolerating CPAP since 5 AM. Oral secretions present. Strength improved.   11/12 no events, no PS too much secretions  11/13- Patient with improved secretions. No overnight events. Will get 3rd dose of PLEX today.   11/14- No overnight events.   11/15- No events overnight.       Allergies    peanuts (Unknown)  No Known Drug Allergies    Intolerances        REVIEW OF SYSTEMS: [ ] Unable to Assess due to neurologic exam   [x ] All ROS addressed below are non-contributory, except:  Neuro: [ ] Headache [ ] Back pain [ ] Numbness [ ] Weakness [ ] Ataxia [ ] Dizziness [ ] Aphasia [ ] Dysarthria [ ] Visual disturbance  Resp: [ ] Shortness of breath/dyspnea, [ ] Orthopnea [ ] Cough  CV: [ ] Chest pain [ ] Palpitation [ ] Lightheadedness [ ] Syncope  Renal: [ ] Thirst [ ] Edema  GI: [ ] Nausea [ ] Emesis [ ] Abdominal pain [ ] Constipation [ ] Diarrhea  Hem: [ ] Hematemesis [ ] bright red blood per rectum  ID: [ ] Fever [ ] Chills [ ] Dysuria  ENT: [ ] Rhinorrhea      DEVICES:   [ ] Restraints [ ] ET tube [ ] central line [ ] arterial line [ ] morales [ ] NGT/OGT [ ] EVD [ ] LD [ ] MYAH/HMV [ ] Trach [ ] PEG [ ] Chest Tube     VITALS:   Vital Signs Last 24 Hrs  T(C): 36.2 (15 Nov 2023 07:00), Max: 37.2 (14 Nov 2023 23:00)  T(F): 97.1 (15 Nov 2023 07:00), Max: 99 (14 Nov 2023 23:00)  HR: 65 (15 Nov 2023 07:00) (59 - 117)  BP: 131/55 (15 Nov 2023 07:00) (130/71 - 153/62)  BP(mean): 78 (15 Nov 2023 07:00) (78 - 96)  RR: 19 (15 Nov 2023 07:00) (16 - 21)  SpO2: 100% (15 Nov 2023 07:00) (98% - 100%)    Parameters below as of 15 Nov 2023 07:00  Patient On (Oxygen Delivery Method): ventilator    O2 Concentration (%): 30  CAPILLARY BLOOD GLUCOSE        I&O's Summary    14 Nov 2023 07:01  -  15 Nov 2023 07:00  --------------------------------------------------------  IN: 2380 mL / OUT: 500 mL / NET: 1880 mL        Respiratory:  Mode: AC/ CMV (Assist Control/ Continuous Mandatory Ventilation)  RR (machine): 16  TV (machine): 420  FiO2: 30  PEEP: 5  ITime: 0.9  MAP: 10  PIP: 23        LABS:                        10.9   12.65 )-----------( 282      ( 13 Nov 2023 09:39 )             33.7     11-15    147<H>  |  110<H>  |  35<H>  ----------------------------<  129<H>  3.8   |  24  |  0.47<L>             MEDICATION LEVELS:     IVF FLUIDS/MEDICATIONS:   MEDICATIONS  (STANDING):  albuterol/ipratropium for Nebulization 3 milliLiter(s) Nebulizer every 6 hours  azaTHIOprine 50 milliGRAM(s) Oral two times a day  chlorhexidine 0.12% Liquid 15 milliLiter(s) Oral Mucosa every 12 hours  chlorhexidine 4% Liquid 1 Application(s) Topical at bedtime  chlorhexidine 4% Liquid 1 Application(s) Topical <User Schedule>  enoxaparin Injectable 40 milliGRAM(s) SubCutaneous every 24 hours  pantoprazole   Suspension 40 milliGRAM(s) Oral before breakfast  predniSONE   Tablet 60 milliGRAM(s) Oral daily    MEDICATIONS  (PRN):  acetaminophen   Oral Liquid .. 650 milliGRAM(s) Oral every 6 hours PRN Temp greater or equal to 38C (100.4F), Mild Pain (1 - 3)  oxyCODONE    IR 5 milliGRAM(s) Oral every 4 hours PRN Moderate Pain (4 - 6)  sodium chloride 0.9% lock flush 10 milliLiter(s) IV Push every 1 hour PRN Pre/post blood products, medications, blood draw, and to maintain line patency        IMAGING:      EXAMINATION:  PHYSICAL EXAM:    Constitutional: Intubated    Neurological: Awake, alert oriented to person, place and time, Following Commands, PERRL, EOMI, No Gaze Preference, Face Symmetrical, NE 5/5, NF 4+/5    Motor exam:          Upper extremity                         Delt     Bicep     Tricep    HG                                                 R         4/5        5/5        5/5       5/5                                               L          4+/5        5/5        5/5       5/5          Lower extremity                        HF         KF        KE       DF         PF                                                  R        5/5        5/5        5/5       5/5         5/5                                               L         5/5        5/5       5/5       5/5          5/5    Pulmonary: Clear to Auscultation, No rales, No rhonchi, No wheezes     Cardiovascular: S1, S2, Regular rate and rhythm     Gastrointestinal: Soft, Non-tender, Non-distended     Extremities: No calf tenderness      HOSPITAL COURSE:  Patient KAIDEN HATCH is a 63y woman presenting with myasthenia gravis exacerbation.    Admission Scores  GCS: 15     24 hour Events: Patient evaluated at bedside. Reports improvement in symptoms w/ exception to copious oral secretions. No complaints of pain or dyspnea.     11/4- Patient intubated overnight for worsening respiratory status. Patient received dose 2/5 of IVIG  11/6- No events; poor respiratory effort on SBT  11/7- Apneic on SBT; worsening respiratory effort; last dose of IVIG TODAY  11/8- NIF -12 and  this AM. PT received shiley in right IJ w/ conscious sedation for initiation of PLEX. S/P tx 1/5 w/ PLEX.  11/9- NIF -20 and VC 630this AM.  11/10- Patient due for second dose of PLEX. No events overnight.   11/11- No events overnight. Has been tolerating CPAP since 5 AM. Oral secretions present. Strength improved.   11/12 no events, no PS too much secretions  11/13- Patient with improved secretions. No overnight events. Will get 3rd dose of PLEX today.   11/14- No overnight events.   11/15- No events overnight.   11/16- no acute events overnight      Allergies    peanuts (Unknown)  No Known Drug Allergies    Intolerances        REVIEW OF SYSTEMS: [ ] Unable to Assess due to neurologic exam   [x ] All ROS addressed below are non-contributory, except:  Neuro: [ ] Headache [ ] Back pain [ ] Numbness [ ] Weakness [ ] Ataxia [ ] Dizziness [ ] Aphasia [ ] Dysarthria [ ] Visual disturbance  Resp: [ ] Shortness of breath/dyspnea, [ ] Orthopnea [ ] Cough  CV: [ ] Chest pain [ ] Palpitation [ ] Lightheadedness [ ] Syncope  Renal: [ ] Thirst [ ] Edema  GI: [ ] Nausea [ ] Emesis [ ] Abdominal pain [ ] Constipation [ ] Diarrhea  Hem: [ ] Hematemesis [ ] bright red blood per rectum  ID: [ ] Fever [ ] Chills [ ] Dysuria  ENT: [ ] Rhinorrhea      DEVICES:   [ ] Restraints [ ] ET tube [ ] central line [ ] arterial line [ ] morales [ ] NGT/OGT [ ] EVD [ ] LD [ ] MYAH/HMV [ ] Trach [ ] PEG [ ] Chest Tube     ICU Vital Signs Last 24 Hrs  T(C): 37 (15 Nov 2023 23:00), Max: 37 (15 Nov 2023 23:00)  T(F): 98.6 (15 Nov 2023 23:00), Max: 98.6 (15 Nov 2023 23:00)  HR: 55 (16 Nov 2023 00:55) (55 - 113)  BP: 124/72 (15 Nov 2023 23:00) (99/86 - 143/71)  BP(mean): 86 (15 Nov 2023 23:00) (71 - 93)  ABP: --  ABP(mean): --  RR: 16 (15 Nov 2023 23:00) (16 - 22)  SpO2: 100% (16 Nov 2023 00:55) (97% - 100%)    O2 Parameters below as of 16 Nov 2023 00:55  Patient On (Oxygen Delivery Method): ventilator      I&O's Summary    14 Nov 2023 07:01  -  15 Nov 2023 07:00  --------------------------------------------------------  IN: 2380 mL / OUT: 500 mL / NET: 1880 mL    15 Nov 2023 07:01  -  16 Nov 2023 03:34  --------------------------------------------------------  IN: 2455 mL / OUT: 350 mL / NET: 2105 mL            Respiratory:  Mode: AC/ CMV (Assist Control/ Continuous Mandatory Ventilation)  RR (machine): 16  TV (machine): 420  FiO2: 30  PEEP: 5  ITime: 0.9  MAP: 10  PIP: 23        LABS:             11-15    146<H>  |  113<H>  |  29<H>  ----------------------------<  225<H>  3.8   |  21<L>  |  0.42<L>    Ca    8.2<L>      15 Nov 2023 13:51  Phos  4.5     11-15  Mg     2.6     11-15    TPro  4.9<L>  /  Alb  4.3  /  TBili  0.3  /  DBili  x   /  AST  5<L>  /  ALT  7<L>  /  AlkPhos  18<L>  11-15                        10.1   16.03 )-----------( 265      ( 15 Nov 2023 13:51 )             32.6          MEDICATION LEVELS:     IVF FLUIDS/MEDICATIONS:   MEDICATIONS  (STANDING):  albuterol/ipratropium for Nebulization 3 milliLiter(s) Nebulizer every 6 hours  azaTHIOprine 50 milliGRAM(s) Oral two times a day  chlorhexidine 0.12% Liquid 15 milliLiter(s) Oral Mucosa every 12 hours  chlorhexidine 4% Liquid 1 Application(s) Topical at bedtime  chlorhexidine 4% Liquid 1 Application(s) Topical <User Schedule>  enoxaparin Injectable 40 milliGRAM(s) SubCutaneous every 24 hours  pantoprazole   Suspension 40 milliGRAM(s) Oral before breakfast  predniSONE   Tablet 60 milliGRAM(s) Oral daily    MEDICATIONS  (PRN):  acetaminophen   Oral Liquid .. 650 milliGRAM(s) Oral every 6 hours PRN Temp greater or equal to 38C (100.4F), Mild Pain (1 - 3)  oxyCODONE    IR 5 milliGRAM(s) Oral every 4 hours PRN Moderate Pain (4 - 6)  sodium chloride 0.9% lock flush 10 milliLiter(s) IV Push every 1 hour PRN Pre/post blood products, medications, blood draw, and to maintain line patency        IMAGING:      EXAMINATION:  PHYSICAL EXAM:    Constitutional: Intubated    Neurological: Awake, alert oriented to person, place and time, Following Commands, PERRL, EOMI, No Gaze Preference, Face Symmetrical, NE 5/5, NF 4+/5    Motor exam:          Upper extremity                         Delt     Bicep     Tricep    HG                                                 R         4/5        5/5        5/5       5/5                                               L          4+/5        5/5        5/5       5/5          Lower extremity                        HF         KF        KE       DF         PF                                                  R        5/5        5/5        5/5       5/5         5/5                                               L         5/5        5/5       5/5       5/5          5/5    Pulmonary: Clear to Auscultation, No rales, No rhonchi, No wheezes     Cardiovascular: S1, S2, Regular rate and rhythm     Gastrointestinal: Soft, Non-tender, Non-distended     Extremities: No calf tenderness

## 2023-11-15 NOTE — PROGRESS NOTE ADULT - ATTENDING COMMENTS
remains intubated.  NIF somewhat improved today, FVC still middling.  will re-eval extubation readiness once PLEX is finished but continue to suspect she will require trach.

## 2023-11-16 PROCEDURE — 71045 X-RAY EXAM CHEST 1 VIEW: CPT | Mod: 26

## 2023-11-16 PROCEDURE — 99291 CRITICAL CARE FIRST HOUR: CPT

## 2023-11-16 RX ORDER — INSULIN LISPRO 100/ML
VIAL (ML) SUBCUTANEOUS EVERY 6 HOURS
Refills: 0 | Status: DISCONTINUED | OUTPATIENT
Start: 2023-11-16 | End: 2023-11-20

## 2023-11-16 RX ORDER — SENNA PLUS 8.6 MG/1
2 TABLET ORAL AT BEDTIME
Refills: 0 | Status: DISCONTINUED | OUTPATIENT
Start: 2023-11-16 | End: 2023-11-20

## 2023-11-16 RX ORDER — IPRATROPIUM/ALBUTEROL SULFATE 18-103MCG
3 AEROSOL WITH ADAPTER (GRAM) INHALATION ONCE
Refills: 0 | Status: COMPLETED | OUTPATIENT
Start: 2023-11-16 | End: 2023-11-16

## 2023-11-16 RX ORDER — POLYETHYLENE GLYCOL 3350 17 G/17G
17 POWDER, FOR SOLUTION ORAL EVERY 12 HOURS
Refills: 0 | Status: DISCONTINUED | OUTPATIENT
Start: 2023-11-16 | End: 2023-11-20

## 2023-11-16 RX ADMIN — Medication 3 MILLILITER(S): at 11:23

## 2023-11-16 RX ADMIN — Medication 650 MILLIGRAM(S): at 10:23

## 2023-11-16 RX ADMIN — CHLORHEXIDINE GLUCONATE 1 APPLICATION(S): 213 SOLUTION TOPICAL at 22:18

## 2023-11-16 RX ADMIN — Medication 650 MILLIGRAM(S): at 09:53

## 2023-11-16 RX ADMIN — Medication 650 MILLIGRAM(S): at 20:20

## 2023-11-16 RX ADMIN — AZATHIOPRINE 50 MILLIGRAM(S): 100 TABLET ORAL at 18:02

## 2023-11-16 RX ADMIN — CHLORHEXIDINE GLUCONATE 15 MILLILITER(S): 213 SOLUTION TOPICAL at 06:34

## 2023-11-16 RX ADMIN — PANTOPRAZOLE SODIUM 40 MILLIGRAM(S): 20 TABLET, DELAYED RELEASE ORAL at 06:46

## 2023-11-16 RX ADMIN — Medication 650 MILLIGRAM(S): at 19:35

## 2023-11-16 RX ADMIN — Medication 3 MILLILITER(S): at 17:17

## 2023-11-16 RX ADMIN — AZATHIOPRINE 50 MILLIGRAM(S): 100 TABLET ORAL at 05:11

## 2023-11-16 RX ADMIN — CHLORHEXIDINE GLUCONATE 15 MILLILITER(S): 213 SOLUTION TOPICAL at 18:02

## 2023-11-16 RX ADMIN — Medication 60 MILLIGRAM(S): at 05:10

## 2023-11-16 RX ADMIN — CHLORHEXIDINE GLUCONATE 1 APPLICATION(S): 213 SOLUTION TOPICAL at 05:11

## 2023-11-16 RX ADMIN — Medication 3 MILLILITER(S): at 05:40

## 2023-11-16 RX ADMIN — CHLORHEXIDINE GLUCONATE 15 MILLILITER(S): 213 SOLUTION TOPICAL at 05:10

## 2023-11-16 RX ADMIN — ENOXAPARIN SODIUM 40 MILLIGRAM(S): 100 INJECTION SUBCUTANEOUS at 22:18

## 2023-11-16 RX ADMIN — Medication 3 MILLILITER(S): at 00:58

## 2023-11-16 NOTE — PROGRESS NOTE ADULT - ASSESSMENT
Myasthenia gravis (MG) exacerbation  Hypernatremia  DM type 2    -  Myasthenia gravis (MG) exacerbation with acute respiratory failure  Hypernatremia  DM type 2  HTN    - Patient with some improvement in MG symptoms following PLEX but still without optimal respiratory function. She is in good spirits  - IVIG 11/3-11/7 but worsened and then switched to PLEX, with session 4/5 on 11/15 and 5/5 planned for 11/7. Following completion could then consider additional course of IVIG, if needed  - Appreciate input from neuromuscular specialist, Dr. Emily Covarrubias. Continue prednisone 60mg daily and azathioprine 50mg BID  - Hold Mestinon due to respiratory status and secretions. Will resume when more stable  - Continue VC and NIF monitoring  - Continue to address above medical issues, as you are doing  - Will continue to follow patient with you

## 2023-11-16 NOTE — PROGRESS NOTE ADULT - SUBJECTIVE AND OBJECTIVE BOX
HOSPITAL COURSE:  Patient KAIDEN HATCH is a 63y woman presenting with myasthenia gravis exacerbation.    Admission Scores  GCS: 15     24 hour Events: Patient evaluated at bedside. Reports improvement in symptoms w/ exception to copious oral secretions. No complaints of pain or dyspnea.     11/4- Patient intubated overnight for worsening respiratory status. Patient received dose 2/5 of IVIG  11/6- No events; poor respiratory effort on SBT  11/7- Apneic on SBT; worsening respiratory effort; last dose of IVIG TODAY  11/8- NIF -12 and  this AM. PT received shiley in right IJ w/ conscious sedation for initiation of PLEX. S/P tx 1/5 w/ PLEX.  11/9- NIF -20 and VC 630this AM.  11/10- Patient due for second dose of PLEX. No events overnight.   11/11- No events overnight. Has been tolerating CPAP since 5 AM. Oral secretions present. Strength improved.   11/12 no events, no PS too much secretions  11/13- Patient with improved secretions. No overnight events. Will get 3rd dose of PLEX today.   11/14- No overnight events.   11/15- No events overnight.   11/16- no acute events overnight. Patient still with thick inline secretions.    Allergies    peanuts (Unknown)  No Known Drug Allergies    Intolerances        REVIEW OF SYSTEMS: [ ] Unable to Assess due to neurologic exam   [ x] All ROS addressed below are non-contributory, except:  Neuro: [ ] Headache [ ] Back pain [ ] Numbness [ ] Weakness [ ] Ataxia [ ] Dizziness [ ] Aphasia [ ] Dysarthria [ ] Visual disturbance  Resp: [ ] Shortness of breath/dyspnea, [ ] Orthopnea [ ] Cough  CV: [ ] Chest pain [ ] Palpitation [ ] Lightheadedness [ ] Syncope  Renal: [ ] Thirst [ ] Edema  GI: [ ] Nausea [ ] Emesis [ ] Abdominal pain [ ] Constipation [ ] Diarrhea  Hem: [ ] Hematemesis [ ] bright red blood per rectum  ID: [ ] Fever [ ] Chills [ ] Dysuria  ENT: [ ] Rhinorrhea      DEVICES:   [ ] Restraints [ ] ET tube [ ] central line [ ] arterial line [ ] morales [ ] NGT/OGT [ ] EVD [ ] LD [ ] MYAH/HMV [ ] Trach [ ] PEG [ ] Chest Tube     VITALS:   Vital Signs Last 24 Hrs  T(C): 37.1 (16 Nov 2023 03:00), Max: 37.1 (16 Nov 2023 03:00)  T(F): 98.8 (16 Nov 2023 03:00), Max: 98.8 (16 Nov 2023 03:00)  HR: 75 (16 Nov 2023 05:45) (55 - 113)  BP: 130/75 (16 Nov 2023 03:00) (99/86 - 143/71)  BP(mean): 88 (16 Nov 2023 03:00) (71 - 93)  RR: 17 (16 Nov 2023 03:00) (16 - 22)  SpO2: 100% (16 Nov 2023 05:45) (97% - 100%)    Parameters below as of 16 Nov 2023 05:45  Patient On (Oxygen Delivery Method): ventilator      CAPILLARY BLOOD GLUCOSE        I&O's Summary    15 Nov 2023 07:01  -  16 Nov 2023 07:00  --------------------------------------------------------  IN: 2455 mL / OUT: 550 mL / NET: 1905 mL        Respiratory:  Mode: AC/ CMV (Assist Control/ Continuous Mandatory Ventilation)  RR (machine): 16  TV (machine): 420  FiO2: 30  PEEP: 5  ITime: 0.9  MAP: 11  PIP: 25        LABS:                        10.1   16.03 )-----------( 265      ( 15 Nov 2023 13:51 )             32.6     11-15    146<H>  |  113<H>  |  29<H>  ----------------------------<  225<H>  3.8   |  21<L>  |  0.42<L>             MEDICATION LEVELS:     IVF FLUIDS/MEDICATIONS:   MEDICATIONS  (STANDING):  albuterol/ipratropium for Nebulization 3 milliLiter(s) Nebulizer every 6 hours  albuterol/ipratropium for Nebulization. 3 milliLiter(s) Nebulizer once  azaTHIOprine 50 milliGRAM(s) Oral two times a day  chlorhexidine 0.12% Liquid 15 milliLiter(s) Oral Mucosa every 12 hours  chlorhexidine 4% Liquid 1 Application(s) Topical <User Schedule>  chlorhexidine 4% Liquid 1 Application(s) Topical at bedtime  enoxaparin Injectable 40 milliGRAM(s) SubCutaneous every 24 hours  pantoprazole   Suspension 40 milliGRAM(s) Oral before breakfast  predniSONE   Tablet 60 milliGRAM(s) Oral daily    MEDICATIONS  (PRN):  acetaminophen   Oral Liquid .. 650 milliGRAM(s) Oral every 6 hours PRN Temp greater or equal to 38C (100.4F), Mild Pain (1 - 3)  oxyCODONE    IR 5 milliGRAM(s) Oral every 4 hours PRN Moderate Pain (4 - 6)  sodium chloride 0.9% lock flush 10 milliLiter(s) IV Push every 1 hour PRN Pre/post blood products, medications, blood draw, and to maintain line patency        IMAGING:      EXAMINATION:  PHYSICAL EXAM:    Constitutional: Intubated    Neurological: Awake, alert oriented to person, place and time with choices, Following Commands, PERRL, EOMI, No Gaze Preference, Face Symmetrical. NE 5/5, NF 4+/5    Motor exam:          Upper extremity                         Delt     Bicep     Tricep    HG                                                 R         4/5        5/5        5/5       5/5                                               L          4+/5        5/5        5/5       5/5          Lower extremity                        HF         KF        KE       DF         PF                                                  R        5/5        5/5        5/5       5/5         5/5                                               L         5/5        5/5       5/5       5/5          5/5                                                 Sensation: [x] intact to light touch  [ ] decreased:     Pulmonary: Mild expiratory wheezes, diminished breath sounds in the bases bilaterally    Cardiovascular: S1, S2, Regular rate and rhythm     Gastrointestinal: Soft, Non-tender, Non-distended     Extremities: No calf tenderness

## 2023-11-16 NOTE — PROGRESS NOTE ADULT - ASSESSMENT
ASSESSMENT/PLAN: 62 y/o female with myasthenia gravis, respiratory failure. Demonstrating worsening respiratory effort on SBT, NIF and FVC below baseline required for extubation to BiPAP despite completion of IVIG. Plan is to d/w PT, family risks & benefits of PLEX - patient consented to insertion of shiley, initiation of PLEX - S/P tx w/ PLEX 1/5. Care initiated w/ [facility] neuromuscular neurology Dr Covarrubias. PT shows signs of improved respiratory effort. Continues to CPAP well, Secretions are improving at this time    NEURO:  Neurochecks q4h  s/p PLEX tx 4/5, next dose 11/17  patient started azathioprine on 11/10   Recommend early ambulation w/ PT/OT  Neurology following  CT chest - no evidence of thymoma   Hold Mestinon, Will consider restarting prior to extubation, currently held in setting of copious secretions, retrial extubation in AM  Continue Prednisone to 60 mg QD  Avoid medications that may worsen the current exacerbation including macrolides, fluoroquinolones, tetracyclines, aminoglycoside, beta-blockers, magnesium, and anti-arrhythmics (procainamide, quinidine, and lidocaine), muscle relaxants   Activity: [x] mobilize as tolerated [] Bedrest [x] PT [x] OT [] PMNR    PULM:  Intubated for airway protection  CPAP as tolerated; daily SBTs, not tolerating  CT chest- with atelectasis, no evidence of residual thymoma   CXR reviewed- RLL opacity  Duonebs q6h for secretions (oral)  Check NIF/VC Q6 hours in ICU setting  Aggressive Chest PT    CV:  MAP>65  TTE - EF 54%, elevated right atrial pressure     RENAL:  Stable renal function  IVL  daily IOs    GI:  Diet: ON TF  GI prophylaxis: PPI while on steroids + intubated  Bowel regimen: miralax, senna; dulcolax suppositories PRN  LBM: 11/14    ENDO:   FS goal 120-180  Medium scale  A1C 6.7    HEME/ONC:  Monitor H/H  SCDs  Chemoppx: SQL  LED negative     ID:  No fevers, WBC count trending down   coptious thick inline secretions, oral secretions  No signs of infection     Disposition: ICU ASSESSMENT/PLAN: 62 y/o female with myasthenia gravis, respiratory failure. Demonstrating worsening respiratory effort on SBT, NIF and FVC below baseline required for extubation to BiPAP despite completion of IVIG. Plan is to d/w PT, family risks & benefits of PLEX - patient consented to insertion of shiley, initiation of PLEX - S/P tx w/ PLEX 1/5. Care initiated w/ [facility] neuromuscular neurology Dr Covarrubias. PT shows signs of improved respiratory effort. Continues to CPAP well, Secretions are improving at this time    NEURO:  Neurochecks q4h  s/p PLEX tx 4/5, next dose 11/17  patient started azathioprine on 11/10   Recommend early ambulation w/ PT/OT  Neurology following  CT chest - no evidence of thymoma   Hold Mestinon, Will consider restarting prior to extubation, currently held in setting of copious secretions, retrial extubation in AM  Continue Prednisone to 60 mg QD  Avoid medications that may worsen the current exacerbation including macrolides, fluoroquinolones, tetracyclines, aminoglycoside, beta-blockers, magnesium, and anti-arrhythmics (procainamide, quinidine, and lidocaine), muscle relaxants   Activity: [x] mobilize as tolerated [] Bedrest [x] PT [x] OT [] PMNR    PULM:  Intubated for airway protection  CPAP as tolerated; daily SBTs, not tolerating  CT chest- with atelectasis, no evidence of residual thymoma   CXR reviewed- RLL opacity  Duonebs q6h for secretions (oral)  Check NIF/VC Q6 hours in ICU setting  Aggressive Chest PT    CV:  MAP>65  TTE - EF 54%, elevated right atrial pressure     RENAL:  Stable renal function  IVL  daily IOs    GI:  Diet: ON TF  GI prophylaxis: PPI while on steroids + intubated  Bowel regimen: miralax, senna; dulcolax suppositories PRN  LBM: 11/14    ENDO:   FS goal 120-180  Medium scale  A1C 6.7    HEME/ONC:  Monitor H/H  SCDs  Chemoppx: SQL  LED negative     ID:  No fevers, WBC count trending down   coptious thick inline secretions, oral secretions  No signs of infection     Disposition: ICU ASSESSMENT/PLAN: 62 y/o female with myasthenia gravis, respiratory failure. Demonstrating worsening respiratory effort on SBT, NIF and FVC below baseline required for extubation to BiPAP despite completion of IVIG. Plan is to d/w PT, family risks & benefits of PLEX - patient consented to insertion of shiley, initiation of PLEX - S/P tx w/ PLEX 1/5. Care initiated w/ [facility] neuromuscular neurology Dr Coavrrubias. PT shows signs of improved respiratory effort. Continues to CPAP well, Secretions are improving at this time    NEURO:  Neurochecks q4h  s/p PLEX tx 4/5, next dose 11/17  patient started azathioprine on 11/10   Recommend early ambulation w/ PT/OT  Neurology following  CT chest - no evidence of thymoma   Hold Mestinon, Will consider restarting prior to extubation, currently held in setting of copious secretions, retrial extubation in AM  Continue Prednisone to 60 mg QD  Avoid medications that may worsen the current exacerbation including macrolides, fluoroquinolones, tetracyclines, aminoglycoside, beta-blockers, magnesium, and anti-arrhythmics (procainamide, quinidine, and lidocaine), muscle relaxants   Activity: [x] mobilize as tolerated [] Bedrest [x] PT [x] OT [] PMNR    PULM:  Intubated for airway protection  CPAP as tolerated; daily SBTs, not tolerating  CT chest- with atelectasis, no evidence of residual thymoma   CXR reviewed- RLL opacity  Duonebs q6h for secretions (oral)  Check NIF/VC Q6 hours in ICU setting  Aggressive Chest PT    CV:  MAP>65  TTE - EF 54%, elevated right atrial pressure     RENAL:  Stable renal function  IVL  daily IOs    GI:  Diet: ON TF  GI prophylaxis: PPI while on steroids + intubated  Bowel regimen: miralax, senna; dulcolax suppositories PRN  LBM: 11/14    ENDO:   FS goal 120-180  Medium scale  A1C 6.7    HEME/ONC:  Monitor H/H  SCDs  Chemoppx: SQL  LED negative     ID:  No fevers, WBC count trending down   coptious thick inline secretions, oral secretions  No signs of infection     Disposition: ICU ASSESSMENT/PLAN: 64 y/o female with myasthenia gravis, respiratory failure. Demonstrating worsening respiratory effort on SBT, NIF and FVC below baseline required for extubation to BiPAP despite completion of IVIG. Plan is to d/w PT, family risks & benefits of PLEX - patient consented to insertion of shiley, initiation of PLEX - S/P tx w/ PLEX 1/5. Care initiated w/ [facility] neuromuscular neurology Dr Covarrubias. PT shows signs of improved respiratory effort. Continues to CPAP well, Secretions are improving at this time    NEURO:  Neurochecks q4h  s/p PLEX tx 4/5, next dose 11/17  patient on azathioprine on 11/10   Recommend early ambulation w/ PT/OT  Neurology following  CT chest - no evidence of thymoma   Hold Mestinon, Will consider restarting prior to extubation, currently held in setting of copious secretions, retrial extubation in AM  Continue Prednisone to 60 mg QD  Avoid medications that may worsen the current exacerbation including macrolides, fluoroquinolones, tetracyclines, aminoglycoside, beta-blockers, magnesium, and anti-arrhythmics (procainamide, quinidine, and lidocaine), muscle relaxants   Activity: [x] mobilize as tolerated [] Bedrest [x] PT [x] OT [] PMNR    PULM:  Intubated for airway protection  CPAP as tolerated; daily SBTs, tolerated CPAP this AM  CT chest- with atelectasis, no evidence of residual thymoma   CXR reviewed- improved   Duonebs q6h for secretions (oral)  Check NIF/VC Q6 hours in ICU setting  11/16- NIF -22/   Aggressive Chest PT    CV:  MAP>65  TTE - EF 54%, elevated right atrial pressure     RENAL:  Stable renal function  IVL  daily IOs    GI:  Diet: ON TF  GI prophylaxis: PPI while on steroids + intubated  Bowel regimen: miralax, senna; dulcolax suppositories PRN  LBM: 11/15    ENDO:   FS goal 120-180  Medium scale  A1C 6.7  ISS     HEME/ONC:  Monitor H/H  SCDs  Chemoppx: SQL  LED negative     ID:  No fevers, WBC count trending down   copious thick inline secretions, oral secretions  No signs of infection     Disposition: ICU ASSESSMENT/PLAN: 62 y/o female with myasthenia gravis, respiratory failure. Demonstrating worsening respiratory effort on SBT, NIF and FVC below baseline required for extubation to BiPAP despite completion of IVIG. Plan is to d/w PT, family risks & benefits of PLEX - patient consented to insertion of shiley, initiation of PLEX - S/P tx w/ PLEX 1/5. Care initiated w/ [facility] neuromuscular neurology Dr Covarrubias. PT shows signs of improved respiratory effort. Continues to CPAP well, Secretions are improving at this time    NEURO:  Neurochecks q4h  s/p PLEX tx 4/5, next dose 11/17  patient on azathioprine on 11/10   Recommend early ambulation w/ PT/OT  Neurology following  CT chest - no evidence of thymoma   Hold Mestinon, Will consider restarting prior to extubation, currently held in setting of copious secretions, retrial extubation in AM  Continue Prednisone to 60 mg QD  Avoid medications that may worsen the current exacerbation including macrolides, fluoroquinolones, tetracyclines, aminoglycoside, beta-blockers, magnesium, and anti-arrhythmics (procainamide, quinidine, and lidocaine), muscle relaxants   Activity: [x] mobilize as tolerated [] Bedrest [x] PT [x] OT [] PMNR    PULM:  Intubated for airway protection  CPAP as tolerated; daily SBTs, tolerated CPAP this AM  CT chest- with atelectasis, no evidence of residual thymoma   CXR reviewed- improved   Duonebs q6h for secretions (oral)  Check NIF/VC Q6 hours in ICU setting  11/16- NIF -22/   Aggressive Chest PT    CV:  MAP>65  TTE - EF 54%, elevated right atrial pressure     RENAL:  Stable renal function  IVL  daily IOs    GI:  Diet: ON TF  GI prophylaxis: PPI while on steroids + intubated  Bowel regimen: miralax, senna; dulcolax suppositories PRN  LBM: 11/15    ENDO:   FS goal 120-180  Medium scale  A1C 6.7  ISS     HEME/ONC:  Monitor H/H  SCDs  Chemoppx: SQL  LED negative     ID:  No fevers, WBC count trending down   copious thick inline secretions, oral secretions  No signs of infection     Disposition: ICU ASSESSMENT/PLAN: 62 y/o female with myasthenia gravis, respiratory failure. Demonstrating worsening respiratory effort on SBT, NIF and FVC below baseline required for extubation to BiPAP despite completion of IVIG. Plan is to d/w PT, family risks & benefits of PLEX - patient consented to insertion of shiley, initiation of PLEX - S/P tx w/ PLEX 1/5. Care initiated w/ [facility] neuromuscular neurology Dr Covarrubias. PT shows signs of improved respiratory effort. Continues to CPAP well, Secretions are improving at this time    NEURO:  Neurochecks q4h  s/p PLEX tx 4/5, next dose 11/17  patient on azathioprine on 11/10   Recommend early ambulation w/ PT/OT  Neurology following  CT chest - no evidence of thymoma   Hold Mestinon, Will consider restarting prior to extubation, currently held in setting of copious secretions, retrial extubation in AM  Continue Prednisone to 60 mg QD  Avoid medications that may worsen the current exacerbation including macrolides, fluoroquinolones, tetracyclines, aminoglycoside, beta-blockers, magnesium, and anti-arrhythmics (procainamide, quinidine, and lidocaine), muscle relaxants   Activity: [x] mobilize as tolerated [] Bedrest [x] PT [x] OT [] PMNR    PULM:  Intubated for airway protection  CPAP as tolerated; daily SBTs, tolerated CPAP this AM  CT chest- with atelectasis, no evidence of residual thymoma   CXR reviewed- improved   Duonebs q6h for secretions (oral)  Check NIF/VC Q6 hours in ICU setting  11/16- NIF -22/   Aggressive Chest PT    CV:  MAP>65  TTE - EF 54%, elevated right atrial pressure     RENAL:  Stable renal function  IVL  daily IOs    GI:  Diet: ON TF  GI prophylaxis: PPI while on steroids + intubated  Bowel regimen: miralax, senna; dulcolax suppositories PRN  LBM: 11/15    ENDO:   FS goal 120-180  Medium scale  A1C 6.7  ISS     HEME/ONC:  Monitor H/H  SCDs  Chemoppx: SQL  LED negative     ID:  No fevers, WBC count trending down   copious thick inline secretions, oral secretions  No signs of infection     Disposition: ICU

## 2023-11-16 NOTE — PROGRESS NOTE ADULT - SUBJECTIVE AND OBJECTIVE BOX
HOSPITAL COURSE:  Patient KAIDEN HATCH is a 63y woman presenting with myasthenia gravis exacerbation.    Admission Scores  GCS: 15     24 hour Events: Patient evaluated at bedside. Reports improvement in symptoms w/ exception to copious oral secretions. No complaints of pain or dyspnea.     11/4- Patient intubated overnight for worsening respiratory status. Patient received dose 2/5 of IVIG  11/6- No events; poor respiratory effort on SBT  11/7- Apneic on SBT; worsening respiratory effort; last dose of IVIG TODAY  11/8- NIF -12 and  this AM. PT received shiley in right IJ w/ conscious sedation for initiation of PLEX. S/P tx 1/5 w/ PLEX.  11/9- NIF -20 and VC 630this AM.  11/10- Patient due for second dose of PLEX. No events overnight.   11/11- No events overnight. Has been tolerating CPAP since 5 AM. Oral secretions present. Strength improved.   11/12 no events, no PS too much secretions  11/13- Patient with improved secretions. No overnight events. Will get 3rd dose of PLEX today.   11/14- No overnight events.   11/15- No events overnight.   11/16- no acute events overnight. Patient still with thick inline secretions.    Allergies: peanuts (Unknown)  No Known Drug Allergies    Intolerances        REVIEW OF SYSTEMS: [ ] Unable to Assess due to neurologic exam   [ x] All ROS addressed below are non-contributory, except:  Neuro: [ ] Headache [ ] Back pain [ ] Numbness [ ] Weakness [ ] Ataxia [ ] Dizziness [ ] Aphasia [ ] Dysarthria [ ] Visual disturbance  Resp: [ ] Shortness of breath/dyspnea, [ ] Orthopnea [ ] Cough  CV: [ ] Chest pain [ ] Palpitation [ ] Lightheadedness [ ] Syncope  Renal: [ ] Thirst [ ] Edema  GI: [ ] Nausea [ ] Emesis [ ] Abdominal pain [ ] Constipation [ ] Diarrhea  Hem: [ ] Hematemesis [ ] bright red blood per rectum  ID: [ ] Fever [ ] Chills [ ] Dysuria  ENT: [ ] Rhinorrhea      DEVICES:   [ ] Restraints [ ] ET tube [ ] central line [ ] arterial line [ ] morales [ ] NGT/OGT [ ] EVD [ ] LD [ ] MYAH/HMV [ ] Trach [ ] PEG [ ] Chest Tube     Vital Signs Last 24 Hrs  T(C): 36.7 (16 Nov 2023 15:00), Max: 37.1 (16 Nov 2023 03:00)  T(F): 98 (16 Nov 2023 15:00), Max: 98.8 (16 Nov 2023 03:00)  HR: 73 (16 Nov 2023 17:44) (55 - 90)  BP: 132/66 (16 Nov 2023 15:00) (124/72 - 132/66)  BP(mean): 86 (16 Nov 2023 15:00) (77 - 88)  RR: 31 (16 Nov 2023 15:00) (16 - 31)  SpO2: 100% (16 Nov 2023 17:44) (95% - 100%)    Parameters below as of 16 Nov 2023 17:44  Patient On (Oxygen Delivery Method): ventilator    CAPILLARY BLOOD GLUCOSE    I&O's Summary    15 Nov 2023 07:01  -  16 Nov 2023 07:00  --------------------------------------------------------  IN: 2575 mL / OUT: 550 mL / NET: 2025 mL    16 Nov 2023 07:01  -  16 Nov 2023 19:42  --------------------------------------------------------  IN: 770 mL / OUT: 350 mL / NET: 420 mL        Respiratory:  Mode: AC/ CMV (Assist Control/ Continuous Mandatory Ventilation)  RR (machine): 16  TV (machine): 420  FiO2: 30  PEEP: 5  ITime: 0.9  MAP: 11  PIP: 25    LABS:             10.1   16.03 )-----------( 265      ( 15 Nov 2023 13:51 )             32.6   11-15    146<H>  |  113<H>  |  29<H>  ----------------------------<  225<H>  3.8   |  21<L>  |  0.42<L>    Ca    8.2<L>      15 Nov 2023 13:51  Phos  4.5     11-15  Mg     2.6     11-15    TPro  4.9<L>  /  Alb  4.3  /  TBili  0.3  /  DBili  x   /  AST  5<L>  /  ALT  7<L>  /  AlkPhos  18<L>  11-15    MEDICATIONS  (STANDING):  albuterol/ipratropium for Nebulization 3 milliLiter(s) Nebulizer every 6 hours  azaTHIOprine 50 milliGRAM(s) Oral two times a day  chlorhexidine 0.12% Liquid 15 milliLiter(s) Oral Mucosa every 12 hours  chlorhexidine 4% Liquid 1 Application(s) Topical at bedtime  chlorhexidine 4% Liquid 1 Application(s) Topical <User Schedule>  enoxaparin Injectable 40 milliGRAM(s) SubCutaneous every 24 hours  insulin lispro (ADMELOG) corrective regimen sliding scale   SubCutaneous every 6 hours  pantoprazole   Suspension 40 milliGRAM(s) Oral before breakfast  polyethylene glycol 3350 17 Gram(s) Oral every 12 hours  predniSONE   Tablet 60 milliGRAM(s) Oral daily  senna 2 Tablet(s) Oral at bedtime    MEDICATIONS  (PRN):  acetaminophen   Oral Liquid .. 650 milliGRAM(s) Oral every 6 hours PRN Temp greater or equal to 38C (100.4F), Mild Pain (1 - 3)  oxyCODONE    IR 5 milliGRAM(s) Oral every 4 hours PRN Moderate Pain (4 - 6)  sodium chloride 0.9% lock flush 10 milliLiter(s) IV Push every 1 hour PRN Pre/post blood products, medications, blood draw, and to maintain line patency      IMAGING: Reviewed      EXAMINATION:  PHYSICAL EXAM:    Constitutional: Intubated    Neurological: Awake, alert oriented to person, place and time with choices, Following Commands, PERRL, EOMI, No Gaze Preference, Face Symmetrical. NE 5/5, NF 4+/5    Motor exam:          Upper extremity                         Delt     Bicep     Tricep    HG                                                 R         4/5        5/5        5/5       5/5                                               L          4+/5        5/5        5/5       5/5          Lower extremity                        HF         KF        KE       DF         PF                                                  R        5/5        5/5        5/5       5/5         5/5                                               L         5/5        5/5       5/5       5/5          5/5                                                 Sensation: [x] intact to light touch  [ ] decreased:     Pulmonary: Mild expiratory wheezes, diminished breath sounds in the bases bilaterally    Cardiovascular: S1, S2, Regular rate and rhythm     Gastrointestinal: Soft, Non-tender, Non-distended     Extremities: No calf tenderness    HOSPITAL COURSE:  Patient KAIDEN HATCH is a 63y woman presenting with myasthenia gravis exacerbation.    Admission Scores  GCS: 15     24 hour Events: Patient evaluated at bedside. Reports improvement in symptoms w/ exception to copious oral secretions. No complaints of pain or dyspnea.     11/4- Patient intubated overnight for worsening respiratory status. Patient received dose 2/5 of IVIG  11/6- No events; poor respiratory effort on SBT  11/7- Apneic on SBT; worsening respiratory effort; last dose of IVIG TODAY  11/8- NIF -12 and  this AM. PT received shiley in right IJ w/ conscious sedation for initiation of PLEX. S/P tx 1/5 w/ PLEX.  11/9- NIF -20 and VC 630this AM.  11/10- Patient due for second dose of PLEX. No events overnight.   11/11- No events overnight. Has been tolerating CPAP since 5 AM. Oral secretions present. Strength improved.   11/12 no events, no PS too much secretions  11/13- Patient with improved secretions. No overnight events. Will get 3rd dose of PLEX today.   11/14- No overnight events.   11/15- No events overnight.   11/16- no acute events overnight. Patient still with thick inline secretions.    Allergies: peanuts (Unknown)  No Known Drug Allergies    Intolerances        REVIEW OF SYSTEMS: [ ] Unable to Assess due to neurologic exam   [ x] All ROS addressed below are non-contributory, except:  Neuro: [ ] Headache [ ] Back pain [ ] Numbness [ ] Weakness [ ] Ataxia [ ] Dizziness [ ] Aphasia [ ] Dysarthria [ ] Visual disturbance  Resp: [ ] Shortness of breath/dyspnea, [ ] Orthopnea [ ] Cough  CV: [ ] Chest pain [ ] Palpitation [ ] Lightheadedness [ ] Syncope  Renal: [ ] Thirst [ ] Edema  GI: [ ] Nausea [ ] Emesis [ ] Abdominal pain [ ] Constipation [ ] Diarrhea  Hem: [ ] Hematemesis [ ] bright red blood per rectum  ID: [ ] Fever [ ] Chills [ ] Dysuria  ENT: [ ] Rhinorrhea      DEVICES:   [ ] Restraints [ ] ET tube [ ] central line [ ] arterial line [ ] morales [ ] NGT/OGT [ ] EVD [ ] LD [ ] MYAH/HMV [ ] Trach [ ] PEG [ ] Chest Tube     Vital Signs Last 24 Hrs  T(C): 36.7 (16 Nov 2023 15:00), Max: 37.1 (16 Nov 2023 03:00)  T(F): 98 (16 Nov 2023 15:00), Max: 98.8 (16 Nov 2023 03:00)  HR: 73 (16 Nov 2023 17:44) (55 - 90)  BP: 132/66 (16 Nov 2023 15:00) (124/72 - 132/66)  BP(mean): 86 (16 Nov 2023 15:00) (77 - 88)  RR: 31 (16 Nov 2023 15:00) (16 - 31)  SpO2: 100% (16 Nov 2023 17:44) (95% - 100%)    Parameters below as of 16 Nov 2023 17:44  Patient On (Oxygen Delivery Method): ventilator    CAPILLARY BLOOD GLUCOSE    I&O's Summary    15 Nov 2023 07:01  -  16 Nov 2023 07:00  --------------------------------------------------------  IN: 2575 mL / OUT: 550 mL / NET: 2025 mL    16 Nov 2023 07:01  -  16 Nov 2023 19:42  --------------------------------------------------------  IN: 770 mL / OUT: 350 mL / NET: 420 mL    Respiratory:  Mode: AC/ CMV (Assist Control/ Continuous Mandatory Ventilation)  RR (machine): 16  TV (machine): 420  FiO2: 30  PEEP: 5  ITime: 0.9  MAP: 11  PIP: 25    LABS:             10.1   16.03 )-----------( 265      ( 15 Nov 2023 13:51 )             32.6   11-15    146<H>  |  113<H>  |  29<H>  ----------------------------<  225<H>  3.8   |  21<L>  |  0.42<L>    Ca    8.2<L>      15 Nov 2023 13:51  Phos  4.5     11-15  Mg     2.6     11-15    TPro  4.9<L>  /  Alb  4.3  /  TBili  0.3  /  DBili  x   /  AST  5<L>  /  ALT  7<L>  /  AlkPhos  18<L>  11-15    MEDICATIONS  (STANDING):  albuterol/ipratropium for Nebulization 3 milliLiter(s) Nebulizer every 6 hours  azaTHIOprine 50 milliGRAM(s) Oral two times a day  chlorhexidine 0.12% Liquid 15 milliLiter(s) Oral Mucosa every 12 hours  chlorhexidine 4% Liquid 1 Application(s) Topical at bedtime  chlorhexidine 4% Liquid 1 Application(s) Topical <User Schedule>  enoxaparin Injectable 40 milliGRAM(s) SubCutaneous every 24 hours  insulin lispro (ADMELOG) corrective regimen sliding scale   SubCutaneous every 6 hours  pantoprazole   Suspension 40 milliGRAM(s) Oral before breakfast  polyethylene glycol 3350 17 Gram(s) Oral every 12 hours  predniSONE   Tablet 60 milliGRAM(s) Oral daily  senna 2 Tablet(s) Oral at bedtime    MEDICATIONS  (PRN):  acetaminophen   Oral Liquid .. 650 milliGRAM(s) Oral every 6 hours PRN Temp greater or equal to 38C (100.4F), Mild Pain (1 - 3)  oxyCODONE    IR 5 milliGRAM(s) Oral every 4 hours PRN Moderate Pain (4 - 6)  sodium chloride 0.9% lock flush 10 milliLiter(s) IV Push every 1 hour PRN Pre/post blood products, medications, blood draw, and to maintain line patency      IMAGING: Reviewed      EXAMINATION:  PHYSICAL EXAM:    Constitutional: Intubated    Neurological: Awake, alert oriented to person, place and time with choices, Following Commands, PERRL, EOMI, No Gaze Preference, Face Symmetrical. NE 5/5, NF 4+/5    Motor exam:          Upper extremity                         Delt     Bicep     Tricep    HG                                                 R         4/5        5/5        5/5       5/5                                               L          4+/5        5/5        5/5       5/5          Lower extremity                        HF         KF        KE       DF         PF                                                  R        5/5        5/5        5/5       5/5         5/5                                               L         5/5        5/5       5/5       5/5          5/5                                                 Sensation: [x] intact to light touch  [ ] decreased:     Pulmonary: Mild expiratory wheezes, diminished breath sounds in the bases bilaterally    Cardiovascular: S1, S2, Regular rate and rhythm     Gastrointestinal: Soft, Non-tender, Non-distended     Extremities: No calf tenderness

## 2023-11-16 NOTE — PROGRESS NOTE ADULT - ASSESSMENT
ASSESSMENT/PLAN: 62 y/o female with myasthenia gravis, respiratory failure. Demonstrating worsening respiratory effort on SBT, NIF and FVC below baseline required for extubation to BiPAP despite completion of IVIG. Plan is to d/w PT, family risks & benefits of PLEX - patient consented to insertion of shiley, initiation of PLEX - S/P tx w/ PLEX 1/5. Care initiated w/ [facility] neuromuscular neurology Dr Covarrubias. PT shows signs of improved respiratory effort. Continues to CPAP well, Secretions are improving at this time    NEURO:  Neurochecks q4h  s/p PLEX tx 4/5, next dose 11/17  patient on azathioprine on 11/10   Recommend early ambulation w/ PT/OT  Neurology following  CT chest - no evidence of thymoma   Hold Mestinon, Will consider restarting prior to extubation, currently held in setting of copious secretions, retrial extubation in AM  Continue Prednisone to 60 mg QD  Avoid medications that may worsen the current exacerbation including macrolides, fluoroquinolones, tetracyclines, aminoglycoside, beta-blockers, magnesium, and anti-arrhythmics (procainamide, quinidine, and lidocaine), muscle relaxants   Activity: [x] mobilize as tolerated [] Bedrest [x] PT [x] OT [] PMNR    PULM:  Intubated for airway protection  CPAP as tolerated; daily SBTs, tolerated CPAP this AM  CT chest- with atelectasis, no evidence of residual thymoma   CXR reviewed- improved   Duonebs q6h for secretions (oral)  Check NIF/VC Q6 hours in ICU setting  11/16- NIF -22/   Aggressive Chest PT    CV:  MAP>65  TTE - EF 54%, elevated right atrial pressure     RENAL:  Stable renal function  IVL  daily IOs    GI:  Diet: ON TF  GI prophylaxis: PPI while on steroids + intubated  Bowel regimen: miralax, senna; dulcolax suppositories PRN  LBM: 11/15    ENDO:   FS goal 120-180  Medium scale  A1C 6.7  ISS     HEME/ONC:  Monitor H/H  SCDs  Chemoppx: SQL  LED negative     ID:  No fevers, WBC count trending down   copious thick inline secretions, oral secretions  No signs of infection     Disposition: ICU ASSESSMENT/PLAN: 64 y/o female with myasthenia gravis, respiratory failure. Demonstrating worsening respiratory effort on SBT, NIF and FVC below baseline required for extubation to BiPAP despite completion of IVIG. Plan is to d/w PT, family risks & benefits of PLEX - patient consented to insertion of shiley, initiation of PLEX - S/P tx w/ PLEX 1/5. Care initiated w/ [facility] neuromuscular neurology Dr Covarrubias. PT shows signs of improved respiratory effort. Continues to CPAP well, Secretions are improving at this time    NEURO:  Neurochecks q4h  s/p PLEX tx 4/5, next dose 11/17  patient on azathioprine on 11/10   Recommend early ambulation w/ PT/OT  Neurology following  CT chest - no evidence of thymoma   Hold Mestinon, Will consider restarting prior to extubation, currently held in setting of copious secretions, retrial extubation in AM  Continue Prednisone to 60 mg QD  Avoid medications that may worsen the current exacerbation including macrolides, fluoroquinolones, tetracyclines, aminoglycoside, beta-blockers, magnesium, and anti-arrhythmics (procainamide, quinidine, and lidocaine), muscle relaxants   Activity: [x] mobilize as tolerated [] Bedrest [x] PT [x] OT [] PMNR    PULM:  Intubated for airway protection  CPAP as tolerated; daily SBTs, tolerated CPAP this AM  CT chest- with atelectasis, no evidence of residual thymoma   CXR reviewed- improved   Duonebs q6h for secretions (oral)  Check NIF/VC Q6 hours in ICU setting  11/16- NIF -22/   Aggressive Chest PT    CV:  MAP>65  TTE - EF 54%, elevated right atrial pressure     RENAL:  Stable renal function  IVL  daily IOs    GI:  Diet: ON TF  GI prophylaxis: PPI while on steroids + intubated  Bowel regimen: miralax, senna; dulcolax suppositories PRN  LBM: 11/15    ENDO:   FS goal 120-180  Medium scale  A1C 6.7  ISS     HEME/ONC:  Monitor H/H  SCDs  Chemoppx: SQL  LED negative     ID:  No fevers, WBC count trending down   copious thick inline secretions, oral secretions  No signs of infection     Disposition: ICU ASSESSMENT/PLAN: 64 y/o female with myasthenia gravis, respiratory failure. Demonstrating worsening respiratory effort on SBT, NIF and FVC below baseline required for extubation to BiPAP despite completion of IVIG. Plan is to d/w PT, family risks & benefits of PLEX - patient consented to insertion of shiley, initiation of PLEX - S/P tx w/ PLEX 1/5. Care initiated w/  neuromuscular neurology Dr Covarrubias. PT shows signs of improved respiratory effort. Continues to CPAP well, Secretions are improving at this time    NEURO:  Neurochecks q4h  s/p PLEX tx 4/5, next dose 11/17  patient on azathioprine on 11/10   Recommend early ambulation w/ PT/OT  Neurology following  CT chest - no evidence of thymoma   Hold Mestinon, Will consider restarting prior to extubation, currently held in setting of copious secretions, retrial extubation in AM  Continue Prednisone to 60 mg QD  Avoid medications that may worsen the current exacerbation including macrolides, fluoroquinolones, tetracyclines, aminoglycoside, beta-blockers, magnesium, and anti-arrhythmics (procainamide, quinidine, and lidocaine), muscle relaxants   Activity: [x] mobilize as tolerated [] Bedrest [x] PT [x] OT [] PMNR    PULM:  Intubated for airway protection  CPAP as tolerated; daily SBTs, tolerated CPAP this AM  CT chest- with atelectasis, no evidence of residual thymoma   CXR reviewed- improved   Duonebs q6h for secretions (oral)  Check NIF/VC Q6 hours in ICU setting  11/16- NIF -22/   Aggressive Chest PT    CV:  MAP>65  TTE - EF 54%, elevated right atrial pressure     RENAL:  Stable renal function  IVL  daily IOs    GI:  Diet: ON TF  GI prophylaxis: PPI while on steroids + intubated  Bowel regimen: miralax, senna; dulcolax suppositories PRN  LBM: 11/15    ENDO:   FS goal 120-180  Medium scale  A1C 6.7  ISS     HEME/ONC:  Monitor H/H  SCDs  Chemoppx: SQL  LED negative     ID:  No fevers, WBC count trending down   copious thick inline secretions, oral secretions  No signs of infection     Disposition: ICU ASSESSMENT/PLAN: 64 y/o female with myasthenia gravis, respiratory failure. Demonstrating worsening respiratory effort on SBT, NIF and FVC below baseline required for extubation to BiPAP despite completion of IVIG. Plan is to d/w PT, family risks & benefits of PLEX - patient consented to insertion of shiley, initiation of PLEX - S/P tx w/ PLEX 1/5. Care initiated w/ [facility] neuromuscular neurology Dr Covarrubias. PT shows signs of improved respiratory effort. Continues to CPAP well, Secretions are improving at this time    NEURO:  Neurochecks q4h  s/p PLEX tx 4/5, next dose 11/17  patient on azathioprine on 11/10   Recommend early ambulation w/ PT/OT  Neurology following  CT chest - no evidence of thymoma   Hold Mestinon, Will consider restarting prior to extubation, currently held in setting of copious secretions, retrial extubation in AM  Continue Prednisone to 60 mg QD  Avoid medications that may worsen the current exacerbation including macrolides, fluoroquinolones, tetracyclines, aminoglycoside, beta-blockers, magnesium, and anti-arrhythmics (procainamide, quinidine, and lidocaine), muscle relaxants   Activity: [x] mobilize as tolerated [] Bedrest [x] PT [x] OT [] PMNR    PULM:  Intubated for airway protection  CPAP as tolerated; daily SBTs, tolerated CPAP this AM  CT chest- with atelectasis, no evidence of residual thymoma   CXR reviewed- improved   Duonebs q6h for secretions (oral)  Check NIF/VC Q6 hours in ICU setting  11/16- NIF -22/   Aggressive Chest PT    CV:  MAP>65  TTE - EF 54%, elevated right atrial pressure     RENAL:  Stable renal function  IVL  daily IOs    GI:  Diet: ON TF  GI prophylaxis: PPI while on steroids + intubated  Bowel regimen: miralax, senna; dulcolax suppositories PRN  LBM: 11/16    ENDO:   FS goal 120-180  Medium scale  A1C 6.7  ISS     HEME/ONC:  Monitor H/H  SCDs  Chemoppx: SQL  LED negative     ID:  No fevers, WBC count trending down   copious thick inline secretions, oral secretions  No signs of infection     Disposition: ICU ASSESSMENT/PLAN: 62 y/o female with myasthenia gravis, respiratory failure. Demonstrating worsening respiratory effort on SBT, NIF and FVC below baseline required for extubation to BiPAP despite completion of IVIG. Plan is to d/w PT, family risks & benefits of PLEX - patient consented to insertion of shiley, initiation of PLEX - S/P tx w/ PLEX 1/5. Care initiated w/ [facility] neuromuscular neurology Dr Covarrubias. PT shows signs of improved respiratory effort. Continues to CPAP well, Secretions are improving at this time    NEURO:  Neurochecks q4h  s/p PLEX tx 4/5, next dose 11/17  patient on azathioprine on 11/10   Recommend early ambulation w/ PT/OT  Neurology following  CT chest - no evidence of thymoma   Hold Mestinon, Will consider restarting prior to extubation, currently held in setting of copious secretions, retrial extubation in AM  Continue Prednisone to 60 mg QD  Avoid medications that may worsen the current exacerbation including macrolides, fluoroquinolones, tetracyclines, aminoglycoside, beta-blockers, magnesium, and anti-arrhythmics (procainamide, quinidine, and lidocaine), muscle relaxants   Activity: [x] mobilize as tolerated [] Bedrest [x] PT [x] OT [] PMNR    PULM:  Intubated for airway protection  CPAP as tolerated; daily SBTs, tolerated CPAP this AM  CT chest- with atelectasis, no evidence of residual thymoma   CXR reviewed- improved   Duonebs q6h for secretions (oral)  Check NIF/VC Q6 hours in ICU setting  11/16- NIF -22/   Aggressive Chest PT    CV:  MAP>65  TTE - EF 54%, elevated right atrial pressure     RENAL:  Stable renal function  IVL  daily IOs    GI:  Diet: ON TF  GI prophylaxis: PPI while on steroids + intubated  Bowel regimen: miralax, senna; dulcolax suppositories PRN  LBM: 11/16    ENDO:   FS goal 120-180  Medium scale  A1C 6.7  ISS     HEME/ONC:  Monitor H/H  SCDs  Chemoppx: SQL  LED negative     ID:  No fevers, WBC count trending down   copious thick inline secretions, oral secretions  No signs of infection     Disposition: ICU ASSESSMENT/PLAN: 64 y/o female with myasthenia gravis, respiratory failure. Demonstrating worsening respiratory effort on SBT, NIF and FVC below baseline required for extubation to BiPAP despite completion of IVIG. Plan is to d/w PT, family risks & benefits of PLEX - patient consented to insertion of shiley, initiation of PLEX - S/P tx w/ PLEX 1/5. Care initiated w/ [facility] neuromuscular neurology Dr Covarrubias. PT shows signs of improved respiratory effort. Continues to CPAP well, Secretions are improving at this time    NEURO:  Neurochecks q4h  s/p PLEX tx 4/5, next dose 11/17  patient on azathioprine on 11/10   Recommend early ambulation w/ PT/OT  Neurology following  CT chest - no evidence of thymoma   Hold Mestinon, Will consider restarting prior to extubation, currently held in setting of copious secretions, retrial extubation in AM  Continue Prednisone to 60 mg QD  Avoid medications that may worsen the current exacerbation including macrolides, fluoroquinolones, tetracyclines, aminoglycoside, beta-blockers, magnesium, and anti-arrhythmics (procainamide, quinidine, and lidocaine), muscle relaxants   Activity: [x] mobilize as tolerated [] Bedrest [x] PT [x] OT [] PMNR    PULM:  Intubated for airway protection  CPAP as tolerated; daily SBTs, tolerated CPAP this AM  CT chest- with atelectasis, no evidence of residual thymoma   CXR reviewed- improved   Duonebs q6h for secretions (oral)  Check NIF/VC Q6 hours in ICU setting  11/16- NIF -22/   Aggressive Chest PT    CV:  MAP>65  TTE - EF 54%, elevated right atrial pressure     RENAL:  Stable renal function  IVL  daily IOs    GI:  Diet: ON TF  GI prophylaxis: PPI while on steroids + intubated  Bowel regimen: miralax, senna; dulcolax suppositories PRN  LBM: 11/16    ENDO:   FS goal 120-180  Medium scale  A1C 6.7  ISS     HEME/ONC:  Monitor H/H  SCDs  Chemoppx: SQL  LED negative     ID:  No fevers, WBC count trending down   copious thick inline secretions, oral secretions  No signs of infection     Disposition: ICU

## 2023-11-16 NOTE — PROGRESS NOTE ADULT - ATTENDING COMMENTS
will get final round of PLEX tomorrow.  per neurology can consider repeat IVIG following plex if necessary.  still with concerning NIF and FVCs.  guarded estimation of extubation success.

## 2023-11-16 NOTE — PROGRESS NOTE ADULT - SUBJECTIVE AND OBJECTIVE BOX
NEUROLOGY FOLLOW-UP CONSULT NOTE    RFC: Myasthenia gravis exacerbation    Interval history: No acute neurologic events overnight. Patient is improving in regards to strength and respiratory status but still not ready for extubation as per primary team. Got PLEX 4/5 on 11/15 without incident and pending last session tomorrow.    Meds:  MEDICATIONS  (STANDING):  albuterol/ipratropium for Nebulization 3 milliLiter(s) Nebulizer every 6 hours  albuterol/ipratropium for Nebulization. 3 milliLiter(s) Nebulizer once  azaTHIOprine 50 milliGRAM(s) Oral two times a day  chlorhexidine 0.12% Liquid 15 milliLiter(s) Oral Mucosa every 12 hours  chlorhexidine 4% Liquid 1 Application(s) Topical at bedtime  chlorhexidine 4% Liquid 1 Application(s) Topical <User Schedule>  enoxaparin Injectable 40 milliGRAM(s) SubCutaneous every 24 hours  insulin lispro (ADMELOG) corrective regimen sliding scale   SubCutaneous every 6 hours  pantoprazole   Suspension 40 milliGRAM(s) Oral before breakfast  polyethylene glycol 3350 17 Gram(s) Oral every 12 hours  predniSONE   Tablet 60 milliGRAM(s) Oral daily  senna 2 Tablet(s) Oral at bedtime    MEDICATIONS  (PRN):  acetaminophen   Oral Liquid .. 650 milliGRAM(s) Oral every 6 hours PRN Temp greater or equal to 38C (100.4F), Mild Pain (1 - 3)  oxyCODONE    IR 5 milliGRAM(s) Oral every 4 hours PRN Moderate Pain (4 - 6)  sodium chloride 0.9% lock flush 10 milliLiter(s) IV Push every 1 hour PRN Pre/post blood products, medications, blood draw, and to maintain line patency    GTT:  None    PMHx/PSHx/FHx/SHx:  Myasthenia gravis with exacerbation    Handoff    MEWS Score    Myasthenia gravis    Diabetes mellitus    Hypertension    Myasthenic crisis    DIFFICULTY WALKING    90+    [id number]        Allergies:  peanuts (Unknown)  No Known Drug Allergies      ROS: All systems negative except as documented in Interval history    O:  T(C): 36.6 (11-16-23 @ 11:00), Max: 37.1 (11-16-23 @ 03:00)  HR: 83 (11-16-23 @ 11:33) (55 - 90)  BP: 129/58 (11-16-23 @ 11:00) (116/53 - 130/75)  RR: 22 (11-16-23 @ 11:00) (16 - 22)  SpO2: 95% (11-16-23 @ 11:33) (95% - 100%)    Focused neurologic exam:  MS Aleyda HOOK x3 (communicates by writing), intubated, not sedated, no speech output as intubated but can write and nods head appropriately to "yes"/"no" questions, follows commands  CN - PERRL, EOMI with L mild to moderate ptosis, VFF, face sens/str/hearing WNL b/l, tongue midline, trap 5/5 b/l, (+) spontaneous respirations (patient rate 28 bpm on CPAP), (+) cough. Could not assess palate. NE 4+/5, NF 4-4+/5  Motor - Normal bulk/tone, 5/5 all  Sens - LT/PP intact all  DTR's - Downgoing b/l plantar response  Coord - FtN intact b/l  Gait and station - Due to clinical condition unable to assess    Pertinent labs/studies:  CBC with inc WBC 16.03, low H/H 10/33 and MCV WNL, otherwise essentially WNL  PT/INR WNL, PTT WNL, fibrinogen dec 71  BMP with inc Na 146, otherwise essentially WNL  Albumin 4.3, LFT's WNL  Mg WNL, Phos WNL  A1C inc 6.7%, TSH WNL, T4 WNL    < from: CT Chest No Cont (11.13.23 @ 22:50) >  Fluid density in anterior portion of superior cardiac recess which limits   the evaluation of the anterior mediastinum. Within this limitation, there   is no soft tissue density to suggest residual thymictissue. Median   sternotomy and postsurgical changes in the mediastinal.  Atelectasis in left lower lobe.    < end of copied text >

## 2023-11-17 LAB
ALBUMIN SERPL ELPH-MCNC: 4.2 G/DL — SIGNIFICANT CHANGE UP (ref 3.3–5)
ALP SERPL-CCNC: 36 U/L — LOW (ref 40–120)
ALT FLD-CCNC: 10 U/L — SIGNIFICANT CHANGE UP (ref 10–45)
ANION GAP SERPL CALC-SCNC: 12 MMOL/L — SIGNIFICANT CHANGE UP (ref 5–17)
APTT BLD: 27.3 SEC — SIGNIFICANT CHANGE UP (ref 24.5–35.6)
AST SERPL-CCNC: 12 U/L — SIGNIFICANT CHANGE UP (ref 10–40)
BILIRUB SERPL-MCNC: 0.4 MG/DL — SIGNIFICANT CHANGE UP (ref 0.2–1.2)
BUN SERPL-MCNC: 27 MG/DL — HIGH (ref 7–23)
CA-I BLD-SCNC: 1.21 MMOL/L — SIGNIFICANT CHANGE UP (ref 1.15–1.33)
CALCIUM SERPL-MCNC: 8.9 MG/DL — SIGNIFICANT CHANGE UP (ref 8.4–10.5)
CHLORIDE SERPL-SCNC: 108 MMOL/L — SIGNIFICANT CHANGE UP (ref 96–108)
CO2 SERPL-SCNC: 24 MMOL/L — SIGNIFICANT CHANGE UP (ref 22–31)
CREAT SERPL-MCNC: 0.46 MG/DL — LOW (ref 0.5–1.3)
EGFR: 107 ML/MIN/1.73M2 — SIGNIFICANT CHANGE UP
FIBRINOGEN PPP-MCNC: 123 MG/DL — LOW (ref 200–445)
GAS PNL BLDA: SIGNIFICANT CHANGE UP
GLUCOSE SERPL-MCNC: 143 MG/DL — HIGH (ref 70–99)
HCT VFR BLD CALC: 29.5 % — LOW (ref 34.5–45)
HGB BLD-MCNC: 9.6 G/DL — LOW (ref 11.5–15.5)
INR BLD: 1.08 RATIO — SIGNIFICANT CHANGE UP (ref 0.85–1.18)
MCHC RBC-ENTMCNC: 29.4 PG — SIGNIFICANT CHANGE UP (ref 27–34)
MCHC RBC-ENTMCNC: 32.5 GM/DL — SIGNIFICANT CHANGE UP (ref 32–36)
MCV RBC AUTO: 90.2 FL — SIGNIFICANT CHANGE UP (ref 80–100)
NRBC # BLD: 0 /100 WBCS — SIGNIFICANT CHANGE UP (ref 0–0)
PLATELET # BLD AUTO: 251 K/UL — SIGNIFICANT CHANGE UP (ref 150–400)
POTASSIUM SERPL-MCNC: 3.3 MMOL/L — LOW (ref 3.5–5.3)
POTASSIUM SERPL-SCNC: 3.3 MMOL/L — LOW (ref 3.5–5.3)
PROT SERPL-MCNC: 5 G/DL — LOW (ref 6–8.3)
PROTHROM AB SERPL-ACNC: 11.3 SEC — SIGNIFICANT CHANGE UP (ref 9.5–13)
RBC # BLD: 3.27 M/UL — LOW (ref 3.8–5.2)
RBC # FLD: 15.5 % — HIGH (ref 10.3–14.5)
SODIUM SERPL-SCNC: 144 MMOL/L — SIGNIFICANT CHANGE UP (ref 135–145)
WBC # BLD: 14.61 K/UL — HIGH (ref 3.8–10.5)
WBC # FLD AUTO: 14.61 K/UL — HIGH (ref 3.8–10.5)

## 2023-11-17 PROCEDURE — 99291 CRITICAL CARE FIRST HOUR: CPT

## 2023-11-17 PROCEDURE — 36514 APHERESIS PLASMA: CPT

## 2023-11-17 PROCEDURE — 71045 X-RAY EXAM CHEST 1 VIEW: CPT | Mod: 26

## 2023-11-17 RX ORDER — POTASSIUM CHLORIDE 20 MEQ
40 PACKET (EA) ORAL EVERY 4 HOURS
Refills: 0 | Status: COMPLETED | OUTPATIENT
Start: 2023-11-17 | End: 2023-11-17

## 2023-11-17 RX ADMIN — Medication 650 MILLIGRAM(S): at 21:30

## 2023-11-17 RX ADMIN — CHLORHEXIDINE GLUCONATE 15 MILLILITER(S): 213 SOLUTION TOPICAL at 17:02

## 2023-11-17 RX ADMIN — Medication 3 MILLILITER(S): at 11:09

## 2023-11-17 RX ADMIN — CHLORHEXIDINE GLUCONATE 1 APPLICATION(S): 213 SOLUTION TOPICAL at 05:31

## 2023-11-17 RX ADMIN — Medication 3 MILLILITER(S): at 23:40

## 2023-11-17 RX ADMIN — Medication 650 MILLIGRAM(S): at 05:30

## 2023-11-17 RX ADMIN — Medication 125 MILLIGRAM(S): at 13:47

## 2023-11-17 RX ADMIN — CHLORHEXIDINE GLUCONATE 1 APPLICATION(S): 213 SOLUTION TOPICAL at 21:29

## 2023-11-17 RX ADMIN — Medication 3 MILLILITER(S): at 00:25

## 2023-11-17 RX ADMIN — Medication 650 MILLIGRAM(S): at 06:15

## 2023-11-17 RX ADMIN — Medication 125 MILLIGRAM(S): at 17:01

## 2023-11-17 RX ADMIN — ENOXAPARIN SODIUM 40 MILLIGRAM(S): 100 INJECTION SUBCUTANEOUS at 21:29

## 2023-11-17 RX ADMIN — Medication 40 MILLIEQUIVALENT(S): at 06:44

## 2023-11-17 RX ADMIN — PANTOPRAZOLE SODIUM 40 MILLIGRAM(S): 20 TABLET, DELAYED RELEASE ORAL at 06:44

## 2023-11-17 RX ADMIN — AZATHIOPRINE 50 MILLIGRAM(S): 100 TABLET ORAL at 05:30

## 2023-11-17 RX ADMIN — Medication 3 MILLILITER(S): at 17:06

## 2023-11-17 RX ADMIN — Medication 60 MILLIGRAM(S): at 05:30

## 2023-11-17 RX ADMIN — POLYETHYLENE GLYCOL 3350 17 GRAM(S): 17 POWDER, FOR SOLUTION ORAL at 05:31

## 2023-11-17 RX ADMIN — Medication 4: at 11:34

## 2023-11-17 RX ADMIN — Medication 40 MILLIEQUIVALENT(S): at 10:14

## 2023-11-17 RX ADMIN — CHLORHEXIDINE GLUCONATE 15 MILLILITER(S): 213 SOLUTION TOPICAL at 05:31

## 2023-11-17 RX ADMIN — Medication 2: at 05:31

## 2023-11-17 RX ADMIN — Medication 650 MILLIGRAM(S): at 22:00

## 2023-11-17 RX ADMIN — Medication 3 MILLILITER(S): at 05:23

## 2023-11-17 RX ADMIN — AZATHIOPRINE 50 MILLIGRAM(S): 100 TABLET ORAL at 17:04

## 2023-11-17 NOTE — PROGRESS NOTE ADULT - ASSESSMENT
ASSESSMENT/PLAN: 62 y/o female with myasthenia gravis, respiratory failure. Demonstrating worsening respiratory effort on SBT, NIF and FVC below baseline required for extubation to BiPAP despite completion of IVIG. Plan is to d/w PT, family risks & benefits of PLEX - patient consented to insertion of shiley, initiation of PLEX - S/P tx w/ PLEX 1/5. Care initiated w/ [facility] neuromuscular neurology Dr Covarrubias. PT shows signs of improved respiratory effort. Continues to CPAP well, Secretions are improving at this time    NEURO:  Neurochecks q4h  S/P completion of PLEX 5/5  started on Solumedrol 125q6 x 2 days per neurology  patient on azathioprine on 11/10   Recommend early ambulation w/ PT/OT  Neurology following  CT chest - no evidence of thymoma   Hold Mestinon, Will consider restarting prior to extubation, currently held in setting of copious secretions, retrial extubation in AM  Avoid medications that may worsen the current exacerbation including macrolides, fluoroquinolones, tetracyclines, aminoglycoside, beta-blockers, magnesium, and anti-arrhythmics (procainamide, quinidine, and lidocaine), muscle relaxants   Activity: [x] mobilize as tolerated [] Bedrest [x] PT [x] OT [] PMNR    PULM:  Intubated for airway protection  CPAP as tolerated; daily SBTs, tolerated CPAP this AM  CT chest- with atelectasis, no evidence of residual thymoma   CXR reviewed- improved   Duonebs q6h for secretions (oral)  Check NIF/VC Q6 hours in ICU setting  11/16- NIF -17/  Aggressive Chest PT    CV:  MAP>65  TTE - EF 54%, elevated right atrial pressure     RENAL:  Stable renal function  IVL  daily IOs    GI:  Diet: ON TF  GI prophylaxis: PPI while on steroids + intubated  Bowel regimen: miralax, senna; dulcolax suppositories PRN  LBM: 11/16    ENDO:   FS goal 120-180  Medium scale  A1C 6.7  ISS     HEME/ONC:  Monitor H/H  SCDs  Chemoppx: SQL  LED negative     ID:  No fevers, WBC count trending down   copious thick inline secretions, oral secretions  No signs of infection     Disposition: ICU ASSESSMENT/PLAN: 64 y/o female with myasthenia gravis, respiratory failure. Demonstrating worsening respiratory effort on SBT, NIF and FVC below baseline required for extubation to BiPAP despite completion of IVIG. Plan is to d/w PT, family risks & benefits of PLEX - patient consented to insertion of shiley, initiation of PLEX - S/P tx w/ PLEX 1/5. Care initiated w/ [facility] neuromuscular neurology Dr Covarrubias. PT shows signs of improved respiratory effort. Continues to CPAP well, Secretions are improving at this time    NEURO:  Neurochecks q4h  S/P completion of PLEX 5/5  started on Solumedrol 125q6 x 2 days per neurology  patient on azathioprine on 11/10   Recommend early ambulation w/ PT/OT  Neurology following  CT chest - no evidence of thymoma   Hold Mestinon, Will consider restarting prior to extubation, currently held in setting of copious secretions, retrial extubation in AM  Avoid medications that may worsen the current exacerbation including macrolides, fluoroquinolones, tetracyclines, aminoglycoside, beta-blockers, magnesium, and anti-arrhythmics (procainamide, quinidine, and lidocaine), muscle relaxants   Activity: [x] mobilize as tolerated [] Bedrest [x] PT [x] OT [] PMNR    PULM:  Intubated for airway protection  CPAP as tolerated; daily SBTs, tolerated CPAP this AM  CT chest- with atelectasis, no evidence of residual thymoma   CXR reviewed- improved   Duonebs q6h for secretions (oral)  Check NIF/VC Q6 hours in ICU setting  11/16- NIF -17/  Aggressive Chest PT    CV:  MAP>65  TTE - EF 54%, elevated right atrial pressure     RENAL:  Stable renal function  IVL  daily IOs    GI:  Diet: ON TF  GI prophylaxis: PPI while on steroids + intubated  Bowel regimen: miralax, senna; dulcolax suppositories PRN  LBM: 11/16    ENDO:   FS goal 120-180  Medium scale  A1C 6.7  ISS     HEME/ONC:  Monitor H/H  SCDs  Chemoppx: SQL  LED negative     ID:  No fevers, WBC count trending down   copious thick inline secretions, oral secretions  No signs of infection     Disposition: ICU ASSESSMENT/PLAN: 62 y/o female with myasthenia gravis, respiratory failure. Demonstrating worsening respiratory effort on SBT, NIF and FVC below baseline required for extubation to BiPAP despite completion of IVIG. Plan is to d/w PT, family risks & benefits of PLEX - patient consented to insertion of shiley, initiation of PLEX - S/P tx w/ PLEX 1/5. Care initiated w/ [facility] neuromuscular neurology Dr Covarrubias. PT shows signs of improved respiratory effort. Continues to CPAP well, Secretions are improving at this time    NEURO:  Neurochecks q4h  S/P completion of PLEX 5/5  started on Solumedrol 125q6 x 2 days per neurology  patient on azathioprine on 11/10   Recommend early ambulation w/ PT/OT  Neurology following  CT chest - no evidence of thymoma   Hold Mestinon, Will consider restarting prior to extubation, currently held in setting of copious secretions, retrial extubation in AM  Avoid medications that may worsen the current exacerbation including macrolides, fluoroquinolones, tetracyclines, aminoglycoside, beta-blockers, magnesium, and anti-arrhythmics (procainamide, quinidine, and lidocaine), muscle relaxants   Activity: [x] mobilize as tolerated [] Bedrest [x] PT [x] OT [] PMNR    PULM:  Intubated for airway protection  CPAP as tolerated; daily SBTs, tolerated CPAP this AM  CT chest- with atelectasis, no evidence of residual thymoma   CXR reviewed- improved   Duonebs q6h for secretions (oral)  Check NIF/VC Q6 hours in ICU setting  11/16- NIF -17/  Aggressive Chest PT    CV:  MAP>65  TTE - EF 54%, elevated right atrial pressure     RENAL:  Stable renal function  IVL  daily IOs    GI:  Diet: ON TF  GI prophylaxis: PPI while on steroids + intubated  Bowel regimen: miralax, senna; dulcolax suppositories PRN  LBM: 11/16    ENDO:   FS goal 120-180  Medium scale  A1C 6.7  ISS     HEME/ONC:  Monitor H/H  SCDs  Chemoppx: SQL  LED negative     ID:  No fevers, WBC count trending down   copious thick inline secretions, oral secretions  No signs of infection     Disposition: ICU ASSESSMENT/PLAN: 64 y/o female with myasthenia gravis, respiratory failure. Demonstrating worsening respiratory effort on SBT, NIF and FVC below baseline required for extubation to BiPAP despite completion of IVIG. Plan is to d/w PT, family risks & benefits of PLEX - patient consented to insertion of shiley, initiation of PLEX - S/P tx w/ PLEX 1/5. Care initiated w/ [facility] neuromuscular neurology Dr Covarrubias. PT shows signs of improved respiratory effort. Continues to CPAP well, Secretions are improving at this time    NEURO:  Neurochecks q4h  S/P completion of PLEX 5/5  started on Solumedrol 125q6 x 2 days per neurology  patient on azathioprine on 11/10   Recommend early ambulation w/ PT/OT  Neurology following  CT chest - no evidence of thymoma   Hold Mestinon, Will consider restarting prior to extubation, currently held in setting of copious secretions, retrial extubation in AM  Avoid medications that may worsen the current exacerbation including macrolides, fluoroquinolones, tetracyclines, aminoglycoside, beta-blockers, magnesium, and anti-arrhythmics (procainamide, quinidine, and lidocaine), muscle relaxants   Activity: [x] mobilize as tolerated [] Bedrest [x] PT [x] OT [] PMNR    PULM:  Intubated for airway protection  CPAP as tolerated; daily SBTs, tolerated CPAP this AM  CT chest- with atelectasis, no evidence of residual thymoma   CXR reviewed- improved   Duonebs q6h for secretions (oral)  Check NIF/VC Q6 hours in ICU setting  11/16- NIF -17/  Aggressive Chest PT    CV:  MAP>65  TTE - EF 54%, elevated right atrial pressure     RENAL:  Stable renal function  IVL  daily IOs    GI:  Diet: ON TF- Glucerna 1.2  GI prophylaxis: PPI while on steroids + intubated  Bowel regimen: held miralax, senna; dulcolax suppositories PRN due to multiple BM  LBM: 11/17    ENDO:   FS goal 120-180  Medium scale  A1C 6.7  ISS     HEME/ONC:  Monitor H/H  SCDs  Chemo ppx: SQL  LED negative     ID:  No fevers, WBC count trending down   copious thick inline secretions, oral secretions  No signs of infection     Disposition: ICU ASSESSMENT/PLAN: 64 y/o female with myasthenia gravis, respiratory failure. Demonstrating worsening respiratory effort on SBT, NIF and FVC below baseline required for extubation to BiPAP despite completion of IVIG. Plan is to d/w PT, family risks & benefits of PLEX - patient consented to insertion of shiley, initiation of PLEX - S/P tx w/ PLEX 1/5. Care initiated w/ [facility] neuromuscular neurology Dr Covarrubias. PT shows signs of improved respiratory effort. Continues to CPAP well, Secretions are improving at this time    NEURO:  Neurochecks q4h  S/P completion of PLEX 5/5  started on Solumedrol 125q6 x 2 days per neurology  patient on azathioprine on 11/10   Recommend early ambulation w/ PT/OT  Neurology following  CT chest - no evidence of thymoma   Hold Mestinon, Will consider restarting prior to extubation, currently held in setting of copious secretions, retrial extubation in AM  Avoid medications that may worsen the current exacerbation including macrolides, fluoroquinolones, tetracyclines, aminoglycoside, beta-blockers, magnesium, and anti-arrhythmics (procainamide, quinidine, and lidocaine), muscle relaxants   Activity: [x] mobilize as tolerated [] Bedrest [x] PT [x] OT [] PMNR    PULM:  Intubated for airway protection  CPAP as tolerated; daily SBTs, tolerated CPAP this AM  CT chest- with atelectasis, no evidence of residual thymoma   CXR reviewed- improved   Duonebs q6h for secretions (oral)  Check NIF/VC Q6 hours in ICU setting  11/16- NIF -17/  Aggressive Chest PT    CV:  MAP>65  TTE - EF 54%, elevated right atrial pressure     RENAL:  Stable renal function  IVL  daily IOs    GI:  Diet: ON TF- Glucerna 1.2  GI prophylaxis: PPI while on steroids + intubated  Bowel regimen: held miralax, senna; dulcolax suppositories PRN due to multiple BM  LBM: 11/17    ENDO:   FS goal 120-180  Medium scale  A1C 6.7  ISS     HEME/ONC:  Monitor H/H  SCDs  Chemo ppx: SQL  LED negative     ID:  No fevers, WBC count trending down   copious thick inline secretions, oral secretions  No signs of infection     Disposition: ICU ASSESSMENT/PLAN: 62 y/o female with myasthenia gravis, respiratory failure. Demonstrating worsening respiratory effort on SBT, NIF and FVC below baseline required for extubation to BiPAP despite completion of IVIG. Plan is to d/w PT, family risks & benefits of PLEX - patient consented to insertion of shiley, initiation of PLEX - S/P tx w/ PLEX 1/5. Care initiated w/ [facility] neuromuscular neurology Dr Covarrubias. PT shows signs of improved respiratory effort. Continues to CPAP well, Secretions are improving at this time    NEURO:  Neurochecks q4h  S/P completion of PLEX 5/5  started on Solumedrol 125q6 x 2 days per neurology  patient on azathioprine on 11/10   Recommend early ambulation w/ PT/OT  Neurology following  CT chest - no evidence of thymoma   Hold Mestinon, Will consider restarting prior to extubation, currently held in setting of copious secretions, retrial extubation in AM  Avoid medications that may worsen the current exacerbation including macrolides, fluoroquinolones, tetracyclines, aminoglycoside, beta-blockers, magnesium, and anti-arrhythmics (procainamide, quinidine, and lidocaine), muscle relaxants   Activity: [x] mobilize as tolerated [] Bedrest [x] PT [x] OT [] PMNR    PULM:  Intubated for airway protection  CPAP as tolerated; daily SBTs, tolerated CPAP this AM  CT chest- with atelectasis, no evidence of residual thymoma   CXR reviewed- improved   Duonebs q6h for secretions (oral)  Check NIF/VC Q6 hours in ICU setting  11/16- NIF -17/  Aggressive Chest PT    CV:  MAP>65  TTE - EF 54%, elevated right atrial pressure     RENAL:  Stable renal function  IVL  daily IOs    GI:  Diet: ON TF- Glucerna 1.2  GI prophylaxis: PPI while on steroids + intubated  Bowel regimen: held miralax, senna; dulcolax suppositories PRN due to multiple BM  LBM: 11/17    ENDO:   FS goal 120-180  Medium scale  A1C 6.7  ISS     HEME/ONC:  Monitor H/H  SCDs  Chemo ppx: SQL  LED negative     ID:  No fevers, WBC count trending down   copious thick inline secretions, oral secretions  No signs of infection     Disposition: ICU

## 2023-11-17 NOTE — PROGRESS NOTE ADULT - SUBJECTIVE AND OBJECTIVE BOX
HOSPITAL COURSE:  Patient KAIDEN HATCH is a 63y woman presenting with myasthenia gravis exacerbation.    Admission Scores  GCS: 15     24 hour Events: Patient evaluated at bedside. Reports improvement in symptoms w/ exception to copious oral secretions. No complaints of pain or dyspnea.     11/4- Patient intubated overnight for worsening respiratory status. Patient received dose 2/5 of IVIG  11/6- No events; poor respiratory effort on SBT  11/7- Apneic on SBT; worsening respiratory effort; last dose of IVIG TODAY  11/8- NIF -12 and  this AM. PT received shiley in right IJ w/ conscious sedation for initiation of PLEX. S/P tx 1/5 w/ PLEX.  11/9- NIF -20 and VC 630this AM.  11/10- Patient due for second dose of PLEX. No events overnight.   11/11- No events overnight. Has been tolerating CPAP since 5 AM. Oral secretions present. Strength improved.   11/12 no events, no PS too much secretions  11/13- Patient with improved secretions. No overnight events. Will get 3rd dose of PLEX today.   11/14- No overnight events.   11/15- No events overnight.   11/16- no acute events overnight. Patient still with thick inline secretions.  11/17- Patient tolerated CPAP for a bit before tiring out again. PLEX 5/5 completed    Allergies    peanuts (Unknown)  No Known Drug Allergies    Intolerances        REVIEW OF SYSTEMS: [ ] Unable to Assess due to neurologic exam   [X] All ROS addressed below are non-contributory, except:  Neuro: [ ] Headache [ ] Back pain [ ] Numbness [ ] Weakness [ ] Ataxia [ ] Dizziness [ ] Aphasia [ ] Dysarthria [ ] Visual disturbance  Resp: [ ] Shortness of breath/dyspnea, [ ] Orthopnea [ ] Cough  CV: [ ] Chest pain [ ] Palpitation [ ] Lightheadedness [ ] Syncope  Renal: [ ] Thirst [ ] Edema  GI: [ ] Nausea [ ] Emesis [ ] Abdominal pain [ ] Constipation [ ] Diarrhea  Hem: [ ] Hematemesis [ ] bright red blood per rectum  ID: [ ] Fever [ ] Chills [ ] Dysuria  ENT: [ ] Rhinorrhea      DEVICES:   [ ] Restraints [ ] ET tube [ ] central line [ ] arterial line [ ] morales [ ] NGT/OGT [ ] EVD [ ] LD [ ] MYAH/HMV [ ] Trach [ ] PEG [ ] Chest Tube     Vital Signs Last 24 Hrs  T(C): 36.8 (17 Nov 2023 15:00), Max: 37.1 (17 Nov 2023 05:00)  T(F): 98.3 (17 Nov 2023 15:00), Max: 98.8 (17 Nov 2023 05:00)  HR: 76 (17 Nov 2023 18:09) (60 - 109)  BP: 148/68 (17 Nov 2023 15:00) (130/61 - 148/68)  BP(mean): 91 (17 Nov 2023 15:00) (80 - 92)  RR: 20 (17 Nov 2023 15:00) (20 - 20)  SpO2: 100% (17 Nov 2023 18:09) (97% - 100%)    Parameters below as of 17 Nov 2023 15:00  Patient On (Oxygen Delivery Method): ventilator      CAPILLARY BLOOD GLUCOSE  POCT Blood Glucose.: 157 mg/dL (17 Nov 2023 05:18)  POCT Blood Glucose.: 140 mg/dL (16 Nov 2023 23:40)  POCT Blood Glucose.: 146 mg/dL (16 Nov 2023 18:01)    I&O's Summary    16 Nov 2023 07:01  -  17 Nov 2023 07:00  --------------------------------------------------------  IN: 1415 mL / OUT: 560 mL / NET: 855 mL    17 Nov 2023 07:01  -  17 Nov 2023 19:49  --------------------------------------------------------  IN: 585 mL / OUT: 900 mL / NET: -315 mL      Respiratory:  Mode: CPAP with PS  FiO2: 30  PEEP: 5  PS: 10  MAP: 10  PIP: 16    ABG - ( 17 Nov 2023 04:20 )  pH, Arterial: 7.49  pH, Blood: x     /  pCO2: 36    /  pO2: 104   / HCO3: 27    / Base Excess: 3.9   /  SaO2: 99.4      LABS:                        9.6    14.61 )-----------( 251      ( 17 Nov 2023 04:46 )             29.5   11-17    144  |  108  |  27<H>  ----------------------------<  143<H>  3.3<L>   |  24  |  0.46<L>    Ca    8.9      17 Nov 2023 04:46  Phos  3.5     11-17  Mg     2.3     11-17    TPro  5.0<L>  /  Alb  4.2  /  TBili  0.4  /  DBili  x   /  AST  12  /  ALT  10  /  AlkPhos  36<L>  11-17    MEDICATIONS  (STANDING):  albuterol/ipratropium for Nebulization 3 milliLiter(s) Nebulizer every 6 hours  azaTHIOprine 50 milliGRAM(s) Oral two times a day  chlorhexidine 0.12% Liquid 15 milliLiter(s) Oral Mucosa every 12 hours  chlorhexidine 4% Liquid 1 Application(s) Topical at bedtime  chlorhexidine 4% Liquid 1 Application(s) Topical <User Schedule>  enoxaparin Injectable 40 milliGRAM(s) SubCutaneous every 24 hours  insulin lispro (ADMELOG) corrective regimen sliding scale   SubCutaneous every 6 hours  methylPREDNISolone sodium succinate Injectable 125 milliGRAM(s) IV Push every 6 hours  pantoprazole   Suspension 40 milliGRAM(s) Oral before breakfast  polyethylene glycol 3350 17 Gram(s) Oral every 12 hours  senna 2 Tablet(s) Oral at bedtime    MEDICATIONS  (PRN):  acetaminophen   Oral Liquid .. 650 milliGRAM(s) Oral every 6 hours PRN Temp greater or equal to 38C (100.4F), Mild Pain (1 - 3)  sodium chloride 0.9% lock flush 10 milliLiter(s) IV Push every 1 hour PRN Pre/post blood products, medications, blood draw, and to maintain line patency    IMAGING: Reviewed      EXAMINATION:  PHYSICAL EXAM:    Constitutional: Intubated    Neurological: Awake, alert oriented to person, place and time (with choices). Following Commands, PERRL, EOMI, No Gaze Preference, Face Symmetrical. Left eye ptosis. NE/NF 5/5.    Motor exam:          Upper extremity                         Delt     Bicep     Tricep    HG                                                 R         4/5        5/5 5/5 5/5                                               L          4+/5        5/5 5/5 5/5          Lower extremity                        HF         KF        KE       DF         PF                                                  R        5/5 5/5 5/5 5/5 5/5                                               L         5/5 5/5 5/5 5/5 5/5                                                 Sensation: [x] intact to light touch  [ ] decreased:     Pulmonary: Clear to Auscultation, No rales, No rhonchi, No wheezes     Cardiovascular: S1, S2, Regular rate and rhythm     Gastrointestinal: Soft, Non-tender, Non-distended     Extremities: No calf tenderness     Incision:

## 2023-11-17 NOTE — PROGRESS NOTE ADULT - SUBJECTIVE AND OBJECTIVE BOX
HOSPITAL COURSE:  Patient KAIDEN HATCH is a 63y woman presenting with myasthenia gravis exacerbation.    Admission Scores  GCS: 15     24 hour Events: Patient evaluated at bedside. Reports improvement in symptoms w/ exception to copious oral secretions. No complaints of pain or dyspnea.     11/4- Patient intubated overnight for worsening respiratory status. Patient received dose 2/5 of IVIG  11/6- No events; poor respiratory effort on SBT  11/7- Apneic on SBT; worsening respiratory effort; last dose of IVIG TODAY  11/8- NIF -12 and  this AM. PT received shiley in right IJ w/ conscious sedation for initiation of PLEX. S/P tx 1/5 w/ PLEX.  11/9- NIF -20 and VC 630this AM.  11/10- Patient due for second dose of PLEX. No events overnight.   11/11- No events overnight. Has been tolerating CPAP since 5 AM. Oral secretions present. Strength improved.   11/12 no events, no PS too much secretions  11/13- Patient with improved secretions. No overnight events. Will get 3rd dose of PLEX today.   11/14- No overnight events.   11/15- No events overnight.   11/16- no acute events overnight. Patient still with thick inline secretions.  11/17- Patient tolerated CPAP for a bit before tiring out again. Will get her last dose of PLEX today.    Allergies    peanuts (Unknown)  No Known Drug Allergies    Intolerances        REVIEW OF SYSTEMS: [ ] Unable to Assess due to neurologic exam   [ ] All ROS addressed below are non-contributory, except:  Neuro: [ ] Headache [ ] Back pain [ ] Numbness [ ] Weakness [ ] Ataxia [ ] Dizziness [ ] Aphasia [ ] Dysarthria [ ] Visual disturbance  Resp: [ ] Shortness of breath/dyspnea, [ ] Orthopnea [ ] Cough  CV: [ ] Chest pain [ ] Palpitation [ ] Lightheadedness [ ] Syncope  Renal: [ ] Thirst [ ] Edema  GI: [ ] Nausea [ ] Emesis [ ] Abdominal pain [ ] Constipation [ ] Diarrhea  Hem: [ ] Hematemesis [ ] bright red blood per rectum  ID: [ ] Fever [ ] Chills [ ] Dysuria  ENT: [ ] Rhinorrhea      DEVICES:   [ ] Restraints [ ] ET tube [ ] central line [ ] arterial line [ ] morales [ ] NGT/OGT [ ] EVD [ ] LD [ ] MYAH/HMV [ ] Trach [ ] PEG [ ] Chest Tube     VITALS:   Vital Signs Last 24 Hrs  T(C): 37.1 (17 Nov 2023 05:00), Max: 37.1 (17 Nov 2023 05:00)  T(F): 98.8 (17 Nov 2023 05:00), Max: 98.8 (17 Nov 2023 05:00)  HR: 72 (17 Nov 2023 05:32) (60 - 109)  BP: 123/62 (16 Nov 2023 19:00) (123/62 - 132/66)  BP(mean): 80 (16 Nov 2023 19:00) (77 - 86)  RR: 20 (16 Nov 2023 19:00) (20 - 31)  SpO2: 99% (17 Nov 2023 05:32) (95% - 100%)    Parameters below as of 17 Nov 2023 05:32  Patient On (Oxygen Delivery Method): ventilator      CAPILLARY BLOOD GLUCOSE      POCT Blood Glucose.: 157 mg/dL (17 Nov 2023 05:18)  POCT Blood Glucose.: 140 mg/dL (16 Nov 2023 23:40)  POCT Blood Glucose.: 146 mg/dL (16 Nov 2023 18:01)    I&O's Summary    15 Nov 2023 07:01  -  16 Nov 2023 07:00  --------------------------------------------------------  IN: 2575 mL / OUT: 550 mL / NET: 2025 mL    16 Nov 2023 07:01  -  17 Nov 2023 06:37  --------------------------------------------------------  IN: 1415 mL / OUT: 560 mL / NET: 855 mL        Respiratory:  Mode: CPAP with PS  FiO2: 30  PEEP: 5  PS: 10  MAP: 10  PIP: 16    ABG - ( 17 Nov 2023 04:20 )  pH, Arterial: 7.49  pH, Blood: x     /  pCO2: 36    /  pO2: 104   / HCO3: 27    / Base Excess: 3.9   /  SaO2: 99.4                LABS:                        9.6    14.61 )-----------( 251      ( 17 Nov 2023 04:46 )             29.5     11-17    144  |  108  |  27<H>  ----------------------------<  143<H>  3.3<L>   |  24  |  0.46<L>             MEDICATION LEVELS:     IVF FLUIDS/MEDICATIONS:   MEDICATIONS  (STANDING):  albuterol/ipratropium for Nebulization 3 milliLiter(s) Nebulizer every 6 hours  azaTHIOprine 50 milliGRAM(s) Oral two times a day  chlorhexidine 0.12% Liquid 15 milliLiter(s) Oral Mucosa every 12 hours  chlorhexidine 4% Liquid 1 Application(s) Topical <User Schedule>  chlorhexidine 4% Liquid 1 Application(s) Topical at bedtime  enoxaparin Injectable 40 milliGRAM(s) SubCutaneous every 24 hours  insulin lispro (ADMELOG) corrective regimen sliding scale   SubCutaneous every 6 hours  pantoprazole   Suspension 40 milliGRAM(s) Oral before breakfast  polyethylene glycol 3350 17 Gram(s) Oral every 12 hours  potassium chloride   Solution 40 milliEquivalent(s) Oral every 4 hours  predniSONE   Tablet 60 milliGRAM(s) Oral daily  senna 2 Tablet(s) Oral at bedtime    MEDICATIONS  (PRN):  acetaminophen   Oral Liquid .. 650 milliGRAM(s) Oral every 6 hours PRN Temp greater or equal to 38C (100.4F), Mild Pain (1 - 3)  sodium chloride 0.9% lock flush 10 milliLiter(s) IV Push every 1 hour PRN Pre/post blood products, medications, blood draw, and to maintain line patency        IMAGING:      EXAMINATION:  PHYSICAL EXAM:    Constitutional: No Acute Distress     Neurological: Awake, alert oriented to person, place and time, Following Commands, PERRL, EOMI, No Gaze Preference, Face Symmetrical, Speech Fluent, No dysmetria, No ataxia, No nystagmus     Motor exam:          Upper extremity                         Delt     Bicep     Tricep    HG                                                 R         5/5        5/5        5/5       5/5                                               L          5/5        5/5        5/5       5/5          Lower extremity                        HF         KF        KE       DF         PF                                                  R        5/5        5/5        5/5       5/5         5/5                                               L         5/5 5/5 5/5 5/5 5/5                                                 Sensation: [ ] intact to light touch  [ ] decreased:     Reflexes: Deep Tendon Reflexes Intact     Pulmonary: Clear to Auscultation, No rales, No rhonchi, No wheezes     Cardiovascular: S1, S2, Regular rate and rhythm     Gastrointestinal: Soft, Non-tender, Non-distended     Extremities: No calf tenderness     Incision:    HOSPITAL COURSE:  Patient KAIDEN HATCH is a 63y woman presenting with myasthenia gravis exacerbation.    Admission Scores  GCS: 15     24 hour Events: Patient evaluated at bedside. Reports improvement in symptoms w/ exception to copious oral secretions. No complaints of pain or dyspnea.     11/4- Patient intubated overnight for worsening respiratory status. Patient received dose 2/5 of IVIG  11/6- No events; poor respiratory effort on SBT  11/7- Apneic on SBT; worsening respiratory effort; last dose of IVIG TODAY  11/8- NIF -12 and  this AM. PT received shiley in right IJ w/ conscious sedation for initiation of PLEX. S/P tx 1/5 w/ PLEX.  11/9- NIF -20 and VC 630this AM.  11/10- Patient due for second dose of PLEX. No events overnight.   11/11- No events overnight. Has been tolerating CPAP since 5 AM. Oral secretions present. Strength improved.   11/12 no events, no PS too much secretions  11/13- Patient with improved secretions. No overnight events. Will get 3rd dose of PLEX today.   11/14- No overnight events.   11/15- No events overnight.   11/16- no acute events overnight. Patient still with thick inline secretions.  11/17- Patient tolerated CPAP for a bit before tiring out again. Will get her last dose of PLEX today.    Allergies    peanuts (Unknown)  No Known Drug Allergies    Intolerances        REVIEW OF SYSTEMS: [ ] Unable to Assess due to neurologic exam   [X] All ROS addressed below are non-contributory, except:  Neuro: [ ] Headache [ ] Back pain [ ] Numbness [ ] Weakness [ ] Ataxia [ ] Dizziness [ ] Aphasia [ ] Dysarthria [ ] Visual disturbance  Resp: [ ] Shortness of breath/dyspnea, [ ] Orthopnea [ ] Cough  CV: [ ] Chest pain [ ] Palpitation [ ] Lightheadedness [ ] Syncope  Renal: [ ] Thirst [ ] Edema  GI: [ ] Nausea [ ] Emesis [ ] Abdominal pain [ ] Constipation [ ] Diarrhea  Hem: [ ] Hematemesis [ ] bright red blood per rectum  ID: [ ] Fever [ ] Chills [ ] Dysuria  ENT: [ ] Rhinorrhea      DEVICES:   [ ] Restraints [ ] ET tube [ ] central line [ ] arterial line [ ] morales [ ] NGT/OGT [ ] EVD [ ] LD [ ] MYAH/HMV [ ] Trach [ ] PEG [ ] Chest Tube     VITALS:   Vital Signs Last 24 Hrs  T(C): 37.1 (17 Nov 2023 05:00), Max: 37.1 (17 Nov 2023 05:00)  T(F): 98.8 (17 Nov 2023 05:00), Max: 98.8 (17 Nov 2023 05:00)  HR: 72 (17 Nov 2023 05:32) (60 - 109)  BP: 123/62 (16 Nov 2023 19:00) (123/62 - 132/66)  BP(mean): 80 (16 Nov 2023 19:00) (77 - 86)  RR: 20 (16 Nov 2023 19:00) (20 - 31)  SpO2: 99% (17 Nov 2023 05:32) (95% - 100%)    Parameters below as of 17 Nov 2023 05:32  Patient On (Oxygen Delivery Method): ventilator      CAPILLARY BLOOD GLUCOSE      POCT Blood Glucose.: 157 mg/dL (17 Nov 2023 05:18)  POCT Blood Glucose.: 140 mg/dL (16 Nov 2023 23:40)  POCT Blood Glucose.: 146 mg/dL (16 Nov 2023 18:01)    I&O's Summary    15 Nov 2023 07:01  -  16 Nov 2023 07:00  --------------------------------------------------------  IN: 2575 mL / OUT: 550 mL / NET: 2025 mL    16 Nov 2023 07:01  -  17 Nov 2023 06:37  --------------------------------------------------------  IN: 1415 mL / OUT: 560 mL / NET: 855 mL        Respiratory:  Mode: CPAP with PS  FiO2: 30  PEEP: 5  PS: 10  MAP: 10  PIP: 16    ABG - ( 17 Nov 2023 04:20 )  pH, Arterial: 7.49  pH, Blood: x     /  pCO2: 36    /  pO2: 104   / HCO3: 27    / Base Excess: 3.9   /  SaO2: 99.4                LABS:                        9.6    14.61 )-----------( 251      ( 17 Nov 2023 04:46 )             29.5     11-17    144  |  108  |  27<H>  ----------------------------<  143<H>  3.3<L>   |  24  |  0.46<L>             MEDICATION LEVELS:     IVF FLUIDS/MEDICATIONS:   MEDICATIONS  (STANDING):  albuterol/ipratropium for Nebulization 3 milliLiter(s) Nebulizer every 6 hours  azaTHIOprine 50 milliGRAM(s) Oral two times a day  chlorhexidine 0.12% Liquid 15 milliLiter(s) Oral Mucosa every 12 hours  chlorhexidine 4% Liquid 1 Application(s) Topical <User Schedule>  chlorhexidine 4% Liquid 1 Application(s) Topical at bedtime  enoxaparin Injectable 40 milliGRAM(s) SubCutaneous every 24 hours  insulin lispro (ADMELOG) corrective regimen sliding scale   SubCutaneous every 6 hours  pantoprazole   Suspension 40 milliGRAM(s) Oral before breakfast  polyethylene glycol 3350 17 Gram(s) Oral every 12 hours  potassium chloride   Solution 40 milliEquivalent(s) Oral every 4 hours  predniSONE   Tablet 60 milliGRAM(s) Oral daily  senna 2 Tablet(s) Oral at bedtime    MEDICATIONS  (PRN):  acetaminophen   Oral Liquid .. 650 milliGRAM(s) Oral every 6 hours PRN Temp greater or equal to 38C (100.4F), Mild Pain (1 - 3)  sodium chloride 0.9% lock flush 10 milliLiter(s) IV Push every 1 hour PRN Pre/post blood products, medications, blood draw, and to maintain line patency        IMAGING:      EXAMINATION:  PHYSICAL EXAM:    Constitutional: Intubated    Neurological: Awake, alert oriented to person, place and time (with choices). Following Commands, PERRL, EOMI, No Gaze Preference, Face Symmetrical. Left eye ptosis. NE/NF 5/5.    Motor exam:          Upper extremity                         Delt     Bicep     Tricep    HG                                                 R         4/5        5/5        5/5       5/5                                               L          4+/5        5/5        5/5       5/5          Lower extremity                        HF         KF        KE       DF         PF                                                  R        5/5        5/5        5/5       5/5         5/5                                               L         5/5 5/5 5/5 5/5 5/5                                                 Sensation: [x] intact to light touch  [ ] decreased:     Pulmonary: Clear to Auscultation, No rales, No rhonchi, No wheezes     Cardiovascular: S1, S2, Regular rate and rhythm     Gastrointestinal: Soft, Non-tender, Non-distended     Extremities: No calf tenderness     Incision:

## 2023-11-17 NOTE — PROGRESS NOTE ADULT - ASSESSMENT
ASSESSMENT/PLAN: 64 y/o female with myasthenia gravis, respiratory failure. Demonstrating worsening respiratory effort on SBT, NIF and FVC below baseline required for extubation to BiPAP despite completion of IVIG. Plan is to d/w PT, family risks & benefits of PLEX - patient consented to insertion of shiley, initiation of PLEX - S/P tx w/ PLEX 1/5. Care initiated w/ [facility] neuromuscular neurology Dr Covarrubias. PT shows signs of improved respiratory effort. Continues to CPAP well, Secretions are improving at this time    NEURO:  Neurochecks q4h  s/p PLEX tx 4/5, next dose today 11/17  patient on azathioprine on 11/10   Recommend early ambulation w/ PT/OT  Neurology following  CT chest - no evidence of thymoma   Hold Mestinon, Will consider restarting prior to extubation, currently held in setting of copious secretions, retrial extubation in AM  Continue Prednisone to 60 mg QD  Avoid medications that may worsen the current exacerbation including macrolides, fluoroquinolones, tetracyclines, aminoglycoside, beta-blockers, magnesium, and anti-arrhythmics (procainamide, quinidine, and lidocaine), muscle relaxants   Activity: [x] mobilize as tolerated [] Bedrest [x] PT [x] OT [] PMNR    PULM:  Intubated for airway protection  CPAP as tolerated; daily SBTs, tolerated CPAP this AM  CT chest- with atelectasis, no evidence of residual thymoma   CXR reviewed- improved   Duonebs q6h for secretions (oral)  Check NIF/VC Q6 hours in ICU setting  11/16- NIF -22/   Aggressive Chest PT    CV:  MAP>65  TTE - EF 54%, elevated right atrial pressure     RENAL:  Stable renal function  IVL  daily IOs    GI:  Diet: ON TF  GI prophylaxis: PPI while on steroids + intubated  Bowel regimen: miralax, senna; dulcolax suppositories PRN  LBM: 11/16    ENDO:   FS goal 120-180  Medium scale  A1C 6.7  ISS     HEME/ONC:  Monitor H/H  SCDs  Chemoppx: SQL  LED negative     ID:  No fevers, WBC count trending down   copious thick inline secretions, oral secretions  No signs of infection     Disposition: ICU ASSESSMENT/PLAN: 62 y/o female with myasthenia gravis, respiratory failure. Demonstrating worsening respiratory effort on SBT, NIF and FVC below baseline required for extubation to BiPAP despite completion of IVIG. Plan is to d/w PT, family risks & benefits of PLEX - patient consented to insertion of shiley, initiation of PLEX - S/P tx w/ PLEX 1/5. Care initiated w/ [facility] neuromuscular neurology Dr Covarrubias. PT shows signs of improved respiratory effort. Continues to CPAP well, Secretions are improving at this time    NEURO:  Neurochecks q4h  s/p PLEX tx 4/5, next dose today 11/17  patient on azathioprine on 11/10   Recommend early ambulation w/ PT/OT  Neurology following  CT chest - no evidence of thymoma   Hold Mestinon, Will consider restarting prior to extubation, currently held in setting of copious secretions, retrial extubation in AM  Continue Prednisone to 60 mg QD  Avoid medications that may worsen the current exacerbation including macrolides, fluoroquinolones, tetracyclines, aminoglycoside, beta-blockers, magnesium, and anti-arrhythmics (procainamide, quinidine, and lidocaine), muscle relaxants   Activity: [x] mobilize as tolerated [] Bedrest [x] PT [x] OT [] PMNR    PULM:  Intubated for airway protection  CPAP as tolerated; daily SBTs, tolerated CPAP this AM  CT chest- with atelectasis, no evidence of residual thymoma   CXR reviewed- improved   Duonebs q6h for secretions (oral)  Check NIF/VC Q6 hours in ICU setting  11/16- NIF -22/   Aggressive Chest PT    CV:  MAP>65  TTE - EF 54%, elevated right atrial pressure     RENAL:  Stable renal function  IVL  daily IOs    GI:  Diet: ON TF  GI prophylaxis: PPI while on steroids + intubated  Bowel regimen: miralax, senna; dulcolax suppositories PRN  LBM: 11/16    ENDO:   FS goal 120-180  Medium scale  A1C 6.7  ISS     HEME/ONC:  Monitor H/H  SCDs  Chemoppx: SQL  LED negative     ID:  No fevers, WBC count trending down   copious thick inline secretions, oral secretions  No signs of infection     Disposition: ICU ASSESSMENT/PLAN: 64 y/o female with myasthenia gravis, respiratory failure. Demonstrating worsening respiratory effort on SBT, NIF and FVC below baseline required for extubation to BiPAP despite completion of IVIG. Plan is to d/w PT, family risks & benefits of PLEX - patient consented to insertion of shiley, initiation of PLEX - S/P tx w/ PLEX 1/5. Care initiated w/ [facility] neuromuscular neurology Dr Covarrubias. PT shows signs of improved respiratory effort. Continues to CPAP well, Secretions are improving at this time    NEURO:  Neurochecks q4h  s/p PLEX tx 4/5, next dose today 11/17  patient on azathioprine on 11/10   Recommend early ambulation w/ PT/OT  Neurology following  CT chest - no evidence of thymoma   Hold Mestinon, Will consider restarting prior to extubation, currently held in setting of copious secretions, retrial extubation in AM  Continue Prednisone to 60 mg QD  Avoid medications that may worsen the current exacerbation including macrolides, fluoroquinolones, tetracyclines, aminoglycoside, beta-blockers, magnesium, and anti-arrhythmics (procainamide, quinidine, and lidocaine), muscle relaxants   Activity: [x] mobilize as tolerated [] Bedrest [x] PT [x] OT [] PMNR    PULM:  Intubated for airway protection  CPAP as tolerated; daily SBTs, tolerated CPAP this AM  CT chest- with atelectasis, no evidence of residual thymoma   CXR reviewed- improved   Duonebs q6h for secretions (oral)  Check NIF/VC Q6 hours in ICU setting  11/16- NIF -22/   Aggressive Chest PT    CV:  MAP>65  TTE - EF 54%, elevated right atrial pressure     RENAL:  Stable renal function  IVL  daily IOs    GI:  Diet: ON TF  GI prophylaxis: PPI while on steroids + intubated  Bowel regimen: miralax, senna; dulcolax suppositories PRN  LBM: 11/16    ENDO:   FS goal 120-180  Medium scale  A1C 6.7  ISS     HEME/ONC:  Monitor H/H  SCDs  Chemoppx: SQL  LED negative     ID:  No fevers, WBC count trending down   copious thick inline secretions, oral secretions  No signs of infection     Disposition: ICU ASSESSMENT/PLAN: 62 y/o female with myasthenia gravis, respiratory failure. Demonstrating worsening respiratory effort on SBT, NIF and FVC below baseline required for extubation to BiPAP despite completion of IVIG. Plan is to d/w PT, family risks & benefits of PLEX - patient consented to insertion of shiley, initiation of PLEX - S/P tx w/ PLEX 1/5. Care initiated w/ [facility] neuromuscular neurology Dr Covarrubias. PT shows signs of improved respiratory effort. Continues to CPAP well, Secretions are improving at this time    NEURO:  Neurochecks q4h  s/p PLEX tx 4/5, next dose today 11/17  patient on azathioprine on 11/10   Recommend early ambulation w/ PT/OT  Neurology following  CT chest - no evidence of thymoma   Hold Mestinon, Will consider restarting prior to extubation, currently held in setting of copious secretions, retrial extubation in AM  Continue Prednisone to 60 mg QD  Avoid medications that may worsen the current exacerbation including macrolides, fluoroquinolones, tetracyclines, aminoglycoside, beta-blockers, magnesium, and anti-arrhythmics (procainamide, quinidine, and lidocaine), muscle relaxants   Activity: [x] mobilize as tolerated [] Bedrest [x] PT [x] OT [] PMNR    PULM:  Intubated for airway protection  CPAP as tolerated; daily SBTs, tolerated CPAP this AM  CT chest- with atelectasis, no evidence of residual thymoma   CXR reviewed- improved   Duonebs q6h for secretions (oral)  Check NIF/VC Q6 hours in ICU setting  11/16- NIF -17/  Aggressive Chest PT    CV:  MAP>65  TTE - EF 54%, elevated right atrial pressure     RENAL:  Stable renal function  IVL  daily IOs    GI:  Diet: ON TF  GI prophylaxis: PPI while on steroids + intubated  Bowel regimen: miralax, senna; dulcolax suppositories PRN  LBM: 11/16    ENDO:   FS goal 120-180  Medium scale  A1C 6.7  ISS     HEME/ONC:  Monitor H/H  SCDs  Chemoppx: SQL  LED negative     ID:  No fevers, WBC count trending down   copious thick inline secretions, oral secretions  No signs of infection     Disposition: ICU ASSESSMENT/PLAN: 64 y/o female with myasthenia gravis, respiratory failure. Demonstrating worsening respiratory effort on SBT, NIF and FVC below baseline required for extubation to BiPAP despite completion of IVIG. Plan is to d/w PT, family risks & benefits of PLEX - patient consented to insertion of shiley, initiation of PLEX - S/P tx w/ PLEX 1/5. Care initiated w/ [facility] neuromuscular neurology Dr Covarrubias. PT shows signs of improved respiratory effort. Continues to CPAP well, Secretions are improving at this time    NEURO:  Neurochecks q4h  s/p PLEX tx 4/5, next dose today 11/17  patient on azathioprine on 11/10   Recommend early ambulation w/ PT/OT  Neurology following  CT chest - no evidence of thymoma   Hold Mestinon, Will consider restarting prior to extubation, currently held in setting of copious secretions, retrial extubation in AM  Continue Prednisone to 60 mg QD  Avoid medications that may worsen the current exacerbation including macrolides, fluoroquinolones, tetracyclines, aminoglycoside, beta-blockers, magnesium, and anti-arrhythmics (procainamide, quinidine, and lidocaine), muscle relaxants   Activity: [x] mobilize as tolerated [] Bedrest [x] PT [x] OT [] PMNR    PULM:  Intubated for airway protection  CPAP as tolerated; daily SBTs, tolerated CPAP this AM  CT chest- with atelectasis, no evidence of residual thymoma   CXR reviewed- improved   Duonebs q6h for secretions (oral)  Check NIF/VC Q6 hours in ICU setting  11/16- NIF -17/  Aggressive Chest PT    CV:  MAP>65  TTE - EF 54%, elevated right atrial pressure     RENAL:  Stable renal function  IVL  daily IOs    GI:  Diet: ON TF  GI prophylaxis: PPI while on steroids + intubated  Bowel regimen: miralax, senna; dulcolax suppositories PRN  LBM: 11/16    ENDO:   FS goal 120-180  Medium scale  A1C 6.7  ISS     HEME/ONC:  Monitor H/H  SCDs  Chemoppx: SQL  LED negative     ID:  No fevers, WBC count trending down   copious thick inline secretions, oral secretions  No signs of infection     Disposition: ICU ASSESSMENT/PLAN: 62 y/o female with myasthenia gravis, respiratory failure. Demonstrating worsening respiratory effort on SBT, NIF and FVC below baseline required for extubation to BiPAP despite completion of IVIG. Plan is to d/w PT, family risks & benefits of PLEX - patient consented to insertion of shiley, initiation of PLEX - S/P tx w/ PLEX 1/5. Care initiated w/ [facility] neuromuscular neurology Dr Covarrubias. PT shows signs of improved respiratory effort. Continues to CPAP well, Secretions are improving at this time    NEURO:  Neurochecks q4h  s/p PLEX tx 4/5, next dose today 11/17  patient on azathioprine on 11/10   Recommend early ambulation w/ PT/OT  Neurology following  CT chest - no evidence of thymoma   Hold Mestinon, Will consider restarting prior to extubation, currently held in setting of copious secretions, retrial extubation in AM  Continue Prednisone to 60 mg QD  Avoid medications that may worsen the current exacerbation including macrolides, fluoroquinolones, tetracyclines, aminoglycoside, beta-blockers, magnesium, and anti-arrhythmics (procainamide, quinidine, and lidocaine), muscle relaxants   Activity: [x] mobilize as tolerated [] Bedrest [x] PT [x] OT [] PMNR    PULM:  Intubated for airway protection  CPAP as tolerated; daily SBTs, tolerated CPAP this AM  CT chest- with atelectasis, no evidence of residual thymoma   CXR reviewed- improved   Duonebs q6h for secretions (oral)  Check NIF/VC Q6 hours in ICU setting  11/16- NIF -17/  Aggressive Chest PT    CV:  MAP>65  TTE - EF 54%, elevated right atrial pressure     RENAL:  Stable renal function  IVL  daily IOs    GI:  Diet: ON TF  GI prophylaxis: PPI while on steroids + intubated  Bowel regimen: miralax, senna; dulcolax suppositories PRN  LBM: 11/16    ENDO:   FS goal 120-180  Medium scale  A1C 6.7  ISS     HEME/ONC:  Monitor H/H  SCDs  Chemoppx: SQL  LED negative     ID:  No fevers, WBC count trending down   copious thick inline secretions, oral secretions  No signs of infection     Disposition: ICU

## 2023-11-17 NOTE — CHART NOTE - NSCHARTNOTEFT_GEN_A_CORE
63 year old female admitted with MG exacerbation. Transfusion medicine has been consulted to complete a series of 5 TPE sessions over approximately 10 days.    TPE #1 was successfully completed on 11/08/23. One plasma volume was replaced with 5% albumin as replacement fluid. Patient tolerated the procedure well.    TPE #2 was successfully completed on 11/10/23. One plasma volume was replaced with 5% albumin as replacement fluid. Patient tolerated the procedure well.    TPE #3 was successfully completed on 11/13/23. Pt notes and labs were reviewed. Fibrinogen 266mg/dl. iCa 1.19mmol/L. One plasma volume was replaced with 5% albumin as replacement fluid. Patient tolerated the procedure well.    TPE #4 was successfully completed on 11/15/23. One plasma volume was replaced with 5% albumin as replacement fluid. Patient tolerated the procedure well. The post TPE fibrinogen was <100 but no bleeding noted. The patient's fibrinogen will likely self replete over 24-48 hours. Cryo transfusion recommended if bleeding noted or prior to the patient undergoing any invasive procedures.     TPE #5 was successfully completed on 11/17/23. One plasma volume was replaced with 5% albumin as replacement fluid. Patient tolerated the procedure well.

## 2023-11-18 LAB
GAS PNL BLDA: SIGNIFICANT CHANGE UP

## 2023-11-18 PROCEDURE — 71045 X-RAY EXAM CHEST 1 VIEW: CPT | Mod: 26

## 2023-11-18 PROCEDURE — 99291 CRITICAL CARE FIRST HOUR: CPT

## 2023-11-18 RX ORDER — SODIUM CHLORIDE 9 MG/ML
1000 INJECTION INTRAMUSCULAR; INTRAVENOUS; SUBCUTANEOUS
Refills: 0 | Status: DISCONTINUED | OUTPATIENT
Start: 2023-11-18 | End: 2023-11-20

## 2023-11-18 RX ORDER — SODIUM CHLORIDE 9 MG/ML
1000 INJECTION INTRAMUSCULAR; INTRAVENOUS; SUBCUTANEOUS ONCE
Refills: 0 | Status: COMPLETED | OUTPATIENT
Start: 2023-11-18 | End: 2023-11-18

## 2023-11-18 RX ADMIN — CHLORHEXIDINE GLUCONATE 15 MILLILITER(S): 213 SOLUTION TOPICAL at 05:53

## 2023-11-18 RX ADMIN — SENNA PLUS 2 TABLET(S): 8.6 TABLET ORAL at 21:40

## 2023-11-18 RX ADMIN — AZATHIOPRINE 50 MILLIGRAM(S): 100 TABLET ORAL at 05:53

## 2023-11-18 RX ADMIN — Medication 125 MILLIGRAM(S): at 23:47

## 2023-11-18 RX ADMIN — POLYETHYLENE GLYCOL 3350 17 GRAM(S): 17 POWDER, FOR SOLUTION ORAL at 17:32

## 2023-11-18 RX ADMIN — ENOXAPARIN SODIUM 40 MILLIGRAM(S): 100 INJECTION SUBCUTANEOUS at 21:40

## 2023-11-18 RX ADMIN — PANTOPRAZOLE SODIUM 40 MILLIGRAM(S): 20 TABLET, DELAYED RELEASE ORAL at 06:28

## 2023-11-18 RX ADMIN — Medication 125 MILLIGRAM(S): at 05:54

## 2023-11-18 RX ADMIN — Medication 125 MILLIGRAM(S): at 17:31

## 2023-11-18 RX ADMIN — Medication 3 MILLILITER(S): at 23:05

## 2023-11-18 RX ADMIN — CHLORHEXIDINE GLUCONATE 1 APPLICATION(S): 213 SOLUTION TOPICAL at 05:51

## 2023-11-18 RX ADMIN — Medication 3 MILLILITER(S): at 17:04

## 2023-11-18 RX ADMIN — SODIUM CHLORIDE 75 MILLILITER(S): 9 INJECTION INTRAMUSCULAR; INTRAVENOUS; SUBCUTANEOUS at 17:35

## 2023-11-18 RX ADMIN — Medication 125 MILLIGRAM(S): at 00:00

## 2023-11-18 RX ADMIN — Medication 2: at 18:27

## 2023-11-18 RX ADMIN — CHLORHEXIDINE GLUCONATE 1 APPLICATION(S): 213 SOLUTION TOPICAL at 21:40

## 2023-11-18 RX ADMIN — Medication 2: at 11:13

## 2023-11-18 RX ADMIN — SODIUM CHLORIDE 75 MILLILITER(S): 9 INJECTION INTRAMUSCULAR; INTRAVENOUS; SUBCUTANEOUS at 19:21

## 2023-11-18 RX ADMIN — Medication 3 MILLILITER(S): at 05:52

## 2023-11-18 RX ADMIN — AZATHIOPRINE 50 MILLIGRAM(S): 100 TABLET ORAL at 17:32

## 2023-11-18 RX ADMIN — SODIUM CHLORIDE 1000 MILLILITER(S): 9 INJECTION INTRAMUSCULAR; INTRAVENOUS; SUBCUTANEOUS at 05:52

## 2023-11-18 RX ADMIN — Medication 3 MILLILITER(S): at 11:14

## 2023-11-18 RX ADMIN — Medication 125 MILLIGRAM(S): at 12:09

## 2023-11-18 RX ADMIN — Medication 2: at 06:28

## 2023-11-18 NOTE — PROGRESS NOTE ADULT - SUBJECTIVE AND OBJECTIVE BOX
HOSPITAL COURSE:  Patient KAIDEN HATCH is a 63y woman presenting with myasthenia gravis exacerbation.    Admission Scores  GCS: 15     24 hour Events: Patient evaluated at bedside. Reports improvement in symptoms w/ exception to copious oral secretions. No complaints of pain or dyspnea.     11/4- Patient intubated overnight for worsening respiratory status. Patient received dose 2/5 of IVIG  11/6- No events; poor respiratory effort on SBT  11/7- Apneic on SBT; worsening respiratory effort; last dose of IVIG TODAY  11/8- NIF -12 and  this AM. PT received shiley in right IJ w/ conscious sedation for initiation of PLEX. S/P tx 1/5 w/ PLEX.  11/9- NIF -20 and VC 630this AM.  11/10- Patient due for second dose of PLEX. No events overnight.   11/11- No events overnight. Has been tolerating CPAP since 5 AM. Oral secretions present. Strength improved.   11/12 no events, no PS too much secretions  11/13- Patient with improved secretions. No overnight events. Will get 3rd dose of PLEX today.   11/14- No overnight events.   11/15- No events overnight.   11/16- no acute events overnight. Patient still with thick inline secretions.  11/17- Patient tolerated CPAP for a bit before tiring out again. PLEX 5/5 completed  11/18- Patient tolerated CPAP very well. Secretions better.     Allergies    peanuts (Unknown)  No Known Drug Allergies    Intolerances        REVIEW OF SYSTEMS: [ ] Unable to Assess due to neurologic exam   [x ] All ROS addressed below are non-contributory, except:  Neuro: [ ] Headache [ ] Back pain [ ] Numbness [ ] Weakness [ ] Ataxia [ ] Dizziness [ ] Aphasia [ ] Dysarthria [ ] Visual disturbance  Resp: [ ] Shortness of breath/dyspnea, [ ] Orthopnea [ ] Cough  CV: [ ] Chest pain [ ] Palpitation [ ] Lightheadedness [ ] Syncope  Renal: [ ] Thirst [ ] Edema  GI: [ ] Nausea [ ] Emesis [ ] Abdominal pain [ ] Constipation [ ] Diarrhea  Hem: [ ] Hematemesis [ ] bright red blood per rectum  ID: [ ] Fever [ ] Chills [ ] Dysuria  ENT: [ ] Rhinorrhea      DEVICES:   [ ] Restraints [ ] ET tube [ ] central line [ ] arterial line [ ] morales [ ] NGT/OGT [ ] EVD [ ] LD [ ] MYAH/HMV [ ] Trach [ ] PEG [ ] Chest Tube     VITALS:   Vital Signs Last 24 Hrs  T(C): 36.7 (18 Nov 2023 03:00), Max: 36.8 (17 Nov 2023 07:00)  T(F): 98.1 (18 Nov 2023 03:00), Max: 98.3 (17 Nov 2023 11:00)  HR: 69 (18 Nov 2023 03:22) (50 - 82)  BP: 161/72 (18 Nov 2023 03:00) (130/61 - 161/72)  BP(mean): 99 (18 Nov 2023 03:00) (80 - 99)  RR: 16 (18 Nov 2023 03:00) (16 - 23)  SpO2: 100% (18 Nov 2023 03:22) (97% - 100%)    Parameters below as of 17 Nov 2023 19:00  Patient On (Oxygen Delivery Method): room air      CAPILLARY BLOOD GLUCOSE      POCT Blood Glucose.: 164 mg/dL (18 Nov 2023 06:25)  POCT Blood Glucose.: 137 mg/dL (17 Nov 2023 16:59)  POCT Blood Glucose.: 228 mg/dL (17 Nov 2023 11:32)    I&O's Summary    16 Nov 2023 07:01  -  17 Nov 2023 07:00  --------------------------------------------------------  IN: 1415 mL / OUT: 560 mL / NET: 855 mL    17 Nov 2023 07:01  -  18 Nov 2023 06:56  --------------------------------------------------------  IN: 2185 mL / OUT: 1300 mL / NET: 885 mL        Respiratory:  Mode: AC/ CMV (Assist Control/ Continuous Mandatory Ventilation)  RR (machine): 16  TV (machine): 420  FiO2: 30  PEEP: 5  MAP: 11  PIP: 25    ABG - ( 17 Nov 2023 04:20 )  pH, Arterial: 7.49  pH, Blood: x     /  pCO2: 36    /  pO2: 104   / HCO3: 27    / Base Excess: 3.9   /  SaO2: 99.4                LABS:                        9.6    14.61 )-----------( 251      ( 17 Nov 2023 04:46 )             29.5     11-17    144  |  108  |  27<H>  ----------------------------<  143<H>  3.3<L>   |  24  |  0.46<L>             MEDICATION LEVELS:     IVF FLUIDS/MEDICATIONS:   MEDICATIONS  (STANDING):  albuterol/ipratropium for Nebulization 3 milliLiter(s) Nebulizer every 6 hours  azaTHIOprine 50 milliGRAM(s) Oral two times a day  chlorhexidine 0.12% Liquid 15 milliLiter(s) Oral Mucosa every 12 hours  chlorhexidine 4% Liquid 1 Application(s) Topical at bedtime  chlorhexidine 4% Liquid 1 Application(s) Topical <User Schedule>  enoxaparin Injectable 40 milliGRAM(s) SubCutaneous every 24 hours  insulin lispro (ADMELOG) corrective regimen sliding scale   SubCutaneous every 6 hours  methylPREDNISolone sodium succinate Injectable 125 milliGRAM(s) IV Push every 6 hours  pantoprazole   Suspension 40 milliGRAM(s) Oral before breakfast  polyethylene glycol 3350 17 Gram(s) Oral every 12 hours  senna 2 Tablet(s) Oral at bedtime    MEDICATIONS  (PRN):  acetaminophen   Oral Liquid .. 650 milliGRAM(s) Oral every 6 hours PRN Temp greater or equal to 38C (100.4F), Mild Pain (1 - 3)  sodium chloride 0.9% lock flush 10 milliLiter(s) IV Push every 1 hour PRN Pre/post blood products, medications, blood draw, and to maintain line patency      EXAMINATION:  PHYSICAL EXAM:    Constitutional: Intubated    Neurological: Awake, alert oriented to person, place and time (with choices). Following Commands, PERRL, EOMI, No Gaze Preference, Face Symmetrical. Left eye ptosis. NE/NF 5/5.    Motor exam:          Upper extremity                         Delt     Bicep     Tricep    HG                                                 R         4/5        5/5        5/5       5/5                                               L          4+/5        5/5        5/5       5/5          Lower extremity                        HF         KF        KE       DF         PF                                                  R        5/5 5/5 5/5 5/5 5/5                                               L         5/5 5/5 5/5 5/5 5/5                                                 Sensation: [x] intact to light touch  [ ] decreased:     Pulmonary: Clear to Auscultation, No rales, No rhonchi, No wheezes     Cardiovascular: S1, S2, Regular rate and rhythm     Gastrointestinal: Soft, Non-tender, Non-distended     Extremities: No calf tenderness

## 2023-11-18 NOTE — PROGRESS NOTE ADULT - ASSESSMENT
ASSESSMENT/PLAN: 62 y/o female with myasthenia gravis, respiratory failure. Demonstrating worsening respiratory effort on SBT, NIF and FVC below baseline required for extubation to BiPAP despite completion of IVIG. Plan is to d/w PT, family risks & benefits of PLEX - patient consented to insertion of shiley, initiation of PLEX - S/P tx w/ PLEX 1/5. Care initiated w/ [facility] neuromuscular neurology Dr Covarrubias. PT shows signs of improved respiratory effort. Continues to CPAP well, Secretions are improving at this time    NEURO:  Neurochecks q4h  S/P completion of PLEX 5/5  started on Solumedrol 125q6 x 2 days per neurology (11/17--11/19)  patient on azathioprine on 11/10   Recommend early ambulation w/ PT/OT  Neurology following  CT chest - no evidence of thymoma   Hold Mestinon, Will consider restarting prior to extubation, currently held in setting of copious secretions, retrial extubation in AM  Avoid medications that may worsen the current exacerbation including macrolides, fluoroquinolones, tetracyclines, aminoglycoside, beta-blockers, magnesium, and anti-arrhythmics (procainamide, quinidine, and lidocaine), muscle relaxants   Activity: [x] mobilize as tolerated [] Bedrest [x] PT [x] OT [] PMNR    PULM:  Extubated to BiPAP  CT chest- with atelectasis, no evidence of residual thymoma   CXR reviewed- improved   Duonebs q6h for secretions (oral)  Aggressive Chest PT    CV:  MAP>65  TTE - EF 54%, elevated right atrial pressure     RENAL:  Stable renal function  IVL  daily IOs    GI:  Diet: ON TF- Glucerna 1.2  GI prophylaxis: PPI while on steroids   Bowel regimen: held miralax, senna; dulcolax suppositories PRN due to multiple BM  LBM: 11/17    ENDO:   FS goal 120-180  Medium scale  A1C 6.7  ISS     HEME/ONC:  Monitor H/H  SCDs  Chemo ppx: SQL  LED negative     ID:  No fevers, WBC count trending down   copious thick inline secretions, oral secretions  No signs of infection     Disposition: ICU ASSESSMENT/PLAN: 62 y/o female with myasthenia gravis, respiratory failure. Demonstrating worsening respiratory effort on SBT, NIF and FVC below baseline required for extubation to BiPAP despite completion of IVIG. Plan is to d/w PT, family risks & benefits of PLEX - patient consented to insertion of shiley, initiation of PLEX - S/P tx w/ PLEX 1/5. Care initiated w/ [facility] neuromuscular neurology Dr Covarrubias. PT shows signs of improved respiratory effort. Continues to CPAP well, Secretions are improving at this time    NEURO:  Neurochecks q4h  S/P completion of PLEX 5/5  started on Solumedrol 125q6 x 2 days per neurology (11/17--11/19)  patient on azathioprine on 11/10   Recommend early ambulation w/ PT/OT  Neurology following  CT chest - no evidence of thymoma   Hold Mestinon, Will consider restarting prior to extubation, currently held in setting of copious secretions, retrial extubation in AM  Avoid medications that may worsen the current exacerbation including macrolides, fluoroquinolones, tetracyclines, aminoglycoside, beta-blockers, magnesium, and anti-arrhythmics (procainamide, quinidine, and lidocaine), muscle relaxants   Activity: [x] mobilize as tolerated [] Bedrest [x] PT [x] OT [] PMNR    PULM:  Extubated to BiPAP  CT chest- with atelectasis, no evidence of residual thymoma   CXR reviewed- improved   Duonebs q6h for secretions (oral)  Aggressive Chest PT    CV:  MAP>65  TTE - EF 54%, elevated right atrial pressure     RENAL:  Stable renal function  IVL  daily IOs    GI:  Diet: ON TF- Glucerna 1.2  GI prophylaxis: PPI while on steroids   Bowel regimen: held miralax, senna; dulcolax suppositories PRN due to multiple BM  LBM: 11/17    ENDO:   FS goal 120-180  Medium scale  A1C 6.7  ISS     HEME/ONC:  Monitor H/H  SCDs  Chemo ppx: SQL  LED negative     ID:  No fevers, WBC count trending down   copious thick inline secretions, oral secretions  No signs of infection     Disposition: ICU ASSESSMENT/PLAN: 64 y/o female with myasthenia gravis, respiratory failure. Demonstrating worsening respiratory effort on SBT, NIF and FVC below baseline required for extubation to BiPAP despite completion of IVIG. Plan is to d/w PT, family risks & benefits of PLEX - patient consented to insertion of shiley, initiation of PLEX - S/P tx w/ PLEX 1/5. Care initiated w/ [facility] neuromuscular neurology Dr Covarrubias. PT shows signs of improved respiratory effort. Continues to CPAP well, Secretions are improving at this time    NEURO:  Neurochecks q4h  S/P completion of PLEX 5/5  started on Solumedrol 125q6 x 2 days per neurology (11/17--11/19)  patient on azathioprine on 11/10   Recommend early ambulation w/ PT/OT  Neurology following  CT chest - no evidence of thymoma   Hold Mestinon, Will consider restarting prior to extubation, currently held in setting of copious secretions, retrial extubation in AM  Avoid medications that may worsen the current exacerbation including macrolides, fluoroquinolones, tetracyclines, aminoglycoside, beta-blockers, magnesium, and anti-arrhythmics (procainamide, quinidine, and lidocaine), muscle relaxants   Activity: [x] mobilize as tolerated [] Bedrest [x] PT [x] OT [] PMNR    PULM:  Extubated to BiPAP  CT chest- with atelectasis, no evidence of residual thymoma   CXR reviewed- improved   Duonebs q6h for secretions (oral)  Aggressive Chest PT    CV:  MAP>65  TTE - EF 54%, elevated right atrial pressure     RENAL:  Stable renal function  IVL  daily IOs    GI:  Diet: ON TF- Glucerna 1.2  GI prophylaxis: PPI while on steroids   Bowel regimen: held miralax, senna; dulcolax suppositories PRN due to multiple BM  LBM: 11/17    ENDO:   FS goal 120-180  Medium scale  A1C 6.7  ISS     HEME/ONC:  Monitor H/H  SCDs  Chemo ppx: SQL  LED negative     ID:  No fevers, WBC count trending down   copious thick inline secretions, oral secretions  No signs of infection     Disposition: ICU ASSESSMENT/PLAN: 64 y/o female with myasthenia gravis, respiratory failure. Demonstrating worsening respiratory effort on SBT, NIF and FVC below baseline required for extubation to BiPAP despite completion of IVIG. Plan is to d/w PT, family risks & benefits of PLEX - patient consented to insertion of shiley, initiation of PLEX - S/P tx w/ PLEX 1/5. Care initiated w/ [facility] neuromuscular neurology Dr Covarrubias. PT shows signs of improved respiratory effort. Continues to CPAP well, Secretions are improving at this time    NEURO:  Neurochecks q4h  S/P completion of PLEX 5/5  on Solumedrol 125 q6 x 2 days per neurology (11/17--11/19)  patient on azathioprine on 11/10   Recommend early ambulation w/ PT/OT  Neurology following  CT chest - no evidence of thymoma   Hold Mestinon, Will consider restarting prior to extubation, currently held in setting of copious secretions, retrial extubation in AM  Avoid medications that may worsen the current exacerbation including macrolides, fluoroquinolones, tetracyclines, aminoglycoside, beta-blockers, magnesium, and anti-arrhythmics (procainamide, quinidine, and lidocaine), muscle relaxants   Activity: [x] mobilize as tolerated [] Bedrest [x] PT [x] OT [] PMNR    PULM:  Extubated to BiPAP  CT chest- with atelectasis, no evidence of residual thymoma   CXR reviewed- improved   Duonebs q6h for secretions (oral), self suctions   Aggressive Chest PT    CV:  MAP>65  TTE - EF 54%, elevated right atrial pressure     RENAL:  Stable renal function  NS @75  daily IOs    GI:  Diet: NPO  GI prophylaxis: PPI while on steroids   Bowel regimen: miralax, senna  LBM: 11/17    ENDO:   FS goal 120-180  Medium scale  A1C 6.7  ISS     HEME/ONC:  Monitor H/H  SCDs  Chemo ppx: SQL  LED negative     ID:  No fevers, WBC count trending down   copious thick inline secretions, oral secretions  No signs of infection     Disposition: ICU ASSESSMENT/PLAN: 62 y/o female with myasthenia gravis, respiratory failure. Demonstrating worsening respiratory effort on SBT, NIF and FVC below baseline required for extubation to BiPAP despite completion of IVIG. Plan is to d/w PT, family risks & benefits of PLEX - patient consented to insertion of shiley, initiation of PLEX - S/P tx w/ PLEX 1/5. Care initiated w/ [facility] neuromuscular neurology Dr Covarrubias. PT shows signs of improved respiratory effort. Continues to CPAP well, Secretions are improving at this time    NEURO:  Neurochecks q4h  S/P completion of PLEX 5/5  on Solumedrol 125 q6 x 2 days per neurology (11/17--11/19)  patient on azathioprine on 11/10   Recommend early ambulation w/ PT/OT  Neurology following  CT chest - no evidence of thymoma   Hold Mestinon, Will consider restarting prior to extubation, currently held in setting of copious secretions, retrial extubation in AM  Avoid medications that may worsen the current exacerbation including macrolides, fluoroquinolones, tetracyclines, aminoglycoside, beta-blockers, magnesium, and anti-arrhythmics (procainamide, quinidine, and lidocaine), muscle relaxants   Activity: [x] mobilize as tolerated [] Bedrest [x] PT [x] OT [] PMNR    PULM:  Extubated to BiPAP  CT chest- with atelectasis, no evidence of residual thymoma   CXR reviewed- improved   Duonebs q6h for secretions (oral), self suctions   Aggressive Chest PT    CV:  MAP>65  TTE - EF 54%, elevated right atrial pressure     RENAL:  Stable renal function  NS @75  daily IOs    GI:  Diet: NPO  GI prophylaxis: PPI while on steroids   Bowel regimen: miralax, senna  LBM: 11/17    ENDO:   FS goal 120-180  Medium scale  A1C 6.7  ISS     HEME/ONC:  Monitor H/H  SCDs  Chemo ppx: SQL  LED negative     ID:  No fevers, WBC count trending down   copious thick inline secretions, oral secretions  No signs of infection     Disposition: ICU ASSESSMENT/PLAN: 64 y/o female with myasthenia gravis, respiratory failure. Demonstrating worsening respiratory effort on SBT, NIF and FVC below baseline required for extubation to BiPAP despite completion of IVIG. Plan is to d/w PT, family risks & benefits of PLEX - patient consented to insertion of shiley, initiation of PLEX - S/P tx w/ PLEX 1/5. Care initiated w/ [facility] neuromuscular neurology Dr Covarrubias. PT shows signs of improved respiratory effort. Continues to CPAP well, Secretions are improving at this time    NEURO:  Neurochecks q4h  S/P completion of PLEX 5/5  on Solumedrol 125 q6 x 2 days per neurology (11/17--11/19)  patient on azathioprine on 11/10   Recommend early ambulation w/ PT/OT  Neurology following  CT chest - no evidence of thymoma   Hold Mestinon, Will consider restarting prior to extubation, currently held in setting of copious secretions, retrial extubation in AM  Avoid medications that may worsen the current exacerbation including macrolides, fluoroquinolones, tetracyclines, aminoglycoside, beta-blockers, magnesium, and anti-arrhythmics (procainamide, quinidine, and lidocaine), muscle relaxants   Activity: [x] mobilize as tolerated [] Bedrest [x] PT [x] OT [] PMNR    PULM:  Extubated to BiPAP  CT chest- with atelectasis, no evidence of residual thymoma   CXR reviewed- improved   Duonebs q6h for secretions (oral), self suctions   Aggressive Chest PT    CV:  MAP>65  TTE - EF 54%, elevated right atrial pressure     RENAL:  Stable renal function  NS @75  daily IOs    GI:  Diet: NPO  GI prophylaxis: PPI while on steroids   Bowel regimen: miralax, senna  LBM: 11/17    ENDO:   FS goal 120-180  Medium scale  A1C 6.7  ISS     HEME/ONC:  Monitor H/H  SCDs  Chemo ppx: SQL  LED negative     ID:  No fevers, WBC count trending down   copious thick inline secretions, oral secretions  No signs of infection     Disposition: ICU

## 2023-11-18 NOTE — AIRWAY REMOVAL NOTE  ADULT & PEDS - ARTIFICAL AIRWAY REMOVAL COMMENTS
Written order for extubation verified. The patient was identified by full name and birth date compared to the identification band. Present during the procedure was Hayden GARCIA

## 2023-11-18 NOTE — PROGRESS NOTE ADULT - ASSESSMENT
ASSESSMENT/PLAN: 62 y/o female with myasthenia gravis, respiratory failure. Demonstrating worsening respiratory effort on SBT, NIF and FVC below baseline required for extubation to BiPAP despite completion of IVIG. Plan is to d/w PT, family risks & benefits of PLEX - patient consented to insertion of shiley, initiation of PLEX - S/P tx w/ PLEX 1/5. Care initiated w/ [facility] neuromuscular neurology Dr Covarrubias. PT shows signs of improved respiratory effort. Continues to CPAP well, Secretions are improving at this time    NEURO:  Neurochecks q4h  S/P completion of PLEX 5/5  started on Solumedrol 125q6 x 2 days per neurology (11/18--)  patient on azathioprine on 11/10   Recommend early ambulation w/ PT/OT  Neurology following  CT chest - no evidence of thymoma   Hold Mestinon, Will consider restarting prior to extubation, currently held in setting of copious secretions, retrial extubation in AM  Avoid medications that may worsen the current exacerbation including macrolides, fluoroquinolones, tetracyclines, aminoglycoside, beta-blockers, magnesium, and anti-arrhythmics (procainamide, quinidine, and lidocaine), muscle relaxants   Activity: [x] mobilize as tolerated [] Bedrest [x] PT [x] OT [] PMNR    PULM:  Intubated for airway protection  CPAP as tolerated; daily SBTs, tolerated CPAP this AM  CT chest- with atelectasis, no evidence of residual thymoma   CXR reviewed- improved   Duonebs q6h for secretions (oral)  Check NIF/VC Q6 hours in ICU setting  11/16- NIF -17/  Aggressive Chest PT    CV:  MAP>65  TTE - EF 54%, elevated right atrial pressure     RENAL:  Stable renal function  IVL  daily IOs    GI:  Diet: ON TF- Glucerna 1.2  GI prophylaxis: PPI while on steroids + intubated  Bowel regimen: held miralax, senna; dulcolax suppositories PRN due to multiple BM  LBM: 11/17    ENDO:   FS goal 120-180  Medium scale  A1C 6.7  ISS     HEME/ONC:  Monitor H/H  SCDs  Chemo ppx: SQL  LED negative     ID:  No fevers, WBC count trending down   copious thick inline secretions, oral secretions  No signs of infection     Disposition: ICU ASSESSMENT/PLAN: 62 y/o female with myasthenia gravis, respiratory failure. Demonstrating worsening respiratory effort on SBT, NIF and FVC below baseline required for extubation to BiPAP despite completion of IVIG. Plan is to d/w PT, family risks & benefits of PLEX - patient consented to insertion of shiley, initiation of PLEX - S/P tx w/ PLEX 1/5. Care initiated w/ [facility] neuromuscular neurology Dr Covarrubias. PT shows signs of improved respiratory effort. Continues to CPAP well, Secretions are improving at this time    NEURO:  Neurochecks q4h  S/P completion of PLEX 5/5  started on Solumedrol 125q6 x 2 days per neurology (11/18--)  patient on azathioprine on 11/10   Recommend early ambulation w/ PT/OT  Neurology following  CT chest - no evidence of thymoma   Hold Mestinon, Will consider restarting prior to extubation, currently held in setting of copious secretions, retrial extubation in AM  Avoid medications that may worsen the current exacerbation including macrolides, fluoroquinolones, tetracyclines, aminoglycoside, beta-blockers, magnesium, and anti-arrhythmics (procainamide, quinidine, and lidocaine), muscle relaxants   Activity: [x] mobilize as tolerated [] Bedrest [x] PT [x] OT [] PMNR    PULM:  Intubated for airway protection  CPAP as tolerated; daily SBTs, tolerated CPAP this AM  CT chest- with atelectasis, no evidence of residual thymoma   CXR reviewed- improved   Duonebs q6h for secretions (oral)  Check NIF/VC Q6 hours in ICU setting  11/16- NIF -17/  Aggressive Chest PT    CV:  MAP>65  TTE - EF 54%, elevated right atrial pressure     RENAL:  Stable renal function  IVL  daily IOs    GI:  Diet: ON TF- Glucerna 1.2  GI prophylaxis: PPI while on steroids + intubated  Bowel regimen: held miralax, senna; dulcolax suppositories PRN due to multiple BM  LBM: 11/17    ENDO:   FS goal 120-180  Medium scale  A1C 6.7  ISS     HEME/ONC:  Monitor H/H  SCDs  Chemo ppx: SQL  LED negative     ID:  No fevers, WBC count trending down   copious thick inline secretions, oral secretions  No signs of infection     Disposition: ICU ASSESSMENT/PLAN: 64 y/o female with myasthenia gravis, respiratory failure. Demonstrating worsening respiratory effort on SBT, NIF and FVC below baseline required for extubation to BiPAP despite completion of IVIG. Plan is to d/w PT, family risks & benefits of PLEX - patient consented to insertion of shiley, initiation of PLEX - S/P tx w/ PLEX 1/5. Care initiated w/ [facility] neuromuscular neurology Dr Covarrubias. PT shows signs of improved respiratory effort. Continues to CPAP well, Secretions are improving at this time    NEURO:  Neurochecks q4h  S/P completion of PLEX 5/5  started on Solumedrol 125q6 x 2 days per neurology (11/18--)  patient on azathioprine on 11/10   Recommend early ambulation w/ PT/OT  Neurology following  CT chest - no evidence of thymoma   Hold Mestinon, Will consider restarting prior to extubation, currently held in setting of copious secretions, retrial extubation in AM  Avoid medications that may worsen the current exacerbation including macrolides, fluoroquinolones, tetracyclines, aminoglycoside, beta-blockers, magnesium, and anti-arrhythmics (procainamide, quinidine, and lidocaine), muscle relaxants   Activity: [x] mobilize as tolerated [] Bedrest [x] PT [x] OT [] PMNR    PULM:  Intubated for airway protection  CPAP as tolerated; daily SBTs, tolerated CPAP this AM  CT chest- with atelectasis, no evidence of residual thymoma   CXR reviewed- improved   Duonebs q6h for secretions (oral)  Check NIF/VC Q6 hours in ICU setting  11/16- NIF -17/  Aggressive Chest PT    CV:  MAP>65  TTE - EF 54%, elevated right atrial pressure     RENAL:  Stable renal function  IVL  daily IOs    GI:  Diet: ON TF- Glucerna 1.2  GI prophylaxis: PPI while on steroids + intubated  Bowel regimen: held miralax, senna; dulcolax suppositories PRN due to multiple BM  LBM: 11/17    ENDO:   FS goal 120-180  Medium scale  A1C 6.7  ISS     HEME/ONC:  Monitor H/H  SCDs  Chemo ppx: SQL  LED negative     ID:  No fevers, WBC count trending down   copious thick inline secretions, oral secretions  No signs of infection     Disposition: ICU

## 2023-11-19 LAB
ANION GAP SERPL CALC-SCNC: 15 MMOL/L — SIGNIFICANT CHANGE UP (ref 5–17)
BUN SERPL-MCNC: 29 MG/DL — HIGH (ref 7–23)
CALCIUM SERPL-MCNC: 9 MG/DL — SIGNIFICANT CHANGE UP (ref 8.4–10.5)
CHLORIDE SERPL-SCNC: 109 MMOL/L — HIGH (ref 96–108)
CO2 SERPL-SCNC: 18 MMOL/L — LOW (ref 22–31)
CO2 SERPL-SCNC: 20 MMOL/L — LOW (ref 22–31)
CREAT SERPL-MCNC: 0.38 MG/DL — LOW (ref 0.5–1.3)
CREAT SERPL-MCNC: 0.39 MG/DL — LOW (ref 0.5–1.3)
EGFR: 111 ML/MIN/1.73M2 — SIGNIFICANT CHANGE UP
EGFR: 112 ML/MIN/1.73M2 — SIGNIFICANT CHANGE UP
GLUCOSE SERPL-MCNC: 201 MG/DL — HIGH (ref 70–99)
GLUCOSE SERPL-MCNC: 206 MG/DL — HIGH (ref 70–99)
HCT VFR BLD CALC: 33.8 % — LOW (ref 34.5–45)
HCT VFR BLD CALC: 36.3 % — SIGNIFICANT CHANGE UP (ref 34.5–45)
HGB BLD-MCNC: 11 G/DL — LOW (ref 11.5–15.5)
HGB BLD-MCNC: 11.2 G/DL — LOW (ref 11.5–15.5)
MAGNESIUM SERPL-MCNC: 2.2 MG/DL — SIGNIFICANT CHANGE UP (ref 1.6–2.6)
MAGNESIUM SERPL-MCNC: 2.3 MG/DL — SIGNIFICANT CHANGE UP (ref 1.6–2.6)
MCHC RBC-ENTMCNC: 28.6 PG — SIGNIFICANT CHANGE UP (ref 27–34)
MCHC RBC-ENTMCNC: 29.5 PG — SIGNIFICANT CHANGE UP (ref 27–34)
MCHC RBC-ENTMCNC: 30.9 GM/DL — LOW (ref 32–36)
MCHC RBC-ENTMCNC: 32.5 GM/DL — SIGNIFICANT CHANGE UP (ref 32–36)
MCV RBC AUTO: 90.6 FL — SIGNIFICANT CHANGE UP (ref 80–100)
MCV RBC AUTO: 92.6 FL — SIGNIFICANT CHANGE UP (ref 80–100)
NRBC # BLD: 0 /100 WBCS — SIGNIFICANT CHANGE UP (ref 0–0)
PHOSPHATE SERPL-MCNC: 3.5 MG/DL — SIGNIFICANT CHANGE UP (ref 2.5–4.5)
PHOSPHATE SERPL-MCNC: 4 MG/DL — SIGNIFICANT CHANGE UP (ref 2.5–4.5)
PLATELET # BLD AUTO: 242 K/UL — SIGNIFICANT CHANGE UP (ref 150–400)
PLATELET # BLD AUTO: 277 K/UL — SIGNIFICANT CHANGE UP (ref 150–400)
POTASSIUM SERPL-MCNC: 3.6 MMOL/L — SIGNIFICANT CHANGE UP (ref 3.5–5.3)
POTASSIUM SERPL-MCNC: 5.6 MMOL/L — HIGH (ref 3.5–5.3)
POTASSIUM SERPL-SCNC: 3.6 MMOL/L — SIGNIFICANT CHANGE UP (ref 3.5–5.3)
POTASSIUM SERPL-SCNC: 5.6 MMOL/L — HIGH (ref 3.5–5.3)
RBC # BLD: 3.73 M/UL — LOW (ref 3.8–5.2)
RBC # BLD: 3.92 M/UL — SIGNIFICANT CHANGE UP (ref 3.8–5.2)
RBC # FLD: 15.9 % — HIGH (ref 10.3–14.5)
RBC # FLD: 16 % — HIGH (ref 10.3–14.5)
SODIUM SERPL-SCNC: 142 MMOL/L — SIGNIFICANT CHANGE UP (ref 135–145)
SODIUM SERPL-SCNC: 144 MMOL/L — SIGNIFICANT CHANGE UP (ref 135–145)
WBC # BLD: 11.99 K/UL — HIGH (ref 3.8–10.5)
WBC # BLD: 12.93 K/UL — HIGH (ref 3.8–10.5)
WBC # FLD AUTO: 11.99 K/UL — HIGH (ref 3.8–10.5)
WBC # FLD AUTO: 12.93 K/UL — HIGH (ref 3.8–10.5)

## 2023-11-19 PROCEDURE — 71045 X-RAY EXAM CHEST 1 VIEW: CPT | Mod: 26

## 2023-11-19 PROCEDURE — 99233 SBSQ HOSP IP/OBS HIGH 50: CPT

## 2023-11-19 RX ORDER — POTASSIUM CHLORIDE 20 MEQ
20 PACKET (EA) ORAL
Refills: 0 | Status: COMPLETED | OUTPATIENT
Start: 2023-11-19 | End: 2023-11-19

## 2023-11-19 RX ORDER — POTASSIUM CHLORIDE 20 MEQ
20 PACKET (EA) ORAL
Refills: 0 | Status: DISCONTINUED | OUTPATIENT
Start: 2023-11-19 | End: 2023-11-19

## 2023-11-19 RX ADMIN — Medication 3 MILLILITER(S): at 05:19

## 2023-11-19 RX ADMIN — Medication 650 MILLIGRAM(S): at 01:30

## 2023-11-19 RX ADMIN — Medication 2: at 23:14

## 2023-11-19 RX ADMIN — Medication 20 MILLIEQUIVALENT(S): at 12:22

## 2023-11-19 RX ADMIN — Medication 60 MILLIGRAM(S): at 13:21

## 2023-11-19 RX ADMIN — SODIUM CHLORIDE 75 MILLILITER(S): 9 INJECTION INTRAMUSCULAR; INTRAVENOUS; SUBCUTANEOUS at 07:46

## 2023-11-19 RX ADMIN — Medication 2: at 06:16

## 2023-11-19 RX ADMIN — Medication 2: at 12:21

## 2023-11-19 RX ADMIN — Medication 20 MILLIEQUIVALENT(S): at 13:21

## 2023-11-19 RX ADMIN — Medication 3 MILLILITER(S): at 23:42

## 2023-11-19 RX ADMIN — SODIUM CHLORIDE 75 MILLILITER(S): 9 INJECTION INTRAMUSCULAR; INTRAVENOUS; SUBCUTANEOUS at 19:03

## 2023-11-19 RX ADMIN — CHLORHEXIDINE GLUCONATE 1 APPLICATION(S): 213 SOLUTION TOPICAL at 21:06

## 2023-11-19 RX ADMIN — Medication 125 MILLIGRAM(S): at 05:22

## 2023-11-19 RX ADMIN — POLYETHYLENE GLYCOL 3350 17 GRAM(S): 17 POWDER, FOR SOLUTION ORAL at 05:22

## 2023-11-19 RX ADMIN — CHLORHEXIDINE GLUCONATE 1 APPLICATION(S): 213 SOLUTION TOPICAL at 05:22

## 2023-11-19 RX ADMIN — ENOXAPARIN SODIUM 40 MILLIGRAM(S): 100 INJECTION SUBCUTANEOUS at 21:06

## 2023-11-19 RX ADMIN — AZATHIOPRINE 50 MILLIGRAM(S): 100 TABLET ORAL at 05:22

## 2023-11-19 RX ADMIN — Medication 3 MILLILITER(S): at 18:13

## 2023-11-19 RX ADMIN — Medication 20 MILLIEQUIVALENT(S): at 16:36

## 2023-11-19 RX ADMIN — Medication 650 MILLIGRAM(S): at 01:00

## 2023-11-19 RX ADMIN — Medication 3 MILLILITER(S): at 11:47

## 2023-11-19 RX ADMIN — AZATHIOPRINE 50 MILLIGRAM(S): 100 TABLET ORAL at 17:10

## 2023-11-19 RX ADMIN — Medication 650 MILLIGRAM(S): at 23:15

## 2023-11-19 NOTE — PROGRESS NOTE ADULT - SUBJECTIVE AND OBJECTIVE BOX
HOSPITAL COURSE:  Patient KAIDEN HATCH is a 63y woman presenting with myasthenia gravis exacerbation.    Admission Scores  GCS: 15     24 hour Events: Patient evaluated at bedside. Reports improvement in symptoms w/ exception to copious oral secretions. No complaints of pain or dyspnea.     11/4- Patient intubated overnight for worsening respiratory status. Patient received dose 2/5 of IVIG  11/6- No events; poor respiratory effort on SBT  11/7- Apneic on SBT; worsening respiratory effort; last dose of IVIG TODAY  11/8- NIF -12 and  this AM. PT received shiley in right IJ w/ conscious sedation for initiation of PLEX. S/P tx 1/5 w/ PLEX.  11/9- NIF -20 and VC 630this AM.  11/10- Patient due for second dose of PLEX. No events overnight.   11/11- No events overnight. Has been tolerating CPAP since 5 AM. Oral secretions present. Strength improved.   11/12 no events, no PS too much secretions  11/13- Patient with improved secretions. No overnight events. Will get 3rd dose of PLEX today.   11/14- No overnight events.   11/15- No events overnight.   11/16- no acute events overnight. Patient still with thick inline secretions.  11/17- Patient tolerated CPAP for a bit before tiring out again. PLEX 5/5 completed  11/18- Patient tolerated CPAP very well. Secretions better. Pt was extubated to Bipap  11/19- extubated to AVAPS yesterday, will wean off to NC.    Allergies    peanuts (Unknown)  No Known Drug Allergies    Intolerances    REVIEW OF SYSTEMS: [ ] Unable to Assess due to neurologic exam   [x] All ROS addressed below are non-contributory, except:  Neuro: [ ] Headache [ ] Back pain [ ] Numbness [ ] Weakness [ ] Ataxia [ ] Dizziness [ ] Aphasia [ ] Dysarthria [ ] Visual disturbance  Resp: [ ] Shortness of breath/dyspnea, [ ] Orthopnea [ ] Cough  CV: [ ] Chest pain [ ] Palpitation [ ] Lightheadedness [ ] Syncope  Renal: [ ] Thirst [ ] Edema  GI: [ ] Nausea [ ] Emesis [ ] Abdominal pain [ ] Constipation [ ] Diarrhea  Hem: [ ] Hematemesis [ ] bright red blood per rectum  ID: [ ] Fever [ ] Chills [ ] Dysuria  ENT: [ ] Rhinorrhea      DEVICES:   [ ] Restraints [ ] ET tube [ ] central line [ ] arterial line [ ] morales [ ] NGT/OGT [ ] EVD [ ] LD [ ] MYAH/HMV [ ] Trach [ ] PEG [ ] Chest Tube     ICU Vital Signs Last 24 Hrs  T(C): 36.9 (19 Nov 2023 03:00), Max: 37.7 (18 Nov 2023 15:00)  T(F): 98.4 (19 Nov 2023 03:00), Max: 99.9 (18 Nov 2023 15:00)  HR: 110 (19 Nov 2023 05:20) (60 - 122)  BP: 154/69 (19 Nov 2023 03:00) (129/62 - 157/64)  BP(mean): 96 (19 Nov 2023 03:00) (83 - 96)  RR: 19 (19 Nov 2023 05:20) (18 - 30)  SpO2: 100% (19 Nov 2023 05:20) (98% - 100%)    O2 Parameters below as of 18 Nov 2023 23:02  Patient On (Oxygen Delivery Method): BiPAP/CPAP    CAPILLARY BLOOD GLUCOSE      POCT Blood Glucose.: 164 mg/dL (18 Nov 2023 06:25)  POCT Blood Glucose.: 137 mg/dL (17 Nov 2023 16:59)  POCT Blood Glucose.: 228 mg/dL (17 Nov 2023 11:32)    I&O's Summary    17 Nov 2023 07:01  -  18 Nov 2023 07:00  --------------------------------------------------------  IN: 2185 mL / OUT: 1300 mL / NET: 885 mL    18 Nov 2023 07:01  -  19 Nov 2023 06:29  --------------------------------------------------------  IN: 1010 mL / OUT: 300 mL / NET: 710 mL          LABS:                        9.6    14.61 )-----------( 251      ( 17 Nov 2023 04:46 )             29.5   11-17    144  |  108  |  27<H>  ----------------------------<  143<H>  3.3<L>   |  24  |  0.46<L>    Ca    8.9      17 Nov 2023 04:46  Phos  3.5     11-17  Mg     2.3     11-17    TPro  5.0<L>  /  Alb  4.2  /  TBili  0.4  /  DBili  x   /  AST  12  /  ALT  10  /  AlkPhos  36<L>  11-17    MEDICATION LEVELS:     MEDICATIONS  (STANDING):  albuterol/ipratropium for Nebulization 3 milliLiter(s) Nebulizer every 6 hours  azaTHIOprine 50 milliGRAM(s) Oral two times a day  chlorhexidine 4% Liquid 1 Application(s) Topical at bedtime  chlorhexidine 4% Liquid 1 Application(s) Topical <User Schedule>  enoxaparin Injectable 40 milliGRAM(s) SubCutaneous every 24 hours  insulin lispro (ADMELOG) corrective regimen sliding scale   SubCutaneous every 6 hours  polyethylene glycol 3350 17 Gram(s) Oral every 12 hours  senna 2 Tablet(s) Oral at bedtime  sodium chloride 0.9%. 1000 milliLiter(s) (75 mL/Hr) IV Continuous <Continuous>    MEDICATIONS  (PRN):  acetaminophen   Oral Liquid .. 650 milliGRAM(s) Oral every 6 hours PRN Temp greater or equal to 38C (100.4F), Mild Pain (1 - 3)  sodium chloride 0.9% lock flush 10 milliLiter(s) IV Push every 1 hour PRN Pre/post blood products, medications, blood draw, and to maintain line patency    EXAMINATION:  PHYSICAL EXAM:    Constitutional: Intubated    Neurological: Awake, alert oriented to person, place and time (with choices). Following Commands, PERRL, EOMI, No Gaze Preference, Face Symmetrical. Left eye ptosis. NE/NF 5/5.    Motor exam:          Upper extremity                         Delt     Bicep     Tricep    HG                                                 R         5/5        5/5        5/5       5/5                                               L          5/5        5/5        5/5       5/5          Lower extremity                        HF         KF        KE       DF         PF                                                  R        5/5        5/5        5/5       5/5         5/5                                               L         5/5        5/5       5/5       5/5          5/5                                                 Sensation: [x] intact to light touch  [ ] decreased:     Pulmonary: Clear to Auscultation, No rales, No rhonchi, No wheezes     Cardiovascular: S1, S2, Regular rate and rhythm     Gastrointestinal: Soft, Non-tender, Non-distended     Extremities: No calf tenderness

## 2023-11-19 NOTE — SWALLOW BEDSIDE ASSESSMENT ADULT - PHARYNGEAL PHASE
subtle throat clearing, subtle change in breathe sounds-wet- intermittent laborious deglutition. Can not r/o silent aspiration/Delayed pharyngeal swallow/Multiple swallows

## 2023-11-19 NOTE — SWALLOW BEDSIDE ASSESSMENT ADULT - SLP GENERAL OBSERVATIONS
Encountered in bed, upright, NGT in situ R elvia. RN present, speaks patients primary language, pt answering questions in English, harsh/breathy vocal quality. follows all directives attempting to speak in phrases /sentences

## 2023-11-19 NOTE — SWALLOW BEDSIDE ASSESSMENT ADULT - SWALLOW EVAL: ORAL MUSCULATURE
VOCAL QUALITY HARSH, BREATHY, hypophonic concern for vocal cord involvement given prolonged intubation/anomalies present

## 2023-11-19 NOTE — SWALLOW BEDSIDE ASSESSMENT ADULT - SWALLOW EVAL: DIAGNOSIS
Oropharyngeal swallow profile p/w deficits in relation to prolonged intubation with concern for vocal cord involvement; offered limited textures this date given high risk for silent aspiration; mildly prolonged oral stage with concern for pharyngeal residue vs airway invasion (penetration vs aspiration) given multiple swallows per single trials (approx 5-6), intermittently laborious, and change in breathe sounds. Patient is at a significalty high risk for silent aspiration, requesting ENT and objective testing to assist with guidance of SLP care moving forward.

## 2023-11-19 NOTE — PROGRESS NOTE ADULT - ASSESSMENT
Myasthenia gravis (MG) exacerbation with acute respiratory failure  Hypernatremia  DM type 2  HTN    #Myasthenia Gravis exacerbation w/ acute respiratory failure  - s/p plasmapheresis 5/5  - s/p methylprednisolone 125 mg Q5H x 2 days (11/17-11/19)   - Restart prednisone 60 mg QD  - c/w azathioprine 50mg BID  - IVIG 11/3-11/7 but worsened and then switched to PLEX (completed). Following completion will consider additional course of IVIG, if needed  - Appreciate input from neuromuscular specialist, Dr. Emily Covarrubias - c/w prednisone 60mg QD & azathioprine 50mg BID  - Hold Mestinon due to respiratory status and secretions. Will resume when more stable  - Continue VC and NIF monitoring  - Neurological checks Q4H  - Avoid medications that may worsen the current exacerbation including macrolides, fluoroquinolones, tetracyclines, aminoglycoside, beta-blockers, magnesium, and anti-arrhythmics (procainamide, quinidine, and lidocaine), muscle relaxants    Assessment: 63-year-old female w/ PMHx MG (dx 2013, thymus surgery 2010, multiple admissions for myasthenic crisis including intubation) p/w worsening weakness, fatigue, and difficulty eating over the prior 1-2 weeks, also 2 days worsening diplopia and head drop. Patient's most recent admission 6/2023, given PLEX and IVIg, discharged on pyridostigmine 90 mg TID and prednisone 50 mg QD, w/ prednisone tapered off and stopped 9/15/23. Patient had restarted prednisone 10 mg QD 2 weeks PTA, then increased to 20 mg QD w/o improvement; pyridostigmine was also increased 1 week PTA.     Hospital course: admitted to NSCU 11/3; intubated 11/4; s/p IVIg (11/3-11/7); s/p PLEX x 5 (11/8-11/17); extubated to BiPAP 11/18, weaned to NC 11/19; accepted for downgrade to general neurology service 11/19, pending transfer.    Plan:  #NEURO: Myasthenia Gravis exacerbation w/ acute respiratory failure  - s/p plasmapheresis 5/5  - s/p methylprednisolone 125 mg Q5H x 2 days (11/17-11/19)   - Restart prednisone 60 mg QD  - c/w azathioprine 50mg BID  - IVIG 11/3-11/7 but worsened and then switched to PLEX (completed). Following completion will consider additional course of IVIG, if needed  - Appreciate input from neuromuscular specialist, Dr. Emily Covarrubias - c/w prednisone 60mg QD & azathioprine 50mg BID  - Hold Mestinon due to respiratory status and secretions. Will resume when more stable  - Continue VC and NIF monitoring  - Neurological checks Q4H  - Avoid medications that may worsen the current exacerbation including macrolides, fluoroquinolones, tetracyclines, aminoglycoside, beta-blockers, magnesium, and anti-arrhythmics (procainamide, quinidine, and lidocaine), muscle relaxants     PULM:  Extubated to BiPAP, currently on NC - tachypneic (11/19)  CT chest- with atelectasis, no evidence of residual thymoma   CXR - improved   Duonebs q6h for secretions (oral), self suctions  Aggressive Chest PT    CV:  HTN - unclear, but patient appears to need a medication reconciliation? Per [employer], patient appears to be on amlodipine and metoprolol at home. Consider restarting.  TTE - EF 54%, elevated right atrial pressure     RENAL:  Stable renal function  IVL if PO intake is tolerated  daily IOs    GI:  Diet: NPO  Bedside dysphagia screening - recommending FEES  GI prophylaxis: PPI while on steroids   Bowel regimen: Miralax, Senna  LBM: 11/17    ENDO:   FS goal 120-180  Medium scale  A1C 6.7  ISS     HEME/ONC:  Monitor H/H  SCDs  Chemo ppx: SQL  LED negative     ID:  No fevers, WBC count trending down   copious thick inline secretions, oral secretions  No signs of infection  Assessment: 63-year-old female w/ PMHx MG (dx 2013, thymus surgery 2010, multiple admissions for myasthenic crisis including intubation) p/w worsening weakness, fatigue, and difficulty eating over the prior 1-2 weeks, also 2 days worsening diplopia and head drop. Patient's most recent admission 6/2023, given PLEX and IVIg, discharged on pyridostigmine 90 mg TID and prednisone 50 mg QD, w/ prednisone tapered off and stopped 9/15/23. Patient had restarted prednisone 10 mg QD 2 weeks PTA, then increased to 20 mg QD w/o improvement; pyridostigmine was also increased 1 week PTA.     Hospital course: admitted to NSCU 11/3; intubated 11/4; s/p IVIg (11/3-11/7); s/p PLEX x 5 (11/8-11/17); extubated to BiPAP 11/18, weaned to NC 11/19; accepted for downgrade to general neurology service 11/19, pending transfer.    Plan:  #NEURO: Myasthenia Gravis exacerbation w/ acute respiratory failure  - s/p plasmapheresis 5/5  - s/p methylprednisolone 125 mg Q5H x 2 days (11/17-11/19)   - Restart prednisone 60 mg QD  - c/w azathioprine 50mg BID  - IVIG 11/3-11/7 but worsened and then switched to PLEX (completed). Following completion will consider additional course of IVIG, if needed  - Appreciate input from neuromuscular specialist, Dr. Emily Covarrubias - c/w prednisone 60mg QD & azathioprine 50mg BID  - Hold Mestinon due to respiratory status and secretions. Will resume when more stable  - Continue VC and NIF monitoring q4H  - Neurological checks Q4H  - Avoid medications that may worsen the current exacerbation including macrolides, fluoroquinolones, tetracyclines, aminoglycoside, beta-blockers, magnesium, and anti-arrhythmics (procainamide, quinidine, and lidocaine), muscle relaxants     PULM:  Extubated to BiPAP, currently on NC - tachypneic (11/19)  CT chest- with atelectasis, no evidence of residual thymoma   CXR - improved   Duonebs q6h for secretions (oral), self suctions  Aggressive Chest PT    CV:  HTN - Continue amlodipine 10 mg daily  TTE - EF 54%, elevated right atrial pressure     RENAL:  Stable renal function  IVL if PO intake is tolerated  daily IOs    GI:  Diet: NPO  Bedside dysphagia screening - recommending FEES  GI prophylaxis: PPI while on steroids   Bowel regimen: Miralax, Senna  LBM: 11/17    ENDO:   FS goal 120-180  Medium scale  A1C 6.7  ISS     HEME/ONC:  Monitor H/H  SCDs  Chemo ppx: SQL  LED negative     ID:  No fevers, WBC count trending down   copious thick inline secretions, oral secretions  No signs of infection

## 2023-11-19 NOTE — PROGRESS NOTE ADULT - SUBJECTIVE AND OBJECTIVE BOX
NSCU ATTENDING -- ADDITIONAL PROGRESS NOTE    Nighttime rounds were performed -- please refer to earlier Progress Note for HPI details.    T(C): 36.8 (11-19-23 @ 19:00), Max: 37 (11-18-23 @ 23:00)  HR: 90 (11-19-23 @ 19:00) (61 - 122)  BP: 151/69 (11-19-23 @ 19:00) (151/69 - 178/84)  RR: 21 (11-19-23 @ 19:00) (19 - 30)  SpO2: 100% (11-19-23 @ 19:00) (96% - 100%)  Wt(kg): --    Relevant labwork and imaging reviewed.    on NC, 100% sat.  AVAPS as needed.  [facility]   [facility]U ATTENDING -- ADDITIONAL PROGRESS NOTE    Nighttime rounds were performed -- please refer to earlier Progress Note for HPI details.    T(C): 36.8 (11-19-23 @ 19:00), Max: 37 (11-18-23 @ 23:00)  HR: 90 (11-19-23 @ 19:00) (61 - 122)  BP: 151/69 (11-19-23 @ 19:00) (151/69 - 178/84)  RR: 21 (11-19-23 @ 19:00) (19 - 30)  SpO2: 100% (11-19-23 @ 19:00) (96% - 100%)  Wt(kg): --    Relevant labwork and imaging reviewed.    on NC, 100% sat.  AVAPS as needed.  [facility]   NSCU ATTENDING -- ADDITIONAL PROGRESS NOTE    Nighttime rounds were performed -- please refer to earlier Progress Note for HPI details.    T(C): 36.8 (11-19-23 @ 19:00), Max: 37 (11-18-23 @ 23:00)  HR: 90 (11-19-23 @ 19:00) (61 - 122)  BP: 151/69 (11-19-23 @ 19:00) (151/69 - 178/84)  RR: 21 (11-19-23 @ 19:00) (19 - 30)  SpO2: 100% (11-19-23 @ 19:00) (96% - 100%)  Wt(kg): --    Relevant labwork and imaging reviewed.    on NC, 100% sat.  AVAPS as needed.  [facility]

## 2023-11-19 NOTE — SWALLOW BEDSIDE ASSESSMENT ADULT - H & P REVIEW
KAIDEN HATCH is a 62 y/o Malayalam/English-speaking female with myasthenia gravis, respiratory failure. Demonstrating worsening respiratory effort on SBT, NIF and FVC below baseline required for extubation to BiPAP despite completion of IVIG. S/p Plex./yes

## 2023-11-19 NOTE — PROGRESS NOTE ADULT - SUBJECTIVE AND OBJECTIVE BOX
Neurology  Progress Note  11-19-23    Name: KAIDEN Randally    Review of Systems: ***    Medications:  MEDICATIONS  (STANDING):  albuterol/ipratropium for Nebulization 3 milliLiter(s) Nebulizer every 6 hours  azaTHIOprine 50 milliGRAM(s) Oral two times a day  chlorhexidine 4% Liquid 1 Application(s) Topical at bedtime  chlorhexidine 4% Liquid 1 Application(s) Topical <User Schedule>  enoxaparin Injectable 40 milliGRAM(s) SubCutaneous every 24 hours  insulin lispro (ADMELOG) corrective regimen sliding scale   SubCutaneous every 6 hours  polyethylene glycol 3350 17 Gram(s) Oral every 12 hours  potassium chloride   Solution 20 milliEquivalent(s) Oral every 2 hours  senna 2 Tablet(s) Oral at bedtime  sodium chloride 0.9%. 1000 milliLiter(s) (75 mL/Hr) IV Continuous <Continuous>    MEDICATIONS  (PRN):  acetaminophen   Oral Liquid .. 650 milliGRAM(s) Oral every 6 hours PRN Temp greater or equal to 38C (100.4F), Mild Pain (1 - 3)  sodium chloride 0.9% lock flush 10 milliLiter(s) IV Push every 1 hour PRN Pre/post blood products, medications, blood draw, and to maintain line patency      Vitals:  T(C): 36.3 (11-19-23 @ 11:00), Max: 37.7 (11-18-23 @ 15:00)  HR: 118 (11-19-23 @ 11:00) (61 - 122)  BP: 178/84 (11-19-23 @ 11:00) (129/62 - 178/84)  RR: 24 (11-19-23 @ 11:00) (19 - 30)  SpO2: 98% (11-19-23 @ 11:00) (96% - 100%)      Neurologic Examination: INCOMPLETE  ***    Labs:                        11.0   11.99 )-----------( 277      ( 19 Nov 2023 07:12 )             33.8     11-19    144  |  109<H>  |  29<H>  ----------------------------<  206<H>  3.6   |  20<L>  |  0.39<L>    Ca    9.0      19 Nov 2023 07:04  Phos  3.5     11-19  Mg     2.2     11-19      CAPILLARY BLOOD GLUCOSE      POCT Blood Glucose.: 166 mg/dL (19 Nov 2023 06:14)       Radiology:     Neurology  Progress Note  11-19-23    Name: KAIDEN HATCH 63y    Review of Systems: Patient feeling generally improved, currently sitting upright in a chair.    Medications:  MEDICATIONS  (STANDING):  albuterol/ipratropium for Nebulization 3 milliLiter(s) Nebulizer every 6 hours  azaTHIOprine 50 milliGRAM(s) Oral two times a day  chlorhexidine 4% Liquid 1 Application(s) Topical at bedtime  chlorhexidine 4% Liquid 1 Application(s) Topical <User Schedule>  enoxaparin Injectable 40 milliGRAM(s) SubCutaneous every 24 hours  insulin lispro (ADMELOG) corrective regimen sliding scale   SubCutaneous every 6 hours  polyethylene glycol 3350 17 Gram(s) Oral every 12 hours  potassium chloride   Solution 20 milliEquivalent(s) Oral every 2 hours  senna 2 Tablet(s) Oral at bedtime  sodium chloride 0.9%. 1000 milliLiter(s) (75 mL/Hr) IV Continuous <Continuous>    MEDICATIONS  (PRN):  acetaminophen   Oral Liquid .. 650 milliGRAM(s) Oral every 6 hours PRN Temp greater or equal to 38C (100.4F), Mild Pain (1 - 3)  sodium chloride 0.9% lock flush 10 milliLiter(s) IV Push every 1 hour PRN Pre/post blood products, medications, blood draw, and to maintain line patency      Vitals:  T(C): 36.3 (11-19-23 @ 11:00), Max: 37.7 (11-18-23 @ 15:00)  HR: 118 (11-19-23 @ 11:00) (61 - 122)  BP: 178/84 (11-19-23 @ 11:00) (129/62 - 178/84)  RR: 24 (11-19-23 @ 11:00) (19 - 30)  SpO2: 98% (11-19-23 @ 11:00) (96% - 100%)      Neurologic Examination:      - Mental Status: Eyes open, attends to examiner; oriented to person, age, month, year, location, and situation; speech is fluent and follows commands.    - Cranial Nerves:  II: Blinks to threat bilaterally; pupils are equal, round, and reactive to light   III, IV, VI: Extraocular movements are intact without nystagmus; moderate L ptosis  V: Facial sensation is intact in the V1-V3 distribution bilaterally  VII: Lower face is symmetric with smile  VIII: Hearing is intact to conversation  XII: Tongue protrudes in the midline    - Motor/Strength Testing:    - Neck: 4+/5 neck flexion, 4+/5 neck extension  - Voice: able to count to 10 in one breath without difficulty, however, becomes hypophonic afterwards. Patient reports that this is due to soreness/discomfort in her throat s/p intubation/extubation.                                   Right           Left  Biceps                      5                 5  Triceps                     5                 5  Hand                  5                 5    Hip Flex                   5                  5  Knee Ext	      5                  5  Dorsiflex                  4                  5 (R DF weakness is chronic, per patient)  Plantarflex               4+                 5 (R PF weakness is chronic, per patient)    - Normal muscle bulk and tone throughout.    - Plant responses down bilaterally.    - Sensory: Intact throughout to temperature x 4 extremities.  - Coordination: Finger-nose-finger intact without ataxia or dysmetria.            Labs:                        11.0   11.99 )-----------( 277      ( 19 Nov 2023 07:12 )             33.8     11-19    144  |  109<H>  |  29<H>  ----------------------------<  206<H>  3.6   |  20<L>  |  0.39<L>    Ca    9.0      19 Nov 2023 07:04  Phos  3.5     11-19  Mg     2.2     11-19      CAPILLARY BLOOD GLUCOSE      POCT Blood Glucose.: 166 mg/dL (19 Nov 2023 06:14)       Radiology:  CT Chest No Cont (11.13.23 @ 22:50)  Fluid density in anterior portion of superior cardiac recess which limits   the evaluation of the anterior mediastinum. Within this limitation, there   is no soft tissue density to suggest residual thymic tissue. Median   sternotomy and postsurgical changes in the mediastinal.  Atelectasis in left lower lobe.    Xray Chest 1 View- PORTABLE-Urgent (Xray Chest 1 View- PORTABLE-Urgent .) (11.19.23 @ 10:56)    IMPRESSION:  Weighted tip enteric tube coursing below level diaphragm and coiling   within stomach.    Hazy opacities overlying bilateral lower lung fields likely secondary to   atelectasis versus layering small bilateral pleural effusions.

## 2023-11-19 NOTE — PROGRESS NOTE ADULT - ASSESSMENT
ASSESSMENT/PLAN: 64 y/o female with myasthenia gravis, respiratory failure. Demonstrating worsening respiratory effort on SBT, NIF and FVC below baseline required for extubation to BiPAP despite completion of IVIG. Plan is to d/w PT, family risks & benefits of PLEX - patient consented to insertion of shiley, initiation of PLEX - S/P tx w/ PLEX 1/5. Care initiated w/ [facility] neuromuscular neurology Dr Covarrubias. PT shows signs of improved respiratory effort. Continues to CPAP well, Secretions are improving at this time    NEURO:  Neurochecks q4h  S/P completion of PLEX 5/5  on Solumedrol 125 q6 x 2 days per neurology (11/17--11/19)  patient on azathioprine on 11/10   Recommend early ambulation w/ PT/OT  Neurology following  CT chest - no evidence of thymoma   Hold Mestinon, Will consider restarting prior to extubation, currently held in setting of copious secretions, retrial extubation in AM  Avoid medications that may worsen the current exacerbation including macrolides, fluoroquinolones, tetracyclines, aminoglycoside, beta-blockers, magnesium, and anti-arrhythmics (procainamide, quinidine, and lidocaine), muscle relaxants   Activity: [x] mobilize as tolerated [] Bedrest [x] PT [x] OT [] PMNR    PULM:  Extubated to BiPAP  CT chest- with atelectasis, no evidence of residual thymoma   CXR reviewed- improved   Duonebs q6h for secretions (oral), self suctions   Aggressive Chest PT    CV:  MAP>65  TTE - EF 54%, elevated right atrial pressure     RENAL:  Stable renal function  IVL if PO intake is tolerated  daily IOs    GI:  Diet: NPO  Bedside dysphagia screening  GI prophylaxis: PPI while on steroids   Bowel regimen: miralax, senna  LBM: 11/17    ENDO:   FS goal 120-180  Medium scale  A1C 6.7  ISS     HEME/ONC:  Monitor H/H  SCDs  Chemo ppx: SQL  LED negative     ID:  No fevers, WBC count trending down   copious thick inline secretions, oral secretions  No signs of infection  ASSESSMENT/PLAN: 64 y/o female with myasthenia gravis, respiratory failure. Demonstrating worsening respiratory effort on SBT, NIF and FVC below baseline required for extubation to BiPAP despite completion of IVIG. Plan is to d/w PT, family risks & benefits of PLEX - patient consented to insertion of shiley, initiation of PLEX - S/P tx w/ PLEX 1/5. Care initiated w/ [facility] neuromuscular neurology Dr Covarrubias. PT shows signs of improved respiratory effort. Continues to CPAP well, Secretions are improving at this time    NEURO:  Neurochecks q4h  S/P completion of PLEX 5/5  on Solumedrol 125 q6 x 2 days per neurology (11/17--11/19)  patient on azathioprine on 11/10   Recommend early ambulation w/ PT/OT  Neurology following  CT chest - no evidence of thymoma   Hold Mestinon, Will consider restarting prior to extubation, currently held in setting of copious secretions, retrial extubation in AM  Avoid medications that may worsen the current exacerbation including macrolides, fluoroquinolones, tetracyclines, aminoglycoside, beta-blockers, magnesium, and anti-arrhythmics (procainamide, quinidine, and lidocaine), muscle relaxants   Activity: [x] mobilize as tolerated [] Bedrest [x] PT [x] OT [] PMNR    PULM:  Extubated to BiPAP  CT chest- with atelectasis, no evidence of residual thymoma   CXR reviewed- improved   Duonebs q6h for secretions (oral), self suctions   Aggressive Chest PT    CV:  MAP>65  TTE - EF 54%, elevated right atrial pressure     RENAL:  Stable renal function  IVL if PO intake is tolerated  daily IOs    GI:  Diet: NPO  Bedside dysphagia screening  GI prophylaxis: PPI while on steroids   Bowel regimen: miralax, senna  LBM: 11/17    ENDO:   FS goal 120-180  Medium scale  A1C 6.7  ISS     HEME/ONC:  Monitor H/H  SCDs  Chemo ppx: SQL  LED negative     ID:  No fevers, WBC count trending down   copious thick inline secretions, oral secretions  No signs of infection  ASSESSMENT/PLAN: 62 y/o female with myasthenia gravis, respiratory failure. Demonstrating worsening respiratory effort on SBT, NIF and FVC below baseline required for extubation to BiPAP despite completion of IVIG. Plan is to d/w PT, family risks & benefits of PLEX - patient consented to insertion of shiley, initiation of PLEX - S/P tx w/ PLEX 1/5. Care initiated w/ [facility] neuromuscular neurology Dr Covarrubias. PT shows signs of improved respiratory effort. Continues to CPAP well, Secretions are improving at this time    NEURO:  Neurochecks q4h  S/P completion of PLEX 5/5  on Solumedrol 125 q6 x 2 days per neurology (11/17--11/19)  patient on azathioprine on 11/10   Recommend early ambulation w/ PT/OT  Neurology following  CT chest - no evidence of thymoma   Hold Mestinon, Will consider restarting prior to extubation, currently held in setting of copious secretions, retrial extubation in AM  Avoid medications that may worsen the current exacerbation including macrolides, fluoroquinolones, tetracyclines, aminoglycoside, beta-blockers, magnesium, and anti-arrhythmics (procainamide, quinidine, and lidocaine), muscle relaxants   Activity: [x] mobilize as tolerated [] Bedrest [x] PT [x] OT [] PMNR    PULM:  Extubated to BiPAP  CT chest- with atelectasis, no evidence of residual thymoma   CXR reviewed- improved   Duonebs q6h for secretions (oral), self suctions   Aggressive Chest PT    CV:  MAP>65  TTE - EF 54%, elevated right atrial pressure     RENAL:  Stable renal function  IVL if PO intake is tolerated  daily IOs    GI:  Diet: NPO  Bedside dysphagia screening  GI prophylaxis: PPI while on steroids   Bowel regimen: miralax, senna  LBM: 11/17    ENDO:   FS goal 120-180  Medium scale  A1C 6.7  ISS     HEME/ONC:  Monitor H/H  SCDs  Chemo ppx: SQL  LED negative     ID:  No fevers, WBC count trending down   copious thick inline secretions, oral secretions  No signs of infection

## 2023-11-19 NOTE — SWALLOW BEDSIDE ASSESSMENT ADULT - COMMENTS
Continued history:   -GI prophylaxis: PPI while on steroids   -24 hour Events:    Reports improvement in symptoms w/ exception to copious oral secretions. No complaints of pain or dyspnea.     11/4- Patient intubated overnight for worsening respiratory status. Patient received dose 2/5 of IVIG  11/7- Apneic on SBT; worsening respiratory effort; last dose of IVIG TODAY  11/8-  PT received shiley in right IJ w/ conscious sedation for initiation of PLEX. S/P tx 1/5 w/ PLEX.  11/10- Patient due for second dose of PLEX.   11/13-  Will get 3rd dose of PLEX today.   11/16- no acute events overnight. Patient still with thick inline secretions.  11/17- PLEX 5/5 completed  11/18- Patient tolerated CPAP very well. Secretions better. Pt was extubated to Bipap    ETT 11/4-11/18     Speech & Swallow:   - February 2023 known to this service; final recommendations for regular solids, thin liquids; please see reports for more information.

## 2023-11-19 NOTE — SWALLOW BEDSIDE ASSESSMENT ADULT - ADDITIONAL RECOMMENDATIONS
GOAL- Pt to tolerate recommended textures during course w/ no s/sx of aspiration Pt/family/caregiver will demonstrate understanding and carryover of dysphagia management (safe swallow guidelines, dysphagia diet).

## 2023-11-20 DIAGNOSIS — R13.10 DYSPHAGIA, UNSPECIFIED: ICD-10-CM

## 2023-11-20 LAB
GLUCOSE BLDC GLUCOMTR-MCNC: 132 MG/DL — HIGH (ref 70–99)
GLUCOSE BLDC GLUCOMTR-MCNC: 148 MG/DL — HIGH (ref 70–99)

## 2023-11-20 PROCEDURE — 99232 SBSQ HOSP IP/OBS MODERATE 35: CPT

## 2023-11-20 PROCEDURE — 31575 DIAGNOSTIC LARYNGOSCOPY: CPT

## 2023-11-20 PROCEDURE — 99254 IP/OBS CNSLTJ NEW/EST MOD 60: CPT | Mod: 25

## 2023-11-20 PROCEDURE — 99233 SBSQ HOSP IP/OBS HIGH 50: CPT

## 2023-11-20 RX ORDER — MULTIVIT-MIN/FERROUS GLUCONATE 9 MG/15 ML
15 LIQUID (ML) ORAL DAILY
Refills: 0 | Status: DISCONTINUED | OUTPATIENT
Start: 2023-11-20 | End: 2023-11-20

## 2023-11-20 RX ORDER — CALCIUM CARBONATE 500(1250)
1 TABLET ORAL DAILY
Refills: 0 | Status: DISCONTINUED | OUTPATIENT
Start: 2023-11-20 | End: 2023-11-23

## 2023-11-20 RX ORDER — PANTOPRAZOLE SODIUM 20 MG/1
40 TABLET, DELAYED RELEASE ORAL
Refills: 0 | Status: DISCONTINUED | OUTPATIENT
Start: 2023-11-20 | End: 2023-11-23

## 2023-11-20 RX ORDER — ACETAMINOPHEN 500 MG
650 TABLET ORAL EVERY 6 HOURS
Refills: 0 | Status: DISCONTINUED | OUTPATIENT
Start: 2023-11-20 | End: 2023-11-23

## 2023-11-20 RX ORDER — AMLODIPINE BESYLATE 2.5 MG/1
10 TABLET ORAL DAILY
Refills: 0 | Status: DISCONTINUED | OUTPATIENT
Start: 2023-11-20 | End: 2023-11-23

## 2023-11-20 RX ORDER — CHOLECALCIFEROL (VITAMIN D3) 125 MCG
400 CAPSULE ORAL DAILY
Refills: 0 | Status: DISCONTINUED | OUTPATIENT
Start: 2023-11-20 | End: 2023-11-20

## 2023-11-20 RX ORDER — LOSARTAN POTASSIUM 100 MG/1
25 TABLET, FILM COATED ORAL DAILY
Refills: 0 | Status: DISCONTINUED | OUTPATIENT
Start: 2023-11-20 | End: 2023-11-20

## 2023-11-20 RX ORDER — MULTIVIT-MIN/FERROUS GLUCONATE 9 MG/15 ML
15 LIQUID (ML) ORAL DAILY
Refills: 0 | Status: DISCONTINUED | OUTPATIENT
Start: 2023-11-20 | End: 2023-11-23

## 2023-11-20 RX ORDER — AZATHIOPRINE 100 MG/1
50 TABLET ORAL DAILY
Refills: 0 | Status: DISCONTINUED | OUTPATIENT
Start: 2023-11-20 | End: 2023-11-20

## 2023-11-20 RX ORDER — SENNA PLUS 8.6 MG/1
2 TABLET ORAL AT BEDTIME
Refills: 0 | Status: DISCONTINUED | OUTPATIENT
Start: 2023-11-20 | End: 2023-11-23

## 2023-11-20 RX ORDER — PANTOPRAZOLE SODIUM 20 MG/1
40 TABLET, DELAYED RELEASE ORAL ONCE
Refills: 0 | Status: COMPLETED | OUTPATIENT
Start: 2023-11-20 | End: 2023-11-20

## 2023-11-20 RX ORDER — CHOLECALCIFEROL (VITAMIN D3) 125 MCG
1000 CAPSULE ORAL DAILY
Refills: 0 | Status: DISCONTINUED | OUTPATIENT
Start: 2023-11-20 | End: 2023-11-20

## 2023-11-20 RX ORDER — AZATHIOPRINE 100 MG/1
50 TABLET ORAL EVERY 12 HOURS
Refills: 0 | Status: DISCONTINUED | OUTPATIENT
Start: 2023-11-20 | End: 2023-11-23

## 2023-11-20 RX ORDER — CHOLECALCIFEROL (VITAMIN D3) 125 MCG
1000 CAPSULE ORAL DAILY
Refills: 0 | Status: DISCONTINUED | OUTPATIENT
Start: 2023-11-20 | End: 2023-11-23

## 2023-11-20 RX ORDER — INSULIN LISPRO 100/ML
VIAL (ML) SUBCUTANEOUS
Refills: 0 | Status: DISCONTINUED | OUTPATIENT
Start: 2023-11-20 | End: 2023-11-23

## 2023-11-20 RX ORDER — POLYETHYLENE GLYCOL 3350 17 G/17G
17 POWDER, FOR SOLUTION ORAL DAILY
Refills: 0 | Status: DISCONTINUED | OUTPATIENT
Start: 2023-11-20 | End: 2023-11-23

## 2023-11-20 RX ORDER — PANTOPRAZOLE SODIUM 20 MG/1
40 TABLET, DELAYED RELEASE ORAL EVERY 12 HOURS
Refills: 0 | Status: DISCONTINUED | OUTPATIENT
Start: 2023-11-20 | End: 2023-11-20

## 2023-11-20 RX ADMIN — Medication 1 TABLET(S): at 17:36

## 2023-11-20 RX ADMIN — PANTOPRAZOLE SODIUM 40 MILLIGRAM(S): 20 TABLET, DELAYED RELEASE ORAL at 16:41

## 2023-11-20 RX ADMIN — Medication 3 MILLILITER(S): at 05:11

## 2023-11-20 RX ADMIN — AZATHIOPRINE 50 MILLIGRAM(S): 100 TABLET ORAL at 05:07

## 2023-11-20 RX ADMIN — Medication 650 MILLIGRAM(S): at 00:15

## 2023-11-20 RX ADMIN — PANTOPRAZOLE SODIUM 40 MILLIGRAM(S): 20 TABLET, DELAYED RELEASE ORAL at 11:01

## 2023-11-20 RX ADMIN — Medication 1 TABLET(S): at 11:33

## 2023-11-20 RX ADMIN — Medication 4: at 12:16

## 2023-11-20 RX ADMIN — Medication 15 MILLILITER(S): at 11:33

## 2023-11-20 RX ADMIN — CHLORHEXIDINE GLUCONATE 1 APPLICATION(S): 213 SOLUTION TOPICAL at 05:06

## 2023-11-20 RX ADMIN — Medication 3 MILLILITER(S): at 17:36

## 2023-11-20 RX ADMIN — ENOXAPARIN SODIUM 40 MILLIGRAM(S): 100 INJECTION SUBCUTANEOUS at 21:37

## 2023-11-20 RX ADMIN — LOSARTAN POTASSIUM 25 MILLIGRAM(S): 100 TABLET, FILM COATED ORAL at 15:19

## 2023-11-20 RX ADMIN — AZATHIOPRINE 50 MILLIGRAM(S): 100 TABLET ORAL at 16:41

## 2023-11-20 RX ADMIN — Medication 1000 UNIT(S): at 12:29

## 2023-11-20 RX ADMIN — Medication 3 MILLILITER(S): at 11:11

## 2023-11-20 RX ADMIN — Medication 60 MILLIGRAM(S): at 05:07

## 2023-11-20 RX ADMIN — Medication 3 MILLILITER(S): at 23:55

## 2023-11-20 NOTE — CHART NOTE - NSCHARTNOTESELECT_GEN_ALL_CORE
Abnormal Lab Result/Event Note
Apheresis Note/Blood Bank
Blood Bank
Blood Bank
Nutrition Services
Intubation/family conversation/Event Note
Nutrition Services
Nutrition Services
Off Service Note
VTE Risk Assessment/Event Note
Blood Bank

## 2023-11-20 NOTE — CONSULT NOTE ADULT - NS ATTEND AMEND GEN_ALL_CORE FT
right cord with mild trauma, but fully mobile  SLP eval in the afternoon permits diet  recommend PPI x 1 month  f/up as outpatient in 1 month if there is persistent dysphonia, but likely will resolve

## 2023-11-20 NOTE — CONSULT NOTE ADULT - ASSESSMENT
63y woman with a PMHx significant for myasthenia gravis diagnosis admitted for worsening difficulty with double vision, swallowing, talking, and breathing that has progressively gotten worse over the course of one week. S/p IVIG and required intubation from 11/4-11/18, now extubated and complaining of dysphagia and hoarseness. Indirect laryngoscopy performed which showed pooling of secretions and right vocal cord ecchymosis, b/l vocal cords mobile with good contact.

## 2023-11-20 NOTE — SWALLOW FEES ASSESSMENT ADULT - ESOPHAGEAL PHASE
laryngeal penetration of reflux; did not descend to the level of the vocal folds; pt sensate to material and triggered repeat swallows in order to protect airway

## 2023-11-20 NOTE — PROGRESS NOTE ADULT - ATTENDING COMMENTS
63 year old female w/ hx of myasthenia gravis, admitted for MG exacerbation (bulbar symptoms) requiring intubation, IVIG x 5 doses followed by PLEX x 5 (last session 11/17) with clinical improvement and now extubated 11/18. Of note, she was also admitted in 2/2023 and 8/2023 for MG exacerbations requiring intubation and PLEX.     Pt seen and examined at bedside in NSCU. Sitting up in recliner. Reported doing well. No acute concerns. L eyelid ptosis, conjugate eye movements, mild b/l facial weakness, tongue midline.  Motor exam:  Neck Flexion 4+/5  Neck Extension 4+/5  L/R (out of 5)       Deltoid  4/3    Biceps   4+/5      Triceps  4+/4+       5/5   L/R (out of 5)       Hip Flexion  5/5    Hip Extension  5/5  Knee Extension  5/5  Dorsiflexion  5/5      Plantar Flexion 5/5   Gait deferred    Imp: MG exacerbation  Plan:  - Transfer to neuro floor  - Nif/VC q4hrs  - Continue imuran 50 mg BID  - Continue prednisone 60 mg QD (plan to taper down by 10 mg weekly till on 30 mg QD after transfer to floor)  - PT/OT

## 2023-11-20 NOTE — PROGRESS NOTE ADULT - ASSESSMENT
Assessment: 63-year-old female w/ PMHx MG (dx 2013, thymus surgery 2010, multiple admissions for myasthenic crisis including intubation) p/w worsening weakness, fatigue, and difficulty eating over the prior 1-2 weeks, also 2 days worsening diplopia and head drop. Patient's most recent admission 6/2023, given PLEX and IVIg, discharged on pyridostigmine 90 mg TID and prednisone 50 mg QD, w/ prednisone tapered off and stopped 9/15/23. Patient had restarted prednisone 10 mg QD 2 weeks PTA, then increased to 20 mg QD w/o improvement; pyridostigmine was also increased 1 week PTA.     Hospital course: admitted to [facility] 11/3; intubated 11/4; s/p IVIg (11/3-11/7); s/p PLEX x 5 (11/8-11/17); extubated to BiPAP 11/18, weaned to NC 11/19; accepted for downgrade to general neurology service 11/19, pending transfer.    Plan:  #NEURO: Myasthenia Gravis exacerbation w/ acute respiratory failure  - s/p plasmapheresis 5/5  - s/p methylprednisolone 125 mg Q5H x 2 days (11/17-11/19)   - Restart prednisone 60 mg QD  - c/w azathioprine 50mg BID  - IVIG 11/3-11/7 but worsened and then switched to PLEX (completed). Following completion will consider additional course of IVIG, if needed  - Appreciate input from neuromuscular specialist, Dr. Emily Covarrubias - c/w prednisone 60mg QD & azathioprine 50mg BID  - Hold Mestinon due to respiratory status and secretions. Will resume when more stable  - Continue VC and NIF monitoring  - Neurological checks Q4H  - Avoid medications that may worsen the current exacerbation including macrolides, fluoroquinolones, tetracyclines, aminoglycoside, beta-blockers, magnesium, and anti-arrhythmics (procainamide, quinidine, and lidocaine), muscle relaxants     PULM:  Extubated to BiPAP, currently on NC - tachypneic (11/19)  CT chest- with atelectasis, no evidence of residual thymoma   CXR - improved   Duonebs q6h for secretions (oral), self suctions  Aggressive Chest PT    CV:  HTN - unclear, but patient appears to need a medication reconciliation? Per AllScripts, patient appears to be on amlodipine and metoprolol at home. Consider restarting.  TTE - EF 54%, elevated right atrial pressure     RENAL:  Stable renal function  IVL if PO intake is tolerated  daily IOs    GI:  Diet: NPO  Bedside dysphagia screening - recommending FEES  GI prophylaxis: PPI while on steroids   Bowel regimen: Miralax, Senna  LBM: 11/17    ENDO:   FS goal 120-180  Medium scale  A1C 6.7  ISS     HEME/ONC:  Monitor H/H  SCDs  Chemo ppx: SQL  LED negative     ID:  No fevers, WBC count trending down   copious thick inline secretions, oral secretions  No signs of infection  Assessment: 63-year-old female w/ PMHx MG (dx 2013, thymus surgery 2010, multiple admissions for myasthenic crisis including intubation) p/w worsening weakness, fatigue, and difficulty eating over the prior 1-2 weeks, also 2 days worsening diplopia and head drop. Patient's most recent admission 6/2023, given PLEX and IVIg, discharged on pyridostigmine 90 mg TID and prednisone 50 mg QD, w/ prednisone tapered off and stopped 9/15/23. Patient had restarted prednisone 10 mg QD 2 weeks PTA, then increased to 20 mg QD w/o improvement; pyridostigmine was also increased 1 week PTA.     Hospital course: admitted to NSCU 11/3; intubated 11/4; s/p IVIg (11/3-11/7); s/p PLEX x 5 (11/8-11/17); extubated to BiPAP 11/18, weaned to NC 11/19; accepted for downgrade to general neurology service 11/19, pending transfer.    Plan:  #NEURO: Myasthenia Gravis exacerbation w/ acute respiratory failure  - s/p plasmapheresis 5/5  - s/p methylprednisolone 125 mg Q5H x 2 days (11/17-11/19)   - Restart prednisone 60 mg QD  - c/w azathioprine 50mg BID  - IVIG 11/3-11/7 but worsened and then switched to PLEX (completed). Following completion will consider additional course of IVIG, if needed  - Appreciate input from neuromuscular specialist, Dr. Emily Covarrubias - c/w prednisone 60mg QD & azathioprine 50mg BID  - Hold Mestinon due to respiratory status and secretions. Will resume when more stable  - Continue VC and NIF monitoring Q4  - Neurological checks Q4H  - PT/OT  - SLP  - Avoid medications that may worsen the current exacerbation including macrolides, fluoroquinolones, tetracyclines, aminoglycoside, beta-blockers, magnesium, and anti-arrhythmics (procainamide, quinidine, and lidocaine), muscle relaxants     PULM:  Extubated to BiPAP, currently on NC - tachypneic (11/19)  CT chest- with atelectasis, no evidence of residual thymoma   CXR - improved   Duonebs q6h for secretions (oral), self suctions  Aggressive Chest PT    CV:  HTN - unclear, but patient appears to need a medication reconciliation? Per AllScripts, patient appears to be on amlodipine and metoprolol at home. Consider restarting.  TTE - EF 54%, elevated right atrial pressure     RENAL:  Stable renal function  IVL if PO intake is tolerated  daily IOs    GI:  Diet: NPO  Bedside dysphagia screening - recommending FEES  GI prophylaxis: PPI while on steroids   Bowel regimen: Miralax, Senna  LBM: 11/17    ENDO:   FS goal 120-180  Medium scale  A1C 6.7  ISS     HEME/ONC:  Monitor H/H  SCDs  Chemo ppx: SQL  LED negative     ID:  No fevers, WBC count trending down   copious thick inline secretions, oral secretions  No signs of infection  Assessment: 63-year-old female w/ PMHx MG (dx 2013, thymus surgery 2010, multiple admissions for myasthenic crisis including intubation) p/w worsening weakness, fatigue, and difficulty eating over the prior 1-2 weeks, also 2 days worsening diplopia and head drop. Patient's most recent admission 6/2023, given PLEX and IVIg, discharged on pyridostigmine 90 mg TID and prednisone 50 mg QD, w/ prednisone tapered off and stopped 9/15/23. Patient had restarted prednisone 10 mg QD 2 weeks PTA, then increased to 20 mg QD w/o improvement; pyridostigmine was also increased 1 week PTA.     Hospital course: admitted to NSCU 11/3; intubated 11/4; s/p IVIg (11/3-11/7); s/p PLEX x 5 (11/8-11/17); extubated to BiPAP 11/18, weaned to NC 11/19; accepted for downgrade to general neurology service 11/19, pending transfer.    Plan:  #NEURO: Myasthenia Gravis exacerbation w/ acute respiratory failure  - s/p plasmapheresis 5/5  - s/p methylprednisolone 125 mg Q5H x 2 days (11/17-11/19)   - IVIG 11/3-11/7 but worsened and then switched to PLEX (completed). Following completion will consider additional course of IVIG, if needed  - Appreciate input from neuromuscular specialist, Dr. Emily Covarrubias - c/w prednisone 60mg QD & azathioprine 50mg BID  - Hold Mestinon due to respiratory status and secretions. Will resume when more stable  - Continue prednisone 60 mg QD  - c/w azathioprine 50mg BID  - Continue VC and NIF monitoring Q4  - Neurological checks Q4H  - PT/OT  - SLP  - Avoid medications that may worsen the current exacerbation including macrolides, fluoroquinolones, tetracyclines, aminoglycoside, beta-blockers, magnesium, and anti-arrhythmics (procainamide, quinidine, and lidocaine), muscle relaxants     PULM:  Extubated to BiPAP, currently on NC - tachypneic (11/19)  CT chest- with atelectasis, no evidence of residual thymoma   CXR - improved   Duonebs q6h for secretions (oral), self suctions  Aggressive Chest PT    CV:  HTN - unclear, but patient appears to need a medication reconciliation? Per AllScripts, patient appears to be on amlodipine and metoprolol at home. Consider restarting.  TTE - EF 54%, elevated right atrial pressure     RENAL:  Stable renal function  IVL if PO intake is tolerated  daily IOs    GI:  Diet: NPO  [] Pending Planed FEES  [] GI prophylaxis: PPI while on steroids   Bowel regimen: Miralax, Senna  LBM: 11/17    ENDO:   FS goal 120-180  Medium scale  A1C 6.7  ISS     HEME/ONC:  Monitor H/H  SCDs  Chemo ppx: SQL  LED negative     ID:  No fevers, WBC count trending down   copious thick inline secretions, oral secretions  No signs of infection  Assessment: 63-year-old female w/ PMHx MG (dx 2013, thymus surgery 2010, multiple admissions for myasthenic crisis including intubation) p/w worsening weakness, fatigue, and difficulty eating over the prior 1-2 weeks, also 2 days worsening diplopia and head drop. Patient's most recent admission 6/2023, given PLEX and IVIg, discharged on pyridostigmine 90 mg TID and prednisone 50 mg QD, w/ prednisone tapered off and stopped 9/15/23. Patient had restarted prednisone 10 mg QD 2 weeks PTA, then increased to 20 mg QD w/o improvement; pyridostigmine was also increased 1 week PTA.     Hospital course: admitted to NSCU 11/3; intubated 11/4; s/p IVIg (11/3-11/7); s/p PLEX x 5 (11/8-11/17); extubated to BiPAP 11/18, weaned to NC 11/19; accepted for downgrade to general neurology service 11/19, pending transfer.    Plan:  #NEURO: Myasthenia Gravis exacerbation w/ acute respiratory failure  - s/p plasmapheresis 5/5  - s/p methylprednisolone 125 mg Q5H x 2 days (11/17-11/19)   - IVIG 11/3-11/7 but worsened and then switched to PLEX (completed). Following completion will consider additional course of IVIG, if needed  - Appreciate input from neuromuscular specialist, Dr. Emily Covarrubias - c/w prednisone 60mg QD & azathioprine 50mg BID  - Hold Mestinon due to respiratory status and secretions. Will resume when more stable  - Continue prednisone 60 mg QD  - c/w azathioprine 50mg BID  - Continue VC and NIF monitoring Q4  - Neurological checks Q4H  - PT/OT  - SLP  - Avoid medications that may worsen the current exacerbation including macrolides, fluoroquinolones, tetracyclines, aminoglycoside, beta-blockers, magnesium, and anti-arrhythmics (procainamide, quinidine, and lidocaine), muscle relaxants     PULM:  Extubated to BiPAP, currently on NC - tachypneic (11/19)  CT chest- with atelectasis, no evidence of residual thymoma   CXR - improved   Duonebs q6h for secretions (oral), self suctions  Aggressive Chest PT  [] ?Continue TMP/SMX PCP ppx?    CV:  HTN - unclear, but patient appears to need a medication reconciliation? Per AllScripts, patient appears to be on amlodipine and metoprolol at home. Consider restarting.  TTE - EF 54%, elevated right atrial pressure     RENAL:  Stable renal function  IVL if PO intake is tolerated  daily IOs    GI:  Diet: NPO  [] Pending Planed FEES  [] GI prophylaxis: PPI while on steroids   Bowel regimen: Miralax, Senna  LBM: 11/17    ENDO:   FS goal 120-180  Medium scale  A1C 6.7  ISS     HEME/ONC:  Monitor H/H  SCDs  Chemo ppx: SQL  LED negative     ID:  No fevers, WBC count trending down   copious thick inline secretions, oral secretions  No signs of infection

## 2023-11-20 NOTE — CHART NOTE - NSCHARTNOTEFT_GEN_A_CORE
Nutrition Follow Up Note  Patient seen for: Nutrition Follow Up     Chart reviewed, events noted. Pt is a 62 yo F presented with Myasthenia Gravis exacerbation. Improvement in symptoms noted. Completed PLEX 5/5. Extubated 11/18.     Source: [] Patient       [x] EMR        [x] RN        [] Family at bedside       [x] Other: interdisciplinary medical team    Diet Order:   Diet, NPO with Tube Feed:   Tube Feeding Modality: Nasogastric  Glucerna 1.2 Taye (GLUCERNARTH)  Total Volume for 24 Hours (mL): 1560  Continuous  Starting Tube Feed Rate {mL per Hour}: 20  Increase Tube Feed Rate by (mL): 10     Every 4 hours  Until Goal Tube Feed Rate (mL per Hour): 65  Tube Feed Duration (in Hours): 24  Tube Feed Start Time: 15:00  Supplement Feeding Modality:  Nasogastric  Probiotic Yogurt/Smoothie Cans or Servings Per Day:  2       Frequency:  Daily (11-19-23)    EN Order Provides: Based on dosing wt 66.2kg, to provide a total volume of 1560ml, 1872kcal (28.3kcal/kg), 94g protein (1.4g/kg) and 1063ml free water. defer free water flush to team.    Is current diet order appropriate/adequate? [x] Yes  []  No:     EN Provision (per nursing flow sheet):   (11/20): feeds infusing at 65ml/hr  (11/19): 410ml (26% of goal); feeds titrating back up after extubation  (11/18): 260ml (16.7% of goal); feeds held for extubation  (11/17): 1170ml (75% of goal)  (11/16): 1170ml (75% of goal)    Nutrition-related concerns:  -S/P Swallow evaluation on 11/19. Recommended to remain NPO with non-oral means of nutrition/hydration. Consider FEES as able.  -Last BM (11/19): x 2; (11/17): x 3. On bowel regimen (senna, Miralax).   -Continues on insulin lispro sliding scale to aid in management of BG. A1c 6.7%. Prescribe Prednisone, posing pt at additional risk for elevated FSBG.   -S/P PLEX therapy, 5/5 sessions complete.     Weights:   Daily     MEDICATIONS  (STANDING):  insulin lispro (ADMELOG) corrective regimen sliding scale  pantoprazole  Injectable  polyethylene glycol 3350  predniSONE   Tablet  senna    Pertinent Labs:   A1C with Estimated Average Glucose Result: 6.7 % (11-03-23 @ 20:25)    Finger Sticks:  POCT Blood Glucose.: 144 mg/dL (11-20 @ 05:05)  POCT Blood Glucose.: 183 mg/dL (11-19 @ 23:09)  POCT Blood Glucose.: 145 mg/dL (11-19 @ 16:53)  POCT Blood Glucose.: 197 mg/dL (11-19 @ 12:10)    Triglycerides, Serum: 81 mg/dL (11-03-23 @ 20:24)    Skin per nursing documentation: no pressure injuries documented  Edema: none noted    based on dosing wt 66.2kg):   Estimated Energy Needs: (25-30kcal/kg): 1655-1986kcal  Estimated Protein Needs: (1.2-1.5g protein/kg): 79-99g protein  Estimated Fluid Needs: defer to team    Previous Nutrition Diagnosis: swallowing difficulty   Nutrition Diagnosis is: [x] ongoing  [] resolved [] not applicable     New Nutrition Diagnosis: [x] Not applicable    Nutrition Care Plan:  [x] In Progress  [] Achieved  [] Not applicable    Nutrition Interventions:     Education Provided:       [] Yes:  [] No:        Recommendations:      1. Continue Glucerna 1.2 to 65ml/hr x 24 hours. Based on dosing wt 66.2kg, to provide a total volume of 1560ml, 1872kcal (28.3kcal/kg), 94g protein (1.4g/kg) and 1063ml free water. defer free water flush to team.  2. Monitor GI tolerance. RD to remain available to adjust EN formulary, volume/rate PRN.   3. Recommend multivitamin (if no medication contraindications) to aid in prevention of micronutrient deficiencies.   4. Defer advancement of PO diet to medical team. If initiated, consider Consistent Carbohydrate diet. Defer consistency/texture to medical team, SLP.     Monitoring and Evaluation:   Continue to monitor nutritional intake, tolerance to diet prescription, weights, labs, skin integrity    RD remains available upon request and will follow up per protocol    Joselyn Gomez RD, available via TEAMS

## 2023-11-20 NOTE — PROGRESS NOTE ADULT - SUBJECTIVE AND OBJECTIVE BOX
Neurology Progress Note    Interval History:        PAST MEDICAL & SURGICAL HISTORY:  Myasthenia gravis    Diabetes mellitus    Hypertension            Medications:  acetaminophen   Oral Liquid .. 650 milliGRAM(s) Oral every 6 hours PRN  albuterol/ipratropium for Nebulization 3 milliLiter(s) Nebulizer every 6 hours  azaTHIOprine 50 milliGRAM(s) Oral two times a day  chlorhexidine 4% Liquid 1 Application(s) Topical <User Schedule>  chlorhexidine 4% Liquid 1 Application(s) Topical at bedtime  enoxaparin Injectable 40 milliGRAM(s) SubCutaneous every 24 hours  insulin lispro (ADMELOG) corrective regimen sliding scale   SubCutaneous every 6 hours  pantoprazole  Injectable 40 milliGRAM(s) IV Push once  polyethylene glycol 3350 17 Gram(s) Oral every 12 hours  predniSONE   Tablet 60 milliGRAM(s) Oral daily  senna 2 Tablet(s) Oral at bedtime  sodium chloride 0.9% lock flush 10 milliLiter(s) IV Push every 1 hour PRN      Vital Signs Last 24 Hrs  T(C): 36.5 (20 Nov 2023 07:00), Max: 36.8 (19 Nov 2023 19:00)  T(F): 97.7 (20 Nov 2023 07:00), Max: 98.3 (20 Nov 2023 03:00)  HR: 108 (20 Nov 2023 09:54) (80 - 119)  BP: 168/89 (20 Nov 2023 07:00) (151/69 - 178/84)  BP(mean): 112 (20 Nov 2023 07:00) (94 - 112)  RR: 20 (20 Nov 2023 07:00) (17 - 24)  SpO2: 99% (20 Nov 2023 09:54) (97% - 100%)    Parameters below as of 20 Nov 2023 07:00  Patient On (Oxygen Delivery Method): nasal cannula  O2 Flow (L/min): 2    Incomplete    General:  Constitutional: Female, appears stated age, nontoxic, not in distress,    Head: Normocephalic;   Eyes: clear sclera;   Extremities: No cyanosis;   Resp: breathing comfortably     Neurological (>12):  MS: Awake, alert.  Oriented person place situation. Follows commands. Attends to examiner  Language: Speech is hypophonic, clear, fluent, good repetition,  comprehension, registration of words.  CNs: PERRL (R 3mm, L 3mm). VFF. EOMI. No disconjugate gaze, skew. V1-3 intact LT, No facial asymmetry b/l. Hearing grossly normal b/l. Tongue midline.     Motor - Normal bulk and tone throughout. No pronator drift.    L/R (out of 5)       Deltoid  5/5    Biceps   5/5      Triceps  5/5         Wrist Extension 5/5   Wrist Flexion  5/5   Interossei 5/5     5/5   L/R (out of 5)       Hip Flexion  5/5    Hip Extension  5/5  Knee Extension  5/5  Dorsiflexion  5/5      Plantar Flexion 5/5     Sensation: Intact to LT b/l. Cortical: Extinction on DSS (neglect): none  Reflexes L/R:  Biceps(C5) 2/2  BR(C6) 2/2   Triceps(C7)  2/2 Patellar(L4)   2/2   Toes: mute  Coordination: No dysmetria to FTN b/l UE  Gait: Normal Romberg. No postural instability. Normal stance.    Labs:  CBC Full  -  ( 19 Nov 2023 07:12 )  WBC Count : 11.99 K/uL  RBC Count : 3.73 M/uL  Hemoglobin : 11.0 g/dL  Hematocrit : 33.8 %  Platelet Count - Automated : 277 K/uL  Mean Cell Volume : 90.6 fl  Mean Cell Hemoglobin : 29.5 pg  Mean Cell Hemoglobin Concentration : 32.5 gm/dL  Auto Neutrophil # : x  Auto Lymphocyte # : x  Auto Monocyte # : x  Auto Eosinophil # : x  Auto Basophil # : x  Auto Neutrophil % : x  Auto Lymphocyte % : x  Auto Monocyte % : x  Auto Eosinophil % : x  Auto Basophil % : x    11-19    144  |  109<H>  |  29<H>  ----------------------------<  206<H>  3.6   |  20<L>  |  0.39<L>    Ca    9.0      19 Nov 2023 07:04  Phos  3.5     11-19  Mg     2.2     11-19          Urinalysis Basic - ( 19 Nov 2023 07:04 )    Color: x / Appearance: x / SG: x / pH: x  Gluc: 206 mg/dL / Ketone: x  / Bili: x / Urobili: x   Blood: x / Protein: x / Nitrite: x   Leuk Esterase: x / RBC: x / WBC x   Sq Epi: x / Non Sq Epi: x / Bacteria: x       Neurology Progress Note    Interval History:        PAST MEDICAL & SURGICAL HISTORY:  Myasthenia gravis    Diabetes mellitus    Hypertension            Medications:  acetaminophen   Oral Liquid .. 650 milliGRAM(s) Oral every 6 hours PRN  albuterol/ipratropium for Nebulization 3 milliLiter(s) Nebulizer every 6 hours  azaTHIOprine 50 milliGRAM(s) Oral two times a day  chlorhexidine 4% Liquid 1 Application(s) Topical <User Schedule>  chlorhexidine 4% Liquid 1 Application(s) Topical at bedtime  enoxaparin Injectable 40 milliGRAM(s) SubCutaneous every 24 hours  insulin lispro (ADMELOG) corrective regimen sliding scale   SubCutaneous every 6 hours  pantoprazole  Injectable 40 milliGRAM(s) IV Push once  polyethylene glycol 3350 17 Gram(s) Oral every 12 hours  predniSONE   Tablet 60 milliGRAM(s) Oral daily  senna 2 Tablet(s) Oral at bedtime  sodium chloride 0.9% lock flush 10 milliLiter(s) IV Push every 1 hour PRN      Vital Signs Last 24 Hrs  T(C): 36.5 (20 Nov 2023 07:00), Max: 36.8 (19 Nov 2023 19:00)  T(F): 97.7 (20 Nov 2023 07:00), Max: 98.3 (20 Nov 2023 03:00)  HR: 108 (20 Nov 2023 09:54) (80 - 119)  BP: 168/89 (20 Nov 2023 07:00) (151/69 - 178/84)  BP(mean): 112 (20 Nov 2023 07:00) (94 - 112)  RR: 20 (20 Nov 2023 07:00) (17 - 24)  SpO2: 99% (20 Nov 2023 09:54) (97% - 100%)    Parameters below as of 20 Nov 2023 07:00  Patient On (Oxygen Delivery Method): nasal cannula  O2 Flow (L/min): 2    Incomplete    General:  Constitutional: Female, appears stated age, nontoxic  Head: Normocephalic;   Eyes: clear sclera; Ptosis L eyelid  Extremities: No cyanosis;   Resp: Can count to 12 after after single deep breath     Neurological (>12):  MS: Awake, alert.  Oriented person place situation. Follows commands. Attends to examiner  Language: Speech is hypophonic, clear, broken occasionally, good comprehension, registration of words.  CNs: PERRL (R 3mm, L 3mm). VFF. EOMI. L eye ptosis, horizontal diplopia on R gaze No disconjugate gaze, skew. V1-3 intact LT, b/l facial weakness on forced eyelid opening. Hearing grossly normal b/l. Tongue midline.     Motor - Normal bulk and tone throughout. No pronator drift.    L/R (out of 5)       Deltoid  4/3    Biceps   4+/5      Triceps  4+/4+       5/5   L/R (out of 5)       Hip Flexion  5/5    Hip Extension  5/5  Knee Extension  5/5  Dorsiflexion  5/5      Plantar Flexion 5/5     Sensation: Intact to LT b/l. Cortical: Extinction on DSS (neglect): none  Reflexes L/R:  Biceps(C5) 0/0  BR(C6) 0/0   Triceps(C7)  0/0 Patellar(L4)   0/   Gait: Gait not tested due to patient safety concerns    Labs:  CBC Full  -  ( 19 Nov 2023 07:12 )  WBC Count : 11.99 K/uL  RBC Count : 3.73 M/uL  Hemoglobin : 11.0 g/dL  Hematocrit : 33.8 %  Platelet Count - Automated : 277 K/uL  Mean Cell Volume : 90.6 fl  Mean Cell Hemoglobin : 29.5 pg  Mean Cell Hemoglobin Concentration : 32.5 gm/dL  Auto Neutrophil # : x  Auto Lymphocyte # : x  Auto Monocyte # : x  Auto Eosinophil # : x  Auto Basophil # : x  Auto Neutrophil % : x  Auto Lymphocyte % : x  Auto Monocyte % : x  Auto Eosinophil % : x  Auto Basophil % : x    11-19    144  |  109<H>  |  29<H>  ----------------------------<  206<H>  3.6   |  20<L>  |  0.39<L>    Ca    9.0      19 Nov 2023 07:04  Phos  3.5     11-19  Mg     2.2     11-19          Urinalysis Basic - ( 19 Nov 2023 07:04 )    Color: x / Appearance: x / SG: x / pH: x  Gluc: 206 mg/dL / Ketone: x  / Bili: x / Urobili: x   Blood: x / Protein: x / Nitrite: x   Leuk Esterase: x / RBC: x / WBC x   Sq Epi: x / Non Sq Epi: x / Bacteria: x       Neurology Progress Note    Interval History:        PAST MEDICAL & SURGICAL HISTORY:  Myasthenia gravis    Diabetes mellitus    Hypertension            Medications:  acetaminophen   Oral Liquid .. 650 milliGRAM(s) Oral every 6 hours PRN  albuterol/ipratropium for Nebulization 3 milliLiter(s) Nebulizer every 6 hours  azaTHIOprine 50 milliGRAM(s) Oral two times a day  chlorhexidine 4% Liquid 1 Application(s) Topical <User Schedule>  chlorhexidine 4% Liquid 1 Application(s) Topical at bedtime  enoxaparin Injectable 40 milliGRAM(s) SubCutaneous every 24 hours  insulin lispro (ADMELOG) corrective regimen sliding scale   SubCutaneous every 6 hours  pantoprazole  Injectable 40 milliGRAM(s) IV Push once  polyethylene glycol 3350 17 Gram(s) Oral every 12 hours  predniSONE   Tablet 60 milliGRAM(s) Oral daily  senna 2 Tablet(s) Oral at bedtime  sodium chloride 0.9% lock flush 10 milliLiter(s) IV Push every 1 hour PRN      Vital Signs Last 24 Hrs  T(C): 36.5 (20 Nov 2023 07:00), Max: 36.8 (19 Nov 2023 19:00)  T(F): 97.7 (20 Nov 2023 07:00), Max: 98.3 (20 Nov 2023 03:00)  HR: 108 (20 Nov 2023 09:54) (80 - 119)  BP: 168/89 (20 Nov 2023 07:00) (151/69 - 178/84)  BP(mean): 112 (20 Nov 2023 07:00) (94 - 112)  RR: 20 (20 Nov 2023 07:00) (17 - 24)  SpO2: 99% (20 Nov 2023 09:54) (97% - 100%)    Parameters below as of 20 Nov 2023 07:00  Patient On (Oxygen Delivery Method): nasal cannula  O2 Flow (L/min): 2    Incomplete    General:  Constitutional: Female, appears stated age, nontoxic  Head: Normocephalic;   Eyes: clear sclera; Ptosis L eyelid  Extremities: No cyanosis;   Resp: Can count to 12 after after single deep breath     Neurological (>12):  MS: Awake, alert.  Oriented person place situation. Follows commands. Attends to examiner  Language: Speech is hypophonic, clear, broken occasionally, good comprehension, registration of words.  CNs: PERRL (R 3mm, L 3mm). VFF. EOMI. L eye ptosis, horizontal diplopia on R gaze No disconjugate gaze, skew. V1-3 intact LT, b/l facial weakness on forced eyelid opening. Hearing grossly normal b/l. Tongue midline.     Motor - Normal bulk and tone throughout. No pronator drift.    L/R (out of 5)       Deltoid  4/3    Biceps   4+/5      Triceps  4+/4+       5/5   L/R (out of 5)       Hip Flexion  5/5    Hip Extension  5/5  Knee Extension  5/5  Dorsiflexion  5/5      Plantar Flexion 5/5     Neck Flexion 4+/5  Neck Extension 4+/5    Sensation: Intact to LT b/l. Cortical: Extinction on DSS (neglect): none  Reflexes L/R:  Biceps(C5) 0/0  BR(C6) 0/0   Triceps(C7)  0/0 Patellar(L4)   0/   Gait: Gait not tested due to patient safety concerns    Labs:  CBC Full  -  ( 19 Nov 2023 07:12 )  WBC Count : 11.99 K/uL  RBC Count : 3.73 M/uL  Hemoglobin : 11.0 g/dL  Hematocrit : 33.8 %  Platelet Count - Automated : 277 K/uL  Mean Cell Volume : 90.6 fl  Mean Cell Hemoglobin : 29.5 pg  Mean Cell Hemoglobin Concentration : 32.5 gm/dL  Auto Neutrophil # : x  Auto Lymphocyte # : x  Auto Monocyte # : x  Auto Eosinophil # : x  Auto Basophil # : x  Auto Neutrophil % : x  Auto Lymphocyte % : x  Auto Monocyte % : x  Auto Eosinophil % : x  Auto Basophil % : x    11-19    144  |  109<H>  |  29<H>  ----------------------------<  206<H>  3.6   |  20<L>  |  0.39<L>    Ca    9.0      19 Nov 2023 07:04  Phos  3.5     11-19  Mg     2.2     11-19          Urinalysis Basic - ( 19 Nov 2023 07:04 )    Color: x / Appearance: x / SG: x / pH: x  Gluc: 206 mg/dL / Ketone: x  / Bili: x / Urobili: x   Blood: x / Protein: x / Nitrite: x   Leuk Esterase: x / RBC: x / WBC x   Sq Epi: x / Non Sq Epi: x / Bacteria: x       Neurology Progress Note    Interval History:    Patient examined, seated at bedside. Patient reports mild difficulty swallowing, stable from last exam. Improvement of b.l LE weakness. No acute overnignt events. Patient pending downgrade to general neuro floors.    PAST MEDICAL & SURGICAL HISTORY:  Myasthenia gravis    Diabetes mellitus    Hypertension            Medications:  acetaminophen   Oral Liquid .. 650 milliGRAM(s) Oral every 6 hours PRN  albuterol/ipratropium for Nebulization 3 milliLiter(s) Nebulizer every 6 hours  azaTHIOprine 50 milliGRAM(s) Oral two times a day  chlorhexidine 4% Liquid 1 Application(s) Topical <User Schedule>  chlorhexidine 4% Liquid 1 Application(s) Topical at bedtime  enoxaparin Injectable 40 milliGRAM(s) SubCutaneous every 24 hours  insulin lispro (ADMELOG) corrective regimen sliding scale   SubCutaneous every 6 hours  pantoprazole  Injectable 40 milliGRAM(s) IV Push once  polyethylene glycol 3350 17 Gram(s) Oral every 12 hours  predniSONE   Tablet 60 milliGRAM(s) Oral daily  senna 2 Tablet(s) Oral at bedtime  sodium chloride 0.9% lock flush 10 milliLiter(s) IV Push every 1 hour PRN      Vital Signs Last 24 Hrs  T(C): 36.5 (20 Nov 2023 07:00), Max: 36.8 (19 Nov 2023 19:00)  T(F): 97.7 (20 Nov 2023 07:00), Max: 98.3 (20 Nov 2023 03:00)  HR: 108 (20 Nov 2023 09:54) (80 - 119)  BP: 168/89 (20 Nov 2023 07:00) (151/69 - 178/84)  BP(mean): 112 (20 Nov 2023 07:00) (94 - 112)  RR: 20 (20 Nov 2023 07:00) (17 - 24)  SpO2: 99% (20 Nov 2023 09:54) (97% - 100%)    Parameters below as of 20 Nov 2023 07:00  Patient On (Oxygen Delivery Method): nasal cannula  O2 Flow (L/min): 2    Incomplete    General:  Constitutional: Female, appears stated age, nontoxic  Head: Normocephalic;   Eyes: clear sclera; Ptosis L eyelid  Extremities: No cyanosis;   Resp: Can count to 12 after after single deep breath     Neurological (>12):  MS: Awake, alert.  Oriented person place situation. Follows commands. Attends to examiner  Language: Speech is hypophonic, clear, broken occasionally, good comprehension, registration of words.  CNs: PERRL (R 3mm, L 3mm). VFF. EOMI. L eye ptosis, horizontal diplopia on R gaze No disconjugate gaze, skew. V1-3 intact LT, b/l facial weakness on forced eyelid opening. Hearing grossly normal b/l. Tongue midline.     Motor - Normal bulk and tone throughout. No pronator drift.    L/R (out of 5)       Deltoid  4/3    Biceps   4+/5      Triceps  4+/4+       5/5   L/R (out of 5)       Hip Flexion  5/5    Hip Extension  5/5  Knee Extension  5/5  Dorsiflexion  5/5      Plantar Flexion 5/5     Neck Flexion 4+/5  Neck Extension 4+/5    Sensation: Intact to LT b/l. Cortical: Extinction on DSS (neglect): none  Reflexes L/R:  Biceps(C5) 0/0  BR(C6) 0/0   Triceps(C7)  0/0 Patellar(L4)   0/   Gait: Gait not tested due to patient safety concerns    Labs:  CBC Full  -  ( 19 Nov 2023 07:12 )  WBC Count : 11.99 K/uL  RBC Count : 3.73 M/uL  Hemoglobin : 11.0 g/dL  Hematocrit : 33.8 %  Platelet Count - Automated : 277 K/uL  Mean Cell Volume : 90.6 fl  Mean Cell Hemoglobin : 29.5 pg  Mean Cell Hemoglobin Concentration : 32.5 gm/dL  Auto Neutrophil # : x  Auto Lymphocyte # : x  Auto Monocyte # : x  Auto Eosinophil # : x  Auto Basophil # : x  Auto Neutrophil % : x  Auto Lymphocyte % : x  Auto Monocyte % : x  Auto Eosinophil % : x  Auto Basophil % : x    11-19    144  |  109<H>  |  29<H>  ----------------------------<  206<H>  3.6   |  20<L>  |  0.39<L>    Ca    9.0      19 Nov 2023 07:04  Phos  3.5     11-19  Mg     2.2     11-19          Urinalysis Basic - ( 19 Nov 2023 07:04 )    Color: x / Appearance: x / SG: x / pH: x  Gluc: 206 mg/dL / Ketone: x  / Bili: x / Urobili: x   Blood: x / Protein: x / Nitrite: x   Leuk Esterase: x / RBC: x / WBC x   Sq Epi: x / Non Sq Epi: x / Bacteria: x

## 2023-11-20 NOTE — PROGRESS NOTE ADULT - ASSESSMENT
ASSESSMENT/PLAN: 64 y/o female with myasthenia gravis, respiratory failure. Demonstrating worsening respiratory effort on SBT, NIF and FVC below baseline required for extubation to BiPAP despite completion of IVIG. Plan is to d/w PT, family risks & benefits of PLEX - patient consented to insertion of shiley, initiation of PLEX - S/P tx w/ PLEX 1/5. Care initiated w/ [facility] neuromuscular neurology Dr Covarrubias. PT shows signs of improved respiratory effort. Continues to CPAP well, Secretions are improving at this time    NEURO:  Neurochecks q4h  S/P completion of PLEX 5/5  on Solumedrol 125 q6 x 2 days per neurology (11/17--11/19)  patient on azathioprine on 11/10   Recommend early ambulation w/ PT/OT  Neurology following  CT chest - no evidence of thymoma   Hold Mestinon, Will consider restarting prior to extubation, currently held in setting of copious secretions, retrial extubation in AM  Avoid medications that may worsen the current exacerbation including macrolides, fluoroquinolones, tetracyclines, aminoglycoside, beta-blockers, magnesium, and anti-arrhythmics (procainamide, quinidine, and lidocaine), muscle relaxants   Activity: [x] mobilize as tolerated [] Bedrest [x] PT [x] OT [] PMNR    PULM:  Extubated to BiPAP  CT chest- with atelectasis, no evidence of residual thymoma   CXR reviewed- improved   Duonebs q6h for secretions (oral), self suctions   Aggressive Chest PT    CV:  MAP>65  TTE - EF 54%, elevated right atrial pressure     RENAL:  Stable renal function  IVL if PO intake is tolerated  daily IOs    GI:  Diet: NPO  Bedside dysphagia screening  GI prophylaxis: PPI while on steroids   Bowel regimen: miralax, senna  LBM: 11/17    ENDO:   FS goal 120-180  Medium scale  A1C 6.7  ISS     HEME/ONC:  Monitor H/H  SCDs  Chemo ppx: SQL  LED negative     ID:  No fevers, WBC count trending down   copious thick inline secretions, oral secretions  No signs of infection  ASSESSMENT/PLAN: 62 y/o female with myasthenia gravis, respiratory failure. Demonstrating worsening respiratory effort on SBT, NIF and FVC below baseline required for extubation to BiPAP despite completion of IVIG. Plan is to d/w PT, family risks & benefits of PLEX - patient consented to insertion of shiley, initiation of PLEX - S/P tx w/ PLEX 1/5. Care initiated w/ [facility] neuromuscular neurology Dr Covarrubias. PT shows signs of improved respiratory effort. Continues to CPAP well, Secretions are improving at this time    NEURO:  Neurochecks q4h  S/P completion of PLEX 5/5  on Solumedrol 125 q6 x 2 days per neurology (11/17--11/19)  patient on azathioprine on 11/10   Recommend early ambulation w/ PT/OT  Neurology following  CT chest - no evidence of thymoma   Hold Mestinon, Will consider restarting prior to extubation, currently held in setting of copious secretions, retrial extubation in AM  Avoid medications that may worsen the current exacerbation including macrolides, fluoroquinolones, tetracyclines, aminoglycoside, beta-blockers, magnesium, and anti-arrhythmics (procainamide, quinidine, and lidocaine), muscle relaxants   Activity: [x] mobilize as tolerated [] Bedrest [x] PT [x] OT [] PMNR    PULM:  Extubated to BiPAP  CT chest- with atelectasis, no evidence of residual thymoma   CXR reviewed- improved   Duonebs q6h for secretions (oral), self suctions   Aggressive Chest PT    CV:  MAP>65  TTE - EF 54%, elevated right atrial pressure     RENAL:  Stable renal function  IVL if PO intake is tolerated  daily IOs    GI:  Diet: NPO  Bedside dysphagia screening  GI prophylaxis: PPI while on steroids   Bowel regimen: miralax, senna  LBM: 11/17    ENDO:   FS goal 120-180  Medium scale  A1C 6.7  ISS     HEME/ONC:  Monitor H/H  SCDs  Chemo ppx: SQL  LED negative     ID:  No fevers, WBC count trending down   copious thick inline secretions, oral secretions  No signs of infection  ASSESSMENT/PLAN: 64 y/o female with myasthenia gravis, respiratory failure. Demonstrating worsening respiratory effort on SBT, NIF and FVC below baseline required for extubation to BiPAP despite completion of IVIG. Plan is to d/w PT, family risks & benefits of PLEX - patient consented to insertion of shiley, initiation of PLEX - S/P tx w/ PLEX 1/5. Care initiated w/ [facility] neuromuscular neurology Dr Covarrubias. PT shows signs of improved respiratory effort. Continues to CPAP well, Secretions are improving at this time    NEURO:  Neurochecks q4h  S/P completion of PLEX 5/5  on Solumedrol 125 q6 x 2 days per neurology (11/17--11/19)  patient on azathioprine on 11/10   Recommend early ambulation w/ PT/OT  Neurology following  CT chest - no evidence of thymoma   Hold Mestinon, Will consider restarting prior to extubation, currently held in setting of copious secretions, retrial extubation in AM  Avoid medications that may worsen the current exacerbation including macrolides, fluoroquinolones, tetracyclines, aminoglycoside, beta-blockers, magnesium, and anti-arrhythmics (procainamide, quinidine, and lidocaine), muscle relaxants   Activity: [x] mobilize as tolerated [] Bedrest [x] PT [x] OT [] PMNR    PULM:  Extubated to BiPAP  CT chest- with atelectasis, no evidence of residual thymoma   CXR reviewed- improved   Duonebs q6h for secretions (oral), self suctions   Aggressive Chest PT    CV:  MAP>65  TTE - EF 54%, elevated right atrial pressure     RENAL:  Stable renal function  IVL if PO intake is tolerated  daily IOs    GI:  Diet: NPO  Bedside dysphagia screening  GI prophylaxis: PPI while on steroids   Bowel regimen: miralax, senna  LBM: 11/17    ENDO:   FS goal 120-180  Medium scale  A1C 6.7  ISS     HEME/ONC:  Monitor H/H  SCDs  Chemo ppx: SQL  LED negative     ID:  No fevers, WBC count trending down   copious thick inline secretions, oral secretions  No signs of infection  ASSESSMENT/PLAN: 62 y/o female with myasthenia gravis, respiratory failure. Demonstrating worsening respiratory effort on SBT, NIF and FVC below baseline required for extubation to BiPAP despite completion of IVIG. Plan is to d/w PT, family risks & benefits of PLEX - patient consented to insertion of shiley, initiation of PLEX -    NEURO:  Neurochecks q4h  S/P completion of PLEX 5/5  on Solumedrol 125 q6 x 2 days per neurology (11/17--11/19)  patient on azathioprine on 11/10   Recommend early ambulation w/ PT/OT  Neurology following  CT chest - no evidence of thymoma   Hold Mestinon, Will consider restarting prior to extubation, currently held in setting of copious secretions, retrial extubation in AM  Avoid medications that may worsen the current exacerbation including macrolides, fluoroquinolones, tetracyclines, aminoglycoside, beta-blockers, magnesium, and anti-arrhythmics (procainamide, quinidine, and lidocaine), muscle relaxants   Activity: [x] mobilize as tolerated [] Bedrest [x] PT [x] OT [] PMNR    PULM:  Extubated to 2 L NC  CT chest- with atelectasis, no evidence of residual thymoma   CXR reviewed- improved   Duonebs q6h for secretions (oral), self suctions   Aggressive Chest PT  reported right vocal cord paralysis     CV:  MAP>65  TTE - EF 54%, elevated right atrial pressure     RENAL:  Stable renal function  IVL if PO intake is tolerated  daily IOs    GI:  Diet: NG tube, pending FEEST test   Bedside dysphagia screening  GI prophylaxis: PPI while on steroids   Bowel regimen: miralax, senna  LBM: 11/19    ENDO:   FS goal 120-180  Medium scale  A1C 6.7  ISS     HEME/ONC:  Monitor H/H  SCDs  Chemo ppx: SQL  LED negative     ID:  No fevers, WBC count trending down   copious thick inline secretions, oral secretions  No signs of infection  ASSESSMENT/PLAN: 64 y/o female with myasthenia gravis, respiratory failure. Demonstrating worsening respiratory effort on SBT, NIF and FVC below baseline required for extubation to BiPAP despite completion of IVIG. Plan is to d/w PT, family risks & benefits of PLEX - patient consented to insertion of shiley, initiation of PLEX -    NEURO:  Neurochecks q4h  S/P completion of PLEX 5/5  on Solumedrol 125 q6 x 2 days per neurology (11/17--11/19)  patient on azathioprine on 11/10   Recommend early ambulation w/ PT/OT  Neurology following  CT chest - no evidence of thymoma   Hold Mestinon, Will consider restarting prior to extubation, currently held in setting of copious secretions, retrial extubation in AM  Avoid medications that may worsen the current exacerbation including macrolides, fluoroquinolones, tetracyclines, aminoglycoside, beta-blockers, magnesium, and anti-arrhythmics (procainamide, quinidine, and lidocaine), muscle relaxants   Activity: [x] mobilize as tolerated [] Bedrest [x] PT [x] OT [] PMNR    PULM:  Extubated to 2 L NC  CT chest- with atelectasis, no evidence of residual thymoma   CXR reviewed- improved   Duonebs q6h for secretions (oral), self suctions   Aggressive Chest PT  reported right vocal cord paralysis     CV:  MAP>65  TTE - EF 54%, elevated right atrial pressure     RENAL:  Stable renal function  IVL if PO intake is tolerated  daily IOs    GI:  Diet: NG tube, pending FEEST test   Bedside dysphagia screening  GI prophylaxis: PPI while on steroids   Bowel regimen: miralax, senna  LBM: 11/19    ENDO:   FS goal 120-180  Medium scale  A1C 6.7  ISS     HEME/ONC:  Monitor H/H  SCDs  Chemo ppx: SQL  LED negative     ID:  No fevers, WBC count trending down   copious thick inline secretions, oral secretions  No signs of infection

## 2023-11-20 NOTE — SWALLOW FEES ASSESSMENT ADULT - ROSENBEK'S PENETRATION ASPIRATION SCALE
(1) no aspiration, material does not enter airway 2 with reflux/(1) no aspiration, material does not enter airway 3 with reflux/(1) no aspiration, material does not enter airway

## 2023-11-20 NOTE — PROGRESS NOTE ADULT - SUBJECTIVE AND OBJECTIVE BOX
HOSPITAL COURSE:  Patient KAIDEN HATCH is a 63y woman presenting with myasthenia gravis exacerbation.    Admission Scores  GCS: 15     24 hour Events: Patient evaluated at bedside. Reports improvement in symptoms w/ exception to copious oral secretions. No complaints of pain or dyspnea.     11/4- Patient intubated overnight for worsening respiratory status. Patient received dose 2/5 of IVIG  11/6- No events; poor respiratory effort on SBT  11/7- Apneic on SBT; worsening respiratory effort; last dose of IVIG TODAY  11/8- NIF -12 and  this AM. PT received shiley in right IJ w/ conscious sedation for initiation of PLEX. S/P tx 1/5 w/ PLEX.  11/9- NIF -20 and VC 630this AM.  11/10- Patient due for second dose of PLEX. No events overnight.   11/11- No events overnight. Has been tolerating CPAP since 5 AM. Oral secretions present. Strength improved.   11/12 no events, no PS too much secretions  11/13- Patient with improved secretions. No overnight events. Will get 3rd dose of PLEX today.   11/14- No overnight events.   11/15- No events overnight.   11/16- no acute events overnight. Patient still with thick inline secretions.  11/17- Patient tolerated CPAP for a bit before tiring out again. PLEX 5/5 completed  11/18- Patient tolerated CPAP very well. Secretions better. Pt was extubated to Bipap  11/19- extubated to AVAPS yesterday, will wean off to NC.    Allergies    peanuts (Unknown)  No Known Drug Allergies    Intolerances    REVIEW OF SYSTEMS: [ ] Unable to Assess due to neurologic exam   [x] All ROS addressed below are non-contributory, except:  Neuro: [ ] Headache [ ] Back pain [ ] Numbness [ ] Weakness [ ] Ataxia [ ] Dizziness [ ] Aphasia [ ] Dysarthria [ ] Visual disturbance  Resp: [ ] Shortness of breath/dyspnea, [ ] Orthopnea [ ] Cough  CV: [ ] Chest pain [ ] Palpitation [ ] Lightheadedness [ ] Syncope  Renal: [ ] Thirst [ ] Edema  GI: [ ] Nausea [ ] Emesis [ ] Abdominal pain [ ] Constipation [ ] Diarrhea  Hem: [ ] Hematemesis [ ] bright red blood per rectum  ID: [ ] Fever [ ] Chills [ ] Dysuria  ENT: [ ] Rhinorrhea      DEVICES:   [ ] Restraints [ ] ET tube [ ] central line [ ] arterial line [ ] morales [ ] NGT/OGT [ ] EVD [ ] LD [ ] MYAH/HMV [ ] Trach [ ] PEG [ ] Chest Tube     ICU Vital Signs Last 24 Hrs  T(C): 36.9 (19 Nov 2023 03:00), Max: 37.7 (18 Nov 2023 15:00)  T(F): 98.4 (19 Nov 2023 03:00), Max: 99.9 (18 Nov 2023 15:00)  HR: 110 (19 Nov 2023 05:20) (60 - 122)  BP: 154/69 (19 Nov 2023 03:00) (129/62 - 157/64)  BP(mean): 96 (19 Nov 2023 03:00) (83 - 96)  RR: 19 (19 Nov 2023 05:20) (18 - 30)  SpO2: 100% (19 Nov 2023 05:20) (98% - 100%)    O2 Parameters below as of 18 Nov 2023 23:02  Patient On (Oxygen Delivery Method): BiPAP/CPAP    CAPILLARY BLOOD GLUCOSE      POCT Blood Glucose.: 164 mg/dL (18 Nov 2023 06:25)  POCT Blood Glucose.: 137 mg/dL (17 Nov 2023 16:59)  POCT Blood Glucose.: 228 mg/dL (17 Nov 2023 11:32)    I&O's Summary    17 Nov 2023 07:01  -  18 Nov 2023 07:00  --------------------------------------------------------  IN: 2185 mL / OUT: 1300 mL / NET: 885 mL    18 Nov 2023 07:01  -  19 Nov 2023 06:29  --------------------------------------------------------  IN: 1010 mL / OUT: 300 mL / NET: 710 mL          LABS:                        9.6    14.61 )-----------( 251      ( 17 Nov 2023 04:46 )             29.5   11-17    144  |  108  |  27<H>  ----------------------------<  143<H>  3.3<L>   |  24  |  0.46<L>    Ca    8.9      17 Nov 2023 04:46  Phos  3.5     11-17  Mg     2.3     11-17    TPro  5.0<L>  /  Alb  4.2  /  TBili  0.4  /  DBili  x   /  AST  12  /  ALT  10  /  AlkPhos  36<L>  11-17    MEDICATION LEVELS:     MEDICATIONS  (STANDING):  albuterol/ipratropium for Nebulization 3 milliLiter(s) Nebulizer every 6 hours  azaTHIOprine 50 milliGRAM(s) Oral two times a day  chlorhexidine 4% Liquid 1 Application(s) Topical at bedtime  chlorhexidine 4% Liquid 1 Application(s) Topical <User Schedule>  enoxaparin Injectable 40 milliGRAM(s) SubCutaneous every 24 hours  insulin lispro (ADMELOG) corrective regimen sliding scale   SubCutaneous every 6 hours  polyethylene glycol 3350 17 Gram(s) Oral every 12 hours  senna 2 Tablet(s) Oral at bedtime  sodium chloride 0.9%. 1000 milliLiter(s) (75 mL/Hr) IV Continuous <Continuous>    MEDICATIONS  (PRN):  acetaminophen   Oral Liquid .. 650 milliGRAM(s) Oral every 6 hours PRN Temp greater or equal to 38C (100.4F), Mild Pain (1 - 3)  sodium chloride 0.9% lock flush 10 milliLiter(s) IV Push every 1 hour PRN Pre/post blood products, medications, blood draw, and to maintain line patency    EXAMINATION:  PHYSICAL EXAM:    Constitutional: Intubated    Neurological: Awake, alert oriented to person, place and time (with choices). Following Commands, PERRL, EOMI, No Gaze Preference, Face Symmetrical. Left eye ptosis. NE/NF 5/5.    Motor exam:          Upper extremity                         Delt     Bicep     Tricep    HG                                                 R         5/5        5/5        5/5       5/5                                               L          5/5        5/5        5/5       5/5          Lower extremity                        HF         KF        KE       DF         PF                                                  R        5/5        5/5        5/5       5/5         5/5                                               L         5/5        5/5       5/5       5/5          5/5                                                 Sensation: [x] intact to light touch  [ ] decreased:     Pulmonary: Clear to Auscultation, No rales, No rhonchi, No wheezes     Cardiovascular: S1, S2, Regular rate and rhythm     Gastrointestinal: Soft, Non-tender, Non-distended     Extremities: No calf tenderness HOSPITAL COURSE:  Patient KAIDEN HATCH is a 63y woman presenting with myasthenia gravis exacerbation.    Admission Scores  GCS: 15     24 hour Events: Patient evaluated at bedside. Reports improvement in symptoms w/ exception to copious oral secretions. No complaints of pain or dyspnea.     11/4- Patient intubated overnight for worsening respiratory status. Patient received dose 2/5 of IVIG  11/6- No events; poor respiratory effort on SBT  11/7- Apneic on SBT; worsening respiratory effort; last dose of IVIG TODAY  11/8- NIF -12 and  this AM. PT received shiley in right IJ w/ conscious sedation for initiation of PLEX. S/P tx 1/5 w/ PLEX.  11/9- NIF -20 and VC 630this AM.  11/10- Patient due for second dose of PLEX. No events overnight.   11/11- No events overnight. Has been tolerating CPAP since 5 AM. Oral secretions present. Strength improved.   11/12 no events, no PS too much secretions  11/13- Patient with improved secretions. No overnight events. Will get 3rd dose of PLEX today.   11/14- No overnight events.   11/15- No events overnight.   11/16- no acute events overnight. Patient still with thick inline secretions.  11/17- Patient tolerated CPAP for a bit before tiring out again. PLEX 5/5 completed  11/18- Patient tolerated CPAP very well. Secretions better. Pt was extubated to Bipap  11/19- extubated to AVAPS yesterday, will wean off to NC.    Allergies    peanuts (Unknown)  No Known Drug Allergies    Intolerances    REVIEW OF SYSTEMS: [ ] Unable to Assess due to neurologic exam   [x] All ROS addressed below are non-contributory, except:  Neuro: [ ] Headache [ ] Back pain [ ] Numbness [ ] Weakness [ ] Ataxia [ ] Dizziness [ ] Aphasia [ ] Dysarthria [ ] Visual disturbance  Resp: [ ] Shortness of breath/dyspnea, [ ] Orthopnea [ ] Cough  CV: [ ] Chest pain [ ] Palpitation [ ] Lightheadedness [ ] Syncope  Renal: [ ] Thirst [ ] Edema  GI: [ ] Nausea [ ] Emesis [ ] Abdominal pain [ ] Constipation [ ] Diarrhea  Hem: [ ] Hematemesis [ ] bright red blood per rectum  ID: [ ] Fever [ ] Chills [ ] Dysuria  ENT: [ ] Rhinorrhea      DEVICES:   [ ] Restraints [ ] ET tube [ ] central line [ ] arterial line [ ] morales [ ] NGT/OGT [ ] EVD [ ] LD [ ] MYAH/HMV [ ] Trach [ ] PEG [ ] Chest Tube     ICU Vital Signs Last 24 Hrs  T(C): 36.8 (20 Nov 2023 03:00), Max: 36.9 (19 Nov 2023 07:00)  T(F): 98.3 (20 Nov 2023 03:00), Max: 98.4 (19 Nov 2023 07:00)  HR: 85 (20 Nov 2023 05:25) (80 - 119)  BP: 159/78 (20 Nov 2023 03:00) (151/69 - 178/84)  BP(mean): 102 (20 Nov 2023 03:00) (94 - 109)  ABP: --  ABP(mean): --  RR: 19 (20 Nov 2023 03:00) (17 - 30)  SpO2: 100% (20 Nov 2023 05:25) (96% - 100%)    O2 Parameters below as of 20 Nov 2023 05:25  Patient On (Oxygen Delivery Method): nasal cannula    I&O's Summary    18 Nov 2023 07:01  -  19 Nov 2023 07:00  --------------------------------------------------------  IN: 1285 mL / OUT: 500 mL / NET: 785 mL    19 Nov 2023 07:01  -  20 Nov 2023 06:59  --------------------------------------------------------  IN: 2675 mL / OUT: 500 mL / NET: 2175 mL                            11.0   11.99 )-----------( 277      ( 19 Nov 2023 07:12 )             33.8   11-19    144  |  109<H>  |  29<H>  ----------------------------<  206<H>  3.6   |  20<L>  |  0.39<L>    Ca    9.0      19 Nov 2023 07:04  Phos  3.5     11-19  Mg     2.2     11-19      MEDICATIONS  (STANDING):  albuterol/ipratropium for Nebulization 3 milliLiter(s) Nebulizer every 6 hours  azaTHIOprine 50 milliGRAM(s) Oral two times a day  chlorhexidine 4% Liquid 1 Application(s) Topical <User Schedule>  chlorhexidine 4% Liquid 1 Application(s) Topical at bedtime  enoxaparin Injectable 40 milliGRAM(s) SubCutaneous every 24 hours  insulin lispro (ADMELOG) corrective regimen sliding scale   SubCutaneous every 6 hours  polyethylene glycol 3350 17 Gram(s) Oral every 12 hours  predniSONE   Tablet 60 milliGRAM(s) Oral daily  senna 2 Tablet(s) Oral at bedtime  sodium chloride 0.9%. 1000 milliLiter(s) (75 mL/Hr) IV Continuous <Continuous>    MEDICATIONS  (PRN):  acetaminophen   Oral Liquid .. 650 milliGRAM(s) Oral every 6 hours PRN Temp greater or equal to 38C (100.4F), Mild Pain (1 - 3)  sodium chloride 0.9% lock flush 10 milliLiter(s) IV Push every 1 hour PRN Pre/post blood products, medications, blood draw, and to maintain line patency      EXAMINATION:  PHYSICAL EXAM:    Constitutional: Intubated    Neurological: Awake, alert oriented to person, place and time (with choices). Following Commands, PERRL, EOMI, No Gaze Preference, Face Symmetrical. Left eye ptosis. NE/NF 5/5.    Motor exam:          Upper extremity                         Delt     Bicep     Tricep    HG                                                 R         5/5        5/5        5/5       5/5                                               L          5/5        5/5        5/5       5/5          Lower extremity                        HF         KF        KE       DF         PF                                                  R        5/5        5/5        5/5       5/5         5/5                                               L         5/5        5/5       5/5       5/5          5/5                                                 Sensation: [x] intact to light touch  [ ] decreased:     Pulmonary: Clear to Auscultation, No rales, No rhonchi, No wheezes     Cardiovascular: S1, S2, Regular rate and rhythm     Gastrointestinal: Soft, Non-tender, Non-distended     Extremities: No calf tenderness HOSPITAL COURSE:  Patient KAIDEN HATCH is a 63y woman presenting with myasthenia gravis exacerbation.    Admission Scores  GCS: 15     24 hour Events: Patient evaluated at bedside. Reports improvement in symptoms w/ exception to copious oral secretions. No complaints of pain or dyspnea.     11/4- Patient intubated overnight for worsening respiratory status. Patient received dose 2/5 of IVIG  11/6- No events; poor respiratory effort on SBT  11/7- Apneic on SBT; worsening respiratory effort; last dose of IVIG TODAY  11/8- NIF -12 and  this AM. PT received shiley in right IJ w/ conscious sedation for initiation of PLEX. S/P tx 1/5 w/ PLEX.  11/9- NIF -20 and VC 630this AM.  11/10- Patient due for second dose of PLEX. No events overnight.   11/11- No events overnight. Has been tolerating CPAP since 5 AM. Oral secretions present. Strength improved.   11/12 no events, no PS too much secretions  11/13- Patient with improved secretions. No overnight events. Will get 3rd dose of PLEX today.   11/14- No overnight events.   11/15- No events overnight.   11/16- no acute events overnight. Patient still with thick inline secretions.  11/17- Patient tolerated CPAP for a bit before tiring out again. PLEX 5/5 completed  11/18- Patient tolerated CPAP very well. Secretions better. Pt was extubated to Bipap  11/19- extubated to AVAPS yesterday, will wean off to NC.  11/20- stable exam. Extubated    Allergies    peanuts (Unknown)  No Known Drug Allergies    Intolerances    REVIEW OF SYSTEMS: [ ] Unable to Assess due to neurologic exam   [x] All ROS addressed below are non-contributory, except:  Neuro: [ ] Headache [ ] Back pain [ ] Numbness [ ] Weakness [ ] Ataxia [ ] Dizziness [ ] Aphasia [ ] Dysarthria [ ] Visual disturbance  Resp: [ ] Shortness of breath/dyspnea, [ ] Orthopnea [ ] Cough  CV: [ ] Chest pain [ ] Palpitation [ ] Lightheadedness [ ] Syncope  Renal: [ ] Thirst [ ] Edema  GI: [ ] Nausea [ ] Emesis [ ] Abdominal pain [ ] Constipation [ ] Diarrhea  Hem: [ ] Hematemesis [ ] bright red blood per rectum  ID: [ ] Fever [ ] Chills [ ] Dysuria  ENT: [ ] Rhinorrhea      DEVICES:   [ ] Restraints [ ] ET tube [ ] central line [ ] arterial line [ ] morales [ ] NGT/OGT [ ] EVD [ ] LD [ ] MYAH/HMV [ ] Trach [ ] PEG [ ] Chest Tube     ICU Vital Signs Last 24 Hrs  T(C): 36.8 (20 Nov 2023 03:00), Max: 36.9 (19 Nov 2023 07:00)  T(F): 98.3 (20 Nov 2023 03:00), Max: 98.4 (19 Nov 2023 07:00)  HR: 85 (20 Nov 2023 05:25) (80 - 119)  BP: 159/78 (20 Nov 2023 03:00) (151/69 - 178/84)  BP(mean): 102 (20 Nov 2023 03:00) (94 - 109)  ABP: --  ABP(mean): --  RR: 19 (20 Nov 2023 03:00) (17 - 30)  SpO2: 100% (20 Nov 2023 05:25) (96% - 100%)    O2 Parameters below as of 20 Nov 2023 05:25  Patient On (Oxygen Delivery Method): nasal cannula    I&O's Summary    18 Nov 2023 07:01  -  19 Nov 2023 07:00  --------------------------------------------------------  IN: 1285 mL / OUT: 500 mL / NET: 785 mL    19 Nov 2023 07:01  -  20 Nov 2023 06:59  --------------------------------------------------------  IN: 2675 mL / OUT: 500 mL / NET: 2175 mL                            11.0   11.99 )-----------( 277      ( 19 Nov 2023 07:12 )             33.8   11-19    144  |  109<H>  |  29<H>  ----------------------------<  206<H>  3.6   |  20<L>  |  0.39<L>    Ca    9.0      19 Nov 2023 07:04  Phos  3.5     11-19  Mg     2.2     11-19      MEDICATIONS  (STANDING):  albuterol/ipratropium for Nebulization 3 milliLiter(s) Nebulizer every 6 hours  azaTHIOprine 50 milliGRAM(s) Oral two times a day  chlorhexidine 4% Liquid 1 Application(s) Topical <User Schedule>  chlorhexidine 4% Liquid 1 Application(s) Topical at bedtime  enoxaparin Injectable 40 milliGRAM(s) SubCutaneous every 24 hours  insulin lispro (ADMELOG) corrective regimen sliding scale   SubCutaneous every 6 hours  polyethylene glycol 3350 17 Gram(s) Oral every 12 hours  predniSONE   Tablet 60 milliGRAM(s) Oral daily  senna 2 Tablet(s) Oral at bedtime  sodium chloride 0.9%. 1000 milliLiter(s) (75 mL/Hr) IV Continuous <Continuous>    MEDICATIONS  (PRN):  acetaminophen   Oral Liquid .. 650 milliGRAM(s) Oral every 6 hours PRN Temp greater or equal to 38C (100.4F), Mild Pain (1 - 3)  sodium chloride 0.9% lock flush 10 milliLiter(s) IV Push every 1 hour PRN Pre/post blood products, medications, blood draw, and to maintain line patency      EXAMINATION:  PHYSICAL EXAM:    Constitutional: Intubated    Neurological: Awake, alert oriented to person, place and time (with choices). Following Commands, PERRL, EOMI, No Gaze Preference, Face Symmetrical. NE/NF 5/5.    Motor exam:          Upper extremity                         Delt     Bicep     Tricep    HG                                                 R         5/5        5/5        5/5       5/5                                               L          5/5        5/5        5/5       5/5          Lower extremity                        HF         KF        KE       DF         PF                                                  R        5/5        5/5        5/5       5/5         5/5                                               L         5/5        5/5       5/5       5/5          5/5                                                 Sensation: [x] intact to light touch  [ ] decreased:     Pulmonary: Clear to Auscultation, No rales, No rhonchi, No wheezes     Cardiovascular: S1, S2, Regular rate and rhythm     Gastrointestinal: Soft, Non-tender, Non-distended     Extremities: No calf tenderness

## 2023-11-20 NOTE — SWALLOW FEES ASSESSMENT ADULT - SLP GENERAL OBSERVATIONS
Pt encountered awake and alert, OOB in chair, +KFT, +2L/NC. Able to follow commands and make wants/needs known. Vocal quality mildly weak, but grossly WFL. Pt noted throat clearing and self suctioning secretions.  at bedside.

## 2023-11-20 NOTE — SWALLOW FEES ASSESSMENT ADULT - ADDITIONAL RECOMMENDATIONS
Maintain good oral hygiene.  Per discussion with EKTA Mc, pt to be given Protonix.  If Protonix to not resolve reflux, would consider GI consult. Maintain good oral hygiene.  Per discussion with ETKA Mc, pt to be given Protonix.  If Protonix to not resolve reflux, would consider GI consult.

## 2023-11-20 NOTE — PROGRESS NOTE ADULT - ATTENDING COMMENTS
64yo woman adm for MG crisis, extubated 11/18  prednisone 60mg daily-->needs PCP ppx, vitD supp   azathioprine 50mg bid    2L NC now, but had an episode of de-sat overnight  -160  tube feeding, glucerna at goal   LBM 11/19   s/s eval FEEST pending  lovenox ppx  transfer to neurology service when bed available     not critically ill but medically complex inpatient care 40min 62yo woman adm for MG crisis, extubated 11/18  prednisone 60mg daily-->needs PCP ppx, vitD supp   azathioprine 50mg bid    2L NC now, but had an episode of de-sat overnight  -160  tube feeding, glucerna at goal   LBM 11/19   s/s eval FEEST pending  lovenox ppx  transfer to neurology service when bed available     not critically ill but medically complex inpatient care 40min

## 2023-11-20 NOTE — SWALLOW FEES ASSESSMENT ADULT - COMMENTS
mild pooling of secretions in pyriform sinuses and esophageal inlet  right VF ecchymosis Continued history:   -GI prophylaxis: PPI while on steroids   -24 hour Events:    Reports improvement in symptoms w/ exception to copious oral secretions. No complaints of pain or dyspnea.     11/4- Patient intubated overnight for worsening respiratory status. Patient received dose 2/5 of IVIG  11/7- Apneic on SBT; worsening respiratory effort; last dose of IVIG TODAY  11/8-  PT received shiley in right IJ w/ conscious sedation for initiation of PLEX. S/P tx 1/5 w/ PLEX.  11/10- Patient due for second dose of PLEX.   11/13-  Will get 3rd dose of PLEX today.   11/16- no acute events overnight. Patient still with thick inline secretions.  11/17- PLEX 5/5 completed  11/18- Patient tolerated CPAP very well. Secretions better. Pt was extubated to Bipap    ETT 11/4-11/18  Seen by ENT 11/20: "Indirect laryngoscopy performed which showed pooling of secretions and right vocal cord ecchymosis, b/l vocal cords mobile with good contact."    Speech & Swallow:   - February 2023 known to this service; final recommendations for regular solids, thin liquids; please see reports for more information.

## 2023-11-20 NOTE — H&P ADULT - ASSESSMENT
NEURO:  Neurochecks Q2H  Continue Mestinon 60 mg 2 tabs TID (no secretions)   Continue Prednisone 20 mg QD  Patient will need IVIG x 5 days duration, son and patients would rather get IVIG over PLEX  Avoid medications that may worsen the current exacerbation including macrolides, fluoroquinolones, tetracyclines, aminoglycoside, beta-blockers, magnesium, and anti-arrhythmics (procainamide, quinidine, and lidocaine)  Neurology following, appreciate their recommendations  Activity: [x] mobilize as tolerated [] Bedrest [x] PT [x] OT [] PMNR    PULM:  Currently patient is not in respiratory distress  CXR, UA to r/o any infectious etiology  Would check NIF/VC Q4 hours in ICU setting. Recommend elective intubation if values consistently less than 1 L and -20 cm H20, respectively OR patient in acute respiratory distress     CV:  MAP>65  TTE - EF 54%    RENAL:  IVF while not on diet   daily IOs    GI:  Diet: ON tf at GOAL   Fluids: IVL  GI prophylaxis: PPI while on steroids  Bowel regimen: miralax, senna    ENDO:   FS goal 120-180  Place on ISS and adjsut insulin as needed  A1C from May 2023 6.8    HEME/ONC:  Monitor H/H  SCDs  Chemoppx: SQL    ID:  Monitor for fevers, leukocytosis likely related to steroids  No signs of infection   Continue to monitor for infection    Disposition: ICU

## 2023-11-20 NOTE — SWALLOW FEES ASSESSMENT ADULT - H & P REVIEW
62 y/o Malayalam/English-speaking female with myasthenia gravis, respiratory failure. Demonstrating worsening respiratory effort on SBT, NIF and FVC below baseline required for extubation to BiPAP despite completion of IVIG. S/p Plex./yes 64 y/o Malayalam/English-speaking female with myasthenia gravis, respiratory failure. Demonstrating worsening respiratory effort on SBT, NIF and FVC below baseline required for extubation to BiPAP despite completion of IVIG. S/p Plex./yes

## 2023-11-20 NOTE — H&P ADULT - HISTORY OF PRESENT ILLNESS
63y (1960) woman with a PMHx significant for myasthenia gravis diagnosis presenting to the ED for worsening difficulty with double vision, swallowing, talking, and breathing that has progressively gotten worse for the previous one week. Patient is currently on Mestinon 60 mg two tablets TID and Prednisone 10 mg --> uptitrated to 20 mg, she was not taking it per son for a month or so and was discharged on 50 mg after her hospitalization in 06/2023. Has been admitted before in June 2023, requiring intubation and PLEX/IVIG and in Feb 2023 for MG crisis that required intubation (2/1-2/11/23) iso COVID19. Patient received 5 days of PLEX (2/2-2/10/23) followed by 5 days of IVIG (2/16-2/20/23) with symptomatic improvement. Patient's son says patient has responded best to IVIG and that it stabilizes her MG for about 2 years. At baseline, patient is on 2L NC. In the ED, patient's NIF was -50. Her VC was 430. Patient also endorsed increased mucus secretion in her throat, and reliance on her oxygen tank.

## 2023-11-20 NOTE — PROGRESS NOTE ADULT - THIS PATIENT HAS THE FOLLOWING CONDITION(S)/DIAGNOSES ON THIS ADMISSION:
Acute Respiratory Failure
MG Exacerbation
Acute Respiratory Failure
Acute Respiratory Failure
MG Exacerbation
MG Exacerbation
Acute Respiratory Failure
MG Exacerbation
Acute Respiratory Failure
MG Exacerbation
Acute Respiratory Failure

## 2023-11-20 NOTE — CONSULT NOTE ADULT - SUBJECTIVE AND OBJECTIVE BOX
CC: dysphagia     HPI: 63y woman with a PMHx significant for myasthenia gravis diagnosis admitted for worsening difficulty with double vision, swallowing, talking, and breathing that has progressively gotten worse over the course of one week. S/p IVIG and required intubation from 11/4-11/18, now extubated and complaining of dysphagia and hoarseness. Failed SLP exam, currently with NG tube in place. Pt denies odynophagia, sob, dyspnea or inability to tolerate secretions.       PAST MEDICAL & SURGICAL HISTORY:  Myasthenia gravis      Diabetes mellitus      Hypertension        Allergies    peanuts (Unknown)  No Known Drug Allergies    Intolerances      MEDICATIONS  (STANDING):  albuterol/ipratropium for Nebulization 3 milliLiter(s) Nebulizer every 6 hours  azaTHIOprine 50 milliGRAM(s) Oral two times a day  chlorhexidine 4% Liquid 1 Application(s) Topical <User Schedule>  chlorhexidine 4% Liquid 1 Application(s) Topical at bedtime  enoxaparin Injectable 40 milliGRAM(s) SubCutaneous every 24 hours  insulin lispro (ADMELOG) corrective regimen sliding scale   SubCutaneous every 6 hours  polyethylene glycol 3350 17 Gram(s) Oral every 12 hours  predniSONE   Tablet 60 milliGRAM(s) Oral daily  senna 2 Tablet(s) Oral at bedtime  sodium chloride 0.9%. 1000 milliLiter(s) (75 mL/Hr) IV Continuous <Continuous>    MEDICATIONS  (PRN):  acetaminophen   Oral Liquid .. 650 milliGRAM(s) Oral every 6 hours PRN Temp greater or equal to 38C (100.4F), Mild Pain (1 - 3)  sodium chloride 0.9% lock flush 10 milliLiter(s) IV Push every 1 hour PRN Pre/post blood products, medications, blood draw, and to maintain line patency      Social History:   No current use of tobacco, alcohol, or illicit drugs  Lives with son     Family history: no pertinent family history     ROS:   ENT: all negative except as noted in HPI   CV: denies palpitations  Pulm: denies SOB, cough, hemoptysis  GI: denies change in appetite, indigestion, n/v  : denies pertinent urinary symptoms, urgency  Neuro: denies numbness/tingling, loss of sensation  Psych: denies anxiety  MS: denies muscle weakness, instability  Heme: denies easy bruising or bleeding  Endo: denies heat/cold intolerance, excessive sweating  Vascular: denies LE edema    Vital Signs Last 24 Hrs  T(C): 36.5 (20 Nov 2023 07:00), Max: 36.8 (19 Nov 2023 19:00)  T(F): 97.7 (20 Nov 2023 07:00), Max: 98.3 (20 Nov 2023 03:00)  HR: 100 (20 Nov 2023 07:00) (80 - 119)  BP: 168/89 (20 Nov 2023 07:00) (151/69 - 178/84)  BP(mean): 112 (20 Nov 2023 07:00) (94 - 112)  RR: 20 (20 Nov 2023 07:00) (17 - 24)  SpO2: 100% (20 Nov 2023 07:00) (96% - 100%)    Parameters below as of 20 Nov 2023 07:00  Patient On (Oxygen Delivery Method): nasal cannula  O2 Flow (L/min): 2                            11.0   11.99 )-----------( 277      ( 19 Nov 2023 07:12 )             33.8    11-19    144  |  109<H>  |  29<H>  ----------------------------<  206<H>  3.6   |  20<L>  |  0.39<L>    Ca    9.0      19 Nov 2023 07:04  Phos  3.5     11-19  Mg     2.2     11-19         PHYSICAL EXAM:  Gen: NAD  Skin: No rashes, bruises, or lesions  Head: Normocephalic, Atraumatic  Face: no edema, erythema, or fluctuance. Parotid glands soft without mass  Eyes: no scleral injection  Nose: NG tube R nare. Nares bilaterally patent, no discharge  Mouth: No Stridor / Drooling / Trismus.  Mucosa moist, tongue/uvula midline, oropharynx clear  Neck: Flat, supple, no lymphadenopathy, trachea midline, no masses  Lymphatic: No lymphadenopathy  Resp: breathing easily, no stridor  CV: no peripheral edema/cyanosis  GI: nondistended   Peripheral vascular: no JVD or edema  Neuro: facial nerve intact, no facial droop        Fiberoptic Indirect laryngoscopy:  (Scope #2 used)  Reason for Laryngoscopy: dysphagia     Patient was unable to cooperate with mirror.  +pooling of secretions, +R VC ecchymosis. Nasopharynx, oropharynx, and hypopharynx clear, no bleeding. Tongue base, posterior pharyngeal wall, vallecula, epiglottis, and subglottis appear normal. No erythema, edema, masses or lesions. Airway patent, no foreign body visualized. No glottic/supraglottic edema. Arytenoids, vestibular folds, ventricles, pyriform sinuses, and aryepiglottic folds appear normal bilaterally. Vocal cords mobile with good contact b/l.

## 2023-11-20 NOTE — SWALLOW FEES ASSESSMENT ADULT - DIAGNOSTIC IMPRESSIONS
Patient admitted with MG crisis; intubated 11/4-11/18; s/p PLEX. Patient presents with a grossly functional oropharyngeal swallow. There is mild post swallow pharyngeal residue with purees and solids that clears with liquid wash or spontaneous repeat swallows. Patient with significant reflux resulting in laryngeal penetration with inconsistent retrieval. Material does not descend to the level of the vocal folds during the exam, however, pt does remain at risk for aspiration of reflux given presentation. Suspect pt is self suctioning secretions that she is refluxing.

## 2023-11-21 ENCOUNTER — TRANSCRIPTION ENCOUNTER (OUTPATIENT)
Age: 63
End: 2023-11-21

## 2023-11-21 LAB
ANION GAP SERPL CALC-SCNC: 9 MMOL/L — SIGNIFICANT CHANGE UP (ref 5–17)
BUN SERPL-MCNC: 25 MG/DL — HIGH (ref 7–23)
CALCIUM SERPL-MCNC: 9 MG/DL — SIGNIFICANT CHANGE UP (ref 8.4–10.5)
CHLORIDE SERPL-SCNC: 101 MMOL/L — SIGNIFICANT CHANGE UP (ref 96–108)
CO2 SERPL-SCNC: 32 MMOL/L — HIGH (ref 22–31)
CREAT SERPL-MCNC: 0.43 MG/DL — LOW (ref 0.5–1.3)
EGFR: 109 ML/MIN/1.73M2 — SIGNIFICANT CHANGE UP
GLUCOSE BLDC GLUCOMTR-MCNC: 127 MG/DL — HIGH (ref 70–99)
GLUCOSE BLDC GLUCOMTR-MCNC: 180 MG/DL — HIGH (ref 70–99)
GLUCOSE BLDC GLUCOMTR-MCNC: 181 MG/DL — HIGH (ref 70–99)
GLUCOSE BLDC GLUCOMTR-MCNC: 208 MG/DL — HIGH (ref 70–99)
GLUCOSE SERPL-MCNC: 151 MG/DL — HIGH (ref 70–99)
HCT VFR BLD CALC: 34.7 % — SIGNIFICANT CHANGE UP (ref 34.5–45)
HGB BLD-MCNC: 11 G/DL — LOW (ref 11.5–15.5)
MAGNESIUM SERPL-MCNC: 2.3 MG/DL — SIGNIFICANT CHANGE UP (ref 1.6–2.6)
MCHC RBC-ENTMCNC: 28.9 PG — SIGNIFICANT CHANGE UP (ref 27–34)
MCHC RBC-ENTMCNC: 31.7 GM/DL — LOW (ref 32–36)
MCV RBC AUTO: 91.3 FL — SIGNIFICANT CHANGE UP (ref 80–100)
NRBC # BLD: 0 /100 WBCS — SIGNIFICANT CHANGE UP (ref 0–0)
PHOSPHATE SERPL-MCNC: 2.6 MG/DL — SIGNIFICANT CHANGE UP (ref 2.5–4.5)
PLATELET # BLD AUTO: 279 K/UL — SIGNIFICANT CHANGE UP (ref 150–400)
POTASSIUM SERPL-MCNC: 3.5 MMOL/L — SIGNIFICANT CHANGE UP (ref 3.5–5.3)
POTASSIUM SERPL-SCNC: 3.5 MMOL/L — SIGNIFICANT CHANGE UP (ref 3.5–5.3)
RBC # BLD: 3.8 M/UL — SIGNIFICANT CHANGE UP (ref 3.8–5.2)
RBC # FLD: 15.9 % — HIGH (ref 10.3–14.5)
SODIUM SERPL-SCNC: 142 MMOL/L — SIGNIFICANT CHANGE UP (ref 135–145)
WBC # BLD: 10.27 K/UL — SIGNIFICANT CHANGE UP (ref 3.8–10.5)
WBC # FLD AUTO: 10.27 K/UL — SIGNIFICANT CHANGE UP (ref 3.8–10.5)

## 2023-11-21 PROCEDURE — 99232 SBSQ HOSP IP/OBS MODERATE 35: CPT

## 2023-11-21 PROCEDURE — 93010 ELECTROCARDIOGRAM REPORT: CPT

## 2023-11-21 RX ORDER — METOPROLOL TARTRATE 50 MG
25 TABLET ORAL
Refills: 0 | Status: DISCONTINUED | OUTPATIENT
Start: 2023-11-21 | End: 2023-11-21

## 2023-11-21 RX ADMIN — PANTOPRAZOLE SODIUM 40 MILLIGRAM(S): 20 TABLET, DELAYED RELEASE ORAL at 05:27

## 2023-11-21 RX ADMIN — Medication 15 MILLILITER(S): at 11:53

## 2023-11-21 RX ADMIN — Medication 3 MILLILITER(S): at 17:15

## 2023-11-21 RX ADMIN — Medication 60 MILLIGRAM(S): at 05:28

## 2023-11-21 RX ADMIN — AMLODIPINE BESYLATE 10 MILLIGRAM(S): 2.5 TABLET ORAL at 05:27

## 2023-11-21 RX ADMIN — Medication 2: at 08:22

## 2023-11-21 RX ADMIN — ENOXAPARIN SODIUM 40 MILLIGRAM(S): 100 INJECTION SUBCUTANEOUS at 21:27

## 2023-11-21 RX ADMIN — Medication 3 MILLILITER(S): at 05:28

## 2023-11-21 RX ADMIN — Medication 3 MILLILITER(S): at 11:53

## 2023-11-21 RX ADMIN — Medication 1000 UNIT(S): at 11:53

## 2023-11-21 RX ADMIN — AZATHIOPRINE 50 MILLIGRAM(S): 100 TABLET ORAL at 17:14

## 2023-11-21 RX ADMIN — Medication 4: at 11:55

## 2023-11-21 RX ADMIN — Medication 2: at 17:13

## 2023-11-21 RX ADMIN — Medication 1 TABLET(S): at 11:52

## 2023-11-21 RX ADMIN — Medication 25 MILLIGRAM(S): at 05:27

## 2023-11-21 RX ADMIN — AZATHIOPRINE 50 MILLIGRAM(S): 100 TABLET ORAL at 05:27

## 2023-11-21 NOTE — PROGRESS NOTE ADULT - SUBJECTIVE AND OBJECTIVE BOX
Neurology Progress Note    Interval History:        PAST MEDICAL & SURGICAL HISTORY:  Myasthenia gravis    Diabetes mellitus    Hypertension            Medications:  acetaminophen     Tablet .. 650 milliGRAM(s) Oral every 6 hours PRN  albuterol/ipratropium for Nebulization 3 milliLiter(s) Nebulizer every 6 hours  amLODIPine   Tablet 10 milliGRAM(s) Oral daily  azaTHIOprine 50 milliGRAM(s) Oral every 12 hours  calcium carbonate   1250 mG (OsCal) 1 Tablet(s) Oral daily  cholecalciferol 1000 Unit(s) Oral daily  enoxaparin Injectable 40 milliGRAM(s) SubCutaneous every 24 hours  insulin lispro (ADMELOG) corrective regimen sliding scale   SubCutaneous Before meals and at bedtime  multivitamin/minerals/iron Oral Solution (CENTRUM) 15 milliLiter(s) Oral daily  pantoprazole    Tablet 40 milliGRAM(s) Oral before breakfast  polyethylene glycol 3350 17 Gram(s) Oral daily  predniSONE   Tablet 60 milliGRAM(s) Oral daily  senna 2 Tablet(s) Oral at bedtime  sodium chloride 0.9% lock flush 10 milliLiter(s) IV Push every 1 hour PRN      Vital Signs Last 24 Hrs  T(C): 36.7 (21 Nov 2023 08:49), Max: 36.8 (20 Nov 2023 23:29)  T(F): 98 (21 Nov 2023 08:49), Max: 98.2 (20 Nov 2023 23:29)  HR: 141 (21 Nov 2023 11:44) (82 - 141)  BP: 138/71 (21 Nov 2023 11:41) (123/73 - 180/85)  BP(mean): 104 (20 Nov 2023 16:00) (104 - 110)  RR: 20 (21 Nov 2023 10:09) (18 - 20)  SpO2: 93% (21 Nov 2023 12:00) (90% - 99%)    Parameters below as of 21 Nov 2023 12:00  Patient On (Oxygen Delivery Method): nasal cannula  O2 Flow (L/min): 2      General:  Constitutional: Female, appears stated age, nontoxic  Head: Normocephalic;   Eyes: clear sclera; Ptosis L eyelid  Extremities: No cyanosis;   Resp: Can count to 16 after after single deep breath     Neurological (>12):  MS: Awake, alert.  Oriented person place situation. Follows commands. Attends to examiner  Language: Speech is hypophonic, clear, broken occasionally, good comprehension, registration of words.  CNs: PERRL (R 3mm, L 3mm). VFF. EOMI. L eye ptosis, horizontal diplopia on R gaze No disconjugate gaze, skew. V1-3 intact LT, b/l facial weakness on forced eyelid opening. Hearing grossly normal b/l. Tongue midline.     Motor - Normal bulk and tone throughout. No pronator drift.    L/R (out of 5)       Deltoid  4-/4    Biceps   5/5      Triceps  4/4       5/5   L/R (out of 5)       Hip Flexion  4/4    Hip Extension  5/5  Knee Extension  5/5  Dorsiflexion  5/5      Plantar Flexion 5/5     Neck Flexion 4+/5  Neck Extension 4+/5    Sensation: Intact to LT b/l.  Reflexes L/R:  Biceps(C5) 2+/2+  BR(C6) 2+/2+ Patellar(L4)   2+/2+   Gait: Gait not tested due to patient safety concerns      Labs:  CBC Full  -  ( 21 Nov 2023 07:19 )  WBC Count : 10.27 K/uL  RBC Count : 3.80 M/uL  Hemoglobin : 11.0 g/dL  Hematocrit : 34.7 %  Platelet Count - Automated : 279 K/uL  Mean Cell Volume : 91.3 fl  Mean Cell Hemoglobin : 28.9 pg  Mean Cell Hemoglobin Concentration : 31.7 gm/dL  Auto Neutrophil # : x  Auto Lymphocyte # : x  Auto Monocyte # : x  Auto Eosinophil # : x  Auto Basophil # : x  Auto Neutrophil % : x  Auto Lymphocyte % : x  Auto Monocyte % : x  Auto Eosinophil % : x  Auto Basophil % : x    11-21    142  |  101  |  25<H>  ----------------------------<  151<H>  3.5   |  32<H>  |  0.43<L>    Ca    9.0      21 Nov 2023 07:19  Phos  2.6     11-21  Mg     2.3     11-21          Urinalysis Basic - ( 21 Nov 2023 07:19 )    Color: x / Appearance: x / SG: x / pH: x  Gluc: 151 mg/dL / Ketone: x  / Bili: x / Urobili: x   Blood: x / Protein: x / Nitrite: x   Leuk Esterase: x / RBC: x / WBC x   Sq Epi: x / Non Sq Epi: x / Bacteria: x       Neurology Progress Note    Interval History:    Patient seen seated on bedside chair. Patient reports improvement in her swallowing ability. She was able to walk to the bathroom herself just prior to evaluation. She continues to speak at a low volume though feels improved. She is able to count to 16 after one deep breath, improved from 12 yesterday.      PAST MEDICAL & SURGICAL HISTORY:  Myasthenia gravis    Diabetes mellitus    Hypertension            Medications:  acetaminophen     Tablet .. 650 milliGRAM(s) Oral every 6 hours PRN  albuterol/ipratropium for Nebulization 3 milliLiter(s) Nebulizer every 6 hours  amLODIPine   Tablet 10 milliGRAM(s) Oral daily  azaTHIOprine 50 milliGRAM(s) Oral every 12 hours  calcium carbonate   1250 mG (OsCal) 1 Tablet(s) Oral daily  cholecalciferol 1000 Unit(s) Oral daily  enoxaparin Injectable 40 milliGRAM(s) SubCutaneous every 24 hours  insulin lispro (ADMELOG) corrective regimen sliding scale   SubCutaneous Before meals and at bedtime  multivitamin/minerals/iron Oral Solution (CENTRUM) 15 milliLiter(s) Oral daily  pantoprazole    Tablet 40 milliGRAM(s) Oral before breakfast  polyethylene glycol 3350 17 Gram(s) Oral daily  predniSONE   Tablet 60 milliGRAM(s) Oral daily  senna 2 Tablet(s) Oral at bedtime  sodium chloride 0.9% lock flush 10 milliLiter(s) IV Push every 1 hour PRN      Vital Signs Last 24 Hrs  T(C): 36.7 (21 Nov 2023 08:49), Max: 36.8 (20 Nov 2023 23:29)  T(F): 98 (21 Nov 2023 08:49), Max: 98.2 (20 Nov 2023 23:29)  HR: 141 (21 Nov 2023 11:44) (82 - 141)  BP: 138/71 (21 Nov 2023 11:41) (123/73 - 180/85)  BP(mean): 104 (20 Nov 2023 16:00) (104 - 110)  RR: 20 (21 Nov 2023 10:09) (18 - 20)  SpO2: 93% (21 Nov 2023 12:00) (90% - 99%)    Parameters below as of 21 Nov 2023 12:00  Patient On (Oxygen Delivery Method): nasal cannula  O2 Flow (L/min): 2      General:  Constitutional: Female, appears stated age, nontoxic  Head: Normocephalic;   Eyes: clear sclera; Ptosis L eyelid  Extremities: No cyanosis;   Resp: Can count to 16 after after single deep breath     Neurological (>12):  MS: Awake, alert.  Oriented person place situation. Follows commands. Attends to examiner  Language: Speech is hypophonic, clear, broken occasionally, good comprehension, registration of words.  CNs: PERRL (R 3mm, L 3mm). VFF. EOMI. L eye ptosis, horizontal diplopia on R gaze No disconjugate gaze, skew. V1-3 intact LT, b/l facial weakness on forced eyelid opening. Hearing grossly normal b/l. Tongue midline.     Motor - Normal bulk and tone throughout. No pronator drift.    L/R (out of 5)       Deltoid  4-/4    Biceps   5/5      Triceps  4/4       5/5   L/R (out of 5)       Hip Flexion  4/4    Hip Extension  5/5  Knee Extension  5/5  Dorsiflexion  5/5      Plantar Flexion 5/5     Neck Flexion 4+/5  Neck Extension 4+/5    Sensation: Intact to LT b/l.  Reflexes L/R:  Biceps(C5) 2+/2+  BR(C6) 2+/2+ Patellar(L4)   2+/2+   Gait: Gait not tested due to patient safety concerns      Labs:  CBC Full  -  ( 21 Nov 2023 07:19 )  WBC Count : 10.27 K/uL  RBC Count : 3.80 M/uL  Hemoglobin : 11.0 g/dL  Hematocrit : 34.7 %  Platelet Count - Automated : 279 K/uL  Mean Cell Volume : 91.3 fl  Mean Cell Hemoglobin : 28.9 pg  Mean Cell Hemoglobin Concentration : 31.7 gm/dL  Auto Neutrophil # : x  Auto Lymphocyte # : x  Auto Monocyte # : x  Auto Eosinophil # : x  Auto Basophil # : x  Auto Neutrophil % : x  Auto Lymphocyte % : x  Auto Monocyte % : x  Auto Eosinophil % : x  Auto Basophil % : x    11-21    142  |  101  |  25<H>  ----------------------------<  151<H>  3.5   |  32<H>  |  0.43<L>    Ca    9.0      21 Nov 2023 07:19  Phos  2.6     11-21  Mg     2.3     11-21          Urinalysis Basic - ( 21 Nov 2023 07:19 )    Color: x / Appearance: x / SG: x / pH: x  Gluc: 151 mg/dL / Ketone: x  / Bili: x / Urobili: x   Blood: x / Protein: x / Nitrite: x   Leuk Esterase: x / RBC: x / WBC x   Sq Epi: x / Non Sq Epi: x / Bacteria: x       Neurology Progress Note    Interval History:    Patient seen seated on bedside chair. Patient reports improvement in her swallowing ability. She was able to walk to the bathroom herself just prior to evaluation. She continues to speak at a low volume though feels improved. She is able to count to 16 after one deep breath, improved from 12 yesterday.      PAST MEDICAL & SURGICAL HISTORY:  Myasthenia gravis    Diabetes mellitus    Hypertension            Medications:  acetaminophen     Tablet .. 650 milliGRAM(s) Oral every 6 hours PRN  albuterol/ipratropium for Nebulization 3 milliLiter(s) Nebulizer every 6 hours  amLODIPine   Tablet 10 milliGRAM(s) Oral daily  azaTHIOprine 50 milliGRAM(s) Oral every 12 hours  calcium carbonate   1250 mG (OsCal) 1 Tablet(s) Oral daily  cholecalciferol 1000 Unit(s) Oral daily  enoxaparin Injectable 40 milliGRAM(s) SubCutaneous every 24 hours  insulin lispro (ADMELOG) corrective regimen sliding scale   SubCutaneous Before meals and at bedtime  multivitamin/minerals/iron Oral Solution (CENTRUM) 15 milliLiter(s) Oral daily  pantoprazole    Tablet 40 milliGRAM(s) Oral before breakfast  polyethylene glycol 3350 17 Gram(s) Oral daily  predniSONE   Tablet 60 milliGRAM(s) Oral daily  senna 2 Tablet(s) Oral at bedtime  sodium chloride 0.9% lock flush 10 milliLiter(s) IV Push every 1 hour PRN      Vital Signs Last 24 Hrs  T(C): 36.7 (21 Nov 2023 08:49), Max: 36.8 (20 Nov 2023 23:29)  T(F): 98 (21 Nov 2023 08:49), Max: 98.2 (20 Nov 2023 23:29)  HR: 141 (21 Nov 2023 11:44) (82 - 141)  BP: 138/71 (21 Nov 2023 11:41) (123/73 - 180/85)  BP(mean): 104 (20 Nov 2023 16:00) (104 - 110)  RR: 20 (21 Nov 2023 10:09) (18 - 20)  SpO2: 93% (21 Nov 2023 12:00) (90% - 99%)    Parameters below as of 21 Nov 2023 12:00  Patient On (Oxygen Delivery Method): nasal cannula  O2 Flow (L/min): 2      General:  Constitutional: Female, appears stated age, nontoxic  Head: Normocephalic;   Eyes: clear sclera; Ptosis L eyelid  Extremities: No cyanosis;   Resp: Can count to 16 after after single deep breath     Neurological (>12):  MS: Awake, alert.  Oriented person place situation. Follows commands. Attends to examiner  Language: Speech is hypophonic, clear, broken occasionally, good comprehension, registration of words.  CNs: PERRL (R 3mm, L 3mm). VFF. EOMI. Mild b/l ptosis L > R, No disconjugate gaze, skew. V1-3 intact LT, b/l facial weakness on forced eyelid opening. Hearing grossly normal b/l. Tongue midline.     Motor - Normal bulk and tone throughout. No pronator drift.    L/R (out of 5)       Deltoid  4-/4    Biceps   5/5      Triceps  4/4       5/5   L/R (out of 5)       Hip Flexion  4/4    Hip Extension  5/5  Knee Extension  5/5  Dorsiflexion  5/5      Plantar Flexion 5/5     Neck Flexion 4+/5  Neck Extension 4+/5    Sensation: Intact to LT b/l.  Reflexes L/R:  Biceps(C5) 2+/2+  BR(C6) 2+/2+ Patellar(L4)   2+/2+   Gait: Gait not tested due to patient safety concerns      Labs:  CBC Full  -  ( 21 Nov 2023 07:19 )  WBC Count : 10.27 K/uL  RBC Count : 3.80 M/uL  Hemoglobin : 11.0 g/dL  Hematocrit : 34.7 %  Platelet Count - Automated : 279 K/uL  Mean Cell Volume : 91.3 fl  Mean Cell Hemoglobin : 28.9 pg  Mean Cell Hemoglobin Concentration : 31.7 gm/dL  Auto Neutrophil # : x  Auto Lymphocyte # : x  Auto Monocyte # : x  Auto Eosinophil # : x  Auto Basophil # : x  Auto Neutrophil % : x  Auto Lymphocyte % : x  Auto Monocyte % : x  Auto Eosinophil % : x  Auto Basophil % : x    11-21    142  |  101  |  25<H>  ----------------------------<  151<H>  3.5   |  32<H>  |  0.43<L>    Ca    9.0      21 Nov 2023 07:19  Phos  2.6     11-21  Mg     2.3     11-21          Urinalysis Basic - ( 21 Nov 2023 07:19 )    Color: x / Appearance: x / SG: x / pH: x  Gluc: 151 mg/dL / Ketone: x  / Bili: x / Urobili: x   Blood: x / Protein: x / Nitrite: x   Leuk Esterase: x / RBC: x / WBC x   Sq Epi: x / Non Sq Epi: x / Bacteria: x

## 2023-11-21 NOTE — DISCHARGE NOTE PROVIDER - NSDCFUSCHEDAPPT_GEN_ALL_CORE_FT
[facility] Physician [facility]  NEUROLOGY  Comm D  Scheduled Appointment: 12/12/2023     [facility] Physician [facility]  NEUROLOGY  Comm D  Scheduled Appointment: 12/12/2023     [facility] Physician [facility]  NEUROLOGY  Comm D  Scheduled Appointment: 12/12/2023

## 2023-11-21 NOTE — DISCHARGE NOTE PROVIDER - HOSPITAL COURSE
HPI:    Hospital Course:   HPI:  KAIDEN HATCH is a 63y (1960) woman with a PMHx significant for HTN, HLD, DM, myasthenia gravis presenting to the ED for worsening difficulty with double vision, swallowing, talking, and breathing, progressively worsening for one week. She was taking 2 tablets of Mestinon 60 mg TID and Prednisone 20 mg. Per son she was not taking it for a month or so. She was previously admitted in 02/2023 for a MG crisis iso COVID19 and again in 06/2023 for a similar presentation of a MG exacerbation requiring intubation and PLEX/IVIG both hospitalizations. Patient's son says patient has responded best to IVIG and that it had previously stabilized her MG for about 2 years. At baseline, patient is on O2 2L NC. In the ED, patient's NIF was -50. Her VC was 430. Patient also endorsed increased mucus secretion in her throat, and reliance on her oxygen tank.       Hospital Course:  NSCU  11/4- Patient intubated overnight for worsening respiratory status. Patient received dose 2/5 of IVIG  11/6- No events; poor respiratory effort on SBT  11/7- Apneic on SBT; worsening respiratory effort; last dose of IVIG TODAY  11/8- NIF -12 and  this AM. PT received shiley in right IJ w/ conscious sedation for initiation of PLEX. S/P tx 1/5 w/ PLEX.  11/9- NIF -20 and VC 630this AM.  11/10- Patient due for second dose of PLEX. No events overnight.   11/11- No events overnight. Has been tolerating CPAP since 5 AM. Oral secretions present. Strength improved.   11/12 no events, no PS too much secretions  11/13- Patient with improved secretions. No overnight events. Will get 3rd dose of PLEX today.   11/14- No overnight events.   11/15- No events overnight.   11/16- no acute events overnight. Patient still with thick inline secretions.  11/17- Patient tolerated CPAP for a bit before tiring out again. PLEX 5/5 completed  11/18- Patient tolerated CPAP very well. Secretions better. Pt was extubated to Bipap  11/19- extubated to AVAPS yesterday, will wean off to NC.  11/20- stable exam. Extubated    Neuro:   HPI:  KAIDEN HATCH is a 63y (1960) woman with a PMHx significant for HTN, HLD, DM, myasthenia gravis who presented to the ED for worsening double vision, swallowing difficulty, issues talking and breathing, progressively worsening for one week. She was taking 2 tablets of Mestinon 60 mg TID and Prednisone 20 mg. Per son she was not taking it for a month or so. She was previously admitted in 02/2023 for a MG crisis iso COVID19 and again in 06/2023 for a similar presentation of a MG exacerbation requiring intubation and PLEX/IVIG both hospitalizations. Patient's son says patient has responded best to IVIG and that it had previously stabilized her MG for about 2 years. At baseline, patient is on O2 2L NC. In the ED, patient's NIF was -50. Her VC was 430. Patient also endorsed increased mucus secretion in her throat, and reliance on her oxygen tank.       Hospital Course:  On the night of arrival, patient was intubated for worsening respiratory status. She was admitted to the NSCU and underwent a total of 5 days of IVIG (11/3/23 - 11/7/23). On 11/8/23 she had a R IJV shiley catheter placed w/ conscious sedation for initiation of PLEX. She underwent a total 5 days of PLEX between 11/8/3 - 11/17/23. She was then given a course of methylprednisolone 125 mg Q5H 11/17/23 - 11/19/23. She was intermittently placed on CPAP befor eventual extubation on 11/18/23 and was transitioned to Bipap. Patient was weaned to her home requirements of 2L O2 via NC. Her neurological exam continued to improve as she was downgraded to general neurology floors. Patient was maintained on Prednisone 60 mg daily,   Neuro:   HPI:  KAIDEN HATCH is a 63y (1960) woman with a PMHx significant for HTN, HLD, DM, myasthenia gravis who presented to the ED for worsening double vision, swallowing difficulty, issues talking and breathing, progressively worsening for one week. She was taking 2 tablets of Mestinon 60 mg TID and Prednisone 20 mg. Per son she was not taking it for a month or so. She was previously admitted in 02/2023 for a MG crisis iso COVID19 and again in 06/2023 for a similar presentation of a MG exacerbation requiring intubation and PLEX/IVIG both hospitalizations. Patient's son says patient has responded best to IVIG and that it had previously stabilized her MG for about 2 years. At baseline, patient is on O2 2L NC. In the ED, patient's NIF was -50. Her VC was 430. Patient also endorsed increased mucus secretion in her throat, and reliance on her oxygen tank.       Hospital Course:  On the night of arrival, patient was intubated for worsening respiratory status. She was admitted to the NSCU and underwent a total of 5 days of IVIG (11/3/23 - 11/7/23).     On 11/8/23 she had a R IJV shiley catheter placed w/ conscious sedation for initiation of PLEX. She underwent a total 5 days of PLEX (11/8/3 - 11/17/23). She was then given a course of methylprednisolone 125 mg Q6H (11/17/23 - 11/19/23). Patient was maintained on Azathioprine 50 mg BID as a steroid sparing agent (11/10). She was re-started on her home Mestinon 60 mg with a frequency of TID and plans to up-titrate as needed (11/22)    She was intermittently placed on CPAP before eventual extubation on 11/18/23 and was transitioned to Bipap. Patient was weaned to her home requirements of 2L O2 via NC. Her neurological exam continued to improve as she was downgraded to general neurology floors. Throughout the hospitalization NIF and VC was monitored to ensure respiratory recovery. Patient was maintained on Prednisone 60 mg daily with plans to taper down on discharge by 10 mg/ week to a final dose of 30 mg daily.     Patient was evaluated by PT/OT who recommended *** on discharge. Patient has shown significant recovery since arrival and is neurologically stable for discharge.   HPI:  KAIDEN HACTH is a 63y (1960) woman with a PMHx significant for HTN, HLD, DM, myasthenia gravis who presented to the ED for worsening double vision, swallowing difficulty, issues talking and breathing, progressively worsening for one week. She was taking 2 tablets of Mestinon 60 mg TID and Prednisone 20 mg. Per son she was not taking it for a month or so. She was previously admitted in 02/2023 for a MG crisis iso COVID19 and again in 06/2023 for a similar presentation of a MG exacerbation requiring intubation and PLEX/IVIG both hospitalizations. Patient's son says patient has responded best to IVIG and that it had previously stabilized her MG for about 2 years. At baseline, patient is on O2 2L NC. In the ED, patient's NIF was -50. Her VC was 430. Patient also endorsed increased mucus secretion in her throat, and reliance on her oxygen tank.       Hospital Course:  On the night of arrival, patient was intubated for worsening respiratory status. She was admitted to the NSCU and underwent a total of 5 days of IVIG (11/3/23 - 11/7/23).     She had a R IJV shiley catheter placed w/ conscious sedation for initiation of PLEX (11/8/23). She underwent a total 5 days of PLEX (11/8/3 - 11/17/23). She was then given a course of methylprednisolone 125 mg Q6H (11/17/23 - 11/19/23). Patient was maintained on Azathioprine 50 mg BID as a steroid sparing agent (11/10/23). She was re-started on her home Mestinon 60 mg with a frequency of TID and plans to up-titrate as needed (11/22/23). She was intermittently placed on CPAP before eventual extubation (11/18/23) and was transitioned to Bipap. Patient was weaned to her home requirements of 2L O2 via NC. Her neurological exam continued to improve as she was downgraded to general neurology floors. Throughout the hospitalization NIF and VC was monitored to ensure respiratory recovery. Patient was maintained on Prednisone 60 mg daily with plans to taper down on discharge by 10 mg/ week to a final dose of 30 mg daily.     Patient was evaluated by PT/OT who recommended *** on discharge. Patient has shown significant recovery since arrival and is neurologically stable for discharge.   HPI:  KAIDEN HATCH is a 63y (1960) woman with a PMHx significant for HTN, HLD, DM, myasthenia gravis who presented to the ED for worsening double vision, swallowing difficulty, issues talking and breathing, progressively worsening for one week. She was taking 2 tablets of Mestinon 60 mg TID and Prednisone 20 mg. Per son she was not taking it for a month or so. She was previously admitted in 02/2023 for a MG crisis iso COVID19 and again in 06/2023 for a similar presentation of a MG exacerbation requiring intubation and PLEX/IVIG both hospitalizations. Patient's son says patient has responded best to IVIG and that it had previously stabilized her MG for about 2 years. At baseline, patient is on O2 2L NC. In the ED, patient's NIF was -50. Her VC was 430. Patient also endorsed increased mucus secretion in her throat, and reliance on her oxygen tank.       Hospital Course:  On the night of arrival, patient was intubated for worsening respiratory status. She was admitted to the NSCU and underwent a total of 5 days of IVIG (11/3/23 - 11/7/23).     She had a R IJV shiley catheter placed w/ conscious sedation for initiation of PLEX (11/8/23). She underwent a total 5 days of PLEX (11/8/3 - 11/17/23). She was then given a course of methylprednisolone 125 mg Q6H (11/17/23 - 11/19/23). Patient was maintained on Azathioprine 50 mg BID as a steroid sparing agent (11/10/23). She was re-started on her home Mestinon 60 mg with a frequency of TID and plans to up-titrate as needed (11/22/23). She was intermittently placed on CPAP before eventual extubation (11/18/23) and was transitioned to Bipap. Patient was weaned to her home requirements of 2L O2 via NC. Her neurological exam continued to improve as she was downgraded to general neurology floors. Throughout the hospitalization NIF and VC was monitored to ensure respiratory recovery. Patient was maintained on Prednisone 60 mg daily with plans to taper down on discharge by 10 mg/ week to a final dose of 30 mg daily.     Patient was evaluated by PT/OT who recommended *** on discharge. Patient has shown significant recovery since arrival and is neurologically stable for discharge.   HPI:  KAIDEN HATCH is a 63y (1960) woman with a PMHx significant for HTN, HLD, DM, myasthenia gravis who presented to the ED for worsening double vision, swallowing difficulty, issues talking and breathing, progressively worsening for one week. She reports taking Mestinon 120 mg TID and was prescribed Prednisone though compliance and dosage is unclear. She was previously admitted in 02/2023 for a MG crisis iso COVID19 and again in 06/2023 for a similar presentation of a MG exacerbation requiring intubation and PLEX/IVIG both hospitalizations. Patient's son says patient has responded best to IVIG and that it had previously stabilized her MG for about 2 years. At baseline, patient is on O2 2L NC reportedly for MG. In the ED, patient's NIF was -50, . Patient also endorsed increased mucus secretion in her throat, and reliance on her oxygen tank.       Hospital Course:  On the night of arrival, patient was intubated for worsening respiratory status. She was admitted to the NSCU and underwent a total of 5 days of IVIG (11/3/23 - 11/7/23).     She had a R IJV shiley catheter placed w/ conscious sedation for initiation of PLEX (11/8/23). She underwent a total 5 days of PLEX (11/8/3 - 11/17/23). She was then given a course of methylprednisolone 125 mg Q6H (11/17/23 - 11/19/23). Patient was maintained on Azathioprine 50 mg BID as a steroid sparing agent (11/10/23). She was re-started on her reduced home dose of Mestinon 60 mg TID and plans to up-titrate outpatient as needed (11/22/23). She was intermittently placed on CPAP before eventual extubation (11/18/23) and was transitioned to Bipap. Patient was weaned to her home requirements of 2L O2 via NC, off O2 she spO2 was 90%. She was evaluated by medicine who felt the hypoxia was mostly due to atelectasis and she was started on chest PT, incentive spirometer and inhaled NaCl. Her neurological exam continued to improve as she was downgraded to general neurology floors. Patient was maintained on Prednisone 60 mg daily with plans to taper down on discharge by 10 mg weekly starting 11/27/23 to a final dose of 30 mg daily.     Patient was evaluated by PT/OT who recommended home PT on discharge. Patient has shown significant recovery since arrival and is neurologically stable for discharge. Patient to follow up with Dr. Mayer on 12/12/23   HPI:  KAIDEN HATCH is a 63y (1960) woman with a PMHx significant for HTN, HLD, DM, myasthenia gravis who presented to the ED for worsening double vision, swallowing difficulty, issues talking and breathing, progressively worsening for one week. She reports taking Mestinon 120 mg TID and was prescribed Prednisone though compliance and dosage is unclear. She was previously admitted in 02/2023 for a MG crisis iso COVID19 and again in 06/2023 for a similar presentation of a MG exacerbation requiring intubation and PLEX/IVIG both hospitalizations. Patient's son says patient has responded best to IVIG and that it had previously stabilized her MG for about 2 years. At baseline, patient is on O2 2L NC reportedly for MG. In the ED, patient's NIF was -50, . Patient also endorsed increased mucus secretion in her throat, and reliance on her oxygen tank.       Hospital Course:  On the night of arrival, patient was intubated for worsening respiratory status. She was admitted to the NSCU and underwent a total of 5 days of IVIG (11/3/23 - 11/7/23).     She had a R IJV shiley catheter placed w/ conscious sedation for initiation of PLEX (11/8/23). She underwent a total 5 days of PLEX (11/8/3 - 11/17/23). She was then given a course of methylprednisolone 125 mg Q6H (11/17/23 - 11/19/23). Patient was maintained on Azathioprine 50 mg BID as a steroid sparing agent (11/10/23). She was re-started on her reduced home dose of Mestinon 60 mg TID and plans to up-titrate outpatient as needed (11/22/23). She was intermittently placed on CPAP before eventual extubation (11/18/23) and was transitioned to Bipap. Patient was weaned to her home requirements of 2L O2 via NC, off O2 she spO2 was 90%. She was evaluated by medicine who felt the hypoxia was mostly due to atelectasis and she was started on chest PT, incentive spirometer and inhaled NaCl. Her neurological exam continued to improve as she was downgraded to general neurology floors. Patient was maintained on Prednisone 60 mg daily with plans to taper down on discharge by 5 mg weekly starting 11/27/23 to a final dose of 30 mg daily.     Patient was evaluated by PT/OT who recommended home PT on discharge. Patient has shown significant recovery since arrival and is neurologically stable for discharge. Patient to follow up with Dr. Mayer on 12/12/23

## 2023-11-21 NOTE — DISCHARGE NOTE PROVIDER - NSDCFUADDAPPT_GEN_ALL_CORE_FT
Dr. Fermin Mayer  [address] [place] [address], [address] [place] [address]  [facility]  12/12/23 at 9:30am Dr. Fermin Mayer  [address] [place] [address], [address] [place] [address]  [facility]  12/12/23 at 9:30am Dr. Fermin Mayer  [address] [place] [address], [address] [place] [address]  [facility]  12/12/23 at 9:30am

## 2023-11-21 NOTE — DISCHARGE NOTE PROVIDER - NSDCMRMEDTOKEN_GEN_ALL_CORE_FT
accu-check glucometer: Apply topically to affected area 4 times a day  Accu-check glucose test strips: Apply topically to affected area 4 times a day  Albuterol (Eqv-ProAir HFA) 90 mcg/inh inhalation aerosol: 2 puff(s) inhaled every 6 hours as needed for  bronchospasm  alcohol pads: for DM MDD: 2 pads  alcohol swabs: Apply topically to affected area 4 times a day  amLODIPine 10 mg oral tablet: 1 tab(s) orally once a day MDD: 10 mg  atorvastatin 20 mg oral tablet: 1 tab(s) orally once a day (at bedtime) MDD: 20 mg  bd alcohol pads for blood glucose checking: Apply topically to affected area 4 times a day  bd ultrafine needles 5mm: 1 applicatorful injectable 4 times a day  bisacodyl 10 mg rectal suppository: 1 suppository(ies) rectal once a day as needed for Constipation once daily as needed MDD: 10 mg  glucometer: for DM MDD: 1 meter  glucometer (per patient&#x27;s insurance): Test blood sugars four times a day. Dispense #1 glucometer.  glucometer test strips: to be used with glucometer  insulin isophane (NPH) 100 units/mL human recombinant subcutaneous suspension: 5 unit(s) subcutaneous once a day during the first month when taking 50 mg prednisone daily MDD: 5 units  insulin isophane (NPH) 100 units/mL human recombinant subcutaneous suspension: 4 unit(s) subcutaneous once a day during the second month when taking 40 mg prednisone daily MDD: 4 units  insulin isophane (NPH) 100 units/mL human recombinant subcutaneous suspension: 3 unit(s) subcutaneous once a day during the third month when taking 30 mg prednisone daily MDD: 3 units  insulin pen: insulin pen for NPH  insulin pen needles: for NPH MDD: 1 needle  ipratropium-albuterol 0.5 mg-2.5 mg/3 mL inhalation solution: 3 milliliter(s) inhaled every 6 hours MDD: 12 ml  lancets: to be used with test strips  lancets: 1 application subcutaneously 4 times a day  melatonin 3 mg oral tablet: 1 tab(s) orally once a day (at bedtime) MDD: 3 mg  metFORMIN 1000 mg oral tablet: 1 tab(s) orally 2 times a day MDD: 2 gram  pantoprazole 40 mg oral delayed release tablet: 1 tab(s) orally once a day (before a meal) MDD: 40 mg  polyethylene glycol 3350 oral powder for reconstitution: 17 gram(s) orally every 12 hours MDD: 34 mg  predniSONE 10 mg oral tablet: 3 tab(s) orally once a day take 30 mg daily during the third month MDD: 30 mg  predniSONE 20 mg oral tablet: 1 tab(s) orally once a day take 20 mg daily from the fourth month onwards MDD: 20  predniSONE 20 mg oral tablet: 2 tab(s) orally once a day take 40 mg daily during the second month MDD: 40 mg  predniSONE 50 mg oral tablet: 1 tab(s) orally once a day take 50 mg prednisone daily for the first month MDD: 50  pyridostigmine 60 mg oral tablet: 2 tab(s) orally 3 times a day MDD: 360 mg  senna leaf extract oral tablet: 2 tab(s) orally once a day (at bedtime) MDD: 2 tab  test strips (per patient&#x27;s insurance): 1 application subcutaneously 4 times a day. ** Compatible with patient&#x27;s glucometer **  Tradjenta 5 mg oral tablet: 1 tab(s) orally once a day from the fourth month onwards MDD: 5 mg   Albuterol (Eqv-ProAir HFA) 90 mcg/inh inhalation aerosol: 2 puff(s) inhaled every 6 hours as needed for  bronchospasm  amLODIPine 10 mg oral tablet: 1 tab(s) orally once a day MDD: 10 mg  lovastatin 20 mg oral tablet: 1 tab(s) orally once a day  metFORMIN 1000 mg oral tablet: 1 tab(s) orally 2 times a day MDD: 2 gram  Metoprolol Succinate ER 50 mg oral tablet, extended release: 1 tab(s) orally 2 times a day  pyridostigmine 60 mg oral tablet: 2 tab(s) orally 3 times a day MDD: 360 mg   Albuterol (Eqv-ProAir HFA) 90 mcg/inh inhalation aerosol: 2 puff(s) inhaled every 6 hours as needed for  bronchospasm  amLODIPine 10 mg oral tablet: 1 tab(s) orally once a day MDD: 10 mg  amLODIPine 10 mg oral tablet: 1 tab(s) orally once a day  azaTHIOprine 50 mg oral tablet: 1 tab(s) orally every 12 hours  lovastatin 20 mg oral tablet: 1 tab(s) orally once a day  Mestinon 60 mg oral tablet: 1 tab(s) orally 3 times a day  metFORMIN 1000 mg oral tablet: 1 tab(s) orally 2 times a day MDD: 2 gram  Metoprolol Succinate ER 50 mg oral tablet, extended release: 1 tab(s) orally 2 times a day  predniSONE 20 mg oral tablet: 3 tab(s) orally once a day  pyridostigmine 60 mg oral tablet: 2 tab(s) orally 3 times a day MDD: 360 mg   Albuterol (Eqv-ProAir HFA) 90 mcg/inh inhalation aerosol: 2 puff(s) inhaled every 6 hours as needed for  bronchospasm  amLODIPine 10 mg oral tablet: 1 tab(s) orally once a day  lovastatin 20 mg oral tablet: 1 tab(s) orally once a day  Mestinon 60 mg oral tablet: 1 tab(s) orally 3 times a day  metFORMIN 1000 mg oral tablet: 1 tab(s) orally 2 times a day MDD: 2 gram   Albuterol (Eqv-ProAir HFA) 90 mcg/inh inhalation aerosol: 2 puff(s) inhaled every 6 hours as needed for  bronchospasm  amLODIPine 10 mg oral tablet: 1 tab(s) orally once a day  lovastatin 20 mg oral tablet: 1 tab(s) orally once a day  Mestinon 60 mg oral tablet: 1 tab(s) orally 3 times a day  metFORMIN 1000 mg oral tablet: 1 tab(s) orally 2 times a day MDD: 2 gram  predniSONE 10 mg oral tablet: 1 tab(s) orally once a day 1 tab daily during week of 30 mg daily MDD: 10 mg  predniSONE 20 mg oral tablet: 1 tab(s) orally once a day  predniSONE 5 mg oral tablet: 1 tab(s) orally once a day  predniSONE 50 mg oral tablet: 1 tab(s) orally once a day   Albuterol (Eqv-ProAir HFA) 90 mcg/inh inhalation aerosol: 2 puff(s) inhaled every 6 hours as needed for  bronchospasm  amLODIPine 10 mg oral tablet: 1 tab(s) orally once a day  calcium carbonate 1250 mg (500 mg elemental calcium) oral tablet: 1 tab(s) orally once a day  cholecalciferol oral tablet: 1000 unit(s) orally once a day  lovastatin 20 mg oral tablet: 1 tab(s) orally once a day  Mestinon 60 mg oral tablet: 1 tab(s) orally 3 times a day  metFORMIN 1000 mg oral tablet: 1 tab(s) orally 2 times a day MDD: 2 gram  predniSONE 10 mg oral tablet: 1 tab(s) orally once a day 1 tab daily during week of 30 mg daily MDD: 10 mg  predniSONE 20 mg oral tablet: 1 tab(s) orally once a day  predniSONE 5 mg oral tablet: 1 tab(s) orally once a day  predniSONE 50 mg oral tablet: 1 tab(s) orally once a day   Albuterol (Eqv-ProAir HFA) 90 mcg/inh inhalation aerosol: 2 puff(s) inhaled every 6 hours as needed for  bronchospasm  amLODIPine 10 mg oral tablet: 1 tab(s) orally once a day  azaTHIOprine 50 mg oral tablet: 1 tab(s) orally every 12 hours  calcium carbonate 1250 mg (500 mg elemental calcium) oral tablet: 1 tab(s) orally once a day  cholecalciferol oral tablet: 1000 unit(s) orally once a day  lovastatin 20 mg oral tablet: 1 tab(s) orally once a day  Mestinon 60 mg oral tablet: 1 tab(s) orally 3 times a day  metFORMIN 1000 mg oral tablet: 1 tab(s) orally 2 times a day MDD: 2 gram  Outpatient Physical Therapy: Outpatient Physical Therapy MDD: 1  pantoprazole 40 mg oral delayed release tablet: 1 tab(s) orally once a day (before a meal)  predniSONE 10 mg oral tablet: 1 tab(s) orally once a day 1 tab daily during week of 30 mg daily MDD: 10 mg  predniSONE 20 mg oral tablet: 1 tab(s) orally once a day  predniSONE 5 mg oral tablet: 1 tab(s) orally once a day  predniSONE 50 mg oral tablet: 1 tab(s) orally once a day

## 2023-11-21 NOTE — DISCHARGE NOTE PROVIDER - ATTENDING DISCHARGE PHYSICAL EXAMINATION:
A&Ox3. CN with mild L eye ptosis. Motor strength full aside from mild HF weakness bilaterally; some mild fatigability. Sensation intact. Walking around room with walker without difficulty.

## 2023-11-21 NOTE — PROGRESS NOTE ADULT - ATTENDING COMMENTS
63 year old female w/ hx of myasthenia gravis, admitted for MG exacerbation (bulbar symptoms) requiring intubation, IVIG x 5 doses followed by PLEX x 5 (last session 11/17) with clinical improvement and extubated 11/18. Of note, she was also admitted in 2/2023 and 8/2023 for MG exacerbations requiring intubation and PLEX.     Pt seen and examined at bedside. Sitting up in recliner. Reported doing well. No acute concerns. Transitioned to PO diet.   L eyelid ptosis, conjugate eye movements with mild weakness on R abduction, mild b/l facial weakness, tongue midline.  Single breath count 16  Motor exam:  Neck Flexion 4+/5  Neck Extension 5/5  L/R (out of 5)       Deltoid  4-/4    Biceps   5/5      Triceps  4+/4+       5/5   L/R (out of 5)       Hip Flexion  4+/4+    Hip Extension  5/5  Knee Extension  5/5  Dorsiflexion  5/5      Plantar Flexion 5/5   Gait deferred    Imp: MG exacerbation  Plan:  - Nif/VC q4hrs  - Continue imuran 50 mg BID  - Continue prednisone 60 mg QD (plan to taper down by 10 mg weekly till on 30 mg QD)- will follow up with updated Nif/VC levels before starting taper course. Goal Nif > -20 and VC > 1L  - Will consider resuming home Mestinon 60 mg TID tomorrow.   - PT/OT/SLP  - Monitor HR and BP: Elevations likely related to recent IV steroids use.     Dispo: Home PT once medically stable.

## 2023-11-21 NOTE — PROGRESS NOTE ADULT - ASSESSMENT
Assessment: 63-year-old female w/ PMHx MG (dx 2013, thymus surgery 2010, multiple admissions for myasthenic crisis including intubation) p/w worsening weakness, fatigue, and difficulty eating over the prior 1-2 weeks, also 2 days worsening diplopia and head drop. Patient's most recent admission 6/2023, given PLEX and IVIg, discharged on pyridostigmine 90 mg TID and prednisone 50 mg QD, w/ prednisone tapered off and stopped 9/15/23. Patient had restarted prednisone 10 mg QD 2 weeks PTA, then increased to 20 mg QD w/o improvement; pyridostigmine was also increased 1 week PTA.     Hospital course: admitted to [facility] 11/3; intubated 11/4; s/p IVIg (11/3-11/7); s/p PLEX x 5 (11/8-11/17); extubated to BiPAP 11/18, weaned to NC 11/19; accepted for downgrade to general neurology service 11/19, pending transfer.    Plan:  #NEURO: Myasthenia Gravis exacerbation w/ acute respiratory failure  - s/p plasmapheresis 5/5  - s/p methylprednisolone 125 mg Q5H x 2 days (11/17-11/19)   - IVIG 11/3-11/7 but worsened and then switched to PLEX (completed). Following completion will consider additional course of IVIG, if needed  - Appreciate input from neuromuscular specialist, Dr. Emily Covarrubias - c/w prednisone 60mg QD & azathioprine 50mg BID  - Hold Mestinon due to respiratory status and secretions. Will resume when more stable  - Continue prednisone 60 mg QD  - c/w azathioprine 50mg BID  - Continue VC and NIF monitoring Q8  - Neurological checks Q4H  - PT/OT  - Per FEES study - Solid & thin liquids diet  - Per Dr. Covarrubias on discharge patient to taper prednisone down in increments of 10 mg a week down to 30 and then the patient is to continue 30 mg daily.  - Avoid medications that may worsen the current exacerbation including macrolides, fluoroquinolones, tetracyclines, aminoglycoside, beta-blockers, magnesium, and anti-arrhythmics (procainamide, quinidine, and lidocaine), muscle relaxants     PULM:  Extubated to BiPAP, currently on NC - tachypneic (11/19)  CT chest- with atelectasis, no evidence of residual thymoma   CXR - improved   Duonebs q6h for secretions (oral), self suctions  Aggressive Chest PT    CV:  HTN - Amlodipine 10 mg daily  TTE - EF 54%, elevated right atrial pressure     RENAL:  Stable renal function  IVL if PO intake is tolerated  daily IOs    GI:  Diet: NPO  [] FEES done  [] GI prophylaxis: PPI while on steroids   Bowel regimen: Miralax, Senna  LBM: 11/17    ENDO:   FS goal 120-180  Medium scale  A1C 6.7  ISS     HEME/ONC:  Monitor H/H  SCDs  Chemo ppx: SQL  LED negative     ID:  No fevers, WBC count trending down   copious thick inline secretions, oral secretions  No signs of infection  Assessment: 63-year-old female w/ PMHx MG (dx 2013, thymus surgery 2010, multiple admissions for myasthenic crisis including intubation) p/w worsening weakness, fatigue, and difficulty eating over the prior 1-2 weeks, also 2 days worsening diplopia and head drop. Patient's most recent admission 6/2023, given PLEX and IVIg, discharged on pyridostigmine 90 mg TID and prednisone 50 mg QD, w/ prednisone tapered off and stopped 9/15/23. Patient had restarted prednisone 10 mg QD 2 weeks PTA, then increased to 20 mg QD w/o improvement; pyridostigmine was also increased 1 week PTA.     Hospital course: admitted to NSCU 11/3; intubated 11/4; s/p IVIg (11/3-11/7); s/p PLEX x 5 (11/8-11/17); extubated to BiPAP 11/18, weaned to NC 11/19; accepted for downgrade to general neurology service 11/19, pending transfer.    Plan:  #NEURO: Myasthenia Gravis exacerbation w/ acute respiratory failure  - s/p plasmapheresis 5/5  - s/p methylprednisolone 125 mg Q5H x 2 days (11/17-11/19)   - IVIG 11/3-11/7 but worsened and then switched to PLEX (completed). Following completion will consider additional course of IVIG, if needed  - Appreciate input from neuromuscular specialist, Dr. Emily Covarrubias - c/w prednisone 60mg QD & azathioprine 50mg BID  - Hold Mestinon due to respiratory status and secretions. Will resume when more stable  - c/w azathioprine 50mg BID  - Continue VC and NIF monitoring Q8  - Neurological checks Q4H  - PT/OT  - Per FEES study - Solid & thin liquids diet  - Continue prednisone 60 mg QD  - Per Dr. Covarrubias we plan to taper prednisone down in increments of 10 mg a week down to 30 mg daily and then the patient is to continue 30 mg daily.  - Avoid medications that may worsen the current exacerbation including macrolides, fluoroquinolones, tetracyclines, aminoglycoside, beta-blockers, magnesium, and anti-arrhythmics (procainamide, quinidine, and lidocaine), muscle relaxants     PULM:  Extubated to BiPAP, currently on NC - tachypneic (11/19)  CT chest- with atelectasis, no evidence of residual thymoma   CXR - improved   Duonebs q6h for secretions (oral), self suctions  Aggressive Chest PT    CV:  HTN - Amlodipine 10 mg daily  TTE - EF 54%, elevated right atrial pressure     RENAL:  Stable renal function  IVL if PO intake is tolerated  daily IOs    GI:  Diet: NPO  [] FEES done  [] GI prophylaxis: PPI while on steroids   Bowel regimen: Miralax, Senna  LBM: 11/17    ENDO:   FS goal 120-180  Medium scale  A1C 6.7  ISS     HEME/ONC:  Monitor H/H  SCDs  Chemo ppx: SQL  LED negative     ID:  No fevers, WBC count trending down   copious thick inline secretions, oral secretions  No signs of infection

## 2023-11-21 NOTE — DISCHARGE NOTE PROVIDER - NSDCCPCAREPLAN_GEN_ALL_CORE_FT
PRINCIPAL DISCHARGE DIAGNOSIS  Diagnosis: Myasthenic crisis  Assessment and Plan of Treatment: You were treated for a myesthenia gravis crisis which caused comprimise of your ability to breathe. You were intubated and treated with a course of IVIG followed by plasma exchange therapy, azithiporine, mestinon and a course of IV and oral steroids. These helped improve your strength and ability to breathe. On discharge we would like you to continue the azathioprine, continue the mestinon with outpatient neurology appointments to adjust dosage as needed. In addition, please continue the prednisone at 60 mg daily with plans to decrease the dosage by 10 mg each week until you reach a dose of 30 mg daily. Please follow up with Dr. Mayer on 12/12/23 at 9:30 AM      SECONDARY DISCHARGE DIAGNOSES  Diagnosis: Acute hypoxic respiratory failure  Assessment and Plan of Treatment: Your hospital stay was complicated by respiritory failure believed to be caused by your myesthenia gravis crisis. You required intubation in order to assure your airway remained open. You were maintained on a ventilator by the neuro critical care staff and you were eventually stable enough for the artificial airway to be removed.     PRINCIPAL DISCHARGE DIAGNOSIS  Diagnosis: Myasthenic crisis  Assessment and Plan of Treatment: You were treated for a myesthenia gravis crisis which caused comprimise of your ability to breathe. You were intubated and treated with a course of IVIG followed by plasma exchange therapy, azithiporine, mestinon and a course of IV and oral steroids. These helped improve your strength and ability to breathe. On discharge we would like you to continue the azathioprine, continue the mestinon with outpatient neurology appointments to adjust dosage as needed. In addition, please continue the prednisone at 60 mg daily with plans to decrease the dosage by 5 mg each week until you reach a dose of 30 mg daily. Please follow up with Dr. Mayer on 12/12/23 at 9:30 AM      SECONDARY DISCHARGE DIAGNOSES  Diagnosis: Acute hypoxic respiratory failure  Assessment and Plan of Treatment: Your hospital stay was complicated by respiritory failure believed to be caused by your myesthenia gravis crisis. You required intubation in order to assure your airway remained open. You were maintained on a ventilator by the neuro critical care staff and you were eventually stable enough for the artificial airway to be removed.     PRINCIPAL DISCHARGE DIAGNOSIS  Diagnosis: Myasthenic crisis  Assessment and Plan of Treatment: You were treated for a myesthenia gravis crisis which caused comprimise of your ability to breathe. You were intubated and treated with a course of IVIG followed by plasma exchange therapy, azithiporine, mestinon and a course of IV and oral steroids. These helped improve your strength and ability to breathe. On discharge we would like you to continue the azathioprine, continue the mestinon with outpatient neurology appointments to adjust dosage as needed. In addition, please continue the prednisone at 60 mg daily with plans to decrease the dosage by 5 mg each week until you reach a dose of 30 mg daily. Please follow up with Dr. Mayer on 12/12/23 at 9:30 AM  May use the following regimen for prednisone per day:  60 mg week = 20 mg tab x 3 x 3 days = 9 of 20 mg tabs for week  55 mg week = 50 mg tab x 7 + 5 mg tab x 7 days= 7 of 50 mg tabs and 7 of 5 mg tabs for week  50 mg week = 50 mg tab x 7 days= 7 of 50 mg tabs for week  45 mg week =  20 mg x 2 tabs x 7 days +  5 mg x7 days = 14 of 20 mg tabs and 7 of  5 mg tabs for week  40 mg week = 20 mg tab x 2 tabs x 7 days = 14 of 20 mg tabs for week  35 mg week= 20 mg tab x 7 days + 5 mg x 3 tab x 7 days= 7 of 20 mg tab and 21 of 5 mg tabs for week  30 mg week = 20 mg tab x 7 days+ 10 mg tab x 7 days= 7 of 20 mg tabs and 7 of 10 mg tabs for week      SECONDARY DISCHARGE DIAGNOSES  Diagnosis: Acute hypoxic respiratory failure  Assessment and Plan of Treatment: Your hospital stay was complicated by respiritory failure believed to be caused by your myesthenia gravis crisis. You required intubation in order to assure your airway remained open. You were maintained on a ventilator by the neuro critical care staff and you were eventually stable enough for the artificial airway to be removed.     PRINCIPAL DISCHARGE DIAGNOSIS  Diagnosis: Myasthenic crisis  Assessment and Plan of Treatment: You were treated for a myesthenia gravis crisis which caused comprimise of your ability to breathe. You were intubated and treated with a course of IVIG followed by plasma exchange therapy, azithiporine, mestinon and a course of IV and oral steroids. These helped improve your strength and ability to breathe. On discharge we would like you to continue the azathioprine, continue the mestinon with outpatient neurology appointments to adjust dosage as needed. In addition, please continue the prednisone at 60 mg daily with plans to decrease the dosage by 5 mg each week until you reach a dose of 30 mg daily. Please follow up with Dr. Mayer on 12/12/23 at 9:30 AM  May use the following regimen for prednisone per day:  60 mg week = 20 mg tab x 3 tabs x 3 days = 9 of 20 mg tabs for week  55 mg week = 50 mg tab x 7  days + 5 mg tab x 7 days= 7 of 50 mg tabs and 7 of 5 mg tabs for week  50 mg week = 50 mg tab x 7 days= 7 of 50 mg tabs for week  45 mg week =  20 mg x 2 tabs x 7 days +  5 mg x 7 days = 14 of 20 mg tabs and 7 of  5 mg tabs for week  40 mg week = 20 mg tab x 2 tabs x 7 days = 14 of 20 mg tabs for week  35 mg week= 20 mg tab x 7 days + 5 mg x 3 tab x 7 days= 7 of 20 mg tab and 21 of 5 mg tabs for week  30 mg week = 20 mg tab x 7 days+ 10 mg tab x 7 days= 7 of 20 mg tabs and 7 of 10 mg tabs for week  Please return to hospital ED for worsening of myasthenia symptoms or sign of respiratory failure/trouble breathing.      SECONDARY DISCHARGE DIAGNOSES  Diagnosis: Acute hypoxic respiratory failure  Assessment and Plan of Treatment: Your hospital stay was complicated by respiritory failure believed to be caused by your myesthenia gravis crisis. You required intubation in order to assure your airway remained open. You were maintained on a ventilator by the neuro critical care staff and you were eventually stable enough for the artificial airway to be removed.

## 2023-11-21 NOTE — DISCHARGE NOTE PROVIDER - CARE PROVIDER_API CALL
Jordon Marino.  Internal Medicine  [address] [place] [address]  Phone: [phone]  Fax: [phone]  Established Patient  Follow Up Time:    Jordon Marino.  Internal Medicine  [address] [place] [address]  Phone: [phone]  Fax: [phone]  Established Patient  Follow Up Time:    Jordon Marino.  Internal Medicine  [address] [place] [address]  Phone: [phone]  Fax: [phone]  Established Patient  Follow Up Time:

## 2023-11-22 DIAGNOSIS — G70.00 MYASTHENIA GRAVIS WITHOUT (ACUTE) EXACERBATION: ICD-10-CM

## 2023-11-22 DIAGNOSIS — E11.9 TYPE 2 DIABETES MELLITUS WITHOUT COMPLICATIONS: ICD-10-CM

## 2023-11-22 DIAGNOSIS — I10 ESSENTIAL (PRIMARY) HYPERTENSION: ICD-10-CM

## 2023-11-22 DIAGNOSIS — E78.5 HYPERLIPIDEMIA, UNSPECIFIED: ICD-10-CM

## 2023-11-22 DIAGNOSIS — J96.01 ACUTE RESPIRATORY FAILURE WITH HYPOXIA: ICD-10-CM

## 2023-11-22 DIAGNOSIS — R09.02 HYPOXEMIA: ICD-10-CM

## 2023-11-22 LAB
A1C WITH ESTIMATED AVERAGE GLUCOSE RESULT: 6.4 % — HIGH (ref 4–5.6)
ALBUMIN SERPL ELPH-MCNC: 4.7 G/DL — SIGNIFICANT CHANGE UP (ref 3.3–5)
ALP SERPL-CCNC: 62 U/L — SIGNIFICANT CHANGE UP (ref 40–120)
ALT FLD-CCNC: 22 U/L — SIGNIFICANT CHANGE UP (ref 10–45)
ANION GAP SERPL CALC-SCNC: 12 MMOL/L — SIGNIFICANT CHANGE UP (ref 5–17)
AST SERPL-CCNC: 13 U/L — SIGNIFICANT CHANGE UP (ref 10–40)
BILIRUB SERPL-MCNC: 1 MG/DL — SIGNIFICANT CHANGE UP (ref 0.2–1.2)
BUN SERPL-MCNC: 18 MG/DL — SIGNIFICANT CHANGE UP (ref 7–23)
CALCIUM SERPL-MCNC: 9.1 MG/DL — SIGNIFICANT CHANGE UP (ref 8.4–10.5)
CHLORIDE SERPL-SCNC: 94 MMOL/L — LOW (ref 96–108)
CHOLEST SERPL-MCNC: 179 MG/DL — SIGNIFICANT CHANGE UP
CO2 SERPL-SCNC: 33 MMOL/L — HIGH (ref 22–31)
CREAT SERPL-MCNC: 0.39 MG/DL — LOW (ref 0.5–1.3)
EGFR: 111 ML/MIN/1.73M2 — SIGNIFICANT CHANGE UP
ESTIMATED AVERAGE GLUCOSE: 137 MG/DL — HIGH (ref 68–114)
GLUCOSE BLDC GLUCOMTR-MCNC: 121 MG/DL — HIGH (ref 70–99)
GLUCOSE BLDC GLUCOMTR-MCNC: 190 MG/DL — HIGH (ref 70–99)
GLUCOSE BLDC GLUCOMTR-MCNC: 196 MG/DL — HIGH (ref 70–99)
GLUCOSE BLDC GLUCOMTR-MCNC: 212 MG/DL — HIGH (ref 70–99)
GLUCOSE SERPL-MCNC: 213 MG/DL — HIGH (ref 70–99)
HCT VFR BLD CALC: 39.8 % — SIGNIFICANT CHANGE UP (ref 34.5–45)
HDLC SERPL-MCNC: 61 MG/DL — SIGNIFICANT CHANGE UP
HGB BLD-MCNC: 12.8 G/DL — SIGNIFICANT CHANGE UP (ref 11.5–15.5)
LIPID PNL WITH DIRECT LDL SERPL: 90 MG/DL — SIGNIFICANT CHANGE UP
MCHC RBC-ENTMCNC: 29 PG — SIGNIFICANT CHANGE UP (ref 27–34)
MCHC RBC-ENTMCNC: 32.2 GM/DL — SIGNIFICANT CHANGE UP (ref 32–36)
MCV RBC AUTO: 90.2 FL — SIGNIFICANT CHANGE UP (ref 80–100)
NON HDL CHOLESTEROL: 118 MG/DL — SIGNIFICANT CHANGE UP
NRBC # BLD: 0 /100 WBCS — SIGNIFICANT CHANGE UP (ref 0–0)
PLATELET # BLD AUTO: 348 K/UL — SIGNIFICANT CHANGE UP (ref 150–400)
POTASSIUM SERPL-MCNC: 3.6 MMOL/L — SIGNIFICANT CHANGE UP (ref 3.5–5.3)
POTASSIUM SERPL-SCNC: 3.6 MMOL/L — SIGNIFICANT CHANGE UP (ref 3.5–5.3)
PROT SERPL-MCNC: 6.2 G/DL — SIGNIFICANT CHANGE UP (ref 6–8.3)
RBC # BLD: 4.41 M/UL — SIGNIFICANT CHANGE UP (ref 3.8–5.2)
RBC # FLD: 15 % — HIGH (ref 10.3–14.5)
SODIUM SERPL-SCNC: 139 MMOL/L — SIGNIFICANT CHANGE UP (ref 135–145)
TRIGL SERPL-MCNC: 164 MG/DL — HIGH
WBC # BLD: 17.07 K/UL — HIGH (ref 3.8–10.5)
WBC # FLD AUTO: 17.07 K/UL — HIGH (ref 3.8–10.5)

## 2023-11-22 PROCEDURE — 99254 IP/OBS CNSLTJ NEW/EST MOD 60: CPT

## 2023-11-22 PROCEDURE — 99232 SBSQ HOSP IP/OBS MODERATE 35: CPT

## 2023-11-22 RX ORDER — AMLODIPINE BESYLATE 2.5 MG/1
1 TABLET ORAL
Qty: 0 | Refills: 0 | DISCHARGE
Start: 2023-11-22

## 2023-11-22 RX ORDER — SODIUM CHLORIDE 9 MG/ML
4 INJECTION INTRAMUSCULAR; INTRAVENOUS; SUBCUTANEOUS EVERY 12 HOURS
Refills: 0 | Status: DISCONTINUED | OUTPATIENT
Start: 2023-11-22 | End: 2023-11-23

## 2023-11-22 RX ORDER — AZATHIOPRINE 100 MG/1
1 TABLET ORAL
Qty: 60 | Refills: 0
Start: 2023-11-22 | End: 2023-12-21

## 2023-11-22 RX ORDER — ACETYLCYSTEINE 200 MG/ML
4 VIAL (ML) MISCELLANEOUS
Refills: 0 | Status: DISCONTINUED | OUTPATIENT
Start: 2023-11-22 | End: 2023-11-22

## 2023-11-22 RX ORDER — AZATHIOPRINE 100 MG/1
1 TABLET ORAL
Qty: 0 | Refills: 0 | DISCHARGE
Start: 2023-11-22

## 2023-11-22 RX ORDER — ATORVASTATIN CALCIUM 80 MG/1
20 TABLET, FILM COATED ORAL AT BEDTIME
Refills: 0 | Status: DISCONTINUED | OUTPATIENT
Start: 2023-11-22 | End: 2023-11-23

## 2023-11-22 RX ORDER — PYRIDOSTIGMINE BROMIDE 60 MG/5ML
60 SOLUTION ORAL THREE TIMES A DAY
Refills: 0 | Status: DISCONTINUED | OUTPATIENT
Start: 2023-11-22 | End: 2023-11-23

## 2023-11-22 RX ORDER — PYRIDOSTIGMINE BROMIDE 60 MG/5ML
1 SOLUTION ORAL
Qty: 0 | Refills: 0 | DISCHARGE
Start: 2023-11-22

## 2023-11-22 RX ORDER — PANTOPRAZOLE SODIUM 20 MG/1
1 TABLET, DELAYED RELEASE ORAL
Qty: 30 | Refills: 0
Start: 2023-11-22 | End: 2023-12-21

## 2023-11-22 RX ADMIN — Medication 2: at 08:15

## 2023-11-22 RX ADMIN — Medication 3 MILLILITER(S): at 00:58

## 2023-11-22 RX ADMIN — AMLODIPINE BESYLATE 10 MILLIGRAM(S): 2.5 TABLET ORAL at 06:15

## 2023-11-22 RX ADMIN — ATORVASTATIN CALCIUM 20 MILLIGRAM(S): 80 TABLET, FILM COATED ORAL at 22:10

## 2023-11-22 RX ADMIN — Medication 2: at 12:12

## 2023-11-22 RX ADMIN — Medication 1000 UNIT(S): at 12:11

## 2023-11-22 RX ADMIN — Medication 4: at 15:52

## 2023-11-22 RX ADMIN — Medication 3 MILLILITER(S): at 06:16

## 2023-11-22 RX ADMIN — PYRIDOSTIGMINE BROMIDE 60 MILLIGRAM(S): 60 SOLUTION ORAL at 22:07

## 2023-11-22 RX ADMIN — Medication 15 MILLILITER(S): at 12:11

## 2023-11-22 RX ADMIN — Medication 60 MILLIGRAM(S): at 06:16

## 2023-11-22 RX ADMIN — PANTOPRAZOLE SODIUM 40 MILLIGRAM(S): 20 TABLET, DELAYED RELEASE ORAL at 06:16

## 2023-11-22 RX ADMIN — Medication 3 MILLILITER(S): at 12:11

## 2023-11-22 RX ADMIN — ENOXAPARIN SODIUM 40 MILLIGRAM(S): 100 INJECTION SUBCUTANEOUS at 22:07

## 2023-11-22 RX ADMIN — Medication 1 TABLET(S): at 12:11

## 2023-11-22 RX ADMIN — AZATHIOPRINE 50 MILLIGRAM(S): 100 TABLET ORAL at 06:16

## 2023-11-22 RX ADMIN — Medication 3 MILLILITER(S): at 17:15

## 2023-11-22 RX ADMIN — SODIUM CHLORIDE 4 MILLILITER(S): 9 INJECTION INTRAMUSCULAR; INTRAVENOUS; SUBCUTANEOUS at 17:18

## 2023-11-22 RX ADMIN — AZATHIOPRINE 50 MILLIGRAM(S): 100 TABLET ORAL at 17:15

## 2023-11-22 RX ADMIN — PYRIDOSTIGMINE BROMIDE 60 MILLIGRAM(S): 60 SOLUTION ORAL at 14:51

## 2023-11-22 NOTE — DISCHARGE NOTE NURSING/CASE MANAGEMENT/SOCIAL WORK - NSDCFUADDAPPT_GEN_ALL_CORE_FT
Dr. Fermin Mayer  [address] [place] [address], [address] [place] [address]  [facility]  12/12/23 at 9:30am Dr. Fermin Mayer  [address] [place] [address], [address] [place] [address]  Neurology Outpatient Clinic  12/12/23 at 9:30am Dr. Fermin Mayer  [address] [place] [address], [address] [place] [address]  Ne[facility] Outpatient Clinic  12/12/23 at 9:30am

## 2023-11-22 NOTE — CONSULT NOTE ADULT - CONSULT REASON
myasthenia gravis
Initiation of Therapeutic Plasma Exchange (TPE) for the treatment of myasthenia gravis (MG)
dysphagia
Hypoxia

## 2023-11-22 NOTE — CONSULT NOTE ADULT - PROBLEM SELECTOR RECOMMENDATION 9
2/2 MG flare. Required intubation, now extubated on 11/18. Remains on NC at present. As per chart review of prior admissions and notes, pt was d/c home in February of 2023 with 2L NC as needed after a MG flare. Has been seen by pulm on prior admissions, no known h/o intrinsic lung disease. CT scan this admission reviewed, is limited by motion but pt with atelectasis and low lung volumes. CXR performed on 11/19 with likely bilateral atelectasis. lungs with decreased BS at bases but otherwise clear  - Suspect hypoxia now likely 2/2 atelectasis. Encourage OOB, deep breathing, IS while awake  - Could start Mucinex 1200mg to help with secretions  - Chest PT  - Agree with enrique  - Could start Mucomyst nebulizers to help with secretions  - Goal O2 saturation >90%
- Recommend PPI  - Diet per SLP  - No further ENT intervention at this time  - Call with questions or concerns

## 2023-11-22 NOTE — CONSULT NOTE ADULT - SUBJECTIVE AND OBJECTIVE BOX
MEDICINE ATTENDING INITIAL CONSULT NOTE    HPI: 63y woman with a PMHx significant for myasthenia gravis, HTN, HLD, DM admitted for worsening difficulty with double vision, swallowing, talking, and breathing that has progressively gotten worse over the course of one week admitted to [facility]. Required intubation from 11/4-11/18. s/p IVIG. Pt on 2L NC as outpatient PRN, was first ne home O2 in February of this tear. No know intrinsic lung disease        SUBJECTIVE: Seen at bedside. Has productive cough. Denies pain. States she is breathing comfortably    Home Medications:  lovastatin 20 mg oral tablet: 1 tab(s) orally once a day (22 Nov 2023 09:58)  Metoprolol Succinate ER 50 mg oral tablet, extended release: 1 tab(s) orally 2 times a day (22 Nov 2023 10:00)      PAST MEDICAL & SURGICAL HISTORY:  Myasthenia gravis      Diabetes mellitus      Hypertension          Review of Systems:   CONSTITUTIONAL: No fever, weight loss, or fatigue  EYES: No eye pain, visual disturbances, or discharge  ENMT:  No difficulty hearing, tinnitus, vertigo; No sinus or throat pain  NECK: No pain or stiffness  RESPIRATORY: + productive cough  CARDIOVASCULAR: No chest pain, palpitations, dizziness, or leg swelling  GASTROINTESTINAL: No abdominal or epigastric pain. No nausea, vomiting, or hematemesis; No diarrhea or constipation. No melena or hematochezia.  GENITOURINARY: No dysuria, frequency, hematuria, or incontinence  SKIN: No itching, burning, rashes, or lesions   ENDOCRINE: No heat or cold intolerance; No hair loss  MUSCULOSKELETAL: No joint pain or swelling; No muscle, back, or extremity pain  PSYCHIATRIC: No depression, anxiety, mood swings, or difficulty sleeping  HEME/LYMPH: No easy bruising, or bleeding gums  ALLERY AND IMMUNOLOGIC: No hives or eczema    Allergies    peanuts (Unknown)  No Known Drug Allergies    Intolerances        Social History: Nonsmoker    FAMILY HISTORY:   Noncontributory    Vital Signs Last 24 Hrs  T(C): 36.9 (22 Nov 2023 08:27), Max: 37.1 (21 Nov 2023 17:22)  T(F): 98.4 (22 Nov 2023 08:27), Max: 98.7 (21 Nov 2023 17:22)  HR: 104 (22 Nov 2023 08:27) (88 - 116)  BP: 136/79 (22 Nov 2023 08:27) (132/72 - 150/76)  BP(mean): --  RR: 18 (22 Nov 2023 08:27) (18 - 20)  SpO2: 97% (22 Nov 2023 08:27) (93% - 100%)    Parameters below as of 22 Nov 2023 08:27  Patient On (Oxygen Delivery Method): nasal cannula      CAPILLARY BLOOD GLUCOSE      POCT Blood Glucose.: 190 mg/dL (22 Nov 2023 11:50)  POCT Blood Glucose.: 196 mg/dL (22 Nov 2023 07:32)  POCT Blood Glucose.: 127 mg/dL (21 Nov 2023 21:30)  POCT Blood Glucose.: 181 mg/dL (21 Nov 2023 16:38)      PHYSICAL EXAM:  GENERAL: NAD, well-developed  HEAD:  NCAT  EYES: EOMI  NECK: Supple, No JVD  CHEST/LUNG: Clear to auscultation bilaterally; No wheeze  HEART: Reg rate. No M/R/G.  ABDOMEN: Soft, NT/ND, Bowel sounds present  EXTREMITIES:  2+ Peripheral Pulses, No clubbing, cyanosis, or edema  PSYCH: AAOx3    SKIN: No rashes or lesions    LABS:                        12.8   17.07 )-----------( 348      ( 22 Nov 2023 10:13 )             39.8     11-22    139  |  94<L>  |  18  ----------------------------<  213<H>  3.6   |  33<H>  |  0.39<L>    Ca    9.1      22 Nov 2023 10:13  Phos  2.6     11-21  Mg     2.3     11-21    TPro  6.2  /  Alb  4.7  /  TBili  1.0  /  DBili  x   /  AST  13  /  ALT  22  /  AlkPhos  62  11-22          Urinalysis Basic - ( 22 Nov 2023 10:13 )    Color: x / Appearance: x / SG: x / pH: x  Gluc: 213 mg/dL / Ketone: x  / Bili: x / Urobili: x   Blood: x / Protein: x / Nitrite: x   Leuk Esterase: x / RBC: x / WBC x   Sq Epi: x / Non Sq Epi: x / Bacteria: x          MEDICATIONS  (STANDING):  albuterol/ipratropium for Nebulization 3 milliLiter(s) Nebulizer every 6 hours  amLODIPine   Tablet 10 milliGRAM(s) Oral daily  atorvastatin 20 milliGRAM(s) Oral at bedtime  azaTHIOprine 50 milliGRAM(s) Oral every 12 hours  calcium carbonate   1250 mG (OsCal) 1 Tablet(s) Oral daily  cholecalciferol 1000 Unit(s) Oral daily  enoxaparin Injectable 40 milliGRAM(s) SubCutaneous every 24 hours  insulin lispro (ADMELOG) corrective regimen sliding scale   SubCutaneous Before meals and at bedtime  multivitamin/minerals/iron Oral Solution (CENTRUM) 15 milliLiter(s) Oral daily  pantoprazole    Tablet 40 milliGRAM(s) Oral before breakfast  polyethylene glycol 3350 17 Gram(s) Oral daily  predniSONE   Tablet 60 milliGRAM(s) Oral daily  pyridostigmine 60 milliGRAM(s) Oral three times a day  senna 2 Tablet(s) Oral at bedtime    MEDICATIONS  (PRN):  acetaminophen     Tablet .. 650 milliGRAM(s) Oral every 6 hours PRN Mild Pain (1 - 3)  sodium chloride 0.9% lock flush 10 milliLiter(s) IV Push every 1 hour PRN Pre/post blood products, medications, blood draw, and to maintain line patency      MEDICINE ATTENDING INITIAL CONSULT NOTE    HPI: 63y woman with a PMHx significant for myasthenia gravis, HTN, HLD, DM admitted for worsening difficulty with double vision, swallowing, talking, and breathing that has progressively gotten worse over the course of one week admitted to [facility]. Required intubation from 11/4-11/18. s/p IVIG. Pt on 2L NC as outpatient PRN, was first ne home O2 in February of this tear. No know intrinsic lung disease        SUBJECTIVE: Seen at bedside. Has productive cough. Denies pain. States she is breathing comfortably    Home Medications:  lovastatin 20 mg oral tablet: 1 tab(s) orally once a day (22 Nov 2023 09:58)  Metoprolol Succinate ER 50 mg oral tablet, extended release: 1 tab(s) orally 2 times a day (22 Nov 2023 10:00)      PAST MEDICAL & SURGICAL HISTORY:  Myasthenia gravis      Diabetes mellitus      Hypertension          Review of Systems:   CONSTITUTIONAL: No fever, weight loss, or fatigue  EYES: No eye pain, visual disturbances, or discharge  ENMT:  No difficulty hearing, tinnitus, vertigo; No sinus or throat pain  NECK: No pain or stiffness  RESPIRATORY: + productive cough  CARDIOVASCULAR: No chest pain, palpitations, dizziness, or leg swelling  GASTROINTESTINAL: No abdominal or epigastric pain. No nausea, vomiting, or hematemesis; No diarrhea or constipation. No melena or hematochezia.  GENITOURINARY: No dysuria, frequency, hematuria, or incontinence  SKIN: No itching, burning, rashes, or lesions   ENDOCRINE: No heat or cold intolerance; No hair loss  MUSCULOSKELETAL: No joint pain or swelling; No muscle, back, or extremity pain  PSYCHIATRIC: No depression, anxiety, mood swings, or difficulty sleeping  HEME/LYMPH: No easy bruising, or bleeding gums  ALLERY AND IMMUNOLOGIC: No hives or eczema    Allergies    peanuts (Unknown)  No Known Drug Allergies    Intolerances        Social History: Nonsmoker    FAMILY HISTORY:   Noncontributory    Vital Signs Last 24 Hrs  T(C): 36.9 (22 Nov 2023 08:27), Max: 37.1 (21 Nov 2023 17:22)  T(F): 98.4 (22 Nov 2023 08:27), Max: 98.7 (21 Nov 2023 17:22)  HR: 104 (22 Nov 2023 08:27) (88 - 116)  BP: 136/79 (22 Nov 2023 08:27) (132/72 - 150/76)  BP(mean): --  RR: 18 (22 Nov 2023 08:27) (18 - 20)  SpO2: 97% (22 Nov 2023 08:27) (93% - 100%)    Parameters below as of 22 Nov 2023 08:27  Patient On (Oxygen Delivery Method): nasal cannula      CAPILLARY BLOOD GLUCOSE      POCT Blood Glucose.: 190 mg/dL (22 Nov 2023 11:50)  POCT Blood Glucose.: 196 mg/dL (22 Nov 2023 07:32)  POCT Blood Glucose.: 127 mg/dL (21 Nov 2023 21:30)  POCT Blood Glucose.: 181 mg/dL (21 Nov 2023 16:38)      PHYSICAL EXAM:  GENERAL: NAD, well-developed  HEAD:  NCAT  EYES: EOMI  NECK: Supple, No JVD  CHEST/LUNG: Clear to auscultation bilaterally; No wheeze  HEART: Reg rate. No M/R/G.  ABDOMEN: Soft, NT/ND, Bowel sounds present  EXTREMITIES:  2+ Peripheral Pulses, No clubbing, cyanosis, or edema  PSYCH: AAOx3    SKIN: No rashes or lesions    LABS:                        12.8   17.07 )-----------( 348      ( 22 Nov 2023 10:13 )             39.8     11-22    139  |  94<L>  |  18  ----------------------------<  213<H>  3.6   |  33<H>  |  0.39<L>    Ca    9.1      22 Nov 2023 10:13  Phos  2.6     11-21  Mg     2.3     11-21    TPro  6.2  /  Alb  4.7  /  TBili  1.0  /  DBili  x   /  AST  13  /  ALT  22  /  AlkPhos  62  11-22          Urinalysis Basic - ( 22 Nov 2023 10:13 )    Color: x / Appearance: x / SG: x / pH: x  Gluc: 213 mg/dL / Ketone: x  / Bili: x / Urobili: x   Blood: x / Protein: x / Nitrite: x   Leuk Esterase: x / RBC: x / WBC x   Sq Epi: x / Non Sq Epi: x / Bacteria: x          MEDICATIONS  (STANDING):  albuterol/ipratropium for Nebulization 3 milliLiter(s) Nebulizer every 6 hours  amLODIPine   Tablet 10 milliGRAM(s) Oral daily  atorvastatin 20 milliGRAM(s) Oral at bedtime  azaTHIOprine 50 milliGRAM(s) Oral every 12 hours  calcium carbonate   1250 mG (OsCal) 1 Tablet(s) Oral daily  cholecalciferol 1000 Unit(s) Oral daily  enoxaparin Injectable 40 milliGRAM(s) SubCutaneous every 24 hours  insulin lispro (ADMELOG) corrective regimen sliding scale   SubCutaneous Before meals and at bedtime  multivitamin/minerals/iron Oral Solution (CENTRUM) 15 milliLiter(s) Oral daily  pantoprazole    Tablet 40 milliGRAM(s) Oral before breakfast  polyethylene glycol 3350 17 Gram(s) Oral daily  predniSONE   Tablet 60 milliGRAM(s) Oral daily  pyridostigmine 60 milliGRAM(s) Oral three times a day  senna 2 Tablet(s) Oral at bedtime    MEDICATIONS  (PRN):  acetaminophen     Tablet .. 650 milliGRAM(s) Oral every 6 hours PRN Mild Pain (1 - 3)  sodium chloride 0.9% lock flush 10 milliLiter(s) IV Push every 1 hour PRN Pre/post blood products, medications, blood draw, and to maintain line patency      MEDICINE ATTENDING INITIAL CONSULT NOTE    HPI: 63y woman with a PMHx significant for myasthenia gravis, HTN, HLD, DM admitted for worsening difficulty with double vision, swallowing, talking, and breathing that has progressively gotten worse over the course of one week admitted to [facility]. Required intubation from 11/4-11/18. s/p IVIG. Pt on 2L NC as outpatient PRN, was first ne home O2 in February of this tear. No know intrinsic lung disease        SUBJECTIVE: Seen at bedside. Has productive cough. Denies pain. States she is breathing comfortably    Home Medications:  lovastatin 20 mg oral tablet: 1 tab(s) orally once a day (22 Nov 2023 09:58)  Metoprolol Succinate ER 50 mg oral tablet, extended release: 1 tab(s) orally 2 times a day (22 Nov 2023 10:00)      PAST MEDICAL & SURGICAL HISTORY:  Myasthenia gravis      Diabetes mellitus      Hypertension          Review of Systems:   CONSTITUTIONAL: No fever, weight loss, or fatigue  EYES: No eye pain, visual disturbances, or discharge  ENMT:  No difficulty hearing, tinnitus, vertigo; No sinus or throat pain  NECK: No pain or stiffness  RESPIRATORY: + productive cough  CARDIOVASCULAR: No chest pain, palpitations, dizziness, or leg swelling  GASTROINTESTINAL: No abdominal or epigastric pain. No nausea, vomiting, or hematemesis; No diarrhea or constipation. No melena or hematochezia.  GENITOURINARY: No dysuria, frequency, hematuria, or incontinence  SKIN: No itching, burning, rashes, or lesions   ENDOCRINE: No heat or cold intolerance; No hair loss  MUSCULOSKELETAL: No joint pain or swelling; No muscle, back, or extremity pain  PSYCHIATRIC: No depression, anxiety, mood swings, or difficulty sleeping  HEME/LYMPH: No easy bruising, or bleeding gums  ALLERY AND IMMUNOLOGIC: No hives or eczema    Allergies    peanuts (Unknown)  No Known Drug Allergies    Intolerances        Social History: Nonsmoker    FAMILY HISTORY:   Noncontributory    Vital Signs Last 24 Hrs  T(C): 36.9 (22 Nov 2023 08:27), Max: 37.1 (21 Nov 2023 17:22)  T(F): 98.4 (22 Nov 2023 08:27), Max: 98.7 (21 Nov 2023 17:22)  HR: 104 (22 Nov 2023 08:27) (88 - 116)  BP: 136/79 (22 Nov 2023 08:27) (132/72 - 150/76)  BP(mean): --  RR: 18 (22 Nov 2023 08:27) (18 - 20)  SpO2: 97% (22 Nov 2023 08:27) (93% - 100%)    Parameters below as of 22 Nov 2023 08:27  Patient On (Oxygen Delivery Method): nasal cannula      CAPILLARY BLOOD GLUCOSE      POCT Blood Glucose.: 190 mg/dL (22 Nov 2023 11:50)  POCT Blood Glucose.: 196 mg/dL (22 Nov 2023 07:32)  POCT Blood Glucose.: 127 mg/dL (21 Nov 2023 21:30)  POCT Blood Glucose.: 181 mg/dL (21 Nov 2023 16:38)      PHYSICAL EXAM:  GENERAL: NAD, well-developed  HEAD:  NCAT  EYES: EOMI  NECK: Supple, No JVD  CHEST/LUNG: Clear to auscultation bilaterally; No wheeze  HEART: Reg rate. No M/R/G.  ABDOMEN: Soft, NT/ND, Bowel sounds present  EXTREMITIES:  2+ Peripheral Pulses, No clubbing, cyanosis, or edema  PSYCH: AAOx3    SKIN: No rashes or lesions    LABS:                        12.8   17.07 )-----------( 348      ( 22 Nov 2023 10:13 )             39.8     11-22    139  |  94<L>  |  18  ----------------------------<  213<H>  3.6   |  33<H>  |  0.39<L>    Ca    9.1      22 Nov 2023 10:13  Phos  2.6     11-21  Mg     2.3     11-21    TPro  6.2  /  Alb  4.7  /  TBili  1.0  /  DBili  x   /  AST  13  /  ALT  22  /  AlkPhos  62  11-22          Urinalysis Basic - ( 22 Nov 2023 10:13 )    Color: x / Appearance: x / SG: x / pH: x  Gluc: 213 mg/dL / Ketone: x  / Bili: x / Urobili: x   Blood: x / Protein: x / Nitrite: x   Leuk Esterase: x / RBC: x / WBC x   Sq Epi: x / Non Sq Epi: x / Bacteria: x          MEDICATIONS  (STANDING):  albuterol/ipratropium for Nebulization 3 milliLiter(s) Nebulizer every 6 hours  amLODIPine   Tablet 10 milliGRAM(s) Oral daily  atorvastatin 20 milliGRAM(s) Oral at bedtime  azaTHIOprine 50 milliGRAM(s) Oral every 12 hours  calcium carbonate   1250 mG (OsCal) 1 Tablet(s) Oral daily  cholecalciferol 1000 Unit(s) Oral daily  enoxaparin Injectable 40 milliGRAM(s) SubCutaneous every 24 hours  insulin lispro (ADMELOG) corrective regimen sliding scale   SubCutaneous Before meals and at bedtime  multivitamin/minerals/iron Oral Solution (CENTRUM) 15 milliLiter(s) Oral daily  pantoprazole    Tablet 40 milliGRAM(s) Oral before breakfast  polyethylene glycol 3350 17 Gram(s) Oral daily  predniSONE   Tablet 60 milliGRAM(s) Oral daily  pyridostigmine 60 milliGRAM(s) Oral three times a day  senna 2 Tablet(s) Oral at bedtime    MEDICATIONS  (PRN):  acetaminophen     Tablet .. 650 milliGRAM(s) Oral every 6 hours PRN Mild Pain (1 - 3)  sodium chloride 0.9% lock flush 10 milliLiter(s) IV Push every 1 hour PRN Pre/post blood products, medications, blood draw, and to maintain line patency

## 2023-11-22 NOTE — CONSULT NOTE ADULT - ASSESSMENT
63y woman with a PMHx significant for myasthenia gravis, HTN, HLD, DM admitted with MG flare. Medicine consulted for hypoxia eval and BP management

## 2023-11-22 NOTE — PROGRESS NOTE ADULT - ATTENDING COMMENTS
63 year old female w/ hx of myasthenia gravis, admitted for MG exacerbation (bulbar symptoms) requiring intubation, IVIG x 5 doses followed by PLEX x 5 (last session 11/17) with clinical improvement and extubated 11/18. Of note, she was also admitted in 2/2023 and 8/2023 for MG exacerbations requiring intubation and PLEX.     Pt seen and examined at bedside. Sitting up in recliner. Reported doing well. No acute concerns.   She denies any dysphagia. On 2L NC, breathing stable. Pulse ox ~ 92-94.   Spoke to son over the phone as well- helps translate for pt.   Pt reports she has been on home O2 x 6 months, after hospital discharges she is able to wean off but then resumes once symptoms worsen. Secretions are better.   Neurological exam is stable. She denies any worsening symptoms.   Most recent Nif -50 and  (poor effort)      Neurological exam:  L eyelid ptosis, conjugate eye movements with mild weakness on R abduction, mild b/l facial weakness, tongue midline.  Single breath count 16  Motor exam:  Neck Flexion 4+/5  Neck Extension 5/5  L/R (out of 5)       Deltoid  4-/4    Biceps   5/5      Triceps  4+/4+       5/5   L/R (out of 5)       Hip Flexion  4+/4+    Hip Extension  5/5  Knee Extension  5/5  Dorsiflexion  5/5      Plantar Flexion 5/5   Gait not assessed    Imp: MG exacerbation  Plan:  - Nif/VC q4hrs - stable.   - Continue imuran 50 mg BID  - Continue prednisone 60 mg QD (plan to taper down by 10 mg weekly till on 30 mg QD)  - start Mestinon 60 mg TID, can increase as tolerated. Monitor oral secretions.   - PT/OT/SLP  - Monitor HR and BP: Elevations likely related to recent IV steroids use.   - Medicine consult to help optimize medical management (HTN, on going O2 need despite MG treatment with evidence of ? b/l pleural effusions on CXR and trace pedal edema). Appreciate assistance.     Dispo: Home PT once medically stable. Will follow up with Dr Covarrubias in neuro resident clinic next month (scheduled)

## 2023-11-22 NOTE — PROGRESS NOTE ADULT - REASON FOR ADMISSION
MG
Myasthenic Crisis
MG
SOB
MG
Myasthenic crisis
MG
MG crisis

## 2023-11-22 NOTE — PROGRESS NOTE ADULT - ASSESSMENT
Assessment: 63-year-old female w/ PMHx MG (dx 2013, thymus surgery 2010, multiple admissions for myasthenic crisis including intubation) p/w worsening weakness, fatigue, and difficulty eating over the prior 1-2 weeks, also 2 days worsening diplopia and head drop. Patient's most recent admission 6/2023, given PLEX and IVIg, discharged on pyridostigmine 90 mg TID and prednisone 50 mg QD, w/ prednisone tapered off and stopped 9/15/23. Patient had restarted prednisone 10 mg QD 2 weeks PTA, then increased to 20 mg QD w/o improvement; pyridostigmine was also increased 1 week PTA.     Hospital course: admitted to [facility] 11/3; intubated 11/4; s/p IVIg (11/3-11/7); s/p PLEX x 5 (11/8-11/17); extubated to BiPAP 11/18, weaned to NC 11/19; accepted for downgrade to general neurology service 11/19, pending transfer.    Plan:  #NEURO: Myasthenia Gravis exacerbation w/ acute respiratory failure  - s/p plasmapheresis 5/5  - s/p methylprednisolone 125 mg Q5H x 2 days (11/17-11/19)   - IVIG 11/3-11/7 but worsened and then switched to PLEX (completed). Following completion will consider additional course of IVIG, if needed  - Appreciate input from neuromuscular specialist, Dr. Emily Covarrubias - c/w prednisone 60mg QD & azathioprine 50mg BID  - Hold Mestinon due to respiratory status and secretions. Will resume when more stable  - Re-start home Mestinon 60 mg daily (11/22), titrate up as tolerated  - c/w azathioprine 50mg BID  - Continue prednisone 60 mg QD - Decrease to 50 mg QD on Monday 11/26 and decrease by 10 mg every Monday to dose of 30 mg QD  - Continue VC and NIF monitoring Q8  - Neurological checks Q4H  - PT/OT  - Per FEES study - Solid & thin liquids diet  - Avoid medications that may worsen the current exacerbation including macrolides, fluoroquinolones, tetracyclines, aminoglycoside, beta-blockers, magnesium, and anti-arrhythmics (procainamide, quinidine, and lidocaine), muscle relaxants     PULM:  - Extubated to BiPAP, currently on NC - tachypneic (11/19)  - CT chest- with atelectasis, no evidence of residual thymoma   - CXR - improved   - Duonebs q6h for secretions (oral), self suctions  - Aggressive Chest PT  - Incentive spirometer given with instructions of use  - Medicine consult for HTN management & patient is desatting to 90% despite ongoing MG treatment w/ pleural effusions vs atelectasis on CXR    CV:  HTN - Amlodipine 10 mg daily ( holding home   TTE - EF 54%, elevated right atrial pressure     RENAL:  Stable renal function  IVL if PO intake is tolerated  daily IOs    GI:  Diet: NPO  Bowel regimen: Miralax, Senna  LBM: 11/17  - FEES done  - GI prophylaxis: PPI while on steroids     ENDO:   FS goal 120-180  Medium scale  A1C 6.7  ISS     HEME/ONC:  Monitor H/H  SCDs  Chemo ppx: SQL  LED negative     ID:  No fevers, WBC count trending down   copious thick inline secretions, oral secretions  No signs of infection

## 2023-11-22 NOTE — CONSULT NOTE ADULT - PROBLEM SELECTOR RECOMMENDATION 3
On Norvasc 10mg and Metoprolol as outpatient  - Overall BP goal inpatient is SBP<160  - Agree with Norvasc  - Metoprolol on hold 2/2 MG flare

## 2023-11-22 NOTE — PROGRESS NOTE ADULT - SUBJECTIVE AND OBJECTIVE BOX
Neurology Progress Note    Interval History:    Patient seen and examined seated in chair. Patient reports a mild cough. Patient reports improvement in her oral secretions and breathing. Her strength is stable and she feels as though she is overall improving. Patient denies headache, nausea, vomiting, vision changes, hearing changes, focal weakness or focal numbness.     PAST MEDICAL & SURGICAL HISTORY:  Myasthenia gravis    Diabetes mellitus    Hypertension            Medications:  acetaminophen     Tablet .. 650 milliGRAM(s) Oral every 6 hours PRN  albuterol/ipratropium for Nebulization 3 milliLiter(s) Nebulizer every 6 hours  amLODIPine   Tablet 10 milliGRAM(s) Oral daily  atorvastatin 20 milliGRAM(s) Oral at bedtime  azaTHIOprine 50 milliGRAM(s) Oral every 12 hours  calcium carbonate   1250 mG (OsCal) 1 Tablet(s) Oral daily  cholecalciferol 1000 Unit(s) Oral daily  enoxaparin Injectable 40 milliGRAM(s) SubCutaneous every 24 hours  insulin lispro (ADMELOG) corrective regimen sliding scale   SubCutaneous Before meals and at bedtime  multivitamin/minerals/iron Oral Solution (CENTRUM) 15 milliLiter(s) Oral daily  pantoprazole    Tablet 40 milliGRAM(s) Oral before breakfast  polyethylene glycol 3350 17 Gram(s) Oral daily  predniSONE   Tablet 60 milliGRAM(s) Oral daily  pyridostigmine 60 milliGRAM(s) Oral three times a day  senna 2 Tablet(s) Oral at bedtime  sodium chloride 0.9% lock flush 10 milliLiter(s) IV Push every 1 hour PRN      Vital Signs Last 24 Hrs  T(C): 36.9 (22 Nov 2023 08:27), Max: 37.1 (21 Nov 2023 17:22)  T(F): 98.4 (22 Nov 2023 08:27), Max: 98.7 (21 Nov 2023 17:22)  HR: 104 (22 Nov 2023 08:27) (88 - 116)  BP: 136/79 (22 Nov 2023 08:27) (132/72 - 150/76)  BP(mean): --  RR: 18 (22 Nov 2023 08:27) (18 - 20)  SpO2: 97% (22 Nov 2023 08:27) (93% - 100%)    Parameters below as of 22 Nov 2023 08:27  Patient On (Oxygen Delivery Method): nasal cannula        General:  Constitutional: Female, appears stated age, nontoxic  Head: Normocephalic;   Eyes: clear sclera; b/l Ptosis L eyelid > R  Extremities: No cyanosis;   Resp: Can count to 16 after after single deep breath     Neurological (>12):  MS: Awake, alert.  Oriented person place situation. Follows commands. Attends to examiner  Language: Speech is hypophonic, clear, broken occasionally, good comprehension, registration of words.  CNs: PERRL (R 3mm, L 3mm). VFF. EOMI. Mild b/l ptosis L > R, No disconjugate gaze, skew. V1-3 intact LT, b/l facial weakness on forced eyelid opening. Hearing grossly normal b/l. Tongue midline.     Motor - Normal bulk and tone throughout. No pronator drift.    L/R (out of 5)       Deltoid  4/4-    Biceps   5/5      Triceps  4+/4+       5/5   L/R (out of 5)       Hip Flexion  4/4    Hip Extension  5/5  Knee Extension  5/5  Dorsiflexion  5/5      Plantar Flexion 5/5     Neck Flexion 4+/5  Neck Extension 4+/5    Sensation: Intact to LT b/l.  Reflexes L/R:  Biceps(C5) 2+/2+  BR(C6) 2+/2+ Patellar(L4)   2+/2+   Gait: Gait not tested due to patient safety concerns    Labs:  CBC Full  -  ( 22 Nov 2023 10:13 )  WBC Count : 17.07 K/uL  RBC Count : 4.41 M/uL  Hemoglobin : 12.8 g/dL  Hematocrit : 39.8 %  Platelet Count - Automated : 348 K/uL  Mean Cell Volume : 90.2 fl  Mean Cell Hemoglobin : 29.0 pg  Mean Cell Hemoglobin Concentration : 32.2 gm/dL  Auto Neutrophil # : x  Auto Lymphocyte # : x  Auto Monocyte # : x  Auto Eosinophil # : x  Auto Basophil # : x  Auto Neutrophil % : x  Auto Lymphocyte % : x  Auto Monocyte % : x  Auto Eosinophil % : x  Auto Basophil % : x    11-22    139  |  94<L>  |  18  ----------------------------<  213<H>  3.6   |  33<H>  |  0.39<L>    Ca    9.1      22 Nov 2023 10:13  Phos  2.6     11-21  Mg     2.3     11-21    TPro  6.2  /  Alb  4.7  /  TBili  1.0  /  DBili  x   /  AST  13  /  ALT  22  /  AlkPhos  62  11-22    LIVER FUNCTIONS - ( 22 Nov 2023 10:13 )  Alb: 4.7 g/dL / Pro: 6.2 g/dL / ALK PHOS: 62 U/L / ALT: 22 U/L / AST: 13 U/L / GGT: x             Urinalysis Basic - ( 22 Nov 2023 10:13 )    Color: x / Appearance: x / SG: x / pH: x  Gluc: 213 mg/dL / Ketone: x  / Bili: x / Urobili: x   Blood: x / Protein: x / Nitrite: x   Leuk Esterase: x / RBC: x / WBC x   Sq Epi: x / Non Sq Epi: x / Bacteria: x

## 2023-11-22 NOTE — DISCHARGE NOTE NURSING/CASE MANAGEMENT/SOCIAL WORK - PATIENT PORTAL LINK FT
You can access the FollowMyHealth Patient Portal offered by [facility] by registering at the following website: [email or web address]. By joining [employer]’s FollowMyHealth portal, you will also be able to view your health information using other applications (apps) compatible with our system. You can access the FollowMyHealth Patient Portal offered by [facility] by registering at the following website: [email or web address]. By joining [employer]’s FollowMyHealth portal, you will also be able to view your health information using other applications (apps) compatible with our system. You can access the FollowMyHealth Patient Portal offered by [facility] by registering at the following website: [email or web address]. By joining [employer]’s FollowMyHealth portal, you will also be able to view your health information using other applications (apps) compatible with our system.

## 2023-11-22 NOTE — DISCHARGE NOTE NURSING/CASE MANAGEMENT/SOCIAL WORK - NSDCPEFALRISK_GEN_ALL_CORE
For information on Fall & Injury Prevention, visit: [email or web address] OR  [email or web address]-injury OR  https://www.cdc.gov/steadi/patient.html For information on Fall & Injury Prevention, visit: [email or web address]://www.[email or web address]-[email or web address]-mobility OR  [email or web address]://www.[email or web address]-[email or web address]-[email or web address]-[email or web address]-injury OR  https://www.cdc.gov/steadi/patient.html For information on Fall & Injury Prevention, visit: [email or web address]://www.[facility]/[email or web address]-maintains-health-[email or web address]-mobility OR  https://[email or web address].[facility]/[email or web address]-[email or web address]-tips-to-avoid-injury OR  https://www.cdc.gov/steadi/patient.html

## 2023-11-22 NOTE — CONSULT NOTE ADULT - PROBLEM SELECTOR RECOMMENDATION 2
s/p methylprednisolone 125 mg Q5H x 2 days (11/17-11/19), IVIG 11/3-11/7 as well as PLEX  - prednisone 60mg QD & azathioprine 50mg BID as per Neuro  - Continue VC and NIF monitoring Q8  - Neurological checks Q4H  - If going to be on high dose steroids would start Bactrim for PCP ppx if no contraindication

## 2023-11-23 ENCOUNTER — TRANSCRIPTION ENCOUNTER (OUTPATIENT)
Age: 63
End: 2023-11-23

## 2023-11-23 VITALS
RESPIRATION RATE: 18 BRPM | DIASTOLIC BLOOD PRESSURE: 80 MMHG | HEART RATE: 97 BPM | OXYGEN SATURATION: 96 % | TEMPERATURE: 98 F | SYSTOLIC BLOOD PRESSURE: 154 MMHG

## 2023-11-23 LAB
GLUCOSE BLDC GLUCOMTR-MCNC: 184 MG/DL — HIGH (ref 70–99)
GLUCOSE BLDC GLUCOMTR-MCNC: 190 MG/DL — HIGH (ref 70–99)
GLUCOSE BLDC GLUCOMTR-MCNC: 216 MG/DL — HIGH (ref 70–99)

## 2023-11-23 PROCEDURE — 83735 ASSAY OF MAGNESIUM: CPT

## 2023-11-23 PROCEDURE — 87040 BLOOD CULTURE FOR BACTERIA: CPT

## 2023-11-23 PROCEDURE — 80053 COMPREHEN METABOLIC PANEL: CPT

## 2023-11-23 PROCEDURE — C8924: CPT

## 2023-11-23 PROCEDURE — 85610 PROTHROMBIN TIME: CPT

## 2023-11-23 PROCEDURE — 99239 HOSP IP/OBS DSCHRG MGMT >30: CPT

## 2023-11-23 PROCEDURE — 94660 CPAP INITIATION&MGMT: CPT

## 2023-11-23 PROCEDURE — 84436 ASSAY OF TOTAL THYROXINE: CPT

## 2023-11-23 PROCEDURE — 82330 ASSAY OF CALCIUM: CPT

## 2023-11-23 PROCEDURE — 83605 ASSAY OF LACTIC ACID: CPT

## 2023-11-23 PROCEDURE — 94003 VENT MGMT INPAT SUBQ DAY: CPT

## 2023-11-23 PROCEDURE — 85018 HEMOGLOBIN: CPT

## 2023-11-23 PROCEDURE — 94150 VITAL CAPACITY TEST: CPT

## 2023-11-23 PROCEDURE — 84132 ASSAY OF SERUM POTASSIUM: CPT

## 2023-11-23 PROCEDURE — 97530 THERAPEUTIC ACTIVITIES: CPT

## 2023-11-23 PROCEDURE — 82784 ASSAY IGA/IGD/IGG/IGM EACH: CPT

## 2023-11-23 PROCEDURE — 36514 APHERESIS PLASMA: CPT

## 2023-11-23 PROCEDURE — 71045 X-RAY EXAM CHEST 1 VIEW: CPT

## 2023-11-23 PROCEDURE — 80048 BASIC METABOLIC PNL TOTAL CA: CPT

## 2023-11-23 PROCEDURE — 84443 ASSAY THYROID STIM HORMONE: CPT

## 2023-11-23 PROCEDURE — 81001 URINALYSIS AUTO W/SCOPE: CPT

## 2023-11-23 PROCEDURE — 82803 BLOOD GASES ANY COMBINATION: CPT

## 2023-11-23 PROCEDURE — 82565 ASSAY OF CREATININE: CPT

## 2023-11-23 PROCEDURE — 94640 AIRWAY INHALATION TREATMENT: CPT

## 2023-11-23 PROCEDURE — 94002 VENT MGMT INPAT INIT DAY: CPT

## 2023-11-23 PROCEDURE — 92507 TX SP LANG VOICE COMM INDIV: CPT

## 2023-11-23 PROCEDURE — 71250 CT THORAX DX C-: CPT

## 2023-11-23 PROCEDURE — 83036 HEMOGLOBIN GLYCOSYLATED A1C: CPT

## 2023-11-23 PROCEDURE — 92612 ENDOSCOPY SWALLOW (FEES) VID: CPT

## 2023-11-23 PROCEDURE — 85025 COMPLETE CBC W/AUTO DIFF WBC: CPT

## 2023-11-23 PROCEDURE — 99291 CRITICAL CARE FIRST HOUR: CPT | Mod: 25

## 2023-11-23 PROCEDURE — 84295 ASSAY OF SERUM SODIUM: CPT

## 2023-11-23 PROCEDURE — 85014 HEMATOCRIT: CPT

## 2023-11-23 PROCEDURE — 97535 SELF CARE MNGMENT TRAINING: CPT

## 2023-11-23 PROCEDURE — 82962 GLUCOSE BLOOD TEST: CPT

## 2023-11-23 PROCEDURE — 97163 PT EVAL HIGH COMPLEX 45 MIN: CPT

## 2023-11-23 PROCEDURE — 92610 EVALUATE SWALLOWING FUNCTION: CPT

## 2023-11-23 PROCEDURE — 82947 ASSAY GLUCOSE BLOOD QUANT: CPT

## 2023-11-23 PROCEDURE — 85730 THROMBOPLASTIN TIME PARTIAL: CPT

## 2023-11-23 PROCEDURE — 82435 ASSAY OF BLOOD CHLORIDE: CPT

## 2023-11-23 PROCEDURE — 36415 COLL VENOUS BLD VENIPUNCTURE: CPT

## 2023-11-23 PROCEDURE — 80061 LIPID PANEL: CPT

## 2023-11-23 PROCEDURE — 97116 GAIT TRAINING THERAPY: CPT

## 2023-11-23 PROCEDURE — 93321 DOPPLER ECHO F-UP/LMTD STD: CPT

## 2023-11-23 PROCEDURE — 85027 COMPLETE CBC AUTOMATED: CPT

## 2023-11-23 PROCEDURE — 84100 ASSAY OF PHOSPHORUS: CPT

## 2023-11-23 PROCEDURE — 97110 THERAPEUTIC EXERCISES: CPT

## 2023-11-23 PROCEDURE — P9041: CPT

## 2023-11-23 PROCEDURE — 85384 FIBRINOGEN ACTIVITY: CPT

## 2023-11-23 PROCEDURE — 93005 ELECTROCARDIOGRAM TRACING: CPT

## 2023-11-23 PROCEDURE — 93970 EXTREMITY STUDY: CPT

## 2023-11-23 PROCEDURE — 97166 OT EVAL MOD COMPLEX 45 MIN: CPT

## 2023-11-23 RX ORDER — PANTOPRAZOLE SODIUM 20 MG/1
1 TABLET, DELAYED RELEASE ORAL
Qty: 30 | Refills: 0
Start: 2023-11-23 | End: 2023-12-22

## 2023-11-23 RX ORDER — AZATHIOPRINE 100 MG/1
1 TABLET ORAL
Qty: 60 | Refills: 3
Start: 2023-11-23 | End: 2024-03-21

## 2023-11-23 RX ORDER — CHOLECALCIFEROL (VITAMIN D3) 125 MCG
1000 CAPSULE ORAL
Qty: 0 | Refills: 0 | DISCHARGE
Start: 2023-11-23

## 2023-11-23 RX ORDER — LOVASTATIN 20 MG
1 TABLET ORAL
Qty: 30 | Refills: 0
Start: 2023-11-23 | End: 2023-12-22

## 2023-11-23 RX ORDER — CALCIUM CARBONATE 500(1250)
1 TABLET ORAL
Qty: 30 | Refills: 0
Start: 2023-11-23 | End: 2023-12-22

## 2023-11-23 RX ORDER — AMLODIPINE BESYLATE 2.5 MG/1
1 TABLET ORAL
Qty: 30 | Refills: 3
Start: 2023-11-23 | End: 2024-03-21

## 2023-11-23 RX ADMIN — AZATHIOPRINE 50 MILLIGRAM(S): 100 TABLET ORAL at 05:09

## 2023-11-23 RX ADMIN — Medication 2: at 17:20

## 2023-11-23 RX ADMIN — AMLODIPINE BESYLATE 10 MILLIGRAM(S): 2.5 TABLET ORAL at 05:10

## 2023-11-23 RX ADMIN — Medication 3 MILLILITER(S): at 17:21

## 2023-11-23 RX ADMIN — AZATHIOPRINE 50 MILLIGRAM(S): 100 TABLET ORAL at 17:44

## 2023-11-23 RX ADMIN — Medication 15 MILLILITER(S): at 11:57

## 2023-11-23 RX ADMIN — Medication 1000 UNIT(S): at 11:57

## 2023-11-23 RX ADMIN — Medication 3 MILLILITER(S): at 11:57

## 2023-11-23 RX ADMIN — POLYETHYLENE GLYCOL 3350 17 GRAM(S): 17 POWDER, FOR SOLUTION ORAL at 11:57

## 2023-11-23 RX ADMIN — PYRIDOSTIGMINE BROMIDE 60 MILLIGRAM(S): 60 SOLUTION ORAL at 05:10

## 2023-11-23 RX ADMIN — Medication 1 TABLET(S): at 11:58

## 2023-11-23 RX ADMIN — Medication 3 MILLILITER(S): at 05:08

## 2023-11-23 RX ADMIN — Medication 4: at 08:07

## 2023-11-23 RX ADMIN — PYRIDOSTIGMINE BROMIDE 60 MILLIGRAM(S): 60 SOLUTION ORAL at 17:20

## 2023-11-23 RX ADMIN — Medication 60 MILLIGRAM(S): at 05:09

## 2023-11-23 RX ADMIN — SODIUM CHLORIDE 4 MILLILITER(S): 9 INJECTION INTRAMUSCULAR; INTRAVENOUS; SUBCUTANEOUS at 17:21

## 2023-11-23 RX ADMIN — Medication 2: at 11:57

## 2023-11-23 RX ADMIN — PANTOPRAZOLE SODIUM 40 MILLIGRAM(S): 20 TABLET, DELAYED RELEASE ORAL at 05:09

## 2023-11-23 RX ADMIN — SODIUM CHLORIDE 4 MILLILITER(S): 9 INJECTION INTRAMUSCULAR; INTRAVENOUS; SUBCUTANEOUS at 05:09

## 2023-11-23 NOTE — PROGRESS NOTE ADULT - SUBJECTIVE AND OBJECTIVE BOX
Neurology - Consult Note    -  Spectra: [phone] ([facility]), [phone] ([facility])  -    Pt seen at bedside with neurology team. Pt has no acute complaints, feels well with no secretions. Pt would like to go home today. Will call son to .     Allergies:  peanuts (Unknown)  No Known Drug Allergies      PMHx/PSHx/Family Hx: As above, otherwise see below   Myasthenia gravis    Diabetes mellitus    Hypertension        Social Hx:  No current use of tobacco, alcohol, or illicit drugs    Medications:  MEDICATIONS  (STANDING):  albuterol/ipratropium for Nebulization 3 milliLiter(s) Nebulizer every 6 hours  amLODIPine   Tablet 10 milliGRAM(s) Oral daily  atorvastatin 20 milliGRAM(s) Oral at bedtime  azaTHIOprine 50 milliGRAM(s) Oral every 12 hours  calcium carbonate   1250 mG (OsCal) 1 Tablet(s) Oral daily  cholecalciferol 1000 Unit(s) Oral daily  enoxaparin Injectable 40 milliGRAM(s) SubCutaneous every 24 hours  insulin lispro (ADMELOG) corrective regimen sliding scale   SubCutaneous Before meals and at bedtime  multivitamin/minerals/iron Oral Solution (CENTRUM) 15 milliLiter(s) Oral daily  pantoprazole    Tablet 40 milliGRAM(s) Oral before breakfast  polyethylene glycol 3350 17 Gram(s) Oral daily  predniSONE   Tablet 60 milliGRAM(s) Oral daily  pyridostigmine 60 milliGRAM(s) Oral three times a day  senna 2 Tablet(s) Oral at bedtime  sodium chloride 3%  Inhalation 4 milliLiter(s) Inhalation every 12 hours    MEDICATIONS  (PRN):  acetaminophen     Tablet .. 650 milliGRAM(s) Oral every 6 hours PRN Mild Pain (1 - 3)  sodium chloride 0.9% lock flush 10 milliLiter(s) IV Push every 1 hour PRN Pre/post blood products, medications, blood draw, and to maintain line patency      Vitals:  T(C): 36.7 (11-23-23 @ 04:51), Max: 36.8 (11-22-23 @ 13:31)  HR: 82 (11-23-23 @ 04:51) (62 - 113)  BP: 113/69 (11-23-23 @ 04:51) (113/62 - 147/84)  RR: 18 (11-23-23 @ 04:51) (18 - 20)  SpO2: 100% (11-23-23 @ 04:51) (97% - 100%)    Physical Examination:     General:  Constitutional: Female, appears stated age, nontoxic  Head: Normocephalic;   Eyes: clear sclera; b/l   Extremities: No cyanosis;   Resp: No acute distress    Neurological (>12):  MS: Awake, alert.  Oriented person place situation. Follows commands. Attends to examiner  Language: Speech is , clear, broken occasionally, good comprehension, registration of words.  CNs: PERRL (R 3mm, L 3mm). VFF. EOMI.  No disconjugate gaze, skew. V1-3 intact LT,  Hearing grossly normal b/l. Tongue midline.     Motor - Normal bulk and tone throughout. No pronator drift.    Sensation: Intact to LT b/l.  Reflexes L/R:  Biceps(C5) 2+/2+  BR(C6) 2+/2+ Patellar(L4)   2+/2+   Gait: Can walk with walker    Labs:                        12.8   17.07 )-----------( 348      ( 22 Nov 2023 10:13 )             39.8     11-22    139  |  94<L>  |  18  ----------------------------<  213<H>  3.6   |  33<H>  |  0.39<L>    Ca    9.1      22 Nov 2023 10:13    TPro  6.2  /  Alb  4.7  /  TBili  1.0  /  DBili  x   /  AST  13  /  ALT  22  /  AlkPhos  62  11-22    CAPILLARY BLOOD GLUCOSE      POCT Blood Glucose.: 184 mg/dL (23 Nov 2023 11:39)    LIVER FUNCTIONS - ( 22 Nov 2023 10:13 )  Alb: 4.7 g/dL / Pro: 6.2 g/dL / ALK PHOS: 62 U/L / ALT: 22 U/L / AST: 13 U/L / GGT: x               CSF:                  Radiology:     Neurology - Consult Note    -  Spectra: [phone] ([facility]), [phone] ([facility])  -    Pt seen at bedside with neurology team. Pt has no acute complaints, feels well with no secretions. Pt would like to go home today. Will call son to .     Allergies:  peanuts (Unknown)  No Known Drug Allergies      PMHx/PSHx/Family Hx: As above, otherwise see below   Myasthenia gravis    Diabetes mellitus    Hypertension        Social Hx:  No current use of tobacco, alcohol, or illicit drugs    Medications:  MEDICATIONS  (STANDING):  albuterol/ipratropium for Nebulization 3 milliLiter(s) Nebulizer every 6 hours  amLODIPine   Tablet 10 milliGRAM(s) Oral daily  atorvastatin 20 milliGRAM(s) Oral at bedtime  azaTHIOprine 50 milliGRAM(s) Oral every 12 hours  calcium carbonate   1250 mG (OsCal) 1 Tablet(s) Oral daily  cholecalciferol 1000 Unit(s) Oral daily  enoxaparin Injectable 40 milliGRAM(s) SubCutaneous every 24 hours  insulin lispro (ADMELOG) corrective regimen sliding scale   SubCutaneous Before meals and at bedtime  multivitamin/minerals/iron Oral Solution (CENTRUM) 15 milliLiter(s) Oral daily  pantoprazole    Tablet 40 milliGRAM(s) Oral before breakfast  polyethylene glycol 3350 17 Gram(s) Oral daily  predniSONE   Tablet 60 milliGRAM(s) Oral daily  pyridostigmine 60 milliGRAM(s) Oral three times a day  senna 2 Tablet(s) Oral at bedtime  sodium chloride 3%  Inhalation 4 milliLiter(s) Inhalation every 12 hours    MEDICATIONS  (PRN):  acetaminophen     Tablet .. 650 milliGRAM(s) Oral every 6 hours PRN Mild Pain (1 - 3)  sodium chloride 0.9% lock flush 10 milliLiter(s) IV Push every 1 hour PRN Pre/post blood products, medications, blood draw, and to maintain line patency      Vitals:  T(C): 36.7 (11-23-23 @ 04:51), Max: 36.8 (11-22-23 @ 13:31)  HR: 82 (11-23-23 @ 04:51) (62 - 113)  BP: 113/69 (11-23-23 @ 04:51) (113/62 - 147/84)  RR: 18 (11-23-23 @ 04:51) (18 - 20)  SpO2: 100% (11-23-23 @ 04:51) (97% - 100%)    Physical Examination:     General:  Constitutional: Female, appears stated age, nontoxic  Head: Normocephalic;   Eyes: clear sclera; b/l   Extremities: No cyanosis;   Resp: No acute distress    Neurological (>12):  MS: Awake, alert.  Oriented person place situation. Follows commands. Attends to examiner  Language: Speech is , clear, broken occasionally, good comprehension, registration of words.  CNs: PERRL (R 3mm, L 3mm). VFF. EOMI.  No disconjugate gaze, skew. V1-3 intact LT,  Hearing grossly normal b/l. Tongue midline.     Motor - Normal bulk and tone throughout. No pronator drift.    Sensation: Intact to LT b/l.  Reflexes L/R:  Biceps(C5) 2+/2+  BR(C6) 2+/2+ Patellar(L4)   2+/2+   Gait: Can walk with walker    Labs:                        12.8   17.07 )-----------( 348      ( 22 Nov 2023 10:13 )             39.8     11-22    139  |  94<L>  |  18  ----------------------------<  213<H>  3.6   |  33<H>  |  0.39<L>    Ca    9.1      22 Nov 2023 10:13    TPro  6.2  /  Alb  4.7  /  TBili  1.0  /  DBili  x   /  AST  13  /  ALT  22  /  AlkPhos  62  11-22    CAPILLARY BLOOD GLUCOSE      POCT Blood Glucose.: 184 mg/dL (23 Nov 2023 11:39)    LIVER FUNCTIONS - ( 22 Nov 2023 10:13 )  Alb: 4.7 g/dL / Pro: 6.2 g/dL / ALK PHOS: 62 U/L / ALT: 22 U/L / AST: 13 U/L / GGT: x               CSF:                  Radiology:     Neurology - Consult Note    -  Spectra: [phone] ([facility]), [phone] ([facility])  -    Pt seen at bedside with neurology team. Pt has no acute complaints, feels well with no secretions. Pt would like to go home today. Will call son to .     Allergies:  peanuts (Unknown)  No Known Drug Allergies      PMHx/PSHx/Family Hx: As above, otherwise see below   Myasthenia gravis    Diabetes mellitus    Hypertension        Social Hx:  No current use of tobacco, alcohol, or illicit drugs    Medications:  MEDICATIONS  (STANDING):  albuterol/ipratropium for Nebulization 3 milliLiter(s) Nebulizer every 6 hours  amLODIPine   Tablet 10 milliGRAM(s) Oral daily  atorvastatin 20 milliGRAM(s) Oral at bedtime  azaTHIOprine 50 milliGRAM(s) Oral every 12 hours  calcium carbonate   1250 mG (OsCal) 1 Tablet(s) Oral daily  cholecalciferol 1000 Unit(s) Oral daily  enoxaparin Injectable 40 milliGRAM(s) SubCutaneous every 24 hours  insulin lispro (ADMELOG) corrective regimen sliding scale   SubCutaneous Before meals and at bedtime  multivitamin/minerals/iron Oral Solution (CENTRUM) 15 milliLiter(s) Oral daily  pantoprazole    Tablet 40 milliGRAM(s) Oral before breakfast  polyethylene glycol 3350 17 Gram(s) Oral daily  predniSONE   Tablet 60 milliGRAM(s) Oral daily  pyridostigmine 60 milliGRAM(s) Oral three times a day  senna 2 Tablet(s) Oral at bedtime  sodium chloride 3%  Inhalation 4 milliLiter(s) Inhalation every 12 hours    MEDICATIONS  (PRN):  acetaminophen     Tablet .. 650 milliGRAM(s) Oral every 6 hours PRN Mild Pain (1 - 3)  sodium chloride 0.9% lock flush 10 milliLiter(s) IV Push every 1 hour PRN Pre/post blood products, medications, blood draw, and to maintain line patency      Vitals:  T(C): 36.7 (11-23-23 @ 04:51), Max: 36.8 (11-22-23 @ 13:31)  HR: 82 (11-23-23 @ 04:51) (62 - 113)  BP: 113/69 (11-23-23 @ 04:51) (113/62 - 147/84)  RR: 18 (11-23-23 @ 04:51) (18 - 20)  SpO2: 100% (11-23-23 @ 04:51) (97% - 100%)    Physical Examination:     General:  Constitutional: Female, appears stated age, nontoxic  Head: Normocephalic;   Eyes: clear sclera; b/l   Extremities: No cyanosis;   Resp: No acute distress    Neurological (>12):  MS: Awake, alert.  Oriented person place situation. Follows commands. Attends to examiner  Language: Speech is , clear, broken occasionally, good comprehension, registration of words.  CNs: PERRL (R 3mm, L 3mm). VFF. EOMI.  No disconjugate gaze, skew. V1-3 intact LT,  Hearing grossly normal b/l. Tongue midline.     Motor - Normal bulk and tone throughout. No pronator drift.    Sensation: Intact to LT b/l.  Reflexes L/R:  Biceps(C5) 2+/2+  BR(C6) 2+/2+ Patellar(L4)   2+/2+   Gait: Can walk with walker    Labs:                        12.8   17.07 )-----------( 348      ( 22 Nov 2023 10:13 )             39.8     11-22    139  |  94<L>  |  18  ----------------------------<  213<H>  3.6   |  33<H>  |  0.39<L>    Ca    9.1      22 Nov 2023 10:13    TPro  6.2  /  Alb  4.7  /  TBili  1.0  /  DBili  x   /  AST  13  /  ALT  22  /  AlkPhos  62  11-22    CAPILLARY BLOOD GLUCOSE      POCT Blood Glucose.: 184 mg/dL (23 Nov 2023 11:39)    LIVER FUNCTIONS - ( 22 Nov 2023 10:13 )  Alb: 4.7 g/dL / Pro: 6.2 g/dL / ALK PHOS: 62 U/L / ALT: 22 U/L / AST: 13 U/L / GGT: x               CSF:                  Radiology:

## 2023-11-23 NOTE — PROGRESS NOTE ADULT - PROVIDER SPECIALTY LIST ADULT
NSICU
Neurology
NSICU
Neurology
Neurology
NSICU
Neurology
NSICU
NSICU
Neurology
Neurology
NSICU
NSICU
Neurology
Neurology
NSICU
Offered and patient declined
NSICU
Neurology
NSICU

## 2023-11-23 NOTE — PROGRESS NOTE ADULT - ASSESSMENT
Assessment: 63-year-old female w/ PMHx MG (dx 2013, thymus surgery 2010, multiple admissions for myasthenic crisis including intubation) p/w worsening weakness, fatigue, and difficulty eating over the prior 1-2 weeks, also 2 days worsening diplopia and head drop. Patient's most recent admission 6/2023, given PLEX and IVIg, discharged on pyridostigmine 90 mg TID and prednisone 50 mg QD, w/ prednisone tapered off and stopped 9/15/23. Patient had restarted prednisone 10 mg QD 2 weeks PTA, then increased to 20 mg QD w/o improvement; pyridostigmine was also increased 1 week PTA.     Hospital course: admitted to [facility] 11/3; intubated 11/4; s/p IVIg (11/3-11/7); s/p PLEX x 5 (11/8-11/17); extubated to BiPAP 11/18, weaned to NC 11/19; accepted for downgrade to general neurology service 11/19, Pt much improved of MG symptoms with treatments.     Plan:  #NEURO: Myasthenia Gravis exacerbation w/ acute respiratory failure  -pt to go home today  - s/p plasmapheresis 5/5  - s/p methylprednisolone 125 mg Q5H x 2 days (11/17-11/19)   - IVIG 11/3-11/7 but worsened and then switched to PLEX (completed).   - Appreciate input from neuromuscular specialist, Dr. Emily Covarrubias - c/w prednisone 60mg QD & azathioprine 50mg BID  - Held home Mestinon due to respiratory status and secretions during admission, prior.   - Restarted home Mestinon 60 mg daily (11/22), currently 60 mg TID  - Continue prednisone 60 mg QD -  Taper prednisone by 5mg weekly starting 11/27 until to prednisone 30mg daily,  - PT/OT  - Per FEES study - Solid & thin liquids diet  - Avoid medications that may worsen the current exacerbation including macrolides, fluoroquinolones, tetracyclines, aminoglycoside, beta-blockers, magnesium, and anti-arrhythmics (procainamide, quinidine, and lidocaine), muscle relaxants     PULM:  - Extubated to BiPAP, currently on NC - tachypneic (11/19)  - CT chest- with atelectasis, no evidence of residual thymoma   - CXR - improved   - Duonebs q6h for secretions (oral), self suctions  - Aggressive Chest PT done  - Incentive spirometer given with instructions of use  - Medicine consult for HTN management & patient is desatting to 90% despite ongoing MG treatment w/ pleural effusions vs atelectasis on CXR    CV:  HTN - Amlodipine 10 mg daily   TTE - EF 54%, elevated right atrial pressure     RENAL:  Stable renal function  IVL if PO intake is tolerated  daily IOs    GI:  Diet: NPO  Bowel regimen: Miralax, Senna  LBM: 11/17  - FEES done  - GI prophylaxis: PPI while on steroids     ENDO:   FS goal 120-180  Medium scale  A1C 6.7  ISS     HEME/ONC:  Monitor H/H  SCDs  Chemo ppx: SQL  LED negative     ID:  No fevers, WBC count trending down   copious thick inline secretions, oral secretions  No signs of infection     Case seen and discussed with neurology attending

## 2023-11-23 NOTE — PROGRESS NOTE ADULT - ATTENDING SUPERVISION STATEMENT
Resident
Fellow
Resident
Fellow
Resident
Resident
Fellow
Fellow
Resident
Fellow
Fellow

## 2023-11-23 NOTE — PROGRESS NOTE ADULT - ATTENDING COMMENTS
No events overnight. Patient feels well today, able to walk around with walker without difficulty, eating and drinking well. Off oxygen.     Exam: A&Ox3. CN with mild L eye ptosis. Motor strength full aside from mild HF weakness bilaterally; some mild fatigability. Sensation intact. Walking around room with walker without difficulty.    Imp: MG exacerbation. As patient has been hospitalized multiple times this year for exacerbation, would gingerly taper off prednisone prior to outpatient follow up. Plan to discharge today    - C/w imuran 50mg BID  - C/w prednisone 60mg daily; would taper off 5mg weekly until 30mg until seeing outpatient neurologist (Dr. Gutierres)  - C/w pyridostigmine 60mg TID  - D/c home today

## 2023-12-12 ENCOUNTER — APPOINTMENT (OUTPATIENT)
Dept: NEUROLOGY | Facility: HOSPITAL | Age: 63
End: 2023-12-12

## 2024-01-02 PROBLEM — Z00.00 ENCOUNTER FOR PREVENTIVE HEALTH EXAMINATION: Status: ACTIVE | Noted: 2024-01-02

## 2024-01-10 RX ORDER — ALBUTEROL 90 UG/1
2 AEROSOL, METERED ORAL
Qty: 0 | Refills: 0 | DISCHARGE

## 2024-01-10 RX ORDER — METOPROLOL TARTRATE 50 MG
1 TABLET ORAL
Refills: 0 | DISCHARGE

## 2024-01-10 RX ORDER — AMLODIPINE BESYLATE 2.5 MG/1
1 TABLET ORAL
Qty: 0 | Refills: 0 | DISCHARGE

## 2024-01-10 RX ORDER — METFORMIN HYDROCHLORIDE 850 MG/1
1 TABLET ORAL
Qty: 0 | Refills: 0 | DISCHARGE

## 2024-01-10 RX ORDER — FLUTICASONE PROPIONATE 220 MCG
2 AEROSOL WITH ADAPTER (GRAM) INHALATION
Qty: 0 | Refills: 0 | DISCHARGE

## 2024-01-10 RX ORDER — LOVASTATIN 20 MG
1 TABLET ORAL
Refills: 0 | DISCHARGE

## 2024-01-10 RX ORDER — FLUTICASONE PROPIONATE 220 MCG
1 AEROSOL WITH ADAPTER (GRAM) INHALATION
Qty: 0 | Refills: 0 | DISCHARGE

## 2024-01-10 RX ORDER — PYRIDOSTIGMINE BROMIDE 60 MG/5ML
2 SOLUTION ORAL
Qty: 0 | Refills: 0 | DISCHARGE

## 2024-01-10 RX ORDER — LOVASTATIN 20 MG
1 TABLET ORAL
Qty: 0 | Refills: 0 | DISCHARGE

## 2024-01-18 PROBLEM — G70.00 MYASTHENIA GRAVIS WITHOUT (ACUTE) EXACERBATION: Chronic | Status: ACTIVE | Noted: 2023-01-30

## 2024-01-18 PROBLEM — E11.9 TYPE 2 DIABETES MELLITUS WITHOUT COMPLICATIONS: Chronic | Status: ACTIVE | Noted: 2023-01-30

## 2024-01-18 PROBLEM — I10 ESSENTIAL (PRIMARY) HYPERTENSION: Chronic | Status: ACTIVE | Noted: 2023-04-18

## 2024-02-01 ENCOUNTER — APPOINTMENT (OUTPATIENT)
Dept: OTOLARYNGOLOGY | Facility: CLINIC | Age: 64
End: 2024-02-01
Payer: MEDICAID

## 2024-02-01 VITALS
BODY MASS INDEX: 27.29 KG/M2 | SYSTOLIC BLOOD PRESSURE: 144 MMHG | HEIGHT: 63 IN | HEART RATE: 83 BPM | WEIGHT: 154 LBS | DIASTOLIC BLOOD PRESSURE: 78 MMHG | OXYGEN SATURATION: 98 %

## 2024-02-01 DIAGNOSIS — Z82.49 FAMILY HISTORY OF ISCHEMIC HEART DISEASE AND OTHER DISEASES OF THE CIRCULATORY SYSTEM: ICD-10-CM

## 2024-02-01 DIAGNOSIS — J38.3 OTHER DISEASES OF VOCAL CORDS: ICD-10-CM

## 2024-02-01 DIAGNOSIS — Z83.3 FAMILY HISTORY OF DIABETES MELLITUS: ICD-10-CM

## 2024-02-01 DIAGNOSIS — R07.0 PAIN IN THROAT: ICD-10-CM

## 2024-02-01 PROCEDURE — 31575 DIAGNOSTIC LARYNGOSCOPY: CPT

## 2024-02-01 PROCEDURE — 99204 OFFICE O/P NEW MOD 45 MIN: CPT | Mod: 25

## 2024-02-01 PROCEDURE — 99214 OFFICE O/P EST MOD 30 MIN: CPT | Mod: 25

## 2024-02-01 RX ORDER — PREDNISONE 10 MG
TABLET ORAL
Refills: 0 | Status: ACTIVE | COMMUNITY

## 2024-02-01 RX ORDER — AZATHIOPRINE 100 MG/1
TABLET ORAL
Refills: 0 | Status: ACTIVE | COMMUNITY

## 2024-02-01 RX ORDER — BUDESONIDE 180 UG/1
180 AEROSOL, POWDER RESPIRATORY (INHALATION) TWICE DAILY
Qty: 1 | Refills: 4 | Status: ACTIVE | COMMUNITY
Start: 2024-02-01 | End: 1900-01-01

## 2024-02-01 RX ORDER — PANTOPRAZOLE 40 MG/1
TABLET, DELAYED RELEASE ORAL
Refills: 0 | Status: ACTIVE | COMMUNITY

## 2024-02-01 RX ORDER — AMLODIPINE BESYLATE 5 MG/1
TABLET ORAL
Refills: 0 | Status: ACTIVE | COMMUNITY

## 2024-02-01 RX ORDER — METFORMIN HYDROCHLORIDE 625 MG/1
TABLET ORAL
Refills: 0 | Status: ACTIVE | COMMUNITY

## 2024-02-01 RX ORDER — PYRIDOSTIGMINE BROMIDE 30 MG/1
TABLET ORAL
Refills: 0 | Status: ACTIVE | COMMUNITY

## 2024-02-01 RX ORDER — LOVASTATIN 40 MG/1
TABLET ORAL
Refills: 0 | Status: ACTIVE | COMMUNITY

## 2024-02-01 NOTE — REASON FOR VISIT
[Initial Consultation] : an initial consultation for [Patient Declined  Services] : - None: Patient declined  services [Interpreters_FullName] : Justin [Interpreters_Relationshiptopatient] : Son [FreeTextEntry3] : Antonio

## 2024-02-01 NOTE — CONSULT LETTER
[FreeTextEntry2] : Dr. Ned Ruvalcaba [FreeTextEntry3] : Miguel Johansen M.D. Division of Laryngology | Department of Otolaryngology  [address] [address]

## 2024-02-01 NOTE — ASSESSMENT
[FreeTextEntry1] : ASSESSMENT/PLAN:  #1 Bilateral sided laryngeal granuloma- small #2 Throat pain  After a thorough discussion of our findings today, I have recommended we have them take a proton pump inhibitor 40 mg daily. I have not recommended that they start voice therapy at this time. I have also prescribed them an inhaled steroid for which they should start using 2 puffs twice daily. They should continue these medications until instructed otherwise by myself. I would like to see them for follow-up in approximately 2 months.  They understand that vocal fold granulomas often heal very slowly and it may take several more months for these to resolve.  They understand that we can not exclude malignancy with 100% certainty without a biopsy, but based on its appearance this would be exceedingly rare.    Inhalers that can be used for this problem include any of the following: Qvar redihaler (Beclomethasone) 80 mcg 2 puffs BID Flovent HFA (Fluticasone propionate) 220 mcg 2 puffs BID Alvesco (Ciclesonide) 160 mcg 2 puffs BID Pulmicort (Budesomide) 180 mcg 2 puffs BID Asthmanex HFA (Mometasone) 200 mcg 2 puffs BID

## 2024-02-01 NOTE — PROCEDURE
[de-identified] : Procedure: Transnasal flexible laryngoscopy  Description: Informed consent was obtained from the patient prior to the procedure. The patient was seated in the clinic chair. Topical anesthesia was achieved by first spraying the nasal cavities with 4% lidocaine and 1% phenylephrine.   Exam: This demonstrates a clear vallecula and crisp epiglottis. The aryepiglottic folds are intact and symmetric bilaterally. The hypopharynx does not demonstrate pooling. The interarytenoid space demonstrates no lesions. It does not demonstrate pachydermia. The false vocal folds are symmetric and without lesions or masses. False fold voicing (plica ventricularis) is not noted today. The true vocal folds show normal and symmetric motion bilaterally. There is no paradoxical motion. The right medial edge is crisp and shows no lesions or masses. The left medial edge is crisp and shows no lesions or masses. The mucosal covering is minimally edematous. There is not erythema present today. There are no obvious vascular ectasias present. The vocal processes do demonstrate small granulomas bilaterally. The subglottis and proximal trachea is clear and unobstructed to the limits of the examination today.

## 2024-02-01 NOTE — HISTORY OF PRESENT ILLNESS
[de-identified] : KAIDEN HATCH  is a 63 year old woman who presents to the [facility] with a chief complaint of throat pain.  She is referred by Dr. Ned Ruvalcaba.  States she was intubated in 11/2023 for about 15 days at [facility] for Myasthenia Gravis. They have had pain in their throat for 3 months. They have NOT had a prior CT neck. They do NOT have a prior smoking history. They do NOT have prior history of alcoholism/alcohol abuse. Throat pain occasionally when swallowing solids or liquids when her throat is dry.  They have NOT had any weight loss in the past 3 months. They do have NOT altered their diet because of this pain. They do NOT have frequent ear pain.  Does now have pain when chewing. They have NOT had a prior swallow study in the past 1 year. They have seen the following types of providers for this problem: Only her PCP They have taken the following medications for this problem: Pantoprazole Doing or taking the following makes the pain better: Avoiding opening the mouth Doing the following makes the pain worse: yawns, opens mouth big or when her throat is dry

## 2024-02-02 ENCOUNTER — TRANSCRIPTION ENCOUNTER (OUTPATIENT)
Age: 64
End: 2024-02-02

## 2024-02-05 ENCOUNTER — APPOINTMENT (OUTPATIENT)
Dept: NEUROLOGY | Facility: CLINIC | Age: 64
End: 2024-02-05
Payer: MEDICAID

## 2024-02-05 VITALS
WEIGHT: 154 LBS | HEART RATE: 86 BPM | HEIGHT: 63 IN | DIASTOLIC BLOOD PRESSURE: 82 MMHG | BODY MASS INDEX: 27.29 KG/M2 | SYSTOLIC BLOOD PRESSURE: 160 MMHG

## 2024-02-05 DIAGNOSIS — G70.00 MYASTHENIA GRAVIS W/OUT (ACUTE) EXACERBATION: ICD-10-CM

## 2024-02-05 DIAGNOSIS — Z79.624 LONG TERM (CURRENT) USE OF INHIBITORS OF NUCLEOTIDE SYNTHESIS: ICD-10-CM

## 2024-02-05 DIAGNOSIS — Z79.52 LONG TERM (CURRENT) USE OF SYSTEMIC STEROIDS: ICD-10-CM

## 2024-02-05 DIAGNOSIS — H26.9 UNSPECIFIED CATARACT: ICD-10-CM

## 2024-02-05 PROCEDURE — 99205 OFFICE O/P NEW HI 60 MIN: CPT

## 2024-02-05 PROCEDURE — 99215 OFFICE O/P EST HI 40 MIN: CPT

## 2024-02-05 RX ORDER — PREDNISONE 10 MG/1
10 TABLET ORAL DAILY
Qty: 150 | Refills: 1 | Status: ACTIVE | COMMUNITY
Start: 2024-02-05 | End: 1900-01-01

## 2024-02-05 RX ORDER — AZATHIOPRINE 50 1/1
50 TABLET ORAL
Qty: 180 | Refills: 3 | Status: ACTIVE | COMMUNITY
Start: 2024-02-05 | End: 1900-01-01

## 2024-02-05 NOTE — HISTORY OF PRESENT ILLNESS
[FreeTextEntry1] : Ms. 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 Thymectomy in 2010. It was not cancer, no chemotherapy, or radiation. She was fine for 3 years then in 2013 she was diagnosed with MG.   MG was diagnosed in 2013 with eye droopy. She was taking pyridostigmine, and it helped.  Until around 2016, her symptoms were eye droopy. difficulty speaking and swallowing. She could not smile. She had aspiration pneumonia. She got intubated. She got IVIG that time. She also got steroid while she was in the hospital. When DC, she was Dc with pyridostigmine. It was pattern that she was in the hospital, got IVIG and steroid and then DC with  mestinon, it worked for about 1.5-2 years then she was back with crisis. AT times, her mestinon was increased to probably 9-10 tablets a day.  She was intubated 3 times last year., At first, she had COVID infection that triggered her MG crisis.  Every time last year when she was admitted, she got IVIG and steroid and got better then DC, until the last admission, she had IVIG, then PLEX and steroid high dose then was able to extubate and then DC home.   She was Dc Nov 24. Since then she was good.  Current medications: Azathioprine 50 mg BID Prednisone 30 mg daily for 4 weeks. mestinon 60 mg TID.  She reports now she may have eye droopy when looked at some things for sometimes. when she read. There is no specific time of the day., When she rest her eyes for couple mins, it gets better. This symptom is different from her usual MG symptom.  Denies difficulty swallowing.  She has shoulder pain both sides which she contributed to not moving while she was in the hospital. Never gotten PT.

## 2024-02-05 NOTE — DISCUSSION/SUMMARY
[FreeTextEntry1] : This is a 62 yo woman with seropositive generalized MG s/p thymectomy, had multiple crisis last year, last time was in NOV.  She is currently taking Prednisoen 30 mg daily. Azathioprine 50 mg BID. Mestinon 60 mg TID.  On exam, she still has weakness proximally.  After discussion with patient, and her son, we both agreed on add IVIG as she is currently taking steroid and immunosuppressant, but still has weakness. I will do IVIG 60 gm every 4 weeks. Side effects of medications including but not limited to headache, aseptic meningitis. blood clot, transient kidney injury were discussed. They both voice understanding. Patient had IVIG in the hospital and never had problem.  I also advised her to see eye doctor to check for dry eye, cataract, and glaucoma as the side effects of steroids, as well as bone scan (they will talk to PCP), glucose control. Advised to take Vit D and Ca while on steroids. CBC, CMP every 3 months for long term use of azathioprine.  follow up in 3 months.  I spent the time noted on the day of this patient encounter preparing for, providing and documenting the above E/M service and counseling and educate patient on differential, workup, disease course, and treatment/management. Education was provided to the patient during this encounter. All questions and concerns were answered and addressed in detail. The patient verbalized understanding and agreed to plan. Patient was advised to continue to monitor for neurologic symptoms and to notify my office or go to the nearest emergency room if there are any changes. Any orders/referrals and communications were provided as well. side effects of the above medications were discussed in detail including but not limited to applicable black box warning and teratogenicity as appropriate.  Patient was advised to bring previous records to my office.

## 2024-02-05 NOTE — PHYSICAL EXAM
[General Appearance - Alert] : alert [General Appearance - In No Acute Distress] : in no acute distress [Oriented To Time, Place, And Person] : oriented to person, place, and time [Impaired Insight] : insight and judgment were intact [Affect] : the affect was normal [Person] : oriented to person [Place] : oriented to place [Time] : oriented to time [Fluency] : fluency intact [Comprehension] : comprehension intact [Cranial Nerves Optic (II)] : visual acuity intact bilaterally,  visual fields full to confrontation, pupils equal round and reactive to light [Cranial Nerves Oculomotor (III)] : extraocular motion intact [Cranial Nerves Trigeminal (V)] : facial sensation intact symmetrically [Cranial Nerves Facial (VII)] : face symmetrical [Cranial Nerves Vestibulocochlear (VIII)] : hearing was intact bilaterally [Cranial Nerves Glossopharyngeal (IX)] : tongue and palate midline [Cranial Nerves Accessory (XI - Cranial And Spinal)] : head turning and shoulder shrug symmetric [Cranial Nerves Hypoglossal (XII)] : there was no tongue deviation with protrusion [Motor Tone] : muscle tone was normal in all four extremities [Involuntary Movements] : no involuntary movements were seen [No Muscle Atrophy] : normal bulk in all four extremities [Sensation Tactile Decrease] : light touch was intact [Sensation Pain / Temperature Decrease] : pain and temperature was intact [Sensation Vibration Decrease] : vibration was intact [Abnormal Walk] : normal gait [Balance] : balance was intact [2+] : Ankle jerk left 2+ [FreeTextEntry1] :  MG-ADL 1 ( breathing       SOB with exertion)  Class III: moderate weakness other ocular muscles A IIIa: predominantly affecting limb, axial muscles, or both   Neck flexion supine 4/5 proximal muscles 4/5. (with pain limitation on both shoulders).  distal muscles 5/5 No ptosis. No enhanced ptosis. EOMI. NO diplopia. able to rise from chair with arm crossed.     [Over the Past 2 Weeks, Have You Felt Down, Depressed, or Hopeless?] : 1.) Over the past 2 weeks, have you felt down, depressed, or hopeless? No [Over the Past 2 Weeks, Have You Felt Little Interest or Pleasure Doing Things?] : 2.) Over the past 2 weeks, have you felt little interest or pleasure doing things? No [Plantar Reflex Right Only] : normal on the right [Plantar Reflex Left Only] : normal on the left

## 2024-03-05 ENCOUNTER — NON-APPOINTMENT (OUTPATIENT)
Age: 64
End: 2024-03-05

## 2024-03-05 ENCOUNTER — APPOINTMENT (OUTPATIENT)
Dept: OPHTHALMOLOGY | Facility: CLINIC | Age: 64
End: 2024-03-05
Payer: MEDICAID

## 2024-03-05 ENCOUNTER — APPOINTMENT (OUTPATIENT)
Dept: NEUROLOGY | Facility: CLINIC | Age: 64
End: 2024-03-05
Payer: MEDICAID

## 2024-03-05 VITALS — DIASTOLIC BLOOD PRESSURE: 69 MMHG | SYSTOLIC BLOOD PRESSURE: 145 MMHG | HEART RATE: 75 BPM | RESPIRATION RATE: 18 BRPM

## 2024-03-05 VITALS — RESPIRATION RATE: 18 BRPM | DIASTOLIC BLOOD PRESSURE: 70 MMHG | SYSTOLIC BLOOD PRESSURE: 136 MMHG | HEART RATE: 81 BPM

## 2024-03-05 VITALS — HEART RATE: 78 BPM | SYSTOLIC BLOOD PRESSURE: 133 MMHG | RESPIRATION RATE: 18 BRPM | DIASTOLIC BLOOD PRESSURE: 69 MMHG

## 2024-03-05 PROCEDURE — 92133 CPTRZD OPH DX IMG PST SGM ON: CPT

## 2024-03-05 PROCEDURE — 96365 THER/PROPH/DIAG IV INF INIT: CPT

## 2024-03-05 PROCEDURE — 92015 DETERMINE REFRACTIVE STATE: CPT | Mod: NC

## 2024-03-05 PROCEDURE — 92083 EXTENDED VISUAL FIELD XM: CPT

## 2024-03-05 PROCEDURE — 96366 THER/PROPH/DIAG IV INF ADDON: CPT

## 2024-03-05 PROCEDURE — 99204 OFFICE O/P NEW MOD 45 MIN: CPT

## 2024-03-05 NOTE — HISTORY OF PRESENT ILLNESS
[Denies] : Denies [No] : No [Yes] : Yes [22g] : 22g [Start Time: ___] : Medication Start Time: [unfilled] [Medication Name: ___] : Medication Name: [unfilled] [de-identified] : Postmenopausal [End Time: ___] : Medication End Time: [unfilled] [IV discontinued. Intact. No signs or symptoms of IV complications noted. Time: ___] : IV discontinued. Intact. No signs or symptoms of IV complications noted. Time: [unfilled] [Patient  instructed to seek medical attention with signs and symptoms of adverse effects] : Patient  instructed to seek medical attention with signs and symptoms of adverse effects [Patient left unit in no acute distress] : Patient left unit in no acute distress [Medications administered as ordered and tolerated well.] : Medications administered as ordered and tolerated well. [de-identified] : R wrist [de-identified] : 8437 [de-identified] : Patient son at chairside, patient A&Ox3, patient and patient son denies patient having heart failure and fluid retention.  Infusion Rate:  500     ml/hr  in  1000   ml 0.9% NS (500 cc pre infusion and 500 cc NS post infusion per order) Dx: Myasthenia Gravis MD Prescriber: Emily Carmichael CPT: 45116 Next Appointment: 3/6/24  Gammagard 30 g administered per order, started at 12 30 PM .   Pre medications: Acetaminophen 650 mg PO at 1152 Diphenhydramine 25 mg PO at 1152  Patient tolerated infusion well without adverse reaction.

## 2024-03-06 ENCOUNTER — APPOINTMENT (OUTPATIENT)
Dept: NEUROLOGY | Facility: CLINIC | Age: 64
End: 2024-03-06
Payer: MEDICAID

## 2024-03-06 VITALS — RESPIRATION RATE: 18 BRPM | HEART RATE: 83 BPM | DIASTOLIC BLOOD PRESSURE: 75 MMHG | SYSTOLIC BLOOD PRESSURE: 155 MMHG

## 2024-03-06 VITALS — HEART RATE: 83 BPM | SYSTOLIC BLOOD PRESSURE: 151 MMHG | RESPIRATION RATE: 18 BRPM | DIASTOLIC BLOOD PRESSURE: 78 MMHG

## 2024-03-06 PROCEDURE — 96366 THER/PROPH/DIAG IV INF ADDON: CPT

## 2024-03-06 PROCEDURE — 96365 THER/PROPH/DIAG IV INF INIT: CPT

## 2024-03-06 NOTE — HISTORY OF PRESENT ILLNESS
[Start Time: ___] : Medication Start Time: [unfilled] [Medication Name: ___] : Medication Name: [unfilled] [Denies] : Denies [No] : No [Yes] : Yes [22g] : 22g [IV discontinued. Intact. No signs or symptoms of IV complications noted. Time: ___] : IV discontinued. Intact. No signs or symptoms of IV complications noted. Time: [unfilled] [End Time: ___] : Medication End Time: [unfilled] [Patient  instructed to seek medical attention with signs and symptoms of adverse effects] : Patient  instructed to seek medical attention with signs and symptoms of adverse effects [Patient left unit in no acute distress] : Patient left unit in no acute distress [Medications administered as ordered and tolerated well.] : Medications administered as ordered and tolerated well. [de-identified] : R wrist [de-identified] : 1034 [de-identified] : Patient  at chairside, patient A&Ox3, patient denies patient having heart failure and fluid retention.  Infusion Rate: 500 ml/hr in 1000 ml 0.9% NS (500 cc pre infusion and 500 cc NS post infusion per order) Dx: Myasthenia Gravis MD Prescriber: Emily Carmichael CPT: 97158 Next Appointment: TBD  Gammagard 30 g administered per order. NDC#: 5748-2836-69 Lot# [id number] Exp: 8/10/2025  Pre medications: Acetaminophen 650 mg PO at 1034 Diphenhydramine 25 mg PO at 1034  Patient tolerated infusion well without adverse reaction.

## 2024-04-01 ENCOUNTER — APPOINTMENT (OUTPATIENT)
Dept: NEUROLOGY | Facility: CLINIC | Age: 64
End: 2024-04-01

## 2024-04-02 ENCOUNTER — APPOINTMENT (OUTPATIENT)
Dept: OTOLARYNGOLOGY | Facility: CLINIC | Age: 64
End: 2024-04-02

## 2024-04-03 ENCOUNTER — APPOINTMENT (OUTPATIENT)
Dept: NEUROLOGY | Facility: CLINIC | Age: 64
End: 2024-04-03
Payer: MEDICAID

## 2024-04-03 VITALS — SYSTOLIC BLOOD PRESSURE: 143 MMHG | RESPIRATION RATE: 16 BRPM | HEART RATE: 81 BPM | DIASTOLIC BLOOD PRESSURE: 83 MMHG

## 2024-04-03 VITALS — HEART RATE: 78 BPM | RESPIRATION RATE: 16 BRPM | DIASTOLIC BLOOD PRESSURE: 76 MMHG | SYSTOLIC BLOOD PRESSURE: 141 MMHG

## 2024-04-03 PROCEDURE — 96365 THER/PROPH/DIAG IV INF INIT: CPT

## 2024-04-04 ENCOUNTER — APPOINTMENT (OUTPATIENT)
Dept: NEUROLOGY | Facility: CLINIC | Age: 64
End: 2024-04-04
Payer: MEDICAID

## 2024-04-04 VITALS — DIASTOLIC BLOOD PRESSURE: 87 MMHG | RESPIRATION RATE: 18 BRPM | SYSTOLIC BLOOD PRESSURE: 181 MMHG | HEART RATE: 101 BPM

## 2024-04-04 VITALS — HEART RATE: 98 BPM | RESPIRATION RATE: 18 BRPM | SYSTOLIC BLOOD PRESSURE: 177 MMHG | DIASTOLIC BLOOD PRESSURE: 83 MMHG

## 2024-04-04 VITALS — HEART RATE: 85 BPM | DIASTOLIC BLOOD PRESSURE: 80 MMHG | SYSTOLIC BLOOD PRESSURE: 150 MMHG | RESPIRATION RATE: 18 BRPM

## 2024-04-04 PROCEDURE — 96365 THER/PROPH/DIAG IV INF INIT: CPT

## 2024-04-04 NOTE — HISTORY OF PRESENT ILLNESS
[Denies] : Denies [No] : No [Yes] : Yes [Declined] : Declined [Left upper extremity] : Left upper extremity [22g] : 22g [Start Time: ___] : Medication Start Time: [unfilled] [End Time: ___] : Medication End Time: [unfilled] [Medication Name: ___] : Medication Name: [unfilled] [Total Amount Administered: ___] : Total Amount Administered: [unfilled] [IV discontinued. Intact. No signs or symptoms of IV complications noted. Time: ___] : IV discontinued. Intact. No signs or symptoms of IV complications noted. Time: [unfilled] [Patient  instructed to seek medical attention with signs and symptoms of adverse effects] : Patient  instructed to seek medical attention with signs and symptoms of adverse effects [Patient left unit in no acute distress] : Patient left unit in no acute distress [Medications administered as ordered and tolerated well.] : Medications administered as ordered and tolerated well. [de-identified] : a/c [de-identified] : 10:10pm  [de-identified] : Generic: Immunoglobulin Dose: 30GM Infusion Rate: 175ml/hr NDC: 5288-6607-20 Lot#: [id number] Exp#: 2025-10-21  [id number] Buy/Bill: Yes Pharmacy: N/A Pre-Medication:  Acetaminophen: 650mg PO Diphenhydramine 25mg PO Hydration: 500ML pre infusion and 500mL post infusion Next Appointment: To be scheduled with Yuliet.

## 2024-05-07 ENCOUNTER — APPOINTMENT (OUTPATIENT)
Dept: NEUROLOGY | Facility: CLINIC | Age: 64
End: 2024-05-07
Payer: MEDICAID

## 2024-05-07 VITALS — DIASTOLIC BLOOD PRESSURE: 65 MMHG | SYSTOLIC BLOOD PRESSURE: 122 MMHG | HEART RATE: 72 BPM | RESPIRATION RATE: 18 BRPM

## 2024-05-07 VITALS — DIASTOLIC BLOOD PRESSURE: 79 MMHG | HEART RATE: 86 BPM | RESPIRATION RATE: 18 BRPM | SYSTOLIC BLOOD PRESSURE: 155 MMHG

## 2024-05-07 VITALS — RESPIRATION RATE: 18 BRPM | SYSTOLIC BLOOD PRESSURE: 130 MMHG | HEART RATE: 83 BPM | DIASTOLIC BLOOD PRESSURE: 62 MMHG

## 2024-05-07 PROCEDURE — 96366 THER/PROPH/DIAG IV INF ADDON: CPT

## 2024-05-07 PROCEDURE — 96365 THER/PROPH/DIAG IV INF INIT: CPT

## 2024-05-07 NOTE — HISTORY OF PRESENT ILLNESS
[Denies] : Denies [No] : No [Yes] : Yes [Declined] : Declined [Right upper extremity] : Right upper extremity [22g] : 22g [Start Time: ___] : Medication Start Time: [unfilled] [End Time: ___] : Medication End Time: [unfilled] [Medication Name: ___] : Medication Name: [unfilled] [Total Amount Administered: ___] : Total Amount Administered: [unfilled] [IV discontinued. Intact. No signs or symptoms of IV complications noted. Time: ___] : IV discontinued. Intact. No signs or symptoms of IV complications noted. Time: [unfilled] [Patient  instructed to seek medical attention with signs and symptoms of adverse effects] : Patient  instructed to seek medical attention with signs and symptoms of adverse effects [Patient left unit in no acute distress] : Patient left unit in no acute distress [Medications administered as ordered and tolerated well.] : Medications administered as ordered and tolerated well. [de-identified] : forearm [de-identified] : 10:22AM [de-identified] : Generic: Immunoglobulin Dose: 30GM Infusion Rate: 175ml/hr NDC: 5371-7824-91 Lot#: [id number] Exp#: DEC 19 2026  [id number] Buy/Bill: Yes Pharmacy: N/A Pre-Medication:  Acetaminophen: 650mg PO Diphenhydramine 25mg  Hydration: total amount 1000ml Next Appointment: 5/8/24

## 2024-05-08 ENCOUNTER — APPOINTMENT (OUTPATIENT)
Dept: NEUROLOGY | Facility: CLINIC | Age: 64
End: 2024-05-08
Payer: MEDICAID

## 2024-05-08 VITALS — DIASTOLIC BLOOD PRESSURE: 74 MMHG | RESPIRATION RATE: 18 BRPM | SYSTOLIC BLOOD PRESSURE: 140 MMHG | HEART RATE: 76 BPM

## 2024-05-08 VITALS — DIASTOLIC BLOOD PRESSURE: 72 MMHG | HEART RATE: 82 BPM | RESPIRATION RATE: 18 BRPM | SYSTOLIC BLOOD PRESSURE: 137 MMHG

## 2024-05-08 VITALS — DIASTOLIC BLOOD PRESSURE: 77 MMHG | HEART RATE: 81 BPM | RESPIRATION RATE: 18 BRPM | SYSTOLIC BLOOD PRESSURE: 149 MMHG

## 2024-05-08 PROCEDURE — 96365 THER/PROPH/DIAG IV INF INIT: CPT

## 2024-05-08 PROCEDURE — 96366 THER/PROPH/DIAG IV INF ADDON: CPT

## 2024-05-08 NOTE — HISTORY OF PRESENT ILLNESS
[Denies] : Denies [No] : No [Yes] : Yes [Right upper extremity] : Right upper extremity [22g] : 22g [Medication Name: ___] : Medication Name: [unfilled] [Total Amount Administered: ___] : Total Amount Administered: [unfilled] [Start Time: ___] : Medication Start Time: [unfilled] [End Time: ___] : Medication End Time: [unfilled] [IV discontinued. Intact. No signs or symptoms of IV complications noted. Time: ___] : IV discontinued. Intact. No signs or symptoms of IV complications noted. Time: [unfilled] [Patient  instructed to seek medical attention with signs and symptoms of adverse effects] : Patient  instructed to seek medical attention with signs and symptoms of adverse effects [Patient left unit in no acute distress] : Patient left unit in no acute distress [Medications administered as ordered and tolerated well.] : Medications administered as ordered and tolerated well. [de-identified] : forearm [de-identified] : 9:39am [de-identified] : Generic: Immunoglobulin Dose: 30GM Infusion Rate: 175ml/hr NDC: 0881-3044-97 Lot#: [id number] Exp#: DEC 19 2026  [id number] Buy/Bill: Yes Pharmacy: N/A Pre-Medication:  Acetaminophen: 650mg PO Diphenhydramine 25mg PO Hydration: total amount 1000 ml Next Appointment: To be scheduled with Yuliet

## 2024-05-13 NOTE — HISTORY OF PRESENT ILLNESS
[Denies] : Denies [No] : No [Yes] : Yes [22g] : 22g [Start Time: ___] : Medication Start Time: [unfilled] [Medication Name: ___] : Medication Name: [unfilled] [de-identified] : L AC [de-identified] : 4080 [de-identified] : ImmunoglobulinDose: 30__G Infusion Rate: 175 ml/hr NDC#: 0392-0563-30 Lot#: [id number] Exp: 11/29/2025  [id number], [id number] Buy and Bill: Yes Specialty Pharmacy: Next Appointment 4/4/24

## 2024-06-11 ENCOUNTER — APPOINTMENT (OUTPATIENT)
Dept: NEUROLOGY | Facility: CLINIC | Age: 64
End: 2024-06-11
Payer: MEDICARE

## 2024-06-11 VITALS — DIASTOLIC BLOOD PRESSURE: 75 MMHG | RESPIRATION RATE: 18 BRPM | HEART RATE: 65 BPM | SYSTOLIC BLOOD PRESSURE: 147 MMHG

## 2024-06-11 PROCEDURE — 96366 THER/PROPH/DIAG IV INF ADDON: CPT

## 2024-06-11 PROCEDURE — 96365 THER/PROPH/DIAG IV INF INIT: CPT

## 2024-06-11 NOTE — HISTORY OF PRESENT ILLNESS
[Denies] : Denies [Left upper extremity] : Left upper extremity [22g] : 22g [Start Time: ___] : Medication Start Time: [unfilled] [End Time: ___] : Medication End Time: [unfilled] [Medication Name: ___] : Medication Name: [unfilled] [Total Amount Administered: ___] : Total Amount Administered: [unfilled] [IV discontinued. Intact. No signs or symptoms of IV complications noted. Time: ___] : IV discontinued. Intact. No signs or symptoms of IV complications noted. Time: [unfilled] [Patient  instructed to seek medical attention with signs and symptoms of adverse effects] : Patient  instructed to seek medical attention with signs and symptoms of adverse effects [Patient left unit in no acute distress] : Patient left unit in no acute distress [de-identified] : 634tq [de-identified] : Patient received IV hydration 0.9 % NS before and after infusion. Patient tolerated infusion well. IV access removed dressing applied no bleeding noted. Patient left in stable condition, next infusion appointment is tomorrow.

## 2024-06-12 ENCOUNTER — APPOINTMENT (OUTPATIENT)
Dept: NEUROLOGY | Facility: CLINIC | Age: 64
End: 2024-06-12

## 2024-06-12 VITALS — HEART RATE: 72 BPM | DIASTOLIC BLOOD PRESSURE: 74 MMHG | SYSTOLIC BLOOD PRESSURE: 156 MMHG | RESPIRATION RATE: 18 BRPM

## 2024-06-12 VITALS — HEART RATE: 64 BPM | RESPIRATION RATE: 18 BRPM | DIASTOLIC BLOOD PRESSURE: 70 MMHG | SYSTOLIC BLOOD PRESSURE: 140 MMHG

## 2024-06-12 VITALS — RESPIRATION RATE: 18 BRPM | SYSTOLIC BLOOD PRESSURE: 156 MMHG | HEART RATE: 67 BPM | DIASTOLIC BLOOD PRESSURE: 81 MMHG

## 2024-06-12 VITALS — HEART RATE: 63 BPM | RESPIRATION RATE: 18 BRPM | SYSTOLIC BLOOD PRESSURE: 137 MMHG | DIASTOLIC BLOOD PRESSURE: 70 MMHG

## 2024-06-12 PROCEDURE — 96365 THER/PROPH/DIAG IV INF INIT: CPT

## 2024-06-12 PROCEDURE — 96366 THER/PROPH/DIAG IV INF ADDON: CPT

## 2024-06-12 NOTE — HISTORY OF PRESENT ILLNESS
Pt alert and oriented x3. Pt up independently in room. Voids without difficulty. No respiratory distress. VSS. Denies pain all shift. Pleasant and cooperative. No complaints of nausea or vomiting throughout shift. No IV infusing. Skin intact; except for lines. Slept well between cares. Uses call light system appropriately.   [Denies] : Denies [No] : No [Yes] : Yes [Informed consent documented in EHR.] : Informed consent documented in EHR. [22g] : 22g [Start Time: ___] : Medication Start Time: [unfilled] [End Time: ___] : Medication End Time: [unfilled] [Medication Name: ___] : Medication Name: [unfilled] [Total Amount Administered: ___] : Total Amount Administered: [unfilled] [IV discontinued. Intact. No signs or symptoms of IV complications noted. Time: ___] : IV discontinued. Intact. No signs or symptoms of IV complications noted. Time: [unfilled] [Patient  instructed to seek medical attention with signs and symptoms of adverse effects] : Patient  instructed to seek medical attention with signs and symptoms of adverse effects [Patient left unit in no acute distress] : Patient left unit in no acute distress [Medications administered as ordered and tolerated well.] : Medications administered as ordered and tolerated well. [de-identified] : left AC [de-identified] : 0927am [de-identified] : Generic: Immunoglobin billing Unit 500mg Dx: Myasthenia gravis (G70.00) Total Dose administered:  30g                                 Start: 1012am                             Stop: 1245pm NDC: 8140-6711-52                  LOT: [id number]     EXP:1/16/2027 Amount of drug waste: none infusion Rate:        175 ml/hr       [id number] Buy and bill: Yes                                        Pharmacy: Pre-Medication: Acetaminophen  650 mg    route:  PO                     Time given:932am ND: 9125-6580-75             LOT: [id number]                  EXP: 12/2025 Diphenhydramine      25mg    route: PO                     Time given: 932am NDC#                                 Lot#: [id number]                                     Exp: 1/2026 Hydration:  500ml pre infusion and 500ml post infusion.  Total amount:                  1000  ml was there a treatment reaction? Action taken: (Document any treatment reactions and any medications given) Next appointment: 4 weeks from 6/11/2024- needs 2 appointment- tasked jessica

## 2024-07-01 ENCOUNTER — NON-APPOINTMENT (OUTPATIENT)
Age: 64
End: 2024-07-01

## 2024-07-09 ENCOUNTER — APPOINTMENT (OUTPATIENT)
Dept: NEUROLOGY | Facility: CLINIC | Age: 64
End: 2024-07-09

## 2024-07-09 VITALS — HEART RATE: 76 BPM | DIASTOLIC BLOOD PRESSURE: 74 MMHG | SYSTOLIC BLOOD PRESSURE: 129 MMHG | RESPIRATION RATE: 18 BRPM

## 2024-07-09 VITALS — HEART RATE: 67 BPM | RESPIRATION RATE: 22 BRPM | SYSTOLIC BLOOD PRESSURE: 129 MMHG | DIASTOLIC BLOOD PRESSURE: 68 MMHG

## 2024-07-09 VITALS — DIASTOLIC BLOOD PRESSURE: 69 MMHG | SYSTOLIC BLOOD PRESSURE: 139 MMHG | RESPIRATION RATE: 18 BRPM | HEART RATE: 70 BPM

## 2024-07-09 VITALS — SYSTOLIC BLOOD PRESSURE: 134 MMHG | RESPIRATION RATE: 24 BRPM | DIASTOLIC BLOOD PRESSURE: 64 MMHG | HEART RATE: 72 BPM

## 2024-07-09 VITALS — HEART RATE: 69 BPM | SYSTOLIC BLOOD PRESSURE: 134 MMHG | DIASTOLIC BLOOD PRESSURE: 68 MMHG | RESPIRATION RATE: 20 BRPM

## 2024-07-09 VITALS — RESPIRATION RATE: 16 BRPM | SYSTOLIC BLOOD PRESSURE: 127 MMHG | HEART RATE: 74 BPM | DIASTOLIC BLOOD PRESSURE: 67 MMHG

## 2024-07-10 ENCOUNTER — APPOINTMENT (OUTPATIENT)
Dept: NEUROLOGY | Facility: CLINIC | Age: 64
End: 2024-07-10

## 2024-07-10 VITALS — HEART RATE: 77 BPM | DIASTOLIC BLOOD PRESSURE: 71 MMHG | RESPIRATION RATE: 20 BRPM | SYSTOLIC BLOOD PRESSURE: 147 MMHG

## 2024-07-10 VITALS — SYSTOLIC BLOOD PRESSURE: 142 MMHG | DIASTOLIC BLOOD PRESSURE: 79 MMHG | HEART RATE: 72 BPM | RESPIRATION RATE: 16 BRPM

## 2024-07-10 VITALS — HEART RATE: 76 BPM | RESPIRATION RATE: 17 BRPM | SYSTOLIC BLOOD PRESSURE: 139 MMHG | DIASTOLIC BLOOD PRESSURE: 74 MMHG

## 2024-07-10 VITALS — SYSTOLIC BLOOD PRESSURE: 136 MMHG | RESPIRATION RATE: 17 BRPM | DIASTOLIC BLOOD PRESSURE: 68 MMHG | HEART RATE: 74 BPM

## 2024-07-10 VITALS — HEART RATE: 68 BPM | DIASTOLIC BLOOD PRESSURE: 76 MMHG | RESPIRATION RATE: 17 BRPM | SYSTOLIC BLOOD PRESSURE: 142 MMHG

## 2024-08-06 ENCOUNTER — APPOINTMENT (OUTPATIENT)
Dept: ULTRASOUND IMAGING | Facility: CLINIC | Age: 64
End: 2024-08-06

## 2024-08-06 ENCOUNTER — APPOINTMENT (OUTPATIENT)
Dept: MAMMOGRAPHY | Facility: CLINIC | Age: 64
End: 2024-08-06

## 2024-08-06 ENCOUNTER — OUTPATIENT (OUTPATIENT)
Dept: OUTPATIENT SERVICES | Facility: HOSPITAL | Age: 64
LOS: 1 days | End: 2024-08-06
Payer: MEDICARE

## 2024-08-06 DIAGNOSIS — Z00.8 ENCOUNTER FOR OTHER GENERAL EXAMINATION: ICD-10-CM

## 2024-08-06 PROCEDURE — 77066 DX MAMMO INCL CAD BI: CPT | Mod: 26

## 2024-08-06 PROCEDURE — 76642 ULTRASOUND BREAST LIMITED: CPT | Mod: 26,LT

## 2024-08-06 PROCEDURE — G0279: CPT

## 2024-08-06 PROCEDURE — 77062 BREAST TOMOSYNTHESIS BI: CPT | Mod: 26

## 2024-08-06 PROCEDURE — 77066 DX MAMMO INCL CAD BI: CPT

## 2024-08-06 PROCEDURE — 76642 ULTRASOUND BREAST LIMITED: CPT

## 2024-08-27 ENCOUNTER — APPOINTMENT (OUTPATIENT)
Dept: NEUROLOGY | Facility: CLINIC | Age: 64
End: 2024-08-27
Payer: MEDICARE

## 2024-08-27 DIAGNOSIS — G70.00 MYASTHENIA GRAVIS W/OUT (ACUTE) EXACERBATION: ICD-10-CM

## 2024-08-27 PROCEDURE — G2211 COMPLEX E/M VISIT ADD ON: CPT

## 2024-08-27 PROCEDURE — 99215 OFFICE O/P EST HI 40 MIN: CPT

## 2024-08-27 RX ORDER — AZATHIOPRINE 50 1/1
50 TABLET ORAL TWICE DAILY
Qty: 270 | Refills: 2 | Status: ACTIVE | COMMUNITY
Start: 2024-08-27 | End: 1900-01-01

## 2024-08-27 NOTE — HISTORY OF PRESENT ILLNESS
[FreeTextEntry1] : Ms. 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 Thymectomy in 2010. It was not cancer, no chemotherapy, or radiation. She was fine for 3 years then in 2013 she was diagnosed with MG.  MG was diagnosed in 2013 with eye droopy. She was taking pyridostigmine, and it helped. Until around 2016, her symptoms were eye droopy. difficulty speaking and swallowing. She could not smile. She had aspiration pneumonia. She got intubated. She got IVIG that time. She also got steroid while she was in the hospital. When DC, she was Dc with pyridostigmine. It was pattern that she was in the hospital, got IVIG and steroid and then DC with mestinon, it worked for about 1.5-2 years then she was back with crisis. AT times, her mestinon was increased to probably 9-10 tablets a day. She was intubated 3 times last year., At first, she had COVID infection that triggered her MG crisis. Every time last year when she was admitted, she got IVIG and steroid and got better then DC, until the last admission, she had IVIG, then PLEX and steroid high dose then was able to extubate and then DC home.  She was Dc Nov 24. Since then she was good. Current medications: Azathioprine 50 mg BID Prednisone 30 mg daily for 4 weeks. mestinon 60 mg TID.  She reports now she may have eye droopy when looked at some things for sometimes. when she read. There is no specific time of the day., When she rest her eyes for couple mins, it gets better. This symptom is different from her usual MG symptom. Denies difficulty swallowing. She has shoulder pain both sides which she contributed to not moving while she was in the hospital. Never gotten PT.   Interval history: 8/27/24 She feels good. She is 100% back to normal. She is able to walk, cooking, and do ADLs.  current med:  prednisone 5 mg daily since July 11th azathioprin 50 mg BID Mestinon 60 mg TID. Sometime she forgets it but no symptom.    She has been receiving IVIG every 4 week, but has not received this month.

## 2024-08-27 NOTE — PHYSICAL EXAM
[FreeTextEntry1] :  MG-ADL: 0  grade                    0                     1                                                                 2                                                                        3 1. talking            normal    2. chewing         normal    3. swallowing     normal    4. breathing       normal    5. impair ability  normal   to brush teeth or comb hair 6.impair ability   normal.    to arise from chair 7. double vision  normal    8. eyelid droop   normal     single breath test 19 UE proximal muscle strength limited by shoulders pain on both sides.  Neck flex 5/5 the rest 5/5 able to arise from chair with arm crossed and able to squat. no ptosis. no induced or enhanced ptosis. no cogan lid twitch.  [Person] : oriented to person [Place] : oriented to place [Time] : oriented to time [Fluency] : fluency intact [Comprehension] : comprehension intact [Cranial Nerves Oculomotor (III)] : extraocular motion intact [Cranial Nerves Optic (II)] : visual acuity intact bilaterally,  visual fields full to confrontation, pupils equal round and reactive to light [Cranial Nerves Trigeminal (V)] : facial sensation intact symmetrically [Cranial Nerves Facial (VII)] : face symmetrical [Cranial Nerves Vestibulocochlear (VIII)] : hearing was intact bilaterally [Cranial Nerves Glossopharyngeal (IX)] : tongue and palate midline [Cranial Nerves Accessory (XI - Cranial And Spinal)] : head turning and shoulder shrug symmetric [Cranial Nerves Hypoglossal (XII)] : there was no tongue deviation with protrusion [Motor Tone] : muscle tone was normal in all four extremities [Involuntary Movements] : no involuntary movements were seen [No Muscle Atrophy] : normal bulk in all four extremities [Sensation Tactile Decrease] : light touch was intact [Sensation Pain / Temperature Decrease] : pain and temperature was intact [Sensation Vibration Decrease] : vibration was intact [Abnormal Walk] : normal gait [Balance] : balance was intact [2+] : Ankle jerk left 2+ [Plantar Reflex Right Only] : normal on the right [Plantar Reflex Left Only] : normal on the left

## 2024-08-27 NOTE — DISCUSSION/SUMMARY
[FreeTextEntry1] : Ms. Acosta is here for follow up visit for MG.  MG ADL 0 She has not received IVIG this month (supposed to get at the beginning of the month) due to insurance issue. Bu so far no issue. no weakness or SOB or other MG symptom. Plan resume IVIG 60gm every 4 weeks with pre-med with tylenol and benadryl continue Prednisone 5 mg daily for now. Advise if clinical worsen (fall behind on IVIG), increase pred to 10 mg and let me know increase azathioprine to 75 mg BID (2mg/kg) Mestinon 60 mg TID prn advise taking Ca and Vit D for long term use of steroid CBC, CMP for long term use of azathioprine and steroid. side effects of steroid and azathioprine discussed.  All questions answered. RTC 3 months or earlier if needed.

## 2024-08-30 ENCOUNTER — OUTPATIENT (OUTPATIENT)
Dept: OUTPATIENT SERVICES | Facility: HOSPITAL | Age: 64
LOS: 1 days | End: 2024-08-30
Payer: MEDICARE

## 2024-08-30 ENCOUNTER — APPOINTMENT (OUTPATIENT)
Dept: RADIOLOGY | Facility: IMAGING CENTER | Age: 64
End: 2024-08-30
Payer: MEDICARE

## 2024-08-30 DIAGNOSIS — Z00.8 ENCOUNTER FOR OTHER GENERAL EXAMINATION: ICD-10-CM

## 2024-08-30 PROCEDURE — 72040 X-RAY EXAM NECK SPINE 2-3 VW: CPT

## 2024-08-30 PROCEDURE — 72040 X-RAY EXAM NECK SPINE 2-3 VW: CPT | Mod: 26

## 2024-09-16 ENCOUNTER — APPOINTMENT (OUTPATIENT)
Dept: NEUROLOGY | Facility: CLINIC | Age: 64
End: 2024-09-16

## 2024-09-16 PROCEDURE — 96365 THER/PROPH/DIAG IV INF INIT: CPT

## 2024-09-16 PROCEDURE — 96366 THER/PROPH/DIAG IV INF ADDON: CPT

## 2024-09-16 NOTE — HISTORY OF PRESENT ILLNESS
[Denies] : Denies [No] : No [Yes] : Yes [Left upper extremity] : Left upper extremity [24g] : 24g [Start Time: ___] : Medication Start Time: [unfilled] [End Time: ___] : Medication End Time: [unfilled] [Medication Name: ___] : Medication Name: [unfilled] [Total Amount Administered: ___] : Total Amount Administered: [unfilled] [IV discontinued. Intact. No signs or symptoms of IV complications noted. Time: ___] : IV discontinued. Intact. No signs or symptoms of IV complications noted. Time: [unfilled] [Patient  instructed to seek medical attention with signs and symptoms of adverse effects] : Patient  instructed to seek medical attention with signs and symptoms of adverse effects [Patient left unit in no acute distress] : Patient left unit in no acute distress [Medications administered as ordered and tolerated well.] : Medications administered as ordered and tolerated well. [de-identified] : Forearm [de-identified] : 5551 [de-identified] : Generic: Immunoglobulin Billing unit 500mg Dx: Myasthenia gravis (G70.00) Total Dose Administered: 30g                         Start: 1030                       Stop: 1222 Amount of drug waste: n/a Infusion Rate: 175mL/hr for entire infusion 0940  Pre-infusion - 154/72, 72, 17 1101  159/67, 61, 16 1140  142/67, 65, 16 1210  142/67, 62, 18 1222  149/71, 62, 18 1301  Observation - 144/75, 65, 14  [id number], 55816 Buy and Bill: N                       Pharmacy: [facility] Pre-Medication: Y Acetaminophen 650mg          Route: PO                        Time Given: 0950 Diphenhydramine 25mg         Route: PO                        Time Given: 0950 Hydration: 500mL pre and post infusion  , 85566 Was there a treatment reaction? n/a Action taken: n/a Next Appointment: 9/17/24 at 0930

## 2024-09-17 ENCOUNTER — APPOINTMENT (OUTPATIENT)
Dept: NEUROLOGY | Facility: CLINIC | Age: 64
End: 2024-09-17

## 2024-09-17 ENCOUNTER — APPOINTMENT (OUTPATIENT)
Dept: ULTRASOUND IMAGING | Facility: CLINIC | Age: 64
End: 2024-09-17

## 2024-09-17 PROCEDURE — 96365 THER/PROPH/DIAG IV INF INIT: CPT

## 2024-09-17 PROCEDURE — 96366 THER/PROPH/DIAG IV INF ADDON: CPT

## 2024-09-17 NOTE — HISTORY OF PRESENT ILLNESS
[Denies] : Denies [No] : No [Yes] : Yes [Right upper extremity] : Right upper extremity [24g] : 24g [Start Time: ___] : Medication Start Time: [unfilled] [End Time: ___] : Medication End Time: [unfilled] [Medication Name: ___] : Medication Name: [unfilled] [Total Amount Administered: ___] : Total Amount Administered: [unfilled] [IV discontinued. Intact. No signs or symptoms of IV complications noted. Time: ___] : IV discontinued. Intact. No signs or symptoms of IV complications noted. Time: [unfilled] [Patient  instructed to seek medical attention with signs and symptoms of adverse effects] : Patient  instructed to seek medical attention with signs and symptoms of adverse effects [Patient left unit in no acute distress] : Patient left unit in no acute distress [Medications administered as ordered and tolerated well.] : Medications administered as ordered and tolerated well. [de-identified] : Forearm [de-identified] : Generic: Immunoglobulin Billing unit 500mg Dx: Myasthenia gravis (G70.00) Total Dose Administered: 30g                        Start: 1035                       Stop: 1218 Amount of drug waste: n/a Infusion Rate: 175mL/hr for entire infusion 0942  Pre-infusion - 184/79, 70, 17 - Dr. Covarrubias notified, came to infusion suite to assess patient 1057  175/78, 72, 16 1134  163/67, 64, 20 1202  148/72, 68, 18 1218  159/61, 70, 20 1254  157/73, 67, 18  [id number], 50070 Buy and Bill: N                       Pharmacy: [facility] Pre-Medication: Y Acetaminophen 650mg          Route: PO                       Time Given: 0950 Diphenhydramine 25 mg       Route: PO                        Time Given: 0950 Hydration: 500mL pre and post infusion  [id number], 75466 Was there a treatment reaction? n/a Action taken: n/a Next Appointment: 10/17 and 10/18 at 0930. Yuliet santiagoed.  [de-identified] : 9308

## 2024-10-17 ENCOUNTER — APPOINTMENT (OUTPATIENT)
Dept: NEUROLOGY | Facility: CLINIC | Age: 64
End: 2024-10-17

## 2024-10-17 PROCEDURE — 96366 THER/PROPH/DIAG IV INF ADDON: CPT

## 2024-10-17 PROCEDURE — 96365 THER/PROPH/DIAG IV INF INIT: CPT

## 2024-10-18 ENCOUNTER — APPOINTMENT (OUTPATIENT)
Dept: NEUROLOGY | Facility: CLINIC | Age: 64
End: 2024-10-18

## 2024-10-18 PROCEDURE — 96366 THER/PROPH/DIAG IV INF ADDON: CPT

## 2024-10-18 PROCEDURE — 96365 THER/PROPH/DIAG IV INF INIT: CPT

## 2024-11-08 ENCOUNTER — APPOINTMENT (OUTPATIENT)
Dept: NEUROLOGY | Facility: CLINIC | Age: 64
End: 2024-11-08

## 2024-11-18 ENCOUNTER — APPOINTMENT (OUTPATIENT)
Dept: NEUROLOGY | Facility: CLINIC | Age: 64
End: 2024-11-18

## 2024-11-18 PROCEDURE — 96366 THER/PROPH/DIAG IV INF ADDON: CPT

## 2024-11-18 PROCEDURE — 96365 THER/PROPH/DIAG IV INF INIT: CPT

## 2024-11-19 ENCOUNTER — APPOINTMENT (OUTPATIENT)
Dept: NEUROLOGY | Facility: CLINIC | Age: 64
End: 2024-11-19

## 2024-11-19 PROCEDURE — 96366 THER/PROPH/DIAG IV INF ADDON: CPT

## 2024-11-19 PROCEDURE — 96365 THER/PROPH/DIAG IV INF INIT: CPT

## 2024-12-04 ENCOUNTER — APPOINTMENT (OUTPATIENT)
Dept: NEUROLOGY | Facility: CLINIC | Age: 64
End: 2024-12-04
Payer: MEDICARE

## 2024-12-04 VITALS
BODY MASS INDEX: 25.34 KG/M2 | HEART RATE: 67 BPM | DIASTOLIC BLOOD PRESSURE: 66 MMHG | WEIGHT: 143 LBS | HEIGHT: 63 IN | SYSTOLIC BLOOD PRESSURE: 162 MMHG

## 2024-12-04 DIAGNOSIS — G70.00 MYASTHENIA GRAVIS W/OUT (ACUTE) EXACERBATION: ICD-10-CM

## 2024-12-04 DIAGNOSIS — Z79.624 LONG TERM (CURRENT) USE OF INHIBITORS OF NUCLEOTIDE SYNTHESIS: ICD-10-CM

## 2024-12-04 DIAGNOSIS — Z79.52 LONG TERM (CURRENT) USE OF SYSTEMIC STEROIDS: ICD-10-CM

## 2024-12-04 PROCEDURE — G2211 COMPLEX E/M VISIT ADD ON: CPT

## 2024-12-04 PROCEDURE — 99215 OFFICE O/P EST HI 40 MIN: CPT

## 2024-12-04 RX ORDER — PYRIDOSTIGMINE BROMIDE 60 MG/1
60 TABLET ORAL
Qty: 270 | Refills: 2 | Status: ACTIVE | COMMUNITY
Start: 2024-12-04 | End: 1900-01-01

## 2024-12-11 ENCOUNTER — APPOINTMENT (OUTPATIENT)
Dept: ORTHOPEDIC SURGERY | Facility: CLINIC | Age: 64
End: 2024-12-11
Payer: MEDICARE

## 2024-12-11 VITALS — WEIGHT: 165 LBS | HEIGHT: 62 IN | BODY MASS INDEX: 30.36 KG/M2

## 2024-12-11 DIAGNOSIS — G89.29 PAIN IN LEFT KNEE: ICD-10-CM

## 2024-12-11 DIAGNOSIS — M17.12 UNILATERAL PRIMARY OSTEOARTHRITIS, LEFT KNEE: ICD-10-CM

## 2024-12-11 DIAGNOSIS — M25.562 PAIN IN LEFT KNEE: ICD-10-CM

## 2024-12-11 PROCEDURE — 73564 X-RAY EXAM KNEE 4 OR MORE: CPT | Mod: LT

## 2024-12-11 PROCEDURE — 99203 OFFICE O/P NEW LOW 30 MIN: CPT

## 2024-12-11 RX ORDER — DICLOFENAC SODIUM 10 MG/G
1 GEL TOPICAL
Qty: 100 | Refills: 0 | Status: ACTIVE | COMMUNITY
Start: 2024-12-11 | End: 1900-01-01

## 2024-12-16 ENCOUNTER — APPOINTMENT (OUTPATIENT)
Dept: NEUROLOGY | Facility: CLINIC | Age: 64
End: 2024-12-16

## 2024-12-16 PROCEDURE — 96366 THER/PROPH/DIAG IV INF ADDON: CPT

## 2024-12-16 PROCEDURE — 96365 THER/PROPH/DIAG IV INF INIT: CPT

## 2024-12-17 ENCOUNTER — APPOINTMENT (OUTPATIENT)
Dept: NEUROLOGY | Facility: CLINIC | Age: 64
End: 2024-12-17

## 2024-12-17 PROCEDURE — 96366 THER/PROPH/DIAG IV INF ADDON: CPT

## 2024-12-17 PROCEDURE — 96365 THER/PROPH/DIAG IV INF INIT: CPT

## 2025-01-10 NOTE — CONSULT NOTE ADULT - SUBJECTIVE AND OBJECTIVE BOX
Had to be straight cathed x 1  Started on Flomax and Finasteride with improvement in symptoms  Has been able to urinate on his own  Urinary retention protocol  Outpatient followup with Urology   Neurology - Consult Note    -  Spectra: [phone] ([facility]), [phone] ([facility])  -    HPI: Patient KAIDEN HATCH is a 62y (1960) woman with a PMHx significant for myasthenia gravis who presents to the ED with SOB for 3 days after testing positive for COVID-19 at home. Patient speaks Malayalam and translation was provided by family member at bedside at patient's request. Patient reportedly follows with a neurologist at [facility]. She is currently on prednisone 10mg PRN and pyridostigmine 120mg TID. She has had prior hospitalizations for myasthenia exacerbations every 1-2 years, including intubation, with last hospitalization 1.5 years ago. Each time she had an exacerbation she was treated with IVIG. She reportedly discussed PLEX as an option but preferred IVIG and states she typically starts to improve after 3-4 days. Currently patient reports to have weakness and worsening of SOB when laying flat. Additionally family reports ptosis can vary in laterality.      Review of Systems:   CONSTITUTIONAL: No fevers or chills  EYES AND ENT: No visual changes or no throat pain   NECK: No pain or stiffness  RESPIRATORY: cough and SOB as above  CARDIOVASCULAR: No chest pain or palpitations  GASTROINTESTINAL: No melena or hematochezia  GENITOURINARY: No dysuria or hematuria  NEUROLOGICAL: +As stated in HPI above  SKIN: No itching, burning, rashes, or lesions   All other review of systems is negative unless indicated above.      PMHx/PSHx/Family Hx: As above, otherwise see below   Myasthenia gravis    Diabetes mellitus        Social Hx:  No current use of tobacco, alcohol, or illicit drugs      Vitals:  T(C): 36.8 (01-30-23 @ 11:21), Max: 36.8 (01-30-23 @ 11:21)  HR: 120 (01-30-23 @ 14:14) (120 - 122)  BP: 180/101 (01-30-23 @ 11:21) (180/101 - 180/101)  RR: 20 (01-30-23 @ 14:14) (20 - 22)  SpO2: 98% (01-30-23 @ 14:14) (91% - 98%)    Physical Examination:  General - NAD  Cardiovascular - Peripheral pulses palpable, no edema  Eyes - Fundoscopy not performed due to safety precautions in the setting of the COVID-19 pandemic    Neurologic Exam:  Mental status - Awake, Alert. Speech is fluent but is severely nasal and moderately hypophonic. Follows simple commands. Attention/concentration intact.    Cranial nerves - PERRLA, dysconjugate gaze with ophthalmoplegia and limited upwards gaze although difficult to adequately assess due to ptosis R>L, face sensation (V1-V3) grossly intact b/l, hearing intact b/l, palate elevation and tongue protrusion not assessed in the setting of COVID-19.    Motor - Normal bulk and tone throughout.  Strength testing  Neck flexion 3  Neck extension 3+            Deltoid      Biceps      Triceps       R            3                 4+           5                     5       L             3                 4+           5                     5               Hip Flexion   Dorsiflexion    Plantar Flexion  R              4                          4                      4    L              4                          4+                     4+    Sensation - Light touch intact throughout    DTR's - Not assessed    Coordination - Not assessed    Gait and station - Not assessed due to weakness and fall risk.    Labs:                        15.2   21.75 )-----------( 331      ( 30 Jan 2023 13:37 )             45.8     01-30    139  |  97  |  13  ----------------------------<  162<H>  4.1   |  27  |  0.51    Ca    9.5      30 Jan 2023 13:37    TPro  8.2  /  Alb  4.6  /  TBili  0.4  /  DBili  x   /  AST  25  /  ALT  19  /  AlkPhos  108  01-30    CAPILLARY BLOOD GLUCOSE    LIVER FUNCTIONS - ( 30 Jan 2023 13:37 )  Alb: 4.6 g/dL / Pro: 8.2 g/dL / ALK PHOS: 108 U/L / ALT: 19 U/L / AST: 25 U/L / GGT: x             Radiology:  Xray Chest 1 View- PORTABLE-Urgent (Xray Chest 1 View- PORTABLE-Urgent .) (01.30.23 @ 14:44)  IMPRESSION:  Bibasilar linear atelectasis. Otherwise, the lungs are clear.   Neurology - Consult Note    -  Spectra: [phone] ([facility]), [phone] ([facility])  -    HPI: Patient KAIDEN HATCH is a 62y (1960) woman with a PMHx significant for myasthenia gravis who presents to the ED with SOB for 3 days after testing positive for COVID-19 at home. Patient speaks Malayalam and translation was provided by family member at bedside at patient's request. Patient reportedly follows with a neurologist at [facility]. She is currently on prednisone 10mg PRN and pyridostigmine 120mg TID. She has had prior hospitalizations for myasthenia exacerbations every 1-2 years, including intubation, with last hospitalization 1.5 years ago. Each time she had an exacerbation she was treated with IVIG. She reportedly discussed PLEX as an option but preferred IVIG and states she typically starts to improve after 3-4 days. Currently patient reports to have weakness and worsening of SOB when laying flat. Additionally family reports ptosis can vary in laterality.      Review of Systems:   CONSTITUTIONAL: No fevers or chills  EYES AND ENT: No visual changes or no throat pain   NECK: No pain or stiffness  RESPIRATORY: cough and SOB as above  CARDIOVASCULAR: No chest pain or palpitations  GASTROINTESTINAL: No melena or hematochezia  GENITOURINARY: No dysuria or hematuria  NEUROLOGICAL: +As stated in HPI above  SKIN: No itching, burning, rashes, or lesions   All other review of systems is negative unless indicated above.      PMHx/PSHx/Family Hx: As above, otherwise see below   Myasthenia gravis    Diabetes mellitus        Social Hx:  No current use of tobacco, alcohol, or illicit drugs      Vitals:  T(C): 36.8 (01-30-23 @ 11:21), Max: 36.8 (01-30-23 @ 11:21)  HR: 120 (01-30-23 @ 14:14) (120 - 122)  BP: 180/101 (01-30-23 @ 11:21) (180/101 - 180/101)  RR: 20 (01-30-23 @ 14:14) (20 - 22)  SpO2: 98% (01-30-23 @ 14:14) (91% - 98%)    Physical Examination:  General - NAD  Cardiovascular - Peripheral pulses palpable, no edema  Eyes - Fundoscopy not performed due to safety precautions in the setting of the COVID-19 pandemic    Neurologic Exam:  Mental status - Awake, Alert. Speech is fluent but is severely nasal and moderately hypophonic. Follows simple commands. Attention/concentration intact.    Cranial nerves - PERRLA, dysconjugate gaze with ophthalmoplegia and limited upwards gaze although difficult to adequately assess due to ptosis R>L, face sensation (V1-V3) grossly intact b/l, hearing intact b/l, palate elevation and tongue protrusion not assessed in the setting of COVID-19.    Motor - Normal bulk and tone throughout.  Strength testing  Neck flexion 3  Neck extension 3+            Deltoid      Biceps      Triceps       R            3                 4+           5                     5       L             3                 4+           5                     5               Hip Flexion   Dorsiflexion    Plantar Flexion  R              4                          4                      4    L              4                          4+                     4+    Sensation - Light touch intact throughout    DTR's - Not assessed    Coordination - Not assessed    Gait and station - Not assessed due to weakness and fall risk.    Labs:                        15.2   21.75 )-----------( 331      ( 30 Jan 2023 13:37 )             45.8     01-30    139  |  97  |  13  ----------------------------<  162<H>  4.1   |  27  |  0.51    Ca    9.5      30 Jan 2023 13:37    TPro  8.2  /  Alb  4.6  /  TBili  0.4  /  DBili  x   /  AST  25  /  ALT  19  /  AlkPhos  108  01-30    CAPILLARY BLOOD GLUCOSE    LIVER FUNCTIONS - ( 30 Jan 2023 13:37 )  Alb: 4.6 g/dL / Pro: 8.2 g/dL / ALK PHOS: 108 U/L / ALT: 19 U/L / AST: 25 U/L / GGT: x             Radiology:  Xray Chest 1 View- PORTABLE-Urgent (Xray Chest 1 View- PORTABLE-Urgent .) (01.30.23 @ 14:44)  IMPRESSION:  Bibasilar linear atelectasis. Otherwise, the lungs are clear.   Neurology - Consult Note    -  Spectra: [phone] ([facility]), [phone] ([facility])  -    HPI: Patient KAIDEN HATCH is a 62y (1960) woman with a PMHx significant for myasthenia gravis who presents to the ED with SOB for 3 days after testing positive for COVID-19 at home. Patient speaks Malayalam and translation was provided by family member at bedside at patient's request. Patient reportedly follows with a neurologist at [facility]. She is currently on prednisone 10mg PRN and pyridostigmine 120mg TID. She has had prior hospitalizations for myasthenia exacerbations every 1-2 years, including intubation, with last hospitalization 1.5 years ago. Each time she had an exacerbation she was treated with IVIG. She reportedly discussed PLEX as an option but preferred IVIG and states she typically starts to improve after 3-4 days. Currently patient reports to have weakness and worsening of SOB when laying flat. Additionally family reports ptosis can vary in laterality.      Review of Systems:   CONSTITUTIONAL: No fevers or chills  EYES AND ENT: No visual changes or no throat pain   NECK: No pain or stiffness  RESPIRATORY: cough and SOB as above  CARDIOVASCULAR: No chest pain or palpitations  GASTROINTESTINAL: No melena or hematochezia  GENITOURINARY: No dysuria or hematuria  NEUROLOGICAL: +As stated in HPI above  SKIN: No itching, burning, rashes, or lesions   All other review of systems is negative unless indicated above.      PMHx/PSHx/Family Hx: As above, otherwise see below   Myasthenia gravis    Diabetes mellitus        Social Hx:  No current use of tobacco, alcohol, or illicit drugs      Vitals:  T(C): 36.8 (01-30-23 @ 11:21), Max: 36.8 (01-30-23 @ 11:21)  HR: 120 (01-30-23 @ 14:14) (120 - 122)  BP: 180/101 (01-30-23 @ 11:21) (180/101 - 180/101)  RR: 20 (01-30-23 @ 14:14) (20 - 22)  SpO2: 98% (01-30-23 @ 14:14) (91% - 98%)    Physical Examination:  General - NAD  Cardiovascular - Peripheral pulses palpable, no edema  Eyes - Fundoscopy not performed due to safety precautions in the setting of the COVID-19 pandemic    Neurologic Exam:  Mental status - Awake, Alert. Speech is fluent but is severely nasal and moderately hypophonic. Follows simple commands. Attention/concentration intact.    Cranial nerves - PERRLA, dysconjugate gaze with ophthalmoplegia and limited upwards gaze although difficult to adequately assess due to ptosis R>L, face sensation (V1-V3) grossly intact b/l, hearing intact b/l, palate elevation and tongue protrusion not assessed in the setting of COVID-19.    Motor - Normal bulk and tone throughout.  Strength testing  Neck flexion 3  Neck extension 3+            Deltoid      Biceps      Triceps       R            3                 4+           5                     5       L             3                 4+           5                     5               Hip Flexion   Dorsiflexion    Plantar Flexion  R              4                          4                      4    L              4                          4+                     4+    Sensation - Light touch intact throughout    DTR's - Not assessed    Coordination - Not assessed    Gait and station - Not assessed due to weakness and fall risk.    Labs:                        15.2   21.75 )-----------( 331      ( 30 Jan 2023 13:37 )             45.8     01-30    139  |  97  |  13  ----------------------------<  162<H>  4.1   |  27  |  0.51    Ca    9.5      30 Jan 2023 13:37    TPro  8.2  /  Alb  4.6  /  TBili  0.4  /  DBili  x   /  AST  25  /  ALT  19  /  AlkPhos  108  01-30    CAPILLARY BLOOD GLUCOSE    LIVER FUNCTIONS - ( 30 Jan 2023 13:37 )  Alb: 4.6 g/dL / Pro: 8.2 g/dL / ALK PHOS: 108 U/L / ALT: 19 U/L / AST: 25 U/L / GGT: x             Radiology:  Xray Chest 1 View- PORTABLE-Urgent (Xray Chest 1 View- PORTABLE-Urgent .) (01.30.23 @ 14:44)  IMPRESSION:  Bibasilar linear atelectasis. Otherwise, the lungs are clear.   Neurology - Consult Note    -  Spectra: [phone] ([facility]), [phone] ([facility])  -    HPI: Patient KAIDEN HATCH is a 62y (1960) woman with a PMHx significant for myasthenia gravis who presents to the ED with SOB for 3 days after testing positive for COVID-19 at home. Patient speaks Malayalam and translation was provided by family member at bedside at patient's request. Patient reportedly follows with a neurologist at [facility]. She is currently on prednisone 10mg PRN and pyridostigmine 120mg TID. She has had prior hospitalizations for myasthenia exacerbations every 1-2 years, including intubation, with last hospitalization 1.5 years ago. Each time she had an exacerbation she was treated with IVIG. She reportedly discussed PLEX as an option but preferred IVIG and states she typically starts to improve after 3-4 days. Currently patient reports to have weakness and worsening of SOB when laying flat. Additionally family reports ptosis can vary in laterality.      Review of Systems:   CONSTITUTIONAL: No fevers or chills  EYES AND ENT: No visual changes or no throat pain   NECK: No pain or stiffness  RESPIRATORY: cough and SOB as above  CARDIOVASCULAR: No chest pain or palpitations  GASTROINTESTINAL: No melena or hematochezia  GENITOURINARY: No dysuria or hematuria  NEUROLOGICAL: +As stated in HPI above  SKIN: No itching, burning, rashes, or lesions   All other review of systems is negative unless indicated above.      PMHx/PSHx/Family Hx: As above, otherwise see below   Myasthenia gravis    Diabetes mellitus        Social Hx:  No current use of tobacco, alcohol, or illicit drugs      Vitals:  T(C): 36.8 (01-30-23 @ 11:21), Max: 36.8 (01-30-23 @ 11:21)  HR: 120 (01-30-23 @ 14:14) (120 - 122)  BP: 180/101 (01-30-23 @ 11:21) (180/101 - 180/101)  RR: 20 (01-30-23 @ 14:14) (20 - 22)  SpO2: 98% (01-30-23 @ 14:14) (91% - 98%)    Physical Examination:  General - NAD  Cardiovascular - Peripheral pulses palpable, no edema  Eyes - Fundoscopy not performed due to safety precautions in the setting of the COVID-19 pandemic  Respiratory: On 2L O2 via NC    Neurologic Exam:  Mental status - Awake, Alert. Speech is fluent but is severely nasal and moderately hypophonic. Follows simple commands. Attention/concentration intact.    Cranial nerves - PERRLA, dysconjugate gaze with ophthalmoplegia and limited upwards gaze although difficult to adequately assess due to ptosis R>L, face sensation (V1-V3) grossly intact b/l, hearing intact b/l, palate elevation and tongue protrusion not assessed in the setting of COVID-19.    Motor - Normal bulk and tone throughout.  Strength testing  Neck flexion 3  Neck extension 3+            Deltoid      Biceps      Triceps       R            3                 4+           5                     5       L             3                 4+           5                     5               Hip Flexion   Dorsiflexion    Plantar Flexion  R              4                          4                      4    L              4                          4+                     4+    Sensation - Light touch intact throughout    DTR's - Not assessed    Coordination - Not assessed    Gait and station - Not assessed due to weakness and fall risk.    Labs:                        15.2   21.75 )-----------( 331      ( 30 Jan 2023 13:37 )             45.8     01-30    139  |  97  |  13  ----------------------------<  162<H>  4.1   |  27  |  0.51    Ca    9.5      30 Jan 2023 13:37    TPro  8.2  /  Alb  4.6  /  TBili  0.4  /  DBili  x   /  AST  25  /  ALT  19  /  AlkPhos  108  01-30    CAPILLARY BLOOD GLUCOSE    LIVER FUNCTIONS - ( 30 Jan 2023 13:37 )  Alb: 4.6 g/dL / Pro: 8.2 g/dL / ALK PHOS: 108 U/L / ALT: 19 U/L / AST: 25 U/L / GGT: x             Radiology:  Xray Chest 1 View- PORTABLE-Urgent (Xray Chest 1 View- PORTABLE-Urgent .) (01.30.23 @ 14:44)  IMPRESSION:  Bibasilar linear atelectasis. Otherwise, the lungs are clear.   Neurology - Consult Note    -  Spectra: [phone] ([facility]), [phone] ([facility])  -    HPI: Patient KAIDEN HATCH is a 62y (1960) woman with a PMHx significant for myasthenia gravis who presents to the ED with SOB for 3 days after testing positive for COVID-19 at home. Patient speaks Malayalam and translation was provided by family member at bedside at patient's request. Patient reportedly follows with a neurologist at [facility]. She is currently on prednisone 10mg PRN and pyridostigmine 120mg TID. She has had prior hospitalizations for myasthenia exacerbations every 1-2 years, including intubation, with last hospitalization 1.5 years ago. Each time she had an exacerbation she was treated with IVIG. She reportedly discussed PLEX as an option but preferred IVIG and states she typically starts to improve after 3-4 days. Currently patient reports to have weakness and worsening of SOB when laying flat. Additionally family reports ptosis can vary in laterality.      Review of Systems:   CONSTITUTIONAL: No fevers or chills  EYES AND ENT: No visual changes or no throat pain   NECK: No pain or stiffness  RESPIRATORY: cough and SOB as above  CARDIOVASCULAR: No chest pain or palpitations  GASTROINTESTINAL: No melena or hematochezia  GENITOURINARY: No dysuria or hematuria  NEUROLOGICAL: +As stated in HPI above  SKIN: No itching, burning, rashes, or lesions   All other review of systems is negative unless indicated above.      PMHx/PSHx/Family Hx: As above, otherwise see below   Myasthenia gravis    Diabetes mellitus        Social Hx:  No current use of tobacco, alcohol, or illicit drugs      Vitals:  T(C): 36.8 (01-30-23 @ 11:21), Max: 36.8 (01-30-23 @ 11:21)  HR: 120 (01-30-23 @ 14:14) (120 - 122)  BP: 180/101 (01-30-23 @ 11:21) (180/101 - 180/101)  RR: 20 (01-30-23 @ 14:14) (20 - 22)  SpO2: 98% (01-30-23 @ 14:14) (91% - 98%)    Physical Examination:  General - NAD  Cardiovascular - Peripheral pulses palpable, no edema  Eyes - Fundoscopy not performed due to safety precautions in the setting of the COVID-19 pandemic  Respiratory: On 2L O2 via NC    Neurologic Exam:  Mental status - Awake, Alert. Speech is fluent but is severely nasal and moderately hypophonic. Follows simple commands. Attention/concentration intact.    Cranial nerves - PERRLA, dysconjugate gaze with ophthalmoplegia and limited upwards gaze although difficult to adequately assess due to ptosis R>L, face sensation (V1-V3) grossly intact b/l, hearing intact b/l, palate elevation and tongue protrusion not assessed in the setting of COVID-19.    Motor - Normal bulk and tone throughout.  Strength testing  Neck flexion 3  Neck extension 3+            Deltoid      Biceps      Triceps       R            3                 4+           5                     5       L             3                 4+           5                     5               Hip Flexion   Dorsiflexion    Plantar Flexion  R              4                          4                      4    L              4                          4+                     4+    Sensation - Light touch intact throughout    DTR's - Not assessed    Coordination - Not assessed    Gait and station - Not assessed due to weakness and fall risk.    Labs:                        15.2   21.75 )-----------( 331      ( 30 Jan 2023 13:37 )             45.8     01-30    139  |  97  |  13  ----------------------------<  162<H>  4.1   |  27  |  0.51    Ca    9.5      30 Jan 2023 13:37    TPro  8.2  /  Alb  4.6  /  TBili  0.4  /  DBili  x   /  AST  25  /  ALT  19  /  AlkPhos  108  01-30    CAPILLARY BLOOD GLUCOSE    LIVER FUNCTIONS - ( 30 Jan 2023 13:37 )  Alb: 4.6 g/dL / Pro: 8.2 g/dL / ALK PHOS: 108 U/L / ALT: 19 U/L / AST: 25 U/L / GGT: x             Radiology:  Xray Chest 1 View- PORTABLE-Urgent (Xray Chest 1 View- PORTABLE-Urgent .) (01.30.23 @ 14:44)  IMPRESSION:  Bibasilar linear atelectasis. Otherwise, the lungs are clear.   Neurology - Consult Note    -  Spectra: [phone] ([facility]), [phone] ([facility])  -    HPI: Patient KAIDEN HATCH is a 62y (1960) woman with a PMHx significant for myasthenia gravis who presents to the ED with SOB for 3 days after testing positive for COVID-19 at home. Patient speaks Malayalam and translation was provided by family member at bedside at patient's request. Patient reportedly follows with a neurologist at [facility]. She is currently on prednisone 10mg PRN and pyridostigmine 120mg TID. She has had prior hospitalizations for myasthenia exacerbations every 1-2 years, including intubation, with last hospitalization 1.5 years ago. Each time she had an exacerbation she was treated with IVIG. She reportedly discussed PLEX as an option but preferred IVIG and states she typically starts to improve after 3-4 days. Currently patient reports to have weakness and worsening of SOB when laying flat. Additionally family reports ptosis can vary in laterality.      Review of Systems:   CONSTITUTIONAL: No fevers or chills  EYES AND ENT: No visual changes or no throat pain   NECK: No pain or stiffness  RESPIRATORY: cough and SOB as above  CARDIOVASCULAR: No chest pain or palpitations  GASTROINTESTINAL: No melena or hematochezia  GENITOURINARY: No dysuria or hematuria  NEUROLOGICAL: +As stated in HPI above  SKIN: No itching, burning, rashes, or lesions   All other review of systems is negative unless indicated above.      PMHx/PSHx/Family Hx: As above, otherwise see below   Myasthenia gravis    Diabetes mellitus        Social Hx:  No current use of tobacco, alcohol, or illicit drugs      Vitals:  T(C): 36.8 (01-30-23 @ 11:21), Max: 36.8 (01-30-23 @ 11:21)  HR: 120 (01-30-23 @ 14:14) (120 - 122)  BP: 180/101 (01-30-23 @ 11:21) (180/101 - 180/101)  RR: 20 (01-30-23 @ 14:14) (20 - 22)  SpO2: 98% (01-30-23 @ 14:14) (91% - 98%)    Physical Examination:  General - NAD  Cardiovascular - Peripheral pulses palpable, no edema  Eyes - Fundoscopy not performed due to safety precautions in the setting of the COVID-19 pandemic  Respiratory: On 2L O2 via NC    Neurologic Exam:  Mental status - Awake, Alert. Speech is fluent but is severely nasal and moderately hypophonic. Follows simple commands. Attention/concentration intact.    Cranial nerves - PERRLA, dysconjugate gaze with ophthalmoplegia and limited upwards gaze although difficult to adequately assess due to ptosis R>L, face sensation (V1-V3) grossly intact b/l, hearing intact b/l, palate elevation and tongue protrusion not assessed in the setting of COVID-19.    Motor - Normal bulk and tone throughout.  Strength testing  Neck flexion 3  Neck extension 3+            Deltoid      Biceps      Triceps       R            3                 4+           5                     5       L             3                 4+           5                     5               Hip Flexion   Dorsiflexion    Plantar Flexion  R              4                          4                      4    L              4                          4+                     4+    Sensation - Light touch intact throughout    DTR's - Not assessed    Coordination - Not assessed    Gait and station - Not assessed due to weakness and fall risk.    Labs:                        15.2   21.75 )-----------( 331      ( 30 Jan 2023 13:37 )             45.8     01-30    139  |  97  |  13  ----------------------------<  162<H>  4.1   |  27  |  0.51    Ca    9.5      30 Jan 2023 13:37    TPro  8.2  /  Alb  4.6  /  TBili  0.4  /  DBili  x   /  AST  25  /  ALT  19  /  AlkPhos  108  01-30    CAPILLARY BLOOD GLUCOSE    LIVER FUNCTIONS - ( 30 Jan 2023 13:37 )  Alb: 4.6 g/dL / Pro: 8.2 g/dL / ALK PHOS: 108 U/L / ALT: 19 U/L / AST: 25 U/L / GGT: x             Radiology:  Xray Chest 1 View- PORTABLE-Urgent (Xray Chest 1 View- PORTABLE-Urgent .) (01.30.23 @ 14:44)  IMPRESSION:  Bibasilar linear atelectasis. Otherwise, the lungs are clear.   Neurology - Consult Note    -  Spectra: [phone] ([facility]), [phone] ([facility])  -    HPI: Patient KAIDEN HATCH is a 62y (1960) woman with a PMHx significant for myasthenia gravis who presents to the ED with SOB for 3 days after testing positive for COVID-19 at home. Patient speaks Malayalam and translation was provided by family member at bedside at patient's request. Patient reportedly follows with a neurologist at [facility]. She is currently on prednisone 10mg PRN and pyridostigmine 120mg TID. She has had prior hospitalizations for myasthenia exacerbations every 1-2 years, including intubation, with last hospitalization 1.5 years ago. Each time she had an exacerbation she was treated with IVIG. She reportedly discussed PLEX as an option but preferred IVIG and states she typically starts to improve after 3-4 days. Currently patient reports to have weakness and worsening of SOB when laying flat. Additionally family reports ptosis can vary in laterality.      Review of Systems:   CONSTITUTIONAL: No fevers or chills  EYES AND ENT: No visual changes or no throat pain   NECK: No pain or stiffness  RESPIRATORY: cough and SOB as above  CARDIOVASCULAR: No chest pain or palpitations  GASTROINTESTINAL: No melena or hematochezia  GENITOURINARY: No dysuria or hematuria  NEUROLOGICAL: +As stated in HPI above  SKIN: No itching, burning, rashes, or lesions   All other review of systems is negative unless indicated above.    Allergies: NKDA, peanuts    PMHx/PSHx/Family Hx: As above, otherwise see below   Myasthenia gravis    Diabetes mellitus        Social Hx:  No current use of tobacco, alcohol, or illicit drugs      Vitals:  T(C): 36.8 (01-30-23 @ 11:21), Max: 36.8 (01-30-23 @ 11:21)  HR: 120 (01-30-23 @ 14:14) (120 - 122)  BP: 180/101 (01-30-23 @ 11:21) (180/101 - 180/101)  RR: 20 (01-30-23 @ 14:14) (20 - 22)  SpO2: 98% (01-30-23 @ 14:14) (91% - 98%)    Physical Examination:  General - NAD  Cardiovascular - Peripheral pulses palpable, no edema  Eyes - Fundoscopy not performed due to safety precautions in the setting of the COVID-19 pandemic  Respiratory: On 2L O2 via NC    Neurologic Exam:  Mental status - Awake, Alert, Oriented x3. Speech is fluent but is severely nasal and moderately hypophonic. Naming intact. Follows simple commands. Attention/concentration intact. Could not assess recent and remote memory, fund of knowledge, or repetition due to SOB/difficulty speaking for longer periods of time    Cranial nerves - PERRLA, dysconjugate gaze with ophthalmoplegia and limited upwards gaze although difficult to adequately assess due to ptosis moderate to severe R>L, face sensation (V1-V3) grossly intact b/l, hearing intact b/l, palate elevation and tongue protrusion not assessed in the setting of COVID-19, trap 5/5 b/l    Motor - Normal bulk and tone throughout.  Strength testing  Neck flexion 3  Neck extension 3+            Deltoid      Biceps      Triceps       R            3                 4+           5                     5       L             3                 4+           5                     5               Hip Flexion   Dorsiflexion    Plantar Flexion  R              4                          4                      4    L              4                          4+                     4+    Sensation - Light touch intact throughout    DTR's - 2+ BUE's, 1+ BLE's, and neutral b/l plantar response    Coordination - Grossly appropriate for level of strength    Gait and station - Not assessed due to weakness and fall risk.    Labs:                        15.2   21.75 )-----------( 331      ( 30 Jan 2023 13:37 )             45.8     01-30    139  |  97  |  13  ----------------------------<  162<H>  4.1   |  27  |  0.51    Ca    9.5      30 Jan 2023 13:37    TPro  8.2  /  Alb  4.6  /  TBili  0.4  /  DBili  x   /  AST  25  /  ALT  19  /  AlkPhos  108  01-30    CAPILLARY BLOOD GLUCOSE    LIVER FUNCTIONS - ( 30 Jan 2023 13:37 )  Alb: 4.6 g/dL / Pro: 8.2 g/dL / ALK PHOS: 108 U/L / ALT: 19 U/L / AST: 25 U/L / GGT: x             Radiology:  Xray Chest 1 View- PORTABLE-Urgent (Xray Chest 1 View- PORTABLE-Urgent .) (01.30.23 @ 14:44)  IMPRESSION:  Bibasilar linear atelectasis. Otherwise, the lungs are clear.   Neurology - Consult Note    -  Spectra: [phone] ([facility]), [phone] ([facility])  -    HPI: Patient KAIDEN HATCH is a 62y (1960) woman with a PMHx significant for myasthenia gravis who presents to the ED with SOB for 3 days after testing positive for COVID-19 at home. Patient speaks Malayalam and translation was provided by family member at bedside at patient's request. Patient reportedly follows with a neurologist at [facility]. She is currently on prednisone 10mg PRN and pyridostigmine 120mg TID. She has had prior hospitalizations for myasthenia exacerbations every 1-2 years, including intubation, with last hospitalization 1.5 years ago. Each time she had an exacerbation she was treated with IVIG. She reportedly discussed PLEX as an option but preferred IVIG and states she typically starts to improve after 3-4 days. Currently patient reports to have weakness and worsening of SOB when laying flat. Additionally family reports ptosis can vary in laterality.      Review of Systems:   CONSTITUTIONAL: No fevers or chills  EYES AND ENT: No visual changes or no throat pain   NECK: No pain or stiffness  RESPIRATORY: cough and SOB as above  CARDIOVASCULAR: No chest pain or palpitations  GASTROINTESTINAL: No melena or hematochezia  GENITOURINARY: No dysuria or hematuria  NEUROLOGICAL: +As stated in HPI above  SKIN: No itching, burning, rashes, or lesions   All other review of systems is negative unless indicated above.    Allergies: NKDA, peanuts    PMHx/PSHx/Family Hx: As above, otherwise see below   Myasthenia gravis    Diabetes mellitus        Social Hx:  No current use of tobacco, alcohol, or illicit drugs      Vitals:  T(C): 36.8 (01-30-23 @ 11:21), Max: 36.8 (01-30-23 @ 11:21)  HR: 120 (01-30-23 @ 14:14) (120 - 122)  BP: 180/101 (01-30-23 @ 11:21) (180/101 - 180/101)  RR: 20 (01-30-23 @ 14:14) (20 - 22)  SpO2: 98% (01-30-23 @ 14:14) (91% - 98%)    Physical Examination:  General - NAD  Cardiovascular - Peripheral pulses palpable, no edema  Eyes - Fundoscopy not performed due to safety precautions in the setting of the COVID-19 pandemic  Respiratory: On 2L O2 via NC    Neurologic Exam:  Mental status - Awake, Alert, Oriented x3. Speech is fluent but is severely nasal and moderately hypophonic. Naming intact. Follows simple commands. Attention/concentration intact. Could not assess recent and remote memory, fund of knowledge, or repetition due to SOB/difficulty speaking for longer periods of time    Cranial nerves - PERRLA, dysconjugate gaze with ophthalmoplegia and limited upwards gaze although difficult to adequately assess due to ptosis moderate to severe R>L, face sensation (V1-V3) grossly intact b/l, hearing intact b/l, palate elevation and tongue protrusion not assessed in the setting of COVID-19, trap 5/5 b/l    Motor - Normal bulk and tone throughout.  Strength testing  Neck flexion 3  Neck extension 3+            Deltoid      Biceps      Triceps       R            3                 4+           5                     5       L             3                 4+           5                     5               Hip Flexion   Dorsiflexion    Plantar Flexion  R              4                          4                      4    L              4                          4+                     4+    Sensation - Light touch intact throughout    DTR's - 2+ BUE's, 1+ BLE's, and neutral b/l plantar response    Coordination - Grossly appropriate for level of strength    Gait and station - Not assessed due to weakness and fall risk.    Labs:                        15.2   21.75 )-----------( 331      ( 30 Jan 2023 13:37 )             45.8     01-30    139  |  97  |  13  ----------------------------<  162<H>  4.1   |  27  |  0.51    Ca    9.5      30 Jan 2023 13:37    TPro  8.2  /  Alb  4.6  /  TBili  0.4  /  DBili  x   /  AST  25  /  ALT  19  /  AlkPhos  108  01-30    CAPILLARY BLOOD GLUCOSE    LIVER FUNCTIONS - ( 30 Jan 2023 13:37 )  Alb: 4.6 g/dL / Pro: 8.2 g/dL / ALK PHOS: 108 U/L / ALT: 19 U/L / AST: 25 U/L / GGT: x             Radiology:  Xray Chest 1 View- PORTABLE-Urgent (Xray Chest 1 View- PORTABLE-Urgent .) (01.30.23 @ 14:44)  IMPRESSION:  Bibasilar linear atelectasis. Otherwise, the lungs are clear.   Neurology - Consult Note    -  Spectra: [phone] ([facility]), [phone] ([facility])  -    HPI: Patient KAIDEN HATCH is a 62y (1960) woman with a PMHx significant for myasthenia gravis who presents to the ED with SOB for 3 days after testing positive for COVID-19 at home. Patient speaks Malayalam and translation was provided by family member at bedside at patient's request. Patient reportedly follows with a neurologist at [facility]. She is currently on prednisone 10mg PRN and pyridostigmine 120mg TID. She has had prior hospitalizations for myasthenia exacerbations every 1-2 years, including intubation, with last hospitalization 1.5 years ago. Each time she had an exacerbation she was treated with IVIG. She reportedly discussed PLEX as an option but preferred IVIG and states she typically starts to improve after 3-4 days. Currently patient reports to have weakness and worsening of SOB when laying flat. Additionally family reports ptosis can vary in laterality.      Review of Systems:   CONSTITUTIONAL: No fevers or chills  EYES AND ENT: No visual changes or no throat pain   NECK: No pain or stiffness  RESPIRATORY: cough and SOB as above  CARDIOVASCULAR: No chest pain or palpitations  GASTROINTESTINAL: No melena or hematochezia  GENITOURINARY: No dysuria or hematuria  NEUROLOGICAL: +As stated in HPI above  SKIN: No itching, burning, rashes, or lesions   All other review of systems is negative unless indicated above.    Allergies: NKDA, peanuts    PMHx/PSHx/Family Hx: As above, otherwise see below   Myasthenia gravis    Diabetes mellitus        Social Hx:  No current use of tobacco, alcohol, or illicit drugs      Vitals:  T(C): 36.8 (01-30-23 @ 11:21), Max: 36.8 (01-30-23 @ 11:21)  HR: 120 (01-30-23 @ 14:14) (120 - 122)  BP: 180/101 (01-30-23 @ 11:21) (180/101 - 180/101)  RR: 20 (01-30-23 @ 14:14) (20 - 22)  SpO2: 98% (01-30-23 @ 14:14) (91% - 98%)    Physical Examination:  General - NAD  Cardiovascular - Peripheral pulses palpable, no edema  Eyes - Fundoscopy not performed due to safety precautions in the setting of the COVID-19 pandemic  Respiratory: On 2L O2 via NC    Neurologic Exam:  Mental status - Awake, Alert, Oriented x3. Speech is fluent but is severely nasal and moderately hypophonic. Naming intact. Follows simple commands. Attention/concentration intact. Could not assess recent and remote memory, fund of knowledge, or repetition due to SOB/difficulty speaking for longer periods of time    Cranial nerves - PERRLA, dysconjugate gaze with ophthalmoplegia and limited upwards gaze although difficult to adequately assess due to ptosis moderate to severe R>L, face sensation (V1-V3) grossly intact b/l, hearing intact b/l, palate elevation and tongue protrusion not assessed in the setting of COVID-19, trap 5/5 b/l    Motor - Normal bulk and tone throughout.  Strength testing  Neck flexion 3  Neck extension 3+            Deltoid      Biceps      Triceps       R            3                 4+           5                     5       L             3                 4+           5                     5               Hip Flexion   Dorsiflexion    Plantar Flexion  R              4                          4                      4    L              4                          4+                     4+    Sensation - Light touch intact throughout    DTR's - 2+ BUE's, 1+ BLE's, and neutral b/l plantar response    Coordination - Grossly appropriate for level of strength    Gait and station - Not assessed due to weakness and fall risk.    Labs:                        15.2   21.75 )-----------( 331      ( 30 Jan 2023 13:37 )             45.8     01-30    139  |  97  |  13  ----------------------------<  162<H>  4.1   |  27  |  0.51    Ca    9.5      30 Jan 2023 13:37    TPro  8.2  /  Alb  4.6  /  TBili  0.4  /  DBili  x   /  AST  25  /  ALT  19  /  AlkPhos  108  01-30    CAPILLARY BLOOD GLUCOSE    LIVER FUNCTIONS - ( 30 Jan 2023 13:37 )  Alb: 4.6 g/dL / Pro: 8.2 g/dL / ALK PHOS: 108 U/L / ALT: 19 U/L / AST: 25 U/L / GGT: x             Radiology:  Xray Chest 1 View- PORTABLE-Urgent (Xray Chest 1 View- PORTABLE-Urgent .) (01.30.23 @ 14:44)  IMPRESSION:  Bibasilar linear atelectasis. Otherwise, the lungs are clear.

## 2025-01-16 ENCOUNTER — APPOINTMENT (OUTPATIENT)
Dept: NEUROLOGY | Facility: CLINIC | Age: 65
End: 2025-01-16

## 2025-01-16 PROCEDURE — 96365 THER/PROPH/DIAG IV INF INIT: CPT

## 2025-01-16 PROCEDURE — 96366 THER/PROPH/DIAG IV INF ADDON: CPT

## 2025-01-17 ENCOUNTER — APPOINTMENT (OUTPATIENT)
Dept: NEUROLOGY | Facility: CLINIC | Age: 65
End: 2025-01-17

## 2025-01-17 PROCEDURE — 96366 THER/PROPH/DIAG IV INF ADDON: CPT

## 2025-01-17 PROCEDURE — 96365 THER/PROPH/DIAG IV INF INIT: CPT

## 2025-02-24 ENCOUNTER — APPOINTMENT (OUTPATIENT)
Dept: NEUROLOGY | Facility: CLINIC | Age: 65
End: 2025-02-24

## 2025-02-24 PROCEDURE — 96366 THER/PROPH/DIAG IV INF ADDON: CPT

## 2025-02-24 PROCEDURE — 96365 THER/PROPH/DIAG IV INF INIT: CPT

## 2025-02-25 ENCOUNTER — APPOINTMENT (OUTPATIENT)
Dept: NEUROLOGY | Facility: CLINIC | Age: 65
End: 2025-02-25

## 2025-02-25 PROCEDURE — 96366 THER/PROPH/DIAG IV INF ADDON: CPT

## 2025-02-25 PROCEDURE — 96365 THER/PROPH/DIAG IV INF INIT: CPT

## 2025-03-13 NOTE — CHART NOTE - NSCHARTNOTEFT_GEN_A_CORE
right normal/left normal Spoke with Resident Roberto Carlos with Neurology [id number], admitted under . Spoke with Resident Roberto Carlos with Neurology [id number], admitted under . Spoke with Resident Roberto Carlos with Neurology [id number], admitted under .

## 2025-03-18 ENCOUNTER — APPOINTMENT (OUTPATIENT)
Dept: NEUROLOGY | Facility: CLINIC | Age: 65
End: 2025-03-18
Payer: MEDICARE

## 2025-03-18 VITALS
DIASTOLIC BLOOD PRESSURE: 82 MMHG | HEART RATE: 71 BPM | BODY MASS INDEX: 28.9 KG/M2 | WEIGHT: 158 LBS | SYSTOLIC BLOOD PRESSURE: 151 MMHG

## 2025-03-18 DIAGNOSIS — G89.29 PAIN IN RIGHT SHOULDER: ICD-10-CM

## 2025-03-18 DIAGNOSIS — M25.512 PAIN IN RIGHT SHOULDER: ICD-10-CM

## 2025-03-18 DIAGNOSIS — Z79.624 LONG TERM (CURRENT) USE OF INHIBITORS OF NUCLEOTIDE SYNTHESIS: ICD-10-CM

## 2025-03-18 DIAGNOSIS — M25.511 PAIN IN RIGHT SHOULDER: ICD-10-CM

## 2025-03-18 DIAGNOSIS — Z79.52 LONG TERM (CURRENT) USE OF SYSTEMIC STEROIDS: ICD-10-CM

## 2025-03-18 DIAGNOSIS — G70.00 MYASTHENIA GRAVIS W/OUT (ACUTE) EXACERBATION: ICD-10-CM

## 2025-03-18 PROCEDURE — G2211 COMPLEX E/M VISIT ADD ON: CPT

## 2025-03-18 PROCEDURE — 99215 OFFICE O/P EST HI 40 MIN: CPT

## 2025-03-25 ENCOUNTER — NON-APPOINTMENT (OUTPATIENT)
Age: 65
End: 2025-03-25

## 2025-03-26 NOTE — CONSULT NOTE ADULT - REASON FOR ADMISSION
[facility] crisis
and air entry.   Neurological:      Mental Status: She is alert and oriented to person, place, and time.   Psychiatric:         Attention and Perception: Attention and perception normal.         Mood and Affect: Mood and affect normal.         Speech: Speech normal.         Behavior: Behavior normal. Behavior is cooperative.         Thought Content: Thought content normal.         Cognition and Memory: She exhibits impaired recent memory.         Judgment: Judgment normal.       Physical Exam                   An electronic signature was used to authenticate this note.    --Lety Snyder, JANICE - CNP   
[facility] crisis

## 2025-03-27 ENCOUNTER — APPOINTMENT (OUTPATIENT)
Dept: NEUROLOGY | Facility: CLINIC | Age: 65
End: 2025-03-27

## 2025-03-27 PROCEDURE — 96365 THER/PROPH/DIAG IV INF INIT: CPT

## 2025-03-27 PROCEDURE — 96366 THER/PROPH/DIAG IV INF ADDON: CPT

## 2025-03-28 ENCOUNTER — APPOINTMENT (OUTPATIENT)
Dept: NEUROLOGY | Facility: CLINIC | Age: 65
End: 2025-03-28

## 2025-03-28 PROCEDURE — 96366 THER/PROPH/DIAG IV INF ADDON: CPT

## 2025-03-28 PROCEDURE — 96365 THER/PROPH/DIAG IV INF INIT: CPT

## 2025-04-28 ENCOUNTER — APPOINTMENT (OUTPATIENT)
Dept: NEUROLOGY | Facility: CLINIC | Age: 65
End: 2025-04-28
Payer: MEDICARE

## 2025-04-28 PROCEDURE — 96366 THER/PROPH/DIAG IV INF ADDON: CPT

## 2025-04-28 PROCEDURE — 96365 THER/PROPH/DIAG IV INF INIT: CPT

## 2025-04-29 ENCOUNTER — APPOINTMENT (OUTPATIENT)
Dept: NEUROLOGY | Facility: CLINIC | Age: 65
End: 2025-04-29

## 2025-04-29 PROCEDURE — 96366 THER/PROPH/DIAG IV INF ADDON: CPT

## 2025-04-29 PROCEDURE — 96365 THER/PROPH/DIAG IV INF INIT: CPT

## 2025-05-22 ENCOUNTER — APPOINTMENT (OUTPATIENT)
Dept: NEUROLOGY | Facility: CLINIC | Age: 65
End: 2025-05-22

## 2025-05-22 PROCEDURE — 96366 THER/PROPH/DIAG IV INF ADDON: CPT

## 2025-05-22 PROCEDURE — 96365 THER/PROPH/DIAG IV INF INIT: CPT

## 2025-05-23 ENCOUNTER — APPOINTMENT (OUTPATIENT)
Dept: NEUROLOGY | Facility: CLINIC | Age: 65
End: 2025-05-23

## 2025-05-23 PROCEDURE — 96366 THER/PROPH/DIAG IV INF ADDON: CPT

## 2025-05-23 PROCEDURE — 96365 THER/PROPH/DIAG IV INF INIT: CPT

## 2025-06-11 NOTE — OCCUPATIONAL THERAPY INITIAL EVALUATION ADULT - RANGE OF MOTION EXAMINATION, UPPER EXTREMITY
Bed: 02  Expected date:   Expected time:   Means of arrival:   Comments:  EMS  
Patient placed in Trendelenburg due to hypotension. MD made aware. Awaiting new orders.   
RT called for POC lab.  
bilateral UE Active ROM was WFL  (within functional limits)/bilateral UE Passive ROM was WFL  (within functional limits)

## 2025-06-12 ENCOUNTER — TRANSCRIPTION ENCOUNTER (OUTPATIENT)
Age: 65
End: 2025-06-12

## 2025-06-18 ENCOUNTER — APPOINTMENT (OUTPATIENT)
Dept: NEUROLOGY | Facility: CLINIC | Age: 65
End: 2025-06-18
Payer: MEDICARE

## 2025-06-18 ENCOUNTER — NON-APPOINTMENT (OUTPATIENT)
Age: 65
End: 2025-06-18

## 2025-06-18 VITALS
OXYGEN SATURATION: 99 % | DIASTOLIC BLOOD PRESSURE: 78 MMHG | RESPIRATION RATE: 21 BRPM | BODY MASS INDEX: 25.66 KG/M2 | HEART RATE: 84 BPM | WEIGHT: 154 LBS | HEIGHT: 65 IN | SYSTOLIC BLOOD PRESSURE: 143 MMHG

## 2025-06-18 PROCEDURE — G2211 COMPLEX E/M VISIT ADD ON: CPT

## 2025-06-18 PROCEDURE — 99214 OFFICE O/P EST MOD 30 MIN: CPT

## 2025-06-25 ENCOUNTER — APPOINTMENT (OUTPATIENT)
Dept: NEUROLOGY | Facility: CLINIC | Age: 65
End: 2025-06-25

## 2025-06-25 PROCEDURE — 96366 THER/PROPH/DIAG IV INF ADDON: CPT

## 2025-06-25 PROCEDURE — 96365 THER/PROPH/DIAG IV INF INIT: CPT

## 2025-06-26 ENCOUNTER — APPOINTMENT (OUTPATIENT)
Dept: NEUROLOGY | Facility: CLINIC | Age: 65
End: 2025-06-26

## 2025-06-26 PROCEDURE — 96366 THER/PROPH/DIAG IV INF ADDON: CPT

## 2025-06-26 PROCEDURE — 96365 THER/PROPH/DIAG IV INF INIT: CPT

## 2025-07-24 ENCOUNTER — APPOINTMENT (OUTPATIENT)
Dept: NEUROLOGY | Facility: CLINIC | Age: 65
End: 2025-07-24
Payer: MEDICARE

## 2025-07-24 PROCEDURE — 96365 THER/PROPH/DIAG IV INF INIT: CPT

## 2025-07-24 PROCEDURE — 96366 THER/PROPH/DIAG IV INF ADDON: CPT

## 2025-07-25 ENCOUNTER — APPOINTMENT (OUTPATIENT)
Dept: NEUROLOGY | Facility: CLINIC | Age: 65
End: 2025-07-25
Payer: MEDICARE

## 2025-07-25 PROCEDURE — 96366 THER/PROPH/DIAG IV INF ADDON: CPT

## 2025-07-25 PROCEDURE — 96365 THER/PROPH/DIAG IV INF INIT: CPT

## 2025-08-27 ENCOUNTER — APPOINTMENT (OUTPATIENT)
Dept: NEUROLOGY | Facility: CLINIC | Age: 65
End: 2025-08-27
Payer: MEDICARE

## 2025-08-27 PROCEDURE — 96366 THER/PROPH/DIAG IV INF ADDON: CPT

## 2025-08-27 PROCEDURE — 96365 THER/PROPH/DIAG IV INF INIT: CPT

## 2025-08-28 ENCOUNTER — APPOINTMENT (OUTPATIENT)
Dept: NEUROLOGY | Facility: CLINIC | Age: 65
End: 2025-08-28

## 2025-08-28 PROCEDURE — 96365 THER/PROPH/DIAG IV INF INIT: CPT

## 2025-08-28 PROCEDURE — 96366 THER/PROPH/DIAG IV INF ADDON: CPT

## 2025-09-05 ENCOUNTER — OUTPATIENT (OUTPATIENT)
Dept: OUTPATIENT SERVICES | Facility: HOSPITAL | Age: 65
LOS: 1 days | End: 2025-09-05
Payer: MEDICARE

## 2025-09-05 ENCOUNTER — APPOINTMENT (OUTPATIENT)
Dept: ULTRASOUND IMAGING | Facility: IMAGING CENTER | Age: 65
End: 2025-09-05
Payer: MEDICARE

## 2025-09-05 DIAGNOSIS — R94.5 ABNORMAL RESULTS OF LIVER FUNCTION STUDIES: ICD-10-CM

## 2025-09-05 PROCEDURE — 76700 US EXAM ABDOM COMPLETE: CPT | Mod: 26

## 2025-09-05 PROCEDURE — 76700 US EXAM ABDOM COMPLETE: CPT

## 2025-09-10 ENCOUNTER — APPOINTMENT (OUTPATIENT)
Dept: NEUROLOGY | Facility: CLINIC | Age: 65
End: 2025-09-10
Payer: MEDICARE

## 2025-09-10 VITALS
SYSTOLIC BLOOD PRESSURE: 144 MMHG | HEART RATE: 73 BPM | BODY MASS INDEX: 25.83 KG/M2 | DIASTOLIC BLOOD PRESSURE: 80 MMHG | WEIGHT: 155 LBS | HEIGHT: 65 IN

## 2025-09-10 DIAGNOSIS — G70.00 MYASTHENIA GRAVIS W/OUT (ACUTE) EXACERBATION: ICD-10-CM

## 2025-09-10 DIAGNOSIS — M25.512 PAIN IN RIGHT SHOULDER: ICD-10-CM

## 2025-09-10 DIAGNOSIS — M25.511 PAIN IN RIGHT SHOULDER: ICD-10-CM

## 2025-09-10 DIAGNOSIS — Z79.52 LONG TERM (CURRENT) USE OF SYSTEMIC STEROIDS: ICD-10-CM

## 2025-09-10 DIAGNOSIS — G89.29 PAIN IN RIGHT SHOULDER: ICD-10-CM

## 2025-09-10 PROCEDURE — 99215 OFFICE O/P EST HI 40 MIN: CPT
